# Patient Record
Sex: FEMALE | Race: WHITE | Employment: OTHER | URBAN - NONMETROPOLITAN AREA
[De-identification: names, ages, dates, MRNs, and addresses within clinical notes are randomized per-mention and may not be internally consistent; named-entity substitution may affect disease eponyms.]

---

## 2017-01-04 ENCOUNTER — TELEPHONE (OUTPATIENT)
Dept: PRIMARY CARE CLINIC | Age: 67
End: 2017-01-04

## 2017-01-19 DIAGNOSIS — J01.00 ACUTE NON-RECURRENT MAXILLARY SINUSITIS: ICD-10-CM

## 2017-01-19 RX ORDER — DEXTROMETHORPHAN HYDROBROMIDE AND PROMETHAZINE HYDROCHLORIDE 15; 6.25 MG/5ML; MG/5ML
5 SYRUP ORAL 4 TIMES DAILY PRN
Qty: 240 ML | Refills: 0 | Status: SHIPPED | OUTPATIENT
Start: 2017-01-19 | End: 2017-01-26

## 2017-01-24 RX ORDER — OXYCODONE AND ACETAMINOPHEN 10; 325 MG/1; MG/1
TABLET ORAL
Qty: 150 TABLET | Refills: 0 | Status: SHIPPED | OUTPATIENT
Start: 2017-01-29 | End: 2017-02-23 | Stop reason: SDUPTHER

## 2017-01-24 RX ORDER — OXYCODONE HYDROCHLORIDE 30 MG/1
30 TABLET, FILM COATED, EXTENDED RELEASE ORAL EVERY 12 HOURS
Qty: 60 EACH | Refills: 0 | Status: SHIPPED | OUTPATIENT
Start: 2017-01-29 | End: 2017-02-23 | Stop reason: SDUPTHER

## 2017-02-08 ENCOUNTER — TELEPHONE (OUTPATIENT)
Dept: PRIMARY CARE CLINIC | Age: 67
End: 2017-02-08

## 2017-02-08 RX ORDER — GUAIFENESIN 600 MG/1
600 TABLET, EXTENDED RELEASE ORAL 2 TIMES DAILY
Qty: 20 TABLET | Refills: 0 | Status: SHIPPED | OUTPATIENT
Start: 2017-02-08 | End: 2017-02-18

## 2017-02-08 RX ORDER — AZITHROMYCIN 250 MG/1
TABLET, FILM COATED ORAL
Qty: 1 PACKET | Refills: 0 | Status: SHIPPED | OUTPATIENT
Start: 2017-02-08 | End: 2017-02-18

## 2017-02-23 RX ORDER — OXYCODONE AND ACETAMINOPHEN 10; 325 MG/1; MG/1
TABLET ORAL
Qty: 120 TABLET | Refills: 0 | Status: SHIPPED | OUTPATIENT
Start: 2017-02-28 | End: 2017-03-30 | Stop reason: SDUPTHER

## 2017-02-23 RX ORDER — ONDANSETRON 4 MG/1
4 TABLET, FILM COATED ORAL EVERY 8 HOURS PRN
Qty: 30 TABLET | Refills: 0 | Status: ON HOLD | OUTPATIENT
Start: 2017-02-23 | End: 2022-06-25 | Stop reason: HOSPADM

## 2017-02-23 RX ORDER — OXYCODONE HYDROCHLORIDE 30 MG/1
30 TABLET, FILM COATED, EXTENDED RELEASE ORAL EVERY 12 HOURS
Qty: 48 EACH | Refills: 0 | Status: SHIPPED | OUTPATIENT
Start: 2017-02-28 | End: 2017-03-30 | Stop reason: SDUPTHER

## 2017-02-24 ENCOUNTER — TELEPHONE (OUTPATIENT)
Dept: PRIMARY CARE CLINIC | Age: 67
End: 2017-02-24

## 2017-02-24 RX ORDER — ONDANSETRON 4 MG/1
4 TABLET, FILM COATED ORAL DAILY PRN
Qty: 30 TABLET | Refills: 0 | Status: SHIPPED | OUTPATIENT
Start: 2017-02-24 | End: 2017-03-30

## 2017-03-04 ENCOUNTER — APPOINTMENT (OUTPATIENT)
Dept: GENERAL RADIOLOGY | Facility: HOSPITAL | Age: 67
End: 2017-03-04

## 2017-03-04 ENCOUNTER — HOSPITAL ENCOUNTER (INPATIENT)
Facility: HOSPITAL | Age: 67
LOS: 12 days | Discharge: HOME HEALTH | End: 2017-03-16
Attending: EMERGENCY MEDICINE | Admitting: INTERNAL MEDICINE

## 2017-03-04 ENCOUNTER — APPOINTMENT (OUTPATIENT)
Dept: CT IMAGING | Facility: HOSPITAL | Age: 67
End: 2017-03-04

## 2017-03-04 DIAGNOSIS — A41.9 SEPSIS, DUE TO UNSPECIFIED ORGANISM: ICD-10-CM

## 2017-03-04 DIAGNOSIS — Z74.09 IMPAIRED MOBILITY: ICD-10-CM

## 2017-03-04 DIAGNOSIS — J96.01 ACUTE RESPIRATORY FAILURE WITH HYPOXIA (HCC): ICD-10-CM

## 2017-03-04 DIAGNOSIS — J18.9 PNEUMONIA OF RIGHT LOWER LOBE DUE TO INFECTIOUS ORGANISM: Primary | ICD-10-CM

## 2017-03-04 LAB
ALBUMIN SERPL-MCNC: 3.5 G/DL (ref 3.5–5)
ALBUMIN/GLOB SERPL: 1.2 G/DL (ref 1.1–2.5)
ALP SERPL-CCNC: 77 U/L (ref 24–120)
ALT SERPL W P-5'-P-CCNC: 29 U/L (ref 0–54)
AMPHET+METHAMPHET UR QL: NEGATIVE
ANION GAP SERPL CALCULATED.3IONS-SCNC: 5 MMOL/L (ref 4–13)
APTT PPP: 36.6 SECONDS (ref 24.1–34.8)
ARTERIAL PATENCY WRIST A: ABNORMAL
AST SERPL-CCNC: 38 U/L (ref 7–45)
ATMOSPHERIC PRESS: ABNORMAL MMHG
BARBITURATES UR QL SCN: NEGATIVE
BASE EXCESS BLDA CALC-SCNC: -0.2 MMOL/L (ref -2–2)
BASE EXCESS BLDA CALC-SCNC: -0.2 MMOL/L (ref -2–2)
BASE EXCESS BLDA CALC-SCNC: -2.5 MMOL/L (ref -2–2)
BASE EXCESS BLDA CALC-SCNC: 1.1 MMOL/L (ref -2–2)
BASOPHILS # BLD AUTO: 0.02 10*3/MM3 (ref 0–0.2)
BASOPHILS NFR BLD AUTO: 0.4 % (ref 0–2)
BDY SITE: ABNORMAL
BENZODIAZ UR QL SCN: POSITIVE
BILIRUB SERPL-MCNC: 0.2 MG/DL (ref 0.1–1)
BILIRUB UR QL STRIP: NEGATIVE
BUN BLD-MCNC: 6 MG/DL (ref 5–21)
BUN/CREAT SERPL: 8 (ref 7–25)
CA-I BLDA-SCNC: 1.09 MMOL/L (ref 1.1–1.35)
CALCIUM SPEC-SCNC: 8.2 MG/DL (ref 8.4–10.4)
CANNABINOIDS SERPL QL: NEGATIVE
CHLORIDE SERPL-SCNC: 102 MMOL/L (ref 98–110)
CK MB SERPL-CCNC: 1.05 NG/ML (ref 0–5)
CK SERPL-CCNC: 42 U/L (ref 0–203)
CLARITY UR: CLEAR
CO2 SERPL-SCNC: 32 MMOL/L (ref 24–31)
COCAINE UR QL: NEGATIVE
COLOR UR: YELLOW
CREAT BLD-MCNC: 0.75 MG/DL (ref 0.5–1.4)
CRP SERPL-MCNC: 4.4 MG/DL (ref 0–0.99)
D-LACTATE SERPL-SCNC: 0.7 MMOL/L (ref 0.5–2)
D-LACTATE SERPL-SCNC: 0.9 MMOL/L (ref 0.5–2)
DEPRECATED RDW RBC AUTO: 53.2 FL (ref 40–54)
EOSINOPHIL # BLD AUTO: 0.14 10*3/MM3 (ref 0–0.7)
EOSINOPHIL NFR BLD AUTO: 2.7 % (ref 0–4)
EPAP: 5
EPAP: 5
ERYTHROCYTE [DISTWIDTH] IN BLOOD BY AUTOMATED COUNT: 15 % (ref 12–15)
FLUAV AG NPH QL: NEGATIVE
FLUBV AG NPH QL IA: NEGATIVE
GFR SERPL CREATININE-BSD FRML MDRD: 77 ML/MIN/1.73
GLOBULIN UR ELPH-MCNC: 3 GM/DL
GLUCOSE BLD-MCNC: 119 MG/DL (ref 70–100)
GLUCOSE UR STRIP-MCNC: NEGATIVE MG/DL
HCO3 BLDA-SCNC: 24.7 MMOL/L (ref 22–26)
HCO3 BLDA-SCNC: 26.3 MMOL/L (ref 22–26)
HCO3 BLDA-SCNC: 27.7 MMOL/L (ref 22–26)
HCO3 BLDA-SCNC: 28.6 MMOL/L (ref 22–26)
HCT VFR BLD AUTO: 36.5 % (ref 37–47)
HGB BLD-MCNC: 11.6 G/DL (ref 12–16)
HGB UR QL STRIP.AUTO: NEGATIVE
HOLD SPECIMEN: NORMAL
HOLD SPECIMEN: NORMAL
HOROWITZ INDEX BLD+IHG-RTO: 100 %
HOROWITZ INDEX BLD+IHG-RTO: 55 %
HOROWITZ INDEX BLD+IHG-RTO: 55 %
HOROWITZ INDEX BLD+IHG-RTO: 75 %
IMM GRANULOCYTES # BLD: 0 10*3/MM3 (ref 0–0.03)
IMM GRANULOCYTES NFR BLD: 0 % (ref 0–5)
INR PPP: 0.92 (ref 0.91–1.09)
IPAP: 16
IPAP: 18
KETONES UR QL STRIP: NEGATIVE
LEUKOCYTE ESTERASE UR QL STRIP.AUTO: NEGATIVE
LYMPHOCYTES # BLD AUTO: 0.89 10*3/MM3 (ref 0.72–4.86)
LYMPHOCYTES NFR BLD AUTO: 17.3 % (ref 15–45)
MAGNESIUM SERPL-MCNC: 1.4 MG/DL (ref 1.4–2.2)
MCH RBC QN AUTO: 30.8 PG (ref 28–32)
MCHC RBC AUTO-ENTMCNC: 31.8 G/DL (ref 33–36)
MCV RBC AUTO: 96.8 FL (ref 82–98)
METHADONE UR QL SCN: NEGATIVE
MODALITY: ABNORMAL
MONOCYTES # BLD AUTO: 0.52 10*3/MM3 (ref 0.19–1.3)
MONOCYTES NFR BLD AUTO: 10.1 % (ref 4–12)
NEUTROPHILS # BLD AUTO: 3.58 10*3/MM3 (ref 1.87–8.4)
NEUTROPHILS NFR BLD AUTO: 69.5 % (ref 39–78)
NITRITE UR QL STRIP: NEGATIVE
OPIATES UR QL: POSITIVE
PCO2 BLDA: 50.3 MM HG (ref 35–45)
PCO2 BLDA: 51.8 MM HG (ref 35–45)
PCO2 BLDA: 56.9 MM HG (ref 35–45)
PCO2 BLDA: 58.6 MM HG (ref 35–45)
PCP UR QL SCN: NEGATIVE
PEEP RESPIRATORY: 5 CM[H2O]
PH BLDA: 7.29 PH UNITS (ref 7.35–7.45)
PH BLDA: 7.3 PH UNITS (ref 7.35–7.45)
PH BLDA: 7.32 PH UNITS (ref 7.35–7.45)
PH BLDA: 7.34 PH UNITS (ref 7.35–7.45)
PH UR STRIP.AUTO: 5.5 [PH] (ref 5–8)
PHOSPHATE SERPL-MCNC: 2.6 MG/DL (ref 2.5–4.5)
PLATELET # BLD AUTO: 161 10*3/MM3 (ref 130–400)
PMV BLD AUTO: 11.5 FL (ref 6–12)
PO2 BLDA: 106.9 MM HG (ref 80–100)
PO2 BLDA: 120.9 MM HG (ref 80–100)
PO2 BLDA: 85.8 MM HG (ref 80–100)
PO2 BLDA: 93.7 MM HG (ref 80–100)
POTASSIUM BLD-SCNC: 3.7 MMOL/L (ref 3.5–5.3)
PROCALCITONIN SERPL-MCNC: <0.25 NG/ML
PROT SERPL-MCNC: 6.5 G/DL (ref 6.3–8.7)
PROT UR QL STRIP: NEGATIVE
PROTHROMBIN TIME: 12.6 SECONDS (ref 11.9–14.6)
RBC # BLD AUTO: 3.77 10*6/MM3 (ref 4.2–5.4)
SAO2 % BLDCOA: 95.8 % (ref 94–100)
SAO2 % BLDCOA: 95.8 % (ref 94–100)
SAO2 % BLDCOA: 96.2 % (ref 94–100)
SAO2 % BLDCOA: 96.2 % (ref 94–100)
SAO2 % BLDCOA: 97.3 % (ref 94–100)
SAO2 % BLDCOA: 97.3 % (ref 94–100)
SAO2 % BLDCOA: 98 % (ref 94–100)
SAO2 % BLDCOA: 98 % (ref 94–100)
SODIUM BLD-SCNC: 139 MMOL/L (ref 135–145)
SP GR UR STRIP: 1.02 (ref 1–1.03)
TOTAL RATE: 10 BREATHS/MINUTE
TOTAL RATE: 12 BREATHS/MINUTE
TROPONIN I SERPL-MCNC: 0 NG/ML (ref 0–0.07)
TROPONIN I SERPL-MCNC: 0.02 NG/ML (ref 0–0.03)
UROBILINOGEN UR QL STRIP: NORMAL
VENT CPAP/PEEP: 5
VENTILATOR MODE: AC
WBC NRBC COR # BLD: 5.15 10*3/MM3 (ref 4.8–10.8)
WHOLE BLOOD HOLD SPECIMEN: NORMAL
WHOLE BLOOD HOLD SPECIMEN: NORMAL

## 2017-03-04 PROCEDURE — 85610 PROTHROMBIN TIME: CPT | Performed by: EMERGENCY MEDICINE

## 2017-03-04 PROCEDURE — 87040 BLOOD CULTURE FOR BACTERIA: CPT | Performed by: EMERGENCY MEDICINE

## 2017-03-04 PROCEDURE — 25010000002 ENOXAPARIN PER 10 MG: Performed by: INTERNAL MEDICINE

## 2017-03-04 PROCEDURE — 71010 HC CHEST PA OR AP: CPT

## 2017-03-04 PROCEDURE — 80307 DRUG TEST PRSMV CHEM ANLYZR: CPT | Performed by: INTERNAL MEDICINE

## 2017-03-04 PROCEDURE — 93005 ELECTROCARDIOGRAM TRACING: CPT | Performed by: EMERGENCY MEDICINE

## 2017-03-04 PROCEDURE — 25010000002 AZITHROMYCIN: Performed by: INTERNAL MEDICINE

## 2017-03-04 PROCEDURE — 94660 CPAP INITIATION&MGMT: CPT

## 2017-03-04 PROCEDURE — 80053 COMPREHEN METABOLIC PANEL: CPT | Performed by: EMERGENCY MEDICINE

## 2017-03-04 PROCEDURE — 85730 THROMBOPLASTIN TIME PARTIAL: CPT | Performed by: EMERGENCY MEDICINE

## 2017-03-04 PROCEDURE — 82330 ASSAY OF CALCIUM: CPT

## 2017-03-04 PROCEDURE — 36600 WITHDRAWAL OF ARTERIAL BLOOD: CPT

## 2017-03-04 PROCEDURE — 83605 ASSAY OF LACTIC ACID: CPT | Performed by: INTERNAL MEDICINE

## 2017-03-04 PROCEDURE — 25010000002 PROPOFOL 1000 MG/ML EMULSION: Performed by: INTERNAL MEDICINE

## 2017-03-04 PROCEDURE — 02HV33Z INSERTION OF INFUSION DEVICE INTO SUPERIOR VENA CAVA, PERCUTANEOUS APPROACH: ICD-10-PCS | Performed by: INTERNAL MEDICINE

## 2017-03-04 PROCEDURE — 81003 URINALYSIS AUTO W/O SCOPE: CPT | Performed by: EMERGENCY MEDICINE

## 2017-03-04 PROCEDURE — 70450 CT HEAD/BRAIN W/O DYE: CPT

## 2017-03-04 PROCEDURE — 94799 UNLISTED PULMONARY SVC/PX: CPT

## 2017-03-04 PROCEDURE — 86140 C-REACTIVE PROTEIN: CPT | Performed by: EMERGENCY MEDICINE

## 2017-03-04 PROCEDURE — 31500 INSERT EMERGENCY AIRWAY: CPT | Performed by: INTERNAL MEDICINE

## 2017-03-04 PROCEDURE — 5A1935Z RESPIRATORY VENTILATION, LESS THAN 24 CONSECUTIVE HOURS: ICD-10-PCS | Performed by: INTERNAL MEDICINE

## 2017-03-04 PROCEDURE — 93010 ELECTROCARDIOGRAM REPORT: CPT | Performed by: INTERNAL MEDICINE

## 2017-03-04 PROCEDURE — 84484 ASSAY OF TROPONIN QUANT: CPT

## 2017-03-04 PROCEDURE — 83735 ASSAY OF MAGNESIUM: CPT | Performed by: EMERGENCY MEDICINE

## 2017-03-04 PROCEDURE — 83605 ASSAY OF LACTIC ACID: CPT | Performed by: EMERGENCY MEDICINE

## 2017-03-04 PROCEDURE — 82803 BLOOD GASES ANY COMBINATION: CPT

## 2017-03-04 PROCEDURE — 5A09357 ASSISTANCE WITH RESPIRATORY VENTILATION, LESS THAN 24 CONSECUTIVE HOURS, CONTINUOUS POSITIVE AIRWAY PRESSURE: ICD-10-PCS | Performed by: INTERNAL MEDICINE

## 2017-03-04 PROCEDURE — 94760 N-INVAS EAR/PLS OXIMETRY 1: CPT

## 2017-03-04 PROCEDURE — 94640 AIRWAY INHALATION TREATMENT: CPT

## 2017-03-04 PROCEDURE — 25010000002 PIPERACILLIN-TAZOBACTAM: Performed by: EMERGENCY MEDICINE

## 2017-03-04 PROCEDURE — 25010000002 METHYLPREDNISOLONE PER 125 MG: Performed by: EMERGENCY MEDICINE

## 2017-03-04 PROCEDURE — 82550 ASSAY OF CK (CPK): CPT | Performed by: INTERNAL MEDICINE

## 2017-03-04 PROCEDURE — 82553 CREATINE MB FRACTION: CPT | Performed by: INTERNAL MEDICINE

## 2017-03-04 PROCEDURE — 99285 EMERGENCY DEPT VISIT HI MDM: CPT

## 2017-03-04 PROCEDURE — 87804 INFLUENZA ASSAY W/OPTIC: CPT | Performed by: EMERGENCY MEDICINE

## 2017-03-04 PROCEDURE — 74176 CT ABD & PELVIS W/O CONTRAST: CPT

## 2017-03-04 PROCEDURE — 84145 PROCALCITONIN (PCT): CPT | Performed by: EMERGENCY MEDICINE

## 2017-03-04 PROCEDURE — 84484 ASSAY OF TROPONIN QUANT: CPT | Performed by: INTERNAL MEDICINE

## 2017-03-04 PROCEDURE — 0BH17EZ INSERTION OF ENDOTRACHEAL AIRWAY INTO TRACHEA, VIA NATURAL OR ARTIFICIAL OPENING: ICD-10-PCS | Performed by: INTERNAL MEDICINE

## 2017-03-04 PROCEDURE — 71250 CT THORAX DX C-: CPT

## 2017-03-04 PROCEDURE — 85025 COMPLETE CBC W/AUTO DIFF WBC: CPT | Performed by: EMERGENCY MEDICINE

## 2017-03-04 PROCEDURE — 84100 ASSAY OF PHOSPHORUS: CPT | Performed by: EMERGENCY MEDICINE

## 2017-03-04 PROCEDURE — 94002 VENT MGMT INPAT INIT DAY: CPT

## 2017-03-04 RX ORDER — PROMETHAZINE HYDROCHLORIDE 25 MG/1
25 TABLET ORAL EVERY 6 HOURS PRN
COMMUNITY
End: 2018-12-03

## 2017-03-04 RX ORDER — MIRTAZAPINE 15 MG/1
15 TABLET, FILM COATED ORAL NIGHTLY
COMMUNITY
End: 2017-03-16 | Stop reason: HOSPADM

## 2017-03-04 RX ORDER — HYDRALAZINE HYDROCHLORIDE 20 MG/ML
10 INJECTION INTRAMUSCULAR; INTRAVENOUS EVERY 6 HOURS PRN
Status: DISCONTINUED | OUTPATIENT
Start: 2017-03-04 | End: 2017-03-16 | Stop reason: HOSPADM

## 2017-03-04 RX ORDER — ZOLPIDEM TARTRATE 5 MG/1
10 TABLET ORAL NIGHTLY PRN
COMMUNITY
End: 2017-03-16 | Stop reason: HOSPADM

## 2017-03-04 RX ORDER — DEXTROAMPHETAMINE SACCHARATE, AMPHETAMINE ASPARTATE, DEXTROAMPHETAMINE SULFATE AND AMPHETAMINE SULFATE 5; 5; 5; 5 MG/1; MG/1; MG/1; MG/1
20 TABLET ORAL 2 TIMES DAILY PRN
COMMUNITY

## 2017-03-04 RX ORDER — TIZANIDINE 4 MG/1
4 TABLET ORAL EVERY 8 HOURS PRN
COMMUNITY
End: 2017-03-16 | Stop reason: HOSPADM

## 2017-03-04 RX ORDER — BUDESONIDE AND FORMOTEROL FUMARATE DIHYDRATE 160; 4.5 UG/1; UG/1
2 AEROSOL RESPIRATORY (INHALATION)
COMMUNITY
End: 2018-12-03 | Stop reason: SDUPTHER

## 2017-03-04 RX ORDER — NICOTINE 21 MG/24HR
1 PATCH, TRANSDERMAL 24 HOURS TRANSDERMAL EVERY 24 HOURS
Status: DISCONTINUED | OUTPATIENT
Start: 2017-03-04 | End: 2017-03-16 | Stop reason: HOSPADM

## 2017-03-04 RX ORDER — SODIUM CHLORIDE 0.9 % (FLUSH) 0.9 %
10 SYRINGE (ML) INJECTION AS NEEDED
Status: DISCONTINUED | OUTPATIENT
Start: 2017-03-04 | End: 2017-03-16 | Stop reason: HOSPADM

## 2017-03-04 RX ORDER — CHLORHEXIDINE GLUCONATE 0.12 MG/ML
15 RINSE ORAL EVERY 12 HOURS SCHEDULED
Status: DISCONTINUED | OUTPATIENT
Start: 2017-03-04 | End: 2017-03-05

## 2017-03-04 RX ORDER — IPRATROPIUM BROMIDE AND ALBUTEROL SULFATE 2.5; .5 MG/3ML; MG/3ML
3 SOLUTION RESPIRATORY (INHALATION) ONCE
Status: COMPLETED | OUTPATIENT
Start: 2017-03-04 | End: 2017-03-04

## 2017-03-04 RX ORDER — LISINOPRIL 5 MG/1
5 TABLET ORAL DAILY
COMMUNITY
End: 2019-08-19 | Stop reason: ALTCHOICE

## 2017-03-04 RX ORDER — FUROSEMIDE 40 MG/1
40 TABLET ORAL DAILY
COMMUNITY
End: 2018-12-03

## 2017-03-04 RX ORDER — IPRATROPIUM BROMIDE AND ALBUTEROL SULFATE 2.5; .5 MG/3ML; MG/3ML
3 SOLUTION RESPIRATORY (INHALATION)
Status: DISCONTINUED | OUTPATIENT
Start: 2017-03-04 | End: 2017-03-05

## 2017-03-04 RX ORDER — ENALAPRILAT 2.5 MG/2ML
0.62 INJECTION INTRAVENOUS EVERY 6 HOURS PRN
Status: DISCONTINUED | OUTPATIENT
Start: 2017-03-04 | End: 2017-03-16 | Stop reason: HOSPADM

## 2017-03-04 RX ORDER — ONDANSETRON 4 MG/1
4 TABLET, FILM COATED ORAL EVERY 6 HOURS PRN
COMMUNITY
End: 2020-11-23 | Stop reason: SDUPTHER

## 2017-03-04 RX ORDER — ALBUTEROL SULFATE 90 UG/1
2 AEROSOL, METERED RESPIRATORY (INHALATION) EVERY 4 HOURS PRN
Status: ON HOLD | COMMUNITY
End: 2019-09-05 | Stop reason: SDUPTHER

## 2017-03-04 RX ORDER — SODIUM CHLORIDE 0.9 % (FLUSH) 0.9 %
1-10 SYRINGE (ML) INJECTION AS NEEDED
Status: DISCONTINUED | OUTPATIENT
Start: 2017-03-04 | End: 2017-03-16 | Stop reason: HOSPADM

## 2017-03-04 RX ORDER — OXYCODONE HYDROCHLORIDE 30 MG/1
30 TABLET, FILM COATED, EXTENDED RELEASE ORAL EVERY 12 HOURS SCHEDULED
COMMUNITY
End: 2020-11-23 | Stop reason: DRUGHIGH

## 2017-03-04 RX ORDER — MORPHINE SULFATE 2 MG/ML
2 INJECTION, SOLUTION INTRAMUSCULAR; INTRAVENOUS
Status: DISCONTINUED | OUTPATIENT
Start: 2017-03-04 | End: 2017-03-05

## 2017-03-04 RX ORDER — ACETAMINOPHEN 325 MG/1
650 TABLET ORAL EVERY 4 HOURS PRN
Status: DISCONTINUED | OUTPATIENT
Start: 2017-03-04 | End: 2017-03-16 | Stop reason: HOSPADM

## 2017-03-04 RX ORDER — VANCOMYCIN HYDROCHLORIDE 1 G/200ML
1000 INJECTION, SOLUTION INTRAVENOUS EVERY 24 HOURS
Status: DISCONTINUED | OUTPATIENT
Start: 2017-03-05 | End: 2017-03-04 | Stop reason: DRUGHIGH

## 2017-03-04 RX ORDER — ACETAMINOPHEN 650 MG/1
325 SUPPOSITORY RECTAL EVERY 4 HOURS PRN
Status: DISCONTINUED | OUTPATIENT
Start: 2017-03-04 | End: 2017-03-16 | Stop reason: HOSPADM

## 2017-03-04 RX ORDER — GABAPENTIN 600 MG/1
600 TABLET ORAL EVERY 8 HOURS SCHEDULED
COMMUNITY

## 2017-03-04 RX ORDER — CITALOPRAM 40 MG/1
40 TABLET ORAL DAILY
COMMUNITY
End: 2020-11-23 | Stop reason: ALTCHOICE

## 2017-03-04 RX ORDER — MIDAZOLAM HYDROCHLORIDE 1 MG/ML
INJECTION INTRAMUSCULAR; INTRAVENOUS
Status: DISPENSED
Start: 2017-03-04 | End: 2017-03-05

## 2017-03-04 RX ORDER — SODIUM CHLORIDE 9 MG/ML
75 INJECTION, SOLUTION INTRAVENOUS CONTINUOUS
Status: DISCONTINUED | OUTPATIENT
Start: 2017-03-04 | End: 2017-03-05

## 2017-03-04 RX ORDER — OXYCODONE AND ACETAMINOPHEN 10; 325 MG/1; MG/1
1 TABLET ORAL EVERY 6 HOURS PRN
COMMUNITY

## 2017-03-04 RX ORDER — ONDANSETRON 2 MG/ML
4 INJECTION INTRAMUSCULAR; INTRAVENOUS EVERY 6 HOURS PRN
Status: DISCONTINUED | OUTPATIENT
Start: 2017-03-04 | End: 2017-03-16 | Stop reason: HOSPADM

## 2017-03-04 RX ORDER — VANCOMYCIN HYDROCHLORIDE 1 G/200ML
1000 INJECTION, SOLUTION INTRAVENOUS EVERY 12 HOURS
Status: DISCONTINUED | OUTPATIENT
Start: 2017-03-05 | End: 2017-03-05

## 2017-03-04 RX ORDER — MORPHINE SULFATE 2 MG/ML
2 INJECTION, SOLUTION INTRAMUSCULAR; INTRAVENOUS EVERY 4 HOURS PRN
Status: DISCONTINUED | OUTPATIENT
Start: 2017-03-04 | End: 2017-03-05

## 2017-03-04 RX ORDER — METHYLPREDNISOLONE SODIUM SUCCINATE 125 MG/2ML
125 INJECTION, POWDER, LYOPHILIZED, FOR SOLUTION INTRAMUSCULAR; INTRAVENOUS ONCE
Status: COMPLETED | OUTPATIENT
Start: 2017-03-04 | End: 2017-03-04

## 2017-03-04 RX ORDER — LORAZEPAM 2 MG/ML
2 INJECTION INTRAMUSCULAR
Status: DISCONTINUED | OUTPATIENT
Start: 2017-03-04 | End: 2017-03-05

## 2017-03-04 RX ORDER — CETIRIZINE HYDROCHLORIDE 10 MG/1
10 TABLET ORAL DAILY
COMMUNITY
End: 2018-12-03

## 2017-03-04 RX ORDER — ALPRAZOLAM 1 MG/1
1 TABLET ORAL 4 TIMES DAILY PRN
COMMUNITY
End: 2019-09-18 | Stop reason: HOSPADM

## 2017-03-04 RX ADMIN — NICOTINE 1 PATCH: 21 PATCH, EXTENDED RELEASE TRANSDERMAL at 20:17

## 2017-03-04 RX ADMIN — METHYLPREDNISOLONE SODIUM SUCCINATE 125 MG: 125 INJECTION, POWDER, FOR SOLUTION INTRAMUSCULAR; INTRAVENOUS at 14:30

## 2017-03-04 RX ADMIN — SODIUM CHLORIDE 75 ML/HR: 9 INJECTION, SOLUTION INTRAVENOUS at 20:08

## 2017-03-04 RX ADMIN — PROPOFOL 35 MCG/KG/MIN: 10 INJECTION, EMULSION INTRAVENOUS at 18:54

## 2017-03-04 RX ADMIN — PROPOFOL 40 MCG/KG/MIN: 10 INJECTION, EMULSION INTRAVENOUS at 23:22

## 2017-03-04 RX ADMIN — ENOXAPARIN SODIUM 40 MG: 40 INJECTION SUBCUTANEOUS at 21:07

## 2017-03-04 RX ADMIN — VANCOMYCIN HYDROCHLORIDE 1000 MG: 1 INJECTION, POWDER, LYOPHILIZED, FOR SOLUTION INTRAVENOUS at 14:37

## 2017-03-04 RX ADMIN — SODIUM CHLORIDE 1974 ML: 9 INJECTION, SOLUTION INTRAVENOUS at 14:20

## 2017-03-04 RX ADMIN — CHLORHEXIDINE GLUCONATE 15 ML: 1.2 RINSE ORAL at 21:12

## 2017-03-04 RX ADMIN — TAZOBACTAM SODIUM AND PIPERACILLIN SODIUM 4.5 G: .5; 4 INJECTION, POWDER, LYOPHILIZED, FOR SOLUTION INTRAVENOUS at 15:06

## 2017-03-04 RX ADMIN — NOREPINEPHRINE BITARTRATE 0.06 MCG/KG/MIN: 1 INJECTION INTRAVENOUS at 20:08

## 2017-03-04 RX ADMIN — IPRATROPIUM BROMIDE AND ALBUTEROL SULFATE 3 ML: .5; 3 SOLUTION RESPIRATORY (INHALATION) at 16:37

## 2017-03-04 RX ADMIN — IPRATROPIUM BROMIDE AND ALBUTEROL SULFATE 3 ML: .5; 3 SOLUTION RESPIRATORY (INHALATION) at 20:03

## 2017-03-04 RX ADMIN — AZITHROMYCIN 500 MG: 500 INJECTION, POWDER, LYOPHILIZED, FOR SOLUTION INTRAVENOUS at 23:19

## 2017-03-04 NOTE — H&P
"    [facility]  HISTORY AND PHYSICAL    Date of Admission: 3/4/2017  Primary Care Physician: No Known Provider    Subjective     Chief Complaint:   hypoxia    History of Present Illness  64 YO CF admitted on 3/4/17. Patient too somnolent to give HX. Daughter at bedside and gives HX. States this started about one month ago. Saw PCP Dr. Reyes and got 3 rounds of antibiotics. 2 rounds of doxycycline and one round of azithromycin, subsequently she got slightly better, but never fully recovered. Patient continues to smoke 1 PPD. She has been in bed a lot lately as her  just passed away in July. She has HX of depression and sees on OP psychiatrist. Daughter stated this afternoon family was unable to wake patient and her 02 was 50%. She does not wear 02 at home.       Review of Systems   Dyspnea  Lethargy  Recent episode of abdominal pain    Otherwise complete ROS reviewed and negative except as mentioned in the HPI.      Past Medical History:   Past Medical History   Diagnosis Date   • Anxiety    • COPD (chronic obstructive pulmonary disease)    • Depression    • Hypertension        Past Surgical History:History reviewed. No pertinent past surgical history.    Social History:  reports that she has been smoking Cigarettes.  She has been smoking about 1.00 pack per day. She does not have any smokeless tobacco history on file. She reports that she does not drink alcohol or use illicit drugs. Daughter lives with patient.    Family History: family history is not on file.   RA, depression/anxiety, unknown type of cancer    Allergies:  Allergies not on file    Medications:  Prior to Admission medications    Not on File       Objective     Vital Signs:   Visit Vitals   • /51   • Pulse 79   • Temp (!) 100.9 °F (38.3 °C) (Oral)   • Resp 20   • Ht 67\" (170.2 cm)   • Wt 145 lb (65.8 kg)   • LMP  (LMP Unknown)  Comment: hysterectomy   • SpO2 99%   • Breastfeeding No   • " BMI 22.71 kg/m2     Physical Exam   HENT:   Head: Normocephalic and atraumatic.   Nose: Nose normal.   Mouth/Throat: Oropharynx is clear and moist.   Eyes: Conjunctivae and EOM are normal.   Neck: Normal range of motion. Neck supple.   Cardiovascular: Normal rate, regular rhythm and normal heart sounds.    Pulmonary/Chest: Breath sounds normal. She is in respiratory distress.   Abdominal: Soft.   Hypomobile bowel sounds.    Musculoskeletal: She exhibits no edema or tenderness.   Neurological: No cranial nerve deficit.   Limited responsiveness.    Skin: Skin is warm and dry.   Psychiatric: She has a normal mood and affect.   Vitals reviewed.           Results Reviewed:  Lab Results (last 24 hours)     Procedure Component Value Units Date/Time    [facility] Draw [[id number]] Collected:  03/04/17 1415    Specimen:  Blood Updated:  03/04/17 1436    Narrative:       The following orders were created for panel order [facility] Draw.  Procedure                               Abnormality         Status                     ---------                               -----------         ------                     Light Blue Top[[id number]]                                    In process                 Green Top (Gel)[[id number]]                                   In process                 Lavender Top[[id number]]                                      In process                 Red Top[[id number]]                                           In process                 Green Top (No Gel)[[id number]]                                                             Please view results for these tests on the individual orders.    Red Top [[id number]] Collected:  03/04/17 1415    Specimen:  Blood from Arm, Left Updated:  03/04/17 1436    Light Blue Top [[id number]] Collected:  03/04/17 1415    Specimen:  Blood from Arm, Left Updated:  03/04/17 1436    Lavender Top [[id number]] Collected:  03/04/17 1415    Specimen:  Blood from Arm, Left Updated:  03/04/17 1436    Green  Top (Gel) [[id number]] Collected:  03/04/17 1415    Specimen:  Blood from Arm, Left Updated:  03/04/17 1436    CBC & Differential [[id number]] Collected:  03/04/17 1415    Specimen:  Blood Updated:  03/04/17 1441    Narrative:       The following orders were created for panel order CBC & Differential.  Procedure                               Abnormality         Status                     ---------                               -----------         ------                     CBC Auto Differential[[id number]]         Abnormal            Final result                 Please view results for these tests on the individual orders.    CBC Auto Differential [[id number]]  (Abnormal) Collected:  03/04/17 1415    Specimen:  Blood from Arm, Left Updated:  03/04/17 1441     WBC 5.15 10*3/mm3      RBC 3.77 (L) 10*6/mm3      Hemoglobin 11.6 (L) g/dL      Hematocrit 36.5 (L) %      MCV 96.8 fL      MCH 30.8 pg      MCHC 31.8 (L) g/dL      RDW 15.0 %      RDW-SD 53.2 fl      MPV 11.5 fL      Platelets 161 10*3/mm3      Neutrophil % 69.5 %      Lymphocyte % 17.3 %      Monocyte % 10.1 %      Eosinophil % 2.7 %      Basophil % 0.4 %      Immature Grans % 0.0 %      Neutrophils, Absolute 3.58 10*3/mm3      Lymphocytes, Absolute 0.89 10*3/mm3      Monocytes, Absolute 0.52 10*3/mm3      Eosinophils, Absolute 0.14 10*3/mm3      Basophils, Absolute 0.02 10*3/mm3      Immature Grans, Absolute 0.00 10*3/mm3     Blood Gas, Arterial [[id number]]  (Abnormal) Collected:  03/04/17 1440    Specimen:  Arterial Blood Updated:  03/04/17 1442     Site Arterial: right brachial      Michael's Test --       Documented in Rapid Comm        pH, Arterial 7.319 (L) pH units      pCO2, Arterial 56.9 (H) mm Hg      pO2, Arterial 120.9 (H) mm Hg      HCO3, Arterial 28.6 (H) mmol/L      Base Excess, Arterial 1.1 mmol/L      O2 Saturation, Arterial 98.0 %      O2 Saturation Calculated 98.0 %      Barometric Pressure for Blood Gas -- mmHg       Component not reported at this  site.        Modality Non Rebreather      FIO2 100 %     Narrative:       Serial Number: [id number]    : [id number]    Blood Culture [[id number]] Collected:  03/04/17 1427    Specimen:  Blood from Arm, Right Updated:  03/04/17 1443    Blood Culture [[id number]] Collected:  03/04/17 1415    Specimen:  Blood from Arm, Left Updated:  03/04/17 1443    Calcium, Ionized [[id number]]  (Abnormal) Collected:  03/04/17 1446    Specimen:  Blood Updated:  03/04/17 1448     Ionized Calcium, Arterial 1.09 (L) mmol/L     Narrative:       Serial Number: [id number]    : [id number]    Protime-INR [[id number]]  (Normal) Collected:  03/04/17 1415    Specimen:  Blood from Arm, Left Updated:  03/04/17 1448     Protime 12.6 Seconds      INR 0.92     aPTT [[id number]]  (Abnormal) Collected:  03/04/17 1415    Specimen:  Blood from Arm, Left Updated:  03/04/17 1448     PTT 36.6 (H) seconds     Lactic Acid, Plasma [[id number]]  (Normal) Collected:  03/04/17 1415    Specimen:  Blood from Arm, Left Updated:  03/04/17 1449     Lactate 0.7 mmol/L     POC Troponin, Rapid [[id number]]  (Normal) Collected:  03/04/17 1436    Specimen:  Blood Updated:  03/04/17 1451     Troponin I 0.00 ng/mL       Serial Number: [id number]    : [id number]       Comprehensive Metabolic Panel [[id number]]  (Abnormal) Collected:  03/04/17 1415    Specimen:  Blood from Arm, Left Updated:  03/04/17 1454     Glucose 119 (H) mg/dL      BUN 6 mg/dL      Creatinine 0.75 mg/dL      Sodium 139 mmol/L      Potassium 3.7 mmol/L      Chloride 102 mmol/L      CO2 32.0 (H) mmol/L      Calcium 8.2 (L) mg/dL      Total Protein 6.5 g/dL      Albumin 3.50 g/dL      ALT (SGPT) 29 U/L      AST (SGOT) 38 U/L      Alkaline Phosphatase 77 U/L      Total Bilirubin 0.2 mg/dL      eGFR Non African Amer 77 mL/min/1.73      Globulin 3.0 gm/dL      A/G Ratio 1.2 g/dL      BUN/Creatinine Ratio 8.0      Anion Gap 5.0 mmol/L     Magnesium [[id number]]  (Normal) Collected:  03/04/17 141    Specimen:  Blood from Arm,  Left Updated:  03/04/17 1454     Magnesium 1.4 mg/dL     Phosphorus [[id number]]  (Normal) Collected:  03/04/17 1415    Specimen:  Blood from Arm, Left Updated:  03/04/17 1454     Phosphorus 2.6 mg/dL     C-reactive Protein [[id number]]  (Abnormal) Collected:  03/04/17 1415    Specimen:  Blood from Arm, Left Updated:  03/04/17 1454     C-Reactive Protein 4.40 (H) mg/dL     Procalcitonin [[id number]]  (Normal) Collected:  03/04/17 1415    Specimen:  Blood from Arm, Left Updated:  03/04/17 1510     Procalcitonin <0.25 ng/mL     Narrative:       SIRS, sepsis, severe sepsis, and septic shock are categorized according to the criteria of the consensus conference of the American College of Chest Physicians/Society of Critical Care Medicine.    PCT < 0.5 ng/mL     Systemic infection (sepsis) is not likely.    PCT >0.5 and < 2.0 ng/mL Systemic infection (sepsis) is possible, but other conditions are known to elevate PCT as well.    PCT > 2.0 ng/mL     Systemic infection (sepsis) is likely, unless other causes are known.      PCT > 10.0 ng/mL    Important systemic inflammatory response, almost exclusively due to severe bacterial sepsis or septic shock.    PCT values of < 0.5 ng/mL do not exclude an infection, because localized infections (without systemic signs) may be associated with such low concentrations, or a systemic infection in its initial stages (<6 hours).  Increased PCT can occur without infection.  PCT concentrations between 0.5 and 2.0 ng/mL should be interpreted taking into account the patients history.  It is recommended to retest PCT within 6-24 hours if any concentrations < 2.0 ng/mL are obtained.    Blood Gas, Arterial [[id number]]  (Abnormal) Collected:  03/04/17 1610    Specimen:  Arterial Blood Updated:  03/04/17 1613     Site Arterial: right brachial      Michael's Test --       Documented in Rapid Comm        pH, Arterial 7.292 (L) pH units      pCO2, Arterial 58.6 (H) mm Hg      pO2, Arterial 93.7 mm Hg       HCO3, Arterial 27.7 (H) mmol/L      Base Excess, Arterial -0.2 mmol/L      O2 Saturation, Arterial 96.2 %      O2 Saturation Calculated 96.2 %      Barometric Pressure for Blood Gas -- mmHg       Component not reported at this site.        Modality BiPAP      FIO2 55 %      Rate 12.0 Breaths/minute      IPAP 16      EPAP 5     Narrative:       Serial Number: [id number]    : [id number]        Imaging Results (last 24 hours)     Procedure Component Value Units Date/Time    XR Chest 1 View [[id number]] Collected:  03/04/17 1527     Updated:  03/04/17 1542    Narrative:       CHEST, ONE VIEW:     HISTORY: Sepsis     COMPARISON: 10/31/2009 and 01/21/2009     A single frontal chest radiograph was obtained.     FINDINGS:     COPD and chronic lung changes appreciated.     There are patchy airspace opacities identified in the right lower lobe  laterally not observed previously and a small area of acute infiltrate  from an infectious or inflammatory process, pneumonia considered.  Correlate with patient presentation.     The heart is normal in size without evidence of overt heart failure.     The bony structures are intact.                                  Impression:       1. COPD and chronic lung changes.  2. Mild patchy right lower lobe airspace opacities, small area of acute  infiltrate/pneumonia considered.     This report was finalized on 03/04/2017 15:40 by Dr. Eduardo Black MD.          I have personally reviewed and interpreted the radiology studies and ECG obtained at time of admission.     Assessment / Plan     Assessment & Plan  Hospital Problem List     Pneumonia of right lower lobe due to infectious organism        Impression/Plan:  1. Respiratory distress with limited responsiveness- will place in unit and get neuro checks,  2. Pneumonia failed OP therapy- Will start vancomycin and zosyn. Add nebs, and continue BiPap  3. Fever- flu swab pending  4. Tobacco abuse- patch and counseling  5. HTN- resume home  medications and follow  6. Depression/anxiety- resume home medications  7. Hx abdominal pain with hypomobile BS- will get KUB and follow      Code Status: Full         Colt Sargent,    03/04/17   4:27 PM

## 2017-03-04 NOTE — ED PROVIDER NOTES
Subjective   Patient is a 65 y.o. female presenting with shortness of breath.   Shortness of Breath   Severity:  Severe  Onset quality:  Gradual  Timing:  Constant  Progression:  Worsening  Chronicity:  New  Context: not activity, not animal exposure, not emotional upset, not fumes, not pollens, not smoke exposure, not URI and not weather changes    Relieved by:  Nothing  Worsened by:  Exertion  Ineffective treatments:  None tried  Associated symptoms: cough, diaphoresis, sputum production and wheezing    Associated symptoms: no abdominal pain, no chest pain, no ear pain, no fever, no headaches, no PND, no rash, no sore throat and no vomiting    Risk factors: no recent alcohol use, no obesity, no oral contraceptive use, no prolonged immobilization and no recent surgery        Review of Systems   Constitutional: Positive for diaphoresis. Negative for activity change, appetite change, chills, fatigue and fever.   HENT: Negative for congestion, drooling, ear pain, facial swelling, hearing loss, sinus pressure and sore throat.    Eyes: Negative.  Negative for discharge.   Respiratory: Positive for cough, sputum production, shortness of breath and wheezing.    Cardiovascular: Negative for chest pain and PND.   Gastrointestinal: Negative for abdominal distention, abdominal pain, blood in stool, diarrhea, nausea and vomiting.   Endocrine: Negative.  Negative for cold intolerance, heat intolerance, polydipsia, polyphagia and polyuria.   Genitourinary: Negative.  Negative for dysuria, flank pain and urgency.   Musculoskeletal: Negative.  Negative for arthralgias, back pain, myalgias and neck stiffness.   Skin: Negative.  Negative for color change, pallor and rash.   Allergic/Immunologic: Negative.    Neurological: Negative.  Negative for dizziness, seizures, speech difficulty, weakness, numbness and headaches.   Hematological: Negative.  Negative for adenopathy.   All other systems reviewed and are negative.      Past  Medical History   Diagnosis Date   • Anxiety    • COPD (chronic obstructive pulmonary disease)    • Depression    • Hypertension        Allergies not on file    History reviewed. No pertinent past surgical history.    History reviewed. No pertinent family history.    Social History     Social History   • Marital status: Unknown     Spouse name: N/A   • Number of children: N/A   • Years of education: N/A     Social History Main Topics   • Smoking status: Current Every Day Smoker     Packs/day: 1.00     Types: Cigarettes   • Smokeless tobacco: None   • Alcohol use No   • Drug use: No   • Sexual activity: Defer     Other Topics Concern   • None     Social History Narrative   • None           Objective   Physical Exam   Constitutional: She is oriented to person, place, and time. She appears well-developed and well-nourished.   HENT:   Head: Normocephalic.   Right Ear: External ear normal.   Eyes: Conjunctivae are normal. Pupils are equal, round, and reactive to light.   Neck: Normal range of motion. Neck supple.   Cardiovascular: Regular rhythm, normal heart sounds and intact distal pulses.  Tachycardia present.  PMI is not displaced.  Exam reveals no decreased pulses.    No murmur heard.  Pulmonary/Chest: Accessory muscle usage present. Tachypnea noted. No apnea. She is in respiratory distress. She has decreased breath sounds in the right middle field, the right lower field, the left middle field and the left lower field. She has wheezes in the right middle field, the right lower field, the left middle field and the left lower field. She has rhonchi in the right lower field and the left lower field. She has rales in the right lower field and the left lower field. She exhibits no tenderness.   Abdominal: Soft. Bowel sounds are normal. There is no tenderness.   Musculoskeletal: Normal range of motion. She exhibits no edema or tenderness.   Lower extremity exam bilaterally is unremarkable.  There is no right or left calf  tenderness .  There is no palpable venous cord.  No obvious difference in the size of the legs.  No pitting edema.  The dorsalis pedis and posterior tibial femoral and popliteal pulses are palpable and +2 bilaterally.  Homans sign is negative       Vascular Status -  Her exam exhibits right foot vasculature normal. Her exam exhibits no right foot edema. Her exam exhibits left foot vasculature normal. Her exam exhibits no left foot edema.  Neurological: She is alert and oriented to person, place, and time. She has normal reflexes. No cranial nerve deficit. Coordination normal.   Skin: Skin is warm. No rash noted. No erythema.   Nursing note and vitals reviewed.      Procedures         ED Course  ED Course   Comment By Time   nsr Shamar Angulo MD 03/04 1441   Patient with the shortness of breath appears nausea and IV fluid bolus was given the patient and she is sepsis screen positive for pneumonia doing better now explained with the to the hospitalist to admit the patient Shamar Angulo MD 03/04 1525                  [facility]      Final diagnoses:   Pneumonia of right lower lobe due to infectious organism   Sepsis, due to unspecified organism            Shamar Angulo MD  03/04/17 [id number]

## 2017-03-05 ENCOUNTER — APPOINTMENT (OUTPATIENT)
Dept: CARDIOLOGY | Facility: HOSPITAL | Age: 67
End: 2017-03-05
Attending: INTERNAL MEDICINE

## 2017-03-05 ENCOUNTER — APPOINTMENT (OUTPATIENT)
Dept: GENERAL RADIOLOGY | Facility: HOSPITAL | Age: 67
End: 2017-03-05

## 2017-03-05 ENCOUNTER — APPOINTMENT (OUTPATIENT)
Dept: CT IMAGING | Facility: HOSPITAL | Age: 67
End: 2017-03-05

## 2017-03-05 LAB
AMMONIA BLD-SCNC: <9 UMOL/L (ref 9–33)
ARTERIAL PATENCY WRIST A: ABNORMAL
ATMOSPHERIC PRESS: ABNORMAL MMHG
BASE EXCESS BLDA CALC-SCNC: -0.5 MMOL/L (ref -2–2)
BASE EXCESS BLDA CALC-SCNC: 0.7 MMOL/L (ref -2–2)
BASE EXCESS BLDA CALC-SCNC: 2.1 MMOL/L (ref -2–2)
BDY SITE: ABNORMAL
BH CV ECHO MEAS - AO MAX PG (FULL): 2.9 MMHG
BH CV ECHO MEAS - AO MAX PG: 6.9 MMHG
BH CV ECHO MEAS - AO MEAN PG (FULL): 1 MMHG
BH CV ECHO MEAS - AO MEAN PG: 3 MMHG
BH CV ECHO MEAS - AO V2 MAX: 131 CM/SEC
BH CV ECHO MEAS - AO V2 MEAN: 86.4 CM/SEC
BH CV ECHO MEAS - AO V2 VTI: 36.1 CM
BH CV ECHO MEAS - AVA(I,A): 2.1 CM^2
BH CV ECHO MEAS - AVA(I,D): 2.1 CM^2
BH CV ECHO MEAS - AVA(V,A): 2.4 CM^2
BH CV ECHO MEAS - AVA(V,D): 2.4 CM^2
BH CV ECHO MEAS - CONTRAST EF 4CH: 66.5 ML/M^2
BH CV ECHO MEAS - EDV(CUBED): 60.7 ML
BH CV ECHO MEAS - EDV(MOD-SP4): 113 ML
BH CV ECHO MEAS - EDV(TEICH): 67.1 ML
BH CV ECHO MEAS - EF(CUBED): 81.2 %
BH CV ECHO MEAS - EF(MOD-SP4): 66.5 %
BH CV ECHO MEAS - EF(TEICH): 74.5 %
BH CV ECHO MEAS - ESV(CUBED): 11.4 ML
BH CV ECHO MEAS - ESV(MOD-SP4): 37.8 ML
BH CV ECHO MEAS - ESV(TEICH): 17.1 ML
BH CV ECHO MEAS - FS: 42.7 %
BH CV ECHO MEAS - IVS/LVPW: 1
BH CV ECHO MEAS - IVSD: 1.1 CM
BH CV ECHO MEAS - LA DIMENSION: 2.6 CM
BH CV ECHO MEAS - LAT PEAK E' VEL: 10 CM/SEC
BH CV ECHO MEAS - LV MASS(C)D: 140.8 GRAMS
BH CV ECHO MEAS - LV MAX PG: 4 MMHG
BH CV ECHO MEAS - LV MEAN PG: 2 MMHG
BH CV ECHO MEAS - LV V1 MAX: 100 CM/SEC
BH CV ECHO MEAS - LV V1 MEAN: 64.4 CM/SEC
BH CV ECHO MEAS - LV V1 VTI: 24.2 CM
BH CV ECHO MEAS - LVIDD: 3.9 CM
BH CV ECHO MEAS - LVIDS: 2.3 CM
BH CV ECHO MEAS - LVLD AP4: 7.4 CM
BH CV ECHO MEAS - LVLS AP4: 6.2 CM
BH CV ECHO MEAS - LVOT AREA (M): 3.1 CM^2
BH CV ECHO MEAS - LVOT AREA: 3.1 CM^2
BH CV ECHO MEAS - LVOT DIAM: 2 CM
BH CV ECHO MEAS - LVPWD: 1.1 CM
BH CV ECHO MEAS - MED PEAK E' VEL: 7.62 CM/SEC
BH CV ECHO MEAS - MV A MAX VEL: 65.5 CM/SEC
BH CV ECHO MEAS - MV DEC TIME: 0.25 SEC
BH CV ECHO MEAS - MV E MAX VEL: 76.6 CM/SEC
BH CV ECHO MEAS - MV E/A: 1.2
BH CV ECHO MEAS - RAP SYSTOLE: 10 MMHG
BH CV ECHO MEAS - RVSP: 30.4 MMHG
BH CV ECHO MEAS - SV(CUBED): 49.3 ML
BH CV ECHO MEAS - SV(LVOT): 76 ML
BH CV ECHO MEAS - SV(MOD-SP4): 75.2 ML
BH CV ECHO MEAS - SV(TEICH): 50 ML
BH CV ECHO MEAS - TR MAX VEL: 226 CM/SEC
COHGB MFR BLD: 0.4 % (ref 0–5.1)
FOLATE SERPL-MCNC: 4.24 NG/ML
GAS FLOW AIRWAY: 6 LPM
HCO3 BLDA-SCNC: 25.1 MMOL/L (ref 22–26)
HCO3 BLDA-SCNC: 25.5 MMOL/L (ref 22–26)
HCO3 BLDA-SCNC: 27.5 MMOL/L (ref 22–26)
HCT VFR BLD CALC: 36 % (ref 38–51)
HGB BLDA-MCNC: 12.1 G/DL (ref 12–16)
HOROWITZ INDEX BLD+IHG-RTO: 60 %
HOROWITZ INDEX BLD+IHG-RTO: 75 %
LEFT ATRIUM VOLUME: 33.7 CM3
LV EF 2D ECHO EST: 60 %
METHGB BLD QL: 0.4 % (ref 0.4–1.5)
MODALITY: ABNORMAL
OXYHGB MFR BLDV: 88.7 % (ref 94–97)
PCO2 BLDA: 39.7 MM HG (ref 35–45)
PCO2 BLDA: 45.7 MM HG (ref 35–45)
PCO2 BLDA: 46.9 MM HG (ref 35–45)
PEEP RESPIRATORY: 5 CM[H2O]
PEEP RESPIRATORY: 5 CM[H2O]
PH BLDA: 7.35 PH UNITS (ref 7.35–7.45)
PH BLDA: 7.4 PH UNITS (ref 7.35–7.45)
PH BLDA: 7.42 PH UNITS (ref 7.35–7.45)
PO2 BLDA: 113.1 MM HG (ref 80–100)
PO2 BLDA: 52 MM HG (ref 80–100)
PO2 BLDA: 52.7 MM HG (ref 80–100)
POTASSIUM BLDA-SCNC: 3.67 MMOL/L (ref 3.5–5)
SAO2 % BLDCOA: 86.6 % (ref 94–100)
SAO2 % BLDCOA: 86.6 % (ref 94–100)
SAO2 % BLDCOA: 97.9 % (ref 94–100)
SAO2 % BLDCOA: 97.9 % (ref 94–100)
SODIUM BLDA-SCNC: 142.4 MMOL/L (ref 135–145)
TOTAL RATE: 10 BREATHS/MINUTE
TOTAL RATE: 14 BREATHS/MINUTE
TROPONIN I SERPL-MCNC: <0.012 NG/ML (ref 0–0.03)
TSH SERPL DL<=0.05 MIU/L-ACNC: 0.21 MIU/ML (ref 0.47–4.68)
VENT CPAP/PEEP: 5
VENT CPAP/PEEP: 5
VENTILATOR MODE: AC
VENTILATOR MODE: AC

## 2017-03-05 PROCEDURE — 94799 UNLISTED PULMONARY SVC/PX: CPT

## 2017-03-05 PROCEDURE — 94640 AIRWAY INHALATION TREATMENT: CPT

## 2017-03-05 PROCEDURE — 84484 ASSAY OF TROPONIN QUANT: CPT | Performed by: INTERNAL MEDICINE

## 2017-03-05 PROCEDURE — 93306 TTE W/DOPPLER COMPLETE: CPT | Performed by: INTERNAL MEDICINE

## 2017-03-05 PROCEDURE — 82746 ASSAY OF FOLIC ACID SERUM: CPT | Performed by: PSYCHIATRY & NEUROLOGY

## 2017-03-05 PROCEDURE — 82805 BLOOD GASES W/O2 SATURATION: CPT

## 2017-03-05 PROCEDURE — C8929 TTE W OR WO FOL WCON,DOPPLER: HCPCS

## 2017-03-05 PROCEDURE — 36600 WITHDRAWAL OF ARTERIAL BLOOD: CPT

## 2017-03-05 PROCEDURE — 83050 HGB METHEMOGLOBIN QUAN: CPT

## 2017-03-05 PROCEDURE — 74000 HC ABDOMEN KUB: CPT

## 2017-03-05 PROCEDURE — 25010000002 PIPERACILLIN SOD-TAZOBACTAM PER 1 G: Performed by: INTERNAL MEDICINE

## 2017-03-05 PROCEDURE — 82140 ASSAY OF AMMONIA: CPT | Performed by: PSYCHIATRY & NEUROLOGY

## 2017-03-05 PROCEDURE — 99222 1ST HOSP IP/OBS MODERATE 55: CPT | Performed by: PSYCHIATRY & NEUROLOGY

## 2017-03-05 PROCEDURE — 82375 ASSAY CARBOXYHB QUANT: CPT

## 2017-03-05 PROCEDURE — 25010000002 LEVOFLOXACIN PER 250 MG: Performed by: INTERNAL MEDICINE

## 2017-03-05 PROCEDURE — 25010000002 LORAZEPAM PER 2 MG: Performed by: INTERNAL MEDICINE

## 2017-03-05 PROCEDURE — 82803 BLOOD GASES ANY COMBINATION: CPT

## 2017-03-05 PROCEDURE — 84443 ASSAY THYROID STIM HORMONE: CPT | Performed by: PSYCHIATRY & NEUROLOGY

## 2017-03-05 PROCEDURE — 94760 N-INVAS EAR/PLS OXIMETRY 1: CPT

## 2017-03-05 PROCEDURE — 0 IOPAMIDOL PER 1 ML: Performed by: INTERNAL MEDICINE

## 2017-03-05 PROCEDURE — 71275 CT ANGIOGRAPHY CHEST: CPT

## 2017-03-05 PROCEDURE — 71010 HC CHEST PA OR AP: CPT

## 2017-03-05 PROCEDURE — 25010000002 VANCOMYCIN PER 500 MG: Performed by: INTERNAL MEDICINE

## 2017-03-05 PROCEDURE — 25010000002 ENOXAPARIN PER 10 MG: Performed by: INTERNAL MEDICINE

## 2017-03-05 PROCEDURE — 25010000002 PERFLUTREN (DEFINITY) 8.476 MG IN SODIUM CHLORIDE 10 ML INJECTION: Performed by: INTERNAL MEDICINE

## 2017-03-05 PROCEDURE — 25010000002 PROPOFOL 1000 MG/ML EMULSION: Performed by: INTERNAL MEDICINE

## 2017-03-05 RX ORDER — GABAPENTIN 300 MG/1
300 CAPSULE ORAL EVERY 8 HOURS SCHEDULED
Status: DISCONTINUED | OUTPATIENT
Start: 2017-03-05 | End: 2017-03-16 | Stop reason: HOSPADM

## 2017-03-05 RX ORDER — BUDESONIDE AND FORMOTEROL FUMARATE DIHYDRATE 160; 4.5 UG/1; UG/1
2 AEROSOL RESPIRATORY (INHALATION)
Status: DISCONTINUED | OUTPATIENT
Start: 2017-03-05 | End: 2017-03-16 | Stop reason: HOSPADM

## 2017-03-05 RX ORDER — LEVOFLOXACIN 5 MG/ML
750 INJECTION, SOLUTION INTRAVENOUS EVERY 24 HOURS
Status: DISCONTINUED | OUTPATIENT
Start: 2017-03-05 | End: 2017-03-10

## 2017-03-05 RX ORDER — OXYCODONE AND ACETAMINOPHEN 10; 325 MG/1; MG/1
1 TABLET ORAL EVERY 6 HOURS PRN
Status: DISCONTINUED | OUTPATIENT
Start: 2017-03-05 | End: 2017-03-16 | Stop reason: HOSPADM

## 2017-03-05 RX ORDER — CITALOPRAM 20 MG/1
40 TABLET ORAL DAILY
Status: DISCONTINUED | OUTPATIENT
Start: 2017-03-05 | End: 2017-03-16 | Stop reason: HOSPADM

## 2017-03-05 RX ORDER — CETIRIZINE HYDROCHLORIDE 10 MG/1
10 TABLET ORAL DAILY
Status: DISCONTINUED | OUTPATIENT
Start: 2017-03-05 | End: 2017-03-06

## 2017-03-05 RX ORDER — OXYCODONE HYDROCHLORIDE 15 MG/1
30 TABLET, FILM COATED, EXTENDED RELEASE ORAL EVERY 12 HOURS SCHEDULED
Status: COMPLETED | OUTPATIENT
Start: 2017-03-06 | End: 2017-03-15

## 2017-03-05 RX ORDER — PANTOPRAZOLE SODIUM 40 MG/10ML
40 INJECTION, POWDER, LYOPHILIZED, FOR SOLUTION INTRAVENOUS
Status: DISCONTINUED | OUTPATIENT
Start: 2017-03-05 | End: 2017-03-07 | Stop reason: CLARIF

## 2017-03-05 RX ORDER — FUROSEMIDE 40 MG/1
40 TABLET ORAL DAILY
Status: DISCONTINUED | OUTPATIENT
Start: 2017-03-05 | End: 2017-03-08

## 2017-03-05 RX ORDER — ALPRAZOLAM 0.5 MG/1
0.5 TABLET ORAL 4 TIMES DAILY PRN
Status: DISCONTINUED | OUTPATIENT
Start: 2017-03-05 | End: 2017-03-09

## 2017-03-05 RX ORDER — IPRATROPIUM BROMIDE AND ALBUTEROL SULFATE 2.5; .5 MG/3ML; MG/3ML
3 SOLUTION RESPIRATORY (INHALATION)
Status: DISCONTINUED | OUTPATIENT
Start: 2017-03-05 | End: 2017-03-16 | Stop reason: HOSPADM

## 2017-03-05 RX ADMIN — IPRATROPIUM BROMIDE AND ALBUTEROL SULFATE 3 ML: .5; 3 SOLUTION RESPIRATORY (INHALATION) at 07:33

## 2017-03-05 RX ADMIN — SODIUM CHLORIDE 75 ML/HR: 9 INJECTION, SOLUTION INTRAVENOUS at 10:48

## 2017-03-05 RX ADMIN — Medication 10 ML: at 08:03

## 2017-03-05 RX ADMIN — CITALOPRAM 40 MG: 20 TABLET, FILM COATED ORAL at 22:01

## 2017-03-05 RX ADMIN — OXYCODONE HYDROCHLORIDE AND ACETAMINOPHEN 1 TABLET: 10; 325 TABLET ORAL at 20:03

## 2017-03-05 RX ADMIN — IPRATROPIUM BROMIDE AND ALBUTEROL SULFATE 3 ML: 2.5; .5 SOLUTION RESPIRATORY (INHALATION) at 15:32

## 2017-03-05 RX ADMIN — CETIRIZINE HYDROCHLORIDE 10 MG: 10 TABLET, FILM COATED ORAL at 22:01

## 2017-03-05 RX ADMIN — TAZOBACTAM SODIUM AND PIPERACILLIN SODIUM 3.38 G: 375; 3 INJECTION, SOLUTION INTRAVENOUS at 06:33

## 2017-03-05 RX ADMIN — NICOTINE 1 PATCH: 21 PATCH, EXTENDED RELEASE TRANSDERMAL at 18:48

## 2017-03-05 RX ADMIN — PROPOFOL 50 MCG/KG/MIN: 10 INJECTION, EMULSION INTRAVENOUS at 06:30

## 2017-03-05 RX ADMIN — LEVOFLOXACIN 750 MG: 750 INJECTION, SOLUTION INTRAVENOUS at 14:34

## 2017-03-05 RX ADMIN — LORAZEPAM 2 MG: 2 INJECTION INTRAMUSCULAR at 08:03

## 2017-03-05 RX ADMIN — ENOXAPARIN SODIUM 40 MG: 40 INJECTION SUBCUTANEOUS at 10:47

## 2017-03-05 RX ADMIN — PANTOPRAZOLE SODIUM 40 MG: 40 INJECTION, POWDER, FOR SOLUTION INTRAVENOUS at 10:47

## 2017-03-05 RX ADMIN — CHLORHEXIDINE GLUCONATE 15 ML: 1.2 RINSE ORAL at 08:04

## 2017-03-05 RX ADMIN — BUDESONIDE AND FORMOTEROL FUMARATE DIHYDRATE 2 PUFF: 160; 4.5 AEROSOL RESPIRATORY (INHALATION) at 19:26

## 2017-03-05 RX ADMIN — IOPAMIDOL 100 ML: 755 INJECTION, SOLUTION INTRAVENOUS at 17:00

## 2017-03-05 RX ADMIN — OXYCODONE HYDROCHLORIDE 30 MG: 20 TABLET, FILM COATED, EXTENDED RELEASE ORAL at 23:58

## 2017-03-05 RX ADMIN — SODIUM CHLORIDE 7 ML: 9 INJECTION INTRAMUSCULAR; INTRAVENOUS; SUBCUTANEOUS at 09:49

## 2017-03-05 RX ADMIN — ALPRAZOLAM 0.5 MG: 0.5 TABLET ORAL at 19:25

## 2017-03-05 RX ADMIN — LORAZEPAM 2 MG: 2 INJECTION INTRAMUSCULAR at 03:17

## 2017-03-05 RX ADMIN — VANCOMYCIN HYDROCHLORIDE 1000 MG: 1 INJECTION, SOLUTION INTRAVENOUS at 03:43

## 2017-03-05 RX ADMIN — TAZOBACTAM SODIUM AND PIPERACILLIN SODIUM 3.38 G: 375; 3 INJECTION, SOLUTION INTRAVENOUS at 00:37

## 2017-03-05 RX ADMIN — IPRATROPIUM BROMIDE AND ALBUTEROL SULFATE 3 ML: 2.5; .5 SOLUTION RESPIRATORY (INHALATION) at 19:26

## 2017-03-05 RX ADMIN — GABAPENTIN 300 MG: 300 CAPSULE ORAL at 22:01

## 2017-03-05 RX ADMIN — IPRATROPIUM BROMIDE AND ALBUTEROL SULFATE 3 ML: 2.5; .5 SOLUTION RESPIRATORY (INHALATION) at 23:44

## 2017-03-05 NOTE — PLAN OF CARE
Problem: Patient Care Overview (Adult)  Goal: Plan of Care Review  Outcome: Ongoing (interventions implemented as appropriate)    03/05/17 0636   Coping/Psychosocial Response Interventions   Plan Of Care Reviewed With daughter   Patient Care Overview   Progress no change   Outcome Evaluation   Outcome Summary/Follow up Plan pt on ventilator and sedated, diprivan @ 50, levophed @ 0.06, abx given, condom cath applied,        Goal: Adult Individualization and Mutuality  Outcome: Ongoing (interventions implemented as appropriate)  Goal: Discharge Needs Assessment  Outcome: Ongoing (interventions implemented as appropriate)    Problem: Respiratory Insufficiency (Adult)  Goal: Identify Related Risk Factors and Signs and Symptoms  Outcome: Ongoing (interventions implemented as appropriate)  Goal: Acid/Base Balance  Outcome: Ongoing (interventions implemented as appropriate)  Goal: Effective Ventilation  Outcome: Ongoing (interventions implemented as appropriate)    Problem: Mechanical Ventilation, Invasive (Adult)  Goal: Signs and Symptoms of Listed Potential Problems Will be Absent or Manageable (Mechanical Ventilation, Invasive)  Outcome: Ongoing (interventions implemented as appropriate)    Problem: Pressure Ulcer Risk (Kermit Scale) (Adult,Obstetrics,Pediatric)  Goal: Identify Related Risk Factors and Signs and Symptoms  Outcome: Ongoing (interventions implemented as appropriate)  Goal: Skin Integrity  Outcome: Ongoing (interventions implemented as appropriate)    Problem: Fall Risk (Adult)  Goal: Identify Related Risk Factors and Signs and Symptoms  Outcome: Ongoing (interventions implemented as appropriate)  Goal: Absence of Falls  Outcome: Ongoing (interventions implemented as appropriate)

## 2017-03-05 NOTE — CONSULTS
[facility]  Phone: [phone]  Ruiz Odonnell MD      Subjective   LOS: 1 day     Thank you for this consultation.  65-year-old female who was found obtunded at home.  Her saturation was in the 60s.  Sequence of events is unclear.  Family members are not here for conversation.  It is unclear if the patient may have taken any drugs or had a neurological event.  She was on propofol sedation when I first walked in.  After propofol was taken off she was completely awake and alert.  She is moving all 4 extremities and is attempting to take her endotracheal tube out.  Again history is unobtainable as the patient is still on the ventilator.    Underlying history as of severe depression and failure to thrive due to 's death last summer.  Patient has been resisting going outside the house.  She is a regular heavy smoker.  She continues to smoke a pack of cigarettes a day.  She has underlying history of hypertension.  There is no history of ordinary artery disease prior cancer or diabetes or prior stroke.  Of note patient was recently treated by her primary care physician for bronchitis or pneumonia with several rounds of antibiotics     I have reviewed and edited the Past Medical History, Past Surgical History, Home Medications, Social History and Family History as of 11:32 AM on 03/05/17.    Prescriptions Prior to Admission   Medication Sig Dispense Refill Last Dose   • albuterol (PROVENTIL HFA;VENTOLIN HFA) 108 (90 BASE) MCG/ACT inhaler Inhale 2 puffs Every 4 (Four) Hours As Needed for wheezing.   Past Week at Unknown time   • ALPRAZolam (XANAX) 1 MG tablet Take 1 mg by mouth 4 (Four) Times a Day As Needed for anxiety.   Past Week at Unknown time   • amphetamine-dextroamphetamine (ADDERALL) 20 MG tablet Take 20 mg by mouth 2 (Two) Times a Day.   Past Week at Unknown time   • budesonide-formoterol (SYMBICORT) 160-4.5 MCG/ACT inhaler Inhale 2 puffs 2 (Two) Times a Day.   Past Week at Unknown time   •  cetirizine (zyrTEC) 10 MG tablet Take 10 mg by mouth Daily.   Past Week at Unknown time   • citalopram (CeleXA) 40 MG tablet Take 40 mg by mouth Daily.   Past Week at Unknown time   • furosemide (LASIX) 40 MG tablet Take 40 mg by mouth Daily.   Past Week at Unknown time   • gabapentin (NEURONTIN) 600 MG tablet Take 600 mg by mouth Every 8 (Eight) Hours.   Past Week at Unknown time   • ipratropium-albuterol (COMBIVENT RESPIMAT)  MCG/ACT inhaler Inhale 1 puff 4 (Four) Times a Day As Needed for wheezing.   Past Week at Unknown time   • lisinopril (PRINIVIL,ZESTRIL) 5 MG tablet Take 5 mg by mouth Daily.   Past Week at Unknown time   • mirtazapine (REMERON) 15 MG tablet Take 15 mg by mouth Every Night.   Past Week at Unknown time   • ondansetron (ZOFRAN) 4 MG tablet Take 4 mg by mouth Every 8 (Eight) Hours As Needed for nausea or vomiting.   Past Week at Unknown time   • OxyCODONE HCl ER (oxyCONTIN) 30 MG tablet extended-release 12 hour Take 30 mg by mouth Every 12 (Twelve) Hours.   Past Week at Unknown time   • oxyCODONE-acetaminophen (PERCOCET)  MG per tablet Take 1 tablet by mouth Every 6 (Six) Hours As Needed for moderate pain (4-6) (every 4-6 hours).   Past Week at Unknown time   • promethazine (PHENERGAN) 25 MG tablet Take 25 mg by mouth Every 6 (Six) Hours As Needed for nausea or vomiting.   Past Week at Unknown time   • tiZANidine (ZANAFLEX) 4 MG tablet Take 4 mg by mouth Every 8 (Eight) Hours As Needed for muscle spasms.   Past Week at Unknown time   • zolpidem (AMBIEN) 5 MG tablet Take 10 mg by mouth At Night As Needed for sleep.   Past Week at Unknown time       Review of Systems   Unable to perform ROS: Intubated       Vital Signs past 24hrs  BP range: BP: ()/(42-80) 113/52  Pulse range: Heart Rate:  [] 57  Resp rate range: Resp:  [14-24] 20  Temp range: Temp (24hrs), Av.3 °F (37.4 °C), Min:97.3 °F (36.3 °C), Max:103.1 °F (39.5 °C)    Oxygen range: SpO2:  [86 %-100 %] 98 %; Flow  (L/min):  [3-55] 55;   O2 Device: mechanical ventilator  159 lb 1.6 oz (72.2 kg); Body mass index is 26.89 kg/(m^2).  I/O this shift:  In: 359.6 [I.V.:309.6; IV Piggyback:50]  Out: 700 [Urine:700]  Adult female on the ventilator.  Oral ET tube is noted.  After discontinuation of sedation patient is awake alert moving all 4 extremities and attempting to communicate.  Her pupils are equal and reactive to light.  JVP does not appear to be elevated though difficult to assess.  Trachea midline thyroid not enlarged.  Lungs reveal bilateral air entry.  I do not hear any adventitious sounds.  I.e. I do not hear any rales rhonchi or wheeze.  Overall air entry is diminished but equal.  Percussion note is resonant.  Chest expansion equal no chest wall deformities or tenderness.  Heart examination S1-S2 present rhythm regular with no murmurs noted.  No edema on lower extremities.  Abdomen is soft nontender with bowel sounds present.  No liver spleen enlargement.  No peripheral cyanosis or clubbing.  As stated moving all 4 extremities and appears appropriate.  No obvious adenopathy in the cervical, axillary, inguinal areas.    Results Review:    I have reviewed the laboratory and imaging data since the last note by LPC physician. My annotations are as noted in assessment and plan.    Medication Review:  I have reviewed the current MAR.  My annotations are as noted in assessment and plan.    Plan   PCCM Problems  Acute respiratory failure with hypoxia and possibly hypercapnia, on mechanical ventilation  Underlying COPD likely severe but without exacerbation at this time   Recent bronchitis versus pneumonia with mild bibasilar infiltrates on CT chest  Right lower lobe nodule 8 mm,  Images 53  Current every day smoker  Chronic sedating pain medications as well as Ativan  Relevant Medical Diagnoses  Hypertension    Plan of Treatment  Patient's propofol was discontinued.  She was on Levophed which was also discontinued.  I will check  weaning parameters and attempt extubation this morning.    Underlying severe COPD.  Possible somnolence due to hypercapnia.  We'll need a blood gas off the ventilator to see what her baseline status is.  Awaiting neurological workup.  Currently no evidence of stroke or ingestion of medications other than those prescribed. Her home medications for pain may have contributed to her somnolence.    Due to COPD we'll continue bronchodilator therapy.  At the present time I do not see a need for IV steroids.  She did receive 1 dose of Solu-Medrol last night.    A CT chest shows by basilar infiltrates versus atelectasis which is quite minimal.  She has treated with outpatient antibiotics recently.  Her pro-calcitonin is within normal limits.  I will switch  Out her vancomycin and Zosyn to Levaquin.  Await results of respiratory cultures.  I don't believe she has severe pneumonia at this time.    She is a current smoker.  She will be advised to completely quit smoking.    Right lower lobe nodule.  This will require a follow-up CT chest within 3 months.    Limit sedating medications especially narcotics and benzodiazepines.    ICU bundle was ordered.    Ruiz Odonnell MD  03/05/17  11:32 AM     I spent +35 mins critical care time in care of this patient outside of any procedures.      EMR Dragon/Transcription disclaimer:   Much of this encounter note is an electronic transcription/translation of spoken language to printed text. The electronic translation of spoken language may permit erroneous, or at times, nonsensical words or phrases to be inadvertently transcribed; Although I have reviewed the note for such errors, some may still exist.

## 2017-03-05 NOTE — PROGRESS NOTES
[facility] Medicine Services  INPATIENT PROGRESS NOTE    Length of Stay: 1  Date of Admission: 3/4/2017  Primary Care Physician: No Known Provider    Subjective   Chief Complaint:   AMS/Hypoxia  HPI   66 YO CF admitted on 3/4/17. Patient is intubated and on pressors. Appears more comfortable than yesterday. Will need Dobhoff. O2 on vent at 60%. Aroused per nursing with sedation holiday and was moving all extremities.         Review of Systems   AMS  Hypoxia    All pertinent negatives and positives are as above. All other systems have been reviewed and are negative unless otherwise stated.     Objective    Temp:  [98 °F (36.7 °C)-103.1 °F (39.5 °C)] 98 °F (36.7 °C)  Heart Rate:  [] 57  Resp:  [14-24] 20  BP: ()/(42-80) 113/52  FiO2 (%):  [60 %-100 %] 60 %  Physical Exam   HENT:   Head: Normocephalic and atraumatic.   Nose: Nose normal.   ET tube in place.    Eyes: Conjunctivae and EOM are normal.   Pin point pupils but reactive.   Neck: Normal range of motion. Neck supple.   Cardiovascular: Normal rate, regular rhythm and normal heart sounds.    Pulmonary/Chest: Effort normal and breath sounds normal.   Abdominal: Soft. Bowel sounds are normal.   Musculoskeletal: She exhibits no edema or tenderness.   Neurological: No cranial nerve deficit.   Sedated   Skin: Skin is warm and dry.   Vitals reviewed.          Results Review:  I have reviewed the labs, radiology results, and diagnostic studies.    Laboratory Data:     Results from last 7 days  Lab Units 03/04/17  1415   WBC 10*3/mm3 5.15   HEMOGLOBIN g/dL 11.6*   HEMATOCRIT % 36.5*   PLATELETS 10*3/mm3 161          Results from last 7 days  Lab Units 03/04/17  1415   SODIUM mmol/L 139   POTASSIUM mmol/L 3.7   CHLORIDE mmol/L 102   TOTAL CO2 mmol/L 32.0*   BUN mg/dL 6   CREATININE mg/dL 0.75   CALCIUM mg/dL 8.2*   BILIRUBIN mg/dL 0.2   ALK PHOS U/L 77   ALT (SGPT) U/L 29   AST (SGOT) U/L 38   GLUCOSE mg/dL 119*        Culture Data:   BLOOD CULTURE   Date Value Ref Range Status   03/04/2017 No growth at less than 24 hours  Preliminary   03/04/2017 No growth at less than 24 hours  Preliminary       Radiology Data:   Imaging Results (last 24 hours)     Procedure Component Value Units Date/Time    XR Chest 1 View [[id number]] Collected:  03/04/17 1527     Updated:  03/04/17 1542    Narrative:       CHEST, ONE VIEW:     HISTORY: Sepsis     COMPARISON: 10/31/2009 and 01/21/2009     A single frontal chest radiograph was obtained.     FINDINGS:     COPD and chronic lung changes appreciated.     There are patchy airspace opacities identified in the right lower lobe  laterally not observed previously and a small area of acute infiltrate  from an infectious or inflammatory process, pneumonia considered.  Correlate with patient presentation.     The heart is normal in size without evidence of overt heart failure.     The bony structures are intact.                                  Impression:       1. COPD and chronic lung changes.  2. Mild patchy right lower lobe airspace opacities, small area of acute  infiltrate/pneumonia considered.     This report was finalized on 03/04/2017 15:40 by Dr. Eduardo Black MD.    XR Chest 1 View [[id number]] Collected:  03/04/17 1933     Updated:  03/04/17 1939    Narrative:       EXAMINATION: XR CHEST 1 VW- 3/4/2017 7:33 PM CST     HISTORY: Endotracheal tube placement     COMPARISON: 03/04/2017     FINDINGS:  Since the previous examination, an endotracheal tube has been placed  with tip approximately 2.1 cm above the juan carlos. The remainder of the  exam is otherwise unchanged, with bilateral scattered interstitial and  alveolar opacities and peribronchial wall thickening, concerning for  multifocal pneumonia. The heart appears normal in size. Aorta is  tortuous. Surgical clips are seen at the GE junction.       Impression:       Interval endotracheal tube placement with tip 2.1 cm above  the juan carlos. Exam  otherwise stable.  This report was finalized on 03/04/2017 19:37 by Dr. Ruddy Alvarez MD.    XR Chest 1 View [[id number]] Collected:  03/05/17 0730     Updated:  03/05/17 0730    Narrative:       CHEST, ONE VIEW:     HISTORY: Respiratory distress     COMPARISON: 03/04/2017     A single frontal chest radiograph was obtained.     FINDINGS:     Endotracheal tube is identified in good position above the juan carlos.     COPD and chronic lung changes again identified.     Mild patchy airspace opacities in the right lung base again observed  likely unchanged.     Radiographically, the chest is likely stable.                                  Impression:       1. Stable one-day appearance of the chest.     This report was finalized on  by Dr. Eduardo Black MD.    CT Head Without Contrast [[id number]] Collected:  03/05/17 0746     Updated:  03/05/17 0746    Narrative:       CT SCAN OF THE HEAD, WITHOUT CONTRAST:      HISTORY: Altered mental status     COMPARISONS: 01/06/2009      TECHNIQUE:     Radiation dose equals  mGy-cm.  Automated exposure control dose  reduction technique was implemented.     CT evaluation of the head without intravenous contrast. 5 mm transaxial  images were obtained.   2-D sagittal and coronal reconstruction images  were generated.     FINDINGS: Image quality degradation related to patient motion observed.     There is no intra or extra-axial hemorrhage.     There is no mass effect or midline shift.     There is no hydrocephalus.     Dilated perivascular space versus an old lacunar infarct, less likely,  appreciated again, in the left basal ganglia.        Mucosal thickening and mucus retention cyst noted in the maxillary sinus  with partial opacification with ethmoid sinus mucosal thickening. Mild  mucosal thickening observed in the sphenoid sinuses.     Bone window imaging reveals no calvarial abnormality.          Impression:       1. No CT evidence of an acute intracranial process.  2. Mild  paranasal sinus disease.                     This report was finalized on  by Dr. Eduardo Black MD.    CT Chest Without Contrast [[id number]] Collected:  03/05/17 0753     Updated:  03/05/17 0753    Narrative:       HISTORY: Hypoxia     CT CHEST WO CONTRAST-  CT evaluation of the chest was performed without intravenous contrast. 5  mm sequential transaxial images were obtained. 2-D sagittal and coronal  reconstruction images generated. Radiation dose equals  mGy-cm.   Automated exposure control dose reduction technique was implemented.     Endotracheal tube identified above the juan carlos.     Small mediastinal lymph nodes appreciated without definite mediastinal  or hilar lymphadenopathy. Atherosclerotic aortic and coronary artery  calcifications appreciated.     There is pulmonary emphysema with an upper lobe predominance.  Fibronodular changes/apical pleural thickening observed compatible with  chronic changes.     There are small bilateral pleural effusions with patchy lower lobe  airspace disease. There is diffuse mild reticulonodular interstitial  prominence. There are no significant areas of lung consolidation  identified.     There is a pulmonary nodule identified in the right lower lobe, near the  pleural surface measuring nearly 8 mm, image 53. Imaging follow-up  recommended. There are no pneumothoraces.     Spondylosis changes noted diffusely in the thoracic spine.       Impression:       1. Pulmonary emphysema and chronic lung changes.  2. Small bilateral pleural effusions with lower lobe airspace disease,  question atelectasis and/or infiltrate. Mild diffuse interstitial  prominence. Mild acute interstitial infiltration not excluded.  3. Right lower lobe nodule. Follow-up recommended.  This report was finalized on  by Dr. Eduardo Black MD.    CT Abdomen Pelvis Without Contrast [[id number]] Collected:  03/05/17 0800     Updated:  03/05/17 0800    Narrative:       HISTORY: Abdominal pain     CT ABDOMEN  PELVIS WO CONTRAST-  CT evaluation of the abdomen and pelvis was performed without  intravenous or oral contrast. 5 mm sequential transaxial images were  obtained. 2-D sagittal and coronal reconstruction images generated.  Radiation dose equals  mGy-cm.  Automated exposure control dose  reduction technique was implemented.     Small bilateral pleural effusions with associated lower lobe airspace  disease again noted. The 7 mm pulmonary nodule right lower lobe  laterally again recognized. Findings are described in the chest CT  report.     Surgical clips result in significant streak artifact in the upper  abdomen. No definite hepatic lesion observed. The gallbladder is  surgically absent. No definite biliary ductal dilatation appreciated.     Spleen is not enlarged.     There are no adrenal masses or pancreatic lesions.     There are no urinary tract calculi or evidence of obstruction. The  urinary bladder is mildly distended without focal bladder wall  abnormality.     Changes from hysterectomy observed. There are no adnexal masses.     There is a large amount of stool identified throughout the colon. There  are scattered diverticuli particularly in the sigmoid colon without CT  evidence of acute diverticulitis. There is no CT evidence of acute  appendicitis, cecum extending into the pelvis. The small bowel is not  dilated. The duodenal sweep is imaged appropriately. The stomach is not  distended.     Small para-aortic and mesenteric lymph nodes observed without  pathologically enlarged nodes.     There is no ascites or pneumoperitoneum.     There are atherosclerotic aortoiliofemoral calcifications.     A few spondylosis changes noted in the thoracolumbar spine.     A right femoral vein catheter is identified.       Impression:       1. No CT evidence of an acute intra-abdominal/pelvic pathological  process.  2. Large amount of stool in the colon suggesting constipation.  Diverticulosis without CT evidence of  acute diverticulitis.  This report was finalized on  by Dr. Eduardo Black MD.          I have reviewed the patient current medications.     Assessment/Plan     Hospital Problem List     Pneumonia of right lower lobe due to infectious organism        Impression/Plan:  1. Hypoxia with respiratory distress- Intubated on 60% 02. Pulmonology has been consulted. Simin. Currently day 2 Vancomycin/Zosyn.  2. AMS- question hypoxia/fever vs possible OD? Will ask family. Apparently there has been a HX of depression since the passing of her  in July. Neurology consulted.   3. RLL PNA, failed OP therapy- abx as above  4. Depression/Anxiety- PRN ativan  5. Abdominal pain- constipation on CT  6. Hypotension with HX of hypertension- currently on levophed            Colt Sargent,    03/05/17   8:48 AM

## 2017-03-05 NOTE — PROGRESS NOTES
"Pharmacokinetic Initial Note - Vancomycin    Justyna Lei is a 65 y.o. female   [Ht: 67\" (170.2 cm); Wt: 158 lb 8 oz (71.9 kg)]    Estimated Creatinine Clearance: 66.4 mL/min (by C-G formula based on Cr of 0.75).   Lab Results   Component Value Date    CREATININE 0.75 03/04/2017      Lab Results   Component Value Date    WBC 5.15 03/04/2017      Baseline culture results:  Microbiology Results (last 10 days)       ** No results found for the last 240 hours. **            Indication for use: pneumonia     Assessment/Plan  Initiated Vancomycin 1000 mg IVPB Q12H. Will order Vancomycin trough level 3rd dose 3/5 @13.  Pharmacy will monitor renal function and adjust dose accordingly.    [facility] located [facility]  03/04/17 6:54 PM     "

## 2017-03-05 NOTE — CONSULTS
Neurology Consult Note    Referring Provider: Dr. Colt Sargent  Reason for Consultation: altered mental status      History of present illness:      65-year-old female with a history of severe depression and possibly failure to thrive since her 's death last summer.  Currently there is no family at the bedside and the patient is intubated.  By report the patient was undergoing some treatment for her chronic pulmonary disease over the past 2 months.  She received multiple rounds of antibiotics.  Yesterday afternoon her family had difficulties waking her up.  Her oxygen saturation was found to be 50%.  She was brought in for respiratory failure.  In addition to this she has a history of depression and sees an outpatient psychiatrist.  Even after her respiratory failure was compensated for in the initial stages of her admission she continued to be extremely somnolent.  A question has been posed whether there may be drug intoxication possibly secondary to depression.  Other neurologic etiologies are also in question hence the neurologic consultation currently.  No seizure activity has been seen overnight by the nursing staff.      Past Medical History   Diagnosis Date   • Anxiety    • COPD (chronic obstructive pulmonary disease)    • Depression    • Hypertension        Allergies   Allergen Reactions   • Morphine And Related    • Tetracyclines & Related      No current facility-administered medications on file prior to encounter.      No current outpatient prescriptions on file prior to encounter.       Social History     Social History   • Marital status: Unknown     Spouse name: N/A   • Number of children: N/A   • Years of education: N/A     Occupational History   • Not on file.     Social History Main Topics   • Smoking status: Current Every Day Smoker     Packs/day: 1.00     Types: Cigarettes   • Smokeless tobacco: Not on file   • Alcohol use No   • Drug use: No   • Sexual activity: Defer     Other Topics  Concern   • Not on file     Social History Narrative   • No narrative on file     History reviewed. No pertinent family history.    Review of Systems  A 14 point review of systems was attempted but mental status does not allow for this    Vital Signs   Temp:  [97.3 °F (36.3 °C)-103.1 °F (39.5 °C)] 97.3 °F (36.3 °C)  Heart Rate:  [] 57  Resp:  [14-24] 20  BP: ()/(42-80) 113/52  FiO2 (%):  [60 %-100 %] 60 %    General Exam:  Head:  Normal cephalic, atraumatic  HEENT:  Neck supple.  Patient intubated  Fundoscopic Exam:  No signs of disc edema  CVS:  Regular rate and rhythm.  No murmurs  Carotid Examination:  No bruits  Lungs:  Clear to auscultation  Abdomen:  Non-tender, Non-distended  Extremities:  No signs of peripheral edema  Skin:  No rashes    Neurologic Exam:    Mental Status:    -Heavily sedated and intubated.  When sedation is paused, the patient opens eyes, moves all extremities and sits up in bed.    CN II:  Visual fields full.  Pupils equally reactive to light  CN III, IV, VI:  Extraocular Muscles full with no signs of nystagmus  CN V:  Facial sensory is symmetric with no asymetries.  CN VII:  Facial motor symmetric  CN VIII:    CN IX:  Gag blunted on sedation  CN X:    CN XI:    CN XII:     Motor:     Moves all extremities equal when sedation paused.    DTR:  -Right   Bicep: 2+ Tricep: 2+ Brachoradialis: 2+   Patella: 2+ Ankle: 2+ Neg Babinski  -Left   Bicep: 2+ Tricep: 2+ Brachoradialis: 2+   Patella: 2+ Ankle: 2+ Neg Babinski    Sensory:  -Responds to noxious stim in all four extremities.    Coordination:  -No signs of active tremor  Gait  -intubated and sedated.      Results Review:  Lab Results (last 24 hours)     Procedure Component Value Units Date/Time    CBC & Differential [[id number]] Collected:  03/04/17 1415    Specimen:  Blood Updated:  03/04/17 1441    Narrative:       The following orders were created for panel order CBC & Differential.  Procedure                                Abnormality         Status                     ---------                               -----------         ------                     CBC Auto Differential[[id number]]         Abnormal            Final result                 Please view results for these tests on the individual orders.    CBC Auto Differential [[id number]]  (Abnormal) Collected:  03/04/17 1415    Specimen:  Blood from Arm, Left Updated:  03/04/17 1441     WBC 5.15 10*3/mm3      RBC 3.77 (L) 10*6/mm3      Hemoglobin 11.6 (L) g/dL      Hematocrit 36.5 (L) %      MCV 96.8 fL      MCH 30.8 pg      MCHC 31.8 (L) g/dL      RDW 15.0 %      RDW-SD 53.2 fl      MPV 11.5 fL      Platelets 161 10*3/mm3      Neutrophil % 69.5 %      Lymphocyte % 17.3 %      Monocyte % 10.1 %      Eosinophil % 2.7 %      Basophil % 0.4 %      Immature Grans % 0.0 %      Neutrophils, Absolute 3.58 10*3/mm3      Lymphocytes, Absolute 0.89 10*3/mm3      Monocytes, Absolute 0.52 10*3/mm3      Eosinophils, Absolute 0.14 10*3/mm3      Basophils, Absolute 0.02 10*3/mm3      Immature Grans, Absolute 0.00 10*3/mm3     Blood Gas, Arterial [[id number]]  (Abnormal) Collected:  03/04/17 1440    Specimen:  Arterial Blood Updated:  03/04/17 1442     Site Arterial: right brachial      Michael's Test --       Documented in Rapid Comm        pH, Arterial 7.319 (L) pH units      pCO2, Arterial 56.9 (H) mm Hg      pO2, Arterial 120.9 (H) mm Hg      HCO3, Arterial 28.6 (H) mmol/L      Base Excess, Arterial 1.1 mmol/L      O2 Saturation, Arterial 98.0 %      O2 Saturation Calculated 98.0 %      Barometric Pressure for Blood Gas -- mmHg       Component not reported at this site.        Modality Non Rebreather      FIO2 100 %     Narrative:       Serial Number: [id number]    : [id number]    Calcium, Ionized [[id number]]  (Abnormal) Collected:  03/04/17 1446    Specimen:  Blood Updated:  03/04/17 1448     Ionized Calcium, Arterial 1.09 (L) mmol/L     Narrative:       Serial Number: [id number]    : [id number]     Protime-INR [[id number]]  (Normal) Collected:  03/04/17 1415    Specimen:  Blood from Arm, Left Updated:  03/04/17 1448     Protime 12.6 Seconds      INR 0.92     aPTT [[id number]]  (Abnormal) Collected:  03/04/17 1415    Specimen:  Blood from Arm, Left Updated:  03/04/17 1448     PTT 36.6 (H) seconds     Lactic Acid, Plasma [[id number]]  (Normal) Collected:  03/04/17 1415    Specimen:  Blood from Arm, Left Updated:  03/04/17 1449     Lactate 0.7 mmol/L     POC Troponin, Rapid [[id number]]  (Normal) Collected:  03/04/17 1436    Specimen:  Blood Updated:  03/04/17 1451     Troponin I 0.00 ng/mL       Serial Number: [id number]    : [id number]       Comprehensive Metabolic Panel [[id number]]  (Abnormal) Collected:  03/04/17 1415    Specimen:  Blood from Arm, Left Updated:  03/04/17 1454     Glucose 119 (H) mg/dL      BUN 6 mg/dL      Creatinine 0.75 mg/dL      Sodium 139 mmol/L      Potassium 3.7 mmol/L      Chloride 102 mmol/L      CO2 32.0 (H) mmol/L      Calcium 8.2 (L) mg/dL      Total Protein 6.5 g/dL      Albumin 3.50 g/dL      ALT (SGPT) 29 U/L      AST (SGOT) 38 U/L      Alkaline Phosphatase 77 U/L      Total Bilirubin 0.2 mg/dL      eGFR Non African Amer 77 mL/min/1.73      Globulin 3.0 gm/dL      A/G Ratio 1.2 g/dL      BUN/Creatinine Ratio 8.0      Anion Gap 5.0 mmol/L     Magnesium [[id number]]  (Normal) Collected:  03/04/17 1415    Specimen:  Blood from Arm, Left Updated:  03/04/17 1454     Magnesium 1.4 mg/dL     Phosphorus [[id number]]  (Normal) Collected:  03/04/17 1415    Specimen:  Blood from Arm, Left Updated:  03/04/17 1454     Phosphorus 2.6 mg/dL     C-reactive Protein [[id number]]  (Abnormal) Collected:  03/04/17 1415    Specimen:  Blood from Arm, Left Updated:  03/04/17 1454     C-Reactive Protein 4.40 (H) mg/dL     Procalcitonin [[id number]]  (Normal) Collected:  03/04/17 1415    Specimen:  Blood from Arm, Left Updated:  03/04/17 1510     Procalcitonin <0.25 ng/mL     Narrative:       SIRS, sepsis, severe  sepsis, and septic shock are categorized according to the criteria of the consensus conference of the American College of Chest Physicians/Society of Critical Care Medicine.    PCT < 0.5 ng/mL     Systemic infection (sepsis) is not likely.    PCT >0.5 and < 2.0 ng/mL Systemic infection (sepsis) is possible, but other conditions are known to elevate PCT as well.    PCT > 2.0 ng/mL     Systemic infection (sepsis) is likely, unless other causes are known.      PCT > 10.0 ng/mL    Important systemic inflammatory response, almost exclusively due to severe bacterial sepsis or septic shock.    PCT values of < 0.5 ng/mL do not exclude an infection, because localized infections (without systemic signs) may be associated with such low concentrations, or a systemic infection in its initial stages (<6 hours).  Increased PCT can occur without infection.  PCT concentrations between 0.5 and 2.0 ng/mL should be interpreted taking into account the patients history.  It is recommended to retest PCT within 6-24 hours if any concentrations < 2.0 ng/mL are obtained.    Blood Gas, Arterial [[id number]]  (Abnormal) Collected:  03/04/17 1610    Specimen:  Arterial Blood Updated:  03/04/17 1613     Site Arterial: right brachial      Michael's Test --       Documented in Rapid Comm        pH, Arterial 7.292 (L) pH units      pCO2, Arterial 58.6 (H) mm Hg      pO2, Arterial 93.7 mm Hg      HCO3, Arterial 27.7 (H) mmol/L      Base Excess, Arterial -0.2 mmol/L      O2 Saturation, Arterial 96.2 %      O2 Saturation Calculated 96.2 %      Barometric Pressure for Blood Gas -- mmHg       Component not reported at this site.        Modality BiPAP      FIO2 55 %      Rate 12.0 Breaths/minute      IPAP 16      EPAP 5     Narrative:       Serial Number: [id number]    : [id number]    Urinalysis With / Culture If Indicated [[id number]]  (Normal) Collected:  03/04/17 1659    Specimen:  Urine from Urine, Catheter Updated:  03/04/17 1710     Color, UA Yellow       Appearance, UA Clear      pH, UA 5.5      Specific Gravity, UA 1.021      Glucose, UA Negative      Ketones, UA Negative      Bilirubin, UA Negative      Blood, UA Negative      Protein, UA Negative      Leuk Esterase, UA Negative      Nitrite, UA Negative      Urobilinogen, UA 0.2 E.U./dL     Narrative:       Urine microscopic not indicated.    Blood Gas, Arterial [[id number]]  (Abnormal) Collected:  03/04/17 1741    Specimen:  Arterial Blood Updated:  03/04/17 1742     Site Arterial: right brachial      Michael's Test --       Documented in Rapid Comm        pH, Arterial 7.337 (L) pH units      pCO2, Arterial 50.3 (H) mm Hg      pO2, Arterial 85.8 mm Hg      HCO3, Arterial 26.3 (H) mmol/L      Base Excess, Arterial -0.2 mmol/L      O2 Saturation, Arterial 95.8 %      O2 Saturation Calculated 95.8 %      Barometric Pressure for Blood Gas -- mmHg       Component not reported at this site.        Modality BiPAP      FIO2 55 %      IPAP 18      EPAP 5     Narrative:       Serial Number: [id number]    : [id number]    Urine Drug Screen [[id number]]  (Abnormal) Collected:  03/04/17 1759    Specimen:  Urine from Urine, Clean Catch Updated:  03/04/17 1843     Amphetamine Screen, Urine Negative      Barbiturates Screen, Urine Negative      Benzodiazepine Screen, Urine Positive (A)      Cocaine Screen, Urine Negative      Methadone Screen, Urine Negative      Opiate Screen Positive (A)      Phencyclidine (PCP), Urine Negative      THC, Screen, Urine Negative     Narrative:       Negative Thresholds For Drugs Screened in Urine:    Amphetamines          500 ng/ml  Barbiturates          200 ng/ml  Benzodiazepines       200 ng/ml  Cocaine               150 ng/ml  Methadone             150 ng/ml  Opiates               300 ng/ml  Phencyclidine         25 ng/ml  THC                      50 ng/ml    The normal value for all drugs tested is negative. This report includes final unconfirmed screening results.  A positive result by this assay  can be, at your request, sent to the Reference Lab for confirmation by gas chromatography. Unconfirmed results must not be used for non-medical purposes, such as employment or legal testing. Clinical consideration should be applied to any drug of abuse test result, particularly when unconfirmed results are used.    Light Blue Top [[id number]] Collected:  03/04/17 1415    Specimen:  Blood from Arm, Left Updated:  03/04/17 1901     Extra Tube hold for add-on       Auto resulted       Green Top (Gel) [[id number]] Collected:  03/04/17 1415    Specimen:  Blood from Arm, Left Updated:  03/04/17 1901     Extra Tube Hold for add-ons.       Auto resulted.       Lavender Top [[id number]] Collected:  03/04/17 1415    Specimen:  Blood from Arm, Left Updated:  03/04/17 1901     Extra Tube hold for add-on       Auto resulted       Red Top [[id number]] Collected:  03/04/17 1415    Specimen:  Blood from Arm, Left Updated:  03/04/17 1901     Extra Tube Hold for add-ons.       Auto resulted.       Blood Gas, Arterial [[id number]]  (Abnormal) Collected:  03/04/17 1920    Specimen:  Arterial Blood Updated:  03/04/17 1921     Site Arterial: right radial      Michael's Test --       Documented in Rapid Comm        pH, Arterial 7.296 (L) pH units      pCO2, Arterial 51.8 (H) mm Hg      pO2, Arterial 106.9 (H) mm Hg      HCO3, Arterial 24.7 mmol/L      Base Excess, Arterial -2.5 (L) mmol/L      O2 Saturation, Arterial 97.3 %      O2 Saturation Calculated 97.3 %      Barometric Pressure for Blood Gas -- mmHg       Component not reported at this site.        Modality Ventilator      FIO2 75 %      Ventilator Mode AC      Rate 10.0 Breaths/minute      PEEP 5.0      Vent CPAP/PEEP 5.0     Narrative:       Serial Number: [id number]    : [id number]    Influenza Antigen [[id number]]  (Normal) Collected:  03/04/17 1915    Specimen:  Swab from Nasopharynx Updated:  03/04/17 2006     Influenza A Ag, EIA Negative      Influenza B Ag, EIA Negative     Narrative:          Recommend confirmation of negative results by viral culture or molecular assay.    Lactic Acid, Plasma [[id number]]  (Normal) Collected:  03/04/17 2148    Specimen:  Blood Updated:  03/04/17 2206     Lactate 0.9 mmol/L     Troponin [[id number]]  (Normal) Collected:  03/04/17 2148    Specimen:  Blood Updated:  03/04/17 2217     Troponin I 0.024 ng/mL     CK [[id number]]  (Normal) Collected:  03/04/17 2148    Specimen:  Blood Updated:  03/04/17 2217     Creatine Kinase 42 U/L     CK-MB [[id number]]  (Normal) Collected:  03/04/17 2148    Specimen:  Blood Updated:  03/04/17 2217     CKMB 1.05 ng/mL     Narrative:       CKMB Index not indicated    [place] Draw [[id number]] Collected:  03/04/17 1415    Specimen:  Blood Updated:  03/04/17 2259    Narrative:       The following orders were created for panel order [place] Draw.  Procedure                               Abnormality         Status                     ---------                               -----------         ------                     Light Blue Top[[id number]]                                    Final result               Green Top (Gel)[[id number]]                                   Final result               Lavender Top[[id number]]                                      Final result               Red Top[[id number]]                                           Final result               Green Top (No Gel)[[id number]]                                                             Please view results for these tests on the individual orders.    Blood Gas, Arterial [[id number]]  (Abnormal) Collected:  03/05/17 0250    Specimen:  Arterial Blood Updated:  03/05/17 0251     Site Arterial: right radial      Michael's Test --       Documented in Rapid Comm        pH, Arterial 7.354 pH units      pCO2, Arterial 46.9 (H) mm Hg      pO2, Arterial 113.1 (H) mm Hg      HCO3, Arterial 25.5 mmol/L      Base Excess, Arterial -0.5 mmol/L      O2 Saturation, Arterial 97.9 %      O2 Saturation  Calculated 97.9 %      Barometric Pressure for Blood Gas -- mmHg       Component not reported at this site.        Modality Ventilator      FIO2 75 %      Ventilator Mode AC      Rate 10.0 Breaths/minute      PEEP 5.0      Vent CPAP/PEEP 5.0     Narrative:       Serial Number: [id number]    : [id number]    Blood Culture [[id number]]  (Normal) Collected:  03/04/17 1427    Specimen:  Blood from Arm, Right Updated:  03/05/17 0301     Blood Culture No growth at less than 24 hours     Blood Culture [[id number]]  (Normal) Collected:  03/04/17 1415    Specimen:  Blood from Arm, Left Updated:  03/05/17 0301     Blood Culture No growth at less than 24 hours     Troponin [[id number]]  (Normal) Collected:  03/05/17 0248    Specimen:  Blood Updated:  03/05/17 0321     Troponin I <0.012 ng/mL           .  Imaging Results (last 24 hours)     Procedure Component Value Units Date/Time    XR Chest 1 View [[id number]] Collected:  03/04/17 1527     Updated:  03/04/17 1542    Narrative:       CHEST, ONE VIEW:     HISTORY: Sepsis     COMPARISON: 10/31/2009 and 01/21/2009     A single frontal chest radiograph was obtained.     FINDINGS:     COPD and chronic lung changes appreciated.     There are patchy airspace opacities identified in the right lower lobe  laterally not observed previously and a small area of acute infiltrate  from an infectious or inflammatory process, pneumonia considered.  Correlate with patient presentation.     The heart is normal in size without evidence of overt heart failure.     The bony structures are intact.                                  Impression:       1. COPD and chronic lung changes.  2. Mild patchy right lower lobe airspace opacities, small area of acute  infiltrate/pneumonia considered.     This report was finalized on 03/04/2017 15:40 by Dr. Eduardo Black MD.    XR Chest 1 View [[id number]] Collected:  03/04/17 1933     Updated:  03/04/17 1939    Narrative:       EXAMINATION: XR CHEST 1 VW- 3/4/2017 7:33 PM  CST     HISTORY: Endotracheal tube placement     COMPARISON: 03/04/2017     FINDINGS:  Since the previous examination, an endotracheal tube has been placed  with tip approximately 2.1 cm above the juan carlos. The remainder of the  exam is otherwise unchanged, with bilateral scattered interstitial and  alveolar opacities and peribronchial wall thickening, concerning for  multifocal pneumonia. The heart appears normal in size. Aorta is  tortuous. Surgical clips are seen at the GE junction.       Impression:       Interval endotracheal tube placement with tip 2.1 cm above  the juan carlos. Exam otherwise stable.  This report was finalized on 03/04/2017 19:37 by Dr. Ruddy Alvarez MD.    XR Chest 1 View [[id number]] Collected:  03/05/17 0730     Updated:  03/05/17 0730    Narrative:       CHEST, ONE VIEW:     HISTORY: Respiratory distress     COMPARISON: 03/04/2017     A single frontal chest radiograph was obtained.     FINDINGS:     Endotracheal tube is identified in good position above the juan carlos.     COPD and chronic lung changes again identified.     Mild patchy airspace opacities in the right lung base again observed  likely unchanged.     Radiographically, the chest is likely stable.                                  Impression:       1. Stable one-day appearance of the chest.     This report was finalized on  by Dr. Eduardo Black MD.    CT Head Without Contrast [[id number]] Collected:  03/05/17 0746     Updated:  03/05/17 0746    Narrative:       CT SCAN OF THE HEAD, WITHOUT CONTRAST:      HISTORY: Altered mental status     COMPARISONS: 01/06/2009      TECHNIQUE:     Radiation dose equals  mGy-cm.  Automated exposure control dose  reduction technique was implemented.     CT evaluation of the head without intravenous contrast. 5 mm transaxial  images were obtained.   2-D sagittal and coronal reconstruction images  were generated.     FINDINGS: Image quality degradation related to patient motion observed.     There is no  intra or extra-axial hemorrhage.     There is no mass effect or midline shift.     There is no hydrocephalus.     Dilated perivascular space versus an old lacunar infarct, less likely,  appreciated again, in the left basal ganglia.        Mucosal thickening and mucus retention cyst noted in the maxillary sinus  with partial opacification with ethmoid sinus mucosal thickening. Mild  mucosal thickening observed in the sphenoid sinuses.     Bone window imaging reveals no calvarial abnormality.          Impression:       1. No CT evidence of an acute intracranial process.  2. Mild paranasal sinus disease.                     This report was finalized on  by Dr. Eduardo Black MD.    CT Chest Without Contrast [[id number]] Collected:  03/05/17 0753     Updated:  03/05/17 0753    Narrative:       HISTORY: Hypoxia     CT CHEST WO CONTRAST-  CT evaluation of the chest was performed without intravenous contrast. 5  mm sequential transaxial images were obtained. 2-D sagittal and coronal  reconstruction images generated. Radiation dose equals  mGy-cm.   Automated exposure control dose reduction technique was implemented.     Endotracheal tube identified above the juan carlos.     Small mediastinal lymph nodes appreciated without definite mediastinal  or hilar lymphadenopathy. Atherosclerotic aortic and coronary artery  calcifications appreciated.     There is pulmonary emphysema with an upper lobe predominance.  Fibronodular changes/apical pleural thickening observed compatible with  chronic changes.     There are small bilateral pleural effusions with patchy lower lobe  airspace disease. There is diffuse mild reticulonodular interstitial  prominence. There are no significant areas of lung consolidation  identified.     There is a pulmonary nodule identified in the right lower lobe, near the  pleural surface measuring nearly 8 mm, image 53. Imaging follow-up  recommended. There are no pneumothoraces.     Spondylosis changes  noted diffusely in the thoracic spine.       Impression:       1. Pulmonary emphysema and chronic lung changes.  2. Small bilateral pleural effusions with lower lobe airspace disease,  question atelectasis and/or infiltrate. Mild diffuse interstitial  prominence. Mild acute interstitial infiltration not excluded.  3. Right lower lobe nodule. Follow-up recommended.  This report was finalized on  by Dr. Eduardo Black MD.    CT Abdomen Pelvis Without Contrast [[id number]] Collected:  03/05/17 0800     Updated:  03/05/17 0800    Narrative:       HISTORY: Abdominal pain     CT ABDOMEN PELVIS WO CONTRAST-  CT evaluation of the abdomen and pelvis was performed without  intravenous or oral contrast. 5 mm sequential transaxial images were  obtained. 2-D sagittal and coronal reconstruction images generated.  Radiation dose equals  mGy-cm.  Automated exposure control dose  reduction technique was implemented.     Small bilateral pleural effusions with associated lower lobe airspace  disease again noted. The 7 mm pulmonary nodule right lower lobe  laterally again recognized. Findings are described in the chest CT  report.     Surgical clips result in significant streak artifact in the upper  abdomen. No definite hepatic lesion observed. The gallbladder is  surgically absent. No definite biliary ductal dilatation appreciated.     Spleen is not enlarged.     There are no adrenal masses or pancreatic lesions.     There are no urinary tract calculi or evidence of obstruction. The  urinary bladder is mildly distended without focal bladder wall  abnormality.     Changes from hysterectomy observed. There are no adnexal masses.     There is a large amount of stool identified throughout the colon. There  are scattered diverticuli particularly in the sigmoid colon without CT  evidence of acute diverticulitis. There is no CT evidence of acute  appendicitis, cecum extending into the pelvis. The small bowel is not  dilated. The  duodenal sweep is imaged appropriately. The stomach is not  distended.     Small para-aortic and mesenteric lymph nodes observed without  pathologically enlarged nodes.     There is no ascites or pneumoperitoneum.     There are atherosclerotic aortoiliofemoral calcifications.     A few spondylosis changes noted in the thoracolumbar spine.     A right femoral vein catheter is identified.       Impression:       1. No CT evidence of an acute intra-abdominal/pelvic pathological  process.  2. Large amount of stool in the colon suggesting constipation.  Diverticulosis without CT evidence of acute diverticulitis.  This report was finalized on  by Dr. Eduardo Black MD.          CT of head without contrast reviewed by me.  No signs of acute parenchymal injury.    Impression    1.  Metabolic encephalopathy  --Currently her encephalopathy is likely secondary to medications as withdrawal off of sedation causes her to abruptly began moving all extremities and her level of consciousness improves.  --This could have been secondary to hypercarbia initially.  --Differential diagnosis will also include medication intoxication  --There is no focality to the neurologic exam to suggest CNS structural lesions.  There is no history of seizures nor seizure activity witnessed thus far to suggest an epileptic etiology.    Plan    1.  Ammonia  2.  B12  3.  Folate  4.  TSH  5.  We will continue neurologic exams.    I discussed the patients findings and my recommendations with nursing staff    Dre Deal MD  03/05/17  10:07 AM

## 2017-03-05 NOTE — PLAN OF CARE
Problem: Patient Care Overview (Adult)  Goal: Plan of Care Review  Outcome: Ongoing (interventions implemented as appropriate)    Problem: Respiratory Insufficiency (Adult)  Goal: Identify Related Risk Factors and Signs and Symptoms  Outcome: Ongoing (interventions implemented as appropriate)  Goal: Acid/Base Balance  Outcome: Ongoing (interventions implemented as appropriate)  Goal: Effective Ventilation  Outcome: Ongoing (interventions implemented as appropriate)    Problem: Mechanical Ventilation, Invasive (Adult)  Goal: Signs and Symptoms of Listed Potential Problems Will be Absent or Manageable (Mechanical Ventilation, Invasive)  Outcome: Ongoing (interventions implemented as appropriate)    Problem: Pressure Ulcer Risk (Kermit Scale) (Adult,Obstetrics,Pediatric)  Goal: Identify Related Risk Factors and Signs and Symptoms  Outcome: Ongoing (interventions implemented as appropriate)  Goal: Skin Integrity  Outcome: Ongoing (interventions implemented as appropriate)    Problem: Fall Risk (Adult)  Goal: Identify Related Risk Factors and Signs and Symptoms  Outcome: Ongoing (interventions implemented as appropriate)  Goal: Absence of Falls  Outcome: Ongoing (interventions implemented as appropriate)

## 2017-03-06 ENCOUNTER — APPOINTMENT (OUTPATIENT)
Dept: CARDIOLOGY | Facility: HOSPITAL | Age: 67
End: 2017-03-06

## 2017-03-06 ENCOUNTER — APPOINTMENT (OUTPATIENT)
Dept: GENERAL RADIOLOGY | Facility: HOSPITAL | Age: 67
End: 2017-03-06

## 2017-03-06 PROBLEM — D64.9 ANEMIA: Status: ACTIVE | Noted: 2017-03-06

## 2017-03-06 PROBLEM — J44.9 COPD (CHRONIC OBSTRUCTIVE PULMONARY DISEASE): Status: ACTIVE | Noted: 2017-03-06

## 2017-03-06 PROBLEM — J96.90 RESPIRATORY FAILURE (HCC): Status: ACTIVE | Noted: 2017-03-06

## 2017-03-06 PROBLEM — E87.6 HYPOKALEMIA: Status: ACTIVE | Noted: 2017-03-06

## 2017-03-06 LAB
ANION GAP SERPL CALCULATED.3IONS-SCNC: 3 MMOL/L (ref 4–13)
BH CV ECHO MEAS - AO MAX PG (FULL): 3.1 MMHG
BH CV ECHO MEAS - AO MAX PG: 9.6 MMHG
BH CV ECHO MEAS - AO MEAN PG (FULL): 1 MMHG
BH CV ECHO MEAS - AO MEAN PG: 5 MMHG
BH CV ECHO MEAS - AO ROOT AREA (BSA CORRECTED): 1.7
BH CV ECHO MEAS - AO ROOT AREA: 7.1 CM^2
BH CV ECHO MEAS - AO ROOT DIAM: 3 CM
BH CV ECHO MEAS - AO V2 MAX: 155 CM/SEC
BH CV ECHO MEAS - AO V2 MEAN: 106 CM/SEC
BH CV ECHO MEAS - AO V2 VTI: 28.3 CM
BH CV ECHO MEAS - AVA(I,A): 2.4 CM^2
BH CV ECHO MEAS - AVA(I,D): 2.4 CM^2
BH CV ECHO MEAS - AVA(V,A): 2.3 CM^2
BH CV ECHO MEAS - AVA(V,D): 2.3 CM^2
BH CV ECHO MEAS - BSA(HAYCOCK): 1.8 M^2
BH CV ECHO MEAS - BSA: 1.8 M^2
BH CV ECHO MEAS - BZI_BMI: 26.3 KILOGRAMS/M^2
BH CV ECHO MEAS - BZI_METRIC_HEIGHT: 165.1 CM
BH CV ECHO MEAS - BZI_METRIC_WEIGHT: 71.7 KG
BH CV ECHO MEAS - CONTRAST EF 4CH: 59.7 ML/M^2
BH CV ECHO MEAS - EDV(CUBED): 64.5 ML
BH CV ECHO MEAS - EDV(MOD-SP4): 42.2 ML
BH CV ECHO MEAS - EDV(TEICH): 70.4 ML
BH CV ECHO MEAS - EF(CUBED): 63 %
BH CV ECHO MEAS - EF(MOD-SP4): 59.7 %
BH CV ECHO MEAS - EF(TEICH): 55 %
BH CV ECHO MEAS - ESV(CUBED): 23.9 ML
BH CV ECHO MEAS - ESV(MOD-SP4): 17 ML
BH CV ECHO MEAS - ESV(TEICH): 31.7 ML
BH CV ECHO MEAS - FS: 28.2 %
BH CV ECHO MEAS - IVS/LVPW: 0.98
BH CV ECHO MEAS - IVSD: 1 CM
BH CV ECHO MEAS - LA DIMENSION: 1.3 CM
BH CV ECHO MEAS - LA/AO: 0.43
BH CV ECHO MEAS - LAT PEAK E' VEL: 6.9 CM/SEC
BH CV ECHO MEAS - LV DIASTOLIC VOL/BSA (35-75): 23.6 ML/M^2
BH CV ECHO MEAS - LV MASS(C)D: 129.4 GRAMS
BH CV ECHO MEAS - LV MASS(C)DI: 72.3 GRAMS/M^2
BH CV ECHO MEAS - LV MAX PG: 6.6 MMHG
BH CV ECHO MEAS - LV MEAN PG: 4 MMHG
BH CV ECHO MEAS - LV SYSTOLIC VOL/BSA (12-30): 9.5 ML/M^2
BH CV ECHO MEAS - LV V1 MAX: 128 CM/SEC
BH CV ECHO MEAS - LV V1 MEAN: 91.8 CM/SEC
BH CV ECHO MEAS - LV V1 VTI: 23.5 CM
BH CV ECHO MEAS - LVIDD: 4 CM
BH CV ECHO MEAS - LVIDS: 2.9 CM
BH CV ECHO MEAS - LVLD AP4: 5.8 CM
BH CV ECHO MEAS - LVLS AP4: 5.1 CM
BH CV ECHO MEAS - LVOT AREA (M): 2.8 CM^2
BH CV ECHO MEAS - LVOT AREA: 2.8 CM^2
BH CV ECHO MEAS - LVOT DIAM: 1.9 CM
BH CV ECHO MEAS - LVPWD: 1 CM
BH CV ECHO MEAS - MV A MAX VEL: 90.7 CM/SEC
BH CV ECHO MEAS - MV DEC TIME: 0.11 SEC
BH CV ECHO MEAS - MV E MAX VEL: 94.1 CM/SEC
BH CV ECHO MEAS - MV E/A: 1
BH CV ECHO MEAS - RAP SYSTOLE: 10 MMHG
BH CV ECHO MEAS - RVSP: 38.5 MMHG
BH CV ECHO MEAS - SI(AO): 111.8 ML/M^2
BH CV ECHO MEAS - SI(CUBED): 22.7 ML/M^2
BH CV ECHO MEAS - SI(LVOT): 37.2 ML/M^2
BH CV ECHO MEAS - SI(MOD-SP4): 14.1 ML/M^2
BH CV ECHO MEAS - SI(TEICH): 21.7 ML/M^2
BH CV ECHO MEAS - SV(AO): 200 ML
BH CV ECHO MEAS - SV(CUBED): 40.6 ML
BH CV ECHO MEAS - SV(LVOT): 66.6 ML
BH CV ECHO MEAS - SV(MOD-SP4): 25.2 ML
BH CV ECHO MEAS - SV(TEICH): 38.7 ML
BH CV ECHO MEAS - TR MAX VEL: 267 CM/SEC
BUN BLD-MCNC: 6 MG/DL (ref 5–21)
BUN/CREAT SERPL: 9.4 (ref 7–25)
CALCIUM SPEC-SCNC: 7.7 MG/DL (ref 8.4–10.4)
CHLORIDE SERPL-SCNC: 105 MMOL/L (ref 98–110)
CO2 SERPL-SCNC: 32 MMOL/L (ref 24–31)
CREAT BLD-MCNC: 0.64 MG/DL (ref 0.5–1.4)
DEPRECATED RDW RBC AUTO: 53.5 FL (ref 40–54)
ERYTHROCYTE [DISTWIDTH] IN BLOOD BY AUTOMATED COUNT: 15.5 % (ref 12–15)
GFR SERPL CREATININE-BSD FRML MDRD: 93 ML/MIN/1.73
GLUCOSE BLD-MCNC: 103 MG/DL (ref 70–100)
HCT VFR BLD AUTO: 30.1 % (ref 37–47)
HGB BLD-MCNC: 9.8 G/DL (ref 12–16)
LEFT ATRIUM VOLUME INDEX: 19.1 ML/M2
MCH RBC QN AUTO: 31.1 PG (ref 28–32)
MCHC RBC AUTO-ENTMCNC: 32.6 G/DL (ref 33–36)
MCV RBC AUTO: 95.6 FL (ref 82–98)
NT-PROBNP SERPL-MCNC: 2510 PG/ML (ref 0–900)
PLATELET # BLD AUTO: 124 10*3/MM3 (ref 130–400)
PMV BLD AUTO: 11.6 FL (ref 6–12)
POTASSIUM BLD-SCNC: 3 MMOL/L (ref 3.5–5.3)
RBC # BLD AUTO: 3.15 10*6/MM3 (ref 4.2–5.4)
SODIUM BLD-SCNC: 140 MMOL/L (ref 135–145)
T3FREE SERPL-MCNC: 2.81 PG/ML (ref 2.77–5.27)
T4 FREE SERPL-MCNC: 0.91 NG/DL (ref 0.78–2.19)
TROPONIN I SERPL-MCNC: 0.01 NG/ML (ref 0–0.03)
VIT B12 BLD-MCNC: 225 PG/ML (ref 239–931)
WBC NRBC COR # BLD: 5.19 10*3/MM3 (ref 4.8–10.8)

## 2017-03-06 PROCEDURE — 84481 FREE ASSAY (FT-3): CPT | Performed by: PSYCHIATRY & NEUROLOGY

## 2017-03-06 PROCEDURE — 25010000002 METHYLPREDNISOLONE PER 40 MG: Performed by: NURSE PRACTITIONER

## 2017-03-06 PROCEDURE — 25010000002 ENOXAPARIN PER 10 MG: Performed by: INTERNAL MEDICINE

## 2017-03-06 PROCEDURE — 94799 UNLISTED PULMONARY SVC/PX: CPT

## 2017-03-06 PROCEDURE — 83880 ASSAY OF NATRIURETIC PEPTIDE: CPT | Performed by: INTERNAL MEDICINE

## 2017-03-06 PROCEDURE — C8929 TTE W OR WO FOL WCON,DOPPLER: HCPCS

## 2017-03-06 PROCEDURE — 99232 SBSQ HOSP IP/OBS MODERATE 35: CPT | Performed by: PSYCHIATRY & NEUROLOGY

## 2017-03-06 PROCEDURE — 84439 ASSAY OF FREE THYROXINE: CPT | Performed by: PSYCHIATRY & NEUROLOGY

## 2017-03-06 PROCEDURE — 94760 N-INVAS EAR/PLS OXIMETRY 1: CPT

## 2017-03-06 PROCEDURE — 25010000002 LEVOFLOXACIN PER 250 MG: Performed by: INTERNAL MEDICINE

## 2017-03-06 PROCEDURE — 85027 COMPLETE CBC AUTOMATED: CPT | Performed by: INTERNAL MEDICINE

## 2017-03-06 PROCEDURE — 93010 ELECTROCARDIOGRAM REPORT: CPT | Performed by: INTERNAL MEDICINE

## 2017-03-06 PROCEDURE — 93306 TTE W/DOPPLER COMPLETE: CPT | Performed by: INTERNAL MEDICINE

## 2017-03-06 PROCEDURE — 82607 VITAMIN B-12: CPT | Performed by: PSYCHIATRY & NEUROLOGY

## 2017-03-06 PROCEDURE — 80048 BASIC METABOLIC PNL TOTAL CA: CPT | Performed by: INTERNAL MEDICINE

## 2017-03-06 PROCEDURE — 84484 ASSAY OF TROPONIN QUANT: CPT | Performed by: INTERNAL MEDICINE

## 2017-03-06 PROCEDURE — 25010000002 PERFLUTREN 6.52 MG/ML SUSPENSION

## 2017-03-06 RX ORDER — DOCUSATE SODIUM 100 MG/1
100 CAPSULE, LIQUID FILLED ORAL 2 TIMES DAILY
Status: DISCONTINUED | OUTPATIENT
Start: 2017-03-06 | End: 2017-03-16 | Stop reason: HOSPADM

## 2017-03-06 RX ORDER — CYANOCOBALAMIN 1000 UG/ML
1000 INJECTION, SOLUTION INTRAMUSCULAR; SUBCUTANEOUS DAILY
Status: DISPENSED | OUTPATIENT
Start: 2017-03-06 | End: 2017-03-11

## 2017-03-06 RX ORDER — METHYLPREDNISOLONE SODIUM SUCCINATE 40 MG/ML
40 INJECTION, POWDER, LYOPHILIZED, FOR SOLUTION INTRAMUSCULAR; INTRAVENOUS EVERY 6 HOURS
Status: COMPLETED | OUTPATIENT
Start: 2017-03-06 | End: 2017-03-06

## 2017-03-06 RX ORDER — DEXTROMETHORPHAN POLISTIREX 30 MG/5ML
30 SUSPENSION ORAL EVERY 12 HOURS SCHEDULED
Status: DISCONTINUED | OUTPATIENT
Start: 2017-03-06 | End: 2017-03-16 | Stop reason: HOSPADM

## 2017-03-06 RX ORDER — POTASSIUM CHLORIDE 750 MG/1
40 CAPSULE, EXTENDED RELEASE ORAL DAILY
Status: COMPLETED | OUTPATIENT
Start: 2017-03-06 | End: 2017-03-08

## 2017-03-06 RX ADMIN — ALPRAZOLAM 0.5 MG: 0.5 TABLET ORAL at 06:26

## 2017-03-06 RX ADMIN — OXYCODONE HYDROCHLORIDE AND ACETAMINOPHEN 1 TABLET: 10; 325 TABLET ORAL at 02:22

## 2017-03-06 RX ADMIN — NICOTINE 1 PATCH: 21 PATCH, EXTENDED RELEASE TRANSDERMAL at 18:01

## 2017-03-06 RX ADMIN — GABAPENTIN 300 MG: 300 CAPSULE ORAL at 22:16

## 2017-03-06 RX ADMIN — METHYLPREDNISOLONE SODIUM SUCCINATE 40 MG: 40 INJECTION, POWDER, FOR SOLUTION INTRAMUSCULAR; INTRAVENOUS at 09:27

## 2017-03-06 RX ADMIN — IPRATROPIUM BROMIDE AND ALBUTEROL SULFATE 3 ML: 2.5; .5 SOLUTION RESPIRATORY (INHALATION) at 20:31

## 2017-03-06 RX ADMIN — METHYLPREDNISOLONE SODIUM SUCCINATE 40 MG: 40 INJECTION, POWDER, FOR SOLUTION INTRAMUSCULAR; INTRAVENOUS at 16:11

## 2017-03-06 RX ADMIN — IPRATROPIUM BROMIDE AND ALBUTEROL SULFATE 3 ML: 2.5; .5 SOLUTION RESPIRATORY (INHALATION) at 03:22

## 2017-03-06 RX ADMIN — IPRATROPIUM BROMIDE AND ALBUTEROL SULFATE 3 ML: 2.5; .5 SOLUTION RESPIRATORY (INHALATION) at 10:38

## 2017-03-06 RX ADMIN — OXYCODONE HYDROCHLORIDE 30 MG: 20 TABLET, FILM COATED, EXTENDED RELEASE ORAL at 20:26

## 2017-03-06 RX ADMIN — DEXTROMETHORPHAN 30 MG: 30 SUSPENSION, EXTENDED RELEASE ORAL at 09:27

## 2017-03-06 RX ADMIN — POTASSIUM CHLORIDE 40 MEQ: 750 CAPSULE, EXTENDED RELEASE ORAL at 11:07

## 2017-03-06 RX ADMIN — IPRATROPIUM BROMIDE AND ALBUTEROL SULFATE 3 ML: 2.5; .5 SOLUTION RESPIRATORY (INHALATION) at 07:30

## 2017-03-06 RX ADMIN — OXYCODONE HYDROCHLORIDE AND ACETAMINOPHEN 1 TABLET: 10; 325 TABLET ORAL at 16:37

## 2017-03-06 RX ADMIN — GABAPENTIN 300 MG: 300 CAPSULE ORAL at 16:11

## 2017-03-06 RX ADMIN — ENOXAPARIN SODIUM 40 MG: 40 INJECTION SUBCUTANEOUS at 09:03

## 2017-03-06 RX ADMIN — ALPRAZOLAM 0.5 MG: 0.5 TABLET ORAL at 00:06

## 2017-03-06 RX ADMIN — ALPRAZOLAM 0.5 MG: 0.5 TABLET ORAL at 20:27

## 2017-03-06 RX ADMIN — LEVOFLOXACIN 750 MG: 750 INJECTION, SOLUTION INTRAVENOUS at 16:10

## 2017-03-06 RX ADMIN — Medication 10 ML: at 09:06

## 2017-03-06 RX ADMIN — PANTOPRAZOLE SODIUM 40 MG: 40 INJECTION, POWDER, FOR SOLUTION INTRAVENOUS at 05:18

## 2017-03-06 RX ADMIN — METHYLPREDNISOLONE SODIUM SUCCINATE 40 MG: 40 INJECTION, POWDER, FOR SOLUTION INTRAMUSCULAR; INTRAVENOUS at 20:26

## 2017-03-06 RX ADMIN — FUROSEMIDE 40 MG: 40 TABLET ORAL at 09:03

## 2017-03-06 RX ADMIN — ACETAMINOPHEN 650 MG: 325 TABLET ORAL at 11:07

## 2017-03-06 RX ADMIN — CITALOPRAM 40 MG: 20 TABLET, FILM COATED ORAL at 09:04

## 2017-03-06 RX ADMIN — PERFLUTREN 13.04 MG: 6.52 INJECTION, SUSPENSION INTRAVENOUS at 10:14

## 2017-03-06 RX ADMIN — DEXTROMETHORPHAN 30 MG: 30 SUSPENSION, EXTENDED RELEASE ORAL at 20:27

## 2017-03-06 RX ADMIN — GABAPENTIN 300 MG: 300 CAPSULE ORAL at 05:18

## 2017-03-06 RX ADMIN — Medication 10 ML: at 16:11

## 2017-03-06 RX ADMIN — BUDESONIDE AND FORMOTEROL FUMARATE DIHYDRATE 2 PUFF: 160; 4.5 AEROSOL RESPIRATORY (INHALATION) at 07:29

## 2017-03-06 RX ADMIN — CETIRIZINE HYDROCHLORIDE 10 MG: 10 TABLET, FILM COATED ORAL at 09:04

## 2017-03-06 RX ADMIN — OXYCODONE HYDROCHLORIDE 30 MG: 20 TABLET, FILM COATED, EXTENDED RELEASE ORAL at 09:03

## 2017-03-06 RX ADMIN — DOCUSATE SODIUM 100 MG: 100 CAPSULE ORAL at 11:07

## 2017-03-06 RX ADMIN — IPRATROPIUM BROMIDE AND ALBUTEROL SULFATE 3 ML: 2.5; .5 SOLUTION RESPIRATORY (INHALATION) at 14:32

## 2017-03-06 RX ADMIN — BUDESONIDE AND FORMOTEROL FUMARATE DIHYDRATE 2 PUFF: 160; 4.5 AEROSOL RESPIRATORY (INHALATION) at 20:31

## 2017-03-06 RX ADMIN — OXYCODONE HYDROCHLORIDE AND ACETAMINOPHEN 1 TABLET: 10; 325 TABLET ORAL at 23:25

## 2017-03-06 NOTE — PLAN OF CARE
Problem: Patient Care Overview (Adult)  Goal: Plan of Care Review  Outcome: Ongoing (interventions implemented as appropriate)  Goal: Adult Individualization and Mutuality  Outcome: Ongoing (interventions implemented as appropriate)  Goal: Discharge Needs Assessment  Outcome: Ongoing (interventions implemented as appropriate)    Problem: Respiratory Insufficiency (Adult)  Goal: Identify Related Risk Factors and Signs and Symptoms  Outcome: Outcome(s) achieved Date Met:  03/06/17  Goal: Acid/Base Balance  Outcome: Ongoing (interventions implemented as appropriate)  Goal: Effective Ventilation  Outcome: Ongoing (interventions implemented as appropriate)    Problem: Pressure Ulcer Risk (Kermit Scale) (Adult,Obstetrics,Pediatric)  Goal: Identify Related Risk Factors and Signs and Symptoms  Outcome: Outcome(s) achieved Date Met:  03/06/17  Goal: Skin Integrity  Outcome: Ongoing (interventions implemented as appropriate)    Problem: Fall Risk (Adult)  Goal: Identify Related Risk Factors and Signs and Symptoms  Outcome: Outcome(s) achieved Date Met:  03/06/17  Goal: Absence of Falls  Outcome: Ongoing (interventions implemented as appropriate)

## 2017-03-06 NOTE — PROGRESS NOTES
Discharge Planning Assessment  [facility]     Patient Name: Justyna Lei  MRN: [id number]  Today's Date: 3/6/2017    Admit Date: 3/4/2017          Discharge Needs Assessment       03/06/17 1352    Living Environment    Lives With child(nani), adult    Living Arrangements mobile home    Provides Primary Care For no one    Quality Of Family Relationships supportive    Able to Return to Prior Living Arrangements yes    Discharge Needs Assessment    Concerns To Be Addressed denies needs/concerns at this time;no discharge needs identified    Readmission Within The Last 30 Days no previous admission in last 30 days    Anticipated Changes Related to Illness none    Equipment Currently Used at Home walker, rolling    Equipment Needed After Discharge none    Current Discharge Risk psychiatric illness    Discharge Planning Comments Spoke with patient regarding discharge plan/needs.  Patient states she resides at home with her two adult children (daughters).  Patient states her PCP is Dr. Reyes.  Patient states she does have prescription drug coverage.  Patient states she owns a RW but doesn't have to use it.  Patient states she broke her leg in the past and that is why she has the walker.  Patient denies discharge needs, no discharge needs identified.              Discharge Plan     None        Discharge Placement     No information found                Demographic Summary     None            Functional Status     None            Psychosocial     None            Abuse/Neglect     None            Legal     None            Substance Abuse     None            Patient Forms     None          YOLA Carlson

## 2017-03-06 NOTE — PROGRESS NOTES
[facility] Medicine Services  INPATIENT PROGRESS NOTE    Length of Stay: 2  Date of Admission: 3/4/2017  Primary Care Physician: No Known Provider    Subjective   Chief Complaint: Dyspnea    HPI   66 YO CF admitted on 3/4/17. Patient is intubated and on pressors.  Extubated on 3/4.  Patient still have shortness of breath on 6 L of oxygen.  She is more alert today.  Moving all extremity.      Review of Systems   Constitutional: Positive for activity change, appetite change, diaphoresis and fatigue. Negative for chills and fever.   HENT: Negative for hearing loss, nosebleeds, tinnitus and trouble swallowing.    Eyes: Negative for visual disturbance.   Respiratory: Positive for apnea, cough and wheezing. Negative for chest tightness and shortness of breath.    Cardiovascular: Negative for chest pain, palpitations and leg swelling.   Gastrointestinal: Negative for abdominal distention, abdominal pain, blood in stool, constipation, diarrhea, nausea and vomiting.   Endocrine: Negative for cold intolerance, heat intolerance, polydipsia, polyphagia and polyuria.   Genitourinary: Negative for decreased urine volume, difficulty urinating, dysuria, flank pain, frequency and hematuria.   Musculoskeletal: Positive for arthralgias, back pain, gait problem and myalgias. Negative for joint swelling.   Skin: Negative for rash.   Allergic/Immunologic: Negative for immunocompromised state.   Neurological: Positive for weakness. Negative for dizziness, syncope, light-headedness and headaches.   Hematological: Negative for adenopathy. Does not bruise/bleed easily.   Psychiatric/Behavioral: Positive for decreased concentration. Negative for confusion, hallucinations, sleep disturbance and suicidal ideas. The patient is not nervous/anxious.           All pertinent negatives and positives are as above. All other systems have been reviewed and are negative unless otherwise stated.     Objective    Temp:   [99.1 °F (37.3 °C)-100 °F (37.8 °C)] 99.8 °F (37.7 °C)  Heart Rate:  [] 105  Resp:  [14-26] 26  BP: (103-137)/(45-71) 132/54  FiO2 (%):  [60 %] 60 %    Intake/Output Summary (Last 24 hours) at 03/06/17 0928  Last data filed at 03/06/17 0800   Gross per 24 hour   Intake   2484 ml   Output   2450 ml   Net     34 ml     Physical Exam   Constitutional: She is oriented to person, place, and time. She appears well-developed and well-nourished.   HENT:   Head: Normocephalic and atraumatic.   Eyes: Conjunctivae and EOM are normal. Pupils are equal, round, and reactive to light.   Neck: Neck supple. No JVD present. No thyromegaly present.   Cardiovascular: Normal rate, regular rhythm, normal heart sounds and intact distal pulses.  Exam reveals no gallop and no friction rub.    No murmur heard.  Pulmonary/Chest: Effort normal. No respiratory distress. She has wheezes. She has no rales. She exhibits no tenderness.   Abdominal: Soft. Bowel sounds are normal. She exhibits no distension. There is no tenderness. There is no rebound and no guarding.   Musculoskeletal: Normal range of motion. She exhibits no edema, tenderness or deformity.   Lymphadenopathy:     She has no cervical adenopathy.   Neurological: She is alert and oriented to person, place, and time. She displays normal reflexes. No cranial nerve deficit. She exhibits abnormal muscle tone. Coordination abnormal.   Skin: Skin is warm and dry. No rash noted.   Psychiatric: She has a normal mood and affect. Her behavior is normal.   Nursing note and vitals reviewed.      Results Review:  Lab Results (last 24 hours)     Procedure Component Value Units Date/Time    Blood Gas, Arterial [[id number]]  (Abnormal) Collected:  03/05/17 1149    Specimen:  Arterial Blood Updated:  03/05/17 1151     Site Arterial: right radial      Michael's Test --       Documented in Rapid Comm        pH, Arterial 7.398 pH units      pCO2, Arterial 45.7 (H) mm Hg      pO2, Arterial 52.0 (L) mm Hg       HCO3, Arterial 27.5 (H) mmol/L      Base Excess, Arterial 2.1 (H) mmol/L      O2 Saturation, Arterial 86.6 (L) %      O2 Saturation Calculated 86.6 (L) %      Barometric Pressure for Blood Gas -- mmHg       Component not reported at this site.        Modality Ventilator      FIO2 60 %      Ventilator Mode AC      Rate 14.0 Breaths/minute      PEEP 5.0      Vent CPAP/PEEP 5.0     Narrative:       Serial Number: [id number]    : [id number]    Blood Gas, Arterial With Co-Ox [[id number]]  (Abnormal) Collected:  03/05/17 1257    Specimen:  Arterial Blood Updated:  03/05/17 1258     Site Arterial: right brachial      Michael's Test --       Documented in Rapid Comm        pH, Arterial 7.419 pH units      pCO2, Arterial 39.7 mm Hg      pO2, Arterial 52.7 (L) mm Hg      HCO3, Arterial 25.1 mmol/L      Base Excess, Arterial 0.7 mmol/L      Hemoglobin, Blood Gas 12.1 g/dL      Hematocrit, Blood Gas 36.0 (L) %      Oxyhemoglobin 88.7 (L) %      Methemoglobin 0.4 %      Carboxyhemoglobin 0.4 %      Sodium, Arterial 142.4 mmol/L      Potassium, Arterial 3.67 mmol/L      Barometric Pressure for Blood Gas -- mmHg       Component not reported at this site.        Modality Cannula      Flow Rate 6.00 lpm     Narrative:       Serial Number: [id number]    : [id number]    Ammonia [[id number]]  (Abnormal) Collected:  03/05/17 1333    Specimen:  Blood Updated:  03/05/17 1354     Ammonia <9 (L) umol/L     TSH [[id number]]  (Abnormal) Collected:  03/05/17 1333    Specimen:  Blood Updated:  03/05/17 1426     TSH 0.208 (L) mIU/mL     Blood Culture [[id number]]  (Normal) Collected:  03/04/17 1427    Specimen:  Blood from Arm, Right Updated:  03/05/17 1501     Blood Culture No growth at 24 hours     Blood Culture [[id number]]  (Normal) Collected:  03/04/17 1415    Specimen:  Blood from Arm, Left Updated:  03/05/17 1501     Blood Culture No growth at 24 hours     Folate [[id number]] Collected:  03/05/17 1333    Specimen:  Blood Updated:  03/05/17 1501      Folate 4.24 ng/mL     CBC (No Diff) [[id number]]  (Abnormal) Collected:  03/06/17 0521    Specimen:  Blood Updated:  03/06/17 0541     WBC 5.19 10*3/mm3      RBC 3.15 (L) 10*6/mm3      Hemoglobin 9.8 (L) g/dL      Hematocrit 30.1 (L) %      MCV 95.6 fL      MCH 31.1 pg      MCHC 32.6 (L) g/dL      RDW 15.5 (H) %      RDW-SD 53.5 fl      MPV 11.6 fL      Platelets 124 (L) 10*3/mm3     Basic Metabolic Panel [[id number]]  (Abnormal) Collected:  03/06/17 0521    Specimen:  Blood Updated:  03/06/17 0553     Glucose 103 (H) mg/dL      BUN 6 mg/dL      Creatinine 0.64 mg/dL      Sodium 140 mmol/L      Potassium 3.0 (L) mmol/L      Chloride 105 mmol/L      CO2 32.0 (H) mmol/L      Calcium 7.7 (L) mg/dL      eGFR Non African Amer 93 mL/min/1.73      BUN/Creatinine Ratio 9.4      Anion Gap 3.0 (L) mmol/L     Narrative:       GFR Normal >60  Chronic Kidney Disease <60  Kidney Failure <15    Troponin [[id number]]  (Normal) Collected:  03/06/17 0521    Specimen:  Blood Updated:  03/06/17 0603     Troponin I 0.014 ng/mL     BNP [[id number]]  (Abnormal) Collected:  03/06/17 0521    Specimen:  Blood Updated:  03/06/17 0603     proBNP 2510.0 (H) pg/mL     Vitamin B12 [[id number]]  (Abnormal) Collected:  03/06/17 0521    Specimen:  Blood Updated:  03/06/17 0643     Vitamin B-12 225 (L) pg/mL            Cultures:  BLOOD CULTURE   Date Value Ref Range Status   03/04/2017 No growth at 24 hours  Preliminary   03/04/2017 No growth at 24 hours  Preliminary       Radiology Data:    Imaging Results (last 24 hours)     Procedure Component Value Units Date/Time    CT Abdomen Pelvis Without Contrast [[id number]] Collected:  03/05/17 0800     Updated:  03/05/17 1259    Narrative:       HISTORY: Abdominal pain     CT ABDOMEN PELVIS WO CONTRAST-  CT evaluation of the abdomen and pelvis was performed without  intravenous or oral contrast. 5 mm sequential transaxial images were  obtained. 2-D sagittal and coronal reconstruction images generated.  Radiation dose  equals  mGy-cm.  Automated exposure control dose  reduction technique was implemented.     Small bilateral pleural effusions with associated lower lobe airspace  disease again noted. The 7 mm pulmonary nodule right lower lobe  laterally again recognized. Findings are described in the chest CT  report.     Surgical clips result in significant streak artifact in the upper  abdomen. No definite hepatic lesion observed. The gallbladder is  surgically absent. No definite biliary ductal dilatation appreciated.     Spleen is not enlarged.     There are no adrenal masses or pancreatic lesions.     There are no urinary tract calculi or evidence of obstruction. The  urinary bladder is mildly distended without focal bladder wall  abnormality.     Changes from hysterectomy observed. There are no adnexal masses.     There is a large amount of stool identified throughout the colon. There  are scattered diverticuli particularly in the sigmoid colon without CT  evidence of acute diverticulitis. There is no CT evidence of acute  appendicitis, cecum extending into the pelvis. The small bowel is not  dilated. The duodenal sweep is imaged appropriately. The stomach is not  distended.     Small para-aortic and mesenteric lymph nodes observed without  pathologically enlarged nodes.     There is no ascites or pneumoperitoneum.     There are atherosclerotic aortoiliofemoral calcifications.     A few spondylosis changes noted in the thoracolumbar spine.     A right femoral vein catheter is identified.       Impression:       1. No CT evidence of an acute intra-abdominal/pelvic pathological  process.  2. Large amount of stool in the colon suggesting constipation.  Diverticulosis without CT evidence of acute diverticulitis.  This report was finalized on 03/05/2017 12:56 by Dr. Eduardo Black MD.    CT Chest Without Contrast [[id number]] Collected:  03/05/17 0753     Updated:  03/05/17 1300    Narrative:       HISTORY: Hypoxia     CT CHEST  WO CONTRAST-  CT evaluation of the chest was performed without intravenous contrast. 5  mm sequential transaxial images were obtained. 2-D sagittal and coronal  reconstruction images generated. Radiation dose equals  mGy-cm.   Automated exposure control dose reduction technique was implemented.     Endotracheal tube identified above the juan carlos.     Small mediastinal lymph nodes appreciated without definite mediastinal  or hilar lymphadenopathy. Atherosclerotic aortic and coronary artery  calcifications appreciated.     There is pulmonary emphysema with an upper lobe predominance.  Fibronodular changes/apical pleural thickening observed compatible with  chronic changes.     There are small bilateral pleural effusions with patchy lower lobe  airspace disease. There is diffuse mild reticulonodular interstitial  prominence. There are no significant areas of lung consolidation  identified.     There is a pulmonary nodule identified in the right lower lobe, near the  pleural surface measuring nearly 8 mm, image 53. Imaging follow-up  recommended. There are no pneumothoraces.     Spondylosis changes noted diffusely in the thoracic spine.       Impression:       1. Pulmonary emphysema and chronic lung changes.  2. Small bilateral pleural effusions with lower lobe airspace disease,  question atelectasis and/or infiltrate. Mild diffuse interstitial  prominence. Mild acute interstitial infiltration not excluded.  3. Right lower lobe nodule. Follow-up recommended.  This report was finalized on 03/05/2017 12:58 by Dr. Eduardo Black MD.    CT Head Without Contrast [[id number]] Collected:  03/05/17 0746     Updated:  03/05/17 1302    Narrative:       CT SCAN OF THE HEAD, WITHOUT CONTRAST:      HISTORY: Altered mental status     COMPARISONS: 01/06/2009      TECHNIQUE:     Radiation dose equals  mGy-cm.  Automated exposure control dose  reduction technique was implemented.     CT evaluation of the head without  intravenous contrast. 5 mm transaxial  images were obtained.   2-D sagittal and coronal reconstruction images  were generated.     FINDINGS: Image quality degradation related to patient motion observed.     There is no intra or extra-axial hemorrhage.     There is no mass effect or midline shift.     There is no hydrocephalus.     Dilated perivascular space versus an old lacunar infarct, less likely,  appreciated again, in the left basal ganglia.        Mucosal thickening and mucus retention cyst noted in the maxillary sinus  with partial opacification with ethmoid sinus mucosal thickening. Mild  mucosal thickening observed in the sphenoid sinuses.     Bone window imaging reveals no calvarial abnormality.          Impression:       1. No CT evidence of an acute intracranial process.  2. Mild paranasal sinus disease.                     This report was finalized on 03/05/2017 13:00 by Dr. Eduardo Black MD.    XR Abdomen KUB [[id number]] Collected:  03/05/17 1213     Updated:  03/05/17 1304    Narrative:       HISTORY: Feeding tube placement.     XR ABDOMEN KUB-  Radiograph of the lower chest and upper abdomen was performed to  evaluate the position of a Dobbhoff feeding tube.     The feeding tube is identified in the stomach with the tip in the distal  body or antrum region.  This report was finalized on 03/05/2017 13:02 by Dr. Eduardo Black MD.    XR Chest 1 View [[id number]] Collected:  03/05/17 0730     Updated:  03/05/17 1306    Narrative:       CHEST, ONE VIEW:     HISTORY: Respiratory distress     COMPARISON: 03/04/2017     A single frontal chest radiograph was obtained.     FINDINGS:     Endotracheal tube is identified in good position above the juan carlos.     COPD and chronic lung changes again identified.     Mild patchy airspace opacities in the right lung base again observed  likely unchanged.     Radiographically, the chest is likely stable.                                  Impression:       1. Stable one-day  appearance of the chest.     This report was finalized on 03/05/2017 13:04 by Dr. Eduardo Black MD.    CT Angiogram Chest With & Without Contrast [[id number]] Collected:  03/05/17 1639     Updated:  03/05/17 1643    Narrative:       HISTORY: Hypoxemia     COMPARISON: Chest CT dated 03/04/2017     TECHNIQUE:     CT evaluation of the chest was performed with intravenous contrast. 2 mm  transaxial images were obtained. 3-D reconstruction angiographic images  were generated using a volume rendering technique. Coronal  reconstruction maximum intensity projection images of the pulmonary  arteries and aorta were also generated.     Radiation dose equals  mGy-cm.  Automated exposure control dose  reduction technique was implemented.        FINDINGS:     There is image quality degradation related to patient motion artifact in  the lower lobes. In the right lower lobe, there is low-attenuation  change within the medial basal pulmonary segment felt most likely  related to motion.     No definite CT angiographic evidence of pulmonary embolus appreciated.     The aorta is normal in caliber, without aneurysm or dissection.     Lower lobe airspace disease again appreciated likely atelectasis with  small pleural effusions.     The right lower lobe pulmonary nodule less conspicuous compared to the  previous examination likely due to the atelectasis.     Pulmonary emphysema again identified with an upper lobe predominance.       Impression:       1. No CT angiographic evidence of pulmonary embolus or acute aortic  pathology.  2. Lower lobe airspace disease, likely atelectasis with small pleural  effusions.  3. Pulmonary emphysema.  This report was finalized on 03/05/2017 16:41 by Dr. Eduardo Black MD.          Allergies   Allergen Reactions   • Aspirin Anaphylaxis   • Demerol [Meperidine] Anaphylaxis   • Ibuprofen Anaphylaxis   • Neosporin [Neomycin-Bacitracin Zn-Polymyx] Anaphylaxis   • Penicillins Anaphylaxis   • Sulfa  Antibiotics Anaphylaxis   • Codeine Hives     Pt can take codeine as long is it is not alot   • Talwin [Pentazocine] Hives   • Tetracyclines & Related    • Morphine And Related Hives       Scheduled meds:     budesonide-formoterol 2 puff Inhalation BID - RT   cetirizine 10 mg Oral Daily   citalopram 40 mg Oral Daily   dextromethorphan polistirex ER 30 mg Oral Q12H   enoxaparin 40 mg Subcutaneous Daily   furosemide 40 mg Oral Daily   gabapentin 300 mg Oral Q8H   ipratropium-albuterol 3 mL Nebulization Q4H - RT   levoFLOXacin 750 mg Intravenous Q24H   methylPREDNISolone sodium succinate 40 mg Intravenous Q6H   nicotine 1 patch Transdermal Q24H   oxyCODONE ER 30 mg Oral Q12H   pantoprazole 40 mg Intravenous Q AM       PRN meds:  •  acetaminophen  •  acetaminophen  •  ALPRAZolam  •  enalaprilat  •  hydrALAZINE  •  ondansetron  •  oxyCODONE-acetaminophen  •  sodium chloride  •  sodium chloride    Assessment/Plan     Active Problems:    Pneumonia of right lower lobe due to infectious organism    COPD (chronic obstructive pulmonary disease)    Hypokalemia    Anemia    Respiratory failure      Plan:  Pneumonia/COPD/respiratory failureHypoxia with respiratory distress-Extubated 3/6.  Duonebs. Currently  Levaquin.  By mouth steroids.    AMS- question hypoxia/fever vs possible OD. Apparently there has been a HX of depression since the passing of her  in July. Neurology consulted.     Hypokalemia. kdur x 3 days.    Depression/Anxiety- xanax.     Abdominal pain- constipation on CT    Hypotension with HX of hypertension- off levaphed    Abnormal TSH- Free t4 and free t3.    Chronic pain-  on OxyContin, and  Percocet when necessary     Anemia- stable. [place] panel.    Discharge Planning:  pending    Siddharth Leavitt MD   03/06/17   9:28 AM

## 2017-03-06 NOTE — PLAN OF CARE
Problem: Mechanical Ventilation, Invasive (Adult)  Goal: Signs and Symptoms of Listed Potential Problems Will be Absent or Manageable (Mechanical Ventilation, Invasive)  Outcome: Outcome(s) achieved Date Met:  03/05/17

## 2017-03-06 NOTE — PROGRESS NOTES
"[facility] Pulmonary Progress Note    Patient: Justyna Lei  3/25/1951   MR# [id number]   [id number]  03/06/17   9:05 AM  Referring Provider: Siddharth Leavitt MD      Problem list:   Patient Active Problem List   Diagnosis   • Pneumonia of right lower lobe due to infectious organism        Chief Complaint: Hypoxia    Interval history: She was extubated yesterday with a neg CTA for PE. She's on 6 liters NC with sats in the upper 80's. She was smoking \"around 1/2 ppd\" prior to hospitalization. She denies any acute distress and carries on conversation without difficulty. She said she had an echo about 2 years ago per Dr. Lynne. No other aggravating, alleviating factors or associated symptoms.    HPI    Allergy:   Allergies   Allergen Reactions   • Aspirin Anaphylaxis   • Demerol [Meperidine] Anaphylaxis   • Ibuprofen Anaphylaxis   • Neosporin [Neomycin-Bacitracin Zn-Polymyx] Anaphylaxis   • Penicillins Anaphylaxis   • Sulfa Antibiotics Anaphylaxis   • Codeine Hives     Pt can take codeine as long is it is not alot   • Talwin [Pentazocine] Hives   • Tetracyclines & Related    • Morphine And Related Hives       Meds:      budesonide-formoterol 2 puff Inhalation BID - RT   cetirizine 10 mg Oral Daily   citalopram 40 mg Oral Daily   dextromethorphan polistirex ER 30 mg Oral Q12H   enoxaparin 40 mg Subcutaneous Daily   furosemide 40 mg Oral Daily   gabapentin 300 mg Oral Q8H   ipratropium-albuterol 3 mL Nebulization Q4H - RT   levoFLOXacin 750 mg Intravenous Q24H   methylPREDNISolone sodium succinate 40 mg Intravenous Q6H   nicotine 1 patch Transdermal Q24H   oxyCODONE ER 30 mg Oral Q12H   pantoprazole 40 mg Intravenous Q AM        Review of Systems:   Review of Systems   Constitutional: Negative.    HENT: Negative.    Respiratory: Positive for shortness of breath.    Cardiovascular: Negative.    Gastrointestinal: Negative.    Skin: Negative.    Neurological: Negative.    Psychiatric/Behavioral: Negative.    All other " systems reviewed and are negative.      Physical Exam:  Vitals:    03/06/17 0800   BP: 132/54   Pulse: 105   Resp: 26   Temp:    SpO2: (!) 88%       Intake/Output Summary (Last 24 hours) at 03/06/17 0905  Last data filed at 03/06/17 0800   Gross per 24 hour   Intake   2484 ml   Output   2450 ml   Net     34 ml     Physical Exam   Constitutional: She is oriented to person, place, and time. She appears well-developed and well-nourished. She appears ill. No distress. She is not intubated. Nasal cannula in place.   HENT:   Head: Normocephalic and atraumatic.   Eyes: Pupils are equal, round, and reactive to light.   Neck: Normal range of motion. Neck supple.   Cardiovascular: Regular rhythm, S1 normal and S2 normal.  Tachycardia present.    Pulmonary/Chest: Tachypnea noted. She is not intubated. No respiratory distress. She has decreased breath sounds in the right lower field and the left lower field.   Abdominal: Soft. Bowel sounds are normal. She exhibits no distension.   Musculoskeletal: Normal range of motion. She exhibits no edema.   Neurological: She is alert and oriented to person, place, and time.   Skin: Skin is warm and dry.   Psychiatric: She has a normal mood and affect.   Nursing note and vitals reviewed.      Data:     Results from last 7 days  Lab Units 03/06/17  0521 03/04/17  1415   WBC 10*3/mm3 5.19 5.15   HEMOGLOBIN g/dL 9.8* 11.6*   PLATELETS 10*3/mm3 124* 161         Results from last 7 days  Lab Units 03/06/17  0521 03/05/17  1257 03/04/17  1415   SODIUM mmol/L 140  --  139   SODIUM, ARTERIAL mmol/L  --  142.4  --    POTASSIUM mmol/L 3.0*  --  3.7   BUN mg/dL 6  --  6   CREATININE mg/dL 0.64  --  0.75         Results from last 7 days  Lab Units 03/05/17  1257 03/05/17  1149 03/05/17  0250 03/04/17  1920   PH, ARTERIAL pH units 7.419 7.398 7.354 7.296*   PCO2, ARTERIAL mm Hg 39.7 45.7* 46.9* 51.8*   PO2 ART mm Hg 52.7* 52.0* 113.1* 106.9*   FIO2 %  --  60 75 75         Radiology:  Imaging Results  (last 24 hours)     Procedure Component Value Units Date/Time    CT Abdomen Pelvis Without Contrast [[id number]] Collected:  03/05/17 0800     Updated:  03/05/17 1259    Narrative:       HISTORY: Abdominal pain     CT ABDOMEN PELVIS WO CONTRAST-  CT evaluation of the abdomen and pelvis was performed without  intravenous or oral contrast. 5 mm sequential transaxial images were  obtained. 2-D sagittal and coronal reconstruction images generated.  Radiation dose equals  mGy-cm.  Automated exposure control dose  reduction technique was implemented.     Small bilateral pleural effusions with associated lower lobe airspace  disease again noted. The 7 mm pulmonary nodule right lower lobe  laterally again recognized. Findings are described in the chest CT  report.     Surgical clips result in significant streak artifact in the upper  abdomen. No definite hepatic lesion observed. The gallbladder is  surgically absent. No definite biliary ductal dilatation appreciated.     Spleen is not enlarged.     There are no adrenal masses or pancreatic lesions.     There are no urinary tract calculi or evidence of obstruction. The  urinary bladder is mildly distended without focal bladder wall  abnormality.     Changes from hysterectomy observed. There are no adnexal masses.     There is a large amount of stool identified throughout the colon. There  are scattered diverticuli particularly in the sigmoid colon without CT  evidence of acute diverticulitis. There is no CT evidence of acute  appendicitis, cecum extending into the pelvis. The small bowel is not  dilated. The duodenal sweep is imaged appropriately. The stomach is not  distended.     Small para-aortic and mesenteric lymph nodes observed without  pathologically enlarged nodes.     There is no ascites or pneumoperitoneum.     There are atherosclerotic aortoiliofemoral calcifications.     A few spondylosis changes noted in the thoracolumbar spine.     A right femoral vein  catheter is identified.       Impression:       1. No CT evidence of an acute intra-abdominal/pelvic pathological  process.  2. Large amount of stool in the colon suggesting constipation.  Diverticulosis without CT evidence of acute diverticulitis.  This report was finalized on 03/05/2017 12:56 by Dr. Eduardo Black MD.    CT Chest Without Contrast [[id number]] Collected:  03/05/17 0753     Updated:  03/05/17 1300    Narrative:       HISTORY: Hypoxia     CT CHEST WO CONTRAST-  CT evaluation of the chest was performed without intravenous contrast. 5  mm sequential transaxial images were obtained. 2-D sagittal and coronal  reconstruction images generated. Radiation dose equals  mGy-cm.   Automated exposure control dose reduction technique was implemented.     Endotracheal tube identified above the juan carlos.     Small mediastinal lymph nodes appreciated without definite mediastinal  or hilar lymphadenopathy. Atherosclerotic aortic and coronary artery  calcifications appreciated.     There is pulmonary emphysema with an upper lobe predominance.  Fibronodular changes/apical pleural thickening observed compatible with  chronic changes.     There are small bilateral pleural effusions with patchy lower lobe  airspace disease. There is diffuse mild reticulonodular interstitial  prominence. There are no significant areas of lung consolidation  identified.     There is a pulmonary nodule identified in the right lower lobe, near the  pleural surface measuring nearly 8 mm, image 53. Imaging follow-up  recommended. There are no pneumothoraces.     Spondylosis changes noted diffusely in the thoracic spine.       Impression:       1. Pulmonary emphysema and chronic lung changes.  2. Small bilateral pleural effusions with lower lobe airspace disease,  question atelectasis and/or infiltrate. Mild diffuse interstitial  prominence. Mild acute interstitial infiltration not excluded.  3. Right lower lobe nodule. Follow-up  recommended.  This report was finalized on 03/05/2017 12:58 by Dr. Eduardo Black MD.    CT Head Without Contrast [[id number]] Collected:  03/05/17 0746     Updated:  03/05/17 1302    Narrative:       CT SCAN OF THE HEAD, WITHOUT CONTRAST:      HISTORY: Altered mental status     COMPARISONS: 01/06/2009      TECHNIQUE:     Radiation dose equals  mGy-cm.  Automated exposure control dose  reduction technique was implemented.     CT evaluation of the head without intravenous contrast. 5 mm transaxial  images were obtained.   2-D sagittal and coronal reconstruction images  were generated.     FINDINGS: Image quality degradation related to patient motion observed.     There is no intra or extra-axial hemorrhage.     There is no mass effect or midline shift.     There is no hydrocephalus.     Dilated perivascular space versus an old lacunar infarct, less likely,  appreciated again, in the left basal ganglia.        Mucosal thickening and mucus retention cyst noted in the maxillary sinus  with partial opacification with ethmoid sinus mucosal thickening. Mild  mucosal thickening observed in the sphenoid sinuses.     Bone window imaging reveals no calvarial abnormality.          Impression:       1. No CT evidence of an acute intracranial process.  2. Mild paranasal sinus disease.                     This report was finalized on 03/05/2017 13:00 by Dr. Eduardo Black MD.    XR Abdomen KUB [[id number]] Collected:  03/05/17 1213     Updated:  03/05/17 1304    Narrative:       HISTORY: Feeding tube placement.     XR ABDOMEN KUB-  Radiograph of the lower chest and upper abdomen was performed to  evaluate the position of a Dobbhoff feeding tube.     The feeding tube is identified in the stomach with the tip in the distal  body or antrum region.  This report was finalized on 03/05/2017 13:02 by Dr. Eduardo Black MD.    XR Chest 1 View [[id number]] Collected:  03/05/17 0730     Updated:  03/05/17 1306    Narrative:       CHEST, ONE  VIEW:     HISTORY: Respiratory distress     COMPARISON: 03/04/2017     A single frontal chest radiograph was obtained.     FINDINGS:     Endotracheal tube is identified in good position above the juan carlos.     COPD and chronic lung changes again identified.     Mild patchy airspace opacities in the right lung base again observed  likely unchanged.     Radiographically, the chest is likely stable.                                  Impression:       1. Stable one-day appearance of the chest.     This report was finalized on 03/05/2017 13:04 by Dr. Eduardo Black MD.    CT Angiogram Chest With & Without Contrast [[id number]] Collected:  03/05/17 1639     Updated:  03/05/17 1643    Narrative:       HISTORY: Hypoxemia     COMPARISON: Chest CT dated 03/04/2017     TECHNIQUE:     CT evaluation of the chest was performed with intravenous contrast. 2 mm  transaxial images were obtained. 3-D reconstruction angiographic images  were generated using a volume rendering technique. Coronal  reconstruction maximum intensity projection images of the pulmonary  arteries and aorta were also generated.     Radiation dose equals  mGy-cm.  Automated exposure control dose  reduction technique was implemented.        FINDINGS:     There is image quality degradation related to patient motion artifact in  the lower lobes. In the right lower lobe, there is low-attenuation  change within the medial basal pulmonary segment felt most likely  related to motion.     No definite CT angiographic evidence of pulmonary embolus appreciated.     The aorta is normal in caliber, without aneurysm or dissection.     Lower lobe airspace disease again appreciated likely atelectasis with  small pleural effusions.     The right lower lobe pulmonary nodule less conspicuous compared to the  previous examination likely due to the atelectasis.     Pulmonary emphysema again identified with an upper lobe predominance.       Impression:       1. No CT angiographic  evidence of pulmonary embolus or acute aortic  pathology.  2. Lower lobe airspace disease, likely atelectasis with small pleural  effusions.  3. Pulmonary emphysema.  This report was finalized on 03/05/2017 16:41 by Dr. Eduardo Black MD.          Pulmonary Assessment:  1. Acute hypoxemic respiratory failure  2. COPD and emphysema  3. RLL nodule, 8mm  4. Tobacco abuse      Plan:   · Echo with bubble study today to rule out a potential blood flow/shunting issue  · Solu-medrol IV 40 mg today x 3 doses  · Continue nebs and Symbicort   · Smoking cessation  · Will need 3 month f/u CT for RLL nodule  · Limit sedation meds      Electronically signed by THIERNO Souza, 03/06/17, 9:05 AM     Respiratory exam shows wheezing. Remains off vent.  Weak.  Continue ICU care, bipap if needed. I have seen and examined patient personally, performing a face-to-face diagnostic evaluation with plan of care reviewed and developed with APRN and nursing staff. I have addended and/or modified the above history of present illness, physical examination, and assessment and plan to reflect my findings and impressions. Essential elements of the care plan were discussed with APRN above.  Agree with findings and assessment/plan as documented above.    Electronically signed by Wm Valdes MD, on 3/6/2017, 11:01 PM

## 2017-03-06 NOTE — PROGRESS NOTES
Neurology Progress Note      Chief Complaint:  Altered mental status    Subjective     Subjective:    The patient has been extubated and is talkative this morning.  She is oriented ×3.  She denies taking excessive medications.  She does admit to not sleeping for the past 4 days.  She is having difficulties after losing her  and having difficulties in her finances as well which is causing her a great deal of stress and anxiety which is ultimately leading to insomnia.  She is in better spirits this morning.  Medications:  Current Facility-Administered Medications   Medication Dose Route Frequency Provider Last Rate Last Dose   • acetaminophen (TYLENOL) suppository 325 mg  325 mg Rectal Q4H PRN Colt Sargent DO       • acetaminophen (TYLENOL) tablet 650 mg  650 mg Oral Q4H PRN Colt Sargent DO       • ALPRAZolam (XANAX) tablet 0.5 mg  0.5 mg Oral 4x Daily PRN Colt Sargent DO   0.5 mg at 03/06/17 0626   • budesonide-formoterol (SYMBICORT) 160-4.5 MCG/ACT inhaler 2 puff  2 puff Inhalation BID - RT Colt Sargent DO   2 puff at 03/06/17 0729   • citalopram (CeleXA) tablet 40 mg  40 mg Oral Daily Colt Sargent DO   40 mg at 03/06/17 0904   • dextromethorphan polistirex ER (DELSYM) 30 MG/5ML oral suspension 30 mg  30 mg Oral Q12H Sydneeray Armendariz APRN   30 mg at 03/06/17 0927   • docusate sodium (COLACE) capsule 100 mg  100 mg Oral BID Siddharth Leavitt MD       • enalaprilat (VASOTEC) injection 0.625 mg  0.625 mg Intravenous Q6H PRN Colt Sargent DO       • enoxaparin (LOVENOX) syringe 40 mg  40 mg Subcutaneous Daily Colt Sargent DO   40 mg at 03/06/17 0903   • furosemide (LASIX) tablet 40 mg  40 mg Oral Daily Colt Sargent DO   40 mg at 03/06/17 0903   • gabapentin (NEURONTIN) capsule 300 mg  300 mg Oral Q8H Colt Sargent DO   300 mg at 03/06/17 0518   • hydrALAZINE (APRESOLINE) injection 10 mg  10 mg Intravenous Q6H PRN Colt Sargent DO       •  ipratropium-albuterol (DUO-NEB) nebulizer solution 3 mL  3 mL Nebulization Q4H - RT Ruiz Odonnell MD   3 mL at 03/06/17 0730   • levoFLOXacin (LEVAQUIN) 750 mg/150 mL D5W (premix) (LEVAQUIN) 750 mg  750 mg Intravenous Q24H Ruiz Odonnell MD   750 mg at 03/05/17 1434   • methylPREDNISolone sodium succinate (SOLU-Medrol) injection 40 mg  40 mg Intravenous Q6H THIERNO Laws   40 mg at 03/06/17 0927   • nicotine (NICODERM CQ) 21 MG/24HR patch 1 patch  1 patch Transdermal Q24H Colt Sargent DO   1 patch at 03/05/17 1848   • ondansetron (ZOFRAN) injection 4 mg  4 mg Intravenous Q6H PRN Colt Sargent DO       • oxyCODONE (oxyCONTIN) 12 hr tablet 30 mg  30 mg Oral Q12H Nicki Green MD   30 mg at 03/06/17 0903   • oxyCODONE-acetaminophen (PERCOCET)  MG per tablet 1 tablet  1 tablet Oral Q6H PRN Colt Sargent DO   1 tablet at 03/06/17 0222   • pantoprazole (PROTONIX) injection 40 mg  40 mg Intravenous Q AM Colt Sargent DO   40 mg at 03/06/17 0518   • potassium chloride (MICRO-K) CR capsule 40 mEq  40 mEq Oral Daily Siddharth Leavitt MD       • sodium chloride 0.9 % flush 1-10 mL  1-10 mL Intravenous PRN Colt Sargent DO       • sodium chloride 0.9 % flush 10 mL  10 mL Intravenous PRN Shamar Angulo MD   10 mL at 03/06/17 0906       Review of Systems:   -A 14 point review of systems is completed and is negative except for fatigue    General Exam:  Head:  Normal cephalic, atraumatic  HEENT:  Neck supple  Fundoscopic Exam:  No signs of disc edema  CVS:  Regular rate and rhythm.  No murmurs  Carotid Examination:  No bruits  Lungs:  Clear to auscultation  Abdomen:  Non-tender, Non-distended  Extremities:  No signs of peripheral edema  Skin:  No rashes    Neurologic Exam:    Mental Status:    -Awake, Alert, Oriented X 3  -No word finding difficulties  -No aphasia  -No dysarthria  -Follows simple and complex commands    CN II:  Visual fields full.  Pupils equally reactive to light  CN III, IV, VI:   Extraocular Muscles full with no signs of nystagmus  CN V:  Facial sensory is symmetric with no asymetries.  CN VII:  Facial motor symmetric  CN VIII:  Gross hearing intact bilaterally  CN IX:  Palate elevates symmetrically  CN X:  Palate elevates symmetrically  CN XI:  Shoulder shrug symmetric  CN XII:  Tongue is midline on protrusion    Motor: (strength out of 5:  1= minimal movement, 2 = movement in plane of gravity, 3 = movement against gravity, 4 = movement against some resistance, 5 = full strength)    -Right Upper Ext: Proximal: 5 Distal: 5  -Left Upper Ext: Proximal: 5 Distal: 5    -Right Lower Ext: Proximal: 5 Distal: 5  -Left Lower Ext: Proximal: 5 Distal: 5    DTR:  -Right   Bicep: 2+ Tricep: 2+ Brachoradialis: 2+   Patella: 2+ Ankle: 2+ Neg Babinski  -Left   Bicep: 2+ Tricep: 2+ Brachoradialis: 2+   Patella: 2+ Ankle: 2+ Neg Babinski    Sensory:  -Intact to light touch, pinprick, temperature, pain, and proprioception    Coordination:  -Finger to nose intact  -Heel to shin intact  -No ataxia    Gait  -No signs of ataxia  -ambulates unassisted    Objective      Vital Signs  Temp:  [99.1 °F (37.3 °C)-100 °F (37.8 °C)] 99.8 °F (37.7 °C)  Heart Rate:  [] 103  Resp:  [14-26] 26  BP: (103-146)/(45-71) 146/68  FiO2 (%):  [60 %] 60 %    Physical Exam:         Results Review:    I reviewed the patient's new clinical results.  Vitamin B-12 239 - 931 pg/mL 225 (L)     TSH 0.470 - 4.680 mIU/mL 0.208 (L)     Folate >2.75 ng/mL 4.24       Ammonia 9 - 33 umol/L <9 (L)           Assessment/Plan     Hospital Problem List    Active Problems:    Pneumonia of right lower lobe due to infectious organism    COPD (chronic obstructive pulmonary disease)    Hypokalemia    Anemia    Respiratory failure    Impression     1. Metabolic encephalopathy  --Resolved  --Likely secondary to hypoxia  --Insomnia could have played a role as well.  2.  B12 deficiency  3.  Abnormal TSH  Plan     1.  We will order 1000 µg of B12 IM  injections for 5 days in a row after this she will require tablets  2.  T4 and T3 level to be followed by primary team  3.  Will sign off for now.  I spoken with the patient and her family.  As an outpatient if she notices continued headaches or memory difficulties secondary to this episode of hypoxia I asked that she contact my office so we can see her on an outpatient basis.      We will schedule outpatient follow-up in 6 weeks        Dre Deal MD  03/06/17  10:37 AM

## 2017-03-06 NOTE — PAYOR COMM NOTE
"FROM: JARRETT HIGH  PHONE: [phone]  FAX: [phone]    PENDING: [id number]    Jhon Lei (65 y.o. Female)     Date of Birth Social Security Number Address Home Phone MRN    03/25/1951  [id number] [facility] [phone] [id number]    Sikh Marital Status          None Unknown       Admission Date Admission Type Admitting Provider Attending Provider Department, Room/Bed    3/4/17 Emergency Siddharth Leavitt MD Truong, Khai C, MD [facility] CARDIAC CARE, C006/1    Discharge Date Discharge Disposition Discharge Destination                      Attending Provider: Siddharth Leavitt MD     Allergies:  Aspirin, Demerol [Meperidine], Ibuprofen, Neosporin [Neomycin-bacitracin Zn-polymyx], Penicillins, Sulfa Antibiotics, Codeine, Talwin [Pentazocine], Tetracyclines & Related, Morphine And Related    Isolation:  None   Infection:  None   Code Status:  FULL    Ht:  64.5\" (163.8 cm)   Wt:  158 lb 8 oz (71.9 kg)    Admission Cmt:  None   Principal Problem:  None                Active Insurance as of 3/4/2017     Primary Coverage     Payor Plan Insurance Group Employer/Plan Group    [employer]HE[employer] MEDICAID ANTH MEDICAID [id number]     Payor Plan Address Payor Plan Phone Number Effective From Effective To    [address] [address] [phone] 1/1/2016     [place] [address]       Subscriber Name Subscriber Birth Date Member ID       JHON LEI 3/25/1951 [id number]                 Emergency Contacts      (Rel.) Home Phone Work Phone Mobile Phone    Ella Guerrero (Daughter) [phone] -- --               History & Physical      Colt Sargent DO at 3/4/2017  4:27 PM              [facility] Medicine Services  HISTORY AND PHYSICAL    Date of Admission: 3/4/2017  Primary Care Physician: No Known Provider    Subjective     Chief Complaint:   hypoxia    History of Present Illness  64 YO CF admitted on 3/4/17. Patient too somnolent to give HX. " "Daughter at bedside and gives HX. States this started about one month ago. Saw PCP Dr. Reyes and got 3 rounds of antibiotics. 2 rounds of doxycycline and one round of azithromycin, subsequently she got slightly better, but never fully recovered. Patient continues to smoke 1 PPD. She has been in bed a lot lately as her  just passed away in July. She has HX of depression and sees on OP psychiatrist. Daughter stated this afternoon family was unable to wake patient and her 02 was 50%. She does not wear 02 at home.       Review of Systems   Dyspnea  Lethargy  Recent episode of abdominal pain    Otherwise complete ROS reviewed and negative except as mentioned in the HPI.      Past Medical History:   Past Medical History   Diagnosis Date   • Anxiety    • COPD (chronic obstructive pulmonary disease)    • Depression    • Hypertension        Past Surgical History:History reviewed. No pertinent past surgical history.    Social History:  reports that she has been smoking Cigarettes.  She has been smoking about 1.00 pack per day. She does not have any smokeless tobacco history on file. She reports that she does not drink alcohol or use illicit drugs. Daughter lives with patient.    Family History: family history is not on file.   RA, depression/anxiety, unknown type of cancer    Allergies:  Allergies not on file    Medications:  Prior to Admission medications    Not on File       Objective     Vital Signs:   Visit Vitals   • /51   • Pulse 79   • Temp (!) 100.9 °F (38.3 °C) (Oral)   • Resp 20   • Ht 67\" (170.2 cm)   • Wt 145 lb (65.8 kg)   • LMP  (LMP Unknown)  Comment: hysterectomy   • SpO2 99%   • Breastfeeding No   • BMI 22.71 kg/m2     Physical Exam   HENT:   Head: Normocephalic and atraumatic.   Nose: Nose normal.   Mouth/Throat: Oropharynx is clear and moist.   Eyes: Conjunctivae and EOM are normal.   Neck: Normal range of motion. Neck supple.   Cardiovascular: Normal rate, regular rhythm and normal heart " sounds.    Pulmonary/Chest: Breath sounds normal. She is in respiratory distress.   Abdominal: Soft.   Hypomobile bowel sounds.    Musculoskeletal: She exhibits no edema or tenderness.   Neurological: No cranial nerve deficit.   Limited responsiveness.    Skin: Skin is warm and dry.   Psychiatric: She has a normal mood and affect.   Vitals reviewed.           Results Reviewed:  Lab Results (last 24 hours)     Procedure Component Value Units Date/Time    Darden Draw [[id number]] Collected:  03/04/17 1415    Specimen:  Blood Updated:  03/04/17 1436    Narrative:       The following orders were created for panel order Darden Draw.  Procedure                               Abnormality         Status                     ---------                               -----------         ------                     Light Blue Top[[id number]]                                    In process                 Green Top (Gel)[[id number]]                                   In process                 Lavender Top[[id number]]                                      In process                 Red Top[[id number]]                                           In process                 Green Top (No Gel)[[id number]]                                                             Please view results for these tests on the individual orders.    Red Top [[id number]] Collected:  03/04/17 1415    Specimen:  Blood from Arm, Left Updated:  03/04/17 1436    Light Blue Top [[id number]] Collected:  03/04/17 1415    Specimen:  Blood from Arm, Left Updated:  03/04/17 1436    Lavender Top [[id number]] Collected:  03/04/17 1415    Specimen:  Blood from Arm, Left Updated:  03/04/17 1436    Green Top (Gel) [[id number]] Collected:  03/04/17 1415    Specimen:  Blood from Arm, Left Updated:  03/04/17 1436    CBC & Differential [[id number]] Collected:  03/04/17 1415    Specimen:  Blood Updated:  03/04/17 1441    Narrative:       The following orders were created for panel order CBC &  Differential.  Procedure                               Abnormality         Status                     ---------                               -----------         ------                     CBC Auto Differential[[id number]]         Abnormal            Final result                 Please view results for these tests on the individual orders.    CBC Auto Differential [[id number]]  (Abnormal) Collected:  03/04/17 1415    Specimen:  Blood from Arm, Left Updated:  03/04/17 1441     WBC 5.15 10*3/mm3      RBC 3.77 (L) 10*6/mm3      Hemoglobin 11.6 (L) g/dL      Hematocrit 36.5 (L) %      MCV 96.8 fL      MCH 30.8 pg      MCHC 31.8 (L) g/dL      RDW 15.0 %      RDW-SD 53.2 fl      MPV 11.5 fL      Platelets 161 10*3/mm3      Neutrophil % 69.5 %      Lymphocyte % 17.3 %      Monocyte % 10.1 %      Eosinophil % 2.7 %      Basophil % 0.4 %      Immature Grans % 0.0 %      Neutrophils, Absolute 3.58 10*3/mm3      Lymphocytes, Absolute 0.89 10*3/mm3      Monocytes, Absolute 0.52 10*3/mm3      Eosinophils, Absolute 0.14 10*3/mm3      Basophils, Absolute 0.02 10*3/mm3      Immature Grans, Absolute 0.00 10*3/mm3     Blood Gas, Arterial [[id number]]  (Abnormal) Collected:  03/04/17 1440    Specimen:  Arterial Blood Updated:  03/04/17 1442     Site Arterial: right brachial      Michael's Test --       Documented in Rapid Comm        pH, Arterial 7.319 (L) pH units      pCO2, Arterial 56.9 (H) mm Hg      pO2, Arterial 120.9 (H) mm Hg      HCO3, Arterial 28.6 (H) mmol/L      Base Excess, Arterial 1.1 mmol/L      O2 Saturation, Arterial 98.0 %      O2 Saturation Calculated 98.0 %      Barometric Pressure for Blood Gas -- mmHg       Component not reported at this site.        Modality Non Rebreather      FIO2 100 %     Narrative:       Serial Number: [id number]    : [id number]    Blood Culture [[id number]] Collected:  03/04/17 1427    Specimen:  Blood from Arm, Right Updated:  03/04/17 1443    Blood Culture [[id number]] Collected:  03/04/17 1415     Specimen:  Blood from Arm, Left Updated:  03/04/17 1443    Calcium, Ionized [[id number]]  (Abnormal) Collected:  03/04/17 1446    Specimen:  Blood Updated:  03/04/17 1448     Ionized Calcium, Arterial 1.09 (L) mmol/L     Narrative:       Serial Number: [id number]    : [id number]    Protime-INR [[id number]]  (Normal) Collected:  03/04/17 1415    Specimen:  Blood from Arm, Left Updated:  03/04/17 1448     Protime 12.6 Seconds      INR 0.92     aPTT [[id number]]  (Abnormal) Collected:  03/04/17 1415    Specimen:  Blood from Arm, Left Updated:  03/04/17 1448     PTT 36.6 (H) seconds     Lactic Acid, Plasma [[id number]]  (Normal) Collected:  03/04/17 1415    Specimen:  Blood from Arm, Left Updated:  03/04/17 1449     Lactate 0.7 mmol/L     POC Troponin, Rapid [[id number]]  (Normal) Collected:  03/04/17 1436    Specimen:  Blood Updated:  03/04/17 1451     Troponin I 0.00 ng/mL       Serial Number: [id number]    : [id number]       Comprehensive Metabolic Panel [[id number]]  (Abnormal) Collected:  03/04/17 1415    Specimen:  Blood from Arm, Left Updated:  03/04/17 1454     Glucose 119 (H) mg/dL      BUN 6 mg/dL      Creatinine 0.75 mg/dL      Sodium 139 mmol/L      Potassium 3.7 mmol/L      Chloride 102 mmol/L      CO2 32.0 (H) mmol/L      Calcium 8.2 (L) mg/dL      Total Protein 6.5 g/dL      Albumin 3.50 g/dL      ALT (SGPT) 29 U/L      AST (SGOT) 38 U/L      Alkaline Phosphatase 77 U/L      Total Bilirubin 0.2 mg/dL      eGFR Non African Amer 77 mL/min/1.73      Globulin 3.0 gm/dL      A/G Ratio 1.2 g/dL      BUN/Creatinine Ratio 8.0      Anion Gap 5.0 mmol/L     Magnesium [[id number]]  (Normal) Collected:  03/04/17 1415    Specimen:  Blood from Arm, Left Updated:  03/04/17 1454     Magnesium 1.4 mg/dL     Phosphorus [[id number]]  (Normal) Collected:  03/04/17 1415    Specimen:  Blood from Arm, Left Updated:  03/04/17 1454     Phosphorus 2.6 mg/dL     C-reactive Protein [[id number]]  (Abnormal) Collected:  03/04/17 1415    Specimen:   Blood from Arm, Left Updated:  03/04/17 1454     C-Reactive Protein 4.40 (H) mg/dL     Procalcitonin [[id number]]  (Normal) Collected:  03/04/17 1415    Specimen:  Blood from Arm, Left Updated:  03/04/17 1510     Procalcitonin <0.25 ng/mL     Narrative:       SIRS, sepsis, severe sepsis, and septic shock are categorized according to the criteria of the consensus conference of the American College of Chest Physicians/Society of Critical Care Medicine.    PCT < 0.5 ng/mL     Systemic infection (sepsis) is not likely.    PCT >0.5 and < 2.0 ng/mL Systemic infection (sepsis) is possible, but other conditions are known to elevate PCT as well.    PCT > 2.0 ng/mL     Systemic infection (sepsis) is likely, unless other causes are known.      PCT > 10.0 ng/mL    Important systemic inflammatory response, almost exclusively due to severe bacterial sepsis or septic shock.    PCT values of < 0.5 ng/mL do not exclude an infection, because localized infections (without systemic signs) may be associated with such low concentrations, or a systemic infection in its initial stages (<6 hours).  Increased PCT can occur without infection.  PCT concentrations between 0.5 and 2.0 ng/mL should be interpreted taking into account the patients history.  It is recommended to retest PCT within 6-24 hours if any concentrations < 2.0 ng/mL are obtained.    Blood Gas, Arterial [[id number]]  (Abnormal) Collected:  03/04/17 1610    Specimen:  Arterial Blood Updated:  03/04/17 1613     Site Arterial: right brachial      Michael's Test --       Documented in Rapid Comm        pH, Arterial 7.292 (L) pH units      pCO2, Arterial 58.6 (H) mm Hg      pO2, Arterial 93.7 mm Hg      HCO3, Arterial 27.7 (H) mmol/L      Base Excess, Arterial -0.2 mmol/L      O2 Saturation, Arterial 96.2 %      O2 Saturation Calculated 96.2 %      Barometric Pressure for Blood Gas -- mmHg       Component not reported at this site.        Modality BiPAP      FIO2 55 %      Rate 12.0  Breaths/minute      IPAP 16      EPAP 5     Narrative:       Serial Number: [id number]    : [id number]        Imaging Results (last 24 hours)     Procedure Component Value Units Date/Time    XR Chest 1 View [[id number]] Collected:  03/04/17 1527     Updated:  03/04/17 1542    Narrative:       CHEST, ONE VIEW:     HISTORY: Sepsis     COMPARISON: 10/31/2009 and 01/21/2009     A single frontal chest radiograph was obtained.     FINDINGS:     COPD and chronic lung changes appreciated.     There are patchy airspace opacities identified in the right lower lobe  laterally not observed previously and a small area of acute infiltrate  from an infectious or inflammatory process, pneumonia considered.  Correlate with patient presentation.     The heart is normal in size without evidence of overt heart failure.     The bony structures are intact.                                  Impression:       1. COPD and chronic lung changes.  2. Mild patchy right lower lobe airspace opacities, small area of acute  infiltrate/pneumonia considered.     This report was finalized on 03/04/2017 15:40 by Dr. Eduardo Black MD.          I have personally reviewed and interpreted the radiology studies and ECG obtained at time of admission.     Assessment / Plan     Assessment & Plan  Hospital Problem List     Pneumonia of right lower lobe due to infectious organism        Impression/Plan:  1. Respiratory distress with limited responsiveness- will place in unit and get neuro checks,  2. Pneumonia failed OP therapy- Will start vancomycin and zosyn. Add nebs, and continue BiPap  3. Fever- flu swab pending  4. Tobacco abuse- patch and counseling  5. HTN- resume home medications and follow  6. Depression/anxiety- resume home medications  7. Hx abdominal pain with hypomobile BS- will get KUB and follow      Code Status: Full         Colt Sargent DO   03/04/17   4:27 PM               Electronically signed by Colt Sargent DO at 3/4/2017  4:37 PM            Emergency Department Notes      Shamar Angulo MD at 3/4/2017  2:11 PM          Subjective   Patient is a 65 y.o. female presenting with shortness of breath.   Shortness of Breath   Severity:  Severe  Onset quality:  Gradual  Timing:  Constant  Progression:  Worsening  Chronicity:  New  Context: not activity, not animal exposure, not emotional upset, not fumes, not pollens, not smoke exposure, not URI and not weather changes    Relieved by:  Nothing  Worsened by:  Exertion  Ineffective treatments:  None tried  Associated symptoms: cough, diaphoresis, sputum production and wheezing    Associated symptoms: no abdominal pain, no chest pain, no ear pain, no fever, no headaches, no PND, no rash, no sore throat and no vomiting    Risk factors: no recent alcohol use, no obesity, no oral contraceptive use, no prolonged immobilization and no recent surgery        Review of Systems   Constitutional: Positive for diaphoresis. Negative for activity change, appetite change, chills, fatigue and fever.   HENT: Negative for congestion, drooling, ear pain, facial swelling, hearing loss, sinus pressure and sore throat.    Eyes: Negative.  Negative for discharge.   Respiratory: Positive for cough, sputum production, shortness of breath and wheezing.    Cardiovascular: Negative for chest pain and PND.   Gastrointestinal: Negative for abdominal distention, abdominal pain, blood in stool, diarrhea, nausea and vomiting.   Endocrine: Negative.  Negative for cold intolerance, heat intolerance, polydipsia, polyphagia and polyuria.   Genitourinary: Negative.  Negative for dysuria, flank pain and urgency.   Musculoskeletal: Negative.  Negative for arthralgias, back pain, myalgias and neck stiffness.   Skin: Negative.  Negative for color change, pallor and rash.   Allergic/Immunologic: Negative.    Neurological: Negative.  Negative for dizziness, seizures, speech difficulty, weakness, numbness and headaches.   Hematological: Negative.   Negative for adenopathy. other systems reviewed and are negative.        Objective   Physical Exam   Constitutional: She is oriented to person, place, and time. She appears well-developed and well-nourished.   HENT:   Head: Normocephalic.   Right Ear: External ear normal.   Eyes: Conjunctivae are normal. Pupils are equal, round, and reactive to light.   Neck: Normal range of motion. Neck supple.   Cardiovascular: Regular rhythm, normal heart sounds and intact distal pulses.  Tachycardia present.  PMI is not displaced.  Exam reveals no decreased pulses.  murmur heard.  Pulmonary/Chest: Accessory muscle usage present. Tachypnea noted. No apnea. She is in respiratory distress. She has decreased breath sounds in the right middle field, the right lower field, the left middle field and the left lower field. She has wheezes in the right middle field, the right lower field, the left middle field and the left lower field. She has rhonchi in the right lower field and the left lower field. She has rales in the right lower field and the left lower field. She exhibits no tenderness.   Abdominal: Soft. Bowel sounds are normal. There is no tenderness.   Musculoskeletal: Normal range of motion. She exhibits no edema or tenderness.   Lower extremity exam bilaterally is unremarkable.  There is no right or left calf tenderness .  There is no palpable venous cord.  No obvious difference in the size of the legs.  No pitting edema.  The dorsalis pedis and posterior tibial femoral and popliteal pulses are palpable and +2 bilaterally.  Homans sign is negative       Vascular Status -  Her exam exhibits right foot vasculature normal. Her exam exhibits no right foot edema. Her exam exhibits left foot vasculature normal. Her exam exhibits no left foot edema.  Neurological: She is alert and oriented to person, place, and time. She has normal reflexes. No cranial nerve deficit. Coordination normal.   Skin: Skin is warm. No rash noted. No  erythema. note and vitals reviewed.      Procedures        ED Course  ED Course   Comment By Time   nsr Shamar Angulo MD 03/04 1441   Patient with the shortness of breath appears nausea and IV fluid bolus was given the patient and she is sepsis screen positive for pneumonia doing better now explained with the to the hospitalist to admit the patient Shamar Angulo MD 03/04 1525                  MDM      Final diagnoses:   Pneumonia of right lower lobe due to infectious organism   Sepsis, due to unspecified organism           Shamar Angulo MD  03/04/17 1527       Electronically signed by Shamar Angulo MD at 3/4/2017  3:27 PM      Morenita Carnes, RN at 3/4/2017  3:36 PM          Notified RT of order for ABG per Dr. Angulo at this time     Morenita Carnes RN  03/04/17 1537       Electronically signed by Morenita Carnes RN at 3/4/2017  3:37 PM       Specimen:  Arterial Blood Updated:  03/05/17 0251     Site Arterial: right radial      Michael's Test --       Documented in Rapid Comm        pH, Arterial 7.354 pH units      pCO2, Arterial 46.9 (H) mm Hg      pO2, Arterial 113.1 (H) mm Hg      HCO3, Arterial 25.5 mmol/L      Base Excess, Arterial -0.5 mmol/L      O2 Saturation, Arterial 97.9 %      O2 Saturation Calculated 97.9 %      Barometric Pressure for Blood Gas -- mmHg       Component not reported at this site.        Modality Ventilator      FIO2 75 %      Ventilator Mode AC      Rate 10.0 Breaths/minute      PEEP 5.0      Vent CPAP/PEEP 5.0     Narrative:       Serial Number: [id number]    : [id number]    Troponin [[id number]]  (Normal) Collected:  03/05/17 0248    Specimen:  Blood Updated:  03/05/17 0321     Troponin I <0.012 ng/mL     Blood Gas, Arterial [[id number]]  (Abnormal) Collected:  03/05/17 1149    Specimen:  Arterial Blood Updated:  03/05/17 1151     Site Arterial: right radial      Michael's Test --       Documented in Rapid Comm        pH, Arterial 7.398 pH units      pCO2, Arterial 45.7 (H) mm Hg      pO2,  Arterial 52.0 (L) mm Hg      HCO3, Arterial 27.5 (H) mmol/L      Base Excess, Arterial 2.1 (H) mmol/L      O2 Saturation, Arterial 86.6 (L) %      O2 Saturation Calculated 86.6 (L) %      Barometric Pressure for Blood Gas -- mmHg       Component not reported at this site.        Modality Ventilator      FIO2 60 %      Ventilator Mode AC      Rate 14.0 Breaths/minute      PEEP 5.0      Vent CPAP/PEEP 5.0     Narrative:       Serial Number: [id number]    : [id number]    Blood Gas, Arterial With Co-Ox [[id number]]  (Abnormal) Collected:  03/05/17 1257    Specimen:  Arterial Blood Updated:  03/05/17 1258     Site Arterial: right brachial      Michael's Test --       Documented in Rapid Comm        pH, Arterial 7.419 pH units      pCO2, Arterial 39.7 mm Hg      pO2, Arterial 52.7 (L) mm Hg      HCO3, Arterial 25.1 mmol/L      Base Excess, Arterial 0.7 mmol/L      Hemoglobin, Blood Gas 12.1 g/dL      Hematocrit, Blood Gas 36.0 (L) %      Oxyhemoglobin 88.7 (L) %      Methemoglobin 0.4 %      Carboxyhemoglobin 0.4 %      Sodium, Arterial 142.4 mmol/L      Potassium, Arterial 3.67 mmol/L      Barometric Pressure for Blood Gas -- mmHg       Component not reported at this site.        Modality Cannula      Flow Rate 6.00 lpm     Narrative:       Serial Number: [id number]    : [id number]    Ammonia [[id number]]  (Abnormal) Collected:  03/05/17 1333    Specimen:  Blood Updated:  03/05/17 1354     Ammonia <9 (L) umol/L     TSH [[id number]]  (Abnormal) Collected:  03/05/17 1333    Specimen:  Blood Updated:  03/05/17 1426     TSH 0.208 (L) mIU/mL     Blood Culture [[id number]]  (Normal) Collected:  03/04/17 1427    Specimen:  Blood from Arm, Right Updated:  03/05/17 1501     Blood Culture No growth at 24 hours     Blood Culture [[id number]]  (Normal) Collected:  03/04/17 1415    Specimen:  Blood from Arm, Left Updated:  03/05/17 1501     Blood Culture No growth at 24 hours     Folate [[id number]] Collected:  03/05/17 1333    Specimen:  Blood  Updated:  03/05/17 1501     Folate 4.24 ng/mL     CBC (No Diff) [[id number]]  (Abnormal) Collected:  03/06/17 0521    Specimen:  Blood Updated:  03/06/17 0541     WBC 5.19 10*3/mm3      RBC 3.15 (L) 10*6/mm3      Hemoglobin 9.8 (L) g/dL      Hematocrit 30.1 (L) %      MCV 95.6 fL      MCH 31.1 pg      MCHC 32.6 (L) g/dL      RDW 15.5 (H) %      RDW-SD 53.5 fl      MPV 11.6 fL      Platelets 124 (L) 10*3/mm3     Basic Metabolic Panel [[id number]]  (Abnormal) Collected:  03/06/17 0521    Specimen:  Blood Updated:  03/06/17 0553     Glucose 103 (H) mg/dL      BUN 6 mg/dL      Creatinine 0.64 mg/dL      Sodium 140 mmol/L      Potassium 3.0 (L) mmol/L      Chloride 105 mmol/L      CO2 32.0 (H) mmol/L      Calcium 7.7 (L) mg/dL      eGFR Non African Amer 93 mL/min/1.73      BUN/Creatinine Ratio 9.4      Anion Gap 3.0 (L) mmol/L     Narrative:       GFR Normal >60  Chronic Kidney Disease <60  Kidney Failure <15    Troponin [[id number]]  (Normal) Collected:  03/06/17 0521    Specimen:  Blood Updated:  03/06/17 0603     Troponin I 0.014 ng/mL     BNP [[id number]]  (Abnormal) Collected:  03/06/17 0521    Specimen:  Blood Updated:  03/06/17 0603     proBNP 2510.0 (H) pg/mL     Vitamin B12 [[id number]]  (Abnormal) Collected:  03/06/17 0521    Specimen:  Blood Updated:  03/06/17 0643     Vitamin B-12 225 (L) pg/mL           Imaging Results (last 72 hours)     Procedure Component Value Units Date/Time    XR Chest 1 View [[id number]] Collected:  03/04/17 1527     Updated:  03/04/17 1542    Narrative:       CHEST, ONE VIEW:     HISTORY: Sepsis     COMPARISON: 10/31/2009 and 01/21/2009     A single frontal chest radiograph was obtained.     FINDINGS:     COPD and chronic lung changes appreciated.     There are patchy airspace opacities identified in the right lower lobe  laterally not observed previously and a small area of acute infiltrate  from an infectious or inflammatory process, pneumonia considered.  Correlate with patient  presentation.     The heart is normal in size without evidence of overt heart failure.     The bony structures are intact.                                  Impression:       1. COPD and chronic lung changes.  2. Mild patchy right lower lobe airspace opacities, small area of acute  infiltrate/pneumonia considered.     This report was finalized on 03/04/2017 15:40 by Dr. Eduardo Black MD.    XR Chest 1 View [[id number]] Collected:  03/04/17 1933     Updated:  03/04/17 1939    Narrative:       EXAMINATION: XR CHEST 1 VW- 3/4/2017 7:33 PM CST     HISTORY: Endotracheal tube placement     COMPARISON: 03/04/2017     FINDINGS:  Since the previous examination, an endotracheal tube has been placed  with tip approximately 2.1 cm above the juan carlos. The remainder of the  exam is otherwise unchanged, with bilateral scattered interstitial and  alveolar opacities and peribronchial wall thickening, concerning for  multifocal pneumonia. The heart appears normal in size. Aorta is  tortuous. Surgical clips are seen at the GE junction.       Impression:       Interval endotracheal tube placement with tip 2.1 cm above  the juan carlos. Exam otherwise stable.  This report was finalized on 03/04/2017 19:37 by Dr. Ruddy Alvarez MD.    CT Abdomen Pelvis Without Contrast [[id number]] Collected:  03/05/17 0800     Updated:  03/05/17 1259    Narrative:       HISTORY: Abdominal pain     CT ABDOMEN PELVIS WO CONTRAST-  CT evaluation of the abdomen and pelvis was performed without  intravenous or oral contrast. 5 mm sequential transaxial images were  obtained. 2-D sagittal and coronal reconstruction images generated.  Radiation dose equals  mGy-cm.  Automated exposure control dose  reduction technique was implemented.     Small bilateral pleural effusions with associated lower lobe airspace  disease again noted. The 7 mm pulmonary nodule right lower lobe  laterally again recognized. Findings are described in the chest CT  report.     Surgical clips  result in significant streak artifact in the upper  abdomen. No definite hepatic lesion observed. The gallbladder is  surgically absent. No definite biliary ductal dilatation appreciated.     Spleen is not enlarged.     There are no adrenal masses or pancreatic lesions.     There are no urinary tract calculi or evidence of obstruction. The  urinary bladder is mildly distended without focal bladder wall  abnormality.     Changes from hysterectomy observed. There are no adnexal masses.     There is a large amount of stool identified throughout the colon. There  are scattered diverticuli particularly in the sigmoid colon without CT  evidence of acute diverticulitis. There is no CT evidence of acute  appendicitis, cecum extending into the pelvis. The small bowel is not  dilated. The duodenal sweep is imaged appropriately. The stomach is not  distended.     Small para-aortic and mesenteric lymph nodes observed without  pathologically enlarged nodes.     There is no ascites or pneumoperitoneum.     There are atherosclerotic aortoiliofemoral calcifications.     A few spondylosis changes noted in the thoracolumbar spine.     A right femoral vein catheter is identified.       Impression:       1. No CT evidence of an acute intra-abdominal/pelvic pathological  process.  2. Large amount of stool in the colon suggesting constipation.  Diverticulosis without CT evidence of acute diverticulitis.  This report was finalized on 03/05/2017 12:56 by Dr. Eduardo Black MD.    CT Chest Without Contrast [[id number]] Collected:  03/05/17 0753     Updated:  03/05/17 1300    Narrative:       HISTORY: Hypoxia     CT CHEST WO CONTRAST-  CT evaluation of the chest was performed without intravenous contrast. 5  mm sequential transaxial images were obtained. 2-D sagittal and coronal  reconstruction images generated. Radiation dose equals  mGy-cm.   Automated exposure control dose reduction technique was implemented.     Endotracheal tube  identified above the juan carlos.     Small mediastinal lymph nodes appreciated without definite mediastinal  or hilar lymphadenopathy. Atherosclerotic aortic and coronary artery  calcifications appreciated.     There is pulmonary emphysema with an upper lobe predominance.  Fibronodular changes/apical pleural thickening observed compatible with  chronic changes.     There are small bilateral pleural effusions with patchy lower lobe  airspace disease. There is diffuse mild reticulonodular interstitial  prominence. There are no significant areas of lung consolidation  identified.     There is a pulmonary nodule identified in the right lower lobe, near the  pleural surface measuring nearly 8 mm, image 53. Imaging follow-up  recommended. There are no pneumothoraces.     Spondylosis changes noted diffusely in the thoracic spine.       Impression:       1. Pulmonary emphysema and chronic lung changes.  2. Small bilateral pleural effusions with lower lobe airspace disease,  question atelectasis and/or infiltrate. Mild diffuse interstitial  prominence. Mild acute interstitial infiltration not excluded.  3. Right lower lobe nodule. Follow-up recommended.  This report was finalized on 03/05/2017 12:58 by Dr. Eduardo Black MD.    CT Head Without Contrast [[id number]] Collected:  03/05/17 0746     Updated:  03/05/17 1302    Narrative:       CT SCAN OF THE HEAD, WITHOUT CONTRAST:      HISTORY: Altered mental status     COMPARISONS: 01/06/2009      TECHNIQUE:     Radiation dose equals  mGy-cm.  Automated exposure control dose  reduction technique was implemented.     CT evaluation of the head without intravenous contrast. 5 mm transaxial  images were obtained.   2-D sagittal and coronal reconstruction images  were generated.     FINDINGS: Image quality degradation related to patient motion observed.     There is no intra or extra-axial hemorrhage.     There is no mass effect or midline shift.     There is no hydrocephalus.      Dilated perivascular space versus an old lacunar infarct, less likely,  appreciated again, in the left basal ganglia.        Mucosal thickening and mucus retention cyst noted in the maxillary sinus  with partial opacification with ethmoid sinus mucosal thickening. Mild  mucosal thickening observed in the sphenoid sinuses.     Bone window imaging reveals no calvarial abnormality.          Impression:       1. No CT evidence of an acute intracranial process.  2. Mild paranasal sinus disease.                     This report was finalized on 03/05/2017 13:00 by Dr. Eduardo Black MD.    XR Abdomen KUB [[id number]] Collected:  03/05/17 1213     Updated:  03/05/17 1304    Narrative:       HISTORY: Feeding tube placement.     XR ABDOMEN KUB-  Radiograph of the lower chest and upper abdomen was performed to  evaluate the position of a Dobbhoff feeding tube.     The feeding tube is identified in the stomach with the tip in the distal  body or antrum region.  This report was finalized on 03/05/2017 13:02 by Dr. Eduardo Black MD.    XR Chest 1 View [[id number]] Collected:  03/05/17 0730     Updated:  03/05/17 1306    Narrative:       CHEST, ONE VIEW:     HISTORY: Respiratory distress     COMPARISON: 03/04/2017     A single frontal chest radiograph was obtained.     FINDINGS:     Endotracheal tube is identified in good position above the juan carlos.     COPD and chronic lung changes again identified.     Mild patchy airspace opacities in the right lung base again observed  likely unchanged.     Radiographically, the chest is likely stable.                                  Impression:       1. Stable one-day appearance of the chest.     This report was finalized on 03/05/2017 13:04 by Dr. Eduardo Black MD.    CT Angiogram Chest With & Without Contrast [[id number]] Collected:  03/05/17 1639     Updated:  03/05/17 1643    Narrative:       HISTORY: Hypoxemia     COMPARISON: Chest CT dated 03/04/2017     TECHNIQUE:     CT evaluation of the  chest was performed with intravenous contrast. 2 mm  transaxial images were obtained. 3-D reconstruction angiographic images  were generated using a volume rendering technique. Coronal  reconstruction maximum intensity projection images of the pulmonary  arteries and aorta were also generated.     Radiation dose equals  mGy-cm.  Automated exposure control dose  reduction technique was implemented.        FINDINGS:     There is image quality degradation related to patient motion artifact in  the lower lobes. In the right lower lobe, there is low-attenuation  change within the medial basal pulmonary segment felt most likely  related to motion.     No definite CT angiographic evidence of pulmonary embolus appreciated.     The aorta is normal in caliber, without aneurysm or dissection.     Lower lobe airspace disease again appreciated likely atelectasis with  small pleural effusions.     The right lower lobe pulmonary nodule less conspicuous compared to the  previous examination likely due to the atelectasis.     Pulmonary emphysema again identified with an upper lobe predominance.       Impression:       1. No CT angiographic evidence of pulmonary embolus or acute aortic  pathology.  2. Lower lobe airspace disease, likely atelectasis with small pleural  effusions.  3. Pulmonary emphysema.  This report was finalized on 03/05/2017 16:41 by Dr. Eduardo Black MD.          ECG/EMG Results (last 72 hours)     Procedure Component Value Units Date/Time    Adult Transthoracic Echo Complete With Contrast [[id number]] Collected:  03/05/17 0832     IVSd 1.1 cm Updated:  03/05/17 1154     LVIDd 3.9 cm      LVIDs 2.3 cm      LVPWd 1.1 cm      IVS/LVPW 1.0      FS 42.7 %      EDV(Teich) 67.1 ml      ESV(Teich) 17.1 ml      EF(Teich) 74.5 %      EDV(cubed) 60.7 ml      ESV(cubed) 11.4 ml      EF(cubed) 81.2 %      LV mass(C)d 140.8 grams      SV(Teich) 50.0 ml      SV(cubed) 49.3 ml      LA dimension 2.6 cm      LVOT diam 2.0 cm       LVOT area 3.1 cm^2      LVOT area(traced) 3.1 cm^2      MV E max italo 76.6 cm/sec      MV A max italo 65.5 cm/sec      MV E/A 1.2      MV dec time 0.25 sec      Ao pk italo 131.0 cm/sec      Ao max PG 6.9 mmHg      Ao max PG (full) 2.9 mmHg      Ao V2 mean 86.4 cm/sec      Ao mean PG 3.0 mmHg      Ao mean PG (full) 1.0 mmHg      Ao V2 VTI 36.1 cm      MARIE(I,A) 2.1 cm^2      MARIE(I,D) 2.1 cm^2      MARIE(V,A) 2.4 cm^2      MARIE(V,D) 2.4 cm^2      LV V1 max PG 4.0 mmHg      LV V1 mean PG 2.0 mmHg      LV V1 max 100.0 cm/sec      LV V1 mean 64.4 cm/sec      LV V1 VTI 24.2 cm      SV(LVOT) 76.0 ml      TR max italo 226.0 cm/sec      RVSP(TR) 30.4 mmHg      RAP systole 10.0 mmHg      LA volume 33.7 cm3      Lat Peak E' Italo 10.0 cm/sec      Med Peak E' Italo 7.62 cm/sec      LVLd ap4 7.4 cm      EDV(MOD-sp4) 113.0 ml      LVLs ap4 6.2 cm      ESV(MOD-sp4) 37.8 ml      EF(MOD-sp4) 66.5 %      SV(MOD-sp4) 75.2 ml      CONTRAST EF 4CH 66.5 ml/m^2      Echo EF Estimated 60 %     Narrative:       · Left ventricular function is normal. Estimated EF = 60%.  · All left ventricular wall segments contract normally.  · Estimated right ventricular systolic pressure from tricuspid   regurgitation is normal (<35 mmHg).       SCANNED EKG [[id number]] Resulted:  03/04/17      Updated:  03/05/17 1356    ECG 12 Lead [[id number]] Collected:  03/04/17 1415     Updated:  03/05/17 1632    Narrative:       Test Reason : shortness of breath  Blood Pressure : **/** mmHG  Vent. Rate : 098 BPM     Atrial Rate : 098 BPM     P-R Int : 118 ms          QRS Dur : 072 ms      QT Int : 338 ms       P-R-T Axes : 047 -09 046 degrees     QTc Int : 431 ms    Normal sinus rhythm  Normal ECG  When compared with ECG of 31-OCT-2009 10:50,  Vent. rate has increased BY  36 BPM  Questionable change in The axis    Referred By:  SORIN           Confirmed By:Calvin Walters MD    ECG 12 Lead [[id number]] Collected:  03/06/17 0823     Updated:  03/06/17 0823             Physician  Progress Notes (last 72 hours) (Notes from 3/3/2017 10:23 AM through 3/6/2017 10:23 AM)      Colt Marianneshanice Sargent, DO at 3/5/2017  8:48 AM  Version 1 of 1             [facility] Medicine Services  INPATIENT PROGRESS NOTE    Length of Stay: 1  Date of Admission: 3/4/2017  Primary Care Physician: No Known Provider    Subjective   Chief Complaint:   AMS/Hypoxia  HPI   66 YO CF admitted on 3/4/17. Patient is intubated and on pressors. Appears more comfortable than yesterday. Will need Dobhoff. O2 on vent at 60%. Aroused per nursing with sedation holiday and was moving all extremities.         Review of Systems   AMS  Hypoxia    All pertinent negatives and positives are as above. All other systems have been reviewed and are negative unless otherwise stated.     Objective    Temp:  [98 °F (36.7 °C)-103.1 °F (39.5 °C)] 98 °F (36.7 °C)  Heart Rate:  [] 57  Resp:  [14-24] 20  BP: ()/(42-80) 113/52  FiO2 (%):  [60 %-100 %] 60 %  Physical Exam   HENT:   Head: Normocephalic and atraumatic.   Nose: Nose normal.   ET tube in place.    Eyes: Conjunctivae and EOM are normal.   Pin point pupils but reactive.   Neck: Normal range of motion. Neck supple.   Cardiovascular: Normal rate, regular rhythm and normal heart sounds.    Pulmonary/Chest: Effort normal and breath sounds normal.   Abdominal: Soft. Bowel sounds are normal.   Musculoskeletal: She exhibits no edema or tenderness.   Neurological: No cranial nerve deficit.   Sedated   Skin: Skin is warm and dry.   Vitals reviewed.          Results Review:  I have reviewed the labs, radiology results, and diagnostic studies.    Laboratory Data:         Culture Data:   BLOOD CULTURE   Date Value Ref Range Status   03/04/2017 No growth at less than 24 hours  Preliminary   03/04/2017 No growth at less than 24 hours  Preliminary       Radiology Data:   Imaging Results (last 24 hours)     Procedure Component Value Units Date/Time    XR Chest 1 View  [[id number]] Collected:  03/04/17 1527     Updated:  03/04/17 1542    Narrative:       CHEST, ONE VIEW:     HISTORY: Sepsis     COMPARISON: 10/31/2009 and 01/21/2009     A single frontal chest radiograph was obtained.     FINDINGS:     COPD and chronic lung changes appreciated.     There are patchy airspace opacities identified in the right lower lobe  laterally not observed previously and a small area of acute infiltrate  from an infectious or inflammatory process, pneumonia considered.  Correlate with patient presentation.     The heart is normal in size without evidence of overt heart failure.     The bony structures are intact.                                  Impression:       1. COPD and chronic lung changes.  2. Mild patchy right lower lobe airspace opacities, small area of acute  infiltrate/pneumonia considered.     This report was finalized on 03/04/2017 15:40 by Dr. Eduardo Black MD.    XR Chest 1 View [[id number]] Collected:  03/04/17 1933     Updated:  03/04/17 1939    Narrative:       EXAMINATION: XR CHEST 1 VW- 3/4/2017 7:33 PM CST     HISTORY: Endotracheal tube placement     COMPARISON: 03/04/2017     FINDINGS:  Since the previous examination, an endotracheal tube has been placed  with tip approximately 2.1 cm above the juan carlos. The remainder of the  exam is otherwise unchanged, with bilateral scattered interstitial and  alveolar opacities and peribronchial wall thickening, concerning for  multifocal pneumonia. The heart appears normal in size. Aorta is  tortuous. Surgical clips are seen at the GE junction.       Impression:       Interval endotracheal tube placement with tip 2.1 cm above  the juan carlos. Exam otherwise stable.  This report was finalized on 03/04/2017 19:37 by Dr. Ruddy Alvarez MD.    XR Chest 1 View [[id number]] Collected:  03/05/17 0730     Updated:  03/05/17 0730    Narrative:       CHEST, ONE VIEW:     HISTORY: Respiratory distress     COMPARISON: 03/04/2017     A single frontal chest  radiograph was obtained.     FINDINGS:     Endotracheal tube is identified in good position above the juan carlos.     COPD and chronic lung changes again identified.     Mild patchy airspace opacities in the right lung base again observed  likely unchanged.     Radiographically, the chest is likely stable.                                  Impression:       1. Stable one-day appearance of the chest.     This report was finalized on  by Dr. Eduardo Black MD.    CT Head Without Contrast [[id number]] Collected:  03/05/17 0746     Updated:  03/05/17 0746    Narrative:       CT SCAN OF THE HEAD, WITHOUT CONTRAST:      HISTORY: Altered mental status     COMPARISONS: 01/06/2009      TECHNIQUE:     Radiation dose equals  mGy-cm.  Automated exposure control dose  reduction technique was implemented.     CT evaluation of the head without intravenous contrast. 5 mm transaxial  images were obtained.   2-D sagittal and coronal reconstruction images  were generated.     FINDINGS: Image quality degradation related to patient motion observed.     There is no intra or extra-axial hemorrhage.     There is no mass effect or midline shift.     There is no hydrocephalus.     Dilated perivascular space versus an old lacunar infarct, less likely,  appreciated again, in the left basal ganglia.        Mucosal thickening and mucus retention cyst noted in the maxillary sinus  with partial opacification with ethmoid sinus mucosal thickening. Mild  mucosal thickening observed in the sphenoid sinuses.     Bone window imaging reveals no calvarial abnormality.          Impression:       1. No CT evidence of an acute intracranial process.  2. Mild paranasal sinus disease.                     This report was finalized on  by Dr. Eduardo Black MD.    CT Chest Without Contrast [[id number]] Collected:  03/05/17 0753     Updated:  03/05/17 0753    Narrative:       HISTORY: Hypoxia     CT CHEST WO CONTRAST-  CT evaluation of the chest was performed without  intravenous contrast. 5  mm sequential transaxial images were obtained. 2-D sagittal and coronal  reconstruction images generated. Radiation dose equals  mGy-cm.   Automated exposure control dose reduction technique was implemented.     Endotracheal tube identified above the juan carlos.     Small mediastinal lymph nodes appreciated without definite mediastinal  or hilar lymphadenopathy. Atherosclerotic aortic and coronary artery  calcifications appreciated.     There is pulmonary emphysema with an upper lobe predominance.  Fibronodular changes/apical pleural thickening observed compatible with  chronic changes.     There are small bilateral pleural effusions with patchy lower lobe  airspace disease. There is diffuse mild reticulonodular interstitial  prominence. There are no significant areas of lung consolidation  identified.     There is a pulmonary nodule identified in the right lower lobe, near the  pleural surface measuring nearly 8 mm, image 53. Imaging follow-up  recommended. There are no pneumothoraces.     Spondylosis changes noted diffusely in the thoracic spine.       Impression:       1. Pulmonary emphysema and chronic lung changes.  2. Small bilateral pleural effusions with lower lobe airspace disease,  question atelectasis and/or infiltrate. Mild diffuse interstitial  prominence. Mild acute interstitial infiltration not excluded.  3. Right lower lobe nodule. Follow-up recommended.  This report was finalized on  by Dr. Eduardo Black MD.    CT Abdomen Pelvis Without Contrast [[id number]] Collected:  03/05/17 0800     Updated:  03/05/17 0800    Narrative:       HISTORY: Abdominal pain     CT ABDOMEN PELVIS WO CONTRAST-  CT evaluation of the abdomen and pelvis was performed without  intravenous or oral contrast. 5 mm sequential transaxial images were  obtained. 2-D sagittal and coronal reconstruction images generated.  Radiation dose equals  mGy-cm.  Automated exposure control dose  reduction  technique was implemented.     Small bilateral pleural effusions with associated lower lobe airspace  disease again noted. The 7 mm pulmonary nodule right lower lobe  laterally again recognized. Findings are described in the chest CT  report.     Surgical clips result in significant streak artifact in the upper  abdomen. No definite hepatic lesion observed. The gallbladder is  surgically absent. No definite biliary ductal dilatation appreciated.     Spleen is not enlarged.     There are no adrenal masses or pancreatic lesions.     There are no urinary tract calculi or evidence of obstruction. The  urinary bladder is mildly distended without focal bladder wall  abnormality.     Changes from hysterectomy observed. There are no adnexal masses.     There is a large amount of stool identified throughout the colon. There  are scattered diverticuli particularly in the sigmoid colon without CT  evidence of acute diverticulitis. There is no CT evidence of acute  appendicitis, cecum extending into the pelvis. The small bowel is not  dilated. The duodenal sweep is imaged appropriately. The stomach is not  distended.     Small para-aortic and mesenteric lymph nodes observed without  pathologically enlarged nodes.     There is no ascites or pneumoperitoneum.     There are atherosclerotic aortoiliofemoral calcifications.     A few spondylosis changes noted in the thoracolumbar spine.     A right femoral vein catheter is identified.       Impression:       1. No CT evidence of an acute intra-abdominal/pelvic pathological  process.  2. Large amount of stool in the colon suggesting constipation.  Diverticulosis without CT evidence of acute diverticulitis.  This report was finalized on  by Dr. Eduardo Black MD.          I have reviewed the patient current medications.     Assessment/Plan     Hospital Problem List     Pneumonia of right lower lobe due to infectious organism        Impression/Plan:  1. Hypoxia with respiratory  "distress- Intubated on 60% 02. Pulmonology has been consulted. Simin. Currently day 2 Vancomycin/Zosyn.  2. AMS- question hypoxia/fever vs possible OD? Will ask family. Apparently there has been a HX of depression since the passing of her  in July. Neurology consulted.   3. RLL PNA, failed OP therapy- abx as above  4. Depression/Anxiety- PRN ativan  5. Abdominal pain- constipation on CT  6. Hypotension with HX of hypertension- currently on levophed            Colt Sargent DO   03/05/17   8:48 AM       Electronically signed by Colt Sargent DO at 3/5/2017  9:02 AM      Ranjeet Wade, APRN at 3/6/2017  9:05 AM  Version 1 of [facility] Pulmonary Progress Note    Patient: Justyna Lei  3/25/1951   MR# [id number]   Acct# [id number]  03/06/17   9:05 AM  Referring Provider: Siddharth Leavitt MD      Problem list:   Patient Active Problem List   Diagnosis   • Pneumonia of right lower lobe due to infectious organism        Chief Complaint: Hypoxia    Interval history: She was extubated yesterday with a neg CTA for PE. She's on 6 liters NC with sats in the upper 80's. She was smoking \"around 1/2 ppd\" prior to hospitalization. She denies any acute distress and carries on conversation without difficulty. She said she had an echo about 2 years ago per Dr. Lynne. No other aggravating, alleviating factors or associated symptoms.    HPI  Meds:      budesonide-formoterol 2 puff Inhalation BID - RT   cetirizine 10 mg Oral Daily   citalopram 40 mg Oral Daily   dextromethorphan polistirex ER 30 mg Oral Q12H   enoxaparin 40 mg Subcutaneous Daily   furosemide 40 mg Oral Daily   gabapentin 300 mg Oral Q8H   ipratropium-albuterol 3 mL Nebulization Q4H - RT   levoFLOXacin 750 mg Intravenous Q24H   methylPREDNISolone sodium succinate 40 mg Intravenous Q6H   nicotine 1 patch Transdermal Q24H   oxyCODONE ER 30 mg Oral Q12H   pantoprazole 40 mg Intravenous Q AM        Review of Systems:   Review of Systems "   Constitutional: Negative.    HENT: Negative.    Respiratory: Positive for shortness of breath.    Cardiovascular: Negative.    Gastrointestinal: Negative.    Skin: Negative.    Neurological: Negative.    Psychiatric/Behavioral: Negative.    All other systems reviewed and are negative.      Physical Exam:  Vitals:    03/06/17 0800   BP: 132/54   Pulse: 105   Resp: 26   Temp:    SpO2: (!) 88%       Intake/Output Summary (Last 24 hours) at 03/06/17 0905  Last data filed at 03/06/17 0800   Gross per 24 hour   Intake   2484 ml   Output   2450 ml   Net     34 ml     Physical Exam   Constitutional: She is oriented to person, place, and time. She appears well-developed and well-nourished. She appears ill. No distress. She is not intubated. Nasal cannula in place.   HENT:   Head: Normocephalic and atraumatic.   Eyes: Pupils are equal, round, and reactive to light.   Neck: Normal range of motion. Neck supple.   Cardiovascular: Regular rhythm, S1 normal and S2 normal.  Tachycardia present.    Pulmonary/Chest: Tachypnea noted. She is not intubated. No respiratory distress. She has decreased breath sounds in the right lower field and the left lower field.   Abdominal: Soft. Bowel sounds are normal. She exhibits no distension.   Musculoskeletal: Normal range of motion. She exhibits no edema.   Neurological: She is alert and oriented to person, place, and time.   Skin: Skin is warm and dry.   Psychiatric: She has a normal mood and affect.   Nursing note and vitals reviewed.      Data:     Results from last 7 days  Lab Units 03/06/17  0521 03/04/17  1415   WBC 10*3/mm3 5.19 5.15   HEMOGLOBIN g/dL 9.8* 11.6*   PLATELETS 10*3/mm3 124* 161         Results from last 7 days  Lab Units 03/06/17  0521 03/05/17  1257 03/04/17  1415   SODIUM mmol/L 140  --  139   SODIUM, ARTERIAL mmol/L  --  142.4  --    POTASSIUM mmol/L 3.0*  --  3.7   BUN mg/dL 6  --  6   CREATININE mg/dL 0.64  --  0.75         Results from last 7 days  Lab Units  03/05/17  1257 03/05/17  1149 03/05/17  0250 03/04/17  1920   PH, ARTERIAL pH units 7.419 7.398 7.354 7.296*   PCO2, ARTERIAL mm Hg 39.7 45.7* 46.9* 51.8*   PO2 ART mm Hg 52.7* 52.0* 113.1* 106.9*   FIO2 %  --  60 75 75         Radiology:  Imaging Results (last 24 hours)     Procedure Component Value Units Date/Time    CT Abdomen Pelvis Without Contrast [[id number]] Collected:  03/05/17 0800     Updated:  03/05/17 1259    Narrative:       HISTORY: Abdominal pain     CT ABDOMEN PELVIS WO CONTRAST-  CT evaluation of the abdomen and pelvis was performed without  intravenous or oral contrast. 5 mm sequential transaxial images were  obtained. 2-D sagittal and coronal reconstruction images generated.  Radiation dose equals  mGy-cm.  Automated exposure control dose  reduction technique was implemented.     Small bilateral pleural effusions with associated lower lobe airspace  disease again noted. The 7 mm pulmonary nodule right lower lobe  laterally again recognized. Findings are described in the chest CT  report.     Surgical clips result in significant streak artifact in the upper  abdomen. No definite hepatic lesion observed. The gallbladder is  surgically absent. No definite biliary ductal dilatation appreciated.     Spleen is not enlarged.     There are no adrenal masses or pancreatic lesions.     There are no urinary tract calculi or evidence of obstruction. The  urinary bladder is mildly distended without focal bladder wall  abnormality.     Changes from hysterectomy observed. There are no adnexal masses.     There is a large amount of stool identified throughout the colon. There  are scattered diverticuli particularly in the sigmoid colon without CT  evidence of acute diverticulitis. There is no CT evidence of acute  appendicitis, cecum extending into the pelvis. The small bowel is not  dilated. The duodenal sweep is imaged appropriately. The stomach is not  distended.     Small para-aortic and mesenteric  lymph nodes observed without  pathologically enlarged nodes.     There is no ascites or pneumoperitoneum.     There are atherosclerotic aortoiliofemoral calcifications.     A few spondylosis changes noted in the thoracolumbar spine.     A right femoral vein catheter is identified.       Impression:       1. No CT evidence of an acute intra-abdominal/pelvic pathological  process.  2. Large amount of stool in the colon suggesting constipation.  Diverticulosis without CT evidence of acute diverticulitis.  This report was finalized on 03/05/2017 12:56 by Dr. Eduardo Black MD.    CT Chest Without Contrast [[id number]] Collected:  03/05/17 0753     Updated:  03/05/17 1300    Narrative:       HISTORY: Hypoxia     CT CHEST WO CONTRAST-  CT evaluation of the chest was performed without intravenous contrast. 5  mm sequential transaxial images were obtained. 2-D sagittal and coronal  reconstruction images generated. Radiation dose equals  mGy-cm.   Automated exposure control dose reduction technique was implemented.     Endotracheal tube identified above the juan carlos.     Small mediastinal lymph nodes appreciated without definite mediastinal  or hilar lymphadenopathy. Atherosclerotic aortic and coronary artery  calcifications appreciated.     There is pulmonary emphysema with an upper lobe predominance.  Fibronodular changes/apical pleural thickening observed compatible with  chronic changes.     There are small bilateral pleural effusions with patchy lower lobe  airspace disease. There is diffuse mild reticulonodular interstitial  prominence. There are no significant areas of lung consolidation  identified.     There is a pulmonary nodule identified in the right lower lobe, near the  pleural surface measuring nearly 8 mm, image 53. Imaging follow-up  recommended. There are no pneumothoraces.     Spondylosis changes noted diffusely in the thoracic spine.       Impression:       1. Pulmonary emphysema and chronic lung  changes.  2. Small bilateral pleural effusions with lower lobe airspace disease,  question atelectasis and/or infiltrate. Mild diffuse interstitial  prominence. Mild acute interstitial infiltration not excluded.  3. Right lower lobe nodule. Follow-up recommended.  This report was finalized on 03/05/2017 12:58 by Dr. Eduardo Black MD.    CT Head Without Contrast [[id number]] Collected:  03/05/17 0746     Updated:  03/05/17 1302    Narrative:       CT SCAN OF THE HEAD, WITHOUT CONTRAST:      HISTORY: Altered mental status     COMPARISONS: 01/06/2009      TECHNIQUE:     Radiation dose equals  mGy-cm.  Automated exposure control dose  reduction technique was implemented.     CT evaluation of the head without intravenous contrast. 5 mm transaxial  images were obtained.   2-D sagittal and coronal reconstruction images  were generated.     FINDINGS: Image quality degradation related to patient motion observed.     There is no intra or extra-axial hemorrhage.     There is no mass effect or midline shift.     There is no hydrocephalus.     Dilated perivascular space versus an old lacunar infarct, less likely,  appreciated again, in the left basal ganglia.        Mucosal thickening and mucus retention cyst noted in the maxillary sinus  with partial opacification with ethmoid sinus mucosal thickening. Mild  mucosal thickening observed in the sphenoid sinuses.     Bone window imaging reveals no calvarial abnormality.          Impression:       1. No CT evidence of an acute intracranial process.  2. Mild paranasal sinus disease.                     This report was finalized on 03/05/2017 13:00 by Dr. Eduardo Black MD.    XR Abdomen KUB [[id number]] Collected:  03/05/17 1213     Updated:  03/05/17 1304    Narrative:       HISTORY: Feeding tube placement.     XR ABDOMEN KUB-  Radiograph of the lower chest and upper abdomen was performed to  evaluate the position of a Dobbhoff feeding tube.     The feeding tube is identified in  the stomach with the tip in the distal  body or antrum region.  This report was finalized on 03/05/2017 13:02 by Dr. Eduardo Black MD.    XR Chest 1 View [[id number]] Collected:  03/05/17 0730     Updated:  03/05/17 1306    Narrative:       CHEST, ONE VIEW:     HISTORY: Respiratory distress     COMPARISON: 03/04/2017     A single frontal chest radiograph was obtained.     FINDINGS:     Endotracheal tube is identified in good position above the juan carlos.     COPD and chronic lung changes again identified.     Mild patchy airspace opacities in the right lung base again observed  likely unchanged.     Radiographically, the chest is likely stable.                                  Impression:       1. Stable one-day appearance of the chest.     This report was finalized on 03/05/2017 13:04 by Dr. Eduardo Black MD.    CT Angiogram Chest With & Without Contrast [[id number]] Collected:  03/05/17 1639     Updated:  03/05/17 1643    Narrative:       HISTORY: Hypoxemia     COMPARISON: Chest CT dated 03/04/2017     TECHNIQUE:     CT evaluation of the chest was performed with intravenous contrast. 2 mm  transaxial images were obtained. 3-D reconstruction angiographic images  were generated using a volume rendering technique. Coronal  reconstruction maximum intensity projection images of the pulmonary  arteries and aorta were also generated.     Radiation dose equals  mGy-cm.  Automated exposure control dose  reduction technique was implemented.        FINDINGS:     There is image quality degradation related to patient motion artifact in  the lower lobes. In the right lower lobe, there is low-attenuation  change within the medial basal pulmonary segment felt most likely  related to motion.     No definite CT angiographic evidence of pulmonary embolus appreciated.     The aorta is normal in caliber, without aneurysm or dissection.     Lower lobe airspace disease again appreciated likely atelectasis with  small pleural  effusions.     The right lower lobe pulmonary nodule less conspicuous compared to the  previous examination likely due to the atelectasis.     Pulmonary emphysema again identified with an upper lobe predominance.       Impression:       1. No CT angiographic evidence of pulmonary embolus or acute aortic  pathology.  2. Lower lobe airspace disease, likely atelectasis with small pleural  effusions.  3. Pulmonary emphysema.  This report was finalized on 03/05/2017 16:41 by Dr. Eduardo Black MD.          Pulmonary Assessment:  1. Acute hypoxemic respiratory failure  2. COPD and emphysema  3. RLL nodule, 8mm  4. Tobacco abuse      Plan:   · Echo with bubble study today to rule out a potential blood flow/shunting issue  · Solu-medrol IV 40 mg today x 3 doses  · Continue nebs and Symbicort   · Smoking cessation  · Will need 3 month f/u CT for RLL nodule  · Limit sedation meds      Electronically signed by THIERNO Souza, 03/06/17, 9:05 AM          Electronically signed by THIERNO Souza at 3/6/2017  9:31 AM      Siddharth Leavitt MD at 3/6/2017  9:26 AM  Version 2 of 2             [facility] Medicine Services  INPATIENT PROGRESS NOTE    Length of Stay: 2  Date of Admission: 3/4/2017  Primary Care Physician: No Known Provider    Subjective   Chief Complaint: Dyspnea    HPI   66 YO CF admitted on 3/4/17. Patient is intubated and on pressors.  Extubated on 3/4.  Patient still have shortness of breath on 6 L of oxygen.  She is more alert today.  Moving all extremity.      Review of Systems   Constitutional: Positive for activity change, appetite change, diaphoresis and fatigue. Negative for chills and fever.   HENT: Negative for hearing loss, nosebleeds, tinnitus and trouble swallowing.    Eyes: Negative for visual disturbance.   Respiratory: Positive for apnea, cough and wheezing. Negative for chest tightness and shortness of breath.    Cardiovascular: Negative for  chest pain, palpitations and leg swelling.   Gastrointestinal: Negative for abdominal distention, abdominal pain, blood in stool, constipation, diarrhea, nausea and vomiting.   Endocrine: Negative for cold intolerance, heat intolerance, polydipsia, polyphagia and polyuria.   Genitourinary: Negative for decreased urine volume, difficulty urinating, dysuria, flank pain, frequency and hematuria.   Musculoskeletal: Positive for arthralgias, back pain, gait problem and myalgias. Negative for joint swelling.   Skin: Negative for rash.   Allergic/Immunologic: Negative for immunocompromised state.   Neurological: Positive for weakness. Negative for dizziness, syncope, light-headedness and headaches.   Hematological: Negative for adenopathy. Does not bruise/bleed easily.   Psychiatric/Behavioral: Positive for decreased concentration. Negative for confusion, hallucinations, sleep disturbance and suicidal ideas. The patient is not nervous/anxious.           All pertinent negatives and positives are as above. All other systems have been reviewed and are negative unless otherwise stated.     Objective    Temp:  [99.1 °F (37.3 °C)-100 °F (37.8 °C)] 99.8 °F (37.7 °C)  Heart Rate:  [] 105  Resp:  [14-26] 26  BP: (103-137)/(45-71) 132/54  FiO2 (%):  [60 %] 60 %    Intake/Output Summary (Last 24 hours) at 03/06/17 0928  Last data filed at 03/06/17 0800   Gross per 24 hour   Intake   2484 ml   Output   2450 ml   Net     34 ml     Physical Exam   Constitutional: She is oriented to person, place, and time. She appears well-developed and well-nourished.   HENT:   Head: Normocephalic and atraumatic.   Eyes: Conjunctivae and EOM are normal. Pupils are equal, round, and reactive to light.   Neck: Neck supple. No JVD present. No thyromegaly present.   Cardiovascular: Normal rate, regular rhythm, normal heart sounds and intact distal pulses.  Exam reveals no gallop and no friction rub.    No murmur heard.  Pulmonary/Chest: Effort  normal. No respiratory distress. She has wheezes. She has no rales. She exhibits no tenderness.   Abdominal: Soft. Bowel sounds are normal. She exhibits no distension. There is no tenderness. There is no rebound and no guarding.   Musculoskeletal: Normal range of motion. She exhibits no edema, tenderness or deformity.   Lymphadenopathy:     She has no cervical adenopathy.   Neurological: She is alert and oriented to person, place, and time. She displays normal reflexes. No cranial nerve deficit. She exhibits abnormal muscle tone. Coordination abnormal.   Skin: Skin is warm and dry. No rash noted.   Psychiatric: She has a normal mood and affect. Her behavior is normal.   Nursing note and vitals reviewed.      Results Review:  Lab Results (last 24 hours)     Procedure Component Value Units Date/Time    Blood Gas, Arterial [[id number]]  (Abnormal) Collected:  03/05/17 1149    Specimen:  Arterial Blood Updated:  03/05/17 1151     Site Arterial: right radial      Michael's Test --       Documented in Rapid Comm        pH, Arterial 7.398 pH units      pCO2, Arterial 45.7 (H) mm Hg      pO2, Arterial 52.0 (L) mm Hg      HCO3, Arterial 27.5 (H) mmol/L      Base Excess, Arterial 2.1 (H) mmol/L      O2 Saturation, Arterial 86.6 (L) %      O2 Saturation Calculated 86.6 (L) %      Barometric Pressure for Blood Gas -- mmHg       Component not reported at this site.        Modality Ventilator      FIO2 60 %      Ventilator Mode AC      Rate 14.0 Breaths/minute      PEEP 5.0      Vent CPAP/PEEP 5.0     Narrative:       Serial Number: [id number]    : [id number]    Blood Gas, Arterial With Co-Ox [[id number]]  (Abnormal) Collected:  03/05/17 1257    Specimen:  Arterial Blood Updated:  03/05/17 1258     Site Arterial: right brachial      Michael's Test --       Documented in Rapid [facility]        pH, Arterial 7.419 pH units      pCO2, Arterial 39.7 mm Hg      pO2, Arterial 52.7 (L) mm Hg      HCO3, Arterial 25.1 mmol/L      Base Excess, Arterial  0.7 mmol/L      Hemoglobin, Blood Gas 12.1 g/dL      Hematocrit, Blood Gas 36.0 (L) %      Oxyhemoglobin 88.7 (L) %      Methemoglobin 0.4 %      Carboxyhemoglobin 0.4 %      Sodium, Arterial 142.4 mmol/L      Potassium, Arterial 3.67 mmol/L      Barometric Pressure for Blood Gas -- mmHg       Component not reported at this site.        Modality Cannula      Flow Rate 6.00 lpm     Narrative:       Serial Number: [id number]    : [id number]    Ammonia [[id number]]  (Abnormal) Collected:  03/05/17 1333    Specimen:  Blood Updated:  03/05/17 1354     Ammonia <9 (L) umol/L     TSH [[id number]]  (Abnormal) Collected:  03/05/17 1333    Specimen:  Blood Updated:  03/05/17 1426     TSH 0.208 (L) mIU/mL     Blood Culture [[id number]]  (Normal) Collected:  03/04/17 1427    Specimen:  Blood from Arm, Right Updated:  03/05/17 1501     Blood Culture No growth at 24 hours     Blood Culture [[id number]]  (Normal) Collected:  03/04/17 1415    Specimen:  Blood from Arm, Left Updated:  03/05/17 1501     Blood Culture No growth at 24 hours     Folate [[id number]] Collected:  03/05/17 1333    Specimen:  Blood Updated:  03/05/17 1501     Folate 4.24 ng/mL     CBC (No Diff) [[id number]]  (Abnormal) Collected:  03/06/17 0521    Specimen:  Blood Updated:  03/06/17 0541     WBC 5.19 10*3/mm3      RBC 3.15 (L) 10*6/mm3      Hemoglobin 9.8 (L) g/dL      Hematocrit 30.1 (L) %      MCV 95.6 fL      MCH 31.1 pg      MCHC 32.6 (L) g/dL      RDW 15.5 (H) %      RDW-SD 53.5 fl      MPV 11.6 fL      Platelets 124 (L) 10*3/mm3     Basic Metabolic Panel [[id number]]  (Abnormal) Collected:  03/06/17 0521    Specimen:  Blood Updated:  03/06/17 0553     Glucose 103 (H) mg/dL      BUN 6 mg/dL      Creatinine 0.64 mg/dL      Sodium 140 mmol/L      Potassium 3.0 (L) mmol/L      Chloride 105 mmol/L      CO2 32.0 (H) mmol/L      Calcium 7.7 (L) mg/dL      eGFR Non African Amer 93 mL/min/1.73      BUN/Creatinine Ratio 9.4      Anion Gap 3.0 (L) mmol/L     Narrative:       GFR  Normal >60  Chronic Kidney Disease <60  Kidney Failure <15    Troponin [[id number]]  (Normal) Collected:  03/06/17 0521    Specimen:  Blood Updated:  03/06/17 0603     Troponin I 0.014 ng/mL     BNP [[id number]]  (Abnormal) Collected:  03/06/17 0521    Specimen:  Blood Updated:  03/06/17 0603     proBNP 2510.0 (H) pg/mL     Vitamin B12 [[id number]]  (Abnormal) Collected:  03/06/17 0521    Specimen:  Blood Updated:  03/06/17 0643     Vitamin B-12 225 (L) pg/mL            Cultures:  BLOOD CULTURE   Date Value Ref Range Status   03/04/2017 No growth at 24 hours  Preliminary   03/04/2017 No growth at 24 hours  Preliminary         Assessment/Plan     Active Problems:    Pneumonia of right lower lobe due to infectious organism    COPD (chronic obstructive pulmonary disease)    Hypokalemia    Anemia    Respiratory failure      Plan:  Pneumonia/COPD/respiratory failureHypoxia with respiratory distress-Extubated 3/6.  Duonebs. Currently  Levaquin.  By mouth steroids.    AMS- question hypoxia/fever vs possible OD. Apparently there has been a HX of depression since the passing of her  in July. Neurology consulted.     Hypokalemia. kdur x 3 days.    Depression/Anxiety- xanax.     Abdominal pain- constipation on CT    Hypotension with HX of hypertension- off levaphed    Abnormal TSH- Free t4 and free t3.    Chronic pain-  on OxyContin, and  Percocet when necessary     Anemia- stable. [place] panel.    Discharge Planning:  pending    Siddharth Leavitt MD   03/06/17   9:28 AM                     Electronically signed by Siddharth Leavitt MD at 3/6/2017 10:00 AM      Siddharth Leavitt MD at 3/6/2017  9:26 AM  Version 1 of 2             [facility] Medicine Services  INPATIENT PROGRESS NOTE    Length of Stay: 2  Date of Admission: 3/4/2017  Primary Care Physician: No Known Provider    Subjective   Chief Complaint: Dyspnea    HPI   64 YO CF admitted on 3/4/17. Patient is intubated and on pressors.  Extubated  on 3/4.  Patient still have shortness of breath on 6 L of oxygen.  She is more alert today.  Moving all extremity.      Review of Systems   Constitutional: Positive for activity change, appetite change, diaphoresis and fatigue. Negative for chills and fever.   HENT: Negative for hearing loss, nosebleeds, tinnitus and trouble swallowing.    Eyes: Negative for visual disturbance.   Respiratory: Positive for apnea, cough and wheezing. Negative for chest tightness and shortness of breath.    Cardiovascular: Negative for chest pain, palpitations and leg swelling.   Gastrointestinal: Negative for abdominal distention, abdominal pain, blood in stool, constipation, diarrhea, nausea and vomiting.   Endocrine: Negative for cold intolerance, heat intolerance, polydipsia, polyphagia and polyuria.   Genitourinary: Negative for decreased urine volume, difficulty urinating, dysuria, flank pain, frequency and hematuria.   Musculoskeletal: Positive for arthralgias, back pain, gait problem and myalgias. Negative for joint swelling.   Skin: Negative for rash.   Allergic/Immunologic: Negative for immunocompromised state.   Neurological: Positive for weakness. Negative for dizziness, syncope, light-headedness and headaches.   Hematological: Negative for adenopathy. Does not bruise/bleed easily.   Psychiatric/Behavioral: Positive for decreased concentration. Negative for confusion, hallucinations, sleep disturbance and suicidal ideas. The patient is not nervous/anxious.           All pertinent negatives and positives are as above. All other systems have been reviewed and are negative unless otherwise stated.     Objective    Temp:  [99.1 °F (37.3 °C)-100 °F (37.8 °C)] 99.8 °F (37.7 °C)  Heart Rate:  [] 105  Resp:  [14-26] 26  BP: (103-137)/(45-71) 132/54  FiO2 (%):  [60 %] 60 %    Intake/Output Summary (Last 24 hours) at 03/06/17 0928  Last data filed at 03/06/17 0800   Gross per 24 hour   Intake   2484 ml   Output   2450 ml   Net      34 ml     Physical Exam   Constitutional: She is oriented to person, place, and time. She appears well-developed and well-nourished.   HENT:   Head: Normocephalic and atraumatic.   Eyes: Conjunctivae and EOM are normal. Pupils are equal, round, and reactive to light.   Neck: Neck supple. No JVD present. No thyromegaly present.   Cardiovascular: Normal rate, regular rhythm, normal heart sounds and intact distal pulses.  Exam reveals no gallop and no friction rub.    No murmur heard.  Pulmonary/Chest: Effort normal. No respiratory distress. She has wheezes. She has no rales. She exhibits no tenderness.   Abdominal: Soft. Bowel sounds are normal. She exhibits no distension. There is no tenderness. There is no rebound and no guarding.   Musculoskeletal: Normal range of motion. She exhibits no edema, tenderness or deformity.   Lymphadenopathy:     She has no cervical adenopathy.   Neurological: She is alert and oriented to person, place, and time. She displays normal reflexes. No cranial nerve deficit. She exhibits abnormal muscle tone. Coordination abnormal.   Skin: Skin is warm and dry. No rash noted.   Psychiatric: She has a normal mood and affect. Her behavior is normal.   Nursing note and vitals reviewed.     Specimen:  Blood Updated:  03/06/17 0541     WBC 5.19 10*3/mm3      RBC 3.15 (L) 10*6/mm3      Hemoglobin 9.8 (L) g/dL      Hematocrit 30.1 (L) %      MCV 95.6 fL      MCH 31.1 pg      MCHC 32.6 (L) g/dL      RDW 15.5 (H) %      RDW-SD 53.5 fl      MPV 11.6 fL      Platelets 124 (L) 10*3/mm3     Basic Metabolic Panel [[id number]]  (Abnormal) Collected:  03/06/17 0521    Specimen:  Blood Updated:  03/06/17 0553     Glucose 103 (H) mg/dL      BUN 6 mg/dL      Creatinine 0.64 mg/dL      Sodium 140 mmol/L      Potassium 3.0 (L) mmol/L      Chloride 105 mmol/L      CO2 32.0 (H) mmol/L      Calcium 7.7 (L) mg/dL      eGFR Non African Amer 93 mL/min/1.73      BUN/Creatinine Ratio 9.4      Anion Gap 3.0 (L) mmol/L      Narrative:       GFR Normal >60  Chronic Kidney Disease <60  Kidney Failure <15    Troponin [[id number]]  (Normal) Collected:  03/06/17 0521    Specimen:  Blood Updated:  03/06/17 0603     Troponin I 0.014 ng/mL     BNP [[id number]]  (Abnormal) Collected:  03/06/17 0521    Specimen:  Blood Updated:  03/06/17 0603     proBNP 2510.0 (H) pg/mL     Vitamin B12 [[id number]]  (Abnormal) Collected:  03/06/17 0521    Specimen:  Blood Updated:  03/06/17 0643     Vitamin B-12 225 (L) pg/mL            Cultures:  BLOOD CULTURE   Date Value Ref Range Status   03/04/2017 No growth at 24 hours  Preliminary   03/04/2017 No growth at 24 hours  Preliminary           Assessment/Plan     Active Problems:    Pneumonia of right lower lobe due to infectious organism    COPD (chronic obstructive pulmonary disease)    Hypokalemia    Anemia    Respiratory failure      Plan:  Pneumonia/COPD/respiratory failureHypoxia with respiratory distress-Extubated 3/6.  Duonebs. Currently  Levaquin.     AMS- question hypoxia/fever vs possible OD. Apparently there has been a HX of depression since the passing of her  in July. Neurology consulted.     Hypokalemia. kdur x 3 days.    Depression/Anxiety- xanax.     Abdominal pain- constipation on CT    Hypotension with HX of hypertension- off levaphed    Abnormal TSH- Free t4 and free t3.    Chronic pain-  on OxyContin, and  Percocet when necessary     Anemia- stable. [place] panel.    Discharge Planning:  pending    Siddharth Leavitt MD   03/06/17   9:28 AM                     Electronically signed by Siddharth Leavitt MD at 3/6/2017  9:58 AM           Consult Notes (last 72 hours) (Notes from 3/3/2017 10:23 AM through 3/6/2017 10:23 AM)      Dre Deal MD at 3/5/2017 10:07 AM  Version 1 of 1     Consult Orders:    1. Inpatient Consult to Neurology [[id number]] ordered by Colt Sargent DO at 03/04/17 1802                Neurology Consult Note    Referring Provider: Dr. Colt Sargent  Reason for  Consultation: altered mental status      History of present illness:      65-year-old female with a history of severe depression and possibly failure to thrive since her 's death last summer.  Currently there is no family at the bedside and the patient is intubated.  By report the patient was undergoing some treatment for her chronic pulmonary disease over the past 2 months.  She received multiple rounds of antibiotics.  Yesterday afternoon her family had difficulties waking her up.  Her oxygen saturation was found to be 50%.  She was brought in for respiratory failure.  In addition to this she has a history of depression and sees an outpatient psychiatrist.  Even after her respiratory failure was compensated for in the initial stages of her admission she continued to be extremely somnolent.  A question has been posed whether there may be drug intoxication possibly secondary to depression.  Other neurologic etiologies are also in question hence the neurologic consultation currently.  No seizure activity has been seen overnight by the nursing staff.    Review of Systems  A 14 point review of systems was attempted but mental status does not allow for this    Vital Signs   Temp:  [97.3 °F (36.3 °C)-103.1 °F (39.5 °C)] 97.3 °F (36.3 °C)  Heart Rate:  [] 57  Resp:  [14-24] 20  BP: ()/(42-80) 113/52  FiO2 (%):  [60 %-100 %] 60 %    General Exam:  Head:  Normal cephalic, atraumatic  HEENT:  Neck supple.  Patient intubated  Fundoscopic Exam:  No signs of disc edema  CVS:  Regular rate and rhythm.  No murmurs  Carotid Examination:  No bruits  Lungs:  Clear to auscultation  Abdomen:  Non-tender, Non-distended  Extremities:  No signs of peripheral edema  Skin:  No rashes    Neurologic Exam:    Mental Status:    -Heavily sedated and intubated.  When sedation is paused, the patient opens eyes, moves all extremities and sits up in bed.    CN II:  Visual fields full.  Pupils equally reactive to light  CN III,  IV, VI:  Extraocular Muscles full with no signs of nystagmus  CN V:  Facial sensory is symmetric with no asymetries.  CN VII:  Facial motor symmetric  CN VIII:    CN IX:  Gag blunted on sedation  CN X:    CN XI:    CN XII:     Motor:     Moves all extremities equal when sedation paused.    DTR:  -Right   Bicep: 2+ Tricep: 2+ Brachoradialis: 2+   Patella: 2+ Ankle: 2+ Neg Babinski  -Left   Bicep: 2+ Tricep: 2+ Brachoradialis: 2+   Patella: 2+ Ankle: 2+ Neg Babinski    Sensory:  -Responds to noxious stim in all four extremities.    Coordination:  -No signs of active tremor  Gait  -intubated and sedated.      Impression    1.  Metabolic encephalopathy  --Currently her encephalopathy is likely secondary to medications as withdrawal off of sedation causes her to abruptly began moving all extremities and her level of consciousness improves.  --This could have been secondary to hypercarbia initially.  --Differential diagnosis will also include medication intoxication  --There is no focality to the neurologic exam to suggest CNS structural lesions.  There is no history of seizures nor seizure activity witnessed thus far to suggest an epileptic etiology.    Plan    1.  Ammonia  2.  B12  3.  Folate  4.  TSH  5.  We will continue neurologic exams.    I discussed the patients findings and my recommendations with nursing staff    Dre Deal MD  03/05/17  10:07 AM         Electronically signed by Dre Deal MD at 3/5/2017 10:23 AM      Ruiz Odonnell MD at 3/5/2017 11:32 AM  Version 1 of 1     Consult Orders:    1. Inpatient Consult to Pulmonology [[id number]] ordered by Colt Sargent DO at 03/04/17 1802                [facility]  Phone: [phone]  Ruiz Odonnell MD      Subjective   LOS: 1 day   you for this consultation.  65-year-old female who was found obtunded at home.  Her saturation was in the 60s.  Sequence of events is unclear.  Family members are not here for conversation.  It is unclear if  the patient may have taken any drugs or had a neurological event.  She was on propofol sedation when I first walked in.  After propofol was taken off she was completely awake and alert.  She is moving all 4 extremities and is attempting to take her endotracheal tube out.  Again history is unobtainable as the patient is still on the ventilator.    Underlying history as of severe depression and failure to thrive due to 's death last summer.  Patient has been resisting going outside the house.  She is a regular heavy smoker.  She continues to smoke a pack of cigarettes a day.  She has underlying history of hypertension.  There is no history of ordinary artery disease prior cancer or diabetes or prior stroke.  Of note patient was recently treated by her primary care physician for bronchitis or pneumonia with several rounds of antibiotics  Objective  I have reviewed and edited the Past Medical History, Past Surgical History, Home Medications, Social History and Family History as of 11:32 AM on 03/05/17.    Prescriptions Prior to Admission   Medication Sig Dispense Refill Last Dose   • albuterol (PROVENTIL HFA;VENTOLIN HFA) 108 (90 BASE) MCG/ACT inhaler Inhale 2 puffs Every 4 (Four) Hours As Needed for wheezing.   Past Week at Unknown time   • ALPRAZolam (XANAX) 1 MG tablet Take 1 mg by mouth 4 (Four) Times a Day As Needed for anxiety.   Past Week at Unknown time   • amphetamine-dextroamphetamine (ADDERALL) 20 MG tablet Take 20 mg by mouth 2 (Two) Times a Day.   Past Week at Unknown time   • budesonide-formoterol (SYMBICORT) 160-4.5 MCG/ACT inhaler Inhale 2 puffs 2 (Two) Times a Day.   Past Week at Unknown time   • cetirizine (zyrTEC) 10 MG tablet Take 10 mg by mouth Daily.   Past Week at Unknown time   • citalopram (CeleXA) 40 MG tablet Take 40 mg by mouth Daily.   Past Week at Unknown time   • furosemide (LASIX) 40 MG tablet Take 40 mg by mouth Daily.   Past Week at Unknown time   • gabapentin (NEURONTIN) 600 MG  tablet Take 600 mg by mouth Every 8 (Eight) Hours.   Past Week at Unknown time   • ipratropium-albuterol (COMBIVENT RESPIMAT)  MCG/ACT inhaler Inhale 1 puff 4 (Four) Times a Day As Needed for wheezing.   Past Week at Unknown time   • lisinopril (PRINIVIL,ZESTRIL) 5 MG tablet Take 5 mg by mouth Daily.   Past Week at Unknown time   • mirtazapine (REMERON) 15 MG tablet Take 15 mg by mouth Every Night.   Past Week at Unknown time   • ondansetron (ZOFRAN) 4 MG tablet Take 4 mg by mouth Every 8 (Eight) Hours As Needed for nausea or vomiting.   Past Week at Unknown time   • OxyCODONE HCl ER (oxyCONTIN) 30 MG tablet extended-release 12 hour Take 30 mg by mouth Every 12 (Twelve) Hours.   Past Week at Unknown time   • oxyCODONE-acetaminophen (PERCOCET)  MG per tablet Take 1 tablet by mouth Every 6 (Six) Hours As Needed for moderate pain (4-6) (every 4-6 hours).   Past Week at Unknown time   • promethazine (PHENERGAN) 25 MG tablet Take 25 mg by mouth Every 6 (Six) Hours As Needed for nausea or vomiting.   Past Week at Unknown time   • tiZANidine (ZANAFLEX) 4 MG tablet Take 4 mg by mouth Every 8 (Eight) Hours As Needed for muscle spasms.   Past Week at Unknown time   • zolpidem (AMBIEN) 5 MG tablet Take 10 mg by mouth At Night As Needed for sleep.   Past Week at Unknown time       Review of Systems   Unable to perform ROS: Intubated       Vital Signs past 24hrs  BP range: BP: ()/(42-80) 113/52  Pulse range: Heart Rate:  [] 57  Resp rate range: Resp:  [14-24] 20  Temp range: Temp (24hrs), Av.3 °F (37.4 °C), Min:97.3 °F (36.3 °C), Max:103.1 °F (39.5 °C)    Oxygen range: SpO2:  [86 %-100 %] 98 %; Flow (L/min):  [3-55] 55;   O2 Device: mechanical ventilator  159 lb 1.6 oz (72.2 kg); Body mass index is 26.89 kg/(m^2).  I/O this shift:  In: 359.6 [I.V.:309.6; IV Piggyback:50]  Out: 700 [Urine:700]female on the ventilator.  Oral ET tube is noted.  After discontinuation of sedation patient is awake alert  moving all 4 extremities and attempting to communicate.  Her pupils are equal and reactive to light.  JVP does not appear to be elevated though difficult to assess.  Trachea midline thyroid not enlarged.  Lungs reveal bilateral air entry.  I do not hear any adventitious sounds.  I.e. I do not hear any rales rhonchi or wheeze.  Overall air entry is diminished but equal.  Percussion note is resonant.  Chest expansion equal no chest wall deformities or tenderness.  Heart examination S1-S2 present rhythm regular with no murmurs noted.  No edema on lower extremities.  Abdomen is soft nontender with bowel sounds present.  No liver spleen enlargement.  No peripheral cyanosis or clubbing.  As stated moving all 4 extremities and appears appropriate.  No obvious adenopathy in the cervical, axillary, inguinal areas.    Results Review:    I have reviewed the laboratory and imaging data since the last note by [facility] physician. My annotations are as noted in assessment and plan.    Medication Review:  I have reviewed the current MAR.  My annotations are as noted in assessment and plan.    Plan   PCCM Problemsrespiratory failure with hypoxia and possibly hypercapnia, on mechanical ventilation  Underlying COPD likely severe but without exacerbation at this time bronchitis versus pneumonia with mild bibasilar infiltrates on CT chestlower lobe nodule 8 mm,  Images 53  Current every day smokersedating pain medications as well as Ativan  Relevant Medical Diagnoses  of Treatment's propofol was discontinued.  She was on Levophed which was also discontinued.  I will check weaning parameters and attempt extubation this morning.    Underlying severe COPD.  Possible somnolence due to hypercapnia.  We'll need a blood gas off the ventilator to see what her baseline status is.  Awaiting neurological workup.  Currently no evidence of stroke or ingestion of medications other than those prescribed. Her home medications for pain may have contributed  to her somnolence.    Due to COPD we'll continue bronchodilator therapy.  At the present time I do not see a need for IV steroids.  She did receive 1 dose of Solu-Medrol last night.    A CT chest shows by basilar infiltrates versus atelectasis which is quite minimal.  She has treated with outpatient antibiotics recently.  Her pro-calcitonin is within normal limits.  I will switch  Out her vancomycin and Zosyn to Levaquin.  Await results of respiratory cultures.  I don't believe she has severe pneumonia at this time.    She is a current smoker.  She will be advised to completely quit smoking.    Right lower lobe nodule.  This will require a follow-up CT chest within 3 months.    Limit sedating medications especially narcotics and benzodiazepines.    ICU bundle was ordered.    Ruiz Odonnell MD  03/05/17  11:32 AM     I spent +35 mins critical care time in care of this patient outside of any procedures.      EMR Dragon/Transcription disclaimer:   Much of this encounter note is an electronic transcription/translation of spoken language to printed text. The electronic translation of spoken language may permit erroneous, or at times, nonsensical words or phrases to be inadvertently transcribed; Although I have reviewed the note for such errors, some may still exist.        Electronically signed by Ruiz Odonnell MD at 3/5/2017 11:45 AM

## 2017-03-06 NOTE — PLAN OF CARE
Problem: Patient Care Overview (Adult)  Goal: Plan of Care Review  Outcome: Ongoing (interventions implemented as appropriate)    03/06/17 0322   Coping/Psychosocial Response Interventions   Plan Of Care Reviewed With patient   Patient Care Overview   Progress improving   Outcome Evaluation   Outcome Summary/Follow up Plan pt on 6LNC O2 sat 93%, breathing tx continue, home pain meds restarted with effect, bp stable, nsr 80's, a       Goal: Adult Individualization and Mutuality  Outcome: Ongoing (interventions implemented as appropriate)  Goal: Discharge Needs Assessment  Outcome: Ongoing (interventions implemented as appropriate)    Problem: Respiratory Insufficiency (Adult)  Goal: Identify Related Risk Factors and Signs and Symptoms  Outcome: Ongoing (interventions implemented as appropriate)  Goal: Acid/Base Balance  Outcome: Ongoing (interventions implemented as appropriate)  Goal: Effective Ventilation  Outcome: Ongoing (interventions implemented as appropriate)    Problem: Pressure Ulcer Risk (Kermit Scale) (Adult,Obstetrics,Pediatric)  Goal: Identify Related Risk Factors and Signs and Symptoms  Outcome: Ongoing (interventions implemented as appropriate)  Goal: Skin Integrity  Outcome: Ongoing (interventions implemented as appropriate)    Problem: Fall Risk (Adult)  Goal: Identify Related Risk Factors and Signs and Symptoms  Outcome: Ongoing (interventions implemented as appropriate)  Goal: Absence of Falls  Outcome: Ongoing (interventions implemented as appropriate)

## 2017-03-07 ENCOUNTER — APPOINTMENT (OUTPATIENT)
Dept: GENERAL RADIOLOGY | Facility: HOSPITAL | Age: 67
End: 2017-03-07

## 2017-03-07 LAB
ALBUMIN SERPL-MCNC: 3.3 G/DL (ref 3.5–5)
ALBUMIN/GLOB SERPL: 1.2 G/DL (ref 1.1–2.5)
ALP SERPL-CCNC: 47 U/L (ref 24–120)
ALT SERPL W P-5'-P-CCNC: 17 U/L (ref 0–54)
AMYLASE SERPL-CCNC: 54 U/L (ref 30–110)
ANION GAP SERPL CALCULATED.3IONS-SCNC: 9 MMOL/L (ref 4–13)
AST SERPL-CCNC: 18 U/L (ref 7–45)
BASOPHILS # BLD AUTO: 0 10*3/MM3 (ref 0–0.2)
BASOPHILS NFR BLD AUTO: 0 % (ref 0–2)
BILIRUB SERPL-MCNC: 0.2 MG/DL (ref 0.1–1)
BUN BLD-MCNC: 13 MG/DL (ref 5–21)
BUN/CREAT SERPL: 20 (ref 7–25)
CALCIUM SPEC-SCNC: 7.9 MG/DL (ref 8.4–10.4)
CHLORIDE SERPL-SCNC: 99 MMOL/L (ref 98–110)
CO2 SERPL-SCNC: 32 MMOL/L (ref 24–31)
CREAT BLD-MCNC: 0.65 MG/DL (ref 0.5–1.4)
DEPRECATED RDW RBC AUTO: 53 FL (ref 40–54)
EOSINOPHIL # BLD AUTO: 0 10*3/MM3 (ref 0–0.7)
EOSINOPHIL NFR BLD AUTO: 0 % (ref 0–4)
ERYTHROCYTE [DISTWIDTH] IN BLOOD BY AUTOMATED COUNT: 15.3 % (ref 12–15)
FERRITIN SERPL-MCNC: 55.8 NG/ML (ref 11.1–264)
GFR SERPL CREATININE-BSD FRML MDRD: 91 ML/MIN/1.73
GLOBULIN UR ELPH-MCNC: 2.8 GM/DL
GLUCOSE BLD-MCNC: 199 MG/DL (ref 70–100)
HCT VFR BLD AUTO: 32.3 % (ref 37–47)
HGB BLD-MCNC: 10.6 G/DL (ref 12–16)
IMM GRANULOCYTES # BLD: 0 10*3/MM3 (ref 0–0.03)
IMM GRANULOCYTES NFR BLD: 0 % (ref 0–5)
IRON 24H UR-MRATE: 14 MCG/DL (ref 42–180)
IRON SATN MFR SERPL: 6 % (ref 20–45)
LIPASE SERPL-CCNC: 49 U/L (ref 23–203)
LYMPHOCYTES # BLD AUTO: 0.22 10*3/MM3 (ref 0.72–4.86)
LYMPHOCYTES NFR BLD AUTO: 6.8 % (ref 15–45)
MCH RBC QN AUTO: 30.7 PG (ref 28–32)
MCHC RBC AUTO-ENTMCNC: 32.8 G/DL (ref 33–36)
MCV RBC AUTO: 93.6 FL (ref 82–98)
MONOCYTES # BLD AUTO: 0.12 10*3/MM3 (ref 0.19–1.3)
MONOCYTES NFR BLD AUTO: 3.7 % (ref 4–12)
NEUTROPHILS # BLD AUTO: 2.89 10*3/MM3 (ref 1.87–8.4)
NEUTROPHILS NFR BLD AUTO: 89.5 % (ref 39–78)
PLATELET # BLD AUTO: 130 10*3/MM3 (ref 130–400)
PMV BLD AUTO: 11.6 FL (ref 6–12)
POTASSIUM BLD-SCNC: 3.2 MMOL/L (ref 3.5–5.3)
PROT SERPL-MCNC: 6.1 G/DL (ref 6.3–8.7)
RBC # BLD AUTO: 3.45 10*6/MM3 (ref 4.2–5.4)
RETICS #: 0.03 10*6/MM3 (ref 0.02–0.13)
RETICS/RBC NFR AUTO: 0.77 % (ref 0.6–1.8)
SODIUM BLD-SCNC: 140 MMOL/L (ref 135–145)
TIBC SERPL-MCNC: 254 MCG/DL (ref 225–420)
WBC NRBC COR # BLD: 3.23 10*3/MM3 (ref 4.8–10.8)

## 2017-03-07 PROCEDURE — 25010000002 LEVOFLOXACIN PER 250 MG: Performed by: INTERNAL MEDICINE

## 2017-03-07 PROCEDURE — 94799 UNLISTED PULMONARY SVC/PX: CPT

## 2017-03-07 PROCEDURE — 83550 IRON BINDING TEST: CPT | Performed by: FAMILY MEDICINE

## 2017-03-07 PROCEDURE — 85025 COMPLETE CBC W/AUTO DIFF WBC: CPT | Performed by: FAMILY MEDICINE

## 2017-03-07 PROCEDURE — 25010000002 CYANOCOBALAMIN PER 1000 MCG: Performed by: PSYCHIATRY & NEUROLOGY

## 2017-03-07 PROCEDURE — 82728 ASSAY OF FERRITIN: CPT | Performed by: FAMILY MEDICINE

## 2017-03-07 PROCEDURE — 71010 HC CHEST PA OR AP: CPT

## 2017-03-07 PROCEDURE — G8978 MOBILITY CURRENT STATUS: HCPCS

## 2017-03-07 PROCEDURE — 25010000002 IRON SUCROSE PER 1 MG: Performed by: FAMILY MEDICINE

## 2017-03-07 PROCEDURE — 97162 PT EVAL MOD COMPLEX 30 MIN: CPT

## 2017-03-07 PROCEDURE — 80053 COMPREHEN METABOLIC PANEL: CPT | Performed by: FAMILY MEDICINE

## 2017-03-07 PROCEDURE — 97116 GAIT TRAINING THERAPY: CPT

## 2017-03-07 PROCEDURE — 94760 N-INVAS EAR/PLS OXIMETRY 1: CPT

## 2017-03-07 PROCEDURE — 83540 ASSAY OF IRON: CPT | Performed by: FAMILY MEDICINE

## 2017-03-07 PROCEDURE — 82150 ASSAY OF AMYLASE: CPT | Performed by: FAMILY MEDICINE

## 2017-03-07 PROCEDURE — G8979 MOBILITY GOAL STATUS: HCPCS

## 2017-03-07 PROCEDURE — 85045 AUTOMATED RETICULOCYTE COUNT: CPT | Performed by: FAMILY MEDICINE

## 2017-03-07 PROCEDURE — 25010000002 ENOXAPARIN PER 10 MG: Performed by: INTERNAL MEDICINE

## 2017-03-07 PROCEDURE — 83690 ASSAY OF LIPASE: CPT | Performed by: FAMILY MEDICINE

## 2017-03-07 RX ORDER — PANTOPRAZOLE SODIUM 40 MG/1
40 TABLET, DELAYED RELEASE ORAL
Status: DISCONTINUED | OUTPATIENT
Start: 2017-03-08 | End: 2017-03-16 | Stop reason: HOSPADM

## 2017-03-07 RX ORDER — MIRTAZAPINE 15 MG/1
15 TABLET, FILM COATED ORAL NIGHTLY
Status: DISCONTINUED | OUTPATIENT
Start: 2017-03-07 | End: 2017-03-08

## 2017-03-07 RX ADMIN — IRON SUCROSE 300 MG: 20 INJECTION, SOLUTION INTRAVENOUS at 11:51

## 2017-03-07 RX ADMIN — ACETAMINOPHEN 650 MG: 325 TABLET ORAL at 03:06

## 2017-03-07 RX ADMIN — IPRATROPIUM BROMIDE AND ALBUTEROL SULFATE 3 ML: 2.5; .5 SOLUTION RESPIRATORY (INHALATION) at 19:33

## 2017-03-07 RX ADMIN — ALPRAZOLAM 0.5 MG: 0.5 TABLET ORAL at 19:41

## 2017-03-07 RX ADMIN — POTASSIUM CHLORIDE 40 MEQ: 750 CAPSULE, EXTENDED RELEASE ORAL at 10:11

## 2017-03-07 RX ADMIN — DOCUSATE SODIUM 100 MG: 100 CAPSULE ORAL at 10:12

## 2017-03-07 RX ADMIN — GABAPENTIN 300 MG: 300 CAPSULE ORAL at 15:12

## 2017-03-07 RX ADMIN — OXYCODONE HYDROCHLORIDE AND ACETAMINOPHEN 1 TABLET: 10; 325 TABLET ORAL at 15:13

## 2017-03-07 RX ADMIN — IPRATROPIUM BROMIDE AND ALBUTEROL SULFATE 3 ML: 2.5; .5 SOLUTION RESPIRATORY (INHALATION) at 23:39

## 2017-03-07 RX ADMIN — IPRATROPIUM BROMIDE AND ALBUTEROL SULFATE 3 ML: 2.5; .5 SOLUTION RESPIRATORY (INHALATION) at 07:07

## 2017-03-07 RX ADMIN — NICOTINE 1 PATCH: 21 PATCH, EXTENDED RELEASE TRANSDERMAL at 18:26

## 2017-03-07 RX ADMIN — GABAPENTIN 300 MG: 300 CAPSULE ORAL at 06:40

## 2017-03-07 RX ADMIN — DEXTROMETHORPHAN 30 MG: 30 SUSPENSION, EXTENDED RELEASE ORAL at 10:34

## 2017-03-07 RX ADMIN — DOCUSATE SODIUM 100 MG: 100 CAPSULE ORAL at 18:26

## 2017-03-07 RX ADMIN — OXYCODONE HYDROCHLORIDE 30 MG: 20 TABLET, FILM COATED, EXTENDED RELEASE ORAL at 22:27

## 2017-03-07 RX ADMIN — OXYCODONE HYDROCHLORIDE AND ACETAMINOPHEN 1 TABLET: 10; 325 TABLET ORAL at 06:40

## 2017-03-07 RX ADMIN — CYANOCOBALAMIN 1000 MCG: 1000 INJECTION, SOLUTION INTRAMUSCULAR at 10:12

## 2017-03-07 RX ADMIN — IPRATROPIUM BROMIDE AND ALBUTEROL SULFATE 3 ML: 2.5; .5 SOLUTION RESPIRATORY (INHALATION) at 10:43

## 2017-03-07 RX ADMIN — BUDESONIDE AND FORMOTEROL FUMARATE DIHYDRATE 2 PUFF: 160; 4.5 AEROSOL RESPIRATORY (INHALATION) at 07:07

## 2017-03-07 RX ADMIN — GABAPENTIN 300 MG: 300 CAPSULE ORAL at 22:27

## 2017-03-07 RX ADMIN — DEXTROMETHORPHAN 30 MG: 30 SUSPENSION, EXTENDED RELEASE ORAL at 22:28

## 2017-03-07 RX ADMIN — ALPRAZOLAM 0.5 MG: 0.5 TABLET ORAL at 10:34

## 2017-03-07 RX ADMIN — PANTOPRAZOLE SODIUM 40 MG: 40 INJECTION, POWDER, FOR SOLUTION INTRAVENOUS at 06:40

## 2017-03-07 RX ADMIN — BUDESONIDE AND FORMOTEROL FUMARATE DIHYDRATE 2 PUFF: 160; 4.5 AEROSOL RESPIRATORY (INHALATION) at 19:34

## 2017-03-07 RX ADMIN — FUROSEMIDE 40 MG: 40 TABLET ORAL at 10:11

## 2017-03-07 RX ADMIN — CITALOPRAM 40 MG: 20 TABLET, FILM COATED ORAL at 10:12

## 2017-03-07 RX ADMIN — IPRATROPIUM BROMIDE AND ALBUTEROL SULFATE 3 ML: 2.5; .5 SOLUTION RESPIRATORY (INHALATION) at 14:38

## 2017-03-07 RX ADMIN — OXYCODONE HYDROCHLORIDE 30 MG: 20 TABLET, FILM COATED, EXTENDED RELEASE ORAL at 10:10

## 2017-03-07 RX ADMIN — LEVOFLOXACIN 750 MG: 750 INJECTION, SOLUTION INTRAVENOUS at 13:40

## 2017-03-07 RX ADMIN — ENOXAPARIN SODIUM 40 MG: 40 INJECTION SUBCUTANEOUS at 10:08

## 2017-03-07 RX ADMIN — IPRATROPIUM BROMIDE AND ALBUTEROL SULFATE 3 ML: 2.5; .5 SOLUTION RESPIRATORY (INHALATION) at 00:11

## 2017-03-07 RX ADMIN — ALPRAZOLAM 0.5 MG: 0.5 TABLET ORAL at 03:10

## 2017-03-07 RX ADMIN — IPRATROPIUM BROMIDE AND ALBUTEROL SULFATE 3 ML: 2.5; .5 SOLUTION RESPIRATORY (INHALATION) at 04:01

## 2017-03-07 NOTE — PLAN OF CARE
Problem: Patient Care Overview (Adult)  Goal: Plan of Care Review  Outcome: Ongoing (interventions implemented as appropriate)    03/07/17 0930   Coping/Psychosocial Response Interventions   Plan Of Care Reviewed With patient   Outcome Evaluation   Outcome Summary/Follow up Plan PT raulal completed. Pt. SBA with bed mobility and CGA with transfer and gait ~ 90 ft. Pt. with 1 LOB requiring CGA to maintain dynamic balance with gait. Pt. also desat to 84% on 4L O2/NC with gait. Pt. also with generalized weakness with mobility. PT will work to increase pt's functional mobility, strength, balance, and endurance. Anticipate pt. will d/c home with daughters, consider HH or outpatient PT if needed.          Problem: Inpatient Physical Therapy  Goal: Transfer Training Goal 1 LTG- PT  Outcome: Ongoing (interventions implemented as appropriate)    03/07/17 0930   Transfer Training PT LTG   Transfer Training PT LTG, Date Established 03/07/17   Transfer Training PT LTG, Time to Achieve by discharge   Transfer Training PT LTG, Activity Type bed to chair /chair to bed;sit to stand/stand to sit   Transfer Training PT LTG, [place] Level independent       Goal: Gait Training Goal LTG- PT  Outcome: Ongoing (interventions implemented as appropriate)    03/07/17 0930   Gait Training PT LTG   Gait Training Goal PT LTG, Date Established 03/07/17   Gait Training Goal PT LTG, Time to Achieve by discharge   Gait Training Goal PT LTG, [place] Level independent   Gait Training Goal PT LTG, Distance to Achieve 250       Goal: Stair Training Goal LTG- PT  Outcome: Ongoing (interventions implemented as appropriate)    03/07/17 0930   Stair Training PT LTG   Stair Training Goal PT LTG, Date Established 03/07/17   Stair Training Goal PT LTG, Time to Achieve by discharge   Stair Training Goal PT LTG, Number of Steps 2   Stair Training Goal PT LTG, [place] Level supervision required   Stair Training Goal PT LTG, Assist Device 2 handrails        Goal: Dynamic Standing Balance Goal LTG- PT  Outcome: Ongoing (interventions implemented as appropriate)    03/07/17 0930   Dynamic Standing Balance PT LTG   Dynamic Standing Balance PT LTG, Date Established 03/07/17   Dynamic Standing Balance PT LTG, Time to Achieve by discharge   Dynamic Standing Balance PT LTG, [place] Level independent   Dynamic Standing Balance PT LTG, Additional Goal With all functional moblity

## 2017-03-07 NOTE — PROGRESS NOTES
Automatic IV to PO Conversion Pharmacy Note - General    [facility] is a 65 y.o. female who meets the following criteria for IV to PO therapy conversion :     · Tolerating oral fluids or 40ml/hour of enteral nutrition and oral route not otherwise compromised  · Receiving other oral medications on a scheduled basis    Assessment/Plan  Based on this criteria, Protonix 40 mg IV QAM has been changed to Protonix 40 mg PO QAM per the directives and guidelines established by [facility] Pharmacy and Therapeutics Committee and [facility] Medical Executive Committee .       Abdi Ohara [facility]  03/07/174:32 PM

## 2017-03-07 NOTE — PROGRESS NOTES
Acute Care - Physical Therapy Initial Evaluation  [facility]     Patient Name: Justyna Lei  : 3/25/1951  MRN: [id number]  Today's Date: 3/7/2017   Onset of Illness/Injury or Date of Surgery Date: 17  Date of Referral to PT: 17  Referring Physician: Dr Sargent      Admit Date: 3/4/2017     Visit Dx:    ICD-10-CM ICD-9-CM   1. Pneumonia of right lower lobe due to infectious organism J18.9 483.8   2. Sepsis, due to unspecified organism A41.9 038.9     995.91   3. Acute respiratory failure with hypoxia J96.01 518.81   4. Impaired mobility Z74.09 799.89     Patient Active Problem List   Diagnosis   • Pneumonia of right lower lobe due to infectious organism   • COPD (chronic obstructive pulmonary disease)   • Hypokalemia   • Anemia   • Respiratory failure     Past Medical History   Diagnosis Date   • Anxiety    • COPD (chronic obstructive pulmonary disease)    • Depression    • Hypertension      History reviewed. No pertinent past surgical history.       PT ASSESSMENT (last 72 hours)      PT Evaluation       17 0930 17 1526    Rehab Evaluation    Document Type evaluation   See MAR  -MELISSA     Subjective Information agree to therapy;complains of;weakness;fatigue;pain  -MELISSA     General Information    Patient Profile Review yes  -MELISSA     Onset of Illness/Injury or Date of Surgery Date 17  -MELISSA     Referring Physician Dr Sargent  -MELISSA     General Observations Fowlers in bed, alert, oriented. Daughter present initially.   -MELISSA     Pertinent History Of Current Problem Daughter unable to wake pt. & O2 sat was 50%. Dx: Hypoxia with resp. distress with limited responsiveness, R LL PNA with failed OP therapy, was intubated at [facility] 3. Metabolic encephalopathy. 3/7 H&H: 10.6/32.3. 3/7 WBC: 3.23. 3/5 Chest CT: Lower lobe airspace disease, likely atelctasis with small pleural effusion. 3/5 KUB, 3/4 chest CT, 3/4 CT Abd/pelvis, 3/ head CT. 3/7 cxr.   -MELISSA     Precautions/Limitations fall precautions;oxygen  therapy device and L/min  -MELISSA     Prior Level of Function independent:;all household mobility;community mobility;ADL's;dressing;bathing;cooking;cleaning;home management;driving  -MELISSA     Equipment Currently Used at Home walker, rolling;shower chair  -MELISSA walker, rolling  -BM    Plans/Goals Discussed With patient;agreed upon  -MELISSA     Risks Reviewed patient:;LOB;dizziness;increased discomfort;change in vital signs  -MELISSA     Benefits Reviewed patient:;improve function;increase independence;increase strength;increase balance;decrease pain;increase knowledge  -MELISSA     Barriers to Rehab previous functional deficit;medically complex  -MELISSA     Living Environment    Lives With child(nani), adult   Daughters lives with pt.  -MELISSA     Living Arrangements mobile home  -MELISSA     Home Accessibility bed and bath on same level;stairs to enter home   walk in shower  -MELISSA     Number of Stairs to Enter Home 2  -MELISSA     Stair Railings at Home outside, present at both sides  -MELISSA     Transportation Available  car;family or friend will provide  -BM    Clinical Impression    Date of Referral to PT 03/05/17  -MELISSA     PT Diagnosis Impaired mobility/balance.  -MELISSA     Functional Level At Time Of Evaluation SBA bed mobility, CGA t/f and gait ~ 90 ft.   -MELISSA     Patient/Family Goals Statement Go home  -MELISSA     Criteria for Skilled Therapeutic Interventions Met yes;treatment indicated  -MELISSA     Impairments Found (describe specific impairments) aerobic capacity/endurance;gait, locomotion, and balance  -MELISSA     Functional Limitations in Following Categories (Describe Specific Limitations) self-care;home management  -MELISSA     Rehab Potential good, to achieve stated therapy goals  -MELISSA     Predicted Duration of Therapy Intervention (days/wks) until d/c  -MELISSA     Vital Signs    Pre Systolic BP Rehab 106  -MELISSA     Pre Treatment Diastolic BP 78  -MELISSA     Post Systolic BP Rehab 121  -MELISSA     Post Treatment Diastolic BP 53  -MELISSA     Pretreatment Heart Rate (beats/min) 82  -MELISSA      Intratreatment Heart Rate (beats/min) 101  -MELISSA     Posttreatment Heart Rate (beats/min) 88  -MELISSA     Pre SpO2 (%) 89  -MELISSA     O2 Delivery Pre Treatment supplemental O2   4L/NC  -MELISSA     Intra SpO2 (%) 84  -MELISSA     O2 Delivery Intra Treatment supplemental O2   4L/NC  -MELISSA     Post SpO2 (%) 90  -MELISSA     O2 Delivery Post Treatment supplemental O2   4L/NC  -MELISSA     Pre Patient Position Supine  -MELISSA     Intra Patient Position --   ambulating  -MELISSA     Post Patient Position Supine  -MELISSA     Pain Assessment    Pain Assessment 0-10  -MELISSA     Pain Score 5  -MELISSA     Pain Type Chronic pain  -MELISSA     Pain Location Generalized  -MELISSA     Pain Descriptors Aching  -MELISSA     Pain Intervention(s) Repositioned;Medication (See MAR);Ambulation/increased activity  -MELISSA     Response to Interventions tolerated  -MELISSA     Vision Assessment/Intervention    Visual Impairment WFL   per pt  -MELISSA     Cognitive Assessment/Intervention    Current Cognitive/Communication Assessment functional  -MELISSA     Orientation Status oriented x 4  -MELISSA     Follows Commands/Answers Questions able to follow multi-step instructions;100% of the time  -MELISSA     Personal Safety decreased awareness, need for assist  -MELISSA     Personal Safety Interventions fall prevention program maintained;gait belt;muscle strengthening facilitated;nonskid shoes/slippers when out of bed;supervised activity  -MELISSA     ROM (Range of Motion)    General ROM Detail B UE/LE AROM WFL  -MELISSA     MMT (Manual Muscle Testing)    General MMT Assessment Detail B UE 4/5, B LE functionally 4-/5.  -MELISSA     Bed Mobility, Assessment/Treatment    Bed Mobility, Assistive Device head of bed elevated  -MELISSA     Bed Mobility, Scoot/Bridge, [place] verbal cues required;supervision required  -MELISSA     Bed Mob, Supine to Sit, [place] verbal cues required;supervision required  -MELISSA     Bed Mob, Sit to Supine, [place] verbal cues required;supervision required  -MELISSA     Transfer Assessment/Treatment    Transfers, Sit-Stand  [place] verbal cues required;contact guard assist  -MELISSA     Transfers, Stand-Sit [place] verbal cues required;contact guard assist  -MELISSA     Gait Assessment/Treatment    Gait, [place] Level verbal cues required;contact guard assist;upper extremity support  -MELISSA     Gait, Distance (Feet) 90  -MELISSA     Gait, Gait Pattern Analysis swing-through gait  -MELISSA     Gait, Gait Deviations bilateral:;ailyn decreased;step length decreased  -MELISSA     Gait, Safety Issues balance decreased during turns;step length decreased;supplemental O2  -MELISSA     Gait, Impairments strength decreased;impaired balance  -MELISSA     Gait, Comment Unsteady gait at times, CGA needed for 1 LOB with turning but pt.. able to maintain with assist. SOA with gait, decreased endurance.   -MELISSA     Motor Skills/Interventions    Additional Documentation Balance Skills Training (Group)  -MELISSA     Balance Skills Training    Sitting-Level of Assistance Close supervision  -MELISSA     Sitting-Balance Support Right upper extremity supported;Left upper extremity supported;Feet unsupported  -MELISSA     Standing-Level of Assistance Contact guard  -MELISSA     Static Standing Balance Support Right upper extremity supported  -MELISSA     Gait Balance-Level of Assistance Contact guard  -MELISSA     Gait Balance Support Right upper extremity supported  -MELISSA     Sensory Assessment/Intervention    Sensory Impairment --   WNL per pt  -MELISSA     Positioning and Restraints    Pre-Treatment Position in bed  -MELISSA     Post Treatment Position bed  -MELISSA     In Bed notified nsg;fowlers;call light within reach;encouraged to call for assist;patient within staff view;legs elevated  -MELISSA       03/06/17 1352 03/06/17 0322    General Information    Equipment Currently Used at Home walker, rolling  -CE     Living Environment    Lives With child(nani), adult  -CE     Living Arrangements mobile home  -CE     Transportation Available  car;family or friend will provide  -KB      03/05/17 1900 03/05/17 1850    General  Information    Equipment Currently Used at Home  none  -AR    Living Environment    Lives With child(nani), adult  -KB     Living Arrangements mobile home  -KB     Home Accessibility no concerns  -KB     Stair Railings at Home none  -KB     Type of Financial/Environmental Concern none  -KB     Transportation Available car;family or friend will provide  -KB       User Key  (r) = Recorded By, (t) = Taken By, (c) = Cosigned By    Initials Name Provider Type    MELISSA Veliz, PT DPT Physical Therapist    ELIZA Gonzalez, RN Registered Nurse    ALLAN Brunner, RN Registered Nurse    DOMI ColvinW     BM Susan He, RN, BSN Registered Nurse          Physical Therapy Education     Title: PT OT SLP Therapies (Active)     Topic: Physical Therapy (Active)     Point: Mobility training (Done)    Learning Progress Summary    Learner Readiness Method Response Comment Documented by Status   Patient Acceptance E VU,NR Educated pt. on progression of PT POC and benefits of activity MELISSA 03/07/17 0930 Done                      User Key     Initials Effective Dates Name Provider Type Discipline    MELISSA 08/02/16 -  Larry Veliz, PT DPT Physical Therapist PT                PT Recommendation and Plan  Anticipated Discharge Disposition: home with assist, home with home health, home with outpatient services, home with 24/7 care (pending progress)  Demonstrates Need for Referral to Another Service: occupational therapy  Planned Therapy Interventions: bed mobility training, transfer training, gait training, balance training, home exercise program, patient/family education, stair training, strengthening  PT Frequency: daily, 2 times/day  Plan of Care Review  Plan Of Care Reviewed With: patient  Outcome Summary/Follow up Plan: PT eval completed. Pt. SBA with bed mobility and CGA with transfer and gait ~ 90 ft. Pt. with 1 LOB requiring CGA to maintain dynamic balance with gait. Pt. also desat to 84%  on 4L O2/NC with gait. Pt. also with generalized weakness with mobility. PT will work to increase pt's functional mobility, strength, balance, and endurance. Anticipate pt. will d/c home with daughters, consider HH or outpatient PT if needed.           IP PT Goals       03/07/17 0930          Transfer Training PT LTG    Transfer Training PT LTG, Date Established 03/07/17  -MELISSA      Transfer Training PT LTG, Time to Achieve by discharge  -MELISSA      Transfer Training PT LTG, Activity Type bed to chair /chair to bed;sit to stand/stand to sit  -MELISSA      Transfer Training PT LTG, [place] Level independent  -MELISSA      Gait Training PT LTG    Gait Training Goal PT LTG, Date Established 03/07/17  -MELISSA      Gait Training Goal PT LTG, Time to Achieve by discharge  -MELISSA      Gait Training Goal PT LTG, [place] Level independent  -MELISSA      Gait Training Goal PT LTG, Distance to Achieve 250  -MELISSA      Stair Training PT LTG    Stair Training Goal PT LTG, Date Established 03/07/17  -MELISSA      Stair Training Goal PT LTG, Time to Achieve by discharge  -MELISSA      Stair Training Goal PT LTG, Number of Steps 2  -MELISSA      Stair Training Goal PT LTG, [place] Level supervision required  -MELISSA      Stair Training Goal PT LTG, Assist Device 2 handrails  -MELISSA      Dynamic Standing Balance PT LTG    Dynamic Standing Balance PT LTG, Date Established 03/07/17  -MELISSA      Dynamic Standing Balance PT LTG, Time to Achieve by discharge  -MELISSA      Dynamic Standing Balance PT LTG, [place] Level independent  -MELISSA      Dynamic Standing Balance PT LTG, Additional Goal With all functional moblity  -MELISSA        User Key  (r) = Recorded By, (t) = Taken By, (c) = Cosigned By    Initials Name Provider Type    EMLISSA Veliz, PT DPT Physical Therapist                Outcome Measures       03/07/17 0930          How much help from another person do you currently need...    Turning from your back to your side while in flat bed without using bedrails? 3  -MELISSA       Moving from lying on back to sitting on the side of a flat bed without bedrails? 3  -MELISSA      Moving to and from a bed to a chair (including a wheelchair)? 3  -MELISSA      Standing up from a chair using your arms (e.g., wheelchair, bedside chair)? 3  -MELISSA      Climbing 3-5 steps with a railing? 2  -MELISSA      To walk in hospital room? 3  -MELISSA      AM-PAC 6 Clicks Score 17  -MELISSA      Functional Assessment    Outcome Measure Options AM-PAC 6 Clicks Basic Mobility (PT)  -MELISSA        User Key  (r) = Recorded By, (t) = Taken By, (c) = Cosigned By    Initials Name Provider Type    MELISSA Veliz, PT DPT Physical Therapist           Time Calculation:         PT Charges       03/07/17 1323          Time Calculation    Start Time 0930  -MELISSA      Stop Time 1130   PT with 80 total minutes of eval.   -MELISSA      Time Calculation (min) 120 min  -MELISSA      PT Non-Billable Time (min) 40 min   extra time spent in chart review with PT student and time spent waiting for RN.  -MELISSA      PT Received On 03/07/17  -MELISSA      PT Goal Re-Cert Due Date 03/17/17  -MELISSA      Time Calculation- PT    Total Timed Code Minutes- PT 20 minute(s)  -MELISSA        User Key  (r) = Recorded By, (t) = Taken By, (c) = Cosigned By    Initials Name Provider Type    MELISSA Veliz, PT DPT Physical Therapist          Therapy Charges for Today     Code Description Service Date Service Provider Modifiers Qty    66202274209 HC PT MOBILITY CURRENT 3/7/2017 Larry Veliz, PT DPT GP, CK 1    13704295677 HC PT MOBILITY PROJECTED 3/7/2017 Larry Veliz, PT DPT GP, CI 1    78485761237 HC PT EVAL MOD COMPLEXITY 4 3/7/2017 Larry Veliz, PT DPT GP 1    28439922076 HC GAIT TRAINING EA 15 MIN 3/7/2017 Larry Veliz, PT DPT GP 1          PT G-Codes  Outcome Measure Options: AM-PAC 6 Clicks Basic Mobility (PT)  Score: 17  Functional Limitation: Mobility: Walking and moving around  Mobility: Walking and Moving Around Current Status (): At least 40 percent but less than 60  percent impaired, limited or restricted  Mobility: Walking and Moving Around Goal Status (): At least 1 percent but less than 20 percent impaired, limited or restricted      Larry Veliz, PT DPT  3/7/2017

## 2017-03-07 NOTE — PLAN OF CARE
Problem: Patient Care Overview (Adult)  Goal: Plan of Care Review  Outcome: Ongoing (interventions implemented as appropriate)    03/07/17 0535   Coping/Psychosocial Response Interventions   Plan Of Care Reviewed With patient   Patient Care Overview   Progress improving         03/07/17 0535   Coping/Psychosocial Response Interventions   Plan Of Care Reviewed With patient   Patient Care Overview   Progress improving   Outcome Evaluation   Outcome Summary/Follow up Plan Tolerates 6L NC, ambulated to commode, O2 sat dropped to 87%.         Problem: Respiratory Insufficiency (Adult)  Goal: Acid/Base Balance  Outcome: Ongoing (interventions implemented as appropriate)    Problem: Pressure Ulcer Risk (Kerimt Scale) (Adult,Obstetrics,Pediatric)  Goal: Skin Integrity  Outcome: Outcome(s) achieved Date Met:  03/07/17    Problem: Fall Risk (Adult)  Goal: Absence of Falls  Outcome: Outcome(s) achieved Date Met:  03/07/17

## 2017-03-07 NOTE — PROGRESS NOTES
[facility]  INPATIENT PROGRESS NOTE    Length of Stay: 3  Date of Admission: 3/4/2017  Primary Care Physician: No Known Provider    Subjective   Chief Complaint: Dyspnea    HPI   66 YO CF admitted on 3/4/17. Patient is intubated and on pressors. Extubated on 3/4.  Patient is breathing a lot better, on 4l nc.  Patient still remained hypotensive, but asymptomatic.  Recommended from pulmonary to keep her here to watch respiratory status.    Review of Systems   Constitutional: Positive for activity change, appetite change and fatigue. Negative for chills and fever.   HENT: Negative for hearing loss, nosebleeds, tinnitus and trouble swallowing.    Eyes: Negative for visual disturbance.   Respiratory: Positive for cough, shortness of breath and wheezing. Negative for chest tightness.    Cardiovascular: Negative for chest pain, palpitations and leg swelling.   Gastrointestinal: Negative for abdominal distention, abdominal pain, blood in stool, constipation, diarrhea, nausea and vomiting.   Endocrine: Negative for cold intolerance, heat intolerance, polydipsia, polyphagia and polyuria.   Genitourinary: Negative for decreased urine volume, difficulty urinating, dysuria, flank pain, frequency and hematuria.   Musculoskeletal: Positive for arthralgias, back pain and myalgias. Negative for joint swelling.   Skin: Negative for rash.   Allergic/Immunologic: Negative for immunocompromised state.   Neurological: Positive for tremors. Negative for dizziness, syncope, weakness, light-headedness and headaches.   Hematological: Negative for adenopathy. Does not bruise/bleed easily.   Psychiatric/Behavioral: Negative for confusion, sleep disturbance and suicidal ideas. The patient is nervous/anxious.           All pertinent negatives and positives are as above. All other systems have been reviewed and are negative unless otherwise stated.     Objective    Temp:  [98.4 °F (36.9  °C)-101.4 °F (38.6 °C)] 98.4 °F (36.9 °C)  Heart Rate:  [] 83  Resp:  [11-26] 11  BP: ()/(44-68) 109/55    Intake/Output Summary (Last 24 hours) at 03/07/17 0910  Last data filed at 03/06/17 1800   Gross per 24 hour   Intake    150 ml   Output   1700 ml   Net  -1550 ml     Physical Exam   Constitutional: She is oriented to person, place, and time. She appears well-developed.   HENT:   Head: Normocephalic and atraumatic.   Eyes: Conjunctivae and EOM are normal. Pupils are equal, round, and reactive to light.   Neck: Neck supple. No JVD present. No thyromegaly present.   Cardiovascular: Normal rate, regular rhythm, normal heart sounds and intact distal pulses.  Exam reveals no gallop and no friction rub.    No murmur heard.  Pulmonary/Chest: Effort normal. No respiratory distress. She has wheezes. She has rales. She exhibits no tenderness.   Abdominal: Soft. Bowel sounds are normal. She exhibits no distension. There is no tenderness. There is no rebound and no guarding.   Musculoskeletal: Normal range of motion. She exhibits no edema, tenderness or deformity.   Lymphadenopathy:     She has no cervical adenopathy.   Neurological: She is alert and oriented to person, place, and time. She displays normal reflexes. No cranial nerve deficit. She exhibits abnormal muscle tone. Coordination abnormal.   Skin: Skin is warm and dry. No rash noted.   Psychiatric: She has a normal mood and affect. Judgment and thought content normal.   Patient seemed very nervous.   Nursing note and vitals reviewed.      Results Review:  Lab Results (last 24 hours)     Procedure Component Value Units Date/Time    T4, Free [[id number]]  (Normal) Collected:  03/06/17 1303    Specimen:  Blood Updated:  03/06/17 1342     Free T4 0.91 ng/dL     T3, Free [[id number]]  (Normal) Collected:  03/06/17 1303    Specimen:  Blood Updated:  03/06/17 1342     T3, Free 2.81 pg/mL     Blood Culture [[id number]]  (Normal) Collected:  03/04/17 5577     Specimen:  Blood from Arm, Right Updated:  03/06/17 1501     Blood Culture No growth at 2 days     Blood Culture [[id number]]  (Normal) Collected:  03/04/17 1415    Specimen:  Blood from Arm, Left Updated:  03/06/17 1501     Blood Culture No growth at 2 days     CBC & Differential [[id number]] Collected:  03/07/17 0248    Specimen:  Blood Updated:  03/07/17 0256    Narrative:       The following orders were created for panel order CBC & Differential.  Procedure                               Abnormality         Status                     ---------                               -----------         ------                     CBC Auto Differential[[id number]]         Abnormal            Final result                 Please view results for these tests on the individual orders.    Reticulocytes [[id number]]  (Normal) Collected:  03/07/17 0248    Specimen:  Blood Updated:  03/07/17 0256     Reticulocyte % 0.77 %      Reticulocyte Absolute 0.0266 10*6/mm3     CBC Auto Differential [[id number]]  (Abnormal) Collected:  03/07/17 0248    Specimen:  Blood Updated:  03/07/17 0256     WBC 3.23 (L) 10*3/mm3      RBC 3.45 (L) 10*6/mm3      Hemoglobin 10.6 (L) g/dL      Hematocrit 32.3 (L) %      MCV 93.6 fL      MCH 30.7 pg      MCHC 32.8 (L) g/dL      RDW 15.3 (H) %      RDW-SD 53.0 fl      MPV 11.6 fL      Platelets 130 10*3/mm3      Neutrophil % 89.5 (H) %      Lymphocyte % 6.8 (L) %      Monocyte % 3.7 (L) %      Eosinophil % 0.0 %      Basophil % 0.0 %      Immature Grans % 0.0 %      Neutrophils, Absolute 2.89 10*3/mm3      Lymphocytes, Absolute 0.22 (L) 10*3/mm3      Monocytes, Absolute 0.12 (L) 10*3/mm3      Eosinophils, Absolute 0.00 10*3/mm3      Basophils, Absolute 0.00 10*3/mm3      Immature Grans, Absolute 0.00 10*3/mm3     Lipase [[id number]]  (Normal) Collected:  03/07/17 0248    Specimen:  Blood Updated:  03/07/17 0306     Lipase 49 U/L     Amylase [[id number]]  (Normal) Collected:  03/07/17 0248    Specimen:  Blood Updated:   03/07/17 0306     Amylase 54 U/L     Comprehensive Metabolic Panel [[id number]]  (Abnormal) Collected:  03/07/17 0248    Specimen:  Blood Updated:  03/07/17 0311     Glucose 199 (H) mg/dL      BUN 13 mg/dL      Creatinine 0.65 mg/dL      Sodium 140 mmol/L      Potassium 3.2 (L) mmol/L      Chloride 99 mmol/L      CO2 32.0 (H) mmol/L      Calcium 7.9 (L) mg/dL      Total Protein 6.1 (L) g/dL      Albumin 3.30 (L) g/dL      ALT (SGPT) 17 U/L      AST (SGOT) 18 U/L      Alkaline Phosphatase 47 U/L      Total Bilirubin 0.2 mg/dL      eGFR Non African Amer 91 mL/min/1.73      Globulin 2.8 gm/dL      A/G Ratio 1.2 g/dL      BUN/Creatinine Ratio 20.0      Anion Gap 9.0 mmol/L     Iron Profile [[id number]]  (Abnormal) Collected:  03/07/17 0248    Specimen:  Blood Updated:  03/07/17 0313     Iron 14 (L) mcg/dL      TIBC 254 mcg/dL      Iron Saturation 6 (L) %     Ferritin [[id number]]  (Normal) Collected:  03/07/17 0248    Specimen:  Blood Updated:  03/07/17 0341     Ferritin 55.80 ng/mL            Cultures:  BLOOD CULTURE   Date Value Ref Range Status   03/04/2017 No growth at 2 days  Preliminary   03/04/2017 No growth at 2 days  Preliminary       Radiology Data:    Interpretation Summary      · Left ventricular function is normal. Estimated ejection fraction is 56-60%.  · Right ventricular size and systolic function is normal.  · There is no significant (greater than mild) valvular dysfunction.  · Left ventricular diastolic function is normal.  · Compared to study dated 3/5/17, there are no significant changes.             Allergies   Allergen Reactions   • Aspirin Anaphylaxis   • Demerol [Meperidine] Anaphylaxis   • Ibuprofen Anaphylaxis   • Neosporin [Neomycin-Bacitracin Zn-Polymyx] Anaphylaxis   • Penicillins Anaphylaxis   • Sulfa Antibiotics Anaphylaxis   • Codeine Hives     Pt can take codeine as long is it is not alot   • Talwin [Pentazocine] Hives   • Tetracyclines & Related    • Morphine And Related Hives       Scheduled  meds:     budesonide-formoterol 2 puff Inhalation BID - RT   citalopram 40 mg Oral Daily   cyanocobalamin 1,000 mcg Intramuscular Daily   dextromethorphan polistirex ER 30 mg Oral Q12H   docusate sodium 100 mg Oral BID   enoxaparin 40 mg Subcutaneous Daily   furosemide 40 mg Oral Daily   gabapentin 300 mg Oral Q8H   ipratropium-albuterol 3 mL Nebulization Q4H - RT   levoFLOXacin 750 mg Intravenous Q24H   nicotine 1 patch Transdermal Q24H   oxyCODONE ER 30 mg Oral Q12H   pantoprazole 40 mg Intravenous Q AM   potassium chloride 40 mEq Oral Daily       PRN meds:  •  acetaminophen  •  acetaminophen  •  ALPRAZolam  •  enalaprilat  •  hydrALAZINE  •  ondansetron  •  oxyCODONE-acetaminophen  •  sodium chloride  •  sodium chloride    Assessment/Plan     Active Problems:    Pneumonia of right lower lobe due to infectious organism    COPD (chronic obstructive pulmonary disease)    Hypokalemia    Anemia    Respiratory failure      Plan:  Pneumonia/COPD/respiratory failureHypoxia with respiratory distress-Extubated 3/6.  Duonebs. Currently Levaquin.  By mouth steroids.      Dyspnea/echocardiogram ejection fraction 60%    Right lower lobe lung nodule-repeat CT scan in 3 months.     AMS- question hypoxia/fever vs possible OD. Apparently there has been a HX of depression since the passing of her  in July. Neurology consulted.      Hypokalemia. kdur x 3 days.  Improving.     Depression/Anxiety- Celexa/xanax- Xanax to stay the same dose, due to hypotension.  We will put patient back on home medication of Remeron to help her sleep.     Abdominal pain- constipation on CT     Hypotension with HX of hypertension- off levaphed     Subacute Thyroid-Abnormal TSH- Free t4 and free t3. Normal.      Chronic pain-  on OxyContin, and  Percocet when necessary      Anemia- iron deficiency.  An venofer ×3 days     Discharge Planning: per pulm.    Siddharth Leavitt MD   03/07/17   9:10 AM

## 2017-03-07 NOTE — PROGRESS NOTES
[facility] Pulmonary Progress Note    Patient: Justyna Waimea  3/25/1951   MR# [id number]   [id number]  03/07/17   8:43 AM  Referring Provider: Siddharth Leavitt MD      Problem list:   Patient Active Problem List   Diagnosis   • Pneumonia of right lower lobe due to infectious organism   • COPD (chronic obstructive pulmonary disease)   • Hypokalemia   • Anemia   • Respiratory failure        Chief Complaint: Anxiety    Interval history: Echo was repeated but without the bubble study as ordered. She's reporting increased anxiety and wants more anxiety meds, we will defer this to the attending.  No other aggravating, alleviating factors or associated symptoms.    Shortness of Breath   Associated symptoms include a fever.   Cough   Associated symptoms include a fever and shortness of breath.   Fever    Associated symptoms include coughing.       Allergy:   Allergies   Allergen Reactions   • Aspirin Anaphylaxis   • Demerol [Meperidine] Anaphylaxis   • Ibuprofen Anaphylaxis   • Neosporin [Neomycin-Bacitracin Zn-Polymyx] Anaphylaxis   • Penicillins Anaphylaxis   • Sulfa Antibiotics Anaphylaxis   • Codeine Hives     Pt can take codeine as long is it is not alot   • Talwin [Pentazocine] Hives   • Tetracyclines & Related    • Morphine And Related Hives       Meds:      budesonide-formoterol 2 puff Inhalation BID - RT   citalopram 40 mg Oral Daily   cyanocobalamin 1,000 mcg Intramuscular Daily   dextromethorphan polistirex ER 30 mg Oral Q12H   docusate sodium 100 mg Oral BID   enoxaparin 40 mg Subcutaneous Daily   furosemide 40 mg Oral Daily   gabapentin 300 mg Oral Q8H   ipratropium-albuterol 3 mL Nebulization Q4H - RT   levoFLOXacin 750 mg Intravenous Q24H   nicotine 1 patch Transdermal Q24H   oxyCODONE ER 30 mg Oral Q12H   pantoprazole 40 mg Intravenous Q AM   potassium chloride 40 mEq Oral Daily        Review of Systems:   Review of Systems   Constitutional: Positive for fever.   HENT: Negative.    Respiratory: Positive for  cough and shortness of breath.    Cardiovascular: Negative.    Gastrointestinal: Negative.    Skin: Negative.    Neurological: Negative.    Psychiatric/Behavioral: The patient is nervous/anxious.    All other systems reviewed and are negative.      Physical Exam:  Vitals:    03/07/17 0804   BP:    Pulse:    Resp:    Temp: 98.4 °F (36.9 °C)   SpO2:        Intake/Output Summary (Last 24 hours) at 03/07/17 0843  Last data filed at 03/06/17 1800   Gross per 24 hour   Intake    150 ml   Output   1700 ml   Net  -1550 ml     Physical Exam   Constitutional: She is oriented to person, place, and time. She appears well-developed and well-nourished. She appears ill. No distress. She is not intubated. Nasal cannula in place.   HENT:   Head: Normocephalic and atraumatic.   Eyes: Pupils are equal, round, and reactive to light.   Neck: Normal range of motion. Neck supple.   Cardiovascular: Regular rhythm, S1 normal and S2 normal.  Tachycardia present.    Pulmonary/Chest: Tachypnea noted. She is not intubated. No respiratory distress. She has decreased breath sounds in the right lower field and the left lower field. She has wheezes.   Abdominal: Soft. Bowel sounds are normal. She exhibits no distension.   Musculoskeletal: Normal range of motion. She exhibits no edema.   Neurological: She is alert and oriented to person, place, and time.   Skin: Skin is warm and dry.   Psychiatric: Her speech is normal. Her mood appears anxious.   Nursing note and vitals reviewed.      Data:     Results from last 7 days  Lab Units 03/07/17  0248 03/06/17  0521 03/04/17  1415   WBC 10*3/mm3 3.23* 5.19 5.15   HEMOGLOBIN g/dL 10.6* 9.8* 11.6*   PLATELETS 10*3/mm3 130 124* 161         Results from last 7 days  Lab Units 03/07/17  0248 03/06/17  0521 03/05/17  1257 03/04/17  1415   SODIUM mmol/L 140 140  --  139   SODIUM, ARTERIAL mmol/L  --   --  142.4  --    POTASSIUM mmol/L 3.2* 3.0*  --  3.7   BUN mg/dL 13 6  --  6   CREATININE mg/dL 0.65 0.64  --   0.75         Results from last 7 days  Lab Units 03/05/17  1257 03/05/17  1149 03/05/17  0250 03/04/17  1920   PH, ARTERIAL pH units 7.419 7.398 7.354 7.296*   PCO2, ARTERIAL mm Hg 39.7 45.7* 46.9* 51.8*   PO2 ART mm Hg 52.7* 52.0* 113.1* 106.9*   FIO2 %  --  60 75 75         Radiology:  No new today    Pulmonary Assessment:  1. Acute hypoxemic respiratory failure  2. COPD and emphysema  3. RLL nodule, 8mm  4. Tobacco abuse  5.  Anxiety, secondary to grief from recent loss of spouse      Plan:   · Echo results noted, unfortunately it was done without the bubble study as ordered to evaluate a potential blood flow/shunting issue  · Okay to increase anxiety med per attending but recommend she remain in CCU for close monitoring of her respiratory status  · CXR tomorrow  · Continue nebs and Symbicort   · Smoking cessation  · Will need 3 month f/u CT for RLL nodule      Electronically signed by THIERNO Souza, 03/07/17, 8:43 AM     Respiratory exam shows wheezing.  She is anxious and is a smoker.  Ok to resume her home anxiety meds in low doses.  Other plans as above.  Ok to floor.   I have seen and examined patient personally, performing a face-to-face diagnostic evaluation with plan of care reviewed and developed with APRN and nursing staff. I have addended and/or modified the above history of present illness, physical examination, and assessment and plan to reflect my findings and impressions. Essential elements of the care plan were discussed with APRN above.  Agree with findings and assessment/plan as documented above.    Electronically signed by Wm Valdes MD, on 3/7/2017, 10:51 PM

## 2017-03-07 NOTE — PLAN OF CARE
Problem: Patient Care Overview (Adult)  Goal: Plan of Care Review  Outcome: Ongoing (interventions implemented as appropriate)    03/07/17 1174   Coping/Psychosocial Response Interventions   Plan Of Care Reviewed With patient   Patient Care Overview   Progress improving   Outcome Evaluation   Outcome Summary/Follow up Plan Pt on regular diet, oral intake 50% of past 3 meals. Adding Ensure BID. Cont to follow

## 2017-03-08 LAB
ALBUMIN SERPL-MCNC: 2.9 G/DL (ref 3.5–5)
ALBUMIN/GLOB SERPL: 1.2 G/DL (ref 1.1–2.5)
ALP SERPL-CCNC: 47 U/L (ref 24–120)
ALT SERPL W P-5'-P-CCNC: 16 U/L (ref 0–54)
ANION GAP SERPL CALCULATED.3IONS-SCNC: 7 MMOL/L (ref 4–13)
AST SERPL-CCNC: 15 U/L (ref 7–45)
BASOPHILS # BLD AUTO: 0 10*3/MM3 (ref 0–0.2)
BASOPHILS NFR BLD AUTO: 0 % (ref 0–2)
BILIRUB SERPL-MCNC: 0.1 MG/DL (ref 0.1–1)
BUN BLD-MCNC: 9 MG/DL (ref 5–21)
BUN/CREAT SERPL: 12.9 (ref 7–25)
CALCIUM SPEC-SCNC: 7.8 MG/DL (ref 8.4–10.4)
CHLORIDE SERPL-SCNC: 98 MMOL/L (ref 98–110)
CO2 SERPL-SCNC: 32 MMOL/L (ref 24–31)
CREAT BLD-MCNC: 0.7 MG/DL (ref 0.5–1.4)
DEPRECATED RDW RBC AUTO: 55 FL (ref 40–54)
EOSINOPHIL # BLD AUTO: 0.01 10*3/MM3 (ref 0–0.7)
EOSINOPHIL NFR BLD AUTO: 0.3 % (ref 0–4)
ERYTHROCYTE [DISTWIDTH] IN BLOOD BY AUTOMATED COUNT: 15.7 % (ref 12–15)
GFR SERPL CREATININE-BSD FRML MDRD: 83 ML/MIN/1.73
GLOBULIN UR ELPH-MCNC: 2.4 GM/DL
GLUCOSE BLD-MCNC: 83 MG/DL (ref 70–100)
HCT VFR BLD AUTO: 32.5 % (ref 37–47)
HGB BLD-MCNC: 10.4 G/DL (ref 12–16)
IMM GRANULOCYTES # BLD: 0.01 10*3/MM3 (ref 0–0.03)
IMM GRANULOCYTES NFR BLD: 0.3 % (ref 0–5)
LYMPHOCYTES # BLD AUTO: 0.71 10*3/MM3 (ref 0.72–4.86)
LYMPHOCYTES NFR BLD AUTO: 20.2 % (ref 15–45)
MCH RBC QN AUTO: 30.3 PG (ref 28–32)
MCHC RBC AUTO-ENTMCNC: 32 G/DL (ref 33–36)
MCV RBC AUTO: 94.8 FL (ref 82–98)
MONOCYTES # BLD AUTO: 0.34 10*3/MM3 (ref 0.19–1.3)
MONOCYTES NFR BLD AUTO: 9.7 % (ref 4–12)
NEUTROPHILS # BLD AUTO: 2.44 10*3/MM3 (ref 1.87–8.4)
NEUTROPHILS NFR BLD AUTO: 69.5 % (ref 39–78)
PLATELET # BLD AUTO: 129 10*3/MM3 (ref 130–400)
PMV BLD AUTO: 11.8 FL (ref 6–12)
POTASSIUM BLD-SCNC: 3.4 MMOL/L (ref 3.5–5.3)
PROT SERPL-MCNC: 5.3 G/DL (ref 6.3–8.7)
RBC # BLD AUTO: 3.43 10*6/MM3 (ref 4.2–5.4)
SODIUM BLD-SCNC: 137 MMOL/L (ref 135–145)
WBC NRBC COR # BLD: 3.51 10*3/MM3 (ref 4.8–10.8)

## 2017-03-08 PROCEDURE — 94799 UNLISTED PULMONARY SVC/PX: CPT

## 2017-03-08 PROCEDURE — 85025 COMPLETE CBC W/AUTO DIFF WBC: CPT | Performed by: FAMILY MEDICINE

## 2017-03-08 PROCEDURE — 25010000002 ENOXAPARIN PER 10 MG: Performed by: INTERNAL MEDICINE

## 2017-03-08 PROCEDURE — 25010000002 IRON SUCROSE PER 1 MG: Performed by: FAMILY MEDICINE

## 2017-03-08 PROCEDURE — 80053 COMPREHEN METABOLIC PANEL: CPT | Performed by: FAMILY MEDICINE

## 2017-03-08 PROCEDURE — 94760 N-INVAS EAR/PLS OXIMETRY 1: CPT

## 2017-03-08 PROCEDURE — 63710000001 PROMETHAZINE PER 25 MG: Performed by: FAMILY MEDICINE

## 2017-03-08 PROCEDURE — 97110 THERAPEUTIC EXERCISES: CPT

## 2017-03-08 PROCEDURE — 25010000002 LEVOFLOXACIN PER 250 MG: Performed by: INTERNAL MEDICINE

## 2017-03-08 PROCEDURE — 97116 GAIT TRAINING THERAPY: CPT

## 2017-03-08 PROCEDURE — 25010000002 CYANOCOBALAMIN PER 1000 MCG: Performed by: PSYCHIATRY & NEUROLOGY

## 2017-03-08 RX ORDER — PROMETHAZINE HYDROCHLORIDE 6.25 MG/5ML
12.5 SYRUP ORAL EVERY 6 HOURS PRN
Status: DISCONTINUED | OUTPATIENT
Start: 2017-03-08 | End: 2017-03-16 | Stop reason: HOSPADM

## 2017-03-08 RX ORDER — MIRTAZAPINE 30 MG/1
30 TABLET, FILM COATED ORAL NIGHTLY
Status: DISCONTINUED | OUTPATIENT
Start: 2017-03-08 | End: 2017-03-09

## 2017-03-08 RX ORDER — SENNOSIDES 8.6 MG
1 TABLET ORAL NIGHTLY
Status: DISCONTINUED | OUTPATIENT
Start: 2017-03-08 | End: 2017-03-16 | Stop reason: HOSPADM

## 2017-03-08 RX ORDER — FUROSEMIDE 20 MG/1
20 TABLET ORAL DAILY
Status: DISCONTINUED | OUTPATIENT
Start: 2017-03-09 | End: 2017-03-11

## 2017-03-08 RX ADMIN — PANTOPRAZOLE SODIUM 40 MG: 40 TABLET, DELAYED RELEASE ORAL at 06:08

## 2017-03-08 RX ADMIN — GABAPENTIN 300 MG: 300 CAPSULE ORAL at 21:57

## 2017-03-08 RX ADMIN — OXYCODONE HYDROCHLORIDE AND ACETAMINOPHEN 1 TABLET: 10; 325 TABLET ORAL at 13:48

## 2017-03-08 RX ADMIN — FUROSEMIDE 40 MG: 40 TABLET ORAL at 10:05

## 2017-03-08 RX ADMIN — OXYCODONE HYDROCHLORIDE 30 MG: 20 TABLET, FILM COATED, EXTENDED RELEASE ORAL at 10:04

## 2017-03-08 RX ADMIN — POTASSIUM CHLORIDE 40 MEQ: 750 CAPSULE, EXTENDED RELEASE ORAL at 10:05

## 2017-03-08 RX ADMIN — SENNA 8.6 MG: 8.6 TABLET, COATED ORAL at 21:55

## 2017-03-08 RX ADMIN — GABAPENTIN 300 MG: 300 CAPSULE ORAL at 06:08

## 2017-03-08 RX ADMIN — OXYCODONE HYDROCHLORIDE AND ACETAMINOPHEN 1 TABLET: 10; 325 TABLET ORAL at 02:56

## 2017-03-08 RX ADMIN — IPRATROPIUM BROMIDE AND ALBUTEROL SULFATE 3 ML: 2.5; .5 SOLUTION RESPIRATORY (INHALATION) at 19:49

## 2017-03-08 RX ADMIN — CYANOCOBALAMIN 1000 MCG: 1000 INJECTION, SOLUTION INTRAMUSCULAR at 10:04

## 2017-03-08 RX ADMIN — OXYCODONE HYDROCHLORIDE 30 MG: 20 TABLET, FILM COATED, EXTENDED RELEASE ORAL at 22:02

## 2017-03-08 RX ADMIN — LEVOFLOXACIN 750 MG: 750 INJECTION, SOLUTION INTRAVENOUS at 14:05

## 2017-03-08 RX ADMIN — IPRATROPIUM BROMIDE AND ALBUTEROL SULFATE 3 ML: 2.5; .5 SOLUTION RESPIRATORY (INHALATION) at 14:37

## 2017-03-08 RX ADMIN — BUDESONIDE AND FORMOTEROL FUMARATE DIHYDRATE 2 PUFF: 160; 4.5 AEROSOL RESPIRATORY (INHALATION) at 06:46

## 2017-03-08 RX ADMIN — BUDESONIDE AND FORMOTEROL FUMARATE DIHYDRATE 2 PUFF: 160; 4.5 AEROSOL RESPIRATORY (INHALATION) at 19:49

## 2017-03-08 RX ADMIN — ALPRAZOLAM 0.5 MG: 0.5 TABLET ORAL at 06:13

## 2017-03-08 RX ADMIN — DOCUSATE SODIUM 100 MG: 100 CAPSULE ORAL at 10:05

## 2017-03-08 RX ADMIN — ENOXAPARIN SODIUM 40 MG: 40 INJECTION SUBCUTANEOUS at 10:05

## 2017-03-08 RX ADMIN — IPRATROPIUM BROMIDE AND ALBUTEROL SULFATE 3 ML: 2.5; .5 SOLUTION RESPIRATORY (INHALATION) at 03:20

## 2017-03-08 RX ADMIN — PROMETHAZINE HYDROCHLORIDE 12.5 MG: 6.25 SOLUTION ORAL at 22:04

## 2017-03-08 RX ADMIN — ALPRAZOLAM 0.5 MG: 0.5 TABLET ORAL at 01:16

## 2017-03-08 RX ADMIN — IPRATROPIUM BROMIDE AND ALBUTEROL SULFATE 3 ML: 2.5; .5 SOLUTION RESPIRATORY (INHALATION) at 06:46

## 2017-03-08 RX ADMIN — GABAPENTIN 300 MG: 300 CAPSULE ORAL at 14:20

## 2017-03-08 RX ADMIN — ALPRAZOLAM 0.5 MG: 0.5 TABLET ORAL at 10:15

## 2017-03-08 RX ADMIN — ALPRAZOLAM 0.5 MG: 0.5 TABLET ORAL at 17:57

## 2017-03-08 RX ADMIN — CITALOPRAM 40 MG: 20 TABLET, FILM COATED ORAL at 10:05

## 2017-03-08 RX ADMIN — IPRATROPIUM BROMIDE AND ALBUTEROL SULFATE 3 ML: 2.5; .5 SOLUTION RESPIRATORY (INHALATION) at 10:48

## 2017-03-08 RX ADMIN — DEXTROMETHORPHAN 30 MG: 30 SUSPENSION, EXTENDED RELEASE ORAL at 10:47

## 2017-03-08 RX ADMIN — IPRATROPIUM BROMIDE AND ALBUTEROL SULFATE 3 ML: 2.5; .5 SOLUTION RESPIRATORY (INHALATION) at 23:40

## 2017-03-08 RX ADMIN — IRON SUCROSE 300 MG: 20 INJECTION, SOLUTION INTRAVENOUS at 11:51

## 2017-03-08 RX ADMIN — NICOTINE 1 PATCH: 21 PATCH, EXTENDED RELEASE TRANSDERMAL at 17:57

## 2017-03-08 NOTE — PLAN OF CARE
Problem: Patient Care Overview (Adult)  Goal: Plan of Care Review  Outcome: Ongoing (interventions implemented as appropriate)    Problem: Respiratory Insufficiency (Adult)  Goal: Effective Ventilation  Outcome: Ongoing (interventions implemented as appropriate)

## 2017-03-08 NOTE — PLAN OF CARE
Problem: Patient Care Overview (Adult)  Goal: Plan of Care Review  Outcome: Ongoing (interventions implemented as appropriate)    03/07/17 1841   Coping/Psychosocial Response Interventions   Plan Of Care Reviewed With patient   Patient Care Overview   Progress no change   Outcome Evaluation   Outcome Summary/Follow up Plan Patient transferred from CCU to [facility], [facility], Will continue to monitor.

## 2017-03-08 NOTE — PAYOR COMM NOTE
"3/8 ADDITIONAL CLINICAL  PLEASE SEND APPROVAL  [id number]      Jhon Lei (65 y.o. Female)     Date of Birth Social Security Number Address Home Phone MRN    03/25/1951  [id number] [facility] [address] [phone] [id number]    Anab[employer] Marital Status          None Unknown       Admission Date Admission Type Admitting Provider Attending Provider Department, Room/Bed    3/4/17 Emergency Siddharth Leavitt MD Truong, Khai C, MD [facility] 4C, 491/1    Discharge Date Discharge Disposition Discharge Destination                      Attending Provider: Siddharth Leavitt MD     Allergies:  Aspirin, Demerol [Meperidine], Ibuprofen, Neosporin [Neomycin-bacitracin Zn-polymyx], Penicillins, Sulfa Antibiotics, Codeine, Talwin [Pentazocine], Tetracyclines & Related, Morphine And Related    Isolation:  None   Infection:  None   Code Status:  FULL    Ht:  64.5\" (163.8 cm)   Wt:  153 lb 11.2 oz (69.7 kg)    Admission Cmt:  None   Principal Problem:  None                Active Insurance as of 3/4/2017     Primary Coverage     Payor Plan Insurance Group Employer/Plan Group    [employer]HE[employer] MEDICAID ANTHEM MEDICAID [id number]     Payor Plan Address Payor Plan Phone Number Effective From Effective To    [address] [address] [phone] 1/1/2016     [place] [id number]       Subscriber Name Subscriber Birth Date Member ID       JHON LEI 3/25/1951 [id number]                 Emergency Contacts      (Rel.) Home Phone Work Phone Mobile Phone    Ella Guerrero (Daughter) [phone] -- --               Physician Progress Notes (last 72 hours) (Notes from 3/5/2017  7:48 AM through 3/8/2017  7:48 AM)      Colt Sargent DO at 3/5/2017  8:48 AM  Version 1 of 1             [facility] Medicine Services  INPATIENT PROGRESS NOTE    Length of Stay: 1  Date of Admission: 3/4/2017  Primary Care Physician: No Known Provider    Subjective   Chief Complaint:   AMS/Hypoxia  HPI   65 " YO CF admitted on 3/4/17. Patient is intubated and on pressors. Appears more comfortable than yesterday. Will need Dobhoff. O2 on vent at 60%. Aroused per nursing with sedation holiday and was moving all extremities.         Review of Systems   AMS  Hypoxia    All pertinent negatives and positives are as above. All other systems have been reviewed and are negative unless otherwise stated.     Objective    Temp:  [98 °F (36.7 °C)-103.1 °F (39.5 °C)] 98 °F (36.7 °C)  Heart Rate:  [] 57  Resp:  [14-24] 20  BP: ()/(42-80) 113/52  FiO2 (%):  [60 %-100 %] 60 %  Physical Exam   HENT:   Head: Normocephalic and atraumatic.   Nose: Nose normal.   ET tube in place.    Eyes: Conjunctivae and EOM are normal.   Pin point pupils but reactive.   Neck: Normal range of motion. Neck supple.   Cardiovascular: Normal rate, regular rhythm and normal heart sounds.    Pulmonary/Chest: Effort normal and breath sounds normal.   Abdominal: Soft. Bowel sounds are normal.   Musculoskeletal: She exhibits no edema or tenderness.   Neurological: No cranial nerve deficit.   Sedated   Skin: Skin is warm and dry.   Vitals reviewed.          Results Review:  I have reviewed the labs, radiology results, and diagnostic studies.    Laboratory Data:     Results from last 7 days  Lab Units 03/04/17  1415   WBC 10*3/mm3 5.15   HEMOGLOBIN g/dL 11.6*   HEMATOCRIT % 36.5*   PLATELETS 10*3/mm3 161          Results from last 7 days  Lab Units 03/04/17  1415   SODIUM mmol/L 139   POTASSIUM mmol/L 3.7   CHLORIDE mmol/L 102   TOTAL CO2 mmol/L 32.0*   BUN mg/dL 6   CREATININE mg/dL 0.75   CALCIUM mg/dL 8.2*   BILIRUBIN mg/dL 0.2   ALK PHOS U/L 77   ALT (SGPT) U/L 29   AST (SGOT) U/L 38   GLUCOSE mg/dL 119*       Culture Data:   BLOOD CULTURE   Date Value Ref Range Status   03/04/2017 No growth at less than 24 hours  Preliminary   03/04/2017 No growth at less than 24 hours  Preliminary       Radiology Data:   Imaging Results (last 24 hours)     Procedure  Component Value Units Date/Time    XR Chest 1 View [[id number]] Collected:  03/04/17 1527     Updated:  03/04/17 1542    Narrative:       CHEST, ONE VIEW:     HISTORY: Sepsis     COMPARISON: 10/31/2009 and 01/21/2009     A single frontal chest radiograph was obtained.     FINDINGS:     COPD and chronic lung changes appreciated.     There are patchy airspace opacities identified in the right lower lobe  laterally not observed previously and a small area of acute infiltrate  from an infectious or inflammatory process, pneumonia considered.  Correlate with patient presentation.     The heart is normal in size without evidence of overt heart failure.     The bony structures are intact.                                  Impression:       1. COPD and chronic lung changes.  2. Mild patchy right lower lobe airspace opacities, small area of acute  infiltrate/pneumonia considered.     This report was finalized on 03/04/2017 15:40 by Dr. Eduardo Black MD.    XR Chest 1 View [[id number]] Collected:  03/04/17 1933     Updated:  03/04/17 1939    Narrative:       EXAMINATION: XR CHEST 1 VW- 3/4/2017 7:33 PM CST     HISTORY: Endotracheal tube placement     COMPARISON: 03/04/2017     FINDINGS:  Since the previous examination, an endotracheal tube has been placed  with tip approximately 2.1 cm above the juan carlos. The remainder of the  exam is otherwise unchanged, with bilateral scattered interstitial and  alveolar opacities and peribronchial wall thickening, concerning for  multifocal pneumonia. The heart appears normal in size. Aorta is  tortuous. Surgical clips are seen at the GE junction.       Impression:       Interval endotracheal tube placement with tip 2.1 cm above  the juan carlos. Exam otherwise stable.  This report was finalized on 03/04/2017 19:37 by Dr. Ruddy Alvarez MD.    XR Chest 1 View [[id number]] Collected:  03/05/17 0730     Updated:  03/05/17 0730    Narrative:       CHEST, ONE VIEW:     HISTORY: Respiratory distress      COMPARISON: 03/04/2017     A single frontal chest radiograph was obtained.     FINDINGS:     Endotracheal tube is identified in good position above the juan carlos.     COPD and chronic lung changes again identified.     Mild patchy airspace opacities in the right lung base again observed  likely unchanged.     Radiographically, the chest is likely stable.                                  Impression:       1. Stable one-day appearance of the chest.     This report was finalized on  by Dr. Eduardo Black MD.    CT Head Without Contrast [[id number]] Collected:  03/05/17 0746     Updated:  03/05/17 0746    Narrative:       CT SCAN OF THE HEAD, WITHOUT CONTRAST:      HISTORY: Altered mental status     COMPARISONS: 01/06/2009      TECHNIQUE:     Radiation dose equals  mGy-cm.  Automated exposure control dose  reduction technique was implemented.     CT evaluation of the head without intravenous contrast. 5 mm transaxial  images were obtained.   2-D sagittal and coronal reconstruction images  were generated.     FINDINGS: Image quality degradation related to patient motion observed.     There is no intra or extra-axial hemorrhage.     There is no mass effect or midline shift.     There is no hydrocephalus.     Dilated perivascular space versus an old lacunar infarct, less likely,  appreciated again, in the left basal ganglia.        Mucosal thickening and mucus retention cyst noted in the maxillary sinus  with partial opacification with ethmoid sinus mucosal thickening. Mild  mucosal thickening observed in the sphenoid sinuses.     Bone window imaging reveals no calvarial abnormality.          Impression:       1. No CT evidence of an acute intracranial process.  2. Mild paranasal sinus disease.                     This report was finalized on  by Dr. Eduardo Black MD.    CT Chest Without Contrast [[id number]] Collected:  03/05/17 0753     Updated:  03/05/17 0753    Narrative:       HISTORY: Hypoxia     CT CHEST WO  CONTRAST-  CT evaluation of the chest was performed without intravenous contrast. 5  mm sequential transaxial images were obtained. 2-D sagittal and coronal  reconstruction images generated. Radiation dose equals  mGy-cm.   Automated exposure control dose reduction technique was implemented.     Endotracheal tube identified above the juan carlos.     Small mediastinal lymph nodes appreciated without definite mediastinal  or hilar lymphadenopathy. Atherosclerotic aortic and coronary artery  calcifications appreciated.     There is pulmonary emphysema with an upper lobe predominance.  Fibronodular changes/apical pleural thickening observed compatible with  chronic changes.     There are small bilateral pleural effusions with patchy lower lobe  airspace disease. There is diffuse mild reticulonodular interstitial  prominence. There are no significant areas of lung consolidation  identified.     There is a pulmonary nodule identified in the right lower lobe, near the  pleural surface measuring nearly 8 mm, image 53. Imaging follow-up  recommended. There are no pneumothoraces.     Spondylosis changes noted diffusely in the thoracic spine.       Impression:       1. Pulmonary emphysema and chronic lung changes.  2. Small bilateral pleural effusions with lower lobe airspace disease,  question atelectasis and/or infiltrate. Mild diffuse interstitial  prominence. Mild acute interstitial infiltration not excluded.  3. Right lower lobe nodule. Follow-up recommended.  This report was finalized on  by Dr. Eduardo Black MD.    CT Abdomen Pelvis Without Contrast [[id number]] Collected:  03/05/17 0800     Updated:  03/05/17 0800    Narrative:       HISTORY: Abdominal pain     CT ABDOMEN PELVIS WO CONTRAST-  CT evaluation of the abdomen and pelvis was performed without  intravenous or oral contrast. 5 mm sequential transaxial images were  obtained. 2-D sagittal and coronal reconstruction images generated.  Radiation dose equals DLP  487 mGy-cm.  Automated exposure control dose  reduction technique was implemented.     Small bilateral pleural effusions with associated lower lobe airspace  disease again noted. The 7 mm pulmonary nodule right lower lobe  laterally again recognized. Findings are described in the chest CT  report.     Surgical clips result in significant streak artifact in the upper  abdomen. No definite hepatic lesion observed. The gallbladder is  surgically absent. No definite biliary ductal dilatation appreciated.     Spleen is not enlarged.     There are no adrenal masses or pancreatic lesions.     There are no urinary tract calculi or evidence of obstruction. The  urinary bladder is mildly distended without focal bladder wall  abnormality.     Changes from hysterectomy observed. There are no adnexal masses.     There is a large amount of stool identified throughout the colon. There  are scattered diverticuli particularly in the sigmoid colon without CT  evidence of acute diverticulitis. There is no CT evidence of acute  appendicitis, cecum extending into the pelvis. The small bowel is not  dilated. The duodenal sweep is imaged appropriately. The stomach is not  distended.     Small para-aortic and mesenteric lymph nodes observed without  pathologically enlarged nodes.     There is no ascites or pneumoperitoneum.     There are atherosclerotic aortoiliofemoral calcifications.     A few spondylosis changes noted in the thoracolumbar spine.     A right femoral vein catheter is identified.       Impression:       1. No CT evidence of an acute intra-abdominal/pelvic pathological  process.  2. Large amount of stool in the colon suggesting constipation.  Diverticulosis without CT evidence of acute diverticulitis.  This report was finalized on  by Dr. Eduardo Black MD.          I have reviewed the patient current medications.     Assessment/Plan     Hospital Problem List     Pneumonia of right lower lobe due to infectious organism     "    Impression/Plan:  1. Hypoxia with respiratory distress- Intubated on 60% 02. Pulmonology has been consulted. Jhononeursula. Currently day 2 Vancomycin/Zosyn.  2. AMS- question hypoxia/fever vs possible OD? Will ask family. Apparently there has been a HX of depression since the passing of her  in July. Neurology consulted.   3. RLL PNA, failed OP therapy- abx as above  4. Depression/Anxiety- PRN ativan  5. Abdominal pain- constipation on CT  6. Hypotension with HX of hypertension- currently on levophed            Colt Sargent DO   03/05/17   8:48 AM       Electronically signed by Colt Sargent DO at 3/5/2017  9:02 AM      Wm Valdes MD at 3/6/2017  9:05 AM  Version 2 of [facility] Pulmonary Progress Note    Patient: Justyna Lei  3/25/1951   MR# [id number]   Acct# [id number]  03/06/17   9:05 AM  Referring Provider: Siddharth Leavitt MD      Problem list:   Patient Active Problem List   Diagnosis   • Pneumonia of right lower lobe due to infectious organism        Chief Complaint: Hypoxia    Interval history: She was extubated yesterday with a neg CTA for PE. She's on 6 liters NC with sats in the upper 80's. She was smoking \"around 1/2 ppd\" prior to hospitalization. She denies any acute distress and carries on conversation without difficulty. She said she had an echo about 2 years ago per Dr. Lynne. No other aggravating, alleviating factors or associated symptoms.    HPI    Allergy:   Allergies   Allergen Reactions   • Aspirin Anaphylaxis   • Demerol [Meperidine] Anaphylaxis   • Ibuprofen Anaphylaxis   • Neosporin [Neomycin-Bacitracin Zn-Polymyx] Anaphylaxis   • Penicillins Anaphylaxis   • Sulfa Antibiotics Anaphylaxis   • Codeine Hives     Pt can take codeine as long is it is not alot   • Talwin [Pentazocine] Hives   • Tetracyclines & Related    • Morphine And Related Hives       Meds:      budesonide-formoterol 2 puff Inhalation BID - RT   cetirizine 10 mg Oral Daily   citalopram 40 mg " Oral Daily   dextromethorphan polistirex ER 30 mg Oral Q12H   enoxaparin 40 mg Subcutaneous Daily   furosemide 40 mg Oral Daily   gabapentin 300 mg Oral Q8H   ipratropium-albuterol 3 mL Nebulization Q4H - RT   levoFLOXacin 750 mg Intravenous Q24H   methylPREDNISolone sodium succinate 40 mg Intravenous Q6H   nicotine 1 patch Transdermal Q24H   oxyCODONE ER 30 mg Oral Q12H   pantoprazole 40 mg Intravenous Q AM        Review of Systems:   Review of Systems   Constitutional: Negative.    HENT: Negative.    Respiratory: Positive for shortness of breath.    Cardiovascular: Negative.    Gastrointestinal: Negative.    Skin: Negative.    Neurological: Negative.    Psychiatric/Behavioral: Negative.    All other systems reviewed and are negative.      Physical Exam:  Vitals:    03/06/17 0800   BP: 132/54   Pulse: 105   Resp: 26   Temp:    SpO2: (!) 88%       Intake/Output Summary (Last 24 hours) at 03/06/17 0905  Last data filed at 03/06/17 0800   Gross per 24 hour   Intake   2484 ml   Output   2450 ml   Net     34 ml     Physical Exam   Constitutional: She is oriented to person, place, and time. She appears well-developed and well-nourished. She appears ill. No distress. She is not intubated. Nasal cannula in place.   HENT:   Head: Normocephalic and atraumatic.   Eyes: Pupils are equal, round, and reactive to light.   Neck: Normal range of motion. Neck supple.   Cardiovascular: Regular rhythm, S1 normal and S2 normal.  Tachycardia present.    Pulmonary/Chest: Tachypnea noted. She is not intubated. No respiratory distress. She has decreased breath sounds in the right lower field and the left lower field.   Abdominal: Soft. Bowel sounds are normal. She exhibits no distension.   Musculoskeletal: Normal range of motion. She exhibits no edema.   Neurological: She is alert and oriented to person, place, and time.   Skin: Skin is warm and dry.   Psychiatric: She has a normal mood and affect.   Nursing note and vitals  reviewed.      Data:     Results from last 7 days  Lab Units 03/06/17  0521 03/04/17  1415   WBC 10*3/mm3 5.19 5.15   HEMOGLOBIN g/dL 9.8* 11.6*   PLATELETS 10*3/mm3 124* 161         Results from last 7 days  Lab Units 03/06/17  0521 03/05/17  1257 03/04/17  1415   SODIUM mmol/L 140  --  139   SODIUM, ARTERIAL mmol/L  --  142.4  --    POTASSIUM mmol/L 3.0*  --  3.7   BUN mg/dL 6  --  6   CREATININE mg/dL 0.64  --  0.75         Results from last 7 days  Lab Units 03/05/17  1257 03/05/17  1149 03/05/17  0250 03/04/17  1920   PH, ARTERIAL pH units 7.419 7.398 7.354 7.296*   PCO2, ARTERIAL mm Hg 39.7 45.7* 46.9* 51.8*   PO2 ART mm Hg 52.7* 52.0* 113.1* 106.9*   FIO2 %  --  60 75 75         Radiology:  Imaging Results (last 24 hours)     Procedure Component Value Units Date/Time    CT Abdomen Pelvis Without Contrast [[id number]] Collected:  03/05/17 0800     Updated:  03/05/17 1259    Narrative:       HISTORY: Abdominal pain     CT ABDOMEN PELVIS WO CONTRAST-  CT evaluation of the abdomen and pelvis was performed without  intravenous or oral contrast. 5 mm sequential transaxial images were  obtained. 2-D sagittal and coronal reconstruction images generated.  Radiation dose equals  mGy-cm.  Automated exposure control dose  reduction technique was implemented.     Small bilateral pleural effusions with associated lower lobe airspace  disease again noted. The 7 mm pulmonary nodule right lower lobe  laterally again recognized. Findings are described in the chest CT  report.     Surgical clips result in significant streak artifact in the upper  abdomen. No definite hepatic lesion observed. The gallbladder is  surgically absent. No definite biliary ductal dilatation appreciated.     Spleen is not enlarged.     There are no adrenal masses or pancreatic lesions.     There are no urinary tract calculi or evidence of obstruction. The  urinary bladder is mildly distended without focal bladder wall  abnormality.     Changes  from hysterectomy observed. There are no adnexal masses.     There is a large amount of stool identified throughout the colon. There  are scattered diverticuli particularly in the sigmoid colon without CT  evidence of acute diverticulitis. There is no CT evidence of acute  appendicitis, cecum extending into the pelvis. The small bowel is not  dilated. The duodenal sweep is imaged appropriately. The stomach is not  distended.     Small para-aortic and mesenteric lymph nodes observed without  pathologically enlarged nodes.     There is no ascites or pneumoperitoneum.     There are atherosclerotic aortoiliofemoral calcifications.     A few spondylosis changes noted in the thoracolumbar spine.     A right femoral vein catheter is identified.       Impression:       1. No CT evidence of an acute intra-abdominal/pelvic pathological  process.  2. Large amount of stool in the colon suggesting constipation.  Diverticulosis without CT evidence of acute diverticulitis.  This report was finalized on 03/05/2017 12:56 by Dr. Eduardo Black MD.    CT Chest Without Contrast [[id number]] Collected:  03/05/17 0753     Updated:  03/05/17 1300    Narrative:       HISTORY: Hypoxia     CT CHEST WO CONTRAST-  CT evaluation of the chest was performed without intravenous contrast. 5  mm sequential transaxial images were obtained. 2-D sagittal and coronal  reconstruction images generated. Radiation dose equals  mGy-cm.   Automated exposure control dose reduction technique was implemented.     Endotracheal tube identified above the juan carlos.     Small mediastinal lymph nodes appreciated without definite mediastinal  or hilar lymphadenopathy. Atherosclerotic aortic and coronary artery  calcifications appreciated.     There is pulmonary emphysema with an upper lobe predominance.  Fibronodular changes/apical pleural thickening observed compatible with  chronic changes.     There are small bilateral pleural effusions with patchy lower  lobe  airspace disease. There is diffuse mild reticulonodular interstitial  prominence. There are no significant areas of lung consolidation  identified.     There is a pulmonary nodule identified in the right lower lobe, near the  pleural surface measuring nearly 8 mm, image 53. Imaging follow-up  recommended. There are no pneumothoraces.     Spondylosis changes noted diffusely in the thoracic spine.       Impression:       1. Pulmonary emphysema and chronic lung changes.  2. Small bilateral pleural effusions with lower lobe airspace disease,  question atelectasis and/or infiltrate. Mild diffuse interstitial  prominence. Mild acute interstitial infiltration not excluded.  3. Right lower lobe nodule. Follow-up recommended.  This report was finalized on 03/05/2017 12:58 by Dr. Eduardo Black MD.    CT Head Without Contrast [[id number]] Collected:  03/05/17 0746     Updated:  03/05/17 1302    Narrative:       CT SCAN OF THE HEAD, WITHOUT CONTRAST:      HISTORY: Altered mental status     COMPARISONS: 01/06/2009      TECHNIQUE:     Radiation dose equals  mGy-cm.  Automated exposure control dose  reduction technique was implemented.     CT evaluation of the head without intravenous contrast. 5 mm transaxial  images were obtained.   2-D sagittal and coronal reconstruction images  were generated.     FINDINGS: Image quality degradation related to patient motion observed.     There is no intra or extra-axial hemorrhage.     There is no mass effect or midline shift.     There is no hydrocephalus.     Dilated perivascular space versus an old lacunar infarct, less likely,  appreciated again, in the left basal ganglia.        Mucosal thickening and mucus retention cyst noted in the maxillary sinus  with partial opacification with ethmoid sinus mucosal thickening. Mild  mucosal thickening observed in the sphenoid sinuses.     Bone window imaging reveals no calvarial abnormality.          Impression:       1. No CT evidence  of an acute intracranial process.  2. Mild paranasal sinus disease.                     This report was finalized on 03/05/2017 13:00 by Dr. Eduardo Black MD.    XR Abdomen KUB [[id number]] Collected:  03/05/17 1213     Updated:  03/05/17 1304    Narrative:       HISTORY: Feeding tube placement.     XR ABDOMEN KUB-  Radiograph of the lower chest and upper abdomen was performed to  evaluate the position of a Dobbhoff feeding tube.     The feeding tube is identified in the stomach with the tip in the distal  body or antrum region.  This report was finalized on 03/05/2017 13:02 by Dr. Eduardo Black MD.    XR Chest 1 View [[id number]] Collected:  03/05/17 0730     Updated:  03/05/17 1306    Narrative:       CHEST, ONE VIEW:     HISTORY: Respiratory distress     COMPARISON: 03/04/2017     A single frontal chest radiograph was obtained.     FINDINGS:     Endotracheal tube is identified in good position above the juan carlos.     COPD and chronic lung changes again identified.     Mild patchy airspace opacities in the right lung base again observed  likely unchanged.     Radiographically, the chest is likely stable.                                  Impression:       1. Stable one-day appearance of the chest.     This report was finalized on 03/05/2017 13:04 by Dr. Eduardo Black MD.    CT Angiogram Chest With & Without Contrast [[id number]] Collected:  03/05/17 1639     Updated:  03/05/17 1643    Narrative:       HISTORY: Hypoxemia     COMPARISON: Chest CT dated 03/04/2017     TECHNIQUE:     CT evaluation of the chest was performed with intravenous contrast. 2 mm  transaxial images were obtained. 3-D reconstruction angiographic images  were generated using a volume rendering technique. Coronal  reconstruction maximum intensity projection images of the pulmonary  arteries and aorta were also generated.     Radiation dose equals  mGy-cm.  Automated exposure control dose  reduction technique was implemented.        FINDINGS:      There is image quality degradation related to patient motion artifact in  the lower lobes. In the right lower lobe, there is low-attenuation  change within the medial basal pulmonary segment felt most likely  related to motion.     No definite CT angiographic evidence of pulmonary embolus appreciated.     The aorta is normal in caliber, without aneurysm or dissection.     Lower lobe airspace disease again appreciated likely atelectasis with  small pleural effusions.     The right lower lobe pulmonary nodule less conspicuous compared to the  previous examination likely due to the atelectasis.     Pulmonary emphysema again identified with an upper lobe predominance.       Impression:       1. No CT angiographic evidence of pulmonary embolus or acute aortic  pathology.  2. Lower lobe airspace disease, likely atelectasis with small pleural  effusions.  3. Pulmonary emphysema.  This report was finalized on 03/05/2017 16:41 by Dr. Eduardo Black MD.          Pulmonary Assessment:  1. Acute hypoxemic respiratory failure  2. COPD and emphysema  3. RLL nodule, 8mm  4. Tobacco abuse      Plan:   · Echo with bubble study today to rule out a potential blood flow/shunting issue  · Solu-medrol IV 40 mg today x 3 doses  · Continue nebs and Symbicort   · Smoking cessation  · Will need 3 month f/u CT for RLL nodule  · Limit sedation meds      Electronically signed by THIERNO Souza, 03/06/17, 9:05 AM     Respiratory exam shows wheezing. Remains off vent.  Weak.  Continue ICU care, bipap if needed. I have seen and examined patient personally, performing a face-to-face diagnostic evaluation with plan of care reviewed and developed with APRN and nursing staff. I have addended and/or modified the above history of present illness, physical examination, and assessment and plan to reflect my findings and impressions. Essential elements of the care plan were discussed with APRN above.  Agree with findings and assessment/plan  "as documented above.    Electronically signed by Wm Valdes MD, on 3/6/2017, 11:01 PM             Electronically signed by Wm Valdes MD at 3/6/2017 11:04 PM      THIERNO Souza at 3/6/2017  9:05 AM  Version 1 of 2         [facility] Pulmonary Progress Note    Patient: Justyna Lei  3/25/1951   MR# [id number]   Acct# [id number]  03/06/17   9:05 AM  Referring Provider: Siddharth Leavitt MD      Problem list:   Patient Active Problem List   Diagnosis   • Pneumonia of right lower lobe due to infectious organism        Chief Complaint: Hypoxia    Interval history: She was extubated yesterday with a neg CTA for PE. She's on 6 liters NC with sats in the upper 80's. She was smoking \"around 1/2 ppd\" prior to hospitalization. She denies any acute distress and carries on conversation without difficulty. She said she had an echo about 2 years ago per Dr. Lynne. No other aggravating, alleviating factors or associated symptoms.    HPI    Allergy:   Allergies   Allergen Reactions   • Aspirin Anaphylaxis   • Demerol [Meperidine] Anaphylaxis   • Ibuprofen Anaphylaxis   • Neosporin [Neomycin-Bacitracin Zn-Polymyx] Anaphylaxis   • Penicillins Anaphylaxis   • Sulfa Antibiotics Anaphylaxis   • Codeine Hives     Pt can take codeine as long is it is not alot   • Talwin [Pentazocine] Hives   • Tetracyclines & Related    • Morphine And Related Hives       Meds:      budesonide-formoterol 2 puff Inhalation BID - RT   cetirizine 10 mg Oral Daily   citalopram 40 mg Oral Daily   dextromethorphan polistirex ER 30 mg Oral Q12H   enoxaparin 40 mg Subcutaneous Daily   furosemide 40 mg Oral Daily   gabapentin 300 mg Oral Q8H   ipratropium-albuterol 3 mL Nebulization Q4H - RT   levoFLOXacin 750 mg Intravenous Q24H   methylPREDNISolone sodium succinate 40 mg Intravenous Q6H   nicotine 1 patch Transdermal Q24H   oxyCODONE ER 30 mg Oral Q12H   pantoprazole 40 mg Intravenous Q AM        Review of Systems:   Review of Systems "   Constitutional: Negative.    HENT: Negative.    Respiratory: Positive for shortness of breath.    Cardiovascular: Negative.    Gastrointestinal: Negative.    Skin: Negative.    Neurological: Negative.    Psychiatric/Behavioral: Negative.    All other systems reviewed and are negative.      Physical Exam:  Vitals:    03/06/17 0800   BP: 132/54   Pulse: 105   Resp: 26   Temp:    SpO2: (!) 88%       Intake/Output Summary (Last 24 hours) at 03/06/17 0905  Last data filed at 03/06/17 0800   Gross per 24 hour   Intake   2484 ml   Output   2450 ml   Net     34 ml     Physical Exam   Constitutional: She is oriented to person, place, and time. She appears well-developed and well-nourished. She appears ill. No distress. She is not intubated. Nasal cannula in place.   HENT:   Head: Normocephalic and atraumatic.   Eyes: Pupils are equal, round, and reactive to light.   Neck: Normal range of motion. Neck supple.   Cardiovascular: Regular rhythm, S1 normal and S2 normal.  Tachycardia present.    Pulmonary/Chest: Tachypnea noted. She is not intubated. No respiratory distress. She has decreased breath sounds in the right lower field and the left lower field.   Abdominal: Soft. Bowel sounds are normal. She exhibits no distension.   Musculoskeletal: Normal range of motion. She exhibits no edema.   Neurological: She is alert and oriented to person, place, and time.   Skin: Skin is warm and dry.   Psychiatric: She has a normal mood and affect.   Nursing note and vitals reviewed.      Data:     Results from last 7 days  Lab Units 03/06/17  0521 03/04/17  1415   WBC 10*3/mm3 5.19 5.15   HEMOGLOBIN g/dL 9.8* 11.6*   PLATELETS 10*3/mm3 124* 161         Results from last 7 days  Lab Units 03/06/17  0521 03/05/17  1257 03/04/17  1415   SODIUM mmol/L 140  --  139   SODIUM, ARTERIAL mmol/L  --  142.4  --    POTASSIUM mmol/L 3.0*  --  3.7   BUN mg/dL 6  --  6   CREATININE mg/dL 0.64  --  0.75         Results from last 7 days  Lab Units  03/05/17  1257 03/05/17  1149 03/05/17  0250 03/04/17  1920   PH, ARTERIAL pH units 7.419 7.398 7.354 7.296*   PCO2, ARTERIAL mm Hg 39.7 45.7* 46.9* 51.8*   PO2 ART mm Hg 52.7* 52.0* 113.1* 106.9*   FIO2 %  --  60 75 75         Radiology:  Imaging Results (last 24 hours)     Procedure Component Value Units Date/Time    CT Abdomen Pelvis Without Contrast [[id number]] Collected:  03/05/17 0800     Updated:  03/05/17 1259    Narrative:       HISTORY: Abdominal pain     CT ABDOMEN PELVIS WO CONTRAST-  CT evaluation of the abdomen and pelvis was performed without  intravenous or oral contrast. 5 mm sequential transaxial images were  obtained. 2-D sagittal and coronal reconstruction images generated.  Radiation dose equals  mGy-cm.  Automated exposure control dose  reduction technique was implemented.     Small bilateral pleural effusions with associated lower lobe airspace  disease again noted. The 7 mm pulmonary nodule right lower lobe  laterally again recognized. Findings are described in the chest CT  report.     Surgical clips result in significant streak artifact in the upper  abdomen. No definite hepatic lesion observed. The gallbladder is  surgically absent. No definite biliary ductal dilatation appreciated.     Spleen is not enlarged.     There are no adrenal masses or pancreatic lesions.     There are no urinary tract calculi or evidence of obstruction. The  urinary bladder is mildly distended without focal bladder wall  abnormality.     Changes from hysterectomy observed. There are no adnexal masses.     There is a large amount of stool identified throughout the colon. There  are scattered diverticuli particularly in the sigmoid colon without CT  evidence of acute diverticulitis. There is no CT evidence of acute  appendicitis, cecum extending into the pelvis. The small bowel is not  dilated. The duodenal sweep is imaged appropriately. The stomach is not  distended.     Small para-aortic and mesenteric  lymph nodes observed without  pathologically enlarged nodes.     There is no ascites or pneumoperitoneum.     There are atherosclerotic aortoiliofemoral calcifications.     A few spondylosis changes noted in the thoracolumbar spine.     A right femoral vein catheter is identified.       Impression:       1. No CT evidence of an acute intra-abdominal/pelvic pathological  process.  2. Large amount of stool in the colon suggesting constipation.  Diverticulosis without CT evidence of acute diverticulitis.  This report was finalized on 03/05/2017 12:56 by Dr. Eduardo Black MD.    CT Chest Without Contrast [[id number]] Collected:  03/05/17 0753     Updated:  03/05/17 1300    Narrative:       HISTORY: Hypoxia     CT CHEST WO CONTRAST-  CT evaluation of the chest was performed without intravenous contrast. 5  mm sequential transaxial images were obtained. 2-D sagittal and coronal  reconstruction images generated. Radiation dose equals  mGy-cm.   Automated exposure control dose reduction technique was implemented.     Endotracheal tube identified above the juan carlos.     Small mediastinal lymph nodes appreciated without definite mediastinal  or hilar lymphadenopathy. Atherosclerotic aortic and coronary artery  calcifications appreciated.     There is pulmonary emphysema with an upper lobe predominance.  Fibronodular changes/apical pleural thickening observed compatible with  chronic changes.     There are small bilateral pleural effusions with patchy lower lobe  airspace disease. There is diffuse mild reticulonodular interstitial  prominence. There are no significant areas of lung consolidation  identified.     There is a pulmonary nodule identified in the right lower lobe, near the  pleural surface measuring nearly 8 mm, image 53. Imaging follow-up  recommended. There are no pneumothoraces.     Spondylosis changes noted diffusely in the thoracic spine.       Impression:       1. Pulmonary emphysema and chronic lung  changes.  2. Small bilateral pleural effusions with lower lobe airspace disease,  question atelectasis and/or infiltrate. Mild diffuse interstitial  prominence. Mild acute interstitial infiltration not excluded.  3. Right lower lobe nodule. Follow-up recommended.  This report was finalized on 03/05/2017 12:58 by Dr. Eduardo Black MD.    CT Head Without Contrast [[id number]] Collected:  03/05/17 0746     Updated:  03/05/17 1302    Narrative:       CT SCAN OF THE HEAD, WITHOUT CONTRAST:      HISTORY: Altered mental status     COMPARISONS: 01/06/2009      TECHNIQUE:     Radiation dose equals  mGy-cm.  Automated exposure control dose  reduction technique was implemented.     CT evaluation of the head without intravenous contrast. 5 mm transaxial  images were obtained.   2-D sagittal and coronal reconstruction images  were generated.     FINDINGS: Image quality degradation related to patient motion observed.     There is no intra or extra-axial hemorrhage.     There is no mass effect or midline shift.     There is no hydrocephalus.     Dilated perivascular space versus an old lacunar infarct, less likely,  appreciated again, in the left basal ganglia.        Mucosal thickening and mucus retention cyst noted in the maxillary sinus  with partial opacification with ethmoid sinus mucosal thickening. Mild  mucosal thickening observed in the sphenoid sinuses.     Bone window imaging reveals no calvarial abnormality.          Impression:       1. No CT evidence of an acute intracranial process.  2. Mild paranasal sinus disease.                     This report was finalized on 03/05/2017 13:00 by Dr. Eduardo Black MD.    XR Abdomen KUB [[id number]] Collected:  03/05/17 1213     Updated:  03/05/17 1304    Narrative:       HISTORY: Feeding tube placement.     XR ABDOMEN KUB-  Radiograph of the lower chest and upper abdomen was performed to  evaluate the position of a Dobbhoff feeding tube.     The feeding tube is identified in  the stomach with the tip in the distal  body or antrum region.  This report was finalized on 03/05/2017 13:02 by Dr. Eduardo Black MD.    XR Chest 1 View [[id number]] Collected:  03/05/17 0730     Updated:  03/05/17 1306    Narrative:       CHEST, ONE VIEW:     HISTORY: Respiratory distress     COMPARISON: 03/04/2017     A single frontal chest radiograph was obtained.     FINDINGS:     Endotracheal tube is identified in good position above the juan carlos.     COPD and chronic lung changes again identified.     Mild patchy airspace opacities in the right lung base again observed  likely unchanged.     Radiographically, the chest is likely stable.                                  Impression:       1. Stable one-day appearance of the chest.     This report was finalized on 03/05/2017 13:04 by Dr. Eduardo Black MD.    CT Angiogram Chest With & Without Contrast [[id number]] Collected:  03/05/17 1639     Updated:  03/05/17 1643    Narrative:       HISTORY: Hypoxemia     COMPARISON: Chest CT dated 03/04/2017     TECHNIQUE:     CT evaluation of the chest was performed with intravenous contrast. 2 mm  transaxial images were obtained. 3-D reconstruction angiographic images  were generated using a volume rendering technique. Coronal  reconstruction maximum intensity projection images of the pulmonary  arteries and aorta were also generated.     Radiation dose equals  mGy-cm.  Automated exposure control dose  reduction technique was implemented.        FINDINGS:     There is image quality degradation related to patient motion artifact in  the lower lobes. In the right lower lobe, there is low-attenuation  change within the medial basal pulmonary segment felt most likely  related to motion.     No definite CT angiographic evidence of pulmonary embolus appreciated.     The aorta is normal in caliber, without aneurysm or dissection.     Lower lobe airspace disease again appreciated likely atelectasis with  small pleural  effusions.     The right lower lobe pulmonary nodule less conspicuous compared to the  previous examination likely due to the atelectasis.     Pulmonary emphysema again identified with an upper lobe predominance.       Impression:       1. No CT angiographic evidence of pulmonary embolus or acute aortic  pathology.  2. Lower lobe airspace disease, likely atelectasis with small pleural  effusions.  3. Pulmonary emphysema.  This report was finalized on 03/05/2017 16:41 by Dr. Eduardo Black MD.          Pulmonary Assessment:  1. Acute hypoxemic respiratory failure  2. COPD and emphysema  3. RLL nodule, 8mm  4. Tobacco abuse      Plan:   · Echo with bubble study today to rule out a potential blood flow/shunting issue  · Solu-medrol IV 40 mg today x 3 doses  · Continue nebs and Symbicort   · Smoking cessation  · Will need 3 month f/u CT for RLL nodule  · Limit sedation meds      Electronically signed by THIERNO Souza, 03/06/17, 9:05 AM          Electronically signed by THIERNO Souza at 3/6/2017  9:31 AM      Siddharth Leavitt MD at 3/6/2017  9:26 AM  Version 2 of 2             [facility] Medicine Services  INPATIENT PROGRESS NOTE    Length of Stay: 2  Date of Admission: 3/4/2017  Primary Care Physician: No Known Provider    Subjective   Chief Complaint: Dyspnea    HPI   66 YO CF admitted on 3/4/17. Patient is intubated and on pressors.  Extubated on 3/4.  Patient still have shortness of breath on 6 L of oxygen.  She is more alert today.  Moving all extremity.      Review of Systems   Constitutional: Positive for activity change, appetite change, diaphoresis and fatigue. Negative for chills and fever.   HENT: Negative for hearing loss, nosebleeds, tinnitus and trouble swallowing.    Eyes: Negative for visual disturbance.   Respiratory: Positive for apnea, cough and wheezing. Negative for chest tightness and shortness of breath.    Cardiovascular: Negative for  chest pain, palpitations and leg swelling.   Gastrointestinal: Negative for abdominal distention, abdominal pain, blood in stool, constipation, diarrhea, nausea and vomiting.   Endocrine: Negative for cold intolerance, heat intolerance, polydipsia, polyphagia and polyuria.   Genitourinary: Negative for decreased urine volume, difficulty urinating, dysuria, flank pain, frequency and hematuria.   Musculoskeletal: Positive for arthralgias, back pain, gait problem and myalgias. Negative for joint swelling.   Skin: Negative for rash.   Allergic/Immunologic: Negative for immunocompromised state.   Neurological: Positive for weakness. Negative for dizziness, syncope, light-headedness and headaches.   Hematological: Negative for adenopathy. Does not bruise/bleed easily.   Psychiatric/Behavioral: Positive for decreased concentration. Negative for confusion, hallucinations, sleep disturbance and suicidal ideas. The patient is not nervous/anxious.           All pertinent negatives and positives are as above. All other systems have been reviewed and are negative unless otherwise stated.     Objective    Temp:  [99.1 °F (37.3 °C)-100 °F (37.8 °C)] 99.8 °F (37.7 °C)  Heart Rate:  [] 105  Resp:  [14-26] 26  BP: (103-137)/(45-71) 132/54  FiO2 (%):  [60 %] 60 %    Intake/Output Summary (Last 24 hours) at 03/06/17 0928  Last data filed at 03/06/17 0800   Gross per 24 hour   Intake   2484 ml   Output   2450 ml   Net     34 ml     Physical Exam   Constitutional: She is oriented to person, place, and time. She appears well-developed and well-nourished.   HENT:   Head: Normocephalic and atraumatic.   Eyes: Conjunctivae and EOM are normal. Pupils are equal, round, and reactive to light.   Neck: Neck supple. No JVD present. No thyromegaly present.   Cardiovascular: Normal rate, regular rhythm, normal heart sounds and intact distal pulses.  Exam reveals no gallop and no friction rub.    No murmur heard.  Pulmonary/Chest: Effort  normal. No respiratory distress. She has wheezes. She has no rales. She exhibits no tenderness.   Abdominal: Soft. Bowel sounds are normal. She exhibits no distension. There is no tenderness. There is no rebound and no guarding.   Musculoskeletal: Normal range of motion. She exhibits no edema, tenderness or deformity.   Lymphadenopathy:     She has no cervical adenopathy.   Neurological: She is alert and oriented to person, place, and time. She displays normal reflexes. No cranial nerve deficit. She exhibits abnormal muscle tone. Coordination abnormal.   Skin: Skin is warm and dry. No rash noted.   Psychiatric: She has a normal mood and affect. Her behavior is normal.   Nursing note and vitals reviewed.      Results Review:  Lab Results (last 24 hours)     Procedure Component Value Units Date/Time    Blood Gas, Arterial [[id number]]  (Abnormal) Collected:  03/05/17 1149    Specimen:  Arterial Blood Updated:  03/05/17 1151     Site Arterial: right radial      Michael's Test --       Documented in Rapid Comm        pH, Arterial 7.398 pH units      pCO2, Arterial 45.7 (H) mm Hg      pO2, Arterial 52.0 (L) mm Hg      HCO3, Arterial 27.5 (H) mmol/L      Base Excess, Arterial 2.1 (H) mmol/L      O2 Saturation, Arterial 86.6 (L) %      O2 Saturation Calculated 86.6 (L) %      Barometric Pressure for Blood Gas -- mmHg       Component not reported at this site.        Modality Ventilator      FIO2 60 %      Ventilator Mode AC      Rate 14.0 Breaths/minute      PEEP 5.0      Vent CPAP/PEEP 5.0     Narrative:       Serial Number: [id number]    : [id number]    Blood Gas, Arterial With Co-Ox [[id number]]  (Abnormal) Collected:  03/05/17 1257    Specimen:  Arterial Blood Updated:  03/05/17 1258     Site Arterial: right brachial      Michael's Test --       Documented in Rapid Comm        pH, Arterial 7.419 pH units      pCO2, Arterial 39.7 mm Hg      pO2, Arterial 52.7 (L) mm Hg      HCO3, Arterial 25.1 mmol/L      Base Excess, Arterial  0.7 mmol/L      Hemoglobin, Blood Gas 12.1 g/dL      Hematocrit, Blood Gas 36.0 (L) %      Oxyhemoglobin 88.7 (L) %      Methemoglobin 0.4 %      Carboxyhemoglobin 0.4 %      Sodium, Arterial 142.4 mmol/L      Potassium, Arterial 3.67 mmol/L      Barometric Pressure for Blood Gas -- mmHg       Component not reported at this site.        Modality Cannula      Flow Rate 6.00 lpm     Narrative:       Serial Number: [id number]    : [id number]    Ammonia [[id number]]  (Abnormal) Collected:  03/05/17 1333    Specimen:  Blood Updated:  03/05/17 1354     Ammonia <9 (L) umol/L     TSH [[id number]]  (Abnormal) Collected:  03/05/17 1333    Specimen:  Blood Updated:  03/05/17 1426     TSH 0.208 (L) mIU/mL     Blood Culture [[id number]]  (Normal) Collected:  03/04/17 1427    Specimen:  Blood from Arm, Right Updated:  03/05/17 1501     Blood Culture No growth at 24 hours     Blood Culture [[id number]]  (Normal) Collected:  03/04/17 1415    Specimen:  Blood from Arm, Left Updated:  03/05/17 1501     Blood Culture No growth at 24 hours     Folate [[id number]] Collected:  03/05/17 1333    Specimen:  Blood Updated:  03/05/17 1501     Folate 4.24 ng/mL     CBC (No Diff) [[id number]]  (Abnormal) Collected:  03/06/17 0521    Specimen:  Blood Updated:  03/06/17 0541     WBC 5.19 10*3/mm3      RBC 3.15 (L) 10*6/mm3      Hemoglobin 9.8 (L) g/dL      Hematocrit 30.1 (L) %      MCV 95.6 fL      MCH 31.1 pg      MCHC 32.6 (L) g/dL      RDW 15.5 (H) %      RDW-SD 53.5 fl      MPV 11.6 fL      Platelets 124 (L) 10*3/mm3     Basic Metabolic Panel [[id number]]  (Abnormal) Collected:  03/06/17 0521    Specimen:  Blood Updated:  03/06/17 0553     Glucose 103 (H) mg/dL      BUN 6 mg/dL      Creatinine 0.64 mg/dL      Sodium 140 mmol/L      Potassium 3.0 (L) mmol/L      Chloride 105 mmol/L      CO2 32.0 (H) mmol/L      Calcium 7.7 (L) mg/dL      eGFR Non African Amer 93 mL/min/1.73      BUN/Creatinine Ratio 9.4      Anion Gap 3.0 (L) mmol/L     Narrative:       GFR  Normal >60  Chronic Kidney Disease <60  Kidney Failure <15    Troponin [[id number]]  (Normal) Collected:  03/06/17 0521    Specimen:  Blood Updated:  03/06/17 0603     Troponin I 0.014 ng/mL     BNP [[id number]]  (Abnormal) Collected:  03/06/17 0521    Specimen:  Blood Updated:  03/06/17 0603     proBNP 2510.0 (H) pg/mL     Vitamin B12 [[id number]]  (Abnormal) Collected:  03/06/17 0521    Specimen:  Blood Updated:  03/06/17 0643     Vitamin B-12 225 (L) pg/mL            Cultures:  BLOOD CULTURE   Date Value Ref Range Status   03/04/2017 No growth at 24 hours  Preliminary   03/04/2017 No growth at 24 hours  Preliminary       Radiology Data:    Imaging Results (last 24 hours)     Procedure Component Value Units Date/Time    CT Abdomen Pelvis Without Contrast [[id number]] Collected:  03/05/17 0800     Updated:  03/05/17 1259    Narrative:       HISTORY: Abdominal pain     CT ABDOMEN PELVIS WO CONTRAST-  CT evaluation of the abdomen and pelvis was performed without  intravenous or oral contrast. 5 mm sequential transaxial images were  obtained. 2-D sagittal and coronal reconstruction images generated.  Radiation dose equals  mGy-cm.  Automated exposure control dose  reduction technique was implemented.     Small bilateral pleural effusions with associated lower lobe airspace  disease again noted. The 7 mm pulmonary nodule right lower lobe  laterally again recognized. Findings are described in the chest CT  report.     Surgical clips result in significant streak artifact in the upper  abdomen. No definite hepatic lesion observed. The gallbladder is  surgically absent. No definite biliary ductal dilatation appreciated.     Spleen is not enlarged.     There are no adrenal masses or pancreatic lesions.     There are no urinary tract calculi or evidence of obstruction. The  urinary bladder is mildly distended without focal bladder wall  abnormality.     Changes from hysterectomy observed. There are no adnexal masses.      There is a large amount of stool identified throughout the colon. There  are scattered diverticuli particularly in the sigmoid colon without CT  evidence of acute diverticulitis. There is no CT evidence of acute  appendicitis, cecum extending into the pelvis. The small bowel is not  dilated. The duodenal sweep is imaged appropriately. The stomach is not  distended.     Small para-aortic and mesenteric lymph nodes observed without  pathologically enlarged nodes.     There is no ascites or pneumoperitoneum.     There are atherosclerotic aortoiliofemoral calcifications.     A few spondylosis changes noted in the thoracolumbar spine.     A right femoral vein catheter is identified.       Impression:       1. No CT evidence of an acute intra-abdominal/pelvic pathological  process.  2. Large amount of stool in the colon suggesting constipation.  Diverticulosis without CT evidence of acute diverticulitis.  This report was finalized on 03/05/2017 12:56 by Dr. Eduardo Black MD.    CT Chest Without Contrast [[id number]] Collected:  03/05/17 0753     Updated:  03/05/17 1300    Narrative:       HISTORY: Hypoxia     CT CHEST WO CONTRAST-  CT evaluation of the chest was performed without intravenous contrast. 5  mm sequential transaxial images were obtained. 2-D sagittal and coronal  reconstruction images generated. Radiation dose equals  mGy-cm.   Automated exposure control dose reduction technique was implemented.     Endotracheal tube identified above the juan carlos.     Small mediastinal lymph nodes appreciated without definite mediastinal  or hilar lymphadenopathy. Atherosclerotic aortic and coronary artery  calcifications appreciated.     There is pulmonary emphysema with an upper lobe predominance.  Fibronodular changes/apical pleural thickening observed compatible with  chronic changes.     There are small bilateral pleural effusions with patchy lower lobe  airspace disease. There is diffuse mild reticulonodular  interstitial  prominence. There are no significant areas of lung consolidation  identified.     There is a pulmonary nodule identified in the right lower lobe, near the  pleural surface measuring nearly 8 mm, image 53. Imaging follow-up  recommended. There are no pneumothoraces.     Spondylosis changes noted diffusely in the thoracic spine.       Impression:       1. Pulmonary emphysema and chronic lung changes.  2. Small bilateral pleural effusions with lower lobe airspace disease,  question atelectasis and/or infiltrate. Mild diffuse interstitial  prominence. Mild acute interstitial infiltration not excluded.  3. Right lower lobe nodule. Follow-up recommended.  This report was finalized on 03/05/2017 12:58 by Dr. Eduardo Black MD.    CT Head Without Contrast [[id number]] Collected:  03/05/17 0746     Updated:  03/05/17 1302    Narrative:       CT SCAN OF THE HEAD, WITHOUT CONTRAST:      HISTORY: Altered mental status     COMPARISONS: 01/06/2009      TECHNIQUE:     Radiation dose equals  mGy-cm.  Automated exposure control dose  reduction technique was implemented.     CT evaluation of the head without intravenous contrast. 5 mm transaxial  images were obtained.   2-D sagittal and coronal reconstruction images  were generated.     FINDINGS: Image quality degradation related to patient motion observed.     There is no intra or extra-axial hemorrhage.     There is no mass effect or midline shift.     There is no hydrocephalus.     Dilated perivascular space versus an old lacunar infarct, less likely,  appreciated again, in the left basal ganglia.        Mucosal thickening and mucus retention cyst noted in the maxillary sinus  with partial opacification with ethmoid sinus mucosal thickening. Mild  mucosal thickening observed in the sphenoid sinuses.     Bone window imaging reveals no calvarial abnormality.          Impression:       1. No CT evidence of an acute intracranial process.  2. Mild paranasal sinus  disease.                     This report was finalized on 03/05/2017 13:00 by Dr. Eduardo Black MD.    XR Abdomen KUB [[id number]] Collected:  03/05/17 1213     Updated:  03/05/17 1304    Narrative:       HISTORY: Feeding tube placement.     XR ABDOMEN KUB-  Radiograph of the lower chest and upper abdomen was performed to  evaluate the position of a Dobbhoff feeding tube.     The feeding tube is identified in the stomach with the tip in the distal  body or antrum region.  This report was finalized on 03/05/2017 13:02 by Dr. Eduardo Black MD.    XR Chest 1 View [[id number]] Collected:  03/05/17 0730     Updated:  03/05/17 1306    Narrative:       CHEST, ONE VIEW:     HISTORY: Respiratory distress     COMPARISON: 03/04/2017     A single frontal chest radiograph was obtained.     FINDINGS:     Endotracheal tube is identified in good position above the juan carlos.     COPD and chronic lung changes again identified.     Mild patchy airspace opacities in the right lung base again observed  likely unchanged.     Radiographically, the chest is likely stable.                                  Impression:       1. Stable one-day appearance of the chest.     This report was finalized on 03/05/2017 13:04 by Dr. Eduardo Black MD.    CT Angiogram Chest With & Without Contrast [[id number]] Collected:  03/05/17 1639     Updated:  03/05/17 1643    Narrative:       HISTORY: Hypoxemia     COMPARISON: Chest CT dated 03/04/2017     TECHNIQUE:     CT evaluation of the chest was performed with intravenous contrast. 2 mm  transaxial images were obtained. 3-D reconstruction angiographic images  were generated using a volume rendering technique. Coronal  reconstruction maximum intensity projection images of the pulmonary  arteries and aorta were also generated.     Radiation dose equals  mGy-cm.  Automated exposure control dose  reduction technique was implemented.        FINDINGS:     There is image quality degradation related to patient motion  artifact in  the lower lobes. In the right lower lobe, there is low-attenuation  change within the medial basal pulmonary segment felt most likely  related to motion.     No definite CT angiographic evidence of pulmonary embolus appreciated.     The aorta is normal in caliber, without aneurysm or dissection.     Lower lobe airspace disease again appreciated likely atelectasis with  small pleural effusions.     The right lower lobe pulmonary nodule less conspicuous compared to the  previous examination likely due to the atelectasis.     Pulmonary emphysema again identified with an upper lobe predominance.       Impression:       1. No CT angiographic evidence of pulmonary embolus or acute aortic  pathology.  2. Lower lobe airspace disease, likely atelectasis with small pleural  effusions.  3. Pulmonary emphysema.  This report was finalized on 03/05/2017 16:41 by Dr. Eduardo Black MD.          Allergies   Allergen Reactions   • Aspirin Anaphylaxis   • Demerol [Meperidine] Anaphylaxis   • Ibuprofen Anaphylaxis   • Neosporin [Neomycin-Bacitracin Zn-Polymyx] Anaphylaxis   • Penicillins Anaphylaxis   • Sulfa Antibiotics Anaphylaxis   • Codeine Hives     Pt can take codeine as long is it is not alot   • Talwin [Pentazocine] Hives   • Tetracyclines & Related    • Morphine And Related Hives       Scheduled meds:     budesonide-formoterol 2 puff Inhalation BID - RT   cetirizine 10 mg Oral Daily   citalopram 40 mg Oral Daily   dextromethorphan polistirex ER 30 mg Oral Q12H   enoxaparin 40 mg Subcutaneous Daily   furosemide 40 mg Oral Daily   gabapentin 300 mg Oral Q8H   ipratropium-albuterol 3 mL Nebulization Q4H - RT   levoFLOXacin 750 mg Intravenous Q24H   methylPREDNISolone sodium succinate 40 mg Intravenous Q6H   nicotine 1 patch Transdermal Q24H   oxyCODONE ER 30 mg Oral Q12H   pantoprazole 40 mg Intravenous Q AM       PRN meds:  •  acetaminophen  •  acetaminophen  •  ALPRAZolam  •  enalaprilat  •  hydrALAZINE  •   ondansetron  •  oxyCODONE-acetaminophen  •  sodium chloride  •  sodium chloride    Assessment/Plan     Active Problems:    Pneumonia of right lower lobe due to infectious organism    COPD (chronic obstructive pulmonary disease)    Hypokalemia    Anemia    Respiratory failure      Plan:  Pneumonia/COPD/respiratory failureHypoxia with respiratory distress-Extubated 3/6.  Duonebs. Currently  Levaquin.  By mouth steroids.    AMS- question hypoxia/fever vs possible OD. Apparently there has been a HX of depression since the passing of her  in July. Neurology consulted.     Hypokalemia. kdur x 3 days.    Depression/Anxiety- xanax.     Abdominal pain- constipation on CT    Hypotension with HX of hypertension- off levaphed    Abnormal TSH- Free t4 and free t3.    Chronic pain-  on OxyContin, and  Percocet when necessary     Anemia- stable. [place] panel.    Discharge Planning:  pending    Siddharth Leavitt MD   03/06/17   9:28 AM                     Electronically signed by Siddharth Leavitt MD at 3/6/2017 10:00 AM      Siddharth Leavitt MD at 3/6/2017  9:26 AM  Version 1 of 2             [facility] Medicine Services  INPATIENT PROGRESS NOTE    Length of Stay: 2  Date of Admission: 3/4/2017  Primary Care Physician: No Known Provider    Subjective   Chief Complaint: Dyspnea    HPI   64 YO CF admitted on 3/4/17. Patient is intubated and on pressors.  Extubated on 3/4.  Patient still have shortness of breath on 6 L of oxygen.  She is more alert today.  Moving all extremity.      Review of Systems   Constitutional: Positive for activity change, appetite change, diaphoresis and fatigue. Negative for chills and fever.   HENT: Negative for hearing loss, nosebleeds, tinnitus and trouble swallowing.    Eyes: Negative for visual disturbance.   Respiratory: Positive for apnea, cough and wheezing. Negative for chest tightness and shortness of breath.    Cardiovascular: Negative for chest pain, palpitations  and leg swelling.   Gastrointestinal: Negative for abdominal distention, abdominal pain, blood in stool, constipation, diarrhea, nausea and vomiting.   Endocrine: Negative for cold intolerance, heat intolerance, polydipsia, polyphagia and polyuria.   Genitourinary: Negative for decreased urine volume, difficulty urinating, dysuria, flank pain, frequency and hematuria.   Musculoskeletal: Positive for arthralgias, back pain, gait problem and myalgias. Negative for joint swelling.   Skin: Negative for rash.   Allergic/Immunologic: Negative for immunocompromised state.   Neurological: Positive for weakness. Negative for dizziness, syncope, light-headedness and headaches.   Hematological: Negative for adenopathy. Does not bruise/bleed easily.   Psychiatric/Behavioral: Positive for decreased concentration. Negative for confusion, hallucinations, sleep disturbance and suicidal ideas. The patient is not nervous/anxious.           All pertinent negatives and positives are as above. All other systems have been reviewed and are negative unless otherwise stated.     Objective    Temp:  [99.1 °F (37.3 °C)-100 °F (37.8 °C)] 99.8 °F (37.7 °C)  Heart Rate:  [] 105  Resp:  [14-26] 26  BP: (103-137)/(45-71) 132/54  FiO2 (%):  [60 %] 60 %    Intake/Output Summary (Last 24 hours) at 03/06/17 0928  Last data filed at 03/06/17 0800   Gross per 24 hour   Intake   2484 ml   Output   2450 ml   Net     34 ml     Physical Exam   Constitutional: She is oriented to person, place, and time. She appears well-developed and well-nourished.   HENT:   Head: Normocephalic and atraumatic.   Eyes: Conjunctivae and EOM are normal. Pupils are equal, round, and reactive to light.   Neck: Neck supple. No JVD present. No thyromegaly present.   Cardiovascular: Normal rate, regular rhythm, normal heart sounds and intact distal pulses.  Exam reveals no gallop and no friction rub.    No murmur heard.  Pulmonary/Chest: Effort normal. No respiratory  distress. She has wheezes. She has no rales. She exhibits no tenderness.   Abdominal: Soft. Bowel sounds are normal. She exhibits no distension. There is no tenderness. There is no rebound and no guarding.   Musculoskeletal: Normal range of motion. She exhibits no edema, tenderness or deformity.   Lymphadenopathy:     She has no cervical adenopathy.   Neurological: She is alert and oriented to person, place, and time. She displays normal reflexes. No cranial nerve deficit. She exhibits abnormal muscle tone. Coordination abnormal.   Skin: Skin is warm and dry. No rash noted.   Psychiatric: She has a normal mood and affect. Her behavior is normal.   Nursing note and vitals reviewed.      Results Review:  Lab Results (last 24 hours)     Procedure Component Value Units Date/Time    Blood Gas, Arterial [[id number]]  (Abnormal) Collected:  03/05/17 1149    Specimen:  Arterial Blood Updated:  03/05/17 1151     Site Arterial: right radial      Michael's Test --       Documented in Rapid Comm        pH, Arterial 7.398 pH units      pCO2, Arterial 45.7 (H) mm Hg      pO2, Arterial 52.0 (L) mm Hg      HCO3, Arterial 27.5 (H) mmol/L      Base Excess, Arterial 2.1 (H) mmol/L      O2 Saturation, Arterial 86.6 (L) %      O2 Saturation Calculated 86.6 (L) %      Barometric Pressure for Blood Gas -- mmHg       Component not reported at this site.        Modality Ventilator      FIO2 60 %      Ventilator Mode AC      Rate 14.0 Breaths/minute      PEEP 5.0      Vent CPAP/PEEP 5.0     Narrative:       Serial Number: [id number]    : [id number]    Blood Gas, Arterial With Co-Ox [[id number]]  (Abnormal) Collected:  03/05/17 1257    Specimen:  Arterial Blood Updated:  03/05/17 1258     Site Arterial: right brachial      Michael's Test --       Documented in Rapid Comm        pH, Arterial 7.419 pH units      pCO2, Arterial 39.7 mm Hg      pO2, Arterial 52.7 (L) mm Hg      HCO3, Arterial 25.1 mmol/L      Base Excess, Arterial 0.7 mmol/L       Hemoglobin, Blood Gas 12.1 g/dL      Hematocrit, Blood Gas 36.0 (L) %      Oxyhemoglobin 88.7 (L) %      Methemoglobin 0.4 %      Carboxyhemoglobin 0.4 %      Sodium, Arterial 142.4 mmol/L      Potassium, Arterial 3.67 mmol/L      Barometric Pressure for Blood Gas -- mmHg       Component not reported at this site.        Modality Cannula      Flow Rate 6.00 lpm     Narrative:       Serial Number: [id number]    : [id number]    Ammonia [[id number]]  (Abnormal) Collected:  03/05/17 1333    Specimen:  Blood Updated:  03/05/17 1354     Ammonia <9 (L) umol/L     TSH [[id number]]  (Abnormal) Collected:  03/05/17 1333    Specimen:  Blood Updated:  03/05/17 1426     TSH 0.208 (L) mIU/mL     Blood Culture [[id number]]  (Normal) Collected:  03/04/17 1427    Specimen:  Blood from Arm, Right Updated:  03/05/17 1501     Blood Culture No growth at 24 hours     Blood Culture [[id number]]  (Normal) Collected:  03/04/17 1415    Specimen:  Blood from Arm, Left Updated:  03/05/17 1501     Blood Culture No growth at 24 hours     Folate [[id number]] Collected:  03/05/17 1333    Specimen:  Blood Updated:  03/05/17 1501     Folate 4.24 ng/mL     CBC (No Diff) [[id number]]  (Abnormal) Collected:  03/06/17 0521    Specimen:  Blood Updated:  03/06/17 0541     WBC 5.19 10*3/mm3      RBC 3.15 (L) 10*6/mm3      Hemoglobin 9.8 (L) g/dL      Hematocrit 30.1 (L) %      MCV 95.6 fL      MCH 31.1 pg      MCHC 32.6 (L) g/dL      RDW 15.5 (H) %      RDW-SD 53.5 fl      MPV 11.6 fL      Platelets 124 (L) 10*3/mm3     Basic Metabolic Panel [[id number]]  (Abnormal) Collected:  03/06/17 0521    Specimen:  Blood Updated:  03/06/17 0553     Glucose 103 (H) mg/dL      BUN 6 mg/dL      Creatinine 0.64 mg/dL      Sodium 140 mmol/L      Potassium 3.0 (L) mmol/L      Chloride 105 mmol/L      CO2 32.0 (H) mmol/L      Calcium 7.7 (L) mg/dL      eGFR Non African Amer 93 mL/min/1.73      BUN/Creatinine Ratio 9.4      Anion Gap 3.0 (L) mmol/L     Narrative:       GFR Normal  >60  Chronic Kidney Disease <60  Kidney Failure <15    Troponin [[id number]]  (Normal) Collected:  03/06/17 0521    Specimen:  Blood Updated:  03/06/17 0603     Troponin I 0.014 ng/mL     BNP [[id number]]  (Abnormal) Collected:  03/06/17 0521    Specimen:  Blood Updated:  03/06/17 0603     proBNP 2510.0 (H) pg/mL     Vitamin B12 [[id number]]  (Abnormal) Collected:  03/06/17 0521    Specimen:  Blood Updated:  03/06/17 0643     Vitamin B-12 225 (L) pg/mL            Cultures:  BLOOD CULTURE   Date Value Ref Range Status   03/04/2017 No growth at 24 hours  Preliminary   03/04/2017 No growth at 24 hours  Preliminary       Radiology Data:    Imaging Results (last 24 hours)     Procedure Component Value Units Date/Time    CT Abdomen Pelvis Without Contrast [[id number]] Collected:  03/05/17 0800     Updated:  03/05/17 1259    Narrative:       HISTORY: Abdominal pain     CT ABDOMEN PELVIS WO CONTRAST-  CT evaluation of the abdomen and pelvis was performed without  intravenous or oral contrast. 5 mm sequential transaxial images were  obtained. 2-D sagittal and coronal reconstruction images generated.  Radiation dose equals  mGy-cm.  Automated exposure control dose  reduction technique was implemented.     Small bilateral pleural effusions with associated lower lobe airspace  disease again noted. The 7 mm pulmonary nodule right lower lobe  laterally again recognized. Findings are described in the chest CT  report.     Surgical clips result in significant streak artifact in the upper  abdomen. No definite hepatic lesion observed. The gallbladder is  surgically absent. No definite biliary ductal dilatation appreciated.     Spleen is not enlarged.     There are no adrenal masses or pancreatic lesions.     There are no urinary tract calculi or evidence of obstruction. The  urinary bladder is mildly distended without focal bladder wall  abnormality.     Changes from hysterectomy observed. There are no adnexal masses.     There is a  large amount of stool identified throughout the colon. There  are scattered diverticuli particularly in the sigmoid colon without CT  evidence of acute diverticulitis. There is no CT evidence of acute  appendicitis, cecum extending into the pelvis. The small bowel is not  dilated. The duodenal sweep is imaged appropriately. The stomach is not  distended.     Small para-aortic and mesenteric lymph nodes observed without  pathologically enlarged nodes.     There is no ascites or pneumoperitoneum.     There are atherosclerotic aortoiliofemoral calcifications.     A few spondylosis changes noted in the thoracolumbar spine.     A right femoral vein catheter is identified.       Impression:       1. No CT evidence of an acute intra-abdominal/pelvic pathological  process.  2. Large amount of stool in the colon suggesting constipation.  Diverticulosis without CT evidence of acute diverticulitis.  This report was finalized on 03/05/2017 12:56 by Dr. Eduardo Black MD.    CT Chest Without Contrast [[id number]] Collected:  03/05/17 0753     Updated:  03/05/17 1300    Narrative:       HISTORY: Hypoxia     CT CHEST WO CONTRAST-  CT evaluation of the chest was performed without intravenous contrast. 5  mm sequential transaxial images were obtained. 2-D sagittal and coronal  reconstruction images generated. Radiation dose equals  mGy-cm.   Automated exposure control dose reduction technique was implemented.     Endotracheal tube identified above the juan carlos.     Small mediastinal lymph nodes appreciated without definite mediastinal  or hilar lymphadenopathy. Atherosclerotic aortic and coronary artery  calcifications appreciated.     There is pulmonary emphysema with an upper lobe predominance.  Fibronodular changes/apical pleural thickening observed compatible with  chronic changes.     There are small bilateral pleural effusions with patchy lower lobe  airspace disease. There is diffuse mild reticulonodular  interstitial  prominence. There are no significant areas of lung consolidation  identified.     There is a pulmonary nodule identified in the right lower lobe, near the  pleural surface measuring nearly 8 mm, image 53. Imaging follow-up  recommended. There are no pneumothoraces.     Spondylosis changes noted diffusely in the thoracic spine.       Impression:       1. Pulmonary emphysema and chronic lung changes.  2. Small bilateral pleural effusions with lower lobe airspace disease,  question atelectasis and/or infiltrate. Mild diffuse interstitial  prominence. Mild acute interstitial infiltration not excluded.  3. Right lower lobe nodule. Follow-up recommended.  This report was finalized on 03/05/2017 12:58 by Dr. Eduardo Black MD.    CT Head Without Contrast [[id number]] Collected:  03/05/17 0746     Updated:  03/05/17 1302    Narrative:       CT SCAN OF THE HEAD, WITHOUT CONTRAST:      HISTORY: Altered mental status     COMPARISONS: 01/06/2009      TECHNIQUE:     Radiation dose equals  mGy-cm.  Automated exposure control dose  reduction technique was implemented.     CT evaluation of the head without intravenous contrast. 5 mm transaxial  images were obtained.   2-D sagittal and coronal reconstruction images  were generated.     FINDINGS: Image quality degradation related to patient motion observed.     There is no intra or extra-axial hemorrhage.     There is no mass effect or midline shift.     There is no hydrocephalus.     Dilated perivascular space versus an old lacunar infarct, less likely,  appreciated again, in the left basal ganglia.        Mucosal thickening and mucus retention cyst noted in the maxillary sinus  with partial opacification with ethmoid sinus mucosal thickening. Mild  mucosal thickening observed in the sphenoid sinuses.     Bone window imaging reveals no calvarial abnormality.          Impression:       1. No CT evidence of an acute intracranial process.  2. Mild paranasal sinus  disease.                     This report was finalized on 03/05/2017 13:00 by Dr. Eduardo Black MD.    XR Abdomen KUB [[id number]] Collected:  03/05/17 1213     Updated:  03/05/17 1304    Narrative:       HISTORY: Feeding tube placement.     XR ABDOMEN KUB-  Radiograph of the lower chest and upper abdomen was performed to  evaluate the position of a Dobbhoff feeding tube.     The feeding tube is identified in the stomach with the tip in the distal  body or antrum region.  This report was finalized on 03/05/2017 13:02 by Dr. Eduardo Black MD.    XR Chest 1 View [[id number]] Collected:  03/05/17 0730     Updated:  03/05/17 1306    Narrative:       CHEST, ONE VIEW:     HISTORY: Respiratory distress     COMPARISON: 03/04/2017     A single frontal chest radiograph was obtained.     FINDINGS:     Endotracheal tube is identified in good position above the juan carlos.     COPD and chronic lung changes again identified.     Mild patchy airspace opacities in the right lung base again observed  likely unchanged.     Radiographically, the chest is likely stable.                                  Impression:       1. Stable one-day appearance of the chest.     This report was finalized on 03/05/2017 13:04 by Dr. Eduardo Black MD.    CT Angiogram Chest With & Without Contrast [[id number]] Collected:  03/05/17 1639     Updated:  03/05/17 1643    Narrative:       HISTORY: Hypoxemia     COMPARISON: Chest CT dated 03/04/2017     TECHNIQUE:     CT evaluation of the chest was performed with intravenous contrast. 2 mm  transaxial images were obtained. 3-D reconstruction angiographic images  were generated using a volume rendering technique. Coronal  reconstruction maximum intensity projection images of the pulmonary  arteries and aorta were also generated.     Radiation dose equals  mGy-cm.  Automated exposure control dose  reduction technique was implemented.        FINDINGS:     There is image quality degradation related to patient motion  ondansetron  •  oxyCODONE-acetaminophen  •  sodium chloride  •  sodium chloride    Assessment/Plan     Active Problems:    Pneumonia of right lower lobe due to infectious organism    COPD (chronic obstructive pulmonary disease)    Hypokalemia    Anemia    Respiratory failure      Plan:  Pneumonia/COPD/respiratory failureHypoxia with respiratory distress-Extubated 3/6.  Duonebs. Currently  Levaquin.     AMS- question hypoxia/fever vs possible OD. Apparently there has been a HX of depression since the passing of her  in July. Neurology consulted.     Hypokalemia. kdur x 3 days.    Depression/Anxiety- xanax.     Abdominal pain- constipation on CT    Hypotension with HX of hypertension- off levaphed    Abnormal TSH- Free t4 and free t3.    Chronic pain-  on OxyContin, and  Percocet when necessary     Anemia- stable. [place] panel.    Discharge Planning:  pending    Siddharth Leavitt MD   03/06/17   9:28 AM                     Electronically signed by Siddharth Leavitt MD at 3/6/2017  9:58 AM      Dre Deal MD at 3/6/2017 10:35 AM  Version 1 of 1           Neurology Progress Note      Chief Complaint:  Altered mental status    Subjective     Subjective:    The patient has been extubated and is talkative this morning.  She is oriented ×3.  She denies taking excessive medications.  She does admit to not sleeping for the past 4 days.  She is having difficulties after losing her  and having difficulties in her finances as well which is causing her a great deal of stress and anxiety which is ultimately leading to insomnia.  She is in better spirits this morning.  Medications:  Current Facility-Administered Medications   Medication Dose Route Frequency Provider Last Rate Last Dose   • acetaminophen (TYLENOL) suppository 325 mg  325 mg Rectal Q4H PRN Colt Sargent DO       • acetaminophen (TYLENOL) tablet 650 mg  650 mg Oral Q4H PRN Colt Sargent, DO       • ALPRAZolam (XANAX) tablet 0.5 mg  0.5 mg Oral  4x Daily PRN Colt Sargent DO   0.5 mg at 03/06/17 0626   • budesonide-formoterol (SYMBICORT) 160-4.5 MCG/ACT inhaler 2 puff  2 puff Inhalation BID - RT Colt Sargent DO   2 puff at 03/06/17 0729   • citalopram (CeleXA) tablet 40 mg  40 mg Oral Daily Colt Sargent DO   40 mg at 03/06/17 0904   • dextromethorphan polistirex ER (DELSYM) 30 MG/5ML oral suspension 30 mg  30 mg Oral Q12H Sydnee Armendariz, APRN   30 mg at 03/06/17 0927   • docusate sodium (COLACE) capsule 100 mg  100 mg Oral BID Siddharth Leavitt MD       • enalaprilat (VASOTEC) injection 0.625 mg  0.625 mg Intravenous Q6H PRN Colt Sargent DO       • enoxaparin (LOVENOX) syringe 40 mg  40 mg Subcutaneous Daily Colt Sargent DO   40 mg at 03/06/17 0903   • furosemide (LASIX) tablet 40 mg  40 mg Oral Daily Colt Sargent DO   40 mg at 03/06/17 0903   • gabapentin (NEURONTIN) capsule 300 mg  300 mg Oral Q8H Colt Sargent DO   300 mg at 03/06/17 0518   • hydrALAZINE (APRESOLINE) injection 10 mg  10 mg Intravenous Q6H PRN Colt Sargent DO       • ipratropium-albuterol (DUO-NEB) nebulizer solution 3 mL  3 mL Nebulization Q4H - RT Ruiz Odonnell MD   3 mL at 03/06/17 0730   • levoFLOXacin (LEVAQUIN) 750 mg/150 mL D5W (premix) (LEVAQUIN) 750 mg  750 mg Intravenous Q24H Ruiz Odonnell MD   750 mg at 03/05/17 1434   • methylPREDNISolone sodium succinate (SOLU-Medrol) injection 40 mg  40 mg Intravenous Q6H Sydnee Armendariz APRN   40 mg at 03/06/17 0927   • nicotine (NICODERM CQ) 21 MG/24HR patch 1 patch  1 patch Transdermal Q24H Colt Sargent DO   1 patch at 03/05/17 1848   • ondansetron (ZOFRAN) injection 4 mg  4 mg Intravenous Q6H PRN Colt Sargent DO       • oxyCODONE (oxyCONTIN) 12 hr tablet 30 mg  30 mg Oral Q12H Nicki Green MD   30 mg at 03/06/17 0903   • oxyCODONE-acetaminophen (PERCOCET)  MG per tablet 1 tablet  1 tablet Oral Q6H PRN Colt Sargent DO   1 tablet at 03/06/17 0222   • pantoprazole (PROTONIX)  injection 40 mg  40 mg Intravenous Q AM Colt Sargent DO   40 mg at 03/06/17 0518   • potassium chloride (MICRO-K) CR capsule 40 mEq  40 mEq Oral Daily Siddharth Leavitt MD       • sodium chloride 0.9 % flush 1-10 mL  1-10 mL Intravenous PRN Colt Sargent DO       • sodium chloride 0.9 % flush 10 mL  10 mL Intravenous PRN Shamar Angulo MD   10 mL at 03/06/17 0906       Review of Systems:   -A 14 point review of systems is completed and is negative except for fatigue    General Exam:  Head:  Normal cephalic, atraumatic  HEENT:  Neck supple  Fundoscopic Exam:  No signs of disc edema  CVS:  Regular rate and rhythm.  No murmurs  Carotid Examination:  No bruits  Lungs:  Clear to auscultation  Abdomen:  Non-tender, Non-distended  Extremities:  No signs of peripheral edema  Skin:  No rashes    Neurologic Exam:    Mental Status:    -Awake, Alert, Oriented X 3  -No word finding difficulties  -No aphasia  -No dysarthria  -Follows simple and complex commands    CN II:  Visual fields full.  Pupils equally reactive to light  CN III, IV, VI:  Extraocular Muscles full with no signs of nystagmus  CN V:  Facial sensory is symmetric with no asymetries.  CN VII:  Facial motor symmetric  CN VIII:  Gross hearing intact bilaterally  CN IX:  Palate elevates symmetrically  CN X:  Palate elevates symmetrically  CN XI:  Shoulder shrug symmetric  CN XII:  Tongue is midline on protrusion    Motor: (strength out of 5:  1= minimal movement, 2 = movement in plane of gravity, 3 = movement against gravity, 4 = movement against some resistance, 5 = full strength)    -Right Upper Ext: Proximal: 5 Distal: 5  -Left Upper Ext: Proximal: 5 Distal: 5    -Right Lower Ext: Proximal: 5 Distal: 5  -Left Lower Ext: Proximal: 5 Distal: 5    DTR:  -Right   Bicep: 2+ Tricep: 2+ Brachoradialis: 2+   Patella: 2+ Ankle: 2+ Neg Babinski  -Left   Bicep: 2+ Tricep: 2+ Brachoradialis: 2+   Patella: 2+ Ankle: 2+ Neg Babinski    Sensory:  -Intact to light touch,  pinprick, temperature, pain, and proprioception    Coordination:  -Finger to nose intact  -Heel to shin intact  -No ataxia    Gait  -No signs of ataxia  -ambulates unassisted    Objective      Vital Signs  Temp:  [99.1 °F (37.3 °C)-100 °F (37.8 °C)] 99.8 °F (37.7 °C)  Heart Rate:  [] 103  Resp:  [14-26] 26  BP: (103-146)/(45-71) 146/68  FiO2 (%):  [60 %] 60 %    Physical Exam:         Results Review:    I reviewed the patient's new clinical results.  Vitamin B-12 239 - 931 pg/mL 225 (L)     TSH 0.470 - 4.680 mIU/mL 0.208 (L)     Folate >2.75 ng/mL 4.24       Ammonia 9 - 33 umol/L <9 (L)           Assessment/Plan     Hospital Problem List    Active Problems:    Pneumonia of right lower lobe due to infectious organism    COPD (chronic obstructive pulmonary disease)    Hypokalemia    Anemia    Respiratory failure    Impression     1. Metabolic encephalopathy  --Resolved  --Likely secondary to hypoxia  --Insomnia could have played a role as well.  2.  B12 deficiency  3.  Abnormal TSH  Plan     1.  We will order 1000 µg of B12 IM injections for 5 days in a row after this she will require tablets  2.  T4 and T3 level to be followed by primary team  3.  Will sign off for now.  I spoken with the patient and her family.  As an outpatient if she notices continued headaches or memory difficulties secondary to this episode of hypoxia I asked that she contact my office so we can see her on an outpatient basis.      We will schedule outpatient follow-up in 6 weeks        Dre Deal MD  03/06/17  10:37 AM       Electronically signed by Dre Deal MD at 3/6/2017 10:47 AM      Wm Valdes MD at 3/7/2017  8:43 AM  Version 3 of 3         [facility] Pulmonary Progress Note    Patient: Justyna Lei  3/25/1951   MR# [id number]   Acct# [id number]  03/07/17   8:43 AM  Referring Provider: Siddharth Leavitt MD      Problem list:   Patient Active Problem List   Diagnosis   • Pneumonia of right lower lobe due to  infectious organism   • COPD (chronic obstructive pulmonary disease)   • Hypokalemia   • Anemia   • Respiratory failure        Chief Complaint: Anxiety    Interval history: Echo was repeated but without the bubble study as ordered. She's reporting increased anxiety and wants more anxiety meds, we will defer this to the attending.  No other aggravating, alleviating factors or associated symptoms.    Shortness of Breath   Associated symptoms include a fever.   Cough   Associated symptoms include a fever and shortness of breath.   Fever    Associated symptoms include coughing.       Allergy:   Allergies   Allergen Reactions   • Aspirin Anaphylaxis   • Demerol [Meperidine] Anaphylaxis   • Ibuprofen Anaphylaxis   • Neosporin [Neomycin-Bacitracin Zn-Polymyx] Anaphylaxis   • Penicillins Anaphylaxis   • Sulfa Antibiotics Anaphylaxis   • Codeine Hives     Pt can take codeine as long is it is not alot   • Talwin [Pentazocine] Hives   • Tetracyclines & Related    • Morphine And Related Hives       Meds:      budesonide-formoterol 2 puff Inhalation BID - RT   citalopram 40 mg Oral Daily   cyanocobalamin 1,000 mcg Intramuscular Daily   dextromethorphan polistirex ER 30 mg Oral Q12H   docusate sodium 100 mg Oral BID   enoxaparin 40 mg Subcutaneous Daily   furosemide 40 mg Oral Daily   gabapentin 300 mg Oral Q8H   ipratropium-albuterol 3 mL Nebulization Q4H - RT   levoFLOXacin 750 mg Intravenous Q24H   nicotine 1 patch Transdermal Q24H   oxyCODONE ER 30 mg Oral Q12H   pantoprazole 40 mg Intravenous Q AM   potassium chloride 40 mEq Oral Daily        Review of Systems:   Review of Systems   Constitutional: Positive for fever.   HENT: Negative.    Respiratory: Positive for cough and shortness of breath.    Cardiovascular: Negative.    Gastrointestinal: Negative.    Skin: Negative.    Neurological: Negative.    Psychiatric/Behavioral: The patient is nervous/anxious.    All other systems reviewed and are negative.      Physical  Exam:  Vitals:    03/07/17 0804   BP:    Pulse:    Resp:    Temp: 98.4 °F (36.9 °C)   SpO2:        Intake/Output Summary (Last 24 hours) at 03/07/17 0843  Last data filed at 03/06/17 1800   Gross per 24 hour   Intake    150 ml   Output   1700 ml   Net  -1550 ml     Physical Exam   Constitutional: She is oriented to person, place, and time. She appears well-developed and well-nourished. She appears ill. No distress. She is not intubated. Nasal cannula in place.   HENT:   Head: Normocephalic and atraumatic.   Eyes: Pupils are equal, round, and reactive to light.   Neck: Normal range of motion. Neck supple.   Cardiovascular: Regular rhythm, S1 normal and S2 normal.  Tachycardia present.    Pulmonary/Chest: Tachypnea noted. She is not intubated. No respiratory distress. She has decreased breath sounds in the right lower field and the left lower field. She has wheezes.   Abdominal: Soft. Bowel sounds are normal. She exhibits no distension.   Musculoskeletal: Normal range of motion. She exhibits no edema.   Neurological: She is alert and oriented to person, place, and time.   Skin: Skin is warm and dry.   Psychiatric: Her speech is normal. Her mood appears anxious.   Nursing note and vitals reviewed.      Data:     Results from last 7 days  Lab Units 03/07/17  0248 03/06/17  0521 03/04/17  1415   WBC 10*3/mm3 3.23* 5.19 5.15   HEMOGLOBIN g/dL 10.6* 9.8* 11.6*   PLATELETS 10*3/mm3 130 124* 161         Results from last 7 days  Lab Units 03/07/17  0248 03/06/17  0521 03/05/17  1257 03/04/17  1415   SODIUM mmol/L 140 140  --  139   SODIUM, ARTERIAL mmol/L  --   --  142.4  --    POTASSIUM mmol/L 3.2* 3.0*  --  3.7   BUN mg/dL 13 6  --  6   CREATININE mg/dL 0.65 0.64  --  0.75         Results from last 7 days  Lab Units 03/05/17  1257 03/05/17  1149 03/05/17  0250 03/04/17  1920   PH, ARTERIAL pH units 7.419 7.398 7.354 7.296*   PCO2, ARTERIAL mm Hg 39.7 45.7* 46.9* 51.8*   PO2 ART mm Hg 52.7* 52.0* 113.1* 106.9*   FIO2 %  --   60 75 75         Radiology:  No new today    Pulmonary Assessment:  1. Acute hypoxemic respiratory failure  2. COPD and emphysema  3. RLL nodule, 8mm  4. Tobacco abuse  5.  Anxiety, secondary to grief from recent loss of spouse      Plan:   · Echo results noted, unfortunately it was done without the bubble study as ordered to evaluate a potential blood flow/shunting issue  · Okay to increase anxiety med per attending but recommend she remain in CCU for close monitoring of her respiratory status  · CXR tomorrow  · Continue nebs and Symbicort   · Smoking cessation  · Will need 3 month f/u CT for RLL nodule      Electronically signed by THIERNO Souza, 03/07/17, 8:43 AM     Respiratory exam shows wheezing.  She is anxious and is a smoker.  Ok to resume her home anxiety meds in low doses.  Other plans as above.  Ok to floor.   I have seen and examined patient personally, performing a face-to-face diagnostic evaluation with plan of care reviewed and developed with APRN and nursing staff. I have addended and/or modified the above history of present illness, physical examination, and assessment and plan to reflect my findings and impressions. Essential elements of the care plan were discussed with APRN above.  Agree with findings and assessment/plan as documented above.    Electronically signed by Wm Valdes MD, on 3/7/2017, 10:51 PM               Electronically signed by Wm Valdes MD at 3/7/2017 10:52 PM      THIERNO Souza at 3/7/2017  8:43 AM  Version 2 of [facility] Pulmonary Progress Note    Patient: [facility]  3/25/1951   MR# [id number]   Acct# [id number]  03/07/17   8:43 AM  Referring Provider: Siddharth Leavitt MD      Problem list:   Patient Active Problem List   Diagnosis   • Pneumonia of right lower lobe due to infectious organism   • COPD (chronic obstructive pulmonary disease)   • Hypokalemia   • Anemia   • Respiratory failure        Chief Complaint:  Anxiety    Interval history: Echo was repeated but without the bubble study as ordered. She's reporting increased anxiety and wants more anxiety meds, we will defer this to the attending.  No other aggravating, alleviating factors or associated symptoms.    Shortness of Breath   Associated symptoms include a fever.   Cough   Associated symptoms include a fever and shortness of breath.   Fever    Associated symptoms include coughing.       Allergy:   Allergies   Allergen Reactions   • Aspirin Anaphylaxis   • Demerol [Meperidine] Anaphylaxis   • Ibuprofen Anaphylaxis   • Neosporin [Neomycin-Bacitracin Zn-Polymyx] Anaphylaxis   • Penicillins Anaphylaxis   • Sulfa Antibiotics Anaphylaxis   • Codeine Hives     Pt can take codeine as long is it is not alot   • Talwin [Pentazocine] Hives   • Tetracyclines & Related    • Morphine And Related Hives       Meds:      budesonide-formoterol 2 puff Inhalation BID - RT   citalopram 40 mg Oral Daily   cyanocobalamin 1,000 mcg Intramuscular Daily   dextromethorphan polistirex ER 30 mg Oral Q12H   docusate sodium 100 mg Oral BID   enoxaparin 40 mg Subcutaneous Daily   furosemide 40 mg Oral Daily   gabapentin 300 mg Oral Q8H   ipratropium-albuterol 3 mL Nebulization Q4H - RT   levoFLOXacin 750 mg Intravenous Q24H   nicotine 1 patch Transdermal Q24H   oxyCODONE ER 30 mg Oral Q12H   pantoprazole 40 mg Intravenous Q AM   potassium chloride 40 mEq Oral Daily        Review of Systems:   Review of Systems   Constitutional: Positive for fever.   HENT: Negative.    Respiratory: Positive for cough and shortness of breath.    Cardiovascular: Negative.    Gastrointestinal: Negative.    Skin: Negative.    Neurological: Negative.    Psychiatric/Behavioral: The patient is nervous/anxious.    All other systems reviewed and are negative.      Physical Exam:  Vitals:    03/07/17 0804   BP:    Pulse:    Resp:    Temp: 98.4 °F (36.9 °C)   SpO2:        Intake/Output Summary (Last 24 hours) at 03/07/17  0843  Last data filed at 03/06/17 1800   Gross per 24 hour   Intake    150 ml   Output   1700 ml   Net  -1550 ml     Physical Exam   Constitutional: She is oriented to person, place, and time. She appears well-developed and well-nourished. She appears ill. No distress. She is not intubated. Nasal cannula in place.   HENT:   Head: Normocephalic and atraumatic.   Eyes: Pupils are equal, round, and reactive to light.   Neck: Normal range of motion. Neck supple.   Cardiovascular: Regular rhythm, S1 normal and S2 normal.  Tachycardia present.    Pulmonary/Chest: Tachypnea noted. She is not intubated. No respiratory distress. She has decreased breath sounds in the right lower field and the left lower field. She has wheezes.   Abdominal: Soft. Bowel sounds are normal. She exhibits no distension.   Musculoskeletal: Normal range of motion. She exhibits no edema.   Neurological: She is alert and oriented to person, place, and time.   Skin: Skin is warm and dry.   Psychiatric: Her speech is normal. Her mood appears anxious.   Nursing note and vitals reviewed.      Data:     Results from last 7 days  Lab Units 03/07/17  0248 03/06/17  0521 03/04/17  1415   WBC 10*3/mm3 3.23* 5.19 5.15   HEMOGLOBIN g/dL 10.6* 9.8* 11.6*   PLATELETS 10*3/mm3 130 124* 161         Results from last 7 days  Lab Units 03/07/17  0248 03/06/17  0521 03/05/17  1257 03/04/17  1415   SODIUM mmol/L 140 140  --  139   SODIUM, ARTERIAL mmol/L  --   --  142.4  --    POTASSIUM mmol/L 3.2* 3.0*  --  3.7   BUN mg/dL 13 6  --  6   CREATININE mg/dL 0.65 0.64  --  0.75         Results from last 7 days  Lab Units 03/05/17  1257 03/05/17  1149 03/05/17  0250 03/04/17  1920   PH, ARTERIAL pH units 7.419 7.398 7.354 7.296*   PCO2, ARTERIAL mm Hg 39.7 45.7* 46.9* 51.8*   PO2 ART mm Hg 52.7* 52.0* 113.1* 106.9*   FIO2 %  --  60 75 75         Radiology:  No new today    Pulmonary Assessment:  1. Acute hypoxemic respiratory failure  2. COPD and emphysema  3. RLL nodule,  8mm  4. Tobacco abuse  5.  Anxiety, secondary to grief from recent loss of spouse      Plan:   · Echo results noted, unfortunately it was done without the bubble study as ordered to evaluate a potential blood flow/shunting issue  · Okay to increase anxiety med per attending but recommend she remain in CCU for close monitoring of her respiratory status  · CXR tomorrow  · Continue nebs and Symbicort   · Smoking cessation  · Will need 3 month f/u CT for RLL nodule      Electronically signed by THIERNO Souza, 03/07/17, 8:43 AM              Electronically signed by THIERNO Souza at 3/7/2017  8:52 AM      THIERNO Souza at 3/7/2017  8:43 AM  Version 1 of 3         [facility] Pulmonary Progress Note    Patient: Justyna Lei  3/25/1951   MR# [id number]   Acct# [id number]  03/07/17   8:43 AM  Referring Provider: Siddharth Leavitt MD      Problem list:   Patient Active Problem List   Diagnosis   • Pneumonia of right lower lobe due to infectious organism   • COPD (chronic obstructive pulmonary disease)   • Hypokalemia   • Anemia   • Respiratory failure        Chief Complaint: Anxiety    Interval history: Echo was repeated but without the bubble study as ordered. She's reporting increased anxiety and wants more anxiety meds, we will defer this to the attending.  No other aggravating, alleviating factors or associated symptoms.    Shortness of Breath   Associated symptoms include a fever.   Cough   Associated symptoms include a fever and shortness of breath.   Fever    Associated symptoms include coughing.       Allergy:   Allergies   Allergen Reactions   • Aspirin Anaphylaxis   • Demerol [Meperidine] Anaphylaxis   • Ibuprofen Anaphylaxis   • Neosporin [Neomycin-Bacitracin Zn-Polymyx] Anaphylaxis   • Penicillins Anaphylaxis   • Sulfa Antibiotics Anaphylaxis   • Codeine Hives     Pt can take codeine as long is it is not alot   • Talwin [Pentazocine] Hives   • Tetracyclines & Related    •  Morphine And Related Hives       Meds:      budesonide-formoterol 2 puff Inhalation BID - RT   citalopram 40 mg Oral Daily   cyanocobalamin 1,000 mcg Intramuscular Daily   dextromethorphan polistirex ER 30 mg Oral Q12H   docusate sodium 100 mg Oral BID   enoxaparin 40 mg Subcutaneous Daily   furosemide 40 mg Oral Daily   gabapentin 300 mg Oral Q8H   ipratropium-albuterol 3 mL Nebulization Q4H - RT   levoFLOXacin 750 mg Intravenous Q24H   nicotine 1 patch Transdermal Q24H   oxyCODONE ER 30 mg Oral Q12H   pantoprazole 40 mg Intravenous Q AM   potassium chloride 40 mEq Oral Daily        Review of Systems:   Review of Systems   Constitutional: Positive for fever.   HENT: Negative.    Respiratory: Positive for cough and shortness of breath.    Cardiovascular: Negative.    Gastrointestinal: Negative.    Skin: Negative.    Neurological: Negative.    Psychiatric/Behavioral: The patient is nervous/anxious.    All other systems reviewed and are negative.      Physical Exam:  Vitals:    03/07/17 0804   BP:    Pulse:    Resp:    Temp: 98.4 °F (36.9 °C)   SpO2:        Intake/Output Summary (Last 24 hours) at 03/07/17 0843  Last data filed at 03/06/17 1800   Gross per 24 hour   Intake    150 ml   Output   1700 ml   Net  -1550 ml     Physical Exam   Constitutional: She is oriented to person, place, and time. She appears well-developed and well-nourished. She appears ill. No distress. She is not intubated. Nasal cannula in place.   HENT:   Head: Normocephalic and atraumatic.   Eyes: Pupils are equal, round, and reactive to light.   Neck: Normal range of motion. Neck supple.   Cardiovascular: Regular rhythm, S1 normal and S2 normal.  Tachycardia present.    Pulmonary/Chest: Tachypnea noted. She is not intubated. No respiratory distress. She has decreased breath sounds in the right lower field and the left lower field. She has wheezes.   Abdominal: Soft. Bowel sounds are normal. She exhibits no distension.   Musculoskeletal: Normal  range of motion. She exhibits no edema.   Neurological: She is alert and oriented to person, place, and time.   Skin: Skin is warm and dry.   Psychiatric: Her speech is normal. Her mood appears anxious.   Nursing note and vitals reviewed.      Data:     Results from last 7 days  Lab Units 03/07/17  0248 03/06/17  0521 03/04/17  1415   WBC 10*3/mm3 3.23* 5.19 5.15   HEMOGLOBIN g/dL 10.6* 9.8* 11.6*   PLATELETS 10*3/mm3 130 124* 161         Results from last 7 days  Lab Units 03/07/17  0248 03/06/17  0521 03/05/17  1257 03/04/17  1415   SODIUM mmol/L 140 140  --  139   SODIUM, ARTERIAL mmol/L  --   --  142.4  --    POTASSIUM mmol/L 3.2* 3.0*  --  3.7   BUN mg/dL 13 6  --  6   CREATININE mg/dL 0.65 0.64  --  0.75         Results from last 7 days  Lab Units 03/05/17  1257 03/05/17  1149 03/05/17  0250 03/04/17  1920   PH, ARTERIAL pH units 7.419 7.398 7.354 7.296*   PCO2, ARTERIAL mm Hg 39.7 45.7* 46.9* 51.8*   PO2 ART mm Hg 52.7* 52.0* 113.1* 106.9*   FIO2 %  --  60 75 75         Radiology:  No new today    Pulmonary Assessment:  1. Acute hypoxemic respiratory failure  2. COPD and emphysema  3. RLL nodule, 8mm  4. Tobacco abuse  5.  Anxiety, secondary to grief from recent loss of spouse      Plan:   · Echo results noted, unfortunately it was done without the bubble study as ordered to evaluate a potential blood flow/shunting issue  · Okay to increase anxiety med per attending but recommend she remain in CCU for close monitoring of her respiratory status  · Continue nebs and Symbicort   · Smoking cessation  · Will need 3 month f/u CT for RLL nodule      Electronically signed by THIERNO Souza, 03/07/17, 8:43 AM              Electronically signed by THIERNO Souza at 3/7/2017  8:51 AM      Siddharth Leavitt MD at 3/7/2017  9:10 AM  Version 1 of 1             [facility] Medicine Services  INPATIENT PROGRESS NOTE    Length of Stay: 3  Date of Admission:  3/4/2017  Primary Care Physician: No Known Provider    Subjective   Chief Complaint: Dyspnea    HPI   64 YO CF admitted on 3/4/17. Patient is intubated and on pressors. Extubated on 3/4.  Patient is breathing a lot better, on 4l nc.  Patient still remained hypotensive, but asymptomatic.  Recommended from pulmonary to keep her here to watch respiratory status.    Review of Systems   Constitutional: Positive for activity change, appetite change and fatigue. Negative for chills and fever.   HENT: Negative for hearing loss, nosebleeds, tinnitus and trouble swallowing.    Eyes: Negative for visual disturbance.   Respiratory: Positive for cough, shortness of breath and wheezing. Negative for chest tightness.    Cardiovascular: Negative for chest pain, palpitations and leg swelling.   Gastrointestinal: Negative for abdominal distention, abdominal pain, blood in stool, constipation, diarrhea, nausea and vomiting.   Endocrine: Negative for cold intolerance, heat intolerance, polydipsia, polyphagia and polyuria.   Genitourinary: Negative for decreased urine volume, difficulty urinating, dysuria, flank pain, frequency and hematuria.   Musculoskeletal: Positive for arthralgias, back pain and myalgias. Negative for joint swelling.   Skin: Negative for rash.   Allergic/Immunologic: Negative for immunocompromised state.   Neurological: Positive for tremors. Negative for dizziness, syncope, weakness, light-headedness and headaches.   Hematological: Negative for adenopathy. Does not bruise/bleed easily.   Psychiatric/Behavioral: Negative for confusion, sleep disturbance and suicidal ideas. The patient is nervous/anxious.           All pertinent negatives and positives are as above. All other systems have been reviewed and are negative unless otherwise stated.     Objective    Temp:  [98.4 °F (36.9 °C)-101.4 °F (38.6 °C)] 98.4 °F (36.9 °C)  Heart Rate:  [] 83  Resp:  [11-26] 11  BP: ()/(44-68) 109/55    Intake/Output  Summary (Last 24 hours) at 03/07/17 0910  Last data filed at 03/06/17 1800   Gross per 24 hour   Intake    150 ml   Output   1700 ml   Net  -1550 ml     Physical Exam   Constitutional: She is oriented to person, place, and time. She appears well-developed.   HENT:   Head: Normocephalic and atraumatic.   Eyes: Conjunctivae and EOM are normal. Pupils are equal, round, and reactive to light.   Neck: Neck supple. No JVD present. No thyromegaly present.   Cardiovascular: Normal rate, regular rhythm, normal heart sounds and intact distal pulses.  Exam reveals no gallop and no friction rub.    No murmur heard.  Pulmonary/Chest: Effort normal. No respiratory distress. She has wheezes. She has rales. She exhibits no tenderness.   Abdominal: Soft. Bowel sounds are normal. She exhibits no distension. There is no tenderness. There is no rebound and no guarding.   Musculoskeletal: Normal range of motion. She exhibits no edema, tenderness or deformity.   Lymphadenopathy:     She has no cervical adenopathy.   Neurological: She is alert and oriented to person, place, and time. She displays normal reflexes. No cranial nerve deficit. She exhibits abnormal muscle tone. Coordination abnormal.   Skin: Skin is warm and dry. No rash noted.   Psychiatric: She has a normal mood and affect. Judgment and thought content normal.   Patient seemed very nervous.   Nursing note and vitals reviewed.      Results Review:  Lab Results (last 24 hours)     Procedure Component Value Units Date/Time    T4, Free [[id number]]  (Normal) Collected:  03/06/17 1303    Specimen:  Blood Updated:  03/06/17 1342     Free T4 0.91 ng/dL     T3, Free [[id number]]  (Normal) Collected:  03/06/17 1303    Specimen:  Blood Updated:  03/06/17 1342     T3, Free 2.81 pg/mL     Blood Culture [[id number]]  (Normal) Collected:  03/04/17 1427    Specimen:  Blood from Arm, Right Updated:  03/06/17 1501     Blood Culture No growth at 2 days     Blood Culture [[id number]]  (Normal)  Collected:  03/04/17 1415    Specimen:  Blood from Arm, Left Updated:  03/06/17 1501     Blood Culture No growth at 2 days     CBC & Differential [[id number]] Collected:  03/07/17 0248    Specimen:  Blood Updated:  03/07/17 0256    Narrative:       The following orders were created for panel order CBC & Differential.  Procedure                               Abnormality         Status                     ---------                               -----------         ------                     CBC Auto Differential[[id number]]         Abnormal            Final result                 Please view results for these tests on the individual orders.    Reticulocytes [[id number]]  (Normal) Collected:  03/07/17 0248    Specimen:  Blood Updated:  03/07/17 0256     Reticulocyte % 0.77 %      Reticulocyte Absolute 0.0266 10*6/mm3     CBC Auto Differential [[id number]]  (Abnormal) Collected:  03/07/17 0248    Specimen:  Blood Updated:  03/07/17 0256     WBC 3.23 (L) 10*3/mm3      RBC 3.45 (L) 10*6/mm3      Hemoglobin 10.6 (L) g/dL      Hematocrit 32.3 (L) %      MCV 93.6 fL      MCH 30.7 pg      MCHC 32.8 (L) g/dL      RDW 15.3 (H) %      RDW-SD 53.0 fl      MPV 11.6 fL      Platelets 130 10*3/mm3      Neutrophil % 89.5 (H) %      Lymphocyte % 6.8 (L) %      Monocyte % 3.7 (L) %      Eosinophil % 0.0 %      Basophil % 0.0 %      Immature Grans % 0.0 %      Neutrophils, Absolute 2.89 10*3/mm3      Lymphocytes, Absolute 0.22 (L) 10*3/mm3      Monocytes, Absolute 0.12 (L) 10*3/mm3      Eosinophils, Absolute 0.00 10*3/mm3      Basophils, Absolute 0.00 10*3/mm3      Immature Grans, Absolute 0.00 10*3/mm3     Lipase [[id number]]  (Normal) Collected:  03/07/17 0248    Specimen:  Blood Updated:  03/07/17 0306     Lipase 49 U/L     Amylase [[id number]]  (Normal) Collected:  03/07/17 0248    Specimen:  Blood Updated:  03/07/17 0306     Amylase 54 U/L     Comprehensive Metabolic Panel [[id number]]  (Abnormal) Collected:  03/07/17 0248    Specimen:  Blood  Updated:  03/07/17 0311     Glucose 199 (H) mg/dL      BUN 13 mg/dL      Creatinine 0.65 mg/dL      Sodium 140 mmol/L      Potassium 3.2 (L) mmol/L      Chloride 99 mmol/L      CO2 32.0 (H) mmol/L      Calcium 7.9 (L) mg/dL      Total Protein 6.1 (L) g/dL      Albumin 3.30 (L) g/dL      ALT (SGPT) 17 U/L      AST (SGOT) 18 U/L      Alkaline Phosphatase 47 U/L      Total Bilirubin 0.2 mg/dL      eGFR Non African Amer 91 mL/min/1.73      Globulin 2.8 gm/dL      A/G Ratio 1.2 g/dL      BUN/Creatinine Ratio 20.0      Anion Gap 9.0 mmol/L     Iron Profile [[id number]]  (Abnormal) Collected:  03/07/17 0248    Specimen:  Blood Updated:  03/07/17 0313     Iron 14 (L) mcg/dL      TIBC 254 mcg/dL      Iron Saturation 6 (L) %     Ferritin [[id number]]  (Normal) Collected:  03/07/17 0248    Specimen:  Blood Updated:  03/07/17 0341     Ferritin 55.80 ng/mL            Cultures:  BLOOD CULTURE   Date Value Ref Range Status   03/04/2017 No growth at 2 days  Preliminary   03/04/2017 No growth at 2 days  Preliminary       Radiology Data:    Interpretation Summary      · Left ventricular function is normal. Estimated ejection fraction is 56-60%.  · Right ventricular size and systolic function is normal.  · There is no significant (greater than mild) valvular dysfunction.  · Left ventricular diastolic function is normal.  · Compared to study dated 3/5/17, there are no significant changes.             Allergies   Allergen Reactions   • Aspirin Anaphylaxis   • Demerol [Meperidine] Anaphylaxis   • Ibuprofen Anaphylaxis   • Neosporin [Neomycin-Bacitracin Zn-Polymyx] Anaphylaxis   • Penicillins Anaphylaxis   • Sulfa Antibiotics Anaphylaxis   • Codeine Hives     Pt can take codeine as long is it is not alot   • Talwin [Pentazocine] Hives   • Tetracyclines & Related    • Morphine And Related Hives       Scheduled meds:     budesonide-formoterol 2 puff Inhalation BID - RT   citalopram 40 mg Oral Daily   cyanocobalamin 1,000 mcg Intramuscular Daily    dextromethorphan polistirex ER 30 mg Oral Q12H   docusate sodium 100 mg Oral BID   enoxaparin 40 mg Subcutaneous Daily   furosemide 40 mg Oral Daily   gabapentin 300 mg Oral Q8H   ipratropium-albuterol 3 mL Nebulization Q4H - RT   levoFLOXacin 750 mg Intravenous Q24H   nicotine 1 patch Transdermal Q24H   oxyCODONE ER 30 mg Oral Q12H   pantoprazole 40 mg Intravenous Q AM   potassium chloride 40 mEq Oral Daily       PRN meds:  •  acetaminophen  •  acetaminophen  •  ALPRAZolam  •  enalaprilat  •  hydrALAZINE  •  ondansetron  •  oxyCODONE-acetaminophen  •  sodium chloride  •  sodium chloride    Assessment/Plan     Active Problems:    Pneumonia of right lower lobe due to infectious organism    COPD (chronic obstructive pulmonary disease)    Hypokalemia    Anemia    Respiratory failure      Plan:  Pneumonia/COPD/respiratory failureHypoxia with respiratory distress-Extubated 3/6.  Duonebs. Currently Levaquin.  By mouth steroids.      Dyspnea/echocardiogram ejection fraction 60%    Right lower lobe lung nodule-repeat CT scan in 3 months.     AMS- question hypoxia/fever vs possible OD. Apparently there has been a HX of depression since the passing of her  in July. Neurology consulted.      Hypokalemia. kdur x 3 days.  Improving.     Depression/Anxiety- Celexa/xanax- Xanax to stay the same dose, due to hypotension.  We will put patient back on home medication of Remeron to help her sleep.     Abdominal pain- constipation on CT     Hypotension with HX of hypertension- off levaphed     Subacute Thyroid-Abnormal TSH- Free t4 and free t3. Normal.      Chronic pain-  on OxyContin, and  Percocet when necessary      Anemia- iron deficiency.  An venofer ×3 days     Discharge Planning: per pulm.    Siddharth Leavitt MD   03/07/17   9:10 AM                    Neurology Consult Note    Referring Provider: Dr. Colt Sargent  Reason for Consultation: altered mental status      History of present illness:      65-year-old female with a  history of severe depression and possibly failure to thrive since her 's death last summer.  Currently there is no family at the bedside and the patient is intubated.  By report the patient was undergoing some treatment for her chronic pulmonary disease over the past 2 months.  She received multiple rounds of antibiotics.  Yesterday afternoon her family had difficulties waking her up.  Her oxygen saturation was found to be 50%.  She was brought in for respiratory failure.  In addition to this she has a history of depression and sees an outpatient psychiatrist.  Even after her respiratory failure was compensated for in the initial stages of her admission she continued to be extremely somnolent.  A question has been posed whether there may be drug intoxication possibly secondary to depression.  Other neurologic etiologies are also in question hence the neurologic consultation currently.  No seizure activity has been seen overnight by the nursing staff.      Review of Systems  A 14 point review of systems was attempted but mental status does not allow for this    Vital Signs   Temp:  [97.3 °F (36.3 °C)-103.1 °F (39.5 °C)] 97.3 °F (36.3 °C)  Heart Rate:  [] 57  Resp:  [14-24] 20  BP: ()/(42-80) 113/52  FiO2 (%):  [60 %-100 %] 60 %    General Exam:  Head:  Normal cephalic, atraumatic  HEENT:  Neck supple.  Patient intubated  Fundoscopic Exam:  No signs of disc edema  CVS:  Regular rate and rhythm.  No murmurs  Carotid Examination:  No bruits  Lungs:  Clear to auscultation  Abdomen:  Non-tender, Non-distended  Extremities:  No signs of peripheral edema  Skin:  No rashes    Neurologic Exam:    Mental Status:    -Heavily sedated and intubated.  When sedation is paused, the patient opens eyes, moves all extremities and sits up in bed.    CN II:  Visual fields full.  Pupils equally reactive to light  CN III, IV, VI:  Extraocular Muscles full with no signs of nystagmus  CN V:  Facial sensory is symmetric  with no asymetries.  CN VII:  Facial motor symmetric  CN VIII:    CN IX:  Gag blunted on sedation  CN X:    CN XI:    CN XII:     Motor:     Moves all extremities equal when sedation paused.    DTR:  -Right   Bicep: 2+ Tricep: 2+ Brachoradialis: 2+   Patella: 2+ Ankle: 2+ Neg Babinski  -Left   Bicep: 2+ Tricep: 2+ Brachoradialis: 2+   Patella: 2+ Ankle: 2+ Neg Babinski    Sensory:  -Responds to noxious stim in all four extremities.    Coordination:  -No signs of active tremor  Gait  -intubated and sedated.      Impression    1.  Metabolic encephalopathy  --Currently her encephalopathy is likely secondary to medications as withdrawal off of sedation causes her to abruptly began moving all extremities and her level of consciousness improves.  --This could have been secondary to hypercarbia initially.  --Differential diagnosis will also include medication intoxication  --There is no focality to the neurologic exam to suggest CNS structural lesions.  There is no history of seizures nor seizure activity witnessed thus far to suggest an epileptic etiology.    Plan    1.  Ammonia  2.  B12  3.  Folate  4.  TSH  5.  We will continue neurologic exams.    I discussed the patients findings and my recommendations with nursing staff    Dre Deal MD  03/05/17  10:07 AM         Electronically signed by Dre Deal MD at 3/5/2017 10:23 AM      Ruiz Odonnell MD at 3/5/2017 11:32 AM  Version 1 of 1     Consult Orders:    1. Inpatient Consult to Pulmonology [[id number]] ordered by Colt Sargent DO at 03/04/17 1802                [facility]  Phone: [phone]  Ruiz Odonnell MD      Subjective   LOS: 1 day   you for this consultation.  65-year-old female who was found obtunded at home.  Her saturation was in the 60s.  Sequence of events is unclear.  Family members are not here for conversation.  It is unclear if the patient may have taken any drugs or had a neurological event.  She was on propofol sedation  when I first walked in.  After propofol was taken off she was completely awake and alert.  She is moving all 4 extremities and is attempting to take her endotracheal tube out.  Again history is unobtainable as the patient is still on the ventilator.    Underlying history as of severe depression and failure to thrive due to 's death last summer.  Patient has been resisting going outside the house.  She is a regular heavy smoker.  She continues to smoke a pack of cigarettes a day.  She has underlying history of hypertension.  There is no history of ordinary artery disease prior cancer or diabetes or prior stroke.  Of note patient was recently treated by her primary care physician for bronchitis or pneumonia with several rounds of antibiotics  Objective  I have reviewed and edited the Past Medical History, Past Surgical History, Home Medications, Social History and Family History as of 11:32 AM on 17.    Review of Systems   Unable to perform ROS: Intubated       Vital Signs past 24hrs  BP range: BP: ()/(42-80) 113/52  Pulse range: Heart Rate:  [] 57  Resp rate range: Resp:  [14-24] 20  Temp range: Temp (24hrs), Av.3 °F (37.4 °C), Min:97.3 °F (36.3 °C), Max:103.1 °F (39.5 °C)    Oxygen range: SpO2:  [86 %-100 %] 98 %; Flow (L/min):  [3-55] 55;   O2 Device: mechanical ventilator  159 lb 1.6 oz (72.2 kg); Body mass index is 26.89 kg/(m^2).  I/O this shift:  In: 359.6 [I.V.:309.6; IV Piggyback:50]  Out: 700 [Urine:700]female on the ventilator.  Oral ET tube is noted.  After discontinuation of sedation patient is awake alert moving all 4 extremities and attempting to communicate.  Her pupils are equal and reactive to light.  JVP does not appear to be elevated though difficult to assess.  Trachea midline thyroid not enlarged.  Lungs reveal bilateral air entry.  I do not hear any adventitious sounds.  I.e. I do not hear any rales rhonchi or wheeze.  Overall air entry is diminished but equal.   Percussion note is resonant.  Chest expansion equal no chest wall deformities or tenderness.  Heart examination S1-S2 present rhythm regular with no murmurs noted.  No edema on lower extremities.  Abdomen is soft nontender with bowel sounds present.  No liver spleen enlargement.  No peripheral cyanosis or clubbing.  As stated moving all 4 extremities and appears appropriate.  No obvious adenopathy in the cervical, axillary, inguinal areas.    Results Review:    I have reviewed the laboratory and imaging data since the last note by [facility] physician. My annotations are as noted in assessment and plan.    Medication Review:  I have reviewed the current MAR.  My annotations are as noted in assessment and plan.    Plan   PCC Problemsrespiratory failure with hypoxia and possibly hypercapnia, on mechanical ventilation  Underlying COPD likely severe but without exacerbation at this time bronchitis versus pneumonia with mild bibasilar infiltrates on CT chestlower lobe nodule 8 mm,  Images 53  Current every day smokersedating pain medications as well as Ativan  Relevant Medical Diagnoses  of Treatment's propofol was discontinued.  She was on Levophed which was also discontinued.  I will check weaning parameters and attempt extubation this morning.    Underlying severe COPD.  Possible somnolence due to hypercapnia.  We'll need a blood gas off the ventilator to see what her baseline status is.  Awaiting neurological workup.  Currently no evidence of stroke or ingestion of medications other than those prescribed. Her home medications for pain may have contributed to her somnolence.    Due to COPD we'll continue bronchodilator therapy.  At the present time I do not see a need for IV steroids.  She did receive 1 dose of Solu-Medrol last night.    A CT chest shows by basilar infiltrates versus atelectasis which is quite minimal.  She has treated with outpatient antibiotics recently.  Her pro-calcitonin is within normal limits.  I  will switch  Out her vancomycin and Zosyn to Levaquin.  Await results of respiratory cultures.  I don't believe she has severe pneumonia at this time.    She is a current smoker.  She will be advised to completely quit smoking.    Right lower lobe nodule.  This will require a follow-up CT chest within 3 months.    Limit sedating medications especially narcotics and benzodiazepines.    ICU bundle was ordered.    Ruiz Odonnell MD  03/05/17  11:32 AM     I spent +35 mins critical care time in care of this patient outside of any procedures.      EMR Dragon/Transcription disclaimer:   Much of this encounter note is an electronic transcription/translation of spoken language to printed text. The electronic translation of spoken language may permit erroneous, or at times, nonsensical words or phrases to be inadvertently transcribed; Although I have reviewed the note for such errors, some may still exist.        Electronically signed by Ruiz Odonnell MD at 3/5/2017 11:45 AM

## 2017-03-08 NOTE — PLAN OF CARE
Problem: Patient Care Overview (Adult)  Goal: Plan of Care Review  Outcome: Ongoing (interventions implemented as appropriate)    03/08/17 6279   Coping/Psychosocial Response Interventions   Plan Of Care Reviewed With patient   Patient Care Overview   Progress progress towards functional goals is fair   Outcome Evaluation   Outcome Summary/Follow up Plan Pt. was modified independent for bed mobility. Pt. was cga to stand and min x 1 to walk 100'. Pt's O2 sats dropped to 85% on 4 liters with activity. With rest and verbal cues, O2 sat increased to 90% within 5 minutes. WIll continue to work on pt's strength and endurance.

## 2017-03-08 NOTE — PROGRESS NOTES
[facility] Pulmonary Progress Note    Patient: Justyna [place]  3/25/1951   MR# [id number]   Acct# [id number]  03/08/17   12:06 PM  Referring Provider: Siddharth Leavitt MD      Problem list:   Patient Active Problem List   Diagnosis   • Pneumonia of right lower lobe due to infectious organism   • COPD (chronic obstructive pulmonary disease)   • Hypokalemia   • Anemia   • Respiratory failure        Chief Complaint: Anxiety    Interval history: She is less anxious and resting comfortably on her O2. Wheezing is much better and she is alert today. She has not been up ambulating yet. No other aggravating, alleviating factors or associated symptoms.    Shortness of Breath   Associated symptoms include a fever.   Cough   Associated symptoms include a fever and shortness of breath.   Fever    Associated symptoms include coughing.       Allergy:   Allergies   Allergen Reactions   • Aspirin Anaphylaxis   • Demerol [Meperidine] Anaphylaxis   • Ibuprofen Anaphylaxis   • Neosporin [Neomycin-Bacitracin Zn-Polymyx] Anaphylaxis   • Penicillins Anaphylaxis   • Sulfa Antibiotics Anaphylaxis   • Codeine Hives     Pt can take codeine as long is it is not alot   • Talwin [Pentazocine] Hives   • Tetracyclines & Related    • Morphine And Related Hives       Meds:      budesonide-formoterol 2 puff Inhalation BID - RT   citalopram 40 mg Oral Daily   cyanocobalamin 1,000 mcg Intramuscular Daily   dextromethorphan polistirex ER 30 mg Oral Q12H   docusate sodium 100 mg Oral BID   enoxaparin 40 mg Subcutaneous Daily   furosemide 40 mg Oral Daily   gabapentin 300 mg Oral Q8H   ipratropium-albuterol 3 mL Nebulization Q4H - RT   iron sucrose (VENOFER) IVPB 300 mg Intravenous Q24H   levoFLOXacin 750 mg Intravenous Q24H   mirtazapine 15 mg Oral Nightly   nicotine 1 patch Transdermal Q24H   oxyCODONE ER 30 mg Oral Q12H   pantoprazole 40 mg Oral Q AM        Review of Systems:   Review of Systems   Constitutional: Positive for fever.   HENT: Negative.     Respiratory: Positive for cough and shortness of breath.    Cardiovascular: Negative.    Gastrointestinal: Negative.    Skin: Negative.    Neurological: Negative.    Psychiatric/Behavioral: The patient is nervous/anxious.    All other systems reviewed and are negative.      Physical Exam:  Vitals:    03/08/17 1048   BP:    Pulse: 110   Resp:    Temp:    SpO2:        Intake/Output Summary (Last 24 hours) at 03/08/17 1206  Last data filed at 03/08/17 1120   Gross per 24 hour   Intake    730 ml   Output    400 ml   Net    330 ml     Physical Exam   Constitutional: She is oriented to person, place, and time. She appears well-developed and well-nourished. No distress. She is not intubated. Nasal cannula in place.   HENT:   Head: Normocephalic and atraumatic.   Eyes: Pupils are equal, round, and reactive to light.   Neck: Normal range of motion. Neck supple.   Cardiovascular: Regular rhythm, S1 normal and S2 normal.  Tachycardia present.    Pulmonary/Chest: She is not intubated. No respiratory distress. She has decreased breath sounds in the right lower field and the left lower field. She has wheezes.   Decreased air movement likely from COPD and wheezing is much better   Abdominal: Soft. Bowel sounds are normal. She exhibits no distension.   Musculoskeletal: Normal range of motion. She exhibits no edema.   Neurological: She is alert and oriented to person, place, and time.   Skin: Skin is warm and dry.   Psychiatric: Her speech is normal. Her mood appears anxious.   Nursing note and vitals reviewed.      Data:     Results from last 7 days  Lab Units 03/08/17  0602 03/07/17  0248 03/06/17  0521   WBC 10*3/mm3 3.51* 3.23* 5.19   HEMOGLOBIN g/dL 10.4* 10.6* 9.8*   PLATELETS 10*3/mm3 129* 130 124*         Results from last 7 days  Lab Units 03/08/17  0602 03/07/17  0248 03/06/17  0521   SODIUM mmol/L 137 140 140   POTASSIUM mmol/L 3.4* 3.2* 3.0*   BUN mg/dL 9 13 6   CREATININE mg/dL 0.70 0.65 0.64         Results from last  7 days  Lab Units 03/05/17  1257 03/05/17  1149 03/05/17  0250 03/04/17  1920   PH, ARTERIAL pH units 7.419 7.398 7.354 7.296*   PCO2, ARTERIAL mm Hg 39.7 45.7* 46.9* 51.8*   PO2 ART mm Hg 52.7* 52.0* 113.1* 106.9*   FIO2 %  --  60 75 75         Radiology:    History: Follow-up respiratory failure      Chest frontal, 0918 hours:      Comparison study dated 03/05/2017.      Endotracheal tube is been removed. There is evidence of right basilar  atelectasis versus fibrosis, stable. Lungs are otherwise clear with some  overexpansion.      IMPRESSIONS:  1. Endotracheal tube now removed.  2. Stable right basilar atelectasis versus fibrosis.  3. Overexpansion compatible with some COPD.  This report was finalized on 03/07/2017 09:52 by Dr. Shabbir Pederson MD.    Pulmonary Assessment:  1. Acute hypoxemic respiratory failure-better  2. COPD and emphysema  3. RLL nodule, 8mm  4. Tobacco abuse  5.  Anxiety, secondary to grief from recent loss of spouse      Plan:     · Start O2 walks today to see how she tolerate mobilization  · Will need Home O2 assessment once she is stable enough to go home.   · If her O2 levels get worse or she becomes more symptomatic would recommend they repeat that echo again with the BUBBLE STUDY as it was ordered to rule out shunting  · Otherwise if she continues to improve should be able to go home in the next 1-2 days  · Continue nebs and Symbicort   · Smoking cessation  · Will need 3 month f/u CT chest wo for RLL nodule and f/u in our office then for the results as well as recheck on her O2 needs.      Electronically signed by THIERNO Pearson, 03/08/17, 12:06 PM     Respiratory exam shows diminished breath sounds.  She needs to ambulate and once ambulatory she can go home with steroid taper ove 10 days or so.  Signing off, available.  I have seen and examined patient personally, performing a face-to-face diagnostic evaluation with plan of care reviewed and developed with APRN and nursing  staff. I have addended and/or modified the above history of present illness, physical examination, and assessment and plan to reflect my findings and impressions. Essential elements of the care plan were discussed with APRN above.  Agree with findings and assessment/plan as documented above.    Electronically signed by Wm Valdes MD, on 3/8/2017, 9:24 PM

## 2017-03-09 LAB
ALBUMIN SERPL-MCNC: 3.1 G/DL (ref 3.5–5)
ALBUMIN/GLOB SERPL: 1.1 G/DL (ref 1.1–2.5)
ALP SERPL-CCNC: 47 U/L (ref 24–120)
ALT SERPL W P-5'-P-CCNC: 17 U/L (ref 0–54)
ANION GAP SERPL CALCULATED.3IONS-SCNC: 7 MMOL/L (ref 4–13)
ARTERIAL PATENCY WRIST A: ABNORMAL
AST SERPL-CCNC: 21 U/L (ref 7–45)
ATMOSPHERIC PRESS: ABNORMAL MMHG
BACTERIA SPEC AEROBE CULT: NORMAL
BACTERIA SPEC AEROBE CULT: NORMAL
BASE EXCESS BLDA CALC-SCNC: 8.3 MMOL/L (ref -2–2)
BASOPHILS # BLD AUTO: 0.01 10*3/MM3 (ref 0–0.2)
BASOPHILS NFR BLD AUTO: 0.3 % (ref 0–2)
BDY SITE: ABNORMAL
BILIRUB SERPL-MCNC: 0.2 MG/DL (ref 0.1–1)
BUN BLD-MCNC: 9 MG/DL (ref 5–21)
BUN/CREAT SERPL: 11.8 (ref 7–25)
CALCIUM SPEC-SCNC: 7.9 MG/DL (ref 8.4–10.4)
CHLORIDE SERPL-SCNC: 96 MMOL/L (ref 98–110)
CO2 SERPL-SCNC: 36 MMOL/L (ref 24–31)
CREAT BLD-MCNC: 0.76 MG/DL (ref 0.5–1.4)
DEPRECATED RDW RBC AUTO: 55.6 FL (ref 40–54)
EOSINOPHIL # BLD AUTO: 0.03 10*3/MM3 (ref 0–0.7)
EOSINOPHIL NFR BLD AUTO: 1 % (ref 0–4)
ERYTHROCYTE [DISTWIDTH] IN BLOOD BY AUTOMATED COUNT: 15.9 % (ref 12–15)
GAS FLOW AIRWAY: 15 LPM
GFR SERPL CREATININE-BSD FRML MDRD: 76 ML/MIN/1.73
GLOBULIN UR ELPH-MCNC: 2.8 GM/DL
GLUCOSE BLD-MCNC: 90 MG/DL (ref 70–100)
HCO3 BLDA-SCNC: 33.4 MMOL/L (ref 22–26)
HCT VFR BLD AUTO: 34.6 % (ref 37–47)
HGB BLD-MCNC: 10.9 G/DL (ref 12–16)
HOROWITZ INDEX BLD+IHG-RTO: 55 %
IMM GRANULOCYTES # BLD: 0.01 10*3/MM3 (ref 0–0.03)
IMM GRANULOCYTES NFR BLD: 0.3 % (ref 0–5)
LYMPHOCYTES # BLD AUTO: 0.86 10*3/MM3 (ref 0.72–4.86)
LYMPHOCYTES NFR BLD AUTO: 29.1 % (ref 15–45)
MCH RBC QN AUTO: 30.2 PG (ref 28–32)
MCHC RBC AUTO-ENTMCNC: 31.5 G/DL (ref 33–36)
MCV RBC AUTO: 95.8 FL (ref 82–98)
MODALITY: ABNORMAL
MONOCYTES # BLD AUTO: 0.41 10*3/MM3 (ref 0.19–1.3)
MONOCYTES NFR BLD AUTO: 13.9 % (ref 4–12)
NEUTROPHILS # BLD AUTO: 1.64 10*3/MM3 (ref 1.87–8.4)
NEUTROPHILS NFR BLD AUTO: 55.4 % (ref 39–78)
PCO2 BLDA: 47.6 MM HG (ref 35–45)
PH BLDA: 7.46 PH UNITS (ref 7.35–7.45)
PLATELET # BLD AUTO: 125 10*3/MM3 (ref 130–400)
PMV BLD AUTO: 11.8 FL (ref 6–12)
PO2 BLDA: 55.6 MM HG (ref 80–100)
POTASSIUM BLD-SCNC: 3.3 MMOL/L (ref 3.5–5.3)
PROT SERPL-MCNC: 5.9 G/DL (ref 6.3–8.7)
RBC # BLD AUTO: 3.61 10*6/MM3 (ref 4.2–5.4)
SAO2 % BLDCOA: 90.3 % (ref 94–100)
SAO2 % BLDCOA: 90.3 % (ref 94–100)
SODIUM BLD-SCNC: 139 MMOL/L (ref 135–145)
WBC NRBC COR # BLD: 2.96 10*3/MM3 (ref 4.8–10.8)

## 2017-03-09 PROCEDURE — 25010000002 CYANOCOBALAMIN PER 1000 MCG: Performed by: PSYCHIATRY & NEUROLOGY

## 2017-03-09 PROCEDURE — 94760 N-INVAS EAR/PLS OXIMETRY 1: CPT

## 2017-03-09 PROCEDURE — 85025 COMPLETE CBC W/AUTO DIFF WBC: CPT | Performed by: FAMILY MEDICINE

## 2017-03-09 PROCEDURE — 94799 UNLISTED PULMONARY SVC/PX: CPT

## 2017-03-09 PROCEDURE — 36600 WITHDRAWAL OF ARTERIAL BLOOD: CPT

## 2017-03-09 PROCEDURE — 25010000002 ENOXAPARIN PER 10 MG: Performed by: INTERNAL MEDICINE

## 2017-03-09 PROCEDURE — 25010000002 LEVOFLOXACIN PER 250 MG: Performed by: INTERNAL MEDICINE

## 2017-03-09 PROCEDURE — 97110 THERAPEUTIC EXERCISES: CPT

## 2017-03-09 PROCEDURE — 97116 GAIT TRAINING THERAPY: CPT

## 2017-03-09 PROCEDURE — 25010000002 IRON SUCROSE PER 1 MG: Performed by: FAMILY MEDICINE

## 2017-03-09 PROCEDURE — 80053 COMPREHEN METABOLIC PANEL: CPT | Performed by: FAMILY MEDICINE

## 2017-03-09 PROCEDURE — 82803 BLOOD GASES ANY COMBINATION: CPT

## 2017-03-09 RX ORDER — ALPRAZOLAM 0.5 MG/1
1 TABLET ORAL 4 TIMES DAILY PRN
Status: DISCONTINUED | OUTPATIENT
Start: 2017-03-09 | End: 2017-03-16 | Stop reason: HOSPADM

## 2017-03-09 RX ORDER — SIMETHICONE 80 MG
160 TABLET,CHEWABLE ORAL 4 TIMES DAILY PRN
Status: DISCONTINUED | OUTPATIENT
Start: 2017-03-09 | End: 2017-03-16 | Stop reason: HOSPADM

## 2017-03-09 RX ORDER — POTASSIUM CHLORIDE 750 MG/1
20 CAPSULE, EXTENDED RELEASE ORAL
Status: COMPLETED | OUTPATIENT
Start: 2017-03-09 | End: 2017-03-11

## 2017-03-09 RX ADMIN — GABAPENTIN 300 MG: 300 CAPSULE ORAL at 05:34

## 2017-03-09 RX ADMIN — CYANOCOBALAMIN 1000 MCG: 1000 INJECTION, SOLUTION INTRAMUSCULAR at 08:42

## 2017-03-09 RX ADMIN — IPRATROPIUM BROMIDE AND ALBUTEROL SULFATE 3 ML: 2.5; .5 SOLUTION RESPIRATORY (INHALATION) at 23:43

## 2017-03-09 RX ADMIN — DOCUSATE SODIUM 100 MG: 100 CAPSULE ORAL at 17:23

## 2017-03-09 RX ADMIN — BUDESONIDE AND FORMOTEROL FUMARATE DIHYDRATE 2 PUFF: 160; 4.5 AEROSOL RESPIRATORY (INHALATION) at 19:05

## 2017-03-09 RX ADMIN — OXYCODONE HYDROCHLORIDE AND ACETAMINOPHEN 1 TABLET: 10; 325 TABLET ORAL at 14:05

## 2017-03-09 RX ADMIN — ALPRAZOLAM 1 MG: 0.5 TABLET ORAL at 16:47

## 2017-03-09 RX ADMIN — POTASSIUM CHLORIDE 20 MEQ: 750 CAPSULE, EXTENDED RELEASE ORAL at 17:22

## 2017-03-09 RX ADMIN — IPRATROPIUM BROMIDE AND ALBUTEROL SULFATE 3 ML: 2.5; .5 SOLUTION RESPIRATORY (INHALATION) at 06:56

## 2017-03-09 RX ADMIN — FUROSEMIDE 20 MG: 20 TABLET ORAL at 08:42

## 2017-03-09 RX ADMIN — SIMETHICONE 160 MG: 80 TABLET, CHEWABLE ORAL at 22:38

## 2017-03-09 RX ADMIN — IPRATROPIUM BROMIDE AND ALBUTEROL SULFATE 3 ML: 2.5; .5 SOLUTION RESPIRATORY (INHALATION) at 19:05

## 2017-03-09 RX ADMIN — ALPRAZOLAM 0.5 MG: 0.5 TABLET ORAL at 08:55

## 2017-03-09 RX ADMIN — CITALOPRAM 40 MG: 20 TABLET, FILM COATED ORAL at 08:42

## 2017-03-09 RX ADMIN — DEXTROMETHORPHAN 30 MG: 30 SUSPENSION, EXTENDED RELEASE ORAL at 09:52

## 2017-03-09 RX ADMIN — SENNA 8.6 MG: 8.6 TABLET, COATED ORAL at 20:18

## 2017-03-09 RX ADMIN — IPRATROPIUM BROMIDE AND ALBUTEROL SULFATE 3 ML: 2.5; .5 SOLUTION RESPIRATORY (INHALATION) at 11:08

## 2017-03-09 RX ADMIN — OXYCODONE HYDROCHLORIDE AND ACETAMINOPHEN 1 TABLET: 10; 325 TABLET ORAL at 02:56

## 2017-03-09 RX ADMIN — IPRATROPIUM BROMIDE AND ALBUTEROL SULFATE 3 ML: 2.5; .5 SOLUTION RESPIRATORY (INHALATION) at 14:50

## 2017-03-09 RX ADMIN — ENOXAPARIN SODIUM 40 MG: 40 INJECTION SUBCUTANEOUS at 08:44

## 2017-03-09 RX ADMIN — GABAPENTIN 300 MG: 300 CAPSULE ORAL at 13:33

## 2017-03-09 RX ADMIN — IPRATROPIUM BROMIDE AND ALBUTEROL SULFATE 3 ML: 2.5; .5 SOLUTION RESPIRATORY (INHALATION) at 03:17

## 2017-03-09 RX ADMIN — ALPRAZOLAM 0.5 MG: 0.5 TABLET ORAL at 00:35

## 2017-03-09 RX ADMIN — OXYCODONE HYDROCHLORIDE 30 MG: 20 TABLET, FILM COATED, EXTENDED RELEASE ORAL at 08:42

## 2017-03-09 RX ADMIN — LEVOFLOXACIN 750 MG: 750 INJECTION, SOLUTION INTRAVENOUS at 12:23

## 2017-03-09 RX ADMIN — NICOTINE 1 PATCH: 21 PATCH, EXTENDED RELEASE TRANSDERMAL at 17:23

## 2017-03-09 RX ADMIN — PANTOPRAZOLE SODIUM 40 MG: 40 TABLET, DELAYED RELEASE ORAL at 05:34

## 2017-03-09 RX ADMIN — DOCUSATE SODIUM 100 MG: 100 CAPSULE ORAL at 08:42

## 2017-03-09 RX ADMIN — DEXTROMETHORPHAN 30 MG: 30 SUSPENSION, EXTENDED RELEASE ORAL at 20:18

## 2017-03-09 RX ADMIN — IRON SUCROSE 300 MG: 20 INJECTION, SOLUTION INTRAVENOUS at 09:52

## 2017-03-09 RX ADMIN — BUDESONIDE AND FORMOTEROL FUMARATE DIHYDRATE 2 PUFF: 160; 4.5 AEROSOL RESPIRATORY (INHALATION) at 06:56

## 2017-03-09 RX ADMIN — GABAPENTIN 300 MG: 300 CAPSULE ORAL at 21:45

## 2017-03-09 RX ADMIN — OXYCODONE HYDROCHLORIDE 30 MG: 20 TABLET, FILM COATED, EXTENDED RELEASE ORAL at 21:45

## 2017-03-09 NOTE — PROGRESS NOTES
Continued Stay Note   Bernard     Patient Name: Justyna Lei  MRN: [id number]  Today's Date: 3/9/2017    Admit Date: 3/4/2017          Discharge Plan       03/09/17 1510    Case Management/Social Work Plan    Plan Home    Additional Comments Have what is needed from respiratory to set up O2/Neb Machine.  Called Camelia at [facility] and provided referal and faxed needed information. Informed that pt will be discharged tomorrow.       03/09/17 1453    Case Management/Social Work Plan    Plan Home    Additional Comments Pt has order for Home O2, and Dr. Leavitt asked for JONEL Boykin to put in order for nebulizer machine.  Spoke with pt re: providers in her area and she prefers [facility]. Once respiratory has documentation showing she qualifies for O2, referral will be sent.  MD says pt will go home tomorrow.                Discharge Codes     None            IMELDA Jean-Baptiste

## 2017-03-09 NOTE — PLAN OF CARE
Problem: Patient Care Overview (Adult)  Goal: Plan of Care Review  Outcome: Ongoing (interventions implemented as appropriate)    03/09/17 0343   Coping/Psychosocial Response Interventions   Plan Of Care Reviewed With patient   Patient Care Overview   Progress no change   Outcome Evaluation   Outcome Summary/Follow up Plan pt has c/o of pain this shift. VSS          Problem: Respiratory Insufficiency (Adult)  Goal: Acid/Base Balance  Outcome: Ongoing (interventions implemented as appropriate)    03/09/17 0343   Respiratory Insufficiency (Adult)   Acid/Base Balance making progress toward outcome       Goal: Effective Ventilation  Outcome: Ongoing (interventions implemented as appropriate)    03/09/17 0343   Respiratory Insufficiency (Adult)   Effective Ventilation making progress toward outcome

## 2017-03-09 NOTE — PROGRESS NOTES
Continued Stay Note   Bernard     Patient Name: Justyna Lei  MRN: [id number]  Today's Date: 3/9/2017    Admit Date: 3/4/2017          Discharge Plan       03/09/17 1452    Case Management/Social Work Plan    Plan Home    Additional Comments Pt has order for Home O2, and Dr. Leavitt asked for JON[facility] Boykin to put in order for nebulizer machine.  Spoke with pt re: providers in her area and she prefers [facility]. Once respiratory has documentation showing she qualifies for O2, referral will be sent.  MD says pt will go home tomorrow.                Discharge Codes     None            IMELDA Jean-Baptiste

## 2017-03-09 NOTE — PROGRESS NOTES
[facility] Medicine Services  INPATIENT PROGRESS NOTE    Length of Stay: 5  Date of Admission: 3/4/2017  Primary Care Physician: No Known Provider    Subjective   Chief Complaint: Pneumonia, dyspnea    HPI   64 YO CF admitted on 3/4/17. Extubated on 3/4. Patient is breathing a lot better, on 2l nc. Patient still remained hypotensive, but asymptomatic. Plan home oxygen and neb. Put back home dose of xanax.     Review of Systems   Constitutional: Positive for activity change, appetite change and fatigue. Negative for chills and fever.   HENT: Negative for hearing loss, nosebleeds, tinnitus and trouble swallowing.   Eyes: Negative for visual disturbance.   Respiratory: Positive for cough, shortness of breath and wheezing. Negative for chest tightness.   Cardiovascular: Negative for chest pain, palpitations and leg swelling.   Gastrointestinal: Negative for abdominal distention, abdominal pain, blood in stool, constipation, diarrhea, nausea and vomiting.   Endocrine: Negative for cold intolerance, heat intolerance, polydipsia, polyphagia and polyuria.   Genitourinary: Negative for decreased urine volume, difficulty urinating, dysuria, flank pain, frequency and hematuria.   Musculoskeletal: Positive for arthralgias, back pain and myalgias. Negative for joint swelling.   Skin: Negative for rash.   Allergic/Immunologic: Negative for immunocompromised state.   Neurological: Positive for tremors. Negative for dizziness, syncope, weakness, light-headedness and headaches.   Hematological: Negative for adenopathy. Does not bruise/bleed easily.   Psychiatric/Behavioral: Negative for confusion, sleep disturbance and suicidal ideas. The patient is nervous/anxio    All pertinent negatives and positives are as above. All other systems have been reviewed and are negative unless otherwise stated.     Objective    Temp:  [98.4 °F (36.9 °C)-98.9 °F (37.2 °C)] 98.7 °F (37.1 °C)  Heart Rate:  [79-88]  84  Resp:  [16-20] 20  BP: ()/(43-56) 108/56    Intake/Output Summary (Last 24 hours) at 03/09/17 1449  Last data filed at 03/09/17 1330   Gross per 24 hour   Intake    360 ml   Output   3650 ml   Net  -3290 ml     Physical Exam  Constitutional: She is oriented to person, place, and time. She appears well-developed.   HENT:   Head: Normocephalic and atraumatic.   Eyes: Conjunctivae and EOM are normal. Pupils are equal, round, and reactive to light.   Neck: Neck supple. No JVD present. No thyromegaly present.   Cardiovascular: Normal rate, regular rhythm, normal heart sounds and intact distal pulses. Exam reveals no gallop and no friction rub.   No murmur heard.  Pulmonary/Chest: Effort normal. No respiratory distress. She has wheezes. She has rales. She exhibits no tenderness.   Abdominal: Soft. Bowel sounds are normal. She exhibits no distension. There is no tenderness. There is no rebound and no guarding.   Musculoskeletal: Normal range of motion. She exhibits no edema, tenderness or deformity.   Lymphadenopathy:   She has no cervical adenopathy.   Neurological: She is alert and oriented to person, place, and time. She displays normal reflexes. No cranial nerve deficit. She exhibits abnormal muscle tone. Coordination abnormal.   Skin: Skin is warm and dry. No rash noted.   Psychiatric: She has a normal mood and affect. Judgment and thought content normal.   Patient seemed very nervous.   Nursing note and vitals reviewed.  Results Review:  Results Review:  Lab Results (last 24 hours)     Procedure Component Value Units Date/Time    Blood Culture [[id number]]  (Normal) Collected:  03/04/17 1427    Specimen:  Blood from Arm, Right Updated:  03/08/17 1501     Blood Culture No growth at 4 days     Blood Culture [[id number]]  (Normal) Collected:  03/04/17 1415    Specimen:  Blood from Arm, Left Updated:  03/08/17 1501     Blood Culture No growth at 4 days     CBC & Differential [[id number]] Collected:  03/09/17 0542     Specimen:  Blood Updated:  03/09/17 0622    Narrative:       The following orders were created for panel order CBC & Differential.  Procedure                               Abnormality         Status                     ---------                               -----------         ------                     CBC Auto Differential[[id number]]         Abnormal            Final result                 Please view results for these tests on the individual orders.    CBC Auto Differential [[id number]]  (Abnormal) Collected:  03/09/17 0521    Specimen:  Blood Updated:  03/09/17 0622     WBC 2.96 (L) 10*3/mm3      RBC 3.61 (L) 10*6/mm3      Hemoglobin 10.9 (L) g/dL      Hematocrit 34.6 (L) %      MCV 95.8 fL      MCH 30.2 pg      MCHC 31.5 (L) g/dL      RDW 15.9 (H) %      RDW-SD 55.6 (H) fl      MPV 11.8 fL      Platelets 125 (L) 10*3/mm3      Neutrophil % 55.4 %      Lymphocyte % 29.1 %      Monocyte % 13.9 (H) %      Eosinophil % 1.0 %      Basophil % 0.3 %      Immature Grans % 0.3 %      Neutrophils, Absolute 1.64 (L) 10*3/mm3      Lymphocytes, Absolute 0.86 10*3/mm3      Monocytes, Absolute 0.41 10*3/mm3      Eosinophils, Absolute 0.03 10*3/mm3      Basophils, Absolute 0.01 10*3/mm3      Immature Grans, Absolute 0.01 10*3/mm3     Comprehensive Metabolic Panel [[id number]]  (Abnormal) Collected:  03/09/17 0521    Specimen:  Blood Updated:  03/09/17 0633     Glucose 90 mg/dL      BUN 9 mg/dL      Creatinine 0.76 mg/dL      Sodium 139 mmol/L      Potassium 3.3 (L) mmol/L      Chloride 96 (L) mmol/L      CO2 36.0 (H) mmol/L      Calcium 7.9 (L) mg/dL      Total Protein 5.9 (L) g/dL      Albumin 3.10 (L) g/dL      ALT (SGPT) 17 U/L      AST (SGOT) 21 U/L      Alkaline Phosphatase 47 U/L      Total Bilirubin 0.2 mg/dL      eGFR Non African Amer 76 mL/min/1.73      Globulin 2.8 gm/dL      A/G Ratio 1.1 g/dL      BUN/Creatinine Ratio 11.8      Anion Gap 7.0 mmol/L            Cultures:  BLOOD CULTURE   Date Value Ref Range Status    03/04/2017 No growth at 4 days  Preliminary   03/04/2017 No growth at 4 days  Preliminary       Radiology Data:    Imaging Results (last 24 hours)     ** No results found for the last 24 hours. **          Allergies   Allergen Reactions   • Aspirin Anaphylaxis   • Demerol [Meperidine] Anaphylaxis   • Ibuprofen Anaphylaxis   • Neosporin [Neomycin-Bacitracin Zn-Polymyx] Anaphylaxis   • Penicillins Anaphylaxis   • Sulfa Antibiotics Anaphylaxis   • Codeine Hives     Pt can take codeine as long is it is not alot   • Talwin [Pentazocine] Hives   • Tetracyclines & Related    • Morphine And Related Hives       Scheduled meds:     budesonide-formoterol 2 puff Inhalation BID - RT   citalopram 40 mg Oral Daily   cyanocobalamin 1,000 mcg Intramuscular Daily   dextromethorphan polistirex ER 30 mg Oral Q12H   docusate sodium 100 mg Oral BID   enoxaparin 40 mg Subcutaneous Daily   furosemide 20 mg Oral Daily   gabapentin 300 mg Oral Q8H   ipratropium-albuterol 3 mL Nebulization Q4H - RT   levoFLOXacin 750 mg Intravenous Q24H   mirtazapine 30 mg Oral Nightly   nicotine 1 patch Transdermal Q24H   oxyCODONE ER 30 mg Oral Q12H   pantoprazole 40 mg Oral Q AM   senna 1 tablet Oral Nightly       PRN meds:  •  acetaminophen  •  acetaminophen  •  ALPRAZolam  •  enalaprilat  •  hydrALAZINE  •  ondansetron  •  oxyCODONE-acetaminophen  •  promethazine  •  sodium chloride  •  sodium chloride    Assessment/Plan     Active Problems:    Pneumonia of right lower lobe due to infectious organism    COPD (chronic obstructive pulmonary disease)    Hypokalemia    Anemia    Respiratory failure      Plan:  Pneumonia/COPD/respiratory failureHypoxia with respiratory distress-Extubated 3/6. Duonebs. Currently Levaquin. By mouth steroids. Ambulatory o2 sat.  Consult placement for home oxygen and neb at home.      Dyspnea/echocardiogram ejection fraction 60%      Right lower lobe lung nodule-repeat CT scan in 3 months.      AMS- question hypoxia/fever vs  possible OD. Apparently there has been a HX of depression since the passing of her  in July. Neurology consulted.       Hypokalemia. kdur x 2 days. Improving.    Reflux and gas- on protonix, start on mylicon prn      Depression/Anxiety- Celexa/xanax- Increase xanax to home does.  Pt wants Remeron to be DC'd..      Abdominal pain- constipation on CT . Colace and senna      Hypotension with HX of hypertension- off levaphed      Subacute Thyroid-Abnormal TSH- Free t4 and free t3. Normal.       Chronic pain- on OxyContin, and Percocet when necessary       Anemia- iron deficiency. An venofer ×3 days      Discharge Plannin-2 days    Siddharth Leavitt MD   17   2:49 PM

## 2017-03-09 NOTE — PROGRESS NOTES
[facility] Medicine Services  INPATIENT PROGRESS NOTE    Length of Stay: 4  Date of Admission: 3/4/2017  Primary Care Physician: No Known Provider    Subjective   Chief Complaint: Pneumonia, dyspnea    HPI   66 YO CF admitted on 3/4/17.  Extubated on 3/4. Patient is breathing a lot better, on 2l nc.  Patient still remained hypotensive, but asymptomatic.  Plan for prematurity, O2 sat.  An home oxygen.    Review of Systems   Constitutional: Positive for activity change, appetite change and fatigue. Negative for chills and fever.   HENT: Negative for hearing loss, nosebleeds, tinnitus and trouble swallowing.   Eyes: Negative for visual disturbance.   Respiratory: Positive for cough, shortness of breath and wheezing. Negative for chest tightness.   Cardiovascular: Negative for chest pain, palpitations and leg swelling.   Gastrointestinal: Negative for abdominal distention, abdominal pain, blood in stool, constipation, diarrhea, nausea and vomiting.   Endocrine: Negative for cold intolerance, heat intolerance, polydipsia, polyphagia and polyuria.   Genitourinary: Negative for decreased urine volume, difficulty urinating, dysuria, flank pain, frequency and hematuria.   Musculoskeletal: Positive for arthralgias, back pain and myalgias. Negative for joint swelling.   Skin: Negative for rash.   Allergic/Immunologic: Negative for immunocompromised state.   Neurological: Positive for tremors. Negative for dizziness, syncope, weakness, light-headedness and headaches.   Hematological: Negative for adenopathy. Does not bruise/bleed easily.   Psychiatric/Behavioral: Negative for confusion, sleep disturbance and suicidal ideas. The patient is nervous/anxio    All pertinent negatives and positives are as above. All other systems have been reviewed and are negative unless otherwise stated.     Objective    Temp:  [98.4 °F (36.9 °C)-99.6 °F (37.6 °C)] 98.4 °F (36.9 °C)  Heart Rate:  []  81  Resp:  [18-20] 18  BP: ()/(43-51) 99/45    Intake/Output Summary (Last 24 hours) at 03/08/17 2001  Last data filed at 03/08/17 1847   Gross per 24 hour   Intake    240 ml   Output   1300 ml   Net  -1060 ml     Physical Exam  Constitutional: She is oriented to person, place, and time. She appears well-developed.   HENT:   Head: Normocephalic and atraumatic.   Eyes: Conjunctivae and EOM are normal. Pupils are equal, round, and reactive to light.   Neck: Neck supple. No JVD present. No thyromegaly present.   Cardiovascular: Normal rate, regular rhythm, normal heart sounds and intact distal pulses. Exam reveals no gallop and no friction rub.   No murmur heard.  Pulmonary/Chest: Effort normal. No respiratory distress. She has wheezes. She has rales. She exhibits no tenderness.   Abdominal: Soft. Bowel sounds are normal. She exhibits no distension. There is no tenderness. There is no rebound and no guarding.   Musculoskeletal: Normal range of motion. She exhibits no edema, tenderness or deformity.   Lymphadenopathy:   She has no cervical adenopathy.   Neurological: She is alert and oriented to person, place, and time. She displays normal reflexes. No cranial nerve deficit. She exhibits abnormal muscle tone. Coordination abnormal.   Skin: Skin is warm and dry. No rash noted.   Psychiatric: She has a normal mood and affect. Judgment and thought content normal.   Patient seemed very nervous.   Nursing note and vitals reviewed.  Results Review:  Lab Results (last 24 hours)     Procedure Component Value Units Date/Time    CBC & Differential [[id number]] Collected:  03/08/17 0602    Specimen:  Blood Updated:  03/08/17 0703    Narrative:       The following orders were created for panel order CBC & Differential.  Procedure                               Abnormality         Status                     ---------                               -----------         ------                     CBC Auto Differential[[id number]]          Abnormal            Final result                 Please view results for these tests on the individual orders.    CBC Auto Differential [[id number]]  (Abnormal) Collected:  03/08/17 0602    Specimen:  Blood Updated:  03/08/17 0703     WBC 3.51 (L) 10*3/mm3      RBC 3.43 (L) 10*6/mm3      Hemoglobin 10.4 (L) g/dL      Hematocrit 32.5 (L) %      MCV 94.8 fL      MCH 30.3 pg      MCHC 32.0 (L) g/dL      RDW 15.7 (H) %      RDW-SD 55.0 (H) fl      MPV 11.8 fL      Platelets 129 (L) 10*3/mm3      Neutrophil % 69.5 %      Lymphocyte % 20.2 %      Monocyte % 9.7 %      Eosinophil % 0.3 %      Basophil % 0.0 %      Immature Grans % 0.3 %      Neutrophils, Absolute 2.44 10*3/mm3      Lymphocytes, Absolute 0.71 (L) 10*3/mm3      Monocytes, Absolute 0.34 10*3/mm3      Eosinophils, Absolute 0.01 10*3/mm3      Basophils, Absolute 0.00 10*3/mm3      Immature Grans, Absolute 0.01 10*3/mm3     Comprehensive Metabolic Panel [[id number]]  (Abnormal) Collected:  03/08/17 0602    Specimen:  Blood Updated:  03/08/17 0746     Glucose 83 mg/dL      BUN 9 mg/dL      Creatinine 0.70 mg/dL      Sodium 137 mmol/L      Potassium 3.4 (L) mmol/L      Chloride 98 mmol/L      CO2 32.0 (H) mmol/L      Calcium 7.8 (L) mg/dL      Total Protein 5.3 (L) g/dL      Albumin 2.90 (L) g/dL      ALT (SGPT) 16 U/L      AST (SGOT) 15 U/L      Alkaline Phosphatase 47 U/L      Total Bilirubin 0.1 mg/dL      eGFR Non African Amer 83 mL/min/1.73      Globulin 2.4 gm/dL      A/G Ratio 1.2 g/dL      BUN/Creatinine Ratio 12.9      Anion Gap 7.0 mmol/L     Blood Culture [[id number]]  (Normal) Collected:  03/04/17 1427    Specimen:  Blood from Arm, Right Updated:  03/08/17 1501     Blood Culture No growth at 4 days     Blood Culture [[id number]]  (Normal) Collected:  03/04/17 1415    Specimen:  Blood from Arm, Left Updated:  03/08/17 1501     Blood Culture No growth at 4 days            Cultures:  BLOOD CULTURE   Date Value Ref Range Status   03/04/2017 No growth at 4 days   Preliminary   03/04/2017 No growth at 4 days  Preliminary       Radiology Data:    Imaging Results (last 24 hours)     ** No results found for the last 24 hours. **          Allergies   Allergen Reactions   • Aspirin Anaphylaxis   • Demerol [Meperidine] Anaphylaxis   • Ibuprofen Anaphylaxis   • Neosporin [Neomycin-Bacitracin Zn-Polymyx] Anaphylaxis   • Penicillins Anaphylaxis   • Sulfa Antibiotics Anaphylaxis   • Codeine Hives     Pt can take codeine as long is it is not alot   • Talwin [Pentazocine] Hives   • Tetracyclines & Related    • Morphine And Related Hives       Scheduled meds:     budesonide-formoterol 2 puff Inhalation BID - RT   citalopram 40 mg Oral Daily   cyanocobalamin 1,000 mcg Intramuscular Daily   dextromethorphan polistirex ER 30 mg Oral Q12H   docusate sodium 100 mg Oral BID   enoxaparin 40 mg Subcutaneous Daily   furosemide 40 mg Oral Daily   gabapentin 300 mg Oral Q8H   ipratropium-albuterol 3 mL Nebulization Q4H - RT   iron sucrose (VENOFER) IVPB 300 mg Intravenous Q24H   levoFLOXacin 750 mg Intravenous Q24H   mirtazapine 15 mg Oral Nightly   nicotine 1 patch Transdermal Q24H   oxyCODONE ER 30 mg Oral Q12H   pantoprazole 40 mg Oral Q AM       PRN meds:  •  acetaminophen  •  acetaminophen  •  ALPRAZolam  •  enalaprilat  •  hydrALAZINE  •  ondansetron  •  oxyCODONE-acetaminophen  •  sodium chloride  •  sodium chloride    Assessment/Plan     Active Problems:    Pneumonia of right lower lobe due to infectious organism    COPD (chronic obstructive pulmonary disease)    Hypokalemia    Anemia    Respiratory failure      Plan:  Pneumonia/COPD/respiratory failureHypoxia with respiratory distress-Extubated 3/6. Duonebs. Currently Levaquin. By mouth steroids. Ambulatory o2 sat.  Consult placement for home oxygen.     Dyspnea/echocardiogram ejection fraction 60%     Right lower lobe lung nodule-repeat CT scan in 3 months.      AMS- question hypoxia/fever vs possible OD. Apparently there has been a HX of  depression since the passing of her  in July. Neurology consulted.       Hypokalemia. kdur x 3 days. Improving.      Depression/Anxiety- Celexa/xanax- Xanax to stay the same dose, due to hypotension.  Increase Remeron to help her sleep.      Abdominal pain- constipation on CT .  Colace and senna      Hypotension with HX of hypertension- off levaphed      Subacute Thyroid-Abnormal TSH- Free t4 and free t3. Normal.       Chronic pain- on OxyContin, and Percocet when necessary       Anemia- iron deficiency. An venofer ×3 days      Discharge Plannin-2 days    Siddharth Leavitt MD   17   8:01 PM

## 2017-03-09 NOTE — PLAN OF CARE
Problem: Patient Care Overview (Adult)  Goal: Plan of Care Review  Outcome: Ongoing (interventions implemented as appropriate)    03/09/17 6891   Coping/Psychosocial Response Interventions   Plan Of Care Reviewed With patient   Patient Care Overview   Progress progress toward functional goals as expected         Problem: Respiratory Insufficiency (Adult)  Goal: Acid/Base Balance  Outcome: Ongoing (interventions implemented as appropriate)  Goal: Effective Ventilation  Outcome: Ongoing (interventions implemented as appropriate)

## 2017-03-09 NOTE — DISCHARGE PLACEMENT REQUEST
"Maye Gao [phone]    Jhon Lei (65 y.o. Female)     Date of Birth Social Security Number Address Home Phone MRN    03/25/1951  [id number] [place] [phone] [id number]    Protestant Marital Status          None Unknown       Admission Date Admission Type Admitting Provider Attending Provider Department, Room/Bed    3/4/17 Emergency Siddharth Leavitt MD Truong, Khai C, MD [address], 491/1    Discharge Date Discharge Disposition Discharge Destination                      Attending Provider: Siddharth Leavitt MD     Allergies:  Aspirin, Demerol [Meperidine], Ibuprofen, Neosporin [Neomycin-bacitracin Zn-polymyx], Penicillins, Sulfa Antibiotics, Codeine, Talwin [Pentazocine], Tetracyclines & Related, Morphine And Related    Isolation:  None   Infection:  None   Code Status:  FULL    Ht:  64.5\" (163.8 cm)   Wt:  153 lb 1.6 oz (69.4 kg)    Admission Cmt:  None   Principal Problem:  None                Active Insurance as of 3/4/2017     Primary Coverage     Payor Plan Insurance Group Employer/Plan Group    ANTHEM MEDICAID AN[place]M MEDICAID [id number]     Payor Plan Address Payor Plan Phone Number Effective From Effective To    [address] [address] [phone] 1/1/2016     [place] [address]       Subscriber Name Subscriber Birth Date Member ID       JHON LEI 3/25/1951 [id number]                 Emergency Contacts      (Rel.) Home Phone Work Phone Mobile Phone    Ella Guerrero (Daughter) [phone] -- --      Home Nebulizer [] (Order [id number])   General Supply   Date: 3/9/2017 Department: [address] Ordering/Authorizing: Siddharth Leavitt MD   Order History   Outpatient   Date/Time Action Taken User Additional Information   03/09/17 1455 Sign Lucretia Alegria RN Ordering Mode: Verbal with readback   Order Details   Frequency Duration Priority Order Class   None None Routine External   Start Date/Time   Start Date   03/09/17   Order " Questions   Question Answer Comment   Nebulizer Equipment:  Nebulizer w/ Compressor    Nebulizer Accessories  Nebulizer Kit - Administration Set, Non-Disposable    Length of Need (99 Months = Lifetime) 99 Months = Lifetime    Note:  Custom values to enter length of need for DME   Verbal Order Info   Action Created on Order Mode Entered by Responsible Provider Signed by Signed on   Ordering 03/09/17 1455 Verbal with readback HAILEY Eugene MD     Source Order Set / Preference List   Preference List   AMB SUPPLIES [1416]   Clinical Indications      ICD-10-CM ICD-9-CM   Pneumonia of right lower lobe due to infectious organism  - Primary     J18.9 483.8   Reprint Order Requisition   HOME NEBULIZER (Order #[id number]) on 3/9/17   Encounter-Level Cardiology Documents:   There are no encounter-level cardiology documents.   Encounter   View Encounter   Order Provider Info       Office phone Pager E-mail   Ordering User Lucretia Alegria RN -- -- --   Authorizing Provider Siddharth Leavitt [phone] -- --   Attending Provider Siddharth Leavitt [phone] -- --   Entered By Lucretia Alegria RN -- -- --   Ordering Provider Siddharth Leavitt [phone] -- --   Linked Charges   No charges linked to this procedure   Order Transmittal Tracking   HOME NEBULIZER (Order #[id number]) on 3/9/17   Authorized by: Siddharth Leavitt MD (NPI: [id number])         Oxygen Therapy [EQ60] (Order [id number])   General Supply   Date: 3/9/2017 Department: [address] Ordering/Authorizing: Siddharth Leavitt MD   Order History   Outpatient   Date/Time Action Taken User Additional Information   03/09/17 1505 Sign Lucretia Alegria RN Ordering Mode: Verbal with readback   Order Details   Frequency Duration Priority Order Class   None None Routine External   Start Date/Time   Start Date   03/09/17   Order Questions   Question Answer Comment   Delivery Modality Nasal Cannula    Liters Per Minute: 4    Duration:  Continuous    Equipment  Oxygen Concentrator &  &  Portable Gaseous Oxygen System & Portable Oxygen Contents Gaseous    Length of Need (99 Months = Lifetime) 99 Months = Lifetime    Note:  Custom values to enter length of need for DME   Verbal Order Info   Action Created on Order Mode Entered by Responsible Provider Signed by Signed on   Ordering 03/09/17 1505 Verbal with readback HAILEY Eugene MD     Source Order Set / Preference List   Preference List   AMB SUPPLIES [1416]   Clinical Indications      ICD-10-CM ICD-9-CM   Pneumonia of right lower lobe due to infectious organism  - Primary     J18.9 483.8   Reprint Order Requisition   OXYGEN THERAPY (Order #[id number]) on 3/9/17   Encounter-Level Cardiology Documents:   There are no encounter-level cardiology documents.   Encounter   View Encounter   Order Provider Info       Office phone Pager E-mail   Ordering User Lucretia Alegria RN -- -- --   Authorizing Provider Siddharth Leavitt [phone] -- --   Attending Provider Siddharth Leavitt [phone] -- --   Entered By Lucretia Alegria RN -- -- --   Ordering Provider Siddharth Leavitt [phone] -- --   Linked Charges   No charges linked to this procedure   Order Transmittal Tracking   OXYGEN THERAPY (Order #[id number]) on 3/9/17   Authorized by: Siddharth Leavitt MD (NPI: [id number])           Oxygen Therapy [EQ60] (Order [id number])   General Supply   Date: 3/9/2017 Department: [address] Ordering/Authorizing: Siddharth Leavitt MD   Order History   Outpatient   Date/Time Action Taken User Additional Information   03/09/17 1505 Sign Lucretia Alegria RN Ordering Mode: Verbal with readback   Order Details   Frequency Duration Priority Order Class   None None Routine External   Start Date/Time   Start Date   03/09/17   Order Questions   Question Answer Comment   Delivery Modality Nasal Cannula    Liters Per Minute: 4    Duration: Continuous    Equipment  Oxygen  Concentrator &  &  Portable Gaseous Oxygen System & Portable Oxygen Contents Gaseous    Length of Need (99 Months = Lifetime) 99 Months = Lifetime    Note:  Custom values to enter length of need for DME   Verbal Order Info   Action Created on Order Mode Entered by Responsible Provider Signed by Signed on   Ordering 03/09/17 1505 Verbal with readback HAILEY Eugene MD     Source Order Set / Preference List   Preference List   AMB SUPPLIES [1416]   Clinical Indications      ICD-10-CM ICD-9-CM   Pneumonia of right lower lobe due to infectious organism  - Primary     J18.9 483.8   Reprint Order Requisition   OXYGEN THERAPY (Order #[id number]) on 3/9/17   Encounter-Level Cardiology Documents:   There are no encounter-level cardiology documents.   Encounter   View Encounter   Order Provider Info       Office phone Pager E-mail   Ordering User Lucretia Alegria RN -- -- --   Authorizing Provider Siddharth Leavitt [phone] -- --   Attending Provider Siddharth Leavitt [phone] -- --   Entered By Lucretia Alegria RN -- -- --   Ordering Provider Siddharth Leavitt [phone] -- --   Linked Charges   No charges linked to this procedure   Order Transmittal Tracking   OXYGEN THERAPY (Order #[id number]) on 3/9/17   Authorized by: Siddharth Leavitt MD (NPI: [id number])         Kumar Mae, RRT Respiratory Therapist Signed  Progress Notes Date of Service: 3/9/2017  2:52 PM         []Hide copied text  []Hover for attribution information  3/9/17 @ 0648 - Entered patient's room to give breathing treatment. Upon placing pulse ox on patient's finger, SpO2 was 68% on RA with HR 85 while patient lying in bed. 4L NC was placed back on patient and SpO2 came up to 90% within approximately 7 minutes with patient doing deep breathing               History & Physical      Colt Sargent DO at 3/4/2017  4:27 PM              [facility]  HISTORY AND  "PHYSICAL    Date of Admission: 3/4/2017  Primary Care Physician: No Known Provider    Subjective     Chief Complaint:   hypoxia    History of Present Illness  64 YO CF admitted on 3/4/17. Patient too somnolent to give HX. Daughter at bedside and gives HX. States this started about one month ago. Saw PCP Dr. Reyes and got 3 rounds of antibiotics. 2 rounds of doxycycline and one round of azithromycin, subsequently she got slightly better, but never fully recovered. Patient continues to smoke 1 PPD. She has been in bed a lot lately as her  just passed away in July. She has HX of depression and sees on OP psychiatrist. Daughter stated this afternoon family was unable to wake patient and her 02 was 50%. She does not wear 02 at home.       Review of Systems   Dyspnea  Lethargy  Recent episode of abdominal pain    Otherwise complete ROS reviewed and negative except as mentioned in the HPI.      Past Medical History:   Past Medical History   Diagnosis Date   • Anxiety    • COPD (chronic obstructive pulmonary disease)    • Depression    • Hypertension        Past Surgical History:History reviewed. No pertinent past surgical history.    Social History:  reports that she has been smoking Cigarettes.  She has been smoking about 1.00 pack per day. She does not have any smokeless tobacco history on file. She reports that she does not drink alcohol or use illicit drugs. Daughter lives with patient.    Family History: family history is not on file.   RA, depression/anxiety, unknown type of cancer    Allergies:  Allergies not on file    Medications:  Prior to Admission medications    Not on File       Objective     Vital Signs:   Visit Vitals   • /51   • Pulse 79   • Temp (!) 100.9 °F (38.3 °C) (Oral)   • Resp 20   • Ht 67\" (170.2 cm)   • Wt 145 lb (65.8 kg)   • LMP  (LMP Unknown)  Comment: hysterectomy   • SpO2 99%   • Breastfeeding No   • BMI 22.71 kg/m2     Physical Exam   HENT:   Head: Normocephalic and atraumatic. "   Nose: Nose normal.   Mouth/Throat: Oropharynx is clear and moist.   Eyes: Conjunctivae and EOM are normal.   Neck: Normal range of motion. Neck supple.   Cardiovascular: Normal rate, regular rhythm and normal heart sounds.    Pulmonary/Chest: Breath sounds normal. She is in respiratory distress.   Abdominal: Soft.   Hypomobile bowel sounds.    Musculoskeletal: She exhibits no edema or tenderness.   Neurological: No cranial nerve deficit.   Limited responsiveness.    Skin: Skin is warm and dry.   Psychiatric: She has a normal mood and affect.   Vitals reviewed.           Results Reviewed:  Lab Results (last 24 hours)     Procedure Component Value Units Date/Time    [place] Draw [[id number]] Collected:  03/04/17 1415    Specimen:  Blood Updated:  03/04/17 1436    Narrative:       The following orders were created for panel order [place] Draw.  Procedure                               Abnormality         Status                     ---------                               -----------         ------                     Light Blue Top[[id number]]                                    In process                 Green Top (Gel)[[id number]]                                   In process                 Lavender Top[[id number]]                                      In process                 Red Top[[id number]]                                           In process                 Green Top (No Gel)[[id number]]                                                             Please view results for these tests on the individual orders.    Red Top [[id number]] Collected:  03/04/17 1415    Specimen:  Blood from Arm, Left Updated:  03/04/17 1436    Light Blue Top [[id number]] Collected:  03/04/17 1415    Specimen:  Blood from Arm, Left Updated:  03/04/17 1436    Lavender Top [[id number]] Collected:  03/04/17 1415    Specimen:  Blood from Arm, Left Updated:  03/04/17 1436    Green Top (Gel) [[id number]] Collected:  03/04/17 1415    Specimen:  Blood from Arm,  Left Updated:  03/04/17 1436    CBC & Differential [[id number]] Collected:  03/04/17 1415    Specimen:  Blood Updated:  03/04/17 1441    Narrative:       The following orders were created for panel order CBC & Differential.  Procedure                               Abnormality         Status                     ---------                               -----------         ------                     CBC Auto Differential[[id number]]         Abnormal            Final result                 Please view results for these tests on the individual orders.    CBC Auto Differential [[id number]]  (Abnormal) Collected:  03/04/17 1415    Specimen:  Blood from Arm, Left Updated:  03/04/17 1441     WBC 5.15 10*3/mm3      RBC 3.77 (L) 10*6/mm3      Hemoglobin 11.6 (L) g/dL      Hematocrit 36.5 (L) %      MCV 96.8 fL      MCH 30.8 pg      MCHC 31.8 (L) g/dL      RDW 15.0 %      RDW-SD 53.2 fl      MPV 11.5 fL      Platelets 161 10*3/mm3      Neutrophil % 69.5 %      Lymphocyte % 17.3 %      Monocyte % 10.1 %      Eosinophil % 2.7 %      Basophil % 0.4 %      Immature Grans % 0.0 %      Neutrophils, Absolute 3.58 10*3/mm3      Lymphocytes, Absolute 0.89 10*3/mm3      Monocytes, Absolute 0.52 10*3/mm3      Eosinophils, Absolute 0.14 10*3/mm3      Basophils, Absolute 0.02 10*3/mm3      Immature Grans, Absolute 0.00 10*3/mm3     Blood Gas, Arterial [[id number]]  (Abnormal) Collected:  03/04/17 1440    Specimen:  Arterial Blood Updated:  03/04/17 1442     Site Arterial: right brachial      Michael's Test --       Documented in Rapid Comm        pH, Arterial 7.319 (L) pH units      pCO2, Arterial 56.9 (H) mm Hg      pO2, Arterial 120.9 (H) mm Hg      HCO3, Arterial 28.6 (H) mmol/L      Base Excess, Arterial 1.1 mmol/L      O2 Saturation, Arterial 98.0 %      O2 Saturation Calculated 98.0 %      Barometric Pressure for Blood Gas -- mmHg       Component not reported at this site.        Modality Non Rebreather      FIO2 100 %     Narrative:        Serial Number: [id number]    : [id number]    Blood Culture [[id number]] Collected:  03/04/17 1427    Specimen:  Blood from Arm, Right Updated:  03/04/17 1443    Blood Culture [[id number]] Collected:  03/04/17 1415    Specimen:  Blood from Arm, Left Updated:  03/04/17 1443    Calcium, Ionized [[id number]]  (Abnormal) Collected:  03/04/17 1446    Specimen:  Blood Updated:  03/04/17 1448     Ionized Calcium, Arterial 1.09 (L) mmol/L     Narrative:       Serial Number: [id number]    : [id number]    Protime-INR [[id number]]  (Normal) Collected:  03/04/17 1415    Specimen:  Blood from Arm, Left Updated:  03/04/17 1448     Protime 12.6 Seconds      INR 0.92     aPTT [[id number]]  (Abnormal) Collected:  03/04/17 1415    Specimen:  Blood from Arm, Left Updated:  03/04/17 1448     PTT 36.6 (H) seconds     Lactic Acid, Plasma [[id number]]  (Normal) Collected:  03/04/17 1415    Specimen:  Blood from Arm, Left Updated:  03/04/17 1449     Lactate 0.7 mmol/L     POC Troponin, Rapid [[id number]]  (Normal) Collected:  03/04/17 1436    Specimen:  Blood Updated:  03/04/17 1451     Troponin I 0.00 ng/mL       Serial Number: [id number]    : [id number]       Comprehensive Metabolic Panel [[id number]]  (Abnormal) Collected:  03/04/17 1415    Specimen:  Blood from Arm, Left Updated:  03/04/17 1454     Glucose 119 (H) mg/dL      BUN 6 mg/dL      Creatinine 0.75 mg/dL      Sodium 139 mmol/L      Potassium 3.7 mmol/L      Chloride 102 mmol/L      CO2 32.0 (H) mmol/L      Calcium 8.2 (L) mg/dL      Total Protein 6.5 g/dL      Albumin 3.50 g/dL      ALT (SGPT) 29 U/L      AST (SGOT) 38 U/L      Alkaline Phosphatase 77 U/L      Total Bilirubin 0.2 mg/dL      eGFR Non African Amer 77 mL/min/1.73      Globulin 3.0 gm/dL      A/G Ratio 1.2 g/dL      BUN/Creatinine Ratio 8.0      Anion Gap 5.0 mmol/L     Magnesium [[id number]]  (Normal) Collected:  03/04/17 1415    Specimen:  Blood from Arm, Left Updated:  03/04/17 1454     Magnesium 1.4 mg/dL     Phosphorus  [[id number]]  (Normal) Collected:  03/04/17 1415    Specimen:  Blood from Arm, Left Updated:  03/04/17 1454     Phosphorus 2.6 mg/dL     C-reactive Protein [[id number]]  (Abnormal) Collected:  03/04/17 1415    Specimen:  Blood from Arm, Left Updated:  03/04/17 1454     C-Reactive Protein 4.40 (H) mg/dL     Procalcitonin [[id number]]  (Normal) Collected:  03/04/17 1415    Specimen:  Blood from Arm, Left Updated:  03/04/17 1510     Procalcitonin <0.25 ng/mL     Narrative:       SIRS, sepsis, severe sepsis, and septic shock are categorized according to the criteria of the consensus conference of the American College of Chest Physicians/Society of Critical Care Medicine.    PCT < 0.5 ng/mL     Systemic infection (sepsis) is not likely.    PCT >0.5 and < 2.0 ng/mL Systemic infection (sepsis) is possible, but other conditions are known to elevate PCT as well.    PCT > 2.0 ng/mL     Systemic infection (sepsis) is likely, unless other causes are known.      PCT > 10.0 ng/mL    Important systemic inflammatory response, almost exclusively due to severe bacterial sepsis or septic shock.    PCT values of < 0.5 ng/mL do not exclude an infection, because localized infections (without systemic signs) may be associated with such low concentrations, or a systemic infection in its initial stages (<6 hours).  Increased PCT can occur without infection.  PCT concentrations between 0.5 and 2.0 ng/mL should be interpreted taking into account the patients history.  It is recommended to retest PCT within 6-24 hours if any concentrations < 2.0 ng/mL are obtained.    Blood Gas, Arterial [[id number]]  (Abnormal) Collected:  03/04/17 1610    Specimen:  Arterial Blood Updated:  03/04/17 1613     Site Arterial: right brachial      Michael's Test --       Documented in Rapid Comm        pH, Arterial 7.292 (L) pH units      pCO2, Arterial 58.6 (H) mm Hg      pO2, Arterial 93.7 mm Hg      HCO3, Arterial 27.7 (H) mmol/L      Base Excess, Arterial -0.2 mmol/L       O2 Saturation, Arterial 96.2 %      O2 Saturation Calculated 96.2 %      Barometric Pressure for Blood Gas -- mmHg       Component not reported at this site.        Modality BiPAP      FIO2 55 %      Rate 12.0 Breaths/minute      IPAP 16      EPAP 5     Narrative:       Serial Number: [id number]    : [id number]        Imaging Results (last 24 hours)     Procedure Component Value Units Date/Time    XR Chest 1 View [[id number]] Collected:  03/04/17 1527     Updated:  03/04/17 1542    Narrative:       CHEST, ONE VIEW:     HISTORY: Sepsis     COMPARISON: 10/31/2009 and 01/21/2009     A single frontal chest radiograph was obtained.     FINDINGS:     COPD and chronic lung changes appreciated.     There are patchy airspace opacities identified in the right lower lobe  laterally not observed previously and a small area of acute infiltrate  from an infectious or inflammatory process, pneumonia considered.  Correlate with patient presentation.     The heart is normal in size without evidence of overt heart failure.     The bony structures are intact.                                  Impression:       1. COPD and chronic lung changes.  2. Mild patchy right lower lobe airspace opacities, small area of acute  infiltrate/pneumonia considered.     This report was finalized on 03/04/2017 15:40 by Dr. Eduardo Black MD.          I have personally reviewed and interpreted the radiology studies and ECG obtained at time of admission.     Assessment / Plan     Assessment & Plan  Hospital Problem List     Pneumonia of right lower lobe due to infectious organism        Impression/Plan:  1. Respiratory distress with limited responsiveness- will place in unit and get neuro checks,  2. Pneumonia failed OP therapy- Will start vancomycin and zosyn. Add nebs, and continue BiPap  3. Fever- flu swab pending  4. Tobacco abuse- patch and counseling  5. HTN- resume home medications and follow  6. Depression/anxiety- resume home medications  7. Hx  abdominal pain with hypomobile BS- will get KUB and follow      Code Status: Full         Colt Sargent DO   03/04/17   4:27 PM               Electronically signed by Colt Sargent DO at 3/4/2017  4:37 PM          Lab Results (last 24 hours)     Procedure Component Value Units Date/Time    CBC & Differential [[id number]] Collected:  03/09/17 0521    Specimen:  Blood Updated:  03/09/17 0622    Narrative:       The following orders were created for panel order CBC & Differential.  Procedure                               Abnormality         Status                     ---------                               -----------         ------                     CBC Auto Differential[[id number]]         Abnormal            Final result                 Please view results for these tests on the individual orders.    CBC Auto Differential [[id number]]  (Abnormal) Collected:  03/09/17 0521    Specimen:  Blood Updated:  03/09/17 0622     WBC 2.96 (L) 10*3/mm3      RBC 3.61 (L) 10*6/mm3      Hemoglobin 10.9 (L) g/dL      Hematocrit 34.6 (L) %      MCV 95.8 fL      MCH 30.2 pg      MCHC 31.5 (L) g/dL      RDW 15.9 (H) %      RDW-SD 55.6 (H) fl      MPV 11.8 fL      Platelets 125 (L) 10*3/mm3      Neutrophil % 55.4 %      Lymphocyte % 29.1 %      Monocyte % 13.9 (H) %      Eosinophil % 1.0 %      Basophil % 0.3 %      Immature Grans % 0.3 %      Neutrophils, Absolute 1.64 (L) 10*3/mm3      Lymphocytes, Absolute 0.86 10*3/mm3      Monocytes, Absolute 0.41 10*3/mm3      Eosinophils, Absolute 0.03 10*3/mm3      Basophils, Absolute 0.01 10*3/mm3      Immature Grans, Absolute 0.01 10*3/mm3     Comprehensive Metabolic Panel [[id number]]  (Abnormal) Collected:  03/09/17 0521    Specimen:  Blood Updated:  03/09/17 0633     Glucose 90 mg/dL      BUN 9 mg/dL      Creatinine 0.76 mg/dL      Sodium 139 mmol/L      Potassium 3.3 (L) mmol/L      Chloride 96 (L) mmol/L      CO2 36.0 (H) mmol/L      Calcium 7.9 (L) mg/dL      Total Protein 5.9 (L)  g/dL      Albumin 3.10 (L) g/dL      ALT (SGPT) 17 U/L      AST (SGOT) 21 U/L      Alkaline Phosphatase 47 U/L      Total Bilirubin 0.2 mg/dL      eGFR Non African Amer 76 mL/min/1.73      Globulin 2.8 gm/dL      A/G Ratio 1.1 g/dL      BUN/Creatinine Ratio 11.8      Anion Gap 7.0 mmol/L     Blood Culture [[id number]]  (Normal) Collected:  03/04/17 1427    Specimen:  Blood from Arm, Right Updated:  03/09/17 1501     Blood Culture No growth at 5 days     Blood Culture [[id number]]  (Normal) Collected:  03/04/17 1415    Specimen:  Blood from Arm, Left Updated:  03/09/17 1501     Blood Culture No growth at 5 days         Orders (last 24 hrs)     Start     Ordered    03/10/17 0600  CBC & Differential  Morning Draw      03/09/17 1507    03/10/17 0600  Comprehensive Metabolic Panel  Morning Draw      03/09/17 1507    03/09/17 1800  potassium chloride (MICRO-K) CR capsule 20 mEq  3 Times Daily With Meals      03/09/17 1502    03/09/17 1506  simethicone (MYLICON) chewable tablet 160 mg  4 Times Daily PRN      03/09/17 1506    03/09/17 1458  ALPRAZolam (XANAX) tablet 1 mg  4 Times Daily PRN      03/09/17 1458    03/09/17 0900  furosemide (LASIX) tablet 20 mg  Daily      03/08/17 2014 03/09/17 0600  CBC & Differential  Morning Draw      03/08/17 2013 03/09/17 0600  Comprehensive Metabolic Panel  Morning Draw      03/08/17 2013 03/09/17 0600  CBC Auto Differential  PROCEDURE ONCE      03/09/17 0001    03/09/17 0000  Home Nebulizer      03/09/17 1455    03/09/17 0000  Oxygen Therapy      03/09/17 1505    03/08/17 2100  mirtazapine (REMERON) tablet 30 mg  Nightly,   Status:  Discontinued      03/08/17 2003 03/08/17 2100  senna (SENOKOT) tablet 8.6 mg  Nightly      03/08/17 2010 03/08/17 2009  Inpatient Consult to Case Management   Once     Provider:  (Not yet assigned)    03/08/17 2008 03/08/17 2009  Pulse Oximetry While Ambulating  Once      03/08/17 2008 03/08/17 2003  promethazine (PHENERGAN)  6.25 MG/5ML syrup 12.5 mg  Every 6 Hours PRN      03/08/17 2003 03/08/17 0600  pantoprazole (PROTONIX) EC tablet 40 mg  Every Early Morning      03/07/17 1631    03/07/17 2100  mirtazapine (REMERON) tablet 15 mg  Nightly,   Status:  Discontinued      03/07/17 0947    03/07/17 1800  Dietary Nutrition Supplements Ensure Enlive  Daily With Breakfast & Dinner     Comments:  Chocolate flavor.    03/07/17 1626    03/07/17 1030  iron sucrose (VENOFER) 300 mg in sodium chloride 0.9 % 100 mL IVPB  Every 24 Hours      03/07/17 0946    03/06/17 1115  cyanocobalamin injection 1,000 mcg  Daily      03/06/17 1043    03/06/17 1030  docusate sodium (COLACE) capsule 100 mg  2 Times Daily      03/06/17 0951    03/06/17 0900  dextromethorphan polistirex ER (DELSYM) 30 MG/5ML oral suspension 30 mg  Every 12 Hours Scheduled      03/06/17 0841    03/06/17 0015  oxyCODONE (oxyCONTIN) 12 hr tablet 30 mg  Every 12 Hours Scheduled      03/05/17 2343    03/06/17 0000  Discharge Follow-up with Specialty      03/06/17 1047    03/05/17 2200  gabapentin (NEURONTIN) capsule 300 mg  Every 8 Hours Scheduled      03/05/17 1710    03/05/17 1930  budesonide-formoterol (SYMBICORT) 160-4.5 MCG/ACT inhaler 2 puff  2 Times Daily - RT      03/05/17 1710 03/05/17 1745  citalopram (CeleXA) tablet 40 mg  Daily      03/05/17 1710    03/05/17 1745  furosemide (LASIX) tablet 40 mg  Daily,   Status:  Discontinued      03/05/17 1710 03/05/17 1710  oxyCODONE-acetaminophen (PERCOCET)  MG per tablet 1 tablet  Every 6 Hours PRN      03/05/17 1710    03/05/17 1709  ALPRAZolam (XANAX) tablet 0.5 mg  4 Times Daily PRN,   Status:  Discontinued      03/05/17 1710    03/05/17 1530  ipratropium-albuterol (DUO-NEB) nebulizer solution 3 mL  Every 4 Hours - RT      03/05/17 1132    03/05/17 1215  levoFLOXacin (LEVAQUIN) 750 mg/150 mL D5W (premix) (LEVAQUIN) 750 mg  Every 24 Hours      03/05/17 1131    03/04/17 1831  hydrALAZINE (APRESOLINE) injection 10 mg  Every  6 Hours PRN      03/04/17 1831    03/04/17 1831  enalaprilat (VASOTEC) injection 0.625 mg  Every 6 Hours PRN      03/04/17 1831    03/04/17 1818  acetaminophen (TYLENOL) suppository 325 mg  Every 4 Hours PRN      03/04/17 1818    03/04/17 1815  enoxaparin (LOVENOX) syringe 40 mg  Daily      03/04/17 1737    03/04/17 1815  nicotine (NICODERM CQ) 21 MG/24HR patch 1 patch  Every 24 Hours      03/04/17 1737    03/04/17 1737  sodium chloride 0.9 % flush 1-10 mL  As Needed      03/04/17 1737    03/04/17 1737  acetaminophen (TYLENOL) tablet 650 mg  Every 4 Hours PRN      03/04/17 1737    03/04/17 1737  ondansetron (ZOFRAN) injection 4 mg  Every 6 Hours PRN      03/04/17 1737    03/04/17 1416  sodium chloride 0.9 % flush 10 mL  As Needed      03/04/17 1419    Unscheduled  Oxygen Therapy- Nasal Cannula; 2 LPM; Titrate for spO2: equal to or greater than, 92%  As Needed      03/04/17 1419    Unscheduled  Suction Deep Oral  As Needed     Comments:  Subglottic suctioning must be provided q 6 hours to meet minimum requirements.    03/04/17 1823    Unscheduled  Suction Deep Oral  As Needed     Comments:  Subglottic suctioning must be provided q 6 hours to meet minimum requirements.    03/04/17 2156    --  gabapentin (NEURONTIN) 600 MG tablet  Every 8 Hours Scheduled      03/04/17 1639    --  furosemide (LASIX) 40 MG tablet  Daily      03/04/17 1639    --  tiZANidine (ZANAFLEX) 4 MG tablet  Every 8 Hours PRN      03/04/17 1639    --  cetirizine (zyrTEC) 10 MG tablet  Daily      03/04/17 1639    --  ALPRAZolam (XANAX) 1 MG tablet  4 Times Daily PRN      03/04/17 1639    --  OxyCODONE HCl ER (oxyCONTIN) 30 MG tablet extended-release 12 hour  Every 12 Hours Scheduled      03/04/17 1639    --  oxyCODONE-acetaminophen (PERCOCET)  MG per tablet  Every 6 Hours PRN      03/04/17 1639    --  mirtazapine (REMERON) 15 MG tablet  Nightly      03/04/17 1639    --  citalopram (CeleXA) 40 MG tablet  Daily      03/04/17 1639    --   ondansetron (ZOFRAN) 4 MG tablet  Every 8 Hours PRN      03/04/17 1639    --  lisinopril (PRINIVIL,ZESTRIL) 5 MG tablet  Daily      03/04/17 1639    --  amphetamine-dextroamphetamine (ADDERALL) 20 MG tablet  2 Times Daily      03/04/17 1639    --  zolpidem (AMBIEN) 5 MG tablet  Nightly PRN      03/04/17 1639    --  ipratropium-albuterol (COMBIVENT RESPIMAT)  MCG/ACT inhaler  4 Times Daily PRN      03/04/17 1639    --  budesonide-formoterol (SYMBICORT) 160-4.5 MCG/ACT inhaler  2 Times Daily - RT      03/04/17 1639    --  albuterol (PROVENTIL HFA;VENTOLIN HFA) 108 (90 BASE) MCG/ACT inhaler  Every 4 Hours PRN      03/04/17 1639    --  promethazine (PHENERGAN) 25 MG tablet  Every 6 Hours PRN      03/04/17 1639    --  SCANNED - TELEMETRY        03/04/17 0000    --  SCANNED EKG      03/04/17 0000    --  SCANNED - TELEMETRY        03/04/17 0000    --  SCANNED EKG      03/04/17 0000    --  SCANNED - TELEMETRY        03/04/17 0000    --  SCANNED - TELEMETRY        03/04/17 0000

## 2017-03-09 NOTE — PROGRESS NOTES
3/9/17 @ 0648 - Entered patient's room to give breathing treatment. Upon placing pulse ox on patient's finger, SpO2 was 68% on RA with HR 85 while patient lying in bed. 4L NC was placed back on patient and SpO2 came up to 90% within approximately 7 minutes with patient doing deep breathing.

## 2017-03-10 ENCOUNTER — APPOINTMENT (OUTPATIENT)
Dept: GENERAL RADIOLOGY | Facility: HOSPITAL | Age: 67
End: 2017-03-10

## 2017-03-10 LAB
ALBUMIN SERPL-MCNC: 3.2 G/DL (ref 3.5–5)
ALBUMIN/GLOB SERPL: 1.1 G/DL (ref 1.1–2.5)
ALP SERPL-CCNC: 46 U/L (ref 24–120)
ALT SERPL W P-5'-P-CCNC: 15 U/L (ref 0–54)
ANION GAP SERPL CALCULATED.3IONS-SCNC: 5 MMOL/L (ref 4–13)
AST SERPL-CCNC: 22 U/L (ref 7–45)
BASOPHILS # BLD AUTO: 0.01 10*3/MM3 (ref 0–0.2)
BASOPHILS NFR BLD AUTO: 0.3 % (ref 0–2)
BILIRUB SERPL-MCNC: 0.2 MG/DL (ref 0.1–1)
BUN BLD-MCNC: 7 MG/DL (ref 5–21)
BUN/CREAT SERPL: 9.3 (ref 7–25)
CALCIUM SPEC-SCNC: 8.1 MG/DL (ref 8.4–10.4)
CHLORIDE SERPL-SCNC: 96 MMOL/L (ref 98–110)
CO2 SERPL-SCNC: 36 MMOL/L (ref 24–31)
CREAT BLD-MCNC: 0.75 MG/DL (ref 0.5–1.4)
DEPRECATED RDW RBC AUTO: 56 FL (ref 40–54)
EOSINOPHIL # BLD AUTO: 0.08 10*3/MM3 (ref 0–0.7)
EOSINOPHIL NFR BLD AUTO: 2.1 % (ref 0–4)
ERYTHROCYTE [DISTWIDTH] IN BLOOD BY AUTOMATED COUNT: 15.9 % (ref 12–15)
GFR SERPL CREATININE-BSD FRML MDRD: 77 ML/MIN/1.73
GLOBULIN UR ELPH-MCNC: 2.9 GM/DL
GLUCOSE BLD-MCNC: 99 MG/DL (ref 70–100)
HCT VFR BLD AUTO: 33.7 % (ref 37–47)
HGB BLD-MCNC: 10.6 G/DL (ref 12–16)
IMM GRANULOCYTES # BLD: 0.03 10*3/MM3 (ref 0–0.03)
IMM GRANULOCYTES NFR BLD: 0.8 % (ref 0–5)
LYMPHOCYTES # BLD AUTO: 1.12 10*3/MM3 (ref 0.72–4.86)
LYMPHOCYTES NFR BLD AUTO: 28.8 % (ref 15–45)
MCH RBC QN AUTO: 30.1 PG (ref 28–32)
MCHC RBC AUTO-ENTMCNC: 31.5 G/DL (ref 33–36)
MCV RBC AUTO: 95.7 FL (ref 82–98)
MONOCYTES # BLD AUTO: 0.35 10*3/MM3 (ref 0.19–1.3)
MONOCYTES NFR BLD AUTO: 9 % (ref 4–12)
NEUTROPHILS # BLD AUTO: 2.3 10*3/MM3 (ref 1.87–8.4)
NEUTROPHILS NFR BLD AUTO: 59 % (ref 39–78)
PLATELET # BLD AUTO: 137 10*3/MM3 (ref 130–400)
PMV BLD AUTO: 11.3 FL (ref 6–12)
POTASSIUM BLD-SCNC: 3.7 MMOL/L (ref 3.5–5.3)
PROT SERPL-MCNC: 6.1 G/DL (ref 6.3–8.7)
RBC # BLD AUTO: 3.52 10*6/MM3 (ref 4.2–5.4)
SODIUM BLD-SCNC: 137 MMOL/L (ref 135–145)
WBC NRBC COR # BLD: 3.89 10*3/MM3 (ref 4.8–10.8)

## 2017-03-10 PROCEDURE — 94799 UNLISTED PULMONARY SVC/PX: CPT

## 2017-03-10 PROCEDURE — 71020 HC CHEST PA AND LATERAL: CPT

## 2017-03-10 PROCEDURE — 97110 THERAPEUTIC EXERCISES: CPT

## 2017-03-10 PROCEDURE — 97530 THERAPEUTIC ACTIVITIES: CPT

## 2017-03-10 PROCEDURE — 63710000001 PROMETHAZINE PER 25 MG: Performed by: FAMILY MEDICINE

## 2017-03-10 PROCEDURE — 25010000002 CYANOCOBALAMIN PER 1000 MCG: Performed by: PSYCHIATRY & NEUROLOGY

## 2017-03-10 PROCEDURE — 85025 COMPLETE CBC W/AUTO DIFF WBC: CPT | Performed by: FAMILY MEDICINE

## 2017-03-10 PROCEDURE — 80053 COMPREHEN METABOLIC PANEL: CPT | Performed by: FAMILY MEDICINE

## 2017-03-10 PROCEDURE — 25010000002 ENOXAPARIN PER 10 MG: Performed by: INTERNAL MEDICINE

## 2017-03-10 PROCEDURE — 25010000002 LEVOFLOXACIN PER 250 MG: Performed by: INTERNAL MEDICINE

## 2017-03-10 PROCEDURE — 25010000002 FUROSEMIDE PER 20 MG: Performed by: FAMILY MEDICINE

## 2017-03-10 RX ORDER — FUROSEMIDE 10 MG/ML
40 INJECTION INTRAMUSCULAR; INTRAVENOUS ONCE
Status: COMPLETED | OUTPATIENT
Start: 2017-03-10 | End: 2017-03-10

## 2017-03-10 RX ORDER — LEVOFLOXACIN 5 MG/ML
500 INJECTION, SOLUTION INTRAVENOUS EVERY 24 HOURS
Status: DISCONTINUED | OUTPATIENT
Start: 2017-03-11 | End: 2017-03-16 | Stop reason: HOSPADM

## 2017-03-10 RX ADMIN — ALPRAZOLAM 1 MG: 0.5 TABLET ORAL at 00:05

## 2017-03-10 RX ADMIN — NICOTINE 1 PATCH: 21 PATCH, EXTENDED RELEASE TRANSDERMAL at 17:56

## 2017-03-10 RX ADMIN — ALPRAZOLAM 1 MG: 0.5 TABLET ORAL at 06:30

## 2017-03-10 RX ADMIN — OXYCODONE HYDROCHLORIDE 30 MG: 20 TABLET, FILM COATED, EXTENDED RELEASE ORAL at 09:17

## 2017-03-10 RX ADMIN — GABAPENTIN 300 MG: 300 CAPSULE ORAL at 21:00

## 2017-03-10 RX ADMIN — OXYCODONE HYDROCHLORIDE 30 MG: 20 TABLET, FILM COATED, EXTENDED RELEASE ORAL at 21:00

## 2017-03-10 RX ADMIN — DOCUSATE SODIUM 100 MG: 100 CAPSULE ORAL at 09:14

## 2017-03-10 RX ADMIN — GABAPENTIN 300 MG: 300 CAPSULE ORAL at 06:30

## 2017-03-10 RX ADMIN — IPRATROPIUM BROMIDE AND ALBUTEROL SULFATE 3 ML: 2.5; .5 SOLUTION RESPIRATORY (INHALATION) at 07:20

## 2017-03-10 RX ADMIN — ALPRAZOLAM 1 MG: 0.5 TABLET ORAL at 21:00

## 2017-03-10 RX ADMIN — LEVOFLOXACIN 750 MG: 750 INJECTION, SOLUTION INTRAVENOUS at 12:24

## 2017-03-10 RX ADMIN — OXYCODONE HYDROCHLORIDE AND ACETAMINOPHEN 1 TABLET: 10; 325 TABLET ORAL at 18:11

## 2017-03-10 RX ADMIN — POTASSIUM CHLORIDE 20 MEQ: 750 CAPSULE, EXTENDED RELEASE ORAL at 11:09

## 2017-03-10 RX ADMIN — ENOXAPARIN SODIUM 40 MG: 40 INJECTION SUBCUTANEOUS at 09:14

## 2017-03-10 RX ADMIN — IPRATROPIUM BROMIDE AND ALBUTEROL SULFATE 3 ML: 2.5; .5 SOLUTION RESPIRATORY (INHALATION) at 15:34

## 2017-03-10 RX ADMIN — SIMETHICONE 160 MG: 80 TABLET, CHEWABLE ORAL at 21:00

## 2017-03-10 RX ADMIN — PROMETHAZINE HYDROCHLORIDE 12.5 MG: 6.25 SOLUTION ORAL at 09:16

## 2017-03-10 RX ADMIN — IPRATROPIUM BROMIDE AND ALBUTEROL SULFATE 3 ML: 2.5; .5 SOLUTION RESPIRATORY (INHALATION) at 23:18

## 2017-03-10 RX ADMIN — IPRATROPIUM BROMIDE AND ALBUTEROL SULFATE 3 ML: 2.5; .5 SOLUTION RESPIRATORY (INHALATION) at 03:34

## 2017-03-10 RX ADMIN — FUROSEMIDE 20 MG: 20 TABLET ORAL at 09:14

## 2017-03-10 RX ADMIN — SENNA 8.6 MG: 8.6 TABLET, COATED ORAL at 21:00

## 2017-03-10 RX ADMIN — DOCUSATE SODIUM 100 MG: 100 CAPSULE ORAL at 17:56

## 2017-03-10 RX ADMIN — POTASSIUM CHLORIDE 20 MEQ: 750 CAPSULE, EXTENDED RELEASE ORAL at 09:12

## 2017-03-10 RX ADMIN — GABAPENTIN 300 MG: 300 CAPSULE ORAL at 14:25

## 2017-03-10 RX ADMIN — IPRATROPIUM BROMIDE AND ALBUTEROL SULFATE 3 ML: 2.5; .5 SOLUTION RESPIRATORY (INHALATION) at 19:24

## 2017-03-10 RX ADMIN — BUDESONIDE AND FORMOTEROL FUMARATE DIHYDRATE 2 PUFF: 160; 4.5 AEROSOL RESPIRATORY (INHALATION) at 19:24

## 2017-03-10 RX ADMIN — Medication 10 ML: at 12:25

## 2017-03-10 RX ADMIN — OXYCODONE HYDROCHLORIDE AND ACETAMINOPHEN 1 TABLET: 10; 325 TABLET ORAL at 03:26

## 2017-03-10 RX ADMIN — PANTOPRAZOLE SODIUM 40 MG: 40 TABLET, DELAYED RELEASE ORAL at 06:30

## 2017-03-10 RX ADMIN — POTASSIUM CHLORIDE 20 MEQ: 750 CAPSULE, EXTENDED RELEASE ORAL at 17:56

## 2017-03-10 RX ADMIN — BUDESONIDE AND FORMOTEROL FUMARATE DIHYDRATE 2 PUFF: 160; 4.5 AEROSOL RESPIRATORY (INHALATION) at 07:20

## 2017-03-10 RX ADMIN — FUROSEMIDE 40 MG: 10 INJECTION, SOLUTION INTRAMUSCULAR; INTRAVENOUS at 17:54

## 2017-03-10 RX ADMIN — DEXTROMETHORPHAN 30 MG: 30 SUSPENSION, EXTENDED RELEASE ORAL at 21:01

## 2017-03-10 RX ADMIN — CITALOPRAM 40 MG: 20 TABLET, FILM COATED ORAL at 09:13

## 2017-03-10 NOTE — PLAN OF CARE
Problem: Patient Care Overview (Adult)  Goal: Plan of Care Review  Outcome: Ongoing (interventions implemented as appropriate)    03/10/17 1530   Coping/Psychosocial Response Interventions   Plan Of Care Reviewed With patient;family   Patient Care Overview   Progress no change   Outcome Evaluation   Outcome Summary/Follow up Plan Pt. on Venturi mask at 55% during the day with O2 sats staying between 90-93%, dropping when changed to NC at 5L to 85% and staying at 85-88% Dr. centeno.          Problem: Respiratory Insufficiency (Adult)  Goal: Acid/Base Balance  Outcome: Ongoing (interventions implemented as appropriate)  Goal: Effective Ventilation  Outcome: Ongoing (interventions implemented as appropriate)

## 2017-03-10 NOTE — PLAN OF CARE
Problem: Patient Care Overview (Adult)  Goal: Plan of Care Review  Outcome: Ongoing (interventions implemented as appropriate)    03/10/17 0455   Coping/Psychosocial Response Interventions   Plan Of Care Reviewed With patient   Patient Care Overview   Progress unable to show any progress toward functional goals         Problem: Respiratory Insufficiency (Adult)  Goal: Acid/Base Balance  Outcome: Ongoing (interventions implemented as appropriate)    03/10/17 0455   Respiratory Insufficiency (Adult)   Acid/Base Balance unable to achieve outcome       Goal: Effective Ventilation  Outcome: Ongoing (interventions implemented as appropriate)    03/10/17 0455   Respiratory Insufficiency (Adult)   Effective Ventilation unable to achieve outcome

## 2017-03-10 NOTE — PROGRESS NOTES
[facility] Medicine Services  INPATIENT PROGRESS NOTE    Length of Stay: 6  Date of Admission: 3/4/2017  Primary Care Physician: No Known Provider    Subjective   Chief Complaint:: Dysnea    HPI   64 YO CF admitted on 3/4/17. Extubated on 3/4. Patient is breathing a lot better, on 2l nc. Patient still remained hypotensive, but asymptomatic.  Patient do not to decrease the Xanax.  Patient c/o of increasing respiratory distress today.  O2 sat is been low.  Give Lasix 40×1 now.     Review of Systems   Constitutional: Positive for activity change, appetite change and fatigue. Negative for chills and fever.   HENT: Negative for hearing loss, nosebleeds, tinnitus and trouble swallowing.   Eyes: Negative for visual disturbance.   Respiratory: Positive for cough, shortness of breath and wheezing. Negative for chest tightness.   Cardiovascular: Negative for chest pain, palpitations and leg swelling.   Gastrointestinal: Negative for abdominal distention, abdominal pain, blood in stool, constipation, diarrhea, nausea and vomiting.   Endocrine: Negative for cold intolerance, heat intolerance, polydipsia, polyphagia and polyuria.   Genitourinary: Negative for decreased urine volume, difficulty urinating, dysuria, flank pain, frequency and hematuria.   Musculoskeletal: Positive for arthralgias, back pain and myalgias. Negative for joint swelling.   Skin: Negative for rash.   Allergic/Immunologic: Negative for immunocompromised state.   Neurological: Positive for tremors. Negative for dizziness, syncope, weakness, light-headedness and headaches.   Hematological: Negative for adenopathy. Does not bruise/bleed easily.   Psychiatric/Behavioral: Negative for confusion, sleep disturbance and suicidal ideas. The patient is nervous/anxio       All pertinent negatives and positives are as above. All other systems have been reviewed and are negative unless otherwise stated.     Objective    Temp:  [98.3  °F (36.8 °C)-98.6 °F (37 °C)] 98.6 °F (37 °C)  Heart Rate:  [75-85] 79  Resp:  [18-20] 20  BP: (104-108)/(49-52) 104/52    Intake/Output Summary (Last 24 hours) at 03/10/17 1619  Last data filed at 03/10/17 1300   Gross per 24 hour   Intake    630 ml   Output   1725 ml   Net  -1095 ml     Physical Exam   Constitutional: She is oriented to person, place, and time. She appears well-developed.   HENT:   Head: Normocephalic and atraumatic.   Eyes: Conjunctivae and EOM are normal. Pupils are equal, round, and reactive to light.   Neck: Neck supple. No JVD present. No thyromegaly present.   Cardiovascular: Normal rate, regular rhythm, normal heart sounds and intact distal pulses.  Exam reveals no gallop and no friction rub.    No murmur heard.  Pulmonary/Chest: Effort normal. No respiratory distress. She has wheezes. She has rales. She exhibits no tenderness.   Abdominal: Soft. Bowel sounds are normal. She exhibits no distension. There is no tenderness. There is no rebound and no guarding.   Musculoskeletal: Normal range of motion. She exhibits no edema, tenderness or deformity.   Lymphadenopathy:     She has no cervical adenopathy.   Neurological: She is alert and oriented to person, place, and time. She displays normal reflexes. No cranial nerve deficit. She exhibits abnormal muscle tone. Coordination abnormal.   Skin: Skin is warm and dry. No rash noted.   Psychiatric: She has a normal mood and affect. Her behavior is normal. Judgment and thought content normal.   Nursing note and vitals reviewed.      Results Review:  Lab Results (last 24 hours)     Procedure Component Value Units Date/Time    Blood Gas, Arterial [[id number]]  (Abnormal) Collected:  03/09/17 2041    Specimen:  Arterial Blood Updated:  03/09/17 2043     Site Arterial: right radial      Michael's Test --       Documented in Rapid Comm        pH, Arterial 7.464 (H) pH units      pCO2, Arterial 47.6 (H) mm Hg      pO2, Arterial 55.6 (L) mm Hg      HCO3,  Arterial 33.4 (H) mmol/L      Base Excess, Arterial 8.3 (H) mmol/L      O2 Saturation, Arterial 90.3 (L) %      O2 Saturation Calculated 90.3 (L) %      Barometric Pressure for Blood Gas -- mmHg       Component not reported at this site.        Modality Venti Mask      FIO2 55 %      Flow Rate 15.00 lpm     Narrative:       Serial Number: [id number]    : [id number]    CBC & Differential [[id number]] Collected:  03/10/17 0511    Specimen:  Blood Updated:  03/10/17 0541    Narrative:       The following orders were created for panel order CBC & Differential.  Procedure                               Abnormality         Status                     ---------                               -----------         ------                     CBC Auto Differential[[id number]]         Abnormal            Final result                 Please view results for these tests on the individual orders.    CBC Auto Differential [[id number]]  (Abnormal) Collected:  03/10/17 0511    Specimen:  Blood Updated:  03/10/17 0541     WBC 3.89 (L) 10*3/mm3      RBC 3.52 (L) 10*6/mm3      Hemoglobin 10.6 (L) g/dL      Hematocrit 33.7 (L) %      MCV 95.7 fL      MCH 30.1 pg      MCHC 31.5 (L) g/dL      RDW 15.9 (H) %      RDW-SD 56.0 (H) fl      MPV 11.3 fL      Platelets 137 10*3/mm3      Neutrophil % 59.0 %      Lymphocyte % 28.8 %      Monocyte % 9.0 %      Eosinophil % 2.1 %      Basophil % 0.3 %      Immature Grans % 0.8 %      Neutrophils, Absolute 2.30 10*3/mm3      Lymphocytes, Absolute 1.12 10*3/mm3      Monocytes, Absolute 0.35 10*3/mm3      Eosinophils, Absolute 0.08 10*3/mm3      Basophils, Absolute 0.01 10*3/mm3      Immature Grans, Absolute 0.03 10*3/mm3     Comprehensive Metabolic Panel [[id number]]  (Abnormal) Collected:  03/10/17 0511    Specimen:  Blood Updated:  03/10/17 0610     Glucose 99 mg/dL      BUN 7 mg/dL      Creatinine 0.75 mg/dL      Sodium 137 mmol/L      Potassium 3.7 mmol/L      Chloride 96 (L) mmol/L      CO2 36.0 (H) mmol/L       Calcium 8.1 (L) mg/dL      Total Protein 6.1 (L) g/dL      Albumin 3.20 (L) g/dL      ALT (SGPT) 15 U/L      AST (SGOT) 22 U/L      Alkaline Phosphatase 46 U/L      Total Bilirubin 0.2 mg/dL      eGFR Non African Amer 77 mL/min/1.73      Globulin 2.9 gm/dL      A/G Ratio 1.1 g/dL      BUN/Creatinine Ratio 9.3      Anion Gap 5.0 mmol/L            Cultures:  BLOOD CULTURE   Date Value Ref Range Status   03/04/2017 No growth at 5 days  Final   03/04/2017 No growth at 5 days  Final       Radiology Data:    Imaging Results (last 24 hours)     ** No results found for the last 24 hours. **          Allergies   Allergen Reactions   • Aspirin Anaphylaxis   • Demerol [Meperidine] Anaphylaxis   • Ibuprofen Anaphylaxis   • Neosporin [Neomycin-Bacitracin Zn-Polymyx] Anaphylaxis   • Penicillins Anaphylaxis   • Sulfa Antibiotics Anaphylaxis   • Codeine Hives     Pt can take codeine as long is it is not alot   • Talwin [Pentazocine] Hives   • Tetracyclines & Related    • Morphine And Related Hives       Scheduled meds:     budesonide-formoterol 2 puff Inhalation BID - RT   citalopram 40 mg Oral Daily   cyanocobalamin 1,000 mcg Intramuscular Daily   dextromethorphan polistirex ER 30 mg Oral Q12H   docusate sodium 100 mg Oral BID   enoxaparin 40 mg Subcutaneous Daily   furosemide 20 mg Oral Daily   gabapentin 300 mg Oral Q8H   ipratropium-albuterol 3 mL Nebulization Q4H - RT   levoFLOXacin 750 mg Intravenous Q24H   nicotine 1 patch Transdermal Q24H   oxyCODONE ER 30 mg Oral Q12H   pantoprazole 40 mg Oral Q AM   potassium chloride 20 mEq Oral TID With Meals   senna 1 tablet Oral Nightly       PRN meds:  •  acetaminophen  •  acetaminophen  •  ALPRAZolam  •  enalaprilat  •  hydrALAZINE  •  ondansetron  •  oxyCODONE-acetaminophen  •  promethazine  •  simethicone  •  sodium chloride  •  sodium chloride    Assessment/Plan     Active Problems:    Pneumonia of right lower lobe due to infectious organism    COPD (chronic obstructive  pulmonary disease)    Hypokalemia    Anemia    Respiratory failure      Plan:  Pneumonia/COPD/respiratory failureHypoxia with respiratory distress-Extubated 3/6. Duonebs. Currently Levaquin. By mouth steroids. Ambulatory o2 sat. Consult placement for home oxygen and neb at home.      Dyspnea/echocardiogram ejection fraction 60%. 40 lasix now due to ncreasing shortness of breath today, chest x-ray.      Right lower lobe lung nodule-repeat CT scan in 3 months.      AMS- question hypoxia/fever vs possible OD. Apparently there has been a HX of depression since the passing of her  in July. Neurology consulted.       Hypokalemia. kdur x 2 days. Improving.     Reflux and gas- on protonix, start on mylicon prn      Depression/Anxiety- Celexa/xanax- Increase xanax to home does. Pt wants Remeron to be DC'd..      Abdominal pain- constipation on CT . Colace and senna      Hypotension with HX of hypertension- off levaphed      Subacute Thyroid-Abnormal TSH- Free t4 and free t3, normal.       Chronic pain- on OxyContin, and Percocet when necessary       Anemia- iron deficiency. S/p venofer 3/8.      Discharge Plannin-2 days    Siddharth Leavitt MD   03/10/17   4:19 PM

## 2017-03-10 NOTE — PLAN OF CARE
Problem: Patient Care Overview (Adult)  Goal: Plan of Care Review  Outcome: Ongoing (interventions implemented as appropriate)    03/10/17 2696   Coping/Psychosocial Response Interventions   Plan Of Care Reviewed With patient   Patient Care Overview   Progress unable to show any progress toward functional goals   Outcome Evaluation   Outcome Summary/Follow up Plan Pt. uanble to ambulate this afternoon due to being on high flow O2 and declining to wear a mask per respiratory. Pt.'s O2 sats ranged from 84-90% during tx. Pt. is independent with transfers and bed mobility. Will continue to work with pt. as her O2 sat allows.

## 2017-03-11 LAB
ALBUMIN SERPL-MCNC: 3.5 G/DL (ref 3.5–5)
ALBUMIN/GLOB SERPL: 1.1 G/DL (ref 1.1–2.5)
ALP SERPL-CCNC: 53 U/L (ref 24–120)
ALT SERPL W P-5'-P-CCNC: 17 U/L (ref 0–54)
ANION GAP SERPL CALCULATED.3IONS-SCNC: 7 MMOL/L (ref 4–13)
AST SERPL-CCNC: 22 U/L (ref 7–45)
BASOPHILS # BLD AUTO: 0.02 10*3/MM3 (ref 0–0.2)
BASOPHILS NFR BLD AUTO: 0.3 % (ref 0–2)
BILIRUB SERPL-MCNC: 0.2 MG/DL (ref 0.1–1)
BUN BLD-MCNC: 11 MG/DL (ref 5–21)
BUN/CREAT SERPL: 12 (ref 7–25)
CALCIUM SPEC-SCNC: 8.3 MG/DL (ref 8.4–10.4)
CHLORIDE SERPL-SCNC: 93 MMOL/L (ref 98–110)
CO2 SERPL-SCNC: 37 MMOL/L (ref 24–31)
CREAT BLD-MCNC: 0.92 MG/DL (ref 0.5–1.4)
DEPRECATED RDW RBC AUTO: 55.3 FL (ref 40–54)
EOSINOPHIL # BLD AUTO: 0.08 10*3/MM3 (ref 0–0.7)
EOSINOPHIL NFR BLD AUTO: 1.4 % (ref 0–4)
ERYTHROCYTE [DISTWIDTH] IN BLOOD BY AUTOMATED COUNT: 15.8 % (ref 12–15)
GFR SERPL CREATININE-BSD FRML MDRD: 61 ML/MIN/1.73
GLOBULIN UR ELPH-MCNC: 3.1 GM/DL
GLUCOSE BLD-MCNC: 101 MG/DL (ref 70–100)
HCT VFR BLD AUTO: 36 % (ref 37–47)
HGB BLD-MCNC: 11.5 G/DL (ref 12–16)
IMM GRANULOCYTES # BLD: 0.03 10*3/MM3 (ref 0–0.03)
IMM GRANULOCYTES NFR BLD: 0.5 % (ref 0–5)
LYMPHOCYTES # BLD AUTO: 1.49 10*3/MM3 (ref 0.72–4.86)
LYMPHOCYTES NFR BLD AUTO: 25.6 % (ref 15–45)
MCH RBC QN AUTO: 30.7 PG (ref 28–32)
MCHC RBC AUTO-ENTMCNC: 31.9 G/DL (ref 33–36)
MCV RBC AUTO: 96.3 FL (ref 82–98)
MONOCYTES # BLD AUTO: 0.58 10*3/MM3 (ref 0.19–1.3)
MONOCYTES NFR BLD AUTO: 10 % (ref 4–12)
NEUTROPHILS # BLD AUTO: 3.61 10*3/MM3 (ref 1.87–8.4)
NEUTROPHILS NFR BLD AUTO: 62.2 % (ref 39–78)
NT-PROBNP SERPL-MCNC: 180 PG/ML (ref 0–900)
PLATELET # BLD AUTO: 184 10*3/MM3 (ref 130–400)
PMV BLD AUTO: 11.5 FL (ref 6–12)
POTASSIUM BLD-SCNC: 4.4 MMOL/L (ref 3.5–5.3)
PROT SERPL-MCNC: 6.6 G/DL (ref 6.3–8.7)
RBC # BLD AUTO: 3.74 10*6/MM3 (ref 4.2–5.4)
SODIUM BLD-SCNC: 137 MMOL/L (ref 135–145)
WBC NRBC COR # BLD: 5.81 10*3/MM3 (ref 4.8–10.8)

## 2017-03-11 PROCEDURE — 80053 COMPREHEN METABOLIC PANEL: CPT | Performed by: FAMILY MEDICINE

## 2017-03-11 PROCEDURE — 94799 UNLISTED PULMONARY SVC/PX: CPT

## 2017-03-11 PROCEDURE — 25010000002 FUROSEMIDE PER 20 MG: Performed by: FAMILY MEDICINE

## 2017-03-11 PROCEDURE — 83880 ASSAY OF NATRIURETIC PEPTIDE: CPT | Performed by: FAMILY MEDICINE

## 2017-03-11 PROCEDURE — 85025 COMPLETE CBC W/AUTO DIFF WBC: CPT | Performed by: FAMILY MEDICINE

## 2017-03-11 PROCEDURE — 25010000002 LEVOFLOXACIN PER 250 MG: Performed by: FAMILY MEDICINE

## 2017-03-11 PROCEDURE — 25010000002 ENOXAPARIN PER 10 MG: Performed by: INTERNAL MEDICINE

## 2017-03-11 PROCEDURE — 63710000001 PROMETHAZINE PER 25 MG: Performed by: FAMILY MEDICINE

## 2017-03-11 RX ORDER — FUROSEMIDE 10 MG/ML
40 INJECTION INTRAMUSCULAR; INTRAVENOUS ONCE
Status: COMPLETED | OUTPATIENT
Start: 2017-03-11 | End: 2017-03-11

## 2017-03-11 RX ORDER — FUROSEMIDE 20 MG/1
20 TABLET ORAL 2 TIMES DAILY
Status: DISCONTINUED | OUTPATIENT
Start: 2017-03-11 | End: 2017-03-11

## 2017-03-11 RX ORDER — FUROSEMIDE 20 MG/1
20 TABLET ORAL DAILY
Status: DISCONTINUED | OUTPATIENT
Start: 2017-03-12 | End: 2017-03-16 | Stop reason: HOSPADM

## 2017-03-11 RX ADMIN — ALPRAZOLAM 1 MG: 0.5 TABLET ORAL at 14:45

## 2017-03-11 RX ADMIN — PROMETHAZINE HYDROCHLORIDE 12.5 MG: 6.25 SOLUTION ORAL at 00:56

## 2017-03-11 RX ADMIN — ENOXAPARIN SODIUM 40 MG: 40 INJECTION SUBCUTANEOUS at 09:09

## 2017-03-11 RX ADMIN — DEXTROMETHORPHAN 30 MG: 30 SUSPENSION, EXTENDED RELEASE ORAL at 21:08

## 2017-03-11 RX ADMIN — IPRATROPIUM BROMIDE AND ALBUTEROL SULFATE 3 ML: 2.5; .5 SOLUTION RESPIRATORY (INHALATION) at 07:46

## 2017-03-11 RX ADMIN — POTASSIUM CHLORIDE 20 MEQ: 750 CAPSULE, EXTENDED RELEASE ORAL at 12:12

## 2017-03-11 RX ADMIN — DEXTROMETHORPHAN 30 MG: 30 SUSPENSION, EXTENDED RELEASE ORAL at 09:08

## 2017-03-11 RX ADMIN — OXYCODONE HYDROCHLORIDE AND ACETAMINOPHEN 1 TABLET: 10; 325 TABLET ORAL at 06:27

## 2017-03-11 RX ADMIN — LEVOFLOXACIN 500 MG: 500 INJECTION, SOLUTION INTRAVENOUS at 12:12

## 2017-03-11 RX ADMIN — BUDESONIDE AND FORMOTEROL FUMARATE DIHYDRATE 2 PUFF: 160; 4.5 AEROSOL RESPIRATORY (INHALATION) at 19:13

## 2017-03-11 RX ADMIN — DOCUSATE SODIUM 100 MG: 100 CAPSULE ORAL at 18:10

## 2017-03-11 RX ADMIN — NYSTATIN 500000 UNITS: 100000 SUSPENSION ORAL at 18:10

## 2017-03-11 RX ADMIN — GABAPENTIN 300 MG: 300 CAPSULE ORAL at 21:07

## 2017-03-11 RX ADMIN — IPRATROPIUM BROMIDE AND ALBUTEROL SULFATE 3 ML: 2.5; .5 SOLUTION RESPIRATORY (INHALATION) at 22:44

## 2017-03-11 RX ADMIN — NICOTINE 1 PATCH: 21 PATCH, EXTENDED RELEASE TRANSDERMAL at 18:10

## 2017-03-11 RX ADMIN — IPRATROPIUM BROMIDE AND ALBUTEROL SULFATE 3 ML: 2.5; .5 SOLUTION RESPIRATORY (INHALATION) at 11:06

## 2017-03-11 RX ADMIN — PROMETHAZINE HYDROCHLORIDE 12.5 MG: 6.25 SOLUTION ORAL at 15:39

## 2017-03-11 RX ADMIN — IPRATROPIUM BROMIDE AND ALBUTEROL SULFATE 3 ML: 2.5; .5 SOLUTION RESPIRATORY (INHALATION) at 03:13

## 2017-03-11 RX ADMIN — POTASSIUM CHLORIDE 20 MEQ: 750 CAPSULE, EXTENDED RELEASE ORAL at 09:09

## 2017-03-11 RX ADMIN — CITALOPRAM 40 MG: 20 TABLET, FILM COATED ORAL at 09:08

## 2017-03-11 RX ADMIN — FUROSEMIDE 20 MG: 20 TABLET ORAL at 09:09

## 2017-03-11 RX ADMIN — SIMETHICONE 160 MG: 80 TABLET, CHEWABLE ORAL at 06:27

## 2017-03-11 RX ADMIN — ALPRAZOLAM 1 MG: 0.5 TABLET ORAL at 22:19

## 2017-03-11 RX ADMIN — NYSTATIN 500000 UNITS: 100000 SUSPENSION ORAL at 21:08

## 2017-03-11 RX ADMIN — DOCUSATE SODIUM 100 MG: 100 CAPSULE ORAL at 09:09

## 2017-03-11 RX ADMIN — OXYCODONE HYDROCHLORIDE AND ACETAMINOPHEN 1 TABLET: 10; 325 TABLET ORAL at 19:19

## 2017-03-11 RX ADMIN — BUDESONIDE AND FORMOTEROL FUMARATE DIHYDRATE 2 PUFF: 160; 4.5 AEROSOL RESPIRATORY (INHALATION) at 07:46

## 2017-03-11 RX ADMIN — PANTOPRAZOLE SODIUM 40 MG: 40 TABLET, DELAYED RELEASE ORAL at 06:27

## 2017-03-11 RX ADMIN — IPRATROPIUM BROMIDE AND ALBUTEROL SULFATE 3 ML: 2.5; .5 SOLUTION RESPIRATORY (INHALATION) at 14:41

## 2017-03-11 RX ADMIN — FUROSEMIDE 40 MG: 10 INJECTION, SOLUTION INTRAMUSCULAR; INTRAVENOUS at 14:46

## 2017-03-11 RX ADMIN — OXYCODONE HYDROCHLORIDE 30 MG: 20 TABLET, FILM COATED, EXTENDED RELEASE ORAL at 21:07

## 2017-03-11 RX ADMIN — OXYCODONE HYDROCHLORIDE AND ACETAMINOPHEN 1 TABLET: 10; 325 TABLET ORAL at 00:54

## 2017-03-11 RX ADMIN — SENNA 8.6 MG: 8.6 TABLET, COATED ORAL at 21:06

## 2017-03-11 RX ADMIN — OXYCODONE HYDROCHLORIDE 30 MG: 20 TABLET, FILM COATED, EXTENDED RELEASE ORAL at 09:09

## 2017-03-11 RX ADMIN — OXYCODONE HYDROCHLORIDE AND ACETAMINOPHEN 1 TABLET: 10; 325 TABLET ORAL at 12:55

## 2017-03-11 RX ADMIN — IPRATROPIUM BROMIDE AND ALBUTEROL SULFATE 3 ML: 2.5; .5 SOLUTION RESPIRATORY (INHALATION) at 19:13

## 2017-03-11 RX ADMIN — GABAPENTIN 300 MG: 300 CAPSULE ORAL at 14:46

## 2017-03-11 RX ADMIN — GABAPENTIN 300 MG: 300 CAPSULE ORAL at 06:28

## 2017-03-11 RX ADMIN — ALPRAZOLAM 1 MG: 0.5 TABLET ORAL at 03:34

## 2017-03-11 NOTE — PROGRESS NOTES
Continued Stay Note  CODY Wagner     Patient Name: Justyna Lei  MRN: [id number]  Today's Date: 3/11/2017    Admit Date: 3/4/2017          Discharge Plan       03/11/17 1451    Case Management/Social Work Plan    Plan (P)   Pt already has home oxygen and neb set up. Will follow.     Patient/Family In Agreement With Plan (P)  yes              Discharge Codes     None            Esther Ramsey

## 2017-03-11 NOTE — PLAN OF CARE
Problem: Patient Care Overview (Adult)  Goal: Plan of Care Review  Outcome: Ongoing (interventions implemented as appropriate)    03/11/17 1533   Coping/Psychosocial Response Interventions   Plan Of Care Reviewed With patient   Patient Care Overview   Progress no change   Outcome Evaluation   Outcome Summary/Follow up Plan Patient continues on 9L HI [place] NC with saturations to low to mid 90's through out the shift, will continue to monitor.          Problem: Respiratory Insufficiency (Adult)  Goal: Acid/Base Balance  Outcome: Ongoing (interventions implemented as appropriate)  Goal: Effective Ventilation  Outcome: Ongoing (interventions implemented as appropriate)

## 2017-03-11 NOTE — PROGRESS NOTES
[facility] Medicine Services  INPATIENT PROGRESS NOTE    Length of Stay: 7  Date of Admission: 3/4/2017  Primary Care Physician: No Known Provider    Subjective   Chief Complaint: Dysnea    HPI   66 YO CF admitted on 3/4/17. Extubated on 3/4. Patient is breathing a lot better, on 2l nc. Patient still remained hypotensive, but asymptomatic. Patient do not to decrease the Xanax. Patient states sob was better after the lasix. Pt is has sob. O2 sat is been low. Give Lasix 40×1 now.  Patient complaining of thrush.    Review of Systems   Constitutional: Positive for activity change, appetite change and fatigue. Negative for chills and fever.   HENT: Negative for hearing loss, nosebleeds, tinnitus and trouble swallowing.   Eyes: Negative for visual disturbance.   Respiratory: Positive for cough, shortness of breath and wheezing. Negative for chest tightness.   Cardiovascular: Negative for chest pain, palpitations and leg swelling.   Gastrointestinal: Negative for abdominal distention, abdominal pain, blood in stool, constipation, diarrhea, nausea and vomiting.   Endocrine: Negative for cold intolerance, heat intolerance, polydipsia, polyphagia and polyuria.   Genitourinary: Negative for decreased urine volume, difficulty urinating, dysuria, flank pain, frequency and hematuria.   Musculoskeletal: Positive for arthralgias, back pain and myalgias. Negative for joint swelling.   Skin: Negative for rash.   Allergic/Immunologic: Negative for immunocompromised state.   Neurological: Positive for tremors. Negative for dizziness, syncope, weakness, light-headedness and headaches.   Hematological: Negative for adenopathy. Does not bruise/bleed easily.   Psychiatric/Behavioral: Negative for confusion, sleep disturbance and suicidal ideas. The patient is nervous/anxio          All pertinent negatives and positives are as above. All other systems have been reviewed and are negative unless otherwise  stated.     Objective    Temp:  [98 °F (36.7 °C)-98.9 °F (37.2 °C)] 98 °F (36.7 °C)  Heart Rate:  [76-87] 87  Resp:  [18-20] 20  BP: ()/(43-61) 103/52    Intake/Output Summary (Last 24 hours) at 03/11/17 1337  Last data filed at 03/11/17 1212   Gross per 24 hour   Intake    700 ml   Output   2350 ml   Net  -1650 ml     Physical Exam  Constitutional: She is oriented to person, place, and time. She appears well-developed.   HENT:   Head: Normocephalic and atraumatic.   Eyes: Conjunctivae and EOM are normal. Pupils are equal, round, and reactive to light.   Neck: Neck supple. No JVD present. No thyromegaly present.   Cardiovascular: Normal rate, regular rhythm, normal heart sounds and intact distal pulses. Exam reveals no gallop and no friction rub.   No murmur heard.  Pulmonary/Chest: Effort normal. No respiratory distress. She has wheezes. She has rales. She exhibits no tenderness.   Abdominal: Soft. Bowel sounds are normal. She exhibits no distension. There is no tenderness. There is no rebound and no guarding.   Musculoskeletal: Normal range of motion. She exhibits no edema, tenderness or deformity.   Lymphadenopathy:   She has no cervical adenopathy.   Neurological: She is alert and oriented to person, place, and time. She displays normal reflexes. No cranial nerve deficit. She exhibits abnormal muscle tone. Coordination abnormal.   Skin: Skin is warm and dry. No rash noted.   Psychiatric: She has a normal mood and affect. Her behavior is normal. Judgment and thought content normal.   Nursing note and vitals reviewed.  Results Review:  Lab Results (last 24 hours)     Procedure Component Value Units Date/Time    CBC & Differential [[id number]] Collected:  03/11/17 0452    Specimen:  Blood Updated:  03/11/17 0554    Narrative:       The following orders were created for panel order CBC & Differential.  Procedure                               Abnormality         Status                     ---------                                -----------         ------                     CBC Auto Differential[[id number]]         Abnormal            Final result                 Please view results for these tests on the individual orders.    CBC Auto Differential [[id number]]  (Abnormal) Collected:  03/11/17 0452    Specimen:  Blood Updated:  03/11/17 0554     WBC 5.81 10*3/mm3      RBC 3.74 (L) 10*6/mm3      Hemoglobin 11.5 (L) g/dL      Hematocrit 36.0 (L) %      MCV 96.3 fL      MCH 30.7 pg      MCHC 31.9 (L) g/dL      RDW 15.8 (H) %      RDW-SD 55.3 (H) fl      MPV 11.5 fL      Platelets 184 10*3/mm3      Neutrophil % 62.2 %      Lymphocyte % 25.6 %      Monocyte % 10.0 %      Eosinophil % 1.4 %      Basophil % 0.3 %      Immature Grans % 0.5 %      Neutrophils, Absolute 3.61 10*3/mm3      Lymphocytes, Absolute 1.49 10*3/mm3      Monocytes, Absolute 0.58 10*3/mm3      Eosinophils, Absolute 0.08 10*3/mm3      Basophils, Absolute 0.02 10*3/mm3      Immature Grans, Absolute 0.03 10*3/mm3     BNP [[id number]]  (Normal) Collected:  03/11/17 0452    Specimen:  Blood Updated:  03/11/17 0609     proBNP 180.0 pg/mL     Comprehensive Metabolic Panel [[id number]]  (Abnormal) Collected:  03/11/17 0452    Specimen:  Blood Updated:  03/11/17 0641     Glucose 101 (H) mg/dL      BUN 11 mg/dL      Creatinine 0.92 mg/dL      Sodium 137 mmol/L      Potassium 4.4 mmol/L      Chloride 93 (L) mmol/L      CO2 37.0 (H) mmol/L      Calcium 8.3 (L) mg/dL      Total Protein 6.6 g/dL      Albumin 3.50 g/dL      ALT (SGPT) 17 U/L      AST (SGOT) 22 U/L      Alkaline Phosphatase 53 U/L      Total Bilirubin 0.2 mg/dL      eGFR Non African Amer 61 mL/min/1.73      Globulin 3.1 gm/dL      A/G Ratio 1.1 g/dL      BUN/Creatinine Ratio 12.0      Anion Gap 7.0 mmol/L            Cultures:  BLOOD CULTURE   Date Value Ref Range Status   03/04/2017 No growth at 5 days  Final   03/04/2017 No growth at 5 days  Final       Radiology Data:    Imaging Results (last 24 hours)     Procedure  Component Value Units Date/Time    XR Chest PA & Lateral [[id number]] Collected:  03/10/17 1736     Updated:  03/10/17 1739    Narrative:       XR CHEST PA AND LATERAL- 3/10/2017 5:28 PM CST     HISTORY: SOB; J18.9-Pneumonia, unspecified organism; A41.9-Sepsis,  unspecified organism; J96.01-Acute respiratory failure with hypoxia;  Z74.09-Other reduced mobility      COMPARISON: 03/07/2017     FINDINGS:   Emphysematous changes are noted. Scarring is seen in the apices. Mild  linear atelectasis or scarring is noted in the lung bases. The  cardiomediastinal silhouette and pulmonary vascularity are unchanged.  The osseous structures and surrounding soft tissues demonstrate no acute  abnormality.       Impression:       1. Emphysematous changes without evidence of focal pneumonia.        This report was finalized on 03/10/2017 17:37 by Dr. Naun Calhoun MD.          Allergies   Allergen Reactions   • Aspirin Anaphylaxis   • Demerol [Meperidine] Anaphylaxis   • Ibuprofen Anaphylaxis   • Neosporin [Neomycin-Bacitracin Zn-Polymyx] Anaphylaxis   • Penicillins Anaphylaxis   • Sulfa Antibiotics Anaphylaxis   • Codeine Hives     Pt can take codeine as long is it is not alot   • Talwin [Pentazocine] Hives   • Tetracyclines & Related    • Morphine And Related Hives       Scheduled meds:     budesonide-formoterol 2 puff Inhalation BID - RT   citalopram 40 mg Oral Daily   dextromethorphan polistirex ER 30 mg Oral Q12H   docusate sodium 100 mg Oral BID   enoxaparin 40 mg Subcutaneous Daily   furosemide 40 mg Intravenous Once   furosemide 20 mg Oral BID   gabapentin 300 mg Oral Q8H   ipratropium-albuterol 3 mL Nebulization Q4H - RT   levoFLOXacin 500 mg Intravenous Q24H   nicotine 1 patch Transdermal Q24H   nystatin 5 mL Oral 4x Daily   oxyCODONE ER 30 mg Oral Q12H   pantoprazole 40 mg Oral Q AM   senna 1 tablet Oral Nightly       PRN meds:  •  acetaminophen  •  acetaminophen  •  ALPRAZolam  •  enalaprilat  •  hydrALAZINE  •   ondansetron  •  oxyCODONE-acetaminophen  •  promethazine  •  simethicone  •  sodium chloride  •  sodium chloride    Assessment/Plan     Active Problems:    Pneumonia of right lower lobe due to infectious organism    COPD (chronic obstructive pulmonary disease)    Hypokalemia    Anemia    Respiratory failure      Plan:  Pneumonia/COPD/respiratory failureHypoxia with respiratory distress-Extubated 3/6. Duonebs. Currently Levaquin. By mouth steroids. Ambulatory o2 sat. Consult placement for home oxygen and neb at home.      Dyspnea/echocardiogram ejection fraction 60%, diastolic dysfunction. 40 lasix now due to ncreasing shortness of breath today, increase lasix bid.       Right lower lobe lung nodule-repeat CT scan in 3 months.      AMS- question hypoxia/fever vs possible OD. Apparently there has been a HX of depression since the passing of her  in July. Neurology consulted.       Hypokalemia. kdur x 2 days. Improving.      Reflux and gas- on protonix, start on mylicon prn      Depression/Anxiety- Celexa/xanax- Increase xanax to home does. Pt wants Remeron to be DC'd..      Abdominal pain- constipation on CT . Colace and senna      Hypotension with HX of hypertension- off levaphed      Subacute Thyroid-Low TSH- Free t4 and free t3, normal.       Chronic pain- on OxyContin, and Percocet when necessary       Anemia- iron deficiency. S/p venofer 3/8.    Thrust - nystatin qid.      Discharge Plannin-2 days     Siddharth Leavitt MD   17   1:37 PM

## 2017-03-11 NOTE — PLAN OF CARE
Problem: Patient Care Overview (Adult)  Goal: Plan of Care Review  Outcome: Ongoing (interventions implemented as appropriate)    03/11/17 0536   Coping/Psychosocial Response Interventions   Plan Of Care Reviewed With patient   Outcome Evaluation   Outcome Summary/Follow up Plan Pt. currently on O2 @ 9L per high flow nasal canula. Pt. continues to desat with speech and positioning.         Problem: Respiratory Insufficiency (Adult)  Goal: Acid/Base Balance  Outcome: Ongoing (interventions implemented as appropriate)    03/11/17 0536   Respiratory Insufficiency (Adult)   Acid/Base Balance making progress toward outcome       Goal: Effective Ventilation  Outcome: Ongoing (interventions implemented as appropriate)    03/11/17 0536   Respiratory Insufficiency (Adult)   Effective Ventilation making progress toward outcome

## 2017-03-12 LAB
ALBUMIN SERPL-MCNC: 3.6 G/DL (ref 3.5–5)
ALBUMIN/GLOB SERPL: 1.1 G/DL (ref 1.1–2.5)
ALP SERPL-CCNC: 47 U/L (ref 24–120)
ALT SERPL W P-5'-P-CCNC: 21 U/L (ref 0–54)
ANION GAP SERPL CALCULATED.3IONS-SCNC: 5 MMOL/L (ref 4–13)
AST SERPL-CCNC: 19 U/L (ref 7–45)
BASOPHILS # BLD AUTO: 0.01 10*3/MM3 (ref 0–0.2)
BASOPHILS NFR BLD AUTO: 0.2 % (ref 0–2)
BILIRUB SERPL-MCNC: 0.4 MG/DL (ref 0.1–1)
BUN BLD-MCNC: 12 MG/DL (ref 5–21)
BUN/CREAT SERPL: 14 (ref 7–25)
CALCIUM SPEC-SCNC: 8.4 MG/DL (ref 8.4–10.4)
CHLORIDE SERPL-SCNC: 92 MMOL/L (ref 98–110)
CO2 SERPL-SCNC: 38 MMOL/L (ref 24–31)
CREAT BLD-MCNC: 0.86 MG/DL (ref 0.5–1.4)
DEPRECATED RDW RBC AUTO: 55.3 FL (ref 40–54)
EOSINOPHIL # BLD AUTO: 0.11 10*3/MM3 (ref 0–0.7)
EOSINOPHIL NFR BLD AUTO: 1.8 % (ref 0–4)
ERYTHROCYTE [DISTWIDTH] IN BLOOD BY AUTOMATED COUNT: 15.7 % (ref 12–15)
GFR SERPL CREATININE-BSD FRML MDRD: 66 ML/MIN/1.73
GLOBULIN UR ELPH-MCNC: 3.2 GM/DL
GLUCOSE BLD-MCNC: 106 MG/DL (ref 70–100)
HCT VFR BLD AUTO: 35.8 % (ref 37–47)
HGB BLD-MCNC: 11.4 G/DL (ref 12–16)
IMM GRANULOCYTES # BLD: 0.06 10*3/MM3 (ref 0–0.03)
IMM GRANULOCYTES NFR BLD: 1 % (ref 0–5)
LYMPHOCYTES # BLD AUTO: 1.38 10*3/MM3 (ref 0.72–4.86)
LYMPHOCYTES NFR BLD AUTO: 22.3 % (ref 15–45)
MCH RBC QN AUTO: 30.6 PG (ref 28–32)
MCHC RBC AUTO-ENTMCNC: 31.8 G/DL (ref 33–36)
MCV RBC AUTO: 96.2 FL (ref 82–98)
MONOCYTES # BLD AUTO: 0.77 10*3/MM3 (ref 0.19–1.3)
MONOCYTES NFR BLD AUTO: 12.4 % (ref 4–12)
NEUTROPHILS # BLD AUTO: 3.87 10*3/MM3 (ref 1.87–8.4)
NEUTROPHILS NFR BLD AUTO: 62.3 % (ref 39–78)
NT-PROBNP SERPL-MCNC: 70.5 PG/ML (ref 0–900)
PLATELET # BLD AUTO: 200 10*3/MM3 (ref 130–400)
PMV BLD AUTO: 11 FL (ref 6–12)
POTASSIUM BLD-SCNC: 4.2 MMOL/L (ref 3.5–5.3)
PROT SERPL-MCNC: 6.8 G/DL (ref 6.3–8.7)
RBC # BLD AUTO: 3.72 10*6/MM3 (ref 4.2–5.4)
SODIUM BLD-SCNC: 135 MMOL/L (ref 135–145)
WBC NRBC COR # BLD: 6.2 10*3/MM3 (ref 4.8–10.8)

## 2017-03-12 PROCEDURE — 85025 COMPLETE CBC W/AUTO DIFF WBC: CPT | Performed by: FAMILY MEDICINE

## 2017-03-12 PROCEDURE — 97116 GAIT TRAINING THERAPY: CPT

## 2017-03-12 PROCEDURE — 97110 THERAPEUTIC EXERCISES: CPT

## 2017-03-12 PROCEDURE — 94799 UNLISTED PULMONARY SVC/PX: CPT

## 2017-03-12 PROCEDURE — 25010000002 ENOXAPARIN PER 10 MG: Performed by: INTERNAL MEDICINE

## 2017-03-12 PROCEDURE — 83880 ASSAY OF NATRIURETIC PEPTIDE: CPT | Performed by: FAMILY MEDICINE

## 2017-03-12 PROCEDURE — 25010000002 LEVOFLOXACIN PER 250 MG: Performed by: FAMILY MEDICINE

## 2017-03-12 PROCEDURE — 80053 COMPREHEN METABOLIC PANEL: CPT | Performed by: FAMILY MEDICINE

## 2017-03-12 RX ORDER — ACETYLCYSTEINE 200 MG/ML
300 SOLUTION ORAL; RESPIRATORY (INHALATION)
Status: DISCONTINUED | OUTPATIENT
Start: 2017-03-12 | End: 2017-03-16 | Stop reason: HOSPADM

## 2017-03-12 RX ADMIN — ALPRAZOLAM 1 MG: 0.5 TABLET ORAL at 22:26

## 2017-03-12 RX ADMIN — NYSTATIN 500000 UNITS: 100000 SUSPENSION ORAL at 20:42

## 2017-03-12 RX ADMIN — ACETYLCYSTEINE 300 MG: 200 SOLUTION ORAL; RESPIRATORY (INHALATION) at 21:05

## 2017-03-12 RX ADMIN — OXYCODONE HYDROCHLORIDE AND ACETAMINOPHEN 1 TABLET: 10; 325 TABLET ORAL at 01:20

## 2017-03-12 RX ADMIN — DOCUSATE SODIUM 100 MG: 100 CAPSULE ORAL at 10:07

## 2017-03-12 RX ADMIN — SIMETHICONE 160 MG: 80 TABLET, CHEWABLE ORAL at 00:47

## 2017-03-12 RX ADMIN — GABAPENTIN 300 MG: 300 CAPSULE ORAL at 05:14

## 2017-03-12 RX ADMIN — DOCUSATE SODIUM 100 MG: 100 CAPSULE ORAL at 17:35

## 2017-03-12 RX ADMIN — NYSTATIN 500000 UNITS: 100000 SUSPENSION ORAL at 10:07

## 2017-03-12 RX ADMIN — ALPRAZOLAM 1 MG: 0.5 TABLET ORAL at 12:30

## 2017-03-12 RX ADMIN — SENNA 8.6 MG: 8.6 TABLET, COATED ORAL at 20:42

## 2017-03-12 RX ADMIN — BUDESONIDE AND FORMOTEROL FUMARATE DIHYDRATE 2 PUFF: 160; 4.5 AEROSOL RESPIRATORY (INHALATION) at 06:50

## 2017-03-12 RX ADMIN — IPRATROPIUM BROMIDE AND ALBUTEROL SULFATE 3 ML: 2.5; .5 SOLUTION RESPIRATORY (INHALATION) at 06:49

## 2017-03-12 RX ADMIN — OXYCODONE HYDROCHLORIDE 30 MG: 20 TABLET, FILM COATED, EXTENDED RELEASE ORAL at 10:07

## 2017-03-12 RX ADMIN — ALPRAZOLAM 1 MG: 0.5 TABLET ORAL at 05:24

## 2017-03-12 RX ADMIN — DEXTROMETHORPHAN 30 MG: 30 SUSPENSION, EXTENDED RELEASE ORAL at 20:43

## 2017-03-12 RX ADMIN — NICOTINE 1 PATCH: 21 PATCH, EXTENDED RELEASE TRANSDERMAL at 17:36

## 2017-03-12 RX ADMIN — GABAPENTIN 300 MG: 300 CAPSULE ORAL at 17:35

## 2017-03-12 RX ADMIN — NYSTATIN 500000 UNITS: 100000 SUSPENSION ORAL at 12:01

## 2017-03-12 RX ADMIN — ACETYLCYSTEINE 300 MG: 200 SOLUTION ORAL; RESPIRATORY (INHALATION) at 11:03

## 2017-03-12 RX ADMIN — BUDESONIDE AND FORMOTEROL FUMARATE DIHYDRATE 2 PUFF: 160; 4.5 AEROSOL RESPIRATORY (INHALATION) at 19:55

## 2017-03-12 RX ADMIN — IPRATROPIUM BROMIDE AND ALBUTEROL SULFATE 3 ML: 2.5; .5 SOLUTION RESPIRATORY (INHALATION) at 14:50

## 2017-03-12 RX ADMIN — ENOXAPARIN SODIUM 40 MG: 40 INJECTION SUBCUTANEOUS at 10:06

## 2017-03-12 RX ADMIN — GABAPENTIN 300 MG: 300 CAPSULE ORAL at 20:42

## 2017-03-12 RX ADMIN — OXYCODONE HYDROCHLORIDE 30 MG: 20 TABLET, FILM COATED, EXTENDED RELEASE ORAL at 20:42

## 2017-03-12 RX ADMIN — IPRATROPIUM BROMIDE AND ALBUTEROL SULFATE 3 ML: 2.5; .5 SOLUTION RESPIRATORY (INHALATION) at 23:19

## 2017-03-12 RX ADMIN — OXYCODONE HYDROCHLORIDE AND ACETAMINOPHEN 1 TABLET: 10; 325 TABLET ORAL at 23:54

## 2017-03-12 RX ADMIN — OXYCODONE HYDROCHLORIDE AND ACETAMINOPHEN 1 TABLET: 10; 325 TABLET ORAL at 17:35

## 2017-03-12 RX ADMIN — FUROSEMIDE 20 MG: 20 TABLET ORAL at 10:07

## 2017-03-12 RX ADMIN — IPRATROPIUM BROMIDE AND ALBUTEROL SULFATE 3 ML: 2.5; .5 SOLUTION RESPIRATORY (INHALATION) at 11:03

## 2017-03-12 RX ADMIN — LEVOFLOXACIN 500 MG: 500 INJECTION, SOLUTION INTRAVENOUS at 12:00

## 2017-03-12 RX ADMIN — NYSTATIN 500000 UNITS: 100000 SUSPENSION ORAL at 17:35

## 2017-03-12 RX ADMIN — DEXTROMETHORPHAN 30 MG: 30 SUSPENSION, EXTENDED RELEASE ORAL at 10:07

## 2017-03-12 RX ADMIN — PANTOPRAZOLE SODIUM 40 MG: 40 TABLET, DELAYED RELEASE ORAL at 05:14

## 2017-03-12 RX ADMIN — IPRATROPIUM BROMIDE AND ALBUTEROL SULFATE 3 ML: 2.5; .5 SOLUTION RESPIRATORY (INHALATION) at 03:12

## 2017-03-12 RX ADMIN — CITALOPRAM 40 MG: 20 TABLET, FILM COATED ORAL at 10:07

## 2017-03-12 RX ADMIN — IPRATROPIUM BROMIDE AND ALBUTEROL SULFATE 3 ML: 2.5; .5 SOLUTION RESPIRATORY (INHALATION) at 19:55

## 2017-03-12 NOTE — PLAN OF CARE
Problem: Patient Care Overview (Adult)  Goal: Plan of Care Review  Outcome: Ongoing (interventions implemented as appropriate)    03/12/17 1031   Coping/Psychosocial Response Interventions   Plan Of Care Reviewed With patient   Patient Care Overview   Progress progress toward functional goals is gradual   Outcome Evaluation   Outcome Summary/Follow up Plan Patient doing well with therapy but continues to be limited by desaturation of O2 and decreased endurance and impaired balance. She transfers with supervision and ambulated 80' x 2 CGA. First on 6 L O2/NC but desated to 85% then did increase to 8L O2/NC with activity and desated to 86%. We will continue to work with therapy to increase mobility and endurance while monitoring O2 sats.

## 2017-03-12 NOTE — PLAN OF CARE
Problem: Patient Care Overview (Adult)  Goal: Plan of Care Review  Outcome: Ongoing (interventions implemented as appropriate)    03/12/17 0349   Coping/Psychosocial Response Interventions   Plan Of Care Reviewed With patient;family   Patient Care Overview   Progress no change   Outcome Evaluation   Outcome Summary/Follow up Plan pt remain on 9 liters high-flow o2 sat 95% tele nsr. pure wick remains in place. medicated once with percocet         Problem: Respiratory Insufficiency (Adult)  Goal: Acid/Base Balance  Outcome: Ongoing (interventions implemented as appropriate)  Goal: Effective Ventilation  Outcome: Ongoing (interventions implemented as appropriate)

## 2017-03-12 NOTE — PROGRESS NOTES
[facility] Medicine Services  INPATIENT PROGRESS NOTE    Length of Stay: 8  Date of Admission: 3/4/2017  Primary Care Physician: No Known Provider    Subjective   Chief Complaint: Dysnea    HPI   Patient underwent an episode respiratory distress last night.  Breathing much better this morning.  Chronic low blood pressure.  On home medication of pain and Xanax? Causing hypotension? Pt refused to cut back those medication.     Review of Systems   Constitutional: Positive for activity change, appetite change and fatigue. Negative for chills and fever.   HENT: Negative for hearing loss, nosebleeds, tinnitus and trouble swallowing.   Eyes: Negative for visual disturbance.   Respiratory: Positive for cough, shortness of breath and wheezing. Negative for chest tightness.   Cardiovascular: Negative for chest pain, palpitations and leg swelling.   Gastrointestinal: Negative for abdominal distention, abdominal pain, blood in stool, constipation, diarrhea, nausea and vomiting.   Endocrine: Negative for cold intolerance, heat intolerance, polydipsia, polyphagia and polyuria.   Genitourinary: Negative for decreased urine volume, difficulty urinating, dysuria, flank pain, frequency and hematuria.   Musculoskeletal: Positive for arthralgias, back pain and myalgias. Negative for joint swelling.   Skin: Negative for rash.   Allergic/Immunologic: Negative for immunocompromised state.   Neurological: Positive for tremors. Negative for dizziness, syncope, weakness, light-headedness and headaches.   Hematological: Negative for adenopathy. Does not bruise/bleed easily.   Psychiatric/Behavioral: Negative for confusion, sleep disturbance and suicidal ideas. The patient is nervous/anxio        All pertinent negatives and positives are as above. All other systems have been reviewed and are negative unless otherwise stated.     Objective    Temp:  [97.9 °F (36.6 °C)-98.7 °F (37.1 °C)] 98.7 °F (37.1  °C)  Heart Rate:  [76-90] 90  Resp:  [18-20] 20  BP: ()/(41-52) 91/52    Intake/Output Summary (Last 24 hours) at 03/12/17 0834  Last data filed at 03/11/17 1950   Gross per 24 hour   Intake    940 ml   Output   1700 ml   Net   -760 ml     Physical Exam  Constitutional: Positive for activity change, appetite change and fatigue. Negative for chills and fever.   HENT: Negative for hearing loss, nosebleeds, tinnitus and trouble swallowing.   Eyes: Negative for visual disturbance.   Respiratory: Positive for cough, shortness of breath and wheezing. Negative for chest tightness.   Cardiovascular: Negative for chest pain, palpitations and leg swelling.   Gastrointestinal: Negative for abdominal distention, abdominal pain, blood in stool, constipation, diarrhea, nausea and vomiting.   Endocrine: Negative for cold intolerance, heat intolerance, polydipsia, polyphagia and polyuria.   Genitourinary: Negative for decreased urine volume, difficulty urinating, dysuria, flank pain, frequency and hematuria.   Musculoskeletal: Positive for arthralgias, back pain and myalgias. Negative for joint swelling.   Skin: Negative for rash.   Allergic/Immunologic: Negative for immunocompromised state.   Neurological: Positive for tremors. Negative for dizziness, syncope, weakness, light-headedness and headaches.   Hematological: Negative for adenopathy. Does not bruise/bleed easily.   Psychiatric/Behavioral: Negative for confusion, sleep disturbance and suicidal ideas. The patient is nervous/anxio  Results Review:  Lab Results (last 24 hours)     Procedure Component Value Units Date/Time    CBC & Differential [[id number]] Collected:  03/12/17 0614    Specimen:  Blood Updated:  03/12/17 0644    Narrative:       The following orders were created for panel order CBC & Differential.  Procedure                               Abnormality         Status                     ---------                               -----------         ------                      CBC Auto Differential[[id number]]         Abnormal            Final result                 Please view results for these tests on the individual orders.    CBC Auto Differential [[id number]]  (Abnormal) Collected:  03/12/17 0614    Specimen:  Blood Updated:  03/12/17 0644     WBC 6.20 10*3/mm3      RBC 3.72 (L) 10*6/mm3      Hemoglobin 11.4 (L) g/dL      Hematocrit 35.8 (L) %      MCV 96.2 fL      MCH 30.6 pg      MCHC 31.8 (L) g/dL      RDW 15.7 (H) %      RDW-SD 55.3 (H) fl      MPV 11.0 fL      Platelets 200 10*3/mm3      Neutrophil % 62.3 %      Lymphocyte % 22.3 %      Monocyte % 12.4 (H) %      Eosinophil % 1.8 %      Basophil % 0.2 %      Immature Grans % 1.0 %      Neutrophils, Absolute 3.87 10*3/mm3      Lymphocytes, Absolute 1.38 10*3/mm3      Monocytes, Absolute 0.77 10*3/mm3      Eosinophils, Absolute 0.11 10*3/mm3      Basophils, Absolute 0.01 10*3/mm3      Immature Grans, Absolute 0.06 (H) 10*3/mm3     Comprehensive Metabolic Panel [[id number]]  (Abnormal) Collected:  03/12/17 0614    Specimen:  Blood Updated:  03/12/17 0701     Glucose 106 (H) mg/dL      BUN 12 mg/dL      Creatinine 0.86 mg/dL      Sodium 135 mmol/L      Potassium 4.2 mmol/L      Chloride 92 (L) mmol/L      CO2 38.0 (H) mmol/L      Calcium 8.4 mg/dL      Total Protein 6.8 g/dL      Albumin 3.60 g/dL      ALT (SGPT) 21 U/L      AST (SGOT) 19 U/L      Alkaline Phosphatase 47 U/L      Total Bilirubin 0.4 mg/dL      eGFR Non African Amer 66 mL/min/1.73      Globulin 3.2 gm/dL      A/G Ratio 1.1 g/dL      BUN/Creatinine Ratio 14.0      Anion Gap 5.0 mmol/L     BNP [[id number]]  (Normal) Collected:  03/12/17 0614    Specimen:  Blood Updated:  03/12/17 0706     proBNP 70.5 pg/mL            Cultures:       Radiology Data:    Imaging Results (last 24 hours)     ** No results found for the last 24 hours. **          Allergies   Allergen Reactions   • Aspirin Anaphylaxis   • Demerol [Meperidine] Anaphylaxis   • Ibuprofen Anaphylaxis   •  Neosporin [Neomycin-Bacitracin Zn-Polymyx] Anaphylaxis   • Penicillins Anaphylaxis   • Sulfa Antibiotics Anaphylaxis   • Codeine Hives     Pt can take codeine as long is it is not alot   • Talwin [Pentazocine] Hives   • Tetracyclines & Related    • Morphine And Related Hives       Scheduled meds:     budesonide-formoterol 2 puff Inhalation BID - RT   citalopram 40 mg Oral Daily   dextromethorphan polistirex ER 30 mg Oral Q12H   docusate sodium 100 mg Oral BID   enoxaparin 40 mg Subcutaneous Daily   furosemide 20 mg Oral Daily   gabapentin 300 mg Oral Q8H   ipratropium-albuterol 3 mL Nebulization Q4H - RT   levoFLOXacin 500 mg Intravenous Q24H   nicotine 1 patch Transdermal Q24H   nystatin 5 mL Oral 4x Daily   oxyCODONE ER 30 mg Oral Q12H   pantoprazole 40 mg Oral Q AM   senna 1 tablet Oral Nightly       PRN meds:  •  acetaminophen  •  acetaminophen  •  ALPRAZolam  •  enalaprilat  •  hydrALAZINE  •  ondansetron  •  oxyCODONE-acetaminophen  •  promethazine  •  simethicone  •  sodium chloride  •  sodium chloride    Assessment/Plan     Active Problems:    Pneumonia of right lower lobe due to infectious organism    COPD (chronic obstructive pulmonary disease)    Hypokalemia    Anemia    Respiratory failure      Plan:  Pneumonia/COPD/respiratory failureHypoxia with respiratory distress-Extubated 3/6. Duonebs. Currently Levaquin. By mouth steroids. Ambulatory o2 sat. Consult placement for home oxygen and neb at home.  Coughing- cont delsym and add mucomyst.      Dyspnea/echocardiogram ejection fraction 60%, diastolic dysfunction. 40 lasix now due to ncreasing shortness of breath today, increase lasix bid.       Right lower lobe lung nodule-per ct scan. Repeat CT scan in 3 months.      AMS- question hypoxia/fever vs possible OD. Apparently there has been a HX of depression since the passing of her  in July. Neurology consulted.       Hypokalemia. kdur x 2 days. Improving.      Reflux and gas- on protonix, start on  mylicon prn       Abdominal pain- constipation on CT . Colace and senna      Hypotension with HX of hypertension- off levaphed      Subacute Thyroid-Low TSH- Free t4 and free t3, normal.       Chronic pain- on OxyContin, and Percocet when necessary, home med.     Depression/Anxiety- Celexa/xanax, home medications.       Anemia- iron deficiency. S/p venofer 3/8.    Smoking- pt promise to stop smoking. Before admission, pat smoke 1-2 ppd. On nicotine patch.      Thrush - nystatin qid.      Discharge Plannin-2 days. Plan go home with home O2 and neb.     Siddharth Leavitt MD   17   8:34 AM

## 2017-03-12 NOTE — PLAN OF CARE
Problem: Patient Care Overview (Adult)  Goal: Plan of Care Review  Outcome: Ongoing (interventions implemented as appropriate)    03/12/17 1621   Coping/Psychosocial Response Interventions   Plan Of Care Reviewed With patient   Patient Care Overview   Progress no change   Outcome Evaluation   Outcome Summary/Follow up Plan Patient saturations low to mid 90's on 6L HI [place] NC while in bed. will continue to monitor.          Problem: Respiratory Insufficiency (Adult)  Goal: Acid/Base Balance  Outcome: Ongoing (interventions implemented as appropriate)  Goal: Effective Ventilation  Outcome: Ongoing (interventions implemented as appropriate)

## 2017-03-13 LAB
ALBUMIN SERPL-MCNC: 3.3 G/DL (ref 3.5–5)
ALBUMIN/GLOB SERPL: 1.1 G/DL (ref 1.1–2.5)
ALP SERPL-CCNC: 50 U/L (ref 24–120)
ALT SERPL W P-5'-P-CCNC: 15 U/L (ref 0–54)
ANION GAP SERPL CALCULATED.3IONS-SCNC: 6 MMOL/L (ref 4–13)
AST SERPL-CCNC: 16 U/L (ref 7–45)
BASOPHILS # BLD AUTO: 0.02 10*3/MM3 (ref 0–0.2)
BASOPHILS NFR BLD AUTO: 0.3 % (ref 0–2)
BILIRUB SERPL-MCNC: 0.3 MG/DL (ref 0.1–1)
BUN BLD-MCNC: 11 MG/DL (ref 5–21)
BUN/CREAT SERPL: 12.9 (ref 7–25)
CALCIUM SPEC-SCNC: 8.6 MG/DL (ref 8.4–10.4)
CHLORIDE SERPL-SCNC: 94 MMOL/L (ref 98–110)
CO2 SERPL-SCNC: 35 MMOL/L (ref 24–31)
CREAT BLD-MCNC: 0.85 MG/DL (ref 0.5–1.4)
DEPRECATED RDW RBC AUTO: 55.4 FL (ref 40–54)
EOSINOPHIL # BLD AUTO: 0.11 10*3/MM3 (ref 0–0.7)
EOSINOPHIL NFR BLD AUTO: 1.7 % (ref 0–4)
ERYTHROCYTE [DISTWIDTH] IN BLOOD BY AUTOMATED COUNT: 15.7 % (ref 12–15)
GFR SERPL CREATININE-BSD FRML MDRD: 67 ML/MIN/1.73
GLOBULIN UR ELPH-MCNC: 3 GM/DL
GLUCOSE BLD-MCNC: 102 MG/DL (ref 70–100)
HCT VFR BLD AUTO: 35 % (ref 37–47)
HGB BLD-MCNC: 11 G/DL (ref 12–16)
IMM GRANULOCYTES # BLD: 0.08 10*3/MM3 (ref 0–0.03)
IMM GRANULOCYTES NFR BLD: 1.2 % (ref 0–5)
LYMPHOCYTES # BLD AUTO: 1.17 10*3/MM3 (ref 0.72–4.86)
LYMPHOCYTES NFR BLD AUTO: 18.2 % (ref 15–45)
MCH RBC QN AUTO: 30.5 PG (ref 28–32)
MCHC RBC AUTO-ENTMCNC: 31.4 G/DL (ref 33–36)
MCV RBC AUTO: 97 FL (ref 82–98)
MONOCYTES # BLD AUTO: 0.98 10*3/MM3 (ref 0.19–1.3)
MONOCYTES NFR BLD AUTO: 15.3 % (ref 4–12)
NEUTROPHILS # BLD AUTO: 4.06 10*3/MM3 (ref 1.87–8.4)
NEUTROPHILS NFR BLD AUTO: 63.3 % (ref 39–78)
PLATELET # BLD AUTO: 214 10*3/MM3 (ref 130–400)
PMV BLD AUTO: 11.1 FL (ref 6–12)
POTASSIUM BLD-SCNC: 4.3 MMOL/L (ref 3.5–5.3)
PROT SERPL-MCNC: 6.3 G/DL (ref 6.3–8.7)
RBC # BLD AUTO: 3.61 10*6/MM3 (ref 4.2–5.4)
SODIUM BLD-SCNC: 135 MMOL/L (ref 135–145)
WBC NRBC COR # BLD: 6.42 10*3/MM3 (ref 4.8–10.8)

## 2017-03-13 PROCEDURE — 85025 COMPLETE CBC W/AUTO DIFF WBC: CPT | Performed by: FAMILY MEDICINE

## 2017-03-13 PROCEDURE — 25010000002 LEVOFLOXACIN PER 250 MG: Performed by: FAMILY MEDICINE

## 2017-03-13 PROCEDURE — 97116 GAIT TRAINING THERAPY: CPT

## 2017-03-13 PROCEDURE — 97110 THERAPEUTIC EXERCISES: CPT

## 2017-03-13 PROCEDURE — 94799 UNLISTED PULMONARY SVC/PX: CPT

## 2017-03-13 PROCEDURE — 80053 COMPREHEN METABOLIC PANEL: CPT | Performed by: FAMILY MEDICINE

## 2017-03-13 PROCEDURE — 94760 N-INVAS EAR/PLS OXIMETRY 1: CPT

## 2017-03-13 PROCEDURE — 25010000002 ENOXAPARIN PER 10 MG: Performed by: INTERNAL MEDICINE

## 2017-03-13 RX ADMIN — LEVOFLOXACIN 500 MG: 500 INJECTION, SOLUTION INTRAVENOUS at 11:31

## 2017-03-13 RX ADMIN — IPRATROPIUM BROMIDE AND ALBUTEROL SULFATE 3 ML: 2.5; .5 SOLUTION RESPIRATORY (INHALATION) at 23:47

## 2017-03-13 RX ADMIN — GABAPENTIN 300 MG: 300 CAPSULE ORAL at 13:03

## 2017-03-13 RX ADMIN — OXYCODONE HYDROCHLORIDE AND ACETAMINOPHEN 1 TABLET: 10; 325 TABLET ORAL at 16:31

## 2017-03-13 RX ADMIN — NYSTATIN 500000 UNITS: 100000 SUSPENSION ORAL at 09:27

## 2017-03-13 RX ADMIN — GABAPENTIN 300 MG: 300 CAPSULE ORAL at 06:03

## 2017-03-13 RX ADMIN — CITALOPRAM 40 MG: 20 TABLET, FILM COATED ORAL at 09:27

## 2017-03-13 RX ADMIN — IPRATROPIUM BROMIDE AND ALBUTEROL SULFATE 3 ML: 2.5; .5 SOLUTION RESPIRATORY (INHALATION) at 14:50

## 2017-03-13 RX ADMIN — ENOXAPARIN SODIUM 40 MG: 40 INJECTION SUBCUTANEOUS at 09:28

## 2017-03-13 RX ADMIN — NYSTATIN 500000 UNITS: 100000 SUSPENSION ORAL at 17:42

## 2017-03-13 RX ADMIN — GABAPENTIN 300 MG: 300 CAPSULE ORAL at 22:09

## 2017-03-13 RX ADMIN — OXYCODONE HYDROCHLORIDE 30 MG: 20 TABLET, FILM COATED, EXTENDED RELEASE ORAL at 09:27

## 2017-03-13 RX ADMIN — ACETYLCYSTEINE 300 MG: 200 SOLUTION ORAL; RESPIRATORY (INHALATION) at 19:33

## 2017-03-13 RX ADMIN — BUDESONIDE AND FORMOTEROL FUMARATE DIHYDRATE 2 PUFF: 160; 4.5 AEROSOL RESPIRATORY (INHALATION) at 06:42

## 2017-03-13 RX ADMIN — NYSTATIN 500000 UNITS: 100000 SUSPENSION ORAL at 11:32

## 2017-03-13 RX ADMIN — IPRATROPIUM BROMIDE AND ALBUTEROL SULFATE 3 ML: 2.5; .5 SOLUTION RESPIRATORY (INHALATION) at 10:43

## 2017-03-13 RX ADMIN — FUROSEMIDE 20 MG: 20 TABLET ORAL at 09:27

## 2017-03-13 RX ADMIN — OXYCODONE HYDROCHLORIDE 30 MG: 20 TABLET, FILM COATED, EXTENDED RELEASE ORAL at 20:21

## 2017-03-13 RX ADMIN — SENNA 8.6 MG: 8.6 TABLET, COATED ORAL at 20:20

## 2017-03-13 RX ADMIN — OXYCODONE HYDROCHLORIDE AND ACETAMINOPHEN 1 TABLET: 10; 325 TABLET ORAL at 06:03

## 2017-03-13 RX ADMIN — NYSTATIN 500000 UNITS: 100000 SUSPENSION ORAL at 20:21

## 2017-03-13 RX ADMIN — NICOTINE 1 PATCH: 21 PATCH, EXTENDED RELEASE TRANSDERMAL at 17:43

## 2017-03-13 RX ADMIN — IPRATROPIUM BROMIDE AND ALBUTEROL SULFATE 3 ML: 2.5; .5 SOLUTION RESPIRATORY (INHALATION) at 19:32

## 2017-03-13 RX ADMIN — ALPRAZOLAM 1 MG: 0.5 TABLET ORAL at 18:33

## 2017-03-13 RX ADMIN — BUDESONIDE AND FORMOTEROL FUMARATE DIHYDRATE 2 PUFF: 160; 4.5 AEROSOL RESPIRATORY (INHALATION) at 19:33

## 2017-03-13 RX ADMIN — ACETYLCYSTEINE 300 MG: 200 SOLUTION ORAL; RESPIRATORY (INHALATION) at 06:42

## 2017-03-13 RX ADMIN — ALPRAZOLAM 1 MG: 0.5 TABLET ORAL at 04:36

## 2017-03-13 RX ADMIN — DEXTROMETHORPHAN 30 MG: 30 SUSPENSION, EXTENDED RELEASE ORAL at 20:21

## 2017-03-13 RX ADMIN — DOCUSATE SODIUM 100 MG: 100 CAPSULE ORAL at 09:27

## 2017-03-13 RX ADMIN — PANTOPRAZOLE SODIUM 40 MG: 40 TABLET, DELAYED RELEASE ORAL at 06:03

## 2017-03-13 RX ADMIN — IPRATROPIUM BROMIDE AND ALBUTEROL SULFATE 3 ML: 2.5; .5 SOLUTION RESPIRATORY (INHALATION) at 06:42

## 2017-03-13 RX ADMIN — DOCUSATE SODIUM 100 MG: 100 CAPSULE ORAL at 17:42

## 2017-03-13 RX ADMIN — DEXTROMETHORPHAN 30 MG: 30 SUSPENSION, EXTENDED RELEASE ORAL at 09:28

## 2017-03-13 NOTE — PROGRESS NOTES
[facility] Medicine Services  INPATIENT PROGRESS NOTE    Length of Stay: 9  Date of Admission: 3/4/2017  Primary Care Physician: No Known Provider    Subjective   Chief Complaint: Tired.   HPI   Feels better.   Still tired.  Still coughing  Has been up with PT in the [address].  No nausea or vomiting.  Is using PureWick currently.  Notes she has had depression at home.  Recent loss of .     Review of Systems     All pertinent negatives and positives are as above. All other systems have been reviewed and are negative unless otherwise stated.     Objective    Temp:  [98 °F (36.7 °C)-98.4 °F (36.9 °C)] 98.4 °F (36.9 °C)  Heart Rate:  [72-95] 84  Resp:  [18] 18  BP: ()/(45-76) 106/47  Physical Exam   Constitutional: She is oriented to person, place, and time. She appears well-developed and well-nourished.   HENT:   Head: Normocephalic and atraumatic.   Eyes: Conjunctivae and EOM are normal. Pupils are equal, round, and reactive to light.   Neck: Neck supple.   Cardiovascular: Normal rate and regular rhythm.  Exam reveals no gallop and no friction rub.    No murmur heard.  Pulmonary/Chest: Effort normal. She has wheezes (inspiratory).   Abdominal: Soft. Bowel sounds are normal. There is no hepatosplenomegaly. There is no tenderness.   Genitourinary:   Genitourinary Comments: Pure Wick system in place   Musculoskeletal: Normal range of motion. She exhibits no edema.   Neurological: She is alert and oriented to person, place, and time. No cranial nerve deficit.   Skin: Skin is warm and dry.   Psychiatric: She has a normal mood and affect. Her behavior is normal.   Nursing note and vitals reviewed.          Results Review:  I have reviewed the labs, radiology results, and diagnostic studies.    Laboratory Data:     Results from last 7 days  Lab Units 03/13/17  0553 03/12/17  0614 03/11/17  0452   WBC 10*3/mm3 6.42 6.20 5.81   HEMOGLOBIN g/dL 11.0* 11.4* 11.5*   HEMATOCRIT %  35.0* 35.8* 36.0*   PLATELETS 10*3/mm3 214 200 184          Results from last 7 days  Lab Units 03/13/17  0553 03/12/17  0614 03/11/17  0452   SODIUM mmol/L 135 135 137   POTASSIUM mmol/L 4.3 4.2 4.4   CHLORIDE mmol/L 94* 92* 93*   TOTAL CO2 mmol/L 35.0* 38.0* 37.0*   BUN mg/dL 11 12 11   CREATININE mg/dL 0.85 0.86 0.92   CALCIUM mg/dL 8.6 8.4 8.3*   BILIRUBIN mg/dL 0.3 0.4 0.2   ALK PHOS U/L 50 47 53   ALT (SGPT) U/L 15 21 17   AST (SGOT) U/L 16 19 22   GLUCOSE mg/dL 102* 106* 101*       Culture Data:        Radiology Data:   Imaging Results (last 24 hours)     ** No results found for the last 24 hours. **          I have reviewed the patient current medications.     Assessment/Plan     Hospital Problem List     Pneumonia of right lower lobe due to infectious organism    COPD (chronic obstructive pulmonary disease)    Hypokalemia    Anemia    Respiratory failure        Assessment    Acute respiratory failure resolved.  COPD, exacerbation  Anemia, chronic disease  Right lower lobe pulmonary nodule    Plan    DC santino  [employer]ulate  Chair  Social service consult for home health,  Anticipate home in 24-48 hours.         Mirna Galvez DO   03/13/17   9:18 AM

## 2017-03-13 NOTE — PROGRESS NOTES
Continued Stay Note  CODY Wagner     Patient Name: Justyna Lei  MRN: [id number]  Today's Date: 3/13/2017    Admit Date: 3/4/2017          Discharge Plan       03/13/17 1414    Case Management/Social Work Plan    Plan Home with home health    Additional Comments Noted that pt will be ready for d/c tomorrow or Wednesday. Will arrange home health when orders rec'd. Oxygen was arranged last week but she will have to be tested again because testing is only good for 48 hours.               Discharge Codes     None            IMELDA Juarez

## 2017-03-13 NOTE — PLAN OF CARE
Problem: Patient Care Overview (Adult)  Goal: Plan of Care Review  Outcome: Ongoing (interventions implemented as appropriate)    03/13/17 1542   Coping/Psychosocial Response Interventions   Plan Of Care Reviewed With patient   Patient Care Overview   Progress progress toward functional goals as expected         Problem: Respiratory Insufficiency (Adult)  Goal: Acid/Base Balance  Outcome: Ongoing (interventions implemented as appropriate)  Goal: Effective Ventilation  Outcome: Ongoing (interventions implemented as appropriate)

## 2017-03-13 NOTE — PLAN OF CARE
Problem: Patient Care Overview (Adult)  Goal: Plan of Care Review  Outcome: Ongoing (interventions implemented as appropriate)    03/13/17 1008   Coping/Psychosocial Response Interventions   Plan Of Care Reviewed With patient   Patient Care Overview   Progress progress towards functional goals is fair   Outcome Evaluation   Outcome Summary/Follow up Plan Pt performed bed mobility with supervision. Transfered sit to stand with CGA. Amb 80' X 2 with CGA on 6.0 L high flow. O2 was at 87% during amb, increased to 92% once pt was back in the bed. Will continue to work with pt to increase strengh, endurance and safety.

## 2017-03-13 NOTE — PLAN OF CARE
Problem: Patient Care Overview (Adult)  Goal: Plan of Care Review  Outcome: Ongoing (interventions implemented as appropriate)    03/13/17 0344   Coping/Psychosocial Response Interventions   Plan Of Care Reviewed With patient   Patient Care Overview   Progress progress toward functional goals is gradual   Outcome Evaluation   Outcome Summary/Follow up Plan cont to moniter o2 sats , o2 on at 6l/m pt c/o sore throat        Goal: Discharge Needs Assessment  Outcome: Ongoing (interventions implemented as appropriate)    Problem: Respiratory Insufficiency (Adult)  Goal: Acid/Base Balance  Outcome: Ongoing (interventions implemented as appropriate)  Goal: Effective Ventilation  Outcome: Ongoing (interventions implemented as appropriate)

## 2017-03-14 PROCEDURE — 25010000002 LEVOFLOXACIN PER 250 MG: Performed by: FAMILY MEDICINE

## 2017-03-14 PROCEDURE — 94799 UNLISTED PULMONARY SVC/PX: CPT

## 2017-03-14 PROCEDURE — 97116 GAIT TRAINING THERAPY: CPT

## 2017-03-14 PROCEDURE — 25010000002 ENOXAPARIN PER 10 MG: Performed by: INTERNAL MEDICINE

## 2017-03-14 PROCEDURE — 97110 THERAPEUTIC EXERCISES: CPT

## 2017-03-14 RX ORDER — BENZONATATE 100 MG/1
200 CAPSULE ORAL 3 TIMES DAILY PRN
Status: DISCONTINUED | OUTPATIENT
Start: 2017-03-14 | End: 2017-03-16 | Stop reason: HOSPADM

## 2017-03-14 RX ORDER — IPRATROPIUM BROMIDE 21 UG/1
2 SPRAY, METERED NASAL EVERY 12 HOURS
Status: DISCONTINUED | OUTPATIENT
Start: 2017-03-14 | End: 2017-03-16 | Stop reason: HOSPADM

## 2017-03-14 RX ADMIN — BUDESONIDE AND FORMOTEROL FUMARATE DIHYDRATE 2 PUFF: 160; 4.5 AEROSOL RESPIRATORY (INHALATION) at 20:02

## 2017-03-14 RX ADMIN — ALPRAZOLAM 1 MG: 0.5 TABLET ORAL at 11:27

## 2017-03-14 RX ADMIN — DEXTROMETHORPHAN 30 MG: 30 SUSPENSION, EXTENDED RELEASE ORAL at 09:32

## 2017-03-14 RX ADMIN — NICOTINE 1 PATCH: 21 PATCH, EXTENDED RELEASE TRANSDERMAL at 21:26

## 2017-03-14 RX ADMIN — IPRATROPIUM BROMIDE AND ALBUTEROL SULFATE 3 ML: 2.5; .5 SOLUTION RESPIRATORY (INHALATION) at 06:37

## 2017-03-14 RX ADMIN — ENOXAPARIN SODIUM 40 MG: 40 INJECTION SUBCUTANEOUS at 09:31

## 2017-03-14 RX ADMIN — OXYCODONE HYDROCHLORIDE 30 MG: 20 TABLET, FILM COATED, EXTENDED RELEASE ORAL at 21:14

## 2017-03-14 RX ADMIN — PANTOPRAZOLE SODIUM 40 MG: 40 TABLET, DELAYED RELEASE ORAL at 06:08

## 2017-03-14 RX ADMIN — BUDESONIDE AND FORMOTEROL FUMARATE DIHYDRATE 2 PUFF: 160; 4.5 AEROSOL RESPIRATORY (INHALATION) at 06:38

## 2017-03-14 RX ADMIN — GABAPENTIN 300 MG: 300 CAPSULE ORAL at 17:19

## 2017-03-14 RX ADMIN — OXYCODONE HYDROCHLORIDE AND ACETAMINOPHEN 1 TABLET: 10; 325 TABLET ORAL at 17:49

## 2017-03-14 RX ADMIN — IPRATROPIUM BROMIDE AND ALBUTEROL SULFATE 3 ML: 2.5; .5 SOLUTION RESPIRATORY (INHALATION) at 20:01

## 2017-03-14 RX ADMIN — NYSTATIN 500000 UNITS: 100000 SUSPENSION ORAL at 17:19

## 2017-03-14 RX ADMIN — NYSTATIN 500000 UNITS: 100000 SUSPENSION ORAL at 09:31

## 2017-03-14 RX ADMIN — IPRATROPIUM BROMIDE AND ALBUTEROL SULFATE 3 ML: 2.5; .5 SOLUTION RESPIRATORY (INHALATION) at 03:15

## 2017-03-14 RX ADMIN — FUROSEMIDE 20 MG: 20 TABLET ORAL at 09:31

## 2017-03-14 RX ADMIN — OXYCODONE HYDROCHLORIDE AND ACETAMINOPHEN 1 TABLET: 10; 325 TABLET ORAL at 01:02

## 2017-03-14 RX ADMIN — IPRATROPIUM BROMIDE AND ALBUTEROL SULFATE 3 ML: 2.5; .5 SOLUTION RESPIRATORY (INHALATION) at 10:45

## 2017-03-14 RX ADMIN — LEVOFLOXACIN 500 MG: 500 INJECTION, SOLUTION INTRAVENOUS at 13:26

## 2017-03-14 RX ADMIN — IPRATROPIUM BROMIDE AND ALBUTEROL SULFATE 3 ML: 2.5; .5 SOLUTION RESPIRATORY (INHALATION) at 14:39

## 2017-03-14 RX ADMIN — ACETYLCYSTEINE 300 MG: 200 SOLUTION ORAL; RESPIRATORY (INHALATION) at 20:01

## 2017-03-14 RX ADMIN — OXYCODONE HYDROCHLORIDE 30 MG: 20 TABLET, FILM COATED, EXTENDED RELEASE ORAL at 11:27

## 2017-03-14 RX ADMIN — ACETYLCYSTEINE 300 MG: 200 SOLUTION ORAL; RESPIRATORY (INHALATION) at 06:38

## 2017-03-14 RX ADMIN — DOCUSATE SODIUM 100 MG: 100 CAPSULE ORAL at 09:31

## 2017-03-14 RX ADMIN — SIMETHICONE 160 MG: 80 TABLET, CHEWABLE ORAL at 20:20

## 2017-03-14 RX ADMIN — CITALOPRAM 40 MG: 20 TABLET, FILM COATED ORAL at 09:30

## 2017-03-14 RX ADMIN — GABAPENTIN 300 MG: 300 CAPSULE ORAL at 21:14

## 2017-03-14 RX ADMIN — IPRATROPIUM BROMIDE AND ALBUTEROL SULFATE 3 ML: 2.5; .5 SOLUTION RESPIRATORY (INHALATION) at 23:42

## 2017-03-14 RX ADMIN — NYSTATIN 500000 UNITS: 100000 SUSPENSION ORAL at 21:14

## 2017-03-14 RX ADMIN — ALPRAZOLAM 1 MG: 0.5 TABLET ORAL at 20:20

## 2017-03-14 RX ADMIN — DEXTROMETHORPHAN 30 MG: 30 SUSPENSION, EXTENDED RELEASE ORAL at 21:14

## 2017-03-14 RX ADMIN — SENNA 8.6 MG: 8.6 TABLET, COATED ORAL at 21:14

## 2017-03-14 RX ADMIN — GABAPENTIN 300 MG: 300 CAPSULE ORAL at 06:08

## 2017-03-14 NOTE — PROGRESS NOTES
"    [facility] Medicine Services  INPATIENT PROGRESS NOTE    Length of Stay: 10  Date of Admission: 3/4/2017  Primary Care Physician: No Known Provider    Subjective   Chief Complaint: Cough, nasal drainage from sinuses  HPI   Significant cough with the nasal drainage.  Also did not sleep last night.  Son at bedside notes this is not unusual as she is a \"night owl\"  Tired.           Review of Systems     All pertinent negatives and positives are as above. All other systems have been reviewed and are negative unless otherwise stated.     Objective    Temp:  [97.8 °F (36.6 °C)-98.7 °F (37.1 °C)] 97.8 °F (36.6 °C)  Heart Rate:  [77-96] 77  Resp:  [16-20] 18  BP: (107-110)/(45-51) 110/45  Physical Exam   Constitutional: She is oriented to person, place, and time. She appears well-developed and well-nourished.   HENT:   Head: Normocephalic and atraumatic.   Eyes: Conjunctivae and EOM are normal. Pupils are equal, round, and reactive to light.   Neck: Neck supple. No JVD present. No thyromegaly present.   Cardiovascular: Normal rate and regular rhythm.  Exam reveals no gallop and no friction rub.    No murmur heard.  Pulmonary/Chest: Effort normal and breath sounds normal.   cough   Abdominal: Soft. Bowel sounds are normal. There is no hepatosplenomegaly. There is no tenderness.   Musculoskeletal: Normal range of motion. She exhibits no edema.   Neurological: She is alert and oriented to person, place, and time. No cranial nerve deficit.   Skin: Skin is warm and dry.   Psychiatric: She has a normal mood and affect. Her behavior is normal.   Nursing note and vitals reviewed.          Results Review:  I have reviewed the labs, radiology results, and diagnostic studies.    Laboratory Data:     Results from last 7 days  Lab Units 03/13/17  0553 03/12/17  0614 03/11/17  0452   WBC 10*3/mm3 6.42 6.20 5.81   HEMOGLOBIN g/dL 11.0* 11.4* 11.5*   HEMATOCRIT % 35.0* 35.8* 36.0*   PLATELETS 10*3/mm3 " 214 200 184          Results from last 7 days  Lab Units 03/13/17  0553 03/12/17  0614 03/11/17  0452   SODIUM mmol/L 135 135 137   POTASSIUM mmol/L 4.3 4.2 4.4   CHLORIDE mmol/L 94* 92* 93*   TOTAL CO2 mmol/L 35.0* 38.0* 37.0*   BUN mg/dL 11 12 11   CREATININE mg/dL 0.85 0.86 0.92   CALCIUM mg/dL 8.6 8.4 8.3*   BILIRUBIN mg/dL 0.3 0.4 0.2   ALK PHOS U/L 50 47 53   ALT (SGPT) U/L 15 21 17   AST (SGOT) U/L 16 19 22   GLUCOSE mg/dL 102* 106* 101*       Culture Data:        Radiology Data:   Imaging Results (last 24 hours)     ** No results found for the last 24 hours. **          I have reviewed the patient current medications.     Assessment/Plan       Assessment     Acute respiratory failure resolved.  COPD, exacerbation  Anemia, chronic disease  Right lower lobe pulmonary nodule    Plan    atrovent nasal spray  tussionex cough syrup  Tessalon pearle q8h prn cough  Wean oxygen to SaO2 89-92%  Continue PT amublation        Discharge Planning: I expect patient to be discharged to home in 1-2 days.    Mirna Galvez DO   03/14/17   10:54 AM

## 2017-03-14 NOTE — PLAN OF CARE
Problem: Patient Care Overview (Adult)  Goal: Plan of Care Review  Outcome: Ongoing (interventions implemented as appropriate)    03/14/17 1035   Coping/Psychosocial Response Interventions   Plan Of Care Reviewed With patient   Patient Care Overview   Progress progress toward functional goals as expected   Outcome Evaluation   Outcome Summary/Follow up Plan Pt is independent with bed mobility and supervision/SBA for transfers. Pt. was able to walk 200' with no LOB, but did have decreased step length/narrow VERNA. Pt's O2 sat dropped to 86% on 6 liters of O2, but increased to 90% with rest and verbal cues.

## 2017-03-14 NOTE — PROGRESS NOTES
Continued Stay Note  CODY Wagner     Patient Name: Justyna Lei  MRN: [id number]  Today's Date: 3/14/2017    Admit Date: 3/4/2017          Discharge Plan       03/14/17 1600    Case Management/Social Work Plan    Plan Home with home health    Patient/Family In Agreement With Plan yes    Additional Comments Informed Dr. Galvez that pt will need to be tested again for oxygen and asked if she can order it the day of d/c. Updated [facility] at [facility] and will send new information on day of d/c.               Discharge Codes     None            IMELDA Juarez

## 2017-03-14 NOTE — PLAN OF CARE
Problem: Patient Care Overview (Adult)  Goal: Plan of Care Review  Outcome: Ongoing (interventions implemented as appropriate)    03/14/17 0316   Coping/Psychosocial Response Interventions   Plan Of Care Reviewed With patient   Patient Care Overview   Progress improving   Outcome Evaluation   Outcome Summary/Follow up Plan pt O2 Sat has been 92% on 3L NC tonight at rest         Problem: Respiratory Insufficiency (Adult)  Goal: Acid/Base Balance  Outcome: Ongoing (interventions implemented as appropriate)    03/14/17 0316   Respiratory Insufficiency (Adult)   Acid/Base Balance making progress toward outcome       Goal: Effective Ventilation  Outcome: Ongoing (interventions implemented as appropriate)    03/14/17 0316   Respiratory Insufficiency (Adult)   Effective Ventilation making progress toward outcome

## 2017-03-14 NOTE — PAYOR COMM NOTE
"[facility]  JOS RANGEL RN [phone]  FAX [phone]    Jhon Lei (65 y.o. Female) [id number]    Date of Birth Social Security Number Address Home Phone MRN    03/25/1951  [id number] [facility] [phone] [id number]    [employer] Marital Status          None Unknown       Admission Date Admission Type Admitting Provider Attending Provider Department, Room/Bed    3/4/17 Emergency Mirna Galvez DO Horn, Frances Marie, DO [facility] 4C, 491/1    Discharge Date Discharge Disposition Discharge Destination                      Attending Provider: Mirna Galvez DO     Allergies:  Aspirin, Demerol [Meperidine], Ibuprofen, Neosporin [Neomycin-bacitracin Zn-polymyx], Penicillins, Sulfa Antibiotics, Codeine, Talwin [Pentazocine], Tetracyclines & Related, Morphine And Related    Isolation:  None   Infection:  None   Code Status:  FULL    Ht:  64.5\" (163.8 cm)   Wt:  154 lb (69.9 kg)    Admission Cmt:  None   Principal Problem:  None                Active Insurance as of 3/4/2017     Primary Coverage     Payor Plan Insurance Group Employer/Plan Group    ANTHEM MEDICAID ANTHEM MEDICAID [id number]     Payor Plan Address Payor Plan Phone Number Effective From Effective To    [address] [address] [phone] 1/1/2016     [place] [address]       Subscriber Name Subscriber Birth Date Member ID       JHON LEI 3/25/1951 [id number]                 Emergency Contacts      (Rel.) Home Phone Work Phone Mobile Phone    Ella Guerrero (Daughter) [phone] -- --            Vital Signs (last 24 hours)       03/13 0700  -  03/14 0659 03/14 0700  -  03/14 1105   Most Recent    Temp (°F) 98.4 -  98.7      97.8     97.8 (36.6)    Heart Rate 78 -  96      77     77    Resp 16 -  20      18     18    /47 -  107/51      110/45     110/45    SpO2 (%) 91 -  94    90 -  96     96          Intake & Output (last day)       03/13 0701 - 03/14 0700 03/14 " 0701 - 03/15 0700    P.O. 360 360    IV Piggyback      Total Intake(mL/kg) 360 (5.2) 360 (5.2)    Urine (mL/kg/hr) 800 (0.5)     Stool      Total Output 800      Net -440 +360              Lab Results (last 24 hours)     ** No results found for the last 24 hours. **        Imaging Results (last 24 hours)     ** No results found for the last 24 hours. **        Orders (last 24 hrs)     Start     Ordered    03/14/17 1130  ipratropium (ATROVENT) nasal spray 2 spray  Every 12 Hours      03/14/17 1052    03/14/17 1100  Nursing Communication Maintain SaO2 89-92%  Continuous     Comments:  Maintain SaO2 89-92%    03/14/17 1059    03/14/17 1052  HYDROcod Polst-CPM Polst ER (TUSSIONEX PENNKINETIC) 10-8 MG/5ML ER suspension 5 mL  Every 12 Hours PRN      03/14/17 1053    03/14/17 1052  benzonatate (TESSALON) capsule 200 mg  3 Times Daily PRN      03/14/17 1052    03/12/17 0900  furosemide (LASIX) tablet 20 mg  Daily      03/11/17 1341    03/12/17 0900  acetylcysteine (MUCOMYST) 20 % solution 300 mg  Every 12 Hours - RT      03/12/17 0849    03/11/17 1800  nystatin (MYCOSTATIN) 835746 UNIT/ML suspension 500,000 Units  4 Times Daily      03/11/17 1336    03/11/17 1215  levoFLOXacin (LEVAQUIN) 500 mg/100 mL D5W (premix) (LEVAQUIN) 500 mg  Every 24 Hours      03/10/17 1621    03/09/17 1506  simethicone (MYLICON) chewable tablet 160 mg  4 Times Daily PRN      03/09/17 1506    03/09/17 1458  ALPRAZolam (XANAX) tablet 1 mg  4 Times Daily PRN      03/09/17 1458    03/09/17 0000  Home Nebulizer      03/09/17 1455    03/09/17 0000  Oxygen Therapy      03/09/17 1505    03/08/17 2100  senna (SENOKOT) tablet 8.6 mg  Nightly      03/08/17 2010 03/08/17 2003  promethazine (PHENERGAN) 6.25 MG/5ML syrup 12.5 mg  Every 6 Hours PRN      03/08/17 2003 03/08/17 0600  pantoprazole (PROTONIX) EC tablet 40 mg  Every Early Morning      03/07/17 1631    03/07/17 1800  Dietary Nutrition Supplements Ensure Enlive  Daily With Breakfast & Dinner      Comments:  Chocolate flavor.    03/07/17 1626    03/06/17 1030  docusate sodium (COLACE) capsule 100 mg  2 Times Daily      03/06/17 0951    03/06/17 0900  dextromethorphan polistirex ER (DELSYM) 30 MG/5ML oral suspension 30 mg  Every 12 Hours Scheduled      03/06/17 0841    03/06/17 0015  oxyCODONE (oxyCONTIN) 12 hr tablet 30 mg  Every 12 Hours Scheduled      03/05/17 2343    03/06/17 0000  Discharge Follow-up with Specialty      03/06/17 1047    03/05/17 2200  gabapentin (NEURONTIN) capsule 300 mg  Every 8 Hours Scheduled      03/05/17 1710    03/05/17 1930  budesonide-formoterol (SYMBICORT) 160-4.5 MCG/ACT inhaler 2 puff  2 Times Daily - RT      03/05/17 1710    03/05/17 1745  citalopram (CeleXA) tablet 40 mg  Daily      03/05/17 1710    03/05/17 1710  oxyCODONE-acetaminophen (PERCOCET)  MG per tablet 1 tablet  Every 6 Hours PRN      03/05/17 1710    03/05/17 1530  ipratropium-albuterol (DUO-NEB) nebulizer solution 3 mL  Every 4 Hours - RT      03/05/17 1132    03/04/17 1831  hydrALAZINE (APRESOLINE) injection 10 mg  Every 6 Hours PRN      03/04/17 1831    03/04/17 1831  enalaprilat (VASOTEC) injection 0.625 mg  Every 6 Hours PRN      03/04/17 1831    03/04/17 1818  acetaminophen (TYLENOL) suppository 325 mg  Every 4 Hours PRN      03/04/17 1818    03/04/17 1815  enoxaparin (LOVENOX) syringe 40 mg  Daily      03/04/17 1737    03/04/17 1815  nicotine (NICODERM CQ) 21 MG/24HR patch 1 patch  Every 24 Hours      03/04/17 1737    03/04/17 1737  sodium chloride 0.9 % flush 1-10 mL  As Needed      03/04/17 1737    03/04/17 1737  acetaminophen (TYLENOL) tablet 650 mg  Every 4 Hours PRN      03/04/17 1737    03/04/17 1737  ondansetron (ZOFRAN) injection 4 mg  Every 6 Hours PRN      03/04/17 1737    03/04/17 1416  sodium chloride 0.9 % flush 10 mL  As Needed      03/04/17 1419    Unscheduled  Oxygen Therapy- Nasal Cannula; 2 LPM; Titrate for spO2: equal to or greater than, 92%  As Needed      03/04/17 1411     Unscheduled  Suction Deep Oral  As Needed     Comments:  Subglottic suctioning must be provided q 6 hours to meet minimum requirements.    03/04/17 1823    Unscheduled  Suction Deep Oral  As Needed     Comments:  Subglottic suctioning must be provided q 6 hours to meet minimum requirements.    03/04/17 2156    Unscheduled  Oxygen Therapy- Venturi Mask; Titrate for spO2: equal to or greater than, 92%  As Needed      03/09/17 1947    --  gabapentin (NEURONTIN) 600 MG tablet  Every 8 Hours Scheduled      03/04/17 1639    --  furosemide (LASIX) 40 MG tablet  Daily      03/04/17 1639    --  tiZANidine (ZANAFLEX) 4 MG tablet  Every 8 Hours PRN      03/04/17 1639    --  cetirizine (zyrTEC) 10 MG tablet  Daily      03/04/17 1639    --  ALPRAZolam (XANAX) 1 MG tablet  4 Times Daily PRN      03/04/17 1639    --  OxyCODONE HCl ER (oxyCONTIN) 30 MG tablet extended-release 12 hour  Every 12 Hours Scheduled      03/04/17 1639    --  oxyCODONE-acetaminophen (PERCOCET)  MG per tablet  Every 6 Hours PRN      03/04/17 1639    --  mirtazapine (REMERON) 15 MG tablet  Nightly      03/04/17 1639    --  citalopram (CeleXA) 40 MG tablet  Daily      03/04/17 1639    --  ondansetron (ZOFRAN) 4 MG tablet  Every 8 Hours PRN      03/04/17 1639    --  lisinopril (PRINIVIL,ZESTRIL) 5 MG tablet  Daily      03/04/17 1639    --  amphetamine-dextroamphetamine (ADDERALL) 20 MG tablet  2 Times Daily      03/04/17 1639    --  zolpidem (AMBIEN) 5 MG tablet  Nightly PRN      03/04/17 1639    --  ipratropium-albuterol (COMBIVENT RESPIMAT)  MCG/ACT inhaler  4 Times Daily PRN      03/04/17 1639    --  budesonide-formoterol (SYMBICORT) 160-4.5 MCG/ACT inhaler  2 Times Daily - RT      03/04/17 1639    --  albuterol (PROVENTIL HFA;VENTOLIN HFA) 108 (90 BASE) MCG/ACT inhaler  Every 4 Hours PRN      03/04/17 1639    --  promethazine (PHENERGAN) 25 MG tablet  Every 6 Hours PRN      03/04/17 1639    --  SCANNED - TELEMETRY        03/04/17 0000    --   "SCANNED EKG      03/04/17 0000    --  SCANNED - TELEMETRY        03/04/17 0000    --  SCANNED EKG      03/04/17 0000    --  SCANNED - TELEMETRY        03/04/17 0000    --  SCANNED - TELEMETRY        03/04/17 0000    --  SCANNED - TELEMETRY        03/04/17 0000    --  SCANNED - TELEMETRY        03/04/17 0000    --  SCANNED - TELEMETRY        03/04/17 0000    --  SCANNED - TELEMETRY        03/04/17 0000             Physician Progress Notes (last 24 hours) (Notes from 3/13/2017 11:05 AM through 3/14/2017 11:05 AM)      Mirna Galvez DO at 3/14/2017 10:53 AM  Version 1 of 1             [facility] Medicine Services  INPATIENT PROGRESS NOTE    Length of Stay: 10  Date of Admission: 3/4/2017  Primary Care Physician: No Known Provider    Subjective   Chief Complaint: Cough, nasal drainage from sinuses  HPI   Significant cough with the nasal drainage.  Also did not sleep last night.  Son at bedside notes this is not unusual as she is a \"night owl\"  Tired.           Review of Systems     All pertinent negatives and positives are as above. All other systems have been reviewed and are negative unless otherwise stated.     Objective    Temp:  [97.8 °F (36.6 °C)-98.7 °F (37.1 °C)] 97.8 °F (36.6 °C)  Heart Rate:  [77-96] 77  Resp:  [16-20] 18  BP: (107-110)/(45-51) 110/45  Physical Exam   Constitutional: She is oriented to person, place, and time. She appears well-developed and well-nourished.   HENT:   Head: Normocephalic and atraumatic.   Eyes: Conjunctivae and EOM are normal. Pupils are equal, round, and reactive to light.   Neck: Neck supple. No JVD present. No thyromegaly present.   Cardiovascular: Normal rate and regular rhythm.  Exam reveals no gallop and no friction rub.    No murmur heard.  Pulmonary/Chest: Effort normal and breath sounds normal.   cough   Abdominal: Soft. Bowel sounds are normal. There is no hepatosplenomegaly. There is no tenderness.   Musculoskeletal: Normal range " of motion. She exhibits no edema.   Neurological: She is alert and oriented to person, place, and time. No cranial nerve deficit.   Skin: Skin is warm and dry.   Psychiatric: She has a normal mood and affect. Her behavior is normal.   Nursing note and vitals reviewed.          Results Review:  I have reviewed the labs, radiology results, and diagnostic studies.    Laboratory Data:     Results from last 7 days  Lab Units 03/13/17  0553 03/12/17  0614 03/11/17  0452   WBC 10*3/mm3 6.42 6.20 5.81   HEMOGLOBIN g/dL 11.0* 11.4* 11.5*   HEMATOCRIT % 35.0* 35.8* 36.0*   PLATELETS 10*3/mm3 214 200 184          Results from last 7 days  Lab Units 03/13/17  0553 03/12/17  0614 03/11/17  0452   SODIUM mmol/L 135 135 137   POTASSIUM mmol/L 4.3 4.2 4.4   CHLORIDE mmol/L 94* 92* 93*   TOTAL CO2 mmol/L 35.0* 38.0* 37.0*   BUN mg/dL 11 12 11   CREATININE mg/dL 0.85 0.86 0.92   CALCIUM mg/dL 8.6 8.4 8.3*   BILIRUBIN mg/dL 0.3 0.4 0.2   ALK PHOS U/L 50 47 53   ALT (SGPT) U/L 15 21 17   AST (SGOT) U/L 16 19 22   GLUCOSE mg/dL 102* 106* 101*       Culture Data:        Radiology Data:   Imaging Results (last 24 hours)     ** No results found for the last 24 hours. **          I have reviewed the patient current medications.     Assessment/Plan       Assessment     Acute respiratory failure resolved.  COPD, exacerbation  Anemia, chronic disease  Right lower lobe pulmonary nodule    Plan    atrovent nasal spray  tussionex cough syrup  Tessalon pearle q8h prn cough  Wean oxygen to SaO2 89-92%  Continue PT amublation        Discharge Planning: I expect patient to be discharged to home in 1-2 days.    Mirna Galvez DO   03/14/17   10:54 AM       Electronically signed by Mirna Galvez DO at 3/14/2017 10:59 AM             Mirna Galvez DO Physician Signed Medicine Progress Notes Date of Service: 3/13/2017  9:18 AM         []Hide copied text  []Hover for attribution information       [facility]  Medicine Services  INPATIENT PROGRESS NOTE     Length of Stay: 9  Date of Admission: 3/4/2017  Primary Care Physician: No Known Provider     Subjective   Chief Complaint: Tired.   HPI   Feels better.   Still tired.  Still coughing  Has been up with PT in the [address].  No nausea or vomiting.  Is using PureWick currently.  Notes she has had depression at home.  Recent loss of .      Review of Systems      All pertinent negatives and positives are as above. All other systems have been reviewed and are negative unless otherwise stated.      Objective    Temp: [98 °F (36.7 °C)-98.4 °F (36.9 °C)] 98.4 °F (36.9 °C)  Heart Rate: [72-95] 84  Resp: [18] 18  BP: ()/(45-76) 106/47  Physical Exam   Constitutional: She is oriented to person, place, and time. She appears well-developed and well-nourished.   HENT:   Head: Normocephalic and atraumatic.   Eyes: Conjunctivae and EOM are normal. Pupils are equal, round, and reactive to light.   Neck: Neck supple.   Cardiovascular: Normal rate and regular rhythm. Exam reveals no gallop and no friction rub.   No murmur heard.  Pulmonary/Chest: Effort normal. She has wheezes (inspiratory).   Abdominal: Soft. Bowel sounds are normal. There is no hepatosplenomegaly. There is no tenderness.   Genitourinary:   Genitourinary Comments: Pure Wick system in place   Musculoskeletal: Normal range of motion. She exhibits no edema.   Neurological: She is alert and oriented to person, place, and time. No cranial nerve deficit.   Skin: Skin is warm and dry.   Psychiatric: She has a normal mood and affect. Her behavior is normal.   Nursing note and vitals reviewed.              Results Review:  I have reviewed the labs, radiology results, and diagnostic studies.     Laboratory Data:      Results from last 7 days  Lab Units 03/13/17  0553 03/12/17  0614 03/11/17  0452   WBC 10*3/mm3 6.42 6.20 5.81   HEMOGLOBIN g/dL 11.0* 11.4* 11.5*   HEMATOCRIT % 35.0* 35.8* 36.0*   PLATELETS 10*3/mm3 214  200 184             Results from last 7 days  Lab Units 03/13/17  0553 03/12/17  0614 03/11/17  0452   SODIUM mmol/L 135 135 137   POTASSIUM mmol/L 4.3 4.2 4.4   CHLORIDE mmol/L 94* 92* 93*   TOTAL CO2 mmol/L 35.0* 38.0* 37.0*   BUN mg/dL 11 12 11   CREATININE mg/dL 0.85 0.86 0.92   CALCIUM mg/dL 8.6 8.4 8.3*   BILIRUBIN mg/dL 0.3 0.4 0.2   ALK PHOS U/L 50 47 53   ALT (SGPT) U/L 15 21 17   AST (SGOT) U/L 16 19 22   GLUCOSE mg/dL 102* 106* 101*         Culture Data:          Radiology Data:       Imaging Results (last 24 hours)      ** No results found for the last 24 hours. **             I have reviewed the patient current medications.      Assessment/Plan          Hospital Problem List      Pneumonia of right lower lobe due to infectious organism     COPD (chronic obstructive pulmonary disease)     Hypokalemia     Anemia     Respiratory failure          Assessment     Acute respiratory failure resolved.  COPD, exacerbation  Anemia, chronic disease  Right lower lobe pulmonary nodule     Plan     IGLESIA de  [employer]  Chair  Social service consult for home health,  Anticipate home in 24-48 hours.            Mirna Galvez DO   03/13/17   9:18 AM

## 2017-03-15 PROCEDURE — 94799 UNLISTED PULMONARY SVC/PX: CPT

## 2017-03-15 PROCEDURE — 97116 GAIT TRAINING THERAPY: CPT

## 2017-03-15 PROCEDURE — 25010000002 ENOXAPARIN PER 10 MG: Performed by: INTERNAL MEDICINE

## 2017-03-15 PROCEDURE — 25010000002 LEVOFLOXACIN PER 250 MG: Performed by: FAMILY MEDICINE

## 2017-03-15 RX ORDER — OXYCODONE HYDROCHLORIDE 15 MG/1
30 TABLET, FILM COATED, EXTENDED RELEASE ORAL EVERY 12 HOURS SCHEDULED
Status: DISCONTINUED | OUTPATIENT
Start: 2017-03-15 | End: 2017-03-16 | Stop reason: HOSPADM

## 2017-03-15 RX ADMIN — OXYCODONE HYDROCHLORIDE 30 MG: 20 TABLET, FILM COATED, EXTENDED RELEASE ORAL at 09:27

## 2017-03-15 RX ADMIN — DOCUSATE SODIUM 100 MG: 100 CAPSULE ORAL at 09:27

## 2017-03-15 RX ADMIN — IPRATROPIUM BROMIDE AND ALBUTEROL SULFATE 3 ML: 2.5; .5 SOLUTION RESPIRATORY (INHALATION) at 15:20

## 2017-03-15 RX ADMIN — DEXTROMETHORPHAN 30 MG: 30 SUSPENSION, EXTENDED RELEASE ORAL at 20:17

## 2017-03-15 RX ADMIN — IPRATROPIUM BROMIDE AND ALBUTEROL SULFATE 3 ML: 2.5; .5 SOLUTION RESPIRATORY (INHALATION) at 07:42

## 2017-03-15 RX ADMIN — ACETYLCYSTEINE 300 MG: 200 SOLUTION ORAL; RESPIRATORY (INHALATION) at 19:38

## 2017-03-15 RX ADMIN — IPRATROPIUM BROMIDE 2 SPRAY: 21 SPRAY, METERED NASAL at 00:26

## 2017-03-15 RX ADMIN — IPRATROPIUM BROMIDE AND ALBUTEROL SULFATE 3 ML: 2.5; .5 SOLUTION RESPIRATORY (INHALATION) at 11:21

## 2017-03-15 RX ADMIN — NICOTINE 1 PATCH: 21 PATCH, EXTENDED RELEASE TRANSDERMAL at 18:49

## 2017-03-15 RX ADMIN — LEVOFLOXACIN 500 MG: 500 INJECTION, SOLUTION INTRAVENOUS at 12:09

## 2017-03-15 RX ADMIN — BUDESONIDE AND FORMOTEROL FUMARATE DIHYDRATE 2 PUFF: 160; 4.5 AEROSOL RESPIRATORY (INHALATION) at 19:37

## 2017-03-15 RX ADMIN — GABAPENTIN 300 MG: 300 CAPSULE ORAL at 16:18

## 2017-03-15 RX ADMIN — ALPRAZOLAM 1 MG: 0.5 TABLET ORAL at 21:35

## 2017-03-15 RX ADMIN — CITALOPRAM 40 MG: 20 TABLET, FILM COATED ORAL at 09:27

## 2017-03-15 RX ADMIN — OXYCODONE HYDROCHLORIDE 30 MG: 20 TABLET, FILM COATED, EXTENDED RELEASE ORAL at 22:31

## 2017-03-15 RX ADMIN — NYSTATIN 500000 UNITS: 100000 SUSPENSION ORAL at 20:17

## 2017-03-15 RX ADMIN — ACETYLCYSTEINE 300 MG: 200 SOLUTION ORAL; RESPIRATORY (INHALATION) at 07:42

## 2017-03-15 RX ADMIN — FUROSEMIDE 20 MG: 20 TABLET ORAL at 09:27

## 2017-03-15 RX ADMIN — BUDESONIDE AND FORMOTEROL FUMARATE DIHYDRATE 2 PUFF: 160; 4.5 AEROSOL RESPIRATORY (INHALATION) at 07:42

## 2017-03-15 RX ADMIN — OXYCODONE HYDROCHLORIDE AND ACETAMINOPHEN 1 TABLET: 10; 325 TABLET ORAL at 16:51

## 2017-03-15 RX ADMIN — NYSTATIN 500000 UNITS: 100000 SUSPENSION ORAL at 09:28

## 2017-03-15 RX ADMIN — OXYCODONE HYDROCHLORIDE AND ACETAMINOPHEN 1 TABLET: 10; 325 TABLET ORAL at 02:06

## 2017-03-15 RX ADMIN — SIMETHICONE 160 MG: 80 TABLET, CHEWABLE ORAL at 01:00

## 2017-03-15 RX ADMIN — ALPRAZOLAM 1 MG: 0.5 TABLET ORAL at 11:29

## 2017-03-15 RX ADMIN — NYSTATIN 500000 UNITS: 100000 SUSPENSION ORAL at 12:10

## 2017-03-15 RX ADMIN — NYSTATIN 500000 UNITS: 100000 SUSPENSION ORAL at 18:50

## 2017-03-15 RX ADMIN — HYDROCODONE POLISTIREX AND CHLORPHENIRAMINE POLISTIREX 5 ML: 10; 8 SUSPENSION, EXTENDED RELEASE ORAL at 19:40

## 2017-03-15 RX ADMIN — DOCUSATE SODIUM 100 MG: 100 CAPSULE ORAL at 18:50

## 2017-03-15 RX ADMIN — ENOXAPARIN SODIUM 40 MG: 40 INJECTION SUBCUTANEOUS at 09:28

## 2017-03-15 RX ADMIN — IPRATROPIUM BROMIDE AND ALBUTEROL SULFATE 3 ML: 2.5; .5 SOLUTION RESPIRATORY (INHALATION) at 19:37

## 2017-03-15 RX ADMIN — IPRATROPIUM BROMIDE AND ALBUTEROL SULFATE 3 ML: 2.5; .5 SOLUTION RESPIRATORY (INHALATION) at 03:10

## 2017-03-15 RX ADMIN — GABAPENTIN 300 MG: 300 CAPSULE ORAL at 06:16

## 2017-03-15 RX ADMIN — PANTOPRAZOLE SODIUM 40 MG: 40 TABLET, DELAYED RELEASE ORAL at 06:16

## 2017-03-15 RX ADMIN — GABAPENTIN 300 MG: 300 CAPSULE ORAL at 22:31

## 2017-03-15 RX ADMIN — IPRATROPIUM BROMIDE AND ALBUTEROL SULFATE 3 ML: 2.5; .5 SOLUTION RESPIRATORY (INHALATION) at 23:53

## 2017-03-15 RX ADMIN — DEXTROMETHORPHAN 30 MG: 30 SUSPENSION, EXTENDED RELEASE ORAL at 09:28

## 2017-03-15 NOTE — PROGRESS NOTES
Continued Stay Note  CODY Wagner     Patient Name: Justyna Lei  MRN: [id number]  Today's Date: 3/15/2017    Admit Date: 3/4/2017          Discharge Plan       03/15/17 1317    Case Management/Social Work Plan    Plan Home with home health    Patient/Family In Agreement With Plan yes    Additional Comments Pt is not going to d/c today but hopefully tomorrow. Will arrange home health and home O2 at that time when orders rec'd.               Discharge Codes     None            IMELDA Juarez

## 2017-03-15 NOTE — PLAN OF CARE
Problem: Patient Care Overview (Adult)  Goal: Plan of Care Review  Outcome: Ongoing (interventions implemented as appropriate)    03/15/17 0959   Coping/Psychosocial Response Interventions   Plan Of Care Reviewed With patient   Patient Care Overview   Progress progress toward functional goals as expected   Outcome Evaluation   Outcome Summary/Follow up Plan Pt. is independent with bed mobility and transfers. Pt. walked 250' with one standing rest to monitor O2 sats. Pt. started out on 1 L at 89% at rest, while walking dropped to 83%. Increased O2 to 2 liters and sat increased to 88% within a few minutes. Again at rest pt was 89% on 1 L. Pt. hopes to be discharged home tomorrow.

## 2017-03-15 NOTE — PAYOR COMM NOTE
"3/15 CLINICAL UPDATE  [id number]    Jhon Lei (65 y.o. Female)     Date of Birth Social Security Number Address Home Phone MRN    03/25/1951  [address] [facility] [phone] [id number]    Buddhism Marital Status          None Unknown       Admission Date Admission Type Admitting Provider Attending Provider Department, Room/Bed    3/4/17 Emergency Mirna Galvez DO Horn, Frances Marie, DO [facility] 4C, 491/1    Discharge Date Discharge Disposition Discharge Destination                      Attending Provider: Mirna Galvez DO     Allergies:  Aspirin, Demerol [Meperidine], Ibuprofen, Neosporin [Neomycin-bacitracin Zn-polymyx], Penicillins, Sulfa Antibiotics, Codeine, Talwin [Pentazocine], Tetracyclines & Related, Morphine And Related    Isolation:  None   Infection:  None   Code Status:  FULL    Ht:  64.5\" (163.8 cm)   Wt:  155 lb (70.3 kg)    Admission Cmt:  None   Principal Problem:  None                Active Insurance as of 3/4/2017     Primary Coverage     Payor Plan Insurance Group Employer/Plan Group    [employer]HE[employer] MEDICAID AN[place] MEDICAID [id number]     Payor Plan Address Payor Plan Phone Number Effective From Effective To    [address] [address] [phone] 1/1/2016     [place] [address]       Subscriber Name Subscriber Birth Date Member ID       JHON LEI 3/25/1951 [id number]                 Emergency Contacts      (Rel.) Home Phone Work Phone Mobile Phone    Ella Guerrero (Daughter) [phone] -- --            Vital Signs (last 72 hrs)       03/12 0700  -  03/13 0659 03/13 0700  -  03/14 0659 03/14 0700  -  03/15 0659 03/15 0700  -  03/15 0805   Most Recent    Temp (°F) 98 -  98.7    98.4 -  98.7      97.8      97.7     97.7 (36.5)    Heart Rate 72 -  95    78 -  96    68 -  78      85     85    Resp 18 -  20    16 -  20      18      18     18    BP 91/52 -  110/45    106/47 -  107/51    102/73 -  110/45      100/49     100/49    " SpO2 (%) (!)87 -  97    91 -  94    90 -  96      97     97          Intake & Output (last 3 days)       03/12 0701 - 03/13 0700 03/13 0701 - 03/14 0700 03/14 0701 - 03/15 0700 03/15 0701 - 03/16 0700    P.O. 360 360 360     IV Piggyback 100  150     Total Intake(mL/kg) 460 (6.6) 360 (5.2) 510 (7.3)     Urine (mL/kg/hr) 1750 (1) 800 (0.5)      Stool 0 (0)       Total Output 1750 800        Net -1290 -440 +510              Unmeasured Urine Occurrence   1 x     Unmeasured Stool Occurrence 1 x           Lines, Drains & Airways    Active LDAs     Name:   Placement date:   Placement time:   Site:   Days:    Peripheral IV Line - Single Lumen 03/04/17 1405 18 gauge  03/04/17    1405      10                Hospital Medications (active)       Dose Frequency Start End    acetaminophen (TYLENOL) suppository 325 mg 325 mg Every 4 Hours PRN 3/4/2017     Sig - Route: Insert 0.5 suppositories into the rectum Every 4 (Four) Hours As Needed for Fever. - Rectal    acetaminophen (TYLENOL) tablet 650 mg 650 mg Every 4 Hours PRN 3/4/2017     Sig - Route: Take 2 tablets by mouth Every 4 (Four) Hours As Needed for Mild Pain (1-3). - Oral    acetylcysteine (MUCOMYST) 20 % solution 300 mg 300 mg Every 12 Hours - RT 3/12/2017     Sig - Route: Inhale 1.5 mL Every 12 (Twelve) Hours. - Inhalation    ALPRAZolam (XANAX) tablet 1 mg 1 mg 4 Times Daily PRN 3/9/2017     Sig - Route: Take 2 tablets by mouth 4 (Four) Times a Day As Needed for Anxiety. - Oral    benzonatate (TESSALON) capsule 200 mg 200 mg 3 Times Daily PRN 3/14/2017     Sig - Route: Take 2 capsules by mouth 3 (Three) Times a Day As Needed for Cough. - Oral    budesonide-formoterol (SYMBICORT) 160-4.5 MCG/ACT inhaler 2 puff 2 puff 2 Times Daily - RT 3/5/2017     Sig - Route: Inhale 2 puffs 2 (Two) Times a Day. - Inhalation    citalopram (CeleXA) tablet 40 mg 40 mg Daily 3/5/2017     Sig - Route: Take 2 tablets by mouth Daily. - Oral    dextromethorphan polistirex ER (DELSYM) 30 MG/5ML  oral suspension 30 mg 30 mg Every 12 Hours Scheduled 3/6/2017     Sig - Route: Take 30 mg by mouth Every 12 (Twelve) Hours. - Oral    docusate sodium (COLACE) capsule 100 mg 100 mg 2 Times Daily 3/6/2017     Sig - Route: Take 1 capsule by mouth 2 (Two) Times a Day. - Oral    enalaprilat (VASOTEC) injection 0.625 mg 0.625 mg Every 6 Hours PRN 3/4/2017     Sig - Route: Infuse 0.5 mL into a venous catheter Every 6 (Six) Hours As Needed for High Blood Pressure. - Intravenous    enoxaparin (LOVENOX) syringe 40 mg 40 mg Daily 3/4/2017     Sig - Route: Inject 0.4 mL under the skin Daily. - Subcutaneous    furosemide (LASIX) tablet 20 mg 20 mg Daily 3/12/2017     Sig - Route: Take 1 tablet by mouth Daily. - Oral    gabapentin (NEURONTIN) capsule 300 mg 300 mg Every 8 Hours Scheduled 3/5/2017     Sig - Route: Take 1 capsule by mouth Every 8 (Eight) Hours. - Oral    hydrALAZINE (APRESOLINE) injection 10 mg 10 mg Every 6 Hours PRN 3/4/2017     Sig - Route: Infuse 0.5 mL into a venous catheter Every 6 (Six) Hours As Needed for High Blood Pressure. - Intravenous    HYDROcod Polst-CPM Polst ER (TUSSIONEX PENNKINETIC) 10-8 MG/5ML ER suspension 5 mL 5 mL Every 12 Hours PRN 3/14/2017 3/24/2017    Sig - Route: Take 5 mL by mouth Every 12 (Twelve) Hours As Needed for Cough. - Oral    ipratropium (ATROVENT) nasal spray 2 spray 2 spray Every 12 Hours 3/14/2017     Sig - Route: 2 sprays by Each Nare route Every 12 (Twelve) Hours. - Each Nare    ipratropium-albuterol (DUO-NEB) nebulizer solution 3 mL 3 mL Every 4 Hours - RT 3/5/2017     Sig - Route: Take 3 mL by nebulization Every 4 (Four) Hours. - Nebulization    levoFLOXacin (LEVAQUIN) 500 mg/100 mL D5W (premix) (LEVAQUIN) 500 mg 500 mg Every 24 Hours 3/11/2017     Sig - Route: Infuse 100 mL into a venous catheter Daily. - Intravenous    nicotine (NICODERM CQ) 21 MG/24HR patch 1 patch 1 patch Every 24 Hours 3/4/2017     Sig - Route: Place 1 patch on the skin Daily. - Transdermal     nystatin (MYCOSTATIN) 576670 UNIT/ML suspension 500,000 Units 5 mL 4 Times Daily 3/11/2017     Sig - Route: Take 5 mL by mouth 4 (Four) Times a Day. - Oral    ondansetron (ZOFRAN) injection 4 mg 4 mg Every 6 Hours PRN 3/4/2017     Sig - Route: Infuse 2 mL into a venous catheter Every 6 (Six) Hours As Needed for Nausea or Vomiting. - Intravenous    oxyCODONE (oxyCONTIN) 12 hr tablet 30 mg 30 mg Every 12 Hours Scheduled 3/6/2017 3/15/2017    Sig - Route: Take 2 tablets by mouth Every 12 (Twelve) Hours. - Oral    oxyCODONE-acetaminophen (PERCOCET)  MG per tablet 1 tablet 1 tablet Every 6 Hours PRN 3/5/2017     Sig - Route: Take 1 tablet by mouth Every 6 (Six) Hours As Needed for Moderate Pain (4-6) (every 4-6 hours). - Oral    pantoprazole (PROTONIX) EC tablet 40 mg 40 mg Every Early Morning 3/8/2017     Sig - Route: Take 1 tablet by mouth Every Morning. - Oral    promethazine (PHENERGAN) 6.25 MG/5ML syrup 12.5 mg 12.5 mg Every 6 Hours PRN 3/8/2017     Sig - Route: Take 10 mL by mouth Every 6 (Six) Hours As Needed for Nausea or Vomiting. - Oral    senna (SENOKOT) tablet 8.6 mg 1 tablet Nightly 3/8/2017     Sig - Route: Take 1 tablet by mouth Every Night. - Oral    simethicone (MYLICON) chewable tablet 160 mg 160 mg 4 Times Daily PRN 3/9/2017     Sig - Route: Chew 2 tablets 4 (Four) Times a Day As Needed for flatulence. - Oral    sodium chloride 0.9 % flush 1-10 mL 1-10 mL As Needed 3/4/2017     Sig - Route: Infuse 1-10 mL into a venous catheter As Needed for Line Care. - Intravenous    sodium chloride 0.9 % flush 10 mL 10 mL As Needed 3/4/2017     Sig - Route: Infuse 10 mL into a venous catheter As Needed for Line Care. - Intravenous          Lab Results (last 24 hours)     ** No results found for the last 24 hours. **        Imaging Results (last 24 hours)     ** No results found for the last 24 hours. **        Orders (last 24 hrs)     Start     Ordered    03/14/17 1130  ipratropium (ATROVENT) nasal spray 2  spray  Every 12 Hours      03/14/17 1052    03/14/17 1100  Nursing Communication Maintain SaO2 89-92%  Continuous     Comments:  Maintain SaO2 89-92%    03/14/17 1059    03/14/17 1052  HYDROcod Polst-CPM Polst ER (TUSSIONEX PENNKINETIC) 10-8 MG/5ML ER suspension 5 mL  Every 12 Hours PRN      03/14/17 1053    03/14/17 1052  benzonatate (TESSALON) capsule 200 mg  3 Times Daily PRN      03/14/17 1052    03/12/17 0900  furosemide (LASIX) tablet 20 mg  Daily      03/11/17 1341    03/12/17 0900  acetylcysteine (MUCOMYST) 20 % solution 300 mg  Every 12 Hours - RT      03/12/17 0849    03/11/17 1800  nystatin (MYCOSTATIN) 160852 UNIT/ML suspension 500,000 Units  4 Times Daily      03/11/17 1336    03/11/17 1215  levoFLOXacin (LEVAQUIN) 500 mg/100 mL D5W (premix) (LEVAQUIN) 500 mg  Every 24 Hours      03/10/17 1621    03/09/17 1506  simethicone (MYLICON) chewable tablet 160 mg  4 Times Daily PRN      03/09/17 1506    03/09/17 1458  ALPRAZolam (XANAX) tablet 1 mg  4 Times Daily PRN      03/09/17 1458    03/09/17 0000  Home Nebulizer      03/09/17 1455    03/09/17 0000  Oxygen Therapy      03/09/17 1505    03/08/17 2100  senna (SENOKOT) tablet 8.6 mg  Nightly      03/08/17 2010 03/08/17 2003  promethazine (PHENERGAN) 6.25 MG/5ML syrup 12.5 mg  Every 6 Hours PRN      03/08/17 2003 03/08/17 0600  pantoprazole (PROTONIX) EC tablet 40 mg  Every Early Morning      03/07/17 1631    03/07/17 1800  Dietary Nutrition Supplements Ensure Enlive  Daily With Breakfast & Dinner     Comments:  Chocolate flavor.    03/07/17 1626    03/06/17 1030  docusate sodium (COLACE) capsule 100 mg  2 Times Daily      03/06/17 0951    03/06/17 0900  dextromethorphan polistirex ER (DELSYM) 30 MG/5ML oral suspension 30 mg  Every 12 Hours Scheduled      03/06/17 0841    03/06/17 0015  oxyCODONE (oxyCONTIN) 12 hr tablet 30 mg  Every 12 Hours Scheduled      03/05/17 2343    03/06/17 0000  Discharge Follow-up with Specialty      03/06/17 1047    03/05/17  2200  gabapentin (NEURONTIN) capsule 300 mg  Every 8 Hours Scheduled      03/05/17 1710    03/05/17 1930  budesonide-formoterol (SYMBICORT) 160-4.5 MCG/ACT inhaler 2 puff  2 Times Daily - RT      03/05/17 1710    03/05/17 1745  citalopram (CeleXA) tablet 40 mg  Daily      03/05/17 1710    03/05/17 1710  oxyCODONE-acetaminophen (PERCOCET)  MG per tablet 1 tablet  Every 6 Hours PRN      03/05/17 1710    03/05/17 1530  ipratropium-albuterol (DUO-NEB) nebulizer solution 3 mL  Every 4 Hours - RT      03/05/17 1132    03/04/17 1831  hydrALAZINE (APRESOLINE) injection 10 mg  Every 6 Hours PRN      03/04/17 1831    03/04/17 1831  enalaprilat (VASOTEC) injection 0.625 mg  Every 6 Hours PRN      03/04/17 1831    03/04/17 1818  acetaminophen (TYLENOL) suppository 325 mg  Every 4 Hours PRN      03/04/17 1818    03/04/17 1815  enoxaparin (LOVENOX) syringe 40 mg  Daily      03/04/17 1737    03/04/17 1815  nicotine (NICODERM CQ) 21 MG/24HR patch 1 patch  Every 24 Hours      03/04/17 1737    03/04/17 1737  sodium chloride 0.9 % flush 1-10 mL  As Needed      03/04/17 1737    03/04/17 1737  acetaminophen (TYLENOL) tablet 650 mg  Every 4 Hours PRN      03/04/17 1737    03/04/17 1737  ondansetron (ZOFRAN) injection 4 mg  Every 6 Hours PRN      03/04/17 1737    03/04/17 1416  sodium chloride 0.9 % flush 10 mL  As Needed      03/04/17 1419    Unscheduled  Oxygen Therapy- Nasal Cannula; 2 LPM; Titrate for spO2: equal to or greater than, 92%  As Needed      03/04/17 1419    Unscheduled  Suction Deep Oral  As Needed     Comments:  Subglottic suctioning must be provided q 6 hours to meet minimum requirements.    03/04/17 1823    Unscheduled  Suction Deep Oral  As Needed     Comments:  Subglottic suctioning must be provided q 6 hours to meet minimum requirements.    03/04/17 2156    Unscheduled  Oxygen Therapy- Venturi Mask; Titrate for spO2: equal to or greater than, 92%  As Needed      03/09/17 1947    --  gabapentin (NEURONTIN) 600  MG tablet  Every 8 Hours Scheduled      03/04/17 1639    --  furosemide (LASIX) 40 MG tablet  Daily      03/04/17 1639    --  tiZANidine (ZANAFLEX) 4 MG tablet  Every 8 Hours PRN      03/04/17 1639    --  cetirizine (zyrTEC) 10 MG tablet  Daily      03/04/17 1639    --  ALPRAZolam (XANAX) 1 MG tablet  4 Times Daily PRN      03/04/17 1639    --  OxyCODONE HCl ER (oxyCONTIN) 30 MG tablet extended-release 12 hour  Every 12 Hours Scheduled      03/04/17 1639    --  oxyCODONE-acetaminophen (PERCOCET)  MG per tablet  Every 6 Hours PRN      03/04/17 1639    --  mirtazapine (REMERON) 15 MG tablet  Nightly      03/04/17 1639    --  citalopram (CeleXA) 40 MG tablet  Daily      03/04/17 1639    --  ondansetron (ZOFRAN) 4 MG tablet  Every 8 Hours PRN      03/04/17 1639    --  lisinopril (PRINIVIL,ZESTRIL) 5 MG tablet  Daily      03/04/17 1639    --  amphetamine-dextroamphetamine (ADDERALL) 20 MG tablet  2 Times Daily      03/04/17 1639    --  zolpidem (AMBIEN) 5 MG tablet  Nightly PRN      03/04/17 1639    --  ipratropium-albuterol (COMBIVENT RESPIMAT)  MCG/ACT inhaler  4 Times Daily PRN      03/04/17 1639    --  budesonide-formoterol (SYMBICORT) 160-4.5 MCG/ACT inhaler  2 Times Daily - RT      03/04/17 1639    --  albuterol (PROVENTIL HFA;VENTOLIN HFA) 108 (90 BASE) MCG/ACT inhaler  Every 4 Hours PRN      03/04/17 1639    --  promethazine (PHENERGAN) 25 MG tablet  Every 6 Hours PRN      03/04/17 1639    --  SCANNED - TELEMETRY        03/04/17 0000    --  SCANNED EKG      03/04/17 0000    --  SCANNED - TELEMETRY        03/04/17 0000    --  SCANNED EKG      03/04/17 0000    --  SCANNED - TELEMETRY        03/04/17 0000    --  SCANNED - TELEMETRY        03/04/17 0000    --  SCANNED - TELEMETRY        03/04/17 0000    --  SCANNED - TELEMETRY        03/04/17 0000    --  SCANNED - TELEMETRY        03/04/17 0000    --  SCANNED - TELEMETRY        03/04/17 0000             Physician Progress Notes (last 72 hours) (Notes from  3/12/2017  8:05 AM through 3/15/2017  8:05 AM)      Siddharth Leavitt MD at 3/12/2017  8:34 AM  Version 1 of 1             [facility] Medicine Services  INPATIENT PROGRESS NOTE    Length of Stay: 8  Date of Admission: 3/4/2017  Primary Care Physician: No Known Provider    Subjective   Chief Complaint: Dysnea    HPI   Patient underwent an episode respiratory distress last night.  Breathing much better this morning.  Chronic low blood pressure.  On home medication of pain and Xanax? Causing hypotension? Pt refused to cut back those medication.     Review of Systems   Constitutional: Positive for activity change, appetite change and fatigue. Negative for chills and fever.   HENT: Negative for hearing loss, nosebleeds, tinnitus and trouble swallowing.   Eyes: Negative for visual disturbance.   Respiratory: Positive for cough, shortness of breath and wheezing. Negative for chest tightness.   Cardiovascular: Negative for chest pain, palpitations and leg swelling.   Gastrointestinal: Negative for abdominal distention, abdominal pain, blood in stool, constipation, diarrhea, nausea and vomiting.   Endocrine: Negative for cold intolerance, heat intolerance, polydipsia, polyphagia and polyuria.   Genitourinary: Negative for decreased urine volume, difficulty urinating, dysuria, flank pain, frequency and hematuria.   Musculoskeletal: Positive for arthralgias, back pain and myalgias. Negative for joint swelling.   Skin: Negative for rash.   Allergic/Immunologic: Negative for immunocompromised state.   Neurological: Positive for tremors. Negative for dizziness, syncope, weakness, light-headedness and headaches.   Hematological: Negative for adenopathy. Does not bruise/bleed easily.   Psychiatric/Behavioral: Negative for confusion, sleep disturbance and suicidal ideas. The patient is nervous/anxio        All pertinent negatives and positives are as above. All other systems have been reviewed and are  negative unless otherwise stated.     Objective    Temp:  [97.9 °F (36.6 °C)-98.7 °F (37.1 °C)] 98.7 °F (37.1 °C)  Heart Rate:  [76-90] 90  Resp:  [18-20] 20  BP: ()/(41-52) 91/52    Intake/Output Summary (Last 24 hours) at 03/12/17 0834  Last data filed at 03/11/17 1950   Gross per 24 hour   Intake    940 ml   Output   1700 ml   Net   -760 ml     Physical Exam  Constitutional: Positive for activity change, appetite change and fatigue. Negative for chills and fever.   HENT: Negative for hearing loss, nosebleeds, tinnitus and trouble swallowing.   Eyes: Negative for visual disturbance.   Respiratory: Positive for cough, shortness of breath and wheezing. Negative for chest tightness.   Cardiovascular: Negative for chest pain, palpitations and leg swelling.   Gastrointestinal: Negative for abdominal distention, abdominal pain, blood in stool, constipation, diarrhea, nausea and vomiting.   Endocrine: Negative for cold intolerance, heat intolerance, polydipsia, polyphagia and polyuria.   Genitourinary: Negative for decreased urine volume, difficulty urinating, dysuria, flank pain, frequency and hematuria.   Musculoskeletal: Positive for arthralgias, back pain and myalgias. Negative for joint swelling.   Skin: Negative for rash.   Allergic/Immunologic: Negative for immunocompromised state.   Neurological: Positive for tremors. Negative for dizziness, syncope, weakness, light-headedness and headaches.   Hematological: Negative for adenopathy. Does not bruise/bleed easily.   Psychiatric/Behavioral: Negative for confusion, sleep disturbance and suicidal ideas. The patient is nervous/anxio  Results Review:  Lab Results (last 24 hours)     Procedure Component Value Units Date/Time    CBC & Differential [[id number]] Collected:  03/12/17 0614    Specimen:  Blood Updated:  03/12/17 0644    Narrative:       The following orders were created for panel order CBC & Differential.  Procedure                                Abnormality         Status                     ---------                               -----------         ------                     CBC Auto Differential[[id number]]         Abnormal            Final result                 Please view results for these tests on the individual orders.    CBC Auto Differential [[id number]]  (Abnormal) Collected:  03/12/17 0614    Specimen:  Blood Updated:  03/12/17 0644     WBC 6.20 10*3/mm3      RBC 3.72 (L) 10*6/mm3      Hemoglobin 11.4 (L) g/dL      Hematocrit 35.8 (L) %      MCV 96.2 fL      MCH 30.6 pg      MCHC 31.8 (L) g/dL      RDW 15.7 (H) %      RDW-SD 55.3 (H) fl      MPV 11.0 fL      Platelets 200 10*3/mm3      Neutrophil % 62.3 %      Lymphocyte % 22.3 %      Monocyte % 12.4 (H) %      Eosinophil % 1.8 %      Basophil % 0.2 %      Immature Grans % 1.0 %      Neutrophils, Absolute 3.87 10*3/mm3      Lymphocytes, Absolute 1.38 10*3/mm3      Monocytes, Absolute 0.77 10*3/mm3      Eosinophils, Absolute 0.11 10*3/mm3      Basophils, Absolute 0.01 10*3/mm3      Immature Grans, Absolute 0.06 (H) 10*3/mm3     Comprehensive Metabolic Panel [[id number]]  (Abnormal) Collected:  03/12/17 0614    Specimen:  Blood Updated:  03/12/17 0701     Glucose 106 (H) mg/dL      BUN 12 mg/dL      Creatinine 0.86 mg/dL      Sodium 135 mmol/L      Potassium 4.2 mmol/L      Chloride 92 (L) mmol/L      CO2 38.0 (H) mmol/L      Calcium 8.4 mg/dL      Total Protein 6.8 g/dL      Albumin 3.60 g/dL      ALT (SGPT) 21 U/L      AST (SGOT) 19 U/L      Alkaline Phosphatase 47 U/L      Total Bilirubin 0.4 mg/dL      eGFR Non African Amer 66 mL/min/1.73      Globulin 3.2 gm/dL      A/G Ratio 1.1 g/dL      BUN/Creatinine Ratio 14.0      Anion Gap 5.0 mmol/L     BNP [[id number]]  (Normal) Collected:  03/12/17 0614    Specimen:  Blood Updated:  03/12/17 0706     proBNP 70.5 pg/mL            Cultures:       Radiology Data:    Imaging Results (last 24 hours)     ** No results found for the last 24 hours. **           Allergies   Allergen Reactions   • Aspirin Anaphylaxis   • Demerol [Meperidine] Anaphylaxis   • Ibuprofen Anaphylaxis   • Neosporin [Neomycin-Bacitracin Zn-Polymyx] Anaphylaxis   • Penicillins Anaphylaxis   • Sulfa Antibiotics Anaphylaxis   • Codeine Hives     Pt can take codeine as long is it is not alot   • Talwin [Pentazocine] Hives   • Tetracyclines & Related    • Morphine And Related Hives       Scheduled meds:     budesonide-formoterol 2 puff Inhalation BID - RT   citalopram 40 mg Oral Daily   dextromethorphan polistirex ER 30 mg Oral Q12H   docusate sodium 100 mg Oral BID   enoxaparin 40 mg Subcutaneous Daily   furosemide 20 mg Oral Daily   gabapentin 300 mg Oral Q8H   ipratropium-albuterol 3 mL Nebulization Q4H - RT   levoFLOXacin 500 mg Intravenous Q24H   nicotine 1 patch Transdermal Q24H   nystatin 5 mL Oral 4x Daily   oxyCODONE ER 30 mg Oral Q12H   pantoprazole 40 mg Oral Q AM   senna 1 tablet Oral Nightly       PRN meds:  •  acetaminophen  •  acetaminophen  •  ALPRAZolam  •  enalaprilat  •  hydrALAZINE  •  ondansetron  •  oxyCODONE-acetaminophen  •  promethazine  •  simethicone  •  sodium chloride  •  sodium chloride    Assessment/Plan     Active Problems:    Pneumonia of right lower lobe due to infectious organism    COPD (chronic obstructive pulmonary disease)    Hypokalemia    Anemia    Respiratory failure      Plan:  Pneumonia/COPD/respiratory failureHypoxia with respiratory distress-Extubated 3/6. Duonebs. Currently Levaquin. By mouth steroids. Ambulatory o2 sat. Consult placement for home oxygen and neb at home.  Coughing- cont delsym and add mucomyst.      Dyspnea/echocardiogram ejection fraction 60%, diastolic dysfunction. 40 lasix now due to ncreasing shortness of breath today, increase lasix bid.       Right lower lobe lung nodule-per ct scan. Repeat CT scan in 3 months.      AMS- question hypoxia/fever vs possible OD. Apparently there has been a HX of depression since the passing of her   in July. Neurology consulted.       Hypokalemia. kdur x 2 days. Improving.      Reflux and gas- on protonix, start on mylicon prn       Abdominal pain- constipation on CT . Colace and senna      Hypotension with HX of hypertension- off levaphed      Subacute Thyroid-Low TSH- Free t4 and free t3, normal.       Chronic pain- on OxyContin, and Percocet when necessary, home med.     Depression/Anxiety- Celexa/xanax, home medications.       Anemia- iron deficiency. S/p venofer 3/8.    Smoking- pt promise to stop smoking. Before admission, pat smoke 1-2 ppd. On nicotine patch.      Thrush - nystatin qid.      Discharge Plannin-2 days. Plan go home with home O2 and neb.     Siddharth Leavitt MD   17   8:34 AM                     Electronically signed by Siddharth Leavitt MD at 3/12/2017  8:50 AM      Mirna Galvez DO at 3/13/2017  9:18 AM  Version 1 of 1             [facility] Medicine Services  INPATIENT PROGRESS NOTE    Length of Stay: 9  Date of Admission: 3/4/2017  Primary Care Physician: No Known Provider    Subjective   Chief Complaint: Tired.   HPI   Feels better.   Still tired.  Still coughing  Has been up with PT in the [address].  No nausea or vomiting.  Is using PureWick currently.  Notes she has had depression at home.  Recent loss of .     Review of Systems     All pertinent negatives and positives are as above. All other systems have been reviewed and are negative unless otherwise stated.     Objective    Temp:  [98 °F (36.7 °C)-98.4 °F (36.9 °C)] 98.4 °F (36.9 °C)  Heart Rate:  [72-95] 84  Resp:  [18] 18  BP: ()/(45-76) 106/47  Physical Exam   Constitutional: She is oriented to person, place, and time. She appears well-developed and well-nourished.   HENT:   Head: Normocephalic and atraumatic.   Eyes: Conjunctivae and EOM are normal. Pupils are equal, round, and reactive to light.   Neck: Neck supple.   Cardiovascular: Normal rate and regular  rhythm.  Exam reveals no gallop and no friction rub.    No murmur heard.  Pulmonary/Chest: Effort normal. She has wheezes (inspiratory).   Abdominal: Soft. Bowel sounds are normal. There is no hepatosplenomegaly. There is no tenderness.   Genitourinary:   Genitourinary Comments: Pure Wick system in place   Musculoskeletal: Normal range of motion. She exhibits no edema.   Neurological: She is alert and oriented to person, place, and time. No cranial nerve deficit.   Skin: Skin is warm and dry.   Psychiatric: She has a normal mood and affect. Her behavior is normal.   Nursing note and vitals reviewed.          Results Review:  I have reviewed the labs, radiology results, and diagnostic studies.    Laboratory Data:     Results from last 7 days  Lab Units 03/13/17  0553 03/12/17  0614 03/11/17  0452   WBC 10*3/mm3 6.42 6.20 5.81   HEMOGLOBIN g/dL 11.0* 11.4* 11.5*   HEMATOCRIT % 35.0* 35.8* 36.0*   PLATELETS 10*3/mm3 214 200 184          Results from last 7 days  Lab Units 03/13/17  0553 03/12/17  0614 03/11/17  0452   SODIUM mmol/L 135 135 137   POTASSIUM mmol/L 4.3 4.2 4.4   CHLORIDE mmol/L 94* 92* 93*   TOTAL CO2 mmol/L 35.0* 38.0* 37.0*   BUN mg/dL 11 12 11   CREATININE mg/dL 0.85 0.86 0.92   CALCIUM mg/dL 8.6 8.4 8.3*   BILIRUBIN mg/dL 0.3 0.4 0.2   ALK PHOS U/L 50 47 53   ALT (SGPT) U/L 15 21 17   AST (SGOT) U/L 16 19 22   GLUCOSE mg/dL 102* 106* 101*       Culture Data:        Radiology Data:   Imaging Results (last 24 hours)     ** No results found for the last 24 hours. **          I have reviewed the patient current medications.     Assessment/Plan     Hospital Problem List     Pneumonia of right lower lobe due to infectious organism    COPD (chronic obstructive pulmonary disease)    Hypokalemia    Anemia    Respiratory failure        Assessment    Acute respiratory failure resolved.  COPD, exacerbation  Anemia, chronic disease  Right lower lobe pulmonary nodule    Plan    MT purewisal  Ambulate  Chair  Social  "service consult for home health,  Anticipate home in 24-48 hours.         Mirna Galvez DO   03/13/17   9:18 AM       Electronically signed by Mirna Galvez DO at 3/13/2017  9:24 AM      Mirna Galvez DO at 3/14/2017 10:53 AM  Version 1 of 1             [facility] Medicine Services  INPATIENT PROGRESS NOTE    Length of Stay: 10  Date of Admission: 3/4/2017  Primary Care Physician: No Known Provider    Subjective   Chief Complaint: Cough, nasal drainage from sinuses  HPI   Significant cough with the nasal drainage.  Also did not sleep last night.  Son at bedside notes this is not unusual as she is a \"night owl\"  Tired.           Review of Systems     All pertinent negatives and positives are as above. All other systems have been reviewed and are negative unless otherwise stated.     Objective    Temp:  [97.8 °F (36.6 °C)-98.7 °F (37.1 °C)] 97.8 °F (36.6 °C)  Heart Rate:  [77-96] 77  Resp:  [16-20] 18  BP: (107-110)/(45-51) 110/45  Physical Exam   Constitutional: She is oriented to person, place, and time. She appears well-developed and well-nourished.   HENT:   Head: Normocephalic and atraumatic.   Eyes: Conjunctivae and EOM are normal. Pupils are equal, round, and reactive to light.   Neck: Neck supple. No JVD present. No thyromegaly present.   Cardiovascular: Normal rate and regular rhythm.  Exam reveals no gallop and no friction rub.    No murmur heard.  Pulmonary/Chest: Effort normal and breath sounds normal.   cough   Abdominal: Soft. Bowel sounds are normal. There is no hepatosplenomegaly. There is no tenderness.   Musculoskeletal: Normal range of motion. She exhibits no edema.   Neurological: She is alert and oriented to person, place, and time. No cranial nerve deficit.   Skin: Skin is warm and dry.   Psychiatric: She has a normal mood and affect. Her behavior is normal.   Nursing note and vitals reviewed.          Results Review:  I have reviewed the labs, " radiology results, and diagnostic studies.    Laboratory Data:     Results from last 7 days  Lab Units 03/13/17  0553 03/12/17  0614 03/11/17  0452   WBC 10*3/mm3 6.42 6.20 5.81   HEMOGLOBIN g/dL 11.0* 11.4* 11.5*   HEMATOCRIT % 35.0* 35.8* 36.0*   PLATELETS 10*3/mm3 214 200 184          Results from last 7 days  Lab Units 03/13/17  0553 03/12/17  0614 03/11/17  0452   SODIUM mmol/L 135 135 137   POTASSIUM mmol/L 4.3 4.2 4.4   CHLORIDE mmol/L 94* 92* 93*   TOTAL CO2 mmol/L 35.0* 38.0* 37.0*   BUN mg/dL 11 12 11   CREATININE mg/dL 0.85 0.86 0.92   CALCIUM mg/dL 8.6 8.4 8.3*   BILIRUBIN mg/dL 0.3 0.4 0.2   ALK PHOS U/L 50 47 53   ALT (SGPT) U/L 15 21 17   AST (SGOT) U/L 16 19 22   GLUCOSE mg/dL 102* 106* 101*       Culture Data:        Radiology Data:   Imaging Results (last 24 hours)     ** No results found for the last 24 hours. **          I have reviewed the patient current medications.     Assessment/Plan       Assessment     Acute respiratory failure resolved.  COPD, exacerbation  Anemia, chronic disease  Right lower lobe pulmonary nodule    Plan    atrovent nasal spray  tussionex cough syrup  Tessalon pearle q8h prn cough  Wean oxygen to SaO2 89-92%  Continue PT amublation        Discharge Planning: I expect patient to be discharged to home in 1-2 days.    Mirna Galvez DO   03/14/17   10:54 AM       Electronically signed by Mirna Galvez DO at 3/14/2017 10:59 AM        Consult Notes (last 72 hours) (Notes from 3/12/2017  8:05 AM through 3/15/2017  8:05 AM)     No notes of this type exist for this encounter.

## 2017-03-15 NOTE — PROGRESS NOTES
[facility] Medicine Services  INPATIENT PROGRESS NOTE    Length of Stay: 11  Date of Admission: 3/4/2017  Primary Care Physician: No Known Provider    Subjective   Chief Complaint: Feels some bloating of stomach that makes her feel short of breath.    HPI   No chest pain.  Breathing is better.   No nausea.  Just can't get to sleep at night.      Review of Systems     All pertinent negatives and positives are as above. All other systems have been reviewed and are negative unless otherwise stated.     Objective    Temp:  [97.7 °F (36.5 °C)-97.8 °F (36.6 °C)] 97.7 °F (36.5 °C)  Heart Rate:  [68-85] 85  Resp:  [18] 18  BP: (100-102)/(49-73) 100/49  Physical Exam   Constitutional: She is oriented to person, place, and time. She appears well-developed and well-nourished.   Patient was asleep when I entered the room.  Attempted to arouse patient.  Physical exam preformed.  Patient remained asleep during exam.  Nurse at bedside during interaction.  Again attempted to awaken patient.  Had to shake her shoulder and call her name in loud voice.   She did finally awaken.   Oxygen was weaned to 1 liter nasal canula while I was examining patient and her oxygen saturation remained at 93-94%,   Eyes: Conjunctivae and EOM are normal. Pupils are equal, round, and reactive to light.   Neck: Neck supple.   Cardiovascular: Normal rate and regular rhythm.  Exam reveals no gallop and no friction rub.    No murmur heard.  Pulmonary/Chest: Effort normal and breath sounds normal.   Snores loudly while sleeping.   Abdominal: Soft. Bowel sounds are normal. There is no hepatosplenomegaly. There is no tenderness.   Musculoskeletal: Normal range of motion. She exhibits no edema.   Neurological: She is alert and oriented to person, place, and time. No cranial nerve deficit.   Skin: Skin is warm and dry.   Psychiatric: She has a normal mood and affect. Her behavior is normal.   Nursing note and vitals  reviewed.          Results Review:  I have reviewed the labs, radiology results, and diagnostic studies.    Laboratory Data:     Results from last 7 days  Lab Units 03/13/17  0553 03/12/17  0614 03/11/17  0452   WBC 10*3/mm3 6.42 6.20 5.81   HEMOGLOBIN g/dL 11.0* 11.4* 11.5*   HEMATOCRIT % 35.0* 35.8* 36.0*   PLATELETS 10*3/mm3 214 200 184          Results from last 7 days  Lab Units 03/13/17  0553 03/12/17  0614 03/11/17  0452   SODIUM mmol/L 135 135 137   POTASSIUM mmol/L 4.3 4.2 4.4   CHLORIDE mmol/L 94* 92* 93*   TOTAL CO2 mmol/L 35.0* 38.0* 37.0*   BUN mg/dL 11 12 11   CREATININE mg/dL 0.85 0.86 0.92   CALCIUM mg/dL 8.6 8.4 8.3*   BILIRUBIN mg/dL 0.3 0.4 0.2   ALK PHOS U/L 50 47 53   ALT (SGPT) U/L 15 21 17   AST (SGOT) U/L 16 19 22   GLUCOSE mg/dL 102* 106* 101*       Culture Data:        Radiology Data:   Imaging Results (last 24 hours)     ** No results found for the last 24 hours. **          I have reviewed the patient current medications.     Assessment/Plan     Hospital Problem List     Pneumonia of right lower lobe due to infectious organism    COPD (chronic obstructive pulmonary disease)    Hypokalemia    Anemia    Respiratory failure        Assessment      Acute respiratory failure resolved.  COPD, exacerbation  Anemia, chronic disease  Right lower lobe pulmonary nodule    Plan    Increase activity.  Walk in [place]  Check oxygen on room air and overnight oximetry.  Anticipate discharge tomorrow.                   Discharge Planning: I expect patient to be discharged to home in 1 days.    Mirna Galvez DO   03/15/17   9:47 AM

## 2017-03-15 NOTE — PLAN OF CARE
Problem: Patient Care Overview (Adult)  Goal: Plan of Care Review  Outcome: Ongoing (interventions implemented as appropriate)    03/15/17 8415   Coping/Psychosocial Response Interventions   Plan Of Care Reviewed With patient   Patient Care Overview   Progress progress toward functional goals as expected   Outcome Evaluation   Outcome Summary/Follow up Plan Pt. is independent for bed mobility and transfers. Pt. walked 250' independent except for PTA pushing O2 tank. Pt's O2 sat dropped to 87-88% on 2 liters with activity and was 92% on 1 L at rest. Pt. expects to be discharged home tomorrow. No PT needs identified.

## 2017-03-15 NOTE — PLAN OF CARE
Problem: Patient Care Overview (Adult)  Goal: Plan of Care Review  Outcome: Ongoing (interventions implemented as appropriate)    03/15/17 8462   Coping/Psychosocial Response Interventions   Plan Of Care Reviewed With patient   Patient Care Overview   Progress no change   Outcome Evaluation   Outcome Summary/Follow up Plan Patient on 2L NC with saturations between at 90-93% at rest. Pt's o2 sat droppped to 87-88% on 2L NC with ambulation to bathroom. Will continue to monitor.          Problem: Respiratory Insufficiency (Adult)  Goal: Acid/Base Balance  Outcome: Ongoing (interventions implemented as appropriate)  Goal: Effective Ventilation  Outcome: Ongoing (interventions implemented as appropriate)

## 2017-03-16 ENCOUNTER — TRANSCRIBE ORDERS (OUTPATIENT)
Dept: ADMINISTRATIVE | Facility: HOSPITAL | Age: 67
End: 2017-03-16

## 2017-03-16 VITALS
HEART RATE: 89 BPM | DIASTOLIC BLOOD PRESSURE: 54 MMHG | BODY MASS INDEX: 26.56 KG/M2 | WEIGHT: 159.4 LBS | OXYGEN SATURATION: 93 % | RESPIRATION RATE: 18 BRPM | TEMPERATURE: 98.4 F | SYSTOLIC BLOOD PRESSURE: 131 MMHG | HEIGHT: 65 IN

## 2017-03-16 DIAGNOSIS — R91.1 LUNG NODULE: Primary | ICD-10-CM

## 2017-03-16 PROCEDURE — 97530 THERAPEUTIC ACTIVITIES: CPT

## 2017-03-16 PROCEDURE — 25010000002 ENOXAPARIN PER 10 MG: Performed by: INTERNAL MEDICINE

## 2017-03-16 PROCEDURE — 94799 UNLISTED PULMONARY SVC/PX: CPT

## 2017-03-16 RX ORDER — NICOTINE 21 MG/24HR
1 PATCH, TRANSDERMAL 24 HOURS TRANSDERMAL EVERY 24 HOURS
Qty: 30 PATCH | Refills: 0 | Status: SHIPPED | OUTPATIENT
Start: 2017-03-16 | End: 2019-09-20

## 2017-03-16 RX ADMIN — DEXTROMETHORPHAN 30 MG: 30 SUSPENSION, EXTENDED RELEASE ORAL at 09:37

## 2017-03-16 RX ADMIN — PANTOPRAZOLE SODIUM 40 MG: 40 TABLET, DELAYED RELEASE ORAL at 06:43

## 2017-03-16 RX ADMIN — IPRATROPIUM BROMIDE 2 SPRAY: 21 SPRAY, METERED NASAL at 12:09

## 2017-03-16 RX ADMIN — GABAPENTIN 300 MG: 300 CAPSULE ORAL at 09:37

## 2017-03-16 RX ADMIN — ALPRAZOLAM 1 MG: 0.5 TABLET ORAL at 12:08

## 2017-03-16 RX ADMIN — IPRATROPIUM BROMIDE AND ALBUTEROL SULFATE 3 ML: 2.5; .5 SOLUTION RESPIRATORY (INHALATION) at 03:14

## 2017-03-16 RX ADMIN — DOCUSATE SODIUM 100 MG: 100 CAPSULE ORAL at 09:36

## 2017-03-16 RX ADMIN — IPRATROPIUM BROMIDE AND ALBUTEROL SULFATE 3 ML: 2.5; .5 SOLUTION RESPIRATORY (INHALATION) at 07:31

## 2017-03-16 RX ADMIN — ENOXAPARIN SODIUM 40 MG: 40 INJECTION SUBCUTANEOUS at 09:37

## 2017-03-16 RX ADMIN — GABAPENTIN 300 MG: 300 CAPSULE ORAL at 15:28

## 2017-03-16 RX ADMIN — BUDESONIDE AND FORMOTEROL FUMARATE DIHYDRATE 2 PUFF: 160; 4.5 AEROSOL RESPIRATORY (INHALATION) at 07:31

## 2017-03-16 RX ADMIN — ACETYLCYSTEINE 300 MG: 200 SOLUTION ORAL; RESPIRATORY (INHALATION) at 07:31

## 2017-03-16 RX ADMIN — OXYCODONE HYDROCHLORIDE AND ACETAMINOPHEN 1 TABLET: 10; 325 TABLET ORAL at 15:28

## 2017-03-16 RX ADMIN — OXYCODONE HYDROCHLORIDE 30 MG: 20 TABLET, FILM COATED, EXTENDED RELEASE ORAL at 09:36

## 2017-03-16 RX ADMIN — FUROSEMIDE 20 MG: 20 TABLET ORAL at 09:36

## 2017-03-16 RX ADMIN — HYDROCODONE POLISTIREX AND CHLORPHENIRAMINE POLISTIREX 5 ML: 10; 8 SUSPENSION, EXTENDED RELEASE ORAL at 12:08

## 2017-03-16 RX ADMIN — CITALOPRAM 40 MG: 20 TABLET, FILM COATED ORAL at 09:36

## 2017-03-16 RX ADMIN — OXYCODONE HYDROCHLORIDE AND ACETAMINOPHEN 1 TABLET: 10; 325 TABLET ORAL at 01:27

## 2017-03-16 RX ADMIN — NYSTATIN 500000 UNITS: 100000 SUSPENSION ORAL at 09:37

## 2017-03-16 NOTE — PLAN OF CARE
Problem: Patient Care Overview (Adult)  Goal: Plan of Care Review  Outcome: Ongoing (interventions implemented as appropriate)    03/16/17 1142   Coping/Psychosocial Response Interventions   Plan Of Care Reviewed With patient   Patient Care Overview   Progress progress towards functional goals is fair   Outcome Evaluation   Outcome Summary/Follow up Plan Pt. limited in eagerness to participate in therapy today. Pt. states she did not sleep well last night. Pt. indep. supine to sit and sat EOB x 5 mins. O2 removed by RT request. Pt's sats dropped to 88% after 2 mins. O2 put back on pt. at 2LO2 and sats increased to 92%. Pt. declined further mobility at this time. Pt. anticipating d/c home today.

## 2017-03-16 NOTE — PLAN OF CARE
Problem: Patient Care Overview (Adult)  Goal: Plan of Care Review  Outcome: Ongoing (interventions implemented as appropriate)    03/15/17 1709 03/16/17 0518   Coping/Psychosocial Response Interventions   Plan Of Care Reviewed With patient --    Patient Care Overview   Progress no change --    Outcome Evaluation   Outcome Summary/Follow up Plan --  pt on 2l nc o2 sats 87 to 93 % with occassional desats to 84-85 with activity. pt c/o anxoety and pain, prn meds given, pt tolerating well. pt resting comfortably         Problem: Respiratory Insufficiency (Adult)  Goal: Identify Related Risk Factors and Signs and Symptoms  Outcome: Ongoing (interventions implemented as appropriate)  Goal: Acid/Base Balance  Outcome: Ongoing (interventions implemented as appropriate)  Goal: Effective Ventilation  Outcome: Ongoing (interventions implemented as appropriate)    Problem: Pressure Ulcer Risk (Kermit Scale) (Adult,Obstetrics,Pediatric)  Goal: Identify Related Risk Factors and Signs and Symptoms  Outcome: Ongoing (interventions implemented as appropriate)  Goal: Skin Integrity  Outcome: Ongoing (interventions implemented as appropriate)    Problem: Fall Risk (Adult)  Goal: Identify Related Risk Factors and Signs and Symptoms  Outcome: Ongoing (interventions implemented as appropriate)  Goal: Absence of Falls  Outcome: Ongoing (interventions implemented as appropriate)

## 2017-03-16 NOTE — DISCHARGE PLACEMENT REQUEST
Terri Coates, PT Physical Therapist Addendum Physical Therapy Plan of Care Date of Service: 3/16/2017 11:44 AM         Problem: Patient Care Overview (Adult)  Goal: Plan of Care Review  Outcome: Ongoing (interventions implemented as appropriate)     03/16/17 1142   Coping/Psychosocial Response Interventions   Plan Of Care Reviewed With patient   Patient Care Overview   Progress progress towards functional goals is fair   Outcome Evaluation   Outcome Summary/Follow up Plan Pt. limited in eagerness to participate in therapy today. Pt. states she did not sleep well last night. Pt. indep. supine to sit and sat EOB x 5 mins. O2 removed by RT request. Pt's sats dropped to 88% after 2 mins. O2 put back on pt. at 2LO2 and sats increased to 92%. Pt. declined further mobility at this time. Pt. anticipating d/c home today.

## 2017-03-16 NOTE — DISCHARGE PLACEMENT REQUEST
"[facility] [phone]  Room 491    Jhon Lei (65 y.o. Female)     Date of Birth Social Security Number Address Home Phone MRN    1951  [address]  [facility] [place] [facility] [place] [address] [phone] [id number]    Catholic Marital Status          None Unknown       Admission Date Admission Type Admitting Provider Attending Provider Department, Room/Bed    3/4/17 Emergency Mirna Galvez DO Horn, Frances Marie, DO [address], 491/1    Discharge Date Discharge Disposition Discharge Destination         Home-Health Care [facility]             Attending Provider: Mirna Galvez DO     Allergies:  Aspirin, Demerol [Meperidine], Ibuprofen, Neosporin [Neomycin-bacitracin Zn-polymyx], Penicillins, Sulfa Antibiotics, Codeine, Talwin [Pentazocine], Tetracyclines & Related, Morphine And Related    Isolation:  None   Infection:  None   Code Status:  FULL    Ht:  64.5\" (163.8 cm)   Wt:  159 lb 6.4 oz (72.3 kg)    Admission Cmt:  None   Principal Problem:  None                Active Insurance as of 3/4/2017     Primary Coverage     Payor Plan Insurance Group Employer/Plan Group    ANTHEM MEDICAID ANTHEM MEDICAID [id number]     Payor Plan Address Payor Plan Phone Number Effective From Effective To    [address] [address] [phone] [address]     [place] [address]       Subscriber Name Subscriber Birth Date Member ID       JHON LEI 3/25/1951 [id number]                 Emergency Contacts      (Rel.) Home Phone Work Phone Mobile Phone    Ella Guerrero (Daughter) [phone] -- --                        [address] [address]  Phone: [phone]  Fax:  Date Ordered: Mar 16, 2017         Patient: Jhon Lei MRN: [id number]   [address] [place] [address] : 3/25/1951  SSN:    Phone: [phone] Sex: F     Weight: 159 lb 6.4 oz (72.3 kg)         Ht Readings from Last 1 Encounters:   17 64.5\" (163.8 cm)    "      Home Oxygen Therapy (Order ID: [id number])   Diagnosis: Acute respiratory failure with hypoxia (J96.01 [ICD-10-CM] 518.81 [ICD-9-CM])   Quantity: 1     Delivery Modality: Nasal Cannula  Liters Per Minute: 2  Duration: Continuous  Equipment:  Oxygen Concentrator &  &  Portable Gaseous Oxygen System & Portable Oxygen Contents Gaseous  Length of Need (99 Months = Lifetime): 99 Months = Lifetime            Authorizing Provider:Mirna Galvez DO  Order Entered By: Mirna Galvez DO 3/16/2017 11:34 AM     Electronically signed by: Mirna Galvez DO (NPI: [id number]) 3/16/2017 11:34 AM                   Discharge Summary      Mirna Galvez DO at 3/16/2017 11:35 AM              [facility] Medicine Services  DISCHARGE SUMMARY       Date of Admission: 3/4/2017  Date of Discharge:  3/16/2017  Primary Care Physician: No Known Provider    Presenting Problem/History of Present Illness:  Pneumonia of right lower lobe due to infectious organism [J18.9]  Pneumonia of right lower lobe due to infectious organism [J18.9]     Final Discharge Diagnoses:  Hospital Problem List     COPD (chronic obstructive pulmonary disease)    Hypokalemia    Anemia    Respiratory failure      Discharge Diagnosis    Acute hypercapneic hypoxemic respiratory failure.  COPD, exacerbation  Anemia, chronic disease  Right lower lobe pulmonary nodule  Mild patchy airspace opacities right lung base.     Consults: Pulmonology, Neurology    Procedures Performed:   CT angiogram chest  IMPRESSION:  1. No CT angiographic evidence of pulmonary embolus or acute aortic  pathology.  2. Lower lobe airspace disease, likely atelectasis with small pleural  effusions.  3. Pulmonary emphysema.  Pertinent Test Results:    Specimen: Blood Updated: 03/13/17 0700         Glucose 102 (H) mg/dL         BUN 11 mg/dL         Creatinine 0.85 mg/dL         Sodium 135 mmol/L         Potassium 4.3 mmol/L          Chloride 94 (L) mmol/L         CO2 35.0 (H) mmol/L         Calcium 8.6 mg/dL         Total Protein 6.3 g/dL         Albumin 3.30 (L) g/dL         ALT (SGPT) 15 U/L         AST (SGOT) 16 U/L         Alkaline Phosphatase 50 U/L         Total Bilirubin 0.3 mg/dL         eGFR Non African Amer 67 mL/min/1.73         Globulin 3.0 gm/dL         A/G Ratio 1.1 g/dL         BUN/Creatinine Ratio 12.9         Anion Gap 6.0 mmol/L        CBC Auto Differential [[id number]] (Abnormal) Collected: 03/13/17 0553       Lab Status: Final result Specimen: Blood Updated: 03/13/17 0641        WBC 6.42 10*3/mm3         RBC 3.61 (L) 10*6/mm3         Hemoglobin 11.0 (L) g/dL         Hematocrit 35.0 (L) %         MCV 97.0 fL         MCH 30.5 pg         MCHC 31.4 (L) g/dL         RDW 15.7 (H) %         RDW-SD 55.4 (H) fl         MPV 11.1 fL         Platelets 214 10*3/mm3         Neutrophil % 63.3 %         Lymphocyte % 18.2 %         Monocyte % 15.3 (H) %         Eosinophil % 1.7 %         Basophil % 0.3 %         Immature Grans % 1.2 %         Neutrophils, Absolute 4.06 10*3/mm3         Lymphocytes, Absolute 1.17 10*3/mm3         Monocytes, Absolute 0.98 10*3/mm3         Eosinophils, Absolute 0.11 10*3/mm3         Basophils, Absolute 0.02 10*3/mm3         Immature Grans, Absolute 0.08 (H)          Chief Complaint on Day of Discharge: Still has cough that is frequent with thin clear sputum.  Mucomyst breathing treatment caused nausea.          Hospital Course:  The patient is a 65 y.o. female who presented to [facility] with increased somnolence and breathing difficulties. .    It was felt she may have had mild patchy infiltrate on the chest xray   She was hypercapneic/hypoxemic with underlying COPD.  She was placed in the CCU. She was intubated.  She also initially required pressor agent for maintenance of blood pressure.  The ventilator was weaned.  Sedation weaned and discontinued and subsequently the ventilator was discontinued.   " Pressor agent weaned off.  Patient was placed on antibiotics via IV at admission.  She was transferred to the medical floor.  Physical therapy consulted.   Mobilization of patient undertaken.  Patient slowly improved.  Oxygen was weaned.  She does have drops in her oxygen status with activity.  She has significant underlying lung disease.   It is noted that the patient takes significant amounts of narcotic and sedating medications.    She has issues with hypersomnolence that I feel are related to her medications.  During her stay several of her sedating medications were held.  They will not be restarted on discharge.   On day of discharge she is stable.  She continues to require oxygen routinely and will likely do so for the remainder of her life.   A lengthy discussion was held on day prior to discharge and day of discharge regarding smoking cessation.  Patient seems to understand the need for smoking cessation but seems to lack the motivation to attempt.      Condition on Discharge:  stable    Physical Exam on Discharge:  Visit Vitals   • /54 (BP Location: Left arm, Patient Position: Lying)   • Pulse 89   • Temp 98.4 °F (36.9 °C) (Temporal Artery )   • Resp 18   • Ht 64.5\" (163.8 cm)   • Wt 159 lb 6.4 oz (72.3 kg)   • LMP  (LMP Unknown)  Comment: hysterectomy   • SpO2 93%   • Breastfeeding No   • BMI 26.94 kg/m2     Physical Exam   Constitutional: She is oriented to person, place, and time. She appears well-developed and well-nourished.   Eyes: Conjunctivae and EOM are normal. Pupils are equal, round, and reactive to light.   Neck: Neck supple.   Cardiovascular: Normal rate and regular rhythm.  Exam reveals no gallop and no friction rub.    No murmur heard.  Pulmonary/Chest: Effort normal and breath sounds normal.   Cough with clear sputum.   Abdominal: Soft. Bowel sounds are normal. There is no hepatosplenomegaly. There is no tenderness.   Musculoskeletal: Normal range of motion. She exhibits no edema. "   Neurological: She is alert and oriented to person, place, and time. No cranial nerve deficit.   Skin: Skin is warm and dry.   Psychiatric: Her behavior is normal.   Nursing note and vitals reviewed.        Discharge Disposition:  Home-Health Care [facility]    Discharge Medications:   Lei, Justyna   Home Medication Instructions KELLIE:[id number]    Printed on:03/16/17 3765   Medication Information                      albuterol (PROVENTIL HFA;VENTOLIN HFA) 108 (90 BASE) MCG/ACT inhaler  Inhale 2 puffs Every 4 (Four) Hours As Needed for wheezing.             ALPRAZolam (XANAX) 1 MG tablet  Take 1 mg by mouth 4 (Four) Times a Day As Needed for anxiety.             amphetamine-dextroamphetamine (ADDERALL) 20 MG tablet  Take 20 mg by mouth 2 (Two) Times a Day.             budesonide-formoterol (SYMBICORT) 160-4.5 MCG/ACT inhaler  Inhale 2 puffs 2 (Two) Times a Day.             cetirizine (zyrTEC) 10 MG tablet  Take 10 mg by mouth Daily.             citalopram (CeleXA) 40 MG tablet  Take 40 mg by mouth Daily.             furosemide (LASIX) 40 MG tablet  Take 40 mg by mouth Daily.             gabapentin (NEURONTIN) 600 MG tablet  Take 600 mg by mouth Every 8 (Eight) Hours.             ipratropium-albuterol (COMBIVENT RESPIMAT)  MCG/ACT inhaler  Inhale 1 puff 4 (Four) Times a Day As Needed for wheezing.             lisinopril (PRINIVIL,ZESTRIL) 5 MG tablet  Take 5 mg by mouth Daily.             nicotine (NICODERM CQ) 21 MG/24HR patch  Place 1 patch on the skin Daily.             ondansetron (ZOFRAN) 4 MG tablet  Take 4 mg by mouth Every 8 (Eight) Hours As Needed for nausea or vomiting.             OxyCODONE HCl ER (oxyCONTIN) 30 MG tablet extended-release 12 hour  Take 30 mg by mouth Every 12 (Twelve) Hours.             oxyCODONE-acetaminophen (PERCOCET)  MG per tablet  Take 1 tablet by mouth Every 6 (Six) Hours As Needed for moderate pain (4-6) (every 4-6 hours).             promethazine (PHENERGAN) 25 MG  tablet  Take 25 mg by mouth Every 6 (Six) Hours As Needed for nausea or vomiting.                 Discharge Diet:   Diet Instructions     Diet: Regular; Thin Liquids, No Restrictions       Discharge Diet:  Regular   Fluid Consistency:  Thin Liquids, No Restrictions                 Activity at Discharge:   Activity Instructions     Activity as Tolerated                     Discharge Care Plan/Instructions: Home Health to follow for disease process and strengthening.    Follow-up Appointments:   Dr Reyes one week    Test Results Pending at Discharge: none     Mirna Galvez DO  03/16/17  11:35 AM    Time: 40 minutes           Electronically signed by Mirna Galvez DO at 3/16/2017 11:54 AM        Terri Coates, PT Physical Therapist Addendum Physical Therapy Plan of Care Date of Service: 3/16/2017 11:44 AM         Problem: Patient Care Overview (Adult)  Goal: Plan of Care Review  Outcome: Ongoing (interventions implemented as appropriate)     03/16/17 1142   Coping/Psychosocial Response Interventions   Plan Of Care Reviewed With patient   Patient Care Overview   Progress progress towards functional goals is fair   Outcome Evaluation   Outcome Summary/Follow up Plan Pt. limited in eagerness to participate in therapy today. Pt. states she did not sleep well last night. Pt. indep. supine to sit and sat EOB x 5 mins. O2 removed by RT request. Pt's sats dropped to 88% after 2 mins. O2 put back on pt. at 2LO2 and sats increased to 92%. Pt. declined further mobility at this time. Pt. anticipating d/c home today.

## 2017-03-16 NOTE — DISCHARGE SUMMARY
[facility]  DISCHARGE SUMMARY       Date of Admission: 3/4/2017  Date of Discharge:  3/16/2017  Primary Care Physician: No Known Provider    Presenting Problem/History of Present Illness:  Pneumonia of right lower lobe due to infectious organism [J18.9]  Pneumonia of right lower lobe due to infectious organism [J18.9]     Final Discharge Diagnoses:  Hospital Problem List     COPD (chronic obstructive pulmonary disease)    Hypokalemia    Anemia    Respiratory failure      Discharge Diagnosis    Acute hypercapneic hypoxemic respiratory failure.  COPD, exacerbation  Anemia, chronic disease  Right lower lobe pulmonary nodule  Mild patchy airspace opacities right lung base.     Consults: Pulmonology, Neurology    Procedures Performed:   CT angiogram chest  IMPRESSION:  1. No CT angiographic evidence of pulmonary embolus or acute aortic  pathology.  2. Lower lobe airspace disease, likely atelectasis with small pleural  effusions.  3. Pulmonary emphysema.  Pertinent Test Results:    Specimen: Blood Updated: 03/13/17 0700         Glucose 102 (H) mg/dL         BUN 11 mg/dL         Creatinine 0.85 mg/dL         Sodium 135 mmol/L         Potassium 4.3 mmol/L         Chloride 94 (L) mmol/L         CO2 35.0 (H) mmol/L         Calcium 8.6 mg/dL         Total Protein 6.3 g/dL         Albumin 3.30 (L) g/dL         ALT (SGPT) 15 U/L         AST (SGOT) 16 U/L         Alkaline Phosphatase 50 U/L         Total Bilirubin 0.3 mg/dL         eGFR Non African Amer 67 mL/min/1.73         Globulin 3.0 gm/dL         A/G Ratio 1.1 g/dL         BUN/Creatinine Ratio 12.9         Anion Gap 6.0 mmol/L        CBC Auto Differential [[id number]] (Abnormal) Collected: 03/13/17 0553       Lab Status: Final result Specimen: Blood Updated: 03/13/17 0641        WBC 6.42 10*3/mm3         RBC 3.61 (L) 10*6/mm3         Hemoglobin 11.0 (L) g/dL         Hematocrit 35.0 (L) %         MCV 97.0 fL         MCH  30.5 pg         MCHC 31.4 (L) g/dL         RDW 15.7 (H) %         RDW-SD 55.4 (H) fl         MPV 11.1 fL         Platelets 214 10*3/mm3         Neutrophil % 63.3 %         Lymphocyte % 18.2 %         Monocyte % 15.3 (H) %         Eosinophil % 1.7 %         Basophil % 0.3 %         Immature Grans % 1.2 %         Neutrophils, Absolute 4.06 10*3/mm3         Lymphocytes, Absolute 1.17 10*3/mm3         Monocytes, Absolute 0.98 10*3/mm3         Eosinophils, Absolute 0.11 10*3/mm3         Basophils, Absolute 0.02 10*3/mm3         Immature Grans, Absolute 0.08 (H)          Chief Complaint on Day of Discharge: Still has cough that is frequent with thin clear sputum.  Mucomyst breathing treatment caused nausea.          Hospital Course:  The patient is a 65 y.o. female who presented to [facility] with increased somnolence and breathing difficulties. .    It was felt she may have had mild patchy infiltrate on the chest xray   She was hypercapneic/hypoxemic with underlying COPD.  She was placed in the CCU. She was intubated.  She also initially required pressor agent for maintenance of blood pressure.  The ventilator was weaned.  Sedation weaned and discontinued and subsequently the ventilator was discontinued.   Pressor agent weaned off.  Patient was placed on antibiotics via IV at admission.  She was transferred to the medical floor.  Physical therapy consulted.   Mobilization of patient undertaken.  Patient slowly improved.  Oxygen was weaned.  She does have drops in her oxygen status with activity.  She has significant underlying lung disease.   It is noted that the patient takes significant amounts of narcotic and sedating medications.    She has issues with hypersomnolence that I feel are related to her medications.  During her stay several of her sedating medications were held.  They will not be restarted on discharge.   On day of discharge she is stable.  She continues to require oxygen routinely and will  "likely do so for the remainder of her life.   A lengthy discussion was held on day prior to discharge and day of discharge regarding smoking cessation.  Patient seems to understand the need for smoking cessation but seems to lack the motivation to attempt.      Condition on Discharge:  stable    Physical Exam on Discharge:  Visit Vitals   • /54 (BP Location: Left arm, Patient Position: Lying)   • Pulse 89   • Temp 98.4 °F (36.9 °C) (Temporal Artery )   • Resp 18   • Ht 64.5\" (163.8 cm)   • Wt 159 lb 6.4 oz (72.3 kg)   • LMP  (LMP Unknown)  Comment: hysterectomy   • SpO2 93%   • Breastfeeding No   • BMI 26.94 kg/m2     Physical Exam   Constitutional: She is oriented to person, place, and time. She appears well-developed and well-nourished.   Eyes: Conjunctivae and EOM are normal. Pupils are equal, round, and reactive to light.   Neck: Neck supple.   Cardiovascular: Normal rate and regular rhythm.  Exam reveals no gallop and no friction rub.    No murmur heard.  Pulmonary/Chest: Effort normal and breath sounds normal.   Cough with clear sputum.   Abdominal: Soft. Bowel sounds are normal. There is no hepatosplenomegaly. There is no tenderness.   Musculoskeletal: Normal range of motion. She exhibits no edema.   Neurological: She is alert and oriented to person, place, and time. No cranial nerve deficit.   Skin: Skin is warm and dry.   Psychiatric: Her behavior is normal.   Nursing note and vitals reviewed.        Discharge Disposition:  Home-Health Care [facility]    Discharge Medications:   Lei, Justyna   Home Medication Instructions KELLIE:[id number]    Printed on:03/16/17 7450   Medication Information                      albuterol (PROVENTIL HFA;VENTOLIN HFA) 108 (90 BASE) MCG/ACT inhaler  Inhale 2 puffs Every 4 (Four) Hours As Needed for wheezing.             ALPRAZolam (XANAX) 1 MG tablet  Take 1 mg by mouth 4 (Four) Times a Day As Needed for anxiety.             amphetamine-dextroamphetamine (ADDERALL) 20 MG " tablet  Take 20 mg by mouth 2 (Two) Times a Day.             budesonide-formoterol (SYMBICORT) 160-4.5 MCG/ACT inhaler  Inhale 2 puffs 2 (Two) Times a Day.             cetirizine (zyrTEC) 10 MG tablet  Take 10 mg by mouth Daily.             citalopram (CeleXA) 40 MG tablet  Take 40 mg by mouth Daily.             furosemide (LASIX) 40 MG tablet  Take 40 mg by mouth Daily.             gabapentin (NEURONTIN) 600 MG tablet  Take 600 mg by mouth Every 8 (Eight) Hours.             ipratropium-albuterol (COMBIVENT RESPIMAT)  MCG/ACT inhaler  Inhale 1 puff 4 (Four) Times a Day As Needed for wheezing.             lisinopril (PRINIVIL,ZESTRIL) 5 MG tablet  Take 5 mg by mouth Daily.             nicotine (NICODERM CQ) 21 MG/24HR patch  Place 1 patch on the skin Daily.             ondansetron (ZOFRAN) 4 MG tablet  Take 4 mg by mouth Every 8 (Eight) Hours As Needed for nausea or vomiting.             OxyCODONE HCl ER (oxyCONTIN) 30 MG tablet extended-release 12 hour  Take 30 mg by mouth Every 12 (Twelve) Hours.             oxyCODONE-acetaminophen (PERCOCET)  MG per tablet  Take 1 tablet by mouth Every 6 (Six) Hours As Needed for moderate pain (4-6) (every 4-6 hours).             promethazine (PHENERGAN) 25 MG tablet  Take 25 mg by mouth Every 6 (Six) Hours As Needed for nausea or vomiting.                 Discharge Diet:   Diet Instructions     Diet: Regular; Thin Liquids, No Restrictions       Discharge Diet:  Regular   Fluid Consistency:  Thin Liquids, No Restrictions                 Activity at Discharge:   Activity Instructions     Activity as Tolerated                     Discharge Care Plan/Instructions: Home Health to follow for disease process and strengthening.    Follow-up Appointments:   Dr Reyes one week    Test Results Pending at Discharge: none     Mirna Galvez DO  03/16/17  11:35 AM    Time: 40 minutes

## 2017-03-16 NOTE — DISCHARGE PLACEMENT REQUEST
"[facility] [phone]  Room 491    Jhon Lei (65 y.o. Female)     Date of Birth Social Security Number Address Home Phone MRN    03/25/1951  [address] [facility] [id number] [phone] [id number]    [place] Marital Status          None Unknown       Admission Date Admission Type Admitting Provider Attending Provider Department, Room/Bed    3/4/17 Emergency Mirna Galvez DO Horn, Frances Marie, DO [facility] 4C, 491/1    Discharge Date Discharge Disposition Discharge Destination         Home-[facility]             Attending Provider: Mirna Galvez DO     Allergies:  Aspirin, Demerol [Meperidine], Ibuprofen, Neosporin [Neomycin-bacitracin Zn-polymyx], Penicillins, Sulfa Antibiotics, Codeine, Talwin [Pentazocine], Tetracyclines & Related, Morphine And Related    Isolation:  None   Infection:  None   Code Status:  FULL    Ht:  64.5\" (163.8 cm)   Wt:  159 lb 6.4 oz (72.3 kg)    Admission Cmt:  None   Principal Problem:  None                Active Insurance as of 3/4/2017     Primary Coverage     Payor Plan Insurance Group Employer/Plan Group    ANTHEM MEDICAID ANTHEM MEDICAID [id number]     Payor Plan Address Payor Plan Phone Number Effective From Effective To    [address] [address] [phone] 1/1/2016     [place] [address]       Subscriber Name Subscriber Birth Date Member ID       JHON LEI 3/25/1951 [id number]                 Emergency Contacts      (Rel.) Home Phone Work Phone Mobile Phone    Ella Guerrero (Daughter) [phone] -- --        Terri Coates, PT Physical Therapist Signed Physical Therapy Plan of Care Date of Service: 3/16/2017 11:44 AM         Problem: Patient Care Overview (Adult)  Goal: Plan of Care Review  Outcome: Ongoing (interventions implemented as appropriate)     03/16/17 1142   Coping/Psychosocial Response Interventions   Plan Of Care Reviewed With patient   Patient Care Overview   Progress progress towards " "functional goals is fair   Outcome Evaluation   Outcome Summary/Follow up Plan Pt. limited in eagerness to participate in therapy today. Pt. states she did not sleep well last night. Pt. indep. supine to sit and sat EOB x 5 mins. O2 removed by RT request. Pt's sats dropped to 88% after 2 mins. O2 put back on pt. at 2LO2. Pt. declined further mobility at this time. Pt. anticipating d/c home today.                [address] [address]  Phone: [phone]  Fax:  Date Ordered: Mar 16, 2017         Patient: Justyna Lei MRN: [id number]   [address] [facility] [address] : 3/25/1951  SSN:    Phone: [phone] Sex: F     Weight: 159 lb 6.4 oz (72.3 kg)         Ht Readings from Last 1 Encounters:   17 64.5\" (163.8 cm)         Home Oxygen Therapy (Order ID: [id number])   Diagnosis: Acute respiratory failure with hypoxia (J96.01 [ICD-10-CM] 518.81 [ICD-9-CM])   Quantity: 1     Delivery Modality: Nasal Cannula  Liters Per Minute: 2  Duration: Continuous  Equipment:  Oxygen Concentrator &  &  Portable Gaseous Oxygen System & Portable Oxygen Contents Gaseous  Length of Need (99 Months = Lifetime): 99 Months = Lifetime            Authorizing Provider:Mirna Galvez DO  Order Entered By: Mirna Galvez DO 3/16/2017 11:34 AM     Electronically signed by: Mirna Galvez DO (NPI: [id number]) 3/16/2017 11:34 AM                   Discharge Summary      Mirna Galvez DO at 3/16/2017 11:35 AM              [facility] Medicine [facility]  DISCHARGE SUMMARY       Date of Admission: 3/4/2017  Date of Discharge:  3/16/2017  Primary Care Physician: No Known Provider    Presenting Problem/History of Present Illness:  Pneumonia of right lower lobe due to infectious organism [J18.9]  Pneumonia of right lower lobe due to infectious organism [J18.9]     Final Discharge Diagnoses:  Hospital Problem List     COPD " (chronic obstructive pulmonary disease)    Hypokalemia    Anemia    Respiratory failure      Discharge Diagnosis    Acute hypercapneic hypoxemic respiratory failure.  COPD, exacerbation  Anemia, chronic disease  Right lower lobe pulmonary nodule  Mild patchy airspace opacities right lung base.     Consults: Pulmonology, Neurology    Procedures Performed:   CT angiogram chest  IMPRESSION:  1. No CT angiographic evidence of pulmonary embolus or acute aortic  pathology.  2. Lower lobe airspace disease, likely atelectasis with small pleural  effusions.  3. Pulmonary emphysema.  Pertinent Test Results:    Specimen: Blood Updated: 03/13/17 0700         Glucose 102 (H) mg/dL         BUN 11 mg/dL         Creatinine 0.85 mg/dL         Sodium 135 mmol/L         Potassium 4.3 mmol/L         Chloride 94 (L) mmol/L         CO2 35.0 (H) mmol/L         Calcium 8.6 mg/dL         Total Protein 6.3 g/dL         Albumin 3.30 (L) g/dL         ALT (SGPT) 15 U/L         AST (SGOT) 16 U/L         Alkaline Phosphatase 50 U/L         Total Bilirubin 0.3 mg/dL         eGFR Non African Amer 67 mL/min/1.73         Globulin 3.0 gm/dL         A/G Ratio 1.1 g/dL         BUN/Creatinine Ratio 12.9         Anion Gap 6.0 mmol/L        CBC Auto Differential [[id number]] (Abnormal) Collected: 03/13/17 0553       Lab Status: Final result Specimen: Blood Updated: 03/13/17 0641        WBC 6.42 10*3/mm3         RBC 3.61 (L) 10*6/mm3         Hemoglobin 11.0 (L) g/dL         Hematocrit 35.0 (L) %         MCV 97.0 fL         MCH 30.5 pg         MCHC 31.4 (L) g/dL         RDW 15.7 (H) %         RDW-SD 55.4 (H) fl         MPV 11.1 fL         Platelets 214 10*3/mm3         Neutrophil % 63.3 %         Lymphocyte % 18.2 %         Monocyte % 15.3 (H) %         Eosinophil % 1.7 %         Basophil % 0.3 %         Immature Grans % 1.2 %         Neutrophils, Absolute 4.06 10*3/mm3         Lymphocytes, Absolute 1.17 10*3/mm3         Monocytes, Absolute 0.98 10*3/mm3          Eosinophils, Absolute 0.11 10*3/mm3         Basophils, Absolute 0.02 10*3/mm3         Immature Grans, Absolute 0.08 (H)          Chief Complaint on Day of Discharge: Still has cough that is frequent with thin clear sputum.  Mucomyst breathing treatment caused nausea.          Hospital Course:  The patient is a 65 y.o. female who presented to [facility] with increased somnolence and breathing difficulties. .    It was felt she may have had mild patchy infiltrate on the chest xray   She was hypercapneic/hypoxemic with underlying COPD.  She was placed in the CCU. She was intubated.  She also initially required pressor agent for maintenance of blood pressure.  The ventilator was weaned.  Sedation weaned and discontinued and subsequently the ventilator was discontinued.   Pressor agent weaned off.  Patient was placed on antibiotics via IV at admission.  She was transferred to the medical floor.  Physical therapy consulted.   Mobilization of patient undertaken.  Patient slowly improved.  Oxygen was weaned.  She does have drops in her oxygen status with activity.  She has significant underlying lung disease.   It is noted that the patient takes significant amounts of narcotic and sedating medications.    She has issues with hypersomnolence that I feel are related to her medications.  During her stay several of her sedating medications were held.  They will not be restarted on discharge.   On day of discharge she is stable.  She continues to require oxygen routinely and will likely do so for the remainder of her life.   A lengthy discussion was held on day prior to discharge and day of discharge regarding smoking cessation.  Patient seems to understand the need for smoking cessation but seems to lack the motivation to attempt.      Condition on Discharge:  stable    Physical Exam on Discharge:  Visit Vitals   • /54 (BP Location: Left arm, Patient Position: Lying)   • Pulse 89   • Temp 98.4 °F (36.9  "°C) (Temporal Artery )   • Resp 18   • Ht 64.5\" (163.8 cm)   • Wt 159 lb 6.4 oz (72.3 kg)   • LMP  (LMP Unknown)  Comment: hysterectomy   • SpO2 93%   • Breastfeeding No   • BMI 26.94 kg/m2     Physical Exam   Constitutional: She is oriented to person, place, and time. She appears well-developed and well-nourished.   Eyes: Conjunctivae and EOM are normal. Pupils are equal, round, and reactive to light.   Neck: Neck supple.   Cardiovascular: Normal rate and regular rhythm.  Exam reveals no gallop and no friction rub.    No murmur heard.  Pulmonary/Chest: Effort normal and breath sounds normal.   Cough with clear sputum.   Abdominal: Soft. Bowel sounds are normal. There is no hepatosplenomegaly. There is no tenderness.   Musculoskeletal: Normal range of motion. She exhibits no edema.   Neurological: She is alert and oriented to person, place, and time. No cranial nerve deficit.   Skin: Skin is warm and dry.   Psychiatric: Her behavior is normal.   Nursing note and vitals reviewed.        Discharge Disposition:  Home-Health Care [facility]    Discharge Medications:   Lei, Justyna   Home Medication Instructions KELLIE:[id number]    Printed on:03/16/17 4038   Medication Information                      albuterol (PROVENTIL HFA;VENTOLIN HFA) 108 (90 BASE) MCG/ACT inhaler  Inhale 2 puffs Every 4 (Four) Hours As Needed for wheezing.             ALPRAZolam (XANAX) 1 MG tablet  Take 1 mg by mouth 4 (Four) Times a Day As Needed for anxiety.             amphetamine-dextroamphetamine (ADDERALL) 20 MG tablet  Take 20 mg by mouth 2 (Two) Times a Day.             budesonide-formoterol (SYMBICORT) 160-4.5 MCG/ACT inhaler  Inhale 2 puffs 2 (Two) Times a Day.             cetirizine (zyrTEC) 10 MG tablet  Take 10 mg by mouth Daily.             citalopram (CeleXA) 40 MG tablet  Take 40 mg by mouth Daily.             furosemide (LASIX) 40 MG tablet  Take 40 mg by mouth Daily.             gabapentin (NEURONTIN) 600 MG tablet  Take 600 mg by " mouth Every 8 (Eight) Hours.             ipratropium-albuterol (COMBIVENT RESPIMAT)  MCG/ACT inhaler  Inhale 1 puff 4 (Four) Times a Day As Needed for wheezing.             lisinopril (PRINIVIL,ZESTRIL) 5 MG tablet  Take 5 mg by mouth Daily.             nicotine (NICODERM CQ) 21 MG/24HR patch  Place 1 patch on the skin Daily.             ondansetron (ZOFRAN) 4 MG tablet  Take 4 mg by mouth Every 8 (Eight) Hours As Needed for nausea or vomiting.             OxyCODONE HCl ER (oxyCONTIN) 30 MG tablet extended-release 12 hour  Take 30 mg by mouth Every 12 (Twelve) Hours.             oxyCODONE-acetaminophen (PERCOCET)  MG per tablet  Take 1 tablet by mouth Every 6 (Six) Hours As Needed for moderate pain (4-6) (every 4-6 hours).             promethazine (PHENERGAN) 25 MG tablet  Take 25 mg by mouth Every 6 (Six) Hours As Needed for nausea or vomiting.                 Discharge Diet:   Diet Instructions     Diet: Regular; Thin Liquids, No Restrictions       Discharge Diet:  Regular   Fluid Consistency:  Thin Liquids, No Restrictions                 Activity at Discharge:   Activity Instructions     Activity as Tolerated                     Discharge Care Plan/Instructions: Home Health to follow for disease process and strengthening.    Follow-up Appointments:   Dr Reyes one week    Test Results Pending at Discharge: none     Mirna Galvez DO  03/16/17  11:35 AM    Time: 40 minutes           Electronically signed by Mirna Galvez DO at 3/16/2017 11:54 AM

## 2017-03-16 NOTE — PROGRESS NOTES
Continued Stay Note   Bernard     Patient Name: Justyna Lei  MRN: [id number]  Today's Date: 3/16/2017    Admit Date: 3/4/2017          Discharge Plan       03/16/17 1409    Case Management/Social Work Plan    Plan Home    Patient/Family In Agreement With Plan yes    Final Note    Final Note Pt is being d/c'ed home today. She now does not want home health and says her family will be helping her. Faxed the newest oxygen information to Mina at [phone] and informed Camelia [phone]. They have already delivered a tank to her room and will deliver the neb machine to her house.       03/16/17 1354    Case Management/Social Work Plan    Plan Home              Discharge Codes       03/16/17 1410    Discharge Codes    Discharge Codes 01  Discharge to home        Expected Discharge Date and Time     Expected Discharge Date Expected Discharge Time    Mar 16, 2017             IMELDA Juarez

## 2017-03-17 RX ORDER — ALBUTEROL SULFATE 2.5 MG/3ML
2.5 SOLUTION RESPIRATORY (INHALATION) EVERY 6 HOURS PRN
Qty: 120 EACH | Refills: 0 | Status: ON HOLD | OUTPATIENT
Start: 2017-03-17 | End: 2022-08-23 | Stop reason: HOSPADM

## 2017-03-17 NOTE — PLAN OF CARE
Problem: Inpatient Physical Therapy  Goal: Transfer Training Goal 1 LTG- PT  Outcome: Outcome(s) achieved Date Met:  03/17/17 03/07/17 0930 03/17/17 0805   Transfer Training PT LTG   Transfer Training PT LTG, Date Established 03/07/17 --    Transfer Training PT LTG, Time to Achieve by discharge --    Transfer Training PT LTG, Activity Type bed to chair /chair to bed;sit to stand/stand to sit --    Transfer Training PT LTG, Amado Level independent --    Transfer Training PT LTG, Date Goal Reviewed --  03/17/17   Transfer Training PT LTG, Outcome --  goal met       Goal: Gait Training Goal LTG- PT  Outcome: Outcome(s) achieved Date Met:  03/17/17 03/07/17 0930 03/17/17 0805   Gait Training PT LTG   Gait Training Goal PT LTG, Date Established 03/07/17 --    Gait Training Goal PT LTG, Time to Achieve by discharge --    Gait Training Goal PT LTG, Amado Level independent --    Gait Training Goal PT LTG, Distance to Achieve 250 --    Gait Training Goal PT LTG, Date Goal Reviewed --  03/17/17   Gait Training Goal PT LTG, Outcome --  goal met       Goal: Stair Training Goal LTG- PT  Outcome: Unable to achieve outcome(s) by discharge Date Met:  03/17/17 03/07/17 0930 03/17/17 0805   Stair Training PT LTG   Stair Training Goal PT LTG, Date Established 03/07/17 --    Stair Training Goal PT LTG, Time to Achieve by discharge --    Stair Training Goal PT LTG, Number of Steps 2 --    Stair Training Goal PT LTG, Amado Level supervision required --    Stair Training Goal PT LTG, Assist Device 2 handrails --    Stair Training Goal PT LTG, Date Goal Reviewed --  03/17/17   Stair Training Goal PT LTG, Outcome --  goal not met       Goal: Dynamic Standing Balance Goal LTG- PT  Outcome: Outcome(s) achieved Date Met:  03/17/17 03/07/17 0930 03/17/17 0805   Dynamic Standing Balance PT LTG   Dynamic Standing Balance PT LTG, Date Established 03/07/17 --    Dynamic Standing Balance PT LTG, Time to Achieve by  discharge --    Dynamic Standing Balance PT LTG, Miller Level independent --    Dynamic Standing Balance PT LTG, Additional Goal With all functional moblity --    Dynamic Standing Balance PT LTG, Date Goal Reviewed --  03/17/17   Dynamic Standing Balance PT LTG, Outcome --  goal met

## 2017-03-17 NOTE — PAYOR COMM NOTE
"[facility]  JOS RANGEL RN [id number]  FAX [phone]  Jhon Lei (65 y.o. Female) [id number]  DCD HOME 3/16    Date of Birth Social Security Number Address Home Phone MRN    03/25/1951  [id number] [facility] [id number] [phone] [id number]    Quaker Marital Status          None Unknown       Admission Date Admission Type Admitting Provider Attending Provider Department, Room/Bed    3/4/17 Emergency Mirna Galvez DO  [facility] 4C, 491/1    Discharge Date Discharge Disposition Discharge Destination        3/16/2017 Home-Health Care [facility]            Attending Provider: (none)    Allergies:  Aspirin, Demerol [Meperidine], Ibuprofen, Neosporin [Neomycin-bacitracin Zn-polymyx], Penicillins, Sulfa Antibiotics, Codeine, Talwin [Pentazocine], Tetracyclines & Related, Morphine And Related    Isolation:  None   Infection:  None   Code Status:  Prior    Ht:  64.5\" (163.8 cm)   Wt:  159 lb 6.4 oz (72.3 kg)    Admission Cmt:  None   Principal Problem:  None                Active Insurance as of 3/4/2017     Primary Coverage     Payor Plan Insurance Group Employer/Plan Group    ANTHEM MEDICAID ANTH MEDICAID [id number]     Payor Plan Address Payor Plan Phone Number Effective From Effective To    [address] [address] [phone] 1/1/2016     [place] [address]       Subscriber Name Subscriber Birth Date Member ID       JHON LEI 3/25/1951 [id number]                 Emergency Contacts      (Rel.) Home Phone Work Phone Mobile Phone    Ella Guerrero (Daughter) [phone] -- --               Discharge Summary      Mirna Galvez DO at 3/16/2017 11:35 AM              [facility] Medicine Services  DISCHARGE SUMMARY       Date of Admission: 3/4/2017  Date of Discharge:  3/16/2017  Primary Care Physician: No Known Provider    Presenting Problem/History of Present Illness:  Pneumonia of right lower lobe due to " infectious organism [J18.9]  Pneumonia of right lower lobe due to infectious organism [J18.9]     Final Discharge Diagnoses:  Hospital Problem List     COPD (chronic obstructive pulmonary disease)    Hypokalemia    Anemia    Respiratory failure      Discharge Diagnosis    Acute hypercapneic hypoxemic respiratory failure.  COPD, exacerbation  Anemia, chronic disease  Right lower lobe pulmonary nodule  Mild patchy airspace opacities right lung base.     Consults: Pulmonology, Neurology    Procedures Performed:   CT angiogram chest  IMPRESSION:  1. No CT angiographic evidence of pulmonary embolus or acute aortic  pathology.  2. Lower lobe airspace disease, likely atelectasis with small pleural  effusions.  3. Pulmonary emphysema.  Pertinent Test Results:    Specimen: Blood Updated: 03/13/17 0700         Glucose 102 (H) mg/dL         BUN 11 mg/dL         Creatinine 0.85 mg/dL         Sodium 135 mmol/L         Potassium 4.3 mmol/L         Chloride 94 (L) mmol/L         CO2 35.0 (H) mmol/L         Calcium 8.6 mg/dL         Total Protein 6.3 g/dL         Albumin 3.30 (L) g/dL         ALT (SGPT) 15 U/L         AST (SGOT) 16 U/L         Alkaline Phosphatase 50 U/L         Total Bilirubin 0.3 mg/dL         eGFR Non African Amer 67 mL/min/1.73         Globulin 3.0 gm/dL         A/G Ratio 1.1 g/dL         BUN/Creatinine Ratio 12.9         Anion Gap 6.0 mmol/L        CBC Auto Differential [[id number]] (Abnormal) Collected: 03/13/17 0553       Lab Status: Final result Specimen: Blood Updated: 03/13/17 0641        WBC 6.42 10*3/mm3         RBC 3.61 (L) 10*6/mm3         Hemoglobin 11.0 (L) g/dL         Hematocrit 35.0 (L) %         MCV 97.0 fL         MCH 30.5 pg         MCHC 31.4 (L) g/dL         RDW 15.7 (H) %         RDW-SD 55.4 (H) fl         MPV 11.1 fL         Platelets 214 10*3/mm3         Neutrophil % 63.3 %         Lymphocyte % 18.2 %         Monocyte % 15.3 (H) %         Eosinophil % 1.7 %         Basophil % 0.3 %          Immature Grans % 1.2 %         Neutrophils, Absolute 4.06 10*3/mm3         Lymphocytes, Absolute 1.17 10*3/mm3         Monocytes, Absolute 0.98 10*3/mm3         Eosinophils, Absolute 0.11 10*3/mm3         Basophils, Absolute 0.02 10*3/mm3         Immature Grans, Absolute 0.08 (H)          Chief Complaint on Day of Discharge: Still has cough that is frequent with thin clear sputum.  Mucomyst breathing treatment caused nausea.          Hospital Course:  The patient is a 65 y.o. female who presented to [facility] with increased somnolence and breathing difficulties. .    It was felt she may have had mild patchy infiltrate on the chest xray   She was hypercapneic/hypoxemic with underlying COPD.  She was placed in the CCU. She was intubated.  She also initially required pressor agent for maintenance of blood pressure.  The ventilator was weaned.  Sedation weaned and discontinued and subsequently the ventilator was discontinued.   Pressor agent weaned off.  Patient was placed on antibiotics via IV at admission.  She was transferred to the medical floor.  Physical therapy consulted.   Mobilization of patient undertaken.  Patient slowly improved.  Oxygen was weaned.  She does have drops in her oxygen status with activity.  She has significant underlying lung disease.   It is noted that the patient takes significant amounts of narcotic and sedating medications.    She has issues with hypersomnolence that I feel are related to her medications.  During her stay several of her sedating medications were held.  They will not be restarted on discharge.   On day of discharge she is stable.  She continues to require oxygen routinely and will likely do so for the remainder of her life.   A lengthy discussion was held on day prior to discharge and day of discharge regarding smoking cessation.  Patient seems to understand the need for smoking cessation but seems to lack the motivation to attempt.      Condition on  "Discharge:  stable    Physical Exam on Discharge:  Visit Vitals   • /54 (BP Location: Left arm, Patient Position: Lying)   • Pulse 89   • Temp 98.4 °F (36.9 °C) (Temporal Artery )   • Resp 18   • Ht 64.5\" (163.8 cm)   • Wt 159 lb 6.4 oz (72.3 kg)   • LMP  (LMP Unknown)  Comment: hysterectomy   • SpO2 93%   • Breastfeeding No   • BMI 26.94 kg/m2     Physical Exam   Constitutional: She is oriented to person, place, and time. She appears well-developed and well-nourished.   Eyes: Conjunctivae and EOM are normal. Pupils are equal, round, and reactive to light.   Neck: Neck supple.   Cardiovascular: Normal rate and regular rhythm.  Exam reveals no gallop and no friction rub.    No murmur heard.  Pulmonary/Chest: Effort normal and breath sounds normal.   Cough with clear sputum.   Abdominal: Soft. Bowel sounds are normal. There is no hepatosplenomegaly. There is no tenderness.   Musculoskeletal: Normal range of motion. She exhibits no edema.   Neurological: She is alert and oriented to person, place, and time. No cranial nerve deficit.   Skin: Skin is warm and dry.   Psychiatric: Her behavior is normal.   Nursing note and vitals reviewed.        Discharge Disposition:  Home-Health Care [facility]    Discharge Medications:   Lei, [facility]   Home Medication Instructions KELLIE:[id number]    Printed on:03/16/17 8429   Medication Information                      albuterol (PROVENTIL HFA;VENTOLIN HFA) 108 (90 BASE) MCG/ACT inhaler  Inhale 2 puffs Every 4 (Four) Hours As Needed for wheezing.             ALPRAZolam (XANAX) 1 MG tablet  Take 1 mg by mouth 4 (Four) Times a Day As Needed for anxiety.             amphetamine-dextroamphetamine (ADDERALL) 20 MG tablet  Take 20 mg by mouth 2 (Two) Times a Day.             budesonide-formoterol (SYMBICORT) 160-4.5 MCG/ACT inhaler  Inhale 2 puffs 2 (Two) Times a Day.             cetirizine (zyrTEC) 10 MG tablet  Take 10 mg by mouth Daily.             citalopram (CeleXA) 40 MG tablet  Take " 40 mg by mouth Daily.             furosemide (LASIX) 40 MG tablet  Take 40 mg by mouth Daily.             gabapentin (NEURONTIN) 600 MG tablet  Take 600 mg by mouth Every 8 (Eight) Hours.             ipratropium-albuterol (COMBIVENT RESPIMAT)  MCG/ACT inhaler  Inhale 1 puff 4 (Four) Times a Day As Needed for wheezing.             lisinopril (PRINIVIL,ZESTRIL) 5 MG tablet  Take 5 mg by mouth Daily.             nicotine (NICODERM CQ) 21 MG/24HR patch  Place 1 patch on the skin Daily.             ondansetron (ZOFRAN) 4 MG tablet  Take 4 mg by mouth Every 8 (Eight) Hours As Needed for nausea or vomiting.             OxyCODONE HCl ER (oxyCONTIN) 30 MG tablet extended-release 12 hour  Take 30 mg by mouth Every 12 (Twelve) Hours.             oxyCODONE-acetaminophen (PERCOCET)  MG per tablet  Take 1 tablet by mouth Every 6 (Six) Hours As Needed for moderate pain (4-6) (every 4-6 hours).             promethazine (PHENERGAN) 25 MG tablet  Take 25 mg by mouth Every 6 (Six) Hours As Needed for nausea or vomiting.                 Discharge Diet:   Diet Instructions     Diet: Regular; Thin Liquids, No Restrictions       Discharge Diet:  Regular   Fluid Consistency:  Thin Liquids, No Restrictions                 Activity at Discharge:   Activity Instructions     Activity as Tolerated                     Discharge Care Plan/Instructions: Home Health to follow for disease process and strengthening.    Follow-up Appointments:   Dr Reyes one week    Test Results Pending at Discharge: none     Mirna Galvez DO  03/16/17  11:35 AM    Time: 40 minutes           Electronically signed by Mirna Galvez DO at 3/16/2017 11:54 AM

## 2017-03-30 ENCOUNTER — HOSPITAL ENCOUNTER (OUTPATIENT)
Dept: PAIN MANAGEMENT | Age: 67
Discharge: HOME | End: 2017-03-30
Payer: MEDICARE

## 2017-03-30 ENCOUNTER — OFFICE VISIT (OUTPATIENT)
Dept: PRIMARY CARE CLINIC | Age: 67
End: 2017-03-30
Payer: MEDICARE

## 2017-03-30 ENCOUNTER — TELEPHONE (OUTPATIENT)
Dept: PRIMARY CARE CLINIC | Age: 67
End: 2017-03-30

## 2017-03-30 VITALS
BODY MASS INDEX: 23.48 KG/M2 | WEIGHT: 149.6 LBS | DIASTOLIC BLOOD PRESSURE: 78 MMHG | SYSTOLIC BLOOD PRESSURE: 122 MMHG | HEART RATE: 81 BPM | HEIGHT: 67 IN | OXYGEN SATURATION: 96 %

## 2017-03-30 VITALS
HEIGHT: 66 IN | WEIGHT: 148 LBS | SYSTOLIC BLOOD PRESSURE: 151 MMHG | BODY MASS INDEX: 23.78 KG/M2 | OXYGEN SATURATION: 96 % | HEART RATE: 75 BPM | RESPIRATION RATE: 20 BRPM | TEMPERATURE: 98.5 F | DIASTOLIC BLOOD PRESSURE: 77 MMHG

## 2017-03-30 DIAGNOSIS — J18.9 CAP (COMMUNITY ACQUIRED PNEUMONIA): Primary | ICD-10-CM

## 2017-03-30 DIAGNOSIS — K44.9 HIATAL HERNIA: ICD-10-CM

## 2017-03-30 DIAGNOSIS — M54.16 LUMBAR RADICULOPATHY: Primary | ICD-10-CM

## 2017-03-30 DIAGNOSIS — K21.9 GASTROESOPHAGEAL REFLUX DISEASE WITHOUT ESOPHAGITIS: ICD-10-CM

## 2017-03-30 DIAGNOSIS — M54.2 CERVICAL PAIN (NECK): ICD-10-CM

## 2017-03-30 DIAGNOSIS — I10 ESSENTIAL HYPERTENSION: ICD-10-CM

## 2017-03-30 PROCEDURE — 3017F COLORECTAL CA SCREEN DOC REV: CPT | Performed by: FAMILY MEDICINE

## 2017-03-30 PROCEDURE — 1123F ACP DISCUSS/DSCN MKR DOCD: CPT | Performed by: FAMILY MEDICINE

## 2017-03-30 PROCEDURE — G8420 CALC BMI NORM PARAMETERS: HCPCS | Performed by: FAMILY MEDICINE

## 2017-03-30 PROCEDURE — 1090F PRES/ABSN URINE INCON ASSESS: CPT | Performed by: FAMILY MEDICINE

## 2017-03-30 PROCEDURE — G8484 FLU IMMUNIZE NO ADMIN: HCPCS | Performed by: FAMILY MEDICINE

## 2017-03-30 PROCEDURE — 99214 OFFICE O/P EST MOD 30 MIN: CPT

## 2017-03-30 PROCEDURE — 99214 OFFICE O/P EST MOD 30 MIN: CPT | Performed by: FAMILY MEDICINE

## 2017-03-30 PROCEDURE — 4040F PNEUMOC VAC/ADMIN/RCVD: CPT | Performed by: FAMILY MEDICINE

## 2017-03-30 PROCEDURE — G8399 PT W/DXA RESULTS DOCUMENT: HCPCS | Performed by: FAMILY MEDICINE

## 2017-03-30 PROCEDURE — G8427 DOCREV CUR MEDS BY ELIG CLIN: HCPCS | Performed by: FAMILY MEDICINE

## 2017-03-30 PROCEDURE — 3014F SCREEN MAMMO DOC REV: CPT | Performed by: FAMILY MEDICINE

## 2017-03-30 PROCEDURE — 4004F PT TOBACCO SCREEN RCVD TLK: CPT | Performed by: FAMILY MEDICINE

## 2017-03-30 PROCEDURE — 80307 DRUG TEST PRSMV CHEM ANLYZR: CPT

## 2017-03-30 RX ORDER — PROMETHAZINE HYDROCHLORIDE 25 MG/1
25 TABLET ORAL EVERY 6 HOURS PRN
COMMUNITY
End: 2017-08-10 | Stop reason: ALTCHOICE

## 2017-03-30 RX ORDER — OXYCODONE AND ACETAMINOPHEN 10; 325 MG/1; MG/1
TABLET ORAL
Qty: 150 TABLET | Refills: 0 | Status: SHIPPED | OUTPATIENT
Start: 2017-03-30 | End: 2017-04-24 | Stop reason: SDUPTHER

## 2017-03-30 RX ORDER — DEXTROMETHORPHAN HYDROBROMIDE AND PROMETHAZINE HYDROCHLORIDE 15; 6.25 MG/5ML; MG/5ML
5 SYRUP ORAL 4 TIMES DAILY PRN
Qty: 180 ML | Refills: 0 | Status: SHIPPED | OUTPATIENT
Start: 2017-03-30 | End: 2017-03-31 | Stop reason: SDUPTHER

## 2017-03-30 RX ORDER — METOCLOPRAMIDE 5 MG/1
5 TABLET ORAL 4 TIMES DAILY
Qty: 120 TABLET | Refills: 3 | Status: SHIPPED | OUTPATIENT
Start: 2017-03-30

## 2017-03-30 RX ORDER — BUDESONIDE AND FORMOTEROL FUMARATE DIHYDRATE 160; 4.5 UG/1; UG/1
2 AEROSOL RESPIRATORY (INHALATION) 2 TIMES DAILY
COMMUNITY
End: 2017-03-30 | Stop reason: SDUPTHER

## 2017-03-30 RX ORDER — BUDESONIDE AND FORMOTEROL FUMARATE DIHYDRATE 160; 4.5 UG/1; UG/1
2 AEROSOL RESPIRATORY (INHALATION) 2 TIMES DAILY
Qty: 1 INHALER | Refills: 0 | Status: SHIPPED | OUTPATIENT
Start: 2017-03-30 | End: 2017-05-01 | Stop reason: SDUPTHER

## 2017-03-30 RX ORDER — OXYCODONE HYDROCHLORIDE 30 MG/1
30 TABLET, FILM COATED, EXTENDED RELEASE ORAL EVERY 12 HOURS
Qty: 60 EACH | Refills: 0 | Status: SHIPPED | OUTPATIENT
Start: 2017-03-30 | End: 2017-04-24 | Stop reason: SDUPTHER

## 2017-03-30 RX ORDER — OMEPRAZOLE 20 MG/1
20 CAPSULE, DELAYED RELEASE ORAL 2 TIMES DAILY
COMMUNITY
End: 2017-08-10 | Stop reason: ALTCHOICE

## 2017-03-30 ASSESSMENT — PAIN DESCRIPTION - ORIENTATION: ORIENTATION: LOWER

## 2017-03-30 ASSESSMENT — PAIN DESCRIPTION - LOCATION: LOCATION: BACK

## 2017-03-30 ASSESSMENT — PAIN DESCRIPTION - ONSET: ONSET: ON-GOING

## 2017-03-30 ASSESSMENT — PAIN DESCRIPTION - FREQUENCY: FREQUENCY: CONTINUOUS

## 2017-03-30 ASSESSMENT — PAIN SCALES - GENERAL: PAINLEVEL_OUTOF10: 7

## 2017-03-30 ASSESSMENT — PAIN DESCRIPTION - PAIN TYPE: TYPE: CHRONIC PAIN

## 2017-03-30 ASSESSMENT — PAIN DESCRIPTION - PROGRESSION: CLINICAL_PROGRESSION: NOT CHANGED

## 2017-03-30 ASSESSMENT — ACTIVITIES OF DAILY LIVING (ADL): EFFECT OF PAIN ON DAILY ACTIVITIES: DAILY ACTIVITY

## 2017-03-30 ASSESSMENT — PAIN DESCRIPTION - DESCRIPTORS: DESCRIPTORS: ACHING;BURNING;CONSTANT

## 2017-03-31 ENCOUNTER — TELEPHONE (OUTPATIENT)
Dept: PRIMARY CARE CLINIC | Age: 67
End: 2017-03-31

## 2017-03-31 DIAGNOSIS — J18.9 CAP (COMMUNITY ACQUIRED PNEUMONIA): ICD-10-CM

## 2017-03-31 RX ORDER — DEXTROMETHORPHAN HYDROBROMIDE AND PROMETHAZINE HYDROCHLORIDE 15; 6.25 MG/5ML; MG/5ML
5 SYRUP ORAL 4 TIMES DAILY PRN
Qty: 180 ML | Refills: 0 | Status: SHIPPED | OUTPATIENT
Start: 2017-03-31 | End: 2017-04-18 | Stop reason: SDUPTHER

## 2017-03-31 RX ORDER — PANTOPRAZOLE SODIUM 20 MG/1
20 TABLET, DELAYED RELEASE ORAL 2 TIMES DAILY
Qty: 60 TABLET | Refills: 3 | Status: SHIPPED | OUTPATIENT
Start: 2017-03-31 | End: 2017-08-24 | Stop reason: SDUPTHER

## 2017-03-31 ASSESSMENT — ENCOUNTER SYMPTOMS
NAUSEA: 1
ABDOMINAL PAIN: 1
COUGH: 0
SHORTNESS OF BREATH: 0

## 2017-04-05 ENCOUNTER — TELEPHONE (OUTPATIENT)
Dept: PRIMARY CARE CLINIC | Age: 67
End: 2017-04-05

## 2017-04-05 RX ORDER — LEVOFLOXACIN 500 MG/1
500 TABLET, FILM COATED ORAL DAILY
Qty: 10 TABLET | Refills: 0 | Status: SHIPPED | OUTPATIENT
Start: 2017-04-05 | End: 2017-04-15

## 2017-04-06 DIAGNOSIS — J42 CHRONIC BRONCHITIS WITH ACUTE EXACERBATION (HCC): Primary | ICD-10-CM

## 2017-04-06 DIAGNOSIS — J44.9 CHRONIC OBSTRUCTIVE PULMONARY DISEASE, UNSPECIFIED COPD TYPE (HCC): ICD-10-CM

## 2017-04-06 DIAGNOSIS — J20.9 CHRONIC BRONCHITIS WITH ACUTE EXACERBATION (HCC): Primary | ICD-10-CM

## 2017-04-06 LAB
AMPHETAMINES, URINE: NEGATIVE NG/ML
BARBITURATES, URINE: NEGATIVE NG/ML
BENZODIAZEPINES, URINE: ABNORMAL NG/ML
BENZODIAZEPINES, URINE: NEGATIVE NG/ML
CANNABINOIDS, URINE: NEGATIVE NG/ML
COCAINE METABOLITE, URINE: NEGATIVE NG/ML
CREATININE, URINE: 9.9 MG/DL (ref 20–300)
ETHANOL U, QUAN: NEGATIVE %
FENTANYL URINE: NEGATIVE PG/ML
MEPERIDINE, UR: NEGATIVE NG/ML
METHADONE SCREEN, URINE: NEGATIVE NG/ML
OPIATES, URINE: NEGATIVE NG/ML
OXYCODONE/OXYMORPHONE, UR: NEGATIVE NG/ML
PH, URINE: 6.6 (ref 4.5–8.9)
PHENCYCLIDINE, URINE: NEGATIVE NG/ML
PROPOXYPHENE, URINE: NEGATIVE NG/ML
SPECIFIC GRAVITY, URINE: 1

## 2017-04-06 RX ORDER — FLUTICASONE PROPIONATE 50 MCG
1 SPRAY, SUSPENSION (ML) NASAL DAILY
Qty: 1 BOTTLE | Refills: 3 | Status: SHIPPED | OUTPATIENT
Start: 2017-04-06 | End: 2017-10-03 | Stop reason: SDUPTHER

## 2017-04-06 RX ORDER — IPRATROPIUM BROMIDE AND ALBUTEROL SULFATE 2.5; .5 MG/3ML; MG/3ML
1 SOLUTION RESPIRATORY (INHALATION) EVERY 4 HOURS
Qty: 360 ML | Refills: 2 | Status: ON HOLD | OUTPATIENT
Start: 2017-04-06 | End: 2022-08-23 | Stop reason: HOSPADM

## 2017-04-18 ENCOUNTER — TELEPHONE (OUTPATIENT)
Dept: PRIMARY CARE CLINIC | Age: 67
End: 2017-04-18

## 2017-04-18 DIAGNOSIS — J18.9 CAP (COMMUNITY ACQUIRED PNEUMONIA): ICD-10-CM

## 2017-04-18 RX ORDER — DEXTROMETHORPHAN HYDROBROMIDE AND PROMETHAZINE HYDROCHLORIDE 15; 6.25 MG/5ML; MG/5ML
5 SYRUP ORAL 4 TIMES DAILY PRN
Qty: 180 ML | Refills: 0 | Status: SHIPPED | OUTPATIENT
Start: 2017-04-18 | End: 2017-12-01 | Stop reason: SDUPTHER

## 2017-04-24 RX ORDER — OXYCODONE AND ACETAMINOPHEN 10; 325 MG/1; MG/1
TABLET ORAL
Qty: 150 TABLET | Refills: 0 | Status: SHIPPED | OUTPATIENT
Start: 2017-04-29 | End: 2017-05-23 | Stop reason: SDUPTHER

## 2017-04-24 RX ORDER — OXYCODONE HYDROCHLORIDE 30 MG/1
30 TABLET, FILM COATED, EXTENDED RELEASE ORAL EVERY 12 HOURS
Qty: 60 EACH | Refills: 0 | Status: SHIPPED | OUTPATIENT
Start: 2017-04-29 | End: 2017-05-23 | Stop reason: SDUPTHER

## 2017-04-25 DIAGNOSIS — I10 ESSENTIAL HYPERTENSION: ICD-10-CM

## 2017-04-25 RX ORDER — LISINOPRIL 5 MG/1
TABLET ORAL
Qty: 30 TABLET | Refills: 0 | Status: SHIPPED | OUTPATIENT
Start: 2017-04-25 | End: 2017-05-26 | Stop reason: SDUPTHER

## 2017-05-01 DIAGNOSIS — J18.9 CAP (COMMUNITY ACQUIRED PNEUMONIA): ICD-10-CM

## 2017-05-01 RX ORDER — ALBUTEROL SULFATE 90 UG/1
2 AEROSOL, METERED RESPIRATORY (INHALATION) EVERY 6 HOURS PRN
Qty: 3 INHALER | Refills: 11 | Status: SHIPPED | OUTPATIENT
Start: 2017-05-01 | End: 2018-04-27 | Stop reason: SDUPTHER

## 2017-05-01 RX ORDER — BUDESONIDE AND FORMOTEROL FUMARATE DIHYDRATE 160; 4.5 UG/1; UG/1
2 AEROSOL RESPIRATORY (INHALATION) 2 TIMES DAILY
Qty: 1 INHALER | Refills: 11 | Status: SHIPPED | OUTPATIENT
Start: 2017-05-01 | End: 2018-04-27 | Stop reason: SDUPTHER

## 2017-05-03 DIAGNOSIS — J18.9 CAP (COMMUNITY ACQUIRED PNEUMONIA): ICD-10-CM

## 2017-05-12 ENCOUNTER — TELEPHONE (OUTPATIENT)
Dept: PRIMARY CARE CLINIC | Age: 67
End: 2017-05-12

## 2017-05-12 DIAGNOSIS — J01.00 ACUTE NON-RECURRENT MAXILLARY SINUSITIS: ICD-10-CM

## 2017-05-12 RX ORDER — AZITHROMYCIN 250 MG/1
TABLET, FILM COATED ORAL
Qty: 1 PACKET | Refills: 0 | Status: SHIPPED | OUTPATIENT
Start: 2017-05-12 | End: 2017-05-22

## 2017-05-12 RX ORDER — DEXTROMETHORPHAN HYDROBROMIDE AND PROMETHAZINE HYDROCHLORIDE 15; 6.25 MG/5ML; MG/5ML
5 SYRUP ORAL 4 TIMES DAILY PRN
Qty: 240 ML | Refills: 0 | Status: SHIPPED | OUTPATIENT
Start: 2017-05-12 | End: 2017-05-19

## 2017-05-24 RX ORDER — OXYCODONE HYDROCHLORIDE 30 MG/1
30 TABLET, FILM COATED, EXTENDED RELEASE ORAL EVERY 12 HOURS
Qty: 60 EACH | Refills: 0 | Status: SHIPPED | OUTPATIENT
Start: 2017-05-29 | End: 2017-06-28 | Stop reason: SDUPTHER

## 2017-05-24 RX ORDER — OXYCODONE AND ACETAMINOPHEN 10; 325 MG/1; MG/1
TABLET ORAL
Qty: 150 TABLET | Refills: 0 | Status: SHIPPED | OUTPATIENT
Start: 2017-05-29 | End: 2017-06-28 | Stop reason: SDUPTHER

## 2017-05-25 ENCOUNTER — TELEPHONE (OUTPATIENT)
Dept: PRIMARY CARE CLINIC | Age: 67
End: 2017-05-25

## 2017-05-25 DIAGNOSIS — R60.1 GENERALIZED EDEMA: ICD-10-CM

## 2017-05-25 RX ORDER — FUROSEMIDE 40 MG/1
TABLET ORAL
Qty: 30 TABLET | Refills: 0 | Status: SHIPPED | OUTPATIENT
Start: 2017-05-25 | End: 2017-08-10 | Stop reason: ALTCHOICE

## 2017-05-26 DIAGNOSIS — I10 ESSENTIAL HYPERTENSION: Primary | ICD-10-CM

## 2017-05-26 RX ORDER — LISINOPRIL 5 MG/1
TABLET ORAL
Qty: 30 TABLET | Refills: 0 | Status: SHIPPED | OUTPATIENT
Start: 2017-05-26 | End: 2017-09-15 | Stop reason: SDUPTHER

## 2017-06-20 ENCOUNTER — HOSPITAL ENCOUNTER (OUTPATIENT)
Dept: MRI IMAGING | Age: 67
Discharge: HOME | End: 2017-06-20
Payer: MEDICARE

## 2017-06-20 DIAGNOSIS — M54.16 LUMBAR RADICULOPATHY: ICD-10-CM

## 2017-06-20 DIAGNOSIS — M54.2 CERVICAL PAIN (NECK): ICD-10-CM

## 2017-06-20 PROCEDURE — 72148 MRI LUMBAR SPINE W/O DYE: CPT

## 2017-06-28 ENCOUNTER — HOSPITAL ENCOUNTER (OUTPATIENT)
Dept: PAIN MANAGEMENT | Age: 67
Discharge: HOME | End: 2017-06-28
Payer: MEDICARE

## 2017-06-28 VITALS
DIASTOLIC BLOOD PRESSURE: 67 MMHG | HEART RATE: 87 BPM | TEMPERATURE: 97.4 F | SYSTOLIC BLOOD PRESSURE: 132 MMHG | WEIGHT: 146 LBS | RESPIRATION RATE: 18 BRPM | BODY MASS INDEX: 23.46 KG/M2 | HEIGHT: 66 IN | OXYGEN SATURATION: 93 %

## 2017-06-28 PROCEDURE — 99214 OFFICE O/P EST MOD 30 MIN: CPT

## 2017-06-28 RX ORDER — OXYCODONE HYDROCHLORIDE 30 MG/1
30 TABLET, FILM COATED, EXTENDED RELEASE ORAL EVERY 12 HOURS
Qty: 60 EACH | Refills: 0
Start: 2017-06-28 | End: 2017-06-29 | Stop reason: SDUPTHER

## 2017-06-28 RX ORDER — OXYCODONE AND ACETAMINOPHEN 10; 325 MG/1; MG/1
TABLET ORAL
Qty: 150 TABLET | Refills: 0
Start: 2017-06-28 | End: 2017-07-24 | Stop reason: SDUPTHER

## 2017-06-28 ASSESSMENT — PAIN DESCRIPTION - PROGRESSION: CLINICAL_PROGRESSION: NOT CHANGED

## 2017-06-28 ASSESSMENT — PAIN SCALES - GENERAL: PAINLEVEL_OUTOF10: 7

## 2017-06-28 ASSESSMENT — PAIN DESCRIPTION - DIRECTION: RADIATING_TOWARDS: INTO BILATERAL HIPS AND LEGS

## 2017-06-28 ASSESSMENT — PAIN DESCRIPTION - ONSET: ONSET: ON-GOING

## 2017-06-28 ASSESSMENT — PAIN DESCRIPTION - DESCRIPTORS: DESCRIPTORS: ACHING;BURNING;CONSTANT

## 2017-06-28 ASSESSMENT — PAIN DESCRIPTION - PAIN TYPE: TYPE: CHRONIC PAIN

## 2017-06-28 ASSESSMENT — PAIN DESCRIPTION - FREQUENCY: FREQUENCY: CONTINUOUS

## 2017-06-28 ASSESSMENT — ACTIVITIES OF DAILY LIVING (ADL): EFFECT OF PAIN ON DAILY ACTIVITIES: LIMITS ACTIVITY

## 2017-06-28 ASSESSMENT — PAIN DESCRIPTION - ORIENTATION: ORIENTATION: LOWER

## 2017-06-28 ASSESSMENT — PAIN DESCRIPTION - LOCATION: LOCATION: BACK;NECK

## 2017-06-29 RX ORDER — OXYCODONE HYDROCHLORIDE 30 MG/1
30 TABLET, FILM COATED, EXTENDED RELEASE ORAL EVERY 12 HOURS
Qty: 60 EACH | Refills: 0
Start: 2017-06-29 | End: 2017-07-24 | Stop reason: SDUPTHER

## 2017-07-03 ENCOUNTER — TELEPHONE (OUTPATIENT)
Dept: PRIMARY CARE CLINIC | Age: 67
End: 2017-07-03

## 2017-07-03 RX ORDER — MONTELUKAST SODIUM 10 MG/1
10 TABLET ORAL DAILY
Qty: 30 TABLET | Refills: 3 | Status: SHIPPED | OUTPATIENT
Start: 2017-07-03 | End: 2017-08-10 | Stop reason: ALTCHOICE

## 2017-07-25 RX ORDER — OXYCODONE AND ACETAMINOPHEN 10; 325 MG/1; MG/1
TABLET ORAL
Qty: 150 TABLET | Refills: 0 | Status: SHIPPED | OUTPATIENT
Start: 2017-07-28 | End: 2017-08-22 | Stop reason: SDUPTHER

## 2017-07-25 RX ORDER — OXYCODONE HYDROCHLORIDE 30 MG/1
30 TABLET, FILM COATED, EXTENDED RELEASE ORAL EVERY 12 HOURS
Qty: 60 EACH | Refills: 0 | Status: SHIPPED | OUTPATIENT
Start: 2017-07-29 | End: 2017-08-22 | Stop reason: SDUPTHER

## 2017-08-02 ENCOUNTER — TELEPHONE (OUTPATIENT)
Dept: PRIMARY CARE CLINIC | Age: 67
End: 2017-08-02

## 2017-08-10 ENCOUNTER — OFFICE VISIT (OUTPATIENT)
Dept: CARDIOLOGY | Age: 67
End: 2017-08-10
Payer: MEDICARE

## 2017-08-10 VITALS
SYSTOLIC BLOOD PRESSURE: 140 MMHG | WEIGHT: 146 LBS | DIASTOLIC BLOOD PRESSURE: 78 MMHG | BODY MASS INDEX: 23.46 KG/M2 | HEIGHT: 66 IN | HEART RATE: 93 BPM

## 2017-08-10 DIAGNOSIS — F41.9 ANXIETY AND DEPRESSION: ICD-10-CM

## 2017-08-10 DIAGNOSIS — I34.0 NON-RHEUMATIC MITRAL REGURGITATION: ICD-10-CM

## 2017-08-10 DIAGNOSIS — I10 ESSENTIAL HYPERTENSION: Primary | ICD-10-CM

## 2017-08-10 DIAGNOSIS — F32.A ANXIETY AND DEPRESSION: ICD-10-CM

## 2017-08-10 DIAGNOSIS — J43.9 PULMONARY EMPHYSEMA, UNSPECIFIED EMPHYSEMA TYPE (HCC): ICD-10-CM

## 2017-08-10 PROCEDURE — 3017F COLORECTAL CA SCREEN DOC REV: CPT | Performed by: CLINICAL NURSE SPECIALIST

## 2017-08-10 PROCEDURE — G8420 CALC BMI NORM PARAMETERS: HCPCS | Performed by: CLINICAL NURSE SPECIALIST

## 2017-08-10 PROCEDURE — G8926 SPIRO NO PERF OR DOC: HCPCS | Performed by: CLINICAL NURSE SPECIALIST

## 2017-08-10 PROCEDURE — 4040F PNEUMOC VAC/ADMIN/RCVD: CPT | Performed by: CLINICAL NURSE SPECIALIST

## 2017-08-10 PROCEDURE — 3014F SCREEN MAMMO DOC REV: CPT | Performed by: CLINICAL NURSE SPECIALIST

## 2017-08-10 PROCEDURE — 4004F PT TOBACCO SCREEN RCVD TLK: CPT | Performed by: CLINICAL NURSE SPECIALIST

## 2017-08-10 PROCEDURE — 1123F ACP DISCUSS/DSCN MKR DOCD: CPT | Performed by: CLINICAL NURSE SPECIALIST

## 2017-08-10 PROCEDURE — 99213 OFFICE O/P EST LOW 20 MIN: CPT | Performed by: CLINICAL NURSE SPECIALIST

## 2017-08-10 PROCEDURE — G8399 PT W/DXA RESULTS DOCUMENT: HCPCS | Performed by: CLINICAL NURSE SPECIALIST

## 2017-08-10 PROCEDURE — 93000 ELECTROCARDIOGRAM COMPLETE: CPT | Performed by: CLINICAL NURSE SPECIALIST

## 2017-08-10 PROCEDURE — G8427 DOCREV CUR MEDS BY ELIG CLIN: HCPCS | Performed by: CLINICAL NURSE SPECIALIST

## 2017-08-10 PROCEDURE — 1090F PRES/ABSN URINE INCON ASSESS: CPT | Performed by: CLINICAL NURSE SPECIALIST

## 2017-08-10 PROCEDURE — 3023F SPIROM DOC REV: CPT | Performed by: CLINICAL NURSE SPECIALIST

## 2017-08-23 RX ORDER — OXYCODONE HYDROCHLORIDE 30 MG/1
30 TABLET, FILM COATED, EXTENDED RELEASE ORAL EVERY 12 HOURS
Qty: 60 EACH | Refills: 0 | Status: SHIPPED | OUTPATIENT
Start: 2017-08-28 | End: 2017-09-22 | Stop reason: SDUPTHER

## 2017-08-23 RX ORDER — OXYCODONE AND ACETAMINOPHEN 10; 325 MG/1; MG/1
TABLET ORAL
Qty: 150 TABLET | Refills: 0 | Status: SHIPPED | OUTPATIENT
Start: 2017-08-28 | End: 2017-09-22 | Stop reason: SDUPTHER

## 2017-08-25 RX ORDER — PANTOPRAZOLE SODIUM 20 MG/1
TABLET, DELAYED RELEASE ORAL
Qty: 60 TABLET | Refills: 3 | Status: SHIPPED | OUTPATIENT
Start: 2017-08-25 | End: 2017-12-16 | Stop reason: SDUPTHER

## 2017-09-15 DIAGNOSIS — I10 ESSENTIAL HYPERTENSION: ICD-10-CM

## 2017-09-16 ENCOUNTER — OFFICE VISIT (OUTPATIENT)
Dept: URGENT CARE | Age: 67
End: 2017-09-16
Payer: MEDICARE

## 2017-09-16 VITALS
WEIGHT: 147 LBS | SYSTOLIC BLOOD PRESSURE: 118 MMHG | OXYGEN SATURATION: 93 % | BODY MASS INDEX: 23.73 KG/M2 | TEMPERATURE: 98.7 F | HEART RATE: 80 BPM | RESPIRATION RATE: 18 BRPM | DIASTOLIC BLOOD PRESSURE: 74 MMHG

## 2017-09-16 DIAGNOSIS — J06.9 URI WITH COUGH AND CONGESTION: Primary | ICD-10-CM

## 2017-09-16 PROCEDURE — 4004F PT TOBACCO SCREEN RCVD TLK: CPT | Performed by: PHYSICIAN ASSISTANT

## 2017-09-16 PROCEDURE — 99213 OFFICE O/P EST LOW 20 MIN: CPT | Performed by: PHYSICIAN ASSISTANT

## 2017-09-16 PROCEDURE — 4040F PNEUMOC VAC/ADMIN/RCVD: CPT | Performed by: PHYSICIAN ASSISTANT

## 2017-09-16 PROCEDURE — 1090F PRES/ABSN URINE INCON ASSESS: CPT | Performed by: PHYSICIAN ASSISTANT

## 2017-09-16 PROCEDURE — G8420 CALC BMI NORM PARAMETERS: HCPCS | Performed by: PHYSICIAN ASSISTANT

## 2017-09-16 PROCEDURE — 3014F SCREEN MAMMO DOC REV: CPT | Performed by: PHYSICIAN ASSISTANT

## 2017-09-16 PROCEDURE — 96372 THER/PROPH/DIAG INJ SC/IM: CPT | Performed by: PHYSICIAN ASSISTANT

## 2017-09-16 PROCEDURE — 3017F COLORECTAL CA SCREEN DOC REV: CPT | Performed by: PHYSICIAN ASSISTANT

## 2017-09-16 PROCEDURE — G8399 PT W/DXA RESULTS DOCUMENT: HCPCS | Performed by: PHYSICIAN ASSISTANT

## 2017-09-16 PROCEDURE — 1123F ACP DISCUSS/DSCN MKR DOCD: CPT | Performed by: PHYSICIAN ASSISTANT

## 2017-09-16 PROCEDURE — G8427 DOCREV CUR MEDS BY ELIG CLIN: HCPCS | Performed by: PHYSICIAN ASSISTANT

## 2017-09-16 RX ORDER — BENZONATATE 100 MG/1
100 CAPSULE ORAL 3 TIMES DAILY PRN
Qty: 21 CAPSULE | Refills: 0 | Status: SHIPPED | OUTPATIENT
Start: 2017-09-16 | End: 2017-09-23

## 2017-09-16 RX ORDER — METHYLPREDNISOLONE SODIUM SUCCINATE 125 MG/2ML
100 INJECTION, POWDER, LYOPHILIZED, FOR SOLUTION INTRAMUSCULAR; INTRAVENOUS ONCE
Status: COMPLETED | OUTPATIENT
Start: 2017-09-16 | End: 2017-09-16

## 2017-09-16 RX ORDER — LEVOFLOXACIN 500 MG/1
500 TABLET, FILM COATED ORAL DAILY
Qty: 7 TABLET | Refills: 0 | Status: SHIPPED | OUTPATIENT
Start: 2017-09-16 | End: 2017-09-23

## 2017-09-16 RX ADMIN — METHYLPREDNISOLONE SODIUM SUCCINATE 100 MG: 125 INJECTION, POWDER, LYOPHILIZED, FOR SOLUTION INTRAMUSCULAR; INTRAVENOUS at 16:47

## 2017-09-16 ASSESSMENT — ENCOUNTER SYMPTOMS
SORE THROAT: 1
EYES NEGATIVE: 1
GASTROINTESTINAL NEGATIVE: 1
SHORTNESS OF BREATH: 0
WHEEZING: 0
COUGH: 1

## 2017-09-18 RX ORDER — LISINOPRIL 5 MG/1
TABLET ORAL
Qty: 30 TABLET | Refills: 5 | Status: SHIPPED | OUTPATIENT
Start: 2017-09-18 | End: 2018-04-10 | Stop reason: SDUPTHER

## 2017-09-25 RX ORDER — OXYCODONE AND ACETAMINOPHEN 10; 325 MG/1; MG/1
TABLET ORAL
Qty: 150 TABLET | Refills: 0 | Status: SHIPPED | OUTPATIENT
Start: 2017-09-27 | End: 2017-10-23 | Stop reason: SDUPTHER

## 2017-09-25 RX ORDER — OXYCODONE HYDROCHLORIDE 30 MG/1
30 TABLET, FILM COATED, EXTENDED RELEASE ORAL EVERY 12 HOURS
Qty: 60 EACH | Refills: 0 | Status: SHIPPED | OUTPATIENT
Start: 2017-09-27 | End: 2017-10-23 | Stop reason: SDUPTHER

## 2017-10-04 RX ORDER — FLUTICASONE PROPIONATE 50 MCG
SPRAY, SUSPENSION (ML) NASAL
Qty: 1 BOTTLE | Refills: 3 | Status: SHIPPED | OUTPATIENT
Start: 2017-10-04

## 2017-10-06 RX ORDER — TIZANIDINE 4 MG/1
4 TABLET ORAL 3 TIMES DAILY PRN
Qty: 90 TABLET | Refills: 2 | Status: SHIPPED | OUTPATIENT
Start: 2017-10-06 | End: 2018-01-10 | Stop reason: SDUPTHER

## 2017-10-24 RX ORDER — OXYCODONE HYDROCHLORIDE 30 MG/1
30 TABLET, FILM COATED, EXTENDED RELEASE ORAL EVERY 12 HOURS
Qty: 60 EACH | Refills: 0 | Status: SHIPPED | OUTPATIENT
Start: 2017-10-27 | End: 2017-11-21 | Stop reason: SDUPTHER

## 2017-10-24 RX ORDER — OXYCODONE AND ACETAMINOPHEN 10; 325 MG/1; MG/1
TABLET ORAL
Qty: 150 TABLET | Refills: 0 | Status: SHIPPED | OUTPATIENT
Start: 2017-10-27 | End: 2017-11-21 | Stop reason: SDUPTHER

## 2017-10-26 RX ORDER — DEXTROMETHORPHAN HYDROBROMIDE AND PROMETHAZINE HYDROCHLORIDE 15; 6.25 MG/5ML; MG/5ML
5 SYRUP ORAL 4 TIMES DAILY PRN
Qty: 180 ML | Refills: 0 | Status: SHIPPED | OUTPATIENT
Start: 2017-10-26 | End: 2017-11-02

## 2017-11-22 RX ORDER — OXYCODONE HYDROCHLORIDE 30 MG/1
30 TABLET, FILM COATED, EXTENDED RELEASE ORAL EVERY 12 HOURS
Qty: 18 EACH | Refills: 0 | Status: SHIPPED | OUTPATIENT
Start: 2017-11-26 | End: 2017-12-05 | Stop reason: SDUPTHER

## 2017-11-22 RX ORDER — OXYCODONE AND ACETAMINOPHEN 10; 325 MG/1; MG/1
TABLET ORAL
Qty: 45 TABLET | Refills: 0 | Status: SHIPPED | OUTPATIENT
Start: 2017-11-26 | End: 2017-12-05 | Stop reason: SDUPTHER

## 2017-12-01 DIAGNOSIS — J18.9 CAP (COMMUNITY ACQUIRED PNEUMONIA): ICD-10-CM

## 2017-12-04 RX ORDER — DEXTROMETHORPHAN HYDROBROMIDE AND PROMETHAZINE HYDROCHLORIDE 15; 6.25 MG/5ML; MG/5ML
5 SYRUP ORAL 4 TIMES DAILY PRN
Qty: 180 ML | Refills: 0 | Status: SHIPPED | OUTPATIENT
Start: 2017-12-04 | End: 2017-12-11

## 2017-12-05 ENCOUNTER — HOSPITAL ENCOUNTER (OUTPATIENT)
Dept: PAIN MANAGEMENT | Age: 67
Discharge: HOME | End: 2017-12-05
Payer: MEDICARE

## 2017-12-05 VITALS
WEIGHT: 144 LBS | RESPIRATION RATE: 18 BRPM | HEIGHT: 66 IN | BODY MASS INDEX: 23.14 KG/M2 | OXYGEN SATURATION: 93 % | SYSTOLIC BLOOD PRESSURE: 158 MMHG | HEART RATE: 92 BPM | TEMPERATURE: 97.2 F | DIASTOLIC BLOOD PRESSURE: 83 MMHG

## 2017-12-05 DIAGNOSIS — G89.29 CHRONIC BILATERAL LOW BACK PAIN WITHOUT SCIATICA: ICD-10-CM

## 2017-12-05 DIAGNOSIS — M54.2 CERVICALGIA: ICD-10-CM

## 2017-12-05 DIAGNOSIS — M54.50 CHRONIC BILATERAL LOW BACK PAIN WITHOUT SCIATICA: ICD-10-CM

## 2017-12-05 PROCEDURE — 80307 DRUG TEST PRSMV CHEM ANLYZR: CPT

## 2017-12-05 PROCEDURE — 99214 OFFICE O/P EST MOD 30 MIN: CPT

## 2017-12-05 RX ORDER — OXYCODONE HYDROCHLORIDE 30 MG/1
30 TABLET, FILM COATED, EXTENDED RELEASE ORAL EVERY 12 HOURS
Qty: 60 EACH | Refills: 0 | Status: SHIPPED | OUTPATIENT
Start: 2017-12-05 | End: 2017-12-29 | Stop reason: SDUPTHER

## 2017-12-05 RX ORDER — OXYCODONE AND ACETAMINOPHEN 10; 325 MG/1; MG/1
TABLET ORAL
Qty: 150 TABLET | Refills: 0 | Status: SHIPPED | OUTPATIENT
Start: 2017-12-05 | End: 2017-12-29 | Stop reason: SDUPTHER

## 2017-12-05 ASSESSMENT — PAIN DESCRIPTION - LOCATION: LOCATION: BACK;NECK

## 2017-12-05 ASSESSMENT — PAIN DESCRIPTION - ONSET: ONSET: ON-GOING

## 2017-12-05 ASSESSMENT — PAIN DESCRIPTION - DESCRIPTORS: DESCRIPTORS: ACHING;BURNING;CONSTANT

## 2017-12-05 ASSESSMENT — PAIN DESCRIPTION - DIRECTION: RADIATING_TOWARDS: INTO BILATERAL HIPS AND LEGS

## 2017-12-05 ASSESSMENT — PAIN SCALES - GENERAL: PAINLEVEL_OUTOF10: 8

## 2017-12-05 ASSESSMENT — PAIN DESCRIPTION - PAIN TYPE: TYPE: CHRONIC PAIN

## 2017-12-05 ASSESSMENT — PAIN DESCRIPTION - ORIENTATION: ORIENTATION: LOWER;MID

## 2017-12-05 ASSESSMENT — ACTIVITIES OF DAILY LIVING (ADL): EFFECT OF PAIN ON DAILY ACTIVITIES: LIMITS ACTIVITY

## 2017-12-05 ASSESSMENT — PAIN DESCRIPTION - PROGRESSION: CLINICAL_PROGRESSION: NOT CHANGED

## 2017-12-05 ASSESSMENT — PAIN DESCRIPTION - FREQUENCY: FREQUENCY: CONTINUOUS

## 2017-12-05 NOTE — PROGRESS NOTES
activity: Not Asked     Other Topics Concern    None     Social History Narrative    None      reports that she does not use drugs. History   Alcohol Use No       Tobacco Use:    She was counseled regarding smoking cessation    Review of Systems:  Other than stated above in the HPI, is negative     UDS done today? yes  Are you currently pregnant? no     [facility] compliant? yes  Concern for prescription abuse? no          Past Medical History      Diagnosis Date    ADHD (attention deficit hyperactivity disorder)     Anxiety     [facility]     Depression     Fibromyalgia     GERD (gastroesophageal reflux disease)     Hypertension     RA (rheumatoid arthritis) ([facility] [address]        Surgical History  Past Surgical History:   Procedure Laterality Date    CHOLECYSTECTOMY      HERNIA REPAIR      HYSTERECTOMY      LEG SURGERY      steel garth placement. 2009       Medications  Current Outpatient Prescriptions   Medication Sig Dispense Refill    oxyCODONE-acetaminophen (PERCOCET)  MG per tablet TAKE 1 TABLET Q 4 TO 6 HRS PRN. (MAX 5/DAY). Earliest Fill Date: 12/5/17 150 tablet 0    oxyCODONE (OXYCONTIN) 30 MG T12A extended release tablet Take 30 mg by mouth every 12 hours .  Earliest Fill Date: 12/5/17 60 each 0    promethazine-dextromethorphan (PROMETHAZINE-DM) 6.25-15 MG/5ML syrup TAKE 5 MLS BY MOUTH 4 TIMES DAILY AS NEEDED FOR COUGH 180 mL 0    tiZANidine (ZANAFLEX) 4 MG tablet Take 1 tablet by mouth 3 times daily as needed (.) 90 tablet 2    fluticasone (FLONASE) 50 MCG/ACT nasal spray INSTILL 1 SPRAY IN EACH NOSTRIL DAILY 1 Bottle 3    lisinopril (PRINIVIL;ZESTRIL) 5 MG tablet TAKE 1 TABLET BY MOUTH DAILY 30 tablet 5    OXYGEN Inhale into the lungs      pantoprazole (PROTONIX) 20 MG tablet TAKE 1 TABLET BY MOUTH 2 TIMES DAILY 60 tablet 3    albuterol-ipratropium (COMBIVENT RESPIMAT)  MCG/ACT AERS inhaler Inhale 1 puff into the lungs every 6 hours 1 Inhaler 11 UDS:  Lab Results   Component Value Date    BARBITURATES Negative 03/30/2017    BENZODIAZURI SEE BELOW 03/30/2017    BENZODIAZURI Negative 03/30/2017    OPIAU Negative 03/30/2017    OXYCOOXYMO Negative 03/30/2017    PHENCYCU Negative 03/30/2017    LABMETH Negative 03/30/2017    PPXUR Negative 03/30/2017    MEPERIDU Negative 03/30/2017    FENTU Negative 03/30/2017    ETHUQN Negative 03/30/2017    CANNANBINURI Negative 03/30/2017    AMPHETUR Negative 03/30/2017    COCAINEMETUR Negative 03/30/2017       Physical therapy during the last 6 months: no    Injections for pain control discussed: yes    ASSESSMENT  Patient Active Problem List   Diagnosis    Chronic pain    Anxiety and depression    GERD (gastroesophageal reflux disease)    [facility]chronic obstructive pulmonary disease) ([facility])    Chronic bronchitis with acute exacerbation (HCC)    Hypertension    Echocardiogram abnormal    COPD (chronic obstructive pulmonary disease) (HCC)    Low back pain  cervicalgia    PLAN  1. Patient is to call with any questions or concerns which may arise prior to the next office visit   2. Continue current dosage of Oxycontin and Percocet per [facility] fill date   3. F/U appointment in 3 months with me    Discussion:  Patient still needs cervical MRI    Education Provided:  [x] Review of Leida Lees [x] Agreement Review [] Compliance Issues Discussed   [] Cognitive Behavior Needs [x] Exercise [] Review of Test [] Financial Issues  [] Tobacco/Alcohol Use [x] Teaching [] New Patient [] Picture Obtained    Controlled Substance Monitoring:   Discussed with patient possible medication side effects, risk of tolerance and/or dependence, and alternative treatments. Discussed the growing epidemic in the [address] [place] [address] with the overprescribing and at times the abuse of narcotics. Discussed the detrimental effects of long term narcotic use in younger patients. Patient encouraged to set daily goals of exercising and decreasing daily narcotic intake. Discussed with the patient about the development of hyperalgesia with long term narcotic intake.      Electronically signed by TEN Thomason on 12/5/2017 at 2:46 PM

## 2017-12-05 NOTE — PROGRESS NOTES
Nursing Admission Record    Current Issues / Falls / ER Visits:  No    Percentage of Pain Relief after Last Procedure:  na %    How long lasted:  0 days    Radiology exams received during the last 12 months: No       When na                                              Where na       Imaging on chart: No         Imaging records requested: No  MRI exams received in the past 2 years:  Yes MRI Lumbar Spine 6/2017  Physical therapy during the last 6 months: No       When: na                                             Where  na  Labs during the last 12 months: Yes    Education Provided:  [x] Review of Bettyann Rides  [x] Agreement Review  [x] Compliance Issues Discussed    [] Cognitive Behavior Needs [x] Exercise [] Review of Test [] Financial Issues  [] Tobacco/Alcohol Use [x] Teaching [] New Patient [] Picture Obtained    Physician Plan:  [] Outgoing Referral  [] Pharmacy Consult  [] Test Ordered   [] Obtained Test Results / Consult Notes  [] UDS due at next visit, verified per EPIC      [] Suspected Physical Abuse or Suicide Risk assessed - IF YES COMPLETE QUESTIONS BELOW    If any of the following questions are answered yes - contact attending physician for referral:    Has been considering harming self to escape stress, pain problems? [] YES  [x] NO  Has a suicide plan? [] YES  [x] NO  Has attempted suicide in the past?   [] YES  [x] NO  Has a close friend or family member who committed suicide? [] YES  [x] NO    Patient Referred To :      Additional Notes:    Assessment Completed by:  Electronically signed by Teto Garner RN on 12/5/2017 at 2:25 PM

## 2017-12-12 LAB
ALPRAZOLAM, URINE CONFIRM: 341 NG/ML
ALPRAZOLAM, URINE: POSITIVE
AMPHETAMINES, URINE: NEGATIVE NG/ML
BARBITURATES, URINE: NEGATIVE NG/ML
BENZODIAZEPINES, URINE: ABNORMAL NG/ML
BENZODIAZEPINES, URINE: POSITIVE NG/ML
CANNABINOIDS, URINE: NEGATIVE NG/ML
CLONAZEPAM, URINE: NEGATIVE
COCAINE METABOLITE, URINE: NEGATIVE NG/ML
CREATININE, URINE: 32.5 MG/DL (ref 20–300)
ETHANOL U, QUAN: NEGATIVE %
FENTANYL URINE: NEGATIVE PG/ML
FLURAZEPAM, UR: NEGATIVE
LORAZEPAM, URINE: NEGATIVE
MEPERIDINE, UR: NEGATIVE NG/ML
METHADONE SCREEN, URINE: NEGATIVE NG/ML
MIDAZOLAM, URINE: NEGATIVE
NORDIAZEPAM, URINE: NEGATIVE
OPIATES, URINE: NEGATIVE NG/ML
OXAZEPAM, URINE: NEGATIVE
OXYCODONE URINE: POSITIVE
OXYCODONE, UR GC/MS: 247 NG/ML
OXYCODONE/OXYMORPH, UR: POSITIVE
OXYCODONE/OXYMORPHONE, UR: ABNORMAL NG/ML
OXYMORPHONE, URINE: NEGATIVE
PH, URINE: 6.4 (ref 4.5–8.9)
PHENCYCLIDINE, URINE: NEGATIVE NG/ML
PROPOXYPHENE, URINE: NEGATIVE NG/ML
TEMAZEPAM, URINE: NEGATIVE
TRIAZOLAM, URINE: NEGATIVE

## 2017-12-13 RX ORDER — NICOTINE 21 MG/24HR
1 PATCH, TRANSDERMAL 24 HOURS TRANSDERMAL
COMMUNITY
Start: 2017-03-16 | End: 2017-12-13 | Stop reason: SDUPTHER

## 2017-12-13 RX ORDER — NICOTINE 21 MG/24HR
1 PATCH, TRANSDERMAL 24 HOURS TRANSDERMAL EVERY 24 HOURS
Qty: 30 PATCH | Refills: 2 | Status: SHIPPED | OUTPATIENT
Start: 2017-12-13 | End: 2018-05-01

## 2017-12-18 RX ORDER — PANTOPRAZOLE SODIUM 20 MG/1
TABLET, DELAYED RELEASE ORAL
Qty: 60 TABLET | Refills: 3 | Status: SHIPPED | OUTPATIENT
Start: 2017-12-18

## 2018-01-05 RX ORDER — OXYCODONE AND ACETAMINOPHEN 10; 325 MG/1; MG/1
TABLET ORAL
Qty: 150 TABLET | Refills: 0
Start: 2018-01-05 | End: 2018-01-29 | Stop reason: SDUPTHER

## 2018-01-05 RX ORDER — OXYCODONE HYDROCHLORIDE 30 MG/1
30 TABLET, FILM COATED, EXTENDED RELEASE ORAL EVERY 12 HOURS
Qty: 60 EACH | Refills: 0
Start: 2018-01-05 | End: 2018-01-29 | Stop reason: SDUPTHER

## 2018-01-10 RX ORDER — TIZANIDINE 4 MG/1
4 TABLET ORAL 3 TIMES DAILY PRN
Qty: 90 TABLET | Refills: 2 | Status: SHIPPED | OUTPATIENT
Start: 2018-01-10 | End: 2018-04-27 | Stop reason: SDUPTHER

## 2018-01-30 RX ORDER — OXYCODONE AND ACETAMINOPHEN 10; 325 MG/1; MG/1
TABLET ORAL
Qty: 150 TABLET | Refills: 0 | Status: SHIPPED | OUTPATIENT
Start: 2018-02-03 | End: 2018-03-05 | Stop reason: SDUPTHER

## 2018-01-30 RX ORDER — OXYCODONE HYDROCHLORIDE 30 MG/1
30 TABLET, FILM COATED, EXTENDED RELEASE ORAL EVERY 12 HOURS
Qty: 60 EACH | Refills: 0 | Status: SHIPPED | OUTPATIENT
Start: 2018-02-03 | End: 2018-03-05 | Stop reason: SDUPTHER

## 2018-03-08 RX ORDER — OXYCODONE HYDROCHLORIDE 30 MG/1
30 TABLET, FILM COATED, EXTENDED RELEASE ORAL EVERY 12 HOURS
Qty: 32 EACH | Refills: 0
Start: 2018-03-08 | End: 2018-03-21 | Stop reason: SDUPTHER

## 2018-03-08 RX ORDER — OXYCODONE AND ACETAMINOPHEN 10; 325 MG/1; MG/1
TABLET ORAL
Qty: 80 TABLET | Refills: 0
Start: 2018-03-08 | End: 2018-03-21 | Stop reason: SDUPTHER

## 2018-03-21 ENCOUNTER — HOSPITAL ENCOUNTER (OUTPATIENT)
Dept: PAIN MANAGEMENT | Age: 68
Discharge: HOME | End: 2018-03-21
Payer: MEDICARE

## 2018-03-21 PROCEDURE — 99213 OFFICE O/P EST LOW 20 MIN: CPT

## 2018-03-21 RX ORDER — OXYCODONE HYDROCHLORIDE 30 MG/1
30 TABLET, FILM COATED, EXTENDED RELEASE ORAL EVERY 12 HOURS
Qty: 60 EACH | Refills: 0 | Status: SHIPPED | OUTPATIENT
Start: 2018-03-21 | End: 2018-04-16 | Stop reason: SDUPTHER

## 2018-03-21 RX ORDER — OXYCODONE AND ACETAMINOPHEN 10; 325 MG/1; MG/1
TABLET ORAL
Qty: 150 TABLET | Refills: 0 | Status: SHIPPED | OUTPATIENT
Start: 2018-03-21 | End: 2018-04-16 | Stop reason: SDUPTHER

## 2018-03-21 NOTE — PROGRESS NOTES
every 6 hours as needed for Wheezing 3 Inhaler 11    budesonide-formoterol (SYMBICORT) 160-4.5 MCG/ACT AERO Inhale 2 puffs into the lungs 2 times daily 1 Inhaler 11    ipratropium-albuterol (DUONEB) 0.5-2.5 (3) MG/3ML SOLN nebulizer solution Inhale 3 mLs into the lungs every 4 hours 360 mL 2    metoclopramide (REGLAN) 5 MG tablet Take 1 tablet by mouth 4 times daily 120 tablet 3    albuterol (PROVENTIL) (2.5 MG/3ML) 0.083% nebulizer solution Take 3 mLs by nebulization every 6 hours as needed for Wheezing 120 each 0    ondansetron (ZOFRAN) 4 MG tablet Take 1 tablet by mouth every 8 hours as needed for Nausea or Vomiting 30 tablet 0    gabapentin (NEURONTIN) 600 MG tablet Take 600 mg by mouth 3 times daily      ALPRAZolam (XANAX) 1 MG tablet Take 1 mg by mouth nightly as needed Takes 4 times a day  1    amphetamine-dextroamphetamine (ADDERALL) 20 MG tablet Take 20 mg by mouth daily. 0    zolpidem (AMBIEN) 10 MG tablet Take 5 mg by mouth nightly as needed. 0    citalopram (CELEXA) 40 MG tablet Take 40 mg by mouth daily. No current facility-administered medications for this encounter. Allergies  Aspirin; Codeine; Demerol; Neosporin [bacitracin-neomycin-polymyxin]; Nsaids; Pcn [penicillins]; Pentazocine lactate; Sulfa antibiotics; Talwin [pentazocine]; and Influenza vaccines    Family History  family history includes Cancer in her father. Social History  Social History     Social History    Marital status:      Spouse name: N/A    Number of children: N/A    Years of education: N/A     Social History Main Topics    Smoking status: Current Some Day Smoker     Packs/day: 0.25     Years: 30.00     Types: Cigarettes    Smokeless tobacco: Never Used    Alcohol use No    Drug use: No    Sexual activity: Not Asked     Other Topics Concern    None     Social History Narrative    None      reports that she does not use drugs.         REVIEW OF SYSTEMS:  ROS         GENERAL PHYSICAL

## 2018-03-21 NOTE — PROGRESS NOTES
Patient still feeling depressed. She is seeing a psychiatrist- Dr Tamie Maki. Patient needs a total knee and garth removed from [address]  Patient Pain Assessment  Consultation - @ATTENDING ZVYZHFH@    Primary Care Physician: Sabrina Childers MD    Chief complaint:   Chief Complaint   Patient presents with    Lower Back Pain    Neck Pain    Leg Pain     BILATERAL   . HISTORY OF PRESENT ILLNESS:  Jonathan Mcneill is 69 y.o. female with    HPI    [employer] compliant? yes  Concern for prescription abuse?no    Current Pain Assessment                                 Past Medical History      Diagnosis Date    ADHD (attention deficit hyperactivity disorder)     Anxiety     [facility]     Depression     Fibromyalgia     GERD (gastroesophageal reflux disease)     Hypertension     RA (rheumatoid arthritis) ([facility] [address]        Surgical History  Past Surgical History:   Procedure Laterality Date    CHOLECYSTECTOMY      HERNIA REPAIR      HYSTERECTOMY      LEG SURGERY      steel garth placement. 2009       Medications  Current Outpatient Prescriptions   Medication Sig Dispense Refill    oxyCODONE (OXYCONTIN) 30 MG T12A extended release tablet Take 30 mg by mouth every 12 hours for 30 days. Earliest Fill Date: 3/21/18 60 each 0    oxyCODONE-acetaminophen (PERCOCET)  MG per tablet TAKE 1 TABLET Q 4 TO 6 HRS PRN. (MAX 5/DAY).  Earliest Fill Date: 3/21/18 150 tablet 0    tiZANidine (ZANAFLEX) 4 MG tablet Take 1 tablet by mouth 3 times daily as needed (.) 90 tablet 2    pantoprazole (PROTONIX) 20 MG tablet TAKE 1 TABLET BY MOUTH 2 TIMES DAILY 60 tablet 3    nicotine (NICODERM CQ) 21 MG/24HR Place 1 patch onto the skin every 24 hours 30 patch 2    fluticasone (FLONASE) 50 MCG/ACT nasal spray INSTILL 1 SPRAY IN EACH NOSTRIL DAILY 1 Bottle 3    lisinopril (PRINIVIL;ZESTRIL) 5 MG tablet TAKE 1 TABLET BY MOUTH DAILY 30 tablet 5    OXYGEN Inhale into the lungs  albuterol-ipratropium (COMBIVENT RESPIMAT)  MCG/ACT AERS inhaler Inhale 1 puff into the lungs every 6 hours 1 Inhaler 11    albuterol sulfate HFA (VENTOLIN HFA) 108 (90 BASE) MCG/ACT inhaler Inhale 2 puffs into the lungs every 6 hours as needed for Wheezing 3 Inhaler 11    budesonide-formoterol (SYMBICORT) 160-4.5 MCG/ACT AERO Inhale 2 puffs into the lungs 2 times daily 1 Inhaler 11    ipratropium-albuterol (DUONEB) 0.5-2.5 (3) MG/3ML SOLN nebulizer solution Inhale 3 mLs into the lungs every 4 hours 360 mL 2    metoclopramide (REGLAN) 5 MG tablet Take 1 tablet by mouth 4 times daily 120 tablet 3    albuterol (PROVENTIL) (2.5 MG/3ML) 0.083% nebulizer solution Take 3 mLs by nebulization every 6 hours as needed for Wheezing 120 each 0    ondansetron (ZOFRAN) 4 MG tablet Take 1 tablet by mouth every 8 hours as needed for Nausea or Vomiting 30 tablet 0    gabapentin (NEURONTIN) 600 MG tablet Take 600 mg by mouth 3 times daily      ALPRAZolam (XANAX) 1 MG tablet Take 1 mg by mouth nightly as needed Takes 4 times a day  1    amphetamine-dextroamphetamine (ADDERALL) 20 MG tablet Take 20 mg by mouth daily. 0    zolpidem (AMBIEN) 10 MG tablet Take 5 mg by mouth nightly as needed. 0    citalopram (CELEXA) 40 MG tablet Take 40 mg by mouth daily. No current facility-administered medications for this encounter. Allergies  Aspirin; Codeine; Demerol; Neosporin [bacitracin-neomycin-polymyxin]; Nsaids; Pcn [penicillins]; Pentazocine lactate; Sulfa antibiotics; Talwin [pentazocine]; and Influenza vaccines    Family History  family history includes Cancer in her father.     Social History  Social History     Social History    Marital status:      Spouse name: N/A    Number of children: N/A    Years of education: N/A     Social History Main Topics    Smoking status: Current Some Day Smoker     Packs/day: 0.25     Years: 30.00     Types: Cigarettes    Smokeless tobacco: Never Used   

## 2018-04-10 DIAGNOSIS — I10 ESSENTIAL HYPERTENSION: ICD-10-CM

## 2018-04-10 RX ORDER — LISINOPRIL 5 MG/1
TABLET ORAL
Qty: 30 TABLET | Refills: 5 | Status: SHIPPED | OUTPATIENT
Start: 2018-04-10 | End: 2018-10-04 | Stop reason: SDUPTHER

## 2018-04-16 RX ORDER — OXYCODONE HYDROCHLORIDE 30 MG/1
30 TABLET, FILM COATED, EXTENDED RELEASE ORAL EVERY 12 HOURS
Qty: 60 EACH | Refills: 0 | Status: SHIPPED | OUTPATIENT
Start: 2018-04-20 | End: 2018-05-14 | Stop reason: SDUPTHER

## 2018-04-16 RX ORDER — OXYCODONE AND ACETAMINOPHEN 10; 325 MG/1; MG/1
TABLET ORAL
Qty: 150 TABLET | Refills: 0 | Status: SHIPPED | OUTPATIENT
Start: 2018-04-20 | End: 2018-05-14 | Stop reason: SDUPTHER

## 2018-04-17 RX ORDER — PANTOPRAZOLE SODIUM 20 MG/1
TABLET, DELAYED RELEASE ORAL
Qty: 60 TABLET | Refills: 3 | OUTPATIENT
Start: 2018-04-17

## 2018-04-27 DIAGNOSIS — J18.9 COMMUNITY ACQUIRED PNEUMONIA, UNSPECIFIED LATERALITY: ICD-10-CM

## 2018-04-27 RX ORDER — BUDESONIDE AND FORMOTEROL FUMARATE DIHYDRATE 160; 4.5 UG/1; UG/1
2 AEROSOL RESPIRATORY (INHALATION) 2 TIMES DAILY
Qty: 1 INHALER | Refills: 11 | Status: SHIPPED | OUTPATIENT
Start: 2018-04-27

## 2018-04-27 RX ORDER — ALBUTEROL SULFATE 90 UG/1
2 AEROSOL, METERED RESPIRATORY (INHALATION) EVERY 6 HOURS PRN
Qty: 3 INHALER | Refills: 11 | Status: ON HOLD | OUTPATIENT
Start: 2018-04-27 | End: 2022-08-23 | Stop reason: HOSPADM

## 2018-04-30 ENCOUNTER — TELEPHONE (OUTPATIENT)
Dept: PRIMARY CARE CLINIC | Age: 68
End: 2018-04-30

## 2018-04-30 RX ORDER — TIZANIDINE 4 MG/1
4 TABLET ORAL 3 TIMES DAILY PRN
Qty: 90 TABLET | Refills: 2 | Status: SHIPPED | OUTPATIENT
Start: 2018-04-30

## 2018-05-01 ENCOUNTER — APPOINTMENT (OUTPATIENT)
Dept: GENERAL RADIOLOGY | Age: 68
End: 2018-05-01
Payer: MEDICARE

## 2018-05-01 ENCOUNTER — APPOINTMENT (OUTPATIENT)
Dept: CT IMAGING | Age: 68
End: 2018-05-01
Payer: MEDICARE

## 2018-05-01 ENCOUNTER — HOSPITAL ENCOUNTER (EMERGENCY)
Age: 68
Discharge: HOME | End: 2018-05-01
Payer: MEDICARE

## 2018-05-01 VITALS
TEMPERATURE: 98 F | BODY MASS INDEX: 21.69 KG/M2 | SYSTOLIC BLOOD PRESSURE: 122 MMHG | WEIGHT: 135 LBS | RESPIRATION RATE: 20 BRPM | HEIGHT: 66 IN | DIASTOLIC BLOOD PRESSURE: 76 MMHG | OXYGEN SATURATION: 99 % | HEART RATE: 94 BPM

## 2018-05-01 DIAGNOSIS — G89.29 OTHER CHRONIC PAIN: ICD-10-CM

## 2018-05-01 DIAGNOSIS — R06.00 DYSPNEA, UNSPECIFIED TYPE: ICD-10-CM

## 2018-05-01 DIAGNOSIS — S22.41XA CLOSED FRACTURE OF MULTIPLE RIBS OF RIGHT SIDE, INITIAL ENCOUNTER: Primary | ICD-10-CM

## 2018-05-01 LAB
ALBUMIN SERPL-MCNC: 4.4 G/DL (ref 3.5–5.2)
ALP BLD-CCNC: 87 U/L (ref 35–104)
ALT SERPL-CCNC: 13 U/L (ref 5–33)
ANION GAP SERPL CALCULATED.3IONS-SCNC: 13 MMOL/L (ref 7–19)
AST SERPL-CCNC: 16 U/L (ref 5–32)
BASE EXCESS ARTERIAL: 3.2 MMOL/L (ref -2–2)
BILIRUB SERPL-MCNC: 0.3 MG/DL (ref 0.2–1.2)
BUN BLDV-MCNC: 9 MG/DL (ref 8–23)
CALCIUM SERPL-MCNC: 9.8 MG/DL (ref 8.8–10.2)
CARBOXYHEMOGLOBIN ARTERIAL: 3.3 % (ref 0–5)
CHLORIDE BLD-SCNC: 99 MMOL/L (ref 98–111)
CO2: 29 MMOL/L (ref 22–29)
CREAT SERPL-MCNC: 0.6 MG/DL (ref 0.5–0.9)
GFR NON-AFRICAN AMERICAN: >60
GLUCOSE BLD-MCNC: 100 MG/DL (ref 74–109)
HCO3 ARTERIAL: 28.5 MMOL/L (ref 22–26)
HCT VFR BLD CALC: 45.9 % (ref 37–47)
HEMOGLOBIN, ART, EXTENDED: 14.8 G/DL (ref 12–16)
HEMOGLOBIN: 14.6 G/DL (ref 12–16)
MCH RBC QN AUTO: 30.3 PG (ref 27–31)
MCHC RBC AUTO-ENTMCNC: 31.8 G/DL (ref 33–37)
MCV RBC AUTO: 95.2 FL (ref 81–99)
METHEMOGLOBIN ARTERIAL: 1.1 %
O2 CONTENT ARTERIAL: 19 ML/DL
O2 SAT, ARTERIAL: 91.2 %
O2 THERAPY: ABNORMAL
PCO2 ARTERIAL: 45 MMHG (ref 35–45)
PDW BLD-RTO: 12.3 % (ref 11.5–14.5)
PH ARTERIAL: 7.41 (ref 7.35–7.45)
PLATELET # BLD: 263 K/UL (ref 130–400)
PMV BLD AUTO: 11 FL (ref 9.4–12.3)
PO2 ARTERIAL: 65 MMHG (ref 80–100)
POTASSIUM SERPL-SCNC: 4.5 MMOL/L (ref 3.5–5)
POTASSIUM, WHOLE BLOOD: 4.1
RBC # BLD: 4.82 M/UL (ref 4.2–5.4)
SODIUM BLD-SCNC: 141 MMOL/L (ref 136–145)
TOTAL PROTEIN: 7.8 G/DL (ref 6.6–8.7)
WBC # BLD: 8.5 K/UL (ref 4.8–10.8)

## 2018-05-01 PROCEDURE — 6360000004 HC RX CONTRAST MEDICATION: Performed by: NURSE PRACTITIONER

## 2018-05-01 PROCEDURE — 80053 COMPREHEN METABOLIC PANEL: CPT

## 2018-05-01 PROCEDURE — 71046 X-RAY EXAM CHEST 2 VIEWS: CPT

## 2018-05-01 PROCEDURE — 82803 BLOOD GASES ANY COMBINATION: CPT

## 2018-05-01 PROCEDURE — 6360000002 HC RX W HCPCS: Performed by: NURSE PRACTITIONER

## 2018-05-01 PROCEDURE — 96375 TX/PRO/DX INJ NEW DRUG ADDON: CPT

## 2018-05-01 PROCEDURE — 85027 COMPLETE CBC AUTOMATED: CPT

## 2018-05-01 PROCEDURE — 71260 CT THORAX DX C+: CPT

## 2018-05-01 PROCEDURE — 93005 ELECTROCARDIOGRAM TRACING: CPT

## 2018-05-01 PROCEDURE — 72072 X-RAY EXAM THORAC SPINE 3VWS: CPT

## 2018-05-01 PROCEDURE — 36600 WITHDRAWAL OF ARTERIAL BLOOD: CPT

## 2018-05-01 PROCEDURE — 99285 EMERGENCY DEPT VISIT HI MDM: CPT

## 2018-05-01 PROCEDURE — 36415 COLL VENOUS BLD VENIPUNCTURE: CPT

## 2018-05-01 PROCEDURE — 96376 TX/PRO/DX INJ SAME DRUG ADON: CPT

## 2018-05-01 PROCEDURE — 84132 ASSAY OF SERUM POTASSIUM: CPT

## 2018-05-01 PROCEDURE — 96374 THER/PROPH/DIAG INJ IV PUSH: CPT

## 2018-05-01 PROCEDURE — 71100 X-RAY EXAM RIBS UNI 2 VIEWS: CPT

## 2018-05-01 PROCEDURE — 99284 EMERGENCY DEPT VISIT MOD MDM: CPT | Performed by: NURSE PRACTITIONER

## 2018-05-01 RX ORDER — HYDROMORPHONE HCL IN 0.9% NACL 0.5 MG/ML
1 SYRINGE (ML) INTRAVENOUS ONCE
Status: COMPLETED | OUTPATIENT
Start: 2018-05-01 | End: 2018-05-01

## 2018-05-01 RX ORDER — LIDOCAINE 50 MG/G
1 PATCH TOPICAL DAILY
Qty: 30 PATCH | Refills: 0 | Status: ON HOLD | OUTPATIENT
Start: 2018-05-01 | End: 2022-08-04

## 2018-05-01 RX ORDER — ONDANSETRON 2 MG/ML
4 INJECTION INTRAMUSCULAR; INTRAVENOUS ONCE
Status: COMPLETED | OUTPATIENT
Start: 2018-05-01 | End: 2018-05-01

## 2018-05-01 RX ORDER — ONDANSETRON 4 MG/1
4 TABLET, ORALLY DISINTEGRATING ORAL EVERY 8 HOURS PRN
Qty: 15 TABLET | Refills: 0 | Status: SHIPPED | OUTPATIENT
Start: 2018-05-01

## 2018-05-01 RX ORDER — NICOTINE 21 MG/24HR
1 PATCH, TRANSDERMAL 24 HOURS TRANSDERMAL EVERY 24 HOURS
Qty: 30 PATCH | Refills: 2 | Status: SHIPPED | OUTPATIENT
Start: 2018-05-01

## 2018-05-01 RX ADMIN — Medication 1 MG: at 21:55

## 2018-05-01 RX ADMIN — ONDANSETRON 4 MG: 2 INJECTION INTRAMUSCULAR; INTRAVENOUS at 18:52

## 2018-05-01 RX ADMIN — Medication 1 MG: at 18:53

## 2018-05-01 RX ADMIN — ONDANSETRON 4 MG: 2 INJECTION INTRAMUSCULAR; INTRAVENOUS at 18:53

## 2018-05-01 RX ADMIN — IOPAMIDOL 90 ML: 755 INJECTION, SOLUTION INTRAVENOUS at 21:04

## 2018-05-01 ASSESSMENT — ENCOUNTER SYMPTOMS
SHORTNESS OF BREATH: 1
VOMITING: 1

## 2018-05-01 ASSESSMENT — PAIN SCALES - GENERAL
PAINLEVEL_OUTOF10: 8
PAINLEVEL_OUTOF10: 6
PAINLEVEL_OUTOF10: 8
PAINLEVEL_OUTOF10: 8
PAINLEVEL_OUTOF10: 7

## 2018-05-02 LAB
EKG P AXIS: 86 DEGREES
EKG P-R INTERVAL: 140 MS
EKG Q-T INTERVAL: 346 MS
EKG QRS DURATION: 72 MS
EKG QTC CALCULATION (BAZETT): 398 MS
EKG T AXIS: 68 DEGREES

## 2018-05-14 ENCOUNTER — APPOINTMENT (OUTPATIENT)
Dept: CT IMAGING | Facility: HOSPITAL | Age: 68
End: 2018-05-14

## 2018-05-14 ENCOUNTER — HOSPITAL ENCOUNTER (EMERGENCY)
Facility: HOSPITAL | Age: 68
Discharge: HOME | End: 2018-05-15
Attending: EMERGENCY MEDICINE | Admitting: EMERGENCY MEDICINE

## 2018-05-14 DIAGNOSIS — R05.9 COUGH: ICD-10-CM

## 2018-05-14 DIAGNOSIS — R10.11 RUQ PAIN: ICD-10-CM

## 2018-05-14 DIAGNOSIS — R91.1 PULMONARY NODULE: ICD-10-CM

## 2018-05-14 DIAGNOSIS — R11.2 NON-INTRACTABLE VOMITING WITH NAUSEA, UNSPECIFIED VOMITING TYPE: ICD-10-CM

## 2018-05-14 DIAGNOSIS — S22.49XA MULTIPLE RIB FRACTURES INVOLVING FOUR OR MORE RIBS: Primary | ICD-10-CM

## 2018-05-14 LAB
ALBUMIN SERPL-MCNC: 4.3 G/DL (ref 3.5–5)
ALBUMIN/GLOB SERPL: 1.2 G/DL (ref 1.1–2.5)
ALP SERPL-CCNC: 68 U/L (ref 24–120)
ALT SERPL W P-5'-P-CCNC: 15 U/L (ref 0–54)
ANION GAP SERPL CALCULATED.3IONS-SCNC: 6 MMOL/L (ref 4–13)
AST SERPL-CCNC: 19 U/L (ref 7–45)
BACTERIA UR QL AUTO: ABNORMAL /HPF
BASOPHILS # BLD AUTO: 0.04 10*3/MM3 (ref 0–0.2)
BASOPHILS NFR BLD AUTO: 0.4 % (ref 0–2)
BILIRUB SERPL-MCNC: 0.5 MG/DL (ref 0.1–1)
BILIRUB UR QL STRIP: NEGATIVE
BUN BLD-MCNC: 4 MG/DL (ref 5–21)
BUN/CREAT SERPL: 6.7 (ref 7–25)
CALCIUM SPEC-SCNC: 9.6 MG/DL (ref 8.4–10.4)
CHLORIDE SERPL-SCNC: 100 MMOL/L (ref 98–110)
CLARITY UR: CLEAR
CO2 SERPL-SCNC: 36 MMOL/L (ref 24–31)
COLOR UR: YELLOW
CREAT BLD-MCNC: 0.6 MG/DL (ref 0.5–1.4)
D-LACTATE SERPL-SCNC: 0.9 MMOL/L (ref 0.5–2)
DEPRECATED RDW RBC AUTO: 42.5 FL (ref 40–54)
EOSINOPHIL # BLD AUTO: 0.07 10*3/MM3 (ref 0–0.7)
EOSINOPHIL NFR BLD AUTO: 0.7 % (ref 0–4)
ERYTHROCYTE [DISTWIDTH] IN BLOOD BY AUTOMATED COUNT: 12.5 % (ref 12–15)
GFR SERPL CREATININE-BSD FRML MDRD: 99 ML/MIN/1.73
GLOBULIN UR ELPH-MCNC: 3.5 GM/DL
GLUCOSE BLD-MCNC: 101 MG/DL (ref 70–100)
GLUCOSE UR STRIP-MCNC: NEGATIVE MG/DL
HCT VFR BLD AUTO: 40.4 % (ref 37–47)
HGB BLD-MCNC: 13.3 G/DL (ref 12–16)
HGB UR QL STRIP.AUTO: NEGATIVE
HYALINE CASTS UR QL AUTO: ABNORMAL /LPF
IMM GRANULOCYTES # BLD: 0.04 10*3/MM3 (ref 0–0.03)
IMM GRANULOCYTES NFR BLD: 0.4 % (ref 0–5)
KETONES UR QL STRIP: NEGATIVE
LEUKOCYTE ESTERASE UR QL STRIP.AUTO: ABNORMAL
LIPASE SERPL-CCNC: 31 U/L (ref 23–203)
LYMPHOCYTES # BLD AUTO: 0.88 10*3/MM3 (ref 0.72–4.86)
LYMPHOCYTES NFR BLD AUTO: 8.6 % (ref 15–45)
MCH RBC QN AUTO: 30.4 PG (ref 28–32)
MCHC RBC AUTO-ENTMCNC: 32.9 G/DL (ref 33–36)
MCV RBC AUTO: 92.2 FL (ref 82–98)
MONOCYTES # BLD AUTO: 0.76 10*3/MM3 (ref 0.19–1.3)
MONOCYTES NFR BLD AUTO: 7.4 % (ref 4–12)
NEUTROPHILS # BLD AUTO: 8.46 10*3/MM3 (ref 1.87–8.4)
NEUTROPHILS NFR BLD AUTO: 82.5 % (ref 39–78)
NITRITE UR QL STRIP: NEGATIVE
NRBC BLD MANUAL-RTO: 0 /100 WBC (ref 0–0)
PH UR STRIP.AUTO: 8.5 [PH] (ref 5–8)
PLATELET # BLD AUTO: 247 10*3/MM3 (ref 130–400)
PMV BLD AUTO: 10.5 FL (ref 6–12)
POTASSIUM BLD-SCNC: 4.2 MMOL/L (ref 3.5–5.3)
PROT SERPL-MCNC: 7.8 G/DL (ref 6.3–8.7)
PROT UR QL STRIP: NEGATIVE
RBC # BLD AUTO: 4.38 10*6/MM3 (ref 4.2–5.4)
RBC # UR: ABNORMAL /HPF
REF LAB TEST METHOD: ABNORMAL
SODIUM BLD-SCNC: 142 MMOL/L (ref 135–145)
SP GR UR STRIP: 1.01 (ref 1–1.03)
SQUAMOUS #/AREA URNS HPF: ABNORMAL /HPF
TROPONIN I SERPL-MCNC: <0.012 NG/ML (ref 0–0.03)
UROBILINOGEN UR QL STRIP: ABNORMAL
WBC NRBC COR # BLD: 10.25 10*3/MM3 (ref 4.8–10.8)
WBC UR QL AUTO: ABNORMAL /HPF

## 2018-05-14 PROCEDURE — 71275 CT ANGIOGRAPHY CHEST: CPT

## 2018-05-14 PROCEDURE — 93005 ELECTROCARDIOGRAM TRACING: CPT | Performed by: EMERGENCY MEDICINE

## 2018-05-14 PROCEDURE — 87040 BLOOD CULTURE FOR BACTERIA: CPT | Performed by: EMERGENCY MEDICINE

## 2018-05-14 PROCEDURE — 96374 THER/PROPH/DIAG INJ IV PUSH: CPT

## 2018-05-14 PROCEDURE — 74177 CT ABD & PELVIS W/CONTRAST: CPT

## 2018-05-14 PROCEDURE — 93010 ELECTROCARDIOGRAM REPORT: CPT | Performed by: INTERNAL MEDICINE

## 2018-05-14 PROCEDURE — 99284 EMERGENCY DEPT VISIT MOD MDM: CPT

## 2018-05-14 PROCEDURE — 83605 ASSAY OF LACTIC ACID: CPT | Performed by: EMERGENCY MEDICINE

## 2018-05-14 PROCEDURE — 0 IOPAMIDOL PER 1 ML: Performed by: EMERGENCY MEDICINE

## 2018-05-14 PROCEDURE — 80053 COMPREHEN METABOLIC PANEL: CPT | Performed by: EMERGENCY MEDICINE

## 2018-05-14 PROCEDURE — 25010000002 HYDROMORPHONE PER 4 MG: Performed by: EMERGENCY MEDICINE

## 2018-05-14 PROCEDURE — 84484 ASSAY OF TROPONIN QUANT: CPT | Performed by: EMERGENCY MEDICINE

## 2018-05-14 PROCEDURE — 83690 ASSAY OF LIPASE: CPT | Performed by: EMERGENCY MEDICINE

## 2018-05-14 PROCEDURE — 25010000002 ONDANSETRON PER 1 MG: Performed by: EMERGENCY MEDICINE

## 2018-05-14 PROCEDURE — 96375 TX/PRO/DX INJ NEW DRUG ADDON: CPT

## 2018-05-14 PROCEDURE — 72125 CT NECK SPINE W/O DYE: CPT

## 2018-05-14 PROCEDURE — 87086 URINE CULTURE/COLONY COUNT: CPT | Performed by: EMERGENCY MEDICINE

## 2018-05-14 PROCEDURE — 85025 COMPLETE CBC W/AUTO DIFF WBC: CPT | Performed by: EMERGENCY MEDICINE

## 2018-05-14 PROCEDURE — 81001 URINALYSIS AUTO W/SCOPE: CPT | Performed by: EMERGENCY MEDICINE

## 2018-05-14 RX ORDER — ONDANSETRON 2 MG/ML
4 INJECTION INTRAMUSCULAR; INTRAVENOUS ONCE
Status: COMPLETED | OUTPATIENT
Start: 2018-05-14 | End: 2018-05-14

## 2018-05-14 RX ORDER — HYDROMORPHONE HYDROCHLORIDE 1 MG/ML
1 INJECTION, SOLUTION INTRAMUSCULAR; INTRAVENOUS; SUBCUTANEOUS ONCE
Status: COMPLETED | OUTPATIENT
Start: 2018-05-14 | End: 2018-05-14

## 2018-05-14 RX ORDER — SODIUM CHLORIDE 0.9 % (FLUSH) 0.9 %
10 SYRINGE (ML) INJECTION AS NEEDED
Status: DISCONTINUED | OUTPATIENT
Start: 2018-05-14 | End: 2018-05-15 | Stop reason: HOSPADM

## 2018-05-14 RX ORDER — OXYCODONE HYDROCHLORIDE AND ACETAMINOPHEN 5; 325 MG/1; MG/1
1 TABLET ORAL ONCE
Status: COMPLETED | OUTPATIENT
Start: 2018-05-14 | End: 2018-05-14

## 2018-05-14 RX ADMIN — ONDANSETRON 4 MG: 2 INJECTION INTRAMUSCULAR; INTRAVENOUS at 20:45

## 2018-05-14 RX ADMIN — OXYCODONE HYDROCHLORIDE AND ACETAMINOPHEN 1 TABLET: 5; 325 TABLET ORAL at 23:27

## 2018-05-14 RX ADMIN — HYDROMORPHONE HYDROCHLORIDE 1 MG: 1 INJECTION, SOLUTION INTRAMUSCULAR; INTRAVENOUS; SUBCUTANEOUS at 20:46

## 2018-05-14 RX ADMIN — IOPAMIDOL 150 ML: 755 INJECTION, SOLUTION INTRAVENOUS at 21:53

## 2018-05-15 VITALS
HEART RATE: 99 BPM | WEIGHT: 136 LBS | DIASTOLIC BLOOD PRESSURE: 46 MMHG | TEMPERATURE: 98.3 F | HEIGHT: 66 IN | RESPIRATION RATE: 16 BRPM | SYSTOLIC BLOOD PRESSURE: 124 MMHG | BODY MASS INDEX: 21.86 KG/M2 | OXYGEN SATURATION: 92 %

## 2018-05-15 LAB — TROPONIN I SERPL-MCNC: <0.012 NG/ML (ref 0–0.03)

## 2018-05-15 PROCEDURE — 96361 HYDRATE IV INFUSION ADD-ON: CPT

## 2018-05-15 RX ORDER — ONDANSETRON 4 MG/1
4 TABLET, ORALLY DISINTEGRATING ORAL EVERY 6 HOURS PRN
Qty: 12 TABLET | Refills: 0 | Status: SHIPPED | OUTPATIENT
Start: 2018-05-15 | End: 2018-12-03

## 2018-05-15 RX ORDER — OXYCODONE HYDROCHLORIDE 30 MG/1
30 TABLET, FILM COATED, EXTENDED RELEASE ORAL EVERY 12 HOURS
Qty: 60 EACH | Refills: 0 | Status: SHIPPED | OUTPATIENT
Start: 2018-05-20 | End: 2018-06-13 | Stop reason: SDUPTHER

## 2018-05-15 RX ORDER — BENZONATATE 100 MG/1
100 CAPSULE ORAL 3 TIMES DAILY PRN
Qty: 12 CAPSULE | Refills: 0 | Status: SHIPPED | OUTPATIENT
Start: 2018-05-15 | End: 2018-12-03

## 2018-05-15 RX ORDER — OXYCODONE AND ACETAMINOPHEN 10; 325 MG/1; MG/1
TABLET ORAL
Qty: 150 TABLET | Refills: 0 | Status: SHIPPED | OUTPATIENT
Start: 2018-05-20 | End: 2018-06-13 | Stop reason: SDUPTHER

## 2018-05-15 RX ADMIN — SODIUM CHLORIDE 1000 ML: 9 INJECTION, SOLUTION INTRAVENOUS at 00:10

## 2018-05-15 NOTE — ED PROVIDER NOTES
[facility]  emergency department encounter      Pt Name: Justyna Lei  MRN: [id number]  Birthdate 3/25/1951  Date of evaluation: 5/15/2018      CHIEF COMPLAINT       Chief Complaint   Patient presents with   • Flank Pain   • Cough       Nurses Notes reviewed and I agree except as noted in the HPI.      HISTORY OF PRESENT ILLNESS    Justyna Lei is a 67 y.o. female who presents To the emergency department complaining of neck pain as well as pain on the right side of her chest since she fell 2 weeks ago.  Patient was seen at an outside facility where she had a CT scan performed which showed fractures of the right fourth through seventh ribs and patient notes that she has also had a cough, subjective fever, abdominal pain, nausea, and vomiting.  She has been unable to hold down her OxyContin at home due to vomiting.  She also notes some abdominal pain.  She denies dysuria, hematemesis, hematochezia.  She has been using her incentive spirometer as directed.     REVIEW OF SYSTEMS     Review of Systems       Review of systems negative except as listed in HPI          PAST MEDICAL HISTORY     Past Medical History:   Diagnosis Date   • Anxiety    • Anxiety     pain clinic patient Dr Sunshine x 5yrs   • Chronic pain due to injury     jarad LE & back   • COPD (chronic obstructive pulmonary disease)    • Depression    • Hypertension    • Smoker        SURGICAL HISTORY      has no past surgical history on file.    CURRENT MEDICATIONS        Medication List      START taking these medications    benzonatate 100 MG capsule  Commonly known as:  TESSALON  Take 1 capsule by mouth 3 (Three) Times a Day As Needed for Cough.     ondansetron ODT 4 MG disintegrating tablet  Commonly known as:  ZOFRAN-ODT  Take 1 tablet by mouth Every 6 (Six) Hours As Needed for Nausea or   Vomiting.        CONTINUE taking these medications    albuterol 108 (90 Base) MCG/ACT inhaler  Commonly known as:  PROVENTIL HFA;VENTOLIN HFA    "  ALPRAZolam 1 MG tablet  Commonly known as:  XANAX     amphetamine-dextroamphetamine 20 MG tablet  Commonly known as:  ADDERALL     budesonide-formoterol 160-4.5 MCG/ACT inhaler  Commonly known as:  SYMBICORT     cetirizine 10 MG tablet  Commonly known as:  zyrTEC     citalopram 40 MG tablet  Commonly known as:  CeleXA     furosemide 40 MG tablet  Commonly known as:  LASIX     gabapentin 600 MG tablet  Commonly known as:  NEURONTIN     ipratropium-albuterol  MCG/ACT inhaler  Commonly known as:  COMBIVENT RESPIMAT     lisinopril 5 MG tablet  Commonly known as:  PRINIVIL,ZESTRIL     nicotine 21 MG/24HR patch  Commonly known as:  NICODERM CQ  Place 1 patch on the skin Daily.     ondansetron 4 MG tablet  Commonly known as:  ZOFRAN     OxyCODONE HCl ER 30 MG tablet extended-release 12 hour  Commonly known as:  oxyCONTIN     oxyCODONE-acetaminophen  MG per tablet  Commonly known as:  PERCOCET     promethazine 25 MG tablet  Commonly known as:  PHENERGAN          ALLERGIES     is allergic to aspirin; demerol [meperidine]; ibuprofen; neosporin [neomycin-bacitracin zn-polymyx]; penicillins; sulfa antibiotics; codeine; talwin [pentazocine]; tetracyclines & related; and morphine and related.    FAMILY HISTORY     has no family status information on file.    family history is not on file.    SOCIAL HISTORY      reports that she has been smoking Cigarettes.  She has been smoking about 1.00 pack per day. She does not have any smokeless tobacco history on file. She reports that she does not drink alcohol or use drugs.    PHYSICAL EXAM     INITIAL VITALS:  height is 167.6 cm (66\") and weight is 61.7 kg (136 lb). Her temperature is 99.2 °F (37.3 °C). Her blood pressure is 151/89 and her pulse is 111. Her respiration is 20 and oxygen saturation is 94%.    Physical Exam    CONSTITUTIONAL: Well developed, well nourished, not diaphoretic, mildly distressed  HENT: Normocephalic, atraumatic, oropharynx clear and moist  EYES: " PERRL, EOM normal, no discharge, no scleral icterus  NECK: ROM normal, supple, no tracheal deviation nor JVD, no stridor, posterior midline tenderness  CARDIOVASCULAR: Tachycardic rate, normal rhythm, heart sounds normal, no rub no gallop, intact distal pulses, normal cap refill  PULMONARY: Normal effort and breath sounds, no distress, no wheezes, rhonchi or rales, no chest tenderness  ABDOMINAL: Soft, right upper quadrant tenderness, no guarding, no mass, no rebound, no hernia  GENITOURINARY/ANORECTAL: deferred  MUSCULOSKELETAL: ROM normal, right anterior lateral chest wall tenderness, no deformity, no edema  NEUROLOGICAL: Alert, oriented x 3, DTRS normal, CN x 10 normal, normal tone  SKIN: Warm, dry, no erythema, no rash, normal color  PSYCH: Mood and affect normal, behavior normal, thought content and judgement normal.        DIAGNOSTIC RESULTS     EKG: All EKG's are interpreted by the Emergency Department Physician who either signs or Co-signs this chart in the absence of a cardiologist.  EKG performed at 2124 shows sinus tachycardia with a rate of 102 bpm, normal axis, normal intervals, normal ST segments, normal T waves    EKG performed at 2340 shows no changes from initial EKG performed today    RADIOLOGY: non-plain film images(s) such as CT, Ultrasound and MRI are read by the radiologist.  Plain radiographic images are visualized and preliminarily interpreted by the emergency physician unless otherwise stated below.  CT cervical spine reviewed and interpreted by radiologist shows no acute fracture or subluxation.  There is evidence of degenerative changes    CTA chest reviewed and interpreted by radiologist shows right anterolateral fourth through seventh rib fractures.  No pneumothorax.  No aortic injury.  No PE or aortic dissection.  No focal consolidation or pleural effusion.  Emphysematous lung changes.  Right lower lobe nodular density, follow-up per final report.    CT abdomen and pelvis with contrast  reviewed and interpreted by radiologist shows no acute abnormalities         LABS:   Lab Results (last 24 hours)     Procedure Component Value Units Date/Time    CBC & Differential [[id number]] Collected:  05/14/18 2042    Specimen:  Blood Updated:  05/14/18 2105    Narrative:       The following orders were created for panel order CBC & Differential.  Procedure                               Abnormality         Status                     ---------                               -----------         ------                     CBC Auto Differential[[id number]]         Abnormal            Final result                 Please view results for these tests on the individual orders.    Comprehensive Metabolic Panel [[id number]]  (Abnormal) Collected:  05/14/18 2042    Specimen:  Blood Updated:  05/14/18 2128     Glucose 101 (H) mg/dL      BUN 4 (L) mg/dL      Creatinine 0.60 mg/dL      Sodium 142 mmol/L      Potassium 4.2 mmol/L      Chloride 100 mmol/L      CO2 36.0 (H) mmol/L      Calcium 9.6 mg/dL      Total Protein 7.8 g/dL      Albumin 4.30 g/dL      ALT (SGPT) 15 U/L      AST (SGOT) 19 U/L      Alkaline Phosphatase 68 U/L      Total Bilirubin 0.5 mg/dL      eGFR Non African Amer 99 mL/min/1.73      Globulin 3.5 gm/dL      A/G Ratio 1.2 g/dL      BUN/Creatinine Ratio 6.7 (L)     Anion Gap 6.0 mmol/L     Lipase [[id number]]  (Normal) Collected:  05/14/18 2042    Specimen:  Blood Updated:  05/14/18 2128     Lipase 31 U/L     Blood Culture - Blood, [[id number]] Collected:  05/14/18 2042    Specimen:  Blood from Arm, Right Updated:  05/14/18 2107    Lactic Acid, Plasma [[id number]]  (Normal) Collected:  05/14/18 2042    Specimen:  Blood Updated:  05/14/18 2118     Lactate 0.9 mmol/L     Troponin [[id number]]  (Normal) Collected:  05/14/18 2042    Specimen:  Blood Updated:  05/14/18 2139     Troponin I <0.012 ng/mL     CBC Auto Differential [[id number]]  (Abnormal) Collected:  05/14/18 2042    Specimen:  Blood Updated:  05/14/18 2105     WBC  10.25 10*3/mm3      RBC 4.38 10*6/mm3      Hemoglobin 13.3 g/dL      Hematocrit 40.4 %      MCV 92.2 fL      MCH 30.4 pg      MCHC 32.9 (L) g/dL      RDW 12.5 %      RDW-SD 42.5 fl      MPV 10.5 fL      Platelets 247 10*3/mm3      Neutrophil % 82.5 (H) %      Lymphocyte % 8.6 (L) %      Monocyte % 7.4 %      Eosinophil % 0.7 %      Basophil % 0.4 %      Immature Grans % 0.4 %      Neutrophils, Absolute 8.46 (H) 10*3/mm3      Lymphocytes, Absolute 0.88 10*3/mm3      Monocytes, Absolute 0.76 10*3/mm3      Eosinophils, Absolute 0.07 10*3/mm3      Basophils, Absolute 0.04 10*3/mm3      Immature Grans, Absolute 0.04 (H) 10*3/mm3      nRBC 0.0 /100 WBC     Blood Culture - Blood, [[id number]] Collected:  05/14/18 2053    Specimen:  Blood from Arm, Left Updated:  05/14/18 2107    Urinalysis With / Culture If Indicated - Urine, Clean Catch [[id number]]  (Abnormal) Collected:  05/14/18 2057    Specimen:  Urine from Urine, Clean Catch Updated:  05/14/18 2110     Color, UA Yellow     Appearance, UA Clear     pH, UA 8.5 (H)     Specific Gravity, UA 1.007     Glucose, UA Negative     Ketones, UA Negative     Bilirubin, UA Negative     Blood, UA Negative     Protein, UA Negative     Leuk Esterase, UA Moderate (2+) (A)     Nitrite, UA Negative     Urobilinogen, UA 0.2 E.U./dL    Urine Culture - Urine, Urine, Clean Catch [[id number]] Collected:  05/14/18 2057    Specimen:  Urine from Urine, Clean Catch Updated:  05/14/18 2106    Urinalysis, Microscopic Only - Urine, Clean Catch [[id number]]  (Abnormal) Collected:  05/14/18 2057    Specimen:  Urine from Urine, Clean Catch Updated:  05/14/18 2110     RBC, UA 0-2 (A) /HPF      WBC, UA 6-12 (A) /HPF      Bacteria, UA None Seen /HPF      Squamous Epithelial Cells, UA 0-2 /HPF      Hyaline Casts, UA None Seen /LPF      Methodology Automated Microscopy    Troponin [[id number]]  (Normal) Collected:  05/14/18 2341    Specimen:  Blood Updated:  05/15/18 0011     Troponin I <0.012 ng/mL      "      EMERGENCY DEPARTMENT COURSE:   Vitals:    Vitals:    05/14/18 1932 05/14/18 1937   BP:  151/89   Pulse: 111    Resp: 20    Temp: 99.2 °F (37.3 °C)    SpO2: 94%    Weight: 61.7 kg (136 lb)    Height: 167.6 cm (66\")        The patient was given the following medications:  Medications   sodium chloride 0.9 % flush 10 mL (not administered)   sodium chloride 0.9 % bolus 1,000 mL (1,000 mL Intravenous New Bag 5/15/18 0010)   HYDROmorphone (DILAUDID) injection 1 mg (1 mg Intravenous Given 5/14/18 2046)   ondansetron (ZOFRAN) injection 4 mg (4 mg Intravenous Given 5/14/18 2045)   iopamidol (ISOVUE-370) 76 % injection 150 mL (150 mL Intravenous Given 5/14/18 2153)   oxyCODONE-acetaminophen (PERCOCET) 5-325 MG per tablet 1 tablet (1 tablet Oral Given 5/14/18 2327)       ED Course   Patient presents to the emergency department complaining of right-sided chest pain, cough, subjective fever, and right upper quadrant abdominal pain as well as neck pain after a fall 2 weeks ago.  She was seen at an outside facility and had CT scan of the chest performed which showed 4 rib fractures.  She did not have any imaging of her cervical spine.  She has posterior midline tenderness which resolved upon reevaluation.  Patient is neurovascularly intact.  She has right upper quadrant abdominal tenderness.  CT cervical spine as well as abdomen and pelvis are unremarkable.  CTA chest shows right lower lobe pulmonary nodule as well as  right anterolateral fourth through seventh rib fractures.  Patient felt better after Zofran, Dilaudid, Percocet here in the emergency department.  She will be given a prescription for Zofran and Tessalon Perles.  She is currently taking OxyContin at home and has an incentive spirometer.  She will follow-up with her primary care physician.  Patient is comfortable at time of discharge and agreeable with plan of care.  Please note that patient does not have any genitourinary complaints so antibiotics were not " given for 6-12 white blood cells and 2+ leukocyte Esterase in the urine.    CRITICAL CARE:  none    CONSULTS:  none    PROCEDURES:  None    FINAL IMPRESSION      1. Multiple rib fractures involving four or more ribs    2. RUQ pain    3. Non-intractable vomiting with nausea, unspecified vomiting type    4. Cough    5. Pulmonary nodule          DISPOSITION/PLAN   Discharge      PATIENT REFERRED TO:  No follow-up provider specified.    DISCHARGE MEDICATIONS:     Medication List      START taking these medications    benzonatate 100 MG capsule  Commonly known as:  TESSALON  Take 1 capsule by mouth 3 (Three) Times a Day As Needed for Cough.     ondansetron ODT 4 MG disintegrating tablet  Commonly known as:  ZOFRAN-ODT  Take 1 tablet by mouth Every 6 (Six) Hours As Needed for Nausea or   Vomiting.        CONTINUE taking these medications    albuterol 108 (90 Base) MCG/ACT inhaler  Commonly known as:  PROVENTIL HFA;VENTOLIN HFA     ALPRAZolam 1 MG tablet  Commonly known as:  XANAX     amphetamine-dextroamphetamine 20 MG tablet  Commonly known as:  ADDERALL     budesonide-formoterol 160-4.5 MCG/ACT inhaler  Commonly known as:  SYMBICORT     cetirizine 10 MG tablet  Commonly known as:  zyrTEC     citalopram 40 MG tablet  Commonly known as:  CeleXA     furosemide 40 MG tablet  Commonly known as:  LASIX     gabapentin 600 MG tablet  Commonly known as:  NEURONTIN     ipratropium-albuterol  MCG/ACT inhaler  Commonly known as:  COMBIVENT RESPIMAT     lisinopril 5 MG tablet  Commonly known as:  PRINIVIL,ZESTRIL     nicotine 21 MG/24HR patch  Commonly known as:  NICODERM CQ  Place 1 patch on the skin Daily.     ondansetron 4 MG tablet  Commonly known as:  ZOFRAN     OxyCODONE HCl ER 30 MG tablet extended-release 12 hour  Commonly known as:  oxyCONTIN     oxyCODONE-acetaminophen  MG per tablet  Commonly known as:  PERCOCET     promethazine 25 MG tablet  Commonly known as:  PHENERGAN          (Please note that portions of  this note were completed with a voice recognition program.)    DO Cale Carey DO  05/15/18 0101

## 2018-05-15 NOTE — DISCHARGE INSTRUCTIONS
Read and follow all instructions in this handout.    Call your primary care physician tomorrow for a follow-up appointment in 1-2 days.    Fill all prescriptions and take as directed.     Return to the emergency department as soon as possible for worsening of your  current symptoms or for any concerns that you may have.

## 2018-05-16 LAB — BACTERIA SPEC AEROBE CULT: ABNORMAL

## 2018-05-19 LAB
BACTERIA SPEC AEROBE CULT: NORMAL
BACTERIA SPEC AEROBE CULT: NORMAL

## 2018-05-21 NOTE — ED NOTES
"ED Call Back Questions    1. How are you doing since leaving the Emergency Department?    Y\"all are so good. I feel a little better  2. Do you have any questions about your discharge instructions? No     3. Have you filled your new prescriptions yet? Yes   a. Do you have any questions about those medications? N/A    4. Were you able to make a follow-up appointment with the physician? Yes     5. Do you have a primary care physician? Yes   a. If No, would you like for me to set you up with one? No   i. If Yes, “I will have our ED  give you a call right back at this number to work with you on the best time for an appointment.”    6. We are always looking to get better at what we do. Do you have any suggestions for what we can do to be even better? N/A  a. If Yes, \"Thank you for sharing your concerns. I apologize. I will follow up with our manager and patient . Would you like someone to call you back?\" N/A    7. Is there anything else I can do for you? N/A my visit was excellent , everyone was so nice       Jeramy Renteria  05/21/18 0380    "

## 2018-06-07 ENCOUNTER — HOSPITAL ENCOUNTER (EMERGENCY)
Facility: HOSPITAL | Age: 68
Discharge: HOME | End: 2018-06-07
Admitting: EMERGENCY MEDICINE

## 2018-06-07 VITALS
OXYGEN SATURATION: 99 % | TEMPERATURE: 98.8 F | RESPIRATION RATE: 18 BRPM | WEIGHT: 136 LBS | DIASTOLIC BLOOD PRESSURE: 58 MMHG | HEART RATE: 81 BPM | SYSTOLIC BLOOD PRESSURE: 103 MMHG | BODY MASS INDEX: 21.35 KG/M2 | HEIGHT: 67 IN

## 2018-06-07 DIAGNOSIS — T63.334A BROWN RECLUSE SPIDER BITE OR STING, UNDETERMINED INTENT, INITIAL ENCOUNTER: Primary | ICD-10-CM

## 2018-06-07 LAB
ALBUMIN SERPL-MCNC: 4.2 G/DL (ref 3.5–5)
ALBUMIN/GLOB SERPL: 1.2 G/DL (ref 1.1–2.5)
ALP SERPL-CCNC: 77 U/L (ref 24–120)
ALT SERPL W P-5'-P-CCNC: 22 U/L (ref 0–54)
ANION GAP SERPL CALCULATED.3IONS-SCNC: 5 MMOL/L (ref 4–13)
APTT PPP: 33.5 SECONDS (ref 24.1–34.8)
AST SERPL-CCNC: 20 U/L (ref 7–45)
BASOPHILS # BLD AUTO: 0.05 10*3/MM3 (ref 0–0.2)
BASOPHILS NFR BLD AUTO: 0.4 % (ref 0–2)
BILIRUB SERPL-MCNC: 0.6 MG/DL (ref 0.1–1)
BUN BLD-MCNC: 7 MG/DL (ref 5–21)
BUN/CREAT SERPL: 9.9 (ref 7–25)
CALCIUM SPEC-SCNC: 9.4 MG/DL (ref 8.4–10.4)
CHLORIDE SERPL-SCNC: 99 MMOL/L (ref 98–110)
CO2 SERPL-SCNC: 36 MMOL/L (ref 24–31)
CREAT BLD-MCNC: 0.71 MG/DL (ref 0.5–1.4)
DEPRECATED RDW RBC AUTO: 42.6 FL (ref 40–54)
EOSINOPHIL # BLD AUTO: 0.08 10*3/MM3 (ref 0–0.7)
EOSINOPHIL NFR BLD AUTO: 0.6 % (ref 0–4)
ERYTHROCYTE [DISTWIDTH] IN BLOOD BY AUTOMATED COUNT: 12.3 % (ref 12–15)
GFR SERPL CREATININE-BSD FRML MDRD: 82 ML/MIN/1.73
GLOBULIN UR ELPH-MCNC: 3.6 GM/DL
GLUCOSE BLD-MCNC: 106 MG/DL (ref 70–100)
HCT VFR BLD AUTO: 40.5 % (ref 37–47)
HGB BLD-MCNC: 13.5 G/DL (ref 12–16)
IMM GRANULOCYTES # BLD: 0.04 10*3/MM3 (ref 0–0.03)
IMM GRANULOCYTES NFR BLD: 0.3 % (ref 0–5)
INR PPP: 0.96 (ref 0.91–1.09)
LYMPHOCYTES # BLD AUTO: 1.56 10*3/MM3 (ref 0.72–4.86)
LYMPHOCYTES NFR BLD AUTO: 12.3 % (ref 15–45)
MCH RBC QN AUTO: 30.9 PG (ref 28–32)
MCHC RBC AUTO-ENTMCNC: 33.3 G/DL (ref 33–36)
MCV RBC AUTO: 92.7 FL (ref 82–98)
MONOCYTES # BLD AUTO: 0.91 10*3/MM3 (ref 0.19–1.3)
MONOCYTES NFR BLD AUTO: 7.2 % (ref 4–12)
NEUTROPHILS # BLD AUTO: 10.03 10*3/MM3 (ref 1.87–8.4)
NEUTROPHILS NFR BLD AUTO: 79.2 % (ref 39–78)
NRBC BLD MANUAL-RTO: 0 /100 WBC (ref 0–0)
PLATELET # BLD AUTO: 282 10*3/MM3 (ref 130–400)
PMV BLD AUTO: 11.3 FL (ref 6–12)
POTASSIUM BLD-SCNC: 3.6 MMOL/L (ref 3.5–5.3)
PROT SERPL-MCNC: 7.8 G/DL (ref 6.3–8.7)
PROTHROMBIN TIME: 13.1 SECONDS (ref 11.9–14.6)
RBC # BLD AUTO: 4.37 10*6/MM3 (ref 4.2–5.4)
SODIUM BLD-SCNC: 140 MMOL/L (ref 135–145)
WBC NRBC COR # BLD: 12.67 10*3/MM3 (ref 4.8–10.8)

## 2018-06-07 PROCEDURE — 85610 PROTHROMBIN TIME: CPT | Performed by: PHYSICIAN ASSISTANT

## 2018-06-07 PROCEDURE — 85025 COMPLETE CBC W/AUTO DIFF WBC: CPT | Performed by: PHYSICIAN ASSISTANT

## 2018-06-07 PROCEDURE — 99283 EMERGENCY DEPT VISIT LOW MDM: CPT

## 2018-06-07 PROCEDURE — 80053 COMPREHEN METABOLIC PANEL: CPT | Performed by: PHYSICIAN ASSISTANT

## 2018-06-07 PROCEDURE — 96372 THER/PROPH/DIAG INJ SC/IM: CPT

## 2018-06-07 PROCEDURE — 85730 THROMBOPLASTIN TIME PARTIAL: CPT | Performed by: PHYSICIAN ASSISTANT

## 2018-06-07 RX ORDER — CLINDAMYCIN PHOSPHATE 150 MG/ML
600 INJECTION, SOLUTION INTRAVENOUS ONCE
Status: COMPLETED | OUTPATIENT
Start: 2018-06-07 | End: 2018-06-07

## 2018-06-07 RX ORDER — OXYCODONE AND ACETAMINOPHEN 10; 325 MG/1; MG/1
1 TABLET ORAL ONCE
Status: COMPLETED | OUTPATIENT
Start: 2018-06-07 | End: 2018-06-07

## 2018-06-07 RX ORDER — ONDANSETRON 4 MG/1
4 TABLET, ORALLY DISINTEGRATING ORAL ONCE
Status: COMPLETED | OUTPATIENT
Start: 2018-06-07 | End: 2018-06-07

## 2018-06-07 RX ORDER — CLINDAMYCIN HYDROCHLORIDE 300 MG/1
300 CAPSULE ORAL 3 TIMES DAILY
Qty: 30 CAPSULE | Refills: 0 | Status: SHIPPED | OUTPATIENT
Start: 2018-06-07 | End: 2018-12-03

## 2018-06-07 RX ORDER — ONDANSETRON 4 MG/1
4 TABLET, ORALLY DISINTEGRATING ORAL 4 TIMES DAILY
Qty: 15 TABLET | Refills: 0 | Status: SHIPPED | OUTPATIENT
Start: 2018-06-07 | End: 2018-12-03

## 2018-06-07 RX ADMIN — CLINDAMYCIN PHOSPHATE 600 MG: 150 INJECTION, SOLUTION INTRAMUSCULAR; INTRAVENOUS at 18:18

## 2018-06-07 RX ADMIN — OXYCODONE HYDROCHLORIDE AND ACETAMINOPHEN 1 TABLET: 10; 325 TABLET ORAL at 18:59

## 2018-06-07 RX ADMIN — ONDANSETRON 4 MG: 4 TABLET, ORALLY DISINTEGRATING ORAL at 18:30

## 2018-06-07 NOTE — ED PROVIDER NOTES
"Subjective   History of Present Illness  67-year-old female presents chief complaint of right lower leg pain.  The patient reports 3-4 days ago she had felt itching and a blister developed on her right lower leg.  Since then, she has had significant pain and redness as well as central area of \"scabbing\"  Review of Systems   All other systems reviewed and are negative.      Past Medical History:   Diagnosis Date   • Anxiety    • Anxiety     pain clinic patient Dr Sunshine x 5yrs   • Chronic pain due to injury     jarad LE & back   • COPD (chronic obstructive pulmonary disease)    • Depression    • Hypertension    • Smoker        Allergies   Allergen Reactions   • Aspirin Anaphylaxis   • Demerol [Meperidine] Anaphylaxis   • Ibuprofen Anaphylaxis   • Neosporin [Neomycin-Bacitracin Zn-Polymyx] Anaphylaxis   • Penicillins Anaphylaxis   • Sulfa Antibiotics Anaphylaxis   • Codeine Hives     Pt can take codeine as long is it is not alot   • Talwin [Pentazocine] Hives   • Tetracyclines & Related    • Morphine And Related Hives       Past Surgical History:   Procedure Laterality Date   • ABDOMINAL SURGERY     • APPENDECTOMY     • CHOLECYSTECTOMY     • FEMUR FRACTURE SURGERY     • HERNIA REPAIR     • HYSTERECTOMY         History reviewed. No pertinent family history.    Social History     Social History   • Marital status:      Social History Main Topics   • Smoking status: Current Every Day Smoker     Packs/day: 1.00     Types: Cigarettes   • Alcohol use No   • Drug use: No   • Sexual activity: Defer     Other Topics Concern   • Not on file           Objective   Physical Exam   Constitutional: She is oriented to person, place, and time. She appears well-developed and well-nourished.   HENT:   Head: Normocephalic and atraumatic.   Eyes: EOM are normal. Pupils are equal, round, and reactive to light.   Neck: Normal range of motion. Neck supple.   Cardiovascular: Normal rate and regular rhythm.    Pulmonary/Chest: Effort normal " and breath sounds normal.   Abdominal: Soft. Bowel sounds are normal.   Musculoskeletal: Normal range of motion.   Neurological: She is alert and oriented to person, place, and time. No cranial nerve deficit. Coordination normal.   Skin: Skin is warm and dry.   6x6cm circular area of erythema with warmth, central necrosis, and some fluctuance    Psychiatric: She has a normal mood and affect. Her behavior is normal.   Nursing note and vitals reviewed.      Procedures           ED Course                  MDM  Number of Diagnoses or Management Options  Diagnosis management comments: Will dc in stable condition with clindamycin and zofran, spoke with Dr. Dozier, she will follow up with with patient and encourage patient to call office tomorrow        Amount and/or Complexity of Data Reviewed  Clinical lab tests: reviewed and ordered  Tests in the medicine section of CPT®: ordered and reviewed    Risk of Complications, Morbidity, and/or Mortality  Presenting problems: moderate  Diagnostic procedures: moderate  Management options: moderate    Patient Progress  Patient progress: stable        Final diagnoses:   Brown recluse spider bite or sting, undetermined intent, initial encounter            Benton Au PA-C  06/08/18 2819

## 2018-06-13 DIAGNOSIS — M54.2 CERVICALGIA: Primary | ICD-10-CM

## 2018-06-14 RX ORDER — OXYCODONE AND ACETAMINOPHEN 10; 325 MG/1; MG/1
TABLET ORAL
Qty: 150 TABLET | Refills: 0 | Status: SHIPPED | OUTPATIENT
Start: 2018-06-19 | End: 2018-06-27 | Stop reason: SDUPTHER

## 2018-06-14 RX ORDER — OXYCODONE HYDROCHLORIDE 30 MG/1
30 TABLET, FILM COATED, EXTENDED RELEASE ORAL EVERY 12 HOURS
Qty: 60 EACH | Refills: 0 | Status: SHIPPED | OUTPATIENT
Start: 2018-06-19 | End: 2018-06-27 | Stop reason: SDUPTHER

## 2018-06-27 DIAGNOSIS — M54.2 CERVICALGIA: ICD-10-CM

## 2018-06-27 RX ORDER — OXYCODONE HYDROCHLORIDE 30 MG/1
30 TABLET, FILM COATED, EXTENDED RELEASE ORAL EVERY 12 HOURS
Qty: 60 EACH | Refills: 0 | Status: ON HOLD | OUTPATIENT
Start: 2018-07-19 | End: 2021-10-08 | Stop reason: HOSPADM

## 2018-06-27 RX ORDER — OXYCODONE AND ACETAMINOPHEN 10; 325 MG/1; MG/1
TABLET ORAL
Qty: 150 TABLET | Refills: 0 | Status: SHIPPED | OUTPATIENT
Start: 2018-07-19 | End: 2018-08-18

## 2018-10-04 DIAGNOSIS — I10 ESSENTIAL HYPERTENSION: ICD-10-CM

## 2018-10-04 RX ORDER — LISINOPRIL 5 MG/1
TABLET ORAL
Qty: 30 TABLET | Refills: 5 | Status: ON HOLD | OUTPATIENT
Start: 2018-10-04 | End: 2022-06-13

## 2018-11-27 ENCOUNTER — TELEPHONE (OUTPATIENT)
Dept: PRIMARY CARE CLINIC | Age: 68
End: 2018-11-27

## 2018-12-03 ENCOUNTER — OFFICE VISIT (OUTPATIENT)
Dept: PULMONOLOGY | Facility: CLINIC | Age: 68
End: 2018-12-03

## 2018-12-03 VITALS
WEIGHT: 125 LBS | SYSTOLIC BLOOD PRESSURE: 140 MMHG | OXYGEN SATURATION: 94 % | BODY MASS INDEX: 20.09 KG/M2 | HEIGHT: 66 IN | DIASTOLIC BLOOD PRESSURE: 80 MMHG | HEART RATE: 74 BPM

## 2018-12-03 DIAGNOSIS — Z87.891 PERSONAL HISTORY OF NICOTINE DEPENDENCE: ICD-10-CM

## 2018-12-03 DIAGNOSIS — J44.9 STAGE 3 SEVERE COPD BY GOLD CLASSIFICATION (HCC): Primary | ICD-10-CM

## 2018-12-03 DIAGNOSIS — J00 NASOPHARYNGITIS: ICD-10-CM

## 2018-12-03 DIAGNOSIS — R91.1 LUNG NODULE: ICD-10-CM

## 2018-12-03 DIAGNOSIS — K21.9 GERD WITHOUT ESOPHAGITIS: ICD-10-CM

## 2018-12-03 DIAGNOSIS — F17.210 CIGARETTE NICOTINE DEPENDENCE WITHOUT COMPLICATION: ICD-10-CM

## 2018-12-03 DIAGNOSIS — Z14.8 ALPHA-1-ANTITRYPSIN DEFICIENCY CARRIER: ICD-10-CM

## 2018-12-03 DIAGNOSIS — J96.11 CHRONIC RESPIRATORY FAILURE WITH HYPOXIA (HCC): ICD-10-CM

## 2018-12-03 PROCEDURE — 99214 OFFICE O/P EST MOD 30 MIN: CPT | Performed by: NURSE PRACTITIONER

## 2018-12-03 RX ORDER — FLUTICASONE PROPIONATE 50 MCG
2 SPRAY, SUSPENSION (ML) NASAL DAILY
Qty: 1 BOTTLE | Refills: 11 | Status: SHIPPED | OUTPATIENT
Start: 2018-12-03 | End: 2019-10-27 | Stop reason: SDUPTHER

## 2018-12-03 RX ORDER — BUDESONIDE AND FORMOTEROL FUMARATE DIHYDRATE 160; 4.5 UG/1; UG/1
2 AEROSOL RESPIRATORY (INHALATION)
Qty: 3 INHALER | Refills: 3 | Status: SHIPPED | OUTPATIENT
Start: 2018-12-03 | End: 2020-02-03

## 2018-12-03 RX ORDER — PANTOPRAZOLE SODIUM 20 MG/1
20 TABLET, DELAYED RELEASE ORAL DAILY
Qty: 30 TABLET | Refills: 11 | Status: SHIPPED | OUTPATIENT
Start: 2018-12-03 | End: 2020-02-05

## 2018-12-03 RX ORDER — MONTELUKAST SODIUM 10 MG/1
10 TABLET ORAL NIGHTLY
Qty: 30 TABLET | Refills: 11 | Status: SHIPPED | OUTPATIENT
Start: 2018-12-03 | End: 2019-10-27 | Stop reason: SDUPTHER

## 2018-12-03 RX ORDER — AZELASTINE 1 MG/ML
2 SPRAY, METERED NASAL 2 TIMES DAILY
Qty: 1 EACH | Refills: 12 | Status: SHIPPED | OUTPATIENT
Start: 2018-12-03 | End: 2019-10-27 | Stop reason: SDUPTHER

## 2018-12-03 NOTE — PATIENT INSTRUCTIONS
Smoking Tobacco Information  Smoking tobacco will very likely harm your health. Tobacco contains a poisonous (toxic), colorless chemical called nicotine. Nicotine affects the brain and makes tobacco addictive. This change in your brain can make it hard to stop smoking. Tobacco also has other toxic chemicals that can hurt your body and raise your risk of many cancers.  How can smoking tobacco affect me?  Smoking tobacco can increase your chances of having serious health conditions, such as:  · Cancer. Smoking is most commonly associated with lung cancer, but can lead to cancer in other parts of the body.  · Chronic obstructive pulmonary disease (COPD). This is a long-term lung condition that makes it hard to breathe. It also gets worse over time.  · High blood pressure (hypertension), heart disease, stroke, or heart attack.  · Lung infections, such as pneumonia.  · Cataracts. This is when the lenses in the eyes become clouded.  · Digestive problems. This may include peptic ulcers, heartburn, and gastroesophageal reflux disease (GERD).  · Oral health problems, such as gum disease and tooth loss.  · Loss of taste and smell.    Smoking can affect your appearance by causing:  · Wrinkles.  · Yellow or stained teeth, fingers, and fingernails.    Smoking tobacco can also affect your social life.  · Many workplaces, restaurants, hotels, and public places are tobacco-free. This means that you may experience challenges in finding places to smoke when away from home.  · The cost of a smoking habit can be expensive. Expenses for someone who smokes come in two ways:  ? You spend money on a regular basis to buy tobacco.  ? Your health care costs in the long-term are higher if you smoke.  · Tobacco smoke can also affect the health of those around you. Children of smokers have greater chances of:  ? Sudden infant death syndrome (SIDS).  ? Ear infections.  ? Lung infections.    What lifestyle changes can be made?  · Do not start  smoking. Quit if you already do.  · To quit smoking:  ? Make a plan to quit smoking and commit yourself to it. Look for programs to help you and ask your health care provider for recommendations and ideas.  ? Talk with your health care provider about using nicotine replacement medicines to help you quit. Medicine replacement medicines include gum, lozenges, patches, sprays, or pills.  ? Do not replace cigarette smoking with electronic cigarettes, which are commonly called e-cigarettes. The safety of e-cigarettes is not known, and some may contain harmful chemicals.  ? Avoid places, people, or situations that tempt you to smoke.  ? If you try to quit but return to smoking, don't give up hope. It is very common for people to try a number of times before they fully succeed. When you feel ready again, give it another try.  · Quitting smoking might affect the way you eat as well as your weight. Be prepared to monitor your eating habits. Get support in planning and following a healthy diet.  · Ask your health care provider about having regular tests (screenings) to check for cancer. This may include blood tests, imaging tests, and other tests.  · Exercise regularly. Consider taking walks, joining a gym, or doing yoga or exercise classes.  · Develop skills to manage your stress. These skills include meditation.  What are the benefits of quitting smoking?  By quitting smoking, you may:  · Lower your risk of getting cancer and other diseases caused by smoking.  · Live longer.  · Breathe better.  · Lower your blood pressure and heart rate.  · Stop your addiction to tobacco.  · Stop creating secondhand smoke that hurts other people.  · Improve your sense of taste and smell.  · Look better over time, due to having fewer wrinkles and less staining.    What can happen if changes are not made?  If you do not stop smoking, you may:  · Get cancer and other diseases.  · Develop COPD or other long-term (chronic) lung  conditions.  · Develop serious problems with your heart and blood vessels (cardiovascular system).  · Need more tests to screen for problems caused by smoking.  · Have higher, long-term healthcare costs from medicines or treatments related to smoking.  · Continue to have worsening changes in your lungs, mouth, and nose.    Where to find support:  To get support to quit smoking, consider:  · Asking your health care provider for more information and resources.  · Taking classes to learn more about quitting smoking.  · Looking for local organizations that offer resources about quitting smoking.  · Joining a support group for people who want to quit smoking in your local community.    Where to find more information:  You may find more information about quitting smoking from:  · HelpGuide.org: www.helpguide.org/articles/addictions/how-to-quit-smoking.htm  · Smokefree.gov: smokefree.gov  · American Lung Association: www.lung.org    Contact a health care provider if:  · You have problems breathing.  · Your lips, nose, or fingers turn blue.  · You have chest pain.  · You are coughing up blood.  · You feel faint or you pass out.  · You have other noticeable changes that cause you to worry.  Summary  · Smoking tobacco can negatively affect your health, the health of those around you, your finances, and your social life.  · Do not start smoking. Quit if you already do. If you need help quitting, ask your health care provider.  · Think about joining a support group for people who want to quit smoking in your local community. There are many effective programs that will help you to quit this behavior.  This information is not intended to replace advice given to you by your health care provider. Make sure you discuss any questions you have with your health care provider.  Document Released: 01/02/2018 Document Revised: 01/02/2018 Document Reviewed: 01/02/2018  Elsevier Interactive Patient Education © 2018 [employer]  Chronic  Obstructive Pulmonary Disease  Chronic obstructive pulmonary disease (COPD) is a long-term (chronic) lung problem. When you have COPD, it is hard for air to get in and out of your lungs. The way your lungs work will never return to normal. Usually the condition gets worse over time. There are things you can do to keep yourself as healthy as possible. Your doctor may treat your condition with:  · Medicines.  · Quitting smoking, if you smoke.  · Rehabilitation. This may involve a team of specialists.  · Oxygen.  · Exercise and changes to your diet.  · Lung surgery.  · Comfort measures (palliative care).    Follow these instructions at home:  Medicines  · Take over-the-counter and prescription medicines only as told by your doctor.  · Talk to your doctor before taking any cough or allergy medicines. You may need to avoid medicines that cause your lungs to be dry.  Lifestyle  · If you smoke, stop. Smoking makes the problem worse. If you need help quitting, ask your doctor.  · Avoid being around things that make your breathing worse. This may include smoke, chemicals, and fumes.  · Stay active, but remember to also rest.  · Learn and use tips on how to relax.  · Make sure you get enough sleep. Most adults need at least 7 hours a night.  · Eat healthy foods. Eat smaller meals more often. Rest before meals.  Controlled breathing  · Learn and use tips on how to control your breathing as told by your doctor. Try:  ? Breathing in (inhaling) through your nose for 1 second. Then, pucker your lips and breath out (exhale) through your lips for 2 seconds.  ? Putting one hand on your belly (abdomen). Breathe in slowly through your nose for 1 second. Your hand on your belly should move out. Pucker your lips and breathe out slowly through your lips. Your hand on your belly should move in as you breathe out.  Controlled coughing  · Learn and use controlled coughing to clear mucus from your lungs. The steps are:  1. Lean your head a  little forward.  2. Breathe in deeply.  3. Try to hold your breath for 3 seconds.  4. Keep your mouth slightly open while coughing 2 times.  5. Spit any mucus out into a tissue.  6. Rest and do the steps again 1 or 2 times as needed.  General instructions  · Make sure you get all the shots (vaccines) that your doctor recommends. Ask your doctor about a flu shot and a pneumonia shot.  · Use oxygen therapy and therapy to help improve your lungs (pulmonary rehabilitation) if told by your doctor. If you need home oxygen therapy, ask your doctor if you should buy a tool to measure your oxygen level (oximeter).  · Make a COPD action plan with your doctor. This helps you know what to do if you feel worse than usual.  · Manage any other conditions you have as told by your doctor.  · Avoid going outside when it is very hot, cold, or humid.  · Avoid people who have a sickness you can catch (contagious).  · Keep all follow-up visits as told by your doctor. This is important.  Contact a doctor if:  · You cough up more mucus than usual.  · There is a change in the color or thickness of the mucus.  · It is harder to breathe than usual.  · Your breathing is faster than usual.  · You have trouble sleeping.  · You need to use your medicines more often than usual.  · You have trouble doing your normal activities such as getting dressed or walking around the house.  Get help right away if:  · You have shortness of breath while resting.  · You have shortness of breath that stops you from:  ? Being able to talk.  ? Doing normal activities.  · Your chest hurts for longer than 5 minutes.  · Your skin color is more blue than usual.  · Your pulse oximeter shows that you have low oxygen for longer than 5 minutes.  · You have a fever.  · You feel too tired to breathe normally.  Summary  · Chronic obstructive pulmonary disease (COPD) is a long-term lung problem.  · The way your lungs work will never return to normal. Usually the condition gets  worse over time. There are things you can do to keep yourself as healthy as possible.  · Take over-the-counter and prescription medicines only as told by your doctor.  · If you smoke, stop. Smoking makes the problem worse.  This information is not intended to replace advice given to you by your health care provider. Make sure you discuss any questions you have with your health care provider.  Document Released: 06/05/2009 Document Revised: 05/25/2017 Document Reviewed: 08/14/2014  Elsevier Interactive Patient Education © 2017 Elsevier Inc.

## 2018-12-04 PROBLEM — K21.9 GERD WITHOUT ESOPHAGITIS: Status: ACTIVE | Noted: 2018-12-04

## 2018-12-04 PROBLEM — J00 NASOPHARYNGITIS: Status: ACTIVE | Noted: 2018-12-04

## 2018-12-04 PROBLEM — F17.210 CIGARETTE NICOTINE DEPENDENCE WITHOUT COMPLICATION: Status: ACTIVE | Noted: 2018-12-04

## 2018-12-04 PROBLEM — Z87.891 PERSONAL HISTORY OF NICOTINE DEPENDENCE: Status: ACTIVE | Noted: 2018-12-04

## 2018-12-04 PROBLEM — R91.1 LUNG NODULE: Status: ACTIVE | Noted: 2018-12-04

## 2018-12-04 PROBLEM — Z14.8 ALPHA-1-ANTITRYPSIN DEFICIENCY CARRIER: Status: ACTIVE | Noted: 2018-12-04

## 2018-12-04 NOTE — PROGRESS NOTES
THIERNO Arthur  [facility]   Respiratory Disease Clinic  [address] [place] [address]  Phone: [phone]  Fax: [phone]     Justyna Lei is a 68 y.o. female.   CC:   Chief Complaint   Patient presents with   • COPD      HPI: Justyna Lei is a pleasant 68 y.o. female. The patient is here today for follow up of COPD and lung nodule.  The patient states that she is slowly getting over a recent respiratory infection.  No increasing shortness of breath, no fever, no chills, no cough with purulent sputum.  She trialed stiolto from her last office visit but did not find any benefit from this.  She is now back on Symbicort and Combivent.  She wishes to continue these.  The patient's PCP is Melodie Dozier MD.  The patient is not current with flu vaccine.  The patient is not current with pneumonia vaccine.      The following portions of the patient's history were reviewed and updated as appropriate: allergies, current medications, past family history, past medical history, past social history, past surgical history and problem list.  Past Medical History:   Diagnosis Date   • Anxiety    • Anxiety     pain clinic patient Dr Sunshine x 5yrs   • Chronic pain due to injury     jarad LE & back   • COPD (chronic obstructive pulmonary disease) (CMS/[facility])    • Depression    • Hypertension    • Smoker      No family history on file.  Social History     Socioeconomic History   • Marital status:      Spouse name: Not on file   • Number of children: Not on file   • Years of education: Not on file   • Highest education level: Not on file   Social Needs   • Financial resource strain: Not on file   • Food insecurity - worry: Not on file   • Food insecurity - inability: Not on file   • Transportation needs - medical: Not on file   • Transportation needs - non-medical: Not on file   Occupational History   • Not on file   Tobacco Use   • Smoking status: Current Every Day Smoker      Packs/day: 1.00     Types: Cigarettes   • Smokeless tobacco: Never Used   Substance and Sexual Activity   • Alcohol use: No   • Drug use: No   • Sexual activity: Defer   Other Topics Concern   • Not on file   Social History Narrative   • Not on file     Review of Systems   Constitutional: Negative for chills and fever.   HENT: Negative for congestion.    Eyes: Negative for blurred vision.   Respiratory: Negative for cough and shortness of breath.    Cardiovascular: Negative for chest pain.   Gastrointestinal: Negative for diarrhea, nausea and vomiting.   Endocrine: Negative for cold intolerance and heat intolerance.   Genitourinary: Negative for dysuria.   Musculoskeletal: Negative for arthralgias.   Skin: Negative for rash.   Neurological: Negative for dizziness, weakness and light-headedness.   Hematological: Does not bruise/bleed easily.   Psychiatric/Behavioral: Negative for agitation. The patient is not nervous/anxious.      Vitals:    12/03/18 1540   BP: 140/80   Pulse: 74   SpO2: 94%     Physical Exam   Constitutional: She is oriented to person, place, and time. She appears well-developed and well-nourished. No distress. Nasal cannula in place.   HENT:   Head: Normocephalic and atraumatic.   Eyes: Conjunctivae and EOM are normal. Pupils are equal, round, and reactive to light. No scleral icterus.   Neck: Normal range of motion. Neck supple.   Cardiovascular: Normal rate, regular rhythm and normal heart sounds. Exam reveals no friction rub.   No murmur heard.  Pulmonary/Chest: Effort normal. No respiratory distress. She has decreased breath sounds. She has no wheezes. She has no rales.   Abdominal: Soft. Bowel sounds are normal. She exhibits no distension. There is no tenderness.   Musculoskeletal: Normal range of motion. She exhibits no edema.   Mild clubbing   Neurological: She is alert and oriented to person, place, and time.   Skin: Skin is warm and dry.   Psychiatric: She has a normal mood and affect. Her  behavior is normal. Judgment and thought content normal.   Nursing note and vitals reviewed.    Pulmonary Functions Testing Results:  No results found for: FEV1, FVC, QTL2HBC, TLC, DLCO  My PFT Interpretation: None today  Imaging: None today    Assessment and Plan:   Justyna was seen today for copd.    Diagnoses and all orders for this visit:    Stage 3 severe COPD by GOLD classification (CMS/[facility])  -     budesonide-formoterol (SYMBICORT) 160-4.5 MCG/ACT inhaler; Inhale 2 puffs 2 (Two) Times a Day for 90 days.  -     ipratropium-albuterol (COMBIVENT RESPIMAT)  MCG/ACT inhaler; Inhale 1 puff 4 (Four) Times a Day As Needed for Wheezing.  The patient is to continue Symbicort and Combivent.  She has trialed stiolto and did not see a benefit from this.  We will repeat flow-volume loop and diffusion at next office visit.  Nasopharyngitis  -     azelastine (ASTELIN) 0.1 % nasal spray; 2 sprays into the nostril(s) as directed by provider 2 (Two) Times a Day. Use in each nostril as directed  -     fluticasone (FLONASE) 50 MCG/ACT nasal spray; 2 sprays into the nostril(s) as directed by provider Daily.  -     montelukast (SINGULAIR) 10 MG tablet; Take 1 tablet by mouth Every Night.  The patient is to continue as a lasting and Flonase.  She was also provided prescription for Singulair.  We did discuss that uncontrolled nasopharyngitis can lead to COPD exacerbations.  GERD without esophagitis  -     pantoprazole (PROTONIX) 20 MG EC tablet; Take 1 tablet by mouth Daily.  Continue Protonix  Lung nodule  -     CT Chest Without Contrast; Future  Lung nodule has been stable.  She is due for repeat CT scan in May 2019. Order has been placed.  Cigarette nicotine dependence without complication  The patient continues as a current smoker.  Chronic respiratory failure with hypoxia (CMS/[facility])  Continue home oxygen, she is benefiting from this, and wishes to continue it.  Alpha-1-antitrypsin deficiency carrier  (CMS/[facility])  Comments:  phenotype SS  The patient does carry a mild form of the alpha-1 antitrypsin gene.  She was given educational material today from the office.  She was also strongly encouraged to stop smoking.  Personal history of nicotine dependence  Smoking cessation education was provided.    Patient's Body mass index is 20.18 kg/m². BMI is below normal parameters. Recommendations include: treating the underlying disease process.    Follow up: 5 months, flow volume loop plus diffusion and CT of the chest  THIERNO Arthur  12/4/2018  12:04 PM

## 2019-01-13 ENCOUNTER — APPOINTMENT (OUTPATIENT)
Dept: GENERAL RADIOLOGY | Facility: HOSPITAL | Age: 69
End: 2019-01-13

## 2019-01-13 ENCOUNTER — HOSPITAL ENCOUNTER (EMERGENCY)
Facility: HOSPITAL | Age: 69
Discharge: HOME | End: 2019-01-13
Attending: EMERGENCY MEDICINE | Admitting: EMERGENCY MEDICINE

## 2019-01-13 ENCOUNTER — APPOINTMENT (OUTPATIENT)
Dept: CT IMAGING | Facility: HOSPITAL | Age: 69
End: 2019-01-13

## 2019-01-13 VITALS
BODY MASS INDEX: 21.21 KG/M2 | TEMPERATURE: 98.1 F | HEART RATE: 85 BPM | SYSTOLIC BLOOD PRESSURE: 128 MMHG | WEIGHT: 132 LBS | DIASTOLIC BLOOD PRESSURE: 67 MMHG | OXYGEN SATURATION: 95 % | HEIGHT: 66 IN | RESPIRATION RATE: 19 BRPM

## 2019-01-13 DIAGNOSIS — J06.9 VIRAL URI: Primary | ICD-10-CM

## 2019-01-13 DIAGNOSIS — J44.1 COPD EXACERBATION (HCC): ICD-10-CM

## 2019-01-13 LAB
ALBUMIN SERPL-MCNC: 4.1 G/DL (ref 3.5–5)
ALBUMIN/GLOB SERPL: 1.2 G/DL (ref 1.1–2.5)
ALP SERPL-CCNC: 62 U/L (ref 24–120)
ALT SERPL W P-5'-P-CCNC: 17 U/L (ref 0–54)
ANION GAP SERPL CALCULATED.3IONS-SCNC: 8 MMOL/L (ref 4–13)
ARTERIAL PATENCY WRIST A: POSITIVE
AST SERPL-CCNC: 24 U/L (ref 7–45)
ATMOSPHERIC PRESS: 756 MMHG
BASE EXCESS BLDA CALC-SCNC: 6.8 MMOL/L (ref 0–2)
BASOPHILS # BLD AUTO: 0.06 10*3/MM3 (ref 0–0.2)
BASOPHILS NFR BLD AUTO: 0.7 % (ref 0–2)
BDY SITE: ABNORMAL
BILIRUB SERPL-MCNC: 0.5 MG/DL (ref 0.1–1)
BODY TEMPERATURE: 37 C
BUN BLD-MCNC: 3 MG/DL (ref 5–21)
BUN/CREAT SERPL: 5.5 (ref 7–25)
CALCIUM SPEC-SCNC: 9.2 MG/DL (ref 8.4–10.4)
CHLORIDE SERPL-SCNC: 90 MMOL/L (ref 98–110)
CO2 SERPL-SCNC: 33 MMOL/L (ref 24–31)
CREAT BLD-MCNC: 0.55 MG/DL (ref 0.5–1.4)
DEPRECATED RDW RBC AUTO: 42.3 FL (ref 40–54)
EOSINOPHIL # BLD AUTO: 0.07 10*3/MM3 (ref 0–0.7)
EOSINOPHIL NFR BLD AUTO: 0.8 % (ref 0–4)
ERYTHROCYTE [DISTWIDTH] IN BLOOD BY AUTOMATED COUNT: 12.5 % (ref 12–15)
GAS FLOW AIRWAY: 2 LPM
GFR SERPL CREATININE-BSD FRML MDRD: 109 ML/MIN/1.73
GLOBULIN UR ELPH-MCNC: 3.5 GM/DL
GLUCOSE BLD-MCNC: 112 MG/DL (ref 70–100)
HCO3 BLDA-SCNC: 32 MMOL/L (ref 20–26)
HCT VFR BLD AUTO: 40.4 % (ref 37–47)
HGB BLD-MCNC: 13.9 G/DL (ref 12–16)
IMM GRANULOCYTES # BLD AUTO: 0.03 10*3/MM3 (ref 0–0.03)
IMM GRANULOCYTES NFR BLD AUTO: 0.3 % (ref 0–5)
LYMPHOCYTES # BLD AUTO: 2.41 10*3/MM3 (ref 0.72–4.86)
LYMPHOCYTES NFR BLD AUTO: 26.8 % (ref 15–45)
Lab: ABNORMAL
MCH RBC QN AUTO: 31.7 PG (ref 28–32)
MCHC RBC AUTO-ENTMCNC: 34.4 G/DL (ref 33–36)
MCV RBC AUTO: 92.2 FL (ref 82–98)
MODALITY: ABNORMAL
MONOCYTES # BLD AUTO: 0.61 10*3/MM3 (ref 0.19–1.3)
MONOCYTES NFR BLD AUTO: 6.8 % (ref 4–12)
NEUTROPHILS # BLD AUTO: 5.8 10*3/MM3 (ref 1.87–8.4)
NEUTROPHILS NFR BLD AUTO: 64.6 % (ref 39–78)
NRBC BLD AUTO-RTO: 0 /100 WBC (ref 0–0)
NT-PROBNP SERPL-MCNC: 290 PG/ML (ref 0–900)
PCO2 BLDA: 46.3 MM HG (ref 35–45)
PH BLDA: 7.45 PH UNITS (ref 7.35–7.45)
PLATELET # BLD AUTO: 260 10*3/MM3 (ref 130–400)
PMV BLD AUTO: 11.3 FL (ref 6–12)
PO2 BLDA: 65.5 MM HG (ref 83–108)
POTASSIUM BLD-SCNC: 3.6 MMOL/L (ref 3.5–5.3)
PROT SERPL-MCNC: 7.6 G/DL (ref 6.3–8.7)
RBC # BLD AUTO: 4.38 10*6/MM3 (ref 4.2–5.4)
SAO2 % BLDCOA: 94.5 % (ref 94–99)
SODIUM BLD-SCNC: 131 MMOL/L (ref 135–145)
VENTILATOR MODE: ABNORMAL
WBC NRBC COR # BLD: 8.98 10*3/MM3 (ref 4.8–10.8)

## 2019-01-13 PROCEDURE — 93005 ELECTROCARDIOGRAM TRACING: CPT | Performed by: EMERGENCY MEDICINE

## 2019-01-13 PROCEDURE — 36600 WITHDRAWAL OF ARTERIAL BLOOD: CPT

## 2019-01-13 PROCEDURE — 93010 ELECTROCARDIOGRAM REPORT: CPT | Performed by: INTERNAL MEDICINE

## 2019-01-13 PROCEDURE — 71275 CT ANGIOGRAPHY CHEST: CPT

## 2019-01-13 PROCEDURE — 94640 AIRWAY INHALATION TREATMENT: CPT

## 2019-01-13 PROCEDURE — 36415 COLL VENOUS BLD VENIPUNCTURE: CPT | Performed by: EMERGENCY MEDICINE

## 2019-01-13 PROCEDURE — 0 IOPAMIDOL PER 1 ML: Performed by: EMERGENCY MEDICINE

## 2019-01-13 PROCEDURE — 87040 BLOOD CULTURE FOR BACTERIA: CPT | Performed by: EMERGENCY MEDICINE

## 2019-01-13 PROCEDURE — 82803 BLOOD GASES ANY COMBINATION: CPT

## 2019-01-13 PROCEDURE — 80053 COMPREHEN METABOLIC PANEL: CPT | Performed by: EMERGENCY MEDICINE

## 2019-01-13 PROCEDURE — 71045 X-RAY EXAM CHEST 1 VIEW: CPT

## 2019-01-13 PROCEDURE — 85025 COMPLETE CBC W/AUTO DIFF WBC: CPT | Performed by: EMERGENCY MEDICINE

## 2019-01-13 PROCEDURE — 94799 UNLISTED PULMONARY SVC/PX: CPT

## 2019-01-13 PROCEDURE — 99284 EMERGENCY DEPT VISIT MOD MDM: CPT

## 2019-01-13 PROCEDURE — 83880 ASSAY OF NATRIURETIC PEPTIDE: CPT | Performed by: EMERGENCY MEDICINE

## 2019-01-13 RX ORDER — AZITHROMYCIN 250 MG/1
250 TABLET, FILM COATED ORAL DAILY
Qty: 6 TABLET | Refills: 0 | Status: SHIPPED | OUTPATIENT
Start: 2019-01-13 | End: 2019-08-19 | Stop reason: ALTCHOICE

## 2019-01-13 RX ORDER — PREDNISONE 20 MG/1
60 TABLET ORAL DAILY
Qty: 15 TABLET | Refills: 0 | Status: SHIPPED | OUTPATIENT
Start: 2019-01-13 | End: 2019-08-19 | Stop reason: ALTCHOICE

## 2019-01-13 RX ORDER — ALBUTEROL SULFATE 2.5 MG/3ML
2.5 SOLUTION RESPIRATORY (INHALATION) ONCE
Status: COMPLETED | OUTPATIENT
Start: 2019-01-13 | End: 2019-01-13

## 2019-01-13 RX ORDER — SODIUM CHLORIDE 0.9 % (FLUSH) 0.9 %
10 SYRINGE (ML) INJECTION AS NEEDED
Status: DISCONTINUED | OUTPATIENT
Start: 2019-01-13 | End: 2019-01-13 | Stop reason: HOSPADM

## 2019-01-13 RX ADMIN — IOPAMIDOL 100 ML: 755 INJECTION, SOLUTION INTRAVENOUS at 12:13

## 2019-01-13 RX ADMIN — ALBUTEROL SULFATE 2.5 MG: 2.5 SOLUTION RESPIRATORY (INHALATION) at 10:15

## 2019-01-13 NOTE — DISCHARGE INSTRUCTIONS
Follow up with one of the [facility] physician groups below to setup primary care. If you have trouble following up, please call the [facility] Nurse Line at [phone]    (Dr. Corbin Nunez DO, Dr. Paul Ritchie DO,  THIERNO Hooper, and THIERNO Nevarez)  [facility], Primary Care   [address] [address], [address], [place], [place] [id number] [phone]     (Dr. Camelia Hernandez MD, THIERNO Perez, and THIERNO Browne)  [facility], Primary Care   [address] [address], [place] [address] [phone]    (Dr. Josep Ruffin MD and Dr. Nikolas Sanabria MD)  [facility], Primary Care  [address], [address] [phone]    (Dr. Guerrero La MD)  [facility], Primary Care  [address], [address], [place], [address] [phone]

## 2019-01-13 NOTE — ED PROVIDER NOTES
Subjective   Patient is a 68-year-old female who presents to the ER with shortness of air.  Patient states that for the last week she has had cold symptoms including congestion, productive cough, nausea vomiting diarrhea, and shortness of breath.  Patient states her symptoms have been progressively worsening over the last 2 days.  Patient states it was hard for her to lie flat this morning without getting short of breath.  Patient has a history of COPD and is on 2-3 L of oxygen at all times.  Patient was given albuterol and steroids in route by EMS.  Patient denies any fever, chest pain, abdominal pain, urinary changes, neurological changes, leg pain or swelling.            Review of Systems   Constitutional: Negative.    HENT: Positive for congestion.    Eyes: Negative.    Respiratory: Positive for cough and shortness of breath.    Cardiovascular: Negative.    Gastrointestinal: Positive for diarrhea, nausea and vomiting.   Endocrine: Negative.    Genitourinary: Negative.    Musculoskeletal: Negative.    Skin: Negative.    Allergic/Immunologic: Negative.    Neurological: Negative.    Hematological: Negative.    Psychiatric/Behavioral: Negative.    All other systems reviewed and are negative.      Past Medical History:   Diagnosis Date   • Anxiety    • Anxiety     pain clinic patient Dr Sunshine x 5yrs   • Chronic pain due to injury     jarad LE & back   • COPD (chronic obstructive pulmonary disease) (CMS/[facility])    • Depression    • Hypertension    • Smoker        Allergies   Allergen Reactions   • Aspirin Anaphylaxis   • Demerol [Meperidine] Anaphylaxis   • Ibuprofen Anaphylaxis   • Neosporin [Neomycin-Bacitracin Zn-Polymyx] Anaphylaxis   • Penicillins Anaphylaxis   • Sulfa Antibiotics Anaphylaxis   • Codeine Hives     Pt can take codeine as long is it is not alot   • Talwin [Pentazocine] Hives   • Tetracyclines & Related        Past Surgical History:   Procedure Laterality Date   • ABDOMINAL SURGERY     • APPENDECTOMY      • CHOLECYSTECTOMY     • FEMUR FRACTURE SURGERY     • HERNIA REPAIR     • HYSTERECTOMY         No family history on file.    Social History     Socioeconomic History   • Marital status:      Spouse name: Not on file   • Number of children: Not on file   • Years of education: Not on file   • Highest education level: Not on file   Tobacco Use   • Smoking status: Current Every Day Smoker     Packs/day: 1.00     Types: Cigarettes   • Smokeless tobacco: Never Used   Substance and Sexual Activity   • Alcohol use: No   • Drug use: No   • Sexual activity: Defer           Objective   Physical Exam   Constitutional: She is oriented to person, place, and time. She appears well-developed and well-nourished.   HENT:   Head: Normocephalic and atraumatic.   Eyes: Conjunctivae are normal. Pupils are equal, round, and reactive to light.   Neck: Normal range of motion.   Cardiovascular: Normal rate, regular rhythm and normal heart sounds.   Pulmonary/Chest: Effort normal. Tachypnea noted. No respiratory distress. She has wheezes.   Abdominal: Soft. There is no tenderness.   Musculoskeletal: Normal range of motion. She exhibits no edema or deformity.   Neurological: She is alert and oriented to person, place, and time. She has normal strength.   Skin: Skin is warm.   Psychiatric: She has a normal mood and affect. Her behavior is normal.   Nursing note and vitals reviewed.      Procedures           ED Course      ECG: Sinus rhythm with a rate of 78 with PVCs, no acute ischemia or infarction    Patient was given albuterol.  Patient improved with treatment.  Good sats.    Lab Results (last 24 hours)     Procedure Component Value Units Date/Time    CBC & Differential [[id number]] Collected:  01/13/19 1000    Specimen:  Blood Updated:  01/13/19 1008    Narrative:       The following orders were created for panel order CBC & Differential.  Procedure                               Abnormality         Status                      ---------                               -----------         ------                     CBC Auto Differential[[id number]]        Normal              Final result                 Please view results for these tests on the individual orders.    Comprehensive Metabolic Panel [[id number]]  (Abnormal) Collected:  01/13/19 1000    Specimen:  Blood Updated:  01/13/19 1019     Glucose 112 mg/dL      BUN 3 mg/dL      Creatinine 0.55 mg/dL      Sodium 131 mmol/L      Potassium 3.6 mmol/L      Chloride 90 mmol/L      CO2 33.0 mmol/L      Calcium 9.2 mg/dL      Total Protein 7.6 g/dL      Albumin 4.10 g/dL      ALT (SGPT) 17 U/L      AST (SGOT) 24 U/L      Alkaline Phosphatase 62 U/L      Total Bilirubin 0.5 mg/dL      eGFR Non African Amer 109 mL/min/1.73      Globulin 3.5 gm/dL      A/G Ratio 1.2 g/dL      BUN/Creatinine Ratio 5.5     Anion Gap 8.0 mmol/L     BNP [[id number]]  (Normal) Collected:  01/13/19 1000    Specimen:  Blood Updated:  01/13/19 1027     proBNP 290.0 pg/mL     Blood Culture - Blood, Arm, Right [[id number]] Collected:  01/13/19 1000    Specimen:  Blood from Arm, Right Updated:  01/13/19 1155    CBC Auto Differential [[id number]]  (Normal) Collected:  01/13/19 1000    Specimen:  Blood Updated:  01/13/19 1008     WBC 8.98 10*3/mm3      RBC 4.38 10*6/mm3      Hemoglobin 13.9 g/dL      Hematocrit 40.4 %      MCV 92.2 fL      MCH 31.7 pg      MCHC 34.4 g/dL      RDW 12.5 %      RDW-SD 42.3 fl      MPV 11.3 fL      Platelets 260 10*3/mm3      Neutrophil % 64.6 %      Lymphocyte % 26.8 %      Monocyte % 6.8 %      Eosinophil % 0.8 %      Basophil % 0.7 %      Immature Grans % 0.3 %      Neutrophils, Absolute 5.80 10*3/mm3      Lymphocytes, Absolute 2.41 10*3/mm3      Monocytes, Absolute 0.61 10*3/mm3      Eosinophils, Absolute 0.07 10*3/mm3      Basophils, Absolute 0.06 10*3/mm3      Immature Grans, Absolute 0.03 10*3/mm3      nRBC 0.0 /100 WBC     Blood Gas, Arterial [[id number]]  (Abnormal) Collected:  01/13/19 1015     Specimen:  Arterial Blood Updated:  01/13/19 1028     Site Right Radial     Michael's Test Positive     pH, Arterial 7.448 pH units      pCO2, Arterial 46.3 mm Hg      Comment: 83 Value above reference range        pO2, Arterial 65.5 mm Hg      Comment: 84 Value below reference range        HCO3, Arterial 32.0 mmol/L      Comment: 83 Value above reference range        Base Excess, Arterial 6.8 mmol/L      Comment: 83 Value above reference range        O2 Saturation, Arterial 94.5 %      Temperature 37.0 C      Barometric Pressure for Blood Gas 756 mmHg      Modality Nasal Cannula     Flow Rate 2.0 lpm      Ventilator Mode NA     Collected by [id number]     Comment: Meter: [id number]     :  [id number]       Blood Culture - Blood, Arm, Left [[id number]] Collected:  01/13/19 1028    Specimen:  Blood from Arm, Left Updated:  01/13/19 1155        CT Angiogram Chest With Contrast   Final Result       1. No acute findings. Specifically negative for pulmonary embolus or   thoracic aortic dissection.   2. Chronic biapical scarring, slight nodular component on the left   probably accounting for plain film finding. This is very likely benign.   Follow-up CT chest in 6 months is advised to ensure stability.   Eccentric. Severe emphysema.       This report was finalized on 01/13/2019 12:36 by Dr Minesh He, .      XR Chest 1 View   Final Result   No acute findings. Possible nodule left upper lobe apex. Elective CT   chest is recommended.   This report was finalized on 01/13/2019 10:53 by Dr Minesh He, .        Labs showed mild hyponatremia and hypochloremia and mild low PaO2 that was baseline for the patient.  Chest x-ray showed no acute findings.  Patient did have a nodule in the left upper lobe.  CTA was then performed and showed no acute findings.  Patient did have chronic biapical scarring and they recommended a follow-up CT scan in 6 months.  Patient also has severe emphysema.  Workup consistent with a COPD  exacerbation.  Patient will be discharged home with azithromycin and prednisone to follow up with PCP.  Continue albuterol.  Return for any fever, chest pain, shortness of air or other concerns.                MDM      Final diagnoses:   Viral URI   COPD exacerbation (CMS/[facility])            Candace Chapa MD  01/13/19 9367

## 2019-01-13 NOTE — ED NOTES
"PATIENT REPORTS SHE CANNOT TAKE STEROIDS DUE TO \"STOMACH ISSUES.\" DR. LOVE NOTIFIED. WILL UPDATE PATIENT.     Nat Fierro RN  01/13/19 8884    "

## 2019-01-13 NOTE — ED NOTES
PT UNABLE TO SIGN DUE TO SHAKINESS. VERBAL CONSENT GIVEN FOR CTA WITH IV CONTRAST. CONSENT SIGNED BY THIS RN AND K HAILEY HOPE Karli C, RN  01/13/19 5541

## 2019-01-13 NOTE — ED NOTES
PT'S GRANDSON CALLED BACK AND STATED THAT HE IS ON HIS WAY TO  PT.     Nat Fierro, RN  01/13/19 7914

## 2019-01-13 NOTE — ED NOTES
"PATIENT'S DAUGHTER STATED THAT PT'S GRANDSON \"SHOULD BE HERE ANY MINUTE.\"      Nat Fierro, RN  01/13/19 0571    "

## 2019-01-18 LAB
BACTERIA SPEC AEROBE CULT: NORMAL
BACTERIA SPEC AEROBE CULT: NORMAL

## 2019-03-14 ENCOUNTER — TELEPHONE (OUTPATIENT)
Dept: PULMONOLOGY | Facility: CLINIC | Age: 69
End: 2019-03-14

## 2019-03-14 NOTE — TELEPHONE ENCOUNTER
"Patient called to say she was sick in January. She states she was treated or flu and pneumonia. Since then she has kept a nagging cough. She states she just feels \"run down\" . She is currently on Symbicort and combivent. She has been using some over the counter cough meds, with no relief.  She states she is allergic to multiple medicines, including steroids.   Suggested patient move her appointment up to discuss.   "

## 2019-05-15 ENCOUNTER — TELEPHONE (OUTPATIENT)
Dept: PULMONOLOGY | Facility: CLINIC | Age: 69
End: 2019-05-15

## 2019-05-15 DIAGNOSIS — J32.9 SINUSITIS, UNSPECIFIED CHRONICITY, UNSPECIFIED LOCATION: Primary | ICD-10-CM

## 2019-05-15 RX ORDER — AZITHROMYCIN 250 MG/1
TABLET, FILM COATED ORAL
Qty: 6 TABLET | Refills: 0 | Status: SHIPPED | OUTPATIENT
Start: 2019-05-15 | End: 2019-08-19 | Stop reason: ALTCHOICE

## 2019-05-15 NOTE — TELEPHONE ENCOUNTER
Patient is asking if you will send in a prescription for her sinus infection. She states she still hasn't found a family doctor yet. She has had head congestion for the last 4 days, sinus drainage with thick, white sputum. She has a nagging cough from the drainage.  She is on Symbicort, azelastine, flonase and an antihistamine. She said she doesn't drive anymore and can't go to urgent care.   She has an appointment at the end of the month with you.

## 2019-06-17 ENCOUNTER — APPOINTMENT (OUTPATIENT)
Dept: CT IMAGING | Facility: HOSPITAL | Age: 69
End: 2019-06-17

## 2019-07-15 ENCOUNTER — APPOINTMENT (OUTPATIENT)
Dept: CT IMAGING | Facility: HOSPITAL | Age: 69
End: 2019-07-15

## 2019-07-22 ENCOUNTER — TELEPHONE (OUTPATIENT)
Dept: PULMONOLOGY | Facility: CLINIC | Age: 69
End: 2019-07-22

## 2019-07-22 ENCOUNTER — APPOINTMENT (OUTPATIENT)
Dept: CT IMAGING | Facility: HOSPITAL | Age: 69
End: 2019-07-22

## 2019-07-22 NOTE — TELEPHONE ENCOUNTER
Patient cancelled her appt today. She is complaining of head and chest congestion. She states she is running a temp of 100. She states it started out with vomiting on Friday. She said today is the first day she has been able to eat.

## 2019-08-09 ENCOUNTER — TELEPHONE (OUTPATIENT)
Dept: PULMONOLOGY | Facility: CLINIC | Age: 69
End: 2019-08-09

## 2019-08-09 NOTE — TELEPHONE ENCOUNTER
Keep appt in August.  In the mean time she can check with her insurance company to see what else is covered besides symbicort if she is wanting to change inhalers. I would not recommend coming off an maintenance inhaler with her severe COPD.

## 2019-08-09 NOTE — TELEPHONE ENCOUNTER
Patient states she continues to have sinus drainage. She thinks the Symbicort irritates her sinus' and makes her have drainage. She has an appt with you on 8/19/19.  She is on flonase, benadryl, symbicort and mucinex.

## 2019-08-19 ENCOUNTER — HOSPITAL ENCOUNTER (OUTPATIENT)
Dept: CT IMAGING | Facility: HOSPITAL | Age: 69
Discharge: HOME | End: 2019-08-19
Admitting: NURSE PRACTITIONER

## 2019-08-19 ENCOUNTER — PROCEDURE VISIT (OUTPATIENT)
Dept: PULMONOLOGY | Facility: CLINIC | Age: 69
End: 2019-08-19

## 2019-08-19 ENCOUNTER — OFFICE VISIT (OUTPATIENT)
Dept: PULMONOLOGY | Facility: CLINIC | Age: 69
End: 2019-08-19

## 2019-08-19 VITALS
OXYGEN SATURATION: 90 % | SYSTOLIC BLOOD PRESSURE: 130 MMHG | HEART RATE: 83 BPM | DIASTOLIC BLOOD PRESSURE: 64 MMHG | WEIGHT: 133.4 LBS | HEIGHT: 64 IN | BODY MASS INDEX: 22.77 KG/M2

## 2019-08-19 DIAGNOSIS — K21.9 GERD WITHOUT ESOPHAGITIS: ICD-10-CM

## 2019-08-19 DIAGNOSIS — R91.1 LUNG NODULE: Primary | ICD-10-CM

## 2019-08-19 DIAGNOSIS — Z14.8 ALPHA-1-ANTITRYPSIN DEFICIENCY CARRIER: ICD-10-CM

## 2019-08-19 DIAGNOSIS — R91.1 LUNG NODULE: ICD-10-CM

## 2019-08-19 DIAGNOSIS — J44.9 STAGE 4 VERY SEVERE COPD BY GOLD CLASSIFICATION (HCC): ICD-10-CM

## 2019-08-19 DIAGNOSIS — J00 NASOPHARYNGITIS: ICD-10-CM

## 2019-08-19 DIAGNOSIS — J96.11 CHRONIC RESPIRATORY FAILURE WITH HYPOXIA (HCC): ICD-10-CM

## 2019-08-19 DIAGNOSIS — J43.9 PULMONARY EMPHYSEMA, UNSPECIFIED EMPHYSEMA TYPE (HCC): ICD-10-CM

## 2019-08-19 DIAGNOSIS — F17.210 CIGARETTE NICOTINE DEPENDENCE WITHOUT COMPLICATION: ICD-10-CM

## 2019-08-19 DIAGNOSIS — Z87.891 PERSONAL HISTORY OF NICOTINE DEPENDENCE: ICD-10-CM

## 2019-08-19 LAB
DIFF CAP.CO: NORMAL ML/MMHG SEC
FEV1/FVC: NORMAL %
FEV1: NORMAL LITERS
FVC VOL RESPIRATORY: NORMAL LITERS

## 2019-08-19 PROCEDURE — 94010 BREATHING CAPACITY TEST: CPT | Performed by: NURSE PRACTITIONER

## 2019-08-19 PROCEDURE — 71250 CT THORAX DX C-: CPT

## 2019-08-19 PROCEDURE — 94729 DIFFUSING CAPACITY: CPT | Performed by: NURSE PRACTITIONER

## 2019-08-19 PROCEDURE — 99214 OFFICE O/P EST MOD 30 MIN: CPT | Performed by: NURSE PRACTITIONER

## 2019-08-19 RX ORDER — ZOLPIDEM TARTRATE 10 MG/1
10 TABLET ORAL
Refills: 1 | Status: ON HOLD | COMMUNITY
Start: 2019-07-05 | End: 2019-09-05

## 2019-08-19 RX ORDER — ALBUTEROL SULFATE 90 UG/1
2 AEROSOL, METERED RESPIRATORY (INHALATION) EVERY 4 HOURS PRN
Qty: 1 INHALER | Refills: 11 | Status: SHIPPED | OUTPATIENT
Start: 2019-08-19 | End: 2019-09-18

## 2019-08-19 RX ORDER — BUDESONIDE AND FORMOTEROL FUMARATE DIHYDRATE 160; 4.5 UG/1; UG/1
AEROSOL RESPIRATORY (INHALATION)
Refills: 3 | COMMUNITY
Start: 2019-08-14 | End: 2019-08-29

## 2019-08-19 RX ORDER — TIZANIDINE 4 MG/1
TABLET ORAL
Refills: 1 | Status: ON HOLD | COMMUNITY
Start: 2019-08-05 | End: 2019-09-05

## 2019-08-19 NOTE — PROGRESS NOTES
[facility]  [facility]   [place] [address]  Phone: [phone]  Fax: [phone]     Justyna Lei is a 68 y.o. female.   : 3/25/1951  CC:   Chief Complaint   Patient presents with   • F/u of Copd      HPI: Justyna Lei is a pleasant 68 y.o. female. The patient is here today for follow up of COPD.  The patient has known COPD, alpha-1-SS, current everyday smoker, chronic respiratory failure with hypoxemia, lung nodule, and emphysema.  The patient currently utilizes Symbicort and Combivent for treatment of COPD.  She is also on Astelin, Flonase, Singulair and chronic oxygen therapy.  The patient states that she believes her Symbicort is affecting her vision and causing her to have a runny nose and would like to trial something different.  The patient reports no hospitalizations since her last office visit but she was treated for a URI in January.  The patient's PCP is Melodie Dozier MD.    The following portions of the patient's history were reviewed and updated as appropriate: allergies, current medications, past family history, past medical history, past social history, past surgical history and problem list.  Past Medical History:   Diagnosis Date   • Anxiety    • Anxiety     pain clinic patient Dr Sunshine x 5yrs   • Chronic pain due to injury     jarad LE & back   • COPD (chronic obstructive pulmonary disease) (CMS/[facility])    • Depression    • Hypertension    • Smoker      Family History   Problem Relation Age of Onset   • Suicidality Mother    • Cancer Father    • No Known Problems Brother      Social History     Socioeconomic History   • Marital status:      Spouse name: Not on file   • Number of children: Not on file   • Years of education: Not on file   • Highest education level: Not on file   Tobacco Use   • Smoking status: Current Some Day Smoker     Packs/day: 25.00     Years: 45.00     Pack years: 1125.00      "Types: Cigarettes   • Smokeless tobacco: Never Used   Substance and Sexual Activity   • Alcohol use: No   • Drug use: No   • Sexual activity: Defer     Review of Systems   Constitutional: Negative for chills and fever.   HENT: Negative for congestion.    Eyes: Negative for blurred vision.   Respiratory: Positive for shortness of breath (chronic with exertion ). Negative for cough.    Cardiovascular: Negative for chest pain.   Gastrointestinal: Negative for diarrhea, nausea and vomiting.   Endocrine: Negative for cold intolerance and heat intolerance.   Genitourinary: Negative for dysuria.   Musculoskeletal: Negative for arthralgias.   Skin: Negative for rash.   Neurological: Negative for dizziness, weakness and light-headedness.   Hematological: Does not bruise/bleed easily.   Psychiatric/Behavioral: Negative for agitation. The patient is not nervous/anxious.      /64   Pulse 83   Ht 162.6 cm (64\")   Wt 60.5 kg (133 lb 6.4 oz)   SpO2 90% Comment: 3L  Breastfeeding? No   BMI 22.90 kg/m²   Physical Exam   Constitutional: She is oriented to person, place, and time. She appears well-developed and well-nourished. No distress. Nasal cannula in place.   HENT:   Head: Normocephalic and atraumatic.   Eyes: Conjunctivae and EOM are normal. Pupils are equal, round, and reactive to light. No scleral icterus.   Neck: Normal range of motion. Neck supple.   Cardiovascular: Normal rate, regular rhythm and normal heart sounds. Exam reveals no friction rub.   No murmur heard.  Pulmonary/Chest: Effort normal. No respiratory distress. She has decreased breath sounds. She has no wheezes. She has no rales.   Abdominal: Soft. Bowel sounds are normal. She exhibits no distension. There is no tenderness.   Musculoskeletal: Normal range of motion. She exhibits edema.   Neurological: She is alert and oriented to person, place, and time.   Skin: Skin is warm and dry.   Psychiatric: She has a normal mood and affect. Her behavior is " normal. Judgment and thought content normal.   Nursing note and vitals reviewed.    Pulmonary Functions Testing Results:  FEV1   Date Value Ref Range Status   08/19/2019 25% liters Final     FVC   Date Value Ref Range Status   08/19/2019 55% liters Final     FEV1/FVC   Date Value Ref Range Status   08/19/2019 35.22% % Final     DLCO   Date Value Ref Range Status   08/19/2019 27% ml/mmHg sec Final     My PFT Interpretation: Spirometry is consistent with very severe obstructive disease and very severe small airway disease.  Diffusion shows a very severe diffusion impairment that improves to moderate when corrected for alveolar volume.  Imaging: [facility] CT of the chest without contrast August 19, 2019  IMPRESSION:  1. Several pulmonary nodules. The largest new pulmonary nodule measures  6 mm. Per Fleischner criteria, if the patient has high risk (history of  smoking, family history of lung cancer or other risk factor), recommend  follow-up CT in 3-6 months. If the patient is low risk, consider CT on  6-12 months. Then, follow-up at 8-24 months.  2. Mild diffuse thickening of the small airways, which could be seen  with bronchitis or chronic lung disease.  3. Emphysema.  4. Borderline pulmonary artery caliber, which can be seen with pulmonary  artery hypertension.    Assessment and Plan:   Justyna was seen today for f/u of copd.    Diagnoses and all orders for this visit:    Lung nodule  -     CT Chest Without Contrast; Future  Repeat CT of the chest in 6 months.  There are several pulmonary nodules.  The largest new pulmonary nodule measures are 6 mm.  Stage 4 very severe COPD by GOLD classification (CMS/[facility])  -     Pulmonary Function Test  -     albuterol sulfate  (90 Base) MCG/ACT inhaler; Inhale 2 puffs Every 4 (Four) Hours As Needed for Wheezing or Shortness of Air for up to 30 days.  -     Fluticasone-Umeclidin-Vilant (TRELEGY ELLIPTA) 100-62.5-25 MCG/INH aerosol powder ; Inhale 1 each Daily  for 30 days.  -     mometasone-formoterol (DULERA) 200-5 MCG/ACT inhaler; Inhale 2 puffs 2 (Two) Times a Day for 30 days. Give 2 sample inhalers.  The patient wishes to trial something besides Symbicort.  She thinks that Symbicort is affecting her vision and causing her to have a runny nose.  I have samples of Dulera and Trelegy available for her to trial.  We discussed both Trelegy and Dulera.  Inhaler demonstration was provided.  The patient is going to trial Trelegy with an albuterol rescue inhaler first for 2 weeks.  She will then switch to Dulera with her Combivent inhaler.  She will notify us which regimen works the best for her to try and send to her pharmacy.  She knows not to use Dulera and Trelegy together.  Alpha-1-antitrypsin deficiency carrier (CMS/HCC)  The patient has an SS phenotype.  She is not a candidate for any replacement therapy as she is a current everyday smoker.  Strongly recommend smoking cessation.  Chronic respiratory failure with hypoxia (CMS/HCC)  The patient is using her home oxygen, benefiting from it, and wishes to continue it.  Personal history of nicotine dependence  Current everyday smoker.  Cigarette nicotine dependence without complication  Recommend smoking cessation.  GERD without esophagitis  Continue Protonix  Nasopharyngitis  Continue Singulair, Flonase, and Astelin.  Pulmonary emphysema, unspecified emphysema type (CMS/HCC)  Continue current treatment regimen.    Health maintenance:   Influenza vaccine: no-allergic   Pneumovax 23: no   Prevnar 13: no   Patient's Body mass index is 22.9 kg/m². BMI is below normal parameters. Recommendations include: Defer to PCP.    Follow up: 6 months with CT of the chest  Keisha Simon, APRN  8/19/2019  9:33 PM    Please note that portions of this note were completed with a voice recognition program.

## 2019-08-19 NOTE — PATIENT INSTRUCTIONS
Chronic Obstructive Pulmonary Disease  Chronic obstructive pulmonary disease (COPD) is a long-term (chronic) lung problem. When you have COPD, it is hard for air to get in and out of your lungs. Usually the condition gets worse over time, and your lungs will never return to normal. There are things you can do to keep yourself as healthy as possible.  · Your doctor may treat your condition with:  ? Medicines.  ? Oxygen.  ? Lung surgery.  · Your doctor may also recommend:  ? Rehabilitation. This includes steps to make your body work better. It may involve a team of specialists.  ? Quitting smoking, if you smoke.  ? Exercise and changes to your diet.  ? Comfort measures (palliative care).  Follow these instructions at home:  Medicines  · Take over-the-counter and prescription medicines only as told by your doctor.  · Talk to your doctor before taking any cough or allergy medicines. You may need to avoid medicines that cause your lungs to be dry.  Lifestyle  · If you smoke, stop. Smoking makes the problem worse. If you need help quitting, ask your doctor.  · Avoid being around things that make your breathing worse. This may include smoke, chemicals, and fumes.  · Stay active, but remember to rest as well.  · Learn and use tips on how to relax.  · Make sure you get enough sleep. Most adults need at least 7 hours of sleep every night.  · Eat healthy foods. Eat smaller meals more often. Rest before meals.  Controlled breathing  Learn and use tips on how to control your breathing as told by your doctor. Try:  · Breathing in (inhaling) through your nose for 1 second. Then, pucker your lips and breath out (exhale) through your lips for 2 seconds.  · Putting one hand on your belly (abdomen). Breathe in slowly through your nose for 1 second. Your hand on your belly should move out. Pucker your lips and breathe out slowly through your lips. Your hand on your belly should move in as you breathe out.    Controlled coughing  Learn  and use controlled coughing to clear mucus from your lungs. Follow these steps:  1. Lean your head a little forward.  2. Breathe in deeply.  3. Try to hold your breath for 3 seconds.  4. Keep your mouth slightly open while coughing 2 times.  5. Spit any mucus out into a tissue.  6. Rest and do the steps again 1 or 2 times as needed.  General instructions  · Make sure you get all the shots (vaccines) that your doctor recommends. Ask your doctor about a flu shot and a pneumonia shot.  · Use oxygen therapy and pulmonary rehabilitation if told by your doctor. If you need home oxygen therapy, ask your doctor if you should buy a tool to measure your oxygen level (oximeter).  · Make a COPD action plan with your doctor. This helps you to know what to do if you feel worse than usual.  · Manage any other conditions you have as told by your doctor.  · Avoid going outside when it is very hot, cold, or humid.  · Avoid people who have a sickness you can catch (contagious).  · Keep all follow-up visits as told by your doctor. This is important.  Contact a doctor if:  · You cough up more mucus than usual.  · There is a change in the color or thickness of the mucus.  · It is harder to breathe than usual.  · Your breathing is faster than usual.  · You have trouble sleeping.  · You need to use your medicines more often than usual.  · You have trouble doing your normal activities such as getting dressed or walking around the house.  Get help right away if:  · You have shortness of breath while resting.  · You have shortness of breath that stops you from:  ? Being able to talk.  ? Doing normal activities.  · Your chest hurts for longer than 5 minutes.  · Your skin color is more blue than usual.  · Your pulse oximeter shows that you have low oxygen for longer than 5 minutes.  · You have a fever.  · You feel too tired to breathe normally.  Summary  · Chronic obstructive pulmonary disease (COPD) is a long-term lung problem.  · The way your  lungs work will never return to normal. Usually the condition gets worse over time. There are things you can do to keep yourself as healthy as possible.  · Take over-the-counter and prescription medicines only as told by your doctor.  · If you smoke, stop. Smoking makes the problem worse.  This information is not intended to replace advice given to you by your health care provider. Make sure you discuss any questions you have with your health care provider.  Document Released: 06/05/2009 Document Revised: 01/22/2018 Document Reviewed: 01/22/2018  [email or web address] Interactive Patient Education © 2019 [email or web address]

## 2019-08-21 ENCOUNTER — TELEPHONE (OUTPATIENT)
Dept: PULMONOLOGY | Facility: CLINIC | Age: 69
End: 2019-08-21

## 2019-08-21 DIAGNOSIS — F17.210 CIGARETTE NICOTINE DEPENDENCE WITHOUT COMPLICATION: Primary | ICD-10-CM

## 2019-08-21 NOTE — TELEPHONE ENCOUNTER
Patient left a voicemail requesting a prescription for nicotine patches to be sent to [facility].

## 2019-08-23 RX ORDER — NICOTINE 21 MG/24HR
1 PATCH, TRANSDERMAL 24 HOURS TRANSDERMAL EVERY 24 HOURS
Qty: 30 PATCH | Refills: 0 | Status: SHIPPED | OUTPATIENT
Start: 2019-08-23 | End: 2019-09-04

## 2019-08-23 NOTE — TELEPHONE ENCOUNTER
Nicotine patches not covered on insurance.   Try to do a PA.   This is the message I got.      The medication you have requested is not covered by Medicare Part D Law. If you believe the medication is being used for a medically accepted or compendia supported indication approved by CMS, please contact your patient's plan.

## 2019-08-28 DIAGNOSIS — R05.9 COUGH: Primary | ICD-10-CM

## 2019-08-28 DIAGNOSIS — J44.9 STAGE 4 VERY SEVERE COPD BY GOLD CLASSIFICATION (HCC): ICD-10-CM

## 2019-08-28 NOTE — TELEPHONE ENCOUNTER
Patient called and stated that she is needing a prescription for Trelegy and that it is working really good and was also wondering if you could send her something for a cough for her chronic coughing at night. Patient states that she is on a budget and can't really afford anything over the counter.

## 2019-08-29 RX ORDER — DEXTROMETHORPHAN POLISTIREX 30 MG/5ML
60 SUSPENSION ORAL NIGHTLY PRN
Qty: 280 ML | Refills: 3 | Status: ON HOLD | OUTPATIENT
Start: 2019-08-29 | End: 2019-09-17

## 2019-09-01 ENCOUNTER — NURSE TRIAGE (OUTPATIENT)
Dept: CALL CENTER | Facility: HOSPITAL | Age: 69
End: 2019-09-01

## 2019-09-01 NOTE — TELEPHONE ENCOUNTER
"Caller states I got up and took my daily medication early because I thought it was morning already. Caller states I'm doing fine and having no difficulty I was just wondering if I'll be ok? She was advised call back as needed and don't forget and take again this morning as this would be her daily dose.     Reason for Disposition  • Caller has medication question only, adult not sick, and triager answers question    Additional Information  • Negative: Drug overdose and nurse unable to answer question  • Negative: Caller requesting information not related to medicine  • Negative: Caller requesting a prescription for Strep throat and has a positive culture result  • Negative: Rash while taking a medication or within 3 days of stopping it  • Negative: Immunization reaction suspected  • Negative: [1] Asthma and [2] having symptoms of asthma (cough, wheezing, etc)  • Negative: MORE THAN A DOUBLE DOSE of a prescription or over-the-counter (OTC) drug  • Negative: [1] DOUBLE DOSE (an extra dose or lesser amount) of over-the-counter (OTC) drug AND [2] any symptoms (e.g., dizziness, nausea, pain, sleepiness)  • Negative: [1] DOUBLE DOSE (an extra dose or lesser amount) of prescription drug AND [2] any symptoms (e.g., dizziness, nausea, pain, sleepiness)  • Negative: Took another person's prescription drug  • Negative: [1] DOUBLE DOSE (an extra dose or lesser amount) of prescription drug AND [2] NO symptoms (Exception: a double dose of antibiotics)  • Negative: Diabetes drug error or overdose (e.g., insulin or extra dose)  • Negative: [1] Request for URGENT new prescription or refill of \"essential\" medication (i.e., likelihood of harm to patient if not taken) AND [2] triager unable to fill per unit policy  • Negative: [1] Prescription not at pharmacy AND [2] was prescribed today by PCP  • Negative: Pharmacy calling with prescription questions and triager unable to answer question  • Negative: Caller has URGENT medication " "question about med that PCP prescribed and triager unable to answer question  • Negative: Caller has NON-URGENT medication question about med that PCP prescribed and triager unable to answer question  • Negative: Caller requesting a NON-URGENT new prescription or refill and triager unable to refill per unit policy  • Negative: Caller has medication question about med not prescribed by PCP and triager unable to answer question (e.g., compatibility with other med, storage)  • Negative: [1] DOUBLE DOSE (an extra dose or lesser amount) of over-the-counter (OTC) drug AND [2] NO symptoms  • Negative: [1] DOUBLE DOSE (an extra dose or lesser amount) of antibiotic drug AND [2] NO symptoms    Answer Assessment - Initial Assessment Questions  1. SYMPTOMS: \"Do you have any symptoms?\"      Denies   2. SEVERITY: If symptoms are present, ask \"Are they mild, moderate or severe?\"      Denies    Protocols used: MEDICATION QUESTION CALL-ADULT-      "

## 2019-09-04 ENCOUNTER — APPOINTMENT (OUTPATIENT)
Dept: CT IMAGING | Facility: HOSPITAL | Age: 69
End: 2019-09-04

## 2019-09-04 ENCOUNTER — NURSE TRIAGE (OUTPATIENT)
Dept: CALL CENTER | Facility: HOSPITAL | Age: 69
End: 2019-09-04

## 2019-09-04 ENCOUNTER — APPOINTMENT (OUTPATIENT)
Dept: GENERAL RADIOLOGY | Facility: HOSPITAL | Age: 69
End: 2019-09-04

## 2019-09-04 ENCOUNTER — HOSPITAL ENCOUNTER (INPATIENT)
Facility: HOSPITAL | Age: 69
LOS: 14 days | Discharge: HOME | End: 2019-09-18
Attending: INTERNAL MEDICINE | Admitting: INTERNAL MEDICINE

## 2019-09-04 DIAGNOSIS — Z74.09 IMPAIRED FUNCTIONAL MOBILITY, BALANCE, GAIT, AND ENDURANCE: ICD-10-CM

## 2019-09-04 DIAGNOSIS — Z74.09 IMPAIRED MOBILITY: ICD-10-CM

## 2019-09-04 DIAGNOSIS — Z78.9 DECREASED ACTIVITIES OF DAILY LIVING (ADL): ICD-10-CM

## 2019-09-04 DIAGNOSIS — J44.1 COPD EXACERBATION (HCC): Primary | ICD-10-CM

## 2019-09-04 DIAGNOSIS — Z87.891 PERSONAL HISTORY OF NICOTINE DEPENDENCE: ICD-10-CM

## 2019-09-04 DIAGNOSIS — R05.9 COUGH: ICD-10-CM

## 2019-09-04 DIAGNOSIS — R06.89 HYPERCAPNIA: ICD-10-CM

## 2019-09-04 PROBLEM — Z79.899 POLYPHARMACY: Status: ACTIVE | Noted: 2019-09-04

## 2019-09-04 PROBLEM — J96.21 ACUTE ON CHRONIC RESPIRATORY FAILURE WITH HYPOXIA AND HYPERCAPNIA: Status: ACTIVE | Noted: 2019-09-04

## 2019-09-04 PROBLEM — J96.22 ACUTE ON CHRONIC RESPIRATORY FAILURE WITH HYPOXIA AND HYPERCAPNIA (HCC): Status: ACTIVE | Noted: 2019-09-04

## 2019-09-04 LAB
A-A DO2: ABNORMAL
ALBUMIN SERPL-MCNC: 3 G/DL (ref 3.5–5)
ALBUMIN/GLOB SERPL: 0.9 G/DL (ref 1.1–2.5)
ALP SERPL-CCNC: 72 U/L (ref 24–120)
ALT SERPL W P-5'-P-CCNC: 20 U/L (ref 0–54)
ANION GAP SERPL CALCULATED.3IONS-SCNC: ABNORMAL MMOL/L
ARTERIAL PATENCY WRIST A: POSITIVE
ARTERIAL PATENCY WRIST A: POSITIVE
AST SERPL-CCNC: 27 U/L (ref 7–45)
ATMOSPHERIC PRESS: 751 MMHG
ATMOSPHERIC PRESS: 751 MMHG
BASE EXCESS BLDA CALC-SCNC: 13.2 MMOL/L (ref 0–2)
BASE EXCESS BLDA CALC-SCNC: 15.6 MMOL/L (ref 0–2)
BASOPHILS # BLD AUTO: 0.03 10*3/MM3 (ref 0–0.2)
BASOPHILS NFR BLD AUTO: 0.4 % (ref 0–1.5)
BDY SITE: ABNORMAL
BDY SITE: ABNORMAL
BILIRUB SERPL-MCNC: 0.3 MG/DL (ref 0.1–1)
BODY TEMPERATURE: 37 C
BODY TEMPERATURE: 37 C
BUN BLD-MCNC: 8 MG/DL (ref 5–21)
BUN/CREAT SERPL: 11.8 (ref 7–25)
CALCIUM SPEC-SCNC: 7.9 MG/DL (ref 8.4–10.4)
CHLORIDE SERPL-SCNC: 92 MMOL/L (ref 98–110)
CO2 SERPL-SCNC: >40 MMOL/L (ref 24–31)
COHGB MFR BLD: 3.4 % (ref 0–5)
CREAT BLD-MCNC: 0.68 MG/DL (ref 0.5–1.4)
D DIMER PPP FEU-MCNC: 1.72 MG/L (FEU) (ref 0–0.5)
D-LACTATE SERPL-SCNC: 1 MMOL/L (ref 0.5–2)
DEPRECATED RDW RBC AUTO: 49.8 FL (ref 37–54)
EOSINOPHIL # BLD AUTO: 0.1 10*3/MM3 (ref 0–0.4)
EOSINOPHIL NFR BLD AUTO: 1.5 % (ref 0.3–6.2)
ERYTHROCYTE [DISTWIDTH] IN BLOOD BY AUTOMATED COUNT: 13.2 % (ref 12.3–15.4)
GAS FLOW AIRWAY: 28 LPM
GAS FLOW AIRWAY: 3 LPM
GFR SERPL CREATININE-BSD FRML MDRD: 86 ML/MIN/1.73
GLOBULIN UR ELPH-MCNC: 3.2 GM/DL
GLUCOSE BLD-MCNC: 109 MG/DL (ref 70–100)
HCO3 BLDA-SCNC: 40.1 MMOL/L (ref 20–26)
HCO3 BLDA-SCNC: 42.8 MMOL/L (ref 20–26)
HCT VFR BLD AUTO: 33.1 % (ref 34–46.6)
HCT VFR BLD CALC: 32.4 % (ref 38–51)
HGB BLD-MCNC: 10.3 G/DL (ref 12–15.9)
HGB BLDA-MCNC: 10.6 G/DL (ref 12–16)
HOLD SPECIMEN: NORMAL
HOROWITZ INDEX BLD+IHG-RTO: 50 %
IMM GRANULOCYTES # BLD AUTO: 0.02 10*3/MM3 (ref 0–0.05)
IMM GRANULOCYTES NFR BLD AUTO: 0.3 % (ref 0–0.5)
INR PPP: 0.95 (ref 0.91–1.09)
LYMPHOCYTES # BLD AUTO: 1.07 10*3/MM3 (ref 0.7–3.1)
LYMPHOCYTES NFR BLD AUTO: 15.9 % (ref 19.6–45.3)
Lab: ABNORMAL
MCH RBC QN AUTO: 31.9 PG (ref 26.6–33)
MCHC RBC AUTO-ENTMCNC: 31.1 G/DL (ref 31.5–35.7)
MCV RBC AUTO: 102.5 FL (ref 79–97)
METHGB BLD QL: 0.9 % (ref 0–3)
MODALITY: ABNORMAL
MODALITY: ABNORMAL
MONOCYTES # BLD AUTO: 0.64 10*3/MM3 (ref 0.1–0.9)
MONOCYTES NFR BLD AUTO: 9.5 % (ref 5–12)
NEUTROPHILS # BLD AUTO: 4.87 10*3/MM3 (ref 1.7–7)
NEUTROPHILS NFR BLD AUTO: 72.4 % (ref 42.7–76)
NOTE: ABNORMAL
NOTIFIED BY: ABNORMAL
NOTIFIED WHO: ABNORMAL
NRBC BLD AUTO-RTO: 0 /100 WBC (ref 0–0.2)
NT-PROBNP SERPL-MCNC: 1270 PG/ML (ref 0–900)
OXYHGB MFR BLDV: 86 % (ref 94–99)
PCO2 BLDA: 64.6 MM HG (ref 35–45)
PCO2 BLDA: 66.9 MM HG (ref 35–45)
PCO2 TEMP ADJ BLD: ABNORMAL MM[HG]
PH BLDA: 7.4 PH UNITS (ref 7.35–7.45)
PH BLDA: 7.41 PH UNITS (ref 7.35–7.45)
PH, TEMP CORRECTED: ABNORMAL
PLATELET # BLD AUTO: 246 10*3/MM3 (ref 140–450)
PMV BLD AUTO: 11.1 FL (ref 6–12)
PO2 BLDA: 56.2 MM HG (ref 83–108)
PO2 BLDA: 61.8 MM HG (ref 83–108)
PO2 TEMP ADJ BLD: ABNORMAL MM[HG]
POTASSIUM BLD-SCNC: 4.1 MMOL/L (ref 3.5–5.3)
POTASSIUM BLDA-SCNC: 3.8 MMOL/L (ref 3.5–5.2)
PROCALCITONIN SERPL-MCNC: <0.25 NG/ML
PROT SERPL-MCNC: 6.2 G/DL (ref 6.3–8.7)
PROTHROMBIN TIME: 13 SECONDS (ref 11.9–14.6)
RBC # BLD AUTO: 3.23 10*6/MM3 (ref 3.77–5.28)
SAO2 % BLDCOA: 89.9 % (ref 94–99)
SAO2 % BLDCOA: 92.1 % (ref 94–99)
SODIUM BLD-SCNC: 136 MMOL/L (ref 135–145)
SODIUM BLDA-SCNC: 139 MMOL/L (ref 136–145)
TROPONIN I SERPL-MCNC: <0.012 NG/ML (ref 0–0.03)
VENTILATOR MODE: ABNORMAL
VENTILATOR MODE: ABNORMAL
WBC NRBC COR # BLD: 6.73 10*3/MM3 (ref 3.4–10.8)
WHOLE BLOOD HOLD SPECIMEN: NORMAL
WHOLE BLOOD HOLD SPECIMEN: NORMAL

## 2019-09-04 PROCEDURE — 71045 X-RAY EXAM CHEST 1 VIEW: CPT

## 2019-09-04 PROCEDURE — 80053 COMPREHEN METABOLIC PANEL: CPT | Performed by: NURSE PRACTITIONER

## 2019-09-04 PROCEDURE — 36600 WITHDRAWAL OF ARTERIAL BLOOD: CPT

## 2019-09-04 PROCEDURE — 84484 ASSAY OF TROPONIN QUANT: CPT | Performed by: NURSE PRACTITIONER

## 2019-09-04 PROCEDURE — 83605 ASSAY OF LACTIC ACID: CPT | Performed by: NURSE PRACTITIONER

## 2019-09-04 PROCEDURE — 82375 ASSAY CARBOXYHB QUANT: CPT

## 2019-09-04 PROCEDURE — 82803 BLOOD GASES ANY COMBINATION: CPT

## 2019-09-04 PROCEDURE — 94799 UNLISTED PULMONARY SVC/PX: CPT

## 2019-09-04 PROCEDURE — 85610 PROTHROMBIN TIME: CPT | Performed by: NURSE PRACTITIONER

## 2019-09-04 PROCEDURE — 94760 N-INVAS EAR/PLS OXIMETRY 1: CPT

## 2019-09-04 PROCEDURE — 71275 CT ANGIOGRAPHY CHEST: CPT

## 2019-09-04 PROCEDURE — 85025 COMPLETE CBC W/AUTO DIFF WBC: CPT | Performed by: NURSE PRACTITIONER

## 2019-09-04 PROCEDURE — 25010000002 ENOXAPARIN PER 10 MG: Performed by: NURSE PRACTITIONER

## 2019-09-04 PROCEDURE — 0 IOPAMIDOL PER 1 ML: Performed by: INTERNAL MEDICINE

## 2019-09-04 PROCEDURE — 25010000002 METHYLPREDNISOLONE PER 125 MG: Performed by: NURSE PRACTITIONER

## 2019-09-04 PROCEDURE — 99285 EMERGENCY DEPT VISIT HI MDM: CPT

## 2019-09-04 PROCEDURE — 25010000002 LEVOFLOXACIN PER 250 MG: Performed by: NURSE PRACTITIONER

## 2019-09-04 PROCEDURE — 93005 ELECTROCARDIOGRAM TRACING: CPT | Performed by: FAMILY MEDICINE

## 2019-09-04 PROCEDURE — 82805 BLOOD GASES W/O2 SATURATION: CPT

## 2019-09-04 PROCEDURE — 83880 ASSAY OF NATRIURETIC PEPTIDE: CPT | Performed by: NURSE PRACTITIONER

## 2019-09-04 PROCEDURE — 87040 BLOOD CULTURE FOR BACTERIA: CPT | Performed by: NURSE PRACTITIONER

## 2019-09-04 PROCEDURE — 85379 FIBRIN DEGRADATION QUANT: CPT | Performed by: NURSE PRACTITIONER

## 2019-09-04 PROCEDURE — 93005 ELECTROCARDIOGRAM TRACING: CPT | Performed by: NURSE PRACTITIONER

## 2019-09-04 PROCEDURE — 83050 HGB METHEMOGLOBIN QUAN: CPT

## 2019-09-04 PROCEDURE — 94640 AIRWAY INHALATION TREATMENT: CPT

## 2019-09-04 PROCEDURE — 84145 PROCALCITONIN (PCT): CPT | Performed by: NURSE PRACTITIONER

## 2019-09-04 PROCEDURE — 93010 ELECTROCARDIOGRAM REPORT: CPT | Performed by: INTERNAL MEDICINE

## 2019-09-04 RX ORDER — SODIUM CHLORIDE 0.9 % (FLUSH) 0.9 %
10 SYRINGE (ML) INJECTION AS NEEDED
Status: DISCONTINUED | OUTPATIENT
Start: 2019-09-04 | End: 2019-09-04

## 2019-09-04 RX ORDER — ONDANSETRON 4 MG/1
4 TABLET, FILM COATED ORAL EVERY 6 HOURS PRN
Status: DISCONTINUED | OUTPATIENT
Start: 2019-09-04 | End: 2019-09-18 | Stop reason: HOSPADM

## 2019-09-04 RX ORDER — GUAIFENESIN 600 MG/1
1200 TABLET, EXTENDED RELEASE ORAL EVERY 12 HOURS SCHEDULED
Status: DISCONTINUED | OUTPATIENT
Start: 2019-09-04 | End: 2019-09-05

## 2019-09-04 RX ORDER — NICOTINE 21 MG/24HR
1 PATCH, TRANSDERMAL 24 HOURS TRANSDERMAL EVERY 24 HOURS
Status: DISCONTINUED | OUTPATIENT
Start: 2019-09-04 | End: 2019-09-18 | Stop reason: HOSPADM

## 2019-09-04 RX ORDER — CITALOPRAM 20 MG/1
40 TABLET ORAL DAILY
Status: DISCONTINUED | OUTPATIENT
Start: 2019-09-04 | End: 2019-09-18 | Stop reason: HOSPADM

## 2019-09-04 RX ORDER — OXYCODONE AND ACETAMINOPHEN 10; 325 MG/1; MG/1
1 TABLET ORAL EVERY 6 HOURS PRN
Status: DISCONTINUED | OUTPATIENT
Start: 2019-09-04 | End: 2019-09-18 | Stop reason: HOSPADM

## 2019-09-04 RX ORDER — AZELASTINE 1 MG/ML
2 SPRAY, METERED NASAL 2 TIMES DAILY
Status: DISCONTINUED | OUTPATIENT
Start: 2019-09-04 | End: 2019-09-18 | Stop reason: HOSPADM

## 2019-09-04 RX ORDER — LEVOFLOXACIN 5 MG/ML
750 INJECTION, SOLUTION INTRAVENOUS DAILY
Status: CANCELLED | OUTPATIENT
Start: 2019-09-04 | End: 2019-09-11

## 2019-09-04 RX ORDER — ALPRAZOLAM 0.5 MG/1
0.5 TABLET ORAL 4 TIMES DAILY PRN
Status: DISCONTINUED | OUTPATIENT
Start: 2019-09-04 | End: 2019-09-18 | Stop reason: HOSPADM

## 2019-09-04 RX ORDER — LEVOFLOXACIN 5 MG/ML
750 INJECTION, SOLUTION INTRAVENOUS EVERY 24 HOURS
Status: COMPLETED | OUTPATIENT
Start: 2019-09-05 | End: 2019-09-08

## 2019-09-04 RX ORDER — MONTELUKAST SODIUM 10 MG/1
10 TABLET ORAL NIGHTLY
Status: DISCONTINUED | OUTPATIENT
Start: 2019-09-04 | End: 2019-09-18 | Stop reason: HOSPADM

## 2019-09-04 RX ORDER — LEVOFLOXACIN 5 MG/ML
500 INJECTION, SOLUTION INTRAVENOUS ONCE
Status: COMPLETED | OUTPATIENT
Start: 2019-09-04 | End: 2019-09-04

## 2019-09-04 RX ORDER — TIZANIDINE 4 MG/1
4 TABLET ORAL EVERY 8 HOURS PRN
Status: DISCONTINUED | OUTPATIENT
Start: 2019-09-04 | End: 2019-09-18 | Stop reason: HOSPADM

## 2019-09-04 RX ORDER — DEXTROMETHORPHAN POLISTIREX 30 MG/5ML
60 SUSPENSION ORAL NIGHTLY PRN
Status: DISPENSED | OUTPATIENT
Start: 2019-09-04 | End: 2019-09-08

## 2019-09-04 RX ORDER — LEVOFLOXACIN 5 MG/ML
250 INJECTION, SOLUTION INTRAVENOUS ONCE
Status: COMPLETED | OUTPATIENT
Start: 2019-09-04 | End: 2019-09-04

## 2019-09-04 RX ORDER — SODIUM CHLORIDE 0.9 % (FLUSH) 0.9 %
10 SYRINGE (ML) INJECTION EVERY 12 HOURS SCHEDULED
Status: DISCONTINUED | OUTPATIENT
Start: 2019-09-04 | End: 2019-09-18 | Stop reason: HOSPADM

## 2019-09-04 RX ORDER — OXYCODONE HYDROCHLORIDE 15 MG/1
30 TABLET, FILM COATED, EXTENDED RELEASE ORAL EVERY 12 HOURS SCHEDULED
Status: DISCONTINUED | OUTPATIENT
Start: 2019-09-04 | End: 2019-09-18 | Stop reason: HOSPADM

## 2019-09-04 RX ORDER — SODIUM CHLORIDE 0.9 % (FLUSH) 0.9 %
10 SYRINGE (ML) INJECTION AS NEEDED
Status: DISCONTINUED | OUTPATIENT
Start: 2019-09-04 | End: 2019-09-18 | Stop reason: HOSPADM

## 2019-09-04 RX ORDER — GABAPENTIN 300 MG/1
600 CAPSULE ORAL EVERY 8 HOURS SCHEDULED
Status: DISCONTINUED | OUTPATIENT
Start: 2019-09-04 | End: 2019-09-18 | Stop reason: HOSPADM

## 2019-09-04 RX ORDER — BUDESONIDE 0.5 MG/2ML
0.5 INHALANT ORAL
Status: DISCONTINUED | OUTPATIENT
Start: 2019-09-04 | End: 2019-09-15

## 2019-09-04 RX ORDER — ONDANSETRON 2 MG/ML
4 INJECTION INTRAMUSCULAR; INTRAVENOUS EVERY 6 HOURS PRN
Status: DISCONTINUED | OUTPATIENT
Start: 2019-09-04 | End: 2019-09-18 | Stop reason: HOSPADM

## 2019-09-04 RX ORDER — SODIUM CHLORIDE, SODIUM LACTATE, POTASSIUM CHLORIDE, CALCIUM CHLORIDE 600; 310; 30; 20 MG/100ML; MG/100ML; MG/100ML; MG/100ML
75 INJECTION, SOLUTION INTRAVENOUS CONTINUOUS
Status: DISCONTINUED | OUTPATIENT
Start: 2019-09-04 | End: 2019-09-18 | Stop reason: HOSPADM

## 2019-09-04 RX ORDER — PANTOPRAZOLE SODIUM 20 MG/1
20 TABLET, DELAYED RELEASE ORAL
Status: DISCONTINUED | OUTPATIENT
Start: 2019-09-05 | End: 2019-09-05

## 2019-09-04 RX ORDER — METHYLPREDNISOLONE SODIUM SUCCINATE 125 MG/2ML
60 INJECTION, POWDER, LYOPHILIZED, FOR SOLUTION INTRAMUSCULAR; INTRAVENOUS EVERY 6 HOURS
Status: DISCONTINUED | OUTPATIENT
Start: 2019-09-04 | End: 2019-09-06

## 2019-09-04 RX ORDER — IPRATROPIUM BROMIDE AND ALBUTEROL SULFATE 2.5; .5 MG/3ML; MG/3ML
3 SOLUTION RESPIRATORY (INHALATION) ONCE
Status: COMPLETED | OUTPATIENT
Start: 2019-09-04 | End: 2019-09-04

## 2019-09-04 RX ORDER — IPRATROPIUM BROMIDE AND ALBUTEROL SULFATE 2.5; .5 MG/3ML; MG/3ML
3 SOLUTION RESPIRATORY (INHALATION)
Status: DISCONTINUED | OUTPATIENT
Start: 2019-09-04 | End: 2019-09-09

## 2019-09-04 RX ORDER — FLUTICASONE PROPIONATE 50 MCG
2 SPRAY, SUSPENSION (ML) NASAL DAILY
Status: DISCONTINUED | OUTPATIENT
Start: 2019-09-04 | End: 2019-09-18 | Stop reason: HOSPADM

## 2019-09-04 RX ORDER — METHYLPREDNISOLONE SODIUM SUCCINATE 125 MG/2ML
125 INJECTION, POWDER, LYOPHILIZED, FOR SOLUTION INTRAMUSCULAR; INTRAVENOUS ONCE
Status: COMPLETED | OUTPATIENT
Start: 2019-09-04 | End: 2019-09-04

## 2019-09-04 RX ORDER — ZOLPIDEM TARTRATE 5 MG/1
10 TABLET ORAL NIGHTLY
Status: DISCONTINUED | OUTPATIENT
Start: 2019-09-04 | End: 2019-09-05 | Stop reason: SDUPTHER

## 2019-09-04 RX ADMIN — GUAIFENESIN 1200 MG: 600 TABLET, EXTENDED RELEASE ORAL at 21:17

## 2019-09-04 RX ADMIN — LEVOFLOXACIN 250 MG: 5 INJECTION, SOLUTION INTRAVENOUS at 15:40

## 2019-09-04 RX ADMIN — IPRATROPIUM BROMIDE AND ALBUTEROL SULFATE 3 ML: 2.5; .5 SOLUTION RESPIRATORY (INHALATION) at 14:34

## 2019-09-04 RX ADMIN — FLUTICASONE PROPIONATE 2 SPRAY: 50 SPRAY, METERED NASAL at 21:17

## 2019-09-04 RX ADMIN — AZELASTINE HYDROCHLORIDE 2 SPRAY: 137 SPRAY, METERED NASAL at 23:27

## 2019-09-04 RX ADMIN — GABAPENTIN 600 MG: 300 CAPSULE ORAL at 21:16

## 2019-09-04 RX ADMIN — SODIUM CHLORIDE, PRESERVATIVE FREE 10 ML: 5 INJECTION INTRAVENOUS at 21:47

## 2019-09-04 RX ADMIN — LEVOFLOXACIN 500 MG: 500 INJECTION, SOLUTION INTRAVENOUS at 23:15

## 2019-09-04 RX ADMIN — BUDESONIDE 0.5 MG: 0.5 INHALANT RESPIRATORY (INHALATION) at 21:42

## 2019-09-04 RX ADMIN — ENOXAPARIN SODIUM 60 MG: 60 INJECTION SUBCUTANEOUS at 21:18

## 2019-09-04 RX ADMIN — IOPAMIDOL 100 ML: 755 INJECTION, SOLUTION INTRAVENOUS at 18:35

## 2019-09-04 RX ADMIN — METHYLPREDNISOLONE SODIUM SUCCINATE 125 MG: 125 INJECTION, POWDER, FOR SOLUTION INTRAMUSCULAR; INTRAVENOUS at 15:40

## 2019-09-04 RX ADMIN — SODIUM CHLORIDE, POTASSIUM CHLORIDE, SODIUM LACTATE AND CALCIUM CHLORIDE 75 ML/HR: 600; 310; 30; 20 INJECTION, SOLUTION INTRAVENOUS at 21:46

## 2019-09-04 RX ADMIN — MONTELUKAST SODIUM 10 MG: 10 TABLET, FILM COATED ORAL at 21:17

## 2019-09-04 RX ADMIN — MUPIROCIN: 20 OINTMENT TOPICAL at 21:17

## 2019-09-04 RX ADMIN — IPRATROPIUM BROMIDE AND ALBUTEROL SULFATE 3 ML: 2.5; .5 SOLUTION RESPIRATORY (INHALATION) at 21:42

## 2019-09-04 RX ADMIN — SODIUM CHLORIDE 1000 ML: 9 INJECTION, SOLUTION INTRAVENOUS at 14:52

## 2019-09-04 RX ADMIN — METHYLPREDNISOLONE SODIUM SUCCINATE 60 MG: 125 INJECTION, POWDER, FOR SOLUTION INTRAMUSCULAR; INTRAVENOUS at 22:00

## 2019-09-04 RX ADMIN — NICOTINE 1 PATCH: 21 PATCH, EXTENDED RELEASE TRANSDERMAL at 21:17

## 2019-09-04 RX ADMIN — OXYCODONE HYDROCHLORIDE 30 MG: 20 TABLET, FILM COATED, EXTENDED RELEASE ORAL at 21:16
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
## 2019-09-04 NOTE — H&P
"    [facility] Medicine Services  HISTORY AND PHYSICAL    Date of Admission: 9/4/2019  Primary Care Physician: Provider, No Known    Subjective     Chief Complaint: Shortness of breathing    History of Present Illness  Nazia Lei is a 68-year-old female with past medical history of COPD with ongoing tobacco use followed by Dr. Wm Valdes, pulmonology, anxiety depression, hypertension, polypharmacy.  Patient states she developed increasing shortness of breathing 2 to 3 days ago.  She does wear oxygen 2 to 3 L continuously.  She states today she was unable to read and checked her home saturation it was 74% on 3 L.  EMS reported 70% on 3 L.  She can planes of sinus drainage without any increase in cough or sputum production.  No fevers however she reports chills.  She has had no chest pain, changes in bowel or bladder habits.  Patient reports 2-3 falls over the past 2 weeks.  She states she noticed her right lower extremity swelling excellently 8 days ago.  She states it is painful.  She also reports \"working on her phone\" when Salazar came from the phone and she felt them on her right cheek and quickly removed her oxygen this did fall on her right foot and she has a quarter size burn on the lateral aspect of the foot.  She has been cleansing it with peroxide and utilizing antibiotic cream for treatment.  The area is quite raw without drainage.  Patient is admitted for further evaluation and treatment to the critical care unit due to ongoing hypoxia despite Vapotherm therapy.    Review of Systems   10 point review of systems was completed, all negative except for those discussed in HPI    Past Medical History:   Past Medical History:   Diagnosis Date   • Anxiety    • Anxiety     pain clinic patient Dr Sunshine x 5yrs   • Chronic pain due to injury     jarad LE & back   • COPD (chronic obstructive pulmonary disease) (CMS/[facility])    • Depression    • Hypertension    • Polypharmacy    • Smoker  "       Past Surgical History:   Past Surgical History:   Procedure Laterality Date   • ABDOMINAL SURGERY     • APPENDECTOMY     • CHOLECYSTECTOMY     • FEMUR FRACTURE SURGERY     • HERNIA REPAIR     • HYSTERECTOMY         Family History: family history includes Cancer in her father; No Known Problems in her brother; Suicidality in her mother.    Social History:  reports that she has been smoking cigarettes.  She has a 1125.00 pack-year smoking history. She has never used smokeless tobacco. She reports that she does not drink alcohol or use drugs.    Code Status: Full, but unable speak for herself her daughter Bertha Guerrero will speak for her      Allergies:  Allergies   Allergen Reactions   • Aspirin Anaphylaxis   • Demerol [Meperidine] Anaphylaxis   • Ibuprofen Anaphylaxis   • Neosporin [Neomycin-Bacitracin Zn-Polymyx] Anaphylaxis   • Penicillins Anaphylaxis   • Sulfa Antibiotics Anaphylaxis   • Codeine Hives     Pt can take codeine as long is it is not alot   • Nsaids Unknown (See Comments)   • Talwin [Pentazocine] Hives   • Tetracyclines & Related    • Influenza Vaccines Hives and Rash       Medications:  Prior to Admission medications    Medication Sig Start Date End Date Taking? Authorizing Provider   albuterol (PROVENTIL HFA;VENTOLIN HFA) 108 (90 BASE) MCG/ACT inhaler Inhale 2 puffs Every 4 (Four) Hours As Needed for wheezing.    ProviderAnatoly MD   albuterol sulfate  (90 Base) MCG/ACT inhaler Inhale 2 puffs Every 4 (Four) Hours As Needed for Wheezing or Shortness of Air for up to 30 days. 8/19/19 9/18/19  Keisha Simon APRN   ALPRAZolam (XANAX) 1 MG tablet Take 1 mg by mouth 4 (Four) Times a Day As Needed for anxiety.    ProviderAnatoly MD   amphetamine-dextroamphetamine (ADDERALL) 20 MG tablet Take 20 mg by mouth 2 (Two) Times a Day.    ProviderAnatoly MD   azelastine (ASTELIN) 0.1 % nasal spray 2 sprays into the nostril(s) as directed by provider 2 (Two) Times a Day. Use in  each nostril as directed 12/3/18   Keisha Simon APRN   citalopram (CeleXA) 40 MG tablet Take 40 mg by mouth Daily.    ProviderAnatoly MD   dextromethorphan polistirex ER (DELSYM) 30 MG/5ML Suspension Extended Release oral suspension Take 60 mg by mouth At Night As Needed (cough) for up to 10 days. 8/29/19 9/8/19  Keisha Simon APRN   fluticasone (FLONASE) 50 MCG/ACT nasal spray 2 sprays into the nostril(s) as directed by provider Daily. 12/3/18   Keisha Simon APRN   Fluticasone-Umeclidin-Vilant (TRELEGY ELLIPTA) 100-62.5-25 MCG/INH aerosol powder  Inhale 1 each Daily for 30 days. 8/19/19 9/18/19  Keisha Simon APRN   Fluticasone-Umeclidin-Vilant (TRELEGY ELLIPTA) 100-62.5-25 MCG/INH aerosol powder  Inhale 1 each Daily. 8/29/19   Keisha Simon APRN   gabapentin (NEURONTIN) 600 MG tablet Take 600 mg by mouth Every 8 (Eight) Hours.    ProviderAnatoly MD   ipratropium-albuterol (COMBIVENT RESPIMAT)  MCG/ACT inhaler Inhale 1 puff 4 (Four) Times a Day As Needed for Wheezing. 12/3/18   Keisha Simon APRN   montelukast (SINGULAIR) 10 MG tablet Take 1 tablet by mouth Every Night. 12/3/18   Keisha Simon APRN   nicotine (NICODERM CQ) 21 MG/24HR patch Place 1 patch on the skin Daily. 3/16/17   Mirna Galvez DO   nicotine (NICODERM CQ) 21 MG/24HR patch Place 1 patch on the skin as directed by provider Daily for 30 days. 8/23/19 9/22/19  Keisha Simon APRN   O2 (OXYGEN) Inhale 2 L.    ProviderAnatoly MD   ondansetron (ZOFRAN) 4 MG tablet Take 4 mg by mouth Every 8 (Eight) Hours As Needed for nausea or vomiting.    ProviderAnatoly MD   OxyCODONE HCl ER (oxyCONTIN) 30 MG tablet extended-release 12 hour Take 30 mg by mouth Every 12 (Twelve) Hours.    Provider, MD Anatoly   oxyCODONE-acetaminophen (PERCOCET)  MG per tablet Take 1 tablet by mouth Every 6 (Six) Hours As Needed for moderate pain (4-6) (every 4-6 hours).     "Provider, MD Anatoly   pantoprazole (PROTONIX) 20 MG EC tablet Take 1 tablet by mouth Daily. 12/3/18   Aurora, THIERNO Garcia   tiZANidine (ZANAFLEX) 4 MG tablet TAKE 1 TABLET BY MOUTH THREE TIMES A DAY FILL ON OR AFTER 7-10-19 8/5/19   Anatoly Ryan MD   zolpidem (AMBIEN) 10 MG tablet Take 10 mg by mouth every night at bedtime. 7/5/19   Anatoly Ryan MD       Objective     /52 (BP Location: Right arm, Patient Position: Sitting)   Pulse 74   Temp 98.7 °F (37.1 °C) (Oral)   Resp 24   Ht 167.6 cm (66\")   Wt 59 kg (130 lb)   LMP  (LMP Unknown) Comment: hysterectomy  SpO2 90%   BMI 20.98 kg/m²   Physical Exam   Constitutional: She is oriented to person, place, and time. She appears well-developed and well-nourished. No distress.   Unkempt    HENT:   Head: Normocephalic and atraumatic.   Eyes: Conjunctivae and EOM are normal. Pupils are equal, round, and reactive to light. No scleral icterus.   Neck: Normal range of motion. Neck supple. No JVD present. No tracheal deviation present.   Cardiovascular: Normal rate, regular rhythm, normal heart sounds and intact distal pulses. Exam reveals no gallop.   No murmur heard.  Pulmonary/Chest: She is in respiratory distress. She has wheezes. She has no rales.   Severely diminished throughout   Abdominal: Soft. Bowel sounds are normal. She exhibits no distension. There is no tenderness. There is no guarding.   Musculoskeletal: Normal range of motion. She exhibits no edema.   Neurological: She is alert and oriented to person, place, and time.   Mildly somnolent however arouses easily   Skin: Skin is warm and dry. No rash noted. She is not diaphoretic. No erythema. No pallor.   Psychiatric: She has a normal mood and affect. Her behavior is normal.   Vitals reviewed.      Pertinent Data:   Lab Results (last 72 hours)     Procedure Component Value Units Date/Time    Blood Gas, Arterial [[id number]]  (Abnormal) Collected:  09/04/19 1620    Specimen:  " Arterial Blood Updated:  09/04/19 1618     Site Left Radial     Michael's Test Positive     pH, Arterial 7.401 pH units      pCO2, Arterial 64.6 mm Hg      Comment: 83 Value above reference range        pO2, Arterial 61.8 mm Hg      Comment: 84 Value below reference range        HCO3, Arterial 40.1 mmol/L      Comment: 83 Value above reference range        Base Excess, Arterial 13.2 mmol/L      Comment: 83 Value above reference range        O2 Saturation, Arterial 92.1 %      Comment: 84 Value below reference range        Temperature 37.0 C      Barometric Pressure for Blood Gas 751 mmHg      Modality Heated HFNC     FIO2 50 %      Flow Rate 28.0 lpm      Ventilator Mode NA     Collected by [id number]     Comment: Meter: [id number]     :  [id number]       Blood Culture - Blood, Wrist, Left [[id number]] Collected:  09/04/19 1450    Specimen:  Blood from Wrist, Left Updated:  09/04/19 1559    BNP [[id number]]  (Abnormal) Collected:  09/04/19 1450    Specimen:  Blood Updated:  09/04/19 1536     proBNP 1,270.0 pg/mL     Troponin [[id number]]  (Normal) Collected:  09/04/19 1450    Specimen:  Blood Updated:  09/04/19 1536     Troponin I <0.012 ng/mL     Comprehensive Metabolic Panel [[id number]]  (Abnormal) Collected:  09/04/19 1450    Specimen:  Blood Updated:  09/04/19 1528     Glucose 109 mg/dL      BUN 8 mg/dL      Creatinine 0.68 mg/dL      Sodium 136 mmol/L      Potassium 4.1 mmol/L      Chloride 92 mmol/L      CO2 >40.0 mmol/L      Calcium 7.9 mg/dL      Total Protein 6.2 g/dL      Albumin 3.00 g/dL      ALT (SGPT) 20 U/L      AST (SGOT) 27 U/L      Alkaline Phosphatase 72 U/L      Total Bilirubin 0.3 mg/dL      eGFR Non African Amer 86 mL/min/1.73      Globulin 3.2 gm/dL      A/G Ratio 0.9 g/dL      BUN/Creatinine Ratio 11.8     Anion Gap --     Comment: Unable to calculate Anion Gap.       Protime-INR [[id number]]  (Normal) Collected:  09/04/19 1450    Specimen:  Blood Updated:  09/04/19 1523     Protime 13.0  Seconds      INR 0.95    Blood Culture - Blood, Arm, Left [[id number]] Collected:  09/04/19 1450    Specimen:  Blood from Arm, Left Updated:  09/04/19 1520    Blood Gas, Arterial With Co-Ox [[id number]]  (Abnormal) Collected:  09/04/19 1440    Specimen:  Arterial Blood Updated:  09/04/19 1442     Site Left Radial     Michael's Test Positive     pH, Arterial 7.414 pH units      pCO2, Arterial 66.9 mm Hg      Comment: 86 Value above critical limit        pO2, Arterial 56.2 mm Hg      Comment: 84 Value below reference range        HCO3, Arterial 42.8 mmol/L      Comment: 83 Value above reference range        Base Excess, Arterial 15.6 mmol/L      Comment: 83 Value above reference range        O2 Saturation, Arterial 89.9 %      Comment: 84 Value below reference range        Hemoglobin, Blood Gas 10.6 g/dL      Comment: 84 Value below reference range        Hematocrit, Blood Gas 32.4 %      Comment: 84 Value below reference range        Oxyhemoglobin 86.0 %      Comment: 84 Value below reference range        Methemoglobin 0.90 %      Carboxyhemoglobin 3.4 %      A-a Gradiant --     Comment: UNABLE TO CALCULATE        Temperature 37.0 C      Sodium, Arterial 139 mmol/L      Potassium, Arterial 3.8 mmol/L      Barometric Pressure for Blood Gas 751 mmHg      Modality Nasal Cannula     Flow Rate 3.0 lpm      Ventilator Mode NA     Note --     Notified [facility] OSMAN ROUSSEAU     Notified By [id number]     Notified Time 09/04/2019 14:49     Collected by [id number]     Comment: Meter: [id number]     :  [id number]        pH, Temp Corrected --     pCO2, Temperature Corrected --     pO2, Temperature Corrected --    CBC Auto Differential [[id number]]  (Abnormal) Collected:  09/04/19 1423    Specimen:  Blood Updated:  09/04/19 1439     WBC 6.73 10*3/mm3      RBC 3.23 10*6/mm3      Hemoglobin 10.3 g/dL      Hematocrit 33.1 %      .5 fL      MCH 31.9 pg      MCHC 31.1 g/dL      RDW 13.2 %      RDW-SD 49.8 fl      MPV 11.1 fL       Platelets 246 10*3/mm3      Neutrophil % 72.4 %      Lymphocyte % 15.9 %      Monocyte % 9.5 %      Eosinophil % 1.5 %      Basophil % 0.4 %      Immature Grans % 0.3 %      Neutrophils, Absolute 4.87 10*3/mm3      Lymphocytes, Absolute 1.07 10*3/mm3      Monocytes, Absolute 0.64 10*3/mm3      Eosinophils, Absolute 0.10 10*3/mm3      Basophils, Absolute 0.03 10*3/mm3      Immature Grans, Absolute 0.02 10*3/mm3      nRBC 0.0 /100 WBC         Imaging Results (last 24 hours)     Procedure Component Value Units Date/Time    XR Chest 1 View [[id number]] Collected:  09/04/19 1455     Updated:  09/04/19 1459    Narrative:       Frontal upright radiograph of the chest 9/4/2019 2:38 PM CDT     COMPARISON: 01/13/2019.     HISTORY: Dyspnea hypoxia.     FINDINGS:   Hyperinflation flattening diaphragms noted. Chronic changes present in  the pulmonary parenchyma.. The cardiomediastinal silhouette and  pulmonary vascularity are within normal limits.      The osseous structures and surrounding soft tissues demonstrate no acute  abnormality.       Impression:       1. COPD no acute cardiopulmonary process.        This report was finalized on 09/04/2019 14:56 by Dr. Adan Myers MD.          I have personally reviewed and interpreted the radiology studies and ECG obtained at time of admission.     Assessment / Plan     Assessment:   Active Hospital Problems    Diagnosis   • COPD exacerbation (CMS/HCC)   • Acute on chronic respiratory failure with hypoxia and hypercapnia (CMS/HCC)   • Polypharmacy   • Personal history of nicotine dependence   • Anemia   Frequent falls  Suspect right lower extremity DVT  Wound right lateral foot    Plan:   1.  Admit to critical care  2.  Continue Levaquin 750 mg daily  3.  Consult Dr. Wm Valdes, pulmonology-follows on outpatient  4.  Duo nebs, budesonide neb, incentive spirometry, continuous pulse oximetry, Mucinex  5.  Stat d-dimer, CT angiogram of the chest with and without contrast  6.  Full  dose Lovenox until PE/DVT ruled out  7.  Consult PT and OT due to frequent falls  8.  Solu-Medrol 60 mg IV every 6 hours  9.  Labs in a.m.  10.  Home medications reviewed and restarted as appropriate, benzodiazepine decreased-patient on multiple sedating medications  11.  NicoDerm patch will need smoking cessation education at discharge  12.  Consult wound nurse to evaluate and treat wound on right foot  13. Check urinalysis with microscopic evaluation  14.  Stat lactic acid, procalcitonin, d-dimer    I discussed the patient's findings and my recommendations with: Abraham Jacques MD  Time spent: 50 minutes    Dory Calvillo, THIERNO  09/04/19   6:00 PM     I have personally seen the patient on date of admit.  The interested reader is referred to my note for details of encounter in conjunction with above note as discussed with Dory.   Abraham Jacques MD  09/05/19  7:01 PM

## 2019-09-04 NOTE — TELEPHONE ENCOUNTER
"    Answer Assessment - Initial Assessment Questions  1. MAIN CONCERN OR SYMPTOM : \"What's your main concern?\" (e.g., low oxygen level, breathing difficulty) \"What question do you have? Short of air  2. ONSET: \"When did the  ________  start?\"       today  3. OXYGEN THERAPY:     - \"Do you currently use home oxygen?\" (e.g., yes, no).     - If yes, \"What is your oxygen source?\" (e.g., O2 tank, O2 concentrator).     - If yes, \"How do you get the oxygen?\" (e.g., nasal prongs, face mask).     - If yes, \"How much oxygen are you supposed to use?\" (e.g., 1-2 L NC)      yes  4. PULSE OXIMETER:     - \"Do you have a pulse oximeter (pulse ox)?\"  (e.g., yes, no)     - If yes, \"Where do you place the probe?\" (e.g., fingertip, ear lobe)      81-83  5. O2 MONITORING: \"What is the oxygen level (pulse ox reading)?\" (e.g., %)  6: VSS MONITORING \"Do you monitor/measure your oxygen level or vital signs (e.g., yes, no, measurements are automatically sent to call center). Document CURRENT and NORMAL BASELINE values if available.      -  O2 SAT: \"What is the oxygen level (pulse ox reading)?\" (e.g., %)    -  P: \"What is your pulse rate per minute?\"    -  RR: \"What is your respiratory rate per minute?\"      na  7. BREATHING DIFFICULTY: \"Are you having any difficulty breathing?\" If so, ask \"How bad is it?\"  (e.g., none, mild, moderate, severe)    - MILD: No SOB at rest, mild SOB with walking, speaks normally in sentences, able to lie down, no retractions, pulse < 100.    - MODERATE: SOB at rest, SOB with minimal exertion and prefers to sit, cannot lie down flat, speaks in phrases, mild retractions, audible wheezing, pulse 100-120.    - SEVERE: Very SOB at rest, speaks in single words, struggling to breathe, sitting hunched forward, retractions, pulse > 120       severe  8. OTHER SYMPTOMS: \"Do you have any other symptoms?\" (e.g., fever, change in sputum)      na  9. SMOKING: \"Do you smoke currently?\" (Note: smoking around oxygen is " dangerous!)      na    Protocols used: COPD OXYGEN MONITORING AND HYPOXIA-ADULT-AH

## 2019-09-04 NOTE — PROGRESS NOTES
She is a 68-year old woman who presented in the emergency room with complaints of shortness of breath and low oxygen.  It was reported that her oxygen was in the low 70s.  She has not been feeling well for the past 2 days.  She normally wears oxygen at 3 L nasal cannula.  When she arrived in the hospital her oxygen saturation was 87%.  Otherwise vital signs are within normal limits.  We were asked to admit her for COPD exacerbation.  Review of record indicates that she has macrocytic anemia.  Her blood gas showed compensated respiratory acidosis and hypoxia on 3 L nasal cannula; her CO2 in the complete metabolic panel is greater than 40; she was subsequently placed on 50% FiO2 high flow at 28 L/min with improvement in her PaO2 to 62.  pH remains normal at 7.40 while PCO2 is 65.  proBNP is elevated at 1270  Chest x-ray showed COPD changes with no acute cardiopulmonary process.  Prior CT scan chest dated August 19, 2019 showed several pulmonary nodules.  At that time she has mild diffuse thickening of the small airways which could be seen in bronchitis or chronic lung disease.  She has emphysema.  She also has borderline pulmonary artery caliber which can be seen in pulmonary artery hypertension.    He was last seen at pulmonary clinic on August 19.  Review of record indicates that she has alpha-1 antitrypsin deficiency carrier;  she continues to smoke.  Pulmonary is aware of the lung nodules which they will follow with CT of the chest without contrast in 6 months.  Patient has known severe COPD stage IV.  It seems that pulmonary has change some of her medication (Trelegy with albuterol rescue inhalers).    She is seated on the gurney.  She is short of breath, tachypneic with oxygen saturation of 86% on 50% FiO2 via high flow.  He has diminished breath sound and has some wheezing.  She has no obvious accessory muscle use  She has swelling of right lower extremity.  She has no pain on her calf.  She has a drying wound  on the lateral aspect of right foot  I think this can be locally treated.  We will consult wound care    I think  d-dimer will not be useful on this situation for ruling out dvt.  I think vl us will be most prudent test.  With increasing need for oxygen I think it is reasonable to do ctA of chest to rule out PE.  I also will empirically start him on Lovenox 1milligram per kilogram body weight until ruled out.    We will treat for COPD exacerbation with acute on chronic hypoxic respiratory failure; chronic compensated respiratory acidosis and patient with alpha-1 antitrypsin deficiency.

## 2019-09-04 NOTE — ED PROVIDER NOTES
Subjective   History of Present Illness    Review of Systems    Past Medical History:   Diagnosis Date   • Anxiety    • Anxiety     pain clinic patient Dr Sunshine x 5yrs   • Chronic pain due to injury     jarad LE & back   • COPD (chronic obstructive pulmonary disease) (CMS/[facility])    • Depression    • Hypertension    • Smoker        Allergies   Allergen Reactions   • Aspirin Anaphylaxis   • Demerol [Meperidine] Anaphylaxis   • Ibuprofen Anaphylaxis   • Neosporin [Neomycin-Bacitracin Zn-Polymyx] Anaphylaxis   • Penicillins Anaphylaxis   • Sulfa Antibiotics Anaphylaxis   • Codeine Hives     Pt can take codeine as long is it is not alot   • Nsaids Unknown (See Comments)   • Talwin [Pentazocine] Hives   • Tetracyclines & Related    • Influenza Vaccines Hives and Rash       Past Surgical History:   Procedure Laterality Date   • ABDOMINAL SURGERY     • APPENDECTOMY     • CHOLECYSTECTOMY     • FEMUR FRACTURE SURGERY     • HERNIA REPAIR     • HYSTERECTOMY         Family History   Problem Relation Age of Onset   • Suicidality Mother    • Cancer Father    • No Known Problems Brother        Social History     Socioeconomic History   • Marital status:      Spouse name: Not on file   • Number of children: Not on file   • Years of education: Not on file   • Highest education level: Not on file   Tobacco Use   • Smoking status: Current Some Day Smoker     Packs/day: 25.00     Years: 45.00     Pack years: 1125.00     Types: Cigarettes   • Smokeless tobacco: Never Used   Substance and Sexual Activity   • Alcohol use: No   • Drug use: No   • Sexual activity: Defer           Objective   Physical Exam    Procedures           ED Course  ED Course as of Sep 04 1636   Wed Sep 04, 2019   1446 Reviewed abgs - will place on vapotherm at this time  [CW]   1457 Pending the remainder of workup at this time. Reviewed pt and pt care plan with Elba WOODWARD Care of pt transferred at this time   [CW]      ED Course User Index  [CW] Lucina  Antoinette Murphy, THIERNO         Final Diagnosis: as of Sep 04 1636   COPD exacerbation (CMS/[facility])   Hypercapnia        Labs Reviewed   COMPREHENSIVE METABOLIC PANEL - Abnormal; Notable for the following components:       Result Value    Glucose 109 (*)     Chloride 92 (*)     CO2 >40.0 (*)     Calcium 7.9 (*)     Total Protein 6.2 (*)     Albumin 3.00 (*)     A/G Ratio 0.9 (*)     All other components within normal limits    Narrative:     GFR Normal >60  Chronic Kidney Disease <60  Kidney Failure <15   BNP (IN-HOUSE) - Abnormal; Notable for the following components:    proBNP 1,270.0 (*)     All other components within normal limits   CBC WITH AUTO DIFFERENTIAL - Abnormal; Notable for the following components:    RBC 3.23 (*)     Hemoglobin 10.3 (*)     Hematocrit 33.1 (*)     .5 (*)     MCHC 31.1 (*)     Lymphocyte % 15.9 (*)     All other components within normal limits   BLOOD GAS, ARTERIAL W/CO-OXIMETRY - Abnormal; Notable for the following components:    pCO2, Arterial 66.9 (*)     pO2, Arterial 56.2 (*)     HCO3, Arterial 42.8 (*)     Base Excess, Arterial 15.6 (*)     O2 Saturation, Arterial 89.9 (*)     Hemoglobin, Blood Gas 10.6 (*)     Hematocrit, Blood Gas 32.4 (*)     Oxyhemoglobin 86.0 (*)     All other components within normal limits   BLOOD GAS, ARTERIAL - Abnormal; Notable for the following components:    pCO2, Arterial 64.6 (*)     pO2, Arterial 61.8 (*)     HCO3, Arterial 40.1 (*)     Base Excess, Arterial 13.2 (*)     O2 Saturation, Arterial 92.1 (*)     All other components within normal limits   PROTIME-INR - Normal   TROPONIN (IN-HOUSE) - Normal   BLOOD CULTURE   BLOOD CULTURE   RAINBOW DRAW    Narrative:     The following orders were created for panel order [place] Draw.  Procedure                               Abnormality         Status                     ---------                               -----------         ------                     Light Blue Top[[id number]]                                    Final result               Green Top (Gel)[[id number]]                                  Final result               Lavender Top[[id number]]                                     Final result               Red Top[[id number]]                                          Final result               Coward Blood Culture Theo...[[id number]]                      Final result                 Please view results for these tests on the individual orders.   BLOOD GAS, ARTERIAL W/CO-OXIMETRY   BLOOD GAS, ARTERIAL   LACTIC ACID, PLASMA   LIGHT BLUE TOP   GREEN TOP   LAVENDER TOP   RED TOP   RAINBOW BLOOD CULTURE BOTTLES - 1 SET   CBC AND DIFFERENTIAL    Narrative:     The following orders were created for panel order CBC & Differential.  Procedure                               Abnormality         Status                     ---------                               -----------         ------                     CBC Auto Differential[[id number]]        Abnormal            Final result                 Please view results for these tests on the individual orders.     XR Chest 1 View   Final Result   1. COPD no acute cardiopulmonary process.           This report was finalized on 09/04/2019 14:56 by Dr. Adan Myers MD.                  MDM  Number of Diagnoses or Management Options  Diagnosis management comments: Repeat co2 still elevated, the patient is lethargic on my exam, will admit to hospitalist, Dr. Jacques per Dr. Coffman        Amount and/or Complexity of Data Reviewed  Clinical lab tests: reviewed and ordered  Tests in the radiology section of CPT®: reviewed and ordered  Tests in the medicine section of CPT®: ordered and reviewed  Decide to obtain previous medical records or to obtain history from someone other than the patient: yes    Risk of Complications, Morbidity, and/or Mortality  Presenting problems: moderate  Diagnostic procedures: moderate  Management options: moderate    Patient Progress  Patient progress:  aurora Au, Benton Rodriguez PA-C  09/04/19 0433

## 2019-09-04 NOTE — TELEPHONE ENCOUNTER
Patient  will be going to the ED of Choice. Patient has a pulse ox reading of 81-83%  Reason for Disposition  • Sounds like a life-threatening emergency to the triager    Additional Information  • Negative: SEVERE difficulty breathing (e.g., struggling for each breath, speaks in single words, pulse > 120)  • Negative: Bluish (or gray) lips or face now  • Negative: Difficult to awaken or acting confused (e.g., disoriented, slurred speech)    Protocols used: COPD OXYGEN MONITORING AND HYPOXIA-ADULT-

## 2019-09-05 ENCOUNTER — APPOINTMENT (OUTPATIENT)
Dept: ULTRASOUND IMAGING | Facility: HOSPITAL | Age: 69
End: 2019-09-05

## 2019-09-05 LAB
ALBUMIN SERPL-MCNC: 2.6 G/DL (ref 3.5–5)
ALBUMIN/GLOB SERPL: 0.9 G/DL (ref 1.1–2.5)
ALP SERPL-CCNC: 66 U/L (ref 24–120)
ALT SERPL W P-5'-P-CCNC: 24 U/L (ref 0–54)
ANION GAP SERPL CALCULATED.3IONS-SCNC: 3 MMOL/L (ref 4–13)
ARTICHOKE IGE QN: 58 MG/DL (ref 0–99)
AST SERPL-CCNC: 11 U/L (ref 7–45)
BASOPHILS # BLD AUTO: 0 10*3/MM3 (ref 0–0.2)
BASOPHILS NFR BLD AUTO: 0 % (ref 0–1.5)
BILIRUB SERPL-MCNC: 0.1 MG/DL (ref 0.1–1)
BUN BLD-MCNC: 5 MG/DL (ref 5–21)
BUN/CREAT SERPL: 9.3 (ref 7–25)
CALCIUM SPEC-SCNC: 8.2 MG/DL (ref 8.4–10.4)
CHLORIDE SERPL-SCNC: 98 MMOL/L (ref 98–110)
CHOLEST SERPL-MCNC: 102 MG/DL (ref 130–200)
CO2 SERPL-SCNC: 36 MMOL/L (ref 24–31)
CREAT BLD-MCNC: 0.54 MG/DL (ref 0.5–1.4)
DEPRECATED RDW RBC AUTO: 49.9 FL (ref 37–54)
EOSINOPHIL # BLD AUTO: 0 10*3/MM3 (ref 0–0.4)
EOSINOPHIL NFR BLD AUTO: 0 % (ref 0.3–6.2)
ERYTHROCYTE [DISTWIDTH] IN BLOOD BY AUTOMATED COUNT: 13.2 % (ref 12.3–15.4)
GFR SERPL CREATININE-BSD FRML MDRD: 112 ML/MIN/1.73
GLOBULIN UR ELPH-MCNC: 2.8 GM/DL
GLUCOSE BLD-MCNC: 157 MG/DL (ref 70–100)
HBA1C MFR BLD: 5 % (ref 4.8–5.9)
HCT VFR BLD AUTO: 30.7 % (ref 34–46.6)
HDLC SERPL-MCNC: 45 MG/DL
HGB BLD-MCNC: 9.5 G/DL (ref 12–15.9)
IMM GRANULOCYTES # BLD AUTO: 0.02 10*3/MM3 (ref 0–0.05)
IMM GRANULOCYTES NFR BLD AUTO: 0.4 % (ref 0–0.5)
LDLC/HDLC SERPL: 1.02 {RATIO}
LYMPHOCYTES # BLD AUTO: 0.37 10*3/MM3 (ref 0.7–3.1)
LYMPHOCYTES NFR BLD AUTO: 8.1 % (ref 19.6–45.3)
MCH RBC QN AUTO: 31.7 PG (ref 26.6–33)
MCHC RBC AUTO-ENTMCNC: 30.9 G/DL (ref 31.5–35.7)
MCV RBC AUTO: 102.3 FL (ref 79–97)
MONOCYTES # BLD AUTO: 0.03 10*3/MM3 (ref 0.1–0.9)
MONOCYTES NFR BLD AUTO: 0.7 % (ref 5–12)
NEUTROPHILS # BLD AUTO: 4.13 10*3/MM3 (ref 1.7–7)
NEUTROPHILS NFR BLD AUTO: 90.8 % (ref 42.7–76)
NRBC BLD AUTO-RTO: 0 /100 WBC (ref 0–0.2)
PLATELET # BLD AUTO: 226 10*3/MM3 (ref 140–450)
PMV BLD AUTO: 11 FL (ref 6–12)
POTASSIUM BLD-SCNC: 4.9 MMOL/L (ref 3.5–5.3)
PROT SERPL-MCNC: 5.4 G/DL (ref 6.3–8.7)
RBC # BLD AUTO: 3 10*6/MM3 (ref 3.77–5.28)
SODIUM BLD-SCNC: 137 MMOL/L (ref 135–145)
TRIGL SERPL-MCNC: 55 MG/DL (ref 0–149)
WBC NRBC COR # BLD: 4.55 10*3/MM3 (ref 3.4–10.8)

## 2019-09-05 PROCEDURE — 85025 COMPLETE CBC W/AUTO DIFF WBC: CPT | Performed by: NURSE PRACTITIONER

## 2019-09-05 PROCEDURE — 93971 EXTREMITY STUDY: CPT

## 2019-09-05 PROCEDURE — 99223 1ST HOSP IP/OBS HIGH 75: CPT | Performed by: INTERNAL MEDICINE

## 2019-09-05 PROCEDURE — 93971 EXTREMITY STUDY: CPT | Performed by: SURGERY

## 2019-09-05 PROCEDURE — 25010000002 LEVOFLOXACIN PER 250 MG: Performed by: NURSE PRACTITIONER

## 2019-09-05 PROCEDURE — 97166 OT EVAL MOD COMPLEX 45 MIN: CPT | Performed by: OCCUPATIONAL THERAPIST

## 2019-09-05 PROCEDURE — 80053 COMPREHEN METABOLIC PANEL: CPT | Performed by: NURSE PRACTITIONER

## 2019-09-05 PROCEDURE — 25010000002 METHYLPREDNISOLONE PER 125 MG: Performed by: NURSE PRACTITIONER

## 2019-09-05 PROCEDURE — 25010000002 ENOXAPARIN PER 10 MG: Performed by: NURSE PRACTITIONER

## 2019-09-05 PROCEDURE — 97162 PT EVAL MOD COMPLEX 30 MIN: CPT

## 2019-09-05 PROCEDURE — 83036 HEMOGLOBIN GLYCOSYLATED A1C: CPT | Performed by: NURSE PRACTITIONER

## 2019-09-05 PROCEDURE — 94799 UNLISTED PULMONARY SVC/PX: CPT

## 2019-09-05 PROCEDURE — 80061 LIPID PANEL: CPT | Performed by: NURSE PRACTITIONER

## 2019-09-05 RX ORDER — TRAZODONE HYDROCHLORIDE 50 MG/1
25 TABLET ORAL NIGHTLY
Status: DISCONTINUED | OUTPATIENT
Start: 2019-09-05 | End: 2019-09-18 | Stop reason: HOSPADM

## 2019-09-05 RX ORDER — TIZANIDINE 4 MG/1
4 TABLET ORAL 3 TIMES DAILY
COMMUNITY

## 2019-09-05 RX ORDER — PANTOPRAZOLE SODIUM 40 MG/1
40 TABLET, DELAYED RELEASE ORAL
Status: DISCONTINUED | OUTPATIENT
Start: 2019-09-06 | End: 2019-09-18 | Stop reason: HOSPADM

## 2019-09-05 RX ORDER — BENZONATATE 100 MG/1
200 CAPSULE ORAL 3 TIMES DAILY PRN
Status: DISCONTINUED | OUTPATIENT
Start: 2019-09-05 | End: 2019-09-18 | Stop reason: HOSPADM

## 2019-09-05 RX ORDER — DIAPER,BRIEF,INFANT-TODD,DISP
EACH MISCELLANEOUS DAILY
Status: DISCONTINUED | OUTPATIENT
Start: 2019-09-05 | End: 2019-09-09

## 2019-09-05 RX ORDER — TRAZODONE HYDROCHLORIDE 50 MG/1
50 TABLET ORAL AS NEEDED
COMMUNITY
End: 2020-11-23 | Stop reason: ALTCHOICE

## 2019-09-05 RX ORDER — ALBUTEROL SULFATE 2.5 MG/3ML
2.5 SOLUTION RESPIRATORY (INHALATION) EVERY 6 HOURS PRN
Status: DISCONTINUED | OUTPATIENT
Start: 2019-09-05 | End: 2019-09-05

## 2019-09-05 RX ADMIN — GUAIFENESIN 400 MG: 100 SOLUTION ORAL at 09:43

## 2019-09-05 RX ADMIN — MONTELUKAST SODIUM 10 MG: 10 TABLET, FILM COATED ORAL at 20:18

## 2019-09-05 RX ADMIN — METHYLPREDNISOLONE SODIUM SUCCINATE 60 MG: 125 INJECTION, POWDER, FOR SOLUTION INTRAMUSCULAR; INTRAVENOUS at 04:27

## 2019-09-05 RX ADMIN — GABAPENTIN 600 MG: 300 CAPSULE ORAL at 06:48

## 2019-09-05 RX ADMIN — BENZONATATE 200 MG: 100 CAPSULE ORAL at 06:48

## 2019-09-05 RX ADMIN — AZELASTINE HYDROCHLORIDE 2 SPRAY: 137 SPRAY, METERED NASAL at 08:18

## 2019-09-05 RX ADMIN — AZELASTINE HYDROCHLORIDE 2 SPRAY: 137 SPRAY, METERED NASAL at 20:18

## 2019-09-05 RX ADMIN — IPRATROPIUM BROMIDE AND ALBUTEROL SULFATE 3 ML: 2.5; .5 SOLUTION RESPIRATORY (INHALATION) at 00:38

## 2019-09-05 RX ADMIN — METHYLPREDNISOLONE SODIUM SUCCINATE 60 MG: 125 INJECTION, POWDER, FOR SOLUTION INTRAMUSCULAR; INTRAVENOUS at 21:33

## 2019-09-05 RX ADMIN — TRAZODONE HYDROCHLORIDE 25 MG: 50 TABLET ORAL at 20:18

## 2019-09-05 RX ADMIN — GABAPENTIN 600 MG: 300 CAPSULE ORAL at 21:33

## 2019-09-05 RX ADMIN — OXYCODONE HYDROCHLORIDE AND ACETAMINOPHEN 1 TABLET: 10; 325 TABLET ORAL at 18:39

## 2019-09-05 RX ADMIN — IPRATROPIUM BROMIDE AND ALBUTEROL SULFATE 3 ML: 2.5; .5 SOLUTION RESPIRATORY (INHALATION) at 06:47

## 2019-09-05 RX ADMIN — ALPRAZOLAM 0.5 MG: 0.5 TABLET ORAL at 23:51

## 2019-09-05 RX ADMIN — CITALOPRAM 40 MG: 20 TABLET, FILM COATED ORAL at 08:17

## 2019-09-05 RX ADMIN — OXYCODONE HYDROCHLORIDE 30 MG: 20 TABLET, FILM COATED, EXTENDED RELEASE ORAL at 08:17

## 2019-09-05 RX ADMIN — GUAIFENESIN 400 MG: 100 SOLUTION ORAL at 17:06

## 2019-09-05 RX ADMIN — ENOXAPARIN SODIUM 60 MG: 60 INJECTION SUBCUTANEOUS at 08:17

## 2019-09-05 RX ADMIN — GUAIFENESIN 400 MG: 100 SOLUTION ORAL at 21:33

## 2019-09-05 RX ADMIN — ALPRAZOLAM 0.5 MG: 0.5 TABLET ORAL at 00:24

## 2019-09-05 RX ADMIN — METHYLPREDNISOLONE SODIUM SUCCINATE 60 MG: 125 INJECTION, POWDER, FOR SOLUTION INTRAMUSCULAR; INTRAVENOUS at 09:49

## 2019-09-05 RX ADMIN — PANTOPRAZOLE SODIUM 20 MG: 20 TABLET, DELAYED RELEASE ORAL at 06:48

## 2019-09-05 RX ADMIN — SODIUM CHLORIDE, PRESERVATIVE FREE 10 ML: 5 INJECTION INTRAVENOUS at 08:18

## 2019-09-05 RX ADMIN — SODIUM CHLORIDE, POTASSIUM CHLORIDE, SODIUM LACTATE AND CALCIUM CHLORIDE 75 ML/HR: 600; 310; 30; 20 INJECTION, SOLUTION INTRAVENOUS at 12:08

## 2019-09-05 RX ADMIN — IPRATROPIUM BROMIDE AND ALBUTEROL SULFATE 3 ML: 2.5; .5 SOLUTION RESPIRATORY (INHALATION) at 20:27

## 2019-09-05 RX ADMIN — LEVOFLOXACIN 750 MG: 750 INJECTION, SOLUTION INTRAVENOUS at 20:18

## 2019-09-05 RX ADMIN — FLUTICASONE PROPIONATE 2 SPRAY: 50 SPRAY, METERED NASAL at 08:18

## 2019-09-05 RX ADMIN — ENOXAPARIN SODIUM 60 MG: 60 INJECTION SUBCUTANEOUS at 20:18

## 2019-09-05 RX ADMIN — GUAIFENESIN 400 MG: 100 SOLUTION ORAL at 13:55

## 2019-09-05 RX ADMIN — SODIUM CHLORIDE, PRESERVATIVE FREE 10 ML: 5 INJECTION INTRAVENOUS at 20:20

## 2019-09-05 RX ADMIN — ALPRAZOLAM 0.5 MG: 0.5 TABLET ORAL at 09:57

## 2019-09-05 RX ADMIN — NICOTINE 1 PATCH: 21 PATCH, EXTENDED RELEASE TRANSDERMAL at 17:18

## 2019-09-05 RX ADMIN — BENZONATATE 200 MG: 100 CAPSULE ORAL at 17:09

## 2019-09-05 RX ADMIN — GABAPENTIN 600 MG: 300 CAPSULE ORAL at 13:55

## 2019-09-05 RX ADMIN — METHYLPREDNISOLONE SODIUM SUCCINATE 60 MG: 125 INJECTION, POWDER, FOR SOLUTION INTRAMUSCULAR; INTRAVENOUS at 17:07

## 2019-09-05 RX ADMIN — IPRATROPIUM BROMIDE AND ALBUTEROL SULFATE 3 ML: 2.5; .5 SOLUTION RESPIRATORY (INHALATION) at 13:05

## 2019-09-05 RX ADMIN — MUPIROCIN: 20 OINTMENT TOPICAL at 08:18

## 2019-09-05 NOTE — THERAPY EVALUATION
Acute Care - Occupational Therapy Initial Evaluation  [facility]     Patient Name: Justyna Lei  : 3/25/1951  MRN: [id number]  Today's Date: 2019  Onset of Illness/Injury or Date of Surgery: 19  Date of Referral to OT: 19  Referring Physician: Dory Calvillo APRN    Admit Date: 2019       ICD-10-CM ICD-9-CM   1. COPD exacerbation (CMS/HCC) J44.1 491.21   2. Hypercapnia R06.89 786.09   3. Decreased activities of daily living (ADL) R68.89 780.99     Patient Active Problem List   Diagnosis   • Stage 4 very severe COPD by GOLD classification (CMS/HCC)   • Hypokalemia   • Anemia   • Respiratory failure (CMS/HCC)   • Nasopharyngitis   • GERD without esophagitis   • Lung nodule   • Alpha-1-antitrypsin deficiency carrier (CMS/HCC)   • Personal history of nicotine dependence   • COPD exacerbation (CMS/HCC)   • Acute on chronic respiratory failure with hypoxia and hypercapnia (CMS/HCC)   • Polypharmacy     Past Medical History:   Diagnosis Date   • Anxiety    • Anxiety     pain clinic patient Dr Sunshine x 5yrs   • Chronic pain due to injury     jarad LE & back   • COPD (chronic obstructive pulmonary disease) (CMS/HCC)    • Depression    • Hypertension    • Polypharmacy    • Smoker      Past Surgical History:   Procedure Laterality Date   • ABDOMINAL SURGERY     • APPENDECTOMY     • CHOLECYSTECTOMY     • FEMUR FRACTURE SURGERY     • HERNIA REPAIR     • HYSTERECTOMY            OT ASSESSMENT FLOWSHEET (last 12 hours)      Occupational Therapy Evaluation     Row Name 19 1327                   OT Evaluation Time/Intention    Subjective Information  complains of;fatigue;dyspnea;swelling R foot swelling  -JJ        Document Type  evaluation see MAR  -JJ        Mode of Treatment  occupational therapy;concurrent therapy  -JJ           General Information    Patient Profile Reviewed?  yes  -JJ        Onset of Illness/Injury or Date of Surgery  19  -JJ        Referring Physician  Dory Calvillo,  APRN  -JJ        Patient Observations  alert;cooperative;agree to therapy  -JJ        Patient/Family Observations  no family present in room   -JJ        General Observations of Patient  fowlers in bed, IV infusing, O2/NC.   -JJ        Prior Level of Function  independent:;all household mobility;transfer;bed mobility;ADL's  -JJ        Equipment Currently Used at Home  wheelchair  -JJ        Pertinent History of Current Functional Problem  Pt presented to ED with SOB, increased falls over past 2 weeks. Pt has a hx of COPD, anxiety, depression, HTN, polypharmacy. Dx: COPD exacerbation, acute on chronic respiratory failure with hypoxia and hypercapnia, polypharmacy and anemia.   -JJ        Existing Precautions/Restrictions  fall  -JJ        Risks Reviewed  patient:;LOB;nausea/vomiting;dizziness;increased discomfort;change in vital signs;increased drainage;lines disloged  -JJ        Benefits Reviewed  patient:;improve function;increase independence;increase balance;increase strength;decrease pain;decrease risk of DVT;improve skin integrity;increase knowledge  -JJ           Relationship/Environment    Lives With  child(nani), adult  -JJ        Family Caregiver if Needed  child(nani), adult  -JJ           Resource/Environmental Concerns    Current Living Arrangements  home/apartment/condo  -JJ        Resource/Environmental Concerns  none  -JJ        Transportation Concerns  car, none  -JJ           Home Main Entrance    Number of Stairs, Main Entrance  three  -JJ        Stair Railings, Main Entrance  railing on left side (ascending)  -JJ           Cognitive Assessment/Interventions    Additional Documentation  Cognitive Assessment/Intervention (Group)  -JJ           Cognitive Assessment/Intervention- PT/OT    Affect/Mental Status (Cognitive)  WFL  -JJ        Orientation Status (Cognition)  oriented x 4  -JJ        Follows Commands (Cognition)  WFL  -JJ        Cognitive Function (Cognitive)  WFL  -JJ        Personal Safety  Interventions  fall prevention program maintained;gait belt;muscle strengthening facilitated;nonskid shoes/slippers when out of bed;supervised activity  -J           Safety Issues, Functional Mobility    Safety Issues Affecting Function (Mobility)  friction/shear risk  -        Impairments Affecting Function (Mobility)  endurance/activity tolerance;shortness of breath;balance;strength  -JJ           Bed Mobility Assessment/Treatment    Bed Mobility Assessment/Treatment  supine-sit;sit-supine  -J        Supine-Sit Wake (Bed Mobility)  supervision  -        Sit-Supine Wake (Bed Mobility)  supervision  -        Assistive Device (Bed Mobility)  head of bed elevated;bed rails  -           Functional Mobility    Functional Mobility- Ind. Level  contact guard assist;minimum assist (75% patient effort)  -        Functional Mobility- Safety Issues  supplemental O2  -        Functional Mobility- Comment  able to take steps from bed, lateral sway requiring Min A   -JJ           Transfer Assessment/Treatment    Transfer Assessment/Treatment  sit-stand transfer;stand-sit transfer  -J           Sit-Stand Transfer    Sit-Stand Wake (Transfers)  contact guard  -J           Stand-Sit Transfer    Stand-Sit Wake (Transfers)  contact guard  -J           ADL Assessment/Intervention    BADL Assessment/Intervention  lower body dressing  -JJ           Lower Body Dressing Assessment/Training    Lower Body Dressing Wake Level  don;socks;moderate assist (50% patient effort)  -J        Lower Body Dressing Position  edge of bed sitting  -J           BADL Safety/Performance    Impairments, BADL Safety/Performance  balance;endurance/activity tolerance;strength;shortness of breath  -J        Skilled BADL Treatment/Intervention  BADL process/adaptation training  -JJ           General ROM    GENERAL ROM COMMENTS  BUE AROM WFL   -JJ           MMT (Manual Muscle Testing)    General MMT  Comments  BUE strength functionally 4/5  -JJ           Motor Assessment/Interventions    Additional Documentation  Balance (Group)  -JJ           Balance    Balance  static sitting balance;static standing balance  -JJ           Static Sitting Balance    Level of [place] (Unsupported Sitting, Static Balance)  supervision  -JJ        Sitting Position (Unsupported Sitting, Static Balance)  sitting on edge of bed  -JJ        Time Able to Maintain Position (Unsupported Sitting, Static Balance)  3 to 4 minutes  -JJ           Static Standing Balance    Level of [place] (Supported Standing, Static Balance)  contact guard assist;minimal assist, 75% patient effort  -JJ        Time Able to Maintain Position (Supported Standing, Static Balance)  1 to 2 minutes  -JJ        Comment (Supported Standing, Static Balance)  lateral sway demo'd during mobility.   -JJ           Sensory Assessment/Intervention    Sensory General Assessment  no sensation deficits identified in BUEs per pt  -JJ           Positioning and Restraints    Pre-Treatment Position  in bed  -JJ        Post Treatment Position  bed  -JJ        In Bed  fowlers;call light within reach;encouraged to call for assist;notified nsg;with nsg;side rails up x3  -JJ           Pain Assessment    Additional Documentation  Pain Scale: Numbers Pre/Post-Treatment (Group)  -JJ           Pain Scale: Numbers Pre/Post-Treatment    Pain Scale: Numbers, Pretreatment  0/10 - no pain  -JJ        Pain Scale: Numbers, Post-Treatment  0/10 - no pain  -JJ        Pain Intervention(s)  Repositioned  -JJ           Wound 09/04/19 1500 Right foot Blisters    Wound - Properties Group Date first assessed: 09/04/19  -ER Time first assessed: 1500  -ER Side: Right  -ER Location: foot  -ER Primary Wound Type: Blisters  -ER, burn injury  Stage, Pressure Injury: Stage 2  -ER       Plan of Care Review    Plan of Care Reviewed With  patient  -JJ           Clinical Impression (OT)    Date of Referral  to OT  09/03/19  -JJ        OT Diagnosis  decreased adls  -JJ        Prognosis (OT Eval)  good  -JJ        Patient/Family Goals Statement (OT Eval)  return home  -J        Criteria for Skilled Therapeutic Interventions Met (OT Eval)  yes;treatment indicated  -JJ        Rehab Potential (OT Eval)  good, to achieve stated therapy goals  -JJ        Therapy Frequency (OT Eval)  5 times/wk  -JJ        Predicted Duration of Therapy Intervention (Therapy Eval)  until d/c from facility  -JJ        Care Plan Review (OT)  evaluation/treatment results reviewed;care plan/treatment goals reviewed;risks/benefits reviewed;current/potential barriers reviewed;patient/other agree to care plan  -JJ        Anticipated Discharge Disposition (OT)  home with assist;home with home health  -JJ           Vital Signs    Pre Systolic BP Rehab  19  -JJ        Pre Treatment Diastolic BP  53  -JJ        Pretreatment Heart Rate (beats/min)  72  -JJ        Pretreatment Resp Rate (breaths/min)  13  -JJ        Pre SpO2 (%)  95  -JJ        O2 Delivery Pre Treatment  -- vapotherm  -JJ        Intra SpO2 (%)  88  -JJ        O2 Delivery Intra Treatment  -- vapotherm  -JJ        Post SpO2 (%)  91  -JJ        O2 Delivery Post Treatment  -- vapotherm  -JJ        Pre Patient Position  Supine  -JJ        Intra Patient Position  Standing  -JJ        Post Patient Position  Supine  -JJ           Planned OT Interventions    Planned Therapy Interventions (OT Eval)  activity tolerance training;BADL retraining;functional balance retraining;occupation/activity based interventions;patient/caregiver education/training;strengthening exercise;transfer/mobility retraining  -JJ           OT Goals    Dressing Goal Selection (OT)  dressing, OT goal 1  -JJ        Toileting Goal Selection (OT)  toileting, OT goal 1  -JJ        Activity Tolerance Goal Selection (OT)  activity tolerance, OT goal 1  -JJ        Additional Documentation  Activity Tolerance Goal Selection (OT) (Row)   -JJ           Dressing Goal 1 (OT)    Activity/Assistive Device (Dressing Goal 1, OT)  dressing skills, all  -JJ        [place]/Cues Needed (Dressing Goal 1, OT)  independent  -JJ        Time Frame (Dressing Goal 1, OT)  long term goal (LTG);by discharge  -JJ        Progress/Outcome (Dressing Goal 1, OT)  goal ongoing  -J           Toileting Goal 1 (OT)    Activity/Device (Toileting Goal 1, OT)  toileting skills, all;commode  -JJ        [place] Level/Cues Needed (Toileting Goal 1, OT)  independent  -JJ        Time Frame (Toileting Goal 1, OT)  long term goal (LTG);by discharge  -JJ        Progress/Outcome (Toileting Goal 1, OT)  goal ongoing  -J            Activity Tolerance Goal 1 (OT)    Activity Tolerance Goal 1 (OT)  Pt will pariticipate in functional task in standing for 8 minutes with no LOB for increased independence with adls, t/fs, and mobility.   -JJ        Activity Level (Endurance Goal 1, OT)  O2 sat >/ equal to 88% 8 mins  -JJ        Time Frame (Activity Tolerance Goal 1, OT)  long term goal (LTG);by discharge  -JJ        Progress/Outcome (Activity Tolerance Goal 1, OT)  goal ongoing  -           Living Environment    Home Accessibility  stairs to enter home walk-in shower  -          User Key  (r) = Recorded By, (t) = Taken By, (c) = Cosigned By    Initials Name Effective Dates    Rose Watson RN 08/02/16 -     Gabi Alba OTR/L 10/12/18 -          Occupational Therapy Education     Title: PT OT SLP Therapies (In Progress)     Topic: Occupational Therapy (In Progress)     Point: ADL training (Done)     Description: Instruct learner(s) on proper safety adaptation and remediation techniques during self care or transfers.   Instruct in proper use of assistive devices.    Learning Progress Summary           Patient Acceptance, E, VU by VI at 9/5/2019  2:05 PM    Comment:  OT POC, adls, t/fs, bed mobility, activity tolerance.                               User Key      Initials Effective Dates Name Provider Type Discipline     10/12/18 -  Gabi Faustin OTR/L Occupational Therapist OT                  OT Recommendation and Plan  Outcome Summary/Treatment Plan (OT)  Anticipated Discharge Disposition (OT): home with assist, home with home health  Planned Therapy Interventions (OT Eval): activity tolerance training, BADL retraining, functional balance retraining, occupation/activity based interventions, patient/caregiver education/training, strengthening exercise, transfer/mobility retraining  Therapy Frequency (OT Eval): 5 times/wk  Plan of Care Review  Plan of Care Reviewed With: patient  Plan of Care Reviewed With: patient  Outcome Summary: OT eval completed. Pt is alert and oriented. Able to comlpete bed mobility with supervision only. Mod A for donning socks. CGA for sit <> stand t/f from EOB. CGA/Min A for functional mobility. Demo'd lateral sway during mobility requiring Min A. Pt would benefit from skilled OT services to address decreased strength, balance, activity tolerance/endurance, and increased SOB. Anticipate d/c home with HH.     Outcome Measures     Row Name 09/05/19 1400             How much help from another is currently needed...    Putting on and taking off regular lower body clothing?  2  -JJ      Bathing (including washing, rinsing, and drying)  2  -JJ      Toileting (which includes using toilet bed pan or urinal)  3  -JJ      Putting on and taking off regular upper body clothing  4  -JJ      Taking care of personal grooming (such as brushing teeth)  4  -JJ      Eating meals  4  -JJ      AM-PAC 6 Clicks Score (OT)  19  -JJ         Functional Assessment    Outcome Measure Options  AM-PAC 6 Clicks Daily Activity (OT)  -        User Key  (r) = Recorded By, (t) = Taken By, (c) = Cosigned By    Initials Name Provider Type     Gabi Faustin OTR/L Occupational Therapist          Time Calculation:   Time Calculation- OT     Row Name 09/05/19 0326              Time Calculation- OT    OT Start Time  1337 add 15 minutes for chart review   -HODA      OT Stop Time  1401  -      OT Time Calculation (min)  24 min  -HODA      OT Received On  09/05/19  -VI      OT Goal Re-Cert Due Date  09/15/19  -IV        User Key  (r) = Recorded By, (t) = Taken By, (c) = Cosigned By    Initials Name Provider Type    Gabi Alba OTR/L Occupational Therapist        Therapy Charges for Today     Code Description Service Date Service Provider Modifiers Qty    [id number] HC OT EVAL MOD COMPLEXITY 3 9/5/2019 Gabi Faustin OTR/L GO 1               SYEDA Heath/SARAH  9/5/2019
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
## 2019-09-05 NOTE — PLAN OF CARE
Problem: Patient Care Overview  Goal: Plan of Care Review  Outcome: Ongoing (interventions implemented as appropriate)   09/05/19 6210   Coping/Psychosocial   Plan of Care Reviewed With patient   OTHER   Outcome Summary PT jonathan completed. She presents alert and oriented and ready to get OOB with therapy. She was able to stand and take steps in her room while on the vapotherm. She needed CGA/min assist to ambulate in her room. She demos one LOB needing min assist to maintain dynamic balance. PT will continue to progress her functional mobility, balance and strength. I anticipate she will d.c home with assist and HH.,

## 2019-09-05 NOTE — THERAPY EVALUATION
Patient Name: Justyna Sosa Lei  : 3/25/1951    MRN: [id number]                              Today's Date: 2019       Admit Date: 2019    Visit Dx:     ICD-10-CM ICD-9-CM   1. COPD exacerbation (CMS/[facility]) J44.1 491.21   2. Hypercapnia R06.89 786.09   3. Decreased activities of daily living (ADL) R68.89 780.99   4. Impaired mobility Z74.09 799.89     Patient Active Problem List   Diagnosis   • Stage 4 very severe COPD by GOLD classification (CMS/[facility])   • Hypokalemia   • Anemia   • Respiratory failure (CMS/[facility])   • Nasopharyngitis   • GERD without esophagitis   • Lung nodule   • Alpha-1-antitrypsin deficiency carrier (CMS/[facility])   • Personal history of nicotine dependence   • COPD exacerbation (CMS/[facility])   • Acute on chronic respiratory failure with hypoxia and hypercapnia (CMS/[facility])   • Polypharmacy     Past Medical History:   Diagnosis Date   • Anxiety    • Anxiety     pain clinic patient Dr Sunshine x 5yrs   • Chronic pain due to injury     jarad LE & back   • COPD (chronic obstructive pulmonary disease) (CMS/[facility])    • Depression    • Hypertension    • Polypharmacy    • Smoker      Past Surgical History:   Procedure Laterality Date   • ABDOMINAL SURGERY     • APPENDECTOMY     • CHOLECYSTECTOMY     • FEMUR FRACTURE SURGERY     • HERNIA REPAIR     • HYSTERECTOMY       General Information     Row Name [id number]          PT Evaluation Time/Intention    Document Type  evaluation Pt presented to ED with SOB, increased falls over past 2 weeks. Pt has a hx of COPD, anxiety, depression, HTN, polypharmacy. Dx: COPD exacerbation, acute on chronic respiratory failure with hypoxia and hypercapnia, polypharmacy and anemia.  -MELISSA     Mode of Treatment  physical therapy  -MELISSA     Row Name [id number]          General Information    Patient Profile Reviewed?  yes  -MELISSA     Prior Level of Function  independent:;all household mobility  -MELISSA     Existing Precautions/Restrictions  fall;oxygen therapy device and L/min Vapotherm  -MELISSA      Barriers to Rehab  medically complex  -MELISSA     Row Name 09/05/19 1330          Relationship/Environment    Lives With  child(nani), adult  -MELISSA     [facility] Name 09/05/19 1330          Resource/Environmental Concerns    Current Living Arrangements  home/apartment/condo  -MELISSA     [facility] Name 09/05/19 1330          Home Main Entrance    Number of Stairs, Main Entrance  three  -MELISSA     Stair Railings, Main Entrance  railing on left side (ascending)  -MELISSA     [facility] Name 09/05/19 1330          Cognitive Assessment/Intervention- PT/OT    Orientation Status (Cognition)  oriented x 4  -MELISSA     Personal Safety Interventions  fall prevention program maintained;gait belt;muscle strengthening facilitated;nonskid shoes/slippers when out of bed  -MELISSA     [facility] Name 09/05/19 1330          Safety Issues, Functional Mobility    Impairments Affecting Function (Mobility)  balance;endurance/activity tolerance;strength;shortness of breath  -MELISSA       User Key  (r) = Recorded By, (t) = Taken By, (c) = Cosigned By    Initials Name Provider Type    Larry Tillman PT DPT Physical Therapist        Mobility     [facility] Name 09/05/19 1330          Bed Mobility Assessment/Treatment    Bed Mobility Assessment/Treatment  supine-sit;sit-supine  -MELISSA     Supine-Sit [place] (Bed Mobility)  supervision  -MELISSA     Sit-Supine [place] (Bed Mobility)  supervision  -MELISSA     Assistive Device (Bed Mobility)  head of bed elevated;bed rails  -MELISSA     [facility] Name 09/05/19 1330          Sit-Stand Transfer    Sit-Stand [place] (Transfers)  contact guard  -MELISSA     [facility] Name 09/05/19 1330          Gait/Stairs Assessment/Training    [place] Level (Gait)  contact guard;minimum assist (75% patient effort)  -MELISSA     Distance in Feet (Gait)  20 Vapotherm on  -MELISSA     Comment (Gait/Stairs)  1 LOB needing min assist to maintain balance.   (Significant)   -MELISSA       User Key  (r) = Recorded By, (t) = Taken By, (c) = Cosigned By    Initials Name Provider Type    Larry Tillman  A, PT DPT Physical Therapist        Obj/Interventions     Row Name 09/05/19 1330          General ROM    GENERAL ROM COMMENTS  B LE AROM WFL  -MELISSA     Row Name 09/05/19 1330          MMT (Manual Muscle Testing)    General MMT Comments  B LE functionally 4-/5  -MELISSA     Row Name 09/05/19 1330          Static Sitting Balance    Level of [place] (Unsupported Sitting, Static Balance)  supervision  -MELISSA     Sitting Position (Unsupported Sitting, Static Balance)  sitting on edge of bed  -EMLISSA     Row Name 09/05/19 1330          Static Standing Balance    Level of [place] (Supported Standing, Static Balance)  contact guard assist;minimal assist, 75% patient effort  -MELISSA     Time Able to Maintain Position (Supported Standing, Static Balance)  1 to 2 minutes  -MELISSA     Comment (Supported Standing, Static Balance)  lateral sway/LOB   -MELISSA       User Key  (r) = Recorded By, (t) = Taken By, (c) = Cosigned By    Initials Name Provider Type    Larry Tillman A, PT DPT Physical Therapist        Goals/Plan     Row Name 09/05/19 1330          Transfer Goal 1 (PT)    Activity/Assistive Device (Transfer Goal 1, PT)  sit-to-stand/stand-to-sit;bed-to-chair/chair-to-bed  -MELISSA     [place] Level/Cues Needed (Transfer Goal 1, PT)  independent  -MELISSA     Time Frame (Transfer Goal 1, PT)  long term goal (LTG);10 days  -MELISSA     Progress/Outcome (Transfer Goal 1, PT)  goal ongoing  -MELISSA     Row Name 09/05/19 1330          Gait Training Goal 1 (PT)    Activity/Assistive Device (Gait Training Goal 1, PT)  gait (walking locomotion);decrease fall risk;improve balance and speed;increase endurance/gait distance  -MELISSA     [place] Level (Gait Training Goal 1, PT)  independent  -MELISSA     Distance (Gait Goal 1, PT)  200  -MELISSA     Time Frame (Gait Training Goal 1, PT)  long term goal (LTG);10 days  -MELISSA     Progress/Outcome (Gait Training Goal 1, PT)  goal ongoing  -MELISSA     Row Name 09/05/19 1330          Stairs Goal 1 (PT)    Activity/Assistive Device  (Stairs Goal 1, PT)  ascending stairs;descending stairs;using handrail, left  -MELISSA     [place] Level/Cues Needed (Stairs Goal 1, PT)  contact guard assist  -MELISSA     Number of Stairs (Stairs Goal 1, PT)  3  -MELISSA     Time Frame (Stairs Goal 1, PT)  long term goal (LTG);10 days  -MELISSA     Progress/Outcome (Stairs Goal 1, PT)  goal ongoing  -MELISSA       User Key  (r) = Recorded By, (t) = Taken By, (c) = Cosigned By    Initials Name Provider Type    Larry Tillman, PT DPT Physical Therapist        Clinical Impression     Row Name 09/05/19 1330          Pain Assessment    Additional Documentation  Pain Scale: Numbers Pre/Post-Treatment (Group)  -MELISSA     Row Name 09/05/19 1330          Pain Scale: Numbers Pre/Post-Treatment    Pain Scale: Numbers, Pretreatment  0/10 - no pain  -MELISSA     Pain Scale: Numbers, Post-Treatment  0/10 - no pain  -MELISSA     Pre/Post Treatment Pain Comment  c/o SOB, fatigue  -MELISSA     Row Name 09/05/19 1330          Plan of Care Review    Plan of Care Reviewed With  patient  -MELISSA     Row Name 09/05/19 1330          Physical Therapy Clinical Impression    Patient/Family Goals Statement (PT Clinical Impression)  Go home  -MELISSA     Criteria for Skilled Interventions Met (PT Clinical Impression)  yes;treatment indicated  -MELISSA     Rehab Potential (PT Clinical Summary)  good, to achieve stated therapy goals  -MELISSA     Predicted Duration of Therapy (PT)  until d/c  -MELISSA     Row Name 09/05/19 1330          Vital Signs    Pre SpO2 (%)  95  -MELISSA     O2 Delivery Pre Treatment  -- vapotherm  -MELISSA     Intra SpO2 (%)  88 vapotherm  -MELISSA     Post SpO2 (%)  91  -MELISSA     O2 Delivery Post Treatment  -- vapotherm  -MELISSA     Pre Patient Position  Supine  -MELISSA     Intra Patient Position  Standing  -MELISSA     Post Patient Position  Supine  -MELISSA     Row Name 09/05/19 1330          Positioning and Restraints    Pre-Treatment Position  in bed  -MELISSA     Post Treatment Position  bed  -MELISSA     In Bed  fowlers;call light within reach;encouraged to  call for assist;with nsg  -MELISSA       User Key  (r) = Recorded By, (t) = Taken By, (c) = Cosigned By    Initials Name Provider Type    Larry Tillman PT DPT Physical Therapist        Outcome Measures     Row Name 09/05/19 1330          How much help from another person do you currently need...    Turning from your back to your side while in flat bed without using bedrails?  3  -MELISSA     Moving from lying on back to sitting on the side of a flat bed without bedrails?  3  -MELISSA     Moving to and from a bed to a chair (including a wheelchair)?  3  -MELISSA     Standing up from a chair using your arms (e.g., wheelchair, bedside chair)?  3  -MELISSA     Climbing 3-5 steps with a railing?  2  -MELISSA     To walk in hospital room?  3  -MELISSA     AM-PAC 6 Clicks Score (PT)  17  -MELISSA     Row Name 09/05/19 1330          Functional Assessment    Outcome Measure Options  AM-PAC 6 Clicks Basic Mobility (PT)  -MELISSA       User Key  (r) = Recorded By, (t) = Taken By, (c) = Cosigned By    Initials Name Provider Type    Larry Tillman, PT DPT Physical Therapist        Physical Therapy Education     Title: PT OT SLP Therapies (In Progress)     Topic: Physical Therapy (In Progress)     Point: Mobility training (Done)     Learning Progress Summary           Patient Acceptance, E, VU by MELISSA at 9/5/2019  1:30 PM    Comment:  Educated pt on progression of PT POC and benefits of activity                               User Key     Initials Effective Dates Name Provider Type Maxwell TORRES 08/02/16 -  Larry Veliz, PT DPT Physical Therapist PT              PT Recommendation and Plan  Planned Therapy Interventions (PT Eval): bed mobility training, transfer training, gait training, balance training, home exercise program, patient/family education, postural re-education, stair training, strengthening  Outcome Summary/Treatment Plan (PT)  Anticipated Discharge Disposition (PT): home with assist, home with home health  Plan of Care Reviewed With:  patient  Outcome Summary: PT jonathan completed. She presents alert and oriented and ready to get OOB with therapy. She was able to stand and take steps in her room while on the vapotherm. She needed CGA/min assist to ambulate in her room. She demos one LOB needing min assist to maintain dynamic balance. PT will continue to progress her functional mobility, balance and strength. I anticipate she will d.c home with assist and HH.,      Time Calculation:   PT Charges     Row Name 09/05/19 1414             Time Calculation    Start Time  1330  -MELISSA      Stop Time  1410  -MELISSA      Time Calculation (min)  40 min  -MELISSA      PT Received On  09/05/19  -MELISSA      PT Goal Re-Cert Due Date  09/15/19  -MELISSA        User Key  (r) = Recorded By, (t) = Taken By, (c) = Cosigned By    Initials Name Provider Type    Larry Tillman, PT DPT Physical Therapist        Therapy Charges for Today     Code Description Service Date Service Provider Modifiers Qty    [id number] HC PT DAXAL MOD COMPLEXITY 3 9/5/2019 Larry Veliz PT DPT GP 1          PT G-Codes  Outcome Measure Options: AM-PAC 6 Clicks Daily Activity (OT)  AM-PAC 6 Clicks Score (PT): 17  AM-PAC 6 Clicks Score (OT): 19    Larry Veliz PT DPT  9/5/2019
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
## 2019-09-05 NOTE — PROGRESS NOTES
Pharmacy Dosing Service  Antimicrobials  Levofloxacin    Assessment/Action/Plan:  One time dose of Levofloxacin 250mg IV given in the Ed. Pharmacy to dose consult placed for Upper Respiratory Tract Infection. Ordered an additional dose of Levofloxacin 500mg IV for today, followed by Levofloxacin 750mg IV Q24H starting tomorrow 9/5/19 at 2000. Current end date: 9/9/19 (5 day course)     Subjective:  Justyna Lei is a 68 y.o. female currently being treated for upper respiratory tract infection.    Objective:  Estimated Creatinine Clearance: 65.8 mL/min (by C-G formula based on SCr of 0.68 mg/dL).   Lab Results   Component Value Date    CREATININE 0.68 09/04/2019    CREATININE 0.55 01/13/2019    CREATININE 0.71 06/07/2018    CREATININE 0.6 05/01/2018     Lab Results   Component Value Date    WBC 6.73 09/04/2019     Temp Readings from Last 1 Encounters:   09/04/19 98.9 °F (37.2 °C) (Oral)       Culture Results:  Microbiology Results (last 10 days)       ** No results found for the last 240 hours. **          Current Antimicrobials:   Anti-Infectives (From admission, onward)      Ordered     Dose/Rate Route Frequency Start Stop    09/04/19 1901  levoFLOXacin (LEVAQUIN) 750 mg/150 mL D5W (premix) (LEVAQUIN) 750 mg     Ordering Provider:  Dory Calvillo APRN    750 mg Intravenous Every 24 Hours 09/05/19 2000 09/09/19 1959 09/04/19 1901  levoFLOXacin (LEVAQUIN) 500 mg/100 mL D5W (premix) (LEVAQUIN) 500 mg     Ordering Provider:  Dory Calvillo APRN    500 mg Intravenous Once 09/04/19 2000 09/04/19 1457  levoFLOXacin (LEVAQUIN) 250 mg/50 mL D5W (premix) 250 mg     Ordering Provider:  Antoinette Verdugo APRN    250 mg Intravenous Once 09/04/19 1459 09/04/19 1640            Zonia Mcmanus, PharmD  09/04/19 7:02 PM

## 2019-09-05 NOTE — PROGRESS NOTES
Discharge Planning Assessment   [place]     Patient Name: Justyna Sosa Lei  MRN: [id number]  Today's Date: 9/5/2019    Admit Date: 9/4/2019    Discharge Needs Assessment     Row Name 09/05/19 1120       Living Environment    Lives With  child(nani), adult    Name(s) of Who Lives With Patient  daughter    Current Living Arrangements  home/apartment/condo    Primary Care Provided by  self    Provides Primary Care For  no one    Family Caregiver if Needed  child(nani), adult    Quality of Family Relationships  supportive;helpful;involved    Able to Return to Prior Arrangements  yes       Resource/Environmental Concerns    Resource/Environmental Concerns  none    Transportation Concerns  car, none       Transition Planning    Patient/Family Anticipates Transition to  home with family;home with help/services    Patient/Family Anticipated Services at Transition  community agency    Transportation Anticipated  family or friend will provide       Discharge Needs Assessment    Readmission Within the Last 30 Days  no previous admission in last 30 days    Concerns to be Addressed  discharge planning;substance/tobacco abuse/use    Concerns Comments  tobacco usage/smoking    Equipment Currently Used at Home  wheelchair;oxygen    Anticipated Changes Related to Illness  none    Equipment Needed After Discharge  walker, standard    Outpatient/Agency/Support Group Needs  public health nursing    Current Discharge Risk  chronically ill;substance use/abuse    Discharge Coordination/Progress  SW spoke with patient regarding discharge plan/needs.  Patient states she resides at home with her daughter.  Patient states she drives and is independent with ADL's.  Patient has home O2 supplied by [employer].  Patient expressed need for standard walker.  Patient is agreeable to referral to [facility].  [facility] will help with smoking cessation resources so this will be a positive choice for patient to follow  through with.  Will advise when referral to [facility] completed.  Patient states she does not currently have a PCP and would like to set up with Dr. Alin Astorga.  Will provide patient with contact information for Dr. Astorga as she would like to schedule her own appointment.        Discharge Plan    No documentation.       Destination      No service coordination in this encounter.      Durable Medical Equipment      No service coordination in this encounter.      Dialysis/Infusion      No service coordination in this encounter.      Home Medical Care      No service coordination in this encounter.      Therapy      No service coordination in this encounter.      Community Resources      No service coordination in this encounter.          Demographic Summary    No documentation.       Functional Status    No documentation.       Psychosocial    No documentation.       Abuse/Neglect    No documentation.       Legal    No documentation.       Substance Abuse    No documentation.       Patient Forms    No documentation.           YOLA Carlson

## 2019-09-05 NOTE — PLAN OF CARE
Problem: Patient Care Overview  Goal: Plan of Care Review  Outcome: Ongoing (interventions implemented as appropriate)   09/05/19 1398   Coping/Psychosocial   Plan of Care Reviewed With patient   Plan of Care Review   Progress improving   OTHER   Outcome Summary OT eval completed. Pt is alert and oriented. Able to comlpete bed mobility with supervision only. Mod A for donning socks. CGA for sit <> stand t/f from EOB. CGA/Min A for functional mobility. Demo'd lateral sway during mobility requiring Min A. Pt would benefit from skilled OT services to address decreased strength, balance, activity tolerance/endurance, and increased SOB. Anticipate d/c home with HH.

## 2019-09-06 ENCOUNTER — APPOINTMENT (OUTPATIENT)
Dept: GENERAL RADIOLOGY | Facility: HOSPITAL | Age: 69
End: 2019-09-06

## 2019-09-06 LAB
ANION GAP SERPL CALCULATED.3IONS-SCNC: 2 MMOL/L (ref 4–13)
ARTERIAL PATENCY WRIST A: ABNORMAL
ATMOSPHERIC PRESS: 749 MMHG
BASE EXCESS BLDA CALC-SCNC: 14.6 MMOL/L (ref 0–2)
BDY SITE: ABNORMAL
BILIRUB UR QL STRIP: NEGATIVE
BODY TEMPERATURE: 37 C
BUN BLD-MCNC: 7 MG/DL (ref 5–21)
BUN/CREAT SERPL: 12.1 (ref 7–25)
CALCIUM SPEC-SCNC: 8.6 MG/DL (ref 8.4–10.4)
CHLORIDE SERPL-SCNC: 96 MMOL/L (ref 98–110)
CLARITY UR: CLEAR
CO2 SERPL-SCNC: 39 MMOL/L (ref 24–31)
COLOR UR: YELLOW
CREAT BLD-MCNC: 0.58 MG/DL (ref 0.5–1.4)
FOLATE SERPL-MCNC: 3.45 NG/ML (ref 4.78–24.2)
GFR SERPL CREATININE-BSD FRML MDRD: 103 ML/MIN/1.73
GLUCOSE BLD-MCNC: 150 MG/DL (ref 70–100)
GLUCOSE UR STRIP-MCNC: NEGATIVE MG/DL
HCO3 BLDA-SCNC: 41.4 MMOL/L (ref 20–26)
HGB UR QL STRIP.AUTO: NEGATIVE
KETONES UR QL STRIP: NEGATIVE
LEUKOCYTE ESTERASE UR QL STRIP.AUTO: NEGATIVE
Lab: ABNORMAL
MODALITY: ABNORMAL
NITRITE UR QL STRIP: NEGATIVE
NOTIFIED BY: ABNORMAL
NOTIFIED WHO: ABNORMAL
PCO2 BLDA: 61.5 MM HG (ref 35–45)
PH BLDA: 7.44 PH UNITS (ref 7.35–7.45)
PH UR STRIP.AUTO: 7 [PH] (ref 5–8)
PO2 BLDA: 38.8 MM HG (ref 83–108)
POTASSIUM BLD-SCNC: 4.7 MMOL/L (ref 3.5–5.3)
PROT UR QL STRIP: NEGATIVE
SAO2 % BLDCOA: 72.4 % (ref 94–99)
SODIUM BLD-SCNC: 137 MMOL/L (ref 135–145)
SP GR UR STRIP: 1.01 (ref 1–1.03)
UROBILINOGEN UR QL STRIP: NORMAL
VENTILATOR MODE: ABNORMAL
VIT B12 BLD-MCNC: 564 PG/ML (ref 239–931)

## 2019-09-06 PROCEDURE — 25010000002 METHYLPREDNISOLONE PER 125 MG: Performed by: NURSE PRACTITIONER

## 2019-09-06 PROCEDURE — 94669 MECHANICAL CHEST WALL OSCILL: CPT

## 2019-09-06 PROCEDURE — 25010000002 LEVOFLOXACIN PER 250 MG: Performed by: NURSE PRACTITIONER

## 2019-09-06 PROCEDURE — 25010000002 ENOXAPARIN PER 10 MG: Performed by: NURSE PRACTITIONER

## 2019-09-06 PROCEDURE — 99233 SBSQ HOSP IP/OBS HIGH 50: CPT | Performed by: INTERNAL MEDICINE

## 2019-09-06 PROCEDURE — 25010000002 METHYLPREDNISOLONE PER 125 MG: Performed by: INTERNAL MEDICINE

## 2019-09-06 PROCEDURE — 82607 VITAMIN B-12: CPT | Performed by: INTERNAL MEDICINE

## 2019-09-06 PROCEDURE — 80048 BASIC METABOLIC PNL TOTAL CA: CPT | Performed by: INTERNAL MEDICINE

## 2019-09-06 PROCEDURE — 81003 URINALYSIS AUTO W/O SCOPE: CPT | Performed by: NURSE PRACTITIONER

## 2019-09-06 PROCEDURE — 94799 UNLISTED PULMONARY SVC/PX: CPT

## 2019-09-06 PROCEDURE — 82746 ASSAY OF FOLIC ACID SERUM: CPT | Performed by: INTERNAL MEDICINE

## 2019-09-06 PROCEDURE — 71045 X-RAY EXAM CHEST 1 VIEW: CPT

## 2019-09-06 PROCEDURE — 82803 BLOOD GASES ANY COMBINATION: CPT

## 2019-09-06 RX ORDER — METHYLPREDNISOLONE SODIUM SUCCINATE 125 MG/2ML
80 INJECTION, POWDER, LYOPHILIZED, FOR SOLUTION INTRAMUSCULAR; INTRAVENOUS EVERY 6 HOURS
Status: DISCONTINUED | OUTPATIENT
Start: 2019-09-06 | End: 2019-09-07

## 2019-09-06 RX ORDER — METHYLPREDNISOLONE SODIUM SUCCINATE 40 MG/ML
40 INJECTION, POWDER, LYOPHILIZED, FOR SOLUTION INTRAMUSCULAR; INTRAVENOUS EVERY 6 HOURS
Status: DISCONTINUED | OUTPATIENT
Start: 2019-09-06 | End: 2019-09-06

## 2019-09-06 RX ORDER — FOLIC ACID 1 MG/1
1 TABLET ORAL DAILY
Status: DISCONTINUED | OUTPATIENT
Start: 2019-09-06 | End: 2019-09-18 | Stop reason: HOSPADM

## 2019-09-06 RX ORDER — SIMETHICONE 80 MG
80 TABLET,CHEWABLE ORAL 4 TIMES DAILY PRN
Status: DISCONTINUED | OUTPATIENT
Start: 2019-09-06 | End: 2019-09-18 | Stop reason: HOSPADM

## 2019-09-06 RX ADMIN — OXYCODONE HYDROCHLORIDE AND ACETAMINOPHEN 1 TABLET: 10; 325 TABLET ORAL at 14:01

## 2019-09-06 RX ADMIN — AZELASTINE HYDROCHLORIDE 2 SPRAY: 137 SPRAY, METERED NASAL at 08:09

## 2019-09-06 RX ADMIN — GABAPENTIN 600 MG: 300 CAPSULE ORAL at 05:57

## 2019-09-06 RX ADMIN — SODIUM CHLORIDE, POTASSIUM CHLORIDE, SODIUM LACTATE AND CALCIUM CHLORIDE 75 ML/HR: 600; 310; 30; 20 INJECTION, SOLUTION INTRAVENOUS at 15:01

## 2019-09-06 RX ADMIN — CITALOPRAM 40 MG: 20 TABLET, FILM COATED ORAL at 08:09

## 2019-09-06 RX ADMIN — ALPRAZOLAM 0.5 MG: 0.5 TABLET ORAL at 13:30

## 2019-09-06 RX ADMIN — ALPRAZOLAM 0.5 MG: 0.5 TABLET ORAL at 19:58

## 2019-09-06 RX ADMIN — IPRATROPIUM BROMIDE AND ALBUTEROL SULFATE 3 ML: 2.5; .5 SOLUTION RESPIRATORY (INHALATION) at 06:25

## 2019-09-06 RX ADMIN — IPRATROPIUM BROMIDE AND ALBUTEROL SULFATE 3 ML: 2.5; .5 SOLUTION RESPIRATORY (INHALATION) at 13:09

## 2019-09-06 RX ADMIN — LEVOFLOXACIN 750 MG: 750 INJECTION, SOLUTION INTRAVENOUS at 19:23

## 2019-09-06 RX ADMIN — FLUTICASONE PROPIONATE 2 SPRAY: 50 SPRAY, METERED NASAL at 08:09

## 2019-09-06 RX ADMIN — FOLIC ACID 1 MG: 1 TABLET ORAL at 14:56

## 2019-09-06 RX ADMIN — GABAPENTIN 600 MG: 300 CAPSULE ORAL at 13:30

## 2019-09-06 RX ADMIN — IPRATROPIUM BROMIDE AND ALBUTEROL SULFATE 3 ML: 2.5; .5 SOLUTION RESPIRATORY (INHALATION) at 00:49

## 2019-09-06 RX ADMIN — OXYCODONE HYDROCHLORIDE AND ACETAMINOPHEN 1 TABLET: 10; 325 TABLET ORAL at 01:00

## 2019-09-06 RX ADMIN — GUAIFENESIN 400 MG: 100 SOLUTION ORAL at 01:38

## 2019-09-06 RX ADMIN — GUAIFENESIN 400 MG: 100 SOLUTION ORAL at 09:57

## 2019-09-06 RX ADMIN — GABAPENTIN 600 MG: 300 CAPSULE ORAL at 21:03

## 2019-09-06 RX ADMIN — GUAIFENESIN 400 MG: 100 SOLUTION ORAL at 21:02

## 2019-09-06 RX ADMIN — SIMETHICONE 80 MG: 80 TABLET, CHEWABLE ORAL at 14:56

## 2019-09-06 RX ADMIN — IPRATROPIUM BROMIDE AND ALBUTEROL SULFATE 3 ML: 2.5; .5 SOLUTION RESPIRATORY (INHALATION) at 19:14

## 2019-09-06 RX ADMIN — SODIUM CHLORIDE, PRESERVATIVE FREE 10 ML: 5 INJECTION INTRAVENOUS at 08:09

## 2019-09-06 RX ADMIN — AZELASTINE HYDROCHLORIDE 2 SPRAY: 137 SPRAY, METERED NASAL at 21:07

## 2019-09-06 RX ADMIN — ENOXAPARIN SODIUM 60 MG: 60 INJECTION SUBCUTANEOUS at 08:09

## 2019-09-06 RX ADMIN — OXYCODONE HYDROCHLORIDE 30 MG: 20 TABLET, FILM COATED, EXTENDED RELEASE ORAL at 21:03

## 2019-09-06 RX ADMIN — GUAIFENESIN 400 MG: 100 SOLUTION ORAL at 05:57

## 2019-09-06 RX ADMIN — METHYLPREDNISOLONE SODIUM SUCCINATE 60 MG: 125 INJECTION, POWDER, FOR SOLUTION INTRAMUSCULAR; INTRAVENOUS at 03:50

## 2019-09-06 RX ADMIN — METHYLPREDNISOLONE SODIUM SUCCINATE 80 MG: 125 INJECTION, POWDER, FOR SOLUTION INTRAMUSCULAR; INTRAVENOUS at 16:01

## 2019-09-06 RX ADMIN — METHYLPREDNISOLONE SODIUM SUCCINATE 80 MG: 125 INJECTION, POWDER, FOR SOLUTION INTRAMUSCULAR; INTRAVENOUS at 21:03

## 2019-09-06 RX ADMIN — SIMETHICONE 80 MG: 80 TABLET, CHEWABLE ORAL at 23:45

## 2019-09-06 RX ADMIN — ALPRAZOLAM 0.5 MG: 0.5 TABLET ORAL at 08:09

## 2019-09-06 RX ADMIN — GUAIFENESIN 400 MG: 100 SOLUTION ORAL at 13:30

## 2019-09-06 RX ADMIN — NICOTINE 1 PATCH: 21 PATCH, EXTENDED RELEASE TRANSDERMAL at 19:23

## 2019-09-06 RX ADMIN — MUPIROCIN: 20 OINTMENT TOPICAL at 08:09

## 2019-09-06 RX ADMIN — GUAIFENESIN 400 MG: 100 SOLUTION ORAL at 17:33

## 2019-09-06 RX ADMIN — SODIUM CHLORIDE, POTASSIUM CHLORIDE, SODIUM LACTATE AND CALCIUM CHLORIDE 75 ML/HR: 600; 310; 30; 20 INJECTION, SOLUTION INTRAVENOUS at 02:55

## 2019-09-06 RX ADMIN — OXYCODONE HYDROCHLORIDE 30 MG: 20 TABLET, FILM COATED, EXTENDED RELEASE ORAL at 08:09

## 2019-09-06 RX ADMIN — BACITRACIN: 500 OINTMENT TOPICAL at 08:09

## 2019-09-06 RX ADMIN — METHYLPREDNISOLONE SODIUM SUCCINATE 40 MG: 125 INJECTION, POWDER, FOR SOLUTION INTRAMUSCULAR; INTRAVENOUS at 09:58

## 2019-09-06 RX ADMIN — PANTOPRAZOLE SODIUM 40 MG: 40 TABLET, DELAYED RELEASE ORAL at 05:57

## 2019-09-06 NOTE — PLAN OF CARE
Problem: Chronic Obstructive Pulmonary Disease (Adult)  Goal: Signs and Symptoms of Listed Potential Problems Will be Absent, Minimized or Managed (Chronic Obstructive Pulmonary Disease)  Outcome: Ongoing (interventions implemented as appropriate)      Problem: Patient Care Overview  Goal: Plan of Care Review  Outcome: Ongoing (interventions implemented as appropriate)   09/06/19 0800 09/06/19 1740   Coping/Psychosocial   Plan of Care Reviewed With patient --    Plan of Care Review   Progress --  no change   OTHER   Outcome Summary --  patient remains on vapotherm with increased o2, vss, one episode of soa, improved with CT, medicated as ordered for pain, will cont to monitor        Problem: Skin Injury Risk (Adult)  Goal: Identify Related Risk Factors and Signs and Symptoms  Outcome: Outcome(s) achieved Date Met: 09/06/19    Goal: Skin Health and Integrity  Outcome: Ongoing (interventions implemented as appropriate)      Problem: Fall Risk (Adult)  Goal: Identify Related Risk Factors and Signs and Symptoms  Outcome: Outcome(s) achieved Date Met: 09/06/19    Goal: Absence of Fall  Outcome: Ongoing (interventions implemented as appropriate)      Problem: Pain, Chronic (Adult)  Goal: Identify Related Risk Factors and Signs and Symptoms  Outcome: Outcome(s) achieved Date Met: 09/06/19    Goal: Acceptable Pain/Comfort Level and Functional Ability  Outcome: Ongoing (interventions implemented as appropriate)      Problem: Nutrition, Imbalanced: Inadequate Oral Intake (Adult)  Goal: Improved Oral Intake  Outcome: Ongoing (interventions implemented as appropriate)    Goal: Prevent Further Weight Loss  Outcome: Ongoing (interventions implemented as appropriate)

## 2019-09-06 NOTE — PROGRESS NOTES
[facility] Medicine Services  INPATIENT PROGRESS NOTE    Patient Name: Justyna Sosa Lei  Date of Admission: 9/4/2019  Today's Date: 09/06/19  Length of Stay: 2  Primary Care Physician: Provider, No Known    Subjective   Chief Complaint: f/u   HPI   Patient remains  Sob  Coughing clear thick secretions  02 sat is in mid 80's    Her o2 requirement is now at [id number].  Still on Vapotherm  ABG pending  cxr pending    Review of Systems     All pertinent negatives and positives are as above. All other systems have been reviewed and are negative unless otherwise stated.     Objective    Temp:  [97.1 °F (36.2 °C)-98.4 °F (36.9 °C)] 97.1 °F (36.2 °C)  Heart Rate:  [] 86  Resp:  [15-22] 22  BP: ()/() 132/100  Physical Exam  tachypneic calm as she get stik for iv access  Speaks in short sentence/prhase  No acc. Muscle use  On 75% fio2 and 40 lpm vapotherm  Alert and oriented x 3  Normocephalic and atraumatic   No ant neck mass  No jvd  Diminished breath sounds. No gross wheezes, barrel chested  s1 s2 rrr  Soft abd, no hsm  No c/c/e  Warm dry skin     Results Review:  I have reviewed the labs, radiology results, and diagnostic studies.    Laboratory Data:   Results from last 7 days   Lab Units 09/05/19  0224 09/04/19  1423   WBC 10*3/mm3 4.55 6.73   HEMOGLOBIN g/dL 9.5* 10.3*   HEMATOCRIT % 30.7* 33.1*   PLATELETS 10*3/mm3 226 246        Results from last 7 days   Lab Units 09/06/19  0328 09/05/19  0224 09/04/19  1450   SODIUM mmol/L 137 137 136   POTASSIUM mmol/L 4.7 4.9 4.1   CHLORIDE mmol/L 96* 98 92*   CO2 mmol/L 39.0* 36.0* >40.0*   BUN mg/dL 7 5 8   CREATININE mg/dL 0.58 0.54 0.68   CALCIUM mg/dL 8.6 8.2* 7.9*   BILIRUBIN mg/dL  --  0.1 0.3   ALK PHOS U/L  --  66 72   ALT (SGPT) U/L  --  24 20   AST (SGOT) U/L  --  11 27   GLUCOSE mg/dL 150* 157* 109*       Culture Data:   Blood Culture   Date Value Ref Range Status   09/04/2019 No growth at 24 hours  Preliminary    09/04/2019 No growth at 24 hours  Preliminary       Radiology Data:   Imaging Results (last 24 hours)     Procedure Component Value Units Date/Time    US Venous Doppler Lower Extremity Right (duplex) [[id number]] Collected:  09/05/19 1658     Updated:  09/05/19 1701    Narrative:       History: Pain and swelling       Impression:       Impression: There is no evidence of deep venous thrombosis or  superficial thrombophlebitis of the right lower extremity.     Comments: Right lower extremity venous duplex exam was performed using  color Doppler flow, Doppler wave form analysis, and grayscale imaging,  with and without compression. There is no evidence of deep venous  thrombosis of the common femoral, superficial femoral, popliteal,  posterior tibial, and peroneal veins. There is no thrombus identified in  the saphenofemoral junction or the greater saphenous vein. There is no  evidence of clot in the left common femoral vein.     This report was finalized on 09/05/2019 16:58 by Dr. Corey Hayes MD.          I have reviewed the patient's current medications.     Assessment/Plan     Active Hospital Problems    Diagnosis   • COPD exacerbation (CMS/HCC)   • Acute on chronic respiratory failure with hypoxia and hypercapnia (CMS/HCC)   • Polypharmacy   • Personal history of nicotine dependence   • Anemia       cxr showed hyperinflated lung. No gross consolidation. Trachea is in midline.  I didn't see any PTX. Will get official radiology report  abg pending  Cont vapotherm to spo2 90-94% per Pulmonary   Cont steroid, neb treatment  Change her lovenox to dvt dosing no PE or dvt  Cpt  Appreciate pulmonary input    azelastine 2 spray Each Nare BID   bacitracin  Topical Daily   budesonide 0.5 mg Nebulization BID - RT   citalopram 40 mg Oral Daily   enoxaparin 1 mg/kg Subcutaneous Q12H   fluticasone 2 spray Nasal Daily   gabapentin 600 mg Oral Q8H   guaiFENesin 400 mg Oral Q4H   ipratropium-albuterol 3 mL Nebulization Q6H -  RT   levoFLOXacin 750 mg Intravenous Q24H   methylPREDNISolone sodium succinate 40 mg Intravenous Q6H   montelukast 10 mg Oral Nightly   mupirocin  Topical Q12H   nicotine 1 patch Transdermal Q24H   oxyCODONE HCl ER 30 mg Oral Q12H   pantoprazole 40 mg Oral Q AM   sodium chloride 10 mL Intravenous Q12H   traZODone 25 mg Oral Nightly       Guarded condition            Discharge Planning:to be determined    Abraham Jacques MD   09/06/19   12:30 PM

## 2019-09-06 NOTE — PROGRESS NOTES
PULMONARY AND CRITICAL CARE PROGRESS NOTE - [facility]    Patient: Justyna Sosa Lei  3/25/1951   MR# [id number]   Acct# [id number]  09/06/19   8:40 AM  Referring Provider: Abraham Jacques*    Chief Complaint: Acute on chronic respiratory failure with hypoxemia  Interval history: The patient is resting in bed on Vapotherm 40 L, 60% FiO2.  O2 sat 97%.  Decrease FiO2 to 50%. No other aggravating or alleviating factors.     Meds:    azelastine 2 spray Each Nare BID   bacitracin  Topical Daily   budesonide 0.5 mg Nebulization BID - RT   citalopram 40 mg Oral Daily   enoxaparin 1 mg/kg Subcutaneous Q12H   fluticasone 2 spray Nasal Daily   gabapentin 600 mg Oral Q8H   guaiFENesin 400 mg Oral Q4H   ipratropium-albuterol 3 mL Nebulization Q6H - RT   levoFLOXacin 750 mg Intravenous Q24H   methylPREDNISolone sodium succinate 60 mg Intravenous Q6H   montelukast 10 mg Oral Nightly   mupirocin  Topical Q12H   nicotine 1 patch Transdermal Q24H   oxyCODONE HCl ER 30 mg Oral Q12H   pantoprazole 40 mg Oral Q AM   sodium chloride 10 mL Intravenous Q12H   traZODone 25 mg Oral Nightly       lactated ringers 75 mL/hr Last Rate: 75 mL/hr (09/06/19 0255)     Review of Systems:   Review of Systems   Constitutional: Negative for chills and fever.   Respiratory: Positive for shortness of breath (chronic). Negative for cough.    Gastrointestinal: Negative for diarrhea, nausea and vomiting.     Physical Exam:  SpO2 Percentage    09/06/19 0720 09/06/19 0735 09/06/19 0750   SpO2: 97% (!) 86% (!) 88%     Temp:  [97.1 °F (36.2 °C)-98.4 °F (36.9 °C)] 97.1 °F (36.2 °C)  Heart Rate:  [] 76  Resp:  [15-22] 22  BP: ()/(47-97) 97/82    Intake/Output Summary (Last 24 hours) at 9/6/2019 0840  Last data filed at 9/6/2019 0759  Gross per 24 hour   Intake 1326.2 ml   Output 1850 ml   Net -523.8 ml     Physical Exam   Constitutional: She is oriented to person, place, and time. She appears well-developed and  well-nourished. No distress. Nasal cannula in place.   HENT:   Head: Normocephalic and atraumatic.   Eyes: Conjunctivae and EOM are normal. Pupils are equal, round, and reactive to light. No scleral icterus.   Neck: Normal range of motion. Neck supple.   Cardiovascular: Normal rate, regular rhythm and normal heart sounds. Exam reveals no friction rub.   No murmur heard.  Pulmonary/Chest: Effort normal. No respiratory distress. She has decreased breath sounds. She has no wheezes. She has rhonchi. She has no rales.   Abdominal: Soft. Bowel sounds are normal. She exhibits no distension. There is no tenderness.   Musculoskeletal: Normal range of motion. She exhibits no edema.   Neurological: She is alert and oriented to person, place, and time.   Skin: Skin is warm and dry.   Psychiatric: She has a normal mood and affect. Her behavior is normal. Judgment and thought content normal.   Nursing note and vitals reviewed.    Laboratory Data:  Results from last 7 days   Lab Units 09/05/19  0224 09/04/19  1423   WBC 10*3/mm3 4.55 6.73   HEMOGLOBIN g/dL 9.5* 10.3*   PLATELETS 10*3/mm3 226 246     Results from last 7 days   Lab Units 09/06/19  0328 09/05/19  0224 09/04/19  1450   SODIUM mmol/L 137 137 136   POTASSIUM mmol/L 4.7 4.9 4.1   BUN mg/dL 7 5 8   CREATININE mg/dL 0.58 0.54 0.68   INR   --   --  0.95     Results from last 7 days   Lab Units 09/04/19  1620 09/04/19  1440   PH, ARTERIAL pH units 7.401 7.414   PCO2, ARTERIAL mm Hg 64.6* 66.9*   PO2 ART mm Hg 61.8* 56.2*   FIO2 % 50  --      Blood Culture   Date Value Ref Range Status   09/04/2019 No growth at 24 hours  Preliminary   09/04/2019 No growth at 24 hours  Preliminary     Recent films:  Ct Angiogram Chest With & Without Contrast    Result Date: 9/4/2019  1. No evidence of pulmonary embolus. 2. New subsegmental consolidation in the lateral segment right middle lobe, either atelectasis or developing pneumonia. 3. Advanced lung emphysema. 4. 1 cm right lower lobe  pulmonary nodule which is suspicious, perhaps a primary lung malignancy given patient's risk factors. This is mildly enlarged when compared with an older 01/29/2019 exam.   This report was finalized on 09/04/2019 19:02 by Dr Te Jensen, .    Xr Chest 1 View    Result Date: 9/4/2019  1. COPD no acute cardiopulmonary process.   This report was finalized on 09/04/2019 14:56 by Dr. Adan Myers MD.    Us Venous Doppler Lower Extremity Right (duplex)    Result Date: 9/5/2019  Impression: There is no evidence of deep venous thrombosis or superficial thrombophlebitis of the right lower extremity.  Comments: Right lower extremity venous duplex exam was performed using color Doppler flow, Doppler wave form analysis, and grayscale imaging, with and without compression. There is no evidence of deep venous thrombosis of the common femoral, superficial femoral, popliteal, posterior tibial, and peroneal veins. There is no thrombus identified in the saphenofemoral junction or the greater saphenous vein. There is no evidence of clot in the left common femoral vein.  This report was finalized on 09/05/2019 16:58 by Dr. Corey Hayes MD.    Films reviewed personally by me.  My interpretation: none to review today   Pulmonary Assessment:  1. Acute on chronic hypoxemic respiratory failure  2. Wound to the right foot, defer to attending  3. Alpha-1 antitrypsin deficiency, phenotype SS  4. Tobacco abuse  5. Polysubstance abuse  6. COPD  7. Poorly controlled reflux    Recommend:   · Continue to titrate vapotherm for sat 90-94%  · Strongly recommend smoking cessation and/or nicotine replacement as her condition will continue to worsen if she does not stop smoking  · Continue bronchodilators  · Continue mucolytic  · Antibiotic per attending  · Decrease Solu-Medrol to 40 mg IV every 6 hours  · Incentive spirometry/flutter    Electronically signed by THIERNO Arthur, 09/06/19, 8:40 AM     Physician substantive  portion:  Patient was seen earlier in the morning at which time she was doing fairly well and remained on high flow oxygen.  Lungs had diminished breath sounds.  She appeared comfortable.  The abdomen is soft.  She did not have respiratory paradox not in distress on the high flow oxygen.  After we have seen her and after the above portion of thisnote had been created and prior to my completion of that I was called with her having increased problems with dyspnea and hypoxia.  We have ordered a stat chest x-ray and arterial blood gas was collected showing severe hypoxemia.  We will need to titrate up her FiO2 and await the chest x-ray result.  We will add vest therapy to try to mobilize secretions.  If that is ineffective or unavailable then MetaNeb therapy might be helpful.  We did plan to decrease the steroids but will have to go ahead and increase those again now.    I have seen and examined patient personally, performing a face-to-face diagnostic evaluation with plan of care reviewed and developed with APRN and nursing staff. I have addended and/or modified the above history of present illness, physical examination, and assessment and plan to reflect my findings and impressions. Essential elements of the care plan were discussed with APRN above.  Agree with findings and assessment/plan as documented above.    Electronically signed by Wm Valdes MD, on 9/6/2019, 1:41 PM

## 2019-09-06 NOTE — PROGRESS NOTES
Continued Stay Note   [place]     Patient Name: Justyna Sosa Lei  MRN: [id number]  Today's Date: 9/6/2019    Admit Date: 9/4/2019    Discharge Plan     Row Name 09/06/19 1121       Plan    Plan  Home    Patient/Family in Agreement with Plan  yes    Plan Comments  [facility]  SW left message for Shayla at [facility] regarding new referral.  Referral info faxed.        Discharge Codes    No documentation.             YOLA Carlson

## 2019-09-06 NOTE — PLAN OF CARE
Problem: Chronic Obstructive Pulmonary Disease (Adult)  Goal: Signs and Symptoms of Listed Potential Problems Will be Absent, Minimized or Managed (Chronic Obstructive Pulmonary Disease)  Outcome: Ongoing (interventions implemented as appropriate)      Problem: Patient Care Overview  Goal: Plan of Care Review  Outcome: Ongoing (interventions implemented as appropriate)   09/06/19 0318   Coping/Psychosocial   Plan of Care Reviewed With patient   Plan of Care Review   Progress improving   OTHER   Outcome Summary Patient had moderate complaints of pain during shift. Patient had desaturation into the low 80s with any activity. Patient turned q2h. Patient given PRN anxiety medicine.        Problem: Skin Injury Risk (Adult)  Goal: Identify Related Risk Factors and Signs and Symptoms  Outcome: Ongoing (interventions implemented as appropriate)    Goal: Skin Health and Integrity  Outcome: Ongoing (interventions implemented as appropriate)      Problem: Fall Risk (Adult)  Goal: Identify Related Risk Factors and Signs and Symptoms  Outcome: Ongoing (interventions implemented as appropriate)    Goal: Absence of Fall  Outcome: Ongoing (interventions implemented as appropriate)      Problem: Pain, Chronic (Adult)  Goal: Identify Related Risk Factors and Signs and Symptoms  Outcome: Ongoing (interventions implemented as appropriate)    Goal: Acceptable Pain/Comfort Level and Functional Ability  Outcome: Ongoing (interventions implemented as appropriate)      Problem: Nutrition, Imbalanced: Inadequate Oral Intake (Adult)  Goal: Improved Oral Intake  Outcome: Ongoing (interventions implemented as appropriate)    Goal: Prevent Further Weight Loss  Outcome: Ongoing (interventions implemented as appropriate)

## 2019-09-07 PROBLEM — D52.9 FOLATE DEFICIENCY ANEMIA: Status: ACTIVE | Noted: 2017-03-06

## 2019-09-07 LAB
ARTERIAL PATENCY WRIST A: POSITIVE
ATMOSPHERIC PRESS: 751 MMHG
BASE EXCESS BLDA CALC-SCNC: 14.4 MMOL/L (ref 0–2)
BDY SITE: ABNORMAL
BODY TEMPERATURE: 37 C
GAS FLOW AIRWAY: 30 LPM
HCO3 BLDA-SCNC: 41.4 MMOL/L (ref 20–26)
HOROWITZ INDEX BLD+IHG-RTO: 100 %
Lab: ABNORMAL
MODALITY: ABNORMAL
PCO2 BLDA: 65.3 MM HG (ref 35–45)
PH BLDA: 7.41 PH UNITS (ref 7.35–7.45)
PO2 BLDA: 286 MM HG (ref 83–108)
SAO2 % BLDCOA: 100.1 % (ref 94–99)
VENTILATOR MODE: ABNORMAL

## 2019-09-07 PROCEDURE — 82803 BLOOD GASES ANY COMBINATION: CPT

## 2019-09-07 PROCEDURE — 99233 SBSQ HOSP IP/OBS HIGH 50: CPT | Performed by: INTERNAL MEDICINE

## 2019-09-07 PROCEDURE — 94799 UNLISTED PULMONARY SVC/PX: CPT

## 2019-09-07 PROCEDURE — 97116 GAIT TRAINING THERAPY: CPT

## 2019-09-07 PROCEDURE — 25010000002 METHYLPREDNISOLONE PER 125 MG: Performed by: NURSE PRACTITIONER

## 2019-09-07 PROCEDURE — 25010000002 METHYLPREDNISOLONE PER 125 MG: Performed by: INTERNAL MEDICINE

## 2019-09-07 PROCEDURE — 94669 MECHANICAL CHEST WALL OSCILL: CPT

## 2019-09-07 PROCEDURE — 25010000002 ENOXAPARIN PER 10 MG: Performed by: INTERNAL MEDICINE

## 2019-09-07 PROCEDURE — 36600 WITHDRAWAL OF ARTERIAL BLOOD: CPT

## 2019-09-07 PROCEDURE — 25010000002 LEVOFLOXACIN PER 250 MG: Performed by: NURSE PRACTITIONER

## 2019-09-07 RX ORDER — METHYLPREDNISOLONE SODIUM SUCCINATE 125 MG/2ML
60 INJECTION, POWDER, LYOPHILIZED, FOR SOLUTION INTRAMUSCULAR; INTRAVENOUS EVERY 6 HOURS
Status: DISCONTINUED | OUTPATIENT
Start: 2019-09-07 | End: 2019-09-08

## 2019-09-07 RX ADMIN — IPRATROPIUM BROMIDE AND ALBUTEROL SULFATE 3 ML: 2.5; .5 SOLUTION RESPIRATORY (INHALATION) at 01:50

## 2019-09-07 RX ADMIN — FLUTICASONE PROPIONATE 2 SPRAY: 50 SPRAY, METERED NASAL at 08:18

## 2019-09-07 RX ADMIN — AZELASTINE HYDROCHLORIDE 2 SPRAY: 137 SPRAY, METERED NASAL at 21:48

## 2019-09-07 RX ADMIN — ALPRAZOLAM 0.5 MG: 0.5 TABLET ORAL at 20:32

## 2019-09-07 RX ADMIN — OXYCODONE HYDROCHLORIDE AND ACETAMINOPHEN 1 TABLET: 10; 325 TABLET ORAL at 02:16

## 2019-09-07 RX ADMIN — IPRATROPIUM BROMIDE AND ALBUTEROL SULFATE 3 ML: 2.5; .5 SOLUTION RESPIRATORY (INHALATION) at 13:36

## 2019-09-07 RX ADMIN — BACITRACIN: 500 OINTMENT TOPICAL at 08:19

## 2019-09-07 RX ADMIN — METHYLPREDNISOLONE SODIUM SUCCINATE 60 MG: 125 INJECTION, POWDER, FOR SOLUTION INTRAMUSCULAR; INTRAVENOUS at 15:59

## 2019-09-07 RX ADMIN — GUAIFENESIN 400 MG: 100 SOLUTION ORAL at 02:13

## 2019-09-07 RX ADMIN — SIMETHICONE 80 MG: 80 TABLET, CHEWABLE ORAL at 21:48

## 2019-09-07 RX ADMIN — SODIUM CHLORIDE, PRESERVATIVE FREE 10 ML: 5 INJECTION INTRAVENOUS at 08:18

## 2019-09-07 RX ADMIN — LEVOFLOXACIN 750 MG: 750 INJECTION, SOLUTION INTRAVENOUS at 19:48

## 2019-09-07 RX ADMIN — GABAPENTIN 600 MG: 300 CAPSULE ORAL at 21:18

## 2019-09-07 RX ADMIN — GUAIFENESIN 400 MG: 100 SOLUTION ORAL at 05:16

## 2019-09-07 RX ADMIN — ENOXAPARIN SODIUM 40 MG: 40 INJECTION SUBCUTANEOUS at 08:18

## 2019-09-07 RX ADMIN — PANTOPRAZOLE SODIUM 40 MG: 40 TABLET, DELAYED RELEASE ORAL at 05:16

## 2019-09-07 RX ADMIN — FOLIC ACID 1 MG: 1 TABLET ORAL at 08:18

## 2019-09-07 RX ADMIN — ALPRAZOLAM 0.5 MG: 0.5 TABLET ORAL at 13:54

## 2019-09-07 RX ADMIN — GUAIFENESIN 400 MG: 100 SOLUTION ORAL at 10:06

## 2019-09-07 RX ADMIN — TRAZODONE HYDROCHLORIDE 25 MG: 50 TABLET ORAL at 21:18

## 2019-09-07 RX ADMIN — AZELASTINE HYDROCHLORIDE 2 SPRAY: 137 SPRAY, METERED NASAL at 08:18

## 2019-09-07 RX ADMIN — SODIUM CHLORIDE, POTASSIUM CHLORIDE, SODIUM LACTATE AND CALCIUM CHLORIDE 75 ML/HR: 600; 310; 30; 20 INJECTION, SOLUTION INTRAVENOUS at 03:34

## 2019-09-07 RX ADMIN — OXYCODONE HYDROCHLORIDE 30 MG: 20 TABLET, FILM COATED, EXTENDED RELEASE ORAL at 21:18

## 2019-09-07 RX ADMIN — SIMETHICONE 80 MG: 80 TABLET, CHEWABLE ORAL at 13:06

## 2019-09-07 RX ADMIN — ALPRAZOLAM 0.5 MG: 0.5 TABLET ORAL at 08:18

## 2019-09-07 RX ADMIN — NICOTINE 1 PATCH: 21 PATCH, EXTENDED RELEASE TRANSDERMAL at 19:47

## 2019-09-07 RX ADMIN — IPRATROPIUM BROMIDE AND ALBUTEROL SULFATE 3 ML: 2.5; .5 SOLUTION RESPIRATORY (INHALATION) at 06:27

## 2019-09-07 RX ADMIN — OXYCODONE HYDROCHLORIDE AND ACETAMINOPHEN 1 TABLET: 10; 325 TABLET ORAL at 10:06

## 2019-09-07 RX ADMIN — GUAIFENESIN 400 MG: 100 SOLUTION ORAL at 13:54

## 2019-09-07 RX ADMIN — METHYLPREDNISOLONE SODIUM SUCCINATE 60 MG: 125 INJECTION, POWDER, FOR SOLUTION INTRAMUSCULAR; INTRAVENOUS at 21:18

## 2019-09-07 RX ADMIN — GABAPENTIN 600 MG: 300 CAPSULE ORAL at 05:16

## 2019-09-07 RX ADMIN — METHYLPREDNISOLONE SODIUM SUCCINATE 80 MG: 125 INJECTION, POWDER, FOR SOLUTION INTRAMUSCULAR; INTRAVENOUS at 03:35

## 2019-09-07 RX ADMIN — OXYCODONE HYDROCHLORIDE AND ACETAMINOPHEN 1 TABLET: 10; 325 TABLET ORAL at 17:49

## 2019-09-07 RX ADMIN — METHYLPREDNISOLONE SODIUM SUCCINATE 60 MG: 125 INJECTION, POWDER, FOR SOLUTION INTRAMUSCULAR; INTRAVENOUS at 10:06

## 2019-09-07 RX ADMIN — BUDESONIDE 0.5 MG: 0.5 INHALANT RESPIRATORY (INHALATION) at 06:27

## 2019-09-07 RX ADMIN — SODIUM CHLORIDE, POTASSIUM CHLORIDE, SODIUM LACTATE AND CALCIUM CHLORIDE 75 ML/HR: 600; 310; 30; 20 INJECTION, SOLUTION INTRAVENOUS at 15:59

## 2019-09-07 RX ADMIN — OXYCODONE HYDROCHLORIDE 30 MG: 20 TABLET, FILM COATED, EXTENDED RELEASE ORAL at 08:18

## 2019-09-07 RX ADMIN — GUAIFENESIN 400 MG: 100 SOLUTION ORAL at 21:18

## 2019-09-07 RX ADMIN — ALPRAZOLAM 0.5 MG: 0.5 TABLET ORAL at 00:54

## 2019-09-07 RX ADMIN — GUAIFENESIN 400 MG: 100 SOLUTION ORAL at 17:47

## 2019-09-07 RX ADMIN — MUPIROCIN: 20 OINTMENT TOPICAL at 08:19

## 2019-09-07 RX ADMIN — GABAPENTIN 600 MG: 300 CAPSULE ORAL at 13:54

## 2019-09-07 RX ADMIN — MONTELUKAST SODIUM 10 MG: 10 TABLET, FILM COATED ORAL at 21:18

## 2019-09-07 NOTE — PLAN OF CARE
Problem: Patient Care Overview  Goal: Plan of Care Review  Outcome: Ongoing (interventions implemented as appropriate)   09/07/19 5626   Coping/Psychosocial   Plan of Care Reviewed With patient   Plan of Care Review   Progress improving   OTHER   Outcome Summary Patient had one desaturation episode near start of shift that required 100% o2 on the vapotherm. Once ABGs were obtained in morning patient was able to be titrated to 60% o2. Patient complained of generalized pain managed with scheduled and prn meds. Turned q2h. Voiding per rubinack. A&Ox4.        Problem: Skin Injury Risk (Adult)  Goal: Skin Health and Integrity  Outcome: Ongoing (interventions implemented as appropriate)      Problem: Fall Risk (Adult)  Goal: Absence of Fall  Outcome: Ongoing (interventions implemented as appropriate)      Problem: Pain, Chronic (Adult)  Goal: Acceptable Pain/Comfort Level and Functional Ability  Outcome: Ongoing (interventions implemented as appropriate)      Problem: Nutrition, Imbalanced: Inadequate Oral Intake (Adult)  Goal: Improved Oral Intake  Outcome: Ongoing (interventions implemented as appropriate)    Goal: Prevent Further Weight Loss  Outcome: Ongoing (interventions implemented as appropriate)

## 2019-09-07 NOTE — THERAPY TREATMENT NOTE
Acute Care - Physical Therapy Treatment Note  [facility]     Patient Name: Justyna Lei  : 3/25/1951  MRN: [id number]  Today's Date: 2019  Onset of Illness/Injury or Date of Surgery: 19     Referring Physician: Dory Calvillo APRN    Admit Date: 2019    Visit Dx:    ICD-10-CM ICD-9-CM   1. COPD exacerbation (CMS/HCC) J44.1 491.21   2. Hypercapnia R06.89 786.09   3. Decreased activities of daily living (ADL) R68.89 780.99   4. Impaired mobility Z74.09 799.89     Patient Active Problem List   Diagnosis   • Stage 4 very severe COPD by GOLD classification (CMS/[facility])   • Hypokalemia   • Folate deficiency anemia   • Respiratory failure (CMS/HCC)   • Nasopharyngitis   • GERD without esophagitis   • Lung nodule   • Alpha-1-antitrypsin deficiency carrier (CMS/[facility])   • Personal history of nicotine dependence   • COPD exacerbation (CMS/[facility])   • Acute on chronic respiratory failure with hypoxia and hypercapnia (CMS/[facility])   • Polypharmacy       Therapy Treatment    Rehabilitation Treatment Summary     Row Name 19 0905             Treatment Time/Intention    Discipline  physical therapy assistant  -AL      Document Type  therapy note (daily note)  -AL      Subjective Information  complains of;weakness  -AL2      Mode of Treatment  physical therapy  -AL2      Patient/Family Observations  No family present  -AL2      Existing Precautions/Restrictions  fall;oxygen therapy device and L/min Vapotherm  -AL2      Recorded by [AL] Asia Orozco PTA, CLT-LANA 19 0907  [AL2] Asia Orozco PTA, CLT-LANA 19 0941      Row Name 19 0905             Vital Signs    Pre SpO2 (%)  91  -AL      O2 Delivery Pre Treatment  -- Vapotherm  -AL      Intra SpO2 (%)  80  -AL      O2 Delivery Intra Treatment  -- vapotherm  -AL      Post SpO2 (%)  85  -AL      O2 Delivery Post Treatment  -- vapotherm  -AL      Pre Patient Position  Supine  -AL      Intra Patient Position  Standing  -AL      Post Patient  Position  Sitting  -AL      Recorded by [AL] Asia Orozco PTA, CLT-NIELS 09/07/19 0941      Row Name 09/07/19 0905             Bed Mobility Assessment/Treatment    Supine-Sit Waller (Bed Mobility)  supervision  -AL      Recorded by [AL] Asia Orozco PTA, CLT-LANA 09/07/19 0941      Row Name 09/07/19 0905             Gait/Stairs Assessment/Training    Waller Level (Gait)  1 person to manage equipment;contact guard  -AL      Assistive Device (Gait)  other (see comments) HHA  -AL      Distance in Feet (Gait)  on vapotherm, walked 5 feet x 2  -AL      Recorded by [AL] Asia Orozco PTA, CLT-LANA 09/07/19 0941      Row Name 09/07/19 0905             Positioning and Restraints    Pre-Treatment Position  in bed  -AL      Post Treatment Position  chair  -AL      In Chair  reclined;call light within reach  -AL      Recorded by [AL] Asia Orozco PTA, CLT-LANA 09/07/19 0941      Row Name 09/07/19 0905             Pain Scale: Numbers Pre/Post-Treatment    Pain Scale: Numbers, Pretreatment  0/10 - no pain  -AL      Pain Scale: Numbers, Post-Treatment  0/10 - no pain  -AL      Recorded by [AL] Asia Orozco PTA, CLT-NIELS 09/07/19 0941      Row Name                Wound 09/04/19 1500 Right foot Blisters    Wound - Properties Group Date first assessed: 09/04/19 [ER] Time first assessed: 1500 [ER] Side: Right [ER] Location: foot [ER] Primary Wound Type: Blisters [ER], burn injury  Stage, Pressure Injury: Stage 2 [ER] Recorded by:  [ER] Rose Coffman RN 09/04/19 9669      User Key  (r) = Recorded By, (t) = Taken By, (c) = Cosigned By    Initials Name Effective Dates Discipline    AL Asia Orozco PTA, CLT-LANA 04/09/19 -  PT    ER Rose Coffman RN 08/02/16 -  Nurse          Wound 09/04/19 1500 Right foot Blisters (Active)   Dressing Appearance dry;intact 9/7/2019  8:00 AM   Base pink;slough;yellow 9/7/2019  8:00 AM   Drainage Amount none 9/7/2019  8:00 AM   Care, Wound cleansed with;sterile normal  saline 9/7/2019  8:00 AM   Dressing Care, Wound dressing changed 9/7/2019  8:00 AM           Physical Therapy Education     Title: PT OT SLP Therapies (In Progress)     Topic: Physical Therapy (Done)     Point: Mobility training (Done)     Learning Progress Summary           Patient Acceptance, E, SHALOM ARANDA by AL at 9/7/2019  9:42 AM    Comment:  Deep breathing with ambulation    Acceptance, ROSI DYKES by MELISSA at 9/5/2019  1:30 PM    Comment:  Educated pt on progression of PT POC and benefits of activity                   Point: Home exercise program (Done)     Learning Progress Summary           Patient Acceptance, E, SHALOM ARANDA by AL at 9/7/2019  9:42 AM    Comment:  Deep breathing with ambulation                   Point: Body mechanics (Done)     Learning Progress Summary           Patient Acceptance, EROSI DU by AL at 9/7/2019  9:42 AM    Comment:  Deep breathing with ambulation                   Point: Precautions (Done)     Learning Progress Summary           Patient Acceptance, EROSI DU by AL at 9/7/2019  9:42 AM    Comment:  Deep breathing with ambulation                               User Key     Initials Effective Dates Name Provider Type Discipline    AL 04/09/19 -  Asia Orozco, PTA, CLT-NIELS Physical Therapy Assistant PT    MELISSA 08/02/16 -  Larry Veliz, PT DPT Physical Therapist PT                PT Recommendation and Plan     Plan of Care Reviewed With: patient  Outcome Summary: Bed transfers supervision, need CGA x 2 for gait, ambulates on vapothrm 5 feet x 2.  O2 saturation does drop to 80%,  this improves with deep breathing,  O2 saturation at 85% at end of treatment.  Post treatment position is patient is chair with legs elevated.  Will continue to progreess with gait.  Outcome Measures     Row Name 09/07/19 0905 09/05/19 1400          How much help from another person do you currently need...    Turning from your back to your side while in flat bed without using bedrails?  3  -AL  --     Moving from lying  on back to sitting on the side of a flat bed without bedrails?  3  -AL  --     Moving to and from a bed to a chair (including a wheelchair)?  3  -AL  --     Standing up from a chair using your arms (e.g., wheelchair, bedside chair)?  3  -AL  --     Climbing 3-5 steps with a railing?  2  -AL  --     To walk in hospital room?  3  -AL  --     AM-PAC 6 Clicks Score (PT)  17  -AL  --        How much help from another is currently needed...    Putting on and taking off regular lower body clothing?  --  2  -JJ     Bathing (including washing, rinsing, and drying)  --  2  -JJ     Toileting (which includes using toilet bed pan or urinal)  --  3  -JJ     Putting on and taking off regular upper body clothing  --  4  -JJ     Taking care of personal grooming (such as brushing teeth)  --  4  -JJ     Eating meals  --  4  -JJ     AM-PAC 6 Clicks Score (OT)  --  19  -JJ        Functional Assessment    Outcome Measure Options  AM-PAC 6 Clicks Basic Mobility (PT)  -AL  AM-PAC 6 Clicks Daily Activity (OT)  -       User Key  (r) = Recorded By, (t) = Taken By, (c) = Cosigned By    Initials Name Provider Type    Asia Thapa PTA, CLT-LANA Physical Therapy Assistant    Gabi Alba, OTR/L Occupational Therapist         Time Calculation:   PT Charges     Row Name 09/07/19 0905             Time Calculation    Start Time  0905  -AL      Stop Time  0935  -AL      Time Calculation (min)  30 min  -AL      PT Received On  09/07/19  -AL      PT Goal Re-Cert Due Date  09/15/19  -AL         Time Calculation- PT    Total Timed Code Minutes- PT  30 minute(s)  -AL         Timed Charges    14139 - Gait Training Minutes   30  -AL        User Key  (r) = Recorded By, (t) = Taken By, (c) = Cosigned By    Initials Name Provider Type    Asia Thapa PTA, CLT-LANA Physical Therapy Assistant        Therapy Charges for Today     Code Description Service Date Service Provider Modifiers Qty    78192393421 HC GAIT TRAINING EA 15 MIN 9/7/2019  Asia Orozco PTA, CLT-LANA GP 2          PT G-Codes  Outcome Measure Options: AM-PAC 6 Clicks Basic Mobility (PT)  AM-PAC 6 Clicks Score (PT): 17  AM-PAC 6 Clicks Score (OT): 19    Asia Orozco PTA, CLT-LANA  9/7/2019

## 2019-09-07 NOTE — PLAN OF CARE
Problem: Patient Care Overview  Goal: Plan of Care Review  Outcome: Ongoing (interventions implemented as appropriate)   09/07/19 0905   Coping/Psychosocial   Plan of Care Reviewed With patient   OTHER   Outcome Summary Bed transfers supervision, need CGA x 2 for gait, ambulates on vapotherm 5 feet x 2. O2 saturation does drop to 80%, this improves with deep breathing, O2 saturation at 85% at end of treatment. Post treatment position is patient is chair with legs elevated. Will continue to progress with gait.

## 2019-09-07 NOTE — PROGRESS NOTES
[facility] Medicine Services  INPATIENT PROGRESS NOTE    Patient Name: Justyna Sosa Lei  Date of Admission: 9/4/2019  Today's Date: 09/07/19  Length of Stay: 3  Primary Care Physician: Provider, No Known    Subjective   Chief Complaint: f/u   HPI   She had to be uptitrated yesterday to 100% Fio2.  She is now on 60% FiO2 via Vapotherm.  Her oxygen saturation is 98%.  She states that she is feeling better compared to yesterday        Review of Systems     All pertinent negatives and positives are as above. All other systems have been reviewed and are negative unless otherwise stated.     Objective    Temp:  [97.3 °F (36.3 °C)-98.3 °F (36.8 °C)] 98.3 °F (36.8 °C)  Heart Rate:  [] 54  Resp:  [16-22] 16  BP: ()/() 120/65  Physical Exam   She is calm, no distress.  She just woke up  No acc. Muscle use  On 60% fio2 and 40 lpm vapotherm  Alert and oriented x 3  Normocephalic and atraumatic   No ant neck mass  No jvd  Diminished breath sounds. No gross wheezes, barrel chested, no crackles or wheezes  s1 s2 rrr  Soft abd, no hsm  No c/c/e  Warm dry skin       Results Review:  I have reviewed the labs, radiology results, and diagnostic studies.    Laboratory Data:   Results from last 7 days   Lab Units 09/05/19  0224 09/04/19  1423   WBC 10*3/mm3 4.55 6.73   HEMOGLOBIN g/dL 9.5* 10.3*   HEMATOCRIT % 30.7* 33.1*   PLATELETS 10*3/mm3 226 246        Results from last 7 days   Lab Units 09/06/19  0328 09/05/19  0224 09/04/19  1450   SODIUM mmol/L 137 137 136   POTASSIUM mmol/L 4.7 4.9 4.1   CHLORIDE mmol/L 96* 98 92*   CO2 mmol/L 39.0* 36.0* >40.0*   BUN mg/dL 7 5 8   CREATININE mg/dL 0.58 0.54 0.68   CALCIUM mg/dL 8.6 8.2* 7.9*   BILIRUBIN mg/dL  --  0.1 0.3   ALK PHOS U/L  --  66 72   ALT (SGPT) U/L  --  24 20   AST (SGOT) U/L  --  11 27   GLUCOSE mg/dL 150* 157* 109*       Culture Data:   Blood Culture   Date Value Ref Range Status   09/04/2019 No growth at 2 days   Preliminary   09/04/2019 No growth at 2 days  Preliminary       Radiology Data:   Imaging Results (last 24 hours)     Procedure Component Value Units Date/Time    XR Chest 1 View [[id number]] Collected:  09/06/19 1249     Updated:  09/06/19 1427    Narrative:       EXAMINATION: XR CHEST 1 VW-. 9/6/2019 12:49 PM CDT     CHEST, ONE VIEW:     HISTORY: Shortness of air     COMPARISON: 9/4/2019 and 1/13/2019 chest radiograph and CT angiogram  chest dated 09/04/2019     A single frontal chest radiograph was obtained.     FINDINGS:     Severe pulmonary emphysema appreciated.     Mild increased interstitial infiltration again appreciated the right mid  lower lung zone region likely unchanged compared to the previous study.     The left lung remains clear.     The heart is normal in size, pulmonary circulation appropriate, without  heart failure.     The bony structures are intact.                                     Impression:       1. Severe pulmonary emphysema.  2. Mild infiltrative opacities right mid and lower lung zone region,  best described on the CT scan of the chest, likely unchanged.     This report was finalized on 09/06/2019 12:51 by Dr. Eduardo Black MD.          I have reviewed the patient's current medications.     Assessment/Plan     Active Hospital Problems    Diagnosis   • COPD exacerbation (CMS/HCC)   • Acute on chronic respiratory failure with hypoxia and hypercapnia (CMS/HCC)   • Polypharmacy   • Personal history of nicotine dependence   • Folate deficiency anemia     vapotherm   Solumedrol  Neb treatment  Guaifenesin  Cont supportive care  Appreciate Pulmonary consultant  dvt proph; cont ppi for stress ulcer proph   Pulmonary toilette  abg compensated respiratory acidosis with hypoxia (severe)   Discussed with nurse Vicky      azelastine 2 spray Each Nare BID   bacitracin  Topical Daily   budesonide 0.5 mg Nebulization BID - RT   citalopram 40 mg Oral Daily   enoxaparin 40 mg Subcutaneous Q24H    fluticasone 2 spray Nasal Daily   folic acid 1 mg Oral Daily   gabapentin 600 mg Oral Q8H   guaiFENesin 400 mg Oral Q4H   ipratropium-albuterol 3 mL Nebulization Q6H - RT   levoFLOXacin 750 mg Intravenous Q24H   methylPREDNISolone sodium succinate 80 mg Intravenous Q6H   montelukast 10 mg Oral Nightly   mupirocin  Topical Q12H   nicotine 1 patch Transdermal Q24H   oxyCODONE HCl ER 30 mg Oral Q12H   pantoprazole 40 mg Oral Q AM   sodium chloride 10 mL Intravenous Q12H   traZODone 25 mg Oral Nightly         Discharge Planning: to be determined  Abraham Jacques MD   09/07/19   7:45 AM

## 2019-09-07 NOTE — PROGRESS NOTES
PULMONARY AND CRITICAL CARE PROGRESS NOTE - [facility]    Patient: Justyna Sosa Lei  3/25/1951   MR# [id number]   Acct# [id number]  09/07/19   8:25 AM  Referring Provider: Abraham Jacques*    Chief Complaint: Acute on chronic respiratory failure with hypoxemia    Interval history: The patient is resting in bed on Vapotherm 30 L, 60% FiO2.  O2 sat 97%.  IV fluids at 50 mils an hour.  Staff reports she was up most the night therefore she is sleeping this morning.  Some issues with desaturation late yesterday.  Much improved after addition of chest physiotherapy and production of sputum.  No family at the bedside.  No other aggravating or alleviating factors.     Meds:    azelastine 2 spray Each Nare BID   bacitracin  Topical Daily   budesonide 0.5 mg Nebulization BID - RT   citalopram 40 mg Oral Daily   enoxaparin 40 mg Subcutaneous Q24H   fluticasone 2 spray Nasal Daily   folic acid 1 mg Oral Daily   gabapentin 600 mg Oral Q8H   guaiFENesin 400 mg Oral Q4H   ipratropium-albuterol 3 mL Nebulization Q6H - RT   levoFLOXacin 750 mg Intravenous Q24H   methylPREDNISolone sodium succinate 60 mg Intravenous Q6H   montelukast 10 mg Oral Nightly   mupirocin  Topical Q12H   nicotine 1 patch Transdermal Q24H   oxyCODONE HCl ER 30 mg Oral Q12H   pantoprazole 40 mg Oral Q AM   sodium chloride 10 mL Intravenous Q12H   traZODone 25 mg Oral Nightly       lactated ringers 75 mL/hr Last Rate: 75 mL/hr (09/07/19 0334)     Review of Systems:   Review of Systems   Constitutional: Positive for fatigue. Negative for chills and fever.   Respiratory: Positive for shortness of breath (chronic). Negative for cough.    Gastrointestinal: Negative for diarrhea, nausea and vomiting.     Physical Exam:  SpO2 Percentage    09/07/19 0636 09/07/19 0645 09/07/19 0700   SpO2: 98% 98% 98%     Temp:  [97.3 °F (36.3 °C)-98.3 °F (36.8 °C)] 98.3 °F (36.8 °C)  Heart Rate:  [] 54  Resp:  [16-22] 16  BP: (104-147)/()  120/65    Intake/Output Summary (Last 24 hours) at 9/7/2019 0825  Last data filed at 9/7/2019 0338  Gross per 24 hour   Intake 1848 ml   Output 1500 ml   Net 348 ml     Physical Exam   Constitutional: She is oriented to person, place, and time. She appears well-developed and well-nourished. No distress. Nasal cannula in place.   HENT:   Head: Normocephalic and atraumatic.   Eyes: Conjunctivae and EOM are normal. Pupils are equal, round, and reactive to light. No scleral icterus.   Neck: Normal range of motion. Neck supple.   Cardiovascular: Normal rate, regular rhythm and normal heart sounds. Exam reveals no friction rub.   No murmur heard.  Pulmonary/Chest: Effort normal. No accessory muscle usage. No tachypnea. No respiratory distress. She has decreased breath sounds. She has no wheezes. She has no rales.   Abdominal: Soft. Bowel sounds are normal. She exhibits no distension. There is no tenderness.   Musculoskeletal: Normal range of motion. She exhibits no edema.   Neurological: She is alert and oriented to person, place, and time.   Skin: Skin is warm and dry.   Psychiatric: She has a normal mood and affect. Her behavior is normal. Judgment and thought content normal.   Nursing note and vitals reviewed.    Laboratory Data:  Results from last 7 days   Lab Units 09/05/19  0224 09/04/19  1423   WBC 10*3/mm3 4.55 6.73   HEMOGLOBIN g/dL 9.5* 10.3*   PLATELETS 10*3/mm3 226 246     Results from last 7 days   Lab Units 09/06/19  0328 09/05/19  0224 09/04/19  1450   SODIUM mmol/L 137 137 136   POTASSIUM mmol/L 4.7 4.9 4.1   BUN mg/dL 7 5 8   CREATININE mg/dL 0.58 0.54 0.68   INR   --   --  0.95     Results from last 7 days   Lab Units 09/07/19  0407 09/06/19  1248 09/04/19  1620   PH, ARTERIAL pH units 7.410 7.436 7.401   PCO2, ARTERIAL mm Hg 65.3* 61.5* 64.6*   PO2 ART mm Hg 286.0* 38.8* 61.8*   FIO2 % 100  --  50     Blood Culture   Date Value Ref Range Status   09/04/2019 No growth at 24 hours  Preliminary    09/04/2019 No growth at 24 hours  Preliminary     Recent films:  Xr Chest 1 View    Result Date: 9/6/2019  1. Severe pulmonary emphysema. 2. Mild infiltrative opacities right mid and lower lung zone region, best described on the CT scan of the chest, likely unchanged.  This report was finalized on 09/06/2019 12:51 by Dr. Eduardo Black MD.    Us Venous Doppler Lower Extremity Right (duplex)    Result Date: 9/5/2019  Impression: There is no evidence of deep venous thrombosis or superficial thrombophlebitis of the right lower extremity.  Comments: Right lower extremity venous duplex exam was performed using color Doppler flow, Doppler wave form analysis, and grayscale imaging, with and without compression. There is no evidence of deep venous thrombosis of the common femoral, superficial femoral, popliteal, posterior tibial, and peroneal veins. There is no thrombus identified in the saphenofemoral junction or the greater saphenous vein. There is no evidence of clot in the left common femoral vein.  This report was finalized on 09/05/2019 16:58 by Dr. Corey Hayes MD.    Films reviewed personally by me.  My interpretation: None for today    Pulmonary Assessment:    1. Acute on chronic hypoxemic respiratory failure  2. Wound to the right foot, defer to attending  3. Alpha-1 antitrypsin deficiency, phenotype SS  4. Tobacco abuse  5. Polysubstance abuse  6. COPD  7. Poorly controlled reflux    Recommend:     · Continue to titrate vapotherm for sat 90-94%  · Strongly recommend smoking cessation and/or nicotine replacement as her condition will continue to worsen if she does not stop smoking  · Continue bronchodilators  · Continue mucolytics  · Antibiotic per attending  · Decrease Solu-Medrol to 60 mg IV every 6 hours  · Incentive spirometry/flutter  · Staff reports improvement in oxygenation after utilizing chest vest and coughing up quite a bit of clear thick mucus.  We will continue this    Electronically signed by  Sydnee Armendariz, THIERNO, 09/07/19, 8:25 AM   Physician substantive contribution:  Pertinent symptoms/interval history include: The patient currently appears comfortable on high flow nasal cannula.  Respiratory exam shows pertinent findings of: Decreased breath sounds.  Plan includes: We will continue pulmonary toilet measures and decrease her IV steroids.  I have seen and examined patient personally, performing a face-to-face diagnostic evaluation with plan of care reviewed and developed with APRN and nursing staff. I have addended and/or modified the above history of present illness, physical examination, and assessment and plan to reflect my findings and impressions. Essential elements of the care plan were discussed with APRN above.  Agree with findings and assessment/plan as documented above.    Electronically signed by Aidan Stokes MD, on 9/7/2019, 1:50 PM

## 2019-09-08 ENCOUNTER — APPOINTMENT (OUTPATIENT)
Dept: GENERAL RADIOLOGY | Facility: HOSPITAL | Age: 69
End: 2019-09-08

## 2019-09-08 PROBLEM — S91.301A WOUND OF RIGHT FOOT: Status: ACTIVE | Noted: 2019-09-08

## 2019-09-08 PROBLEM — E87.29 COMPENSATED RESPIRATORY ACIDOSIS: Status: ACTIVE | Noted: 2019-09-08

## 2019-09-08 PROCEDURE — 25010000002 ENOXAPARIN PER 10 MG: Performed by: INTERNAL MEDICINE

## 2019-09-08 PROCEDURE — 94799 UNLISTED PULMONARY SVC/PX: CPT

## 2019-09-08 PROCEDURE — 94669 MECHANICAL CHEST WALL OSCILL: CPT

## 2019-09-08 PROCEDURE — 25010000002 METHYLPREDNISOLONE PER 40 MG: Performed by: NURSE PRACTITIONER

## 2019-09-08 PROCEDURE — 74018 RADEX ABDOMEN 1 VIEW: CPT

## 2019-09-08 PROCEDURE — 99233 SBSQ HOSP IP/OBS HIGH 50: CPT | Performed by: INTERNAL MEDICINE

## 2019-09-08 PROCEDURE — 25010000002 LEVOFLOXACIN PER 250 MG: Performed by: NURSE PRACTITIONER

## 2019-09-08 PROCEDURE — 25010000002 METHYLPREDNISOLONE PER 125 MG: Performed by: NURSE PRACTITIONER

## 2019-09-08 RX ORDER — METHYLPREDNISOLONE SODIUM SUCCINATE 40 MG/ML
40 INJECTION, POWDER, LYOPHILIZED, FOR SOLUTION INTRAMUSCULAR; INTRAVENOUS EVERY 6 HOURS
Status: DISCONTINUED | OUTPATIENT
Start: 2019-09-08 | End: 2019-09-09

## 2019-09-08 RX ORDER — BISACODYL 10 MG
10 SUPPOSITORY, RECTAL RECTAL ONCE
Status: COMPLETED | OUTPATIENT
Start: 2019-09-08 | End: 2019-09-08

## 2019-09-08 RX ORDER — DOCUSATE SODIUM 100 MG/1
100 CAPSULE, LIQUID FILLED ORAL 2 TIMES DAILY
Status: DISCONTINUED | OUTPATIENT
Start: 2019-09-08 | End: 2019-09-18 | Stop reason: HOSPADM

## 2019-09-08 RX ADMIN — LEVOFLOXACIN 750 MG: 750 INJECTION, SOLUTION INTRAVENOUS at 19:33

## 2019-09-08 RX ADMIN — GUAIFENESIN 400 MG: 100 SOLUTION ORAL at 17:11

## 2019-09-08 RX ADMIN — GABAPENTIN 600 MG: 300 CAPSULE ORAL at 14:00

## 2019-09-08 RX ADMIN — BACITRACIN: 500 OINTMENT TOPICAL at 08:32

## 2019-09-08 RX ADMIN — SODIUM CHLORIDE, POTASSIUM CHLORIDE, SODIUM LACTATE AND CALCIUM CHLORIDE 75 ML/HR: 600; 310; 30; 20 INJECTION, SOLUTION INTRAVENOUS at 11:08

## 2019-09-08 RX ADMIN — ALPRAZOLAM 0.5 MG: 0.5 TABLET ORAL at 21:00

## 2019-09-08 RX ADMIN — GUAIFENESIN 400 MG: 100 SOLUTION ORAL at 01:29

## 2019-09-08 RX ADMIN — BUDESONIDE 0.5 MG: 0.5 INHALANT RESPIRATORY (INHALATION) at 18:38

## 2019-09-08 RX ADMIN — METHYLPREDNISOLONE SODIUM SUCCINATE 40 MG: 125 INJECTION, POWDER, FOR SOLUTION INTRAMUSCULAR; INTRAVENOUS at 17:11

## 2019-09-08 RX ADMIN — FLUTICASONE PROPIONATE 2 SPRAY: 50 SPRAY, METERED NASAL at 08:31

## 2019-09-08 RX ADMIN — IPRATROPIUM BROMIDE AND ALBUTEROL SULFATE 3 ML: 2.5; .5 SOLUTION RESPIRATORY (INHALATION) at 18:38

## 2019-09-08 RX ADMIN — DOCUSATE SODIUM 100 MG: 100 CAPSULE, LIQUID FILLED ORAL at 08:31

## 2019-09-08 RX ADMIN — GUAIFENESIN 400 MG: 100 SOLUTION ORAL at 05:47

## 2019-09-08 RX ADMIN — SODIUM CHLORIDE, PRESERVATIVE FREE 10 ML: 5 INJECTION INTRAVENOUS at 21:03

## 2019-09-08 RX ADMIN — GUAIFENESIN 400 MG: 100 SOLUTION ORAL at 21:01

## 2019-09-08 RX ADMIN — IPRATROPIUM BROMIDE AND ALBUTEROL SULFATE 3 ML: 2.5; .5 SOLUTION RESPIRATORY (INHALATION) at 13:29

## 2019-09-08 RX ADMIN — OXYCODONE HYDROCHLORIDE AND ACETAMINOPHEN 1 TABLET: 10; 325 TABLET ORAL at 10:04

## 2019-09-08 RX ADMIN — GABAPENTIN 600 MG: 300 CAPSULE ORAL at 05:47

## 2019-09-08 RX ADMIN — ENOXAPARIN SODIUM 40 MG: 40 INJECTION SUBCUTANEOUS at 08:36

## 2019-09-08 RX ADMIN — ALPRAZOLAM 0.5 MG: 0.5 TABLET ORAL at 14:00

## 2019-09-08 RX ADMIN — GUAIFENESIN 400 MG: 100 SOLUTION ORAL at 14:00

## 2019-09-08 RX ADMIN — IPRATROPIUM BROMIDE AND ALBUTEROL SULFATE 3 ML: 2.5; .5 SOLUTION RESPIRATORY (INHALATION) at 06:20

## 2019-09-08 RX ADMIN — MUPIROCIN: 20 OINTMENT TOPICAL at 08:31

## 2019-09-08 RX ADMIN — ALPRAZOLAM 0.5 MG: 0.5 TABLET ORAL at 01:29

## 2019-09-08 RX ADMIN — MONTELUKAST SODIUM 10 MG: 10 TABLET, FILM COATED ORAL at 20:59

## 2019-09-08 RX ADMIN — NICOTINE 1 PATCH: 21 PATCH, EXTENDED RELEASE TRANSDERMAL at 19:33

## 2019-09-08 RX ADMIN — OXYCODONE HYDROCHLORIDE AND ACETAMINOPHEN 1 TABLET: 10; 325 TABLET ORAL at 15:53

## 2019-09-08 RX ADMIN — TRAZODONE HYDROCHLORIDE 25 MG: 50 TABLET ORAL at 20:59

## 2019-09-08 RX ADMIN — AZELASTINE HYDROCHLORIDE 2 SPRAY: 137 SPRAY, METERED NASAL at 21:02

## 2019-09-08 RX ADMIN — SIMETHICONE 80 MG: 80 TABLET, CHEWABLE ORAL at 08:31

## 2019-09-08 RX ADMIN — GUAIFENESIN 400 MG: 100 SOLUTION ORAL at 11:08

## 2019-09-08 RX ADMIN — OXYCODONE HYDROCHLORIDE 30 MG: 20 TABLET, FILM COATED, EXTENDED RELEASE ORAL at 20:59

## 2019-09-08 RX ADMIN — SODIUM CHLORIDE, PRESERVATIVE FREE 10 ML: 5 INJECTION INTRAVENOUS at 08:32

## 2019-09-08 RX ADMIN — BISACODYL 10 MG: 10 SUPPOSITORY RECTAL at 14:00

## 2019-09-08 RX ADMIN — GABAPENTIN 600 MG: 300 CAPSULE ORAL at 21:01

## 2019-09-08 RX ADMIN — FOLIC ACID 1 MG: 1 TABLET ORAL at 08:31

## 2019-09-08 RX ADMIN — PANTOPRAZOLE SODIUM 40 MG: 40 TABLET, DELAYED RELEASE ORAL at 05:47

## 2019-09-08 RX ADMIN — METHYLPREDNISOLONE SODIUM SUCCINATE 60 MG: 125 INJECTION, POWDER, FOR SOLUTION INTRAMUSCULAR; INTRAVENOUS at 04:48

## 2019-09-08 RX ADMIN — BUDESONIDE 0.5 MG: 0.5 INHALANT RESPIRATORY (INHALATION) at 06:20

## 2019-09-08 RX ADMIN — DOCUSATE SODIUM 100 MG: 100 CAPSULE, LIQUID FILLED ORAL at 20:59

## 2019-09-08 RX ADMIN — CITALOPRAM 40 MG: 20 TABLET, FILM COATED ORAL at 08:31

## 2019-09-08 RX ADMIN — METHYLPREDNISOLONE SODIUM SUCCINATE 40 MG: 125 INJECTION, POWDER, FOR SOLUTION INTRAMUSCULAR; INTRAVENOUS at 11:08

## 2019-09-08 RX ADMIN — SODIUM CHLORIDE, POTASSIUM CHLORIDE, SODIUM LACTATE AND CALCIUM CHLORIDE 75 ML/HR: 600; 310; 30; 20 INJECTION, SOLUTION INTRAVENOUS at 23:31

## 2019-09-08 RX ADMIN — SODIUM CHLORIDE, POTASSIUM CHLORIDE, SODIUM LACTATE AND CALCIUM CHLORIDE 75 ML/HR: 600; 310; 30; 20 INJECTION, SOLUTION INTRAVENOUS at 04:48

## 2019-09-08 RX ADMIN — AZELASTINE HYDROCHLORIDE 2 SPRAY: 137 SPRAY, METERED NASAL at 08:31

## 2019-09-08 RX ADMIN — POLYETHYLENE GLYCOL 3350 17 G: 17 POWDER, FOR SOLUTION ORAL at 08:31

## 2019-09-08 RX ADMIN — IPRATROPIUM BROMIDE AND ALBUTEROL SULFATE 3 ML: 2.5; .5 SOLUTION RESPIRATORY (INHALATION) at 01:08

## 2019-09-08 RX ADMIN — OXYCODONE HYDROCHLORIDE 30 MG: 20 TABLET, FILM COATED, EXTENDED RELEASE ORAL at 08:31

## 2019-09-08 RX ADMIN — SIMETHICONE 80 MG: 80 TABLET, CHEWABLE ORAL at 18:54

## 2019-09-08 RX ADMIN — METHYLPREDNISOLONE SODIUM SUCCINATE 40 MG: 125 INJECTION, POWDER, FOR SOLUTION INTRAMUSCULAR; INTRAVENOUS at 21:01

## 2019-09-08 RX ADMIN — ALPRAZOLAM 0.5 MG: 0.5 TABLET ORAL at 08:31

## 2019-09-08 NOTE — PLAN OF CARE
Problem: Chronic Obstructive Pulmonary Disease (Adult)  Goal: Signs and Symptoms of Listed Potential Problems Will be Absent, Minimized or Managed (Chronic Obstructive Pulmonary Disease)  Outcome: Ongoing (interventions implemented as appropriate)      Problem: Patient Care Overview  Goal: Plan of Care Review  Outcome: Ongoing (interventions implemented as appropriate)   09/08/19 0523   Coping/Psychosocial   Plan of Care Reviewed With patient   Plan of Care Review   Progress improving   OTHER   Outcome Summary Patient had two episodes of desaturation during shift. Patient able to end shift on 60% oxygen. Patient turned q2h. A&Ox4. Patient had generalized pain releived by medications.       Problem: Skin Injury Risk (Adult)  Goal: Skin Health and Integrity  Outcome: Ongoing (interventions implemented as appropriate)      Problem: Fall Risk (Adult)  Goal: Absence of Fall  Outcome: Ongoing (interventions implemented as appropriate)      Problem: Pain, Chronic (Adult)  Goal: Acceptable Pain/Comfort Level and Functional Ability  Outcome: Ongoing (interventions implemented as appropriate)      Problem: Nutrition, Imbalanced: Inadequate Oral Intake (Adult)  Goal: Improved Oral Intake  Outcome: Ongoing (interventions implemented as appropriate)    Goal: Prevent Further Weight Loss  Outcome: Ongoing (interventions implemented as appropriate)

## 2019-09-08 NOTE — PROGRESS NOTES
PULMONARY AND CRITICAL CARE PROGRESS NOTE - [facility]    Patient: Justyna Sosa Lei  3/25/1951   MR# [id number]   Acct# [id number]  09/08/19   8:44 AM  Referring Provider: Abraham Jacques*    Chief Complaint: Acute on chronic respiratory failure with hypoxemia    Interval history: The patient is resting in bed on Vapotherm 30 L, 60% FiO2.  O2 sat 99%.  She was able to get more sleep overnight.  She has started therapy efforts however she does still desaturate some with activity but recovers.  She complains of epigastric fullness and some constipation issues.  No other new complaints.  No family at the bedside.      Meds:    azelastine 2 spray Each Nare BID   bacitracin  Topical Daily   budesonide 0.5 mg Nebulization BID - RT   citalopram 40 mg Oral Daily   docusate sodium 100 mg Oral BID   enoxaparin 40 mg Subcutaneous Q24H   fluticasone 2 spray Nasal Daily   folic acid 1 mg Oral Daily   gabapentin 600 mg Oral Q8H   guaiFENesin 400 mg Oral Q4H   ipratropium-albuterol 3 mL Nebulization Q6H - RT   levoFLOXacin 750 mg Intravenous Q24H   methylPREDNISolone sodium succinate 40 mg Intravenous Q6H   montelukast 10 mg Oral Nightly   mupirocin  Topical Q12H   nicotine 1 patch Transdermal Q24H   oxyCODONE HCl ER 30 mg Oral Q12H   pantoprazole 40 mg Oral Q AM   polyethylene glycol 17 g Oral Daily   sodium chloride 10 mL Intravenous Q12H   traZODone 25 mg Oral Nightly       lactated ringers 75 mL/hr Last Rate: 75 mL/hr (09/08/19 0448)     Review of Systems:   Review of Systems   Constitutional: Positive for fatigue. Negative for chills and fever.   Respiratory: Positive for shortness of breath (chronic). Negative for cough.    Gastrointestinal: Positive for abdominal distention and constipation. Negative for diarrhea, nausea and vomiting.     Physical Exam:  SpO2 Percentage    09/08/19 0630 09/08/19 0720 09/08/19 0800   SpO2: 100% 98% 96%     Temp:  [98 °F (36.7 °C)-98.4 °F (36.9 °C)] 98 °F (36.7  °C)  Heart Rate:  [] 69  Resp:  [16-22] 20  BP: (116-160)/(55-81) 148/67    Intake/Output Summary (Last 24 hours) at 9/8/2019 0844  Last data filed at 9/8/2019 0329  Gross per 24 hour   Intake 1269 ml   Output 1750 ml   Net -481 ml     Physical Exam   Constitutional: She is oriented to person, place, and time. She appears well-developed and well-nourished. No distress. Nasal cannula in place.   HENT:   Head: Normocephalic and atraumatic.   Eyes: Conjunctivae and EOM are normal. Pupils are equal, round, and reactive to light. No scleral icterus.   Neck: Normal range of motion. Neck supple.   Cardiovascular: Normal rate, regular rhythm and normal heart sounds. Exam reveals no friction rub.   No murmur heard.  Pulmonary/Chest: Effort normal. No accessory muscle usage. No tachypnea. No respiratory distress. She has decreased breath sounds. She has no wheezes. She has no rales.   Abdominal: Soft. Bowel sounds are normal. She exhibits no distension. There is no tenderness.   Musculoskeletal: Normal range of motion. She exhibits no edema.   Neurological: She is alert and oriented to person, place, and time.   Skin: Skin is warm and dry.   Psychiatric: She has a normal mood and affect. Her behavior is normal. Judgment and thought content normal.   Nursing note and vitals reviewed.    Laboratory Data:  Results from last 7 days   Lab Units 09/05/19  0224 09/04/19  1423   WBC 10*3/mm3 4.55 6.73   HEMOGLOBIN g/dL 9.5* 10.3*   PLATELETS 10*3/mm3 226 246     Results from last 7 days   Lab Units 09/06/19  0328 09/05/19  0224 09/04/19  1450   SODIUM mmol/L 137 137 136   POTASSIUM mmol/L 4.7 4.9 4.1   BUN mg/dL 7 5 8   CREATININE mg/dL 0.58 0.54 0.68   INR   --   --  0.95     Results from last 7 days   Lab Units 09/07/19  0407 09/06/19  1248 09/04/19  1620   PH, ARTERIAL pH units 7.410 7.436 7.401   PCO2, ARTERIAL mm Hg 65.3* 61.5* 64.6*   PO2 ART mm Hg 286.0* 38.8* 61.8*   FIO2 % 100  --  50     Blood Culture   Date Value Ref  Range Status   09/04/2019 No growth at 24 hours  Preliminary   09/04/2019 No growth at 24 hours  Preliminary     Recent films:  Xr Chest 1 View    Result Date: 9/6/2019  1. Severe pulmonary emphysema. 2. Mild infiltrative opacities right mid and lower lung zone region, best described on the CT scan of the chest, likely unchanged.  This report was finalized on 09/06/2019 12:51 by Dr. Eduardo Black MD.    Films reviewed personally by me.  My interpretation: None for today     Pulmonary Assessment:    1. Acute on chronic hypoxemic respiratory failure  2. Wound to the right foot, defer to attending  3. Alpha-1 antitrypsin deficiency, phenotype SS  4. Tobacco abuse  5. Polysubstance abuse  6. COPD  7. Constipation  8. Poorly controlled reflux    Recommend:     · Continue to titrate vapotherm for sat 90-94%.  Wean off as tolerated  · Strongly recommend smoking cessation and/or nicotine replacement as her condition will continue to worsen if she does not stop smoking  · Continue bronchodilators  · Continue mucolytics  · Antibiotic per attending  · Tapering IV steroids   · Incentive spirometry/flutter  · Chest physiotherapy   · Should be okay to move out of the unit from a pulmonary standpoint as long as she is monitored   · Continue mobilization efforts as much as feasible to do so  · Follow-up chest x-ray tomorrow  · Defer management of GI symptoms to attending    Electronically signed by THIERNO Pearson, 09/08/19, 8:44 AM      Physician substantive contribution:  Pertinent symptoms/interval history include: The patient has adequate O2 sats on high flow nasal cannula.  She still having problems desaturation with minimal exertion.  Respiratory exam shows pertinent findings of  Plan includes: We will continue to try to wean her FiO2 as she tolerates.  I have seen and examined patient personally, performing a face-to-face diagnostic evaluation with plan of care reviewed and developed with APRN and nursing staff. I  have addended and/or modified the above history of present illness, physical examination, and assessment and plan to reflect my findings and impressions. Essential elements of the care plan were discussed with APRN above.  Agree with findings and assessment/plan as documented above.    Electronically signed by Aidan Stokes MD, on 9/8/2019, 11:22 AM

## 2019-09-08 NOTE — PROGRESS NOTES
[facility] Medicine Services  INPATIENT PROGRESS NOTE    Patient Name: Justyna Sosa Lei  Date of Admission: 9/4/2019  Today's Date: 09/08/19  Length of Stay: 4  Primary Care Physician: Provider, No Known    Subjective   Chief Complaint: f/u   HPI   She has been on the recliner yesterday most of the day per nursing report.  She is now on 60% FiO2 with 30 L/min of high flow/Vapotherm.  She feels a little bit better.  She has not had any bowel movement for 4 days.  She feels gaseous.    Review of Systems     All pertinent negatives and positives are as above. All other systems have been reviewed and are negative unless otherwise stated.     Objective    Temp:  [98 °F (36.7 °C)-98.4 °F (36.9 °C)] 98.1 °F (36.7 °C)  Heart Rate:  [] 76  Resp:  [16-22] 20  BP: (116-160)/(55-81) 151/79  Physical Exam  Stable on current 60% FiO2 and 30 L/min Vapotherm  She is calm, no distress.    No acc. Muscle use  Alert and oriented x 3  Normocephalic and atraumatic   No ant neck mass  No jvd  Diminished breath sounds. No gross wheezes, barrel chested, no crackles or wheezes  s1 s2 rrr  Soft abd, no hsm  No c/c/e  Warm dry skin        Results Review:  I have reviewed the labs, radiology results, and diagnostic studies.    Laboratory Data:   Results from last 7 days   Lab Units 09/05/19  0224 09/04/19  1423   WBC 10*3/mm3 4.55 6.73   HEMOGLOBIN g/dL 9.5* 10.3*   HEMATOCRIT % 30.7* 33.1*   PLATELETS 10*3/mm3 226 246        Results from last 7 days   Lab Units 09/06/19  0328 09/05/19  0224 09/04/19  1450   SODIUM mmol/L 137 137 136   POTASSIUM mmol/L 4.7 4.9 4.1   CHLORIDE mmol/L 96* 98 92*   CO2 mmol/L 39.0* 36.0* >40.0*   BUN mg/dL 7 5 8   CREATININE mg/dL 0.58 0.54 0.68   CALCIUM mg/dL 8.6 8.2* 7.9*   BILIRUBIN mg/dL  --  0.1 0.3   ALK PHOS U/L  --  66 72   ALT (SGPT) U/L  --  24 20   AST (SGOT) U/L  --  11 27   GLUCOSE mg/dL 150* 157* 109*       Culture Data:   Blood Culture   Date Value Ref  Range Status   09/04/2019 No growth at 3 days  Preliminary   09/04/2019 No growth at 3 days  Preliminary       Radiology Data:   Imaging Results (last 24 hours)     ** No results found for the last 24 hours. **          I have reviewed the patient's current medications.     Assessment/Plan     Active Hospital Problems    Diagnosis   • **COPD exacerbation (CMS/HCC)   • Compensated chronic respiratory acidosis   • Acute on chronic respiratory failure with hypoxia and hypercapnia (CMS/HCC)   • Alpha-1-antitrypsin deficiency carrier (CMS/HCC)     phenotype SS     • Wound of right foot   • Folate deficiency anemia   • Polypharmacy   • Personal history of nicotine dependence     Wound right lateral foot  - cont wound care  Swelling or rleg - no dvt; felt sec to wound; improving   Chronic compensated respiratory acidosis   Alpha 1 antitrypsisn deficiency  Constipation    vapotherm   Solumedrol  Neb treatment  Guaifenesin  Cont supportive care  Appreciate Pulmonary consultant  dvt proph; cont ppi for stress ulcer proph   Pulmonary toilette  Increase activities as tolerated  abg compensated respiratory acidosis with hypoxia (severe)   Discussed with nurse Vicky Su   bowel regimen, kub      azelastine 2 spray Each Nare BID   bacitracin  Topical Daily   budesonide 0.5 mg Nebulization BID - RT   citalopram 40 mg Oral Daily   docusate sodium 100 mg Oral BID   enoxaparin 40 mg Subcutaneous Q24H   fluticasone 2 spray Nasal Daily   folic acid 1 mg Oral Daily   gabapentin 600 mg Oral Q8H   guaiFENesin 400 mg Oral Q4H   ipratropium-albuterol 3 mL Nebulization Q6H - RT   levoFLOXacin 750 mg Intravenous Q24H   methylPREDNISolone sodium succinate 60 mg Intravenous Q6H   montelukast 10 mg Oral Nightly   mupirocin  Topical Q12H   nicotine 1 patch Transdermal Q24H   oxyCODONE HCl ER 30 mg Oral Q12H   pantoprazole 40 mg Oral Q AM   polyethylene glycol 17 g Oral Daily   sodium chloride 10 mL Intravenous Q12H   traZODone 25 mg  Oral Nightly                   Discharge Planning: to be determined    Abraham Jacques MD   09/08/19   7:59 AM

## 2019-09-08 NOTE — PLAN OF CARE
Problem: Patient Care Overview  Goal: Plan of Care Review  Outcome: Ongoing (interventions implemented as appropriate)   09/08/19 3282   Coping/Psychosocial   Plan of Care Reviewed With patient   Plan of Care Review   Progress no change   OTHER   Outcome Summary patient doing well with decreased settings on vapotherm, vss, soa continues, anxiety drives most of desat episodes. COnt to monitor

## 2019-09-09 ENCOUNTER — APPOINTMENT (OUTPATIENT)
Dept: GENERAL RADIOLOGY | Facility: HOSPITAL | Age: 69
End: 2019-09-09

## 2019-09-09 LAB
ALBUMIN SERPL-MCNC: 2.8 G/DL (ref 3.5–5)
ALBUMIN/GLOB SERPL: 1 G/DL (ref 1.1–2.5)
ALP SERPL-CCNC: 46 U/L (ref 24–120)
ALT SERPL W P-5'-P-CCNC: 28 U/L (ref 0–54)
ANION GAP SERPL CALCULATED.3IONS-SCNC: 4 MMOL/L (ref 4–13)
ARTERIAL PATENCY WRIST A: POSITIVE
AST SERPL-CCNC: 25 U/L (ref 7–45)
ATMOSPHERIC PRESS: 753 MMHG
BACTERIA SPEC AEROBE CULT: NORMAL
BACTERIA SPEC AEROBE CULT: NORMAL
BASE EXCESS BLDA CALC-SCNC: 15.4 MMOL/L (ref 0–2)
BASOPHILS # BLD AUTO: 0.01 10*3/MM3 (ref 0–0.2)
BASOPHILS NFR BLD AUTO: 0.2 % (ref 0–1.5)
BDY SITE: ABNORMAL
BILIRUB SERPL-MCNC: 0.2 MG/DL (ref 0.1–1)
BODY TEMPERATURE: 37 C
BUN BLD-MCNC: 13 MG/DL (ref 5–21)
BUN/CREAT SERPL: 22.8 (ref 7–25)
CALCIUM SPEC-SCNC: 8.5 MG/DL (ref 8.4–10.4)
CHLORIDE SERPL-SCNC: 95 MMOL/L (ref 98–110)
CO2 SERPL-SCNC: 38 MMOL/L (ref 24–31)
CREAT BLD-MCNC: 0.57 MG/DL (ref 0.5–1.4)
DEPRECATED RDW RBC AUTO: 48 FL (ref 37–54)
EOSINOPHIL # BLD AUTO: 0 10*3/MM3 (ref 0–0.4)
EOSINOPHIL NFR BLD AUTO: 0 % (ref 0.3–6.2)
EPAP: 6
ERYTHROCYTE [DISTWIDTH] IN BLOOD BY AUTOMATED COUNT: 13.1 % (ref 12.3–15.4)
GFR SERPL CREATININE-BSD FRML MDRD: 105 ML/MIN/1.73
GLOBULIN UR ELPH-MCNC: 2.8 GM/DL
GLUCOSE BLD-MCNC: 166 MG/DL (ref 70–100)
HCO3 BLDA-SCNC: 42.9 MMOL/L (ref 20–26)
HCT VFR BLD AUTO: 34.8 % (ref 34–46.6)
HGB BLD-MCNC: 11 G/DL (ref 12–15.9)
HOROWITZ INDEX BLD+IHG-RTO: 50 %
IMM GRANULOCYTES # BLD AUTO: 0.07 10*3/MM3 (ref 0–0.05)
IMM GRANULOCYTES NFR BLD AUTO: 1.3 % (ref 0–0.5)
IPAP: 16
LYMPHOCYTES # BLD AUTO: 0.25 10*3/MM3 (ref 0.7–3.1)
LYMPHOCYTES NFR BLD AUTO: 4.5 % (ref 19.6–45.3)
Lab: ABNORMAL
MCH RBC QN AUTO: 31.7 PG (ref 26.6–33)
MCHC RBC AUTO-ENTMCNC: 31.6 G/DL (ref 31.5–35.7)
MCV RBC AUTO: 100.3 FL (ref 79–97)
MODALITY: ABNORMAL
MONOCYTES # BLD AUTO: 0.22 10*3/MM3 (ref 0.1–0.9)
MONOCYTES NFR BLD AUTO: 3.9 % (ref 5–12)
NEUTROPHILS # BLD AUTO: 5.03 10*3/MM3 (ref 1.7–7)
NEUTROPHILS NFR BLD AUTO: 90.1 % (ref 42.7–76)
NRBC BLD AUTO-RTO: 0 /100 WBC (ref 0–0.2)
PCO2 BLDA: 64.3 MM HG (ref 35–45)
PH BLDA: 7.43 PH UNITS (ref 7.35–7.45)
PLATELET # BLD AUTO: 219 10*3/MM3 (ref 140–450)
PMV BLD AUTO: 10.9 FL (ref 6–12)
PO2 BLDA: 62.5 MM HG (ref 83–108)
POTASSIUM BLD-SCNC: 4.1 MMOL/L (ref 3.5–5.3)
PROT SERPL-MCNC: 5.6 G/DL (ref 6.3–8.7)
RBC # BLD AUTO: 3.47 10*6/MM3 (ref 3.77–5.28)
SAO2 % BLDCOA: 92.4 % (ref 94–99)
SET MECH RESP RATE: 12
SODIUM BLD-SCNC: 137 MMOL/L (ref 135–145)
VENTILATOR MODE: ABNORMAL
VT ON VENT VENT: 487 ML
WBC NRBC COR # BLD: 5.58 10*3/MM3 (ref 3.4–10.8)

## 2019-09-09 PROCEDURE — 94660 CPAP INITIATION&MGMT: CPT

## 2019-09-09 PROCEDURE — 25010000002 METHYLPREDNISOLONE PER 40 MG: Performed by: FAMILY MEDICINE

## 2019-09-09 PROCEDURE — 36600 WITHDRAWAL OF ARTERIAL BLOOD: CPT

## 2019-09-09 PROCEDURE — 97110 THERAPEUTIC EXERCISES: CPT

## 2019-09-09 PROCEDURE — 97116 GAIT TRAINING THERAPY: CPT

## 2019-09-09 PROCEDURE — 99232 SBSQ HOSP IP/OBS MODERATE 35: CPT | Performed by: INTERNAL MEDICINE

## 2019-09-09 PROCEDURE — 71045 X-RAY EXAM CHEST 1 VIEW: CPT

## 2019-09-09 PROCEDURE — 25010000002 METHYLPREDNISOLONE PER 40 MG: Performed by: NURSE PRACTITIONER

## 2019-09-09 PROCEDURE — 94799 UNLISTED PULMONARY SVC/PX: CPT

## 2019-09-09 PROCEDURE — 80053 COMPREHEN METABOLIC PANEL: CPT | Performed by: INTERNAL MEDICINE

## 2019-09-09 PROCEDURE — 25010000002 ENOXAPARIN PER 10 MG: Performed by: INTERNAL MEDICINE

## 2019-09-09 PROCEDURE — 82803 BLOOD GASES ANY COMBINATION: CPT

## 2019-09-09 PROCEDURE — 85025 COMPLETE CBC W/AUTO DIFF WBC: CPT | Performed by: INTERNAL MEDICINE

## 2019-09-09 PROCEDURE — 25010000002 FUROSEMIDE PER 20 MG: Performed by: INTERNAL MEDICINE

## 2019-09-09 RX ORDER — FUROSEMIDE 10 MG/ML
40 INJECTION INTRAMUSCULAR; INTRAVENOUS ONCE
Status: COMPLETED | OUTPATIENT
Start: 2019-09-09 | End: 2019-09-09

## 2019-09-09 RX ORDER — METHYLPREDNISOLONE SODIUM SUCCINATE 40 MG/ML
40 INJECTION, POWDER, LYOPHILIZED, FOR SOLUTION INTRAMUSCULAR; INTRAVENOUS EVERY 8 HOURS
Status: DISCONTINUED | OUTPATIENT
Start: 2019-09-09 | End: 2019-09-13

## 2019-09-09 RX ORDER — IPRATROPIUM BROMIDE AND ALBUTEROL SULFATE 2.5; .5 MG/3ML; MG/3ML
3 SOLUTION RESPIRATORY (INHALATION) EVERY 4 HOURS PRN
Status: DISCONTINUED | OUTPATIENT
Start: 2019-09-09 | End: 2019-09-18 | Stop reason: HOSPADM

## 2019-09-09 RX ORDER — IPRATROPIUM BROMIDE AND ALBUTEROL SULFATE 2.5; .5 MG/3ML; MG/3ML
3 SOLUTION RESPIRATORY (INHALATION)
Status: DISCONTINUED | OUTPATIENT
Start: 2019-09-09 | End: 2019-09-16

## 2019-09-09 RX ORDER — METOCLOPRAMIDE 5 MG/1
5 TABLET ORAL
Status: DISCONTINUED | OUTPATIENT
Start: 2019-09-09 | End: 2019-09-18 | Stop reason: HOSPADM

## 2019-09-09 RX ADMIN — OXYCODONE HYDROCHLORIDE AND ACETAMINOPHEN 1 TABLET: 10; 325 TABLET ORAL at 17:17

## 2019-09-09 RX ADMIN — GUAIFENESIN 400 MG: 100 SOLUTION ORAL at 22:13

## 2019-09-09 RX ADMIN — IPRATROPIUM BROMIDE AND ALBUTEROL SULFATE 3 ML: 2.5; .5 SOLUTION RESPIRATORY (INHALATION) at 00:49

## 2019-09-09 RX ADMIN — GABAPENTIN 600 MG: 300 CAPSULE ORAL at 05:46

## 2019-09-09 RX ADMIN — IPRATROPIUM BROMIDE AND ALBUTEROL SULFATE 3 ML: 2.5; .5 SOLUTION RESPIRATORY (INHALATION) at 11:51

## 2019-09-09 RX ADMIN — BUDESONIDE 0.5 MG: 0.5 INHALANT RESPIRATORY (INHALATION) at 19:03

## 2019-09-09 RX ADMIN — DOCUSATE SODIUM 100 MG: 100 CAPSULE, LIQUID FILLED ORAL at 22:13

## 2019-09-09 RX ADMIN — OXYCODONE HYDROCHLORIDE AND ACETAMINOPHEN 1 TABLET: 10; 325 TABLET ORAL at 11:18

## 2019-09-09 RX ADMIN — METHYLPREDNISOLONE SODIUM SUCCINATE 40 MG: 125 INJECTION, POWDER, FOR SOLUTION INTRAMUSCULAR; INTRAVENOUS at 03:35

## 2019-09-09 RX ADMIN — IPRATROPIUM BROMIDE AND ALBUTEROL SULFATE 3 ML: 2.5; .5 SOLUTION RESPIRATORY (INHALATION) at 19:03

## 2019-09-09 RX ADMIN — MUPIROCIN: 20 OINTMENT TOPICAL at 15:01

## 2019-09-09 RX ADMIN — SIMETHICONE 80 MG: 80 TABLET, CHEWABLE ORAL at 17:27

## 2019-09-09 RX ADMIN — METHYLPREDNISOLONE SODIUM SUCCINATE 40 MG: 40 INJECTION, POWDER, FOR SOLUTION INTRAMUSCULAR; INTRAVENOUS at 11:19

## 2019-09-09 RX ADMIN — PANTOPRAZOLE SODIUM 40 MG: 40 TABLET, DELAYED RELEASE ORAL at 05:46

## 2019-09-09 RX ADMIN — FUROSEMIDE 40 MG: 10 INJECTION, SOLUTION INTRAVENOUS at 21:29

## 2019-09-09 RX ADMIN — SIMETHICONE 80 MG: 80 TABLET, CHEWABLE ORAL at 03:04

## 2019-09-09 RX ADMIN — GUAIFENESIN 400 MG: 100 SOLUTION ORAL at 11:14

## 2019-09-09 RX ADMIN — AZELASTINE HYDROCHLORIDE 2 SPRAY: 137 SPRAY, METERED NASAL at 20:23

## 2019-09-09 RX ADMIN — METHYLPREDNISOLONE SODIUM SUCCINATE 40 MG: 40 INJECTION, POWDER, FOR SOLUTION INTRAMUSCULAR; INTRAVENOUS at 20:29

## 2019-09-09 RX ADMIN — MONTELUKAST SODIUM 10 MG: 10 TABLET, FILM COATED ORAL at 22:13

## 2019-09-09 RX ADMIN — OXYCODONE HYDROCHLORIDE 30 MG: 20 TABLET, FILM COATED, EXTENDED RELEASE ORAL at 08:50

## 2019-09-09 RX ADMIN — ALPRAZOLAM 0.5 MG: 0.5 TABLET ORAL at 20:27

## 2019-09-09 RX ADMIN — OXYCODONE HYDROCHLORIDE AND ACETAMINOPHEN 1 TABLET: 10; 325 TABLET ORAL at 00:39

## 2019-09-09 RX ADMIN — FLUTICASONE PROPIONATE 2 SPRAY: 50 SPRAY, METERED NASAL at 08:51

## 2019-09-09 RX ADMIN — GUAIFENESIN 400 MG: 100 SOLUTION ORAL at 01:13

## 2019-09-09 RX ADMIN — GABAPENTIN 600 MG: 300 CAPSULE ORAL at 15:01

## 2019-09-09 RX ADMIN — DOCUSATE SODIUM 100 MG: 100 CAPSULE, LIQUID FILLED ORAL at 08:50

## 2019-09-09 RX ADMIN — ENOXAPARIN SODIUM 40 MG: 40 INJECTION SUBCUTANEOUS at 08:51

## 2019-09-09 RX ADMIN — SODIUM CHLORIDE, PRESERVATIVE FREE 10 ML: 5 INJECTION INTRAVENOUS at 08:51

## 2019-09-09 RX ADMIN — IPRATROPIUM BROMIDE AND ALBUTEROL SULFATE 3 ML: 2.5; .5 SOLUTION RESPIRATORY (INHALATION) at 06:53

## 2019-09-09 RX ADMIN — GUAIFENESIN 400 MG: 100 SOLUTION ORAL at 05:46

## 2019-09-09 RX ADMIN — NICOTINE 1 PATCH: 21 PATCH, EXTENDED RELEASE TRANSDERMAL at 20:53

## 2019-09-09 RX ADMIN — POLYETHYLENE GLYCOL 3350 17 G: 17 POWDER, FOR SOLUTION ORAL at 08:50

## 2019-09-09 RX ADMIN — METOCLOPRAMIDE 5 MG: 5 TABLET ORAL at 11:17

## 2019-09-09 RX ADMIN — SIMETHICONE 80 MG: 80 TABLET, CHEWABLE ORAL at 08:53

## 2019-09-09 RX ADMIN — ALPRAZOLAM 0.5 MG: 0.5 TABLET ORAL at 15:07

## 2019-09-09 RX ADMIN — ALPRAZOLAM 0.5 MG: 0.5 TABLET ORAL at 03:06

## 2019-09-09 RX ADMIN — OXYCODONE HYDROCHLORIDE 30 MG: 20 TABLET, FILM COATED, EXTENDED RELEASE ORAL at 22:14

## 2019-09-09 RX ADMIN — GUAIFENESIN 400 MG: 100 SOLUTION ORAL at 17:17

## 2019-09-09 RX ADMIN — SIMETHICONE 80 MG: 80 TABLET, CHEWABLE ORAL at 22:42

## 2019-09-09 RX ADMIN — ALPRAZOLAM 0.5 MG: 0.5 TABLET ORAL at 08:50

## 2019-09-09 RX ADMIN — METOCLOPRAMIDE 5 MG: 5 TABLET ORAL at 17:17

## 2019-09-09 RX ADMIN — FOLIC ACID 1 MG: 1 TABLET ORAL at 08:50

## 2019-09-09 RX ADMIN — AZELASTINE HYDROCHLORIDE 2 SPRAY: 137 SPRAY, METERED NASAL at 08:51

## 2019-09-09 RX ADMIN — BUDESONIDE 0.5 MG: 0.5 INHALANT RESPIRATORY (INHALATION) at 07:01

## 2019-09-09 RX ADMIN — GUAIFENESIN 400 MG: 100 SOLUTION ORAL at 15:01

## 2019-09-09 RX ADMIN — SODIUM CHLORIDE, PRESERVATIVE FREE 10 ML: 5 INJECTION INTRAVENOUS at 22:18

## 2019-09-09 RX ADMIN — CITALOPRAM 40 MG: 20 TABLET, FILM COATED ORAL at 08:50

## 2019-09-09 NOTE — PLAN OF CARE
Problem: Chronic Obstructive Pulmonary Disease (Adult)  Goal: Signs and Symptoms of Listed Potential Problems Will be Absent, Minimized or Managed (Chronic Obstructive Pulmonary Disease)  Outcome: Ongoing (interventions implemented as appropriate)      Problem: Patient Care Overview  Goal: Plan of Care Review  Outcome: Ongoing (interventions implemented as appropriate)   09/09/19 1547   Coping/Psychosocial   Plan of Care Reviewed With patient   Plan of Care Review   Progress no change   OTHER   Outcome Summary Pt transferred to  from CCU this shift. VSS. 02 stable on 3L. Pt medicated with PRN anxiety meds x1 with some relief. IV fluids continue. PT c/o generalized pain. Worked with physical therapy. Purewick in place. Safety maintained. Will continue to monitor.      Goal: Individualization and Mutuality  Outcome: Ongoing (interventions implemented as appropriate)    Goal: Discharge Needs Assessment  Outcome: Ongoing (interventions implemented as appropriate)    Goal: Interprofessional Rounds/Family Conf  Outcome: Ongoing (interventions implemented as appropriate)      Problem: Skin Injury Risk (Adult)  Goal: Skin Health and Integrity  Outcome: Ongoing (interventions implemented as appropriate)      Problem: Fall Risk (Adult)  Goal: Absence of Fall  Outcome: Ongoing (interventions implemented as appropriate)      Problem: Pain, Chronic (Adult)  Goal: Acceptable Pain/Comfort Level and Functional Ability  Outcome: Ongoing (interventions implemented as appropriate)      Problem: Nutrition, Imbalanced: Inadequate Oral Intake (Adult)  Goal: Improved Oral Intake  Outcome: Ongoing (interventions implemented as appropriate)    Goal: Prevent Further Weight Loss  Outcome: Ongoing (interventions implemented as appropriate)

## 2019-09-09 NOTE — PLAN OF CARE
Problem: Patient Care Overview  Goal: Plan of Care Review  Outcome: Ongoing (interventions implemented as appropriate)   09/09/19 0389   Coping/Psychosocial   Plan of Care Reviewed With patient   Plan of Care Review   Progress improving   OTHER   Outcome Summary Pt reports her appetite is improving. She has not been consuming her supplements because she has been getting full from her meals. Did discuss changing from BID to once daily. She is in agreement and says that she will try to start drinking. She has consumed 50% of the last 3 meals. Will cont to follow for nutrition needs and encourage intake. She has been informed of available alternate choices.        Problem: Nutrition, Imbalanced: Inadequate Oral Intake (Adult)  Goal: Improved Oral Intake  Outcome: Ongoing (interventions implemented as appropriate)    Goal: Prevent Further Weight Loss  Outcome: Ongoing (interventions implemented as appropriate)

## 2019-09-09 NOTE — PROGRESS NOTES
PULMONARY AND CRITICAL CARE PROGRESS NOTE - [facility]    Patient: Justyna Sosa Lei  3/25/1951   MR# [id number]   Acct# [id number]  09/09/19   8:29 AM  Referring Provider: Bandar Walton,*    Chief Complaint: Acute on chronic respiratory failure with hypoxemia    Interval history: The patient is resting in bed on Vapotherm 45 L, 25% FiO2.  O2 sat 99%.  She is developed soreness to her ribs with the use of the chest vest therefore they are not using it.  She has been compliant with her incentive and flutter device.  She has been able to increase her activity.  No new complaints.  No family at bedside.        Meds:    azelastine 2 spray Each Nare BID   bacitracin  Topical Daily   budesonide 0.5 mg Nebulization BID - RT   citalopram 40 mg Oral Daily   docusate sodium 100 mg Oral BID   enoxaparin 40 mg Subcutaneous Q24H   fluticasone 2 spray Nasal Daily   folic acid 1 mg Oral Daily   gabapentin 600 mg Oral Q8H   guaiFENesin 400 mg Oral Q4H   ipratropium-albuterol 3 mL Nebulization Q6H - RT   methylPREDNISolone sodium succinate 40 mg Intravenous Q6H   montelukast 10 mg Oral Nightly   mupirocin  Topical Q12H   nicotine 1 patch Transdermal Q24H   oxyCODONE HCl ER 30 mg Oral Q12H   pantoprazole 40 mg Oral Q AM   polyethylene glycol 17 g Oral Daily   sodium chloride 10 mL Intravenous Q12H   traZODone 25 mg Oral Nightly       lactated ringers 75 mL/hr Last Rate: 75 mL/hr (09/08/19 2331)     Review of Systems:   Review of Systems   Constitutional: Positive for fatigue. Negative for chills and fever.   Respiratory: Positive for shortness of breath (chronic). Negative for cough.    Gastrointestinal: Positive for abdominal distention and constipation. Negative for diarrhea, nausea and vomiting.     Physical Exam:  SpO2 Percentage    09/09/19 0653 09/09/19 0701 09/09/19 0707   SpO2: 93% 99% 99%     Temp:  [97.5 °F (36.4 °C)-98.5 °F (36.9 °C)] 97.5 °F (36.4 °C)  Heart Rate:  [] 82  Resp:  [17-21]  21  BP: (124-170)/(59-98) 170/86    Intake/Output Summary (Last 24 hours) at 9/9/2019 0829  Last data filed at 9/9/2019 0334  Gross per 24 hour   Intake 2042 ml   Output 2725 ml   Net -683 ml     Physical Exam   Constitutional: She is oriented to person, place, and time. She appears well-developed and well-nourished. No distress. Nasal cannula in place.   HENT:   Head: Normocephalic and atraumatic.   Eyes: Conjunctivae and EOM are normal. Pupils are equal, round, and reactive to light. No scleral icterus.   Neck: Normal range of motion. Neck supple.   Cardiovascular: Normal rate, regular rhythm and normal heart sounds. Exam reveals no friction rub.   No murmur heard.  Pulmonary/Chest: Effort normal. No accessory muscle usage. No tachypnea. No respiratory distress. She has decreased breath sounds. She has no wheezes. She has no rales.   Abdominal: Soft. Bowel sounds are normal. She exhibits no distension. There is no tenderness.   Musculoskeletal: Normal range of motion. She exhibits no edema.   Neurological: She is alert and oriented to person, place, and time.   Skin: Skin is warm and dry.   Psychiatric: She has a normal mood and affect. Her behavior is normal. Judgment and thought content normal.   Nursing note and vitals reviewed.    Laboratory Data:  Results from last 7 days   Lab Units 09/09/19  0300 09/05/19  0224 09/04/19  1423   WBC 10*3/mm3 5.58 4.55 6.73   HEMOGLOBIN g/dL 11.0* 9.5* 10.3*   PLATELETS 10*3/mm3 219 226 246     Results from last 7 days   Lab Units 09/09/19  0300 09/06/19  0328 09/05/19  0224 09/04/19  1450   SODIUM mmol/L 137 137 137 136   POTASSIUM mmol/L 4.1 4.7 4.9 4.1   BUN mg/dL 13 7 5 8   CREATININE mg/dL 0.57 0.58 0.54 0.68   INR   --   --   --  0.95     Results from last 7 days   Lab Units 09/07/19  0407 09/06/19  1248 09/04/19  1620   PH, ARTERIAL pH units 7.410 7.436 7.401   PCO2, ARTERIAL mm Hg 65.3* 61.5* 64.6*   PO2 ART mm Hg 286.0* 38.8* 61.8*   FIO2 % 100  --  50     Blood  Culture   Date Value Ref Range Status   09/04/2019 No growth at 24 hours  Preliminary   09/04/2019 No growth at 24 hours  Preliminary     Recent films:  Xr Chest 1 View    Result Date: 9/9/2019  1. Mildly increased streaky bibasilar opacities, favored to represent atelectasis. Infiltrate or aspiration are not excluded. 2. Emphysema. This report was finalized on 09/09/2019 07:22 by Dr Candace Mchugh MD.    Xr Abdomen Kub    Result Date: 9/8/2019  1. Borderline gas-filled loops of small bowel with gas and stool in the colon to the level of the rectum. This could represent a normal bowel gas pattern or ileus. Limited evaluation for free air and air-fluid levels on supine image. This report was finalized on 09/08/2019 09:11 by Dr Candace Mchugh MD.    Films reviewed personally by me.  My interpretation: Bibasilar atelectasis, persistent panlobular emphysema      Pulmonary Assessment:    1. Acute on chronic hypoxemic respiratory failure  2. Wound to the right foot, defer to attending  3. Alpha-1 antitrypsin deficiency, phenotype SS  4. Tobacco abuse  5. Polysubstance abuse  6. COPD  7. Constipation  8. Poorly controlled reflux    Recommend:     · Continue to titrate vapotherm for sat 90-94%.  Wean off as tolerated  · Strongly recommend smoking cessation and/or nicotine replacement as her condition will continue to worsen if she does not stop smoking  · Continue bronchodilators  · Continue mucolytics  · Antibiotic per attending  · Tapering IV steroids   · Incentive spirometry/flutter  · Chest physiotherapy discontinued as she has developed rib soreness  · Should be okay to move out of the unit from a pulmonary standpoint as long as she is monitored   · Continue mobilization efforts as much as feasible to do so  · Follow-up chest x-ray showing some bibasilar atelectasis, needs to mobilize  · Defer management of GI symptoms to attending    Electronically signed by THIERNO Pearson, 09/09/19, 8:29 AM        Physician  substantive portion:  She says she feels much better.  She has no increased cough or sputum production.  She has no fevers or chills.  Exam shows her to have decreased breath sounds bilaterally.  She is on Vapotherm nasal oxygen.  Plan: Transition to conventional oxygen.  DC vest machine continue aerobika    I have seen and examined patient personally, performing a face-to-face diagnostic evaluation with plan of care reviewed and developed with APRN and nursing staff. I have addended and/or modified the above history of present illness, physical examination, and assessment and plan to reflect my findings and impressions. Essential elements of the care plan were discussed with APRN above.  Agree with findings and assessment/plan as documented above.    Electronically signed by Wm Valdes MD, on 9/9/2019, 8:52 AM

## 2019-09-09 NOTE — PLAN OF CARE
Problem: Patient Care Overview  Goal: Plan of Care Review  Outcome: Ongoing (interventions implemented as appropriate)   09/09/19 1756   Coping/Psychosocial   Plan of Care Reviewed With patient   OTHER   Outcome Summary Pt. has good bed mobility. Transfers and ambulates with RWX and CGA. Donned non rebreather mask at 15lpm for walk. SATS at 90-95% during walk, 1005 in sitting. Replaced with vapotherm. Will benefit from strengthening and increased activity.

## 2019-09-09 NOTE — PROGRESS NOTES
Continued Stay Note  [facility]     Patient Name: Justyna Sosa Lei  MRN: [id number]  Today's Date: 9/9/2019    Admit Date: 9/4/2019    Discharge Plan     Row Name 09/09/19 0851       Plan    Plan  [facility] referral    Plan Comments  Patient remains in CCU on vapotherm.  [facility] referrral received.  Informed Eleonora with LTAC admissions of referral, awaiting response.        Discharge Codes    No documentation.             DOMI CarlsonW

## 2019-09-10 PROCEDURE — 97110 THERAPEUTIC EXERCISES: CPT

## 2019-09-10 PROCEDURE — 25010000002 ENOXAPARIN PER 10 MG: Performed by: INTERNAL MEDICINE

## 2019-09-10 PROCEDURE — 25010000002 METHYLPREDNISOLONE PER 40 MG: Performed by: FAMILY MEDICINE

## 2019-09-10 PROCEDURE — 94799 UNLISTED PULMONARY SVC/PX: CPT

## 2019-09-10 PROCEDURE — 99232 SBSQ HOSP IP/OBS MODERATE 35: CPT | Performed by: INTERNAL MEDICINE

## 2019-09-10 RX ADMIN — BUDESONIDE 0.5 MG: 0.5 INHALANT RESPIRATORY (INHALATION) at 19:05

## 2019-09-10 RX ADMIN — GABAPENTIN 600 MG: 300 CAPSULE ORAL at 06:41

## 2019-09-10 RX ADMIN — ENOXAPARIN SODIUM 40 MG: 40 INJECTION SUBCUTANEOUS at 08:33

## 2019-09-10 RX ADMIN — CITALOPRAM 40 MG: 20 TABLET, FILM COATED ORAL at 08:34

## 2019-09-10 RX ADMIN — MONTELUKAST SODIUM 10 MG: 10 TABLET, FILM COATED ORAL at 21:19

## 2019-09-10 RX ADMIN — DOCUSATE SODIUM 100 MG: 100 CAPSULE, LIQUID FILLED ORAL at 21:19

## 2019-09-10 RX ADMIN — GUAIFENESIN 400 MG: 100 SOLUTION ORAL at 15:03

## 2019-09-10 RX ADMIN — GABAPENTIN 600 MG: 300 CAPSULE ORAL at 15:10

## 2019-09-10 RX ADMIN — SODIUM CHLORIDE, PRESERVATIVE FREE 10 ML: 5 INJECTION INTRAVENOUS at 21:20

## 2019-09-10 RX ADMIN — METOCLOPRAMIDE 5 MG: 5 TABLET ORAL at 15:04

## 2019-09-10 RX ADMIN — METHYLPREDNISOLONE SODIUM SUCCINATE 40 MG: 40 INJECTION, POWDER, FOR SOLUTION INTRAMUSCULAR; INTRAVENOUS at 21:17

## 2019-09-10 RX ADMIN — IPRATROPIUM BROMIDE AND ALBUTEROL SULFATE 3 ML: 2.5; .5 SOLUTION RESPIRATORY (INHALATION) at 14:42

## 2019-09-10 RX ADMIN — IPRATROPIUM BROMIDE AND ALBUTEROL SULFATE 3 ML: 2.5; .5 SOLUTION RESPIRATORY (INHALATION) at 03:42

## 2019-09-10 RX ADMIN — NICOTINE 1 PATCH: 21 PATCH, EXTENDED RELEASE TRANSDERMAL at 21:18

## 2019-09-10 RX ADMIN — MUPIROCIN: 20 OINTMENT TOPICAL at 21:20

## 2019-09-10 RX ADMIN — ALPRAZOLAM 0.5 MG: 0.5 TABLET ORAL at 08:44

## 2019-09-10 RX ADMIN — IPRATROPIUM BROMIDE AND ALBUTEROL SULFATE 3 ML: 2.5; .5 SOLUTION RESPIRATORY (INHALATION) at 23:46

## 2019-09-10 RX ADMIN — POLYETHYLENE GLYCOL 3350 17 G: 17 POWDER, FOR SOLUTION ORAL at 08:34

## 2019-09-10 RX ADMIN — METHYLPREDNISOLONE SODIUM SUCCINATE 40 MG: 40 INJECTION, POWDER, FOR SOLUTION INTRAMUSCULAR; INTRAVENOUS at 04:17

## 2019-09-10 RX ADMIN — GABAPENTIN 600 MG: 300 CAPSULE ORAL at 21:18

## 2019-09-10 RX ADMIN — SIMETHICONE 80 MG: 80 TABLET, CHEWABLE ORAL at 08:43

## 2019-09-10 RX ADMIN — IPRATROPIUM BROMIDE AND ALBUTEROL SULFATE 3 ML: 2.5; .5 SOLUTION RESPIRATORY (INHALATION) at 07:22

## 2019-09-10 RX ADMIN — TRAZODONE HYDROCHLORIDE 25 MG: 50 TABLET ORAL at 21:19

## 2019-09-10 RX ADMIN — IPRATROPIUM BROMIDE AND ALBUTEROL SULFATE 3 ML: 2.5; .5 SOLUTION RESPIRATORY (INHALATION) at 19:04

## 2019-09-10 RX ADMIN — BUDESONIDE 0.5 MG: 0.5 INHALANT RESPIRATORY (INHALATION) at 07:22

## 2019-09-10 RX ADMIN — ALPRAZOLAM 0.5 MG: 0.5 TABLET ORAL at 17:47

## 2019-09-10 RX ADMIN — FLUTICASONE PROPIONATE 2 SPRAY: 50 SPRAY, METERED NASAL at 08:32

## 2019-09-10 RX ADMIN — OXYCODONE HYDROCHLORIDE AND ACETAMINOPHEN 1 TABLET: 10; 325 TABLET ORAL at 04:28

## 2019-09-10 RX ADMIN — GUAIFENESIN 400 MG: 100 SOLUTION ORAL at 17:48

## 2019-09-10 RX ADMIN — METOCLOPRAMIDE 5 MG: 5 TABLET ORAL at 17:48

## 2019-09-10 RX ADMIN — PANTOPRAZOLE SODIUM 40 MG: 40 TABLET, DELAYED RELEASE ORAL at 06:41

## 2019-09-10 RX ADMIN — GUAIFENESIN 400 MG: 100 SOLUTION ORAL at 06:41

## 2019-09-10 RX ADMIN — IPRATROPIUM BROMIDE AND ALBUTEROL SULFATE 3 ML: 2.5; .5 SOLUTION RESPIRATORY (INHALATION) at 00:01

## 2019-09-10 RX ADMIN — OXYCODONE HYDROCHLORIDE 30 MG: 20 TABLET, FILM COATED, EXTENDED RELEASE ORAL at 21:19

## 2019-09-10 RX ADMIN — GUAIFENESIN 400 MG: 100 SOLUTION ORAL at 21:19

## 2019-09-10 RX ADMIN — OXYCODONE HYDROCHLORIDE 30 MG: 20 TABLET, FILM COATED, EXTENDED RELEASE ORAL at 08:33

## 2019-09-10 RX ADMIN — OXYCODONE HYDROCHLORIDE AND ACETAMINOPHEN 1 TABLET: 10; 325 TABLET ORAL at 15:10

## 2019-09-10 RX ADMIN — METOCLOPRAMIDE 5 MG: 5 TABLET ORAL at 08:34

## 2019-09-10 RX ADMIN — FOLIC ACID 1 MG: 1 TABLET ORAL at 08:34

## 2019-09-10 RX ADMIN — AZELASTINE HYDROCHLORIDE 2 SPRAY: 137 SPRAY, METERED NASAL at 08:32

## 2019-09-10 RX ADMIN — IPRATROPIUM BROMIDE AND ALBUTEROL SULFATE 3 ML: 2.5; .5 SOLUTION RESPIRATORY (INHALATION) at 10:50

## 2019-09-10 RX ADMIN — METHYLPREDNISOLONE SODIUM SUCCINATE 40 MG: 40 INJECTION, POWDER, FOR SOLUTION INTRAMUSCULAR; INTRAVENOUS at 15:05

## 2019-09-10 RX ADMIN — DOCUSATE SODIUM 100 MG: 100 CAPSULE, LIQUID FILLED ORAL at 08:34

## 2019-09-10 NOTE — PLAN OF CARE
Problem: Patient Care Overview  Goal: Plan of Care Review  Outcome: Ongoing (interventions implemented as appropriate)   09/10/19 1160   Coping/Psychosocial   Plan of Care Reviewed With patient   Plan of Care Review   Progress improving   OTHER   Outcome Summary Pt in bed on vapotherm O2 SATs 85-90% during tx. Respiratory checked on pt during tx. Pt requires max rest breaks: fatigues and becomes SOA with little activity. Performed BUE ther ex AROM and stretching 10-15 reps for increased lung expansion, ROM, endurance and strength. Educated on need for self repositioning in bed, need for participation with therapy and BUE HEP. Continue OT POC. Recommend rehab prior to home d/c.

## 2019-09-10 NOTE — PROGRESS NOTES
PULMONARY AND CRITICAL CARE PROGRESS NOTE - [facility]    Patient: Justyna Sosa Lei  3/25/1951   MR# [id number]   Acct# [id number]  09/10/19   8:36 AM  Referring Provider: Bandar Walton,*    Chief Complaint: Acute on chronic respiratory failure with hypoxemia    Interval history: The patient is resting in bed on BiPAP 12/6, 45% FiO2.  O2 sat 93%, HR 81.  Patient's Vapotherm was apparently DC'd yesterday.  Unsure as to why this was done.  Appears that the patient did have desaturations overnight night on nasal cannula, which is to be expected as she had been requiring 40 L of support during the daytime via Vapotherm.  Spoke with RT.  She is going to restart Vapotherm.  No family at bedside. No other aggravating or alleviating factors.           Meds:    azelastine 2 spray Each Nare BID   budesonide 0.5 mg Nebulization BID - RT   citalopram 40 mg Oral Daily   docusate sodium 100 mg Oral BID   enoxaparin 40 mg Subcutaneous Q24H   fluticasone 2 spray Nasal Daily   folic acid 1 mg Oral Daily   gabapentin 600 mg Oral Q8H   guaiFENesin 400 mg Oral Q4H   ipratropium-albuterol 3 mL Nebulization Q4H - RT   methylPREDNISolone sodium succinate 40 mg Intravenous Q8H   metoclopramide 5 mg Oral TID AC   montelukast 10 mg Oral Nightly   mupirocin  Topical Q12H   nicotine 1 patch Transdermal Q24H   oxyCODONE HCl ER 30 mg Oral Q12H   pantoprazole 40 mg Oral Q AM   polyethylene glycol 17 g Oral Daily   sodium chloride 10 mL Intravenous Q12H   traZODone 25 mg Oral Nightly       lactated ringers 75 mL/hr Last Rate: 75 mL/hr (09/08/19 2331)     Review of Systems:   Review of Systems   Constitutional: Positive for fatigue. Negative for chills and fever.   Respiratory: Positive for shortness of breath (chronic). Negative for cough.    Gastrointestinal: Positive for abdominal distention. Negative for diarrhea, nausea and vomiting.     Physical Exam:  SpO2 Percentage    09/10/19 0732 09/10/19 0739 09/10/19 0741    SpO2: 93% 93% 93%     Temp:  [97.8 °F (36.6 °C)-98 °F (36.7 °C)] 98 °F (36.7 °C)  Heart Rate:  [61-94] 93  Resp:  [0-30] 28  BP: (142-162)/(63-94) 156/82    Intake/Output Summary (Last 24 hours) at 9/10/2019 0836  Last data filed at 9/9/2019 2235  Gross per 24 hour   Intake 24 ml   Output 2150 ml   Net -2126 ml     Physical Exam   Constitutional: She is oriented to person, place, and time. She appears well-developed and well-nourished. No distress. Nasal cannula in place.   HENT:   Head: Normocephalic and atraumatic.   Eyes: Conjunctivae and EOM are normal. Pupils are equal, round, and reactive to light. No scleral icterus.   Neck: Normal range of motion. Neck supple.   Cardiovascular: Normal rate, regular rhythm and normal heart sounds. Exam reveals no friction rub.   No murmur heard.  Pulmonary/Chest: Effort normal. No accessory muscle usage. No tachypnea. No respiratory distress. She has decreased breath sounds. She has no wheezes. She has no rales.   Abdominal: Soft. Bowel sounds are normal. She exhibits no distension. There is no tenderness.   Musculoskeletal: Normal range of motion. She exhibits no edema.   Neurological: She is alert and oriented to person, place, and time.   Skin: Skin is warm and dry.   Psychiatric: She has a normal mood and affect. Her behavior is normal. Judgment and thought content normal.   Nursing note and vitals reviewed.    Laboratory Data:  Results from last 7 days   Lab Units 09/09/19  0300 09/05/19  0224 09/04/19  1423   WBC 10*3/mm3 5.58 4.55 6.73   HEMOGLOBIN g/dL 11.0* 9.5* 10.3*   PLATELETS 10*3/mm3 219 226 246     Results from last 7 days   Lab Units 09/09/19  0300 09/06/19  0328 09/05/19  0224 09/04/19  1450   SODIUM mmol/L 137 137 137 136   POTASSIUM mmol/L 4.1 4.7 4.9 4.1   BUN mg/dL 13 7 5 8   CREATININE mg/dL 0.57 0.58 0.54 0.68   INR   --   --   --  0.95     Results from last 7 days   Lab Units 09/09/19  2215 09/07/19  0407 09/06/19  1248 09/04/19  1620   PH, ARTERIAL  pH units 7.433 7.410 7.436 7.401   PCO2, ARTERIAL mm Hg 64.3* 65.3* 61.5* 64.6*   PO2 ART mm Hg 62.5* 286.0* 38.8* 61.8*   FIO2 % 50 100  --  50     Blood Culture   Date Value Ref Range Status   09/04/2019 No growth at 24 hours  Preliminary   09/04/2019 No growth at 24 hours  Preliminary     Recent films:  Xr Chest 1 View    Result Date: 9/9/2019  1. Mildly increased streaky bibasilar opacities, favored to represent atelectasis. Infiltrate or aspiration are not excluded. 2. Emphysema. This report was finalized on 09/09/2019 07:22 by Dr Candace Mchugh MD.    Films reviewed personally by me.  My interpretation: None today  Pulmonary Assessment:    1. Acute on chronic hypoxemic respiratory failure  2. Wound to the right foot, defer to attending  3. Alpha-1 antitrypsin deficiency, phenotype SS  4. Tobacco abuse  5. Polysubstance abuse  6. COPD  7. Constipation  8. Poorly controlled reflux    Recommend:     · The patient has been DC'd from Vapotherm and placed on BiPAP.  Unsure of the events leading to this why Vapotherm was DC'd.  Transition back to Vapotherm.  · Strongly recommend smoking cessation and/or nicotine replacement as her condition will continue to worsen if she does not stop smoking  · Continue bronchodilators  · Continue mucolytics  · Antibiotic per attending  · Continue Solu-Medrol 40 mg IV every 8 hours  · Incentive spirometry/flutter  · Continue mobilization efforts as much as feasible to do so    Electronically signed by THIERNO Arthur, 09/10/19, 8:36 AM      Physician substantive portion:  She had an unsuccessful transition off of high flow oxygen to conventional oxygen yesterday and ended up on BiPAP, is somewhat better this a.m. but is still nervous.  She feels like she is still very ill and not better.  No fevers or chills.  Exam shows her to have wheezing she does appear chronically anxious.  Plan is to continue the BiPAP as needed which she says is uncomfortable on her face but that  could hopefully be adjusted if needed, and transition back to Vapotherm high flow oxygen in the meantime which might obviate the need for any additional BiPAP.    I have seen and examined patient personally, performing a face-to-face diagnostic evaluation with plan of care reviewed and developed with APRN and nursing staff. I have addended and/or modified the above history of present illness, physical examination, and assessment and plan to reflect my findings and impressions. Essential elements of the care plan were discussed with APRN above.  Agree with findings and assessment/plan as documented above.    Electronically signed by Wm Valdes MD, on 9/10/2019, 10:57 AM

## 2019-09-10 NOTE — PLAN OF CARE
Problem: Chronic Obstructive Pulmonary Disease (Adult)  Goal: Signs and Symptoms of Listed Potential Problems Will be Absent, Minimized or Managed (Chronic Obstructive Pulmonary Disease)  Outcome: Ongoing (interventions implemented as appropriate)      Problem: Patient Care Overview  Goal: Plan of Care Review  Outcome: Ongoing (interventions implemented as appropriate)   09/10/19 0459 09/10/19 1554   Coping/Psychosocial   Plan of Care Reviewed With patient --    Plan of Care Review   Progress declining --    OTHER   Outcome Summary --  changed to vapotherm this mornign because she was not tolerating bipap, refused some of her medication but was about to participate in therapy       Problem: Skin Injury Risk (Adult)  Goal: Skin Health and Integrity  Outcome: Ongoing (interventions implemented as appropriate)      Problem: Fall Risk (Adult)  Goal: Absence of Fall  Outcome: Ongoing (interventions implemented as appropriate)      Problem: Pain, Chronic (Adult)  Goal: Acceptable Pain/Comfort Level and Functional Ability  Outcome: Ongoing (interventions implemented as appropriate)

## 2019-09-10 NOTE — PROGRESS NOTES
[facility] Medicine Services  INPATIENT PROGRESS NOTE    Patient Name: Justyna Sosa Lei  Date of Admission: 9/4/2019  Today's Date: 09/10/19  Length of Stay: 6  Primary Care Physician: Provider, No Known    Subjective   Chief Complaint: Shortness of breath  HPI   Dyspneic today, did not tolerate nasal canula and required BiPAP. Back on Vapotherm, anxious about not being able to breathe.    Review of Systems   All pertinent negatives and positives are as above. All other systems have been reviewed and are negative unless otherwise stated.     Objective    Temp:  [97.8 °F (36.6 °C)-98 °F (36.7 °C)] 98 °F (36.7 °C)  Heart Rate:  [61-93] 72  Resp:  [0-30] 20  BP: (132-156)/(65-87) 132/73  Physical Exam   Constitutional: She is oriented to person, place, and time. She appears well-developed.   Dyspneic at rest   HENT:   Head: Normocephalic and atraumatic.   Right Ear: External ear normal.   Left Ear: External ear normal.   Eyes: EOM are normal. Pupils are equal, round, and reactive to light.   Neck: Normal range of motion. Neck supple. No tracheal deviation present. No thyromegaly present.   Cardiovascular: Normal rate and regular rhythm.   Pulmonary/Chest:   Increased effort, using accessory muscles. Air entry is diminished, mild expiratory wheezing   Abdominal: Soft. Bowel sounds are normal. She exhibits no mass. There is no tenderness.   Musculoskeletal: Normal range of motion. She exhibits no edema or deformity.   Neurological: She is alert and oriented to person, place, and time. She displays normal reflexes. No cranial nerve deficit. She exhibits normal muscle tone.   Skin: Skin is warm. Capillary refill takes less than 2 seconds. She is not diaphoretic. No erythema.   Psychiatric:   Anxious, concerned     Results Review:  I have reviewed the labs, radiology results, and diagnostic studies.    Laboratory Data:   Results from last 7 days   Lab Units 09/09/19  0300 09/05/19  3775  09/04/19  1423   WBC 10*3/mm3 5.58 4.55 6.73   HEMOGLOBIN g/dL 11.0* 9.5* 10.3*   HEMATOCRIT % 34.8 30.7* 33.1*   PLATELETS 10*3/mm3 219 226 246        Results from last 7 days   Lab Units 09/09/19  0300 09/06/19  0328 09/05/19  0224 09/04/19  1450   SODIUM mmol/L 137 137 137 136   POTASSIUM mmol/L 4.1 4.7 4.9 4.1   CHLORIDE mmol/L 95* 96* 98 92*   CO2 mmol/L 38.0* 39.0* 36.0* >40.0*   BUN mg/dL 13 7 5 8   CREATININE mg/dL 0.57 0.58 0.54 0.68   CALCIUM mg/dL 8.5 8.6 8.2* 7.9*   BILIRUBIN mg/dL 0.2  --  0.1 0.3   ALK PHOS U/L 46  --  66 72   ALT (SGPT) U/L 28  --  24 20   AST (SGOT) U/L 25  --  11 27   GLUCOSE mg/dL 166* 150* 157* 109*       Culture Data:   Blood Culture   Date Value Ref Range Status   09/04/2019 No growth at 5 days  Final   09/04/2019 No growth at 5 days  Final     Radiology Data:   Imaging Results (last 24 hours)     ** No results found for the last 24 hours. **        I have reviewed the patient's current medications.     Assessment/Plan     Active Hospital Problems    Diagnosis   • **COPD exacerbation (CMS/HCC)   • Compensated chronic respiratory acidosis   • Wound of right foot   • Acute on chronic respiratory failure with hypoxia and hypercapnia (CMS/HCC)   • Polypharmacy   • Personal history of nicotine dependence   • Alpha-1-antitrypsin deficiency carrier (CMS/HCC)     phenotype SS     • Folate deficiency anemia       Continue care in medical floor.  High flow oxygen. Pulmonology input noted.   Do not anticipate a fast recovery, likely will need placement if there is any chance of continued improvement.   Solumedrol 40 mg q8h. Douneb q4h.   Reglan for GERD.  Medications reviewed.  Prognosis guarded.      Discharge Planning: To be determined, considering LTAC placement.    Bandar Walton MD   09/10/19   1:01 PM

## 2019-09-10 NOTE — PLAN OF CARE
Problem: Chronic Obstructive Pulmonary Disease (Adult)  Goal: Signs and Symptoms of Listed Potential Problems Will be Absent, Minimized or Managed (Chronic Obstructive Pulmonary Disease)  Outcome: Ongoing (interventions implemented as appropriate)   09/10/19 0459   Goal/Outcome Evaluation   Problems Assessed (Chronic Obstructive Pulmonary Disease (COPD)) all   Problems Present (COPD, Bronch/Emphy) respiratory compromise;situational response       Problem: Patient Care Overview  Goal: Plan of Care Review  Outcome: Ongoing (interventions implemented as appropriate)   09/10/19 0459   Coping/Psychosocial   Plan of Care Reviewed With patient   Plan of Care Review   Progress declining   OTHER   Outcome Summary Pt. noted to desat into the 70's - 80's on O2 per nasal cannula. O2 per venti mask attempted. O2 sat noted to remain in the low 80's.  Crackles noted in lung bases per lung auscultation.   Dr. Vizcaino notified. Orders received. Pt. placed on BIPAP per RT.  Xanax given for anxiousness.  O2 sat noted to be WNL on BIPAP.  ABG's drawn with no critical values noted.  Lasix IV also given this shift with good results.  Scheduled pain meds and PRN Percocet given this shift for c/o headache.  Resting quietly at this time. NSR with HR in the 70's @ present.  Safety maintained.  Call light within reach.  Will continue to monitor.  Continuous O2 monitoring ongoing.      Goal: Individualization and Mutuality  Outcome: Ongoing (interventions implemented as appropriate)   09/10/19 0459   Individualization   Patient Specific Goals (Include Timeframe) O2 Sat WNL this shift.   Patient Specific Interventions Bipap ordered this shift.     Goal: Interprofessional Rounds/Family Conf  Outcome: Ongoing (interventions implemented as appropriate)   09/10/19 0459   Interdisciplinary Rounds/Family Conf   Participants nursing;patient       Problem: Skin Injury Risk (Adult)  Goal: Skin Health and Integrity  Outcome: Ongoing (interventions  implemented as appropriate)   09/10/19 0459   Skin Injury Risk (Adult)   Skin Health and Integrity making progress toward outcome       Problem: Fall Risk (Adult)  Goal: Absence of Fall  Outcome: Ongoing (interventions implemented as appropriate)   09/10/19 0459   Fall Risk (Adult)   Absence of Fall making progress toward outcome       Problem: Pain, Chronic (Adult)  Goal: Acceptable Pain/Comfort Level and Functional Ability  Outcome: Ongoing (interventions implemented as appropriate)   09/10/19 0459   Pain, Chronic (Adult)   Acceptable Pain/Comfort Level and Functional Ability making progress toward outcome       Problem: Nutrition, Imbalanced: Inadequate Oral Intake (Adult)  Goal: Improved Oral Intake  Outcome: Ongoing (interventions implemented as appropriate)   09/10/19 0459   Nutrition, Imbalanced: Inadequate Oral Intake (Adult)   Improved Oral Intake making progress toward outcome     Goal: Prevent Further Weight Loss  Outcome: Ongoing (interventions implemented as appropriate)   09/10/19 0459   Nutrition, Imbalanced: Inadequate Oral Intake (Adult)   Prevent Further Weight Loss making progress toward outcome

## 2019-09-10 NOTE — THERAPY TREATMENT NOTE
Acute Care - Occupational Therapy Treatment Note  [facility]     Patient Name: Justyna Lei  : 3/25/1951  MRN: [id number]  Today's Date: 9/10/2019  Onset of Illness/Injury or Date of Surgery: 19  Date of Referral to OT: 19  Referring Physician: Dory Calvillo APRN    Admit Date: 2019       ICD-10-CM ICD-9-CM   1. COPD exacerbation (CMS/HCC) J44.1 491.21   2. Hypercapnia R06.89 786.09   3. Decreased activities of daily living (ADL) R68.89 780.99   4. Impaired mobility Z74.09 799.89     Patient Active Problem List   Diagnosis   • Stage 4 very severe COPD by GOLD classification (CMS/[facility])   • Hypokalemia   • Folate deficiency anemia   • Respiratory failure (CMS/HCC)   • Nasopharyngitis   • GERD without esophagitis   • Lung nodule   • Alpha-1-antitrypsin deficiency carrier (CMS/[facility])   • Personal history of nicotine dependence   • COPD exacerbation (CMS/[facility])   • Acute on chronic respiratory failure with hypoxia and hypercapnia (CMS/HCC)   • Polypharmacy   • Compensated chronic respiratory acidosis   • Wound of right foot     Past Medical History:   Diagnosis Date   • Anxiety    • Anxiety     pain clinic patient Dr Sunshine x 5yrs   • Chronic pain due to injury     jarad LE & back   • COPD (chronic obstructive pulmonary disease) (CMS/HCC)    • Depression    • Hypertension    • Polypharmacy    • Smoker      Past Surgical History:   Procedure Laterality Date   • ABDOMINAL SURGERY     • APPENDECTOMY     • CHOLECYSTECTOMY     • FEMUR FRACTURE SURGERY     • HERNIA REPAIR     • HYSTERECTOMY         Therapy Treatment    Rehabilitation Treatment Summary     Row Name 09/10/19 1515 09/10/19 1343 09/10/19 1124       Treatment Time/Intention    Discipline  occupational therapy assistant  -MM  physical therapy assistant  -JOSE ELIAS  physical therapy assistant  -JOSE ELIAS    Document Type  therapy note (daily note)  -MM  therapy note (daily note)  -JOSE ELIAS  therapy note (daily note)  -JOSE ELIAS    Subjective Information  complains  "of;weakness;fatigue;pain;dyspnea  -MM  --  --    Mode of Treatment  individual therapy;occupational therapy  -MM  --  --    Patient/Family Observations  no family present   -MM  --  --    Comment  performed tx in bed: pt on vapotherm 40lpm at 55% O2  -MM  --  Pt. sleeping, since had bad nite last nite will let sleep & ck back in p.m.  -JP2    Reason Treatment Not Performed  --  patient/family declined treatment  -JP2  --    Existing Precautions/Restrictions  fall;oxygen therapy device and L/min  -MM  fall;oxygen therapy device and L/min  -JOSE ELIAS  --    Recorded by [MM] Carmen Wong COTA/L 09/10/19 1536 [JOSE ELIAS] Danelle Stevens, PTA 09/10/19 1344  [JP2] Danelle Stevens, PTA 09/10/19 1345 [JOSE ELIAS] Danelle Stevens, PTA 09/10/19 1125  [JP2] Danelle Stevens, PTA 09/10/19 1126    Row Name 09/10/19 0941 09/10/19 0925          Treatment Time/Intention    Discipline  occupational therapy assistant  -MM  physical therapy assistant  -JOSE ELIAS     Comment  Spoke with NSG: pt on vapotherm and respiratory status not good. Per NSG pt had difficult time attempting to swallow pills. Will check with NSG in pm  -MM  \"I had a bad nite.\"  -JOSE ELIAS     Reason Treatment Not Performed  other (see comments)  -MM  patient/family declined treatment  -JOSE ELIAS     Recorded by [MM] Carmen Wong COTA/L 09/10/19 0942 [JOSE ELIAS] Danelle Stevens, PTA 09/10/19 0926     Row Name 09/10/19 1515             Vital Signs    Pre SpO2 (%)  88  -MM      O2 Delivery Pre Treatment  other (see comments) Vapotherm   -MM      Intra SpO2 (%)  85  -MM      O2 Delivery Intra Treatment  other (see comments)  -MM      Post SpO2 (%)  90  -MM      Pre Patient Position  Sitting  -MM      Intra Patient Position  Sitting  -MM      Post Patient Position  Sitting  -MM      Recorded by [MM] Carmen Wong COTA/L 09/10/19 1606      Row Name 09/10/19 1515             Cognitive Assessment/Intervention- PT/OT    Personal Safety Interventions  fall prevention program maintained;supervised " activity  -MM      Recorded by [MM] Carmen Wong COTA/L 09/10/19 1606      Row Name 09/10/19 1515             Safety Issues, Functional Mobility    Safety Issues Affecting Function (Mobility)  friction/shear risk  -MM      Impairments Affecting Function (Mobility)  endurance/activity tolerance;shortness of breath;strength  -MM      Recorded by [MM] Carmen Wong COTA/L 09/10/19 1606      Row Name 09/10/19 1515             Bed Mobility Assessment/Treatment    Comment (Bed Mobility)  fowlers in bed   -MM      Recorded by [MM] Carmen Wong FLORES/L 09/10/19 1606      Row Name 09/10/19 1515             Transfer Assessment/Treatment    Comment (Transfers)  declined OOB  -MM      Recorded by [MM] Carmen Wong FLORES/L 09/10/19 1606      Row Name 09/10/19 1515             BADL Safety/Performance    Impairments, BADL Safety/Performance  endurance/activity tolerance;shortness of breath;strength  -MM      Skilled BADL Treatment/Intervention  BADL process/adaptation training  -MM      Recorded by [MM] Carmen Wong COTA/L 09/10/19 1606      Row Name 09/10/19 1515             Motor Skills Assessment/Interventions    Additional Documentation  Therapeutic Exercise (Group);Therapeutic Exercise Interventions (Group)  -MM      Recorded by [MM] Carmen Wong COTA/L 09/10/19 1606      Row Name 09/10/19 1515             Therapeutic Exercise    Exercise Type (Therapeutic Exercise)  AROM (active range of motion);static stretching  -MM      Position (Therapeutic Exercise)  seated sitting up in bed   -MM      Sets/Reps (Therapeutic Exercise)  10-15  -MM      Expected Outcome (Therapeutic Exercise)  facilitate normal movement patterns;improve functional stability;improve functional tolerance, household activity;improve functional tolerance, self-care activity;improve performance, BADLs;improve performance, gait skills;improve performance, transfer skills;improve object manipulation, self-care activity;improve  object manipulation, home environment  -MM      Comment (Therapeutic Exercise)  AROM/stretching BUEs, edema reduction in B hands. 10-15 reps each ther ex with rest breaks and cues for pursed lip breathing.   -MM      Recorded by [MM] Carmen Wong COTA/L 09/10/19 1606      Row Name 09/10/19 1515             Positioning and Restraints    Pre-Treatment Position  in bed  -MM      Post Treatment Position  bed  -MM      In Bed  sitting;call light within reach;encouraged to call for assist;side rails up x2;notified nsg  -MM      Recorded by [MM] Carmen Wong COTA/L 09/10/19 1606      Row Name 09/10/19 1515             Pain Scale: Numbers Pre/Post-Treatment    Pain Scale: Numbers, Pretreatment  3/10  -MM      Pain Scale: Numbers, Post-Treatment  5/10  -MM      Pain Location  back  -MM      Pain Intervention(s)  Repositioned  -MM      Recorded by [MM] Carmen Wong COTA/L 09/10/19 1606      Row Name                Wound 09/04/19 1500 Right foot Blisters    Wound - Properties Group Date first assessed: 09/04/19 [ER] Time first assessed: 1500 [ER] Side: Right [ER] Location: foot [ER] Primary Wound Type: Blisters [ER], burn injury  Stage, Pressure Injury: Stage 2 [ER] Recorded by:  [ER] Rose Coffman, RN 09/04/19 1715    Row Name 09/10/19 1515             Plan of Care Review    Plan of Care Reviewed With  patient  -MM      Recorded by [MM] Carmen Wong COTA/L 09/10/19 1606      Row Name 09/10/19 1515             Outcome Summary/Treatment Plan (OT)    Daily Summary of Progress (OT)  progress toward functional goals is gradual  -MM      Recorded by [MM] Carmen Wong COTA/L 09/10/19 1606        User Key  (r) = Recorded By, (t) = Taken By, (c) = Cosigned By    Initials Name Effective Dates Discipline    JOSE ELIAS Danelle Stevens, PTA 08/02/16 -  PT    ER Rose Coffman, RN 08/02/16 -  Nurse    MM Carmen Wong COTA/L 10/15/18 -  OT        Wound 09/04/19 1500 Right foot Blisters (Active)    Dressing Appearance dry;intact 9/10/2019  7:39 AM       Occupational Therapy Education     Title: PT OT SLP Therapies (In Progress)     Topic: Occupational Therapy (In Progress)     Point: ADL training (Done)     Description: Instruct learner(s) on proper safety adaptation and remediation techniques during self care or transfers.   Instruct in proper use of assistive devices.    Learning Progress Summary           Patient Acceptance, E,TB, VU by JACQUELIN at 9/10/2019  4:07 PM    Comment:  HEP for AROM: pt demo'd understanding and verbalized importance for movement    Acceptance, E, VU by VI at 9/5/2019  2:05 PM    Comment:  OT POC, adls, t/fs, bed mobility, activity tolerance.                   Point: Home exercise program (Done)     Description: Instruct learner(s) on appropriate technique for monitoring, assisting and/or progressing therapeutic exercises/activities.    Learning Progress Summary           Patient Acceptance, E,TB, VU by JACQUELIN at 9/10/2019  4:07 PM    Comment:  HEP for AROM: pt demo'd understanding and verbalized importance for movement                               User Key     Initials Effective Dates Name Provider Type Discipline    JACQUELIN 10/15/18 -  Carmen Wong COTA/L Occupational Therapy Assistant OT    VI 10/12/18 -  Gabi Faustin OTR/L Occupational Therapist OT                OT Recommendation and Plan  Outcome Summary/Treatment Plan (OT)  Daily Summary of Progress (OT): progress toward functional goals is gradual  Daily Summary of Progress (OT): progress toward functional goals is gradual  Plan of Care Review  Plan of Care Reviewed With: patient  Plan of Care Reviewed With: patient  Outcome Summary: Pt in bed on vapotherm O2 SATs 85-90% during tx. Respiratory checked on pt during tx. Pt requires max rest breaks: fatigues and becomes SOA with little activity. Performed BUE ther ex AROM and stretching for increased lung expansion, ROM, endurance and strength. Educated on need for self  repositioning in bed, need for participation with therapy and BUE HEP. Continue OT POC. Recommend rehab prior to home d/c.   Outcome Measures     Row Name 09/10/19 1515             How much help from another is currently needed...    Putting on and taking off regular lower body clothing?  2  -MM      Bathing (including washing, rinsing, and drying)  2  -MM      Toileting (which includes using toilet bed pan or urinal)  2  -MM      Putting on and taking off regular upper body clothing  3  -MM      Taking care of personal grooming (such as brushing teeth)  4  -MM      Eating meals  4  -MM      AM-PAC 6 Clicks Score (OT)  17  -MM        User Key  (r) = Recorded By, (t) = Taken By, (c) = Cosigned By    Initials Name Provider Type    Carmen Spivey COTA/L Occupational Therapy Assistant           Time Calculation:   Time Calculation- OT     Row Name 09/10/19 1515             Time Calculation- OT    OT Start Time  1515  -MM      OT Stop Time  1558  -MM      OT Time Calculation (min)  43 min  -MM      Total Timed Code Minutes- OT  43 minute(s)  -MM      OT Received On  09/10/19  -MM         Timed Charges    50305 - OT Therapeutic Exercise Minutes  43  -MM        User Key  (r) = Recorded By, (t) = Taken By, (c) = Cosigned By    Initials Name Provider Type    Carmen Spivey COTA/L Occupational Therapy Assistant        Therapy Charges for Today     Code Description Service Date Service Provider Modifiers Qty    12491075047 HC OT THER PROC EA 15 MIN 9/10/2019 Carmen Wong COTA/L GO 3               JOSEFA Gonzales  9/10/2019

## 2019-09-10 NOTE — DISCHARGE PLACEMENT REQUEST
"Jhon Lei (68 y.o. Female)     Date of Birth Social Security Number Address Home Phone MRN    03/25/1951  [id number] [place] [address] [phone] [id number]    Jain Marital Status          Other        Admission Date Admission Type Admitting Provider Attending Provider Department, Room/Bed    9/4/19 Emergency Bandar Walton MD Polidori, Mariano Ariel, MD [facility] 4C, 496/1    Discharge Date Discharge Disposition Discharge Destination                       Attending Provider:  Bandar Walton MD    Allergies:  Aspirin, Demerol [Meperidine], Ibuprofen, Neosporin [Neomycin-bacitracin Zn-polymyx], Penicillins, Sulfa Antibiotics, Codeine, Nsaids, Talwin [Pentazocine], Tetracyclines & Related, Influenza Vaccines    Isolation:  None   Infection:  None   Code Status:  CPR    Ht:  167.6 cm (66\")   Wt:  66.9 kg (147 lb 6.4 oz)    Admission Cmt:  None   Principal Problem:  COPD exacerbation (CMS/[facility]) [J44.1]                 Active Insurance as of 9/4/2019     Primary Coverage     Payor Plan Insurance Group Employer/Plan Group    MEDICARE MEDICARE A & B      Payor Plan Address Payor Plan Phone Number Payor Plan Fax Number Effective Dates    [address] [phone]  5/1/1989 - None Entered    [facility] [id number]       Subscriber Name Subscriber Birth Date Member ID       JHON LEI 3/25/1951 [id number]           Secondary Coverage     Payor Plan Insurance Group Employer/Plan Group    [employer]HE[place] MEDICAID [place] MED[place]ID [id number]     Payor Plan Address Payor Plan Phone Number Payor Plan Fax Number Effective Dates    [address] [address] [phone]  1/1/2016 - None Entered    [facility] [id number]       Subscriber Name Subscriber Birth Date Member ID       JHON LEI 3/25/1951 [id number]                 Emergency Contacts      (Rel.) Home Phone Work Phone Mobile Phone    Ella Guerrero (Daughter) [phone] -- [phone]    " Shama Patterson (Daughter) [phone] -- --            {Documentation Categories:[id number]}

## 2019-09-11 PROCEDURE — 97530 THERAPEUTIC ACTIVITIES: CPT

## 2019-09-11 PROCEDURE — 94799 UNLISTED PULMONARY SVC/PX: CPT

## 2019-09-11 PROCEDURE — 99232 SBSQ HOSP IP/OBS MODERATE 35: CPT | Performed by: INTERNAL MEDICINE

## 2019-09-11 PROCEDURE — 97110 THERAPEUTIC EXERCISES: CPT

## 2019-09-11 PROCEDURE — 25010000002 METHYLPREDNISOLONE PER 40 MG: Performed by: FAMILY MEDICINE

## 2019-09-11 PROCEDURE — 94660 CPAP INITIATION&MGMT: CPT

## 2019-09-11 PROCEDURE — 25010000002 ENOXAPARIN PER 10 MG: Performed by: INTERNAL MEDICINE

## 2019-09-11 PROCEDURE — 94760 N-INVAS EAR/PLS OXIMETRY 1: CPT

## 2019-09-11 PROCEDURE — 97535 SELF CARE MNGMENT TRAINING: CPT

## 2019-09-11 RX ADMIN — METHYLPREDNISOLONE SODIUM SUCCINATE 40 MG: 40 INJECTION, POWDER, FOR SOLUTION INTRAMUSCULAR; INTRAVENOUS at 11:08

## 2019-09-11 RX ADMIN — IPRATROPIUM BROMIDE AND ALBUTEROL SULFATE 3 ML: 2.5; .5 SOLUTION RESPIRATORY (INHALATION) at 18:48

## 2019-09-11 RX ADMIN — GABAPENTIN 600 MG: 300 CAPSULE ORAL at 06:22

## 2019-09-11 RX ADMIN — FOLIC ACID 1 MG: 1 TABLET ORAL at 09:03

## 2019-09-11 RX ADMIN — GUAIFENESIN 400 MG: 100 SOLUTION ORAL at 09:03

## 2019-09-11 RX ADMIN — GUAIFENESIN 400 MG: 100 SOLUTION ORAL at 02:46

## 2019-09-11 RX ADMIN — METOCLOPRAMIDE 5 MG: 5 TABLET ORAL at 11:08

## 2019-09-11 RX ADMIN — MONTELUKAST SODIUM 10 MG: 10 TABLET, FILM COATED ORAL at 20:01

## 2019-09-11 RX ADMIN — IPRATROPIUM BROMIDE AND ALBUTEROL SULFATE 3 ML: 2.5; .5 SOLUTION RESPIRATORY (INHALATION) at 07:24

## 2019-09-11 RX ADMIN — METOCLOPRAMIDE 5 MG: 5 TABLET ORAL at 09:04

## 2019-09-11 RX ADMIN — OXYCODONE HYDROCHLORIDE 30 MG: 20 TABLET, FILM COATED, EXTENDED RELEASE ORAL at 20:00

## 2019-09-11 RX ADMIN — OXYCODONE HYDROCHLORIDE AND ACETAMINOPHEN 1 TABLET: 10; 325 TABLET ORAL at 02:52

## 2019-09-11 RX ADMIN — GUAIFENESIN 400 MG: 100 SOLUTION ORAL at 21:30

## 2019-09-11 RX ADMIN — BUDESONIDE 0.5 MG: 0.5 INHALANT RESPIRATORY (INHALATION) at 10:38

## 2019-09-11 RX ADMIN — ENOXAPARIN SODIUM 40 MG: 40 INJECTION SUBCUTANEOUS at 09:11

## 2019-09-11 RX ADMIN — FLUTICASONE PROPIONATE 2 SPRAY: 50 SPRAY, METERED NASAL at 09:02

## 2019-09-11 RX ADMIN — IPRATROPIUM BROMIDE AND ALBUTEROL SULFATE 3 ML: 2.5; .5 SOLUTION RESPIRATORY (INHALATION) at 15:51

## 2019-09-11 RX ADMIN — OXYCODONE HYDROCHLORIDE AND ACETAMINOPHEN 1 TABLET: 10; 325 TABLET ORAL at 11:08

## 2019-09-11 RX ADMIN — TIZANIDINE 4 MG: 4 TABLET ORAL at 16:21

## 2019-09-11 RX ADMIN — ALPRAZOLAM 0.5 MG: 0.5 TABLET ORAL at 19:55

## 2019-09-11 RX ADMIN — AZELASTINE HYDROCHLORIDE 2 SPRAY: 137 SPRAY, METERED NASAL at 09:02

## 2019-09-11 RX ADMIN — ALPRAZOLAM 0.5 MG: 0.5 TABLET ORAL at 12:39

## 2019-09-11 RX ADMIN — METHYLPREDNISOLONE SODIUM SUCCINATE 40 MG: 40 INJECTION, POWDER, FOR SOLUTION INTRAMUSCULAR; INTRAVENOUS at 02:46

## 2019-09-11 RX ADMIN — GUAIFENESIN 400 MG: 100 SOLUTION ORAL at 06:22

## 2019-09-11 RX ADMIN — CITALOPRAM 40 MG: 20 TABLET, FILM COATED ORAL at 09:04

## 2019-09-11 RX ADMIN — SODIUM CHLORIDE, PRESERVATIVE FREE 10 ML: 5 INJECTION INTRAVENOUS at 20:00

## 2019-09-11 RX ADMIN — GABAPENTIN 600 MG: 300 CAPSULE ORAL at 16:18

## 2019-09-11 RX ADMIN — SODIUM CHLORIDE, PRESERVATIVE FREE 10 ML: 5 INJECTION INTRAVENOUS at 09:03

## 2019-09-11 RX ADMIN — POLYETHYLENE GLYCOL 3350 17 G: 17 POWDER, FOR SOLUTION ORAL at 09:03

## 2019-09-11 RX ADMIN — ALPRAZOLAM 0.5 MG: 0.5 TABLET ORAL at 02:52

## 2019-09-11 RX ADMIN — DOCUSATE SODIUM 100 MG: 100 CAPSULE, LIQUID FILLED ORAL at 20:01

## 2019-09-11 RX ADMIN — MUPIROCIN: 20 OINTMENT TOPICAL at 12:39

## 2019-09-11 RX ADMIN — IPRATROPIUM BROMIDE AND ALBUTEROL SULFATE 3 ML: 2.5; .5 SOLUTION RESPIRATORY (INHALATION) at 10:36

## 2019-09-11 RX ADMIN — OXYCODONE HYDROCHLORIDE 30 MG: 20 TABLET, FILM COATED, EXTENDED RELEASE ORAL at 09:11

## 2019-09-11 RX ADMIN — GABAPENTIN 600 MG: 300 CAPSULE ORAL at 21:30

## 2019-09-11 RX ADMIN — DOCUSATE SODIUM 100 MG: 100 CAPSULE, LIQUID FILLED ORAL at 09:04

## 2019-09-11 RX ADMIN — PANTOPRAZOLE SODIUM 40 MG: 40 TABLET, DELAYED RELEASE ORAL at 06:22

## 2019-09-11 RX ADMIN — GUAIFENESIN 400 MG: 100 SOLUTION ORAL at 16:20

## 2019-09-11 RX ADMIN — BUDESONIDE 0.5 MG: 0.5 INHALANT RESPIRATORY (INHALATION) at 18:48

## 2019-09-11 RX ADMIN — METHYLPREDNISOLONE SODIUM SUCCINATE 40 MG: 40 INJECTION, POWDER, FOR SOLUTION INTRAMUSCULAR; INTRAVENOUS at 19:55

## 2019-09-11 RX ADMIN — TRAZODONE HYDROCHLORIDE 25 MG: 50 TABLET ORAL at 20:01

## 2019-09-11 RX ADMIN — IPRATROPIUM BROMIDE AND ALBUTEROL SULFATE 3 ML: 2.5; .5 SOLUTION RESPIRATORY (INHALATION) at 02:57

## 2019-09-11 RX ADMIN — NICOTINE 1 PATCH: 21 PATCH, EXTENDED RELEASE TRANSDERMAL at 19:57

## 2019-09-11 NOTE — THERAPY TREATMENT NOTE
Acute Care - Occupational Therapy Treatment Note  [facility]     Patient Name: Justyna Lei  : 3/25/1951  MRN: [id number]  Today's Date: 2019  Onset of Illness/Injury or Date of Surgery: 19  Date of Referral to OT: 19  Referring Physician: Dory Calvillo APRN    Admit Date: 2019       ICD-10-CM ICD-9-CM   1. COPD exacerbation (CMS/HCC) J44.1 491.21   2. Hypercapnia R06.89 786.09   3. Decreased activities of daily living (ADL) R68.89 780.99   4. Impaired mobility Z74.09 799.89     Patient Active Problem List   Diagnosis   • Stage 4 very severe COPD by GOLD classification (CMS/[facility])   • Hypokalemia   • Folate deficiency anemia   • Respiratory failure (CMS/HCC)   • Nasopharyngitis   • GERD without esophagitis   • Lung nodule   • Alpha-1-antitrypsin deficiency carrier (CMS/[facility])   • Personal history of nicotine dependence   • COPD exacerbation (CMS/[facility])   • Acute on chronic respiratory failure with hypoxia and hypercapnia (CMS/HCC)   • Polypharmacy   • Compensated chronic respiratory acidosis   • Wound of right foot     Past Medical History:   Diagnosis Date   • Anxiety    • Anxiety     pain clinic patient Dr Sunshine x 5yrs   • Chronic pain due to injury     jarad LE & back   • COPD (chronic obstructive pulmonary disease) (CMS/HCC)    • Depression    • Hypertension    • Polypharmacy    • Smoker      Past Surgical History:   Procedure Laterality Date   • ABDOMINAL SURGERY     • APPENDECTOMY     • CHOLECYSTECTOMY     • FEMUR FRACTURE SURGERY     • HERNIA REPAIR     • HYSTERECTOMY         Therapy Treatment    Rehabilitation Treatment Summary     Row Name 19 1333 19 1138          Treatment Time/Intention    Discipline  occupational therapy assistant  -MM  physical therapy assistant  -AB     Document Type  therapy note (daily note)  -MM  therapy note (daily note)  -AB     Subjective Information  complains of;fatigue;dyspnea  -MM2  complains of;dyspnea  -AB     Mode of Treatment   individual therapy;occupational therapy  -MM2  physical therapy  -AB     Patient/Family Observations  no family present   -MM2  --     Existing Precautions/Restrictions  fall;oxygen therapy device and L/min  -MM2  fall;oxygen therapy device and L/min  -AB     Recorded by [MM] Carmen Wong COTA/L 09/11/19 1355  [MM2] Carmen Wong COTA/L 09/11/19 1405 [AB] Camelia Domínguez, PTA 09/11/19 1233     Row Name 09/11/19 1333 09/11/19 1138          Vital Signs    Pre SpO2 (%)  95  -MM  92  -AB     O2 Delivery Pre Treatment  other (see comments) vapotherm   -MM  -- vapotherm  -AB     Intra SpO2 (%)  87  -MM  87  -AB     O2 Delivery Intra Treatment  -- vapotherm   -MM  -- vapotherm  -AB     Post SpO2 (%)  89  -MM  --     O2 Delivery Post Treatment  other (see comments) vapotherm   -MM  --     Pre Patient Position  Sitting  -MM  --     Intra Patient Position  Standing  -MM  --     Post Patient Position  Sitting  -MM  --     Recorded by [MM] Carmen Wong COTA/L 09/11/19 1405 [AB] Camelia Domínguez, PTA 09/11/19 1233     Row Name 09/11/19 1333             Cognitive Assessment/Intervention- PT/OT    Orientation Status (Cognition)  oriented x 4  -MM      Personal Safety Interventions  fall prevention program maintained;gait belt;nonskid shoes/slippers when out of bed;supervised activity  -MM      Recorded by [MM] Carmen Wong COTA/L 09/11/19 1405      Row Name 09/11/19 1333             Safety Issues, Functional Mobility    Safety Issues Affecting Function (Mobility)  friction/shear risk  -MM      Impairments Affecting Function (Mobility)  endurance/activity tolerance;shortness of breath;strength  -MM      Recorded by [MM] Carmen Wong COTA/L 09/11/19 1405      Row Name 09/11/19 1333 09/11/19 1138          Bed Mobility Assessment/Treatment    Supine-Sit Island (Bed Mobility)  supervision  -MM  verbal cues;supervision  -AB     Sit-Supine [place] (Bed Mobility)  supervision  -MM  verbal  cues;supervision  -AB     Assistive Device (Bed Mobility)  head of bed elevated  -MM  head of bed elevated  -AB     Comment (Bed Mobility)  sat up EOB 2x to self adjust pillow/sheets   -MM  --     Recorded by [MM] Carmen Wong COTA/L 09/11/19 1405 [AB] Camelia Domínguez, PTA 09/11/19 1233     Row Name 09/11/19 1333             Functional Mobility    Functional Mobility- Ind. Level  verbal cues required;contact guard assist  -MM      Functional Mobility- Device  other (see comments) HHA  -MM      Functional Mobility- Safety Issues  supplemental O2  -MM      Functional Mobility- Comment  limited mobility d/t vapotherm   -MM      Recorded by [MM] Carmen Wong COTA/L 09/11/19 1405      Row Name 09/11/19 1333             Transfer Assessment/Treatment    Transfer Assessment/Treatment  sit-stand transfer;stand-sit transfer;toilet transfer  -MM      Recorded by [MM] Carmen Wong COTA/L 09/11/19 1405      Row Name 09/11/19 1333 09/11/19 1138          Sit-Stand Transfer    Sit-Stand [place] (Transfers)  verbal cues;contact guard  -MM  verbal cues;contact guard  -AB     Recorded by [MM] Carmen Wong COTA/L 09/11/19 1405 [AB] Camelia Domínguez, PTA 09/11/19 1233     Row Name 09/11/19 1333 09/11/19 1138          Stand-Sit Transfer    Stand-Sit [place] (Transfers)  verbal cues;contact guard  -MM  verbal cues;contact guard  -AB     Recorded by [MM] Carmen Wong COTA/L 09/11/19 1405 [AB] Camelia Domínguez, PTA 09/11/19 1233     Row Name 09/11/19 1333             Toilet Transfer    Type (Toilet Transfer)  stand pivot/stand step  -MM      [place] Level (Toilet Transfer)  verbal cues;contact guard  -MM      Assistive Device (Toilet Transfer)  commode, bedside without drop arms HHA  -MM      Recorded by [MM] Carmen Wong COTA/L 09/11/19 1405      Row Name 09/11/19 1138             Gait/Stairs Assessment/Training    [place] Level (Gait)  contact guard  -AB      Assistive Device (Gait)   -- HHA  -AB      Distance in Feet (Gait)  2' bed to chair due to vapotherm  -AB      Recorded by [AB] Camelia Domínguez, PTA 09/11/19 1233      Row Name 09/11/19 1333             ADL Assessment/Intervention    BADL Assessment/Intervention  toileting;grooming  -MM      Recorded by [MM] Carmen Wong COTA/L 09/11/19 1405      Row Name 09/11/19 1333             Grooming Assessment/Training    [place] Level (Grooming)  wash face, hands;set up  -MM      Grooming Position  supported sitting  -MM      Recorded by [MM] Carmen Wong COTA/L 09/11/19 1405      Row Name 09/11/19 1333             Toileting Assessment/Training    [place] Level (Toileting)  toileting skills;adjust/manage clothing;perform perineal hygiene;set up;contact guard assist  -MM      Assistive Devices (Toileting)  commode, bedside with drop arms  -MM      Toileting Position  supported sitting;supported standing  -MM      Recorded by [MM] Carmen Wong COTA/L 09/11/19 1405      Row Name 09/11/19 1333             BADL Safety/Performance    Impairments, BADL Safety/Performance  endurance/activity tolerance;shortness of breath;strength  -MM      Skilled BADL Treatment/Intervention  BADL process/adaptation training;energy conservation  -MM      Progress in BADL Status  improvement noted  -MM      Recorded by [MM] Carmen Wong COTA/L 09/11/19 1405      Row Name 09/11/19 1333 09/11/19 1130          Therapeutic Exercise    Lower Extremity (Therapeutic Exercise)  --  LAQ (long arc quad), bilateral;marching while seated  -AB     Lower Extremity Range of Motion (Therapeutic Exercise)  --  ankle dorsiflexion/plantar flexion, bilateral;hip abduction/adduction, bilateral  -AB     Exercise Type (Therapeutic Exercise)  --  AROM (active range of motion)  -AB     Position (Therapeutic Exercise)  --  seated  -AB     Sets/Reps (Therapeutic Exercise)  --  10reps 2 sets  -AB     Comment (Therapeutic Exercise)  stated she had performed HEP  educated on prior tx with no difficulty and rest breaks when needed. Educated to increase reps if ther ex was too easy for pt   -MM  --     Recorded by [MM] Carmen Wong COTA/L 09/11/19 1405 [AB] Camelia Domínguez, PTA 09/11/19 1233     Row Name 09/11/19 1333 09/11/19 1138          Positioning and Restraints    Pre-Treatment Position  sitting in chair/recliner  -MM  in bed  -AB     Post Treatment Position  bed  -MM  chair  -AB     In Bed  fowlers;call light within reach;encouraged to call for assist;notified nsg;side rails up x2  -MM  --     In Chair  --  sitting;call light within reach  -AB     Recorded by [MM] Carmen Wong COTA/L 09/11/19 1405 [AB] Camelia Domínguez, PTA 09/11/19 1233     Row Name 09/11/19 [facility] 09/11/19 1138          Pain Scale: Numbers Pre/Post-Treatment    Pain Scale: Numbers, Pretreatment  0/10 - no pain  -MM  0/10 - no pain  -AB     Pain Scale: Numbers, Post-Treatment  0/10 - no pain  -MM  0/10 - no pain  -AB     Recorded by [MM] Carmen Wong COTA/L 09/11/19 1405 [AB] Camelia Domínguez, PTA 09/11/19 1233     Row Name                Wound 09/04/19 1500 Right foot Blisters    Wound - Properties Group Date first assessed: 09/04/19 [ER] Time first assessed: 1500 [ER] Side: Right [ER] Location: foot [ER] Primary Wound Type: Blisters [ER], burn injury  Stage, Pressure Injury: Stage 2 [ER] Recorded by:  [ER] Rose Coffman RN 09/04/19 1715    Row Name 09/11/19 1333             Plan of Care Review    Plan of Care Reviewed With  patient  -MM      Recorded by [MM] Carmen Wong COTA/L 09/11/19 1405      Row Name 09/11/19 1333             Outcome Summary/Treatment Plan (OT)    Daily Summary of Progress (OT)  progress toward functional goals is good  -MM      Barriers to Overall Progress (OT)  most limited by SOA: O2 maintained this date   -MM      Recorded by [MM] Carmen Wong COTA/L 09/11/19 1405        User Key  (r) = Recorded By, (t) = Taken By, (c) = Cosigned By     Initials Name Effective Dates Discipline    AB DomínguezCamelia, PTA 08/02/16 -  PT    Rose Watson, RN 08/02/16 -  Nurse    Carmen Spivey COTA/L 10/15/18 -  OT        Wound 09/04/19 1500 Right foot Blisters (Active)   Closure MANDI 9/10/2019  8:20 PM   Base dressing in place, unable to visualize 9/10/2019  8:20 PM       Occupational Therapy Education     Title: PT OT SLP Therapies (In Progress)     Topic: Occupational Therapy (In Progress)     Point: ADL training (Done)     Description: Instruct learner(s) on proper safety adaptation and remediation techniques during self care or transfers.   Instruct in proper use of assistive devices.    Learning Progress Summary           Patient Acceptance, E,TB, VU by JACQUELIN at 9/11/2019  2:05 PM    Comment:  t.f safety, need to get up to use BSC not bedpan/purewick    Acceptance, E,TB, VU by JACQUELIN at 9/10/2019  4:07 PM    Comment:  HEP for AROM: pt demo'd understanding and verbalized importance for movement    Acceptance, E, VU by VI at 9/5/2019  2:05 PM    Comment:  OT POC, adls, t/fs, bed mobility, activity tolerance.                   Point: Home exercise program (Done)     Description: Instruct learner(s) on appropriate technique for monitoring, assisting and/or progressing therapeutic exercises/activities.    Learning Progress Summary           Patient Acceptance, E,TB, VU by JACQUELIN at 9/11/2019  2:05 PM    Comment:  t.f safety, need to get up to use BSC not bedpan/purewick    Acceptance, E,TB, VU by JACQUELIN at 9/10/2019  4:07 PM    Comment:  HEP for AROM: pt demo'd understanding and verbalized importance for movement                               User Key     Initials Effective Dates Name Provider Type Discipline     10/15/18 -  Carmen Wong COTA/L Occupational Therapy Assistant OT    VI 10/12/18 -  Gabi Faustin OTR/L Occupational Therapist OT                OT Recommendation and Plan  Outcome Summary/Treatment Plan (OT)  Daily Summary of Progress (OT):  progress toward functional goals is good  Barriers to Overall Progress (OT): most limited by SOA: O2 maintained this date   Daily Summary of Progress (OT): progress toward functional goals is good  Plan of Care Review  Plan of Care Reviewed With: patient  Plan of Care Reviewed With: patient  Outcome Summary: Pt on vapotherm: up in chair and agreed to OT tx. Sit<>stand CGA. t/f >BSC and toileted CGA. BSC>bed CGA. limited fxl mobility d/t vapotherm. Maintained 87%-95% O2 sats during tx. Encouraged pt to use BSC with assist for increased activity during day. Pt would benefit from LTACH vs SNF at d/c. Continue OT POC  Outcome Measures     Row Name 09/11/19 1333 09/10/19 1515          How much help from another is currently needed...    Putting on and taking off regular lower body clothing?  2  -MM  2  -MM     Bathing (including washing, rinsing, and drying)  2  -MM  2  -MM     Toileting (which includes using toilet bed pan or urinal)  3  -MM  2  -MM     Putting on and taking off regular upper body clothing  3  -MM  3  -MM     Taking care of personal grooming (such as brushing teeth)  4  -MM  4  -MM     Eating meals  4  -MM  4  -MM     AM-PAC 6 Clicks Score (OT)  18  -MM  17  -MM       User Key  (r) = Recorded By, (t) = Taken By, (c) = Cosigned By    Initials Name Provider Type    Carmen Spivey COTA/L Occupational Therapy Assistant           Time Calculation:   Time Calculation- OT     Row Name 09/11/19 1333             Time Calculation- OT    OT Start Time  1333  -MM      OT Stop Time  1411  -MM      OT Time Calculation (min)  38 min  -MM      Total Timed Code Minutes- OT  38 minute(s)  -MM         Timed Charges    18744 - OT Therapeutic Exercise Minutes  8  -MM      83708 - OT Self Care/Mgmt Minutes  30  -MM        User Key  (r) = Recorded By, (t) = Taken By, (c) = Cosigned By    Initials Name Provider Type    Carmen Spivey COTA/L Occupational Therapy Assistant        Therapy Charges for Today     Code  Description Service Date Service Provider Modifiers Qty    83464984639 HC OT THER PROC EA 15 MIN 9/10/2019 Carmen Wong COTA/L GO 3    94540396366 HC OT THER PROC EA 15 MIN 9/11/2019 Carmen Wong COTA/L GO 1    39566816566 HC OT SELF CARE/MGMT/TRAIN EA 15 MIN 9/11/2019 Carmen Wong COTA/L GO 2               JOSEFA Gonzales  9/11/2019

## 2019-09-11 NOTE — PROGRESS NOTES
[facility] Medicine Services  INPATIENT PROGRESS NOTE    Patient Name: Justyna Sosa Lei  Date of Admission: 9/4/2019  Today's Date: 09/11/19  Length of Stay: 7  Primary Care Physician: Provider, No Known    Subjective   Chief Complaint: Shortness of breath  HPI   Still requiring high flow oxygen, appears less distressed and cyanotic than yesterday.    Oral intake remains poor. I do think she is malnourished, will ask for a nutrition evaluation.    Review of Systems   Respiratory: Positive for shortness of breath.       All pertinent negatives and positives are as above. All other systems have been reviewed and are negative unless otherwise stated.     Objective    Temp:  [98.1 °F (36.7 °C)-98.2 °F (36.8 °C)] 98.2 °F (36.8 °C)  Heart Rate:  [61-86] 86  Resp:  [20-30] 24  BP: (125-144)/(59-78) 139/64  Physical Exam   Constitutional: She is oriented to person, place, and time. She appears well-developed.   Dyspneic at rest   HENT:   Head: Normocephalic and atraumatic.   Right Ear: External ear normal.   Left Ear: External ear normal.   Eyes: EOM are normal. Pupils are equal, round, and reactive to light.   Neck: Normal range of motion. Neck supple. No tracheal deviation present. No thyromegaly present.   Cardiovascular: Normal rate and regular rhythm.   Pulmonary/Chest:   Increased effort, using accessory muscles. Air entry is diminished, wheezing improved   Abdominal: Soft. Bowel sounds are normal. She exhibits no mass. There is no tenderness.   Musculoskeletal: Normal range of motion. She exhibits no edema or deformity.   Neurological: She is alert and oriented to person, place, and time. She displays normal reflexes. No cranial nerve deficit. She exhibits normal muscle tone.   Skin: Skin is warm. Capillary refill takes less than 2 seconds. She is not diaphoretic. No erythema.   Psychiatric:   Anxious, concerned     Results Review:  I have reviewed the labs, radiology results,  and diagnostic studies.    Laboratory Data:   Results from last 7 days   Lab Units 09/09/19  0300 09/05/19  0224   WBC 10*3/mm3 5.58 4.55   HEMOGLOBIN g/dL 11.0* 9.5*   HEMATOCRIT % 34.8 30.7*   PLATELETS 10*3/mm3 219 226        Results from last 7 days   Lab Units 09/09/19  0300 09/06/19  0328 09/05/19  0224   SODIUM mmol/L 137 137 137   POTASSIUM mmol/L 4.1 4.7 4.9   CHLORIDE mmol/L 95* 96* 98   CO2 mmol/L 38.0* 39.0* 36.0*   BUN mg/dL 13 7 5   CREATININE mg/dL 0.57 0.58 0.54   CALCIUM mg/dL 8.5 8.6 8.2*   BILIRUBIN mg/dL 0.2  --  0.1   ALK PHOS U/L 46  --  66   ALT (SGPT) U/L 28  --  24   AST (SGOT) U/L 25  --  11   GLUCOSE mg/dL 166* 150* 157*       Culture Data:   Blood Culture   Date Value Ref Range Status   09/04/2019 No growth at 5 days  Final   09/04/2019 No growth at 5 days  Final     Radiology Data:   Imaging Results (last 24 hours)     ** No results found for the last 24 hours. **        I have reviewed the patient's current medications.     Assessment/Plan     Active Hospital Problems    Diagnosis   • **COPD exacerbation (CMS/HCC)   • Compensated chronic respiratory acidosis   • Wound of right foot   • Acute on chronic respiratory failure with hypoxia and hypercapnia (CMS/HCC)   • Polypharmacy   • Personal history of nicotine dependence   • Alpha-1-antitrypsin deficiency carrier (CMS/HCC)     phenotype SS     • Folate deficiency anemia       Continue care in medical floor.  High flow oxygen. Pulmonology input noted. Case discussed with Dr Valdes.   Do not anticipate a fast recovery, likely will need placement if there is any chance of continued improvement.   Solumedrol 40 mg q8h. Douneb q4h.   Reglan for GERD.  Medications reviewed.  Prognosis guarded.    Nutrition consult. Likely malnourished due to ongoing poor intake, chronic severe illness and low albumin with minimal fluid accumulation.       Discharge Planning: To be determined, considering LTAC placement.    Bandar Walton MD   09/11/19    4:18 PM

## 2019-09-11 NOTE — PLAN OF CARE
Problem: Patient Care Overview  Goal: Plan of Care Review  Outcome: Ongoing (interventions implemented as appropriate)   09/11/19 4635   Coping/Psychosocial   Plan of Care Reviewed With patient   Plan of Care Review   Progress improving   OTHER   Outcome Summary Pt on vapotherm: up in chair and agreed to OT tx. Sit<>stand CGA. t/f >BSC and toileted CGA. BSC>bed CGA. limited fxl mobility d/t vapotherm. Maintained 87%-95% O2 sats during tx. Encouraged pt to use BSC with assist for increased activity during day. Educated pt to increase HEP reps as pt reports she completed with little difficulty this a.m. Pt would benefit from LTACH vs SNF at d/c. Continue OT POC

## 2019-09-11 NOTE — PROGRESS NOTES
PULMONARY AND CRITICAL CARE PROGRESS NOTE - [facility]    Patient: Justyna Sosa Lei  3/25/1951   MR# [id number]   Acct# [id number]  09/11/19   8:57 AM  Referring Provider: Bandar Walton,*    Chief Complaint: Acute on chronic respiratory failure with hypoxemia    Interval history: The patient is resting in bed on Vapotherm 40 L and FiO2 0.70 with a saturation of 98%.  She reports she rested well.  She is starting to feel some better.  She has no new complaints.  No family at the bedside.          Meds:    azelastine 2 spray Each Nare BID   budesonide 0.5 mg Nebulization BID - RT   citalopram 40 mg Oral Daily   docusate sodium 100 mg Oral BID   enoxaparin 40 mg Subcutaneous Q24H   fluticasone 2 spray Nasal Daily   folic acid 1 mg Oral Daily   gabapentin 600 mg Oral Q8H   guaiFENesin 400 mg Oral Q4H   ipratropium-albuterol 3 mL Nebulization Q4H - RT   methylPREDNISolone sodium succinate 40 mg Intravenous Q8H   metoclopramide 5 mg Oral TID AC   montelukast 10 mg Oral Nightly   mupirocin  Topical Q12H   nicotine 1 patch Transdermal Q24H   oxyCODONE HCl ER 30 mg Oral Q12H   pantoprazole 40 mg Oral Q AM   polyethylene glycol 17 g Oral Daily   sodium chloride 10 mL Intravenous Q12H   traZODone 25 mg Oral Nightly       lactated ringers 75 mL/hr Last Rate: 75 mL/hr (09/08/19 2331)     Review of Systems:   Review of Systems   Constitutional: Positive for fatigue. Negative for chills and fever.   Respiratory: Positive for shortness of breath (chronic). Negative for cough.    Gastrointestinal: Positive for abdominal distention. Negative for diarrhea, nausea and vomiting.     Physical Exam:  SpO2 Percentage    09/11/19 0724 09/11/19 0732 09/11/19 0757   SpO2: 95% 96% 92%     Temp:  [98 °F (36.7 °C)-98.2 °F (36.8 °C)] 98.2 °F (36.8 °C)  Heart Rate:  [61-92] 74  Resp:  [18-22] 20  BP: (125-144)/(59-78) 144/78    Intake/Output Summary (Last 24 hours) at 9/11/2019 0857  Last data filed at 9/11/2019  0334  Gross per 24 hour   Intake --   Output 2850 ml   Net -2850 ml     Physical Exam   Constitutional: She is oriented to person, place, and time. She appears well-developed and well-nourished. No distress. Nasal cannula in place.   HENT:   Head: Normocephalic and atraumatic.   Eyes: Conjunctivae and EOM are normal. Pupils are equal, round, and reactive to light. No scleral icterus.   Neck: Normal range of motion. Neck supple.   Cardiovascular: Normal rate, regular rhythm and normal heart sounds. Exam reveals no friction rub.   No murmur heard.  Pulmonary/Chest: Effort normal. No accessory muscle usage. No tachypnea. No respiratory distress. She has decreased breath sounds. She has no wheezes. She has no rales.   Abdominal: Soft. Bowel sounds are normal. She exhibits no distension. There is no tenderness.   Musculoskeletal: Normal range of motion. She exhibits no edema.   Neurological: She is alert and oriented to person, place, and time.   Skin: Skin is warm and dry.   Psychiatric: She has a normal mood and affect. Her behavior is normal. Judgment and thought content normal.   Nursing note and vitals reviewed.    Laboratory Data:  Results from last 7 days   Lab Units 09/09/19  0300 09/05/19  0224 09/04/19  1423   WBC 10*3/mm3 5.58 4.55 6.73   HEMOGLOBIN g/dL 11.0* 9.5* 10.3*   PLATELETS 10*3/mm3 219 226 246     Results from last 7 days   Lab Units 09/09/19  0300 09/06/19  0328 09/05/19  0224 09/04/19  1450   SODIUM mmol/L 137 137 137 136   POTASSIUM mmol/L 4.1 4.7 4.9 4.1   BUN mg/dL 13 7 5 8   CREATININE mg/dL 0.57 0.58 0.54 0.68   INR   --   --   --  0.95     Results from last 7 days   Lab Units 09/09/19  2215 09/07/19  0407 09/06/19  1248 09/04/19  1620   PH, ARTERIAL pH units 7.433 7.410 7.436 7.401   PCO2, ARTERIAL mm Hg 64.3* 65.3* 61.5* 64.6*   PO2 ART mm Hg 62.5* 286.0* 38.8* 61.8*   FIO2 % 50 100  --  50     Blood Culture   Date Value Ref Range Status   09/04/2019 No growth at 24 hours  Preliminary    09/04/2019 No growth at 24 hours  Preliminary     Recent films:  No radiology results for the last day  Films reviewed personally by me.  My interpretation: None today  Pulmonary Assessment:    1. Acute on chronic hypoxemic respiratory failure  2. Wound to the right foot, defer to attending  3. Alpha-1 antitrypsin deficiency, phenotype SS  4. Tobacco abuse  5. Polysubstance abuse  6. COPD  7. Constipation  8. Poorly controlled reflux    Recommend:     · She continues to do well on Vapotherm however she is requiring too much oxygen to discharge home   · BiPAP as needed at this point  · Strongly recommend smoking cessation and/or nicotine replacement as her condition will continue to worsen if she does not stop smoking  · Continue bronchodilators  · Continue mucolytics  · Antibiotic per attending  · Continue Solu-Medrol 40 mg IV every 8 hours.  Would not reduce additionally until she is showing significant improvement  · Incentive spirometry/flutter  · Continue mobilization efforts as much as feasible to do so    Electronically signed by Sydnee Armendariz, THIERNO, 09/11/19, 8:57 AM      Physician substantive portion:  Patient is without new complaints today.  She is still on the high flow oxygen which were trying to titrate down today.  She feels like she is a little bit less short of breath than before but remains short of breath continuously moderately with no aggravating or alleviating factors.  Exam shows her to be in no distress.  Chest has coarse breath sounds.  Wheezing is improved.  Plan continue oxygen.  We will need to watch and titrate.  There has been some discussion of her going to long-term acute care.  Certainly if her oxygenation does not improve dramatically over the next 1 to 2days then I would anticipate a longer term stay need, in which case I think LTAC would be an excellent choice.    I have seen and examined patient personally, performing a face-to-face diagnostic evaluation with plan of care  reviewed and developed with APRN and nursing staff. I have addended and/or modified the above history of present illness, physical examination, and assessment and plan to reflect my findings and impressions. Essential elements of the care plan were discussed with APRN above.  Agree with findings and assessment/plan as documented above.    Electronically signed by Wm Valdes MD, on 9/11/2019, 1:52 PM

## 2019-09-11 NOTE — THERAPY TREATMENT NOTE
Acute Care - Physical Therapy Treatment Note  [facility]     Patient Name: Justyna Lei  : 3/25/1951  MRN: [id number]  Today's Date: 2019  Onset of Illness/Injury or Date of Surgery: 19     Referring Physician: Dory Calvillo APRN    Admit Date: 2019    Visit Dx:    ICD-10-CM ICD-9-CM   1. COPD exacerbation (CMS/HCC) J44.1 491.21   2. Hypercapnia R06.89 786.09   3. Decreased activities of daily living (ADL) R68.89 780.99   4. Impaired mobility Z74.09 799.89     Patient Active Problem List   Diagnosis   • Stage 4 very severe COPD by GOLD classification (CMS/[facility])   • Hypokalemia   • Folate deficiency anemia   • Respiratory failure (CMS/[facility])   • Nasopharyngitis   • GERD without esophagitis   • Lung nodule   • Alpha-1-antitrypsin deficiency carrier (CMS/[facility])   • Personal history of nicotine dependence   • COPD exacerbation (CMS/[facility])   • Acute on chronic respiratory failure with hypoxia and hypercapnia (CMS/[facility])   • Polypharmacy   • Compensated chronic respiratory acidosis   • Wound of right foot       Therapy Treatment    Rehabilitation Treatment Summary     Row Name 19 1138             Treatment Time/Intention    Discipline  physical therapy assistant  -AB      Document Type  therapy note (daily note)  -AB      Subjective Information  complains of;dyspnea  -AB      Mode of Treatment  physical therapy  -AB      Existing Precautions/Restrictions  fall;oxygen therapy device and L/min  -AB      Recorded by [AB] Camelia Domínguez, PTA 19 1233      Row Name 19 1138             Vital Signs    Pre SpO2 (%)  92  -AB      O2 Delivery Pre Treatment  -- vapotherm  -AB      Intra SpO2 (%)  87  -AB      O2 Delivery Intra Treatment  -- vapotherm  -AB      Recorded by [AB] Camelia Domínguez, PTA 19 1233      Row Name 19 1138             Bed Mobility Assessment/Treatment    Supine-Sit [place] (Bed Mobility)  verbal cues;supervision  -AB      Sit-Supine [place] (Bed  Mobility)  verbal cues;supervision  -AB      Assistive Device (Bed Mobility)  head of bed elevated  -AB      Recorded by [AB] Camelia Domínguez, PTA 09/11/19 1233      Row Name 09/11/19 1138             Sit-Stand Transfer    Sit-Stand [place] (Transfers)  verbal cues;contact guard  -AB      Recorded by [AB] Camelia Domínguez, PTA 09/11/19 1233      Row Name 09/11/19 1138             Stand-Sit Transfer    Stand-Sit [place] (Transfers)  verbal cues;contact guard  -AB      Recorded by [AB] Camelia Domínguez, PTA 09/11/19 1233      Row Name 09/11/19 1138             Gait/Stairs Assessment/Training    [place] Level (Gait)  contact guard  -AB      Assistive Device (Gait)  -- HHA  -AB      Distance in Feet (Gait)  2' bed to chair due to vapotherm  -AB      Recorded by [AB] Camelia Domínguez, PTA 09/11/19 1233      Row Name 09/11/19 1138             Therapeutic Exercise    Lower Extremity (Therapeutic Exercise)  LAQ (long arc quad), bilateral;marching while seated  -AB      Lower Extremity Range of Motion (Therapeutic Exercise)  ankle dorsiflexion/plantar flexion, bilateral;hip abduction/adduction, bilateral  -AB      Exercise Type (Therapeutic Exercise)  AROM (active range of motion)  -AB      Position (Therapeutic Exercise)  seated  -AB      Sets/Reps (Therapeutic Exercise)  10reps 2 sets  -AB      Recorded by [AB] Camelia Domínguez, PTA 09/11/19 1233      Row Name 09/11/19 1138             Positioning and Restraints    Pre-Treatment Position  in bed  -AB      Post Treatment Position  chair  -AB      In Chair  sitting;call light within reach  -AB      Recorded by [AB] Camelia Domínguez, PTA 09/11/19 1233      Row Name 09/11/19 1138             Pain Scale: Numbers Pre/Post-Treatment    Pain Scale: Numbers, Pretreatment  0/10 - no pain  -AB      Pain Scale: Numbers, Post-Treatment  0/10 - no pain  -AB      Recorded by [AB] Camelia Domínguez, PTA 09/11/19 1233      Row Name                Wound 09/04/19 1500 Right  foot Blisters    Wound - Properties Group Date first assessed: 09/04/19 [ER] Time first assessed: 1500 [ER] Side: Right [ER] Location: foot [ER] Primary Wound Type: Blisters [ER], burn injury  Stage, Pressure Injury: Stage 2 [ER] Recorded by:  [ER] Rose Coffman RN 09/04/19 1715      User Key  (r) = Recorded By, (t) = Taken By, (c) = Cosigned By    Initials Name Effective Dates Discipline    AB Camelia Domínguez PTA 08/02/16 -  PT    ER Rose Coffman RN 08/02/16 -  Nurse          Wound 09/04/19 1500 Right foot Blisters (Active)   Closure MANDI 9/10/2019  8:20 PM   Base dressing in place, unable to visualize 9/10/2019  8:20 PM           Physical Therapy Education     Title: PT OT SLP Therapies (In Progress)     Topic: Physical Therapy (Done)     Point: Mobility training (Done)     Learning Progress Summary           Patient Acceptance, EROSI DU by AL at 9/7/2019  9:42 AM    Comment:  Deep breathing with ambulation    AcceptanceDONIS VU by MELISSA at 9/5/2019  1:30 PM    Comment:  Educated pt on progression of PT POC and benefits of activity                   Point: Home exercise program (Done)     Learning Progress Summary           Patient Acceptance, EROSI DU by AL at 9/7/2019  9:42 AM    Comment:  Deep breathing with ambulation                   Point: Body mechanics (Done)     Learning Progress Summary           Patient Acceptance, EROSI DU by AL at 9/7/2019  9:42 AM    Comment:  Deep breathing with ambulation                   Point: Precautions (Done)     Learning Progress Summary           Patient Acceptance, EROSI DU by AL at 9/7/2019  9:42 AM    Comment:  Deep breathing with ambulation                               User Key     Initials Effective Dates Name Provider Type Discipline    AL 04/09/19 -  Asia Orozco PTA, CLT-NIELS Physical Therapy Assistant PT    MELISSA 08/02/16 -  Larry Veliz PT DPT Physical Therapist PT                PT Recommendation and Plan        Outcome Measures     Row Name  09/10/19 1515             How much help from another is currently needed...    Putting on and taking off regular lower body clothing?  2  -MM      Bathing (including washing, rinsing, and drying)  2  -MM      Toileting (which includes using toilet bed pan or urinal)  2  -MM      Putting on and taking off regular upper body clothing  3  -MM      Taking care of personal grooming (such as brushing teeth)  4  -MM      Eating meals  4  -MM      AM-PAC 6 Clicks Score (OT)  17  -MM        User Key  (r) = Recorded By, (t) = Taken By, (c) = Cosigned By    Initials Name Provider Type    MM Carmen Wong COTA/L Occupational Therapy Assistant         Time Calculation:   PT Charges     Row Name 09/11/19 1138             Time Calculation    Start Time  1138  -AB      Stop Time  1201  -AB      Time Calculation (min)  23 min  -AB      PT Received On  09/11/19  -AB      PT Goal Re-Cert Due Date  09/15/19  -AB         Time Calculation- PT    Total Timed Code Minutes- PT  23 minute(s)  -AB        User Key  (r) = Recorded By, (t) = Taken By, (c) = Cosigned By    Initials Name Provider Type    AB Camelia Domínguez PTA Physical Therapy Assistant        Therapy Charges for Today     Code Description Service Date Service Provider Modifiers Qty    48398173896 HC PT THERAPEUTIC ACT EA 15 MIN 9/11/2019 Camelia Domínguez PTA GP 1    39458546897 HC PT THER PROC EA 15 MIN 9/11/2019 Camelia Domínguez PTA GP 1          PT G-Codes  Outcome Measure Options: AM-PAC 6 Clicks Basic Mobility (PT)  AM-PAC 6 Clicks Score (PT): 17  AM-PAC 6 Clicks Score (OT): 17    Camelia Domínguez PTA  9/11/2019

## 2019-09-11 NOTE — PLAN OF CARE
Problem: Patient Care Overview  Goal: Plan of Care Review  Outcome: Ongoing (interventions implemented as appropriate)   09/11/19 0353   Coping/Psychosocial   Plan of Care Reviewed With patient   Plan of Care Review   Progress no change   OTHER   Outcome Summary VSS, pt rested off and on through night. Remains on vapotherm for o2 therapy. Plan is possible d/c to LTACH. Safety maintained and will continue to monitor.

## 2019-09-12 PROCEDURE — 94760 N-INVAS EAR/PLS OXIMETRY 1: CPT

## 2019-09-12 PROCEDURE — 97535 SELF CARE MNGMENT TRAINING: CPT | Performed by: OCCUPATIONAL THERAPIST

## 2019-09-12 PROCEDURE — 94799 UNLISTED PULMONARY SVC/PX: CPT

## 2019-09-12 PROCEDURE — 94660 CPAP INITIATION&MGMT: CPT

## 2019-09-12 PROCEDURE — 97530 THERAPEUTIC ACTIVITIES: CPT

## 2019-09-12 PROCEDURE — 25010000002 ENOXAPARIN PER 10 MG: Performed by: INTERNAL MEDICINE

## 2019-09-12 PROCEDURE — 99232 SBSQ HOSP IP/OBS MODERATE 35: CPT | Performed by: INTERNAL MEDICINE

## 2019-09-12 PROCEDURE — 97110 THERAPEUTIC EXERCISES: CPT

## 2019-09-12 PROCEDURE — 25010000002 METHYLPREDNISOLONE PER 40 MG: Performed by: FAMILY MEDICINE

## 2019-09-12 RX ORDER — CETIRIZINE HYDROCHLORIDE 5 MG/1
5 TABLET ORAL DAILY
Status: DISCONTINUED | OUTPATIENT
Start: 2019-09-12 | End: 2019-09-18 | Stop reason: HOSPADM

## 2019-09-12 RX ADMIN — FLUTICASONE PROPIONATE 2 SPRAY: 50 SPRAY, METERED NASAL at 08:46

## 2019-09-12 RX ADMIN — SODIUM CHLORIDE, PRESERVATIVE FREE 10 ML: 5 INJECTION INTRAVENOUS at 21:45

## 2019-09-12 RX ADMIN — ENOXAPARIN SODIUM 40 MG: 40 INJECTION SUBCUTANEOUS at 08:43

## 2019-09-12 RX ADMIN — IPRATROPIUM BROMIDE AND ALBUTEROL SULFATE 3 ML: 2.5; .5 SOLUTION RESPIRATORY (INHALATION) at 20:04

## 2019-09-12 RX ADMIN — MUPIROCIN: 20 OINTMENT TOPICAL at 21:44

## 2019-09-12 RX ADMIN — METOCLOPRAMIDE 5 MG: 5 TABLET ORAL at 11:08

## 2019-09-12 RX ADMIN — DOCUSATE SODIUM 100 MG: 100 CAPSULE, LIQUID FILLED ORAL at 21:40

## 2019-09-12 RX ADMIN — BUDESONIDE 0.5 MG: 0.5 INHALANT RESPIRATORY (INHALATION) at 06:34

## 2019-09-12 RX ADMIN — ALPRAZOLAM 0.5 MG: 0.5 TABLET ORAL at 06:19

## 2019-09-12 RX ADMIN — GUAIFENESIN 400 MG: 100 SOLUTION ORAL at 13:09

## 2019-09-12 RX ADMIN — MUPIROCIN: 20 OINTMENT TOPICAL at 08:46

## 2019-09-12 RX ADMIN — IPRATROPIUM BROMIDE AND ALBUTEROL SULFATE 3 ML: 2.5; .5 SOLUTION RESPIRATORY (INHALATION) at 01:29

## 2019-09-12 RX ADMIN — NICOTINE 1 PATCH: 21 PATCH, EXTENDED RELEASE TRANSDERMAL at 18:46

## 2019-09-12 RX ADMIN — ALPRAZOLAM 0.5 MG: 0.5 TABLET ORAL at 23:40

## 2019-09-12 RX ADMIN — OXYCODONE HYDROCHLORIDE 30 MG: 20 TABLET, FILM COATED, EXTENDED RELEASE ORAL at 08:52

## 2019-09-12 RX ADMIN — GUAIFENESIN 400 MG: 100 SOLUTION ORAL at 17:38

## 2019-09-12 RX ADMIN — METHYLPREDNISOLONE SODIUM SUCCINATE 40 MG: 40 INJECTION, POWDER, FOR SOLUTION INTRAMUSCULAR; INTRAVENOUS at 03:24

## 2019-09-12 RX ADMIN — METHYLPREDNISOLONE SODIUM SUCCINATE 40 MG: 40 INJECTION, POWDER, FOR SOLUTION INTRAMUSCULAR; INTRAVENOUS at 11:08

## 2019-09-12 RX ADMIN — DOCUSATE SODIUM 100 MG: 100 CAPSULE, LIQUID FILLED ORAL at 08:44

## 2019-09-12 RX ADMIN — OXYCODONE HYDROCHLORIDE AND ACETAMINOPHEN 1 TABLET: 10; 325 TABLET ORAL at 01:10

## 2019-09-12 RX ADMIN — GUAIFENESIN 400 MG: 100 SOLUTION ORAL at 11:08

## 2019-09-12 RX ADMIN — IPRATROPIUM BROMIDE AND ALBUTEROL SULFATE 3 ML: 2.5; .5 SOLUTION RESPIRATORY (INHALATION) at 23:17

## 2019-09-12 RX ADMIN — OXYCODONE HYDROCHLORIDE 30 MG: 20 TABLET, FILM COATED, EXTENDED RELEASE ORAL at 21:52

## 2019-09-12 RX ADMIN — GABAPENTIN 600 MG: 300 CAPSULE ORAL at 13:09

## 2019-09-12 RX ADMIN — CETIRIZINE HYDROCHLORIDE 5 MG: 5 TABLET ORAL at 14:27

## 2019-09-12 RX ADMIN — OXYCODONE HYDROCHLORIDE AND ACETAMINOPHEN 1 TABLET: 10; 325 TABLET ORAL at 15:18

## 2019-09-12 RX ADMIN — GUAIFENESIN 400 MG: 100 SOLUTION ORAL at 01:10

## 2019-09-12 RX ADMIN — METOCLOPRAMIDE 5 MG: 5 TABLET ORAL at 06:19

## 2019-09-12 RX ADMIN — ALPRAZOLAM 0.5 MG: 0.5 TABLET ORAL at 13:14

## 2019-09-12 RX ADMIN — IPRATROPIUM BROMIDE AND ALBUTEROL SULFATE 3 ML: 2.5; .5 SOLUTION RESPIRATORY (INHALATION) at 14:29

## 2019-09-12 RX ADMIN — GABAPENTIN 600 MG: 300 CAPSULE ORAL at 06:19

## 2019-09-12 RX ADMIN — AZELASTINE HYDROCHLORIDE 2 SPRAY: 137 SPRAY, METERED NASAL at 08:45

## 2019-09-12 RX ADMIN — SIMETHICONE 80 MG: 80 TABLET, CHEWABLE ORAL at 18:49

## 2019-09-12 RX ADMIN — IPRATROPIUM BROMIDE AND ALBUTEROL SULFATE 3 ML: 2.5; .5 SOLUTION RESPIRATORY (INHALATION) at 10:44

## 2019-09-12 RX ADMIN — GUAIFENESIN 400 MG: 100 SOLUTION ORAL at 06:19

## 2019-09-12 RX ADMIN — GUAIFENESIN 400 MG: 100 SOLUTION ORAL at 21:41

## 2019-09-12 RX ADMIN — TRAZODONE HYDROCHLORIDE 25 MG: 50 TABLET ORAL at 21:40

## 2019-09-12 RX ADMIN — IPRATROPIUM BROMIDE AND ALBUTEROL SULFATE 3 ML: 2.5; .5 SOLUTION RESPIRATORY (INHALATION) at 06:34

## 2019-09-12 RX ADMIN — IPRATROPIUM BROMIDE AND ALBUTEROL SULFATE 3 ML: 2.5; .5 SOLUTION RESPIRATORY (INHALATION) at 03:52

## 2019-09-12 RX ADMIN — GABAPENTIN 600 MG: 300 CAPSULE ORAL at 21:52

## 2019-09-12 RX ADMIN — METOCLOPRAMIDE 5 MG: 5 TABLET ORAL at 17:38

## 2019-09-12 RX ADMIN — METHYLPREDNISOLONE SODIUM SUCCINATE 40 MG: 40 INJECTION, POWDER, FOR SOLUTION INTRAMUSCULAR; INTRAVENOUS at 18:45

## 2019-09-12 RX ADMIN — MONTELUKAST SODIUM 10 MG: 10 TABLET, FILM COATED ORAL at 21:40

## 2019-09-12 RX ADMIN — AZELASTINE HYDROCHLORIDE 2 SPRAY: 137 SPRAY, METERED NASAL at 21:44

## 2019-09-12 NOTE — PLAN OF CARE
Problem: Patient Care Overview  Goal: Plan of Care Review  Outcome: Ongoing (interventions implemented as appropriate)   09/12/19 1127   Coping/Psychosocial   Plan of Care Reviewed With patient   Plan of Care Review   Progress improving   OTHER   Outcome Summary PO intake remains poor at this time, 33% avg of 3 meals. Pt states she is feeling better and ate well for breakfast this AM. Pt also reports she is drinking Boost now whereas she hadn't been before. NFPE completed, MSA submitted to MD. Will continue to follow.

## 2019-09-12 NOTE — PROGRESS NOTES
Continued Stay Note   [facility]     Patient Name: Justyna Sosa Lei  MRN: [id number]  Today's Date: 9/12/2019    Admit Date: 9/4/2019    Discharge Plan     Row Name 09/12/19 0905       Plan    Plan  LTAC following    Patient/Family in Agreement with Plan  yes    Plan Comments  Micaela from [facility] is still gathering needing information from MD and will inform this SW when decision made. Pt currently still on high flow O2. Will follow.         Discharge Codes    No documentation.             IMELDA Jean-Baptiste

## 2019-09-12 NOTE — PLAN OF CARE
Problem: Chronic Obstructive Pulmonary Disease (Adult)  Goal: Signs and Symptoms of Listed Potential Problems Will be Absent, Minimized or Managed (Chronic Obstructive Pulmonary Disease)  Outcome: Ongoing (interventions implemented as appropriate)      Problem: Patient Care Overview  Goal: Plan of Care Review  Outcome: Ongoing (interventions implemented as appropriate)   09/12/19 1525   Coping/Psychosocial   Plan of Care Reviewed With patient   Plan of Care Review   Progress improving   OTHER   Outcome Summary Pt c/o generalized pain and anxiety. Medicated with PRN medication with some relief. Set up in chair most of shift. Multiple bowel movements today. VSS. Weaning vapotherm as tolertaed. Looking into LTACH placement possibility. Safety maintained. Will continue to monitor.      Goal: Individualization and Mutuality  Outcome: Ongoing (interventions implemented as appropriate)    Goal: Discharge Needs Assessment  Outcome: Ongoing (interventions implemented as appropriate)    Goal: Interprofessional Rounds/Family Conf  Outcome: Ongoing (interventions implemented as appropriate)      Problem: Skin Injury Risk (Adult)  Goal: Skin Health and Integrity  Outcome: Ongoing (interventions implemented as appropriate)      Problem: Fall Risk (Adult)  Goal: Absence of Fall  Outcome: Ongoing (interventions implemented as appropriate)      Problem: Pain, Chronic (Adult)  Goal: Acceptable Pain/Comfort Level and Functional Ability  Outcome: Ongoing (interventions implemented as appropriate)      Problem: Nutrition, Imbalanced: Inadequate Oral Intake (Adult)  Goal: Improved Oral Intake  Outcome: Ongoing (interventions implemented as appropriate)    Goal: Prevent Further Weight Loss  Outcome: Ongoing (interventions implemented as appropriate)

## 2019-09-12 NOTE — PROGRESS NOTES
Malnutrition Severity Assessment    Patient Name:  Justyna Sosa [place]  YOB: 1951  MRN: [id number]  Admit Date:  9/4/2019    Patient meets criteria for : Moderate (non-severe) Malnutrition    Comments:  If in agreement with malnutrition assessment, please attest documentation. Thanks.     Malnutrition Severity Assessment  Malnutrition Type: Chronic Disease - Related Malnutrition     Malnutrition Type (last 8 hours)      Malnutrition Severity Assessment     Row Name 09/12/19 0957       Malnutrition Severity Assessment    Malnutrition Type  Chronic Disease - Related Malnutrition    Row Name 09/12/19 0957       Insufficient Energy Intake     Insufficient Energy Intake   < or equal to 50% of est. energy requirement for > or equal to 5d)    Row Name 09/12/19 0957       Muscle Loss    Loss of Muscle Mass Findings  Moderate    Anabaptist Region  Moderate - slight depression    Clavicle Bone Region  Moderate - some protrusion in females, visible in males    Acromion Bone Region  Moderate - acromion may slightly protrude    Anterior Thigh Region  Moderate - mild depression on inner thigh    Posterior Calf Region  Moderate - some roundness, slight firmness    Row Name 09/12/19 0957       Fat Loss    Subcutaneous Fat Loss Findings  Moderate    Orbital Region   Moderate -  somewhat hollowness, slightly dark circles    Upper Arm Region  Moderate - some fat tissue, not ample    Thoracic & Lumbar Region  Moderate - ribs visible with mild depressions, iliac crest somewhat prominent    Row Name 09/12/19 0957       Fluid Accumulation (Edema)    Fluid Acumulation Findings  Mild    Fluid Accumulation   Mild equals 1+ pitting edema    Row Name 09/12/19 0957       Criteria Met (Must meet criteria for severity in at least 2 of these categories: M Wasting, Fat Loss, Fluid, Secondary Signs, Wt. Status, Intake)    Patient meets criteria for   Moderate (non-severe) Malnutrition          Electronically signed by:  Susan Rodriguez  PETR MEDINA  09/12/19 9:59 AM

## 2019-09-12 NOTE — PROGRESS NOTES
[facility] Medicine Services  INPATIENT PROGRESS NOTE    Patient Name: Justyna Sosa Lei  Date of Admission: 9/4/2019  Today's Date: 09/12/19  Length of Stay: 8  Primary Care Physician: Provider, No Known    Subjective   Chief Complaint: Shortness of breath  Shortness of Breath        Patient continues to require high flow oxygen and BiPAP at bedtime. No new complaints today, she is not very interested in going to LTAC.     Review of Systems   Respiratory: Positive for shortness of breath.       All pertinent negatives and positives are as above. All other systems have been reviewed and are negative unless otherwise stated.     Objective    Temp:  [98.2 °F (36.8 °C)-98.6 °F (37 °C)] 98.4 °F (36.9 °C)  Heart Rate:  [64-96] 70  Resp:  [20-30] 20  BP: (129-141)/(56-76) 129/60  Physical Exam   Constitutional: She is oriented to person, place, and time. She appears well-developed.   Dyspneic at rest   HENT:   Head: Normocephalic and atraumatic.   Right Ear: External ear normal.   Left Ear: External ear normal.   Eyes: EOM are normal. Pupils are equal, round, and reactive to light.   Neck: Normal range of motion. Neck supple. No tracheal deviation present. No thyromegaly present.   Cardiovascular: Normal rate and regular rhythm.   Pulmonary/Chest:   Increased effort, using accessory muscles. Air entry is diminished, wheezing improved   Abdominal: Soft. Bowel sounds are normal. She exhibits no mass. There is no tenderness.   Musculoskeletal: Normal range of motion. She exhibits no edema or deformity.   Neurological: She is alert and oriented to person, place, and time. She displays normal reflexes. No cranial nerve deficit. She exhibits normal muscle tone.   Skin: Skin is warm. Capillary refill takes less than 2 seconds. She is not diaphoretic. No erythema.   Psychiatric: She has a normal mood and affect.     Results Review:  I have reviewed the labs, radiology results, and diagnostic  studies.    Laboratory Data:   Results from last 7 days   Lab Units 09/09/19  0300   WBC 10*3/mm3 5.58   HEMOGLOBIN g/dL 11.0*   HEMATOCRIT % 34.8   PLATELETS 10*3/mm3 219        Results from last 7 days   Lab Units 09/09/19  0300 09/06/19  0328   SODIUM mmol/L 137 137   POTASSIUM mmol/L 4.1 4.7   CHLORIDE mmol/L 95* 96*   CO2 mmol/L 38.0* 39.0*   BUN mg/dL 13 7   CREATININE mg/dL 0.57 0.58   CALCIUM mg/dL 8.5 8.6   BILIRUBIN mg/dL 0.2  --    ALK PHOS U/L 46  --    ALT (SGPT) U/L 28  --    AST (SGOT) U/L 25  --    GLUCOSE mg/dL 166* 150*       Culture Data:   Blood Culture   Date Value Ref Range Status   09/04/2019 No growth at 5 days  Final   09/04/2019 No growth at 5 days  Final     Radiology Data:   Imaging Results (last 24 hours)     ** No results found for the last 24 hours. **        I have reviewed the patient's current medications.     Assessment/Plan     Active Hospital Problems    Diagnosis   • **COPD exacerbation (CMS/HCC)   • Compensated chronic respiratory acidosis   • Wound of right foot   • Acute on chronic respiratory failure with hypoxia and hypercapnia (CMS/HCC)   • Polypharmacy   • Personal history of nicotine dependence   • Alpha-1-antitrypsin deficiency carrier (CMS/HCC)     phenotype SS     • Folate deficiency anemia       Continue care in medical floor.  High flow oxygen. Pulmonology input noted. .   Do not anticipate a fast recovery, likely will need placement if there is any chance of continued improvement.   Solumedrol 40 mg q8h. Douneb q4h.   Reglan for GERD.  Medications reviewed.  Prognosis guarded.    Nutrition consult noted.    Discharge Planning: To be determined, considering LTAC placement.    Bandar Walton MD   09/12/19   3:04 PM

## 2019-09-12 NOTE — THERAPY TREATMENT NOTE
Acute Care - Occupational Therapy Treatment Note  [facility]     Patient Name: Justyna Lei  : 3/25/1951  MRN: [id number]  Today's Date: 2019  Onset of Illness/Injury or Date of Surgery: 19  Date of Referral to OT: 19  Referring Physician: Dory Calvillo APRN    Admit Date: 2019       ICD-10-CM ICD-9-CM   1. COPD exacerbation (CMS/HCC) J44.1 491.21   2. Hypercapnia R06.89 786.09   3. Decreased activities of daily living (ADL) R68.89 780.99   4. Impaired mobility Z74.09 799.89     Patient Active Problem List   Diagnosis   • Stage 4 very severe COPD by GOLD classification (CMS/[facility])   • Hypokalemia   • Folate deficiency anemia   • Respiratory failure (CMS/HCC)   • Nasopharyngitis   • GERD without esophagitis   • Lung nodule   • Alpha-1-antitrypsin deficiency carrier (CMS/[facility])   • Personal history of nicotine dependence   • COPD exacerbation (CMS/[facility])   • Acute on chronic respiratory failure with hypoxia and hypercapnia (CMS/HCC)   • Polypharmacy   • Compensated chronic respiratory acidosis   • Wound of right foot     Past Medical History:   Diagnosis Date   • Anxiety    • Anxiety     pain clinic patient Dr Sunshine x 5yrs   • Chronic pain due to injury     jarad LE & back   • COPD (chronic obstructive pulmonary disease) (CMS/HCC)    • Depression    • Hypertension    • Polypharmacy    • Smoker      Past Surgical History:   Procedure Laterality Date   • ABDOMINAL SURGERY     • APPENDECTOMY     • CHOLECYSTECTOMY     • FEMUR FRACTURE SURGERY     • HERNIA REPAIR     • HYSTERECTOMY         Therapy Treatment    Rehabilitation Treatment Summary     Row Name 19 1410 19 1103 19 0949       Treatment Time/Intention    Discipline  physical therapy assistant  -AB  physical therapy assistant  -AB  occupational therapist  -CH    Document Type  therapy note (daily note)  -AB  therapy note (daily note)  -AB  therapy note (daily note)  -CH    Subjective Information  complains  of;weakness;fatigue;pain;dyspnea  -AB  complains of;dyspnea  -AB  complains of;weakness  -CH2    Mode of Treatment  physical therapy  -AB  physical therapy  -AB  occupational therapy  -CH2    Therapy Frequency (OT Eval)  --  --  5 times/wk  -CH2    Patient Effort  --  --  good  -CH2    Existing Precautions/Restrictions  fall;oxygen therapy device and L/min  -AB  fall;oxygen therapy device and L/min  -AB  fall;oxygen therapy device and L/min  -CH2    Recorded by [AB] Camelia Domínguez, PTA 09/12/19 1523 [AB] Camelia Domínguez, PTA 09/12/19 1202 [CH] Lorri Nicholson, OTR/L 09/12/19 0952  [CH2] Lorri Nicholson, OTR/L 09/12/19 1541    Row Name 09/12/19 1103             Vital Signs    Intra SpO2 (%)  87  -AB      O2 Delivery Intra Treatment  other (see comments) vapotherm  -AB      Recorded by [AB] Camelia Domínguez, PTA 09/12/19 1202      Row Name 09/12/19 1410             Bed Mobility Assessment/Treatment    Comment (Bed Mobility)  up in chair  -AB      Recorded by [AB] Camelia Domínguez, PTA 09/12/19 1523      Row Name 09/12/19 0949             Transfer Assessment/Treatment    Transfer Assessment/Treatment  bed-chair transfer  -CH      Recorded by [CH] Lorri Nicholson, OTR/L 09/12/19 1541      Row Name 09/12/19 0949             Bed-Chair Transfer    Bed-Chair Red Level (Transfers)  contact guard;verbal cues  -CH      Recorded by [CH] Lorri Nicholson, OTR/L 09/12/19 1541      Row Name 09/12/19 1410             Sit-Stand Transfer    Sit-Stand [place] (Transfers)  verbal cues;stand by assist  -AB      Recorded by [AB] Camelia Domínguez, PTA 09/12/19 1523      Row Name 09/12/19 1410             Stand-Sit Transfer    Stand-Sit [place] (Transfers)  verbal cues;stand by assist  -AB      Recorded by [AB] Camelia Domínguez, PTA 09/12/19 1523      Row Name 09/12/19 1410             Toilet Transfer    Type (Toilet Transfer)  stand pivot/stand step  -AB      [place] Level (Toilet Transfer)  verbal cues;contact guard  -AB       Assistive Device (Toilet Transfer)  commode, bedside without drop arms  -AB      Recorded by [AB] Camelia Domínguez, PTA 09/12/19 1523      Row Name 09/12/19 0949             ADL Assessment/Intervention    BADL Assessment/Intervention  upper body dressing;bathing;lower body dressing  -CH      Recorded by [CH] Lorri Nicholson OTR/L 09/12/19 1541      Row Name 09/12/19 0949             Bathing Assessment/Intervention    Bathing [place] Level  set up;upper extremities;chest/trunk;proximal lower extremities;perineal area;maximum assist (25% patient effort);distal lower extremities/feet  -CH      Bathing Position  edge of bed sitting  -CH      Recorded by [CH] Lorri Nicholson OTR/L 09/12/19 1541      Row Name 09/12/19 0949             Upper Body Dressing Assessment/Training    Upper Body Dressing [place] Level  minimum assist (75% patient effort);don;doff  -CH      Upper Body Dressing Position  edge of bed sitting  -CH      Recorded by [CH] Lorri Nicholson OTR/L 09/12/19 1541      Row Name 09/12/19 0949             Lower Body Dressing Assessment/Training    Lower Body Dressing [place] Level  don;socks;moderate assist (50% patient effort)  -CH      Lower Body Dressing Position  edge of bed sitting  -CH      Recorded by [CH] Lorri Nicholson OTR/L 09/12/19 1541      Row Name 09/12/19 1410 09/12/19 1103          Therapeutic Exercise    Lower Extremity (Therapeutic Exercise)  LAQ (long arc quad), bilateral;marching while seated  -AB  LAQ (long arc quad), bilateral;marching while seated  -AB     Lower Extremity Range of Motion (Therapeutic Exercise)  hip abduction/adduction, bilateral;ankle dorsiflexion/plantar flexion, bilateral  -AB  hip abduction/adduction, bilateral;ankle dorsiflexion/plantar flexion, bilateral  -AB     Exercise Type (Therapeutic Exercise)  AROM (active range of motion)  -AB  AROM (active range of motion)  -AB     Position (Therapeutic Exercise)  seated  -AB  seated  -AB     Sets/Reps  (Therapeutic Exercise)  20  -AB  20  -AB     Recorded by [AB] Camelia Domínguez, PTA 09/12/19 1523 [AB] Camelia Domínguez, PTA 09/12/19 1202     Row Name 09/12/19 1410 09/12/19 1103 09/12/19 0949       Positioning and Restraints    Pre-Treatment Position  sitting in chair/recliner  -AB  sitting in chair/recliner  -AB  in bed  -CH    Post Treatment Position  chair  -AB  chair  -AB  chair  -CH    In Chair  reclined;sitting;call light within reach  -AB  sitting;call light within reach  -AB  sitting;call light within reach;encouraged to call for assist  -CH    Recorded by [AB] Camelia Domínguez, PTA 09/12/19 1523 [AB] Camelia Domínguez, PTA 09/12/19 1202 [CH] Lorri Nicholson OTR/L 09/12/19 1541    Row Name 09/12/19 1103             Pain Scale: Numbers Pre/Post-Treatment    Pain Scale: Numbers, Pretreatment  0/10 - no pain  -AB      Pain Scale: Numbers, Post-Treatment  0/10 - no pain  -AB      Recorded by [AB] Camelia Domínguez, PTA 09/12/19 1202      Row Name                Wound 09/04/19 1500 Right foot Blisters    Wound - Properties Group Date first assessed: 09/04/19 [ER] Time first assessed: 1500 [ER] Side: Right [ER] Location: foot [ER] Primary Wound Type: Blisters [ER], burn injury  Stage, Pressure Injury: Stage 2 [ER] Recorded by:  [ER] Rose Coffman RN 09/04/19 1715    Row Name 09/12/19 0949             Outcome Summary/Treatment Plan (OT)    Daily Summary of Progress (OT)  progress toward functional goals is good  -CH      Anticipated Discharge Disposition (OT)  home with assist;home with home health;transitional care  -CH      Recorded by [CH] Lorri Nicholson OTR/L 09/12/19 1541        User Key  (r) = Recorded By, (t) = Taken By, (c) = Cosigned By    Initials Name Effective Dates Discipline    AB Camelia Domínguez, PTA 08/02/16 -  PT    CH Lorri Nicholson OTR/L 08/02/16 -  OT    Rose Watson, RN 08/02/16 -  Nurse        Wound 09/04/19 1500 Right foot Blisters (Active)   Dressing Appearance dry;intact  9/12/2019  8:00 AM   Closure MANDI 9/12/2019  8:00 AM   Base dressing in place, unable to visualize 9/12/2019  8:00 AM   Periwound edematous;redness 9/12/2019 11:48 AM   Periwound Temperature warm 9/12/2019 11:48 AM   Edges open 9/12/2019 11:48 AM   Drainage Characteristics/Odor yellow;serous 9/12/2019 11:48 AM   Drainage Amount scant 9/12/2019 11:48 AM   Care, Wound cleansed with;sterile normal saline 9/12/2019 11:48 AM   Dressing Care, Wound other (see comments);dressing changed 9/12/2019 11:48 AM       Occupational Therapy Education     Title: PT OT SLP Therapies (In Progress)     Topic: Occupational Therapy (In Progress)     Point: ADL training (Done)     Description: Instruct learner(s) on proper safety adaptation and remediation techniques during self care or transfers.   Instruct in proper use of assistive devices.    Learning Progress Summary           Patient Acceptance, E,TB, VU by JACQUELIN at 9/11/2019  2:05 PM    Comment:  t.f safety, need to get up to use BSC not bedpan/purewick    Acceptance, E,TB, VU by JACQUELIN at 9/10/2019  4:07 PM    Comment:  HEP for AROM: pt demo'd understanding and verbalized importance for movement    Acceptance, E, VU by VI at 9/5/2019  2:05 PM    Comment:  OT POC, adls, t/fs, bed mobility, activity tolerance.                   Point: Home exercise program (Done)     Description: Instruct learner(s) on appropriate technique for monitoring, assisting and/or progressing therapeutic exercises/activities.    Learning Progress Summary           Patient Acceptance, E,TB, VU by JACQUELIN at 9/11/2019  2:05 PM    Comment:  t.f safety, need to get up to use BSC not bedpan/purewick    Acceptance, E,TB, VU by JACQUELIN at 9/10/2019  4:07 PM    Comment:  HEP for AROM: pt demo'd understanding and verbalized importance for movement                               User Key     Initials Effective Dates Name Provider Type Discipline    JACQUELIN 10/15/18 -  Carmen Wong COTA/L Occupational Therapy Assistant OT    VI  10/12/18 -  Gabi Faustin OTR/L Occupational Therapist OT                OT Recommendation and Plan  Outcome Summary/Treatment Plan (OT)  Daily Summary of Progress (OT): progress toward functional goals is good  Anticipated Discharge Disposition (OT): home with assist, home with home health, transitional care  Therapy Frequency (OT Eval): 5 times/wk  Daily Summary of Progress (OT): progress toward functional goals is good  Outcome Measures     Row Name 09/11/19 1333 09/10/19 1515          How much help from another is currently needed...    Putting on and taking off regular lower body clothing?  2  -MM  2  -MM     Bathing (including washing, rinsing, and drying)  2  -MM  2  -MM     Toileting (which includes using toilet bed pan or urinal)  3  -MM  2  -MM     Putting on and taking off regular upper body clothing  3  -MM  3  -MM     Taking care of personal grooming (such as brushing teeth)  4  -MM  4  -MM     Eating meals  4  -MM  4  -MM     AM-PAC 6 Clicks Score (OT)  18  -MM  17  -MM       User Key  (r) = Recorded By, (t) = Taken By, (c) = Cosigned By    Initials Name Provider Type    MM Carmen Wong COTA/L Occupational Therapy Assistant           Time Calculation:   Time Calculation- OT     Row Name 09/12/19 0949             Time Calculation- OT    OT Start Time  0949  -      OT Stop Time  1045  -      OT Time Calculation (min)  56 min  -      Total Timed Code Minutes- OT  56 minute(s)  -      OT Received On  09/12/19  -      OT Goal Re-Cert Due Date  09/15/19  -        User Key  (r) = Recorded By, (t) = Taken By, (c) = Cosigned By    Initials Name Provider Type     Lorri Nicholson OTR/L Occupational Therapist        Therapy Charges for Today     Code Description Service Date Service Provider Modifiers Qty    [id number]  OT SELF CARE/MGMT/TRAIN EA 15 MIN 9/12/2019 Lorri Nicholson OTR/L GO 4               SYEDA Villaseñor/SARAH  9/12/2019

## 2019-09-12 NOTE — PLAN OF CARE
Problem: Patient Care Overview  Goal: Plan of Care Review  Outcome: Ongoing (interventions implemented as appropriate)   09/12/19 9338   Coping/Psychosocial   Plan of Care Reviewed With patient   Plan of Care Review   Progress improving   OTHER   Outcome Summary VSS, pt rested off and on during night. States that she wants to get out of bed and work on building her strength back up to go home. Safety maintained and will continue to monitor.

## 2019-09-12 NOTE — PROGRESS NOTES
PULMONARY AND CRITICAL CARE PROGRESS NOTE - [facility]    Patient: Justyna Sosa Lei  3/25/1951   MR# [id number]   Acct# [id number]  09/12/19   1:59 PM  Referring Provider: Bandar Walton,*    Chief Complaint: Acute on chronic respiratory failure with hypoxemia    Interval history: The patient remains on Vapotherm 40 L, 75% FiO2.  She is complaining of sinus drainage.  No family at the bedside.          Meds:    azelastine 2 spray Each Nare BID   budesonide 0.5 mg Nebulization BID - RT   citalopram 40 mg Oral Daily   docusate sodium 100 mg Oral BID   enoxaparin 40 mg Subcutaneous Q24H   fluticasone 2 spray Nasal Daily   folic acid 1 mg Oral Daily   gabapentin 600 mg Oral Q8H   guaiFENesin 400 mg Oral Q4H   ipratropium-albuterol 3 mL Nebulization Q4H - RT   methylPREDNISolone sodium succinate 40 mg Intravenous Q8H   metoclopramide 5 mg Oral TID AC   montelukast 10 mg Oral Nightly   mupirocin  Topical Q12H   nicotine 1 patch Transdermal Q24H   oxyCODONE HCl ER 30 mg Oral Q12H   pantoprazole 40 mg Oral Q AM   polyethylene glycol 17 g Oral Daily   sodium chloride 10 mL Intravenous Q12H   traZODone 25 mg Oral Nightly       lactated ringers 75 mL/hr Last Rate: 75 mL/hr (09/08/19 2331)     Review of Systems:   Review of Systems   Constitutional: Positive for fatigue. Negative for chills and fever.   Respiratory: Positive for shortness of breath (chronic). Negative for cough.    Gastrointestinal: Positive for abdominal distention. Negative for diarrhea, nausea and vomiting.     Physical Exam:  SpO2 Percentage    09/12/19 0805 09/12/19 1044 09/12/19 1126   SpO2: 90% 90% 98%     Temp:  [98.2 °F (36.8 °C)-98.6 °F (37 °C)] 98.4 °F (36.9 °C)  Heart Rate:  [64-96] 83  Resp:  [20-30] 20  BP: (129-141)/(56-76) 129/60    Intake/Output Summary (Last 24 hours) at 9/12/2019 1359  Last data filed at 9/12/2019 1237  Gross per 24 hour   Intake 600 ml   Output 2100 ml   Net -1500 ml     Physical Exam    Constitutional: She is oriented to person, place, and time. She appears well-developed and well-nourished. No distress. Nasal cannula in place.   HENT:   Head: Normocephalic and atraumatic.   Eyes: Conjunctivae and EOM are normal. Pupils are equal, round, and reactive to light. No scleral icterus.   Neck: Normal range of motion. Neck supple.   Cardiovascular: Normal rate, regular rhythm and normal heart sounds. Exam reveals no friction rub.   No murmur heard.  Pulmonary/Chest: Effort normal. No accessory muscle usage. No tachypnea. No respiratory distress. She has decreased breath sounds. She has no wheezes. She has no rales.   Abdominal: Soft. Bowel sounds are normal. She exhibits no distension. There is no tenderness.   Musculoskeletal: Normal range of motion. She exhibits no edema.   Neurological: She is alert and oriented to person, place, and time.   Skin: Skin is warm and dry.   Psychiatric: She has a normal mood and affect. Her behavior is normal. Judgment and thought content normal.   Nursing note and vitals reviewed.    Laboratory Data:  Results from last 7 days   Lab Units 09/09/19  0300   WBC 10*3/mm3 5.58   HEMOGLOBIN g/dL 11.0*   PLATELETS 10*3/mm3 219     Results from last 7 days   Lab Units 09/09/19  0300 09/06/19  0328   SODIUM mmol/L 137 137   POTASSIUM mmol/L 4.1 4.7   BUN mg/dL 13 7   CREATININE mg/dL 0.57 0.58     Results from last 7 days   Lab Units 09/09/19  2215 09/07/19  0407 09/06/19  1248   PH, ARTERIAL pH units 7.433 7.410 7.436   PCO2, ARTERIAL mm Hg 64.3* 65.3* 61.5*   PO2 ART mm Hg 62.5* 286.0* 38.8*   FIO2 % 50 100  --      Blood Culture   Date Value Ref Range Status   09/04/2019 No growth at 24 hours  Preliminary   09/04/2019 No growth at 24 hours  Preliminary     Recent films:  No radiology results for the last day  Films reviewed personally by me.  My interpretation: None today  Pulmonary Assessment:    1. Acute on chronic hypoxemic respiratory failure  2. Wound to the right foot,  defer to attending  3. Alpha-1 antitrypsin deficiency, phenotype SS  4. Tobacco abuse  5. Polysubstance abuse  6. COPD  7. Constipation  8. Poorly controlled reflux    Recommend:     · Continue to titrate Vapotherm as tolerated.  · BiPAP as needed  · Strongly recommend smoking cessation and/or nicotine replacement as her condition will continue to worsen if she does not stop smoking  · Continue bronchodilators  · Continue mucolytics  · Antibiotic per attending  · Continue Solu-Medrol 40 mg IV every 8 hours.  Would not reduce additionally until she is showing significant improvement  · Incentive spirometry/flutter  · Continue mobilization efforts as much as feasible to do so    Electronically signed by THIERNO Arthur, 09/12/19, 1:59 PM     Physician substantive portion:  Due to epic issues with inbox issues this note did not come to me for completion until the day after the service.  Patient was seen and examined by me on the date of service in conjunction with APRN above.  Patient reported feeling better with no increased sputum production at this point.  Breath sounds were diminished.  Wheezing improved.  Plan continue oxygen, taper steroids, continue respiratory therapy, monitor saturation carefully and titrate the oxygen flow.  Perhaps taper steroids tomorrow.    I have seen and examined patient personally, performing a face-to-face diagnostic evaluation with plan of care reviewed and developed with APRN and nursing staff. I have addended and/or modified the above history of present illness, physical examination, and assessment and plan to reflect my findings and impressions. Essential elements of the care plan were discussed with APRN above.  Agree with findings and assessment/plan as documented above.    Electronically signed by Wm Valdes MD, on 9/13/2019, 10:12 AM

## 2019-09-12 NOTE — THERAPY TREATMENT NOTE
Acute Care - Physical Therapy Treatment Note  [facility]     Patient Name: Justyna Sosa Lei  : 3/25/1951  MRN: [id number]  Today's Date: 2019  Onset of Illness/Injury or Date of Surgery: 19     Referring Physician: Dory Calvillo APRN    Admit Date: 2019    Visit Dx:    ICD-10-CM ICD-9-CM   1. COPD exacerbation (CMS/HCC) J44.1 491.21   2. Hypercapnia R06.89 786.09   3. Decreased activities of daily living (ADL) R68.89 780.99   4. Impaired mobility Z74.09 799.89     Patient Active Problem List   Diagnosis   • Stage 4 very severe COPD by GOLD classification (CMS/[facility])   • Hypokalemia   • Folate deficiency anemia   • Respiratory failure (CMS/[facility])   • Nasopharyngitis   • GERD without esophagitis   • Lung nodule   • Alpha-1-antitrypsin deficiency carrier (CMS/[facility])   • Personal history of nicotine dependence   • COPD exacerbation (CMS/[facility])   • Acute on chronic respiratory failure with hypoxia and hypercapnia (CMS/[facility])   • Polypharmacy   • Compensated chronic respiratory acidosis   • Wound of right foot       Therapy Treatment    Rehabilitation Treatment Summary     Row Name 19 1103 19 0949          Treatment Time/Intention    Discipline  physical therapy assistant  -AB  occupational therapist  -CH     Document Type  therapy note (daily note)  -AB  therapy note (daily note)  -     Subjective Information  complains of;dyspnea  -AB  --     Mode of Treatment  physical therapy  -AB  --     Existing Precautions/Restrictions  fall;oxygen therapy device and L/min  -AB  --     Recorded by [AB] Camelia Domínguez, PTA 19 1202 [CH] Lorri Nicholson, OTR/L 19 0952     Row Name 19 1103             Vital Signs    Intra SpO2 (%)  87  -AB      O2 Delivery Intra Treatment  other (see comments) vapotherm  -AB      Recorded by [AB] Camelia Domínguez, MARLA 19 1202      Row Name 19 1103             Therapeutic Exercise    Lower Extremity (Therapeutic Exercise)  LAQ (long arc quad),  bilateral;marching while seated  -AB      Lower Extremity Range of Motion (Therapeutic Exercise)  hip abduction/adduction, bilateral;ankle dorsiflexion/plantar flexion, bilateral  -AB      Exercise Type (Therapeutic Exercise)  AROM (active range of motion)  -AB      Position (Therapeutic Exercise)  seated  -AB      Sets/Reps (Therapeutic Exercise)  20  -AB      Recorded by [AB] Camelia Domínguez, PTA 09/12/19 1202      Row Name 09/12/19 1103             Positioning and Restraints    Pre-Treatment Position  sitting in chair/recliner  -AB      Post Treatment Position  chair  -AB      In Chair  sitting;call light within reach  -AB      Recorded by [AB] Camelia Domínguez, PTA 09/12/19 1202      Row Name 09/12/19 1103             Pain Scale: Numbers Pre/Post-Treatment    Pain Scale: Numbers, Pretreatment  0/10 - no pain  -AB      Pain Scale: Numbers, Post-Treatment  0/10 - no pain  -AB      Recorded by [AB] Camelia Domínguez, PTA 09/12/19 1202      Row Name                Wound 09/04/19 1500 Right foot Blisters    Wound - Properties Group Date first assessed: 09/04/19 [ER] Time first assessed: 1500 [ER] Side: Right [ER] Location: foot [ER] Primary Wound Type: Blisters [ER], burn injury  Stage, Pressure Injury: Stage 2 [ER] Recorded by:  [ER] Rose Coffman RN 09/04/19 6789      User Key  (r) = Recorded By, (t) = Taken By, (c) = Cosigned By    Initials Name Effective Dates Discipline    AB Camelia Domínguez, PTA 08/02/16 -  PT    Lorri Fang, OTR/L 08/02/16 -  OT    ER Rose Coffman RN 08/02/16 -  Nurse          Wound 09/04/19 1500 Right foot Blisters (Active)   Dressing Appearance dry;intact 9/12/2019  8:00 AM   Closure MANDI 9/12/2019  8:00 AM   Base dressing in place, unable to visualize 9/12/2019  8:00 AM   Periwound edematous;redness 9/12/2019 11:48 AM   Periwound Temperature warm 9/12/2019 11:48 AM   Edges open 9/12/2019 11:48 AM   Drainage Characteristics/Odor yellow;serous 9/12/2019 11:48 AM   Drainage  Amount scant 9/12/2019 11:48 AM   Care, Wound cleansed with;sterile normal saline 9/12/2019 11:48 AM   Dressing Care, Wound other (see comments);dressing changed 9/12/2019 11:48 AM           Physical Therapy Education     Title: PT OT SLP Therapies (In Progress)     Topic: Physical Therapy (Done)     Point: Mobility training (Done)     Learning Progress Summary           Patient Acceptance, E, SHALOM ARANDA by AL at 9/7/2019  9:42 AM    Comment:  Deep breathing with ambulation    Acceptance, E, VU by MELISSA at 9/5/2019  1:30 PM    Comment:  Educated pt on progression of PT POC and benefits of activity                   Point: Home exercise program (Done)     Learning Progress Summary           Patient Acceptance, E, SHALOM ARANDA by AL at 9/7/2019  9:42 AM    Comment:  Deep breathing with ambulation                   Point: Body mechanics (Done)     Learning Progress Summary           Patient Acceptance, E, SHALOM ARANDA by AL at 9/7/2019  9:42 AM    Comment:  Deep breathing with ambulation                   Point: Precautions (Done)     Learning Progress Summary           Patient Acceptance, EROSI DU by AL at 9/7/2019  9:42 AM    Comment:  Deep breathing with ambulation                               User Key     Initials Effective Dates Name Provider Type Discipline    AL 04/09/19 -  Asia Orozco, PTA, CLT-NIELS Physical Therapy Assistant PT    MELISSA 08/02/16 -  Larry Veliz, PT DPT Physical Therapist PT                PT Recommendation and Plan        Outcome Measures     Row Name 09/11/19 1333 09/10/19 1515          How much help from another is currently needed...    Putting on and taking off regular lower body clothing?  2  -MM  2  -MM     Bathing (including washing, rinsing, and drying)  2  -MM  2  -MM     Toileting (which includes using toilet bed pan or urinal)  3  -MM  2  -MM     Putting on and taking off regular upper body clothing  3  -MM  3  -MM     Taking care of personal grooming (such as brushing teeth)  4  -MM  4  -MM      Eating meals  4  -MM  4  -MM     AM-PAC 6 Clicks Score (OT)  18  -MM  17  -MM       User Key  (r) = Recorded By, (t) = Taken By, (c) = Cosigned By    Initials Name Provider Type    MM Carmen Wong COTA/L Occupational Therapy Assistant         Time Calculation:   PT Charges     Row Name 09/12/19 1103             Time Calculation    Start Time  1103  -AB      Stop Time  1119  -AB      Time Calculation (min)  16 min  -AB      PT Received On  09/12/19  -AB      PT Goal Re-Cert Due Date  09/15/19  -AB         Time Calculation- PT    Total Timed Code Minutes- PT  16 minute(s)  -AB        User Key  (r) = Recorded By, (t) = Taken By, (c) = Cosigned By    Initials Name Provider Type    Camelia Flores, MARLA Physical Therapy Assistant        Therapy Charges for Today     Code Description Service Date Service Provider Modifiers Qty    75622837403 HC PT THERAPEUTIC ACT EA 15 MIN 9/11/2019 Camelia Domínguez, PTA GP 1    85585942015 HC PT THER PROC EA 15 MIN 9/11/2019 Camelia Domínguez, PTA GP 1    85327123644 HC PT THER PROC EA 15 MIN 9/12/2019 Camelia Domínguez, PTA GP 1          PT G-Codes  Outcome Measure Options: AM-PAC 6 Clicks Basic Mobility (PT)  AM-PAC 6 Clicks Score (PT): 17  AM-PAC 6 Clicks Score (OT): 18    Camelia Domínguez PTA  9/12/2019

## 2019-09-13 PROCEDURE — 94799 UNLISTED PULMONARY SVC/PX: CPT

## 2019-09-13 PROCEDURE — 97535 SELF CARE MNGMENT TRAINING: CPT

## 2019-09-13 PROCEDURE — 99232 SBSQ HOSP IP/OBS MODERATE 35: CPT | Performed by: INTERNAL MEDICINE

## 2019-09-13 PROCEDURE — 94760 N-INVAS EAR/PLS OXIMETRY 1: CPT

## 2019-09-13 PROCEDURE — 25010000002 ENOXAPARIN PER 10 MG: Performed by: INTERNAL MEDICINE

## 2019-09-13 PROCEDURE — 97110 THERAPEUTIC EXERCISES: CPT

## 2019-09-13 PROCEDURE — 94660 CPAP INITIATION&MGMT: CPT

## 2019-09-13 PROCEDURE — 25010000002 METHYLPREDNISOLONE PER 40 MG: Performed by: INTERNAL MEDICINE

## 2019-09-13 PROCEDURE — 25010000002 METHYLPREDNISOLONE PER 40 MG: Performed by: FAMILY MEDICINE

## 2019-09-13 RX ORDER — METHYLPREDNISOLONE SODIUM SUCCINATE 40 MG/ML
40 INJECTION, POWDER, LYOPHILIZED, FOR SOLUTION INTRAMUSCULAR; INTRAVENOUS EVERY 12 HOURS
Status: DISCONTINUED | OUTPATIENT
Start: 2019-09-13 | End: 2019-09-14

## 2019-09-13 RX ADMIN — OXYCODONE HYDROCHLORIDE AND ACETAMINOPHEN 1 TABLET: 10; 325 TABLET ORAL at 02:33

## 2019-09-13 RX ADMIN — ENOXAPARIN SODIUM 40 MG: 40 INJECTION SUBCUTANEOUS at 08:09

## 2019-09-13 RX ADMIN — GUAIFENESIN 400 MG: 100 SOLUTION ORAL at 21:06

## 2019-09-13 RX ADMIN — GUAIFENESIN 400 MG: 100 SOLUTION ORAL at 13:19

## 2019-09-13 RX ADMIN — BUDESONIDE 0.5 MG: 0.5 INHALANT RESPIRATORY (INHALATION) at 06:17

## 2019-09-13 RX ADMIN — AZELASTINE HYDROCHLORIDE 2 SPRAY: 137 SPRAY, METERED NASAL at 21:08

## 2019-09-13 RX ADMIN — GUAIFENESIN 400 MG: 100 SOLUTION ORAL at 17:03

## 2019-09-13 RX ADMIN — TRAZODONE HYDROCHLORIDE 25 MG: 50 TABLET ORAL at 21:07

## 2019-09-13 RX ADMIN — IPRATROPIUM BROMIDE AND ALBUTEROL SULFATE 3 ML: 2.5; .5 SOLUTION RESPIRATORY (INHALATION) at 14:48

## 2019-09-13 RX ADMIN — OXYCODONE HYDROCHLORIDE AND ACETAMINOPHEN 1 TABLET: 10; 325 TABLET ORAL at 13:19

## 2019-09-13 RX ADMIN — METOCLOPRAMIDE 5 MG: 5 TABLET ORAL at 08:10

## 2019-09-13 RX ADMIN — IPRATROPIUM BROMIDE AND ALBUTEROL SULFATE 3 ML: 2.5; .5 SOLUTION RESPIRATORY (INHALATION) at 06:17

## 2019-09-13 RX ADMIN — BUDESONIDE 0.5 MG: 0.5 INHALANT RESPIRATORY (INHALATION) at 19:21

## 2019-09-13 RX ADMIN — FLUTICASONE PROPIONATE 2 SPRAY: 50 SPRAY, METERED NASAL at 08:09

## 2019-09-13 RX ADMIN — METHYLPREDNISOLONE SODIUM SUCCINATE 40 MG: 40 INJECTION, POWDER, FOR SOLUTION INTRAMUSCULAR; INTRAVENOUS at 04:17

## 2019-09-13 RX ADMIN — NICOTINE 1 PATCH: 21 PATCH, EXTENDED RELEASE TRANSDERMAL at 19:30

## 2019-09-13 RX ADMIN — GABAPENTIN 600 MG: 300 CAPSULE ORAL at 06:52

## 2019-09-13 RX ADMIN — IPRATROPIUM BROMIDE AND ALBUTEROL SULFATE 3 ML: 2.5; .5 SOLUTION RESPIRATORY (INHALATION) at 03:34

## 2019-09-13 RX ADMIN — GABAPENTIN 600 MG: 300 CAPSULE ORAL at 21:06

## 2019-09-13 RX ADMIN — OXYCODONE HYDROCHLORIDE 30 MG: 20 TABLET, FILM COATED, EXTENDED RELEASE ORAL at 21:06

## 2019-09-13 RX ADMIN — DOCUSATE SODIUM 100 MG: 100 CAPSULE, LIQUID FILLED ORAL at 08:09

## 2019-09-13 RX ADMIN — MONTELUKAST SODIUM 10 MG: 10 TABLET, FILM COATED ORAL at 21:07

## 2019-09-13 RX ADMIN — SODIUM CHLORIDE, PRESERVATIVE FREE 10 ML: 5 INJECTION INTRAVENOUS at 21:07

## 2019-09-13 RX ADMIN — METOCLOPRAMIDE 5 MG: 5 TABLET ORAL at 17:03

## 2019-09-13 RX ADMIN — METOCLOPRAMIDE 5 MG: 5 TABLET ORAL at 11:07

## 2019-09-13 RX ADMIN — GUAIFENESIN 400 MG: 100 SOLUTION ORAL at 06:52

## 2019-09-13 RX ADMIN — GUAIFENESIN 400 MG: 100 SOLUTION ORAL at 11:06

## 2019-09-13 RX ADMIN — GUAIFENESIN 400 MG: 100 SOLUTION ORAL at 02:17

## 2019-09-13 RX ADMIN — MUPIROCIN: 20 OINTMENT TOPICAL at 08:11

## 2019-09-13 RX ADMIN — IPRATROPIUM BROMIDE AND ALBUTEROL SULFATE 3 ML: 2.5; .5 SOLUTION RESPIRATORY (INHALATION) at 10:10

## 2019-09-13 RX ADMIN — IPRATROPIUM BROMIDE AND ALBUTEROL SULFATE 3 ML: 2.5; .5 SOLUTION RESPIRATORY (INHALATION) at 19:16

## 2019-09-13 RX ADMIN — PANTOPRAZOLE SODIUM 40 MG: 40 TABLET, DELAYED RELEASE ORAL at 06:52

## 2019-09-13 RX ADMIN — METHYLPREDNISOLONE SODIUM SUCCINATE 40 MG: 40 INJECTION, POWDER, FOR SOLUTION INTRAMUSCULAR; INTRAVENOUS at 17:03

## 2019-09-13 RX ADMIN — SODIUM CHLORIDE, PRESERVATIVE FREE 10 ML: 5 INJECTION INTRAVENOUS at 08:10

## 2019-09-13 RX ADMIN — OXYCODONE HYDROCHLORIDE 30 MG: 20 TABLET, FILM COATED, EXTENDED RELEASE ORAL at 08:10

## 2019-09-13 RX ADMIN — MUPIROCIN: 20 OINTMENT TOPICAL at 21:13

## 2019-09-13 RX ADMIN — ALPRAZOLAM 0.5 MG: 0.5 TABLET ORAL at 08:16

## 2019-09-13 RX ADMIN — CETIRIZINE HYDROCHLORIDE 5 MG: 5 TABLET ORAL at 08:09

## 2019-09-13 RX ADMIN — ALPRAZOLAM 0.5 MG: 0.5 TABLET ORAL at 22:13

## 2019-09-13 RX ADMIN — GABAPENTIN 600 MG: 300 CAPSULE ORAL at 13:19

## 2019-09-13 RX ADMIN — IPRATROPIUM BROMIDE AND ALBUTEROL SULFATE 3 ML: 2.5; .5 SOLUTION RESPIRATORY (INHALATION) at 22:47

## 2019-09-13 NOTE — THERAPY TREATMENT NOTE
Acute Care - Occupational Therapy Treatment Note  [facility]     Patient Name: Justyna Lei  : 3/25/1951  MRN: [id number]  Today's Date: 2019  Onset of Illness/Injury or Date of Surgery: 19  Date of Referral to OT: 19  Referring Physician: Dory Calvillo APRN    Admit Date: 2019       ICD-10-CM ICD-9-CM   1. COPD exacerbation (CMS/HCC) J44.1 491.21   2. Hypercapnia R06.89 786.09   3. Decreased activities of daily living (ADL) R68.89 780.99   4. Impaired mobility Z74.09 799.89     Patient Active Problem List   Diagnosis   • Stage 4 very severe COPD by GOLD classification (CMS/[facility])   • Hypokalemia   • Folate deficiency anemia   • Respiratory failure (CMS/[facility])   • Nasopharyngitis   • GERD without esophagitis   • Lung nodule   • Alpha-1-antitrypsin deficiency carrier (CMS/[facility])   • Personal history of nicotine dependence   • COPD exacerbation (CMS/[facility])   • Acute on chronic respiratory failure with hypoxia and hypercapnia (CMS/[facility])   • Polypharmacy   • Compensated chronic respiratory acidosis   • Wound of right foot     Past Medical History:   Diagnosis Date   • Anxiety    • Anxiety     pain clinic patient Dr Sunshine x 5yrs   • Chronic pain due to injury     jarad LE & back   • COPD (chronic obstructive pulmonary disease) (CMS/HCC)    • Depression    • Hypertension    • Polypharmacy    • Smoker      Past Surgical History:   Procedure Laterality Date   • ABDOMINAL SURGERY     • APPENDECTOMY     • CHOLECYSTECTOMY     • FEMUR FRACTURE SURGERY     • HERNIA REPAIR     • HYSTERECTOMY         Therapy Treatment    Rehabilitation Treatment Summary     Row Name 19 1156 19 1033          Treatment Time/Intention    Discipline  occupational therapy assistant  -MM  physical therapy assistant  -NW     Document Type  therapy note (daily note)  -MM  --     Subjective Information  no complaints  -MM  --     Mode of Treatment  individual therapy;occupational therapy  -MM  --     Patient/Family  Observations  no family: pt up in chair   -MM  --     Patient Effort  excellent  -MM  --     Comment  --  w/ nsg getting cleaned up  -NW     Treatment Considerations/Comments  extra time spent educating on home mods, energy conservation and work simplification tasks  -MM  --     Recorded by [MM] Carmen Wong COTA/SARAH 09/13/19 1325 [NW] Jodie Vicente, PTA 09/13/19 1033     Row Name 09/13/19 1156             Vital Signs    Pre SpO2 (%)  94  -MM      O2 Delivery Pre Treatment  nasal cannula 5L  -MM      Intra SpO2 (%)  90  -MM      O2 Delivery Intra Treatment  nasal cannula  -MM      Post SpO2 (%)  96  -MM      O2 Delivery Post Treatment  nasal cannula  -MM      Pre Patient Position  Sitting  -MM      Intra Patient Position  Standing  -MM      Post Patient Position  Sitting  -MM      Recorded by [MM] Carmen Wong COTA/L 09/13/19 1325      Row Name 09/13/19 1156             Cognitive Assessment/Intervention- PT/OT    Personal Safety Interventions  fall prevention program maintained;nonskid shoes/slippers when out of bed;supervised activity  -MM      Recorded by [MM] Carmen Wong COTA/L 09/13/19 1325      Row Name 09/13/19 1156             Safety Issues, Functional Mobility    Safety Issues Affecting Function (Mobility)  friction/shear risk  -MM      Impairments Affecting Function (Mobility)  endurance/activity tolerance;strength;shortness of breath  -MM      Recorded by [MM] Carmen Wong COTA/L 09/13/19 1325      Row Name 09/13/19 1156             Bed Mobility Assessment/Treatment    Comment (Bed Mobility)  up in chair   -MM      Recorded by [MM] Carmen Wong COTA/L 09/13/19 1325      Row Name 09/13/19 1156             Transfer Assessment/Treatment    Comment (Transfers)  5 sit<>stands for endurance training   -MM      Recorded by [MM] Carmen Wong COTA/L 09/13/19 1325      Row Name 09/13/19 1156             Sit-Stand Transfer    Sit-Stand [place] (Transfers)  verbal cues;stand  by assist  -MM      Recorded by [MM] Carmen Wong COTA/L 09/13/19 1325      Row Name 09/13/19 1156             Stand-Sit Transfer    Stand-Sit [place] (Transfers)  verbal cues;stand by assist  -MM      Recorded by [MM] Carmen Wong COTA/L 09/13/19 1325      Row Name 09/13/19 1156             Bathing Assessment/Intervention    Comment (Bathing)  reports I sponge bathing after set up this date   -MM      Recorded by [MM] Carmen Wong COTA/L 09/13/19 1325      Row Name 09/13/19 1156             BADL Safety/Performance    Impairments, BADL Safety/Performance  endurance/activity tolerance;shortness of breath;strength  -MM      Skilled BADL Treatment/Intervention  BADL process/adaptation training;energy conservation;environmental modifications  -MM      Progress in BADL Status  improvement noted  -MM      Recorded by [MM] Carmen Wong COTA/L 09/13/19 1325      Row Name 09/13/19 1156             Therapeutic Exercise    Exercise Type (Therapeutic Exercise)  AROM (active range of motion)  -MM      Position (Therapeutic Exercise)  standing  -MM      Sets/Reps (Therapeutic Exercise)  10  -MM      Expected Outcome (Therapeutic Exercise)  improve functional tolerance, household activity;improve functional tolerance, self-care activity;improve performance, BADLs;improve performance, transfer skills;improve performance, gait skills  -MM      Comment (Therapeutic Exercise)  Standing BUE ther ex in multiple planes/ranges for increased standing tolerance and UE strength: stood 7 mins total with standing rest breaks from ther ex  -MM      Recorded by [MM] Carmen Wong COTA/L 09/13/19 1325      Row Name 09/13/19 1156             Positioning and Restraints    Pre-Treatment Position  sitting in chair/recliner  -MM      Post Treatment Position  chair  -MM      In Chair  reclined;call light within reach;encouraged to call for assist  -MM      Recorded by [MM] Carmen Wong COTA/SARAH 09/13/19 1325       Row Name 09/13/19 1156             Pain Scale: Numbers Pre/Post-Treatment    Pain Scale: Numbers, Pretreatment  0/10 - no pain  -MM      Pain Scale: Numbers, Post-Treatment  0/10 - no pain  -MM      Recorded by [MM] Carmen Wong COTA/L 09/13/19 1325      Row Name                Wound 09/04/19 1500 Right foot Blisters    Wound - Properties Group Date first assessed: 09/04/19 [ER] Time first assessed: 1500 [ER] Side: Right [ER] Location: foot [ER] Primary Wound Type: Blisters [ER], burn injury  Stage, Pressure Injury: Stage 2 [ER] Recorded by:  [ER] Rose Coffman, RN 09/04/19 1715    Row Name 09/13/19 1156             Plan of Care Review    Plan of Care Reviewed With  patient  -MM      Recorded by [MM] Carmen Wong COTA/L 09/13/19 1325      Row Name 09/13/19 1156             Outcome Summary/Treatment Plan (OT)    Daily Summary of Progress (OT)  progress toward functional goals is good  -MM      Recorded by [MM] Carmen Wong COTA/SARAH 09/13/19 1325        User Key  (r) = Recorded By, (t) = Taken By, (c) = Cosigned By    Initials Name Effective Dates Discipline    NW Jodie Vicente, PTA 08/02/16 -  PT    ER Rose Coffman, RN 08/02/16 -  Nurse    MM Carmen Wong COTA/L 10/15/18 -  OT        Wound 09/04/19 1500 Right foot Blisters (Active)   Dressing Appearance dry;intact 9/13/2019  8:00 AM   Closure MANDI 9/13/2019  8:00 AM   Base dressing in place, unable to visualize 9/13/2019  8:00 AM       Occupational Therapy Education     Title: PT OT SLP Therapies (In Progress)     Topic: Occupational Therapy (In Progress)     Point: ADL training (Done)     Description: Instruct learner(s) on proper safety adaptation and remediation techniques during self care or transfers.   Instruct in proper use of assistive devices.    Learning Progress Summary           Patient Acceptance, E,TB, VU by MM at 9/13/2019  1:25 PM    Comment:  ther ex, HEP, energy conservation    Acceptance, E,TB, VU by MM at 9/11/2019   2:05 PM    Comment:  marine safety, need to get up to use BSC not bedpan/purewick    Acceptance, E,TB, VU by JACQUELIN at 9/10/2019  4:07 PM    Comment:  HEP for AROM: pt demo'd understanding and verbalized importance for movement    Acceptance, E, VU by VI at 9/5/2019  2:05 PM    Comment:  OT POC, adls, t/fs, bed mobility, activity tolerance.                   Point: Home exercise program (Done)     Description: Instruct learner(s) on appropriate technique for monitoring, assisting and/or progressing therapeutic exercises/activities.    Learning Progress Summary           Patient Acceptance, E,TB, VU by JACQUELIN at 9/13/2019  1:25 PM    Comment:  ther ex, HEP, energy conservation    Acceptance, E,TB, VU by MM at 9/11/2019  2:05 PM    Comment:  marine safety, need to get up to use BSC not bedpan/purewick    Acceptance, E,TB, VU by MM at 9/10/2019  4:07 PM    Comment:  HEP for AROM: pt demo'lionel understanding and verbalized importance for movement                               User Key     Initials Effective Dates Name Provider Type Discipline    MM 10/15/18 -  Carmen Wong COTA/L Occupational Therapy Assistant OT    VI 10/12/18 -  Gabi Faustin OTR/L Occupational Therapist OT                OT Recommendation and Plan  Outcome Summary/Treatment Plan (OT)  Daily Summary of Progress (OT): progress toward functional goals is good  Barriers to Overall Progress (OT): most limited by SOA: O2 maintained this date   Daily Summary of Progress (OT): progress toward functional goals is good  Plan of Care Review  Plan of Care Reviewed With: patient  Plan of Care Reviewed With: patient  Outcome Summary: Very eager and thankful to work with therapy. Sit<>stand 5x at chair SBA for endurance training. Tolerated BUE ther ex standing for 7 mins in multiple planes: required standing rests and cues for breathing. Pt on NC O2 SATs 90 and above. Pt improving limited by endurance and strength. Recommended HH therapy: pt requests home ther ex  programs from OT/PT to complete I.   Outcome Measures     Row Name 09/13/19 1156 09/11/19 1333 09/10/19 1515       How much help from another is currently needed...    Putting on and taking off regular lower body clothing?  3  -MM  2  -MM  2  -MM    Bathing (including washing, rinsing, and drying)  2  -MM  2  -MM  2  -MM    Toileting (which includes using toilet bed pan or urinal)  3  -MM  3  -MM  2  -MM    Putting on and taking off regular upper body clothing  4  -MM  3  -MM  3  -MM    Taking care of personal grooming (such as brushing teeth)  4  -MM  4  -MM  4  -MM    Eating meals  4  -MM  4  -MM  4  -MM    AM-PAC 6 Clicks Score (OT)  20  -MM  18  -MM  17  -MM      User Key  (r) = Recorded By, (t) = Taken By, (c) = Cosigned By    Initials Name Provider Type    Carmen Spivey COTA/L Occupational Therapy Assistant           Time Calculation:   Time Calculation- OT     Row Name 09/13/19 1156             Time Calculation- OT    OT Start Time  1156  -MM      OT Stop Time  1222  -MM      OT Time Calculation (min)  26 min  -MM      Total Timed Code Minutes- OT  26 minute(s)  -MM      OT Received On  09/13/19  -MM         Timed Charges    62695 - OT Therapeutic Exercise Minutes  16  -MM      92417 - OT Self Care/Mgmt Minutes  10  -MM        User Key  (r) = Recorded By, (t) = Taken By, (c) = Cosigned By    Initials Name Provider Type    Carmen Spivey COTA/L Occupational Therapy Assistant        Therapy Charges for Today     Code Description Service Date Service Provider Modifiers Qty    98822299664 HC OT THER PROC EA 15 MIN 9/13/2019 Carmen Wong COTA/L GO 1    72624341737 HC OT SELF CARE/MGMT/TRAIN EA 15 MIN 9/13/2019 Carmen Wong COTA/L GO 1               JOSEFA Gonzales  9/13/2019

## 2019-09-13 NOTE — PROGRESS NOTES
PULMONARY AND CRITICAL CARE PROGRESS NOTE - [facility]    Patient: Justyna Sosa Lei  3/25/1951   MR# [id number]   Acct# [id number]  09/13/19   10:16 AM  Referring Provider: Bandar Walton,*    Chief Complaint: Acute on chronic respiratory failure with hypoxemia    Interval history: The patient has been transitioned to conventional nasal cannula oxygen and says she feels much better today and she is very satisfied with her care.  She remains on IV steroids.  Moderate continuous dyspnea in the chest is improved for the past day aggravated by exertion alleviated by oxygen and steroids associated with improving wheeze and an improving burn on her foot    Meds:    azelastine 2 spray Each Nare BID   budesonide 0.5 mg Nebulization BID - RT   cetirizine 5 mg Oral Daily   citalopram 40 mg Oral Daily   docusate sodium 100 mg Oral BID   enoxaparin 40 mg Subcutaneous Q24H   fluticasone 2 spray Nasal Daily   folic acid 1 mg Oral Daily   gabapentin 600 mg Oral Q8H   guaiFENesin 400 mg Oral Q4H   ipratropium-albuterol 3 mL Nebulization Q4H - RT   methylPREDNISolone sodium succinate 40 mg Intravenous Q8H   metoclopramide 5 mg Oral TID AC   montelukast 10 mg Oral Nightly   mupirocin  Topical Q12H   nicotine 1 patch Transdermal Q24H   oxyCODONE HCl ER 30 mg Oral Q12H   pantoprazole 40 mg Oral Q AM   polyethylene glycol 17 g Oral Daily   sodium chloride 10 mL Intravenous Q12H   traZODone 25 mg Oral Nightly       lactated ringers 75 mL/hr Last Rate: 75 mL/hr (09/08/19 2331)     Review of Systems:   Review of Systems   Constitutional: Positive for fatigue. Negative for chills and fever.   Respiratory: Positive for shortness of breath (improved). Negative for cough.    Cardiovascular: Negative for chest pain.   Gastrointestinal: Positive for abdominal distention. Negative for diarrhea, nausea and vomiting.     Physical Exam:  SpO2 Percentage    09/13/19 0617 09/13/19 0840 09/13/19 1010   SpO2: 97% 93% 94%      Temp:  [98.1 °F (36.7 °C)-98.5 °F (36.9 °C)] 98.5 °F (36.9 °C)  Heart Rate:  [70-96] 86  Resp:  [16-20] 18  BP: (100-143)/(52-66) 123/52    Intake/Output Summary (Last 24 hours) at 9/13/2019 1016  Last data filed at 9/13/2019 0300  Gross per 24 hour   Intake 240 ml   Output 900 ml   Net -660 ml     Physical Exam   Constitutional: She appears well-developed. She is active.  Non-toxic appearance. She has a sickly appearance. No distress. Nasal cannula in place.   HENT:   Nose: Nose normal.   Eyes: EOM are normal. No scleral icterus.   Neck: No JVD present. No tracheal deviation present.   Cardiovascular: Normal rate and regular rhythm.   Pulmonary/Chest: No respiratory distress. She has wheezes (much improved).   Abdominal: Soft. There is no tenderness. There is no guarding.   Musculoskeletal: She exhibits no edema.   Neurological: She is alert.   Skin: Skin is warm and dry. She is not diaphoretic.   Psychiatric: She has a normal mood and affect. Her behavior is normal.     Laboratory Data:  Results from last 7 days   Lab Units 09/09/19  0300   WBC 10*3/mm3 5.58   HEMOGLOBIN g/dL 11.0*   PLATELETS 10*3/mm3 219     Results from last 7 days   Lab Units 09/09/19  0300   SODIUM mmol/L 137   POTASSIUM mmol/L 4.1   BUN mg/dL 13   CREATININE mg/dL 0.57     Results from last 7 days   Lab Units 09/09/19  2215 09/07/19  0407 09/06/19  1248   PH, ARTERIAL pH units 7.433 7.410 7.436   PCO2, ARTERIAL mm Hg 64.3* 65.3* 61.5*   PO2 ART mm Hg 62.5* 286.0* 38.8*   FIO2 % 50 100  --      Blood Culture   Date Value Ref Range Status   09/04/2019 No growth at 24 hours  Preliminary   09/04/2019 No growth at 24 hours  Preliminary     Pulmonary Assessment:    1. Acute on chronic hypoxemic respiratory failure improved  2. Wound to the right foot, deferred to attending  3. Alpha-1 antitrypsin deficiency, phenotype SS  4. Tobacco abuse needs to discontinue  5. Polysubstance abuse needs to discontinue  6. COPD exacerbation  improved  7. Constipation chronic and stable  8. Poorly controlled reflux needs to stay on treatment    Recommend:     · On oxygen, now on conventional oxygen  · Discontinue BiPAP which she is no longer using  · Strongly recommend smoking cessation and/or nicotine replacement as her condition will continue to worsen if she does not stop smoking  · Continue bronchodilators  · Continue mucolytics  · Antibiotic per attending  · Taper steroids today  · Continue incentive spirometry/flutter valve  · O2 walks  · Defer fluid management to primary care service    Electronically signed by Wm Valdes MD, 09/13/19, 10:16 AM

## 2019-09-13 NOTE — PLAN OF CARE
Problem: Chronic Obstructive Pulmonary Disease (Adult)  Goal: Signs and Symptoms of Listed Potential Problems Will be Absent, Minimized or Managed (Chronic Obstructive Pulmonary Disease)  Outcome: Ongoing (interventions implemented as appropriate)      Problem: Patient Care Overview  Goal: Plan of Care Review  Outcome: Ongoing (interventions implemented as appropriate)   09/13/19 1526   Coping/Psychosocial   Plan of Care Reviewed With patient   Plan of Care Review   Progress improving   OTHER   Outcome Summary Pt c/o generalized pain. Medicated with PRN medication with good relief. 1x prn xanax given with good relief. VSS. 02 weaned to 5L currently. Wound care performed to right foot. Worked with PT. Set up in chair part of shift. Safety maintained. Will continue to monitor.      Goal: Individualization and Mutuality  Outcome: Ongoing (interventions implemented as appropriate)    Goal: Discharge Needs Assessment  Outcome: Ongoing (interventions implemented as appropriate)    Goal: Interprofessional Rounds/Family Conf  Outcome: Ongoing (interventions implemented as appropriate)      Problem: Skin Injury Risk (Adult)  Goal: Skin Health and Integrity  Outcome: Ongoing (interventions implemented as appropriate)      Problem: Fall Risk (Adult)  Goal: Absence of Fall  Outcome: Ongoing (interventions implemented as appropriate)      Problem: Pain, Chronic (Adult)  Goal: Acceptable Pain/Comfort Level and Functional Ability  Outcome: Ongoing (interventions implemented as appropriate)      Problem: Nutrition, Imbalanced: Inadequate Oral Intake (Adult)  Goal: Improved Oral Intake  Outcome: Ongoing (interventions implemented as appropriate)    Goal: Prevent Further Weight Loss  Outcome: Ongoing (interventions implemented as appropriate)

## 2019-09-13 NOTE — PLAN OF CARE
Problem: Chronic Obstructive Pulmonary Disease (Adult)  Goal: Signs and Symptoms of Listed Potential Problems Will be Absent, Minimized or Managed (Chronic Obstructive Pulmonary Disease)  Outcome: Ongoing (interventions implemented as appropriate)      Problem: Patient Care Overview  Goal: Plan of Care Review  Outcome: Ongoing (interventions implemented as appropriate)   09/12/19 1525 09/12/19 2100 09/13/19 0450   Coping/Psychosocial   Plan of Care Reviewed With --  patient --    Plan of Care Review   Progress improving --  --    OTHER   Outcome Summary --  --  pt c/o pain, prn po pain meds given. pt c/o anxiety, prn po xanax given. vss. pt resting comfortably       Problem: Fall Risk (Adult)  Goal: Absence of Fall  Outcome: Ongoing (interventions implemented as appropriate)      Problem: Pain, Chronic (Adult)  Goal: Acceptable Pain/Comfort Level and Functional Ability  Outcome: Ongoing (interventions implemented as appropriate)      Problem: Nutrition, Imbalanced: Inadequate Oral Intake (Adult)  Goal: Improved Oral Intake  Outcome: Ongoing (interventions implemented as appropriate)    Goal: Prevent Further Weight Loss  Outcome: Ongoing (interventions implemented as appropriate)

## 2019-09-13 NOTE — PROGRESS NOTES
Continued Stay Note   [place]     Patient Name: Justyna Sosa Lei  MRN: [id number]  Today's Date: 9/13/2019    Admit Date: 9/4/2019    Discharge Plan     Row Name 09/13/19 1027       Plan    Plan Comments  Spoke to Micaela with LTAC and patient does not meet criteria so LTAC denied; patient is also refusing home health        Discharge Codes    No documentation.             Keisha Rodriguez RN

## 2019-09-13 NOTE — PLAN OF CARE
Problem: Patient Care Overview  Goal: Plan of Care Review  Outcome: Ongoing (interventions implemented as appropriate)   09/13/19 3539   Coping/Psychosocial   Plan of Care Reviewed With patient   Plan of Care Review   Progress improving   OTHER   Outcome Summary Very eager and thankful to work with therapy. Sit<>stand 5x at chair SBA for endurance training. Tolerated BUE ther ex standing for 7 mins in multiple planes: required standing rests and cues for breathing. Pt on NC O2 SATs 90 and above. Pt improving limited by endurance and strength. Recommended HH therapy: pt requests home ther ex programs from OT/PT to complete I and no HH.

## 2019-09-14 PROCEDURE — 25010000002 METHYLPREDNISOLONE PER 40 MG: Performed by: INTERNAL MEDICINE

## 2019-09-14 PROCEDURE — 97110 THERAPEUTIC EXERCISES: CPT

## 2019-09-14 PROCEDURE — 94799 UNLISTED PULMONARY SVC/PX: CPT

## 2019-09-14 PROCEDURE — 25010000002 ENOXAPARIN PER 10 MG: Performed by: INTERNAL MEDICINE

## 2019-09-14 PROCEDURE — 94760 N-INVAS EAR/PLS OXIMETRY 1: CPT

## 2019-09-14 PROCEDURE — 97116 GAIT TRAINING THERAPY: CPT

## 2019-09-14 RX ORDER — PREDNISONE 20 MG/1
20 TABLET ORAL 2 TIMES DAILY WITH MEALS
Status: DISCONTINUED | OUTPATIENT
Start: 2019-09-14 | End: 2019-09-15

## 2019-09-14 RX ADMIN — BUDESONIDE 0.5 MG: 0.5 INHALANT RESPIRATORY (INHALATION) at 08:23

## 2019-09-14 RX ADMIN — GUAIFENESIN 400 MG: 100 SOLUTION ORAL at 06:01

## 2019-09-14 RX ADMIN — GUAIFENESIN 400 MG: 100 SOLUTION ORAL at 02:09

## 2019-09-14 RX ADMIN — IPRATROPIUM BROMIDE AND ALBUTEROL SULFATE 3 ML: 2.5; .5 SOLUTION RESPIRATORY (INHALATION) at 08:17

## 2019-09-14 RX ADMIN — MUPIROCIN: 20 OINTMENT TOPICAL at 11:02

## 2019-09-14 RX ADMIN — GUAIFENESIN 400 MG: 100 SOLUTION ORAL at 15:56

## 2019-09-14 RX ADMIN — FLUTICASONE PROPIONATE 2 SPRAY: 50 SPRAY, METERED NASAL at 08:24

## 2019-09-14 RX ADMIN — SIMETHICONE 80 MG: 80 TABLET, CHEWABLE ORAL at 08:24

## 2019-09-14 RX ADMIN — GUAIFENESIN 400 MG: 100 SOLUTION ORAL at 21:52

## 2019-09-14 RX ADMIN — PANTOPRAZOLE SODIUM 40 MG: 40 TABLET, DELAYED RELEASE ORAL at 06:02

## 2019-09-14 RX ADMIN — SODIUM CHLORIDE, PRESERVATIVE FREE 10 ML: 5 INJECTION INTRAVENOUS at 21:51

## 2019-09-14 RX ADMIN — GUAIFENESIN 400 MG: 100 SOLUTION ORAL at 18:28

## 2019-09-14 RX ADMIN — ALPRAZOLAM 0.5 MG: 0.5 TABLET ORAL at 18:28

## 2019-09-14 RX ADMIN — SODIUM CHLORIDE, PRESERVATIVE FREE 10 ML: 5 INJECTION INTRAVENOUS at 08:25

## 2019-09-14 RX ADMIN — FOLIC ACID 1 MG: 1 TABLET ORAL at 08:24

## 2019-09-14 RX ADMIN — CITALOPRAM 40 MG: 20 TABLET, FILM COATED ORAL at 08:24

## 2019-09-14 RX ADMIN — ALPRAZOLAM 0.5 MG: 0.5 TABLET ORAL at 08:24

## 2019-09-14 RX ADMIN — GABAPENTIN 600 MG: 300 CAPSULE ORAL at 21:52

## 2019-09-14 RX ADMIN — METHYLPREDNISOLONE SODIUM SUCCINATE 40 MG: 40 INJECTION, POWDER, FOR SOLUTION INTRAMUSCULAR; INTRAVENOUS at 06:02

## 2019-09-14 RX ADMIN — POLYETHYLENE GLYCOL 3350 17 G: 17 POWDER, FOR SOLUTION ORAL at 08:24

## 2019-09-14 RX ADMIN — METOCLOPRAMIDE 5 MG: 5 TABLET ORAL at 12:46

## 2019-09-14 RX ADMIN — GUAIFENESIN 400 MG: 100 SOLUTION ORAL at 11:01

## 2019-09-14 RX ADMIN — BUDESONIDE 0.5 MG: 0.5 INHALANT RESPIRATORY (INHALATION) at 20:25

## 2019-09-14 RX ADMIN — OXYCODONE HYDROCHLORIDE 30 MG: 20 TABLET, FILM COATED, EXTENDED RELEASE ORAL at 21:52

## 2019-09-14 RX ADMIN — OXYCODONE HYDROCHLORIDE AND ACETAMINOPHEN 1 TABLET: 10; 325 TABLET ORAL at 06:32

## 2019-09-14 RX ADMIN — IPRATROPIUM BROMIDE AND ALBUTEROL SULFATE 3 ML: 2.5; .5 SOLUTION RESPIRATORY (INHALATION) at 20:25

## 2019-09-14 RX ADMIN — METOCLOPRAMIDE 5 MG: 5 TABLET ORAL at 08:24

## 2019-09-14 RX ADMIN — CETIRIZINE HYDROCHLORIDE 5 MG: 5 TABLET ORAL at 08:24

## 2019-09-14 RX ADMIN — OXYCODONE HYDROCHLORIDE AND ACETAMINOPHEN 1 TABLET: 10; 325 TABLET ORAL at 14:05

## 2019-09-14 RX ADMIN — OXYCODONE HYDROCHLORIDE 30 MG: 20 TABLET, FILM COATED, EXTENDED RELEASE ORAL at 08:24

## 2019-09-14 RX ADMIN — ENOXAPARIN SODIUM 40 MG: 40 INJECTION SUBCUTANEOUS at 08:23

## 2019-09-14 RX ADMIN — GABAPENTIN 600 MG: 300 CAPSULE ORAL at 06:02

## 2019-09-14 RX ADMIN — METOCLOPRAMIDE 5 MG: 5 TABLET ORAL at 18:28

## 2019-09-14 RX ADMIN — MONTELUKAST SODIUM 10 MG: 10 TABLET, FILM COATED ORAL at 21:51

## 2019-09-14 RX ADMIN — SIMETHICONE 80 MG: 80 TABLET, CHEWABLE ORAL at 22:27

## 2019-09-14 RX ADMIN — AZELASTINE HYDROCHLORIDE 2 SPRAY: 137 SPRAY, METERED NASAL at 08:24

## 2019-09-14 RX ADMIN — DOCUSATE SODIUM 100 MG: 100 CAPSULE, LIQUID FILLED ORAL at 08:24

## 2019-09-14 RX ADMIN — IPRATROPIUM BROMIDE AND ALBUTEROL SULFATE 3 ML: 2.5; .5 SOLUTION RESPIRATORY (INHALATION) at 23:49

## 2019-09-14 RX ADMIN — GABAPENTIN 600 MG: 300 CAPSULE ORAL at 15:56

## 2019-09-14 RX ADMIN — NICOTINE 1 PATCH: 21 PATCH, EXTENDED RELEASE TRANSDERMAL at 18:32

## 2019-09-14 RX ADMIN — TRAZODONE HYDROCHLORIDE 25 MG: 50 TABLET ORAL at 21:51

## 2019-09-14 NOTE — PROGRESS NOTES
PROGRESS NOTE  Patient Name: Justyna Sosa Lei  Age/Sex: 68 y.o. female  : 3/25/1951  MRN: [id number]    Date of Admission: 2019  Date of Encounter Visit: 19   LOS: 10 days   Patient Care Team:  Provider, No Known as PCP - Shawn Valladares DO as Consulting Physician (Gastroenterology)  Leigh Lynn MD as Consulting Physician (Gastroenterology)  AuroraKeisha APRN as Nurse Practitioner (Family Medicine)  [place]     Chief Complaint: Gradually improving    Hospital course: Admitted with acute exacerbation of her bronchitis, patient has alpha-1 antitrypsin deficiency was homozygous SS genotype.  She was initially on antibiotic which were discontinued, she is now on the steroid and that is being tapered.  She is having dyspnea with activity but it is getting better, she has minimal symptoms while at rest.  She was started on Symbicort however patient reported that she cannot tolerate that medication and it caused significant nasal irritation, she uses Trelegy at home and she can bring it if we allow her to use it.    Interval History: Improving shortness of breath was still dyspnea on exertion but overall sounding and looking better    REVIEW OF SYSTEMS:   CONSTITUTIONAL: no fever or chills  CARDIOVASCULAR: No chest pain, chest pressure or chest discomfort. No palpitations or edema.   RESPIRATORY: Improving shortness of breath with less cough and less tightness.   GASTROINTESTINAL: No anorexia, nausea, vomiting or diarrhea. No abdominal pain or blood.   HEMATOLOGIC: No bleeding or bruising.     Ventilator/Non-Invasive Ventilation Settings (From admission, onward)    Start     Ordered    19  NIPPV (CPAP or BIPAP)  Until Discontinued,   Status:  Canceled     Comments:  RT to manage.  Currently  with  02 @ 50%   Question Answer Comment   Indication: Acute Respiratory Failure    Type: BIPAP    NIPPV Mask Interface: Per Patient Preference    IPAP 16    EPAP 6    Oxygen FIO2   "  FIO2 % 50    Titrate for SPO2 90%        09/09/19 2122            Vital Signs  Temp:  [98 °F (36.7 °C)-98.3 °F (36.8 °C)] 98 °F (36.7 °C)  Heart Rate:  [] 84  Resp:  [16-20] 16  BP: (102-145)/(45-76) 134/53  SpO2:  [90 %-95 %] 94 %  on  Flow (L/min):  [0.3-5] 3 Device (Oxygen Therapy): nasal cannula    Intake/Output Summary (Last 24 hours) at 9/14/2019 1236  Last data filed at 9/14/2019 0438  Gross per 24 hour   Intake --   Output 500 ml   Net -500 ml     Flowsheet Rows      First Filed Value   Admission Height  167.6 cm (66\") Documented at 09/04/2019 1405   Admission Weight  59 kg (130 lb) Documented at 09/04/2019 1405        Body mass index is 24.39 kg/m².      09/11/19 2020 09/12/19 2047 09/13/19 1958   Weight: 69.2 kg (152 lb 8 oz) 69.7 kg (153 lb 9.6 oz) 68.5 kg (151 lb 1.6 oz)       Physical Exam:  GEN:  No acute distress, alert, cooperative, well developed, on nasal cannula oxygen  EYES:   Sclera clear. No icterus. PERRL. Normal EOM  ENT:   External ears/nose normal, no oral lesions, no thrush, mucous membranes moist  NECK:  Supple, midline trachea, no JVD  LUNGS: Normal chest on inspection, diminished breath sounds bilaterally, there is a very minimal expiratory wheezes,. Respirations regular, even and unlabored.   CV:  Regular rhythm and rate. Normal S1/S2. No murmurs, gallops, or rubs noted.  ABD:  Soft, non-tender and non-distended. Normal bowel sounds. No guarding  EXT:  Moves all extremities well. No cyanosis. No redness. No edema.   Skin: dry, intact, no bleeding    Results Review:      Results from last 7 days   Lab Units 09/09/19  0300   SODIUM mmol/L 137   POTASSIUM mmol/L 4.1   CHLORIDE mmol/L 95*   CO2 mmol/L 38.0*   BUN mg/dL 13   CREATININE mg/dL 0.57   CALCIUM mg/dL 8.5   AST (SGOT) U/L 25   ALT (SGPT) U/L 28   ANION GAP mmol/L 4.0   ALBUMIN g/dL 2.80*                 Results from last 7 days   Lab Units 09/09/19  0300   WBC 10*3/mm3 5.58   HEMOGLOBIN g/dL 11.0*   HEMATOCRIT % 34.8 "   PLATELETS 10*3/mm3 219   MCV fL 100.3*   NEUTROPHIL % % 90.1*   LYMPHOCYTE % % 4.5*   MONOCYTES % % 3.9*   EOSINOPHIL % % 0.0*   BASOPHIL % % 0.2   IMM GRAN % % 1.3*                   Invalid input(s): LDLCALC  Results from last 7 days   Lab Units 09/09/19  2215   PH, ARTERIAL pH units 7.433   PCO2, ARTERIAL mm Hg 64.3*   PO2 ART mm Hg 62.5*   HCO3 ART mmol/L 42.9*         No results found for: POCGLU                        Imaging:   Imaging Results (all)     Procedure Component Value Units Date/Time    XR Chest 1 View [[id number]] Collected:  09/09/19 0720     Updated:  09/09/19 0725    Narrative:       EXAM: XR CHEST 1 VW- - 9/9/2019 3:05 AM CDT     HISTORY: Follow-up lung infiltrate; J44.1-Chronic obstructive pulmonary  disease with (acute) exacerbation; R06.89-Other abnormalities of  breathing; R68.89-Other general symptoms and signs; Z74.09-Other reduced  mobility       COMPARISON: 09/06/2019, 09/04/2019.      TECHNIQUE:  1 images.  Frontal view of the chest.     FINDINGS:    No pneumothorax or large pleural effusion. Mildly increased streaky  bibasilar opacities. Emphysema. Cardiac mediastinal silhouette within  normal limits. Calcified aortic atherosclerosis. Surgical clips at the  epigastrium. No acute bony pathology.          Impression:       1. Mildly increased streaky bibasilar opacities, favored to represent  atelectasis. Infiltrate or aspiration are not excluded.  2. Emphysema.  This report was finalized on 09/09/2019 07:22 by Dr Candace Mchugh MD.    XR Abdomen KUB [[id number]] Collected:  09/08/19 0908     Updated:  09/08/19 0914    Narrative:       EXAM: XR ABDOMEN KUB- - 9/8/2019 8:17 AM CDT     HISTORY: Abdominal pain; J44.1-Chronic obstructive pulmonary disease  with (acute) exacerbation; R06.89-Other abnormalities of breathing;  R68.89-Other general symptoms and signs; Z74.09-Other reduced mobility       COMPARISON: None.      TECHNIQUE:  1 images.  Supine view of the abdomen.      FINDINGS:   Borderline gas-filled loops of small bowel. There is gas and  stool in the colon to the level of the rectum. This could represent a  normal bowel gas pattern or ileus. Surgical clips at the epigastrium,  right upper quadrant and mid abdomen. Correlate with surgical history. A  few pelvic phleboliths. Degenerative changes in visualized spine and  hips. Lung bases limited in assessment, grossly clear.          Impression:       1. Borderline gas-filled loops of small bowel with gas and stool in the  colon to the level of the rectum. This could represent a normal bowel  gas pattern or ileus. Limited evaluation for free air and air-fluid  levels on supine image.  This report was finalized on 09/08/2019 09:11 by Dr Candace Mchugh MD.    XR Chest 1 View [[id number]] Collected:  09/06/19 1249     Updated:  09/06/19 1427    Narrative:       EXAMINATION: XR CHEST 1 VW-. 9/6/2019 12:49 PM CDT     CHEST, ONE VIEW:     HISTORY: Shortness of air     COMPARISON: 9/4/2019 and 1/13/2019 chest radiograph and CT angiogram  chest dated 09/04/2019     A single frontal chest radiograph was obtained.     FINDINGS:     Severe pulmonary emphysema appreciated.     Mild increased interstitial infiltration again appreciated the right mid  lower lung zone region likely unchanged compared to the previous study.     The left lung remains clear.     The heart is normal in size, pulmonary circulation appropriate, without  heart failure.     The bony structures are intact.                                     Impression:       1. Severe pulmonary emphysema.  2. Mild infiltrative opacities right mid and lower lung zone region,  best described on the CT scan of the chest, likely unchanged.     This report was finalized on 09/06/2019 12:51 by Dr. Eduardo Black MD.    US Venous Doppler Lower Extremity Right (duplex) [[id number]] Collected:  09/05/19 1658     Updated:  09/05/19 1701    Narrative:       History: Pain and swelling       Impression:        Impression: There is no evidence of deep venous thrombosis or  superficial thrombophlebitis of the right lower extremity.     Comments: Right lower extremity venous duplex exam was performed using  color Doppler flow, Doppler wave form analysis, and grayscale imaging,  with and without compression. There is no evidence of deep venous  thrombosis of the common femoral, superficial femoral, popliteal,  posterior tibial, and peroneal veins. There is no thrombus identified in  the saphenofemoral junction or the greater saphenous vein. There is no  evidence of clot in the left common femoral vein.     This report was finalized on 09/05/2019 16:58 by Dr. Corey Hayes MD.    CT Angiogram Chest With & Without Contrast [[id number]] Collected:  09/04/19 1857     Updated:  09/04/19 1905    Narrative:       CT ANGIOGRAM CHEST W WO CONTRAST- 9/4/2019 6:23 PM CDT      HISTORY: Dyspnea, cardiac origin suspected; J44.1-Chronic obstructive  pulmonary disease with (acute) exacerbation; R06.89-Other abnormalities  of breathing      COMPARISON: CT scan dated 08/19/2019.      DOSE LENGTH PRODUCT: 246 mGy cm. Automated exposure control was also  utilized to decrease patient radiation dose.     TECHNIQUE: Helical tomographic images of the chest were obtained after  the administration of intravenous contrast following angiogram protocol.  Additionally, 3D and multiplanar reformatted images were provided.        FINDINGS:    Pulmonary arteries: There is adequate enhancement of the pulmonary  arteries to evaluate for central and segmental pulmonary emboli. There  are no filling defects within the main, lobar, segmental or visualized  subsegmental pulmonary arteries. The pulmonary arteries are within  normal limits for size.      Aorta and great vessels: The aorta is well opacified and demonstrates no  dissection or aneurysm. Mild calcification in the arch. Great vessel  origins are normal in caliber.     Visualized neck base: The imaged  portion of the base of the neck appears  unremarkable.      Lungs: Advanced lung emphysema. Reidentified 1 cm right lower lobe  pulmonary nodule (series 5 image image 99). There is a new consolidation  in the lateral segment right middle lobe. Notable peribronchial  thickening in the right middle and right lower lobe. No pleural  effusion.     Heart: The heart is normal in size. There is no pericardial effusion.  Moderate coronary atheromatous calcification.     Mediastinum and lymph nodes: No enlarged mediastinal, hilar, or axillary  lymph nodes are present.      Skeletal and soft tissues: The osseous structures of the thorax and  surrounding soft tissues demonstrate no acute process.     Upper abdomen: The imaged portion of the upper abdomen demonstrates no  acute process.        Impression:       1. No evidence of pulmonary embolus.  2. New subsegmental consolidation in the lateral segment right middle  lobe, either atelectasis or developing pneumonia.  3. Advanced lung emphysema.  4. 1 cm right lower lobe pulmonary nodule which is suspicious, perhaps a  primary lung malignancy given patient's risk factors. This is mildly  enlarged when compared with an older 01/29/2019 exam.        This report was finalized on 09/04/2019 19:02 by Dr Te Jensen, .    XR Chest 1 View [[id number]] Collected:  09/04/19 1455     Updated:  09/04/19 1459    Narrative:       Frontal upright radiograph of the chest 9/4/2019 2:38 PM CDT     COMPARISON: 01/13/2019.     HISTORY: Dyspnea hypoxia.     FINDINGS:   Hyperinflation flattening diaphragms noted. Chronic changes present in  the pulmonary parenchyma.. The cardiomediastinal silhouette and  pulmonary vascularity are within normal limits.      The osseous structures and surrounding soft tissues demonstrate no acute  abnormality.       Impression:       1. COPD no acute cardiopulmonary process.        This report was finalized on 09/04/2019 14:56 by Dr. Adan Myers MD.          I  reviewed the patient's new clinical results.  I personally viewed and interpreted the patient's imaging results:        Medication Review:     azelastine 2 spray Each Nare BID   budesonide 0.5 mg Nebulization BID - RT   cetirizine 5 mg Oral Daily   citalopram 40 mg Oral Daily   docusate sodium 100 mg Oral BID   enoxaparin 40 mg Subcutaneous Q24H   fluticasone 2 spray Nasal Daily   folic acid 1 mg Oral Daily   gabapentin 600 mg Oral Q8H   guaiFENesin 400 mg Oral Q4H   ipratropium-albuterol 3 mL Nebulization Q4H - RT   methylPREDNISolone sodium succinate 40 mg Intravenous Q12H   metoclopramide 5 mg Oral TID AC   montelukast 10 mg Oral Nightly   mupirocin  Topical Q12H   nicotine 1 patch Transdermal Q24H   oxyCODONE HCl ER 30 mg Oral Q12H   pantoprazole 40 mg Oral Q AM   polyethylene glycol 17 g Oral Daily   sodium chloride 10 mL Intravenous Q12H   traZODone 25 mg Oral Nightly         lactated ringers 75 mL/hr Last Rate: 75 mL/hr (09/08/19 6092)       ASSESSMENT:   1. Acute on chronic hypoxemic respiratory failure improved  2. Wound to the right foot, deferred to attending  3. Alpha-1 antitrypsin deficiency, phenotype SS  4. Tobacco abuse needs to discontinue  5. Polysubstance abuse needs to discontinue  6. COPD exacerbation improved  7. Constipation chronic and stable  8. Poorly controlled reflux needs to stay on treatment    PLAN:  Patient is doing better, she will be transitioned to oral prednisone and continue to taper as tolerated  Re educated on the need to abstain from smoking  She can not tolerate the symbicort, she will bring her home Trelegy inhaler and she should be ok to use it  Discussed with the family the need to get screened for alpha-1 antitrypsin genetic mutation since they patient has homozygous trait so definitely each 1 of her offsprings is at least heterozygous for that gene.  Patient is doing well on the BiPAP is no longer needed  Fine off of the antibiotics  Discussed with patient and with the  bedside    Disposition: Per primary team    Thomas Leal MD  09/14/19  12:36 PM           Dictated utilizing Dragon dictation

## 2019-09-14 NOTE — PROGRESS NOTES
[facility]  INPATIENT PROGRESS NOTE    Patient Name: Justyna Sosa Lei  Date of Admission: 9/4/2019  Today's Date: 09/13/19  Length of Stay: 9  Primary Care Physician: Provider, No Known    Subjective   Chief Complaint: Shortness of breath  Shortness of Breath     Tolerating oxygen by nasal canula today without significant resting dyspnea.    Review of Systems   Respiratory: Positive for shortness of breath.       All pertinent negatives and positives are as above. All other systems have been reviewed and are negative unless otherwise stated.     Objective    Temp:  [97.4 °F (36.3 °C)-98.5 °F (36.9 °C)] 98.1 °F (36.7 °C)  Heart Rate:  [77-96] 94  Resp:  [16-20] 20  BP: (100-143)/(45-63) 118/45  Physical Exam   Constitutional: She is oriented to person, place, and time. She appears well-developed.   Dyspneic at rest   HENT:   Head: Normocephalic and atraumatic.   Right Ear: External ear normal.   Left Ear: External ear normal.   Eyes: EOM are normal. Pupils are equal, round, and reactive to light.   Neck: Normal range of motion. Neck supple. No tracheal deviation present. No thyromegaly present.   Cardiovascular: Normal rate and regular rhythm.   Pulmonary/Chest:   Increased effort, mild use of accessory muscles. Air entry is diminished, wheezing improved   Abdominal: Soft. Bowel sounds are normal. She exhibits no mass. There is no tenderness.   Musculoskeletal: Normal range of motion. She exhibits no edema or deformity.   Neurological: She is alert and oriented to person, place, and time. She displays normal reflexes. No cranial nerve deficit. She exhibits normal muscle tone.   Skin: Skin is warm. Capillary refill takes less than 2 seconds. She is not diaphoretic. No erythema.   Psychiatric: She has a normal mood and affect.     Results Review:  I have reviewed the labs, radiology results, and diagnostic studies.    Laboratory Data:   Results from last 7 days    Lab Units 09/09/19  0300   WBC 10*3/mm3 5.58   HEMOGLOBIN g/dL 11.0*   HEMATOCRIT % 34.8   PLATELETS 10*3/mm3 219        Results from last 7 days   Lab Units 09/09/19  0300   SODIUM mmol/L 137   POTASSIUM mmol/L 4.1   CHLORIDE mmol/L 95*   CO2 mmol/L 38.0*   BUN mg/dL 13   CREATININE mg/dL 0.57   CALCIUM mg/dL 8.5   BILIRUBIN mg/dL 0.2   ALK PHOS U/L 46   ALT (SGPT) U/L 28   AST (SGOT) U/L 25   GLUCOSE mg/dL 166*       Culture Data:   Blood Culture   Date Value Ref Range Status   09/04/2019 No growth at 5 days  Final   09/04/2019 No growth at 5 days  Final     Radiology Data:   Imaging Results (last 24 hours)     ** No results found for the last 24 hours. **        I have reviewed the patient's current medications.     Assessment/Plan     Active Hospital Problems    Diagnosis   • **COPD exacerbation (CMS/HCC)   • Compensated chronic respiratory acidosis   • Wound of right foot   • Acute on chronic respiratory failure with hypoxia and hypercapnia (CMS/HCC)   • Polypharmacy   • Personal history of nicotine dependence   • Alpha-1-antitrypsin deficiency carrier (CMS/HCC)     phenotype SS     • Folate deficiency anemia       Continue care in medical floor.  High flow oxygen. Pulmonology input noted. .   Do not anticipate a fast recovery, likely will need placement if there is any chance of continued improvement.   Solumedrol 40 mg q8h. Douneb q4h.   Reglan for GERD.  Medications reviewed.  Prognosis guarded.    Nutrition consult noted.    Discharge Planning: To be determined, considering LTAC placement although if she continues to improve could consider home discharge.    Bandar Walton MD   09/13/19   7:24 PM

## 2019-09-14 NOTE — PROGRESS NOTES
[facility] Medicine Services  INPATIENT PROGRESS NOTE    Patient Name: Justyna Sosa Lei  Date of Admission: 9/4/2019  Today's Date: 09/14/19  Length of Stay: 10  Primary Care Physician: Provider, No Known    Subjective   Chief Complaint: Shortness of breath  Shortness of Breath     Continues to tolerated oxygen by nasal canula. No chest pain. Dry cough.    Review of Systems   Respiratory: Positive for shortness of breath.       All pertinent negatives and positives are as above. All other systems have been reviewed and are negative unless otherwise stated.     Objective    Temp:  [97.9 °F (36.6 °C)-98.3 °F (36.8 °C)] 97.9 °F (36.6 °C)  Heart Rate:  [] 77  Resp:  [16-20] 16  BP: (102-145)/(52-76) 143/53  Physical Exam   Constitutional: She is oriented to person, place, and time. She appears well-developed.   Dyspneic at rest   HENT:   Head: Normocephalic and atraumatic.   Right Ear: External ear normal.   Left Ear: External ear normal.   Eyes: EOM are normal. Pupils are equal, round, and reactive to light.   Neck: Normal range of motion. Neck supple. No tracheal deviation present. No thyromegaly present.   Cardiovascular: Normal rate and regular rhythm.   Pulmonary/Chest:   Normal effort, dry cough. Retractions improving. Bilateral wheezing improving. Air entry improving.    Abdominal: Soft. Bowel sounds are normal. She exhibits no mass. There is no tenderness.   Musculoskeletal: Normal range of motion. She exhibits no edema or deformity.   Neurological: She is alert and oriented to person, place, and time. She displays normal reflexes. No cranial nerve deficit. She exhibits normal muscle tone.   Skin: Skin is warm. Capillary refill takes less than 2 seconds. She is not diaphoretic. No erythema.   Psychiatric: She has a normal mood and affect.     Results Review:  I have reviewed the labs, radiology results, and diagnostic studies.    Laboratory Data:   Results from last 7  days   Lab Units 09/09/19  0300   WBC 10*3/mm3 5.58   HEMOGLOBIN g/dL 11.0*   HEMATOCRIT % 34.8   PLATELETS 10*3/mm3 219        Results from last 7 days   Lab Units 09/09/19  0300   SODIUM mmol/L 137   POTASSIUM mmol/L 4.1   CHLORIDE mmol/L 95*   CO2 mmol/L 38.0*   BUN mg/dL 13   CREATININE mg/dL 0.57   CALCIUM mg/dL 8.5   BILIRUBIN mg/dL 0.2   ALK PHOS U/L 46   ALT (SGPT) U/L 28   AST (SGOT) U/L 25   GLUCOSE mg/dL 166*       Culture Data:   Blood Culture   Date Value Ref Range Status   09/04/2019 No growth at 5 days  Final   09/04/2019 No growth at 5 days  Final     Radiology Data:   Imaging Results (last 24 hours)     ** No results found for the last 24 hours. **        I have reviewed the patient's current medications.     Assessment/Plan     Active Hospital Problems    Diagnosis   • **COPD exacerbation (CMS/HCC)   • Compensated chronic respiratory acidosis   • Wound of right foot   • Acute on chronic respiratory failure with hypoxia and hypercapnia (CMS/HCC)   • Polypharmacy   • Personal history of nicotine dependence   • Alpha-1-antitrypsin deficiency carrier (CMS/HCC)     phenotype SS     • Folate deficiency anemia       Continue care in medical floor.  High flow oxygen to nasal canula. Off BiPAP. Pulmonology input noted.    Solumedrol to prednisone. Duoneb.  Reglan for GERD tolerated.  Medications reviewed.  Prognosis guarded.    Nutrition consult noted.    Discharge Planning: Hopefully home in 1-2 days.    Bandar Walton MD   09/14/19   5:31 PM

## 2019-09-14 NOTE — PLAN OF CARE
Problem: Chronic Obstructive Pulmonary Disease (Adult)  Goal: Signs and Symptoms of Listed Potential Problems Will be Absent, Minimized or Managed (Chronic Obstructive Pulmonary Disease)  Outcome: Ongoing (interventions implemented as appropriate)      Problem: Patient Care Overview  Goal: Plan of Care Review  Outcome: Ongoing (interventions implemented as appropriate)   09/14/19 0465   Coping/Psychosocial   Plan of Care Reviewed With patient   Plan of Care Review   Progress improving   OTHER   Outcome Summary Pt medicated for c/o with scheduled and prn pain meds. Oxygen at 3l/nc. Sats wnl. Ambulated in hallway with PT. Fall protocol in place.        Problem: Nutrition, Imbalanced: Inadequate Oral Intake (Adult)  Goal: Improved Oral Intake  Outcome: Ongoing (interventions implemented as appropriate)    Goal: Prevent Further Weight Loss  Outcome: Ongoing (interventions implemented as appropriate)

## 2019-09-14 NOTE — PLAN OF CARE
Problem: Patient Care Overview  Goal: Plan of Care Review  Outcome: Ongoing (interventions implemented as appropriate)   09/14/19 2658   Coping/Psychosocial   Plan of Care Reviewed With patient   OTHER   Outcome Summary Pt. is independent with bed transfers. Stands and sits with CGA. Ambulated with RWX on 4LPM with CGA for 110'. SATS 88-92%. Tolerated well.

## 2019-09-14 NOTE — PLAN OF CARE
Problem: Chronic Obstructive Pulmonary Disease (Adult)  Goal: Signs and Symptoms of Listed Potential Problems Will be Absent, Minimized or Managed (Chronic Obstructive Pulmonary Disease)  Outcome: Ongoing (interventions implemented as appropriate)      Problem: Patient Care Overview  Goal: Plan of Care Review  Outcome: Ongoing (interventions implemented as appropriate)   09/13/19 1526 09/13/19 2100 09/14/19 0346   Coping/Psychosocial   Plan of Care Reviewed With --  patient --    Plan of Care Review   Progress improving --  --    OTHER   Outcome Summary --  --  pt c/o pain, scheduled po pain meds given. pt c/o anxiety, prn po xanax given. o2 5L nc. vss. pt resting comfortably       Problem: Skin Injury Risk (Adult)  Goal: Skin Health and Integrity  Outcome: Ongoing (interventions implemented as appropriate)      Problem: Fall Risk (Adult)  Goal: Absence of Fall  Outcome: Ongoing (interventions implemented as appropriate)      Problem: Pain, Chronic (Adult)  Goal: Acceptable Pain/Comfort Level and Functional Ability  Outcome: Ongoing (interventions implemented as appropriate)      Problem: Nutrition, Imbalanced: Inadequate Oral Intake (Adult)  Goal: Improved Oral Intake  Outcome: Ongoing (interventions implemented as appropriate)    Goal: Prevent Further Weight Loss  Outcome: Ongoing (interventions implemented as appropriate)

## 2019-09-14 NOTE — PLAN OF CARE
Problem: Fall Risk (Adult)  Goal: Absence of Fall  Outcome: Ongoing (interventions implemented as appropriate)      Problem: Pain, Chronic (Adult)  Goal: Acceptable Pain/Comfort Level and Functional Ability  Outcome: Ongoing (interventions implemented as appropriate)

## 2019-09-15 PROCEDURE — 25010000002 ENOXAPARIN PER 10 MG: Performed by: INTERNAL MEDICINE

## 2019-09-15 PROCEDURE — 94799 UNLISTED PULMONARY SVC/PX: CPT

## 2019-09-15 PROCEDURE — 25010000002 METHYLPREDNISOLONE PER 40 MG: Performed by: INTERNAL MEDICINE

## 2019-09-15 PROCEDURE — 97116 GAIT TRAINING THERAPY: CPT

## 2019-09-15 RX ORDER — METHYLPREDNISOLONE SODIUM SUCCINATE 40 MG/ML
40 INJECTION, POWDER, LYOPHILIZED, FOR SOLUTION INTRAMUSCULAR; INTRAVENOUS DAILY
Status: COMPLETED | OUTPATIENT
Start: 2019-09-15 | End: 2019-09-16

## 2019-09-15 RX ADMIN — IPRATROPIUM BROMIDE AND ALBUTEROL SULFATE 3 ML: 2.5; .5 SOLUTION RESPIRATORY (INHALATION) at 06:33

## 2019-09-15 RX ADMIN — METOCLOPRAMIDE 5 MG: 5 TABLET ORAL at 17:48

## 2019-09-15 RX ADMIN — DOCUSATE SODIUM 100 MG: 100 CAPSULE, LIQUID FILLED ORAL at 21:07

## 2019-09-15 RX ADMIN — ALPRAZOLAM 0.5 MG: 0.5 TABLET ORAL at 15:47

## 2019-09-15 RX ADMIN — IPRATROPIUM BROMIDE AND ALBUTEROL SULFATE 3 ML: 2.5; .5 SOLUTION RESPIRATORY (INHALATION) at 11:04

## 2019-09-15 RX ADMIN — SODIUM CHLORIDE, PRESERVATIVE FREE 10 ML: 5 INJECTION INTRAVENOUS at 21:08

## 2019-09-15 RX ADMIN — GUAIFENESIN 400 MG: 100 SOLUTION ORAL at 02:00

## 2019-09-15 RX ADMIN — OXYCODONE HYDROCHLORIDE 30 MG: 20 TABLET, FILM COATED, EXTENDED RELEASE ORAL at 21:07

## 2019-09-15 RX ADMIN — ALPRAZOLAM 0.5 MG: 0.5 TABLET ORAL at 04:23

## 2019-09-15 RX ADMIN — FOLIC ACID 1 MG: 1 TABLET ORAL at 09:03

## 2019-09-15 RX ADMIN — GABAPENTIN 600 MG: 300 CAPSULE ORAL at 21:07

## 2019-09-15 RX ADMIN — IPRATROPIUM BROMIDE AND ALBUTEROL SULFATE 3 ML: 2.5; .5 SOLUTION RESPIRATORY (INHALATION) at 23:28

## 2019-09-15 RX ADMIN — PANTOPRAZOLE SODIUM 40 MG: 40 TABLET, DELAYED RELEASE ORAL at 06:26

## 2019-09-15 RX ADMIN — FLUTICASONE PROPIONATE 2 SPRAY: 50 SPRAY, METERED NASAL at 09:01

## 2019-09-15 RX ADMIN — GUAIFENESIN 400 MG: 100 SOLUTION ORAL at 09:02

## 2019-09-15 RX ADMIN — OXYCODONE HYDROCHLORIDE 30 MG: 20 TABLET, FILM COATED, EXTENDED RELEASE ORAL at 09:03

## 2019-09-15 RX ADMIN — TRAZODONE HYDROCHLORIDE 25 MG: 50 TABLET ORAL at 21:08

## 2019-09-15 RX ADMIN — GABAPENTIN 600 MG: 300 CAPSULE ORAL at 15:42

## 2019-09-15 RX ADMIN — MUPIROCIN: 20 OINTMENT TOPICAL at 09:07

## 2019-09-15 RX ADMIN — IPRATROPIUM BROMIDE AND ALBUTEROL SULFATE 3 ML: 2.5; .5 SOLUTION RESPIRATORY (INHALATION) at 03:16

## 2019-09-15 RX ADMIN — GUAIFENESIN 400 MG: 100 SOLUTION ORAL at 06:26

## 2019-09-15 RX ADMIN — NYSTATIN 500000 UNITS: 100000 SUSPENSION ORAL at 15:43

## 2019-09-15 RX ADMIN — METHYLPREDNISOLONE SODIUM SUCCINATE 40 MG: 40 INJECTION, POWDER, FOR SOLUTION INTRAMUSCULAR; INTRAVENOUS at 11:56

## 2019-09-15 RX ADMIN — ENOXAPARIN SODIUM 40 MG: 40 INJECTION SUBCUTANEOUS at 09:02

## 2019-09-15 RX ADMIN — IPRATROPIUM BROMIDE AND ALBUTEROL SULFATE 3 ML: 2.5; .5 SOLUTION RESPIRATORY (INHALATION) at 14:23

## 2019-09-15 RX ADMIN — NYSTATIN 500000 UNITS: 100000 SUSPENSION ORAL at 21:08

## 2019-09-15 RX ADMIN — METOCLOPRAMIDE 5 MG: 5 TABLET ORAL at 11:55

## 2019-09-15 RX ADMIN — OXYCODONE HYDROCHLORIDE AND ACETAMINOPHEN 1 TABLET: 10; 325 TABLET ORAL at 11:55

## 2019-09-15 RX ADMIN — MONTELUKAST SODIUM 10 MG: 10 TABLET, FILM COATED ORAL at 21:07

## 2019-09-15 RX ADMIN — NYSTATIN 500000 UNITS: 100000 SUSPENSION ORAL at 17:48

## 2019-09-15 RX ADMIN — GUAIFENESIN 400 MG: 100 SOLUTION ORAL at 17:48

## 2019-09-15 RX ADMIN — SODIUM CHLORIDE, PRESERVATIVE FREE 10 ML: 5 INJECTION INTRAVENOUS at 09:06

## 2019-09-15 RX ADMIN — GUAIFENESIN 400 MG: 100 SOLUTION ORAL at 15:43

## 2019-09-15 RX ADMIN — CETIRIZINE HYDROCHLORIDE 5 MG: 5 TABLET ORAL at 09:03

## 2019-09-15 RX ADMIN — AZELASTINE HYDROCHLORIDE 2 SPRAY: 137 SPRAY, METERED NASAL at 09:01

## 2019-09-15 RX ADMIN — OXYCODONE HYDROCHLORIDE AND ACETAMINOPHEN 1 TABLET: 10; 325 TABLET ORAL at 02:07

## 2019-09-15 RX ADMIN — GABAPENTIN 600 MG: 300 CAPSULE ORAL at 06:26

## 2019-09-15 RX ADMIN — GUAIFENESIN 400 MG: 100 SOLUTION ORAL at 21:07

## 2019-09-15 RX ADMIN — NICOTINE 1 PATCH: 21 PATCH, EXTENDED RELEASE TRANSDERMAL at 21:08

## 2019-09-15 RX ADMIN — OXYCODONE HYDROCHLORIDE AND ACETAMINOPHEN 1 TABLET: 10; 325 TABLET ORAL at 17:48

## 2019-09-15 RX ADMIN — METOCLOPRAMIDE 5 MG: 5 TABLET ORAL at 09:03

## 2019-09-15 RX ADMIN — IPRATROPIUM BROMIDE AND ALBUTEROL SULFATE 3 ML: 2.5; .5 SOLUTION RESPIRATORY (INHALATION) at 18:46

## 2019-09-15 NOTE — PLAN OF CARE
Problem: Chronic Obstructive Pulmonary Disease (Adult)  Goal: Signs and Symptoms of Listed Potential Problems Will be Absent, Minimized or Managed (Chronic Obstructive Pulmonary Disease)  Outcome: Ongoing (interventions implemented as appropriate)   09/15/19 0356   Goal/Outcome Evaluation   Problems Assessed (Chronic Obstructive Pulmonary Disease (COPD)) all   Problems Present (COPD, Bronch/Emphy) respiratory compromise;situational response       Problem: Patient Care Overview  Goal: Plan of Care Review   09/15/19 0356   Coping/Psychosocial   Plan of Care Reviewed With patient   Plan of Care Review   Progress improving   OTHER   Outcome Summary Pt alert and oriented X 4. Daughter at bedside. Up to BSC during the day and pt requested purewick at night. No c/o SOA. Pt c/o pain. Scheduled and PRN pain meds given with some relief. O2 sats WNL. Pt stated she is worried about her O2 machine at home and how to clean the filter. Pt unaware of brand, but will find out. Pt stated that she wants to get better, and plans to do better once discharged. VSS. Safety maintained. Will continue to monitor.       Problem: Skin Injury Risk (Adult)  Goal: Skin Health and Integrity  Outcome: Ongoing (interventions implemented as appropriate)   09/15/19 0356   Skin Injury Risk (Adult)   Skin Health and Integrity making progress toward outcome       Problem: Fall Risk (Adult)  Goal: Absence of Fall  Outcome: Ongoing (interventions implemented as appropriate)   09/15/19 0356   Fall Risk (Adult)   Absence of Fall making progress toward outcome       Problem: Pain, Chronic (Adult)  Goal: Acceptable Pain/Comfort Level and Functional Ability  Outcome: Ongoing (interventions implemented as appropriate)   09/15/19 0356   Pain, Chronic (Adult)   Acceptable Pain/Comfort Level and Functional Ability making progress toward outcome       Problem: Nutrition, Imbalanced: Inadequate Oral Intake (Adult)  Goal: Prevent Further Weight Loss  Outcome: Ongoing  (interventions implemented as appropriate)   09/15/19 0356   Nutrition, Imbalanced: Inadequate Oral Intake (Adult)   Prevent Further Weight Loss making progress toward outcome

## 2019-09-15 NOTE — PROGRESS NOTES
[facility] Medicine Services  INPATIENT PROGRESS NOTE    Patient Name: Justyna Sosa Lei  Date of Admission: 9/4/2019  Today's Date: 09/15/19  Length of Stay: 11  Primary Care Physician: Provider, No Known    Subjective   Chief Complaint: Shortness of breath  Shortness of Breath     Tolerating oxygen with nasal canula, dyspneic at baseline, not distressed.   Moved bowels. No chest pain.     Review of Systems   Respiratory: Positive for shortness of breath.       All pertinent negatives and positives are as above. All other systems have been reviewed and are negative unless otherwise stated.     Objective    Temp:  [98 °F (36.7 °C)-98.3 °F (36.8 °C)] 98 °F (36.7 °C)  Heart Rate:  [67-91] 77  Resp:  [16-19] 16  BP: (112-149)/(52-68) 113/68  Physical Exam   Constitutional: She is oriented to person, place, and time. She appears well-developed.   Dyspneic at rest   HENT:   Head: Normocephalic and atraumatic.   Right Ear: External ear normal.   Left Ear: External ear normal.   Eyes: EOM are normal. Pupils are equal, round, and reactive to light.   Neck: Normal range of motion. Neck supple. No tracheal deviation present. No thyromegaly present.   Cardiovascular: Normal rate and regular rhythm.   Pulmonary/Chest:   Normal effort, dry cough. Retractions improving. Bilateral wheezing improving. Air entry improving.    Abdominal: Soft. Bowel sounds are normal. She exhibits no mass. There is no tenderness.   Musculoskeletal: Normal range of motion. She exhibits no edema or deformity.   Neurological: She is alert and oriented to person, place, and time. She displays normal reflexes. No cranial nerve deficit. She exhibits normal muscle tone.   Skin: Skin is warm. Capillary refill takes less than 2 seconds. She is not diaphoretic. No erythema.   Psychiatric: She has a normal mood and affect.     Results Review:  I have reviewed the labs, radiology results, and diagnostic studies.    Laboratory  Data:   Results from last 7 days   Lab Units 09/09/19  0300   WBC 10*3/mm3 5.58   HEMOGLOBIN g/dL 11.0*   HEMATOCRIT % 34.8   PLATELETS 10*3/mm3 219        Results from last 7 days   Lab Units 09/09/19  0300   SODIUM mmol/L 137   POTASSIUM mmol/L 4.1   CHLORIDE mmol/L 95*   CO2 mmol/L 38.0*   BUN mg/dL 13   CREATININE mg/dL 0.57   CALCIUM mg/dL 8.5   BILIRUBIN mg/dL 0.2   ALK PHOS U/L 46   ALT (SGPT) U/L 28   AST (SGOT) U/L 25   GLUCOSE mg/dL 166*       Culture Data:   Blood Culture   Date Value Ref Range Status   09/04/2019 No growth at 5 days  Final   09/04/2019 No growth at 5 days  Final     Radiology Data:   Imaging Results (last 24 hours)     ** No results found for the last 24 hours. **        I have reviewed the patient's current medications.     Assessment/Plan     Active Hospital Problems    Diagnosis   • **COPD exacerbation (CMS/HCC)   • Compensated chronic respiratory acidosis   • Wound of right foot   • Acute on chronic respiratory failure with hypoxia and hypercapnia (CMS/HCC)   • Polypharmacy   • Personal history of nicotine dependence   • Alpha-1-antitrypsin deficiency carrier (CMS/HCC)     phenotype SS     • Folate deficiency anemia       Continue care in medical floor.  High flow oxygen to nasal canula. Off BiPAP. Pulmonology input noted. Probably home with trilogy.     Solumedrol to prednisone not tolerated, restarted Solumedrol by patient preference. Duoneb.  Reglan for GERD tolerated. Will need at discharge.  Medications reviewed.  Prognosis guarded.    Nutrition consult noted.    Discharge Planning: Hopefully home in 1-2 days. Offered LTAC, patient refused.    Bandar Walton MD   09/15/19   5:27 PM

## 2019-09-15 NOTE — PLAN OF CARE
Problem: Chronic Obstructive Pulmonary Disease (Adult)  Goal: Signs and Symptoms of Listed Potential Problems Will be Absent, Minimized or Managed (Chronic Obstructive Pulmonary Disease)  Outcome: Ongoing (interventions implemented as appropriate)      Problem: Patient Care Overview  Goal: Plan of Care Review  Outcome: Ongoing (interventions implemented as appropriate)   09/15/19 0800 09/15/19 9591   Coping/Psychosocial   Plan of Care Reviewed With patient;family --    Plan of Care Review   Progress --  improving       Problem: Skin Injury Risk (Adult)  Goal: Skin Health and Integrity  Outcome: Ongoing (interventions implemented as appropriate)      Problem: Fall Risk (Adult)  Goal: Absence of Fall  Outcome: Ongoing (interventions implemented as appropriate)      Problem: Pain, Chronic (Adult)  Goal: Acceptable Pain/Comfort Level and Functional Ability  Outcome: Ongoing (interventions implemented as appropriate)      Problem: Nutrition, Imbalanced: Inadequate Oral Intake (Adult)  Goal: Improved Oral Intake  Outcome: Ongoing (interventions implemented as appropriate)    Goal: Prevent Further Weight Loss  Outcome: Ongoing (interventions implemented as appropriate)

## 2019-09-15 NOTE — PROGRESS NOTES
PROGRESS NOTE  Patient Name: Justyna Sosa Lei  Age/Sex: 68 y.o. female  : 3/25/1951  MRN: [id number]    Date of Admission: 2019  Date of Encounter Visit: 09/15/19   LOS: 11 days   Patient Care Team:  Provider, No Known as PCP - Shawn Valladares DO as Consulting Physician (Gastroenterology)  Leigh Lynn MD as Consulting Physician (Gastroenterology)  AuroraKeisha APRN as Nurse Practitioner (Family Medicine)  [place]     Chief Complaint: Gradually improving    Hospital course: Admitted with acute exacerbation of her bronchitis, patient has alpha-1 antitrypsin deficiency was homozygous SS genotype.  She was initially on antibiotic which were discontinued, she is now on the steroid and that is being tapered.  She is having dyspnea with activity but it is getting better, she has minimal symptoms while at rest.  She was started on Symbicort however patient reported that she cannot tolerate that medication and it caused significant nasal irritation, she uses Trelegy at home and she can bring it if we allow her to use it.  Patient was switched from systemic steroid to p.o. however she reported that because of her GI problems she cannot take p.o. prednisone.  She is complaining of some sore throat today    Interval History: Improving shortness of breath was still dyspnea on exertion but overall sounding and looking better, positive for sore throat    REVIEW OF SYSTEMS:   CONSTITUTIONAL: no fever or chills  CARDIOVASCULAR: No chest pain, chest pressure or chest discomfort. No palpitations or edema.   RESPIRATORY: Improving shortness of breath with less cough and less tightness.   GASTROINTESTINAL: No anorexia, nausea, vomiting or diarrhea. No abdominal pain or blood.  Sores in the mouth  HEMATOLOGIC: No bleeding or bruising    Ventilator/Non-Invasive Ventilation Settings (From admission, onward)    Start     Ordered    19  NIPPV (CPAP or BIPAP)  Until Discontinued,   Status:   "Canceled     Comments:  RT to manage.  Currently 16/6 with  02 @ 50%   Question Answer Comment   Indication: Acute Respiratory Failure    Type: BIPAP    NIPPV Mask Interface: Per Patient Preference    IPAP 16    EPAP 6    Oxygen FIO2    FIO2 % 50    Titrate for SPO2 90%        09/09/19 2122            Vital Signs  Temp:  [97.9 °F (36.6 °C)-98.3 °F (36.8 °C)] 98 °F (36.7 °C)  Heart Rate:  [67-91] 84  Resp:  [16-19] 18  BP: (119-149)/(52-56) 124/54  SpO2:  [92 %-98 %] 94 %  on  Flow (L/min):  [3] 3 Device (Oxygen Therapy): nasal cannula    Intake/Output Summary (Last 24 hours) at 9/15/2019 1028  Last data filed at 9/14/2019 1941  Gross per 24 hour   Intake --   Output 1200 ml   Net -1200 ml     Flowsheet Rows      First Filed Value   Admission Height  167.6 cm (66\") Documented at 09/04/2019 1405   Admission Weight  59 kg (130 lb) Documented at 09/04/2019 1405        Body mass index is 24.39 kg/m².      09/11/19 2020 09/12/19 2047 09/13/19  1958   Weight: 69.2 kg (152 lb 8 oz) 69.7 kg (153 lb 9.6 oz) 68.5 kg (151 lb 1.6 oz)       Physical Exam:  GEN:  No acute distress, alert, cooperative, well developed, on nasal cannula oxygen at 3 lpm  EYES:   Sclera clear. No icterus. PERRL. Normal EOM  ENT:   External ears/nose normal, no oral lesions, positive early thrush affecting the posterior hard palate and muscular palate, mucous membranes moist  NECK:  Supple, midline trachea, no JVD  LUNGS: Normal chest on inspection, diminished breath sounds bilaterally, there is a very minimal expiratory wheezes,. Respirations regular, even and unlabored.   CV:  Regular rhythm and rate. Normal S1/S2. No murmurs, gallops, or rubs noted.  ABD:  Soft, non-tender and non-distended. Normal bowel sounds. No guarding  EXT:  Moves all extremities well. No cyanosis. No redness. No edema.   Skin: dry, intact, no bleeding    Results Review:      Results from last 7 days   Lab Units 09/09/19  0300   SODIUM mmol/L 137   POTASSIUM mmol/L 4.1 "   CHLORIDE mmol/L 95*   CO2 mmol/L 38.0*   BUN mg/dL 13   CREATININE mg/dL 0.57   CALCIUM mg/dL 8.5   AST (SGOT) U/L 25   ALT (SGPT) U/L 28   ANION GAP mmol/L 4.0   ALBUMIN g/dL 2.80*                 Results from last 7 days   Lab Units 09/09/19  0300   WBC 10*3/mm3 5.58   HEMOGLOBIN g/dL 11.0*   HEMATOCRIT % 34.8   PLATELETS 10*3/mm3 219   MCV fL 100.3*   NEUTROPHIL % % 90.1*   LYMPHOCYTE % % 4.5*   MONOCYTES % % 3.9*   EOSINOPHIL % % 0.0*   BASOPHIL % % 0.2   IMM GRAN % % 1.3*                   Invalid input(s): LDLCALC  Results from last 7 days   Lab Units 09/09/19  2215   PH, ARTERIAL pH units 7.433   PCO2, ARTERIAL mm Hg 64.3*   PO2 ART mm Hg 62.5*   HCO3 ART mmol/L 42.9*         No results found for: POCGLU                        Imaging:   Imaging Results (all)     Procedure Component Value Units Date/Time         Medication Review:     azelastine 2 spray Each Nare BID   budesonide 0.5 mg Nebulization BID - RT   cetirizine 5 mg Oral Daily   citalopram 40 mg Oral Daily   docusate sodium 100 mg Oral BID   enoxaparin 40 mg Subcutaneous Q24H   fluticasone 2 spray Nasal Daily   folic acid 1 mg Oral Daily   gabapentin 600 mg Oral Q8H   guaiFENesin 400 mg Oral Q4H   ipratropium-albuterol 3 mL Nebulization Q4H - RT   metoclopramide 5 mg Oral TID AC   montelukast 10 mg Oral Nightly   mupirocin  Topical Q12H   nicotine 1 patch Transdermal Q24H   oxyCODONE HCl ER 30 mg Oral Q12H   pantoprazole 40 mg Oral Q AM   polyethylene glycol 17 g Oral Daily   predniSONE 20 mg Oral BID With Meals   sodium chloride 10 mL Intravenous Q12H   traZODone 25 mg Oral Nightly         lactated ringers 75 mL/hr Last Rate: 75 mL/hr (09/08/19 2331)       ASSESSMENT:   1. Acute on chronic hypoxemic respiratory failure improved  2. Wound to the right foot, deferred to attending  3. Alpha-1 antitrypsin deficiency, phenotype SS  4. Tobacco abuse needs to discontinue  5. Polysubstance abuse needs to discontinue  6. COPD exacerbation  improved  7. Constipation chronic and stable  8. Poorly controlled reflux needs to stay on treatment  9. New onset thrush    PLAN:  Patient is refusing p.o. prednisone, will give her IV Solu-Medrol and she will get 1 dose today and 1 dose tomorrow and then will discontinue steroids afterwards  She is to start her home Trelegy, the daughter is supposed to bring it later today  Patient should be cleared for discharge home tomorrow if she continues to improve with the above regimen, she already have her oxygen and her nebulizer and her home inhalers available at home.    Discussed with the patient and with the daughter at the bedside    Summary of recommendations:  · Since unable to take prednisone, will give the steroid IV for today and tomorrow then discontinue  · Give nystatin for oral thrush  · Okay to discharge home tomorrow if she continues to slowly improve with no p.o. prednisone on discharge    Disposition:  patient would like to go home     Thomas Leal MD  09/15/19  10:28 AM           Dictated utilizing Dragon dictation

## 2019-09-16 PROCEDURE — 25010000002 ENOXAPARIN PER 10 MG: Performed by: INTERNAL MEDICINE

## 2019-09-16 PROCEDURE — 97535 SELF CARE MNGMENT TRAINING: CPT

## 2019-09-16 PROCEDURE — 94799 UNLISTED PULMONARY SVC/PX: CPT

## 2019-09-16 PROCEDURE — 25010000002 METHYLPREDNISOLONE PER 40 MG: Performed by: INTERNAL MEDICINE

## 2019-09-16 PROCEDURE — 97168 OT RE-EVAL EST PLAN CARE: CPT

## 2019-09-16 PROCEDURE — 97165 OT EVAL LOW COMPLEX 30 MIN: CPT

## 2019-09-16 RX ORDER — IPRATROPIUM BROMIDE AND ALBUTEROL SULFATE 2.5; .5 MG/3ML; MG/3ML
3 SOLUTION RESPIRATORY (INHALATION)
Status: DISCONTINUED | OUTPATIENT
Start: 2019-09-16 | End: 2019-09-18 | Stop reason: HOSPADM

## 2019-09-16 RX ADMIN — GUAIFENESIN 400 MG: 100 SOLUTION ORAL at 02:24

## 2019-09-16 RX ADMIN — MUPIROCIN: 20 OINTMENT TOPICAL at 20:56

## 2019-09-16 RX ADMIN — NYSTATIN 500000 UNITS: 100000 SUSPENSION ORAL at 09:55

## 2019-09-16 RX ADMIN — ENOXAPARIN SODIUM 40 MG: 40 INJECTION SUBCUTANEOUS at 09:55

## 2019-09-16 RX ADMIN — GUAIFENESIN 400 MG: 100 SOLUTION ORAL at 20:55

## 2019-09-16 RX ADMIN — OXYCODONE HYDROCHLORIDE AND ACETAMINOPHEN 1 TABLET: 10; 325 TABLET ORAL at 00:47

## 2019-09-16 RX ADMIN — ALPRAZOLAM 0.5 MG: 0.5 TABLET ORAL at 11:29

## 2019-09-16 RX ADMIN — GABAPENTIN 600 MG: 300 CAPSULE ORAL at 15:39

## 2019-09-16 RX ADMIN — GABAPENTIN 600 MG: 300 CAPSULE ORAL at 20:55

## 2019-09-16 RX ADMIN — IPRATROPIUM BROMIDE AND ALBUTEROL SULFATE 3 ML: 2.5; .5 SOLUTION RESPIRATORY (INHALATION) at 21:40

## 2019-09-16 RX ADMIN — ALPRAZOLAM 0.5 MG: 0.5 TABLET ORAL at 02:24

## 2019-09-16 RX ADMIN — SIMETHICONE 80 MG: 80 TABLET, CHEWABLE ORAL at 21:51

## 2019-09-16 RX ADMIN — GABAPENTIN 600 MG: 300 CAPSULE ORAL at 06:47

## 2019-09-16 RX ADMIN — METOCLOPRAMIDE 5 MG: 5 TABLET ORAL at 18:10

## 2019-09-16 RX ADMIN — GUAIFENESIN 400 MG: 100 SOLUTION ORAL at 15:38

## 2019-09-16 RX ADMIN — OXYCODONE HYDROCHLORIDE 30 MG: 20 TABLET, FILM COATED, EXTENDED RELEASE ORAL at 10:03

## 2019-09-16 RX ADMIN — CITALOPRAM 40 MG: 20 TABLET, FILM COATED ORAL at 09:55

## 2019-09-16 RX ADMIN — OXYCODONE HYDROCHLORIDE 30 MG: 20 TABLET, FILM COATED, EXTENDED RELEASE ORAL at 20:56

## 2019-09-16 RX ADMIN — GUAIFENESIN 400 MG: 100 SOLUTION ORAL at 18:10

## 2019-09-16 RX ADMIN — FOLIC ACID 1 MG: 1 TABLET ORAL at 09:55

## 2019-09-16 RX ADMIN — MONTELUKAST SODIUM 10 MG: 10 TABLET, FILM COATED ORAL at 20:56

## 2019-09-16 RX ADMIN — NYSTATIN 500000 UNITS: 100000 SUSPENSION ORAL at 11:29

## 2019-09-16 RX ADMIN — METOCLOPRAMIDE 5 MG: 5 TABLET ORAL at 09:55

## 2019-09-16 RX ADMIN — NICOTINE 1 PATCH: 21 PATCH, EXTENDED RELEASE TRANSDERMAL at 18:35

## 2019-09-16 RX ADMIN — TRAZODONE HYDROCHLORIDE 25 MG: 50 TABLET ORAL at 20:56

## 2019-09-16 RX ADMIN — PANTOPRAZOLE SODIUM 40 MG: 40 TABLET, DELAYED RELEASE ORAL at 06:46

## 2019-09-16 RX ADMIN — FLUTICASONE PROPIONATE 2 SPRAY: 50 SPRAY, METERED NASAL at 09:54

## 2019-09-16 RX ADMIN — AZELASTINE HYDROCHLORIDE 2 SPRAY: 137 SPRAY, METERED NASAL at 09:54

## 2019-09-16 RX ADMIN — METOCLOPRAMIDE 5 MG: 5 TABLET ORAL at 11:29

## 2019-09-16 RX ADMIN — IPRATROPIUM BROMIDE AND ALBUTEROL SULFATE 3 ML: 2.5; .5 SOLUTION RESPIRATORY (INHALATION) at 12:10

## 2019-09-16 RX ADMIN — OXYCODONE HYDROCHLORIDE AND ACETAMINOPHEN 1 TABLET: 10; 325 TABLET ORAL at 16:41

## 2019-09-16 RX ADMIN — DOCUSATE SODIUM 100 MG: 100 CAPSULE, LIQUID FILLED ORAL at 09:55

## 2019-09-16 RX ADMIN — GUAIFENESIN 400 MG: 100 SOLUTION ORAL at 06:47

## 2019-09-16 RX ADMIN — METHYLPREDNISOLONE SODIUM SUCCINATE 40 MG: 40 INJECTION, POWDER, FOR SOLUTION INTRAMUSCULAR; INTRAVENOUS at 09:55

## 2019-09-16 RX ADMIN — MUPIROCIN: 20 OINTMENT TOPICAL at 15:39

## 2019-09-16 RX ADMIN — ALPRAZOLAM 0.5 MG: 0.5 TABLET ORAL at 20:56

## 2019-09-16 RX ADMIN — IPRATROPIUM BROMIDE AND ALBUTEROL SULFATE 3 ML: 2.5; .5 SOLUTION RESPIRATORY (INHALATION) at 06:15

## 2019-09-16 RX ADMIN — SODIUM CHLORIDE, PRESERVATIVE FREE 10 ML: 5 INJECTION INTRAVENOUS at 20:56

## 2019-09-16 RX ADMIN — SODIUM CHLORIDE, PRESERVATIVE FREE 10 ML: 5 INJECTION INTRAVENOUS at 09:56

## 2019-09-16 RX ADMIN — CETIRIZINE HYDROCHLORIDE 5 MG: 5 TABLET ORAL at 09:55

## 2019-09-16 RX ADMIN — OXYCODONE HYDROCHLORIDE AND ACETAMINOPHEN 1 TABLET: 10; 325 TABLET ORAL at 06:46

## 2019-09-16 RX ADMIN — GUAIFENESIN 400 MG: 100 SOLUTION ORAL at 09:55

## 2019-09-16 NOTE — PROGRESS NOTES
"  PROGRESS NOTE  Patient Name: Justyna Sosa Lei  Age/Sex: 68 y.o. female  : 3/25/1951  MRN: [id number]    Date of Admission: 2019  Date of Encounter Visit: 19   LOS: 12 days   Patient Care Team:  Provider, No Known as PCP - Shawn Valladares DO as Consulting Physician (Gastroenterology)  Leigh Lynn MD as Consulting Physician (Gastroenterology)  [place], THIERNO Garcia as Nurse Practitioner (Family Medicine)  [facility]     Chief Complaint: Gradually improving    Hospital course: Admitted with acute exacerbation of her bronchitis, patient has alpha-1 antitrypsin deficiency was homozygous SS genotype.  She was initially on antibiotic which were discontinued, she is now on the steroid and that is being tapered.  Does not tolerate Symbicort or oral prednisone, she got her last dose of IV steroids today.  Still having some tachycardia with exertion but overall continues to improve, down to 2 L/min and at home she uses 3 L/min    Interval History: Improving shortness of breath     REVIEW OF SYSTEMS:     RESPIRATORY: Improving shortness of breath with less cough and less tightness.     Ventilator/Non-Invasive Ventilation Settings (From admission, onward)    Start     Ordered       Vital Signs  Temp:  [97.9 °F (36.6 °C)-98.2 °F (36.8 °C)] 97.9 °F (36.6 °C)  Heart Rate:  [70-90] 76  Resp:  [16-18] 16  BP: (112-122)/(48-68) 120/54  SpO2:  [93 %-98 %] 94 %  on  Flow (L/min):  [2.5] 2.5 Device (Oxygen Therapy): nasal cannula  No intake or output data in the 24 hours ending 19 1055  Flowsheet Rows      First Filed Value   Admission Height  167.6 cm (66\") Documented at 2019 1405   Admission Weight  59 kg (130 lb) Documented at 2019 1405        Body mass index is 24.39 kg/m².      19   Weight: 69.2 kg (152 lb 8 oz) 69.7 kg (153 lb 9.6 oz) 68.5 kg (151 lb 1.6 oz)       Physical Exam:  GEN:  No acute distress, alert, cooperative, well developed, " on nasal cannula oxygen at 2 lpm   hard palate and muscular palate, mucous membranes moist  NECK:  Supple, midline trachea, no JVD  LUNGS: Normal chest on inspection, diminished breath sounds bilaterally, there is a very minimal expiratory wheezes,. Respirations regular, even and unlabored.       Results Review:            Invalid input(s):  BILIRUBIN                                Invalid input(s): LDLCALC  Results from last 7 days   Lab Units 09/09/19  2215   PH, ARTERIAL pH units 7.433   PCO2, ARTERIAL mm Hg 64.3*   PO2 ART mm Hg 62.5*   HCO3 ART mmol/L 42.9*         No results found for: POCGLU                        Imaging:   Imaging Results (all)     Procedure Component Value Units Date/Time         Medication Review:     azelastine 2 spray Each Nare BID   cetirizine 5 mg Oral Daily   citalopram 40 mg Oral Daily   docusate sodium 100 mg Oral BID   enoxaparin 40 mg Subcutaneous Q24H   fluticasone 2 spray Nasal Daily   folic acid 1 mg Oral Daily   gabapentin 600 mg Oral Q8H   guaiFENesin 400 mg Oral Q4H   ipratropium-albuterol 3 mL Nebulization Q6H While Awake - RT   metoclopramide 5 mg Oral TID AC   montelukast 10 mg Oral Nightly   mupirocin  Topical Q12H   nicotine 1 patch Transdermal Q24H   nystatin 5 mL Oral 4x Daily   oxyCODONE HCl ER 30 mg Oral Q12H   pantoprazole 40 mg Oral Q AM   polyethylene glycol 17 g Oral Daily   sodium chloride 10 mL Intravenous Q12H   traZODone 25 mg Oral Nightly         lactated ringers 75 mL/hr Last Rate: 75 mL/hr (09/08/19 2331)       ASSESSMENT:   1. Acute on chronic hypoxemic respiratory failure improved  2. Wound to the right foot, deferred to attending  3. Alpha-1 antitrypsin deficiency, phenotype SS  4. Tobacco abuse needs to discontinue  5. Polysubstance abuse needs to discontinue  6. COPD exacerbation improved  7. Constipation chronic and stable  8. Poorly controlled reflux needs to stay on treatment  9. New onset thrush    PLAN:  Doing better, done with the steroid  after today's dose  Okay to discharge home on her home regimen of Trelegy with PRN albuterol      Disposition: Follow-up with Dr. Valdes as scheduled e to go home     Thomas Leal MD  09/16/19  10:55 AM           Dictated utilizing Dragon dictation

## 2019-09-16 NOTE — THERAPY RE-EVALUATION
Acute Care - Occupational Therapy Re-Evaluation  [facility]     Patient Name: Justyna Lei  : 3/25/1951  MRN: [id number]  Today's Date: 2019  Onset of Illness/Injury or Date of Surgery: 19  Date of Referral to OT: 19  Referring Physician: Dory Calvillo APRN    Admit Date: 2019       ICD-10-CM ICD-9-CM   1. COPD exacerbation (CMS/HCC) J44.1 491.21   2. Hypercapnia R06.89 786.09   3. Decreased activities of daily living (ADL) R68.89 780.99   4. Impaired mobility Z74.09 799.89     Patient Active Problem List   Diagnosis   • Stage 4 very severe COPD by GOLD classification (CMS/HCC)   • Hypokalemia   • Folate deficiency anemia   • Respiratory failure (CMS/HCC)   • Nasopharyngitis   • GERD without esophagitis   • Lung nodule   • Alpha-1-antitrypsin deficiency carrier (CMS/[facility])   • Personal history of nicotine dependence   • COPD exacerbation (CMS/HCC)   • Acute on chronic respiratory failure with hypoxia and hypercapnia (CMS/HCC)   • Polypharmacy   • Compensated chronic respiratory acidosis   • Wound of right foot     Past Medical History:   Diagnosis Date   • Anxiety    • Anxiety     pain clinic patient Dr Sunshine x 5yrs   • Chronic pain due to injury     jarad LE & back   • COPD (chronic obstructive pulmonary disease) (CMS/HCC)    • Depression    • Hypertension    • Polypharmacy    • Smoker      Past Surgical History:   Procedure Laterality Date   • ABDOMINAL SURGERY     • APPENDECTOMY     • CHOLECYSTECTOMY     • FEMUR FRACTURE SURGERY     • HERNIA REPAIR     • HYSTERECTOMY            OT ASSESSMENT FLOWSHEET (last 12 hours)      Occupational Therapy Evaluation     Row Name 19 1330                   OT Evaluation Time/Intention    Subjective Information  complains of;fatigue;swelling swelling in feet  -AC (r) LS (t) AC (c)        Document Type  re-evaluation  -AC (r) LS (t) AC (c)        Mode of Treatment  occupational therapy  -AC (r) LS (t) AC (c)        Patient Effort  excellent  -AC  (r) LS (t) AC (c)        Symptoms Noted During/After Treatment  fatigue;shortness of breath  -AC (r) LS (t) AC (c)           General Information    Patient Observations  alert;cooperative;agree to therapy  -AC (r) LS (t) AC (c)        Patient/Family Observations  no family present  -AC (r) LS (t) AC (c)        General Observations of Patient  lying in fowlers, cont. pulse ox, supplemental O2 @ 2L  -AC (r) LS (t) AC (c)        Existing Precautions/Restrictions  fall;oxygen therapy device and L/min  -AC (r) LS (t) AC (c)        Risks Reviewed  patient:;LOB;nausea/vomiting;dizziness;increased discomfort;change in vital signs;lines disloged  -AC (r) LS (t) AC (c)        Benefits Reviewed  patient:;improve function;increase independence;increase strength;increase balance;decrease pain;decrease risk of DVT;improve skin integrity;increase knowledge  -AC (r) LS (t) AC (c)           Cognitive Assessment/Interventions    Additional Documentation  Cognitive Assessment/Intervention (Group)  -AC (r) LS (t) AC (c)           Cognitive Assessment/Intervention- PT/OT    Affect/Mental Status (Cognitive)  WNL  -AC (r) LS (t) AC (c)        Orientation Status (Cognition)  oriented x 4  -AC (r) LS (t) AC (c)        Cognitive Function (Cognitive)  WNL  -AC (r) LS (t) AC (c)           Safety Issues, Functional Mobility    Impairments Affecting Function (Mobility)  endurance/activity tolerance;shortness of breath;balance  -AC (r) LS (t) AC (c)           Bed Mobility Assessment/Treatment    Bed Mobility Assessment/Treatment  scooting/bridging;supine-sit;sit-supine  -AC (r) LS (t) AC (c)        Scooting/Bridging Protection (Bed Mobility)  independent  -AC (r) LS (t) AC (c)        Supine-Sit Protection (Bed Mobility)  independent  -AC (r) LS (t) AC (c)        Sit-Supine Protection (Bed Mobility)  independent  -AC (r) LS (t) AC (c)        Assistive Device (Bed Mobility)  head of bed elevated  -AC (r) LS (t) AC (c)           Functional  Mobility    Functional Mobility- Ind. Level  independent  -AC (r) LS (t) AC (c)        Functional Mobility- Device  rolling walker  -AC (r) LS (t) AC (c)        Functional Mobility- Safety Issues  supplemental O2  -AC (r) LS (t) AC (c)        Functional Mobility- Comment  walked to bathroom; 0 LOB  -AC (r) LS (t) AC (c)           Transfer Assessment/Treatment    Transfer Assessment/Treatment  sit-stand transfer;stand-sit transfer;toilet transfer  -AC (r) LS (t) AC (c)           Sit-Stand Transfer    Sit-Stand [place] (Transfers)  independent  -AC (r) LS (t) AC (c)           Stand-Sit Transfer    Stand-Sit [place] (Transfers)  independent  -AC (r) LS (t) AC (c)           Toilet Transfer    Type (Toilet Transfer)  sit-stand;stand-sit  -AC (r) LS (t) AC (c)        [place] Level (Toilet Transfer)  independent  -AC (r) LS (t) AC (c)        Assistive Device (Toilet Transfer)  commode  -AC (r) LS (t) AC (c)           ADL Assessment/Intervention    BADL Assessment/Intervention  lower body dressing;grooming;toileting  -AC (r) LS (t) AC (c)           Lower Body Dressing Assessment/Training    Lower Body Dressing [place] Level  don;socks;independent  -AC (r) LS (t) AC (c)        Lower Body Dressing Position  edge of bed sitting  -AC (r) LS (t) AC (c)           Grooming Assessment/Training    [place] Level (Grooming)  oral care regimen;wash face, hands;independent  -AC (r) LS (t) AC (c)        Grooming Position  supported standing  -AC (r) LS (t) AC (c)           Toileting Assessment/Training    [place] Level (Toileting)  adjust/manage clothing;perform perineal hygiene;independent  -AC (r) LS (t) AC (c)        Assistive Devices (Toileting)  commode  -AC (r) LS (t) AC (c)        Toileting Position  unsupported sitting;unsupported standing  -AC (r) LS (t) AC (c)           BADL Safety/Performance    Impairments, BADL Safety/Performance  balance;endurance/activity tolerance;shortness of breath  -AC (r)  LS (t) AC (c)           General ROM    GENERAL ROM COMMENTS  BUE AROM WFL  -AC (r) LS (t) AC (c)           MMT (Manual Muscle Testing)    General MMT Comments  MMT not formally tested; demonstrated good functional strength during act  -AC (r) LS (t) AC (c)           Motor Assessment/Interventions    Additional Documentation  Balance (Group)  -AC (r) LS (t) AC (c)           Balance    Balance  static sitting balance;static standing balance;dynamic sitting balance;dynamic standing balance  -AC (r) LS (t) AC (c)           Static Sitting Balance    Level of [place] (Unsupported Sitting, Static Balance)  independent  -AC (r) LS (t) AC (c)        Sitting Position (Unsupported Sitting, Static Balance)  sitting on edge of bed  -AC (r) LS (t) AC (c)           Dynamic Sitting Balance    Level of [place], Reaches Outside Midline (Sitting, Dynamic Balance)  independent  -AC (r) LS (t) AC (c)        Sitting Position, Reaches Outside Midline (Sitting, Dynamic Balance)  sitting on edge of bed  -AC (r) LS (t) AC (c)           Static Standing Balance    Level of [place] (Supported Standing, Static Balance)  independent  -AC (r) LS (t) AC (c)        Assistive Device Utilized (Supported Standing, Static Balance)  walker, rolling  -AC (r) LS (t) AC (c)           Dynamic Standing Balance    Level of [place], Reaches Outside Midline (Standing, Dynamic Balance)  independent  -AC (r) LS (t) AC (c)        Assistive Device Utilized (Supported Standing, Dynamic Balance)  walker, rolling  -AC (r) LS (t) AC (c)           Sensory Assessment/Intervention    Sensory General Assessment  no sensation deficits identified  -AC (r) LS (t) AC (c)           Positioning and Restraints    Pre-Treatment Position  in bed  -AC (r) LS (t) AC (c)        Post Treatment Position  bed  -AC (r) LS (t) AC (c)        In Bed  sitting;call light within reach;encouraged to call for assist;side rails up x2  -AC (r) LS (t) AC (c)           Pain  Assessment    Additional Documentation  Pain Scale: Numbers Pre/Post-Treatment (Group)  -AC (r) LS (t) AC (c)           Pain Scale: Numbers Pre/Post-Treatment    Pain Scale: Numbers, Pretreatment  0/10 - no pain  -AC (r) LS (t) AC (c)        Pain Scale: Numbers, Post-Treatment  0/10 - no pain  -AC (r) LS (t) AC (c)           Wound 09/04/19 1500 Right foot Blisters    Wound - Properties Group Date first assessed: 09/04/19  -ER Time first assessed: 1500  -ER Side: Right  -ER Location: foot  -ER Primary Wound Type: Blisters  -ER, burn injury  Stage, Pressure Injury: Stage 2  -ER       Plan of Care Review    Plan of Care Reviewed With  patient  -AC (r) LS (t) AC (c)           Clinical Impression (OT)    OT Diagnosis  decreased adl  -AC (r) LS (t) AC (c)        Prognosis (OT Eval)  good  -AC (r) LS (t) AC (c)        Criteria for Skilled Therapeutic Interventions Met (OT Eval)  yes;treatment indicated  -AC (r) LS (t) AC (c)        Rehab Potential (OT Eval)  good, to achieve stated therapy goals  -AC (r) LS (t) AC (c)        Therapy Frequency (OT Eval)  3 times/wk  -AC (r) LS (t) AC (c)        Predicted Duration of Therapy Intervention (Therapy Eval)  10 days  -AC (r) LS (t) AC (c)        Care Plan Review (OT)  evaluation/treatment results reviewed;care plan/treatment goals reviewed;risks/benefits reviewed;current/potential barriers reviewed;patient/other agree to care plan  -AC (r) LS (t) AC (c)        Anticipated Discharge Disposition (OT)  home with assist;home with home health  -AC (r) LS (t) AC (c)           Vital Signs    Pre SpO2 (%)  92  -AC (r) LS (t) AC (c)        O2 Delivery Pre Treatment  supplemental O2  -AC (r) LS (t) AC (c)        Intra SpO2 (%)  86  -AC (r) LS (t) AC (c)        O2 Delivery Intra Treatment  supplemental O2  -AC (r) LS (t) AC (c)        Post SpO2 (%)  94  -AC (r) LS (t) AC (c)        O2 Delivery Post Treatment  supplemental O2  -AC (r) LS (t) AC (c)        Pre Patient Position  Sitting  -AC  (r) LS (t) AC (c)        Intra Patient Position  Standing  -AC (r) LS (t) AC (c)        Post Patient Position  Sitting  -AC (r) LS (t) AC (c)           Planned OT Interventions    Planned Therapy Interventions (OT Eval)  patient/caregiver education/training;strengthening exercise;activity tolerance training;BADL retraining;functional balance retraining;occupation/activity based interventions  -AC (r) LS (t) AC (c)          User Key  (r) = Recorded By, (t) = Taken By, (c) = Cosigned By    Initials Name Effective Dates    AC Ezequiel Card, OTR/L, CNT 04/09/19 -     ER Rose Coffman RN 08/02/16 -     LS SharerMena OT Student 08/14/19 -          Occupational Therapy Education     Title: PT OT SLP Therapies (In Progress)     Topic: Occupational Therapy (In Progress)     Point: ADL training (Done)     Description: Instruct learner(s) on proper safety adaptation and remediation techniques during self care or transfers.   Instruct in proper use of assistive devices.    Learning Progress Summary           Patient Acceptance, E, VU,DU by HE at 9/16/2019  3:29 PM    Comment:  Benefits of exercise, importance of HEP, energy conservation during ADL, OT POC    Acceptance, E,TB, VU by JACQUELIN at 9/13/2019  1:25 PM    Comment:  ther ex, HEP, energy conservation    Acceptance, E,TB, VU by MM at 9/11/2019  2:05 PM    Comment:  t.f safety, need to get up to use BSC not bedpan/purewick    Acceptance, E,TB, VU by JACQUELIN at 9/10/2019  4:07 PM    Comment:  HEP for AROM: pt demo'd understanding and verbalized importance for movement    Acceptance, E, VU by VI at 9/5/2019  2:05 PM    Comment:  OT POC, adls, t/fs, bed mobility, activity tolerance.                   Point: Home exercise program (Done)     Description: Instruct learner(s) on appropriate technique for monitoring, assisting and/or progressing therapeutic exercises/activities.    Learning Progress Summary           Patient Acceptance, E, VU,DU by HE at 9/16/2019  3:29 PM     Comment:  Benefits of exercise, importance of HEP, energy conservation during ADL, OT POC    Acceptance, E,TB, VU by MM at 9/13/2019  1:25 PM    Comment:  ther ex, HEP, energy conservation    Acceptance, E,TB, VU by MM at 9/11/2019  2:05 PM    Comment:  t.f safety, need to get up to use BSC not bedpan/purewick    Acceptance, E,TB, VU by MM at 9/10/2019  4:07 PM    Comment:  HEP for AROM: pt demo'd understanding and verbalized importance for movement                   Point: Body mechanics (Done)     Description: Instruct learner(s) on proper positioning and spine alignment during self-care, functional mobility activities and/or exercises.    Learning Progress Summary           Patient Acceptance, E, VU,DU by HE at 9/16/2019  3:29 PM    Comment:  Benefits of exercise, importance of HEP, energy conservation during ADL, OT POC                               User Key     Initials Effective Dates Name Provider Type Discipline    MM 10/15/18 -  Carmen Wong COTA/L Occupational Therapy Assistant OT    VI 10/12/18 -  Gabi Faustin OTR/L Occupational Therapist OT    HE 08/14/19 -  Mena Rodriguez OT Student OT Student OT                  OT Recommendation and Plan  Outcome Summary/Treatment Plan (OT)  Anticipated Discharge Disposition (OT): home with assist, home with home health  Planned Therapy Interventions (OT Eval): patient/caregiver education/training, strengthening exercise, activity tolerance training, BADL retraining, functional balance retraining, occupation/activity based interventions  Therapy Frequency (OT Eval): 3 times/wk  Plan of Care Review  Plan of Care Reviewed With: patient  Plan of Care Reviewed With: patient  Outcome Summary: OT re-eval complete. Pt performed bed mobility, T/Fs, donning of socks, toileting, and grooming I'ly. Pt progressing well w/ therapy. Pt is completing most BADL I'ly. She continues to have O2 desats w/ approx. 10 minutes of act. O2 sats dropped to 86% after completing  toileting and grooming, recovering to <90% following a 1 minute rest break. OT recommends HH, however pt continues to refuse at this time. OT will continue to work on strengthening and will provide education on energy conservation and work simplification prior to D/C home.     Outcome Measures     Row Name 09/16/19 1400             How much help from another is currently needed...    Putting on and taking off regular lower body clothing?  4  -AC (r) LS (t) AC (c)      Bathing (including washing, rinsing, and drying)  4  -AC (r) LS (t) AC (c)      Toileting (which includes using toilet bed pan or urinal)  4  -AC (r) LS (t) AC (c)      Putting on and taking off regular upper body clothing  4  -AC (r) LS (t) AC (c)      Taking care of personal grooming (such as brushing teeth)  4  -AC (r) LS (t) AC (c)      Eating meals  4  -AC (r) LS (t) AC (c)      AM-PAC 6 Clicks Score (OT)  24  -AC (r) LS (t)         Functional Assessment    Outcome Measure Options  AM-PAC 6 Clicks Daily Activity (OT)  -AC (r) LS (t) AC (c)        User Key  (r) = Recorded By, (t) = Taken By, (c) = Cosigned By    Initials Name Provider Type    Ezequiel Yang, OTR/L, HEATHER Occupational Therapist    LS NelsonrMena, OT Student OT Student          Time Calculation:   Time Calculation- OT     Row Name 09/16/19 1530             Time Calculation- OT    OT Start Time  1330  -AC (r) LS (t) AC (c)      OT Stop Time  1500  -AC (r) LS (t) AC (c)      OT Time Calculation (min)  90 min  -AC (r) LS (t)      Total Timed Code Minutes- OT  30 minute(s)  -AC (r) LS (t) AC (c)      OT Received On  09/16/19  -AC (r) LS (t) AC (c)      OT Goal Re-Cert Due Date  09/26/19  -AC (r) LS (t) AC (c)        User Key  (r) = Recorded By, (t) = Taken By, (c) = Cosigned By    Initials Name Provider Type    Ezequiel Yang, OTR/L, CNT Occupational Therapist    HE DamonrMena, OT Student OT Student        Therapy Charges for Today     Code Description Service Date  Service Provider Modifiers Qty    36594663638 HC OT RE-EVAL 2 9/16/2019 Mena Rodriguez, OT Student GO 1    17408724342 HC OT SELF CARE/MGMT/TRAIN EA 15 MIN 9/16/2019 Mena Rodriguez OT Student GO 4               Mena Rodriguez, OT Student  9/16/2019

## 2019-09-16 NOTE — PROGRESS NOTES
Continued Stay Note   [facility]     Patient Name: Justyna Sosa Lei  MRN: [id number]  Today's Date: 9/16/2019    Admit Date: 9/4/2019    Discharge Plan     Row Name 09/16/19 0908       Plan    Plan  Home    Patient/Family in Agreement with Plan  yes    Plan Comments  Pt will return home with dtr upon d/c as before. Pt refuses HH.  Pt does request standard walker to be ordered  for home use. She does have O2 at home via [facility]. She has been set up with [facility]. Will follow.         Discharge Codes    No documentation.             IMELDA Jean-Baptiste

## 2019-09-16 NOTE — PROGRESS NOTES
[facility] Medicine Services  INPATIENT PROGRESS NOTE    Patient Name: Justyna Sosa Lei  Date of Admission: 9/4/2019  Today's Date: 09/16/19  Length of Stay: 12  Primary Care Physician: Provider, No Known    Subjective   Chief Complaint: Follow-up  HPI   I admitted her for severe respiratory failure.  She carries history of chronic obstructive pulmonary disease, alpha-1 antitrypsin deficiency carrier, COPD with exacerbation.  She was successfully transferred out to regular floor where she continued to use high flow oxygen.  She has been off BiPAP.  Pulmonary service has been following and anticipating that she may need to be discharged on trilogy.  She was switched to oral steroid however not tolerated this according to yesterday's progress note.  She was restarted back on Solu-Medrol by patient's preference.  LTAC that has been offered however refused.  She desires to get stronger before discharge.  Dr. Walton was anticipating that she can possibly go home in 1 to 2 days.  Patient states that she is coughing and easier to expectorate phlegm.  She said that she walked with therapies twice and had walked about 100 feet.  She desires no skilled nursing facility rehabilitation.  She desires no home health but still considering this.  She states that her daughter is a CNA who would stay with her.  States that she is doing much better.  She is now on 2 and half liters of oxygen with oxygen saturation range 94 to 95%  Review of Systems   States she has burning when she swallows.  All pertinent negatives and positives are as above. All other systems have been reviewed and are negative unless otherwise stated.     Objective    Temp:  [97.9 °F (36.6 °C)-98.2 °F (36.8 °C)] 97.9 °F (36.6 °C)  Heart Rate:  [70-90] 76  Resp:  [16-18] 16  BP: (112-122)/(48-68) 120/54  Physical Exam   Speaks in full sentences, no gross accessory muscle use   constitutional: She is oriented to person, place,  and time. She appears well-developed.     HENT:   Head: Normocephalic and atraumatic.   Right Ear: External ear normal.   Left Ear: External ear normal.   Eyes: EOM are normal. Pupils are equal, round, and reactive to light.   Neck: Normal range of motion. Neck supple. No tracheal deviation present. No thyromegaly present.   Cardiovascular: Normal rate and regular rhythm.   Pulmonary/Chest:   Normal effort, dry cough.  No wheezing or crackles. Air entry improving.    Abdominal: Soft. Bowel sounds are normal. She exhibits no mass. There is no tenderness.   Musculoskeletal: Normal range of motion. She exhibits no edema or deformity.   Neurological: She is alert and oriented to person, place, and time. She displays normal reflexes. No cranial nerve deficit. She exhibits normal muscle tone.   Skin: Skin is warm. Capillary refill takes less than 2 seconds. She is not diaphoretic. No erythema.   Psychiatric: She has a normal mood and affect.        Results Review:  I have reviewed the labs, radiology results, and diagnostic studies.    Laboratory Data:                Invalid input(s): LABALBU, PROT    Culture Data:        Radiology Data:   Imaging Results (last 24 hours)     ** No results found for the last 24 hours. **          I have reviewed the patient's current medications.     Assessment/Plan     Active Hospital Problems    Diagnosis   • **COPD exacerbation (CMS/HCC)   • Compensated chronic respiratory acidosis   • Acute on chronic respiratory failure with hypoxia and hypercapnia (CMS/HCC)   • Alpha-1-antitrypsin deficiency carrier (CMS/HCC)     phenotype SS     • Wound of right foot   • Folate deficiency anemia   • Polypharmacy   • Personal history of nicotine dependence       Patient is doing better.  She still on Solu-Medrol but anticipate to complete course of treatment.  Normally I would have placed the patient on tapered dose however in her case, she does not tolerate oral steroid.  She is no longer wheezing.   I think this is reasonable.  Noted that she still on DuoNeb every 4 hours.  We may have to switch it to every 6 hourly and see how she does through the day.  Continue to work with physical rehabilitation.  I anticipate that she go home tomorrow morning.    azelastine 2 spray Each Nare BID   cetirizine 5 mg Oral Daily   citalopram 40 mg Oral Daily   docusate sodium 100 mg Oral BID   enoxaparin 40 mg Subcutaneous Q24H   fluticasone 2 spray Nasal Daily   folic acid 1 mg Oral Daily   gabapentin 600 mg Oral Q8H   guaiFENesin 400 mg Oral Q4H   ipratropium-albuterol 3 mL Nebulization Q6H While Awake - RT   methylPREDNISolone sodium succinate 40 mg Intravenous Daily   metoclopramide 5 mg Oral TID AC   montelukast 10 mg Oral Nightly   mupirocin  Topical Q12H   nicotine 1 patch Transdermal Q24H   nystatin 5 mL Oral 4x Daily   oxyCODONE HCl ER 30 mg Oral Q12H   pantoprazole 40 mg Oral Q AM   polyethylene glycol 17 g Oral Daily   sodium chloride 10 mL Intravenous Q12H   traZODone 25 mg Oral Nightly       Abraham Jacques MD   09/16/19   8:54 AM     Ambulatory

## 2019-09-16 NOTE — PLAN OF CARE
Problem: Patient Care Overview  Goal: Plan of Care Review  Outcome: Ongoing (interventions implemented as appropriate)   09/16/19 3978   Coping/Psychosocial   Plan of Care Reviewed With patient   Plan of Care Review   Progress improving   OTHER   Outcome Summary OT re-eval complete. Pt performed bed mobility, T/Fs, donning of socks, toileting, and grooming I'ly. Pt progressing well w/ therapy. Pt is completing most BADL I'ly. She continues to have O2 desats w/ approx. 10 minutes of act. O2 sats dropped to 86% after completing toileting and grooming, recovering to <90% following a 1 minute rest break. OT recommends HH, however pt continues to refuse at this time. OT will continue to work on strengthening and will provide education on energy conservation and work simplification prior to D/C home.

## 2019-09-16 NOTE — PLAN OF CARE
Problem: Chronic Obstructive Pulmonary Disease (Adult)  Goal: Signs and Symptoms of Listed Potential Problems Will be Absent, Minimized or Managed (Chronic Obstructive Pulmonary Disease)  Outcome: Ongoing (interventions implemented as appropriate)      Problem: Patient Care Overview  Goal: Plan of Care Review   09/16/19 8097   Coping/Psychosocial   Plan of Care Reviewed With patient   Plan of Care Review   Progress improving       Problem: Skin Injury Risk (Adult)  Goal: Skin Health and Integrity  Outcome: Ongoing (interventions implemented as appropriate)      Problem: Fall Risk (Adult)  Goal: Absence of Fall  Outcome: Ongoing (interventions implemented as appropriate)      Problem: Pain, Chronic (Adult)  Goal: Acceptable Pain/Comfort Level and Functional Ability  Outcome: Ongoing (interventions implemented as appropriate)      Problem: Nutrition, Imbalanced: Inadequate Oral Intake (Adult)  Goal: Improved Oral Intake  Outcome: Ongoing (interventions implemented as appropriate)    Goal: Prevent Further Weight Loss  Outcome: Ongoing (interventions implemented as appropriate)

## 2019-09-16 NOTE — PLAN OF CARE
Problem: Chronic Obstructive Pulmonary Disease (Adult)  Goal: Signs and Symptoms of Listed Potential Problems Will be Absent, Minimized or Managed (Chronic Obstructive Pulmonary Disease)  Outcome: Ongoing (interventions implemented as appropriate)   09/16/19 0324   Goal/Outcome Evaluation   Problems Assessed (Chronic Obstructive Pulmonary Disease (COPD)) all   Problems Present (COPD, Bronch/Emphy) respiratory compromise;situational response       Problem: Patient Care Overview  Goal: Plan of Care Review   09/16/19 0324   Coping/Psychosocial   Plan of Care Reviewed With patient   Plan of Care Review   Progress improving   OTHER   Outcome Summary A&O X4. Pt c/o chronic back and rib pain. Scheduled and PRN pain meds given with some relief. Up to BSC independently. VSS. Safety maintained. Will continue to monitor.       Problem: Skin Injury Risk (Adult)  Goal: Skin Health and Integrity  Outcome: Ongoing (interventions implemented as appropriate)   09/16/19 0324   Skin Injury Risk (Adult)   Skin Health and Integrity making progress toward outcome       Problem: Fall Risk (Adult)  Goal: Absence of Fall  Outcome: Ongoing (interventions implemented as appropriate)   09/16/19 0324   Fall Risk (Adult)   Absence of Fall making progress toward outcome       Problem: Pain, Chronic (Adult)  Goal: Acceptable Pain/Comfort Level and Functional Ability  Outcome: Ongoing (interventions implemented as appropriate)   09/16/19 0324   Pain, Chronic (Adult)   Acceptable Pain/Comfort Level and Functional Ability making progress toward outcome       Problem: Nutrition, Imbalanced: Inadequate Oral Intake (Adult)  Goal: Prevent Further Weight Loss  Outcome: Ongoing (interventions implemented as appropriate)   09/16/19 0324   Nutrition, Imbalanced: Inadequate Oral Intake (Adult)   Prevent Further Weight Loss making progress toward outcome

## 2019-09-17 PROCEDURE — 97164 PT RE-EVAL EST PLAN CARE: CPT

## 2019-09-17 PROCEDURE — 94799 UNLISTED PULMONARY SVC/PX: CPT

## 2019-09-17 PROCEDURE — 25010000002 ENOXAPARIN PER 10 MG: Performed by: INTERNAL MEDICINE

## 2019-09-17 RX ORDER — ALPRAZOLAM 0.5 MG/1
0.5 TABLET ORAL 4 TIMES DAILY PRN
Qty: 12 TABLET | Refills: 0 | Status: SHIPPED | OUTPATIENT
Start: 2019-09-17 | End: 2020-09-12

## 2019-09-17 RX ORDER — PSEUDOEPHEDRINE HCL 30 MG
100 TABLET ORAL 2 TIMES DAILY
Qty: 10 EACH | Refills: 0 | Status: SHIPPED | OUTPATIENT
Start: 2019-09-17 | End: 2020-11-23

## 2019-09-17 RX ORDER — LORATADINE 10 MG/1
10 TABLET ORAL DAILY
Qty: 30 TABLET | Refills: 0 | Status: SHIPPED | OUTPATIENT
Start: 2019-09-17 | End: 2020-02-05

## 2019-09-17 RX ORDER — METOCLOPRAMIDE 5 MG/1
5 TABLET ORAL
Qty: 30 TABLET | Refills: 0 | Status: SHIPPED | OUTPATIENT
Start: 2019-09-17 | End: 2020-02-05 | Stop reason: SDUPTHER

## 2019-09-17 RX ORDER — DEXTROMETHORPHAN POLISTIREX 30 MG/5ML
60 SUSPENSION ORAL NIGHTLY PRN
Qty: 280 ML | Refills: 3 | Status: SHIPPED | OUTPATIENT
Start: 2019-09-17 | End: 2019-09-27

## 2019-09-17 RX ORDER — FOLIC ACID 1 MG/1
1 TABLET ORAL DAILY
Qty: 30 TABLET | Refills: 0 | Status: SHIPPED | OUTPATIENT
Start: 2019-09-18 | End: 2020-11-23

## 2019-09-17 RX ADMIN — CETIRIZINE HYDROCHLORIDE 5 MG: 5 TABLET ORAL at 09:44

## 2019-09-17 RX ADMIN — OXYCODONE HYDROCHLORIDE 30 MG: 20 TABLET, FILM COATED, EXTENDED RELEASE ORAL at 21:26

## 2019-09-17 RX ADMIN — AZELASTINE HYDROCHLORIDE 2 SPRAY: 137 SPRAY, METERED NASAL at 09:45

## 2019-09-17 RX ADMIN — GABAPENTIN 600 MG: 300 CAPSULE ORAL at 06:03

## 2019-09-17 RX ADMIN — IPRATROPIUM BROMIDE AND ALBUTEROL SULFATE 3 ML: 2.5; .5 SOLUTION RESPIRATORY (INHALATION) at 21:34

## 2019-09-17 RX ADMIN — GUAIFENESIN 400 MG: 100 SOLUTION ORAL at 02:16

## 2019-09-17 RX ADMIN — GUAIFENESIN 400 MG: 100 SOLUTION ORAL at 21:26

## 2019-09-17 RX ADMIN — GUAIFENESIN 400 MG: 100 SOLUTION ORAL at 06:03

## 2019-09-17 RX ADMIN — MUPIROCIN: 20 OINTMENT TOPICAL at 21:26

## 2019-09-17 RX ADMIN — IPRATROPIUM BROMIDE AND ALBUTEROL SULFATE 3 ML: 2.5; .5 SOLUTION RESPIRATORY (INHALATION) at 13:42

## 2019-09-17 RX ADMIN — METOCLOPRAMIDE 5 MG: 5 TABLET ORAL at 17:56

## 2019-09-17 RX ADMIN — SODIUM CHLORIDE, PRESERVATIVE FREE 10 ML: 5 INJECTION INTRAVENOUS at 09:44

## 2019-09-17 RX ADMIN — TRAZODONE HYDROCHLORIDE 25 MG: 50 TABLET ORAL at 21:25

## 2019-09-17 RX ADMIN — ALPRAZOLAM 0.5 MG: 0.5 TABLET ORAL at 19:38

## 2019-09-17 RX ADMIN — METOCLOPRAMIDE 5 MG: 5 TABLET ORAL at 12:02

## 2019-09-17 RX ADMIN — ENOXAPARIN SODIUM 40 MG: 40 INJECTION SUBCUTANEOUS at 09:45

## 2019-09-17 RX ADMIN — MONTELUKAST SODIUM 10 MG: 10 TABLET, FILM COATED ORAL at 21:25

## 2019-09-17 RX ADMIN — MUPIROCIN: 20 OINTMENT TOPICAL at 09:45

## 2019-09-17 RX ADMIN — SIMETHICONE 80 MG: 80 TABLET, CHEWABLE ORAL at 19:38

## 2019-09-17 RX ADMIN — GABAPENTIN 600 MG: 300 CAPSULE ORAL at 14:11

## 2019-09-17 RX ADMIN — AZELASTINE HYDROCHLORIDE 2 SPRAY: 137 SPRAY, METERED NASAL at 21:25

## 2019-09-17 RX ADMIN — CITALOPRAM 40 MG: 20 TABLET, FILM COATED ORAL at 09:44

## 2019-09-17 RX ADMIN — GABAPENTIN 600 MG: 300 CAPSULE ORAL at 21:26

## 2019-09-17 RX ADMIN — IPRATROPIUM BROMIDE AND ALBUTEROL SULFATE 3 ML: 2.5; .5 SOLUTION RESPIRATORY (INHALATION) at 06:20

## 2019-09-17 RX ADMIN — FOLIC ACID 1 MG: 1 TABLET ORAL at 09:44

## 2019-09-17 RX ADMIN — OXYCODONE HYDROCHLORIDE AND ACETAMINOPHEN 1 TABLET: 10; 325 TABLET ORAL at 16:31

## 2019-09-17 RX ADMIN — DOCUSATE SODIUM 100 MG: 100 CAPSULE, LIQUID FILLED ORAL at 09:44

## 2019-09-17 RX ADMIN — ALPRAZOLAM 0.5 MG: 0.5 TABLET ORAL at 12:04

## 2019-09-17 RX ADMIN — GUAIFENESIN 400 MG: 100 SOLUTION ORAL at 09:45

## 2019-09-17 RX ADMIN — OXYCODONE HYDROCHLORIDE 30 MG: 20 TABLET, FILM COATED, EXTENDED RELEASE ORAL at 09:45

## 2019-09-17 RX ADMIN — GUAIFENESIN 400 MG: 100 SOLUTION ORAL at 17:56

## 2019-09-17 RX ADMIN — SODIUM CHLORIDE, PRESERVATIVE FREE 10 ML: 5 INJECTION INTRAVENOUS at 21:26

## 2019-09-17 RX ADMIN — METOCLOPRAMIDE 5 MG: 5 TABLET ORAL at 09:44

## 2019-09-17 RX ADMIN — OXYCODONE HYDROCHLORIDE AND ACETAMINOPHEN 1 TABLET: 10; 325 TABLET ORAL at 02:16

## 2019-09-17 RX ADMIN — FLUTICASONE PROPIONATE 2 SPRAY: 50 SPRAY, METERED NASAL at 09:45

## 2019-09-17 RX ADMIN — GUAIFENESIN 400 MG: 100 SOLUTION ORAL at 14:11

## 2019-09-17 RX ADMIN — NICOTINE 1 PATCH: 21 PATCH, EXTENDED RELEASE TRANSDERMAL at 17:58

## 2019-09-17 RX ADMIN — NICOTINE 1 PATCH: 21 PATCH, EXTENDED RELEASE TRANSDERMAL at 18:00

## 2019-09-17 RX ADMIN — PANTOPRAZOLE SODIUM 40 MG: 40 TABLET, DELAYED RELEASE ORAL at 06:03

## 2019-09-17 NOTE — PLAN OF CARE
Problem: Patient Care Overview  Goal: Plan of Care Review   09/17/19 1443   Coping/Psychosocial   Plan of Care Reviewed With patient   Plan of Care Review   Progress improving   OTHER   Outcome Summary PT re-eval completed. Pt A&Ox4. Pt BLAS jovelht 4/5, Pt BLE strength 3/5. Pt independent in bed mobility, and transfers w/ use of rolling walker. Pt standby assist during gait w/ use of rolling walker. Pt demonstrates reduced stride length, slowed gait speed, and mild unsteadiness that increase Pt fall risk at home. Pt had no LOB and was able to self-corret and maintain balance without PT intervention. Pt on 2L of supplemental O2 throughout re-eval. Pt is progressing well towards current goals. Pt is still appropriate for skilled intervention to improve Pt safety awareness, strength, balance, and activity tolerance. Recommend Pt D/C to home with assist and home health PT.

## 2019-09-17 NOTE — PLAN OF CARE
Problem: Chronic Obstructive Pulmonary Disease (Adult)  Goal: Signs and Symptoms of Listed Potential Problems Will be Absent, Minimized or Managed (Chronic Obstructive Pulmonary Disease)  Outcome: Ongoing (interventions implemented as appropriate)   09/17/19 0530   Goal/Outcome Evaluation   Problems Assessed (Chronic Obstructive Pulmonary Disease (COPD)) all   Problems Present (COPD, Bronch/Emphy) respiratory compromise;situational response       Problem: Patient Care Overview  Goal: Plan of Care Review   09/17/19 0530   Coping/Psychosocial   Plan of Care Reviewed With patient   Plan of Care Review   Progress improving   OTHER   Outcome Summary Pt has beem anxious. PRN xanax given with relief. PT c/o constant pain. PRN and Scheduled pain meds given with some relief. Up to BSC. Pt slept some during the night. VSS. Safety maintained. Will continue to monitor.       Problem: Skin Injury Risk (Adult)  Goal: Skin Health and Integrity  Outcome: Ongoing (interventions implemented as appropriate)   09/17/19 0530   Skin Injury Risk (Adult)   Skin Health and Integrity making progress toward outcome       Problem: Fall Risk (Adult)  Goal: Absence of Fall  Outcome: Ongoing (interventions implemented as appropriate)   09/17/19 0530   Fall Risk (Adult)   Absence of Fall making progress toward outcome       Problem: Pain, Chronic (Adult)  Goal: Acceptable Pain/Comfort Level and Functional Ability  Outcome: Ongoing (interventions implemented as appropriate)   09/17/19 0530   Pain, Chronic (Adult)   Acceptable Pain/Comfort Level and Functional Ability making progress toward outcome       Problem: Nutrition, Imbalanced: Inadequate Oral Intake (Adult)  Goal: Prevent Further Weight Loss  Outcome: Ongoing (interventions implemented as appropriate)   09/17/19 0530   Nutrition, Imbalanced: Inadequate Oral Intake (Adult)   Prevent Further Weight Loss making progress toward outcome
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
## 2019-09-17 NOTE — PROGRESS NOTES
[facility] Medicine Services  INPATIENT PROGRESS NOTE    Patient Name: Justyna Sosa Lei  Date of Admission: 9/4/2019  Today's Date: 09/17/19  Length of Stay: 13  Primary Care Physician: Provider, No Known    Subjective   Chief Complaint: Follow-up  HPI   Daughter is working on arranging house and anticipates that she can return home tomorrow.  Patient is doing better.  PT and OT notes reviewed.  Patient continued to desaturate.  She desaturated to 86% after completing toileting and grooming recovering to less than 90% following 1 minute of rest break.  Patient refused skilled nursing facility or home health.  She claims that her daughter is a CNA and will be moving in with her.  Pulmonary signed off yesterday and cleared her for discharge with Trelegy and PRN albuterol.    She claims she had good bowel movement today  Review of Systems     All pertinent negatives and positives are as above. All other systems have been reviewed and are negative unless otherwise stated.     Objective    Temp:  [97.4 °F (36.3 °C)-98.7 °F (37.1 °C)] 97.9 °F (36.6 °C)  Heart Rate:  [71-92] 82  Resp:  [16-18] 18  BP: (101-136)/(41-57) 118/41  Physical Exam   Constitutional: She is oriented to person, place, and time. She appears well-developed. No distress.   Barrel chested   HENT:   Head: Normocephalic and atraumatic.   Right Ear: External ear normal.   Left Ear: External ear normal.   Nose: Nose normal.   Mouth/Throat: Oropharynx is clear and moist. No oropharyngeal exudate.   Eyes: Conjunctivae and EOM are normal. Pupils are equal, round, and reactive to light. Right eye exhibits no discharge. Left eye exhibits no discharge. No scleral icterus.   Neck: Normal range of motion. Neck supple. No tracheal deviation present. No thyromegaly present.   Pulmonary/Chest: Effort normal.   She is on 2 L nasal cannula with oxygen saturation of 95%.  Diminished breath sounds.  No crackles or wheezes   Abdominal:  Soft. Bowel sounds are normal. She exhibits no distension. There is no tenderness. There is no guarding.   Musculoskeletal: Normal range of motion. She exhibits no edema or tenderness.   Neurological: She is alert and oriented to person, place, and time. No cranial nerve deficit. She exhibits normal muscle tone. Coordination normal.   Skin: Skin is warm and dry. Capillary refill takes less than 2 seconds. No rash noted. She is not diaphoretic. No erythema.   Psychiatric: She has a normal mood and affect. Her behavior is normal. Judgment and thought content normal.   Vitals reviewed.          Results Review:  I have reviewed the labs, radiology results, and diagnostic studies.    Laboratory Data:                Invalid input(s): LABALBU, PROT    Culture Data:        Radiology Data:   Imaging Results (last 24 hours)     ** No results found for the last 24 hours. **          I have reviewed the patient's current medications.     Assessment/Plan     Active Hospital Problems    Diagnosis   • **COPD exacerbation (CMS/HCC)   • Compensated chronic respiratory acidosis   • Acute on chronic respiratory failure with hypoxia and hypercapnia (CMS/HCC)   • Alpha-1-antitrypsin deficiency carrier (CMS/HCC)     phenotype SS     • Wound of right foot   • Folate deficiency anemia   • Polypharmacy   • Personal history of nicotine dependence       Patient is anticipated to go home tomorrow.  Continue current management  Counseled on continued smoking cessation  She declined home health and skilled nursing facility placement  Activities as tolerated      azelastine 2 spray Each Nare BID   cetirizine 5 mg Oral Daily   citalopram 40 mg Oral Daily   docusate sodium 100 mg Oral BID   enoxaparin 40 mg Subcutaneous Q24H   fluticasone 2 spray Nasal Daily   folic acid 1 mg Oral Daily   gabapentin 600 mg Oral Q8H   guaiFENesin 400 mg Oral Q4H   ipratropium-albuterol 3 mL Nebulization Q6H While Awake - RT   metoclopramide 5 mg Oral TID AC    montelukast 10 mg Oral Nightly   mupirocin  Topical Q12H   nicotine 1 patch Transdermal Q24H   oxyCODONE HCl ER 30 mg Oral Q12H   pantoprazole 40 mg Oral Q AM   polyethylene glycol 17 g Oral Daily   sodium chloride 10 mL Intravenous Q12H   traZODone 25 mg Oral Nightly     Discussed with nurse and   I will start working on her discharge medications and instructions to facilitate her discharge tomorrow.  Abraham Jacques MD   09/17/19   4:16 PM

## 2019-09-17 NOTE — THERAPY EVALUATION
Patient Name: Justyna Sosa Wainscott  : 3/25/1951    MRN: [id number]                              Today's Date: 2019       Admit Date: 2019    Visit Dx:     ICD-10-CM ICD-9-CM   1. COPD exacerbation (CMS/HCC) J44.1 491.21   2. Hypercapnia R06.89 786.09   3. Decreased activities of daily living (ADL) R68.89 780.99   4. Impaired mobility Z74.09 799.89   5. Impaired functional mobility, balance, gait, and endurance Z74.09 V49.89     Patient Active Problem List   Diagnosis   • Stage 4 very severe COPD by GOLD classification (CMS/[facility])   • Hypokalemia   • Folate deficiency anemia   • Respiratory failure (CMS/[facility])   • Nasopharyngitis   • GERD without esophagitis   • Lung nodule   • Alpha-1-antitrypsin deficiency carrier (CMS/[facility])   • Personal history of nicotine dependence   • COPD exacerbation (CMS/[facility])   • Acute on chronic respiratory failure with hypoxia and hypercapnia (CMS/[facility])   • Polypharmacy   • Compensated chronic respiratory acidosis   • Wound of right foot     Past Medical History:   Diagnosis Date   • Anxiety    • Anxiety     pain clinic patient Dr Sunshine x 5yrs   • Chronic pain due to injury     jarad LE & back   • COPD (chronic obstructive pulmonary disease) (CMS/[facility])    • Depression    • Hypertension    • Polypharmacy    • Smoker      Past Surgical History:   Procedure Laterality Date   • ABDOMINAL SURGERY     • APPENDECTOMY     • CHOLECYSTECTOMY     • FEMUR FRACTURE SURGERY     • HERNIA REPAIR     • HYSTERECTOMY       General Information     Row Name 19 1408          PT Evaluation Time/Intention    Document Type  re-evaluation Pt Dx of COPD exacerbation. PT re-eval due to time passed.   -MELISSA (r) CR (t) MELISSA (c)     Mode of Treatment  physical therapy  -MELISSA (r) CR (t) MELISSA (c)     Row Name 19 5683          General Information    Patient Profile Reviewed?  yes  -MELISSA (r) CR (t) MELISSA (c)     Prior Level of Function  independent:;community mobility;gait;ADL's  -MELISSA (r) CR (t) MELISSA (c)     Existing  Precautions/Restrictions  oxygen therapy device and L/min;fall 2L of O2  -MELISSA (r) CR (t) MELISSA (c)     Barriers to Rehab  medically complex  -MELISSA (r) CR (t) MELISSA (c)     Row Name 09/17/19 1408          Relationship/Environment    Lives With  child(nani), adult  -MELISSA (r) CR (t) MELISSA (c)     Name(s) of Who Lives With Patient  Pt lives with daughter.  -MELISSA (r) CR (t) MELISSA (c)     Row Name 09/17/19 1408          Resource/Environmental Concerns    Current Living Arrangements  home/apartment/condo  -MELISSA (r) CR (t) MELISSA (c)     Row Name 09/17/19 1408          Home Main Entrance    Number of Stairs, Main Entrance  three  -MELISSA (r) CR (t) MELISSA (c)     Stair Railings, Main Entrance  railing on left side (ascending)  -MELISSA (r) CR (t) MELISSA (c)     Row Name 09/17/19 1408          Cognitive Assessment/Intervention- PT/OT    Orientation Status (Cognition)  oriented x 4  -MELISSA (r) CR (t) MELISSA (c)     Personal Safety Interventions  fall prevention program maintained;nonskid shoes/slippers when out of bed;muscle strengthening facilitated;gait belt;supervised activity  -MELISSA (r) CR (t) MELISSA (c)     Row Name 09/17/19 1408          Safety Issues, Functional Mobility    Impairments Affecting Function (Mobility)  balance;endurance/activity tolerance;strength;shortness of breath  -MELISSA (r) CR (t) MELISSA (c)       User Key  (r) = Recorded By, (t) = Taken By, (c) = Cosigned By    Initials Name Provider Type    Larry Tillman, PT DPT Physical Therapist    Bry Huerta, PT Student PT Student        Mobility     Row Name 09/17/19 1408          Bed Mobility Assessment/Treatment    Bed Mobility Assessment/Treatment  supine-sit;sit-supine  -MELISSA (r) CR (t) MELISSA (c)     Supine-Sit [place] (Bed Mobility)  independent  -MELISSA (r) CR (t) MELISSA (c)     Sit-Supine [place] (Bed Mobility)  independent  -MELISSA (r) CR (t) MELISSA (c)     Assistive Device (Bed Mobility)  head of bed elevated  -MELISSA (r) CR (t) MELISSA (c)     Row Name 09/17/19 1408          Sit-Stand Transfer    Sit-Stand [place]  (Transfers)  supervision  -MELISSA (r) CR (t) MELISSA (c)     Row Name 09/17/19 1408          Gait/Stairs Assessment/Training    Gait/Stairs Assessment/Training  gait/ambulation independence;gait/ambulation assistive device  -MELISSA (r) CR (t) MELISSA (c)     [place] Level (Gait)  stand by assist  -MELISSA (r) CR (t) MELISSA (c)     Assistive Device (Gait)  walker, front-wheeled  -MELISSA (r) CR (t) MELISSA (c)     Distance in Feet (Gait)  100 ft  -MELISSA (r) CR (t) MELISSA (c)     Deviations/Abnormal Patterns (Gait)  ailyn decreased;gait speed decreased;base of support, narrow  -MELISSA (r) CR (t) MELISSA (c)       User Key  (r) = Recorded By, (t) = Taken By, (c) = Cosigned By    Initials Name Provider Type    MELISSA Larry Veliz, PT DPT Physical Therapist    Bry Huerta, PT Student PT Student        Obj/Interventions     Row Name 09/17/19 1408          General ROM    GENERAL ROM COMMENTS  Pt BUE, BLE AROM WFL.  -MELISSA (r) CR (t) MELISSA (c)     [facility] Name 09/17/19 1408          MMT (Manual Muscle Testing)    General MMT Comments  Pt BUE strengh 4/5, Pt BLE strength 3+/5.   -MELISSA (r) CR (t) MELISSA (c)     Row Name 09/17/19 1408          Static Sitting Balance    Level of [place] (Unsupported Sitting, Static Balance)  independent  -MELISSA (r) CR (t) MELISSA (c)     Sitting Position (Unsupported Sitting, Static Balance)  sitting on edge of bed  -MELISSA (r) CR (t) MELISSA (c)     [facility] Name 09/17/19 1408          Static Standing Balance    Level of [place] (Supported Standing, Static Balance)  independent  -MELISSA (r) CR (t) MELISSA (c)     Assistive Device Utilized (Supported Standing, Static Balance)  walker, rolling  -MELISSA (r) CR (t) MELISSA (c)     Row Name 09/17/19 1408          Dynamic Standing Balance    Level of [place], Reaches Outside Midline (Standing, Dynamic Balance)  standby assist  -MELISSA (r) CR (t) MELISSA (c)     Assistive Device Utilized (Supported Standing, Dynamic Balance)  walker, rolling  -MELISSA (r) CR (t) MELISSA (c)     Row Name 09/17/19 1400          Sensory Assessment/Intervention     Sensory General Assessment  no sensation deficits identified  -MELISSA (r) CR (t) MELISSA (c)       User Key  (r) = Recorded By, (t) = Taken By, (c) = Cosigned By    Initials Name Provider Type    Larry Tillman, PT DPT Physical Therapist    Bry Huerta, PT Student PT Student        Goals/Plan     Row Name 09/17/19 1408          Transfer Goal 1 (PT)    Activity/Assistive Device (Transfer Goal 1, PT)  sit-to-stand/stand-to-sit;bed-to-chair/chair-to-bed  -MELISSA     [place] Level/Cues Needed (Transfer Goal 1, PT)  independent  -MELISSA (r) CR (t) MELISSA (c)     Time Frame (Transfer Goal 1, PT)  10 days  -MELISSA (r) CR (t) MELISSA (c)     Progress/Outcome (Transfer Goal 1, PT)  continuing progress toward goal;goal ongoing  -MELISSA     Row Name 09/17/19 1408          Gait Training Goal 1 (PT)    Activity/Assistive Device (Gait Training Goal 1, PT)  gait (walking locomotion)  -MELISSA (r) CR (t) MELISSA (c)     [place] Level (Gait Training Goal 1, PT)  standby assist  -MELISSA (r) CR (t) MELISSA (c)     Distance (Gait Goal 1, PT)  100 ft  -MELISSA (r) CR (t) MELISSA (c)     Time Frame (Gait Training Goal 1, PT)  10 days  -MELISSA (r) CR (t) MELISSA (c)     Progress/Outcome (Gait Training Goal 1, PT)  continuing progress toward goal;goal ongoing  -MELISSA     Row Name 09/17/19 1408          Stairs Goal 1 (PT)    Activity/Assistive Device (Stairs Goal 1, PT)  ascending stairs;descending stairs;using handrail, left  -MELISSA     [place] Level/Cues Needed (Stairs Goal 1, PT)  contact guard assist  -MELISSA     Number of Stairs (Stairs Goal 1, PT)  3  -MELISSA     Time Frame (Stairs Goal 1, PT)  long term goal (LTG);10 days  -MELISSA     Progress/Outcome (Stairs Goal 1, PT)  goal not met;goal ongoing  -MELISSA       User Key  (r) = Recorded By, (t) = Taken By, (c) = Cosigned By    Initials Name Provider Type    Larry Tillman, PT DPT Physical Therapist    Bry Huerta, PT Student PT Student        Clinical Impression     Row Name 09/17/19 6632          Pain Assessment    Additional  Documentation  Pain Scale: Numbers Pre/Post-Treatment (Group)  -MELISSA (r) CR (t) MELISSA (c)     Row Name 09/17/19 1408          Pain Scale: Numbers Pre/Post-Treatment    Pain Scale: Numbers, Pretreatment  0/10 - no pain  -MELISSA (r) CR (t) MELISSA (c)     Pain Scale: Numbers, Post-Treatment  0/10 - no pain  -MELISSA (r) CR (t) MELISSA (c)     Row Name 09/17/19 1408          Plan of Care Review    Plan of Care Reviewed With  patient  -MELISSA (r) CR (t) MELISSA (c)     Row Name 09/17/19 1408          Physical Therapy Clinical Impression    Patient/Family Goals Statement (PT Clinical Impression)  Pt goal is to return to home.  -MELISSA (r) CR (t) MELISSA (c)     Criteria for Skilled Interventions Met (PT Clinical Impression)  yes;treatment indicated  -MELISSA (r) CR (t) MELISSA (c)     Rehab Potential (PT Clinical Summary)  good, to achieve stated therapy goals  -MELISSA (r) CR (t) MELISSA (c)     Predicted Duration of Therapy (PT)  Untill D/C  -MELISSA (r) CR (t) MELISSA (c)     Row Name 09/17/19 140          Vital Signs    Pre SpO2 (%)  94  -MELISSA (r) CR (t) MELISSA (c)     O2 Delivery Pre Treatment  supplemental O2 2L  -MELISSA (r) CR (t) MELISSA (c)     O2 Delivery Intra Treatment  supplemental O2  -MELISSA (r) CR (t) MELISSA (c)     Post SpO2 (%)  91  -MELISSA (r) CR (t) MELISSA (c)     O2 Delivery Post Treatment  supplemental O2 2L  -MELISSA (r) CR (t) MELISSA (c)     Row Name 09/17/19 1408          Positioning and Restraints    Pre-Treatment Position  in bed  -MELISSA (r) CR (t) MELISSA (c)     Post Treatment Position  bed  -MELISSA (r) CR (t) MELISSA (c)     In Bed  fowlers;call light within reach;encouraged to call for assist;side rails up x2  -MELISSA (r) CR (t) MELISSA (c)       User Key  (r) = Recorded By, (t) = Taken By, (c) = Cosigned By    Initials Name Provider Type    MELISSA Larry Veliz, PT DPT Physical Therapist    Bry Huerta, PT Student PT Student        Outcome Measures     Row Name 09/17/19 7228          How much help from another person do you currently need...    Turning from your back to your side while in flat bed without using  bedrails?  4  -MELISSA (r) CR (t) MELISSA (c)     Moving from lying on back to sitting on the side of a flat bed without bedrails?  4  -MELISSA (r) CR (t) MELISSA (c)     Moving to and from a bed to a chair (including a wheelchair)?  4  -MELISSA (r) CR (t) MELISSA (c)     Standing up from a chair using your arms (e.g., wheelchair, bedside chair)?  3  -MELISSA (r) CR (t) MELISSA (c)     Climbing 3-5 steps with a railing?  3  -MELISSA (r) CR (t) MELISSA (c)     To walk in hospital room?  3  -MELISSA (r) CR (t) MELISSA (c)     AM-PAC 6 Clicks Score (PT)  21  -MELISSA (r) CR (t)     Row Name 09/17/19 1408          Functional Assessment    Outcome Measure Options  AM-PAC 6 Clicks Basic Mobility (PT)  -MELISSA (r) CR (t) MELISSA (c)       User Key  (r) = Recorded By, (t) = Taken By, (c) = Cosigned By    Initials Name Provider Type    Larry Tillman, PT DPT Physical Therapist    Bry Huerta, PT Student PT Student        Physical Therapy Education     Title: PT OT SLP Therapies (In Progress)     Topic: Physical Therapy (Done)     Point: Mobility training (Done)     Learning Progress Summary           Patient Acceptance, E, VU,NR by KAMALJIT at 9/17/2019  2:43 PM    Comment:  Pt educated on safety awareness during bed mobility, transfers, and gait.    AcceptanceDONIS VU, DU by AL at 9/7/2019  9:42 AM    Comment:  Deep breathing with ambulation    AcceptanceDONIS VU by MELISSA at 9/5/2019  1:30 PM    Comment:  Educated pt on progression of PT POC and benefits of activity                   Point: Home exercise program (Done)     Learning Progress Summary           Patient Acceptance, EROSI DU by AL at 9/7/2019  9:42 AM    Comment:  Deep breathing with ambulation                   Point: Body mechanics (Done)     Learning Progress Summary           Patient Acceptance, E, VU,NR by KAMALJIT at 9/17/2019  2:43 PM    Comment:  Pt educated on safety awareness during bed mobility, transfers, and gait.    AcceptanceDONIS VU, DU by AL at 9/7/2019  9:42 AM    Comment:  Deep breathing with ambulation                    Point: Precautions (Done)     Learning Progress Summary           Patient Acceptance, ROSI DYKES NR by CR at 9/17/2019  2:43 PM    Comment:  Pt educated on safety awareness during bed mobility, transfers, and gait.    AcceptanceDONIS VU, DU by AL at 9/7/2019  9:42 AM    Comment:  Deep breathing with ambulation                               User Key     Initials Effective Dates Name Provider Type Discipline    AL 04/09/19 -  Asia Orozco, PTA, CLT-NIELS Physical Therapy Assistant PT    MELISSA 08/02/16 -  Larry Veliz, PT DPT Physical Therapist PT    CR 08/02/19 -  Bry Delatorre, PT Student PT Student PT              PT Recommendation and Plan     Outcome Summary/Treatment Plan (PT)  Anticipated Discharge Disposition (PT): home with assist, home with home health  Plan of Care Reviewed With: patient  Progress: improving  Outcome Summary: PT re-eval completed. Pt A&Ox4. Pt BUE strenght 4/5, Pt BLE strength 3/5. Pt independent in bed mobility, and transfers w/ use of rolling walker. Pt standby assist during gait w/ use of rolling walker. Pt demonstrates reduced stride length, slowed gait speed, and mild unsteadiness that increase Pt fall risk at home. Pt had no LOB and was able to self-corret and maintain balance without PT intervention. Pt on 2L of supplemental O2 throughout re-eval. Pt is progressing well towards current goals. Pt is still appropriate for skilled intervention to improve Pt safety awareness, strength, balance, and activity tolerance. Recommend Pt D/C to home with assist and home health PT.      Time Calculation:   PT Charges     Row Name 09/17/19 1408             Time Calculation    Start Time  1408 PT chart review from [id number] for a total time of 33 minutes.   -MELISSA (r) CR (t) MELISSA (c)      Stop Time  1432  -MELISSA (r) CR (t) MELISSA (c)      Time Calculation (min)  24 min  -MELISSA (r) CR (t)      PT Received On  09/17/19  -MELISSA (r) CR (t) MELISSA (c)      PT Goal Re-Cert Due Date  09/27/19  -MELISSA (r) CR (t) MELISSA (c)        User Key   (r) = Recorded By, (t) = Taken By, (c) = Cosigned By    Initials Name Provider Type    Larry Tillman, PT DPT Physical Therapist    Bry Huerta, PT Student PT Student        Therapy Charges for Today     Code Description Service Date Service Provider Modifiers Qty    [id number] HC PT RE-EVAL ESTABLISHED PLAN 2 9/17/2019 Bry Delatorre, PT Student GP 1          PT G-Codes  Outcome Measure Options: AM-PAC 6 Clicks Basic Mobility (PT)  AM-PAC 6 Clicks Score (PT): 21  AM-PAC 6 Clicks Score (OT): 24    Bry Delatorre PT Student  9/17/2019

## 2019-09-17 NOTE — PLAN OF CARE
Problem: Chronic Obstructive Pulmonary Disease (Adult)  Goal: Signs and Symptoms of Listed Potential Problems Will be Absent, Minimized or Managed (Chronic Obstructive Pulmonary Disease)  Outcome: Ongoing (interventions implemented as appropriate)      Problem: Patient Care Overview  Goal: Plan of Care Review   09/17/19 4598   Coping/Psychosocial   Plan of Care Reviewed With patient   Plan of Care Review   Progress improving       Problem: Skin Injury Risk (Adult)  Goal: Skin Health and Integrity  Outcome: Ongoing (interventions implemented as appropriate)      Problem: Fall Risk (Adult)  Goal: Absence of Fall  Outcome: Ongoing (interventions implemented as appropriate)      Problem: Pain, Chronic (Adult)  Goal: Acceptable Pain/Comfort Level and Functional Ability  Outcome: Ongoing (interventions implemented as appropriate)      Problem: Nutrition, Imbalanced: Inadequate Oral Intake (Adult)  Goal: Improved Oral Intake  Outcome: Ongoing (interventions implemented as appropriate)    Goal: Prevent Further Weight Loss  Outcome: Ongoing (interventions implemented as appropriate)

## 2019-09-18 VITALS
SYSTOLIC BLOOD PRESSURE: 140 MMHG | HEART RATE: 74 BPM | DIASTOLIC BLOOD PRESSURE: 53 MMHG | TEMPERATURE: 98 F | HEIGHT: 66 IN | BODY MASS INDEX: 24.56 KG/M2 | RESPIRATION RATE: 16 BRPM | OXYGEN SATURATION: 93 % | WEIGHT: 152.8 LBS

## 2019-09-18 PROCEDURE — 25010000002 ENOXAPARIN PER 10 MG: Performed by: INTERNAL MEDICINE

## 2019-09-18 PROCEDURE — 94799 UNLISTED PULMONARY SVC/PX: CPT

## 2019-09-18 PROCEDURE — 97535 SELF CARE MNGMENT TRAINING: CPT

## 2019-09-18 RX ADMIN — CETIRIZINE HYDROCHLORIDE 5 MG: 5 TABLET ORAL at 08:23

## 2019-09-18 RX ADMIN — GUAIFENESIN 400 MG: 100 SOLUTION ORAL at 11:24

## 2019-09-18 RX ADMIN — OXYCODONE HYDROCHLORIDE 30 MG: 20 TABLET, FILM COATED, EXTENDED RELEASE ORAL at 08:23

## 2019-09-18 RX ADMIN — AZELASTINE HYDROCHLORIDE 2 SPRAY: 137 SPRAY, METERED NASAL at 08:23

## 2019-09-18 RX ADMIN — OXYCODONE HYDROCHLORIDE AND ACETAMINOPHEN 1 TABLET: 10; 325 TABLET ORAL at 01:00

## 2019-09-18 RX ADMIN — METOCLOPRAMIDE 5 MG: 5 TABLET ORAL at 11:24

## 2019-09-18 RX ADMIN — GUAIFENESIN 400 MG: 100 SOLUTION ORAL at 05:33

## 2019-09-18 RX ADMIN — GUAIFENESIN 400 MG: 100 SOLUTION ORAL at 01:01

## 2019-09-18 RX ADMIN — CITALOPRAM 40 MG: 20 TABLET, FILM COATED ORAL at 08:23

## 2019-09-18 RX ADMIN — ALPRAZOLAM 0.5 MG: 0.5 TABLET ORAL at 11:30

## 2019-09-18 RX ADMIN — FOLIC ACID 1 MG: 1 TABLET ORAL at 08:23

## 2019-09-18 RX ADMIN — METOCLOPRAMIDE 5 MG: 5 TABLET ORAL at 08:24

## 2019-09-18 RX ADMIN — DOCUSATE SODIUM 100 MG: 100 CAPSULE, LIQUID FILLED ORAL at 08:23

## 2019-09-18 RX ADMIN — FLUTICASONE PROPIONATE 2 SPRAY: 50 SPRAY, METERED NASAL at 08:23

## 2019-09-18 RX ADMIN — ENOXAPARIN SODIUM 40 MG: 40 INJECTION SUBCUTANEOUS at 08:24

## 2019-09-18 RX ADMIN — PANTOPRAZOLE SODIUM 40 MG: 40 TABLET, DELAYED RELEASE ORAL at 05:01

## 2019-09-18 RX ADMIN — GABAPENTIN 600 MG: 300 CAPSULE ORAL at 05:01

## 2019-09-18 RX ADMIN — IPRATROPIUM BROMIDE AND ALBUTEROL SULFATE 3 ML: 2.5; .5 SOLUTION RESPIRATORY (INHALATION) at 07:18

## 2019-09-18 RX ADMIN — ALPRAZOLAM 0.5 MG: 0.5 TABLET ORAL at 05:01

## 2019-09-18 RX ADMIN — SODIUM CHLORIDE, PRESERVATIVE FREE 10 ML: 5 INJECTION INTRAVENOUS at 08:23

## 2019-09-18 RX ADMIN — MUPIROCIN: 20 OINTMENT TOPICAL at 08:23

## 2019-09-18 NOTE — THERAPY DISCHARGE NOTE
Acute Care - Occupational Therapy Treatment Note/Discharge  [facility]     Patient Name: Justyna Lei  : 1950  MRN: [id number]  Today's Date: 2019  Onset of Illness/Injury or Date of Surgery: 19  Date of Referral to OT: 19  Referring Physician: oDry Calvillo APRN      Admit Date: 2019    Visit Dx:     ICD-10-CM ICD-9-CM   1. COPD exacerbation (CMS/HCC) J44.1 491.21   2. Hypercapnia R06.89 786.09   3. Decreased activities of daily living (ADL) R68.89 780.99   4. Impaired mobility Z74.09 799.89   5. Impaired functional mobility, balance, gait, and endurance Z74.09 V49.89   6. Cough R05 786.2   7. Personal history of nicotine dependence Z87.891 V15.82     Patient Active Problem List   Diagnosis   • Stage 4 very severe COPD by GOLD classification (CMS/[facility])   • Hypokalemia   • Folate deficiency anemia   • Respiratory failure (CMS/HCC)   • Nasopharyngitis   • GERD without esophagitis   • Lung nodule   • Alpha-1-antitrypsin deficiency carrier (CMS/[facility])   • Personal history of nicotine dependence   • COPD exacerbation (CMS/HCC)   • Acute on chronic respiratory failure with hypoxia and hypercapnia (CMS/[facility])   • Polypharmacy   • Compensated chronic respiratory acidosis   • Wound of right foot       Therapy Treatment    Rehabilitation Treatment Summary     Row Name 19 0816             Treatment Time/Intention    Discipline  occupational therapy assistant  -MM      Document Type  therapy note (daily note)  -MM      Subjective Information  no complaints  -MM      Mode of Treatment  individual therapy;occupational therapy  -MM      Patient/Family Observations  no family   -MM      Patient Effort  good  -MM      Comment  Pt able to stated and demo 3 energy conservation techs/simplification of tasks. Pt feels nervous about home d/c: extra time educating on home safety/mods to decrease fall risk   -MM2      Existing Precautions/Restrictions  oxygen therapy device and L/min;fall  -MM       Recorded by [MM] Carmen Wong COTA/L 09/18/19 0844  [MM2] Carmen Wong COTA/L 09/18/19 0855      Row Name 09/18/19 0816             Cognitive Assessment/Intervention- PT/OT    Orientation Status (Cognition)  oriented x 4  -MM      Personal Safety Interventions  fall prevention program maintained;gait belt;supervised activity;nonskid shoes/slippers when out of bed  -MM      Recorded by [MM] Carmen Wong COTA/L 09/18/19 0844      Row Name 09/18/19 0816             Safety Issues, Functional Mobility    Safety Issues Affecting Function (Mobility)  friction/shear risk  -MM      Impairments Affecting Function (Mobility)  balance;endurance/activity tolerance;strength;shortness of breath  -MM      Recorded by [MM] Carmen Wong COTA/L 09/18/19 0844      Row Name 09/18/19 0816             Bed Mobility Assessment/Treatment    Scooting/Bridging [place] (Bed Mobility)  independent  -MM      Supine-Sit [place] (Bed Mobility)  independent  -MM      Sit-Supine [place] (Bed Mobility)  independent  -MM      Recorded by [MM] Carmen Wong COTA/L 09/18/19 0844      Row Name 09/18/19 0816             Functional Mobility    Functional Mobility- Ind. Level  independent  -MM      Functional Mobility- Device  rolling walker  -MM      Functional Mobility- Safety Issues  other (see comments)  -MM      Functional Mobility- Comment  in room: declined more d/t breakfast   -MM      Recorded by [MM] Carmen Wong COTA/L 09/18/19 0844      Row Name 09/18/19 0816             Transfer Assessment/Treatment    Transfer Assessment/Treatment  sit-stand transfer;stand-sit transfer  -MM      Recorded by [MM] Carmen Wong COTA/L 09/18/19 0844      Row Name 09/18/19 0816             Sit-Stand Transfer    Sit-Stand [place] (Transfers)  conditional independence  -MM      Assistive Device (Sit-Stand Transfers)  walker, front-wheeled  -MM      Recorded by [MM] Carmen Wong FLORES/L 09/18/19 0844       Row Name 09/18/19 0816             Stand-Sit Transfer    Stand-Sit [place] (Transfers)  conditional independence  -MM      Assistive Device (Stand-Sit Transfers)  walker, front-wheeled  -MM      Recorded by [MM] Carmen Wong COTA/L 09/18/19 0844      Row Name 09/18/19 0816             ADL Assessment/Intervention    BADL Assessment/Intervention  bathing;upper body dressing;lower body dressing  -MM      Recorded by [MM] Carmen Wong COTA/L 09/18/19 0851      Row Name 09/18/19 0816             Bathing Assessment/Intervention    Bathing [place] Level  set up simulated EOB   -MM      Comment (Bathing)  educated on need/safety for seated task and how to bring LEs up to opposite leg to decrease compression on lungs and SOA   -MM      Recorded by [MM] Carmen Wong COTA/L 09/18/19 0851      Row Name 09/18/19 0816             Upper Body Dressing Assessment/Training    Upper Body Dressing [place] Level  don;doff;independent  -MM      Upper Body Dressing Position  edge of bed sitting  -MM      Comment (Upper Body Dressing)  simulated   -MM      Recorded by [MM] Carmen Wong COTA/L 09/18/19 0851      Row Name 09/18/19 0816             Lower Body Dressing Assessment/Training    Lower Body Dressing [place] Level  lower body dressing skills  -MM      Lower Body Dressing Position  edge of bed sitting  -MM      Comment (Lower Body Dressing)  simulated: I   -MM      Recorded by [MM] Carmen Wong COTA/L 09/18/19 0851      Row Name 09/18/19 0816             BADL Safety/Performance    Impairments, BADL Safety/Performance  endurance/activity tolerance;strength;shortness of breath  -MM      Skilled BADL Treatment/Intervention  BADL process/adaptation training;energy conservation  -MM      Progress in BADL Status  improvement noted  -MM      Recorded by [MM] Carmen Wong COTA/L 09/18/19 0851      Row Name 09/18/19 0816             Positioning and Restraints    Pre-Treatment Position   in bed  -MM      Post Treatment Position  bed  -MM      In Bed  sitting;call light within reach;encouraged to call for assist;side rails up x2  -MM      Recorded by [MM] Carmen Wong COTA/L 09/18/19 0851      Row Name 09/18/19 0816             Pain Scale: Numbers Pre/Post-Treatment    Pain Scale: Numbers, Pretreatment  0/10 - no pain  -MM      Pain Scale: Numbers, Post-Treatment  0/10 - no pain  -MM      Recorded by [MM] Carmen Wong COTA/L 09/18/19 0851      Row Name                [REMOVED] Wound 09/04/19 1500 Right foot Blisters    Wound - Properties Group Date first assessed: 09/04/19 [ER] Time first assessed: 1500 [ER] Side: Right [ER] Location: foot [ER] Primary Wound Type: Blisters [ER], burn injury  Stage, Pressure Injury: Stage 2 [ER] Resolution Date: 09/18/19 [RL] Resolution Time: 0841 [RL] Recorded by:  [ER] Rose Coffman RN 09/04/19 1715 [RL] Benny Chávez RN 09/18/19 0841    Row Name 09/18/19 0816             Plan of Care Review    Plan of Care Reviewed With  patient  -MM      Recorded by [MM] Carmen Wong COTA/L 09/18/19 0851      Row Name 09/18/19 0816             Outcome Summary/Treatment Plan (OT)    Daily Summary of Progress (OT)  progress toward functional goals as expected  -MM      Recorded by [MM] Carmen Wong COTA/SARAH 09/18/19 0851        User Key  (r) = Recorded By, (t) = Taken By, (c) = Cosigned By    Initials Name Effective Dates Discipline    RL Benny Chávez RN 08/02/16 -  Nurse    ER Rose Coffman RN 08/02/16 -  Nurse    MM Carmen Wong COTA/L 10/15/18 -  OT             Rehab Goal Summary     Row Name 09/18/19 0816             Dressing Goal 1 (OT)    Activity/Assistive Device (Dressing Goal 1, OT)  dressing skills, all  -MM      [place]/Cues Needed (Dressing Goal 1, OT)  independent  -MM      Time Frame (Dressing Goal 1, OT)  long term goal (LTG);by discharge  -MM      Progress/Outcome (Dressing Goal 1, OT)  goal met per anticipation  from simulated tasks  -MM         Toileting Goal 1 (OT)    Activity/Device (Toileting Goal 1, OT)  toileting skills, all;commode  -MM      [place] Level/Cues Needed (Toileting Goal 1, OT)  independent  -MM      Time Frame (Toileting Goal 1, OT)  long term goal (LTG);by discharge  -MM      Progress/Outcome (Toileting Goal 1, OT)  goal met  -MM          Activity Tolerance Goal 1 (OT)    Activity Tolerance Goal 1 (OT)  fx; standing task 8mins no LOB   -MM      Activity Level (Endurance Goal 1, OT)  O2 sat >/ equal to 88%;10 min activity  -MM      Time Frame (Activity Tolerance Goal 1, OT)  long term goal (LTG)  -MM      Progress/Outcome (Activity Tolerance Goal 1, OT)  goal not met  -MM         Patient Education Goal (OT)    Activity (Patient Education Goal, OT)  energy conservation, work simplification  -MM      [place]/Cues/Accuracy (Memory Goal 2, OT)  demonstrates adequately;independent;verbalizes understanding  -MM      Time Frame (Patient Education Goal, OT)  long term goal (LTG);10 days  -MM      Progress/Outcome (Patient Education Goal, OT)  goal met  -MM        User Key  (r) = Recorded By, (t) = Taken By, (c) = Cosigned By    Initials Name Provider Type Discipline    Carmen Spivey COTA/L Occupational Therapy Assistant OT          Occupational Therapy Education     Title: PT OT SLP Therapies (Resolved)     Topic: Occupational Therapy (Resolved)     Point: ADL training (Resolved)     Description: Instruct learner(s) on proper safety adaptation and remediation techniques during self care or transfers.   Instruct in proper use of assistive devices.    Learning Progress Summary           Patient Acceptance, E, VU,DU by HE at 9/16/2019  3:29 PM    Comment:  Benefits of exercise, importance of HEP, energy conservation during ADL, OT POC    Acceptance, E,TB, VU by JACQUELIN at 9/13/2019  1:25 PM    Comment:  ther ex, HEP, energy conservation    Acceptance, E,TB, VU by JACQUELIN at 9/11/2019  2:05 PM    Comment:   t.f safety, need to get up to use BSC not bedpan/purewick    Acceptance, E,TB, VU by JACQUELIN at 9/10/2019  4:07 PM    Comment:  HEP for AROM: pt demo'd understanding and verbalized importance for movement    Acceptance, E, VU by VI at 9/5/2019  2:05 PM    Comment:  OT POC, adls, t/fs, bed mobility, activity tolerance.                   Point: Home exercise program (Resolved)     Description: Instruct learner(s) on appropriate technique for monitoring, assisting and/or progressing therapeutic exercises/activities.    Learning Progress Summary           Patient Acceptance, E, VU,DU by HE at 9/16/2019  3:29 PM    Comment:  Benefits of exercise, importance of HEP, energy conservation during ADL, OT POC    Acceptance, E,TB, VU by MM at 9/13/2019  1:25 PM    Comment:  ther ex, HEP, energy conservation    Acceptance, E,TB, VU by MM at 9/11/2019  2:05 PM    Comment:  t.f safety, need to get up to use BSC not bedpan/purewick    Acceptance, E,TB, VU by JACQUELIN at 9/10/2019  4:07 PM    Comment:  HEP for AROM: pt demo'lionel understanding and verbalized importance for movement                   Point: Body mechanics (Resolved)     Description: Instruct learner(s) on proper positioning and spine alignment during self-care, functional mobility activities and/or exercises.    Learning Progress Summary           Patient Acceptance, E, VU,DU by  at 9/16/2019  3:29 PM    Comment:  Benefits of exercise, importance of HEP, energy conservation during ADL, OT POC                               User Key     Initials Effective Dates Name Provider Type Discipline     10/15/18 -  Carmen Wong COTA/L Occupational Therapy Assistant OT    JJ 10/12/18 -  Gabi Faustin OTR/L Occupational Therapist OT     08/14/19 -  Mena Rodriguez OT Student OT Student OT                OT Recommendation and Plan  Outcome Summary/Treatment Plan (OT)  Daily Summary of Progress (OT): progress toward functional goals as expected  Barriers to Overall Progress  (OT): most limited by SOA: O2 maintained this date   Daily Summary of Progress (OT): progress toward functional goals as expected  Plan of Care Review  Plan of Care Reviewed With: patient  Plan of Care Reviewed With: patient  Outcome Summary: Very eager and thankful to work with therapy. Sit<>stand 5x at chair SBA for endurance training. Tolerated BUE ther ex standing for 7 mins in multiple planes: required standing rests and cues for breathing. Pt on NC O2 SATs 90 and above. Pt improving limited by endurance and strength. Recommended HH therapy: pt requests home ther ex programs from OT/PT to complete I.     Outcome Measures     Row Name 09/18/19 0816 09/16/19 1400          How much help from another is currently needed...    Putting on and taking off regular lower body clothing?  4  -MM  4  -AC (r) LS (t) AC (c)     Bathing (including washing, rinsing, and drying)  4  -MM  4  -AC (r) LS (t) AC (c)     Toileting (which includes using toilet bed pan or urinal)  4  -MM  4  -AC (r) LS (t) AC (c)     Putting on and taking off regular upper body clothing  4  -MM  4  -AC (r) LS (t) AC (c)     Taking care of personal grooming (such as brushing teeth)  4  -MM  4  -AC (r) LS (t) AC (c)     Eating meals  4  -MM  4  -AC (r) LS (t) AC (c)     AM-PAC 6 Clicks Score (OT)  24  -MM  24  -AC (r) LS (t)        Functional Assessment    Outcome Measure Options  --  AM-PAC 6 Clicks Daily Activity (OT)  -AC (r) LS (t) AC (c)       User Key  (r) = Recorded By, (t) = Taken By, (c) = Cosigned By    Initials Name Provider Type    AC Ezequiel Card, OTR/L, CNT Occupational Therapist    MM Carmen Wong COTA/L Occupational Therapy Assistant    LS Mena Rodriguez, OT Student OT Student           Time Calculation:    Time Calculation- OT     Row Name 09/18/19 0816             Time Calculation- OT    OT Start Time  0816  -MM      OT Stop Time  0854  -MM      OT Time Calculation (min)  38 min  -MM      Total Timed Code Minutes- OT  38  minute(s)  -MM      OT Received On  09/18/19  -MM         Timed Charges    77380 - OT Self Care/Mgmt Minutes  38  -MM        User Key  (r) = Recorded By, (t) = Taken By, (c) = Cosigned By    Initials Name Provider Type    Carmen Spivey COTA/L Occupational Therapy Assistant        Therapy Suggested Charges     Code   Minutes Charges    67136 (CPT®) Hc Ot Neuromusc Re Education Ea 15 Min      14770 (CPT®) Hc Ot Ther Proc Ea 15 Min      38722 (CPT®) Hc Ot Therapeutic Act Ea 15 Min      36640 (CPT®) Hc Ot Manual Therapy Ea 15 Min      46774 (CPT®) Hc Ot Iontophoresis Ea 15 Min      09832 (CPT®) Hc Ot Elec Stim Ea-Per 15 Min      44597 (CPT®) Hc Ot Ultrasound Ea 15 Min      27697 (CPT®) Hc Ot Self Care/Mgmt/Train Ea 15 Min 38 3    Total  38 3        Therapy Charges for Today     Code Description Service Date Service Provider Modifiers Qty    [id number] HC OT SELF CARE/MGMT/TRAIN EA 15 MIN 9/18/2019 Carmen Wong COTA/L GO 3               OT Discharge Summary  Reason for Discharge: Discharge from facility  Outcomes Achieved: Refer to plan of care for updates on goals achieved  Discharge Destination: Home with assist    JOSEFA Gonzales  9/18/2019

## 2019-09-18 NOTE — DISCHARGE PLACEMENT REQUEST
"Maye Gao [phone]  Jhon Lei (69 y.o. Female)     Date of Birth Social Security Number Address Home Phone MRN    1950  [address] [place] [address] [phone] [id number]    [facility] Marital Status          Other        Admission Date Admission Type Admitting Provider Attending Provider Department, Room/Bed    9/4/19 Emergency Abraham Jacques MD [place], Glenn Riego, MD [facility] 4C, 496/1    Discharge Date Discharge Disposition Discharge Destination         Home or Self Care              Attending Provider:  Abraham Jacques MD    Allergies:  Aspirin, Demerol [Meperidine], Ibuprofen, Neosporin [Neomycin-bacitracin Zn-polymyx], Penicillins, Sulfa Antibiotics, Codeine, Nsaids, Talwin [Pentazocine], Tetracyclines & Related, Influenza Vaccines    Isolation:  None   Infection:  None   Code Status:  CPR    Ht:  167.6 cm (66\")   Wt:  69.3 kg (152 lb 12.8 oz)    Admission Cmt:  None   Principal Problem:  COPD exacerbation (CMS/[facility]) [J44.1]                 Active Insurance as of 9/4/2019     Primary Coverage     Payor Plan Insurance Group Employer/Plan Group    MEDICARE MEDICARE A & B      Payor Plan Address Payor Plan Phone Number Payor Plan Fax Number Effective Dates    [address] [phone]  5/1/1989 - None Entered    [facility] [id number]       Subscriber Name Subscriber Birth Date Member ID       JHON LEI 3/25/1951 [id number]           Secondary Coverage     Payor Plan Insurance Group Employer/Plan Group    ANTHEM MEDICAID ANTHEM MEDICAID [id number]     Payor Plan Address Payor Plan Phone Number Payor Plan Fax Number Effective Dates    [address] [address] [phone]  1/1/2016 - None Entered    [facility] [id number]       Subscriber Name Subscriber Birth Date Member ID       JHON LEI 3/25/1951 [id number]                 Emergency Contacts      (Rel.) Home Phone Work Phone Mobile Phone    Ella Guerrero " (Daughter) [phone] -- [phone]    Shama Patterson (Daughter) [phone] -- --        [address] [address]  Phone:  [phone]  Fax:          Patient:     Justyna Lei MRN:  [id number]   [address]  [facility] [address] :  1950  SSN:    Phone: [phone] Sex:  F      INSURANCE PAYOR PLAN GROUP # SUBSCRIBER ID   Primary:  Secondary:    MEDICARE  ANTHEM MEDICAID [id number]  [id number]    [id number]  [id number]   Admitting Diagnosis: COPD exacerbation (CMS/[facility]) [J44.1]  Order Date:  Sep 18, 2019         Case Management  Consult       (Order ID: [id number])     Diagnosis:         Priority:  Routine Expected Date:   Expiration Date:        Interval:   Count:    Reason for Consult? Walker for discharge-- Uses [place] for O2     Specimen Type:   Specimen Source:   Specimen Taken Date:   Specimen Taken Time:                   Verbal Order Mode: Per protocol: cosign required  Authorizing Provider: Abraham Jacques MD  Authorizing Provider's NPI: [id number]     Order Entered By: Emily Min RN 2019 12:07 PM     Electronically signed by:                   History & Physical      Abraham Jacques MD at 19 1657              [facility] Medicine Services  HISTORY AND PHYSICAL    Date of Admission: 2019  Primary Care Physician: Provider, No Known    Subjective     Chief Complaint: Shortness of breathing    History of Present Illness  Nazia Lei is a 68-year-old female with past medical history of COPD with ongoing tobacco use followed by Dr. Wm Valdes, pulmonology, anxiety depression, hypertension, polypharmacy.  Patient states she developed increasing shortness of breathing 2 to 3 days ago.  She does wear oxygen 2 to 3 L continuously.  She states today she was unable to read and checked her home saturation it was 74% on 3 L.  EMS reported  "70% on 3 L.  She can planes of sinus drainage without any increase in cough or sputum production.  No fevers however she reports chills.  She has had no chest pain, changes in bowel or bladder habits.  Patient reports 2-3 falls over the past 2 weeks.  She states she noticed her right lower extremity swelling excellently 8 days ago.  She states it is painful.  She also reports \"working on her phone\" when Salazar came from the phone and she felt them on her right cheek and quickly removed her oxygen this did fall on her right foot and she has a quarter size burn on the lateral aspect of the foot.  She has been cleansing it with peroxide and utilizing antibiotic cream for treatment.  The area is quite raw without drainage.  Patient is admitted for further evaluation and treatment to the critical care unit due to ongoing hypoxia despite Vapotherm therapy.    Review of Systems   10 point review of systems was completed, all negative except for those discussed in HPI    Past Medical History:   Past Medical History:   Diagnosis Date   • Anxiety    • Anxiety     pain clinic patient Dr Sunshine x 5yrs   • Chronic pain due to injury     jarad LE & back   • COPD (chronic obstructive pulmonary disease) (CMS/HCC)    • Depression    • Hypertension    • Polypharmacy    • Smoker        Past Surgical History:   Past Surgical History:   Procedure Laterality Date   • ABDOMINAL SURGERY     • APPENDECTOMY     • CHOLECYSTECTOMY     • FEMUR FRACTURE SURGERY     • HERNIA REPAIR     • HYSTERECTOMY         Family History: family history includes Cancer in her father; No Known Problems in her brother; Suicidality in her mother.    Social History:  reports that she has been smoking cigarettes.  She has a 1125.00 pack-year smoking history. She has never used smokeless tobacco. She reports that she does not drink alcohol or use drugs.    Code Status: Full, but unable speak for herself her daughter Bertha Guerrero will speak for " her      Allergies:  Allergies   Allergen Reactions   • Aspirin Anaphylaxis   • Demerol [Meperidine] Anaphylaxis   • Ibuprofen Anaphylaxis   • Neosporin [Neomycin-Bacitracin Zn-Polymyx] Anaphylaxis   • Penicillins Anaphylaxis   • Sulfa Antibiotics Anaphylaxis   • Codeine Hives     Pt can take codeine as long is it is not alot   • Nsaids Unknown (See Comments)   • Talwin [Pentazocine] Hives   • Tetracyclines & Related    • Influenza Vaccines Hives and Rash       Medications:  Prior to Admission medications    Medication Sig Start Date End Date Taking? Authorizing Provider   albuterol (PROVENTIL HFA;VENTOLIN HFA) 108 (90 BASE) MCG/ACT inhaler Inhale 2 puffs Every 4 (Four) Hours As Needed for wheezing.    ProviderAnatoly MD   albuterol sulfate  (90 Base) MCG/ACT inhaler Inhale 2 puffs Every 4 (Four) Hours As Needed for Wheezing or Shortness of Air for up to 30 days. 8/19/19 9/18/19  Keisha Simon APRN   ALPRAZolam (XANAX) 1 MG tablet Take 1 mg by mouth 4 (Four) Times a Day As Needed for anxiety.    ProviderAnatoly MD   amphetamine-dextroamphetamine (ADDERALL) 20 MG tablet Take 20 mg by mouth 2 (Two) Times a Day.    ProviderAnatoly MD   azelastine (ASTELIN) 0.1 % nasal spray 2 sprays into the nostril(s) as directed by provider 2 (Two) Times a Day. Use in each nostril as directed 12/3/18   Keisha Simon APRN   citalopram (CeleXA) 40 MG tablet Take 40 mg by mouth Daily.    ProviderAnatoly MD   dextromethorphan polistirex ER (DELSYM) 30 MG/5ML Suspension Extended Release oral suspension Take 60 mg by mouth At Night As Needed (cough) for up to 10 days. 8/29/19 9/8/19  Keisha Simon APRN   fluticasone (FLONASE) 50 MCG/ACT nasal spray 2 sprays into the nostril(s) as directed by provider Daily. 12/3/18   Keisha Simon APRN   Fluticasone-Umeclidin-Vilant (TRELEGY ELLIPTA) 100-62.5-25 MCG/INH aerosol powder  Inhale 1 each Daily for 30 days. 8/19/19 9/18/19  [place],  "THIERNO Garcia   Fluticasone-Umeclidin-Vilant (TRELEGY ELLIPTA) 100-62.5-25 MCG/INH aerosol powder  Inhale 1 each Daily. 8/29/19   Keisha Simon APRN   gabapentin (NEURONTIN) 600 MG tablet Take 600 mg by mouth Every 8 (Eight) Hours.    Anatoly Ryan MD   ipratropium-albuterol (COMBIVENT RESPIMAT)  MCG/ACT inhaler Inhale 1 puff 4 (Four) Times a Day As Needed for Wheezing. 12/3/18   Keisha Simon APRN   montelukast (SINGULAIR) 10 MG tablet Take 1 tablet by mouth Every Night. 12/3/18   Keisha Simon APRN   nicotine (NICODERM CQ) 21 MG/24HR patch Place 1 patch on the skin Daily. 3/16/17   Mirna Galvez DO   nicotine (NICODERM CQ) 21 MG/24HR patch Place 1 patch on the skin as directed by provider Daily for 30 days. 8/23/19 9/22/19  Keisha Simon APRN   O2 (OXYGEN) Inhale 2 L.    Anatoly Ryan MD   ondansetron (ZOFRAN) 4 MG tablet Take 4 mg by mouth Every 8 (Eight) Hours As Needed for nausea or vomiting.    Anatoly Ryan MD   OxyCODONE HCl ER (oxyCONTIN) 30 MG tablet extended-release 12 hour Take 30 mg by mouth Every 12 (Twelve) Hours.    Anatoly Ryan MD   oxyCODONE-acetaminophen (PERCOCET)  MG per tablet Take 1 tablet by mouth Every 6 (Six) Hours As Needed for moderate pain (4-6) (every 4-6 hours).    Anatoly Ryan MD   pantoprazole (PROTONIX) 20 MG EC tablet Take 1 tablet by mouth Daily. 12/3/18   Keisha Simon APRN   tiZANidine (ZANAFLEX) 4 MG tablet TAKE 1 TABLET BY MOUTH THREE TIMES A DAY FILL ON OR AFTER 7-10-19 8/5/19   Anatoly Ryan MD   zolpidem (AMBIEN) 10 MG tablet Take 10 mg by mouth every night at bedtime. 7/5/19   Anatoly Ryan MD       Objective     /52 (BP Location: Right arm, Patient Position: Sitting)   Pulse 74   Temp 98.7 °F (37.1 °C) (Oral)   Resp 24   Ht 167.6 cm (66\")   Wt 59 kg (130 lb)   LMP  (LMP Unknown) Comment: hysterectomy  SpO2 90%   BMI 20.98 kg/m²  "   Physical Exam   Constitutional: She is oriented to person, place, and time. She appears well-developed and well-nourished. No distress.   Unkempt    HENT:   Head: Normocephalic and atraumatic.   Eyes: Conjunctivae and EOM are normal. Pupils are equal, round, and reactive to light. No scleral icterus.   Neck: Normal range of motion. Neck supple. No JVD present. No tracheal deviation present.   Cardiovascular: Normal rate, regular rhythm, normal heart sounds and intact distal pulses. Exam reveals no gallop.   No murmur heard.  Pulmonary/Chest: She is in respiratory distress. She has wheezes. She has no rales.   Severely diminished throughout   Abdominal: Soft. Bowel sounds are normal. She exhibits no distension. There is no tenderness. There is no guarding.   Musculoskeletal: Normal range of motion. She exhibits no edema.   Neurological: She is alert and oriented to person, place, and time.   Mildly somnolent however arouses easily   Skin: Skin is warm and dry. No rash noted. She is not diaphoretic. No erythema. No pallor.   Psychiatric: She has a normal mood and affect. Her behavior is normal.   Vitals reviewed.      Pertinent Data:   Lab Results (last 72 hours)     Procedure Component Value Units Date/Time    Blood Gas, Arterial [[id number]]  (Abnormal) Collected:  09/04/19 1620    Specimen:  Arterial Blood Updated:  09/04/19 1618     Site Left Radial     Michael's Test Positive     pH, Arterial 7.401 pH units      pCO2, Arterial 64.6 mm Hg      Comment: 83 Value above reference range        pO2, Arterial 61.8 mm Hg      Comment: 84 Value below reference range        HCO3, Arterial 40.1 mmol/L      Comment: 83 Value above reference range        Base Excess, Arterial 13.2 mmol/L      Comment: 83 Value above reference range        O2 Saturation, Arterial 92.1 %      Comment: 84 Value below reference range        Temperature 37.0 C      Barometric Pressure for Blood Gas 751 mmHg      Modality Heated HFNC     FIO2 50 %       Flow Rate 28.0 lpm      Ventilator Mode NA     Collected by [id number]     Comment: Meter: [id number]     :  [id number]       Blood Culture - Blood, Wrist, Left [[id number]] Collected:  09/04/19 1450    Specimen:  Blood from Wrist, Left Updated:  09/04/19 1559    BNP [[id number]]  (Abnormal) Collected:  09/04/19 1450    Specimen:  Blood Updated:  09/04/19 1536     proBNP 1,270.0 pg/mL     Troponin [[id number]]  (Normal) Collected:  09/04/19 1450    Specimen:  Blood Updated:  09/04/19 1536     Troponin I <0.012 ng/mL     Comprehensive Metabolic Panel [[id number]]  (Abnormal) Collected:  09/04/19 1450    Specimen:  Blood Updated:  09/04/19 1528     Glucose 109 mg/dL      BUN 8 mg/dL      Creatinine 0.68 mg/dL      Sodium 136 mmol/L      Potassium 4.1 mmol/L      Chloride 92 mmol/L      CO2 >40.0 mmol/L      Calcium 7.9 mg/dL      Total Protein 6.2 g/dL      Albumin 3.00 g/dL      ALT (SGPT) 20 U/L      AST (SGOT) 27 U/L      Alkaline Phosphatase 72 U/L      Total Bilirubin 0.3 mg/dL      eGFR Non African Amer 86 mL/min/1.73      Globulin 3.2 gm/dL      A/G Ratio 0.9 g/dL      BUN/Creatinine Ratio 11.8     Anion Gap --     Comment: Unable to calculate Anion Gap.       Protime-INR [[id number]]  (Normal) Collected:  09/04/19 1450    Specimen:  Blood Updated:  09/04/19 1523     Protime 13.0 Seconds      INR 0.95    Blood Culture - Blood, Arm, Left [[id number]] Collected:  09/04/19 1450    Specimen:  Blood from Arm, Left Updated:  09/04/19 1520    Blood Gas, Arterial With Co-Ox [[id number]]  (Abnormal) Collected:  09/04/19 1440    Specimen:  Arterial Blood Updated:  09/04/19 1442     Site Left Radial     Michael's Test Positive     pH, Arterial 7.414 pH units      pCO2, Arterial 66.9 mm Hg      Comment: 86 Value above critical limit        pO2, Arterial 56.2 mm Hg      Comment: 84 Value below reference range        HCO3, Arterial 42.8 mmol/L      Comment: 83 Value above reference range        Base Excess, Arterial 15.6  mmol/L      Comment: 83 Value above reference range        O2 Saturation, Arterial 89.9 %      Comment: 84 Value below reference range        Hemoglobin, Blood Gas 10.6 g/dL      Comment: 84 Value below reference range        Hematocrit, Blood Gas 32.4 %      Comment: 84 Value below reference range        Oxyhemoglobin 86.0 %      Comment: 84 Value below reference range        Methemoglobin 0.90 %      Carboxyhemoglobin 3.4 %      A-a Gradiant --     Comment: UNABLE TO CALCULATE        Temperature 37.0 C      Sodium, Arterial 139 mmol/L      Potassium, Arterial 3.8 mmol/L      Barometric Pressure for Blood Gas 751 mmHg      Modality Nasal Cannula     Flow Rate 3.0 lpm      Ventilator Mode NA     Note --     Notified [facility]     Notified By [id number]     Notified Time 09/04/2019 14:49     Collected by [id number]     Comment: Meter: [id number]     :  [id number]        pH, Temp Corrected --     pCO2, Temperature Corrected --     pO2, Temperature Corrected --    CBC Auto Differential [[id number]]  (Abnormal) Collected:  09/04/19 1423    Specimen:  Blood Updated:  09/04/19 1439     WBC 6.73 10*3/mm3      RBC 3.23 10*6/mm3      Hemoglobin 10.3 g/dL      Hematocrit 33.1 %      .5 fL      MCH 31.9 pg      MCHC 31.1 g/dL      RDW 13.2 %      RDW-SD 49.8 fl      MPV 11.1 fL      Platelets 246 10*3/mm3      Neutrophil % 72.4 %      Lymphocyte % 15.9 %      Monocyte % 9.5 %      Eosinophil % 1.5 %      Basophil % 0.4 %      Immature Grans % 0.3 %      Neutrophils, Absolute 4.87 10*3/mm3      Lymphocytes, Absolute 1.07 10*3/mm3      Monocytes, Absolute 0.64 10*3/mm3      Eosinophils, Absolute 0.10 10*3/mm3      Basophils, Absolute 0.03 10*3/mm3      Immature Grans, Absolute 0.02 10*3/mm3      nRBC 0.0 /100 WBC         Imaging Results (last 24 hours)     Procedure Component Value Units Date/Time    XR Chest 1 View [[id number]] Collected:  09/04/19 1455     Updated:  09/04/19 1459    Narrative:        Frontal upright radiograph of the chest 9/4/2019 2:38 PM CDT     COMPARISON: 01/13/2019.     HISTORY: Dyspnea hypoxia.     FINDINGS:   Hyperinflation flattening diaphragms noted. Chronic changes present in  the pulmonary parenchyma.. The cardiomediastinal silhouette and  pulmonary vascularity are within normal limits.      The osseous structures and surrounding soft tissues demonstrate no acute  abnormality.       Impression:       1. COPD no acute cardiopulmonary process.        This report was finalized on 09/04/2019 14:56 by Dr. Adan Myers MD.          I have personally reviewed and interpreted the radiology studies and ECG obtained at time of admission.     Assessment / Plan     Assessment:   Active Hospital Problems    Diagnosis   • COPD exacerbation (CMS/HCC)   • Acute on chronic respiratory failure with hypoxia and hypercapnia (CMS/HCC)   • Polypharmacy   • Personal history of nicotine dependence   • Anemia   Frequent falls  Suspect right lower extremity DVT  Wound right lateral foot    Plan:   1.  Admit to critical care  2.  Continue Levaquin 750 mg daily  3.  Consult Dr. Wm Valdes, pulmonology-follows on outpatient  4.  Duo nebs, budesonide neb, incentive spirometry, continuous pulse oximetry, Mucinex  5.  Stat d-dimer, CT angiogram of the chest with and without contrast  6.  Full dose Lovenox until PE/DVT ruled out  7.  Consult PT and OT due to frequent falls  8.  Solu-Medrol 60 mg IV every 6 hours  9.  Labs in a.m.  10.  Home medications reviewed and restarted as appropriate, benzodiazepine decreased-patient on multiple sedating medications  11.  NicoDerm patch will need smoking cessation education at discharge  12.  Consult wound nurse to evaluate and treat wound on right foot  13. Check urinalysis with microscopic evaluation  14.  Stat lactic acid, procalcitonin, d-dimer    I discussed the patient's findings and my recommendations with: Abraham Jacques MD  Time spent: 50 minutes    Dory OLVERA  THIERNO Calvillo  09/04/19   6:00 PM     I have personally seen the patient on date of admit.  The interested reader is referred to my note for details of encounter in conjunction with above note as discussed with Dory.   Abraham Jacques MD  09/05/19  7:01 PM      Electronically signed by Abraham Jacques MD at 09/05/19 2468          Discharge Summary      Abraham Jacques MD at 09/18/19 0742              [facility] Medicine Services  DISCHARGE SUMMARY       Date of Admission: 9/4/2019  Date of Discharge:  9/18/2019  Primary Care Physician: Provider, No Known    Discharge Diagnoses:  Active Hospital Problems    Diagnosis   • **COPD exacerbation (CMS/HCC)   • Compensated chronic respiratory acidosis   • Acute on chronic respiratory failure with hypoxia and hypercapnia (CMS/HCC)   • Alpha-1-antitrypsin deficiency carrier (CMS/HCC)     phenotype SS     • Wound of right foot   • Folate deficiency anemia   • Polypharmacy   • Personal history of nicotine dependence         Presenting Problem/History of Present Illness:  COPD exacerbation (CMS/HCC) [J44.1]  COPD exacerbation (CMS/HCC) [J44.1]  COPD exacerbation (CMS/HCC) [J44.1]         Hospital Course  She is a 68 year old woman who had a protracted hospital stay for severe respiratory failure requiring intensive care unit care.  She carries history of chronic obstructive pulmonary disease, alpha-1 antitrypsin deficiency carrier.  She remains a smoker prior to this admission. She was successfully transferred out to regular floor where she continued to use high flow oxygen.  She has been off BiPAP.  Pulmonary service followed him.  She refused HH. She felt her daughter who is CNA is moving with her and felt will be sufficient for her needs.   She  worked with therapist on strengthening. Pt had no LOB and was able to self-correct and maintain balance without PT intervention.  She is completing most BADL per  OT.  Overall, she is doing better and felt at her baseline.  I strongly emphasized smoking abstinence and influenza vaccination.   She is medically stable and appropriate for discharge.   Procedures Performed:   None    Consults:   Pulmonary Medicine    Pertinent Test Results:   Imaging Results (all)     Procedure Component Value Units Date/Time    XR Chest 1 View [[id number]] Collected:  09/09/19 0720     Updated:  09/09/19 0725    Narrative:       EXAM: XR CHEST 1 VW- - 9/9/2019 3:05 AM CDT     HISTORY: Follow-up lung infiltrate; J44.1-Chronic obstructive pulmonary  disease with (acute) exacerbation; R06.89-Other abnormalities of  breathing; R68.89-Other general symptoms and signs; Z74.09-Other reduced  mobility       COMPARISON: 09/06/2019, 09/04/2019.      TECHNIQUE:  1 images.  Frontal view of the chest.     FINDINGS:    No pneumothorax or large pleural effusion. Mildly increased streaky  bibasilar opacities. Emphysema. Cardiac mediastinal silhouette within  normal limits. Calcified aortic atherosclerosis. Surgical clips at the  epigastrium. No acute bony pathology.          Impression:       1. Mildly increased streaky bibasilar opacities, favored to represent  atelectasis. Infiltrate or aspiration are not excluded.  2. Emphysema.  This report was finalized on 09/09/2019 07:22 by Dr Candace Mchugh MD.    XR Abdomen KUB [[id number]] Collected:  09/08/19 0908     Updated:  09/08/19 0914    Narrative:       EXAM: XR ABDOMEN KUB- - 9/8/2019 8:17 AM CDT     HISTORY: Abdominal pain; J44.1-Chronic obstructive pulmonary disease  with (acute) exacerbation; R06.89-Other abnormalities of breathing;  R68.89-Other general symptoms and signs; Z74.09-Other reduced mobility       COMPARISON: None.      TECHNIQUE:  1 images.  Supine view of the abdomen.      FINDINGS:  Borderline gas-filled loops of small bowel. There is gas and  stool in the colon to the level of the rectum. This could represent a  normal bowel gas pattern or  ileus. Surgical clips at the epigastrium,  right upper quadrant and mid abdomen. Correlate with surgical history. A  few pelvic phleboliths. Degenerative changes in visualized spine and  hips. Lung bases limited in assessment, grossly clear.          Impression:       1. Borderline gas-filled loops of small bowel with gas and stool in the  colon to the level of the rectum. This could represent a normal bowel  gas pattern or ileus. Limited evaluation for free air and air-fluid  levels on supine image.  This report was finalized on 09/08/2019 09:11 by Dr Candace Mchugh MD.    XR Chest 1 View [[id number]] Collected:  09/06/19 1249     Updated:  09/06/19 1427    Narrative:       EXAMINATION: XR CHEST 1 VW-. 9/6/2019 12:49 PM CDT     CHEST, ONE VIEW:     HISTORY: Shortness of air     COMPARISON: 9/4/2019 and 1/13/2019 chest radiograph and CT angiogram  chest dated 09/04/2019     A single frontal chest radiograph was obtained.     FINDINGS:     Severe pulmonary emphysema appreciated.     Mild increased interstitial infiltration again appreciated the right mid  lower lung zone region likely unchanged compared to the previous study.     The left lung remains clear.     The heart is normal in size, pulmonary circulation appropriate, without  heart failure.     The bony structures are intact.                                     Impression:       1. Severe pulmonary emphysema.  2. Mild infiltrative opacities right mid and lower lung zone region,  best described on the CT scan of the chest, likely unchanged.     This report was finalized on 09/06/2019 12:51 by Dr. Eduardo Black MD.    US Venous Doppler Lower Extremity Right (duplex) [[id number]] Collected:  09/05/19 1658     Updated:  09/05/19 1701    Narrative:       History: Pain and swelling       Impression:       Impression: There is no evidence of deep venous thrombosis or  superficial thrombophlebitis of the right lower extremity.     Comments: Right lower extremity  venous duplex exam was performed using  color Doppler flow, Doppler wave form analysis, and grayscale imaging,  with and without compression. There is no evidence of deep venous  thrombosis of the common femoral, superficial femoral, popliteal,  posterior tibial, and peroneal veins. There is no thrombus identified in  the saphenofemoral junction or the greater saphenous vein. There is no  evidence of clot in the left common femoral vein.     This report was finalized on 09/05/2019 16:58 by Dr. Corey Hayes MD.    CT Angiogram Chest With & Without Contrast [[id number]] Collected:  09/04/19 1857     Updated:  09/04/19 1905    Narrative:       CT ANGIOGRAM CHEST W WO CONTRAST- 9/4/2019 6:23 PM CDT      HISTORY: Dyspnea, cardiac origin suspected; J44.1-Chronic obstructive  pulmonary disease with (acute) exacerbation; R06.89-Other abnormalities  of breathing      COMPARISON: CT scan dated 08/19/2019.      DOSE LENGTH PRODUCT: 246 mGy cm. Automated exposure control was also  utilized to decrease patient radiation dose.     TECHNIQUE: Helical tomographic images of the chest were obtained after  the administration of intravenous contrast following angiogram protocol.  Additionally, 3D and multiplanar reformatted images were provided.        FINDINGS:    Pulmonary arteries: There is adequate enhancement of the pulmonary  arteries to evaluate for central and segmental pulmonary emboli. There  are no filling defects within the main, lobar, segmental or visualized  subsegmental pulmonary arteries. The pulmonary arteries are within  normal limits for size.      Aorta and great vessels: The aorta is well opacified and demonstrates no  dissection or aneurysm. Mild calcification in the arch. Great vessel  origins are normal in caliber.     Visualized neck base: The imaged portion of the base of the neck appears  unremarkable.      Lungs: Advanced lung emphysema. Reidentified 1 cm right lower lobe  pulmonary nodule (series 5  image image 99). There is a new consolidation  in the lateral segment right middle lobe. Notable peribronchial  thickening in the right middle and right lower lobe. No pleural  effusion.     Heart: The heart is normal in size. There is no pericardial effusion.  Moderate coronary atheromatous calcification.     Mediastinum and lymph nodes: No enlarged mediastinal, hilar, or axillary  lymph nodes are present.      Skeletal and soft tissues: The osseous structures of the thorax and  surrounding soft tissues demonstrate no acute process.     Upper abdomen: The imaged portion of the upper abdomen demonstrates no  acute process.        Impression:       1. No evidence of pulmonary embolus.  2. New subsegmental consolidation in the lateral segment right middle  lobe, either atelectasis or developing pneumonia.  3. Advanced lung emphysema.  4. 1 cm right lower lobe pulmonary nodule which is suspicious, perhaps a  primary lung malignancy given patient's risk factors. This is mildly  enlarged when compared with an older 01/29/2019 exam.        This report was finalized on 09/04/2019 19:02 by Dr Te Jensen, .    XR Chest 1 View [[id number]] Collected:  09/04/19 1455     Updated:  09/04/19 1459    Narrative:       Frontal upright radiograph of the chest 9/4/2019 2:38 PM CDT     COMPARISON: 01/13/2019.     HISTORY: Dyspnea hypoxia.     FINDINGS:   Hyperinflation flattening diaphragms noted. Chronic changes present in  the pulmonary parenchyma.. The cardiomediastinal silhouette and  pulmonary vascularity are within normal limits.      The osseous structures and surrounding soft tissues demonstrate no acute  abnormality.       Impression:       1. COPD no acute cardiopulmonary process.        This report was finalized on 09/04/2019 14:56 by Dr. Adan Myers MD.          Condition on Discharge:  Stable    Physical Exam on Discharge:  /53 (BP Location: Right arm, Patient Position: Lying)   Pulse 74   Temp 98 °F (36.7 °C)  "(Axillary)   Resp 16   Ht 167.6 cm (66\")   Wt 69.3 kg (152 lb 12.8 oz)   LMP  (LMP Unknown) Comment: hysterectomy  SpO2 93%   BMI 24.66 kg/m²    Physical Exam  Constitutional: She is oriented to person, place, and time. She appears well-developed. No distress.   Barrel chested   HENT:   Head: Normocephalic and atraumatic.   Right Ear: External ear normal.   Left Ear: External ear normal.   Nose: Nose normal.   Mouth/Throat: Oropharynx is clear and moist. No oropharyngeal exudate.   Eyes: Conjunctivae and EOM are normal. Pupils are equal, round, and reactive to light. Right eye exhibits no discharge. Left eye exhibits no discharge. No scleral icterus.   Neck: Normal range of motion. Neck supple. No tracheal deviation present. No thyromegaly present.   Pulmonary/Chest: Effort normal.   She is on 2 L nasal cannula with oxygen saturation of 94-95%.  Diminished breath sounds.  No crackles or wheezes   Abdominal: Soft. Bowel sounds are normal. She exhibits no distension. There is no tenderness. There is no guarding.   Musculoskeletal: Normal range of motion. She exhibits no edema or tenderness.   Neurological: She is alert and oriented to person, place, and time. No cranial nerve deficit. She exhibits normal muscle tone. Coordination normal.   Skin: Skin is warm and dry. Capillary refill takes less than 2 seconds. No rash noted. She is not diaphoretic. No erythema.  Improve clean right ankle wound  Psychiatric: She has a normal mood and affect. Her behavior is normal. Judgment and thought content normal.   Vitals reviewed.          Discharge Disposition:  Home or Self Care    Discharge Medications:     Discharge Medications      New Medications      Instructions Start Date   docusate sodium 100 MG capsule   100 mg, Oral, 2 Times Daily      folic acid 1 MG tablet  Commonly known as:  FOLVITE   1 mg, Oral, Daily      guaiFENesin 100 MG/5ML solution oral solution  Commonly known as:  ROBITUSSIN   400 mg, Oral, Every 4 " Hours      loratadine 10 MG tablet  Commonly known as:  CLARITIN   10 mg, Oral, Daily      metoclopramide 5 MG tablet  Commonly known as:  REGLAN   5 mg, Oral, 3 Times Daily Before Meals      mupirocin 2 % ointment  Commonly known as:  BACTROBAN   Topical, Every 12 Hours Scheduled         Changes to Medications      Instructions Start Date   ALPRAZolam 0.5 MG tablet  Commonly known as:  XANAX  What changed:    · medication strength  · how much to take   0.5 mg, Oral, 4 Times Daily PRN         Continue These Medications      Instructions Start Date   albuterol sulfate  (90 Base) MCG/ACT inhaler  Commonly known as:  PROVENTIL HFA;VENTOLIN HFA;PROAIR HFA   2 puffs, Inhalation, Every 4 Hours PRN      amphetamine-dextroamphetamine 20 MG tablet  Commonly known as:  ADDERALL   20 mg, Oral, 2 Times Daily      azelastine 0.1 % nasal spray  Commonly known as:  ASTELIN   2 sprays, Nasal, 2 Times Daily, Use in each nostril as directed      citalopram 40 MG tablet  Commonly known as:  CeleXA   40 mg, Oral, Daily      dextromethorphan polistirex ER 30 MG/5ML Suspension Extended Release oral suspension  Commonly known as:  DELSYM   60 mg, Oral, Nightly PRN      fluticasone 50 MCG/ACT nasal spray  Commonly known as:  FLONASE   2 sprays, Nasal, Daily      Fluticasone-Umeclidin-Vilant 100-62.5-25 MCG/INH aerosol powder   Commonly known as:  TRELEGY ELLIPTA   1 each, Inhalation, Daily      gabapentin 600 MG tablet  Commonly known as:  NEURONTIN   600 mg, Oral, Every 8 Hours Scheduled      montelukast 10 MG tablet  Commonly known as:  SINGULAIR   10 mg, Oral, Nightly      nicotine 21 MG/24HR patch  Commonly known as:  NICODERM CQ   1 patch, Transdermal, Every 24 Hours      ondansetron 4 MG tablet  Commonly known as:  ZOFRAN   4 mg, Oral, Every 6 Hours PRN      oxyCODONE HCl ER 30 MG tablet extended-release 12 hour  Commonly known as:  oxyCONTIN   30 mg, Oral, Every 12 Hours Scheduled      oxyCODONE-acetaminophen  MG per  tablet  Commonly known as:  PERCOCET   1 tablet, Oral, Every 6 Hours PRN      pantoprazole 20 MG EC tablet  Commonly known as:  PROTONIX   20 mg, Oral, Daily      tiZANidine 4 MG tablet  Commonly known as:  ZANAFLEX   4 mg, Oral, 3 Times Daily      traZODone 50 MG tablet  Commonly known as:  DESYREL   50 mg, Oral, Nightly             Discharge Diet:   Diet Instructions     Diet: Regular; Thin      Discharge Diet:  Regular    Fluid Consistency:  Thin          Discharge Care Plan / Instructions:   Activity at Discharge:   Activity Instructions     Discharge Activity      As tolerated          Follow-up Appointments: Primary care provider in 1 week, pulmonary per their recommendation   Test Results Pending at Discharge:      Abraham Jacques MD  09/18/19  7:54 AM    Time: > 30 mins      Part of this note may be an electronic transcription/translation of spoken language to printed text using the Dragon Dictation System.            Electronically signed by Abraham Jacques MD at 09/18/19 0865

## 2019-09-18 NOTE — PLAN OF CARE
Problem: Chronic Obstructive Pulmonary Disease (Adult)  Goal: Signs and Symptoms of Listed Potential Problems Will be Absent, Minimized or Managed (Chronic Obstructive Pulmonary Disease)  Outcome: Ongoing (interventions implemented as appropriate)      Problem: Patient Care Overview  Goal: Plan of Care Review  Outcome: Ongoing (interventions implemented as appropriate)   09/17/19 1835 09/17/19 2030 09/18/19 0331   Coping/Psychosocial   Plan of Care Reviewed With --  patient --    Plan of Care Review   Progress improving --  --    OTHER   Outcome Summary --  --  pt c/o pain, scheduled and prn po pain given. o2 2L/40% nc. pt possible dc in am. vss. pt resting comfortably       Problem: Skin Injury Risk (Adult)  Goal: Skin Health and Integrity  Outcome: Ongoing (interventions implemented as appropriate)      Problem: Fall Risk (Adult)  Goal: Absence of Fall  Outcome: Ongoing (interventions implemented as appropriate)      Problem: Pain, Chronic (Adult)  Goal: Acceptable Pain/Comfort Level and Functional Ability  Outcome: Ongoing (interventions implemented as appropriate)      Problem: Nutrition, Imbalanced: Inadequate Oral Intake (Adult)  Goal: Improved Oral Intake  Outcome: Ongoing (interventions implemented as appropriate)    Goal: Prevent Further Weight Loss  Outcome: Ongoing (interventions implemented as appropriate)

## 2019-09-18 NOTE — DISCHARGE SUMMARY
[facility] Medicine Services  DISCHARGE SUMMARY       Date of Admission: 9/4/2019  Date of Discharge:  9/18/2019  Primary Care Physician: Provider, No Known    Discharge Diagnoses:  Active Hospital Problems    Diagnosis   • **COPD exacerbation (CMS/HCC)   • Compensated chronic respiratory acidosis   • Acute on chronic respiratory failure with hypoxia and hypercapnia (CMS/HCC)   • Alpha-1-antitrypsin deficiency carrier (CMS/HCC)     phenotype SS     • Wound of right foot   • Folate deficiency anemia   • Polypharmacy   • Personal history of nicotine dependence         Presenting Problem/History of Present Illness:  COPD exacerbation (CMS/HCC) [J44.1]  COPD exacerbation (CMS/HCC) [J44.1]  COPD exacerbation (CMS/HCC) [J44.1]         Hospital Course  She is a 68 year old woman who had a protracted hospital stay for severe respiratory failure requiring intensive care unit care.  She carries history of chronic obstructive pulmonary disease, alpha-1 antitrypsin deficiency carrier.  She remains a smoker prior to this admission. She was successfully transferred out to regular floor where she continued to use high flow oxygen.  She has been off BiPAP.  Pulmonary service followed him.  She refused HH. She felt her daughter who is CNA is moving with her and felt will be sufficient for her needs.   She worked with therapist on strengthening. Pt had no LOB and was able to self-correct and maintain balance without PT intervention.  She is completing most BADL per OT.  Overall, she is doing better and felt at her baseline.  I strongly emphasized smoking abstinence and influenza vaccination.   She is medically stable and appropriate for discharge.   Procedures Performed:   None    Consults:   Pulmonary Medicine    Pertinent Test Results:   Imaging Results (all)     Procedure Component Value Units Date/Time    XR Chest 1 View [[id number]] Collected:  09/09/19 0720     Updated:  09/09/19 0791     Narrative:       EXAM: XR CHEST 1 VW- - 9/9/2019 3:05 AM CDT     HISTORY: Follow-up lung infiltrate; J44.1-Chronic obstructive pulmonary  disease with (acute) exacerbation; R06.89-Other abnormalities of  breathing; R68.89-Other general symptoms and signs; Z74.09-Other reduced  mobility       COMPARISON: 09/06/2019, 09/04/2019.      TECHNIQUE:  1 images.  Frontal view of the chest.     FINDINGS:    No pneumothorax or large pleural effusion. Mildly increased streaky  bibasilar opacities. Emphysema. Cardiac mediastinal silhouette within  normal limits. Calcified aortic atherosclerosis. Surgical clips at the  epigastrium. No acute bony pathology.          Impression:       1. Mildly increased streaky bibasilar opacities, favored to represent  atelectasis. Infiltrate or aspiration are not excluded.  2. Emphysema.  This report was finalized on 09/09/2019 07:22 by Dr Candace Mchugh MD.    XR Abdomen KUB [[id number]] Collected:  09/08/19 0908     Updated:  09/08/19 0914    Narrative:       EXAM: XR ABDOMEN KUB- - 9/8/2019 8:17 AM CDT     HISTORY: Abdominal pain; J44.1-Chronic obstructive pulmonary disease  with (acute) exacerbation; R06.89-Other abnormalities of breathing;  R68.89-Other general symptoms and signs; Z74.09-Other reduced mobility       COMPARISON: None.      TECHNIQUE:  1 images.  Supine view of the abdomen.      FINDINGS:  Borderline gas-filled loops of small bowel. There is gas and  stool in the colon to the level of the rectum. This could represent a  normal bowel gas pattern or ileus. Surgical clips at the epigastrium,  right upper quadrant and mid abdomen. Correlate with surgical history. A  few pelvic phleboliths. Degenerative changes in visualized spine and  hips. Lung bases limited in assessment, grossly clear.          Impression:       1. Borderline gas-filled loops of small bowel with gas and stool in the  colon to the level of the rectum. This could represent a normal bowel  gas pattern or ileus.  Limited evaluation for free air and air-fluid  levels on supine image.  This report was finalized on 09/08/2019 09:11 by Dr Candace Mchugh MD.    XR Chest 1 View [[id number]] Collected:  09/06/19 1249     Updated:  09/06/19 1427    Narrative:       EXAMINATION: XR CHEST 1 VW-. 9/6/2019 12:49 PM CDT     CHEST, ONE VIEW:     HISTORY: Shortness of air     COMPARISON: 9/4/2019 and 1/13/2019 chest radiograph and CT angiogram  chest dated 09/04/2019     A single frontal chest radiograph was obtained.     FINDINGS:     Severe pulmonary emphysema appreciated.     Mild increased interstitial infiltration again appreciated the right mid  lower lung zone region likely unchanged compared to the previous study.     The left lung remains clear.     The heart is normal in size, pulmonary circulation appropriate, without  heart failure.     The bony structures are intact.                                     Impression:       1. Severe pulmonary emphysema.  2. Mild infiltrative opacities right mid and lower lung zone region,  best described on the CT scan of the chest, likely unchanged.     This report was finalized on 09/06/2019 12:51 by Dr. Eduardo Black MD.    US Venous Doppler Lower Extremity Right (duplex) [[id number]] Collected:  09/05/19 1658     Updated:  09/05/19 1701    Narrative:       History: Pain and swelling       Impression:       Impression: There is no evidence of deep venous thrombosis or  superficial thrombophlebitis of the right lower extremity.     Comments: Right lower extremity venous duplex exam was performed using  color Doppler flow, Doppler wave form analysis, and grayscale imaging,  with and without compression. There is no evidence of deep venous  thrombosis of the common femoral, superficial femoral, popliteal,  posterior tibial, and peroneal veins. There is no thrombus identified in  the saphenofemoral junction or the greater saphenous vein. There is no  evidence of clot in the left common femoral  vein.     This report was finalized on 09/05/2019 16:58 by Dr. Corey Hayes MD.    CT Angiogram Chest With & Without Contrast [[id number]] Collected:  09/04/19 1857     Updated:  09/04/19 1905    Narrative:       CT ANGIOGRAM CHEST W WO CONTRAST- 9/4/2019 6:23 PM CDT      HISTORY: Dyspnea, cardiac origin suspected; J44.1-Chronic obstructive  pulmonary disease with (acute) exacerbation; R06.89-Other abnormalities  of breathing      COMPARISON: CT scan dated 08/19/2019.      DOSE LENGTH PRODUCT: 246 mGy cm. Automated exposure control was also  utilized to decrease patient radiation dose.     TECHNIQUE: Helical tomographic images of the chest were obtained after  the administration of intravenous contrast following angiogram protocol.  Additionally, 3D and multiplanar reformatted images were provided.        FINDINGS:    Pulmonary arteries: There is adequate enhancement of the pulmonary  arteries to evaluate for central and segmental pulmonary emboli. There  are no filling defects within the main, lobar, segmental or visualized  subsegmental pulmonary arteries. The pulmonary arteries are within  normal limits for size.      Aorta and great vessels: The aorta is well opacified and demonstrates no  dissection or aneurysm. Mild calcification in the arch. Great vessel  origins are normal in caliber.     Visualized neck base: The imaged portion of the base of the neck appears  unremarkable.      Lungs: Advanced lung emphysema. Reidentified 1 cm right lower lobe  pulmonary nodule (series 5 image image 99). There is a new consolidation  in the lateral segment right middle lobe. Notable peribronchial  thickening in the right middle and right lower lobe. No pleural  effusion.     Heart: The heart is normal in size. There is no pericardial effusion.  Moderate coronary atheromatous calcification.     Mediastinum and lymph nodes: No enlarged mediastinal, hilar, or axillary  lymph nodes are present.      Skeletal and soft  "tissues: The osseous structures of the thorax and  surrounding soft tissues demonstrate no acute process.     Upper abdomen: The imaged portion of the upper abdomen demonstrates no  acute process.        Impression:       1. No evidence of pulmonary embolus.  2. New subsegmental consolidation in the lateral segment right middle  lobe, either atelectasis or developing pneumonia.  3. Advanced lung emphysema.  4. 1 cm right lower lobe pulmonary nodule which is suspicious, perhaps a  primary lung malignancy given patient's risk factors. This is mildly  enlarged when compared with an older 01/29/2019 exam.        This report was finalized on 09/04/2019 19:02 by Dr Te Jensen, .    XR Chest 1 View [[id number]] Collected:  09/04/19 1455     Updated:  09/04/19 1459    Narrative:       Frontal upright radiograph of the chest 9/4/2019 2:38 PM CDT     COMPARISON: 01/13/2019.     HISTORY: Dyspnea hypoxia.     FINDINGS:   Hyperinflation flattening diaphragms noted. Chronic changes present in  the pulmonary parenchyma.. The cardiomediastinal silhouette and  pulmonary vascularity are within normal limits.      The osseous structures and surrounding soft tissues demonstrate no acute  abnormality.       Impression:       1. COPD no acute cardiopulmonary process.        This report was finalized on 09/04/2019 14:56 by Dr. Adan Myers MD.          Condition on Discharge:  Stable    Physical Exam on Discharge:  /53 (BP Location: Right arm, Patient Position: Lying)   Pulse 74   Temp 98 °F (36.7 °C) (Axillary)   Resp 16   Ht 167.6 cm (66\")   Wt 69.3 kg (152 lb 12.8 oz)   LMP  (LMP Unknown) Comment: hysterectomy  SpO2 93%   BMI 24.66 kg/m²   Physical Exam  Constitutional: She is oriented to person, place, and time. She appears well-developed. No distress.   Barrel chested   HENT:   Head: Normocephalic and atraumatic.   Right Ear: External ear normal.   Left Ear: External ear normal.   Nose: Nose normal.   Mouth/Throat: " Oropharynx is clear and moist. No oropharyngeal exudate.   Eyes: Conjunctivae and EOM are normal. Pupils are equal, round, and reactive to light. Right eye exhibits no discharge. Left eye exhibits no discharge. No scleral icterus.   Neck: Normal range of motion. Neck supple. No tracheal deviation present. No thyromegaly present.   Pulmonary/Chest: Effort normal.   She is on 2 L nasal cannula with oxygen saturation of 94-95%.  Diminished breath sounds.  No crackles or wheezes   Abdominal: Soft. Bowel sounds are normal. She exhibits no distension. There is no tenderness. There is no guarding.   Musculoskeletal: Normal range of motion. She exhibits no edema or tenderness.   Neurological: She is alert and oriented to person, place, and time. No cranial nerve deficit. She exhibits normal muscle tone. Coordination normal.   Skin: Skin is warm and dry. Capillary refill takes less than 2 seconds. No rash noted. She is not diaphoretic. No erythema. Improve clean right ankle wound  Psychiatric: She has a normal mood and affect. Her behavior is normal. Judgment and thought content normal.   Vitals reviewed.          Discharge Disposition:  Home or Self Care    Discharge Medications:     Discharge Medications      New Medications      Instructions Start Date   docusate sodium 100 MG capsule   100 mg, Oral, 2 Times Daily      folic acid 1 MG tablet  Commonly known as:  FOLVITE   1 mg, Oral, Daily      guaiFENesin 100 MG/5ML solution oral solution  Commonly known as:  ROBITUSSIN   400 mg, Oral, Every 4 Hours      loratadine 10 MG tablet  Commonly known as:  CLARITIN   10 mg, Oral, Daily      metoclopramide 5 MG tablet  Commonly known as:  REGLAN   5 mg, Oral, 3 Times Daily Before Meals      mupirocin 2 % ointment  Commonly known as:  BACTROBAN   Topical, Every 12 Hours Scheduled         Changes to Medications      Instructions Start Date   ALPRAZolam 0.5 MG tablet  Commonly known as:  XANAX  What changed:    · medication  strength  · how much to take   0.5 mg, Oral, 4 Times Daily PRN         Continue These Medications      Instructions Start Date   albuterol sulfate  (90 Base) MCG/ACT inhaler  Commonly known as:  PROVENTIL HFA;VENTOLIN HFA;PROAIR HFA   2 puffs, Inhalation, Every 4 Hours PRN      amphetamine-dextroamphetamine 20 MG tablet  Commonly known as:  ADDERALL   20 mg, Oral, 2 Times Daily      azelastine 0.1 % nasal spray  Commonly known as:  ASTELIN   2 sprays, Nasal, 2 Times Daily, Use in each nostril as directed      citalopram 40 MG tablet  Commonly known as:  CeleXA   40 mg, Oral, Daily      dextromethorphan polistirex ER 30 MG/5ML Suspension Extended Release oral suspension  Commonly known as:  DELSYM   60 mg, Oral, Nightly PRN      fluticasone 50 MCG/ACT nasal spray  Commonly known as:  FLONASE   2 sprays, Nasal, Daily      Fluticasone-Umeclidin-Vilant 100-62.5-25 MCG/INH aerosol powder   Commonly known as:  TRELEGY ELLIPTA   1 each, Inhalation, Daily      gabapentin 600 MG tablet  Commonly known as:  NEURONTIN   600 mg, Oral, Every 8 Hours Scheduled      montelukast 10 MG tablet  Commonly known as:  SINGULAIR   10 mg, Oral, Nightly      nicotine 21 MG/24HR patch  Commonly known as:  NICODERM CQ   1 patch, Transdermal, Every 24 Hours      ondansetron 4 MG tablet  Commonly known as:  ZOFRAN   4 mg, Oral, Every 6 Hours PRN      oxyCODONE HCl ER 30 MG tablet extended-release 12 hour  Commonly known as:  oxyCONTIN   30 mg, Oral, Every 12 Hours Scheduled      oxyCODONE-acetaminophen  MG per tablet  Commonly known as:  PERCOCET   1 tablet, Oral, Every 6 Hours PRN      pantoprazole 20 MG EC tablet  Commonly known as:  PROTONIX   20 mg, Oral, Daily      tiZANidine 4 MG tablet  Commonly known as:  ZANAFLEX   4 mg, Oral, 3 Times Daily      traZODone 50 MG tablet  Commonly known as:  DESYREL   50 mg, Oral, Nightly             Discharge Diet:   Diet Instructions     Diet: Regular; Thin      Discharge Diet:  Regular     Fluid Consistency:  Thin          Discharge Care Plan / Instructions:   Activity at Discharge:   Activity Instructions     Discharge Activity      As tolerated          Follow-up Appointments: Primary care provider in 1 week, pulmonary per their recommendation   Test Results Pending at Discharge:      Abraham Jacques MD  09/18/19  7:54 AM    Time: > 30 mins      Part of this note may be an electronic transcription/translation of spoken language to printed text using the Dragon Dictation System.

## 2019-09-19 ENCOUNTER — READMISSION MANAGEMENT (OUTPATIENT)
Dept: CALL CENTER | Facility: HOSPITAL | Age: 69
End: 2019-09-19

## 2019-09-19 ENCOUNTER — NURSE TRIAGE (OUTPATIENT)
Dept: CALL CENTER | Facility: HOSPITAL | Age: 69
End: 2019-09-19

## 2019-09-19 NOTE — TELEPHONE ENCOUNTER
Was discharged yesterday and noticed leg swelling today. Care advice given per guideline. Patient very complimentary of nurses at hospital. Had a good hospital experience. She says her PCP has left town, he is in [place]. She is looking for a PCP.    Reason for Disposition  • [1] MILD swelling of both ankles (i.e., pedal edema) AND [2] new onset or worsening    Additional Information  • Negative: Severe difficulty breathing (e.g., struggling for each breath, speaks in single words)  • Negative: Looks like a broken bone or dislocated joint (e.g., crooked or deformed)  • Negative: Sounds like a life-threatening emergency to the triager  • Negative: Chest pain  • Negative: Followed a leg injury  • Negative: [1] Small area of swelling AND [2] followed an insect bite to the area  • Negative: Swelling of one ankle joint  • Negative: Swelling of knee is main symptom  • Negative: Pregnant  • Negative: Postpartum (< 1 month since delivery)  • Negative: Difficulty breathing at rest  • Negative: Entire foot is cool or blue in comparison to other side  • Negative: [1] Can't walk or can barely walk AND [2] new onset  • Negative: [1] Difficulty breathing with exertion (e.g., walking) AND [2] new onset or worsening  • Negative: [1] Red area or streak AND [2] fever  • Negative: [1] Swelling is painful to touch AND [2] fever  • Negative: [1] Cast on leg or ankle AND [2] now increased pain  • Negative: Patient sounds very sick or weak to the triager  • Negative: SEVERE leg swelling (e.g., swelling extends above knee, entire leg is swollen, weeping fluid)  • Negative: [1] Red area or streak [2] large (> 2 in. or 5 cm)  • Negative: [1] Thigh or calf pain AND [2] only 1 side AND [3] present > 1 hour  • Negative: [1] Thigh, calf, or ankle swelling AND [2] only 1 side  • Negative: [1] Thigh, calf, or ankle swelling AND [2] bilateral AND [3] 1 side is more swollen  • Negative: [1] MODERATE leg swelling (e.g., swelling extends up to  "knees) AND [2] new onset or worsening  • Negative: Swelling of face, arm or hands  (Exception: slight puffiness of fingers occurring during hot weather)  • Negative: Looks like a boil, infected sore, deep ulcer or other infected rash (spreading redness, pus)    Answer Assessment - Initial Assessment Questions  1. ONSET: \"When did the swelling start?\" (e.g., minutes, hours, days)    Noticed it today  2. LOCATION: \"What part of the leg is swollen?\"  \"Are both legs swollen or just one leg?\"      Both L>R  3. SEVERITY: \"How bad is the swelling?\" (e.g., localized; mild, moderate, severe)   - Localized - small area of swelling localized to one leg   - MILD pedal edema - swelling limited to foot and ankle, pitting edema < 1/4 inch (6 mm) deep, rest and elevation eliminate most or all swelling   - MODERATE edema - swelling of lower leg to knee, pitting edema > 1/4 inch (6 mm) deep, rest and elevation only partially reduce swelling   - SEVERE edema - swelling extends above knee, facial or hand swelling present       Feet ankle almost to knee  4. REDNESS: \"Does the swelling look red or infected?\"      no  5. PAIN: \"Is the swelling painful to touch?\" If so, ask: \"How painful is it?\"   (Scale 1-10; mild, moderate or severe)      0/10  6. FEVER: \"Do you have a fever?\" If so, ask: \"What is it, how was it measured, and when did it start?\"       No  7. CAUSE: \"What do you think is causing the leg swelling?\"      Doesn't know  8. MEDICAL HISTORY: \"Do you have a history of heart failure, kidney disease, liver failure, or cancer?\"      COPD  9. RECURRENT SYMPTOM: \"Have you had leg swelling before?\" If so, ask: \"When was the last time?\" \"What happened that time?\"      Maybe once or twice  10. OTHER SYMPTOMS: \"Do you have any other symptoms?\" (e.g., chest pain, difficulty breathing)        no11. PREGNANCY: \"Is there any chance you are pregnant?\" \"When was your last menstrual period?\"        na    Protocols used: LEG SWELLING AND " EDEMA-ADULT-AH

## 2019-09-19 NOTE — THERAPY DISCHARGE NOTE
Acute Care - Physical Therapy Discharge Summary  [facility]       Patient Name: Justyna Sosa Lei  : 1950  MRN: [id number]    Today's Date: 2019  Onset of Illness/Injury or Date of Surgery: 19       Referring Physician: Dory Calvillo APRN      Admit Date: 2019      PT Recommendation and Plan    Visit Dx:    ICD-10-CM ICD-9-CM   1. COPD exacerbation (CMS/[facility]) J44.1 491.21   2. Hypercapnia R06.89 786.09   3. Decreased activities of daily living (ADL) R68.89 780.99   4. Impaired mobility Z74.09 799.89   5. Impaired functional mobility, balance, gait, and endurance Z74.09 V49.89   6. Cough R05 786.2   7. Personal history of nicotine dependence Z87.891 V15.82       Outcome Measures     Row Name 19 0816 19 1400          How much help from another is currently needed...    Putting on and taking off regular lower body clothing?  4  -MM  4  -AC (r) LS (t) AC (c)     Bathing (including washing, rinsing, and drying)  4  -MM  4  -AC (r) LS (t) AC (c)     Toileting (which includes using toilet bed pan or urinal)  4  -MM  4  -AC (r) LS (t) AC (c)     Putting on and taking off regular upper body clothing  4  -MM  4  -AC (r) LS (t) AC (c)     Taking care of personal grooming (such as brushing teeth)  4  -MM  4  -AC (r) LS (t) AC (c)     Eating meals  4  -MM  4  -AC (r) LS (t) AC (c)     AM-PAC 6 Clicks Score (OT)  24  -MM  24  -AC (r) LS (t)        Functional Assessment    Outcome Measure Options  --  AM-PAC 6 Clicks Daily Activity (OT)  -AC (r) LS (t) AC (c)       User Key  (r) = Recorded By, (t) = Taken By, (c) = Cosigned By    Initials Name Provider Type    Ezequiel Yang, OTR/L, CNT Occupational Therapist    Carmen Spivey COTA/L Occupational Therapy Assistant    Mena Madrid OT Student OT Student              Rehab Goal Summary     Row Name 19 1040             Transfer Goal 1 (PT)    Activity/Assistive Device (Transfer Goal 1, PT)   sit-to-stand/stand-to-sit;bed-to-chair/chair-to-bed  -NW      Cloud Level/Cues Needed (Transfer Goal 1, PT)  independent  -NW      Time Frame (Transfer Goal 1, PT)  10 days  -NW      Progress/Outcome (Transfer Goal 1, PT)  goal not met  -NW         Gait Training Goal 1 (PT)    Activity/Assistive Device (Gait Training Goal 1, PT)  gait (walking locomotion)  -NW      Cloud Level (Gait Training Goal 1, PT)  standby assist  -NW      Distance (Gait Goal 1, PT)  100 ft  -NW      Time Frame (Gait Training Goal 1, PT)  10 days  -NW      Progress/Outcome (Gait Training Goal 1, PT)  goal not met  -NW         Stairs Goal 1 (PT)    Activity/Assistive Device (Stairs Goal 1, PT)  ascending stairs;descending stairs;using handrail, left  -NW      Cloud Level/Cues Needed (Stairs Goal 1, PT)  contact guard assist  -NW      Number of Stairs (Stairs Goal 1, PT)  3  -NW      Time Frame (Stairs Goal 1, PT)  long term goal (LTG);10 days  -NW      Progress/Outcome (Stairs Goal 1, PT)  goal not met  -NW        User Key  (r) = Recorded By, (t) = Taken By, (c) = Cosigned By    Initials Name Provider Type Discipline    Jodie Carey PTA Physical Therapy Assistant PT              PT Discharge Summary  Anticipated Discharge Disposition (PT): home  Reason for Discharge: Discharge from facility  Outcomes Achieved: Refer to plan of care for updates on goals achieved  Discharge Destination: Home      Jodie Vicente PTA   9/19/2019

## 2019-09-19 NOTE — DISCHARGE PLACEMENT REQUEST
"To: [facility] (Attn: Shayla)    From: Karen Smart [phone]      Jhon Lei (69 y.o. Female)     Date of Birth Social Security Number Address Home Phone MRN    [id number]  [id number] [place] [address] [phone] [id number]    [employer] Marital Status          Other        Admission Date Admission Type Admitting Provider Attending Provider Department, Room/Bed    9/4/19 Emergency Abraham Jacques MD  [facility] 4C, 496/1    Discharge Date Discharge Disposition Discharge Destination        9/18/2019 Home or Self Care              Attending Provider:  (none)   Allergies:  Aspirin, Demerol [Meperidine], Ibuprofen, Neosporin [Neomycin-bacitracin Zn-polymyx], Penicillins, Sulfa Antibiotics, Codeine, Nsaids, Talwin [Pentazocine], Tetracyclines & Related, Influenza Vaccines    Isolation:  None   Infection:  None   Code Status:  Prior    Ht:  167.6 cm (66\")   Wt:  69.3 kg (152 lb 12.8 oz)    Admission Cmt:  None   Principal Problem:  COPD exacerbation (CMS/[facility]) [J44.1]                 Active Insurance as of 9/4/2019     Primary Coverage     Payor Plan Insurance Group Employer/Plan Group    MEDICARE MEDICARE A & B      Payor Plan Address Payor Plan Phone Number Payor Plan Fax Number Effective Dates    [address] [phone]  5/1/1989 - None Entered    [facility] [id number]       Subscriber Name Subscriber Birth Date Member ID       JHON LEI 1950 [id number]           Secondary Coverage     Payor Plan Insurance Group Employer/Plan Group    ANTHEM MEDICAID ANTHEM MEDICAID [id number]     Payor Plan Address Payor Plan Phone Number Payor Plan Fax Number Effective Dates    [address] [address] [phone]  1/1/2016 - None Entered    [facility] [address]       Subscriber Name Subscriber Birth Date Member ID       JHON LEI 1950 [id number]                 Emergency Contacts      (Rel.) Home Phone Work Phone Mobile Phone    Ella Guerrero " "(Daughter) [phone] -- [phone]    Shama Patterson (Daughter) [phone] -- --               History & Physical      Abraham Jacques MD at 09/04/19 2362              [facility] Medicine Services  HISTORY AND PHYSICAL    Date of Admission: 9/4/2019  Primary Care Physician: Provider, No Known    Subjective     Chief Complaint: Shortness of breathing    History of Present Illness  Nazia Lei is a 68-year-old female with past medical history of COPD with ongoing tobacco use followed by Dr. Wm Valdes, pulmonology, anxiety depression, hypertension, polypharmacy.  Patient states she developed increasing shortness of breathing 2 to 3 days ago.  She does wear oxygen 2 to 3 L continuously.  She states today she was unable to read and checked her home saturation it was 74% on 3 L.  EMS reported 70% on 3 L.  She can planes of sinus drainage without any increase in cough or sputum production.  No fevers however she reports chills.  She has had no chest pain, changes in bowel or bladder habits.  Patient reports 2-3 falls over the past 2 weeks.  She states she noticed her right lower extremity swelling excellently 8 days ago.  She states it is painful.  She also reports \"working on her phone\" when Salazar came from the phone and she felt them on her right cheek and quickly removed her oxygen this did fall on her right foot and she has a quarter size burn on the lateral aspect of the foot.  She has been cleansing it with peroxide and utilizing antibiotic cream for treatment.  The area is quite raw without drainage.  Patient is admitted for further evaluation and treatment to the critical care unit due to ongoing hypoxia despite Vapotherm therapy.    Review of Systems   10 point review of systems was completed, all negative except for those discussed in HPI    Past Medical History:   Past Medical History:   Diagnosis Date   • Anxiety    • Anxiety     pain clinic patient Dr" Love x 5yrs   • Chronic pain due to injury     jarad LE & back   • COPD (chronic obstructive pulmonary disease) (CMS/[facility])    • Depression    • Hypertension    • Polypharmacy    • Smoker        Past Surgical History:   Past Surgical History:   Procedure Laterality Date   • ABDOMINAL SURGERY     • APPENDECTOMY     • CHOLECYSTECTOMY     • FEMUR FRACTURE SURGERY     • HERNIA REPAIR     • HYSTERECTOMY         Family History: family history includes Cancer in her father; No Known Problems in her brother; Suicidality in her mother.    Social History:  reports that she has been smoking cigarettes.  She has a 1125.00 pack-year smoking history. She has never used smokeless tobacco. She reports that she does not drink alcohol or use drugs.    Code Status: Full, but unable speak for herself her daughter Bertha Guerrero will speak for her      Allergies:  Allergies   Allergen Reactions   • Aspirin Anaphylaxis   • Demerol [Meperidine] Anaphylaxis   • Ibuprofen Anaphylaxis   • Neosporin [Neomycin-Bacitracin Zn-Polymyx] Anaphylaxis   • Penicillins Anaphylaxis   • Sulfa Antibiotics Anaphylaxis   • Codeine Hives     Pt can take codeine as long is it is not alot   • Nsaids Unknown (See Comments)   • Talwin [Pentazocine] Hives   • Tetracyclines & Related    • Influenza Vaccines Hives and Rash       Medications:  Prior to Admission medications    Medication Sig Start Date End Date Taking? Authorizing Provider   albuterol (PROVENTIL HFA;VENTOLIN HFA) 108 (90 BASE) MCG/ACT inhaler Inhale 2 puffs Every 4 (Four) Hours As Needed for wheezing.    Provider, MD Anatoly   albuterol sulfate  (90 Base) MCG/ACT inhaler Inhale 2 puffs Every 4 (Four) Hours As Needed for Wheezing or Shortness of Air for up to 30 days. 8/19/19 9/18/19  AuroraKeisha APRN   ALPRAZolam (XANAX) 1 MG tablet Take 1 mg by mouth 4 (Four) Times a Day As Needed for anxiety.    Provider, MD Anatoly   amphetamine-dextroamphetamine (ADDERALL) 20 MG tablet Take  20 mg by mouth 2 (Two) Times a Day.    Provider, MD Anatoly   azelastine (ASTELIN) 0.1 % nasal spray 2 sprays into the nostril(s) as directed by provider 2 (Two) Times a Day. Use in each nostril as directed 12/3/18   Keisha Simon APRN   citalopram (CeleXA) 40 MG tablet Take 40 mg by mouth Daily.    Provider, MD Anatoly   dextromethorphan polistirex ER (DELSYM) 30 MG/5ML Suspension Extended Release oral suspension Take 60 mg by mouth At Night As Needed (cough) for up to 10 days. 8/29/19 9/8/19  Keisha Simon APRN   fluticasone (FLONASE) 50 MCG/ACT nasal spray 2 sprays into the nostril(s) as directed by provider Daily. 12/3/18   Keisha Simon APRN   Fluticasone-Umeclidin-Vilant (TRELEGY ELLIPTA) 100-62.5-25 MCG/INH aerosol powder  Inhale 1 each Daily for 30 days. 8/19/19 9/18/19  Keisha Simon APRN   Fluticasone-Umeclidin-Vilant (TRELEGY ELLIPTA) 100-62.5-25 MCG/INH aerosol powder  Inhale 1 each Daily. 8/29/19   Keisha Simon APRN   gabapentin (NEURONTIN) 600 MG tablet Take 600 mg by mouth Every 8 (Eight) Hours.    Provider, MD Anatoly   ipratropium-albuterol (COMBIVENT RESPIMAT)  MCG/ACT inhaler Inhale 1 puff 4 (Four) Times a Day As Needed for Wheezing. 12/3/18   Keisha Simon APRN   montelukast (SINGULAIR) 10 MG tablet Take 1 tablet by mouth Every Night. 12/3/18   Keisha Simon APRN   nicotine (NICODERM CQ) 21 MG/24HR patch Place 1 patch on the skin Daily. 3/16/17   Mirna Galvez DO   nicotine (NICODERM CQ) 21 MG/24HR patch Place 1 patch on the skin as directed by provider Daily for 30 days. 8/23/19 9/22/19  Keisha Simon APRN   O2 (OXYGEN) Inhale 2 L.    Provider, MD Anatoly   ondansetron (ZOFRAN) 4 MG tablet Take 4 mg by mouth Every 8 (Eight) Hours As Needed for nausea or vomiting.    Provider, MD Anatoly   OxyCODONE HCl ER (oxyCONTIN) 30 MG tablet extended-release 12 hour Take 30 mg by mouth Every 12 (Twelve) Hours.   "  Anatoly Ryan MD   oxyCODONE-acetaminophen (PERCOCET)  MG per tablet Take 1 tablet by mouth Every 6 (Six) Hours As Needed for moderate pain (4-6) (every 4-6 hours).    Anatoly Ryan MD   pantoprazole (PROTONIX) 20 MG EC tablet Take 1 tablet by mouth Daily. 12/3/18   Aurora, Keisha MaldonadoTHIERNO   tiZANidine (ZANAFLEX) 4 MG tablet TAKE 1 TABLET BY MOUTH THREE TIMES A DAY FILL ON OR AFTER 7-10-19 8/5/19   Anatoly Ryan MD   zolpidem (AMBIEN) 10 MG tablet Take 10 mg by mouth every night at bedtime. 7/5/19   Anatoly Ryan MD       Objective     /52 (BP Location: Right arm, Patient Position: Sitting)   Pulse 74   Temp 98.7 °F (37.1 °C) (Oral)   Resp 24   Ht 167.6 cm (66\")   Wt 59 kg (130 lb)   LMP  (LMP Unknown) Comment: hysterectomy  SpO2 90%   BMI 20.98 kg/m²    Physical Exam   Constitutional: She is oriented to person, place, and time. She appears well-developed and well-nourished. No distress.   Unkempt    HENT:   Head: Normocephalic and atraumatic.   Eyes: Conjunctivae and EOM are normal. Pupils are equal, round, and reactive to light. No scleral icterus.   Neck: Normal range of motion. Neck supple. No JVD present. No tracheal deviation present.   Cardiovascular: Normal rate, regular rhythm, normal heart sounds and intact distal pulses. Exam reveals no gallop.   No murmur heard.  Pulmonary/Chest: She is in respiratory distress. She has wheezes. She has no rales.   Severely diminished throughout   Abdominal: Soft. Bowel sounds are normal. She exhibits no distension. There is no tenderness. There is no guarding.   Musculoskeletal: Normal range of motion. She exhibits no edema.   Neurological: She is alert and oriented to person, place, and time.   Mildly somnolent however arouses easily   Skin: Skin is warm and dry. No rash noted. She is not diaphoretic. No erythema. No pallor.   Psychiatric: She has a normal mood and affect. Her behavior is normal.   Vitals " reviewed.      Pertinent Data:   Lab Results (last 72 hours)     Procedure Component Value Units Date/Time    Blood Gas, Arterial [[id number]]  (Abnormal) Collected:  09/04/19 1620    Specimen:  Arterial Blood Updated:  09/04/19 1618     Site Left Radial     Michael's Test Positive     pH, Arterial 7.401 pH units      pCO2, Arterial 64.6 mm Hg      Comment: 83 Value above reference range        pO2, Arterial 61.8 mm Hg      Comment: 84 Value below reference range        HCO3, Arterial 40.1 mmol/L      Comment: 83 Value above reference range        Base Excess, Arterial 13.2 mmol/L      Comment: 83 Value above reference range        O2 Saturation, Arterial 92.1 %      Comment: 84 Value below reference range        Temperature 37.0 C      Barometric Pressure for Blood Gas 751 mmHg      Modality Heated HFNC     FIO2 50 %      Flow Rate 28.0 lpm      Ventilator Mode NA     Collected by [id number]     Comment: Meter: [id number]     :  [id number]       Blood Culture - Blood, Wrist, Left [[id number]] Collected:  09/04/19 1450    Specimen:  Blood from Wrist, Left Updated:  09/04/19 1559    BNP [[id number]]  (Abnormal) Collected:  09/04/19 1450    Specimen:  Blood Updated:  09/04/19 1536     proBNP 1,270.0 pg/mL     Troponin [[id number]]  (Normal) Collected:  09/04/19 1450    Specimen:  Blood Updated:  09/04/19 1536     Troponin I <0.012 ng/mL     Comprehensive Metabolic Panel [[id number]]  (Abnormal) Collected:  09/04/19 1450    Specimen:  Blood Updated:  09/04/19 1528     Glucose 109 mg/dL      BUN 8 mg/dL      Creatinine 0.68 mg/dL      Sodium 136 mmol/L      Potassium 4.1 mmol/L      Chloride 92 mmol/L      CO2 >40.0 mmol/L      Calcium 7.9 mg/dL      Total Protein 6.2 g/dL      Albumin 3.00 g/dL      ALT (SGPT) 20 U/L      AST (SGOT) 27 U/L      Alkaline Phosphatase 72 U/L      Total Bilirubin 0.3 mg/dL      eGFR Non African Amer 86 mL/min/1.73      Globulin 3.2 gm/dL      A/G Ratio 0.9 g/dL      BUN/Creatinine Ratio  11.8     Anion Gap --     Comment: Unable to calculate Anion Gap.       Protime-INR [[id number]]  (Normal) Collected:  09/04/19 1450    Specimen:  Blood Updated:  09/04/19 1523     Protime 13.0 Seconds      INR 0.95    Blood Culture - Blood, Arm, Left [[id number]] Collected:  09/04/19 1450    Specimen:  Blood from Arm, Left Updated:  09/04/19 1520    Blood Gas, Arterial With Co-Ox [[id number]]  (Abnormal) Collected:  09/04/19 1440    Specimen:  Arterial Blood Updated:  09/04/19 1442     Site Left Radial     Michael's Test Positive     pH, Arterial 7.414 pH units      pCO2, Arterial 66.9 mm Hg      Comment: 86 Value above critical limit        pO2, Arterial 56.2 mm Hg      Comment: 84 Value below reference range        HCO3, Arterial 42.8 mmol/L      Comment: 83 Value above reference range        Base Excess, Arterial 15.6 mmol/L      Comment: 83 Value above reference range        O2 Saturation, Arterial 89.9 %      Comment: 84 Value below reference range        Hemoglobin, Blood Gas 10.6 g/dL      Comment: 84 Value below reference range        Hematocrit, Blood Gas 32.4 %      Comment: 84 Value below reference range        Oxyhemoglobin 86.0 %      Comment: 84 Value below reference range        Methemoglobin 0.90 %      Carboxyhemoglobin 3.4 %      A-a Gradiant --     Comment: UNABLE TO CALCULATE        Temperature 37.0 C      Sodium, Arterial 139 mmol/L      Potassium, Arterial 3.8 mmol/L      Barometric Pressure for Blood Gas 751 mmHg      Modality Nasal Cannula     Flow Rate 3.0 lpm      Ventilator Mode NA     Note --     Notified [facility]     Notified By [id number]     Notified Time 09/04/2019 14:49     Collected by [id number]     Comment: Meter: [id number]     :  [id number]        pH, Temp Corrected --     pCO2, Temperature Corrected --     pO2, Temperature Corrected --    CBC Auto Differential [[id number]]  (Abnormal) Collected:  09/04/19 1423    Specimen:  Blood Updated:  09/04/19 1439     WBC  6.73 10*3/mm3      RBC 3.23 10*6/mm3      Hemoglobin 10.3 g/dL      Hematocrit 33.1 %      .5 fL      MCH 31.9 pg      MCHC 31.1 g/dL      RDW 13.2 %      RDW-SD 49.8 fl      MPV 11.1 fL      Platelets 246 10*3/mm3      Neutrophil % 72.4 %      Lymphocyte % 15.9 %      Monocyte % 9.5 %      Eosinophil % 1.5 %      Basophil % 0.4 %      Immature Grans % 0.3 %      Neutrophils, Absolute 4.87 10*3/mm3      Lymphocytes, Absolute 1.07 10*3/mm3      Monocytes, Absolute 0.64 10*3/mm3      Eosinophils, Absolute 0.10 10*3/mm3      Basophils, Absolute 0.03 10*3/mm3      Immature Grans, Absolute 0.02 10*3/mm3      nRBC 0.0 /100 WBC         Imaging Results (last 24 hours)     Procedure Component Value Units Date/Time    XR Chest 1 View [[id number]] Collected:  09/04/19 1455     Updated:  09/04/19 1459    Narrative:       Frontal upright radiograph of the chest 9/4/2019 2:38 PM CDT     COMPARISON: 01/13/2019.     HISTORY: Dyspnea hypoxia.     FINDINGS:   Hyperinflation flattening diaphragms noted. Chronic changes present in  the pulmonary parenchyma.. The cardiomediastinal silhouette and  pulmonary vascularity are within normal limits.      The osseous structures and surrounding soft tissues demonstrate no acute  abnormality.       Impression:       1. COPD no acute cardiopulmonary process.        This report was finalized on 09/04/2019 14:56 by Dr. Adan Myers MD.          I have personally reviewed and interpreted the radiology studies and ECG obtained at time of admission.     Assessment / Plan     Assessment:   Active Hospital Problems    Diagnosis   • COPD exacerbation (CMS/HCC)   • Acute on chronic respiratory failure with hypoxia and hypercapnia (CMS/HCC)   • Polypharmacy   • Personal history of nicotine dependence   • Anemia   Frequent falls  Suspect right lower extremity DVT  Wound right lateral foot    Plan:   1.  Admit to critical care  2.  Continue Levaquin 750 mg daily  3.  Consult Dr. Wm Valdes  pulmonology-follows on outpatient  4.  Duo nebs, budesonide neb, incentive spirometry, continuous pulse oximetry, Mucinex  5.  Stat d-dimer, CT angiogram of the chest with and without contrast  6.  Full dose Lovenox until PE/DVT ruled out  7.  Consult PT and OT due to frequent falls  8.  Solu-Medrol 60 mg IV every 6 hours  9.  Labs in a.m.  10.  Home medications reviewed and restarted as appropriate, benzodiazepine decreased-patient on multiple sedating medications  11.  NicoDerm patch will need smoking cessation education at discharge  12.  Consult wound nurse to evaluate and treat wound on right foot  13. Check urinalysis with microscopic evaluation  14.  Stat lactic acid, procalcitonin, d-dimer    I discussed the patient's findings and my recommendations with: Abraham Jacques MD  Time spent: 50 minutes    Dory Calvillo, THIERNO  09/04/19   6:00 PM     I have personally seen the patient on date of admit.  The interested reader is referred to my note for details of encounter in conjunction with above note as discussed with Dory.   Abraham Jacques MD  09/05/19  7:01 PM      Electronically signed by Abraham Jacques MD at 09/05/19 1905          Discharge Summary      Abraham Jacques MD at 09/18/19 0722              [facility] Medicine Services  DISCHARGE SUMMARY       Date of Admission: 9/4/2019  Date of Discharge:  9/18/2019  Primary Care Physician: Provider, No Known    Discharge Diagnoses:  Active Hospital Problems    Diagnosis   • **COPD exacerbation (CMS/HCC)   • Compensated chronic respiratory acidosis   • Acute on chronic respiratory failure with hypoxia and hypercapnia (CMS/HCC)   • Alpha-1-antitrypsin deficiency carrier (CMS/HCC)     phenotype SS     • Wound of right foot   • Folate deficiency anemia   • Polypharmacy   • Personal history of nicotine dependence         Presenting Problem/History of Present Illness:  COPD exacerbation (CMS/HCC)  [J44.1]  COPD exacerbation (CMS/HCC) [J44.1]  COPD exacerbation (CMS/HCC) [J44.1]         Hospital Course  She is a 68 year old woman who had a protracted hospital stay for severe respiratory failure requiring intensive care unit care.  She carries history of chronic obstructive pulmonary disease, alpha-1 antitrypsin deficiency carrier.  She remains a smoker prior to this admission. She was successfully transferred out to regular floor where she continued to use high flow oxygen.  She has been off BiPAP.  Pulmonary service followed him.  She refused HH. She felt her daughter who is CNA is moving with her and felt will be sufficient for her needs.   She  worked with therapist on strengthening. Pt had no LOB and was able to self-correct and maintain balance without PT intervention.  She is completing most BADL per OT.  Overall, she is doing better and felt at her baseline.  I strongly emphasized smoking abstinence and influenza vaccination.   She is medically stable and appropriate for discharge.   Procedures Performed:   None    Consults:   Pulmonary Medicine    Pertinent Test Results:   Imaging Results (all)     Procedure Component Value Units Date/Time    XR Chest 1 View [[id number]] Collected:  09/09/19 0720     Updated:  09/09/19 0725    Narrative:       EXAM: XR CHEST 1 VW- - 9/9/2019 3:05 AM CDT     HISTORY: Follow-up lung infiltrate; J44.1-Chronic obstructive pulmonary  disease with (acute) exacerbation; R06.89-Other abnormalities of  breathing; R68.89-Other general symptoms and signs; Z74.09-Other reduced  mobility       COMPARISON: 09/06/2019, 09/04/2019.      TECHNIQUE:  1 images.  Frontal view of the chest.     FINDINGS:    No pneumothorax or large pleural effusion. Mildly increased streaky  bibasilar opacities. Emphysema. Cardiac mediastinal silhouette within  normal limits. Calcified aortic atherosclerosis. Surgical clips at the  epigastrium. No acute bony pathology.          Impression:       1. Mildly  increased streaky bibasilar opacities, favored to represent  atelectasis. Infiltrate or aspiration are not excluded.  2. Emphysema.  This report was finalized on 09/09/2019 07:22 by Dr Candace Mchugh MD.    XR Abdomen KUB [[id number]] Collected:  09/08/19 0908     Updated:  09/08/19 0914    Narrative:       EXAM: XR ABDOMEN KUB- - 9/8/2019 8:17 AM CDT     HISTORY: Abdominal pain; J44.1-Chronic obstructive pulmonary disease  with (acute) exacerbation; R06.89-Other abnormalities of breathing;  R68.89-Other general symptoms and signs; Z74.09-Other reduced mobility       COMPARISON: None.      TECHNIQUE:  1 images.  Supine view of the abdomen.      FINDINGS:  Borderline gas-filled loops of small bowel. There is gas and  stool in the colon to the level of the rectum. This could represent a  normal bowel gas pattern or ileus. Surgical clips at the epigastrium,  right upper quadrant and mid abdomen. Correlate with surgical history. A  few pelvic phleboliths. Degenerative changes in visualized spine and  hips. Lung bases limited in assessment, grossly clear.          Impression:       1. Borderline gas-filled loops of small bowel with gas and stool in the  colon to the level of the rectum. This could represent a normal bowel  gas pattern or ileus. Limited evaluation for free air and air-fluid  levels on supine image.  This report was finalized on 09/08/2019 09:11 by Dr Candace Mchugh MD.    XR Chest 1 View [[id number]] Collected:  09/06/19 1249     Updated:  09/06/19 1427    Narrative:       EXAMINATION: XR CHEST 1 VW-. 9/6/2019 12:49 PM CDT     CHEST, ONE VIEW:     HISTORY: Shortness of air     COMPARISON: 9/4/2019 and 1/13/2019 chest radiograph and CT angiogram  chest dated 09/04/2019     A single frontal chest radiograph was obtained.     FINDINGS:     Severe pulmonary emphysema appreciated.     Mild increased interstitial infiltration again appreciated the right mid  lower lung zone region likely unchanged compared to  the previous study.     The left lung remains clear.     The heart is normal in size, pulmonary circulation appropriate, without  heart failure.     The bony structures are intact.                                     Impression:       1. Severe pulmonary emphysema.  2. Mild infiltrative opacities right mid and lower lung zone region,  best described on the CT scan of the chest, likely unchanged.     This report was finalized on 09/06/2019 12:51 by Dr. Eduardo Black MD.    US Venous Doppler Lower Extremity Right (duplex) [[id number]] Collected:  09/05/19 1658     Updated:  09/05/19 1701    Narrative:       History: Pain and swelling       Impression:       Impression: There is no evidence of deep venous thrombosis or  superficial thrombophlebitis of the right lower extremity.     Comments: Right lower extremity venous duplex exam was performed using  color Doppler flow, Doppler wave form analysis, and grayscale imaging,  with and without compression. There is no evidence of deep venous  thrombosis of the common femoral, superficial femoral, popliteal,  posterior tibial, and peroneal veins. There is no thrombus identified in  the saphenofemoral junction or the greater saphenous vein. There is no  evidence of clot in the left common femoral vein.     This report was finalized on 09/05/2019 16:58 by Dr. Corey Hayes MD.    CT Angiogram Chest With & Without Contrast [[id number]] Collected:  09/04/19 1857     Updated:  09/04/19 1905    Narrative:       CT ANGIOGRAM CHEST W WO CONTRAST- 9/4/2019 6:23 PM CDT      HISTORY: Dyspnea, cardiac origin suspected; J44.1-Chronic obstructive  pulmonary disease with (acute) exacerbation; R06.89-Other abnormalities  of breathing      COMPARISON: CT scan dated 08/19/2019.      DOSE LENGTH PRODUCT: 246 mGy cm. Automated exposure control was also  utilized to decrease patient radiation dose.     TECHNIQUE: Helical tomographic images of the chest were obtained after  the administration  of intravenous contrast following angiogram protocol.  Additionally, 3D and multiplanar reformatted images were provided.        FINDINGS:    Pulmonary arteries: There is adequate enhancement of the pulmonary  arteries to evaluate for central and segmental pulmonary emboli. There  are no filling defects within the main, lobar, segmental or visualized  subsegmental pulmonary arteries. The pulmonary arteries are within  normal limits for size.      Aorta and great vessels: The aorta is well opacified and demonstrates no  dissection or aneurysm. Mild calcification in the arch. Great vessel  origins are normal in caliber.     Visualized neck base: The imaged portion of the base of the neck appears  unremarkable.      Lungs: Advanced lung emphysema. Reidentified 1 cm right lower lobe  pulmonary nodule (series 5 image image 99). There is a new consolidation  in the lateral segment right middle lobe. Notable peribronchial  thickening in the right middle and right lower lobe. No pleural  effusion.     Heart: The heart is normal in size. There is no pericardial effusion.  Moderate coronary atheromatous calcification.     Mediastinum and lymph nodes: No enlarged mediastinal, hilar, or axillary  lymph nodes are present.      Skeletal and soft tissues: The osseous structures of the thorax and  surrounding soft tissues demonstrate no acute process.     Upper abdomen: The imaged portion of the upper abdomen demonstrates no  acute process.        Impression:       1. No evidence of pulmonary embolus.  2. New subsegmental consolidation in the lateral segment right middle  lobe, either atelectasis or developing pneumonia.  3. Advanced lung emphysema.  4. 1 cm right lower lobe pulmonary nodule which is suspicious, perhaps a  primary lung malignancy given patient's risk factors. This is mildly  enlarged when compared with an older 01/29/2019 exam.        This report was finalized on 09/04/2019 19:02 by Dr Te Jensen, .    XR Chest  "1 View [[id number]] Collected:  09/04/19 1455     Updated:  09/04/19 1459    Narrative:       Frontal upright radiograph of the chest 9/4/2019 2:38 PM CDT     COMPARISON: 01/13/2019.     HISTORY: Dyspnea hypoxia.     FINDINGS:   Hyperinflation flattening diaphragms noted. Chronic changes present in  the pulmonary parenchyma.. The cardiomediastinal silhouette and  pulmonary vascularity are within normal limits.      The osseous structures and surrounding soft tissues demonstrate no acute  abnormality.       Impression:       1. COPD no acute cardiopulmonary process.        This report was finalized on 09/04/2019 14:56 by Dr. Adan Myers MD.          Condition on Discharge:  Stable    Physical Exam on Discharge:  /53 (BP Location: Right arm, Patient Position: Lying)   Pulse 74   Temp 98 °F (36.7 °C) (Axillary)   Resp 16   Ht 167.6 cm (66\")   Wt 69.3 kg (152 lb 12.8 oz)   LMP  (LMP Unknown) Comment: hysterectomy  SpO2 93%   BMI 24.66 kg/m²    Physical Exam  Constitutional: She is oriented to person, place, and time. She appears well-developed. No distress.   Barrel chested   HENT:   Head: Normocephalic and atraumatic.   Right Ear: External ear normal.   Left Ear: External ear normal.   Nose: Nose normal.   Mouth/Throat: Oropharynx is clear and moist. No oropharyngeal exudate.   Eyes: Conjunctivae and EOM are normal. Pupils are equal, round, and reactive to light. Right eye exhibits no discharge. Left eye exhibits no discharge. No scleral icterus.   Neck: Normal range of motion. Neck supple. No tracheal deviation present. No thyromegaly present.   Pulmonary/Chest: Effort normal.   She is on 2 L nasal cannula with oxygen saturation of 94-95%.  Diminished breath sounds.  No crackles or wheezes   Abdominal: Soft. Bowel sounds are normal. She exhibits no distension. There is no tenderness. There is no guarding.   Musculoskeletal: Normal range of motion. She exhibits no edema or tenderness.   Neurological: " She is alert and oriented to person, place, and time. No cranial nerve deficit. She exhibits normal muscle tone. Coordination normal.   Skin: Skin is warm and dry. Capillary refill takes less than 2 seconds. No rash noted. She is not diaphoretic. No erythema.  Improve clean right ankle wound  Psychiatric: She has a normal mood and affect. Her behavior is normal. Judgment and thought content normal.   Vitals reviewed.          Discharge Disposition:  Home or Self Care    Discharge Medications:     Discharge Medications      New Medications      Instructions Start Date   docusate sodium 100 MG capsule   100 mg, Oral, 2 Times Daily      folic acid 1 MG tablet  Commonly known as:  FOLVITE   1 mg, Oral, Daily      guaiFENesin 100 MG/5ML solution oral solution  Commonly known as:  ROBITUSSIN   400 mg, Oral, Every 4 Hours      loratadine 10 MG tablet  Commonly known as:  CLARITIN   10 mg, Oral, Daily      metoclopramide 5 MG tablet  Commonly known as:  REGLAN   5 mg, Oral, 3 Times Daily Before Meals      mupirocin 2 % ointment  Commonly known as:  BACTROBAN   Topical, Every 12 Hours Scheduled         Changes to Medications      Instructions Start Date   ALPRAZolam 0.5 MG tablet  Commonly known as:  XANAX  What changed:    · medication strength  · how much to take   0.5 mg, Oral, 4 Times Daily PRN         Continue These Medications      Instructions Start Date   albuterol sulfate  (90 Base) MCG/ACT inhaler  Commonly known as:  PROVENTIL HFA;VENTOLIN HFA;PROAIR HFA   2 puffs, Inhalation, Every 4 Hours PRN      amphetamine-dextroamphetamine 20 MG tablet  Commonly known as:  ADDERALL   20 mg, Oral, 2 Times Daily      azelastine 0.1 % nasal spray  Commonly known as:  ASTELIN   2 sprays, Nasal, 2 Times Daily, Use in each nostril as directed      citalopram 40 MG tablet  Commonly known as:  CeleXA   40 mg, Oral, Daily      dextromethorphan polistirex ER 30 MG/5ML Suspension Extended Release oral suspension  Commonly known  as:  DELSYM   60 mg, Oral, Nightly PRN      fluticasone 50 MCG/ACT nasal spray  Commonly known as:  FLONASE   2 sprays, Nasal, Daily      Fluticasone-Umeclidin-Vilant 100-62.5-25 MCG/INH aerosol powder   Commonly known as:  TRELEGY ELLIPTA   1 each, Inhalation, Daily      gabapentin 600 MG tablet  Commonly known as:  NEURONTIN   600 mg, Oral, Every 8 Hours Scheduled      montelukast 10 MG tablet  Commonly known as:  SINGULAIR   10 mg, Oral, Nightly      nicotine 21 MG/24HR patch  Commonly known as:  NICODERM CQ   1 patch, Transdermal, Every 24 Hours      ondansetron 4 MG tablet  Commonly known as:  ZOFRAN   4 mg, Oral, Every 6 Hours PRN      oxyCODONE HCl ER 30 MG tablet extended-release 12 hour  Commonly known as:  oxyCONTIN   30 mg, Oral, Every 12 Hours Scheduled      oxyCODONE-acetaminophen  MG per tablet  Commonly known as:  PERCOCET   1 tablet, Oral, Every 6 Hours PRN      pantoprazole 20 MG EC tablet  Commonly known as:  PROTONIX   20 mg, Oral, Daily      tiZANidine 4 MG tablet  Commonly known as:  ZANAFLEX   4 mg, Oral, 3 Times Daily      traZODone 50 MG tablet  Commonly known as:  DESYREL   50 mg, Oral, Nightly             Discharge Diet:   Diet Instructions     Diet: Regular; Thin      Discharge Diet:  Regular    Fluid Consistency:  Thin          Discharge Care Plan / Instructions:   Activity at Discharge:   Activity Instructions     Discharge Activity      As tolerated          Follow-up Appointments: Primary care provider in 1 week, pulmonary per their recommendation   Test Results Pending at Discharge:      Abraham Jacques MD  09/18/19  7:54 AM    Time: > 30 mins      Part of this note may be an electronic transcription/translation of spoken language to printed text using the Dragon Dictation System.            Electronically signed by Abraham Jacques MD at 09/18/19 2025

## 2019-09-20 ENCOUNTER — TELEPHONE (OUTPATIENT)
Dept: PULMONOLOGY | Facility: CLINIC | Age: 69
End: 2019-09-20

## 2019-09-20 DIAGNOSIS — Z87.891 PERSONAL HISTORY OF NICOTINE DEPENDENCE: Primary | ICD-10-CM

## 2019-09-20 DIAGNOSIS — F17.210 CIGARETTE NICOTINE DEPENDENCE WITHOUT COMPLICATION: ICD-10-CM

## 2019-09-20 RX ORDER — NICOTINE 21-14-7MG
1 KIT TRANSDERMAL TAKE AS DIRECTED
Qty: 1 EACH | Refills: 0 | Status: SHIPPED | OUTPATIENT
Start: 2019-09-20 | End: 2020-11-23

## 2019-09-20 NOTE — TELEPHONE ENCOUNTER
"Patient called stating that her feet are swelling and was wanting some lasix for this.   She is also wanting some nicotine patches and she says they are probably going to have to be \"pre-approved\" since insurance is not paying for anything like that.    Do you want to send these things in?  "

## 2019-09-20 NOTE — TELEPHONE ENCOUNTER
Tried to call patient and no answer and after ringing about 5-6 times it cuts me off and hangs up.

## 2019-09-23 ENCOUNTER — READMISSION MANAGEMENT (OUTPATIENT)
Dept: CALL CENTER | Facility: HOSPITAL | Age: 69
End: 2019-09-23

## 2019-09-23 NOTE — OUTREACH NOTE
COPD/PN Week 1 Survey      Responses   Facility patient discharged from?  [place] [facility]   Does the patient have one of the following disease processes/diagnoses(primary or secondary)?  COPD/Pneumonia   Is there a successful TCM telephone encounter documented?  No   Was the primary reason for admission:  COPD exacerbation   Week 1 attempt successful?  No   Unsuccessful attempts  Attempt 1          Anai Chance RN

## 2019-09-25 ENCOUNTER — READMISSION MANAGEMENT (OUTPATIENT)
Dept: CALL CENTER | Facility: HOSPITAL | Age: 69
End: 2019-09-25

## 2019-09-25 NOTE — OUTREACH NOTE
COPD/PN Week 1 Survey      Responses   Facility patient discharged from?  [facility]   Does the patient have one of the following disease processes/diagnoses(primary or secondary)?  COPD/Pneumonia   Is there a successful TCM telephone encounter documented?  No   Was the primary reason for admission:  COPD exacerbation   Week 1 attempt successful?  Yes   Call start time  1530   Call end time  1541   Discharge diagnosis  COPD exacerbation    Meds reviewed with patient/caregiver?  Yes   Is the patient having any side effects they believe may be caused by any medication additions or changes?  No   Does the patient have all medications ordered at discharge?  Yes   Is the patient taking all medications as directed (includes completed medication regime)?  Yes   Does the patient have a primary care provider?   No   Does the patient have an appointment with their PCP or pulmonologist within 7 days of discharge?  No   Comments regarding PCP  PCP has moved away, pt is in process of finding new PCP, 9/25 appt was cancelled   What is preventing the patient from scheduling follow up appointments within 7 days of discharge?  Unsure of when or with whom [see above]   Has the patient kept scheduled appointments due by today?  N/A   Has home health visited the patient within 72 hours of discharge?  N/A   Psychosocial issues?  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  Yes   Nursing interventions  Reviewed instructions with patient   What is the patient's perception of their health status since discharge?  Improving   Nursing Interventions  Nurse provided patient education   Is the patient/caregiver able to teach back the hierarchy of who to call/visit for symptoms/problems? PCP, Specialist, Home health nurse, Urgent Care, ED, 911  Yes   Is the patient able to teach back COPD zones?  Yes   Nursing interventions  Education provided  on various zones   Patient reports what zone on this call?  Green Zone   Green Zone  Reports doing well, Breathing without shortness of breath, Usual activity and exercise level, Usual amount of phlegm/mucus without difficulty coughing up, Sleeping well, Appetite is good   Green Zone interventions:  Take daily medications, Continue regular exercise/diet plan, Do not smoke, Use oxygen as prescribed, Avoid indoor/outdoor triggers   Week 1 call completed?  Yes          Pat Bess RN

## 2019-10-04 ENCOUNTER — READMISSION MANAGEMENT (OUTPATIENT)
Dept: CALL CENTER | Facility: HOSPITAL | Age: 69
End: 2019-10-04

## 2019-10-04 NOTE — OUTREACH NOTE
COPD/PN Week 2 Survey      Responses   Facility patient discharged from?  [place]   Does the patient have one of the following disease processes/diagnoses(primary or secondary)?  COPD/Pneumonia   Was the primary reason for admission:  COPD exacerbation   Week 2 attempt successful?  Yes   Call start time  1118   Revoke  Decline to participate   Call end time  1118   Discharge diagnosis  COPD exacerbation           Rey Chou RN

## 2019-10-07 ENCOUNTER — TELEPHONE (OUTPATIENT)
Dept: PULMONOLOGY | Facility: CLINIC | Age: 69
End: 2019-10-07

## 2019-10-07 DIAGNOSIS — J44.9 STAGE 4 VERY SEVERE COPD BY GOLD CLASSIFICATION (HCC): Primary | ICD-10-CM

## 2019-10-07 DIAGNOSIS — F17.210 CIGARETTE NICOTINE DEPENDENCE WITHOUT COMPLICATION: ICD-10-CM

## 2019-10-07 DIAGNOSIS — R05.9 COUGH: ICD-10-CM

## 2019-10-07 RX ORDER — NICOTINE 21 MG/24HR
1 PATCH, TRANSDERMAL 24 HOURS TRANSDERMAL EVERY 24 HOURS
Qty: 30 PATCH | Refills: 2 | Status: SHIPPED | OUTPATIENT
Start: 2019-10-07 | End: 2019-11-06

## 2019-10-07 RX ORDER — BENZONATATE 200 MG/1
200 CAPSULE ORAL 3 TIMES DAILY PRN
Qty: 42 CAPSULE | Refills: 2 | Status: SHIPPED | OUTPATIENT
Start: 2019-10-07 | End: 2020-11-23

## 2019-10-07 NOTE — TELEPHONE ENCOUNTER
Patient is needing a rx for Trelegy and was also wondering about nicotine patches. Patient was also wondering if she could get a one time rx for Reglan. Patient was prescribed this in the hospital but is needing more refills; patient has to find a new PCP. Also, patient was wondering if you would send something in for her cough as well.

## 2019-10-08 DIAGNOSIS — Z12.31 ENCOUNTER FOR SCREENING MAMMOGRAM FOR BREAST CANCER: Primary | ICD-10-CM

## 2019-10-08 NOTE — PROGRESS NOTES
Continued Stay Note  [facility]     Patient Name: Justyna Sosa Lei  MRN: [id number]  Today's Date: 9/18/2019    Admit Date: 9/4/2019    Discharge Plan     Row Name 09/18/19 1324       Plan    Plan  Home    Patient/Family in Agreement with Plan  yes    Final Discharge Disposition Code  01 - home or self-care    Final Note  Pt is being discharged home. Pt has order for standard walker and pt prefers [facility], gets O2 there already. Informed Rey at [facility] of referral [phone].        Discharge Codes    No documentation.       Expected Discharge Date and Time     Expected Discharge Date Expected Discharge Time    Sep 18, 2019             IMELDA Jean-Baptiste     Pt. is a 52year old homeless female with history of Bipolar Disorder and Alcohol Dependence. Pt. was admitted the hospital for ideations harm self. Pt.s case was discussed with the treating psychiatrist.   
  
 Contact:  ANJU met with the pt.  to discuss dc planning. Pt. states she is feeling better. SW assisted pt with application for [facility]. ANJU explained to pt she has to get enrolled in services at [facility]  prior to submitting application to . Pt. decline board and care provider homes. Pt.plans to go to the shelter. ANJU provided pt. with positive feedback for her compliance. Pt. is cooperative, and has fair insight. ANJU will continue assist pt with dc planning

## 2019-10-25 ENCOUNTER — TELEPHONE (OUTPATIENT)
Dept: PULMONOLOGY | Facility: CLINIC | Age: 69
End: 2019-10-25

## 2019-10-25 NOTE — TELEPHONE ENCOUNTER
Patient was having problems with nausea and headaches. She states she thinks the Trelegy is causing it. She hasn't taken it for the last couple of days and thinks she is some better.

## 2019-10-27 DIAGNOSIS — K21.9 GERD WITHOUT ESOPHAGITIS: ICD-10-CM

## 2019-10-27 DIAGNOSIS — J44.9 STAGE 3 SEVERE COPD BY GOLD CLASSIFICATION (HCC): ICD-10-CM

## 2019-10-27 DIAGNOSIS — J00 NASOPHARYNGITIS: ICD-10-CM

## 2019-10-28 RX ORDER — MONTELUKAST SODIUM 10 MG/1
TABLET ORAL
Qty: 90 TABLET | Refills: 3 | Status: SHIPPED | OUTPATIENT
Start: 2019-10-28 | End: 2020-10-20

## 2019-10-28 RX ORDER — IPRATROPIUM/ALBUTEROL SULFATE 20-100 MCG
MIST INHALER (GRAM) INHALATION
Qty: 1 INHALER | Refills: 3 | Status: SHIPPED | OUTPATIENT
Start: 2019-10-28 | End: 2020-04-13

## 2019-10-28 RX ORDER — PANTOPRAZOLE SODIUM 20 MG/1
TABLET, DELAYED RELEASE ORAL
Qty: 90 TABLET | Refills: 3 | OUTPATIENT
Start: 2019-10-28

## 2019-10-28 RX ORDER — AZELASTINE 1 MG/ML
SPRAY, METERED NASAL
Qty: 1 EACH | Refills: 4 | Status: SHIPPED | OUTPATIENT
Start: 2019-10-28 | End: 2020-01-27

## 2019-10-28 RX ORDER — FLUTICASONE PROPIONATE 50 MCG
2 SPRAY, SUSPENSION (ML) NASAL DAILY
Qty: 48 ML | Refills: 3 | Status: SHIPPED | OUTPATIENT
Start: 2019-10-28 | End: 2020-10-20

## 2019-11-01 ENCOUNTER — TELEPHONE (OUTPATIENT)
Dept: GASTROENTEROLOGY | Age: 69
End: 2019-11-01

## 2019-11-05 DIAGNOSIS — K21.9 GERD WITHOUT ESOPHAGITIS: ICD-10-CM

## 2019-11-05 RX ORDER — PANTOPRAZOLE SODIUM 20 MG/1
20 TABLET, DELAYED RELEASE ORAL DAILY
Qty: 30 TABLET | Refills: 11 | OUTPATIENT
Start: 2019-11-05

## 2020-01-27 DIAGNOSIS — J44.9 STAGE 3 SEVERE COPD BY GOLD CLASSIFICATION (HCC): ICD-10-CM

## 2020-01-27 DIAGNOSIS — J00 NASOPHARYNGITIS: ICD-10-CM

## 2020-01-27 RX ORDER — AZELASTINE 1 MG/ML
SPRAY, METERED NASAL
Qty: 1 EACH | Refills: 1 | Status: SHIPPED | OUTPATIENT
Start: 2020-01-27 | End: 2020-01-28

## 2020-01-27 RX ORDER — IPRATROPIUM/ALBUTEROL SULFATE 20-100 MCG
MIST INHALER (GRAM) INHALATION
Qty: 1 INHALER | Refills: 1 | OUTPATIENT
Start: 2020-01-27

## 2020-01-27 NOTE — TELEPHONE ENCOUNTER
The patient had a refill request for combivent and astilin.  Ok to refill astilin, but if she is still using trelegy then she does not need to use combivent with this.

## 2020-01-28 DIAGNOSIS — J00 NASOPHARYNGITIS: ICD-10-CM

## 2020-01-28 RX ORDER — AZELASTINE 1 MG/ML
SPRAY, METERED NASAL
Qty: 1 EACH | Refills: 11 | Status: SHIPPED | OUTPATIENT
Start: 2020-01-28 | End: 2021-01-18

## 2020-02-01 DIAGNOSIS — J44.9 STAGE 3 SEVERE COPD BY GOLD CLASSIFICATION (HCC): ICD-10-CM

## 2020-02-03 RX ORDER — BUDESONIDE AND FORMOTEROL FUMARATE DIHYDRATE 160; 4.5 UG/1; UG/1
AEROSOL RESPIRATORY (INHALATION)
Qty: 1 INHALER | Refills: 11 | Status: SHIPPED | OUTPATIENT
Start: 2020-02-03 | End: 2020-02-05

## 2020-02-05 ENCOUNTER — OFFICE VISIT (OUTPATIENT)
Dept: INTERNAL MEDICINE | Facility: CLINIC | Age: 70
End: 2020-02-05

## 2020-02-05 VITALS
TEMPERATURE: 97.4 F | HEIGHT: 64 IN | DIASTOLIC BLOOD PRESSURE: 70 MMHG | RESPIRATION RATE: 18 BRPM | SYSTOLIC BLOOD PRESSURE: 120 MMHG | OXYGEN SATURATION: 93 % | BODY MASS INDEX: 21 KG/M2 | WEIGHT: 123 LBS | HEART RATE: 61 BPM

## 2020-02-05 DIAGNOSIS — J44.9 STAGE 4 VERY SEVERE COPD BY GOLD CLASSIFICATION (HCC): Primary | ICD-10-CM

## 2020-02-05 DIAGNOSIS — R11.0 NAUSEA: ICD-10-CM

## 2020-02-05 DIAGNOSIS — J30.9 ALLERGIC RHINITIS, UNSPECIFIED SEASONALITY, UNSPECIFIED TRIGGER: ICD-10-CM

## 2020-02-05 DIAGNOSIS — K21.9 GASTROESOPHAGEAL REFLUX DISEASE, ESOPHAGITIS PRESENCE NOT SPECIFIED: ICD-10-CM

## 2020-02-05 DIAGNOSIS — R30.0 DYSURIA: ICD-10-CM

## 2020-02-05 DIAGNOSIS — F43.21 UNRESOLVED GRIEF: ICD-10-CM

## 2020-02-05 PROCEDURE — 99204 OFFICE O/P NEW MOD 45 MIN: CPT | Performed by: FAMILY MEDICINE

## 2020-02-05 RX ORDER — METOCLOPRAMIDE 5 MG/1
5 TABLET ORAL
Qty: 90 TABLET | Refills: 5 | Status: SHIPPED | OUTPATIENT
Start: 2020-02-05

## 2020-02-05 RX ORDER — GUAIFENESIN 600 MG/1
600 TABLET, EXTENDED RELEASE ORAL 2 TIMES DAILY
Qty: 60 TABLET | Refills: 11 | Status: SHIPPED | OUTPATIENT
Start: 2020-02-05 | End: 2020-06-05

## 2020-02-05 RX ORDER — LORATADINE 10 MG/1
10 TABLET ORAL DAILY
Qty: 90 TABLET | Refills: 3 | Status: SHIPPED | OUTPATIENT
Start: 2020-02-05

## 2020-02-05 RX ORDER — OMEPRAZOLE 40 MG/1
40 CAPSULE, DELAYED RELEASE ORAL DAILY
Qty: 90 CAPSULE | Refills: 3 | Status: SHIPPED | OUTPATIENT
Start: 2020-02-05 | End: 2021-01-15

## 2020-02-05 NOTE — PROGRESS NOTES
CC:   Chief Complaint   Patient presents with   • Establish Care     New patient.     History:  Justyna Lei is a 69 y.o. female who presents today for evaluation of the above problems.      Has a history of very severe COPD based on last PFTs, currently uses O2 as needed and uses trilogy prescribed by pulmonology.    Sees Dr. He for mood, who prescribes Xanax and Adderall    Followed by pain management, sees Dr. Sunshine.    Struggles with depression and feeling lonely, says that this has to do with family deaths in the past, currently not seeing a counselor.    ROS:  Review of Systems   Constitutional: Positive for fatigue.   Respiratory: Positive for shortness of breath.    Genitourinary: Positive for dysuria.   Psychiatric/Behavioral: Positive for behavioral problems, decreased concentration and dysphoric mood.   All other systems reviewed and are negative.      Allergies   Allergen Reactions   • Aspirin Anaphylaxis   • Demerol [Meperidine] Anaphylaxis   • Ibuprofen Anaphylaxis   • Neosporin [Neomycin-Bacitracin Zn-Polymyx] Anaphylaxis   • Nsaids Hives   • Penicillins Anaphylaxis   • Sulfa Antibiotics Anaphylaxis   • Codeine Hives     Pt can take codeine as long is it is not alot   • Talwin [Pentazocine] Hives   • Tetracyclines & Related    • Influenza Vaccines Hives and Rash     Past Medical History:   Diagnosis Date   • Anxiety    • Anxiety     pain clinic patient Dr Sunshine x 5yrs   • Arthritis    • Chronic pain due to injury     jarad LE & back   • COPD (chronic obstructive pulmonary disease) (CMS/[facility])    • Depression    • GERD (gastroesophageal reflux disease)    • Hypertension    • Neuropathy    • Polypharmacy    • Smoker      Past Surgical History:   Procedure Laterality Date   • ABDOMINAL SURGERY     • APPENDECTOMY     • CHOLECYSTECTOMY     • FEMUR FRACTURE SURGERY     • HERNIA REPAIR     • HYSTERECTOMY       Family History   Problem Relation Age of Onset   • Suicidality Mother    • Alcohol abuse Mother     • Cancer Father    • Heart disease Father    • Heart disease Maternal Grandmother       reports that she has been smoking cigarettes. She has a 1125.00 pack-year smoking history. She has never used smokeless tobacco. She reports that she does not drink alcohol or use drugs.      Current Outpatient Medications:   •  ALPRAZolam (XANAX) 0.5 MG tablet, Take 1 tablet by mouth 4 (Four) Times a Day As Needed for Anxiety., Disp: 12 tablet, Rfl: 0  •  amphetamine-dextroamphetamine (ADDERALL) 20 MG tablet, Take 20 mg by mouth 2 (Two) Times a Day As Needed., Disp: , Rfl:   •  COMBIVENT RESPIMAT  MCG/ACT inhaler, INHALE 1 PUFF 4 (FOUR) TIMES A DAY AS NEEDED FOR WHEEZING., Disp: 1 inhaler, Rfl: 3  •  fluticasone (FLONASE) 50 MCG/ACT nasal spray, 2 SPRAYS INTO THE NOSTRIL(S) AS DIRECTED BY PROVIDER DAILY., Disp: 48 mL, Rfl: 3  •  gabapentin (NEURONTIN) 600 MG tablet, Take 600 mg by mouth Every 8 (Eight) Hours., Disp: , Rfl:   •  montelukast (SINGULAIR) 10 MG tablet, TAKE 1 TABLET BY MOUTH EVERY DAY AT NIGHT, Disp: 90 tablet, Rfl: 3  •  ondansetron (ZOFRAN) 4 MG tablet, Take 4 mg by mouth Every 6 (Six) Hours As Needed for Nausea or Vomiting., Disp: , Rfl:   •  oxyCODONE-acetaminophen (PERCOCET)  MG per tablet, Take 1 tablet by mouth Every 6 (Six) Hours As Needed for Moderate Pain ., Disp: , Rfl:   •  OXYCONTIN 20 MG 12 hr tablet, 1 tablet 2 (Two) Times a Day As Needed., Disp: , Rfl:   •  tiZANidine (ZANAFLEX) 4 MG tablet, Take 4 mg by mouth 3 (Three) Times a Day., Disp: , Rfl:   •  traZODone (DESYREL) 50 MG tablet, Take 50 mg by mouth As Needed., Disp: , Rfl:   •  TRELEGY ELLIPTA 100-62.5-25 MCG/INH aerosol powder , 1 puff Daily., Disp: , Rfl:   •  azelastine (ASTELIN) 0.1 % nasal spray, PLEASE SEE ATTACHED FOR DETAILED DIRECTIONS, Disp: 1 each, Rfl: 11  •  benzonatate (TESSALON) 200 MG capsule, Take 1 capsule by mouth 3 (Three) Times a Day As Needed for Cough., Disp: 42 capsule, Rfl: 2  •  citalopram (CeleXA) 40 MG  "tablet, Take 40 mg by mouth Daily., Disp: , Rfl:   •  docusate sodium 100 MG capsule, Take 100 mg by mouth 2 (Two) Times a Day., Disp: 10 each, Rfl: 0  •  folic acid (FOLVITE) 1 MG tablet, Take 1 tablet by mouth Daily., Disp: 30 tablet, Rfl: 0  •  guaiFENesin (MUCINEX) 600 MG 12 hr tablet, Take 1 tablet by mouth 2 (Two) Times a Day., Disp: 60 tablet, Rfl: 11  •  loratadine (CLARITIN) 10 MG tablet, Take 1 tablet by mouth Daily., Disp: 90 tablet, Rfl: 3  •  metoclopramide (REGLAN) 5 MG tablet, Take 1 tablet by mouth 3 (Three) Times a Day Before Meals., Disp: 90 tablet, Rfl: 5  •  mupirocin (BACTROBAN) 2 % ointment, Apply  topically to the appropriate area as directed Every 12 (Twelve) Hours., Disp: 30 g, Rfl: 0  •  Nicotine 21-14-7 MG/24HR kit, Place 1 kit on the skin as directed by provider Take As Directed., Disp: 1 each, Rfl: 0  •  omeprazole (priLOSEC) 40 MG capsule, Take 1 capsule by mouth Daily., Disp: 90 capsule, Rfl: 3  •  OxyCODONE HCl ER (oxyCONTIN) 30 MG tablet extended-release 12 hour, Take 30 mg by mouth Every 12 (Twelve) Hours., Disp: , Rfl:     OBJECTIVE:  /70 (BP Location: Left arm, Patient Position: Sitting, Cuff Size: Adult)   Pulse 61   Temp 97.4 °F (36.3 °C) (Oral)   Resp 18   Ht 162.6 cm (64\")   Wt 55.8 kg (123 lb)   LMP  (LMP Unknown) Comment: hysterectomy  SpO2 93%   BMI 21.11 kg/m²    Physical Exam   Constitutional: She is oriented to person, place, and time. No distress.   HENT:   Head: Normocephalic and atraumatic.   Nose: Nose normal.   Eyes: Conjunctivae are normal. Right eye exhibits no discharge. Left eye exhibits no discharge. No scleral icterus.   Neck: No tracheal deviation present.   Cardiovascular: Normal rate, regular rhythm and normal heart sounds.   Pulmonary/Chest: Effort normal.   Decreased breath sounds throughout   Neurological: She is alert and oriented to person, place, and time.   Skin: Skin is warm and dry. She is not diaphoretic. No pallor.   Psychiatric: " Her behavior is normal. Judgment and thought content normal.   Depressed   Nursing note and vitals reviewed.      Assessment/Plan     Diagnosis Plan   1. Stage 4 very severe COPD by GOLD classification (CMS/[facility])  guaiFENesin (MUCINEX) 600 MG 12 hr tablet   2. Dysuria  Urinalysis With Culture If Indicated - Urine, Clean Catch   3. Unresolved grief     4. Allergic rhinitis, unspecified seasonality, unspecified trigger  loratadine (CLARITIN) 10 MG tablet    guaiFENesin (MUCINEX) 600 MG 12 hr tablet   5. Gastroesophageal reflux disease, esophagitis presence not specified  omeprazole (priLOSEC) 40 MG capsule   6. Nausea  metoclopramide (REGLAN) 5 MG tablet   Continue with pulmonology recommendations, did prescribe Mucinex to aid in productive cough  Recent dysuria, will order UA today  Needs refill of Claritin for allergic rhinitis  Needs refill of omeprazole for chronic GERD  Needs refill for chronic nausea  Discussed counseling referral for unresolved grief, patient would like to wait for now    An After Visit Summary was printed and given to the patient at discharge.  Return in about 3 months (around 5/5/2020).         Corbin Nunez D.O.  Family Medicine  Osteopathic Neuromusculoskeletal Medicine  2/6/2020

## 2020-02-06 ENCOUNTER — TELEPHONE (OUTPATIENT)
Dept: INTERNAL MEDICINE | Facility: CLINIC | Age: 70
End: 2020-02-06

## 2020-02-07 NOTE — TELEPHONE ENCOUNTER
Spoke with Lab they have no record of patient being down for the UA, NOT SURE WHERE SHE WENT to have this test done.

## 2020-02-12 ENCOUNTER — TELEPHONE (OUTPATIENT)
Dept: INTERNAL MEDICINE | Facility: CLINIC | Age: 70
End: 2020-02-12

## 2020-02-13 NOTE — TELEPHONE ENCOUNTER
Patient informed UA lab order is listed as active in her chart and lab has no record of her providing a specimen, per Kumar.  Patient was given the number to lab and she said she would call them.

## 2020-02-17 ENCOUNTER — NURSE TRIAGE (OUTPATIENT)
Dept: CALL CENTER | Facility: HOSPITAL | Age: 70
End: 2020-02-17

## 2020-02-17 NOTE — TELEPHONE ENCOUNTER
"She saw the PCP she has copd and alpha 1 deficiency- She went to the lab and had a urine specimen completed. She has been trying to reach someone since last Wednesday and she has not received the results. She was called back and was told she did not have the urine test completed. She is not happy, about lab loosing the urine.  She is asking if there is such a thing as a bladder stone. Explained that yes, there is-  the PCP would have to explain this. She is very frustrated.     Reason for Disposition  • [1] Caller requesting NON-URGENT health information AND [2] PCP's office is the best resource    Additional Information  • Negative: [1] Caller is not with the adult (patient) AND [2] reporting urgent symptoms  • Negative: Lab result questions  • Negative: Medication questions  • Negative: Caller can't be reached by phone  • Negative: Caller has already spoken to PCP or another triager  • Negative: RN needs further essential information from caller in order to complete triage  • Negative: Requesting regular office appointment    Answer Assessment - Initial Assessment Questions  1. REASON FOR CALL or QUESTION: \"What is your reason for calling today?\" or \"How can I best help you?\" or \"What question do you have that I can help answer?\"      See note    Protocols used: INFORMATION ONLY CALL-ADULT-      "

## 2020-02-18 DIAGNOSIS — R30.0 DYSURIA: Primary | ICD-10-CM

## 2020-03-03 ENCOUNTER — APPOINTMENT (OUTPATIENT)
Dept: CT IMAGING | Facility: HOSPITAL | Age: 70
End: 2020-03-03

## 2020-03-09 ENCOUNTER — APPOINTMENT (OUTPATIENT)
Dept: CT IMAGING | Facility: HOSPITAL | Age: 70
End: 2020-03-09

## 2020-03-13 ENCOUNTER — LAB (OUTPATIENT)
Dept: LAB | Facility: HOSPITAL | Age: 70
End: 2020-03-13

## 2020-03-13 ENCOUNTER — HOSPITAL ENCOUNTER (OUTPATIENT)
Dept: CT IMAGING | Facility: HOSPITAL | Age: 70
Discharge: HOME | End: 2020-03-13
Admitting: NURSE PRACTITIONER

## 2020-03-13 DIAGNOSIS — R30.0 DYSURIA: ICD-10-CM

## 2020-03-13 DIAGNOSIS — R91.1 LUNG NODULE: ICD-10-CM

## 2020-03-13 LAB
BACTERIA UR QL AUTO: ABNORMAL /HPF
BILIRUB UR QL STRIP: NEGATIVE
CLARITY UR: CLEAR
COLOR UR: ABNORMAL
GLUCOSE UR STRIP-MCNC: NEGATIVE MG/DL
HGB UR QL STRIP.AUTO: NEGATIVE
HYALINE CASTS UR QL AUTO: ABNORMAL /LPF
KETONES UR QL STRIP: NEGATIVE
LEUKOCYTE ESTERASE UR QL STRIP.AUTO: ABNORMAL
NITRITE UR QL STRIP: NEGATIVE
PH UR STRIP.AUTO: 6.5 [PH] (ref 5–8)
PROT UR QL STRIP: NEGATIVE
RBC # UR: ABNORMAL /HPF
REF LAB TEST METHOD: ABNORMAL
SP GR UR STRIP: 1.01 (ref 1–1.03)
SQUAMOUS #/AREA URNS HPF: ABNORMAL /HPF
UROBILINOGEN UR QL STRIP: ABNORMAL
WBC UR QL AUTO: ABNORMAL /HPF

## 2020-03-13 PROCEDURE — 87186 SC STD MICRODIL/AGAR DIL: CPT | Performed by: FAMILY MEDICINE

## 2020-03-13 PROCEDURE — 71250 CT THORAX DX C-: CPT

## 2020-03-13 PROCEDURE — 87086 URINE CULTURE/COLONY COUNT: CPT | Performed by: FAMILY MEDICINE

## 2020-03-13 PROCEDURE — 87077 CULTURE AEROBIC IDENTIFY: CPT | Performed by: FAMILY MEDICINE

## 2020-03-13 PROCEDURE — 81001 URINALYSIS AUTO W/SCOPE: CPT | Performed by: FAMILY MEDICINE

## 2020-03-14 DIAGNOSIS — R30.0 DYSURIA: Primary | ICD-10-CM

## 2020-03-14 RX ORDER — NITROFURANTOIN 25; 75 MG/1; MG/1
100 CAPSULE ORAL 2 TIMES DAILY
Qty: 10 CAPSULE | Refills: 0 | Status: SHIPPED | OUTPATIENT
Start: 2020-03-14 | End: 2020-03-19

## 2020-03-16 ENCOUNTER — TELEPHONE (OUTPATIENT)
Dept: PULMONOLOGY | Facility: CLINIC | Age: 70
End: 2020-03-16

## 2020-03-16 DIAGNOSIS — R05.9 COUGH: Primary | ICD-10-CM

## 2020-03-16 DIAGNOSIS — R91.1 LUNG NODULE: ICD-10-CM

## 2020-03-16 LAB
BACTERIA SPEC AEROBE CULT: ABNORMAL
BACTERIA SPEC AEROBE CULT: ABNORMAL

## 2020-03-16 RX ORDER — BENZONATATE 200 MG/1
200 CAPSULE ORAL 3 TIMES DAILY PRN
Qty: 42 CAPSULE | Refills: 3 | Status: SHIPPED | OUTPATIENT
Start: 2020-03-16 | End: 2020-11-23

## 2020-03-16 NOTE — TELEPHONE ENCOUNTER
Attempt made to call patient today after patient cancelled her OV to discuss results of CT chest.  At this time, I would recommend a PET scan.  If her PET is positive I would recommend referral to radiation oncology.

## 2020-03-16 NOTE — TELEPHONE ENCOUNTER
Spoke to the patient regarding the results of her CT scan.  She is agreeable to PET scan at this time.  The patient is c/o of cough.  She is asking for cough med.  Recommend delsym or tessalon pearls.  Benzonatate sent to pharmacy.

## 2020-04-03 ENCOUNTER — TELEPHONE (OUTPATIENT)
Dept: INTERNAL MEDICINE | Facility: CLINIC | Age: 70
End: 2020-04-03

## 2020-04-03 NOTE — TELEPHONE ENCOUNTER
Patient left a voicemail requesting a returned call.    Contacted patient, unable to leave a voicemail, voicemail no set up

## 2020-04-08 ENCOUNTER — TELEPHONE (OUTPATIENT)
Dept: PULMONOLOGY | Facility: CLINIC | Age: 70
End: 2020-04-08

## 2020-04-08 NOTE — TELEPHONE ENCOUNTER
Patient is wanting to speak to you about her upcoming PET scan on 4/20. She states she has several questions.

## 2020-04-09 PROBLEM — J96.21 ACUTE ON CHRONIC RESPIRATORY FAILURE WITH HYPOXIA AND HYPERCAPNIA (HCC): Status: RESOLVED | Noted: 2019-09-04 | Resolved: 2020-04-09

## 2020-04-09 PROBLEM — J96.90 RESPIRATORY FAILURE: Status: RESOLVED | Noted: 2017-03-06 | Resolved: 2020-04-09

## 2020-04-09 PROBLEM — J44.1 COPD EXACERBATION (HCC): Status: RESOLVED | Noted: 2019-09-04 | Resolved: 2020-04-09

## 2020-04-09 PROBLEM — J96.22 ACUTE ON CHRONIC RESPIRATORY FAILURE WITH HYPOXIA AND HYPERCAPNIA: Status: RESOLVED | Noted: 2019-09-04 | Resolved: 2020-04-09

## 2020-04-09 NOTE — PROGRESS NOTES
THIERNO Arthur  [facility]   [place] [address]  Phone: [phone]  Fax: [phone]     Justyna Lei is a 69 y.o. female.   : 3/25/1951  CC:   Chief Complaint   Patient presents with   • COPD     HPI: This visit is conducted today via telephone 2' covid-19 epidemic.  This is a pleasant 69 y.o. female.  The patient has known COPD, alpha-1-SS, current everyday smoker, chronic respiratory failure with hypoxemia, lung nodule, and emphysema.  The patient currently utilizes Trelegy, albuterol HFA, Astelin, Flonase, Singulair, mucinex, and chronic oxygen therapy.  She states she currently has a head cold, broken arm, and bladder spasms.  She states she is up all night, every hour with bladder spasms.  She states she will talk to her PCP regarding this.  She denies fever or chills.  She is scheduled for a PET scan on  to further evaluate a lung nodule.  The patient is asking for the PET scan to be rescheduled until next month.  I explained to the patient that she needs to call scheduling and reschedule this herself.  She states that she understands.  I explained again that the PET scan is being ordered to further evaluate her enlarging lung nodule.  She would be a poor candidate for any invasive procedure or surgical work-up.  Therefore, if her PET scan is positive I would recommend referral to radiation oncology.  The patient states that she understands.  The patient's PCP is Corbin Nunez DO.    The following portions of the patient's history were reviewed and updated as appropriate: allergies, current medications, past family history, past medical history, past social history, past surgical history and problem list.  Past Medical History:   Diagnosis Date   • Anxiety    • Anxiety     pain clinic patient Dr Sunshine x 5yrs   • Arthritis    • Chronic pain due to injury     jarad LE & back   • COPD (chronic obstructive pulmonary  "disease) (CMS/[facility])    • Depression    • GERD (gastroesophageal reflux disease)    • Hypertension    • Neuropathy    • Polypharmacy    • Smoker      Family History   Problem Relation Age of Onset   • Suicidality Mother    • Alcohol abuse Mother    • Cancer Father    • Heart disease Father    • Heart disease Maternal Grandmother      Social History     Socioeconomic History   • Marital status:      Spouse name: Not on file   • Number of children: Not on file   • Years of education: Not on file   • Highest education level: Not on file   Tobacco Use   • Smoking status: Current Some Day Smoker     Packs/day: 25.00     Years: 45.00     Pack years: 1125.00     Types: Cigarettes   • Smokeless tobacco: Never Used   • Tobacco comment: Smokes 2-3 ciragettes per day. Former 2 pack a day smoker for 50 years.   Substance and Sexual Activity   • Alcohol use: No   • Drug use: No   • Sexual activity: Defer     Review of Systems   Constitutional: Negative for chills and fever.   HENT: Positive for congestion.    Eyes: Negative for blurred vision.   Respiratory: Positive for shortness of breath (chronic ). Negative for cough.    Cardiovascular: Negative for chest pain.   Gastrointestinal: Negative for diarrhea, nausea and vomiting.   Endocrine: Negative for cold intolerance and heat intolerance.   Genitourinary: Negative for dysuria.        \"Bladder spasms\"   Musculoskeletal: Negative for arthralgias.        \"Broken arm\"   Skin: Negative for rash.   Neurological: Negative for dizziness, weakness and light-headedness.   Hematological: Does not bruise/bleed easily.   Psychiatric/Behavioral: Negative for agitation. The patient is not nervous/anxious.      LMP  (LMP Unknown) Comment: hysterectomy  Unable to perform physical exam 2' telephone visit.      Pulmonary Functions Testing Results:  PFT Values        Some values may be hidden. Unless noted otherwise, only the newest values recorded on each date are displayed.         Old " Values PFT Results 8/19/19   FVC 55%   FEV1 25%   FEV1/FVC 35.22%   DLCO 27%      Pre Drug PFT Results 8/19/19   No data to display.      Post Drug PFT Results 8/19/19   No data to display.      Other Tests PFT Results 8/19/19   No data to display.              Imaging: None to review today    Assessment and Plan:   Justyna was seen today for copd.    Diagnoses and all orders for this visit:    Lung nodule  The patient has a known enlarging 11 mm right lower lobe pulmonary nodule.  A PET scan is scheduled for April 20.  The patient now states that she wants her PET scan scheduled for next month.  I explained to her again with a PET scan is for and to help us determine if this could possibly be a malignancy.  The patient is a poor candidate for any invasive work-up.  If the PET scan were to be positive then would likely recommend referral to radiation oncology if the patient is agreeable.  The patient states that she understands.  She will call scheduling herself to reschedule the PET scan for May.  Stage 4 very severe COPD by GOLD classification (CMS/[facility])  Continue Trelegy and albuterol rescue inhaler as needed.  Continue Mucinex 1200 mg twice a day with full glass of water.  GERD without esophagitis  Continue Prilosec  Alpha-1-antitrypsin deficiency carrier  The patient is an SS phenotype.  She is not a candidate for replacement therapy as she is a current everyday smoker.  Would strongly recommend smoking cessation.  Personal history of nicotine dependence  Current everyday smoker.  Pulmonary emphysema  Continue current treatment regimen.  Allergic rhinitis  Continue Astelin, Flonase, Claritin, and Singulair.      Health maintenance:   Influenza vaccine: No.  She states she is allergic.  Pneumovax 23: No  Prevnar 13: No  BMI: unable to obtain   Follow up: Keep follow-up in May.  The patient states that she will reschedule her PET scan for that time.    This visit has been rescheduled as a phone visit to comply  with patient safety concerns in accordance with CDC recommendations. Total time of discussion was 15 minutes.    Keisha Simon, APRN  4/13/2020  16:47    Please note that portions of this note were completed with a voice recognition program.

## 2020-04-09 NOTE — TELEPHONE ENCOUNTER
She has a known right lower lobe pulmonary nodule that has at least been present since 3/5/17.  Please set up a phone visit to discuss this again next week.  Thank you.

## 2020-04-13 ENCOUNTER — TELEPHONE (OUTPATIENT)
Dept: INTERNAL MEDICINE | Facility: CLINIC | Age: 70
End: 2020-04-13

## 2020-04-13 ENCOUNTER — TELEPHONE (OUTPATIENT)
Dept: PULMONOLOGY | Facility: CLINIC | Age: 70
End: 2020-04-13

## 2020-04-13 ENCOUNTER — OFFICE VISIT (OUTPATIENT)
Dept: PULMONOLOGY | Facility: CLINIC | Age: 70
End: 2020-04-13

## 2020-04-13 DIAGNOSIS — J44.9 STAGE 4 VERY SEVERE COPD BY GOLD CLASSIFICATION (HCC): ICD-10-CM

## 2020-04-13 DIAGNOSIS — E88.01 ALPHA-1-ANTITRYPSIN DEFICIENCY (HCC): ICD-10-CM

## 2020-04-13 DIAGNOSIS — Z87.891 PERSONAL HISTORY OF NICOTINE DEPENDENCE: ICD-10-CM

## 2020-04-13 DIAGNOSIS — R91.1 LUNG NODULE: Primary | ICD-10-CM

## 2020-04-13 DIAGNOSIS — Z14.8 ALPHA-1-ANTITRYPSIN DEFICIENCY CARRIER: ICD-10-CM

## 2020-04-13 DIAGNOSIS — J43.9 PULMONARY EMPHYSEMA, UNSPECIFIED EMPHYSEMA TYPE (HCC): ICD-10-CM

## 2020-04-13 DIAGNOSIS — K21.9 GERD WITHOUT ESOPHAGITIS: ICD-10-CM

## 2020-04-13 PROCEDURE — 99442 PR PHYS/QHP TELEPHONE EVALUATION 11-20 MIN: CPT | Performed by: NURSE PRACTITIONER

## 2020-04-13 RX ORDER — ONDANSETRON 4 MG/1
4 TABLET, ORALLY DISINTEGRATING ORAL EVERY 6 HOURS PRN
COMMUNITY
Start: 2020-03-23

## 2020-04-13 RX ORDER — ALBUTEROL SULFATE 90 UG/1
AEROSOL, METERED RESPIRATORY (INHALATION)
COMMUNITY
Start: 2020-03-28 | End: 2020-08-12 | Stop reason: SDUPTHER

## 2020-04-13 RX ORDER — ALPRAZOLAM 1 MG/1
1 TABLET ORAL 4 TIMES DAILY PRN
COMMUNITY
Start: 2020-04-04 | End: 2020-09-12

## 2020-04-13 NOTE — TELEPHONE ENCOUNTER
Yes. If her cough medicine also has guaifenesin in it then no reason to double up on that.  She could hold on mucinex.  Take one or the other for mucolytic if the cough medicine has it in it too.  I do not know which cough medication she has.

## 2020-04-13 NOTE — TELEPHONE ENCOUNTER
FYI  Patient has not been on Mucinex. She has a cough syrup that she has been using. I told her you recommend Mucinex 1200MG twice a day. And I told her not to take the cough syrup if it has guaifenesin in it.

## 2020-04-14 ENCOUNTER — TELEMEDICINE (OUTPATIENT)
Dept: INTERNAL MEDICINE | Facility: CLINIC | Age: 70
End: 2020-04-14

## 2020-04-14 DIAGNOSIS — F41.1 GAD (GENERALIZED ANXIETY DISORDER): ICD-10-CM

## 2020-04-14 DIAGNOSIS — J44.9 STAGE 4 VERY SEVERE COPD BY GOLD CLASSIFICATION (HCC): Primary | ICD-10-CM

## 2020-04-14 DIAGNOSIS — R91.1 PULMONARY NODULE: ICD-10-CM

## 2020-04-14 PROCEDURE — 99214 OFFICE O/P EST MOD 30 MIN: CPT | Performed by: FAMILY MEDICINE

## 2020-04-14 NOTE — PROGRESS NOTES
CC: follow up    History:  Justyna Lei is a 69 y.o. female who presents today for follow-up for evaluation of the above:    Seen via video visit, spent 31 min with discussion of the following listed below    ROS:  Review of Systems   Constitutional: Activity change: social distancing.   Respiratory: Positive for shortness of breath.    Psychiatric/Behavioral: Positive for agitation and behavioral problems. The patient is nervous/anxious.        Ms. Lei  reports that she has been smoking cigarettes. She has a 1125.00 pack-year smoking history. She has never used smokeless tobacco. She reports that she does not drink alcohol or use drugs.      Current Outpatient Medications:   •  ALPRAZolam (XANAX) 0.5 MG tablet, Take 1 tablet by mouth 4 (Four) Times a Day As Needed for Anxiety., Disp: 12 tablet, Rfl: 0  •  ALPRAZolam (XANAX) 1 MG tablet, , Disp: , Rfl:   •  amphetamine-dextroamphetamine (ADDERALL) 20 MG tablet, Take 20 mg by mouth 2 (Two) Times a Day As Needed., Disp: , Rfl:   •  azelastine (ASTELIN) 0.1 % nasal spray, PLEASE SEE ATTACHED FOR DETAILED DIRECTIONS, Disp: 1 each, Rfl: 11  •  benzonatate (TESSALON) 200 MG capsule, Take 1 capsule by mouth 3 (Three) Times a Day As Needed for Cough., Disp: 42 capsule, Rfl: 2  •  benzonatate (TESSALON) 200 MG capsule, Take 1 capsule by mouth 3 (Three) Times a Day As Needed for Cough., Disp: 42 capsule, Rfl: 3  •  citalopram (CeleXA) 40 MG tablet, Take 40 mg by mouth Daily., Disp: , Rfl:   •  docusate sodium 100 MG capsule, Take 100 mg by mouth 2 (Two) Times a Day., Disp: 10 each, Rfl: 0  •  fluticasone (FLONASE) 50 MCG/ACT nasal spray, 2 SPRAYS INTO THE NOSTRIL(S) AS DIRECTED BY PROVIDER DAILY., Disp: 48 mL, Rfl: 3  •  folic acid (FOLVITE) 1 MG tablet, Take 1 tablet by mouth Daily., Disp: 30 tablet, Rfl: 0  •  gabapentin (NEURONTIN) 600 MG tablet, Take 600 mg by mouth Every 8 (Eight) Hours., Disp: , Rfl:   •  guaiFENesin (MUCINEX) 600 MG 12 hr tablet, Take 1 tablet by  mouth 2 (Two) Times a Day., Disp: 60 tablet, Rfl: 11  •  loratadine (CLARITIN) 10 MG tablet, Take 1 tablet by mouth Daily., Disp: 90 tablet, Rfl: 3  •  metoclopramide (REGLAN) 5 MG tablet, Take 1 tablet by mouth 3 (Three) Times a Day Before Meals., Disp: 90 tablet, Rfl: 5  •  montelukast (SINGULAIR) 10 MG tablet, TAKE 1 TABLET BY MOUTH EVERY DAY AT NIGHT, Disp: 90 tablet, Rfl: 3  •  mupirocin (BACTROBAN) 2 % ointment, Apply  topically to the appropriate area as directed Every 12 (Twelve) Hours., Disp: 30 g, Rfl: 0  •  Nicotine 21-14-7 MG/24HR kit, Place 1 kit on the skin as directed by provider Take As Directed., Disp: 1 each, Rfl: 0  •  omeprazole (priLOSEC) 40 MG capsule, Take 1 capsule by mouth Daily., Disp: 90 capsule, Rfl: 3  •  ondansetron (ZOFRAN) 4 MG tablet, Take 4 mg by mouth Every 6 (Six) Hours As Needed for Nausea or Vomiting., Disp: , Rfl:   •  ondansetron ODT (ZOFRAN-ODT) 4 MG disintegrating tablet, 4 mg Every 6 (Six) Hours As Needed., Disp: , Rfl:   •  OxyCODONE HCl ER (oxyCONTIN) 30 MG tablet extended-release 12 hour, Take 30 mg by mouth Every 12 (Twelve) Hours., Disp: , Rfl:   •  oxyCODONE-acetaminophen (PERCOCET)  MG per tablet, Take 1 tablet by mouth Every 6 (Six) Hours As Needed for Moderate Pain ., Disp: , Rfl:   •  OXYCONTIN 20 MG 12 hr tablet, 1 tablet 2 (Two) Times a Day As Needed., Disp: , Rfl:   •  tiZANidine (ZANAFLEX) 4 MG tablet, Take 4 mg by mouth 3 (Three) Times a Day., Disp: , Rfl:   •  traZODone (DESYREL) 50 MG tablet, Take 50 mg by mouth As Needed., Disp: , Rfl:   •  TRELEGY ELLIPTA 100-62.5-25 MCG/INH aerosol powder , 1 puff Daily., Disp: , Rfl:   •  VENTOLIN  (90 Base) MCG/ACT inhaler, INHALE 2 PUFFS EVERY 4HRS AS NEEDED FOR WHEEZING OR SHORTNESS OF AIR, Disp: , Rfl:       OBJECTIVE:  LMP  (LMP Unknown) Comment: hysterectomy   Physical Exam   Constitutional: She is oriented to person, place, and time. No distress.   HENT:   Head: Normocephalic and atraumatic.   Nose:  Nose normal.   Eyes: Conjunctivae are normal. Right eye exhibits no discharge. Left eye exhibits no discharge. No scleral icterus.   Neck: No tracheal deviation present.   Pulmonary/Chest: Effort normal.   Neurological: She is alert and oriented to person, place, and time.   Skin: She is not diaphoretic. No pallor.   Psychiatric: Her behavior is normal. Judgment and thought content normal.   Anxious, tearful at times       Assessment/Plan     Diagnosis Plan   1. Stage 4 very severe COPD by GOLD classification (CMS/HCC)     2. SOY (generalized anxiety disorder)     3. Pulmonary nodule     Despite her COPD being at a basic baseline for her and practicing social distancing, her anxiety is worsened in the setting of having an enlarging pulmonary nodule needing PET scan surveillance.  Her father had lung cancer, and she is fearful of malignancy.  She does have follow-up with psychiatry later on this week, she does not feel like she needs further counseling assistance at this time.  She will continue her current psychiatric and pulmonary medications at this point and follow through with PET surveillance    An After Visit Summary was printed and given to the patient at discharge.  Return in about 3 months (around 7/14/2020). Sooner if problems arise.         Corbin Nunez D.O.  Family Medicine  Osteopathic Neuromusculoskeletal Medicine

## 2020-04-16 ENCOUNTER — APPOINTMENT (OUTPATIENT)
Dept: CT IMAGING | Facility: HOSPITAL | Age: 70
End: 2020-04-16

## 2020-04-20 ENCOUNTER — HOSPITAL ENCOUNTER (OUTPATIENT)
Dept: CT IMAGING | Facility: HOSPITAL | Age: 70
End: 2020-04-20

## 2020-04-20 ENCOUNTER — APPOINTMENT (OUTPATIENT)
Dept: CT IMAGING | Facility: HOSPITAL | Age: 70
End: 2020-04-20

## 2020-05-05 ENCOUNTER — APPOINTMENT (OUTPATIENT)
Dept: CT IMAGING | Facility: HOSPITAL | Age: 70
End: 2020-05-05

## 2020-05-05 ENCOUNTER — TELEPHONE (OUTPATIENT)
Dept: PULMONOLOGY | Facility: CLINIC | Age: 70
End: 2020-05-05

## 2020-05-05 DIAGNOSIS — B37.0 ORAL CANDIDA: Primary | ICD-10-CM

## 2020-05-05 NOTE — TELEPHONE ENCOUNTER
Patient left a voicemail that she is wanting a script for thrush. She said she has it on the roof of her mouth and the back of her throat.  She wants it sent to [facility].

## 2020-05-11 ENCOUNTER — HOSPITAL ENCOUNTER (OUTPATIENT)
Dept: CT IMAGING | Facility: HOSPITAL | Age: 70
Discharge: HOME | End: 2020-05-11
Admitting: NURSE PRACTITIONER

## 2020-05-11 ENCOUNTER — HOSPITAL ENCOUNTER (OUTPATIENT)
Dept: CT IMAGING | Facility: HOSPITAL | Age: 70
Discharge: HOME | End: 2020-05-11

## 2020-05-11 DIAGNOSIS — R91.1 LUNG NODULE: ICD-10-CM

## 2020-05-11 PROCEDURE — A9552 F18 FDG: HCPCS | Performed by: NURSE PRACTITIONER

## 2020-05-11 PROCEDURE — 0 FLUDEOXYGLUCOSE F18 SOLUTION: Performed by: NURSE PRACTITIONER

## 2020-05-11 PROCEDURE — 78815 PET IMAGE W/CT SKULL-THIGH: CPT

## 2020-05-11 RX ADMIN — FLUDEOXYGLUCOSE F18 1 DOSE: 300 INJECTION INTRAVENOUS at 11:22

## 2020-05-12 ENCOUNTER — OFFICE VISIT (OUTPATIENT)
Dept: PULMONOLOGY | Facility: CLINIC | Age: 70
End: 2020-05-12

## 2020-05-12 ENCOUNTER — TELEPHONE (OUTPATIENT)
Dept: PULMONOLOGY | Facility: CLINIC | Age: 70
End: 2020-05-12

## 2020-05-12 VITALS — SYSTOLIC BLOOD PRESSURE: 106 MMHG | DIASTOLIC BLOOD PRESSURE: 60 MMHG | HEART RATE: 86 BPM | OXYGEN SATURATION: 95 %

## 2020-05-12 DIAGNOSIS — Z14.8 ALPHA-1-ANTITRYPSIN DEFICIENCY CARRIER: ICD-10-CM

## 2020-05-12 DIAGNOSIS — J43.9 PULMONARY EMPHYSEMA, UNSPECIFIED EMPHYSEMA TYPE (HCC): ICD-10-CM

## 2020-05-12 DIAGNOSIS — Z87.891 PERSONAL HISTORY OF NICOTINE DEPENDENCE: ICD-10-CM

## 2020-05-12 DIAGNOSIS — R91.1 LUNG NODULE: ICD-10-CM

## 2020-05-12 DIAGNOSIS — J44.9 STAGE 4 VERY SEVERE COPD BY GOLD CLASSIFICATION (HCC): Primary | ICD-10-CM

## 2020-05-12 DIAGNOSIS — F17.210 CIGARETTE NICOTINE DEPENDENCE WITHOUT COMPLICATION: ICD-10-CM

## 2020-05-12 PROCEDURE — 99442 PR PHYS/QHP TELEPHONE EVALUATION 11-20 MIN: CPT | Performed by: NURSE PRACTITIONER

## 2020-05-12 RX ORDER — FLUOXETINE HYDROCHLORIDE 20 MG/1
CAPSULE ORAL
COMMUNITY
Start: 2020-05-11 | End: 2020-11-23 | Stop reason: ALTCHOICE

## 2020-05-12 NOTE — PATIENT INSTRUCTIONS
Chronic Obstructive Pulmonary Disease  Chronic obstructive pulmonary disease (COPD) is a long-term (chronic) lung problem. When you have COPD, it is hard for air to get in and out of your lungs. Usually the condition gets worse over time, and your lungs will never return to normal. There are things you can do to keep yourself as healthy as possible.  · Your doctor may treat your condition with:  ? Medicines.  ? Oxygen.  ? Lung surgery.  · Your doctor may also recommend:  ? Rehabilitation. This includes steps to make your body work better. It may involve a team of specialists.  ? Quitting smoking, if you smoke.  ? Exercise and changes to your diet.  ? Comfort measures (palliative care).  Follow these instructions at home:  Medicines  · Take over-the-counter and prescription medicines only as told by your doctor.  · Talk to your doctor before taking any cough or allergy medicines. You may need to avoid medicines that cause your lungs to be dry.  Lifestyle  · If you smoke, stop. Smoking makes the problem worse. If you need help quitting, ask your doctor.  · Avoid being around things that make your breathing worse. This may include smoke, chemicals, and fumes.  · Stay active, but remember to rest as well.  · Learn and use tips on how to relax.  · Make sure you get enough sleep. Most adults need at least 7 hours of sleep every night.  · Eat healthy foods. Eat smaller meals more often. Rest before meals.  Controlled breathing  Learn and use tips on how to control your breathing as told by your doctor. Try:  · Breathing in (inhaling) through your nose for 1 second. Then, pucker your lips and breath out (exhale) through your lips for 2 seconds.  · Putting one hand on your belly (abdomen). Breathe in slowly through your nose for 1 second. Your hand on your belly should move out. Pucker your lips and breathe out slowly through your lips. Your hand on your belly should move in as you breathe out.    Controlled coughing  Learn  and use controlled coughing to clear mucus from your lungs. Follow these steps:  1. Lean your head a little forward.  2. Breathe in deeply.  3. Try to hold your breath for 3 seconds.  4. Keep your mouth slightly open while coughing 2 times.  5. Spit any mucus out into a tissue.  6. Rest and do the steps again 1 or 2 times as needed.  General instructions  · Make sure you get all the shots (vaccines) that your doctor recommends. Ask your doctor about a flu shot and a pneumonia shot.  · Use oxygen therapy and pulmonary rehabilitation if told by your doctor. If you need home oxygen therapy, ask your doctor if you should buy a tool to measure your oxygen level (oximeter).  · Make a COPD action plan with your doctor. This helps you to know what to do if you feel worse than usual.  · Manage any other conditions you have as told by your doctor.  · Avoid going outside when it is very hot, cold, or humid.  · Avoid people who have a sickness you can catch (contagious).  · Keep all follow-up visits as told by your doctor. This is important.  Contact a doctor if:  · You cough up more mucus than usual.  · There is a change in the color or thickness of the mucus.  · It is harder to breathe than usual.  · Your breathing is faster than usual.  · You have trouble sleeping.  · You need to use your medicines more often than usual.  · You have trouble doing your normal activities such as getting dressed or walking around the house.  Get help right away if:  · You have shortness of breath while resting.  · You have shortness of breath that stops you from:  ? Being able to talk.  ? Doing normal activities.  · Your chest hurts for longer than 5 minutes.  · Your skin color is more blue than usual.  · Your pulse oximeter shows that you have low oxygen for longer than 5 minutes.  · You have a fever.  · You feel too tired to breathe normally.  Summary  · Chronic obstructive pulmonary disease (COPD) is a long-term lung problem.  · The way your  lungs work will never return to normal. Usually the condition gets worse over time. There are things you can do to keep yourself as healthy as possible.  · Take over-the-counter and prescription medicines only as told by your doctor.  · If you smoke, stop. Smoking makes the problem worse.  This information is not intended to replace advice given to you by your health care provider. Make sure you discuss any questions you have with your health care provider.  Document Released: 06/05/2009 Document Revised: 01/22/2018 Document Reviewed: 01/22/2018  [employer] Interactive Patient Education © 2020 [employer]      Smoking Tobacco Information, Adult  Smoking tobacco can be harmful to your health. Tobacco contains a poisonous (toxic), colorless chemical called nicotine. Nicotine is addictive. It changes the brain and can make it hard to stop smoking. Tobacco also has other toxic chemicals that can hurt your body and raise your risk of many cancers.  How can smoking tobacco affect me?  Smoking tobacco puts you at risk for:  · Cancer. Smoking is most commonly associated with lung cancer, but can also lead to cancer in other parts of the body.  · Chronic obstructive pulmonary disease (COPD). This is a long-term lung condition that makes it hard to breathe. It also gets worse over time.  · High blood pressure (hypertension), heart disease, stroke, or heart attack.  · Lung infections, such as pneumonia.  · Cataracts. This is when the lenses in the eyes become clouded.  · Digestive problems. This may include peptic ulcers, heartburn, and gastroesophageal reflux disease (GERD).  · Oral health problems, such as gum disease and tooth loss.  · Loss of taste and smell.  Smoking can affect your appearance by causing:  · Wrinkles.  · Yellow or stained teeth, fingers, and fingernails.  Smoking tobacco can also affect your social life, because:  · It may be challenging to find places to smoke when away from home. Many workplaces,  restaurants, hotels, and public places are tobacco-free.  · Smoking is expensive. This is due to the cost of tobacco and the long-term costs of treating health problems from smoking.  · Secondhand smoke may affect those around you. Secondhand smoke can cause lung cancer, breathing problems, and heart disease. Children of smokers have a higher risk for:  ? Sudden infant death syndrome (SIDS).  ? Ear infections.  ? Lung infections.  If you currently smoke tobacco, quitting now can help you:  · Lead a longer and healthier life.  · Look, smell, breathe, and feel better over time.  · Save money.  · Protect others from the harms of secondhand smoke.  What actions can I take to prevent health problems?  Quit smoking    · Do not start smoking. Quit if you already do.  · Make a plan to quit smoking and commit to it. Look for programs to help you and ask your health care provider for recommendations and ideas.  · Set a date and write down all the reasons you want to quit.  · Let your friends and family know you are quitting so they can help and support you. Consider finding friends who also want to quit. It can be easier to quit with someone else, so that you can support each other.  · Talk with your health care provider about using nicotine replacement medicines to help you quit, such as gum, lozenges, patches, sprays, or pills.  · Do not replace cigarette smoking with electronic cigarettes, which are commonly called e-cigarettes. The safety of e-cigarettes is not known, and some may contain harmful chemicals.  · If you try to quit but return to smoking, stay positive. It is common to slip up when you first quit, so take it one day at a time.  · Be prepared for cravings. When you feel the urge to smoke, chew gum or suck on hard candy.  Lifestyle  · Stay busy and take care of your body.  · Drink enough fluid to keep your urine pale yellow.  · Get plenty of exercise and eat a healthy diet. This can help prevent weight gain  after quitting.  · Monitor your eating habits. Quitting smoking can cause you to have a larger appetite than when you smoke.  · Find ways to relax. Go out with friends or family to a movie or a restaurant where people do not smoke.  · Ask your health care provider about having regular tests (screenings) to check for cancer. This may include blood tests, imaging tests, and other tests.  · Find ways to manage your stress, such as meditation, yoga, or exercise.  Where to find support  To get support to quit smoking, consider:  · Asking your health care provider for more information and resources.  · Taking classes to learn more about quitting smoking.  · Looking for local organizations that offer resources about quitting smoking.  · Joining a support group for people who want to quit smoking in your local community.  · Calling the [employer] counselor helpline: -033-Quit-Now (1-224.625.2275)  Where to find more information  You may find more information about quitting smoking from:  · HelpGuide.org: www.helpguide.org  · Smokefree.gov: smokefree.gov  · American Lung Association: www.lung.org  Contact a health care provider if you:  · Have problems breathing.  · Notice that your lips, nose, or fingers turn blue.  · Have chest pain.  · Are coughing up blood.  · Feel faint or you pass out.  · Have other health changes that cause you to worry.  Summary  · Smoking tobacco can negatively affect your health, the health of those around you, your finances, and your social life.  · Do not start smoking. Quit if you already do. If you need help quitting, ask your health care provider.  · Think about joining a support group for people who want to quit smoking in your local community. There are many effective programs that will help you to quit this behavior.  This information is not intended to replace advice given to you by your health care provider. Make sure you discuss any questions you have with your health care  provider.  Document Released: 01/02/2018 Document Revised: 02/06/2019 Document Reviewed: 01/02/2018  [employer] Interactive Patient Education © 2020 [employer]

## 2020-05-12 NOTE — PROGRESS NOTES
THIERNO Arthur  [facility]   Respiratory [place] [address]  Phone: [phone]  Fax: [phone]     Justyna Lei is a 69 y.o. female.   : 3/25/1951  CC:   Chief Complaint   Patient presents with   • Lung Nodule     HPI: This visit is conducted today via telephone 2' covid-19 epidemic.  This is a pleasant 69 y.o. female.  The patient has known very severe COPD, alpha-1-SS, current everyday smoker, chronic respiratory failure with hypoxemia, lung nodule, and emphysema.  The patient currently utilizes Trelegy, albuterol HFA, Astelin, Flonase, Singulair, mucinex, and chronic oxygen therapy.  The patient finally completed her PET scan on May 11, 2020.  Unfortunately her PET is positive.  Discussed the patient again with Dr. Valdes.  We do not feel that the patient is a candidate for invasive procedures or surgical work-up secondary to her very severe COPD and chronic respiratory failure with hypoxemia.  The patient is agreeable to radiation oncology referral.  Referral will be made.  The patient is understandably very upset regarding the results of her PET scan.  The patient's PCP is Corbin Nunez DO.    The following portions of the patient's history were reviewed and updated as appropriate: allergies, current medications, past family history, past medical history, past social history, past surgical history and problem list.  Past Medical History:   Diagnosis Date   • Anxiety    • Anxiety     pain clinic patient Dr Sunshine x 5yrs   • Arthritis    • Chronic pain due to injury     jarad LE & back   • COPD (chronic obstructive pulmonary disease) (CMS/[facility])    • Depression    • GERD (gastroesophageal reflux disease)    • Hypertension    • Neuropathy    • Polypharmacy    • Smoker      Family History   Problem Relation Age of Onset   • Suicidality Mother    • Alcohol abuse Mother    • Cancer Father    • Heart disease Father    • Heart disease Maternal  Grandmother      Social History     Socioeconomic History   • Marital status:      Spouse name: Not on file   • Number of children: Not on file   • Years of education: Not on file   • Highest education level: Not on file   Tobacco Use   • Smoking status: Current Some Day Smoker     Packs/day: 25.00     Years: 45.00     Pack years: 1125.00     Types: Cigarettes   • Smokeless tobacco: Never Used   • Tobacco comment: Smokes 2-3 ciragettes per day. Former 2 pack a day smoker for 50 years.   Substance and Sexual Activity   • Alcohol use: No   • Drug use: No   • Sexual activity: Defer     Review of Systems   Constitutional: Negative for chills and fever.   HENT: Negative for congestion.    Eyes: Negative for blurred vision.   Respiratory: Positive for shortness of breath (with exertion ). Negative for cough.    Cardiovascular: Negative for chest pain.   Gastrointestinal: Negative for diarrhea, nausea and vomiting.   Endocrine: Negative for cold intolerance and heat intolerance.   Genitourinary: Negative for dysuria.   Musculoskeletal: Negative for arthralgias.   Skin: Negative for rash.   Neurological: Negative for dizziness, weakness and light-headedness.   Hematological: Does not bruise/bleed easily.   Psychiatric/Behavioral: Negative for agitation. The patient is not nervous/anxious.      /60   Pulse 86   LMP  (LMP Unknown) Comment: hysterectomy  SpO2 95% Comment: RA per patient   Unable to perform physical exam 2' telephone visit.      Pulmonary Functions Testing Results:  PFT Values        Some values may be hidden. Unless noted otherwise, only the newest values recorded on each date are displayed.         Old Values PFT Results 8/19/19   FVC 55%   FEV1 25%   FEV1/FVC 35.22%   DLCO 27%      Pre Drug PFT Results 8/19/19   No data to display.      Post Drug PFT Results 8/19/19   No data to display.      Other Tests PFT Results 8/19/19   No data to display.              Imaging:   PET CT 5/11/2020 11:11  AM CDT     Reason for study: solitary pulmonary nodule; R91.1-Solitary pulmonary  nodule     Comparison: CT scan dated 03/13/2020      Radiopharmaceutical: 11.18 mCi F-18 FDG intravenously.      Technique: Prior to injection of the radiotracer, the patient's blood  glucose is 147 mg/dL. Following injection and a 60 minute distribution  interval, PET scan is performed from the skull base to the midthigh. Low  dose, noncontrast CT is performed in the same anatomic distribution for  attenuation correction of the PET scan and to assist in localizing the  PET findings.       FINDINGS:      HEAD AND NECK: The visualized brain demonstrates no definite focal  abnormal FDG activity. Salivary, tonsillar and laryngeal activity  appears ordinary. Focal radiotracer uptake along the right maxillary  alveolar ridge (SUV max 6.2). No hypermetabolic cervical or  supraclavicular lymphadenopathy is demonstrated.      CHEST: There is a hypermetabolic nodule identified within a previously  described left apical juxtapleural consolidation. This nodule displays  an SUV max of 11.9. No other hypermetabolic pulmonary nodules. No  hypermetabolic hilar or mediastinal adenopathy. Physiologic radiotracer  uptake in the myocardium.     ABDOMEN AND PELVIS: Normal physiologic activity is noted in the liver,  spleen, stomach and bowel. No adrenal hypermetabolism is noted. Urinary  activity is noted in the kidneys and bladder. No FDG-avid  lymphadenopathy is appreciated.      MUSCULOSKELETAL: No FDG-avid osseous metastases are demonstrated.      IMPRESSION:  1. Hypermetabolic left apical juxtapleural nodule displaying an SUV max 11.9, concerning for primary lung malignancy. No other hypermetabolic  pulmonary nodules.  2. No hypermetabolic hilar/mediastinal iris metastases.  3. Suspicious radiotracer uptake along the right maxillary alveolar  ridge, SUV max 6.2. I would recommend direct visualization of the  alveolar ridge to help exclude oral  squamous carcinoma. No suspicious  cervical adenopathy.     This report was finalized on 05/11/2020 13:43 by Dr Te Jensen, .    Assessment and Plan:   Justyna was seen today for lung nodule.    Diagnoses and all orders for this visit:    Stage 4 very severe COPD by GOLD classification (CMS/HCC)  Continue Trelegy and albuterol rescue inhaler.  Pulmonary emphysema, unspecified emphysema type (CMS/HCC)  Continue current treatment regimen.  Alpha-1-antitrypsin deficiency carrier  phenotype SS  The patient does carry a mild form of the alpha-1 antitrypsin gene.  She was given educational material today from the office.  She was also strongly encouraged to stop smoking.  Personal history of nicotine dependence  Current everyday smoker.  Lung nodule  -     Ambulatory Referral to Radiation Oncology-[place]  A PET scan was ordered 3/16/20 and completed on 5/11/20 secondary to an enlarging 11 mm right lower lobe pulmonary nodule per CT chest 3/13/20.  The PET actually shows a hypermetabolic left apical juxtapleural nodule displaying an SUV max 11.9, concerning for primary lung malignancy.  Discussed results with Dr. Valdes.  We do not feel that the patient is a candidate for any invasive work-up given her very severe COPD and chronic respiratory failure.  Recommend referral to radiation oncology for further evaluation and treatment.  The patient is agreeable to this plan and referral will be made.  Cigarette nicotine dependence without complication  Recommend smoking cessation    Health maintenance:   Influenza vaccine: no   Pneumovax 23: no   Prevnar 13: no  BMI: unable to obtain   Follow up: 3 months with [facility]     This visit has been rescheduled as a phone visit to comply with patient safety concerns in accordance with CDC recommendations. Total time of discussion was 15 minutes.    Keisha Simon, APRN  5/12/2020  15:24    Please note that portions of this note were completed with a voice recognition program.

## 2020-05-14 ENCOUNTER — TELEPHONE (OUTPATIENT)
Dept: INTERNAL MEDICINE | Facility: CLINIC | Age: 70
End: 2020-05-14

## 2020-05-17 PROBLEM — F17.200 CURRENT EVERY DAY SMOKER: Status: ACTIVE | Noted: 2020-05-17

## 2020-05-18 ENCOUNTER — TELEPHONE (OUTPATIENT)
Dept: RADIATION ONCOLOGY | Facility: HOSPITAL | Age: 70
End: 2020-05-18

## 2020-05-18 NOTE — TELEPHONE ENCOUNTER
Patient was scheduled to see Dr. Dave Mejia today however she called to reschedule due to having diarrhea. Patient is now scheduled 5/21 at 1:00pm

## 2020-05-19 ENCOUNTER — HOSPITAL ENCOUNTER (OUTPATIENT)
Dept: RADIATION ONCOLOGY | Facility: HOSPITAL | Age: 70
Setting detail: RADIATION/ONCOLOGY SERIES
End: 2020-05-19

## 2020-05-20 ENCOUNTER — HOSPITAL ENCOUNTER (OUTPATIENT)
Dept: RADIATION ONCOLOGY | Facility: HOSPITAL | Age: 70
Setting detail: RADIATION/ONCOLOGY SERIES
Discharge: HOME | End: 2020-05-20

## 2020-05-20 ENCOUNTER — CONSULT (OUTPATIENT)
Dept: RADIATION ONCOLOGY | Facility: HOSPITAL | Age: 70
End: 2020-05-20

## 2020-05-20 VITALS
BODY MASS INDEX: 21.34 KG/M2 | WEIGHT: 125 LBS | HEIGHT: 64 IN | DIASTOLIC BLOOD PRESSURE: 65 MMHG | SYSTOLIC BLOOD PRESSURE: 121 MMHG | OXYGEN SATURATION: 100 %

## 2020-05-20 DIAGNOSIS — Z14.8 ALPHA-1-ANTITRYPSIN DEFICIENCY CARRIER: ICD-10-CM

## 2020-05-20 DIAGNOSIS — F17.200 CURRENT EVERY DAY SMOKER: ICD-10-CM

## 2020-05-20 DIAGNOSIS — C34.11 MALIGNANT NEOPLASM OF UPPER LOBE OF RIGHT LUNG (HCC): Primary | ICD-10-CM

## 2020-05-20 DIAGNOSIS — J44.9 STAGE 4 VERY SEVERE COPD BY GOLD CLASSIFICATION (HCC): ICD-10-CM

## 2020-05-21 PROBLEM — C34.11 MALIGNANT NEOPLASM OF UPPER LOBE OF RIGHT LUNG: Status: ACTIVE | Noted: 2018-12-04

## 2020-05-22 ENCOUNTER — TELEPHONE (OUTPATIENT)
Dept: RADIATION ONCOLOGY | Facility: HOSPITAL | Age: 70
End: 2020-05-22

## 2020-05-22 NOTE — TELEPHONE ENCOUNTER
DANG consultation requested due to high distress score. Ms. Lei is a 70 year old female who plans to be treated for lung cancer. She has a strong support system which includes her two daughters, her son, grandchildren, and her Catholic. She currently lives with her daughter and grandson. Her family plans to bring her for treatments. Ms. Lei states she had been extremely worried and nervous about her cancer diagnosis. She said she is now more relaxed due to the doctor answering all her questions and she is confident with the treatment she will receive. She has had several losses throughout her lifetime. Her mother committed suicide when she was 11 years old and her first  committed suicide when she was 25 years old. Her second  of 30 years passed away four years ago. She said after he passed it was hard to motivate herself to keep going. She is thankful for her family and say they give her strength. She has been seeing a psychiatrist, Dr. Muñoz, for quite a few years. She has always been an independent person and has worked two jobs, therefore it has been an adjustment for her ask for help. Emotional support provided and discussed effective strategies to reduce stress. Resources offered and she declined. DANG provided Ms. Lei with contact information and she will contact if additional assistance is needed.

## 2020-05-26 ENCOUNTER — TELEPHONE (OUTPATIENT)
Dept: RADIATION ONCOLOGY | Facility: HOSPITAL | Age: 70
End: 2020-05-26

## 2020-06-01 ENCOUNTER — HOSPITAL ENCOUNTER (OUTPATIENT)
Dept: RADIATION ONCOLOGY | Facility: HOSPITAL | Age: 70
Setting detail: RADIATION/ONCOLOGY SERIES
End: 2020-06-01

## 2020-06-03 ENCOUNTER — HOSPITAL ENCOUNTER (OUTPATIENT)
Dept: RADIATION ONCOLOGY | Facility: HOSPITAL | Age: 70
Setting detail: RADIATION/ONCOLOGY SERIES
Discharge: HOME | End: 2020-06-03

## 2020-06-03 PROCEDURE — 77334 RADIATION TREATMENT AID(S): CPT | Performed by: RADIOLOGY

## 2020-06-04 ENCOUNTER — TELEPHONE (OUTPATIENT)
Dept: PULMONOLOGY | Facility: CLINIC | Age: 70
End: 2020-06-04

## 2020-06-04 DIAGNOSIS — B37.0 ORAL CANDIDA: Primary | ICD-10-CM

## 2020-06-04 DIAGNOSIS — R05.9 COUGH: ICD-10-CM

## 2020-06-04 NOTE — TELEPHONE ENCOUNTER
"Patient thinks she is having a problem with Trelegy. She said her tongue was very \"raw\" and felt \"swollen\". She said she does have white patches in her throat and it is sore. She said she does rinse her mouth after using the Trelegy.  She was also requesting a prescription for liquid Mucinex to be sent also to help save her some money on prescriptions.  Patient will check with the pharmacy tomorrow around lunch for her prescriptions.  "

## 2020-06-05 PROCEDURE — 77301 RADIOTHERAPY DOSE PLAN IMRT: CPT | Performed by: RADIOLOGY

## 2020-06-05 PROCEDURE — 77338 DESIGN MLC DEVICE FOR IMRT: CPT | Performed by: RADIOLOGY

## 2020-06-05 PROCEDURE — 77300 RADIATION THERAPY DOSE PLAN: CPT | Performed by: RADIOLOGY

## 2020-06-05 PROCEDURE — 77293 RESPIRATOR MOTION MGMT SIMUL: CPT | Performed by: RADIOLOGY

## 2020-06-05 RX ORDER — GUAIFENESIN 200 MG/10ML
200 LIQUID ORAL 4 TIMES DAILY PRN
Qty: 1000 ML | Refills: 11 | Status: SHIPPED | OUTPATIENT
Start: 2020-06-05 | End: 2020-07-05

## 2020-06-05 NOTE — TELEPHONE ENCOUNTER
She needs to ensure she is rinsing her mouth really well after using steroid containing inhalers.  She also needs to ensure she is cleaning her dentures really well if she wears those.  I will send in nystatin again.  She needs to use the nystatin swish and swallow for at least 10 days and up to 3 days after her symptoms are gone. Did she use the nystatin I had prescribed on 5/7??? If she continues to have thrush after this treatment then she again needs to contact her PCP.  I can try to send liquid mucolytic.

## 2020-06-15 ENCOUNTER — TELEPHONE (OUTPATIENT)
Dept: INTERNAL MEDICINE | Facility: CLINIC | Age: 70
End: 2020-06-15

## 2020-06-23 ENCOUNTER — HOSPITAL ENCOUNTER (OUTPATIENT)
Dept: RADIATION ONCOLOGY | Facility: HOSPITAL | Age: 70
Setting detail: RADIATION/ONCOLOGY SERIES
Discharge: HOME | End: 2020-06-23

## 2020-06-23 PROCEDURE — 77373 STRTCTC BDY RAD THER TX DLVR: CPT | Performed by: RADIOLOGY

## 2020-06-25 ENCOUNTER — HOSPITAL ENCOUNTER (OUTPATIENT)
Dept: RADIATION ONCOLOGY | Facility: HOSPITAL | Age: 70
Setting detail: RADIATION/ONCOLOGY SERIES
Discharge: HOME | End: 2020-06-25

## 2020-06-25 PROCEDURE — 77373 STRTCTC BDY RAD THER TX DLVR: CPT | Performed by: RADIOLOGY

## 2020-06-29 ENCOUNTER — HOSPITAL ENCOUNTER (OUTPATIENT)
Dept: RADIATION ONCOLOGY | Facility: HOSPITAL | Age: 70
Setting detail: RADIATION/ONCOLOGY SERIES
Discharge: HOME | End: 2020-06-29

## 2020-06-29 PROCEDURE — 77373 STRTCTC BDY RAD THER TX DLVR: CPT | Performed by: RADIOLOGY

## 2020-07-01 ENCOUNTER — HOSPITAL ENCOUNTER (OUTPATIENT)
Dept: RADIATION ONCOLOGY | Facility: HOSPITAL | Age: 70
Setting detail: RADIATION/ONCOLOGY SERIES
End: 2020-07-01

## 2020-07-01 ENCOUNTER — HOSPITAL ENCOUNTER (OUTPATIENT)
Dept: RADIATION ONCOLOGY | Facility: HOSPITAL | Age: 70
Setting detail: RADIATION/ONCOLOGY SERIES
Discharge: HOME | End: 2020-07-01

## 2020-07-01 PROCEDURE — 77373 STRTCTC BDY RAD THER TX DLVR: CPT | Performed by: RADIOLOGY

## 2020-07-01 PROCEDURE — 77336 RADIATION PHYSICS CONSULT: CPT | Performed by: RADIOLOGY

## 2020-07-06 ENCOUNTER — TELEPHONE (OUTPATIENT)
Dept: PULMONOLOGY | Facility: CLINIC | Age: 70
End: 2020-07-06

## 2020-07-06 ENCOUNTER — HOSPITAL ENCOUNTER (OUTPATIENT)
Dept: RADIATION ONCOLOGY | Facility: HOSPITAL | Age: 70
Setting detail: RADIATION/ONCOLOGY SERIES
Discharge: HOME | End: 2020-07-06

## 2020-07-06 PROCEDURE — 77373 STRTCTC BDY RAD THER TX DLVR: CPT | Performed by: RADIOLOGY

## 2020-07-06 NOTE — TELEPHONE ENCOUNTER
"Pt called stating \"I'm running out of breath 3-4 hours before it's time to take the trelegy again. I'm having to take my rescue inhaler more often as well\".  Pt is wondering if she should go back on her symbicort from before.   "

## 2020-07-13 NOTE — TELEPHONE ENCOUNTER
That is completely her choice.  When she was on symbicort she stated she had problems with it as well.  If she chooses to go back on symbicort then I would also recommend spriva with symbicort.  She would have to stop trelegy.

## 2020-07-14 DIAGNOSIS — F17.200 CURRENT EVERY DAY SMOKER: ICD-10-CM

## 2020-07-14 DIAGNOSIS — Z92.3 STATUS POST STEREOTACTIC RADIOSURGERY: ICD-10-CM

## 2020-07-14 DIAGNOSIS — C34.11 MALIGNANT NEOPLASM OF UPPER LOBE OF RIGHT LUNG (HCC): Primary | ICD-10-CM

## 2020-07-14 DIAGNOSIS — J44.9 STAGE 4 VERY SEVERE COPD BY GOLD CLASSIFICATION (HCC): ICD-10-CM

## 2020-07-20 NOTE — TELEPHONE ENCOUNTER
I spoke with pt.  She will continue the trelegy and will discuss her options on her 8/11/20 appt.

## 2020-08-07 ENCOUNTER — TELEPHONE (OUTPATIENT)
Dept: FAMILY MEDICINE CLINIC | Facility: CLINIC | Age: 70
End: 2020-08-07

## 2020-08-07 ENCOUNTER — TELEPHONE (OUTPATIENT)
Dept: PULMONOLOGY | Facility: CLINIC | Age: 70
End: 2020-08-07

## 2020-08-07 DIAGNOSIS — B37.0 THRUSH: Primary | ICD-10-CM

## 2020-08-07 DIAGNOSIS — R05.9 COUGH: ICD-10-CM

## 2020-08-07 DIAGNOSIS — H53.9 VISION CHANGES: ICD-10-CM

## 2020-08-07 DIAGNOSIS — J44.9 STAGE 4 VERY SEVERE COPD BY GOLD CLASSIFICATION (HCC): ICD-10-CM

## 2020-08-07 RX ORDER — FLUCONAZOLE 150 MG/1
150 TABLET ORAL ONCE
Qty: 1 TABLET | Refills: 0 | Status: SHIPPED | OUTPATIENT
Start: 2020-08-07 | End: 2020-08-07

## 2020-08-07 RX ORDER — BENZONATATE 200 MG/1
200 CAPSULE ORAL 3 TIMES DAILY PRN
Qty: 30 CAPSULE | Refills: 0 | Status: SHIPPED | OUTPATIENT
Start: 2020-08-07 | End: 2020-11-23

## 2020-08-07 NOTE — TELEPHONE ENCOUNTER
Patient called stating that she wasn't able to make her appointment on Tuesday. She will isn't feeling well. She is falling a lot with trouble walking. She is also having trouble breathing. She is coughing a lot more. She feels it is from the Trelegy that she has been on less that a year. She said she read the side effects and having those that were listed.   She is also having thrush issues. She has some medicine to treat that right now from the last time that she used it.  She will call back to reschedule appointment when she is better.  I wasn't sure what to tell her on the Trelegy problem.

## 2020-08-07 NOTE — TELEPHONE ENCOUNTER
PATIENT STATES SHE HAS A VERY BAD COUGH AND AND CANT SWALLOW ANYTHING. PATIENT IS WANTING TO KNOW IF COUGH SYRUP CAN BE CALLED IN FOR HER.

## 2020-08-07 NOTE — TELEPHONE ENCOUNTER
Patient requesting medication for worsening cough, thinks this is from Trelegy, having multiple side effects per patient

## 2020-08-10 ENCOUNTER — NURSE TRIAGE (OUTPATIENT)
Dept: CALL CENTER | Facility: HOSPITAL | Age: 70
End: 2020-08-10

## 2020-08-10 NOTE — TELEPHONE ENCOUNTER
Please call pt and see if cough improved following last week's treatment and if she is still interested in switching inhaler therapy

## 2020-08-10 NOTE — TELEPHONE ENCOUNTER
Patient stated that she is still with cough, and thrush.  Wanting to change inhaler.  States that she is also having other side effects from inhaler, such as vision changes.

## 2020-08-10 NOTE — TELEPHONE ENCOUNTER
"    Reason for Disposition  • [1] Follow-up call from patient regarding patient's clinical status AND [2] information NON-URGENT    Additional Information  • Negative: Lab calling with strep throat test results and triager can call in prescription  • Negative: Lab calling with urinalysis test results and triager can call in prescription  • Negative: Medication questions  • Negative: ED call to PCP  • Negative: Physician call to PCP  • Negative: Call about patient who is currently hospitalized  • Negative: Lab or radiology calling with CRITICAL test results  • Negative: [1] Prescription not at pharmacy AND [2] was prescribed today by PCP  • Negative: [1] Follow-up call from patient regarding patient's clinical status AND [2] information urgent  • Negative: [1] Caller requests to speak ONLY to PCP AND [2] URGENT question  • Negative: [1] Caller requests to speak to PCP now AND [2] won't tell us reason for call  (Exception: if 10 pm to 6 am, caller must first discuss reason for the call)  • Negative: Notification of hospital admission  • Negative: Notification of death  • Negative: Caller requesting lab results  • Negative: Lab or radiology calling with test results    Answer Assessment - Initial Assessment Questions  1. REASON FOR CALL or QUESTION: \"What is your reason for calling today?\" or \"How can I best  help you?\" or \"What question do you have that I can help answer?\"      Missed a call from office  2. CALLER: Document the source of call. (e.g., laboratory, patient).      Dr. Nunez ordered her new inhalers 3 days ago, she thinks maybe they were calling to check on her.    Protocols used: PCP CALL - NO TRIAGE-ADULT-      "

## 2020-08-10 NOTE — TELEPHONE ENCOUNTER
Is she coughing up sputum? If so, is it colored? Is she running fever.  She needs to follow up with her PCP regarding the frequent falls.  It is her decision whether or not to stop trelegy.  If she stops trelegy then she will need to start symbicort/spiriva combo or something similar to that for triple therapy for her lung disease.  She needs to ensure she is rinsing her mouth out well after using any inhalers.

## 2020-08-11 NOTE — TELEPHONE ENCOUNTER
Attempted to reach patient, both today and yesterday (please see note from 8/10/20 for further details.)

## 2020-08-11 NOTE — TELEPHONE ENCOUNTER
She said she isn't coughing anything up and no fever.  I told patient that she needed to get with her PCP regarding her falls. She said she has and they haven't got back with her. She doesn't want you to call anything else in to replace the Trelegy. She said she would take care of it.

## 2020-08-12 NOTE — TELEPHONE ENCOUNTER
"Patient wanted to let you know that she has picked up the Stiloto and Qvar inhalers and this has already made a difference in her breathing.  Patient states that she is no longer \"breathing heavily\", feels as though she can take a deep breath again.  Also, wanted to thank Dr. Nunez for changing the inhalers.      Would like refill on rescue inhaler.   "

## 2020-08-13 RX ORDER — ALBUTEROL SULFATE 90 UG/1
AEROSOL, METERED RESPIRATORY (INHALATION)
Qty: 1 G | Refills: 11 | Status: SHIPPED | OUTPATIENT
Start: 2020-08-13 | End: 2020-09-12

## 2020-08-13 RX ORDER — ALBUTEROL SULFATE 90 UG/1
2 AEROSOL, METERED RESPIRATORY (INHALATION) EVERY 4 HOURS PRN
Qty: 18 G | Refills: 11 | Status: SHIPPED | OUTPATIENT
Start: 2020-08-13 | End: 2021-06-03 | Stop reason: SDUPTHER

## 2020-09-09 DIAGNOSIS — J44.9 STAGE 4 VERY SEVERE COPD BY GOLD CLASSIFICATION (HCC): ICD-10-CM

## 2020-09-09 DIAGNOSIS — F41.1 GAD (GENERALIZED ANXIETY DISORDER): Primary | ICD-10-CM

## 2020-09-09 NOTE — TELEPHONE ENCOUNTER
PATIENT IS REQUESTING A REFILL OF   ALPRAZolam (XANAX) 0.5 MG tablet  tiotropium bromide-olodaterol (STIOLTO RESPIMAT) 2.5-2.5 MCG/ACT aerosol solution inhaler  2 puff, Daily - RT     SHE IS ALSO REQUESTING SOMETHING FOR A POSSIBLE SINUS INFECTION HER SYMPTOMS ARE HEADACHES,SORE THROAT, COUGH, DRAINAGE       GOOD CONTACT NUMBER   [phone] (H)      VERIFIED PHARMACY   [facility]/pharmacy #[id number] - [place], [place] - [address] - [phone]  - [phone] FX

## 2020-09-12 RX ORDER — ALPRAZOLAM 0.5 MG/1
0.5 TABLET ORAL 3 TIMES DAILY PRN
Qty: 30 TABLET | Refills: 0 | Status: SHIPPED | OUTPATIENT
Start: 2020-09-12 | End: 2020-11-23 | Stop reason: DRUGHIGH

## 2020-09-14 DIAGNOSIS — J44.9 STAGE 4 VERY SEVERE COPD BY GOLD CLASSIFICATION (HCC): Primary | ICD-10-CM

## 2020-09-14 NOTE — TELEPHONE ENCOUNTER
SHE IS NEEDING HER MEDICINE THAT GOES IN HER NEBULIZER CALLED INTO [facility].

## 2020-09-17 RX ORDER — ALBUTEROL SULFATE 2.5 MG/3ML
2.5 SOLUTION RESPIRATORY (INHALATION) EVERY 4 HOURS PRN
Qty: 120 VIAL | Refills: 12 | Status: SHIPPED | OUTPATIENT
Start: 2020-09-17 | End: 2022-02-21 | Stop reason: SDUPTHER

## 2020-09-30 ENCOUNTER — APPOINTMENT (OUTPATIENT)
Dept: CT IMAGING | Facility: HOSPITAL | Age: 70
End: 2020-09-30

## 2020-09-30 ENCOUNTER — TELEPHONE (OUTPATIENT)
Dept: FAMILY MEDICINE CLINIC | Facility: CLINIC | Age: 70
End: 2020-09-30

## 2020-09-30 DIAGNOSIS — J44.9 STAGE 4 VERY SEVERE COPD BY GOLD CLASSIFICATION (HCC): Primary | ICD-10-CM

## 2020-09-30 NOTE — TELEPHONE ENCOUNTER
NEEDS A SYBICORT INHALER CALLED INTO THE PHARMACY.  SHE DOESN'T LIKE THE POWDER INHALERS.  SHE IS ALSO NEEDING AN ANITBIOTIC CALLED IN FOR HER POSSIBLE SINUS INFECTION.  PLEASE CALL HER TO DISCUSS THIS MEDICATIONS.  SHE IS A NERVOUS WRECK.            EMT/paramedic

## 2020-10-01 ENCOUNTER — TELEPHONE (OUTPATIENT)
Dept: FAMILY MEDICINE CLINIC | Facility: CLINIC | Age: 70
End: 2020-10-01

## 2020-10-01 DIAGNOSIS — C34.11 MALIGNANT NEOPLASM OF UPPER LOBE OF RIGHT LUNG (HCC): Primary | ICD-10-CM

## 2020-10-01 DIAGNOSIS — D52.9 ANEMIA DUE TO FOLIC ACID DEFICIENCY, UNSPECIFIED DEFICIENCY TYPE: ICD-10-CM

## 2020-10-01 DIAGNOSIS — E87.6 HYPOKALEMIA: ICD-10-CM

## 2020-10-01 RX ORDER — BUDESONIDE AND FORMOTEROL FUMARATE DIHYDRATE 160; 4.5 UG/1; UG/1
2 AEROSOL RESPIRATORY (INHALATION)
Qty: 10.2 G | Refills: 12 | Status: SHIPPED | OUTPATIENT
Start: 2020-10-01 | End: 2021-11-22 | Stop reason: SDUPTHER

## 2020-10-01 NOTE — TELEPHONE ENCOUNTER
Patient is requesting levaquin for sinus infection.  States that she has chest congestion and pain, nasal congestion, and sore throat.  She has an oncology appt next week

## 2020-10-06 ENCOUNTER — APPOINTMENT (OUTPATIENT)
Dept: CT IMAGING | Facility: HOSPITAL | Age: 70
End: 2020-10-06

## 2020-10-20 DIAGNOSIS — J00 NASOPHARYNGITIS: ICD-10-CM

## 2020-10-20 RX ORDER — FLUTICASONE PROPIONATE 50 MCG
2 SPRAY, SUSPENSION (ML) NASAL DAILY
Qty: 48 ML | Refills: 3 | Status: SHIPPED | OUTPATIENT
Start: 2020-10-20

## 2020-10-20 RX ORDER — MONTELUKAST SODIUM 10 MG/1
TABLET ORAL
Qty: 90 TABLET | Refills: 3 | Status: SHIPPED | OUTPATIENT
Start: 2020-10-20 | End: 2021-09-27

## 2020-10-20 NOTE — TELEPHONE ENCOUNTER
Pharmacy sent a request for refills on Flonase and Singulair. Patient was last seen on 5/12/20 and does not have a return appointment.  Refill request sent to Keisha PA.

## 2020-10-26 ENCOUNTER — APPOINTMENT (OUTPATIENT)
Dept: CT IMAGING | Facility: HOSPITAL | Age: 70
End: 2020-10-26

## 2020-10-26 ENCOUNTER — TELEPHONE (OUTPATIENT)
Dept: RADIATION ONCOLOGY | Facility: HOSPITAL | Age: 70
End: 2020-10-26

## 2020-10-26 NOTE — TELEPHONE ENCOUNTER
Pt called and stated she was running a fever of 100 and she was unable to get her scan today. She was calling to r/s our appt. She stated they told her to call us to r/s and they would work around our appt. I contacted scheduling and spoke to Brigid, she was able to r/s appt at the [facility] on 11/05 @ 10:30. I r/s her appt with our office to 11/09 @ 2 PM. Contacted pt and gave her both appts.

## 2020-11-05 ENCOUNTER — TELEPHONE (OUTPATIENT)
Dept: RADIATION ONCOLOGY | Facility: HOSPITAL | Age: 70
End: 2020-11-05

## 2020-11-05 ENCOUNTER — APPOINTMENT (OUTPATIENT)
Dept: CT IMAGING | Facility: HOSPITAL | Age: 70
End: 2020-11-05

## 2020-11-05 NOTE — TELEPHONE ENCOUNTER
Leny from [facility] called stating that patient was due this afternoon for CT chest that Dr. Mejia ordered.  She had called to cancel because she is running a temp of 99.6 and has a cough.  Leny is asking if it is okay to reschedule CT for 1-2 weeks out.    I give Leny the okay to reschedule.   We will contact patient when CT is rescheduled to move her appointment with Dr. Mejia.

## 2020-11-06 ENCOUNTER — TRANSCRIBE ORDERS (OUTPATIENT)
Dept: ADMINISTRATIVE | Facility: HOSPITAL | Age: 70
End: 2020-11-06

## 2020-11-06 ENCOUNTER — TELEPHONE (OUTPATIENT)
Dept: PULMONOLOGY | Facility: CLINIC | Age: 70
End: 2020-11-06

## 2020-11-06 DIAGNOSIS — R50.9 FEVER, UNSPECIFIED FEVER CAUSE: Primary | ICD-10-CM

## 2020-11-06 RX ORDER — PREDNISONE 10 MG/1
TABLET ORAL
Qty: 31 TABLET | Refills: 0 | Status: SHIPPED | OUTPATIENT
Start: 2020-11-06 | End: 2020-11-23

## 2020-11-06 RX ORDER — AZITHROMYCIN 250 MG/1
TABLET, FILM COATED ORAL
Qty: 6 TABLET | Refills: 0 | Status: SHIPPED | OUTPATIENT
Start: 2020-11-06 | End: 2020-11-23

## 2020-11-06 NOTE — TELEPHONE ENCOUNTER
"This patient has been sick for the last 2 weeks with \"cold symptoms\". She states last weekend she started running a temp of .2. She has a productive cough with white sputum. She states that she has been \"so sick\" and \"can't hardly walk\" because of shortness of breath.   She has been unable to have her work up by Dr. Mejia. His office told her to call here to get antibiotics and get scheduled for Covid testing at . She could go tomorrow for the testing, if this is something you think would be appropriate for her?     "

## 2020-11-06 NOTE — TELEPHONE ENCOUNTER
She definitely needs to go have Covid testing done ASAP.  In the meantime I will get her started on antibiotics and send her in steroids but please stress the importance of her going and having that test done.  Also, ask her if she has a pulse ox monitor at home?

## 2020-11-07 ENCOUNTER — RESULTS ENCOUNTER (OUTPATIENT)
Dept: PULMONOLOGY | Facility: CLINIC | Age: 70
End: 2020-11-07

## 2020-11-07 DIAGNOSIS — R50.9 FEVER, UNSPECIFIED FEVER CAUSE: ICD-10-CM

## 2020-11-09 NOTE — TELEPHONE ENCOUNTER
Spoke with the patient and she did not get the Covid test Saturday. She states she is going to Urgent care today to be evaluated. She did get the Zpak Friday, but not the prednisone.

## 2020-11-19 ENCOUNTER — HOSPITAL ENCOUNTER (OUTPATIENT)
Dept: CT IMAGING | Facility: HOSPITAL | Age: 70
Discharge: HOME | End: 2020-11-19
Admitting: PHYSICIAN ASSISTANT

## 2020-11-19 DIAGNOSIS — J44.9 STAGE 4 VERY SEVERE COPD BY GOLD CLASSIFICATION (HCC): ICD-10-CM

## 2020-11-19 DIAGNOSIS — Z92.3 STATUS POST STEREOTACTIC RADIOSURGERY: ICD-10-CM

## 2020-11-19 DIAGNOSIS — F17.200 CURRENT EVERY DAY SMOKER: ICD-10-CM

## 2020-11-19 DIAGNOSIS — C34.11 MALIGNANT NEOPLASM OF UPPER LOBE OF RIGHT LUNG (HCC): ICD-10-CM

## 2020-11-19 LAB — CREAT BLDA-MCNC: 0.8 MG/DL (ref 0.6–1.3)

## 2020-11-19 PROCEDURE — 82565 ASSAY OF CREATININE: CPT

## 2020-11-19 PROCEDURE — 25010000002 IOPAMIDOL 61 % SOLUTION: Performed by: PHYSICIAN ASSISTANT

## 2020-11-19 PROCEDURE — 71260 CT THORAX DX C+: CPT

## 2020-11-19 RX ADMIN — IOPAMIDOL 100 ML: 612 INJECTION, SOLUTION INTRAVENOUS at 15:25

## 2020-11-20 ENCOUNTER — APPOINTMENT (OUTPATIENT)
Dept: CT IMAGING | Facility: HOSPITAL | Age: 70
End: 2020-11-20

## 2020-11-20 ENCOUNTER — TELEPHONE (OUTPATIENT)
Dept: PULMONOLOGY | Facility: CLINIC | Age: 70
End: 2020-11-20

## 2020-11-20 ENCOUNTER — APPOINTMENT (OUTPATIENT)
Dept: GENERAL RADIOLOGY | Facility: HOSPITAL | Age: 70
End: 2020-11-20

## 2020-11-20 ENCOUNTER — HOSPITAL ENCOUNTER (EMERGENCY)
Facility: HOSPITAL | Age: 70
Discharge: HOME | End: 2020-11-21
Attending: FAMILY MEDICINE | Admitting: EMERGENCY MEDICINE

## 2020-11-20 DIAGNOSIS — K11.20 SIALADENITIS: Primary | ICD-10-CM

## 2020-11-20 LAB
ALBUMIN SERPL-MCNC: 4.1 G/DL (ref 3.5–5.2)
ALBUMIN/GLOB SERPL: 1.3 G/DL
ALP SERPL-CCNC: 70 U/L (ref 39–117)
ALT SERPL W P-5'-P-CCNC: 7 U/L (ref 1–33)
ANION GAP SERPL CALCULATED.3IONS-SCNC: 5 MMOL/L (ref 5–15)
AST SERPL-CCNC: 13 U/L (ref 1–32)
BASOPHILS # BLD AUTO: 0.03 10*3/MM3 (ref 0–0.2)
BASOPHILS NFR BLD AUTO: 0.4 % (ref 0–1.5)
BILIRUB SERPL-MCNC: 0.2 MG/DL (ref 0–1.2)
BUN SERPL-MCNC: 8 MG/DL (ref 8–23)
BUN/CREAT SERPL: 14.8 (ref 7–25)
CALCIUM SPEC-SCNC: 9.3 MG/DL (ref 8.6–10.5)
CHLORIDE SERPL-SCNC: 100 MMOL/L (ref 98–107)
CO2 SERPL-SCNC: 36 MMOL/L (ref 22–29)
CREAT SERPL-MCNC: 0.54 MG/DL (ref 0.57–1)
DEPRECATED RDW RBC AUTO: 42.5 FL (ref 37–54)
EOSINOPHIL # BLD AUTO: 0.14 10*3/MM3 (ref 0–0.4)
EOSINOPHIL NFR BLD AUTO: 1.9 % (ref 0.3–6.2)
ERYTHROCYTE [DISTWIDTH] IN BLOOD BY AUTOMATED COUNT: 12.1 % (ref 12.3–15.4)
GFR SERPL CREATININE-BSD FRML MDRD: 111 ML/MIN/1.73
GLOBULIN UR ELPH-MCNC: 3.2 GM/DL
GLUCOSE SERPL-MCNC: 99 MG/DL (ref 65–99)
HCT VFR BLD AUTO: 33.7 % (ref 34–46.6)
HGB BLD-MCNC: 10.8 G/DL (ref 12–15.9)
IMM GRANULOCYTES # BLD AUTO: 0.02 10*3/MM3 (ref 0–0.05)
IMM GRANULOCYTES NFR BLD AUTO: 0.3 % (ref 0–0.5)
LYMPHOCYTES # BLD AUTO: 1.38 10*3/MM3 (ref 0.7–3.1)
LYMPHOCYTES NFR BLD AUTO: 18.6 % (ref 19.6–45.3)
MCH RBC QN AUTO: 30.9 PG (ref 26.6–33)
MCHC RBC AUTO-ENTMCNC: 32 G/DL (ref 31.5–35.7)
MCV RBC AUTO: 96.6 FL (ref 79–97)
MONOCYTES # BLD AUTO: 0.68 10*3/MM3 (ref 0.1–0.9)
MONOCYTES NFR BLD AUTO: 9.2 % (ref 5–12)
NEUTROPHILS NFR BLD AUTO: 5.18 10*3/MM3 (ref 1.7–7)
NEUTROPHILS NFR BLD AUTO: 69.6 % (ref 42.7–76)
NRBC BLD AUTO-RTO: 0 /100 WBC (ref 0–0.2)
PLATELET # BLD AUTO: 219 10*3/MM3 (ref 140–450)
PMV BLD AUTO: 10.8 FL (ref 6–12)
POTASSIUM SERPL-SCNC: 5 MMOL/L (ref 3.5–5.2)
PROT SERPL-MCNC: 7.3 G/DL (ref 6–8.5)
RBC # BLD AUTO: 3.49 10*6/MM3 (ref 3.77–5.28)
SARS-COV-2 RDRP RESP QL NAA+PROBE: NOT DETECTED
SODIUM SERPL-SCNC: 141 MMOL/L (ref 136–145)
WBC # BLD AUTO: 7.43 10*3/MM3 (ref 3.4–10.8)

## 2020-11-20 PROCEDURE — 25010000002 METHYLPREDNISOLONE PER 125 MG: Performed by: FAMILY MEDICINE

## 2020-11-20 PROCEDURE — 25010000002 IOPAMIDOL 61 % SOLUTION: Performed by: FAMILY MEDICINE

## 2020-11-20 PROCEDURE — 94640 AIRWAY INHALATION TREATMENT: CPT

## 2020-11-20 PROCEDURE — 96374 THER/PROPH/DIAG INJ IV PUSH: CPT

## 2020-11-20 PROCEDURE — 87635 SARS-COV-2 COVID-19 AMP PRB: CPT | Performed by: FAMILY MEDICINE

## 2020-11-20 PROCEDURE — 80053 COMPREHEN METABOLIC PANEL: CPT | Performed by: FAMILY MEDICINE

## 2020-11-20 PROCEDURE — 85025 COMPLETE CBC W/AUTO DIFF WBC: CPT | Performed by: FAMILY MEDICINE

## 2020-11-20 PROCEDURE — 71045 X-RAY EXAM CHEST 1 VIEW: CPT

## 2020-11-20 PROCEDURE — 99284 EMERGENCY DEPT VISIT MOD MDM: CPT

## 2020-11-20 PROCEDURE — 96375 TX/PRO/DX INJ NEW DRUG ADDON: CPT

## 2020-11-20 PROCEDURE — 70491 CT SOFT TISSUE NECK W/DYE: CPT

## 2020-11-20 PROCEDURE — 94799 UNLISTED PULMONARY SVC/PX: CPT

## 2020-11-20 RX ORDER — METHYLPREDNISOLONE SODIUM SUCCINATE 125 MG/2ML
125 INJECTION, POWDER, LYOPHILIZED, FOR SOLUTION INTRAMUSCULAR; INTRAVENOUS ONCE
Status: COMPLETED | OUTPATIENT
Start: 2020-11-20 | End: 2020-11-20

## 2020-11-20 RX ORDER — PANTOPRAZOLE SODIUM 40 MG/10ML
40 INJECTION, POWDER, LYOPHILIZED, FOR SOLUTION INTRAVENOUS ONCE
Status: COMPLETED | OUTPATIENT
Start: 2020-11-20 | End: 2020-11-20

## 2020-11-20 RX ORDER — IPRATROPIUM BROMIDE AND ALBUTEROL SULFATE 2.5; .5 MG/3ML; MG/3ML
3 SOLUTION RESPIRATORY (INHALATION) ONCE
Status: COMPLETED | OUTPATIENT
Start: 2020-11-20 | End: 2020-11-20

## 2020-11-20 RX ADMIN — PANTOPRAZOLE SODIUM 40 MG: 40 INJECTION, POWDER, FOR SOLUTION INTRAVENOUS at 18:51

## 2020-11-20 RX ADMIN — METHYLPREDNISOLONE SODIUM SUCCINATE 125 MG: 125 INJECTION, POWDER, FOR SOLUTION INTRAMUSCULAR; INTRAVENOUS at 18:50

## 2020-11-20 RX ADMIN — IOPAMIDOL 100 ML: 612 INJECTION, SOLUTION INTRAVENOUS at 22:14

## 2020-11-20 RX ADMIN — IPRATROPIUM BROMIDE AND ALBUTEROL SULFATE 3 ML: 2.5; .5 SOLUTION RESPIRATORY (INHALATION) at 20:49

## 2020-11-20 NOTE — TELEPHONE ENCOUNTER
She called stating that she is coughing and nothing is working for her. She is on robitussin, claritin, singulair and her inhalers.   We recommended she get robitussin DM and take by itself or take delsym with the robitussin.   She said that is what she will do.

## 2020-11-21 VITALS
BODY MASS INDEX: 22.18 KG/M2 | HEIGHT: 66 IN | WEIGHT: 138 LBS | RESPIRATION RATE: 17 BRPM | DIASTOLIC BLOOD PRESSURE: 64 MMHG | HEART RATE: 81 BPM | OXYGEN SATURATION: 100 % | SYSTOLIC BLOOD PRESSURE: 110 MMHG | TEMPERATURE: 97.9 F

## 2020-11-21 RX ORDER — LEVOFLOXACIN 500 MG/1
500 TABLET, FILM COATED ORAL ONCE
Status: COMPLETED | OUTPATIENT
Start: 2020-11-21 | End: 2020-11-21

## 2020-11-21 RX ORDER — LEVOFLOXACIN 500 MG/1
500 TABLET, FILM COATED ORAL DAILY
Qty: 10 TABLET | Refills: 0 | Status: SHIPPED | OUTPATIENT
Start: 2020-11-21 | End: 2021-05-26

## 2020-11-21 RX ADMIN — LEVOFLOXACIN 500 MG: 500 TABLET, FILM COATED ORAL at 01:44

## 2020-11-22 PROBLEM — Z92.3 STATUS POST STEREOTACTIC RADIOSURGERY: Status: ACTIVE | Noted: 2020-11-22

## 2020-11-23 ENCOUNTER — OFFICE VISIT (OUTPATIENT)
Dept: RADIATION ONCOLOGY | Facility: HOSPITAL | Age: 70
End: 2020-11-23

## 2020-11-23 ENCOUNTER — HOSPITAL ENCOUNTER (OUTPATIENT)
Dept: RADIATION ONCOLOGY | Facility: HOSPITAL | Age: 70
Setting detail: RADIATION/ONCOLOGY SERIES
End: 2020-11-23

## 2020-11-23 VITALS
SYSTOLIC BLOOD PRESSURE: 153 MMHG | BODY MASS INDEX: 22.71 KG/M2 | WEIGHT: 133 LBS | DIASTOLIC BLOOD PRESSURE: 67 MMHG | HEART RATE: 81 BPM | HEIGHT: 64 IN | OXYGEN SATURATION: 100 %

## 2020-11-23 DIAGNOSIS — Z92.3 STATUS POST STEREOTACTIC RADIOSURGERY: ICD-10-CM

## 2020-11-23 DIAGNOSIS — J44.9 STAGE 4 VERY SEVERE COPD BY GOLD CLASSIFICATION (HCC): ICD-10-CM

## 2020-11-23 DIAGNOSIS — F17.200 CURRENT EVERY DAY SMOKER: ICD-10-CM

## 2020-11-23 DIAGNOSIS — C34.11 MALIGNANT NEOPLASM OF UPPER LOBE OF RIGHT LUNG (HCC): Primary | ICD-10-CM

## 2020-11-23 PROCEDURE — G0463 HOSPITAL OUTPT CLINIC VISIT: HCPCS | Performed by: RADIOLOGY

## 2020-11-23 RX ORDER — ALPRAZOLAM 1 MG/1
1 TABLET ORAL 4 TIMES DAILY PRN
COMMUNITY
Start: 2020-10-29

## 2020-11-23 RX ORDER — ZOLPIDEM TARTRATE 10 MG/1
10 TABLET ORAL
COMMUNITY
Start: 2020-09-30

## 2020-11-23 RX ORDER — ESCITALOPRAM OXALATE 10 MG/1
10 TABLET ORAL DAILY
COMMUNITY
Start: 2020-11-04

## 2020-12-04 ENCOUNTER — TELEPHONE (OUTPATIENT)
Dept: PULMONOLOGY | Facility: CLINIC | Age: 70
End: 2020-12-04

## 2020-12-04 DIAGNOSIS — B37.0 ORAL CANDIDA: Primary | ICD-10-CM

## 2020-12-04 NOTE — TELEPHONE ENCOUNTER
Please let the know that she needs to establish with a primary care provider.  I will send in nystatin.

## 2021-01-15 DIAGNOSIS — K21.9 GASTROESOPHAGEAL REFLUX DISEASE: ICD-10-CM

## 2021-01-15 RX ORDER — OMEPRAZOLE 40 MG/1
CAPSULE, DELAYED RELEASE ORAL
Qty: 90 CAPSULE | Refills: 2 | Status: SHIPPED | OUTPATIENT
Start: 2021-01-15

## 2021-01-17 DIAGNOSIS — J00 NASOPHARYNGITIS: ICD-10-CM

## 2021-01-18 RX ORDER — AZELASTINE 1 MG/ML
SPRAY, METERED NASAL
Qty: 1 EACH | Refills: 11 | Status: SHIPPED | OUTPATIENT
Start: 2021-01-18

## 2021-01-18 NOTE — TELEPHONE ENCOUNTER
Pharmacy sent a request for refills on azelastine nasal spray. Patient was last seen on 5/15/20 and has a return appointment on 4/12/21.  Refill request sent to Keisha PA.

## 2021-01-21 ENCOUNTER — TELEPHONE (OUTPATIENT)
Dept: RADIATION ONCOLOGY | Facility: HOSPITAL | Age: 71
End: 2021-01-21

## 2021-01-21 NOTE — TELEPHONE ENCOUNTER
"    Requested Prescriptions     Signed Prescriptions Disp Refills   • dextromethorphan 15 MG/5ML syrup 118 mL 0     Sig: Take 10 mL by mouth 4 (Four) Times a Day As Needed for Cough for up to 20 doses.     Authorizing Provider: BRIEN CHU       ----- Message from Christi Noe RN sent at 1/21/2021  3:33 PM CST -----  Patient called this afternoon asking if we would call in cough medicine for her?  She is an SBRT lung patient that completed in 7/2020.  She is due to see us in February with a scan.  She states that she has tessalon perles, and they help some, but not with \"the real bad cough that makes me lose my breath.\"  She states that she has been buying OTC cough medicine, but has a limited income and it is getting expensive.  She wanted to know if a different medicine other than tessalon perles could be called in for when she has that \"real bad cough.\"  She states that she does not have a PCP to ask.        "

## 2021-01-21 NOTE — TELEPHONE ENCOUNTER
"Patient called to verify time for her appointment with Dr. Mejia in February.  She also asks if Dr. Mejia would call in prescription for her for her cough.  She states that she is on a limited income and is spending lots of money for OTC cough medication.  She states that she has tessalon perles, but that they do not help when she \"gets a real bad cough and lose my breath.\"  She asks if he would send something in for times when she has that kind of cough.  I told her that I would pass information to Dalia/Dr. Mejia to see if they would.  She states that she does not have a PCP anymore or she would have contacted them.  "

## 2021-02-01 ENCOUNTER — NURSE TRIAGE (OUTPATIENT)
Dept: CALL CENTER | Facility: HOSPITAL | Age: 71
End: 2021-02-01

## 2021-02-02 NOTE — TELEPHONE ENCOUNTER
Patient exposed to someone on 1/30/21 who tested positive for COVID today but is asymptomatic.  Patient has lung disease and history of lung cancer.  Patient denies symptoms.  Patient is wondering what to do next.  Advised per care advice.  Verbalizes understanding.  Since patient does not have a PCP she will contact her pulmonologist.  Will call back with symptoms or questions.    Reason for Disposition  • [1] CLOSE CONTACT COVID-19 EXPOSURE within last 14 days AND [2] NO symptoms    Additional Information  • Negative: COVID-19 lab test positive  • Negative: [1] Lives with someone known to have influenza (flu test positive) AND [2] flu-like symptoms (e.g., cough, runny nose, sore throat, SOB; with or without fever)  • Negative: [1] Symptoms of COVID-19 (e.g., cough, fever, SOB, or others) AND [2] HCP diagnosed COVID-19 based on symptoms  • Negative: [1] Symptoms of COVID-19 (e.g., cough, fever, SOB, or others) AND [2] lives in an area with community spread  • Negative: [1] Symptoms of COVID-19 (e.g., cough, fever, SOB, or others) AND [2] within 14 days of EXPOSURE (close contact) with diagnosed or suspected COVID-19 patient  • Negative: [1] Symptoms of COVID-19 (e.g., cough, fever, SOB, or others) AND [2] within 14 days of travel from high-risk area for COVID-19 community spread (identified by CDC)  • Negative: [1] Difficulty breathing (shortness of breath) occurs AND [2] onset > 14 days after COVID-19 EXPOSURE (Close Contact)  • Negative: [1] Dry cough occurs AND [2] onset > 14 days after COVID-19 EXPOSURE  • Negative: [1] Wet cough (i.e., white-yellow, yellow, green, or tyshawn colored sputum) AND [2] onset > 14 days after COVID-19 EXPOSURE  • Negative: [1] Common cold symptoms AND [2] onset > 14 days after COVID-19 EXPOSURE  • Negative: COVID-19 vaccine reaction suspected (e.g., fever, headache, muscle aches) occurring during days 1-3 after getting vaccine  • Negative: COVID-19 vaccine, questions about  •  "Negative: [1] CLOSE CONTACT COVID-19 EXPOSURE within last 14 days AND [2] needs COVID-19 lab test to return to work AND [3] NO symptoms  • Negative: [1] CLOSE CONTACT COVID-19 EXPOSURE within last 14 days AND [2] exposed person is a  (e.g., police or paramedic) AND [3] NO symptoms  • Negative: [1] CLOSE CONTACT COVID-19 EXPOSURE within last 14 days AND [2] exposed person is a healthcare worker who was NOT using all recommended personal protective equipment (i.e., a respirator-N95 mask, eye protection, gloves, and gown) AND [3] NO symptoms  • Negative: [1] Living or working in a correctional facility, long-term care facility, or shelter (i.e., congregate setting; densely populated) AND [2] where an outbreak has occurred AND [3] NO symptoms    Answer Assessment - Initial Assessment Questions  1. COVID-19 CLOSE CONTACT: \"Who is the person with the confirmed or suspected COVID-19 infection that you were exposed to?\"      Friend  2. PLACE of CONTACT: \"Where were you when you were exposed to COVID-19?\" (e.g., home, school, medical waiting room; which city?)      Home  3. TYPE of CONTACT: \"How much contact was there?\" (e.g., sitting next to, live in same house, work in same office, same building)      Same room 3-4 feet away, no mask  4. DURATION of CONTACT: \"How long were you in contact with the COVID-19 patient?\" (e.g., a few seconds, passed by person, a few minutes, 15 minutes or longer, live with the patient)      30-45 minutes  5. MASK: \"Were you wearing a mask?\" \"Was the other person wearing a mask?\" Note: wearing a mask reduces the risk of an otherwise close contact.      No  6. DATE of CONTACT: \"When did you have contact with a COVID-19 patient?\" (e.g., how many days ago)      1/30/21  7. COMMUNITY SPREAD: \"Are there lots of cases of COVID-19 (community spread) where you live?\" (See public health department website, if unsure)        Yes  8. SYMPTOMS: \"Do you have any symptoms?\" (e.g., fever, cough, " "breathing difficulty, loss of taste or smell)      No  9. PREGNANCY OR POSTPARTUM: \"Is there any chance you are pregnant?\" \"When was your last menstrual period?\" \"Did you deliver in the last 2 weeks?\"      No  10. HIGH RISK: \"Do you have any heart or lung problems? Do you have a weak immune system?\" (e.g., heart failure, COPD, asthma, HIV positive, chemotherapy, renal failure, diabetes mellitus, sickle cell anemia, obesity)        COPD, in remission for lung cancer  11. TRAVEL: \"Have you traveled out of the country recently?\" If so, \"When and where?\" Also ask about out-of-state travel, since the CDC has identified some high-risk cities for community spread in the  Note: Travel becomes less relevant if there is widespread community transmission where the patient lives.        No    Protocols used: CORONAVIRUS (COVID-19) EXPOSURE-ADULT-AH      "

## 2021-02-17 ENCOUNTER — APPOINTMENT (OUTPATIENT)
Dept: CT IMAGING | Facility: HOSPITAL | Age: 71
End: 2021-02-17

## 2021-02-19 ENCOUNTER — TELEPHONE (OUTPATIENT)
Dept: PULMONOLOGY | Facility: CLINIC | Age: 71
End: 2021-02-19

## 2021-02-19 NOTE — TELEPHONE ENCOUNTER
Cover my meds sent us a authorization request regarding Singulair. I ran it and it said available without authorization. Called pharmacy regarding Singulair being prior authorized. Pharmacy said it was covered and copay was $1.32.

## 2021-02-22 ENCOUNTER — APPOINTMENT (OUTPATIENT)
Dept: CT IMAGING | Facility: HOSPITAL | Age: 71
End: 2021-02-22

## 2021-02-23 ENCOUNTER — TELEPHONE (OUTPATIENT)
Dept: RADIATION ONCOLOGY | Facility: HOSPITAL | Age: 71
End: 2021-02-23

## 2021-02-23 NOTE — TELEPHONE ENCOUNTER
Pt called  She and daughter both have the flu.  She cancelled her CT Scan and will need it rescheduled as well as follow up appt with us.    I told her she'd get a call in a week or so to see about rescheduling.

## 2021-04-05 ENCOUNTER — APPOINTMENT (OUTPATIENT)
Dept: CT IMAGING | Facility: HOSPITAL | Age: 71
End: 2021-04-05

## 2021-04-09 ENCOUNTER — APPOINTMENT (OUTPATIENT)
Dept: CT IMAGING | Facility: HOSPITAL | Age: 71
End: 2021-04-09

## 2021-04-12 ENCOUNTER — TELEPHONE (OUTPATIENT)
Dept: PULMONOLOGY | Facility: CLINIC | Age: 71
End: 2021-04-12

## 2021-04-18 ENCOUNTER — APPOINTMENT (OUTPATIENT)
Dept: GENERAL RADIOLOGY | Facility: HOSPITAL | Age: 71
End: 2021-04-18

## 2021-04-18 ENCOUNTER — HOSPITAL ENCOUNTER (EMERGENCY)
Facility: HOSPITAL | Age: 71
Discharge: HOME | End: 2021-04-19
Attending: EMERGENCY MEDICINE | Admitting: EMERGENCY MEDICINE

## 2021-04-18 ENCOUNTER — APPOINTMENT (OUTPATIENT)
Dept: CT IMAGING | Facility: HOSPITAL | Age: 71
End: 2021-04-18

## 2021-04-18 DIAGNOSIS — S22.000A COMPRESSION FRACTURE OF BODY OF THORACIC VERTEBRA (HCC): Primary | ICD-10-CM

## 2021-04-18 LAB
ALBUMIN SERPL-MCNC: 3.4 G/DL (ref 3.5–5.2)
ALBUMIN/GLOB SERPL: 1 G/DL
ALP SERPL-CCNC: 85 U/L (ref 39–117)
ALT SERPL W P-5'-P-CCNC: 7 U/L (ref 1–33)
ANION GAP SERPL CALCULATED.3IONS-SCNC: 5 MMOL/L (ref 5–15)
ARTERIAL PATENCY WRIST A: POSITIVE
AST SERPL-CCNC: 11 U/L (ref 1–32)
ATMOSPHERIC PRESS: 750 MMHG
BASE EXCESS BLDA CALC-SCNC: 16.3 MMOL/L (ref 0–2)
BASOPHILS # BLD AUTO: 0.05 10*3/MM3 (ref 0–0.2)
BASOPHILS NFR BLD AUTO: 0.5 % (ref 0–1.5)
BDY SITE: ABNORMAL
BILIRUB SERPL-MCNC: 0.2 MG/DL (ref 0–1.2)
BILIRUB UR QL STRIP: NEGATIVE
BODY TEMPERATURE: 37 C
BUN SERPL-MCNC: 6 MG/DL (ref 8–23)
BUN/CREAT SERPL: 13 (ref 7–25)
CALCIUM SPEC-SCNC: 8.7 MG/DL (ref 8.6–10.5)
CHLORIDE SERPL-SCNC: 96 MMOL/L (ref 98–107)
CLARITY UR: CLEAR
CO2 SERPL-SCNC: 42 MMOL/L (ref 22–29)
COLOR UR: YELLOW
CREAT SERPL-MCNC: 0.46 MG/DL (ref 0.57–1)
DEPRECATED RDW RBC AUTO: 54 FL (ref 37–54)
EOSINOPHIL # BLD AUTO: 0.09 10*3/MM3 (ref 0–0.4)
EOSINOPHIL NFR BLD AUTO: 1 % (ref 0.3–6.2)
ERYTHROCYTE [DISTWIDTH] IN BLOOD BY AUTOMATED COUNT: 14.4 % (ref 12.3–15.4)
GAS FLOW AIRWAY: 3 LPM
GFR SERPL CREATININE-BSD FRML MDRD: 133 ML/MIN/1.73
GLOBULIN UR ELPH-MCNC: 3.5 GM/DL
GLUCOSE SERPL-MCNC: 83 MG/DL (ref 65–99)
GLUCOSE UR STRIP-MCNC: NEGATIVE MG/DL
HCO3 BLDA-SCNC: 44 MMOL/L (ref 20–26)
HCT VFR BLD AUTO: 31.6 % (ref 34–46.6)
HGB BLD-MCNC: 9.6 G/DL (ref 12–15.9)
HGB UR QL STRIP.AUTO: NEGATIVE
IMM GRANULOCYTES # BLD AUTO: 0.02 10*3/MM3 (ref 0–0.05)
IMM GRANULOCYTES NFR BLD AUTO: 0.2 % (ref 0–0.5)
KETONES UR QL STRIP: NEGATIVE
LEUKOCYTE ESTERASE UR QL STRIP.AUTO: NEGATIVE
LIPASE SERPL-CCNC: 9 U/L (ref 13–60)
LYMPHOCYTES # BLD AUTO: 1.27 10*3/MM3 (ref 0.7–3.1)
LYMPHOCYTES NFR BLD AUTO: 13.7 % (ref 19.6–45.3)
Lab: ABNORMAL
Lab: ABNORMAL
MCH RBC QN AUTO: 31.2 PG (ref 26.6–33)
MCHC RBC AUTO-ENTMCNC: 30.4 G/DL (ref 31.5–35.7)
MCV RBC AUTO: 102.6 FL (ref 79–97)
MODALITY: ABNORMAL
MONOCYTES # BLD AUTO: 1.06 10*3/MM3 (ref 0.1–0.9)
MONOCYTES NFR BLD AUTO: 11.4 % (ref 5–12)
NEUTROPHILS NFR BLD AUTO: 6.79 10*3/MM3 (ref 1.7–7)
NEUTROPHILS NFR BLD AUTO: 73.2 % (ref 42.7–76)
NITRITE UR QL STRIP: NEGATIVE
NOTIFIED BY: ABNORMAL
NOTIFIED WHO: ABNORMAL
NRBC BLD AUTO-RTO: 0 /100 WBC (ref 0–0.2)
PCO2 BLDA: 71.7 MM HG (ref 35–45)
PCO2 TEMP ADJ BLD: 71.7 MM HG (ref 35–45)
PH BLDA: 7.4 PH UNITS (ref 7.35–7.45)
PH UR STRIP.AUTO: 8.5 [PH] (ref 5–8)
PH, TEMP CORRECTED: 7.4 PH UNITS (ref 7.35–7.45)
PLATELET # BLD AUTO: 208 10*3/MM3 (ref 140–450)
PMV BLD AUTO: 11.1 FL (ref 6–12)
PO2 BLDA: 107 MM HG (ref 83–108)
PO2 TEMP ADJ BLD: 107 MM HG (ref 83–108)
POTASSIUM SERPL-SCNC: 3.8 MMOL/L (ref 3.5–5.2)
PROT SERPL-MCNC: 6.9 G/DL (ref 6–8.5)
PROT UR QL STRIP: NEGATIVE
RBC # BLD AUTO: 3.08 10*6/MM3 (ref 3.77–5.28)
SAO2 % BLDCOA: 99 % (ref 94–99)
SARS-COV-2 RDRP RESP QL NAA+PROBE: NORMAL
SODIUM SERPL-SCNC: 143 MMOL/L (ref 136–145)
SP GR UR STRIP: 1.01 (ref 1–1.03)
TROPONIN T SERPL-MCNC: <0.01 NG/ML (ref 0–0.03)
UROBILINOGEN UR QL STRIP: ABNORMAL
VENTILATOR MODE: ABNORMAL
WBC # BLD AUTO: 9.28 10*3/MM3 (ref 3.4–10.8)

## 2021-04-18 PROCEDURE — P9612 CATHETERIZE FOR URINE SPEC: HCPCS

## 2021-04-18 PROCEDURE — 99284 EMERGENCY DEPT VISIT MOD MDM: CPT

## 2021-04-18 PROCEDURE — 87635 SARS-COV-2 COVID-19 AMP PRB: CPT | Performed by: PHYSICIAN ASSISTANT

## 2021-04-18 PROCEDURE — 80053 COMPREHEN METABOLIC PANEL: CPT | Performed by: PHYSICIAN ASSISTANT

## 2021-04-18 PROCEDURE — 82803 BLOOD GASES ANY COMBINATION: CPT

## 2021-04-18 PROCEDURE — 0 IOPAMIDOL PER 1 ML: Performed by: PHYSICIAN ASSISTANT

## 2021-04-18 PROCEDURE — 72131 CT LUMBAR SPINE W/O DYE: CPT

## 2021-04-18 PROCEDURE — 85025 COMPLETE CBC W/AUTO DIFF WBC: CPT | Performed by: PHYSICIAN ASSISTANT

## 2021-04-18 PROCEDURE — 71275 CT ANGIOGRAPHY CHEST: CPT

## 2021-04-18 PROCEDURE — 93010 ELECTROCARDIOGRAM REPORT: CPT | Performed by: EMERGENCY MEDICINE

## 2021-04-18 PROCEDURE — 83690 ASSAY OF LIPASE: CPT | Performed by: PHYSICIAN ASSISTANT

## 2021-04-18 PROCEDURE — 71045 X-RAY EXAM CHEST 1 VIEW: CPT

## 2021-04-18 PROCEDURE — 74177 CT ABD & PELVIS W/CONTRAST: CPT

## 2021-04-18 PROCEDURE — 81003 URINALYSIS AUTO W/O SCOPE: CPT | Performed by: PHYSICIAN ASSISTANT

## 2021-04-18 PROCEDURE — 36600 WITHDRAWAL OF ARTERIAL BLOOD: CPT

## 2021-04-18 PROCEDURE — 93005 ELECTROCARDIOGRAM TRACING: CPT | Performed by: PHYSICIAN ASSISTANT

## 2021-04-18 PROCEDURE — 72128 CT CHEST SPINE W/O DYE: CPT

## 2021-04-18 PROCEDURE — 84484 ASSAY OF TROPONIN QUANT: CPT | Performed by: PHYSICIAN ASSISTANT

## 2021-04-18 PROCEDURE — 99285 EMERGENCY DEPT VISIT HI MDM: CPT

## 2021-04-18 RX ORDER — SODIUM CHLORIDE 0.9 % (FLUSH) 0.9 %
10 SYRINGE (ML) INJECTION AS NEEDED
Status: DISCONTINUED | OUTPATIENT
Start: 2021-04-18 | End: 2021-04-19 | Stop reason: HOSPADM

## 2021-04-18 RX ADMIN — IOPAMIDOL 100 ML: 755 INJECTION, SOLUTION INTRAVENOUS at 22:54

## 2021-04-19 ENCOUNTER — APPOINTMENT (OUTPATIENT)
Dept: GENERAL RADIOLOGY | Facility: HOSPITAL | Age: 71
End: 2021-04-19

## 2021-04-19 VITALS
BODY MASS INDEX: 21.69 KG/M2 | WEIGHT: 135 LBS | OXYGEN SATURATION: 97 % | TEMPERATURE: 98.4 F | DIASTOLIC BLOOD PRESSURE: 57 MMHG | HEIGHT: 66 IN | HEART RATE: 98 BPM | SYSTOLIC BLOOD PRESSURE: 124 MMHG | RESPIRATION RATE: 28 BRPM

## 2021-04-19 LAB
QT INTERVAL: 348 MS
QTC INTERVAL: 423 MS
TROPONIN T SERPL-MCNC: <0.01 NG/ML (ref 0–0.03)

## 2021-04-19 PROCEDURE — 72070 X-RAY EXAM THORAC SPINE 2VWS: CPT

## 2021-04-19 PROCEDURE — 84484 ASSAY OF TROPONIN QUANT: CPT | Performed by: PHYSICIAN ASSISTANT

## 2021-04-19 PROCEDURE — 94640 AIRWAY INHALATION TREATMENT: CPT

## 2021-04-19 PROCEDURE — 94799 UNLISTED PULMONARY SVC/PX: CPT

## 2021-04-19 RX ORDER — ACETAMINOPHEN 325 MG/1
650 TABLET ORAL ONCE
Status: COMPLETED | OUTPATIENT
Start: 2021-04-19 | End: 2021-04-19

## 2021-04-19 RX ORDER — ALBUTEROL SULFATE 2.5 MG/3ML
2.5 SOLUTION RESPIRATORY (INHALATION) ONCE
Status: COMPLETED | OUTPATIENT
Start: 2021-04-19 | End: 2021-04-19

## 2021-04-19 RX ADMIN — ACETAMINOPHEN 650 MG: 325 TABLET, FILM COATED ORAL at 00:34

## 2021-04-19 RX ADMIN — ALBUTEROL SULFATE 2.5 MG: 2.5 SOLUTION RESPIRATORY (INHALATION) at 01:04

## 2021-04-19 NOTE — ED PROVIDER NOTES
"Subjective   History of Present Illness    Patient is a 71-year-old female presenting to ED with NVD and not feeling well. PMH significant for 3L chronic NC oxygen use, COPD, GERD, HTN, chronic pain, . Family member at bedside to help provide additional history. Patient reports she had a mechanical fall 2 weeks ago where she landed directly on her back.  Patient reports she has had some soreness which feels like it was improving.  Patient stated however since that time she has developed \"not feeling well with a lot of issues.\"  Patient describes that she is having epigastric abdominal pain with nausea, vomiting, and mild diarrhea.  Patient's family member at bedside reports she is concerned because a couple times patient complained of chest pain.  Patient denies any changes in her chronic shortness of breath or troubles breathing.  Patient reports she is a chronic cough and is well denies changes with this.  Patient denies any increased oxygen use.  Patient denies any other injuries in the fall such as head injuries, loss of consciousness, anterior abdominal injuries, or extremity injuries.  Patient was not seen or evaluated after her fall.  Patient denies any fevers, chills, diaphoresis, hematuria, dysuria, flank pain.  Patient denies hematemesis or black/bloody/tarry stools.    Records reviewed show patient was last seen in ED on 11/20/20 for sialadenitis.     Review of Systems   Constitutional: Negative.  Negative for chills, diaphoresis and fever.   HENT: Negative.    Eyes: Negative.    Respiratory: Positive for shortness of breath (no change in chronic per patient).    Cardiovascular: Negative for chest pain.   Gastrointestinal: Positive for abdominal pain (epigastric), diarrhea and nausea. Negative for constipation and vomiting.   Genitourinary: Negative.    Musculoskeletal: Positive for back pain (mid back). Negative for neck pain.   Skin: Negative.  Negative for wound.   Neurological: Negative.         " Denies head injury  Denies LOC   Psychiatric/Behavioral: Negative.    All other systems reviewed and are negative.      Past Medical History:   Diagnosis Date   • Anxiety    • Anxiety     pain clinic patient Dr Sunshine x 5yrs   • Arthritis    • Chronic pain due to injury     jarad LE & back   • COPD (chronic obstructive pulmonary disease) (CMS/[facility])    • Depression    • GERD (gastroesophageal reflux disease)    • Hypertension    • Neuropathy    • Polypharmacy    • Smoker        Allergies   Allergen Reactions   • Aspirin Anaphylaxis   • Demerol [Meperidine] Anaphylaxis   • Ibuprofen Anaphylaxis   • Neosporin [Neomycin-Bacitracin Zn-Polymyx] Anaphylaxis   • Nsaids Hives   • Penicillins Anaphylaxis   • Sulfa Antibiotics Anaphylaxis   • Codeine Hives     Pt can take codeine as long is it is not alot   • Talwin [Pentazocine] Hives   • Tetracyclines & Related Hives   • Influenza Vaccines Hives and Rash       Past Surgical History:   Procedure Laterality Date   • ABDOMINAL SURGERY     • APPENDECTOMY     • CHOLECYSTECTOMY     • FEMUR FRACTURE SURGERY     • HERNIA REPAIR     • HYSTERECTOMY         Family History   Problem Relation Age of Onset   • Suicidality Mother    • Alcohol abuse Mother    • Cancer Father    • Heart disease Father    • Heart disease Maternal Grandmother        Social History     Socioeconomic History   • Marital status:      Spouse name: Not on file   • Number of children: Not on file   • Years of education: Not on file   • Highest education level: Not on file   Tobacco Use   • Smoking status: Current Some Day Smoker     Packs/day: 25.00     Years: 45.00     Pack years: 1125.00     Types: Cigarettes   • Smokeless tobacco: Never Used   • Tobacco comment: Smokes 2-3 ciragettes per day. Former 2 pack a day smoker for 50 years.   Substance and Sexual Activity   • Alcohol use: No   • Drug use: No   • Sexual activity: Defer           Objective   Physical Exam  Vitals and nursing note reviewed.    Constitutional:       General: She is in acute distress.      Appearance: Normal appearance. She is well-developed, well-groomed and normal weight. She is ill-appearing.      Interventions: Nasal cannula in place.   HENT:      Head: Normocephalic and atraumatic.      Mouth/Throat:      Mouth: Mucous membranes are moist.      Pharynx: Oropharynx is clear.   Eyes:      Conjunctiva/sclera: Conjunctivae normal.      Pupils: Pupils are equal, round, and reactive to light.   Cardiovascular:      Rate and Rhythm: Regular rhythm. Tachycardia present.   Pulmonary:      Effort: Tachypnea and accessory muscle usage present.      Breath sounds: No stridor. Decreased breath sounds and wheezing (Expiratory, throughout all lung fields) present.      Comments: Patient with short and shallow breaths at approximately 5-6 word dyspnea using which she reports is her 3 L of nasal cannula home oxygen  Abdominal:      General: Bowel sounds are normal.      Palpations: Abdomen is soft.      Tenderness: There is abdominal tenderness in the epigastric area. There is no right CVA tenderness, left CVA tenderness, guarding or rebound.   Musculoskeletal:      Cervical back: Normal range of motion and neck supple. Tenderness present.      Thoracic back: Tenderness present. No signs of trauma.      Lumbar back: Tenderness present. No signs of trauma. Normal range of motion. Negative right straight leg raise test and negative left straight leg raise test.        Back:       Comments: No evidence of injury to back including no bruising, healing wounds.   Skin:     General: Skin is warm and dry.   Neurological:      General: No focal deficit present.      Mental Status: She is alert and oriented to person, place, and time.   Psychiatric:         Mood and Affect: Mood is anxious.         Speech: Speech normal.         Behavior: Behavior normal. Behavior is cooperative.         Procedures           ED Course  ED Course as of Apr 19 0136 Mon Apr 19,  2021 0134 Using govea her Co2 is chronic.     [JH]      ED Course User Index  [JH] Savage Salazar MD            XR Chest 1 View   ED Interpretation   No acute findings       CT Abdomen Pelvis With Contrast    (Results Pending)   CT Thoracic Spine Without Contrast    (Results Pending)   CT Lumbar Spine Without Contrast    (Results Pending)   CT Angiogram Chest    (Results Pending)   XR Spine Thoracic 2 View    (Results Pending)     Labs Reviewed   COMPREHENSIVE METABOLIC PANEL - Abnormal; Notable for the following components:       Result Value    BUN 6 (*)     Creatinine 0.46 (*)     Chloride 96 (*)     CO2 42.0 (*)     Albumin 3.40 (*)     All other components within normal limits    Narrative:     GFR Normal >60  Chronic Kidney Disease <60  Kidney Failure <15     URINALYSIS W/ CULTURE IF INDICATED - Abnormal; Notable for the following components:    pH, UA 8.5 (*)     All other components within normal limits    Narrative:     Urine microscopic not indicated.   LIPASE - Abnormal; Notable for the following components:    Lipase 9 (*)     All other components within normal limits   CBC WITH AUTO DIFFERENTIAL - Abnormal; Notable for the following components:    RBC 3.08 (*)     Hemoglobin 9.6 (*)     Hematocrit 31.6 (*)     .6 (*)     MCHC 30.4 (*)     Lymphocyte % 13.7 (*)     Monocytes, Absolute 1.06 (*)     All other components within normal limits   BLOOD GAS, ARTERIAL - Abnormal; Notable for the following components:    pCO2, Arterial 71.7 (*)     HCO3, Arterial 44.0 (*)     Base Excess, Arterial 16.3 (*)     pCO2, Temperature Corrected 71.7 (*)     All other components within normal limits   COVID-19,ABBOTT IN-HOUSE,NASAL SWAB (NO TRANSPORT MEDIA) 2 HR TAT - Normal    Narrative:     Fact sheet for providers: https://www.fda.gov/media/461674/download     Fact sheet for patients: https://www.fda.gov/media/913940/download    Test performed by PCR.  Fact sheet for providers:  https://www.fda.gov/media/576606/download     Fact sheet for patients: https://www.fda.gov/media/513142/download    Test performed by PCR.  If inconsistent with clinical signs and symptoms patient should be tested with different authorized molecular test.   TROPONIN (IN-HOUSE) - Normal    Narrative:     Troponin T Reference Range:  <= 0.03 ng/mL-   Negative for AMI  >0.03 ng/mL-     Abnormal for myocardial necrosis.  Clinicians would have to utilize clinical acumen, EKG, Troponin and serial changes to determine if it is an Acute Myocardial Infarction or myocardial injury due to an underlying chronic condition.       Results may be falsely decreased if patient taking Biotin.     TROPONIN (IN-HOUSE) - Normal    Narrative:     Troponin T Reference Range:  <= 0.03 ng/mL-   Negative for AMI  >0.03 ng/mL-     Abnormal for myocardial necrosis.  Clinicians would have to utilize clinical acumen, EKG, Troponin and serial changes to determine if it is an Acute Myocardial Infarction or myocardial injury due to an underlying chronic condition.       Results may be falsely decreased if patient taking Biotin.     BLOOD GAS, ARTERIAL   CBC AND DIFFERENTIAL    Narrative:     The following orders were created for panel order CBC & Differential.  Procedure                               Abnormality         Status                     ---------                               -----------         ------                     CBC Auto Differential[[id number]]        Abnormal            Final result                 Please view results for these tests on the individual orders.                                       MDM    Patient is a 71-year-old female presenting to ED with NVD and not feeling well. PMH significant for 3L chronic NC oxygen use, COPD, GERD, HTN, chronic pain who follows with pain management.  Patient with mechanical fall 2 weeks ago landing on her back who has not been medically evaluated since.CBC with normal white blood cell  count 9.28, H&H stable for patient's chronic diseases at 9.6/31.6. CMP with elevated CO2 42, decreased BUN 6, decreased Creatine 0.46.  Decreased lipase 9.  Covid negative.  Urine studies with no evidence of infection or hematuria.  Initial troponin negative.  ABG with elevated PCO2 at 71.7, HCO3 44, pO2 107 on 2L. pH 7.396. Appears to be compensated from previous. Chest CTA shows: Acute appearing compression fracture of the superior endplate of T12 vertebral body with approximately 40% height loss, no significant bony retropulsion, patchy bilateral groundglass opacities may be infectious or inflammatory.  Bilateral emphysematous changes, peribronchial thickening, biapical pleural parenchymal scar, no central pulmonary emboli.  No thoracic aortic aneurysm or dissection.  No pneumothorax or significant pleural effusions.  CT imaging of the abdomen and pelvis with IV contrast shows: No acute evidence of traumatic injury in the abdomen or pelvis, aortic atherosclerosis, no AAA.  CT imaging of the thoracic spine without contrast shows: Acute compression fracture of the superior endplate of the T12 vertebral body with approximately 40% height loss, no significant bony retropulsion.  CT of the lumbar spine without contrast shows no acute fracture or malalignment, osteopenia, mild degenerative changes.  Patient with persistent increase in anxiety while in ED.  Case was discussed with Dr. Savage Salazar who is in agree with neurosurgery consult.  Case was discussed with Dr. Mercer who requested that patient be placed in a TLSO brace.  Requested additional x-ray imaging.  Supine and recumbent lateral x-rays.  At this time case was handed over to Dr. Savage Salazar.  Pending the results of stability on the additional thoracic x-rays patient will be placed in a TLSO brace for continued inpatient versus outpatient follow-up.  Patient was made aware of this.  Please see Dr. Salazar's note below for further ED course as well  as final diagnosis.      Final diagnoses:   Compression fracture of body of thoracic vertebra (CMS/HCC)       ED Disposition  ED Disposition     ED Disposition Condition Comment    Discharge Stable           Provider, No Known  [facility]  [facility] [address]  [phone]          Shabbir Mercer MD  [address]  [address]  [facility] [address]  [phone]               Medication List      No changes were made to your prescriptions during this visit.          Savage Salazar MD  04/19/21 0136

## 2021-04-19 NOTE — DISCHARGE INSTRUCTIONS
Justyna,    Please wear the brace when you are walking, you can take this off when you are sitting or resting. Please return here for worsening symptoms or any other issues.

## 2021-04-20 ENCOUNTER — TELEPHONE (OUTPATIENT)
Dept: NEUROSURGERY | Facility: CLINIC | Age: 71
End: 2021-04-20

## 2021-04-20 ENCOUNTER — APPOINTMENT (OUTPATIENT)
Dept: CT IMAGING | Facility: HOSPITAL | Age: 71
End: 2021-04-20

## 2021-04-20 NOTE — TELEPHONE ENCOUNTER
Provider: MALACHI BEAL  Caller: JHON VALDIVIA  Relationship to Patient: SELF    Phone Number: [phone]  Reason for Call: PATIENT STATED SHE WAS PUT IN A BRACE AT THE ER FOR A  BROKEN BACK AND SHE HAS BREATHING ISSUES AND STATED SHE IS HAVING DIFFICULTY WITH  STANDING AND BREATHING.  SHE STATED SHE WAS UNAWARE OF HER APPT YESTERDAY 4/19/21 AND WOULD LIKE TO KNOW HOW TO PROCEED.   When was the patient last seen: 4/18/21 (ER)  When did it start: 4/19/21  Where is it located: BACK AND CHEST   Characteristics of symptom/severity: TROUBLE BREATHING AND STANDING  Timing- Is it constant or intermittent: INTERMITTENT  What makes it worse: COUGHING

## 2021-04-20 NOTE — TELEPHONE ENCOUNTER
"Returned call. Patient states that the weight of the brace is causing her pain to be much worse. She also feels that she is not able to \"catch her breath\" with it on. She has a very extensive pulmonary history with current 3L home O2 use. Reports a fall approximately 3 weeks ago, but no complaints of pain. Came to ED 04/18/21 for cold symptoms & found fracture. No real complaints of back pain until she was put in the brace. Pain today 9/10. I have advised her if she is in bed, or sitting she may take brace off but if up/moving would recommend keeping it on. Questioned pain medication, states that she ws told per ER not to take her home narcotics due to her breathing. ?? Explained I can not advise her to take/not take pain medications due to her pulmonary status and us being spine specialist. Recommend she contact pulmonary provider. Explained I would review this with Dr. Mercer/THIERNO Browne and contact her ASAP with further recommendations.     ?if she would even be considered kypho candidate given pulm status, also known lung cancer.  "

## 2021-04-21 ENCOUNTER — TELEPHONE (OUTPATIENT)
Dept: RADIATION ONCOLOGY | Facility: HOSPITAL | Age: 71
End: 2021-04-21

## 2021-04-21 ENCOUNTER — HOSPITAL ENCOUNTER (OUTPATIENT)
Dept: RADIATION ONCOLOGY | Facility: HOSPITAL | Age: 71
Setting detail: RADIATION/ONCOLOGY SERIES
End: 2021-04-21

## 2021-04-21 NOTE — TELEPHONE ENCOUNTER
Patient left message asking for return call.    I have called her three times and received a message that her phone does not accept messages.

## 2021-04-21 NOTE — TELEPHONE ENCOUNTER
Reviewed with provider, I have contacted patient. I have provided an appointment in office. Encouraged to use pain medications as indicated for pain as prescribed per pain management. Wear brace when out of bed. Daughter and patient voiced understanding.

## 2021-04-22 ENCOUNTER — TELEPHONE (OUTPATIENT)
Dept: RADIATION ONCOLOGY | Facility: HOSPITAL | Age: 71
End: 2021-04-22

## 2021-04-22 NOTE — TELEPHONE ENCOUNTER
"Returned patient's phone call re: appointment today.  Patient states that she needs to cancel today's appointment d/t recent hospitalization and back surgery.  States that she is wearing a back brace and its \"really heavy\" and it makes it more difficult to her ambulate and breathe.  She states that she did have a CT chest while hospitalized.   States that she is due to see Dr. Mercer on 4-.  I told her to contact our office after her appointment with Dr. Mercer and we would get her rescheduled.  Patient verbalized understanding.  "

## 2021-04-27 ENCOUNTER — TELEPHONE (OUTPATIENT)
Dept: NEUROSURGERY | Facility: CLINIC | Age: 71
End: 2021-04-27

## 2021-04-27 NOTE — TELEPHONE ENCOUNTER
PT STATES THAT SHE DOES NOT WEAR THE BRACE AS IT IS TOO HARD FOR HER TO WEAR. IT COMPLICATES HER BREATHING.

## 2021-05-04 ENCOUNTER — TELEPHONE (OUTPATIENT)
Dept: NEUROSURGERY | Facility: CLINIC | Age: 71
End: 2021-05-04

## 2021-05-11 ENCOUNTER — HOSPITAL ENCOUNTER (EMERGENCY)
Age: 71
Discharge: HOME | End: 2021-05-11
Attending: EMERGENCY MEDICINE
Payer: MEDICARE

## 2021-05-11 ENCOUNTER — APPOINTMENT (OUTPATIENT)
Dept: GENERAL RADIOLOGY | Age: 71
End: 2021-05-11
Payer: MEDICARE

## 2021-05-11 VITALS
HEIGHT: 66 IN | WEIGHT: 130 LBS | DIASTOLIC BLOOD PRESSURE: 52 MMHG | HEART RATE: 91 BPM | OXYGEN SATURATION: 93 % | SYSTOLIC BLOOD PRESSURE: 105 MMHG | RESPIRATION RATE: 27 BRPM | TEMPERATURE: 98.7 F | BODY MASS INDEX: 20.89 KG/M2

## 2021-05-11 DIAGNOSIS — J44.1 COPD EXACERBATION (HCC): Primary | ICD-10-CM

## 2021-05-11 DIAGNOSIS — J96.11 CHRONIC RESPIRATORY FAILURE WITH HYPOXIA (HCC): ICD-10-CM

## 2021-05-11 DIAGNOSIS — N39.0 URINARY TRACT INFECTION WITHOUT HEMATURIA, SITE UNSPECIFIED: ICD-10-CM

## 2021-05-11 LAB
ADENOVIRUS BY PCR: NOT DETECTED
ALBUMIN SERPL-MCNC: 3.2 G/DL (ref 3.5–5.2)
ALP BLD-CCNC: 115 U/L (ref 35–104)
ALT SERPL-CCNC: 8 U/L (ref 5–33)
ANION GAP SERPL CALCULATED.3IONS-SCNC: 5 MMOL/L (ref 7–19)
AST SERPL-CCNC: 12 U/L (ref 5–32)
BACTERIA: ABNORMAL /HPF
BASOPHILS ABSOLUTE: 0 K/UL (ref 0–0.2)
BASOPHILS RELATIVE PERCENT: 0.2 % (ref 0–1)
BILIRUB SERPL-MCNC: 0.3 MG/DL (ref 0.2–1.2)
BILIRUBIN URINE: NEGATIVE
BLOOD, URINE: ABNORMAL
BORDETELLA PARAPERTUSSIS BY PCR: NOT DETECTED
BORDETELLA PERTUSSIS BY PCR: NOT DETECTED
BUN BLDV-MCNC: 7 MG/DL (ref 8–23)
CALCIUM SERPL-MCNC: 9 MG/DL (ref 8.8–10.2)
CHLAMYDOPHILIA PNEUMONIAE BY PCR: NOT DETECTED
CHLORIDE BLD-SCNC: 95 MMOL/L (ref 98–111)
CLARITY: ABNORMAL
CO2: 40 MMOL/L (ref 22–29)
COLOR: YELLOW
CORONAVIRUS 229E BY PCR: NOT DETECTED
CORONAVIRUS HKU1 BY PCR: NOT DETECTED
CORONAVIRUS NL63 BY PCR: NOT DETECTED
CORONAVIRUS OC43 BY PCR: NOT DETECTED
CREAT SERPL-MCNC: 0.6 MG/DL (ref 0.5–0.9)
CRYSTALS, UA: ABNORMAL /HPF
EKG P AXIS: 94 DEGREES
EKG P-R INTERVAL: 132 MS
EKG Q-T INTERVAL: 346 MS
EKG QRS DURATION: 68 MS
EKG QTC CALCULATION (BAZETT): 395 MS
EKG T AXIS: 74 DEGREES
EOSINOPHILS ABSOLUTE: 0.1 K/UL (ref 0–0.6)
EOSINOPHILS RELATIVE PERCENT: 0.8 % (ref 0–5)
EPITHELIAL CELLS, UA: 0 /HPF (ref 0–5)
GFR AFRICAN AMERICAN: >59
GFR NON-AFRICAN AMERICAN: >60
GLUCOSE BLD-MCNC: 125 MG/DL (ref 74–109)
GLUCOSE URINE: NEGATIVE MG/DL
HCT VFR BLD CALC: 30.9 % (ref 37–47)
HEMOGLOBIN: 9.4 G/DL (ref 12–16)
HUMAN METAPNEUMOVIRUS BY PCR: NOT DETECTED
HUMAN RHINOVIRUS/ENTEROVIRUS BY PCR: NOT DETECTED
HYALINE CASTS: 1 /HPF (ref 0–8)
IMMATURE GRANULOCYTES #: 0 K/UL
INFLUENZA A BY PCR: NOT DETECTED
INFLUENZA B BY PCR: NOT DETECTED
INR BLD: 1.04 (ref 0.88–1.18)
KETONES, URINE: NEGATIVE MG/DL
LACTIC ACID: 0.7 MMOL/L (ref 0.5–1.9)
LEUKOCYTE ESTERASE, URINE: ABNORMAL
LIPASE: 7 U/L (ref 13–60)
LYMPHOCYTES ABSOLUTE: 0.7 K/UL (ref 1.1–4.5)
LYMPHOCYTES RELATIVE PERCENT: 7.9 % (ref 20–40)
MCH RBC QN AUTO: 30.8 PG (ref 27–31)
MCHC RBC AUTO-ENTMCNC: 30.4 G/DL (ref 33–37)
MCV RBC AUTO: 101.3 FL (ref 81–99)
MONOCYTES ABSOLUTE: 0.7 K/UL (ref 0–0.9)
MONOCYTES RELATIVE PERCENT: 8.1 % (ref 0–10)
MYCOPLASMA PNEUMONIAE BY PCR: NOT DETECTED
NEUTROPHILS ABSOLUTE: 7.5 K/UL (ref 1.5–7.5)
NEUTROPHILS RELATIVE PERCENT: 82.6 % (ref 50–65)
NITRITE, URINE: POSITIVE
PARAINFLUENZA VIRUS 1 BY PCR: NOT DETECTED
PARAINFLUENZA VIRUS 2 BY PCR: NOT DETECTED
PARAINFLUENZA VIRUS 3 BY PCR: NOT DETECTED
PARAINFLUENZA VIRUS 4 BY PCR: NOT DETECTED
PDW BLD-RTO: 13.9 % (ref 11.5–14.5)
PH UA: 6.5 (ref 5–8)
PLATELET # BLD: 195 K/UL (ref 130–400)
PMV BLD AUTO: 10.5 FL (ref 9.4–12.3)
POTASSIUM SERPL-SCNC: 3.5 MMOL/L (ref 3.5–5)
PROTEIN UA: 30 MG/DL
PROTHROMBIN TIME: 13.5 SEC (ref 12–14.6)
RBC # BLD: 3.05 M/UL (ref 4.2–5.4)
RBC UA: 3 /HPF (ref 0–4)
RESPIRATORY SYNCYTIAL VIRUS BY PCR: NOT DETECTED
SARS-COV-2, PCR: NOT DETECTED
SODIUM BLD-SCNC: 140 MMOL/L (ref 136–145)
SPECIFIC GRAVITY UA: 1.01 (ref 1–1.03)
TOTAL PROTEIN: 7.2 G/DL (ref 6.6–8.7)
UROBILINOGEN, URINE: 1 E.U./DL
WBC # BLD: 9.1 K/UL (ref 4.8–10.8)
WBC UA: 350 /HPF (ref 0–5)

## 2021-05-11 PROCEDURE — 94640 AIRWAY INHALATION TREATMENT: CPT

## 2021-05-11 PROCEDURE — 71045 X-RAY EXAM CHEST 1 VIEW: CPT

## 2021-05-11 PROCEDURE — 83690 ASSAY OF LIPASE: CPT

## 2021-05-11 PROCEDURE — 87040 BLOOD CULTURE FOR BACTERIA: CPT

## 2021-05-11 PROCEDURE — 6370000000 HC RX 637 (ALT 250 FOR IP): Performed by: EMERGENCY MEDICINE

## 2021-05-11 PROCEDURE — 83605 ASSAY OF LACTIC ACID: CPT

## 2021-05-11 PROCEDURE — 85610 PROTHROMBIN TIME: CPT

## 2021-05-11 PROCEDURE — 2580000003 HC RX 258: Performed by: EMERGENCY MEDICINE

## 2021-05-11 PROCEDURE — 87186 SC STD MICRODIL/AGAR DIL: CPT

## 2021-05-11 PROCEDURE — 93010 ELECTROCARDIOGRAM REPORT: CPT | Performed by: INTERNAL MEDICINE

## 2021-05-11 PROCEDURE — 6360000002 HC RX W HCPCS: Performed by: EMERGENCY MEDICINE

## 2021-05-11 PROCEDURE — 87086 URINE CULTURE/COLONY COUNT: CPT

## 2021-05-11 PROCEDURE — 36415 COLL VENOUS BLD VENIPUNCTURE: CPT

## 2021-05-11 PROCEDURE — 85025 COMPLETE CBC W/AUTO DIFF WBC: CPT

## 2021-05-11 PROCEDURE — 0202U NFCT DS 22 TRGT SARS-COV-2: CPT

## 2021-05-11 PROCEDURE — 81001 URINALYSIS AUTO W/SCOPE: CPT

## 2021-05-11 PROCEDURE — 87077 CULTURE AEROBIC IDENTIFY: CPT

## 2021-05-11 PROCEDURE — 99284 EMERGENCY DEPT VISIT MOD MDM: CPT

## 2021-05-11 PROCEDURE — 96374 THER/PROPH/DIAG INJ IV PUSH: CPT

## 2021-05-11 PROCEDURE — 96375 TX/PRO/DX INJ NEW DRUG ADDON: CPT

## 2021-05-11 PROCEDURE — 80053 COMPREHEN METABOLIC PANEL: CPT

## 2021-05-11 PROCEDURE — 93005 ELECTROCARDIOGRAM TRACING: CPT | Performed by: EMERGENCY MEDICINE

## 2021-05-11 RX ORDER — LEVOFLOXACIN 500 MG/1
500 TABLET, FILM COATED ORAL DAILY
Qty: 7 TABLET | Refills: 0 | Status: SHIPPED | OUTPATIENT
Start: 2021-05-11 | End: 2021-05-18

## 2021-05-11 RX ORDER — PREDNISONE 20 MG/1
20 TABLET ORAL 2 TIMES DAILY
Qty: 10 TABLET | Refills: 0 | Status: SHIPPED | OUTPATIENT
Start: 2021-05-11 | End: 2021-05-16

## 2021-05-11 RX ORDER — DEXAMETHASONE SODIUM PHOSPHATE 10 MG/ML
4 INJECTION, SOLUTION INTRAMUSCULAR; INTRAVENOUS ONCE
Status: COMPLETED | OUTPATIENT
Start: 2021-05-11 | End: 2021-05-11

## 2021-05-11 RX ORDER — IPRATROPIUM BROMIDE AND ALBUTEROL SULFATE 2.5; .5 MG/3ML; MG/3ML
1 SOLUTION RESPIRATORY (INHALATION) ONCE
Status: COMPLETED | OUTPATIENT
Start: 2021-05-11 | End: 2021-05-11

## 2021-05-11 RX ADMIN — DEXAMETHASONE SODIUM PHOSPHATE 4 MG: 10 INJECTION, SOLUTION INTRAMUSCULAR; INTRAVENOUS at 14:07

## 2021-05-11 RX ADMIN — CEFTRIAXONE 1000 MG: 1 INJECTION, POWDER, FOR SOLUTION INTRAMUSCULAR; INTRAVENOUS at 14:07

## 2021-05-11 RX ADMIN — IPRATROPIUM BROMIDE AND ALBUTEROL SULFATE 1 AMPULE: .5; 3 SOLUTION RESPIRATORY (INHALATION) at 14:38

## 2021-05-11 ASSESSMENT — ENCOUNTER SYMPTOMS
WHEEZING: 1
COUGH: 1
ABDOMINAL PAIN: 0
SHORTNESS OF BREATH: 1

## 2021-05-11 NOTE — ED NOTES
Bed: 13  Expected date:   Expected time:   Means of arrival:   Comments:  EMS       Johanne Hays, YUE  05/11/21 2739

## 2021-05-11 NOTE — ED PROVIDER NOTES
[facility]  eMERGENCY dEPARTMENT eNCOUnter      Pt Name: Adline Gosselin  MRN: [id number]  [place] 1950  Date of evaluation: 5/11/2021  Provider: Joan Chance MD    CHIEF COMPLAINT       Chief Complaint   Patient presents with    Shortness of Breath         HISTORY OF PRESENT ILLNESS   (Location/Symptom, Timing/Onset,Context/Setting, Quality, Duration, Modifying Factors, Severity)  Note limiting factors. Adline Gosselin is a 66 y.o. female who presents to the emergency department with shortness of breath. The patient tells me she has been sick x3 weeks. She was seen at [facility] ER she tells me she states she thinks she got a UTI there when they catheterize her. She has had pain ever since. The patient very infrequently smokes. She uses 3-1/2 L oxygen at home today she was very short of breath EMS arrived she was given a DuoNeb treatment on my medical direction orders. She continued on a nonrebreather to the ER. She was saturating in the high 80s on 3-1/2 L earlier when they arrived. The patient states she is not felt well she has no fevers she has no known Covid. The patient has not been vaccinated. She goes to Dr. Odilon Cruz office for pulmonology she states she is had trouble following up due to Covid restrictions over the last 6 months. The patient is on inhaled steroids. She is not on antibiotics at this time. She does have a albuterol breathing machine at home. The history is provided by the patient and the EMS personnel. NursingNotes were reviewed. REVIEW OF SYSTEMS    (2-9 systems for level 4, 10 or more for level 5)     Review of Systems   Constitutional: Negative for fever. Respiratory: Positive for cough, shortness of breath and wheezing. Cardiovascular: Negative for chest pain. Gastrointestinal: Negative for abdominal pain. Genitourinary: Positive for dysuria. Neurological: Negative for seizures and syncope.    Psychiatric/Behavioral: Negative for TABLET    Take 1 tablet by mouth 4 times daily    NICOTINE (NICODERM CQ) 21 MG/24HR    Place 1 patch onto the skin every 24 hours    NICOTINE (NICODERM CQ) 7 MG/24HR    Place 1 patch onto the skin every 24 hours    ONDANSETRON (ZOFRAN ODT) 4 MG DISINTEGRATING TABLET    Take 1 tablet by mouth every 8 hours as needed for Nausea or Vomiting    ONDANSETRON (ZOFRAN) 4 MG TABLET    Take 1 tablet by mouth every 8 hours as needed for Nausea or Vomiting    OXYCODONE (OXYCONTIN) 30 MG T12A EXTENDED RELEASE TABLET    Take 30 mg by mouth every 12 hours for 30 days. [place] List Date: 7/19/18    OXYCODONE-ACETAMINOPHEN (PERCOCET)  MG PER TABLET    TAKE 1 TABLET Q 4 TO 6 HRS PRN. (MAX 5/DAY). Earliest Fill Date: 7/19/18    OXYGEN    Inhale into the lungs    PANTOPRAZOLE (PROTONIX) 20 MG TABLET    TAKE 1 TABLET BY MOUTH 2 TIMES DAILY    TIZANIDINE (ZANAFLEX) 4 MG TABLET    Take 1 tablet by mouth 3 times daily as needed (.)    ZOLPIDEM (AMBIEN) 10 MG TABLET    Take 5 mg by mouth nightly as needed.        ALLERGIES     Aspirin, Codeine, Demerol, Neosporin [bacitracin-neomycin-polymyxin], Nsaids, Pcn [penicillins], Pentazocine lactate, Sulfa antibiotics, Talwin [pentazocine], and Influenza vaccines    FAMILY HISTORY       Family History   Problem Relation Age of Onset    Cancer Father           SOCIAL HISTORY       Social History     Socioeconomic History    Marital status:      Spouse name: None    Number of children: None    Years of education: None    Highest education level: None   Occupational History    None   Social Needs    Financial resource strain: None    Food insecurity     Worry: None     Inability: None    Transportation needs     Medical: None     Non-medical: None   Tobacco Use    Smoking status: Current Some Day Smoker     Packs/day: 0.25     Years: 30.00     Pack years: 7.50     Types: Cigarettes    Smokeless tobacco: Never Used   Substance and Sexual Activity    Alcohol use: No    Drug use: No    Sexual activity: None   Lifestyle    Physical activity     Days per week: None     Minutes per session: None    Stress: None   Relationships    Social connections     Talks on phone: None     Gets together: None     Attends Restorationism service: None     Active member of club or organization: None     Attends meetings of clubs or organizations: None     Relationship status: None    Intimate partner violence     Fear of current or ex partner: None     Emotionally abused: None     Physically abused: None     Forced sexual activity: None   Other Topics Concern    None   Social History Narrative    None       SCREENINGS             PHYSICAL EXAM    (up to 7 for level 4, 8 or more for level 5)     ED Triage Vitals [05/11/21 1159]   BP Temp Temp Source Pulse Resp SpO2 Height Weight   (!) 111/44 98.7 °F (37.1 °C) Oral 88 16 98 % 5' 6\" (1.676 m) 130 lb (59 kg)       Physical Exam  Vitals signs and nursing note reviewed. Constitutional:       Appearance: She is normal weight. Comments: Mildly acute distress with shortness of breath    Cough   HENT:      Head: Normocephalic and atraumatic. Eyes:      Extraocular Movements: Extraocular movements intact. Pupils: Pupils are equal, round, and reactive to light. Neck:      Musculoskeletal: Normal range of motion and neck supple. Cardiovascular:      Rate and Rhythm: Normal rate and regular rhythm. Pulmonary:      Effort: Respiratory distress present. Breath sounds: Wheezing present. Abdominal:      General: Abdomen is flat. There is no distension. Palpations: Abdomen is soft. Tenderness: There is no abdominal tenderness. Musculoskeletal: Normal range of motion. General: No tenderness. Skin:     General: Skin is warm and dry. Neurological:      General: No focal deficit present. Mental Status: She is alert and oriented to person, place, and time. Motor: No weakness.    Psychiatric:         Thought Content: Thought content normal.         Judgment: Judgment normal.      Comments: Mildly anxious         DIAGNOSTIC RESULTS     EKG: All EKG's are interpreted by the Emergency Department Physician who either signs or Co-signs this chart in the absence of a cardiologist.    Patient with a EKG sinus rhythm rate 89 some mild artifact. RADIOLOGY:   Non-plain film images such as CT, Ultrasound and MRI are read by the radiologist. Carol Gonzales images are visualized and preliminarily interpreted by the emergency physician with the below findings:        Interpretation per the Radiologist below, if available at the time of this note:    XR CHEST PORTABLE   Final Result   1. COPD no acute cardiopulmonary process.    Signed by Dr Pat Campos on 5/11/2021 1:01 PM            ED BEDSIDE ULTRASOUND:   Performed by ED Physician - none    LABS:  Labs Reviewed   COMPREHENSIVE METABOLIC PANEL - Abnormal; Notable for the following components:       Result Value    Chloride 95 (*)     CO2 40 (*)     Anion Gap 5 (*)     Glucose 125 (*)     BUN 7 (*)     Albumin 3.2 (*)     Alkaline Phosphatase 115 (*)     All other components within normal limits   CBC WITH AUTO DIFFERENTIAL - Abnormal; Notable for the following components:    RBC 3.05 (*)     Hemoglobin 9.4 (*)     Hematocrit 30.9 (*)     .3 (*)     MCHC 30.4 (*)     Neutrophils % 82.6 (*)     Lymphocytes % 7.9 (*)     Lymphocytes Absolute 0.7 (*)     All other components within normal limits   LIPASE - Abnormal; Notable for the following components:    Lipase 7 (*)     All other components within normal limits   URINE RT REFLEX TO CULTURE - Abnormal; Notable for the following components:    Clarity, UA CLOUDY (*)     Blood, Urine TRACE (*)     Protein, UA 30 (*)     Nitrite, Urine POSITIVE (*)     Leukocyte Esterase, Urine LARGE (*)     All other components within normal limits   MICROSCOPIC URINALYSIS - Abnormal; Notable for the following components:    Bacteria, UA 4+ (*) Crystals, UA NEG (*)     WBC,  (*)     All other components within normal limits   RESPIRATORY PANEL, MOLECULAR, WITH COVID-19   CULTURE, BLOOD 1   CULTURE, BLOOD 2   CULTURE, URINE   LACTIC ACID, PLASMA   PROTIME-INR       All other labs were within normal range or not returned as of this dictation. EMERGENCY DEPARTMENT COURSE and DIFFERENTIALDIAGNOSIS/MDM:   Vitals:    Vitals:    05/11/21 1159   BP: (!) 111/44   Pulse: 88   Resp: 16   Temp: 98.7 °F (37.1 °C)   TempSrc: Oral   SpO2: 98%   Weight: 130 lb (59 kg)   Height: 5' 6\" (1.676 m)       MDM  Number of Diagnoses or Management Options  Chronic respiratory failure with hypoxia (HCC)  COPD exacerbation (HCC)  Urinary tract infection without hematuria, site unspecified  Diagnosis management comments: Patient with COPD exacerbation with productive cough. Given IV steroids as well as antibiotics. Also found of UTI. Patient is no longer on Zanaflex she states she is a lot of allergy she actually does well on Levaquin therefore we will switch her to [address] [place] [address] outpatient along with oral steroid she also has chronic oxygen use at home she saturating 99% after a breathing treatment here. And in no distress she actually fell asleep not because she was hypercapnic but because she could breathe again. I woke her up and we had a very fluid conversation and she is completely alert and oriented. Organ to try and discharge her back to home on her chronic oxygen she will take her nebulizer treatments every 4 to 6 hours we discussed she cannot smoke at all start steroids and Levaquin and have her follow-up with pulmonology as she sees them outpatient. She is good with the plan. Her vital signs and she look much improved from arrival.  She is in no distress.        Amount and/or Complexity of Data Reviewed  Clinical lab tests: ordered and reviewed  Tests in the radiology section of CPT®: reviewed and ordered  Decide to obtain previous medical records or to

## 2021-05-13 ENCOUNTER — TELEPHONE (OUTPATIENT)
Dept: PULMONOLOGY | Facility: CLINIC | Age: 71
End: 2021-05-13

## 2021-05-13 ENCOUNTER — HOSPITAL ENCOUNTER (OUTPATIENT)
Dept: RADIATION ONCOLOGY | Facility: HOSPITAL | Age: 71
Setting detail: RADIATION/ONCOLOGY SERIES
End: 2021-05-13

## 2021-05-13 LAB
ORGANISM: ABNORMAL
ORGANISM: ABNORMAL
URINE CULTURE, ROUTINE: ABNORMAL

## 2021-05-14 ENCOUNTER — DOCUMENTATION (OUTPATIENT)
Dept: CT IMAGING | Facility: HOSPITAL | Age: 71
End: 2021-05-14

## 2021-05-16 LAB
BLOOD CULTURE, ROUTINE: NORMAL
CULTURE, BLOOD 2: NORMAL

## 2021-05-26 ENCOUNTER — OFFICE VISIT (OUTPATIENT)
Dept: RADIATION ONCOLOGY | Facility: HOSPITAL | Age: 71
End: 2021-05-26

## 2021-05-26 VITALS
OXYGEN SATURATION: 97 % | SYSTOLIC BLOOD PRESSURE: 135 MMHG | HEIGHT: 66 IN | WEIGHT: 135 LBS | DIASTOLIC BLOOD PRESSURE: 58 MMHG | BODY MASS INDEX: 21.69 KG/M2

## 2021-05-26 DIAGNOSIS — R63.4 WEIGHT LOSS: ICD-10-CM

## 2021-05-26 DIAGNOSIS — J44.9 STAGE 4 VERY SEVERE COPD BY GOLD CLASSIFICATION (HCC): ICD-10-CM

## 2021-05-26 DIAGNOSIS — Z92.3 STATUS POST STEREOTACTIC RADIOSURGERY: ICD-10-CM

## 2021-05-26 DIAGNOSIS — R91.8 LUNG NODULE, MULTIPLE: ICD-10-CM

## 2021-05-26 DIAGNOSIS — C34.11 MALIGNANT NEOPLASM OF UPPER LOBE OF RIGHT LUNG (HCC): Primary | ICD-10-CM

## 2021-05-26 DIAGNOSIS — F17.200 CURRENT EVERY DAY SMOKER: ICD-10-CM

## 2021-05-26 DIAGNOSIS — Z99.81 OXYGEN DEPENDENT: ICD-10-CM

## 2021-05-26 PROCEDURE — G0463 HOSPITAL OUTPT CLINIC VISIT: HCPCS | Performed by: RADIOLOGY

## 2021-06-03 DIAGNOSIS — J44.9 STAGE 4 VERY SEVERE COPD BY GOLD CLASSIFICATION (HCC): Primary | ICD-10-CM

## 2021-06-03 RX ORDER — ALBUTEROL SULFATE 90 UG/1
2 AEROSOL, METERED RESPIRATORY (INHALATION) EVERY 4 HOURS PRN
Qty: 18 G | Refills: 11 | Status: SHIPPED | OUTPATIENT
Start: 2021-06-03 | End: 2022-02-21 | Stop reason: SDUPTHER

## 2021-06-03 NOTE — TELEPHONE ENCOUNTER
Patient called to request refills on Ventolin. Patient was last seen on 5/12/20 and has a follow up visit on 6/14/21.  Refill request sent to Keisha PA.

## 2021-06-14 ENCOUNTER — HOSPITAL ENCOUNTER (OUTPATIENT)
Dept: CT IMAGING | Facility: HOSPITAL | Age: 71
End: 2021-06-14

## 2021-06-14 ENCOUNTER — HOSPITAL ENCOUNTER (OUTPATIENT)
Dept: CT IMAGING | Facility: HOSPITAL | Age: 71
Discharge: HOME | End: 2021-06-14

## 2021-06-14 DIAGNOSIS — F17.200 CURRENT EVERY DAY SMOKER: ICD-10-CM

## 2021-06-14 DIAGNOSIS — C34.11 MALIGNANT NEOPLASM OF UPPER LOBE OF RIGHT LUNG (HCC): ICD-10-CM

## 2021-06-14 DIAGNOSIS — Z92.3 STATUS POST STEREOTACTIC RADIOSURGERY: ICD-10-CM

## 2021-06-14 DIAGNOSIS — J44.9 STAGE 4 VERY SEVERE COPD BY GOLD CLASSIFICATION (HCC): ICD-10-CM

## 2021-06-21 ENCOUNTER — APPOINTMENT (OUTPATIENT)
Dept: CT IMAGING | Facility: HOSPITAL | Age: 71
End: 2021-06-21

## 2021-06-21 ENCOUNTER — HOSPITAL ENCOUNTER (OUTPATIENT)
Dept: CT IMAGING | Facility: HOSPITAL | Age: 71
End: 2021-06-21

## 2021-06-28 ENCOUNTER — HOSPITAL ENCOUNTER (OUTPATIENT)
Dept: CT IMAGING | Facility: HOSPITAL | Age: 71
End: 2021-06-28

## 2021-06-28 ENCOUNTER — APPOINTMENT (OUTPATIENT)
Dept: CT IMAGING | Facility: HOSPITAL | Age: 71
End: 2021-06-28

## 2021-06-28 ENCOUNTER — DOCUMENTATION (OUTPATIENT)
Dept: RADIATION ONCOLOGY | Facility: HOSPITAL | Age: 71
End: 2021-06-28

## 2021-06-28 NOTE — PROGRESS NOTES
"Today received this note regarding this patient's PET scan which she has canceled on multiple times.    Unfortunately this precludes her from further scheduling of PET scans at this institution according to this note.    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     Dalia saw her in follow-up May 26th and ordered an PET/CT due to her April 2021 CT results.     This PET/CT has been rescheduled x 4 times, due to patient calling the day of and cancelling.  Because of this, the dose has to be trashed.  [facility]  has stated that they will not be rescheduling her PET because of this.     Just wanted to pass on to you.     Thanks!   Christi \"    "

## 2021-07-02 ENCOUNTER — TELEPHONE (OUTPATIENT)
Dept: PULMONOLOGY | Facility: CLINIC | Age: 71
End: 2021-07-02

## 2021-07-02 NOTE — TELEPHONE ENCOUNTER
Patient called stating that she was was having some shortness of breath. She said she was coughing up clear congestion and no fever. Her oxygen level was 97%.   Keisha was in on the conversation. She said she hasn't seen her in over a year so she couldn't recommend anything other than ER or urgent care.  I called patient back to let her know this information.

## 2021-07-12 ENCOUNTER — APPOINTMENT (OUTPATIENT)
Dept: CT IMAGING | Facility: HOSPITAL | Age: 71
End: 2021-07-12

## 2021-07-12 ENCOUNTER — TELEPHONE (OUTPATIENT)
Dept: RADIATION ONCOLOGY | Facility: HOSPITAL | Age: 71
End: 2021-07-12

## 2021-07-12 ENCOUNTER — HOSPITAL ENCOUNTER (OUTPATIENT)
Dept: CT IMAGING | Facility: HOSPITAL | Age: 71
End: 2021-07-12

## 2021-07-12 DIAGNOSIS — R91.8 LUNG NODULE, MULTIPLE: ICD-10-CM

## 2021-07-12 DIAGNOSIS — C34.11 MALIGNANT NEOPLASM OF UPPER LOBE OF RIGHT LUNG (HCC): Primary | ICD-10-CM

## 2021-07-12 NOTE — TELEPHONE ENCOUNTER
----- Message from Kaylee Pyle sent at 7/12/2021  9:13 AM CDT -----  Patient is requesting to speak to you regarding PET being canceled.    Thank you

## 2021-07-12 NOTE — TELEPHONE ENCOUNTER
"Called and spoke with patient, let her know that PET/CT will not be rescheduled d/t her previously cancelling and rescheduling x 4 different occasions.    Patient became upset on the phone, explained that this had not happened.  I went through the referral log and gave her dates on when these instances happened.  Daughter began yelling in the background.  Patient states that she \"does not want to lose Dr. Mejia as a doctor.\"  I told her that WAS NOT what was happening.  It just meant that she would have to have other scans other than a PET/CT.  Told her that I would discuss situation again with Dr. Mejia to see what he wanted to do, and would get back her with.  She verbalized understanding.  "

## 2021-07-13 NOTE — TELEPHONE ENCOUNTER
Instructed patient that CT chest had been ordered by Dr. Mejia.  She verbalized understanding of instructions.

## 2021-07-30 ENCOUNTER — HOSPITAL ENCOUNTER (OUTPATIENT)
Dept: CT IMAGING | Facility: HOSPITAL | Age: 71
Discharge: HOME | End: 2021-07-30
Admitting: RADIOLOGY

## 2021-07-30 DIAGNOSIS — R91.8 LUNG NODULE, MULTIPLE: ICD-10-CM

## 2021-07-30 DIAGNOSIS — C34.11 MALIGNANT NEOPLASM OF UPPER LOBE OF RIGHT LUNG (HCC): ICD-10-CM

## 2021-07-30 LAB — CREAT BLDA-MCNC: 0.7 MG/DL (ref 0.6–1.3)

## 2021-07-30 PROCEDURE — 71260 CT THORAX DX C+: CPT

## 2021-07-30 PROCEDURE — 82565 ASSAY OF CREATININE: CPT

## 2021-07-30 PROCEDURE — 25010000002 IOPAMIDOL 61 % SOLUTION: Performed by: RADIOLOGY

## 2021-07-30 RX ADMIN — IOPAMIDOL 100 ML: 612 INJECTION, SOLUTION INTRAVENOUS at 14:33

## 2021-07-31 ENCOUNTER — NURSE TRIAGE (OUTPATIENT)
Dept: CALL CENTER | Facility: HOSPITAL | Age: 71
End: 2021-07-31

## 2021-07-31 NOTE — TELEPHONE ENCOUNTER
"    Reason for Disposition  • Patient sounds very sick or weak to the triager    Additional Information  • Negative: SEVERE difficulty breathing (e.g., struggling for each breath, speaks in single words, stridor)  • Negative: Sounds like a life-threatening emergency to the triager  • Negative: [1] Diagnosed strep throat AND [2] taking antibiotic AND [3] symptoms continue  • Negative: Throat culture results, call about  • Negative: Productive cough is main symptom  • Negative: Non-productive cough is main symptom  • Negative: Hoarseness is main symptom  • Negative: Runny nose is main symptom  • Negative: [1] Drooling or spitting out saliva (because can't swallow) AND [2] normal breathing  • Negative: Unable to open mouth completely  • Negative: [1] Difficulty breathing AND [2] not severe  • Negative: Fever > 104 F (40 C)  • Negative: [1] Refuses to drink anything AND [2] for > 12 hours  • Negative: [1] Drinking very little AND [2] dehydration suspected (e.g., no urine > 12 hours, very dry mouth, very lightheaded)    Answer Assessment - Initial Assessment Questions  1. ONSET: \"When did the throat start hurting?\" (Hours or days ago)       This morning   2. SEVERITY: \"How bad is the sore throat?\" (Scale 1-10; mild, moderate or severe)    - MILD (1-3):  doesn't interfere with eating or normal activities    - MODERATE (4-7): interferes with eating some solids and normal activities    - SEVERE (8-10):  excruciating pain, interferes with most normal activities    - SEVERE DYSPHAGIA: can't swallow liquids, drooling      Moderate to severe   3. STREP EXPOSURE: \"Has there been any exposure to strep within the past week?\" If Yes, ask: \"What type of contact occurred?\"       Unknown   4.  VIRAL SYMPTOMS: \"Are there any symptoms of a cold, such as a runny nose, cough, hoarse voice or red eyes?\"       Lymph nodes are so swollen that it looks like golf balls on her neck   5. FEVER: \"Do you have a fever?\" If Yes, ask: \"What is your " "temperature, how was it measured, and when did it start?\"      Unknown   6. PUS ON THE TONSILS: \"Is there pus on the tonsils in the back of your throat?\"      Unknown   7. OTHER SYMPTOMS: \"Do you have any other symptoms?\" (e.g., difficulty breathing, headache, rash)      Wears oxygen, but sounds SOA   8. PREGNANCY: \"Is there any chance you are pregnant?\" \"When was your last menstrual period?\"      No    Protocols used: SORE THROAT-ADULT-AH      "

## 2021-08-02 ENCOUNTER — OFFICE VISIT (OUTPATIENT)
Dept: PULMONOLOGY | Facility: CLINIC | Age: 71
End: 2021-08-02

## 2021-08-02 VITALS
BODY MASS INDEX: 20.18 KG/M2 | HEIGHT: 66 IN | OXYGEN SATURATION: 98 % | HEART RATE: 98 BPM | SYSTOLIC BLOOD PRESSURE: 136 MMHG | DIASTOLIC BLOOD PRESSURE: 88 MMHG | WEIGHT: 125.6 LBS

## 2021-08-02 DIAGNOSIS — Z92.3 STATUS POST STEREOTACTIC RADIOSURGERY: ICD-10-CM

## 2021-08-02 DIAGNOSIS — F17.200 CURRENT EVERY DAY SMOKER: ICD-10-CM

## 2021-08-02 DIAGNOSIS — J43.9 PULMONARY EMPHYSEMA, UNSPECIFIED EMPHYSEMA TYPE (HCC): ICD-10-CM

## 2021-08-02 DIAGNOSIS — Z99.81 HYPOXEMIA REQUIRING SUPPLEMENTAL OXYGEN: ICD-10-CM

## 2021-08-02 DIAGNOSIS — R59.0 ANTERIOR CERVICAL LYMPHADENOPATHY: ICD-10-CM

## 2021-08-02 DIAGNOSIS — R91.8 LUNG NODULE, MULTIPLE: ICD-10-CM

## 2021-08-02 DIAGNOSIS — J44.9 STAGE 4 VERY SEVERE COPD BY GOLD CLASSIFICATION (HCC): Primary | ICD-10-CM

## 2021-08-02 DIAGNOSIS — Z14.8 ALPHA-1-ANTITRYPSIN DEFICIENCY CARRIER: ICD-10-CM

## 2021-08-02 DIAGNOSIS — Z99.81 OXYGEN DEPENDENT: ICD-10-CM

## 2021-08-02 DIAGNOSIS — Z87.891 PERSONAL HISTORY OF NICOTINE DEPENDENCE: ICD-10-CM

## 2021-08-02 DIAGNOSIS — R09.02 HYPOXEMIA REQUIRING SUPPLEMENTAL OXYGEN: ICD-10-CM

## 2021-08-02 PROCEDURE — 99406 BEHAV CHNG SMOKING 3-10 MIN: CPT | Performed by: INTERNAL MEDICINE

## 2021-08-02 PROCEDURE — 99214 OFFICE O/P EST MOD 30 MIN: CPT | Performed by: INTERNAL MEDICINE

## 2021-08-02 RX ORDER — AZITHROMYCIN 250 MG/1
TABLET, FILM COATED ORAL
Qty: 6 TABLET | Refills: 0 | Status: SHIPPED | OUTPATIENT
Start: 2021-08-02 | End: 2022-05-17

## 2021-08-02 RX ORDER — BENZONATATE 100 MG/1
100 CAPSULE ORAL 3 TIMES DAILY PRN
Qty: 42 CAPSULE | Refills: 3 | Status: SHIPPED | OUTPATIENT
Start: 2021-08-02

## 2021-08-02 NOTE — PROGRESS NOTES
[facility] OUTPATIENT PROGRESS NOTE    Patient: Justyna Lei  : 1950  Age: 71 y.o.  Date of Service: 2021    REASON FOR CLINIC VISIT:  Chief Complaint   Patient presents with   • Lung Nodule     has swelling in neck lymph nodes and face states due to contast dye ct scan   • Stage 4 very severe COPD by GOLD classification       Subjective:    History of Present Illness:  Justyna Lei is a 71 y.o. female who presents to the office today to be seen for    Diagnosis Plan   1. Stage 4 very severe COPD by GOLD classification (CMS/HCC)  Pulmonary Function Test   2. Pulmonary emphysema, unspecified emphysema type (CMS/HCC)     3. Lung nodule, multiple     4. Personal history of nicotine dependence     5. Oxygen dependent     6. Alpha-1-antitrypsin deficiency carrier     7. Current every day smoker     8. Hypoxemia requiring supplemental oxygen     9. Anterior cervical lymphadenopathy     10. Status post stereotactic radiosurgery     .  Other problems per record.  Patient is a 71 years old very pleasant  female returns the pulmonary clinic today with her stepdaughter for follow-up visit.    She was seen and followed by Keisha Simon the APRN in the past.  She was last seen in the pulmonary clinic in May 2020 as a telephone visit.. She has a complex medical history.  She has very severe chronic obstructive pulmonary disease with FEV1 25% of predicted and she is on home oxygen 2 to 3 L all the time.  She continues to smoke half pack a day.  She was diagnosed with a left upper lobe malignant lung nodule in the CT scan which was later found to be strongly PET positive.  This was worked up last year and she was seen by Dr. Dave Mejia the radiation oncologist.  She received 5 or 6 cycles of chemotherapy and apparently was cancer free and was getting regular follow-up with Dr. Sue.  She had a follow-up PET scan ordered but she missed a few appointments due to  misunderstanding and she had a CT scan done recently which showed she had stable right-sided peripheral lung nodule and severe emphysematous changes in both lung and hilar lymphadenopathy was improved.      She was referred to pulmonary clinic for discussed the CT results with me today.  The patient reported since she had a CT scan done a few days ago she had significant swelling around the neck which is decreased but she also had some pain on the left side of the neck and has difficulty in swallowing.  For her COPD she used Trelegy Ellipta in the past but had some problem with Trelegy and currently she is using Symbicort and albuterol nebulizer and rescue inhaler.  She uses allergy medications including Flonase, Singulair and loratadine but still continues to have allergy problems.  She is using oxygen all the time.  She still gets short of breath.  She lost some weight over the last year and currently weighs 125 pounds.    She did not have Covid infection or exposure to Covid.  She has anxiety issues.  She did not have any recent hospitalizations and ER visit urgent care visit.  She did not have any fever but has persistent cough and is requesting some cough medications.  She is usually tired and fatigued.        PFT done today:  Not done today    PFT Values        Some values may be hidden. Unless noted otherwise, only the newest values recorded on each date are displayed.         Old Values PFT Results 8/19/19   FVC 55%   FEV1 25%   FEV1/FVC 35.22%   DLCO 27%      Pre Drug PFT Results 8/19/19   No data to display.      Post Drug PFT Results 8/19/19   No data to display.      Other Tests PFT Results 8/19/19   No data to display.           Results for orders placed in visit on 08/19/19    Pulmonary Function Test         Bronchodilator therapy: Symbicort and     Smoking Status:   Social History     Tobacco Use   Smoking Status Current Some Day Smoker   • Packs/day: 25.00   • Years: 45.00   • Pack years: 1125.00   •  Types: Cigarettes   Smokeless Tobacco Never Used   Tobacco Comment    Smokes 2-3 ciragettes per day. Former 2 pack a day smoker for 50 years.     Pulm Rehab: no  Sleep: yes    Support System: lives with their family    Code Status:   There are no questions and answers to display.        Review of Systems:  A complete review of systems is performed and all other systems were reviewed and negative as note above in the HPI.  Review of Systems   Constitutional: Positive for fatigue.   HENT: Positive for congestion and postnasal drip.    Eyes: Negative.    Respiratory: Positive for cough, chest tightness and shortness of breath.    Cardiovascular: Negative.    Gastrointestinal: Negative.    Endocrine: Negative.    Genitourinary: Negative.    Musculoskeletal: Positive for arthralgias and back pain.   Skin: Negative.    Allergic/Immunologic: Positive for food allergies.   Neurological: Positive for weakness.   Hematological: Negative.    Psychiatric/Behavioral: The patient is nervous/anxious.        CAT/ACT Score:  Not done today    Medications:  Outpatient Encounter Medications as of 8/2/2021   Medication Sig Dispense Refill   • albuterol (PROVENTIL) (2.5 MG/3ML) 0.083% nebulizer solution Take 2.5 mg by nebulization Every 4 (Four) Hours As Needed for Wheezing. 120 vial 12   • ALPRAZolam (XANAX) 1 MG tablet Take 1 mg by mouth 4 (Four) Times a Day As Needed.     • amphetamine-dextroamphetamine (ADDERALL) 20 MG tablet Take 20 mg by mouth 2 (Two) Times a Day As Needed.     • azelastine (ASTELIN) 0.1 % nasal spray PLEASE SEE ATTACHED FOR DETAILED DIRECTIONS 1 each 11   • budesonide-formoterol (Symbicort) 160-4.5 MCG/ACT inhaler Inhale 2 puffs 2 (Two) Times a Day. 10.2 g 12   • dextromethorphan 15 MG/5ML syrup Take 5 mL by mouth 4 (Four) Times a Day As Needed for Cough. 118 mL 4   • escitalopram (LEXAPRO) 10 MG tablet Take 10 mg by mouth Daily.     • fluticasone (FLONASE) 50 MCG/ACT nasal spray 2 SPRAYS INTO THE NOSTRIL(S) AS  DIRECTED BY PROVIDER DAILY. 48 mL 3   • gabapentin (NEURONTIN) 600 MG tablet Take 600 mg by mouth Every 8 (Eight) Hours.     • loratadine (CLARITIN) 10 MG tablet Take 1 tablet by mouth Daily. 90 tablet 3   • metoclopramide (REGLAN) 5 MG tablet Take 1 tablet by mouth 3 (Three) Times a Day Before Meals. 90 tablet 5   • montelukast (SINGULAIR) 10 MG tablet TAKE 1 TABLET BY MOUTH EVERY DAY AT NIGHT 90 tablet 3   • nystatin (MYCOSTATIN) 345929 UNIT/ML suspension Take 5 mL by mouth 4 (Four) Times a Day. Swish in mouth and swallow.  Use for 48 hours after symptoms have ceased. 280 mL 3   • omeprazole (priLOSEC) 40 MG capsule TAKE 1 CAPSULE BY MOUTH EVERY DAY 90 capsule 2   • ondansetron ODT (ZOFRAN-ODT) 4 MG disintegrating tablet 4 mg Every 6 (Six) Hours As Needed.     • oxyCODONE-acetaminophen (PERCOCET)  MG per tablet Take 1 tablet by mouth Every 6 (Six) Hours As Needed for Moderate Pain .     • Ventolin  (90 Base) MCG/ACT inhaler Inhale 2 puffs Every 4 (Four) Hours As Needed for Wheezing or Shortness of Air. 18 g 11   • zolpidem (AMBIEN) 10 MG tablet Take 10 mg by mouth every night at bedtime.     • [DISCONTINUED] dextromethorphan 15 MG/5ML syrup Take 5 mL by mouth 4 (Four) Times a Day As Needed for Cough. 118 mL 4   • tiZANidine (ZANAFLEX) 4 MG tablet Take 4 mg by mouth 3 (Three) Times a Day.       No facility-administered encounter medications on file as of 8/2/2021.       Allergies:  Allergies   Allergen Reactions   • Aspirin Anaphylaxis   • Demerol [Meperidine] Anaphylaxis   • Ibuprofen Anaphylaxis   • Neosporin [Neomycin-Bacitracin Zn-Polymyx] Anaphylaxis   • Nsaids Hives   • Penicillins Anaphylaxis   • Sulfa Antibiotics Anaphylaxis   • Codeine Hives     Pt can take codeine as long is it is not alot   • Talwin [Pentazocine] Hives   • Tetracyclines & Related Hives   • Influenza Vaccines Hives and Rash       Immunizations:  There is no immunization history for the selected administration types on file  "for this patient.    Objective:    Vitals:  /88   Pulse 98   Ht 167.6 cm (66\")   Wt 57 kg (125 lb 9.6 oz)   LMP  (LMP Unknown) Comment: hysterectomy  SpO2 98% Comment: 3l  BMI 20.27 kg/m²     Physical Exam:  General: Patient is a 71 y.o. elderly thin built  female. Looks stated age. Appears to be in no acute distress.  Eyes: EOMI. PERRLA. Vision intact. No scleral icterus.  Ear, Nose, Mouth and Throat: Hearing is grossly intact. No Leukoplakia, pharyngitis, stomatitis or thrush. Swollen nasal mucosa with post nasal drop.  Neck: Range of motion of neck normal. No thyromegaly or masses. Mallampati Class 3,  No JVD.  She had anterior cervical lymphadenopathy noted which is firm indurated and slightly tender on the left side.  Respiratory: Clear to auscultation bilaterally. No use of accessory muscles. Decreased breath sounds.  Cardiovascular: Normal heart sounds. Regularly regular rhythm without murmur.  Gastrointestinal: Non tender, non distended, soft. Bowel sounds positive in all four quadrants. No organomegaly.  Skin: No obvious rashes, lesions, ulcers or large amount of bruising. No edema.   Neurological: No new motor deficits. Cranial nerves appear intact.  Psychiatric: Patient is alert and oriented to person, place and time.    Chest Imaging:    CT scan of chest with contrast showed    IMPRESSION:  1. Centrilobular emphysematous changes noted throughout the pulmonary  parenchyma.  2. 0.8 mm nodule periphery of the right lung on image 94 is similar to  April 18, 2021.  2. Right hilar lymph node may be minimally decreased in size.  3. Previously described anterior mediastinal 19 mm nodule is completely  resolved with no residual.        4. Tree-in-bud peripheral nodularity in the right lung just above the  peripheral nodule. This may be an inflammatory process. This is also  similar to April 18, 2021.        This report was finalized on 07/30/2021 14:53 by Dr. Adan Myers, " MD.    Assessment:  1. Stage 4 very severe COPD by GOLD classification (CMS/HCC)    2. Pulmonary emphysema, unspecified emphysema type (CMS/HCC)    3. Lung nodule, multiple    4. Personal history of nicotine dependence    5. Oxygen dependent    6. Alpha-1-antitrypsin deficiency carrier    7. Current every day smoker    8. Hypoxemia requiring supplemental oxygen    9. Anterior cervical lymphadenopathy    10. Status post stereotactic radiosurgery        Plan/Recommendations:    1.  I reviewed the CT scan results with the patient and her stepdaughter and explained the results to them.  Although she has stable lung nodules a PET scan will be essential for further work-up which she did not do in the last few months and will probably need 1 soon.  2.  He has cervical lymphadenopathy noted which could be lymphadenitis from infection but as she has a history of cancer there is always a concern about malignant involvement of the lymph nodes and further work-up and a CT guided needle biopsy will be needed otherwise the PET scan needs to be done again.  3.  Called and personally talked to Dr. Dave Mejia patient oncologist but he was scheduled to see the patient on 26 August 2021 but he agreed to see the patient sooner and the family to call and make an earlier appointment.  In the meantime I gave the patient a course of azithromycin for possible infectious etiology of lymphadenopathy and if it does not improve further work-up will be needed as discussed.  4.  For her COPD she will continue using Symbicort and albuterol nebulizer and rescue inhaler.  For the chronic respiratory flow she should continues on oxygen all the time.  5.  She will continue allergy medications with Flonase Claritin and Singulair and also will use Tessalon Perles and dextromethorphan cough syrup as needed and the patient sent to the pharmacy.  Smoking cessation was discussed with the patient and 7 minutes spent on smoking cessation counseling.  She  was strongly encouraged to quit smoking.  She appears motivated to quit date has been set up in a month time.  All necessary information was given.  She did not want any pharmacologic intervention.  6.  Continue all other treatment as before and return to pulmonary clinic in 3 months time for follow-up visit or earlier if needed.    Follow up:  3 Months    Time Spent;  30 minutes    I appreciate the opportunity of participating in this patient's care. I would like to thank the PCP for the referral.  Please feel free to contact me with any other questions.    Isabela Flores MD   Pulmonologist/Intensivist     Electronically signed by: Isabela Flores MD, 8/2/2021 16:48 CDT     Addendum:  She had called our office reporting chest congestion and more difficulty in breathing.  She missed a few appointments and was last seen by me on August 2, 2021.  She was also seen by Dr. Jean for radiation treatment for her malignant lung nodule and going to see Dr. Jean again tomorrow.  She still continues to smoke a few cigarettes a day.  She is currently using Symbicort but could not afford Trelegy Ellipta.  She is using albuterol nebulizer as well.  She is on 2 to 3 L oxygen all the time at home.  Her last PFT showed very severe COPD with FEV1 25% of predicted.  She reported having fever and was in the ER a few times mostly with gastroenteritis and stomach trouble.  She did not use nicotine patch to quit smoking because this caused some tachycardia.  She was advised to return to the pulmonary clinic on Thursday because she had an appointment with Dr. Mejia and she can come to my office after that visit in the afternoon.  Again she was advised to quit smoking.  I called in a prescription for azithromycin for her chest congestion and also called in a prescription for prednisone taper for COPD exacerbation with possible bronchitis.  I informed my office to schedule appointment for Thursday afternoon on 19 May 2022.    Isabela  MD Mark  Pulmonologist/Intensivist  5/17/2022 16:07 CDT

## 2021-08-04 ENCOUNTER — TELEPHONE (OUTPATIENT)
Dept: PULMONOLOGY | Facility: CLINIC | Age: 71
End: 2021-08-04

## 2021-08-04 DIAGNOSIS — C34.11 MALIGNANT NEOPLASM OF UPPER LOBE OF RIGHT LUNG (HCC): Primary | ICD-10-CM

## 2021-08-04 NOTE — TELEPHONE ENCOUNTER
Pharmacy sent a note needing clarification on the medication Dextromethorphan 15mg/5ml r/t to this dosage not existing. Alt dose is 3mg/ml. Would you like to change this?

## 2021-08-05 RX ORDER — DEXTROMETHORPHAN POLISTIREX 30 MG/5ML
15 SUSPENSION ORAL 4 TIMES DAILY
Qty: 118 ML | Refills: 4 | Status: SHIPPED | OUTPATIENT
Start: 2021-08-05

## 2021-08-27 ENCOUNTER — NURSE TRIAGE (OUTPATIENT)
Dept: CALL CENTER | Facility: HOSPITAL | Age: 71
End: 2021-08-27

## 2021-08-27 NOTE — TELEPHONE ENCOUNTER
"If do not urinate > 12 hours, fever> 103 or severe pain >1 hour then go to ER or if SPO2 <90% on her O2.     Reason for Disposition  • [1] MILD or MODERATE vomiting AND [2] present > 48 hours (2 days) (Exception: mild vomiting with associated diarrhea)    Additional Information  • Negative: Shock suspected (e.g., cold/pale/clammy skin, too weak to stand, low BP, rapid pulse)  • Negative: Difficult to awaken or acting confused (e.g., disoriented, slurred speech)  • Negative: Sounds like a life-threatening emergency to the triager  • Negative: Vomiting occurs only while coughing  • Negative: [1] Pregnant < 20 Weeks AND [2] nausea/vomiting began in early pregnancy (i.e., 4-8 weeks pregnant)  • Negative: Chest pain  • Negative: Headache is main symptom  • Negative: Vomiting (or Nausea) in a cancer patient who is currently (or recently) receiving chemotherapy or radiation therapy, or cancer patient who has metastatic or end-stage cancer and is receiving palliative care  • Negative: [1] Vomiting AND [2] contains red blood or black (\"coffee ground\") material  (Exception: few red streaks in vomit that only happened once)  • Negative: Severe pain in one eye  • Negative: Recent head injury (within last 3 days)  • Negative: Recent abdominal injury (within last 3 days)  • Negative: [1] Insulin-dependent diabetes (Type I) AND [2] glucose > 400 mg/dl (22 mmol/l)  • Negative: [1] Vomiting AND [2] hernia is more painful or swollen than usual  • Negative: [1] SEVERE vomiting (e.g., 6 or more times/day) AND [2] present > 8 hours (Exception: patient sounds well, is drinking liquids, does not sound dehydrated, and vomiting has lasted less than 24 hours)  • Negative: [1] MODERATE vomiting (e.g., 3 - 5 times/day) AND [2] age > 60 years  • Negative: Severe headache (e.g., excruciating) (Exception: similar to previous migraines)  • Negative: High-risk adult (e.g., diabetes mellitus, brain tumor, V-P shunt, hernia)  • Negative: [1] Drinking " "very little AND [2] dehydration suspected (e.g., no urine > 12 hours, very dry mouth, very lightheaded)  • Negative: Patient sounds very sick or weak to the triager  • Negative: [1] Vomiting AND [2] abdomen looks much more swollen than usual  • Negative: [1] Vomiting AND [2] contains bile (green color)  • Negative: [1] Constant abdominal pain AND [2] present > 2 hours  • Negative: [1] Fever > 103 F (39.4 C) AND [2] not able to get the fever down using Fever Care Advice  • Negative: [1] Fever > 101 F (38.3 C) AND [2] age > 60 years  • Negative: [1] Fever > 100.0 F (37.8 C) AND [2] bedridden (e.g., nursing home patient, CVA, chronic illness, recovering from surgery)  • Negative: [1] Fever > 100.0 F (37.8 C) AND [2] weak immune system (e.g., HIV positive, cancer chemo, splenectomy, organ transplant, chronic steroids)  • Negative: Taking any of the following medications: digoxin (Lanoxin), lithium, theophylline, phenytoin (Dilantin)    Answer Assessment - Initial Assessment Questions  1. VOMITING SEVERITY: \"How many times have you vomited in the past 24 hours?\"      - MILD:  1 - 2 times/day     - MODERATE: 3 - 5 times/day, decreased oral intake without significant weight loss or symptoms of dehydration     - SEVERE: 6 or more times/day, vomits everything or nearly everything, with significant weight loss, symptoms of dehydration       Has stopped,, this was two days ago  2. ONSET: \"When did the vomiting begin?\"       Two days  3. FLUIDS: \"What fluids or food have you vomited up today?\" \"Have you been able to keep any fluids down?\"      water  4. ABDOMINAL PAIN: \"Are your having any abdominal pain?\" If yes : \"How bad is it and what does it feel like?\" (e.g., crampy, dull, intermittent, constant)       Neck and back sore from heaving  5. DIARRHEA: \"Is there any diarrhea?\" If Yes, ask: \"How many times today?\"       no  6. CONTACTS: \"Is there anyone else in the family with the same symptoms?\"       none  7. CAUSE: \"What do " "you think is causing your vomiting?\"      Virus, covid negative x2  8. HYDRATION STATUS: \"Any signs of dehydration?\" (e.g., dry mouth [not only dry lips], too weak to stand) \"When did you last urinate?\"      Has peed today x 4-5 times  9. OTHER SYMPTOMS: \"Do you have any other symptoms?\" (e.g., fever, headache, vertigo, vomiting blood or coffee grounds, recent head injury)      Fever 100 and hurts and is on O2  10. PREGNANCY: \"Is there any chance you are pregnant?\" \"When was your last menstrual period?\"        NA    Protocols used: VOMITING-ADULT-AH      "

## 2021-09-14 ENCOUNTER — HOSPITAL ENCOUNTER (OUTPATIENT)
Dept: RADIATION ONCOLOGY | Facility: HOSPITAL | Age: 71
Setting detail: RADIATION/ONCOLOGY SERIES
End: 2021-09-14

## 2021-09-23 ENCOUNTER — NURSE TRIAGE (OUTPATIENT)
Dept: CALL CENTER | Facility: HOSPITAL | Age: 71
End: 2021-09-23

## 2021-09-23 NOTE — TELEPHONE ENCOUNTER
Caller reporting severe diarrhea on and off for days now. Caller states she has had headache and wondering if she has COVID? Caller states chronic cough, sinus drainage. Caller reporting some abdominal cramping rates four. Caller reporting urination small amount about hour and half ago. Caller states other family members with GI complaints. Caller denies blood in stool. Caller states she is weak and scared. Caller advised per guideline.           Reason for Disposition  • [1] SEVERE diarrhea (e.g., 7 or more times / day more than normal) AND [2] age > 60 years    Additional Information  • Negative: [1] Abdominal pain, moderate-severe AND [2] upper  • Negative: [1] Abdominal pain AND [2] pregnant 20 or more weeks  • Negative: [1] Abdominal pain AND [2] pregnant < 20 weeks  • Negative: [1] Abdominal pain, moderate-severe AND [2] female  • Negative: [1] Abdominal pain, moderate-severe AND [2] male  • Negative: [1] Pregnant < 20 Weeks AND [2] nausea/vomiting began in early pregnancy (i.e., 4-8 weeks pregnant)  • Negative: Vomiting  • Diarrhea  • Negative: Shock suspected (e.g., cold/pale/clammy skin, too weak to stand, low BP, rapid pulse)  • Negative: Difficult to awaken or acting confused (e.g., disoriented, slurred speech)  • Negative: Sounds like a life-threatening emergency to the triager  • Negative: Vomiting also present and worse than the diarrhea  • Negative: [1] Blood in stool AND [2] without diarrhea  • Negative: Diarrhea in a cancer patient who is currently (or recently) receiving chemotherapy or radiation therapy, or cancer patient who has metastatic or end-stage cancer and is receiving palliative care  • Negative: [1] SEVERE abdominal pain (e.g., excruciating) AND [2] present > 1 hour  • Negative: [1] SEVERE abdominal pain AND [2] age > 60 years  • Negative: [1] Blood in the stool AND [2] moderate or large amount of blood  • Negative: Black or tarry bowel movements (Exception: chronic-unchanged black-grey  "bowel movements AND is taking iron pills or Pepto-bismol)  • Negative: [1] Drinking very little AND [2] dehydration suspected (e.g., no urine > 12 hours, very dry mouth, very lightheaded)  • Negative: Patient sounds very sick or weak to the triager    Answer Assessment - Initial Assessment Questions  1. DIARRHEA SEVERITY: \"How bad is the diarrhea?\" \"How many extra stools have you had in the past 24 hours than normal?\"     - NO DIARRHEA (SCALE 0)    - MILD (SCALE 1-3): Few loose or mushy BMs; increase of 1-3 stools over normal daily number of stools; mild increase in ostomy output.    -  MODERATE (SCALE 4-7): Increase of 4-6 stools daily over normal; moderate increase in ostomy output.  * SEVERE (SCALE 8-10; OR 'WORST POSSIBLE'): Increase of 7 or more stools daily over normal; moderate increase in ostomy output; incontinence.      Severe  2. ONSET: \"When did the diarrhea begin?\"        On and off couple days   3. BM CONSISTENCY: \"How loose or watery is the diarrhea?\"       Watery   4. VOMITING: \"Are you also vomiting?\" If Yes, ask: \"How many times in the past 24 hours?\"       Once more like water   5. ABDOMINAL PAIN: \"Are you having any abdominal pain?\" If Yes, ask: \"What does it feel like?\" (e.g., crampy, dull, intermittent, constant)       Cramping like comes and goes   6. ABDOMINAL PAIN SEVERITY: If present, ask: \"How bad is the pain?\"  (e.g., Scale 1-10; mild, moderate, or severe)    - MILD (1-3): doesn't interfere with normal activities, abdomen soft and not tender to touch     - MODERATE (4-7): interferes with normal activities or awakens from sleep, tender to touch     - SEVERE (8-10): excruciating pain, doubled over, unable to do any normal activities        Four right now   7. ORAL INTAKE: If vomiting, \"Have you been able to drink liquids?\" \"How much fluids have you had in the past 24 hours?\"       I don't know about six   8. HYDRATION: \"Any signs of dehydration?\" (e.g., dry mouth [not just dry lips], too " "weak to stand, dizziness, new weight loss) \"When did you last urinate?\"      Last urination about hour and half ago   9. EXPOSURE: \"Have you traveled to a foreign country recently?\" \"Have you been exposed to anyone with diarrhea?\" \"Could you have eaten any food that was spoiled?\"      Denies   10. ANTIBIOTIC USE: \"Are you taking antibiotics now or have you taken antibiotics in the past 2 months?\"        Yes last month   11. OTHER SYMPTOMS: \"Do you have any other symptoms?\" (e.g., fever, blood in stool)        Denies all but states weak   12. PREGNANCY: \"Is there any chance you are pregnant?\" \"When was your last menstrual period?\"    Protocols used: DIARRHEA-ADULT-AH, GI MULTIPLE SYMPTOMS - GUIDELINE SELECTION-ADULT-AH      "

## 2021-09-26 DIAGNOSIS — J00 NASOPHARYNGITIS: ICD-10-CM

## 2021-09-27 RX ORDER — MONTELUKAST SODIUM 10 MG/1
TABLET ORAL
Qty: 90 TABLET | Refills: 3 | Status: SHIPPED | OUTPATIENT
Start: 2021-09-27 | End: 2022-10-10 | Stop reason: SDUPTHER

## 2021-09-27 NOTE — TELEPHONE ENCOUNTER
Pharmacy sent a request for refills on Singulair.   Rx Refill Note  Requested Prescriptions     Pending Prescriptions Disp Refills   • montelukast (SINGULAIR) 10 MG tablet [Pharmacy Med Name: MONTELUKAST SOD 10 MG TABLET] 90 tablet 3     Sig: TAKE 1 TABLET BY MOUTH EVERY DAY AT NIGHT      Last office visit with prescribing clinician: 5/12/2020      Next office visit with prescribing clinician: 12/6/21 with Dr. Mark Patel, [facility]  09/27/21, 09:47 CDT

## 2021-09-30 ENCOUNTER — OFFICE VISIT (OUTPATIENT)
Dept: RADIATION ONCOLOGY | Facility: HOSPITAL | Age: 71
End: 2021-09-30

## 2021-09-30 VITALS
OXYGEN SATURATION: 91 % | RESPIRATION RATE: 24 BRPM | BODY MASS INDEX: 19.44 KG/M2 | DIASTOLIC BLOOD PRESSURE: 50 MMHG | HEART RATE: 80 BPM | HEIGHT: 66 IN | SYSTOLIC BLOOD PRESSURE: 98 MMHG | WEIGHT: 121 LBS

## 2021-09-30 DIAGNOSIS — F17.200 CURRENT EVERY DAY SMOKER: ICD-10-CM

## 2021-09-30 DIAGNOSIS — J44.9 STAGE 4 VERY SEVERE COPD BY GOLD CLASSIFICATION (HCC): ICD-10-CM

## 2021-09-30 DIAGNOSIS — Z92.3 STATUS POST STEREOTACTIC RADIOSURGERY: ICD-10-CM

## 2021-09-30 DIAGNOSIS — C34.11 MALIGNANT NEOPLASM OF UPPER LOBE OF RIGHT LUNG (HCC): Primary | ICD-10-CM

## 2021-09-30 PROCEDURE — G0463 HOSPITAL OUTPT CLINIC VISIT: HCPCS | Performed by: RADIOLOGY

## 2021-09-30 RX ORDER — OLANZAPINE 10 MG/1
10 TABLET ORAL
COMMUNITY
Start: 2021-08-16

## 2021-10-02 ENCOUNTER — HOSPITAL ENCOUNTER (INPATIENT)
Age: 71
LOS: 6 days | Discharge: HOME | DRG: 193 | End: 2021-10-08
Attending: EMERGENCY MEDICINE
Payer: MEDICARE

## 2021-10-02 ENCOUNTER — APPOINTMENT (OUTPATIENT)
Dept: GENERAL RADIOLOGY | Age: 71
DRG: 193 | End: 2021-10-02
Payer: MEDICARE

## 2021-10-02 DIAGNOSIS — J96.02 ACUTE RESPIRATORY FAILURE WITH HYPOXIA AND HYPERCAPNIA (HCC): Primary | ICD-10-CM

## 2021-10-02 DIAGNOSIS — J96.01 ACUTE RESPIRATORY FAILURE WITH HYPOXIA AND HYPERCAPNIA (HCC): Primary | ICD-10-CM

## 2021-10-02 DIAGNOSIS — J18.9 PNEUMONIA OF RIGHT LUNG DUE TO INFECTIOUS ORGANISM, UNSPECIFIED PART OF LUNG: ICD-10-CM

## 2021-10-02 DIAGNOSIS — A41.9 SEPTICEMIA (HCC): ICD-10-CM

## 2021-10-02 LAB
ADENOVIRUS BY PCR: NOT DETECTED
ALBUMIN SERPL-MCNC: 3 G/DL (ref 3.5–5.2)
ALP BLD-CCNC: 74 U/L (ref 35–104)
ALT SERPL-CCNC: 8 U/L (ref 5–33)
ANION GAP SERPL CALCULATED.3IONS-SCNC: 7 MMOL/L (ref 7–19)
AST SERPL-CCNC: 25 U/L (ref 5–32)
BASE EXCESS ARTERIAL: 15 MMOL/L (ref -2–2)
BASE EXCESS VENOUS: 15 MMOL/L
BASOPHILS ABSOLUTE: 0 K/UL (ref 0–0.2)
BASOPHILS RELATIVE PERCENT: 0.2 % (ref 0–1)
BILIRUB SERPL-MCNC: <0.2 MG/DL (ref 0.2–1.2)
BILIRUBIN URINE: NEGATIVE
BLOOD, URINE: NEGATIVE
BORDETELLA PARAPERTUSSIS BY PCR: NOT DETECTED
BORDETELLA PERTUSSIS BY PCR: NOT DETECTED
BUN BLDV-MCNC: 8 MG/DL (ref 8–23)
CALCIUM SERPL-MCNC: 7.7 MG/DL (ref 8.8–10.2)
CARBOXYHEMOGLOBIN ARTERIAL: 2.2 % (ref 0–5)
CARBOXYHEMOGLOBIN: 3.2 %
CHLAMYDOPHILIA PNEUMONIAE BY PCR: NOT DETECTED
CHLORIDE BLD-SCNC: 97 MMOL/L (ref 98–111)
CLARITY: CLEAR
CO2: 36 MMOL/L (ref 22–29)
COLOR: YELLOW
CORONAVIRUS 229E BY PCR: NOT DETECTED
CORONAVIRUS HKU1 BY PCR: NOT DETECTED
CORONAVIRUS NL63 BY PCR: NOT DETECTED
CORONAVIRUS OC43 BY PCR: NOT DETECTED
CREAT SERPL-MCNC: 0.4 MG/DL (ref 0.5–0.9)
EOSINOPHILS ABSOLUTE: 0.1 K/UL (ref 0–0.6)
EOSINOPHILS RELATIVE PERCENT: 0.7 % (ref 0–5)
GFR AFRICAN AMERICAN: >59
GFR NON-AFRICAN AMERICAN: >60
GLUCOSE BLD-MCNC: 111 MG/DL (ref 74–109)
GLUCOSE URINE: NEGATIVE MG/DL
HCO3 ARTERIAL: 44.7 MMOL/L (ref 22–26)
HCO3 VENOUS: 42 MMOL/L (ref 23–29)
HCT VFR BLD CALC: 31.9 % (ref 37–47)
HEMOGLOBIN, ART, EXTENDED: 10 G/DL (ref 12–16)
HEMOGLOBIN: 9.5 G/DL (ref 12–16)
HUMAN METAPNEUMOVIRUS BY PCR: NOT DETECTED
HUMAN RHINOVIRUS/ENTEROVIRUS BY PCR: NOT DETECTED
IMMATURE GRANULOCYTES #: 0.1 K/UL
INFLUENZA A BY PCR: NOT DETECTED
INFLUENZA B BY PCR: NOT DETECTED
KETONES, URINE: ABNORMAL MG/DL
LACTIC ACID: 0.7 MMOL/L (ref 0.5–1.9)
LEUKOCYTE ESTERASE, URINE: NEGATIVE
LYMPHOCYTES ABSOLUTE: 1.7 K/UL (ref 1.1–4.5)
LYMPHOCYTES RELATIVE PERCENT: 10.8 % (ref 20–40)
MCH RBC QN AUTO: 31.8 PG (ref 27–31)
MCHC RBC AUTO-ENTMCNC: 29.8 G/DL (ref 33–37)
MCV RBC AUTO: 106.7 FL (ref 81–99)
METHEMOGLOBIN ARTERIAL: 1 %
METHEMOGLOBIN VENOUS: 1.2 %
MONOCYTES ABSOLUTE: 1 K/UL (ref 0–0.9)
MONOCYTES RELATIVE PERCENT: 6.8 % (ref 0–10)
MYCOPLASMA PNEUMONIAE BY PCR: NOT DETECTED
NEUTROPHILS ABSOLUTE: 12.3 K/UL (ref 1.5–7.5)
NEUTROPHILS RELATIVE PERCENT: 81.1 % (ref 50–65)
NITRITE, URINE: NEGATIVE
O2 CONTENT ARTERIAL: 13.5 ML/DL
O2 CONTENT, VEN: 10 ML/DL
O2 SAT, ARTERIAL: 95.1 %
O2 SAT, VEN: 76 %
O2 THERAPY: ABNORMAL
PARAINFLUENZA VIRUS 1 BY PCR: NOT DETECTED
PARAINFLUENZA VIRUS 2 BY PCR: NOT DETECTED
PARAINFLUENZA VIRUS 3 BY PCR: NOT DETECTED
PARAINFLUENZA VIRUS 4 BY PCR: NOT DETECTED
PCO2 ARTERIAL: 93 MMHG (ref 35–45)
PCO2, VEN: 69 MMHG (ref 40–50)
PDW BLD-RTO: 14 % (ref 11.5–14.5)
PH ARTERIAL: 7.29 (ref 7.35–7.45)
PH UA: 7.5 (ref 5–8)
PH VENOUS: 7.39 (ref 7.35–7.45)
PLATELET # BLD: 219 K/UL (ref 130–400)
PMV BLD AUTO: 11.3 FL (ref 9.4–12.3)
PO2 ARTERIAL: 96 MMHG (ref 80–100)
PO2, VEN: 42 MMHG
POTASSIUM SERPL-SCNC: 4.9 MMOL/L (ref 3.5–5)
POTASSIUM, WHOLE BLOOD: 3.2
PRO-BNP: 1176 PG/ML (ref 0–900)
PROTEIN UA: ABNORMAL MG/DL
RBC # BLD: 2.99 M/UL (ref 4.2–5.4)
RESPIRATORY SYNCYTIAL VIRUS BY PCR: NOT DETECTED
SARS-COV-2, NAAT: NOT DETECTED
SARS-COV-2, PCR: NOT DETECTED
SODIUM BLD-SCNC: 140 MMOL/L (ref 136–145)
SPECIFIC GRAVITY UA: 1.01 (ref 1–1.03)
TOTAL PROTEIN: 7 G/DL (ref 6.6–8.7)
TROPONIN: <0.01 NG/ML (ref 0–0.03)
UROBILINOGEN, URINE: 1 E.U./DL
WBC # BLD: 15.2 K/UL (ref 4.8–10.8)

## 2021-10-02 PROCEDURE — 99284 EMERGENCY DEPT VISIT MOD MDM: CPT

## 2021-10-02 PROCEDURE — 84132 ASSAY OF SERUM POTASSIUM: CPT

## 2021-10-02 PROCEDURE — 6370000000 HC RX 637 (ALT 250 FOR IP): Performed by: EMERGENCY MEDICINE

## 2021-10-02 PROCEDURE — 71045 X-RAY EXAM CHEST 1 VIEW: CPT

## 2021-10-02 PROCEDURE — 36600 WITHDRAWAL OF ARTERIAL BLOOD: CPT

## 2021-10-02 PROCEDURE — 2580000003 HC RX 258: Performed by: EMERGENCY MEDICINE

## 2021-10-02 PROCEDURE — 87040 BLOOD CULTURE FOR BACTERIA: CPT

## 2021-10-02 PROCEDURE — 87635 SARS-COV-2 COVID-19 AMP PRB: CPT

## 2021-10-02 PROCEDURE — 96375 TX/PRO/DX INJ NEW DRUG ADDON: CPT

## 2021-10-02 PROCEDURE — 2000000000 HC ICU R&B

## 2021-10-02 PROCEDURE — 6360000002 HC RX W HCPCS: Performed by: EMERGENCY MEDICINE

## 2021-10-02 PROCEDURE — 6360000002 HC RX W HCPCS

## 2021-10-02 PROCEDURE — 85025 COMPLETE CBC W/AUTO DIFF WBC: CPT

## 2021-10-02 PROCEDURE — 80053 COMPREHEN METABOLIC PANEL: CPT

## 2021-10-02 PROCEDURE — 2700000000 HC OXYGEN THERAPY PER DAY

## 2021-10-02 PROCEDURE — 6360000002 HC RX W HCPCS: Performed by: INTERNAL MEDICINE

## 2021-10-02 PROCEDURE — 94660 CPAP INITIATION&MGMT: CPT

## 2021-10-02 PROCEDURE — 83605 ASSAY OF LACTIC ACID: CPT

## 2021-10-02 PROCEDURE — 82803 BLOOD GASES ANY COMBINATION: CPT

## 2021-10-02 PROCEDURE — 6370000000 HC RX 637 (ALT 250 FOR IP): Performed by: INTERNAL MEDICINE

## 2021-10-02 PROCEDURE — 96365 THER/PROPH/DIAG IV INF INIT: CPT

## 2021-10-02 PROCEDURE — 51798 US URINE CAPACITY MEASURE: CPT

## 2021-10-02 PROCEDURE — 81003 URINALYSIS AUTO W/O SCOPE: CPT

## 2021-10-02 PROCEDURE — 94640 AIRWAY INHALATION TREATMENT: CPT

## 2021-10-02 PROCEDURE — 0202U NFCT DS 22 TRGT SARS-COV-2: CPT

## 2021-10-02 PROCEDURE — 36415 COLL VENOUS BLD VENIPUNCTURE: CPT

## 2021-10-02 PROCEDURE — 84484 ASSAY OF TROPONIN QUANT: CPT

## 2021-10-02 PROCEDURE — 83880 ASSAY OF NATRIURETIC PEPTIDE: CPT

## 2021-10-02 PROCEDURE — 93005 ELECTROCARDIOGRAM TRACING: CPT | Performed by: EMERGENCY MEDICINE

## 2021-10-02 RX ORDER — ALBUTEROL SULFATE 90 UG/1
2 AEROSOL, METERED RESPIRATORY (INHALATION) EVERY 6 HOURS PRN
Status: DISCONTINUED | OUTPATIENT
Start: 2021-10-02 | End: 2021-10-02 | Stop reason: CLARIF

## 2021-10-02 RX ORDER — ALPRAZOLAM 0.5 MG/1
1 TABLET ORAL NIGHTLY PRN
Status: DISCONTINUED | OUTPATIENT
Start: 2021-10-02 | End: 2021-10-02

## 2021-10-02 RX ORDER — ALPRAZOLAM 0.5 MG/1
1 TABLET ORAL 4 TIMES DAILY PRN
Status: DISCONTINUED | OUTPATIENT
Start: 2021-10-02 | End: 2021-10-08 | Stop reason: HOSPADM

## 2021-10-02 RX ORDER — BUDESONIDE AND FORMOTEROL FUMARATE DIHYDRATE 160; 4.5 UG/1; UG/1
2 AEROSOL RESPIRATORY (INHALATION) 2 TIMES DAILY
Status: DISCONTINUED | OUTPATIENT
Start: 2021-10-02 | End: 2021-10-02 | Stop reason: CLARIF

## 2021-10-02 RX ORDER — ONDANSETRON 2 MG/ML
INJECTION INTRAMUSCULAR; INTRAVENOUS
Status: COMPLETED
Start: 2021-10-02 | End: 2021-10-02

## 2021-10-02 RX ORDER — LISINOPRIL 5 MG/1
5 TABLET ORAL DAILY
Status: DISCONTINUED | OUTPATIENT
Start: 2021-10-02 | End: 2021-10-06

## 2021-10-02 RX ORDER — METHYLPREDNISOLONE SODIUM SUCCINATE 40 MG/ML
40 INJECTION, POWDER, LYOPHILIZED, FOR SOLUTION INTRAMUSCULAR; INTRAVENOUS EVERY 8 HOURS
Status: DISCONTINUED | OUTPATIENT
Start: 2021-10-02 | End: 2021-10-04

## 2021-10-02 RX ORDER — ALBUTEROL SULFATE 2.5 MG/3ML
2.5 SOLUTION RESPIRATORY (INHALATION) EVERY 6 HOURS PRN
Status: DISCONTINUED | OUTPATIENT
Start: 2021-10-02 | End: 2021-10-04 | Stop reason: SDUPTHER

## 2021-10-02 RX ORDER — LORAZEPAM 2 MG/ML
INJECTION INTRAMUSCULAR
Status: COMPLETED
Start: 2021-10-02 | End: 2021-10-02

## 2021-10-02 RX ORDER — ONDANSETRON 4 MG/1
4 TABLET, ORALLY DISINTEGRATING ORAL EVERY 8 HOURS PRN
Status: DISCONTINUED | OUTPATIENT
Start: 2021-10-02 | End: 2021-10-08 | Stop reason: HOSPADM

## 2021-10-02 RX ORDER — ALBUTEROL SULFATE 2.5 MG/3ML
2.5 SOLUTION RESPIRATORY (INHALATION) EVERY 6 HOURS PRN
Status: DISCONTINUED | OUTPATIENT
Start: 2021-10-02 | End: 2021-10-08 | Stop reason: HOSPADM

## 2021-10-02 RX ORDER — IPRATROPIUM BROMIDE AND ALBUTEROL SULFATE 2.5; .5 MG/3ML; MG/3ML
1 SOLUTION RESPIRATORY (INHALATION) EVERY 4 HOURS
Status: DISCONTINUED | OUTPATIENT
Start: 2021-10-02 | End: 2021-10-07

## 2021-10-02 RX ORDER — NICOTINE 21 MG/24HR
1 PATCH, TRANSDERMAL 24 HOURS TRANSDERMAL DAILY
Status: DISCONTINUED | OUTPATIENT
Start: 2021-10-02 | End: 2021-10-08 | Stop reason: HOSPADM

## 2021-10-02 RX ORDER — ACETAMINOPHEN 325 MG/1
650 TABLET ORAL EVERY 6 HOURS PRN
Status: DISCONTINUED | OUTPATIENT
Start: 2021-10-02 | End: 2021-10-08 | Stop reason: HOSPADM

## 2021-10-02 RX ORDER — GABAPENTIN 600 MG/1
600 TABLET ORAL 3 TIMES DAILY
Status: DISCONTINUED | OUTPATIENT
Start: 2021-10-02 | End: 2021-10-08 | Stop reason: HOSPADM

## 2021-10-02 RX ORDER — METHYLPREDNISOLONE SODIUM SUCCINATE 125 MG/2ML
125 INJECTION, POWDER, LYOPHILIZED, FOR SOLUTION INTRAMUSCULAR; INTRAVENOUS ONCE
Status: COMPLETED | OUTPATIENT
Start: 2021-10-02 | End: 2021-10-02

## 2021-10-02 RX ORDER — IPRATROPIUM BROMIDE AND ALBUTEROL SULFATE 2.5; .5 MG/3ML; MG/3ML
1 SOLUTION RESPIRATORY (INHALATION) ONCE
Status: COMPLETED | OUTPATIENT
Start: 2021-10-02 | End: 2021-10-02

## 2021-10-02 RX ORDER — DEXTROAMPHETAMINE SACCHARATE, AMPHETAMINE ASPARTATE, DEXTROAMPHETAMINE SULFATE AND AMPHETAMINE SULFATE 5; 5; 5; 5 MG/1; MG/1; MG/1; MG/1
20 TABLET ORAL DAILY
Status: DISCONTINUED | OUTPATIENT
Start: 2021-10-02 | End: 2021-10-02

## 2021-10-02 RX ORDER — ACETAMINOPHEN 650 MG/1
650 SUPPOSITORY RECTAL EVERY 6 HOURS PRN
Status: DISCONTINUED | OUTPATIENT
Start: 2021-10-02 | End: 2021-10-08 | Stop reason: HOSPADM

## 2021-10-02 RX ORDER — BUDESONIDE 0.5 MG/2ML
0.5 INHALANT ORAL 2 TIMES DAILY
Status: DISCONTINUED | OUTPATIENT
Start: 2021-10-02 | End: 2021-10-08 | Stop reason: HOSPADM

## 2021-10-02 RX ORDER — ARFORMOTEROL TARTRATE 15 UG/2ML
15 SOLUTION RESPIRATORY (INHALATION) 2 TIMES DAILY
Status: DISCONTINUED | OUTPATIENT
Start: 2021-10-02 | End: 2021-10-08 | Stop reason: HOSPADM

## 2021-10-02 RX ORDER — FLUTICASONE PROPIONATE 50 MCG
2 SPRAY, SUSPENSION (ML) NASAL DAILY
Status: DISCONTINUED | OUTPATIENT
Start: 2021-10-02 | End: 2021-10-08 | Stop reason: HOSPADM

## 2021-10-02 RX ORDER — METOCLOPRAMIDE 5 MG/1
5 TABLET ORAL 4 TIMES DAILY
Status: DISCONTINUED | OUTPATIENT
Start: 2021-10-02 | End: 2021-10-08 | Stop reason: HOSPADM

## 2021-10-02 RX ORDER — DEXTROAMPHETAMINE SACCHARATE, AMPHETAMINE ASPARTATE, DEXTROAMPHETAMINE SULFATE AND AMPHETAMINE SULFATE 2.5; 2.5; 2.5; 2.5 MG/1; MG/1; MG/1; MG/1
20 TABLET ORAL 2 TIMES DAILY
Status: DISCONTINUED | OUTPATIENT
Start: 2021-10-02 | End: 2021-10-08 | Stop reason: HOSPADM

## 2021-10-02 RX ORDER — PANTOPRAZOLE SODIUM 40 MG/1
40 TABLET, DELAYED RELEASE ORAL
Status: DISCONTINUED | OUTPATIENT
Start: 2021-10-02 | End: 2021-10-08 | Stop reason: HOSPADM

## 2021-10-02 RX ORDER — 0.9 % SODIUM CHLORIDE 0.9 %
30 INTRAVENOUS SOLUTION INTRAVENOUS ONCE
Status: COMPLETED | OUTPATIENT
Start: 2021-10-02 | End: 2021-10-02

## 2021-10-02 RX ORDER — ONDANSETRON 2 MG/ML
4 INJECTION INTRAMUSCULAR; INTRAVENOUS ONCE
Status: COMPLETED | OUTPATIENT
Start: 2021-10-02 | End: 2021-10-02

## 2021-10-02 RX ORDER — CITALOPRAM 20 MG/1
40 TABLET ORAL DAILY
Status: DISCONTINUED | OUTPATIENT
Start: 2021-10-02 | End: 2021-10-08 | Stop reason: HOSPADM

## 2021-10-02 RX ADMIN — ENOXAPARIN SODIUM 40 MG: 40 INJECTION SUBCUTANEOUS at 12:00

## 2021-10-02 RX ADMIN — Medication 500 MG: at 08:08

## 2021-10-02 RX ADMIN — IPRATROPIUM BROMIDE AND ALBUTEROL SULFATE 3 ML: .5; 2.5 SOLUTION RESPIRATORY (INHALATION) at 18:42

## 2021-10-02 RX ADMIN — PANTOPRAZOLE SODIUM 40 MG: 40 TABLET, DELAYED RELEASE ORAL at 12:00

## 2021-10-02 RX ADMIN — ARFORMOTEROL TARTRATE 15 MCG: 15 SOLUTION RESPIRATORY (INHALATION) at 11:38

## 2021-10-02 RX ADMIN — METHYLPREDNISOLONE SODIUM SUCCINATE 40 MG: 40 INJECTION, POWDER, FOR SOLUTION INTRAMUSCULAR; INTRAVENOUS at 15:51

## 2021-10-02 RX ADMIN — ACETAMINOPHEN 650 MG: 325 TABLET ORAL at 23:57

## 2021-10-02 RX ADMIN — IPRATROPIUM BROMIDE AND ALBUTEROL SULFATE 3 ML: .5; 2.5 SOLUTION RESPIRATORY (INHALATION) at 11:33

## 2021-10-02 RX ADMIN — ARFORMOTEROL TARTRATE 15 MCG: 15 SOLUTION RESPIRATORY (INHALATION) at 18:42

## 2021-10-02 RX ADMIN — BUDESONIDE 500 MCG: 0.5 SUSPENSION RESPIRATORY (INHALATION) at 11:38

## 2021-10-02 RX ADMIN — ALPRAZOLAM 1 MG: 0.5 TABLET ORAL at 18:40

## 2021-10-02 RX ADMIN — METHYLPREDNISOLONE SODIUM SUCCINATE 40 MG: 40 INJECTION, POWDER, FOR SOLUTION INTRAMUSCULAR; INTRAVENOUS at 23:57

## 2021-10-02 RX ADMIN — SODIUM CHLORIDE 1632 ML: 9 INJECTION, SOLUTION INTRAVENOUS at 11:52

## 2021-10-02 RX ADMIN — FLUTICASONE PROPIONATE 2 SPRAY: 50 SPRAY, METERED NASAL at 12:03

## 2021-10-02 RX ADMIN — ONDANSETRON 4 MG: 2 INJECTION INTRAMUSCULAR; INTRAVENOUS at 08:05

## 2021-10-02 RX ADMIN — IPRATROPIUM BROMIDE AND ALBUTEROL SULFATE 3 ML: .5; 2.5 SOLUTION RESPIRATORY (INHALATION) at 14:09

## 2021-10-02 RX ADMIN — CEFTRIAXONE 1000 MG: 1 INJECTION, POWDER, FOR SOLUTION INTRAMUSCULAR; INTRAVENOUS at 08:07

## 2021-10-02 RX ADMIN — IPRATROPIUM BROMIDE AND ALBUTEROL SULFATE 1 AMPULE: .5; 3 SOLUTION RESPIRATORY (INHALATION) at 08:11

## 2021-10-02 RX ADMIN — METHYLPREDNISOLONE SODIUM SUCCINATE 125 MG: 125 INJECTION, POWDER, FOR SOLUTION INTRAMUSCULAR; INTRAVENOUS at 08:06

## 2021-10-02 RX ADMIN — CITALOPRAM HYDROBROMIDE 40 MG: 20 TABLET ORAL at 12:00

## 2021-10-02 RX ADMIN — IPRATROPIUM BROMIDE AND ALBUTEROL SULFATE 3 ML: .5; 2.5 SOLUTION RESPIRATORY (INHALATION) at 22:57

## 2021-10-02 RX ADMIN — LORAZEPAM 0.5 MG: 2 INJECTION INTRAMUSCULAR; INTRAVENOUS at 08:06

## 2021-10-02 RX ADMIN — METOCLOPRAMIDE 5 MG: 5 TABLET ORAL at 12:00

## 2021-10-02 RX ADMIN — BUDESONIDE 500 MCG: 0.5 SUSPENSION RESPIRATORY (INHALATION) at 18:42

## 2021-10-02 ASSESSMENT — ENCOUNTER SYMPTOMS
COUGH: 1
RHINORRHEA: 0
SORE THROAT: 0
DIARRHEA: 0
ABDOMINAL PAIN: 0
CHEST TIGHTNESS: 1
NAUSEA: 0
BACK PAIN: 0
WHEEZING: 1
VOMITING: 0
SHORTNESS OF BREATH: 1

## 2021-10-02 ASSESSMENT — PAIN SCALES - GENERAL
PAINLEVEL_OUTOF10: 0
PAINLEVEL_OUTOF10: 0
PAINLEVEL_OUTOF10: 6

## 2021-10-02 NOTE — ED PROVIDER NOTES
[facility] EMERGENCY DEPT  eMERGENCY dEPARTMENT eNCOUnter      Pt Name: Ena Primrose  MRN: [id number]  [place] 1950  Date of evaluation: 10/2/2021  Provider: Miguelina Gradner MD    [address] [place] [address]       Chief Complaint   Patient presents with    Respiratory Distress         HISTORY OF PRESENT ILLNESS   (Location/Symptom, Timing/Onset,Context/Setting, Quality, Duration, Modifying Factors, Severity)  Note limiting factors. Ena Primrose is a 66 y.o. female who presents to the emergency department for shortness of breath. Patient states that she has had progressively worsening dyspnea since yesterday afternoon. She admits to productive cough fever and chills. She has known COPD and wears 3-1/2 L of oxygen at home at baseline. She also has a known lung cancer in her right upper lung. Oxygen saturations were in the 70s on EMS arrival and she was placed on nonrebreather and transported here. She was given 4 mg of Zofran in route. Fever of 102 with EMS. Patient is not vaccinated for Covid. HPI    NursingNotes were reviewed. REVIEW OF SYSTEMS    (2-9 systems for level 4, 10 or more for level 5)     Review of Systems   Constitutional: Positive for activity change, chills and fever. HENT: Negative for rhinorrhea and sore throat. Respiratory: Positive for cough, chest tightness, shortness of breath and wheezing. Cardiovascular: Negative for chest pain, palpitations and leg swelling. Gastrointestinal: Negative for abdominal pain, diarrhea, nausea and vomiting. Genitourinary: Negative for dysuria, frequency and urgency. Musculoskeletal: Negative for back pain and neck pain. Neurological: Negative for dizziness and headaches. All other systems reviewed and are negative.            PAST MEDICALHISTORY     Past Medical History:   Diagnosis Date    ADHD (attention deficit hyperactivity disorder)     Anxiety     COPD (chronic obstructive pulmonary disease) (HCC)     Depression     Fibromyalgia     Take 1 tablet by mouth every 8 hours as needed for Nausea or Vomiting    OXYCODONE (OXYCONTIN) 30 MG T12A EXTENDED RELEASE TABLET    Take 30 mg by mouth every 12 hours for 30 days. Mario Ramirez Date: 7/19/18    OXYCODONE-ACETAMINOPHEN (PERCOCET)  MG PER TABLET    TAKE 1 TABLET Q 4 TO 6 HRS PRN. (MAX 5/DAY). Earliest Fill Date: 7/19/18    OXYGEN    Inhale into the lungs    PANTOPRAZOLE (PROTONIX) 20 MG TABLET    TAKE 1 TABLET BY MOUTH 2 TIMES DAILY    TIZANIDINE (ZANAFLEX) 4 MG TABLET    Take 1 tablet by mouth 3 times daily as needed (.)    ZOLPIDEM (AMBIEN) 10 MG TABLET    Take 5 mg by mouth nightly as needed. ALLERGIES     Aspirin, Codeine, Demerol, Neosporin [bacitracin-neomycin-polymyxin], Nsaids, Pcn [penicillins], Pentazocine lactate, Sulfa antibiotics, Talwin [pentazocine], and Influenza vaccines    FAMILY HISTORY       Family History   Problem Relation Age of Onset    Cancer Father           SOCIAL HISTORY       Social History     Socioeconomic History    Marital status:      Spouse name: None    Number of children: None    Years of education: None    Highest education level: None   Occupational History    None   Tobacco Use    Smoking status: Current Some Day Smoker     Packs/day: 0.25     Years: 30.00     Pack years: 7.50     Types: Cigarettes    Smokeless tobacco: Never Used   Substance and Sexual Activity    Alcohol use: No    Drug use: No    Sexual activity: None   Other Topics Concern    None   Social History Narrative    None     Social Determinants of Health     Financial Resource Strain:     Difficulty of Paying Living Expenses:    Food Insecurity:     Worried About Running Out of Food in the Last Year:     Ran Out of Food in the Last Year:    Transportation Needs:     Lack of Transportation (Medical):      Lack of Transportation (Non-Medical):    Physical Activity:     Days of Exercise per Week:     Minutes of Exercise per Session:    Stress:     Feeling of Stress :    Social Connections:     Frequency of Communication with Friends and Family:     Frequency of Social Gatherings with Friends and Family:     Attends Anglican Services:     Active Member of Clubs or Organizations:     Attends Club or Organization Meetings:     Marital Status:    Intimate Partner Violence:     Fear of Current or Ex-Partner:     Emotionally Abused:     Physically Abused:     Sexually Abused:        SCREENINGS             PHYSICAL EXAM    (up to 7 for level 4, 8 or more for level 5)     ED Triage Vitals [10/02/21 0719]   BP Temp Temp src Pulse Resp SpO2 Height Weight   (!) 129/46 -- -- 117 (!) 34 100 % 5' 5\" (1.651 m) 120 lb (54.4 kg)       Physical Exam  Vitals and nursing note reviewed. Constitutional:       Appearance: She is well-developed. She is ill-appearing. She is not diaphoretic. HENT:      Head: Normocephalic and atraumatic. Right Ear: External ear normal.      Left Ear: External ear normal.      Nose: Nose normal.      Mouth/Throat:      Mouth: Mucous membranes are moist.   Eyes:      Conjunctiva/sclera: Conjunctivae normal.   Neck:      Trachea: No tracheal deviation. Cardiovascular:      Rate and Rhythm: Normal rate and regular rhythm. Heart sounds: Normal heart sounds. No murmur heard. Pulmonary:      Effort: Tachypnea, accessory muscle usage and respiratory distress present. Breath sounds: Decreased air movement present. Wheezing present. No rales. Comments: Diffuse wheezes  Abdominal:      Palpations: Abdomen is soft. There is no mass. Tenderness: There is no abdominal tenderness. Musculoskeletal:         General: Normal range of motion. Cervical back: Normal range of motion. Right lower leg: No edema. Left lower leg: No edema. Skin:     General: Skin is warm and dry. Neurological:      Mental Status: She is alert and oriented to person, place, and time. GCS: GCS eye subscore is 4.  GCS verbal subscore is 5. GCS motor subscore is 6. DIAGNOSTIC RESULTS     EKG: All EKG's areinterpreted by the Emergency Department Physician who either signs or Co-signs this chart in the absence of a cardiologist.    117 normal sinus rhythm no ST changes nondiagnostic EKG    RADIOLOGY:  Non-plain film images such as CT, Ultrasound and MRI are read by the radiologist. Plain radiographic images are visualized and preliminarily interpreted bythe emergency physician with the below findings:      XR CHEST PORTABLE   Final Result   New RIGHT mid/lower lobe peripheral predominant air space opacity. Leading differential is infectious process, including viral pneumonia.    Signed by Dr Zenia Paiz:  Rosalee Inks - Abnormal; Notable for the following components:       Result Value    Pro-BNP 1,176 (*)     All other components within normal limits   CBC WITH AUTO DIFFERENTIAL - Abnormal; Notable for the following components:    WBC 15.2 (*)     RBC 2.99 (*)     Hemoglobin 9.5 (*)     Hematocrit 31.9 (*)     .7 (*)     MCH 31.8 (*)     MCHC 29.8 (*)     Neutrophils % 81.1 (*)     Lymphocytes % 10.8 (*)     Neutrophils Absolute 12.3 (*)     Monocytes Absolute 1.00 (*)     All other components within normal limits   COMPREHENSIVE METABOLIC PANEL - Abnormal; Notable for the following components:    Chloride 97 (*)     CO2 36 (*)     Glucose 111 (*)     CREATININE 0.4 (*)     Calcium 7.7 (*)     Albumin 3.0 (*)     All other components within normal limits   BLOOD GAS, ARTERIAL - Abnormal; Notable for the following components:    pH, Arterial 7.290 (*)     pCO2, Arterial 93.0 (*)     HCO3, Arterial 44.7 (*)     Base Excess, Arterial 15.0 (*)     Hemoglobin, Art, Extended 10.0 (*)     All other components within normal limits    Narrative:     CALL  [facility] tel. ,  Dr. Jessica Barnes , 10/02/2021 07:28, by GAYLA   COVID-19, RAPID   CULTURE, BLOOD 1   CULTURE, BLOOD 2   TROPONIN   LACTIC ACID, PLASMA   POTASSIUM, WHOLE BLOOD       All other labs were within normal range or not returned as of this dictation. EMERGENCY DEPARTMENT COURSE and DIFFERENTIAL DIAGNOSIS/MDM:   Vitals:    Vitals:    10/02/21 0750 10/02/21 0800 10/02/21 0830 10/02/21 0907   BP: 112/76  99/68 100/76   Pulse: 114  97 104   Resp: 30  20 20   Temp:    98.9 °F (37.2 °C)   TempSrc:    Axillary   SpO2: 90% 92% 92% 92%   Weight:       Height:           MDM  Number of Diagnoses or Management Options     Amount and/or Complexity of Data Reviewed  Clinical lab tests: ordered and reviewed  Tests in the radiology section of CPT®: ordered and reviewed  Discuss the patient with other providers: yes  Independent visualization of images, tracings, or specimens: yes    Critical Care  Total time providing critical care: 30-74 minutes      Pt presents with resp distress secondary to copd and pneumonia, diffuse wheezing on exam, sats in 70s at home on her 3.5L, transported on NRB, RLL infiltrate, resp acidosis and hypoxia, started on bipap, neb, medrol, antibx, pt quite anxious given 0.5mg ativan, much more comfortable now on 6L bipap with sat of 91, labs reviewed, d/w Dr. Doe De La Cruz for admission to ICU      CRITICAL CARE TIME   Total Critical Care time was 45 minutes, excluding separately reportable procedures. There was a high probability of clinically significant/life threatening deterioration in the patient's condition which required my urgent intervention. CONSULTS:  None    PROCEDURES:  Unless otherwise noted below, none     Procedures    FINAL IMPRESSION      1. Acute respiratory failure with hypoxia and hypercapnia (HCC)    2. Pneumonia of right lung due to infectious organism, unspecified part of lung    3. Septicemia ([facility] [address]          DISPOSITION/PLAN   DISPOSITION Admitted 10/02/2021 08:57:32 AM      PATIENT REFERRED TO:  No follow-up provider specified.     DISCHARGE MEDICATIONS:  New Prescriptions    No medications on file (Please note that portions of this note were completed with a voice recognition program.  Efforts were made to edit thedictations but occasionally words are mis-transcribed.)    James Aldrich MD (electronically signed)  Attending Emergency Physician        Shante Hermosillo MD  10/02/21 7726

## 2021-10-02 NOTE — PROGRESS NOTES
Results for Chris Almaguer (MRN [id number]) as of 10/2/2021 07:28   Ref.  Range 10/2/2021 07:27   Hemoglobin, Art, Extended Latest Ref Range: 12.0 - 16.0 g/dL 10.0 (L)   pH, Arterial Latest Ref Range: 7.350 - 7.450  7.290 (LL)   pCO2, Arterial Latest Ref Range: 35.0 - 45.0 mmHg 93.0 (HH)   pO2, Arterial Latest Ref Range: 80.0 - 100.0 mmHg 96.0   HCO3, Arterial Latest Ref Range: 22.0 - 26.0 mmol/L 44.7 (H)   Base Excess, Arterial Latest Ref Range: -2.0 - 2.0 mmol/L 15.0 (H)   O2 Sat, Arterial Latest Ref Range: >92 % 95.1   O2 Content, Arterial Latest Ref Range: Not Established mL/dL 13.5   Methemoglobin, Arterial Latest Ref Range: <1.5 % 1.0   Carboxyhgb, Arterial Latest Ref Range: 0.0 - 5.0 % 2.2   Pt on 3 lpm nc, RR, AT+

## 2021-10-02 NOTE — ED NOTES
Pt's family updated on plan of care.   [address] [facility] (pt's daughter)     Mariah Zhao RN  10/02/21 8009

## 2021-10-02 NOTE — H&P
[address]    Patient: Yas Mcgraw   : 1950   MRN: [id number]  Code Status: Full Code  PCP: TEN Logan  Date of Service: 10/2/2021    Chief Complaint:   Shortness of breath    History of Present Illness:   70-year-old female with past medical history as listed below presented to ER with shortness of breath. Associated cough with mild clear sputum production x1 week. Fever. History of COPD and lung cancer. Requires 3.5 L supplemental O2 at baseline. Continued tobacco dependence. No further history provided. Review of Systems:   A comprehensive review of systems was negative except for: Respiratory: positive for cough, shortness of breath and sputum    Past Medical History:     Past Medical History:   Diagnosis Date    ADHD (attention deficit hyperactivity disorder)     Anxiety     [facility]ch[facility] disease) ([facility]     Depression     Fibromyalgia     GERD (gastroesophageal reflux disease)     Hypertension     RA (rheumatoid arthritis) ([facility] [address]        Past Surgical History:     Past Surgical History:   Procedure Laterality Date    CHOLECYSTECTOMY      HERNIA REPAIR      HYSTERECTOMY      LEG SURGERY      steel garth placement.         Family History:     Family History   Problem Relation Age of Onset    Cancer Father         Social History:     Social History     Socioeconomic History    Marital status:      Spouse name: None    Number of children: None    Years of education: None    Highest education level: None   Occupational History    None   Tobacco Use    Smoking status: Current Some Day Smoker     Packs/day: 0.25     Years: 30.00     Pack years: 7.50     Types: Cigarettes    Smokeless tobacco: Never Used   Substance and Sexual Activity    Alcohol use: No    Drug use: No    Sexual activity: None   Other Topics Concern    None   Social History Narrative    None     Social Determinants of Health     Financial Resource Strain:     Difficulty of Paying Living Expenses:    Food Insecurity:     Worried About Running Out of Food in the Last Year:     [address] in the Last Year:    Transportation Needs:     Lack of Transportation (Medical):  Lack of Transportation (Non-Medical):    Physical Activity:     Days of Exercise per Week:     Minutes of Exercise per Session:    Stress:     Feeling of Stress :    Social Connections:     Frequency of Communication with Friends and Family:     Frequency of Social Gatherings with Friends and Family:     Attends Rastafarian Services:     Active Member of Clubs or Organizations:     Attends Club or Organization Meetings:     Marital Status:    Intimate Partner Violence:     Fear of Current or Ex-Partner:     Emotionally Abused:     Physically Abused:     Sexually Abused:        Prior to Admission Medications:   Medications Prior to Admission: lisinopril (PRINIVIL;ZESTRIL) 5 MG tablet, TAKE 1 TABLET BY MOUTH DAILY  oxyCODONE (OXYCONTIN) 30 MG T12A extended release tablet, Take 30 mg by mouth every 12 hours for 30 days. Aly Brink Date: 7/19/18  oxyCODONE-acetaminophen (PERCOCET)  MG per tablet, TAKE 1 TABLET Q 4 TO 6 HRS PRN. (MAX 5/DAY).  Earliest Fill Date: 7/19/18  ondansetron (ZOFRAN ODT) 4 MG disintegrating tablet, Take 1 tablet by mouth every 8 hours as needed for Nausea or Vomiting  lidocaine (LIDODERM) 5 %, Place 1 patch onto the skin daily 12 hours on, 12 hours off.  nicotine (NICODERM CQ) 7 MG/24HR, Place 1 patch onto the skin every 24 hours  nicotine (NICODERM CQ) 21 MG/24HR, Place 1 patch onto the skin every 24 hours  tiZANidine (ZANAFLEX) 4 MG tablet, Take 1 tablet by mouth 3 times daily as needed (.)  albuterol-ipratropium (COMBIVENT RESPIMAT)  MCG/ACT AERS inhaler, Inhale 1 puff into the lungs every 6 hours  albuterol sulfate HFA (VENTOLIN HFA) 108 (90 Base) MCG/ACT inhaler, Inhale 2 puffs into the lungs every 6 hours as needed for Wheezing  budesonide-formoterol (SYMBICORT) 160-4.5 MCG/ACT AERO, Inhale 2 puffs into the lungs 2 times daily  pantoprazole (PROTONIX) 20 MG tablet, TAKE 1 TABLET BY MOUTH 2 TIMES DAILY  fluticasone (FLONASE) 50 MCG/ACT nasal spray, INSTILL 1 SPRAY IN EACH NOSTRIL DAILY  OXYGEN, Inhale into the lungs  ipratropium-albuterol (DUONEB) 0.5-2.5 (3) MG/3ML SOLN nebulizer solution, Inhale 3 mLs into the lungs every 4 hours  metoclopramide (REGLAN) 5 MG tablet, Take 1 tablet by mouth 4 times daily  albuterol (PROVENTIL) (2.5 MG/3ML) 0.083% nebulizer solution, Take 3 mLs by nebulization every 6 hours as needed for Wheezing  ondansetron (ZOFRAN) 4 MG tablet, Take 1 tablet by mouth every 8 hours as needed for Nausea or Vomiting  gabapentin (NEURONTIN) 600 MG tablet, Take 600 mg by mouth 3 times daily  ALPRAZolam (XANAX) 1 MG tablet, Take 1 mg by mouth nightly as needed Takes 4 times a day  amphetamine-dextroamphetamine (ADDERALL) 20 MG tablet, Take 20 mg by mouth 2 times daily. zolpidem (AMBIEN) 10 MG tablet, Take 5 mg by mouth nightly as needed. citalopram (CELEXA) 40 MG tablet, Take 40 mg by mouth daily. Allergies:      Allergies   Allergen Reactions    Aspirin Hives    Codeine     Demerol     Neosporin [Bacitracin-Neomycin-Polymyxin]     Nsaids     Pcn [Penicillins]     Pentazocine Lactate     Sulfa Antibiotics     Talwin [Pentazocine] Hives    Influenza Vaccines Hives and Rash         Physical Exam:   BP (!) 118/49   Pulse 90   Temp 98.5 °F (36.9 °C) (Temporal)   Resp 24   Ht 5' 5\" (1.651 m)   Wt 120 lb (54.4 kg)   SpO2 94%   BMI 19.97 kg/m²     General: no acute distress  HEENT: normocephalic, atraumatic  Neck: supple, symmetrical, trachea midline   Lungs: bilateral expiratory wheezing/scattered rhonchi  Cardiovascular: s1 and s2 normal  Abdomen: soft, positive bowel sounds  Extremities: no edema or cyanosis   Neuro: aaox3, no focal deficits   Skin: normal color and texture Recent Results (from the past 72 hour(s))   Brain Natriuretic Peptide    Collection Time: 10/02/21  7:20 AM   Result Value Ref Range    Pro-BNP 1,176 (H) 0 - 900 pg/mL   CBC Auto Differential    Collection Time: 10/02/21  7:20 AM   Result Value Ref Range    WBC 15.2 (H) 4.8 - 10.8 K/uL    RBC 2.99 (L) 4.20 - 5.40 M/uL    Hemoglobin 9.5 (L) 12.0 - 16.0 g/dL    Hematocrit 31.9 (L) 37.0 - 47.0 %    .7 (H) 81.0 - 99.0 fL    MCH 31.8 (H) 27.0 - 31.0 pg    MCHC 29.8 (L) 33.0 - 37.0 g/dL    RDW 14.0 11.5 - 14.5 %    Platelets 695 979 - 659 K/uL    MPV 11.3 9.4 - 12.3 fL    Neutrophils % 81.1 (H) 50.0 - 65.0 %    Lymphocytes % 10.8 (L) 20.0 - 40.0 %    Monocytes % 6.8 0.0 - 10.0 %    Eosinophils % 0.7 0.0 - 5.0 %    Basophils % 0.2 0.0 - 1.0 %    Neutrophils Absolute 12.3 (H) 1.5 - 7.5 K/uL    Immature Granulocytes # 0.1 K/uL    Lymphocytes Absolute 1.7 1.1 - 4.5 K/uL    Monocytes Absolute 1.00 (H) 0.00 - 0.90 K/uL    Eosinophils Absolute 0.10 0.00 - 0.60 K/uL    Basophils Absolute 0.00 0.00 - 0.20 K/uL   Comprehensive Metabolic Panel    Collection Time: 10/02/21  7:20 AM   Result Value Ref Range    Sodium 140 136 - 145 mmol/L    Potassium 4.9 3.5 - 5.0 mmol/L    Chloride 97 (L) 98 - 111 mmol/L    CO2 36 (H) 22 - 29 mmol/L    Anion Gap 7 7 - 19 mmol/L    Glucose 111 (H) 74 - 109 mg/dL    BUN 8 8 - 23 mg/dL    CREATININE 0.4 (L) 0.5 - 0.9 mg/dL    GFR Non-African American >60 >60    GFR African American >59 >59    Calcium 7.7 (L) 8.8 - 10.2 mg/dL    Total Protein 7.0 6.6 - 8.7 g/dL    Albumin 3.0 (L) 3.5 - 5.2 g/dL    Total Bilirubin <0.2 0.2 - 1.2 mg/dL    Alkaline Phosphatase 74 35 - 104 U/L    ALT 8 5 - 33 U/L    AST 25 5 - 32 U/L   Troponin    Collection Time: 10/02/21  7:20 AM   Result Value Ref Range    Troponin <0.01 0.00 - 0.03 ng/mL   BLOOD GAS, ARTERIAL    Collection Time: 10/02/21  7:27 AM   Result Value Ref Range    pH, Arterial 7.290 (LL) 7.350 - 7.450    pCO2, Arterial 93.0 (HH) 35.0 - 45.0 mmHg pO2, Arterial 96.0 80.0 - 100.0 mmHg    HCO3, Arterial 44.7 (H) 22.0 - 26.0 mmol/L    Base Excess, Arterial 15.0 (H) -2.0 - 2.0 mmol/L    Hemoglobin, Art, Extended 10.0 (L) 12.0 - 16.0 g/dL    O2 Sat, Arterial 95.1 >92 %    Carboxyhgb, Arterial 2.2 0.0 - 5.0 %    Methemoglobin, Arterial 1.0 <1.5 %    O2 Content, Arterial 13.5 Not Established mL/dL    O2 Therapy Unknown    Potassium, Whole Blood    Collection Time: 10/02/21  7:27 AM   Result Value Ref Range    Potassium, Whole Blood 3.2    Lactic Acid, Plasma    Collection Time: 10/02/21  8:05 AM   Result Value Ref Range    Lactic Acid 0.7 0.5 - 1.9 mmol/L   COVID-19, Rapid    Collection Time: 10/02/21  9:05 AM    Specimen: Nasopharyngeal Swab   Result Value Ref Range    SARS-CoV-2, NAAT Not Detected Not Detected   Urinalysis    Collection Time: 10/02/21  3:21 PM   Result Value Ref Range    Color, UA YELLOW Straw/Yellow    Clarity, UA Clear Clear    Glucose, Ur Negative Negative mg/dL    Bilirubin Urine Negative Negative    Ketones, Urine TRACE (A) Negative mg/dL    Specific Gravity, UA 1.011 1.005 - 1.030    Blood, Urine Negative Negative    pH, UA 7.5 5.0 - 8.0    Protein, UA TRACE (A) Negative mg/dL    Urobilinogen, Urine 1.0 <2.0 E.U./dL    Nitrite, Urine Negative Negative    Leukocyte Esterase, Urine Negative Negative   Respiratory Panel, Molecular, with COVID-19 (Restricted: peds pts or suitable admitted adults)    Collection Time: 10/02/21  5:43 PM    Specimen: Nasopharyngeal; Nasal   Result Value Ref Range    Adenovirus by PCR Not Detected Not Detected    Bordetella parapertussis by PCR Not Detected Not Detected    Bordetella pertussis by PCR Not Detected Not Detected    Chlamydophilia pneumoniae by PCR Not Detected Not Detected    Coronavirus 229E by PCR Not Detected Not Detected    Coronavirus HKU1 by PCR Not Detected Not Detected    Coronavirus NL63 by PCR Not Detected Not Detected    Coronavirus OC43 by PCR Not Detected Not Detected    SARS-CoV-2, PCR Not Detected Not Detected    Human Metapneumovirus by PCR Not Detected Not Detected    Human Rhinovirus/Enterovirus by PCR Not Detected Not Detected    Influenza A by PCR Not Detected Not Detected    Influenza B by PCR Not Detected Not Detected    Mycoplasma pneumoniae by PCR Not Detected Not Detected    Parainfluenza Virus 1 by PCR Not Detected Not Detected    Parainfluenza Virus 2 by PCR Not Detected Not Detected    Parainfluenza Virus 3 by PCR Not Detected Not Detected    Parainfluenza Virus 4 by PCR Not Detected Not Detected    Respiratory Syncytial Virus by PCR Not Detected Not Detected   Venous Blood Gas    Collection Time: 10/02/21  6:23 PM   Result Value Ref Range    pH, Jones 7.39 7.35 - 7.45    pCO2, Jones 69.0 (H) 40.0 - 50.0 mmHg    pO2, Jones 42 Not Established mmHg    HCO3, Venous 42 (H) 23 - 29 mmol/L    Base Excess, Jones 15 Not Established mmol/L    O2 Sat, Jones 76 Not Established %    Carboxyhemoglobin 3.2 %    MetHgb, Jones 1.2 <1.5 %    O2 Content, Jones 10 Not Established mL/dL       No intake/output data recorded. XR CHEST PORTABLE    Result Date: 10/2/2021  New RIGHT mid/lower lobe peripheral predominant air space opacity. Leading differential is infectious process, including viral pneumonia.  Signed by Dr Shanice Hines and Plan:   CAP  Antibiotics  Follow cultures  Rapid COVID-19 negative  Molecular respiratory panel ordered    COPD exacerbation  Steroids  Nebs  Antibiotics  Goal sats 88-92%    Acute on chronic hypercapnic respiratory failure  Suspect secondary to above processes  BiPAP 16/6  Follow ABG  Requires 3.5 L at baseline    History of lung cancer  Routine outpatient follow-up    Continued tobacco dependence  Counseled    DVT prophylaxis  Lovenox    Total critical care time: 70 minutes  Total time spent managing the care of this patient: 70 minutes    Carson Shaver MD  10/2/2021 6:57 PM

## 2021-10-02 NOTE — ED NOTES
Called ICU for report, they were unable to take report at this time, they will return my call momentarily.      Lin Strong RN  10/02/21 1010

## 2021-10-02 NOTE — PROGRESS NOTES
Pt arrived to  at 10:48 from ER with respiratory failure. [facility] initiated. BiPAP 12/6 c 8L O2 resumed; O2 sat 92%. Pt is lethargic but alert and oriented x 4 when she rouses. Azithromycin infusing via 18G in LFA. VSS at this time. Dr. Andrea Barney aware of pt's arrival to unit. Will continue to monitor.

## 2021-10-02 NOTE — ED NOTES
ASSESSMENT:  Pt to ED in resp distress via ems with initial sat RA 77 (pt on home 02 3.5 L). Pt states her SOB increased over the night, to the point where she 'couldn't catch her breath' this am.   SKIN:  Warm, dry, pink. Cap refill < 2 sec  CARDIAC:  S1 S2 noted. Denies CP. LUNGS: wheezing upper and lower lobes, diminished. Resp labored and tachypneic with accessory muscle use noted. ABDOMEN: bowel sounds noted upper and lower quadrants. Soft and tender. EXTREMITIES: bilateral DP and PT. No edema noted. Pt alert and oriented x4. Pupils equal and reactive. Side rails up and call light within reach. Britney Joshi RN  10/02/21 4410

## 2021-10-02 NOTE — PROGRESS NOTES
pH, Jones 7.39  7.35 - 7.45 Final 10/02/2021  6:23 PM [address] [facility]   pCO2, Jones 69.0High   40.0 - 50.0 mmHg Final 10/02/2021  6:23 PM [address] [facility]   pO2, Jones 42  Not Established mmHg Final 10/02/2021  6:23 PM [address] [facility]   HCO3, Venous 42High   23 - 29 mmol/L Final 10/02/2021  6:23 PM [facility] Excess, [place] 15  Not Established mmol/L Final 10/02/2021  6:23 PM [address] [facility]   O2 Sat, [place] 76  Not Established % Final 10/02/2021  6:23 PM [address] [facility]   Carboxyhemoglobin 3.2  % Final 10/02/2021  6:23 PM [facility]        0.0-1.5   (Smokers 1.5-5.0)    MetHgb, Jones 1.2  <1.5 % Final 10/02/2021  6:23 PM [address] [facility]   O2 Content, Jones 10          vbg

## 2021-10-03 LAB
ALBUMIN SERPL-MCNC: 3 G/DL (ref 3.5–5.2)
ALP BLD-CCNC: 62 U/L (ref 35–104)
ALT SERPL-CCNC: 5 U/L (ref 5–33)
ANION GAP SERPL CALCULATED.3IONS-SCNC: 7 MMOL/L (ref 7–19)
AST SERPL-CCNC: 8 U/L (ref 5–32)
BASOPHILS ABSOLUTE: 0 K/UL (ref 0–0.2)
BASOPHILS RELATIVE PERCENT: 0 % (ref 0–1)
BILIRUB SERPL-MCNC: <0.2 MG/DL (ref 0.2–1.2)
BUN BLDV-MCNC: 10 MG/DL (ref 8–23)
CALCIUM SERPL-MCNC: 8 MG/DL (ref 8.8–10.2)
CHLORIDE BLD-SCNC: 100 MMOL/L (ref 98–111)
CO2: 36 MMOL/L (ref 22–29)
CREAT SERPL-MCNC: 0.3 MG/DL (ref 0.5–0.9)
EOSINOPHILS ABSOLUTE: 0 K/UL (ref 0–0.6)
EOSINOPHILS RELATIVE PERCENT: 0 % (ref 0–5)
GFR AFRICAN AMERICAN: >59
GFR NON-AFRICAN AMERICAN: >60
GLUCOSE BLD-MCNC: 125 MG/DL (ref 74–109)
HCT VFR BLD CALC: 28.3 % (ref 37–47)
HEMOGLOBIN: 8.4 G/DL (ref 12–16)
IMMATURE GRANULOCYTES #: 0 K/UL
LYMPHOCYTES ABSOLUTE: 0.4 K/UL (ref 1.1–4.5)
LYMPHOCYTES RELATIVE PERCENT: 6.6 % (ref 20–40)
MAGNESIUM: 1.2 MG/DL (ref 1.6–2.4)
MCH RBC QN AUTO: 30.5 PG (ref 27–31)
MCHC RBC AUTO-ENTMCNC: 29.7 G/DL (ref 33–37)
MCV RBC AUTO: 102.9 FL (ref 81–99)
MONOCYTES ABSOLUTE: 0.1 K/UL (ref 0–0.9)
MONOCYTES RELATIVE PERCENT: 2.1 % (ref 0–10)
NEUTROPHILS ABSOLUTE: 4.8 K/UL (ref 1.5–7.5)
NEUTROPHILS RELATIVE PERCENT: 90.7 % (ref 50–65)
PDW BLD-RTO: 13.7 % (ref 11.5–14.5)
PLATELET # BLD: 181 K/UL (ref 130–400)
PMV BLD AUTO: 11.4 FL (ref 9.4–12.3)
POTASSIUM SERPL-SCNC: 3.7 MMOL/L (ref 3.5–5)
RBC # BLD: 2.75 M/UL (ref 4.2–5.4)
SODIUM BLD-SCNC: 143 MMOL/L (ref 136–145)
TOTAL PROTEIN: 6.4 G/DL (ref 6.6–8.7)
WBC # BLD: 5.3 K/UL (ref 4.8–10.8)

## 2021-10-03 PROCEDURE — 94640 AIRWAY INHALATION TREATMENT: CPT

## 2021-10-03 PROCEDURE — 2700000000 HC OXYGEN THERAPY PER DAY

## 2021-10-03 PROCEDURE — 36415 COLL VENOUS BLD VENIPUNCTURE: CPT

## 2021-10-03 PROCEDURE — 6370000000 HC RX 637 (ALT 250 FOR IP): Performed by: INTERNAL MEDICINE

## 2021-10-03 PROCEDURE — 6360000002 HC RX W HCPCS: Performed by: INTERNAL MEDICINE

## 2021-10-03 PROCEDURE — 2000000000 HC ICU R&B

## 2021-10-03 PROCEDURE — 80053 COMPREHEN METABOLIC PANEL: CPT

## 2021-10-03 PROCEDURE — 85025 COMPLETE CBC W/AUTO DIFF WBC: CPT

## 2021-10-03 PROCEDURE — 83735 ASSAY OF MAGNESIUM: CPT

## 2021-10-03 PROCEDURE — 2580000003 HC RX 258: Performed by: INTERNAL MEDICINE

## 2021-10-03 PROCEDURE — 94660 CPAP INITIATION&MGMT: CPT

## 2021-10-03 RX ORDER — OXYCODONE AND ACETAMINOPHEN 10; 325 MG/1; MG/1
1 TABLET ORAL EVERY 6 HOURS PRN
Status: DISCONTINUED | OUTPATIENT
Start: 2021-10-03 | End: 2021-10-08 | Stop reason: HOSPADM

## 2021-10-03 RX ORDER — MAGNESIUM SULFATE 1 G/100ML
1000 INJECTION INTRAVENOUS PRN
Status: DISCONTINUED | OUTPATIENT
Start: 2021-10-03 | End: 2021-10-08 | Stop reason: HOSPADM

## 2021-10-03 RX ADMIN — AZITHROMYCIN MONOHYDRATE 500 MG: 500 INJECTION, POWDER, LYOPHILIZED, FOR SOLUTION INTRAVENOUS at 13:53

## 2021-10-03 RX ADMIN — FLUTICASONE PROPIONATE 2 SPRAY: 50 SPRAY, METERED NASAL at 09:36

## 2021-10-03 RX ADMIN — IPRATROPIUM BROMIDE AND ALBUTEROL SULFATE 3 ML: .5; 2.5 SOLUTION RESPIRATORY (INHALATION) at 03:44

## 2021-10-03 RX ADMIN — OXYCODONE AND ACETAMINOPHEN 1 TABLET: 10; 325 TABLET ORAL at 18:53

## 2021-10-03 RX ADMIN — ALPRAZOLAM 1 MG: 0.5 TABLET ORAL at 21:23

## 2021-10-03 RX ADMIN — DEXTROAMPHETAMINE SACCHARATE, AMPHETAMINE ASPARTATE, DEXTROAMPHETAMINE SULFATE, AMPHETAMINE SULFATE TABLETS, 10 MG,CLL 20 MG: 2.5; 2.5; 2.5; 2.5 TABLET ORAL at 09:36

## 2021-10-03 RX ADMIN — IPRATROPIUM BROMIDE AND ALBUTEROL SULFATE 3 ML: .5; 2.5 SOLUTION RESPIRATORY (INHALATION) at 14:19

## 2021-10-03 RX ADMIN — GABAPENTIN 600 MG: 600 TABLET, FILM COATED ORAL at 14:01

## 2021-10-03 RX ADMIN — ALPRAZOLAM 1 MG: 0.5 TABLET ORAL at 09:51

## 2021-10-03 RX ADMIN — ARFORMOTEROL TARTRATE 15 MCG: 15 SOLUTION RESPIRATORY (INHALATION) at 06:23

## 2021-10-03 RX ADMIN — GABAPENTIN 600 MG: 600 TABLET, FILM COATED ORAL at 20:06

## 2021-10-03 RX ADMIN — ALPRAZOLAM 1 MG: 0.5 TABLET ORAL at 01:13

## 2021-10-03 RX ADMIN — ACETAMINOPHEN 650 MG: 325 TABLET ORAL at 23:24

## 2021-10-03 RX ADMIN — BUDESONIDE 500 MCG: 0.5 SUSPENSION RESPIRATORY (INHALATION) at 18:31

## 2021-10-03 RX ADMIN — IPRATROPIUM BROMIDE AND ALBUTEROL SULFATE 3 ML: .5; 2.5 SOLUTION RESPIRATORY (INHALATION) at 06:13

## 2021-10-03 RX ADMIN — ENOXAPARIN SODIUM 40 MG: 40 INJECTION SUBCUTANEOUS at 09:37

## 2021-10-03 RX ADMIN — IPRATROPIUM BROMIDE AND ALBUTEROL SULFATE 3 ML: .5; 2.5 SOLUTION RESPIRATORY (INHALATION) at 22:12

## 2021-10-03 RX ADMIN — OXYCODONE AND ACETAMINOPHEN 1 TABLET: 10; 325 TABLET ORAL at 12:23

## 2021-10-03 RX ADMIN — BUDESONIDE 500 MCG: 0.5 SUSPENSION RESPIRATORY (INHALATION) at 06:23

## 2021-10-03 RX ADMIN — METHYLPREDNISOLONE SODIUM SUCCINATE 40 MG: 40 INJECTION, POWDER, FOR SOLUTION INTRAMUSCULAR; INTRAVENOUS at 17:17

## 2021-10-03 RX ADMIN — METHYLPREDNISOLONE SODIUM SUCCINATE 40 MG: 40 INJECTION, POWDER, FOR SOLUTION INTRAMUSCULAR; INTRAVENOUS at 23:24

## 2021-10-03 RX ADMIN — IPRATROPIUM BROMIDE AND ALBUTEROL SULFATE 3 ML: .5; 2.5 SOLUTION RESPIRATORY (INHALATION) at 10:30

## 2021-10-03 RX ADMIN — METHYLPREDNISOLONE SODIUM SUCCINATE 40 MG: 40 INJECTION, POWDER, FOR SOLUTION INTRAMUSCULAR; INTRAVENOUS at 07:54

## 2021-10-03 RX ADMIN — CITALOPRAM HYDROBROMIDE 40 MG: 20 TABLET ORAL at 09:35

## 2021-10-03 RX ADMIN — IPRATROPIUM BROMIDE AND ALBUTEROL SULFATE 3 ML: .5; 2.5 SOLUTION RESPIRATORY (INHALATION) at 18:18

## 2021-10-03 RX ADMIN — METOCLOPRAMIDE 5 MG: 5 TABLET ORAL at 14:01

## 2021-10-03 RX ADMIN — METOCLOPRAMIDE 5 MG: 5 TABLET ORAL at 09:36

## 2021-10-03 RX ADMIN — PANTOPRAZOLE SODIUM 40 MG: 40 TABLET, DELAYED RELEASE ORAL at 06:27

## 2021-10-03 RX ADMIN — MAGNESIUM SULFATE HEPTAHYDRATE 1000 MG: 1 INJECTION, SOLUTION INTRAVENOUS at 07:37

## 2021-10-03 RX ADMIN — ARFORMOTEROL TARTRATE 15 MCG: 15 SOLUTION RESPIRATORY (INHALATION) at 18:31

## 2021-10-03 RX ADMIN — METOCLOPRAMIDE 5 MG: 5 TABLET ORAL at 20:02

## 2021-10-03 RX ADMIN — MAGNESIUM SULFATE HEPTAHYDRATE 1000 MG: 1 INJECTION, SOLUTION INTRAVENOUS at 08:51

## 2021-10-03 RX ADMIN — IPRATROPIUM BROMIDE AND ALBUTEROL SULFATE 3 ML: .5; 2.5 SOLUTION RESPIRATORY (INHALATION) at 14:21

## 2021-10-03 RX ADMIN — SODIUM CHLORIDE, PRESERVATIVE FREE 1000 MG: 5 INJECTION INTRAVENOUS at 07:47

## 2021-10-03 RX ADMIN — LISINOPRIL 5 MG: 5 TABLET ORAL at 09:36

## 2021-10-03 RX ADMIN — GABAPENTIN 600 MG: 600 TABLET, FILM COATED ORAL at 09:36

## 2021-10-03 ASSESSMENT — PAIN - FUNCTIONAL ASSESSMENT: PAIN_FUNCTIONAL_ASSESSMENT: ACTIVITIES ARE NOT PREVENTED

## 2021-10-03 ASSESSMENT — PAIN SCALES - GENERAL
PAINLEVEL_OUTOF10: 3
PAINLEVEL_OUTOF10: 0
PAINLEVEL_OUTOF10: 8
PAINLEVEL_OUTOF10: 2
PAINLEVEL_OUTOF10: 7
PAINLEVEL_OUTOF10: 0

## 2021-10-03 ASSESSMENT — PAIN DESCRIPTION - PAIN TYPE: TYPE: ACUTE PAIN

## 2021-10-03 ASSESSMENT — PAIN DESCRIPTION - LOCATION: LOCATION: HEAD

## 2021-10-03 ASSESSMENT — PAIN DESCRIPTION - PROGRESSION: CLINICAL_PROGRESSION: GRADUALLY WORSENING

## 2021-10-03 ASSESSMENT — PAIN DESCRIPTION - ORIENTATION: ORIENTATION: RIGHT

## 2021-10-03 ASSESSMENT — PAIN DESCRIPTION - ONSET: ONSET: GRADUAL

## 2021-10-03 ASSESSMENT — PAIN DESCRIPTION - FREQUENCY: FREQUENCY: INTERMITTENT

## 2021-10-03 ASSESSMENT — PAIN DESCRIPTION - DESCRIPTORS: DESCRIPTORS: DULL

## 2021-10-03 NOTE — PROGRESS NOTES
Patient more awake wanting to know \"How long have I been here? \"  Answered all questions. States \"I want a cigarette so bad\" xanax given  as requested by patient.   B/P stable 107/7, resp 16, Bipap with 7 L O2 sat 96%

## 2021-10-03 NOTE — PROGRESS NOTES
Hospitalist Progress Note  [facility]     Patient: Sherif Díaz  : 1950  MRN: [id number]  Code Status: Full Code    Hospital Day: 1   Date of Service: 10/3/2021    Subjective:   Patient seen and examined. Clinical improvement. Off BiPAP. No current complaints. Past Medical History:   Diagnosis Date    ADHD (attention deficit hyperactivity disorder)     Anxiety     [facility]     Depression     Fibromyalgia     GERD (gastroesophageal reflux disease)     Hypertension     RA (rheumatoid arthritis) ([facility] [address]        Past Surgical History:   Procedure Laterality Date    CHOLECYSTECTOMY      HERNIA REPAIR      HYSTERECTOMY      LEG SURGERY      steel garth placement.        Family History   Problem Relation Age of Onset    Cancer Father        Social History     Socioeconomic History    Marital status:      Spouse name: Not on file    Number of children: Not on file    Years of education: Not on file    Highest education level: Not on file   Occupational History    Not on file   Tobacco Use    Smoking status: Current Some Day Smoker     Packs/day: 0.25     Years: 30.00     Pack years: 7.50     Types: Cigarettes    Smokeless tobacco: Never Used   Substance and Sexual Activity    Alcohol use: No    Drug use: No    Sexual activity: Not on file   Other Topics Concern    Not on file   Social History Narrative    Not on file     Social Determinants of Health     Financial Resource Strain:     Difficulty of Paying Living Expenses:    Food Insecurity:     Worried About Running Out of Food in the Last Year:     Ran Out of Food in the Last Year:    Transportation Needs:     Lack of Transportation (Medical):      Lack of Transportation (Non-Medical):    Physical Activity:     Days of Exercise per Week:     Minutes of Exercise per Session:    Stress:     Feeling of Stress :    Social Connections:     Frequency of Communication with Friends and Family:     Frequency of Social Gatherings with Friends and Family:     Attends Religion Services:     Active Member of Clubs or Organizations:     Attends Club or Organization Meetings:     Marital Status:    Intimate Partner Violence:     Fear of Current or Ex-Partner:     Emotionally Abused:     Physically Abused:     Sexually Abused:        Current Facility-Administered Medications   Medication Dose Route Frequency Provider Last Rate Last Admin    magnesium sulfate 1000 mg in dextrose 5% 100 mL IVPB  1,000 mg IntraVENous PRN Octavia Vasquez MD   Stopped at 10/03/21 0849    oxyCODONE-acetaminophen (PERCOCET)  MG per tablet 1 tablet  1 tablet Oral Q6H PRN Octavia Vasquez MD        albuterol (PROVENTIL) nebulizer solution 2.5 mg  2.5 mg Nebulization Q6H PRN Octavia Vasquez MD        citalopram (CELEXA) tablet 40 mg  40 mg Oral Daily Octavia Vasquez MD   40 mg at 10/03/21 0935    fluticasone (FLONASE) 50 MCG/ACT nasal spray 2 spray  2 spray Each Nostril Daily Octavia Vasquez MD   2 spray at 10/03/21 0936    gabapentin (NEURONTIN) tablet 600 mg  600 mg Oral TID Octavia Vasquez MD   600 mg at 10/03/21 0936    ipratropium-albuterol (DUONEB) nebulizer solution 3 mL  1 vial Inhalation Q4H Octavia Vasquez MD   3 mL at 10/03/21 1030    lisinopril (PRINIVIL;ZESTRIL) tablet 5 mg  5 mg Oral Daily Octavia Vasquez MD   5 mg at 10/03/21 0936    metoclopramide (REGLAN) tablet 5 mg  5 mg Oral 4x Daily Octavia Vasquez MD   5 mg at 10/03/21 0936    nicotine (NICODERM CQ) 21 MG/24HR 1 patch  1 patch TransDERmal Daily Octavia Vasquez MD   1 patch at 10/02/21 1200    ondansetron (ZOFRAN-ODT) disintegrating tablet 4 mg  4 mg Oral Q8H PRN Octavia Vasquez MD        pantoprazole (PROTONIX) tablet 40 mg  40 mg Oral QAM AC Octavia Vasquez MD   40 mg at 10/03/21 2152    enoxaparin (LOVENOX) injection 40 mg  40 mg SubCUTAneous Daily Octavia Vasquez MD   40 mg at 10/03/21 1464    acetaminophen (TYLENOL) tablet 650 mg  650 mg Oral Q6H PRN Negrita Montgomery MD   650 mg at 10/02/21 2357    Or    acetaminophen (TYLENOL) suppository 650 mg  650 mg Rectal Q6H PRN Negrita Montgomery MD        cefTRIAXone (ROCEPHIN) 1,000 mg in sodium chloride (PF) 10 mL IV syringe  1,000 mg IntraVENous Q24H Negrita Montgomery MD   1,000 mg at 10/03/21 0747    azithromycin (ZITHROMAX) 500 mg in dextrose 5% 250 mL IVPB  500 mg IntraVENous Q24H Negrita Montgomery MD        methylPREDNISolone sodium (SOLU-MEDROL) injection 40 mg  40 mg IntraVENous Q8H Negrita Montgomery MD   40 mg at 10/03/21 0754    budesonide (PULMICORT) nebulizer suspension 500 mcg  0.5 mg Nebulization BID Negrita Montgomery MD   500 mcg at 10/03/21 9238    And    Arformoterol Tartrate (BROVANA) nebulizer solution 15 mcg  15 mcg Nebulization BID Negrita Montgomery MD   15 mcg at 10/03/21 2295    albuterol (PROVENTIL) nebulizer solution 2.5 mg  2.5 mg Nebulization Q6H PRN Negrita Montgomery MD        amphetamine-dextroamphetamine (ADDERALL) tablet 20 mg  20 mg Oral BID Negrita Montgomery MD   20 mg at 10/03/21 6143    ALPRAZolam Otelia Adjutant) tablet 1 mg  1 mg Oral 4x Daily PRN Negrita Montgomery MD   1 mg at 10/03/21 0951             Objective:   BP (!) 104/47   Pulse 111   Temp 97.9 °F (36.6 °C)   Resp 16   Ht 5' 5\" (1.651 m)   Wt 126 lb (57.2 kg)   SpO2 99%   BMI 20.97 kg/m²     General: no acute distress  HEENT: normocephalic, atraumatic  Neck: supple, symmetrical, trachea midline   Lungs: improving bilateral expiratory wheezing/scattered rhonchi  Cardiovascular: s1 and s2 normal  Abdomen: soft, positive bowel sounds  Extremities: no edema or cyanosis   Neuro: aaox3, no focal deficits   Skin: normal color and texture     Recent Labs     10/02/21  0720 10/03/21  0340   WBC 15.2* 5.3   RBC 2.99* 2.75*   HGB 9.5* 8.4*   HCT 31.9* 28.3*   .7* 102.9*   MCH 31.8* 30.5   MCHC 29.8* 29.7*    181     Recent Labs     10/02/21  0720 10/02/21  0727 10/03/21  0340     --  143   K 4.9 3.2 3.7   ANIONGAP 7  --  7   CL 97*  --  100   CO2 36*  --  36*   BUN 8  --  10   CREATININE 0.4*  --  0.3*   GLUCOSE 111*  --  125*   CALCIUM 7.7*  --  8.0*     Recent Labs     10/03/21  0340   MG 1.2*     Recent Labs     10/02/21  0720 10/03/21  0340   AST 25 8   ALT 8 5   BILITOT <0.2 <0.2   ALKPHOS 74 62     No results for input(s): PH, PO2, PCO2, HCO3, BE, O2SAT in the last 72 hours. Recent Labs     10/02/21  0720   TROPONINI <0.01     No results for input(s): INR in the last 72 hours. Recent Labs     10/02/21  0805   LACTA 0.7         Intake/Output Summary (Last 24 hours) at 10/3/2021 1144  Last data filed at 10/3/2021 4619  Gross per 24 hour   Intake 2093 ml   Output 1360 ml   Net 733 ml       XR CHEST PORTABLE    Result Date: 10/2/2021  EXAM: XR CHEST PORTABLE - 10/2/2021 7:28 AM INDICATION: Shortness of breath COMPARISON: 5/11/2021 FINDINGS: Cardiac silhouette is normal in size. No pleural effusion or visible pneumothorax. New peripheral airspace opacity in the RIGHT mid and lower lung zone. Chronic interstitial changes throughout both lungs. Biapical pleural/parenchymal scarring. No acute osseous finding. Surgical clips in the upper abdomen. New RIGHT mid/lower lobe peripheral predominant air space opacity. Leading differential is infectious process, including viral pneumonia.  Signed by Dr Rosalee Jeter and Plan:   CAP  Antibiotics  Follow cultures  Rapid COVID-19 negative  Molecular respiratory panel negative     COPD exacerbation  Steroids  Nebs  Antibiotics  Goal sats 88-92%     Acute on chronic hypercapnic respiratory failure  Suspect secondary to above processes  BiPAP 16/6  Follow-up blood gas compensated  Trial off BiPAP  Currently on 5 L  Requires 3.5 L at baseline     History of lung cancer  Routine outpatient follow-up     Continued tobacco dependence  Counseled     DVT prophylaxis  Lovenox    Total critical care time: 45 minutes    Ru Yousif MD   10/3/2021 11:44

## 2021-10-03 NOTE — PROGRESS NOTES
Patient on NC with 4 L sating 95%.   Drowsy and lethargic Bipap back on with  L O2 sat 87% titrated up to 7 L O2 sat up to 92%

## 2021-10-04 LAB
ANION GAP SERPL CALCULATED.3IONS-SCNC: 8 MMOL/L (ref 7–19)
BASOPHILS ABSOLUTE: 0 K/UL (ref 0–0.2)
BASOPHILS RELATIVE PERCENT: 0 % (ref 0–1)
BUN BLDV-MCNC: 17 MG/DL (ref 8–23)
CALCIUM SERPL-MCNC: 8.2 MG/DL (ref 8.8–10.2)
CHLORIDE BLD-SCNC: 97 MMOL/L (ref 98–111)
CO2: 35 MMOL/L (ref 22–29)
CREAT SERPL-MCNC: 0.6 MG/DL (ref 0.5–0.9)
EKG P AXIS: 63 DEGREES
EKG P AXIS: NORMAL DEGREES
EKG P-R INTERVAL: 98 MS
EKG P-R INTERVAL: NORMAL MS
EKG Q-T INTERVAL: 296 MS
EKG Q-T INTERVAL: 350 MS
EKG QRS DURATION: 68 MS
EKG QRS DURATION: 70 MS
EKG QTC CALCULATION (BAZETT): 394 MS
EKG QTC CALCULATION (BAZETT): 427 MS
EKG T AXIS: 70 DEGREES
EKG T AXIS: 86 DEGREES
EOSINOPHILS ABSOLUTE: 0 K/UL (ref 0–0.6)
EOSINOPHILS RELATIVE PERCENT: 0 % (ref 0–5)
GFR AFRICAN AMERICAN: >59
GFR NON-AFRICAN AMERICAN: >60
GLUCOSE BLD-MCNC: 235 MG/DL (ref 74–109)
HCT VFR BLD CALC: 30.5 % (ref 37–47)
HEMOGLOBIN: 9 G/DL (ref 12–16)
IMMATURE GRANULOCYTES #: 0 K/UL
LYMPHOCYTES ABSOLUTE: 0.2 K/UL (ref 1.1–4.5)
LYMPHOCYTES RELATIVE PERCENT: 2.5 % (ref 20–40)
MAGNESIUM: 1.9 MG/DL (ref 1.6–2.4)
MCH RBC QN AUTO: 31.6 PG (ref 27–31)
MCHC RBC AUTO-ENTMCNC: 29.5 G/DL (ref 33–37)
MCV RBC AUTO: 107 FL (ref 81–99)
MONOCYTES ABSOLUTE: 0.3 K/UL (ref 0–0.9)
MONOCYTES RELATIVE PERCENT: 4 % (ref 0–10)
NEUTROPHILS ABSOLUTE: 7.2 K/UL (ref 1.5–7.5)
NEUTROPHILS RELATIVE PERCENT: 93.1 % (ref 50–65)
PDW BLD-RTO: 13.7 % (ref 11.5–14.5)
PLATELET # BLD: 240 K/UL (ref 130–400)
PMV BLD AUTO: 11.3 FL (ref 9.4–12.3)
POTASSIUM SERPL-SCNC: 3.9 MMOL/L (ref 3.5–5)
RBC # BLD: 2.85 M/UL (ref 4.2–5.4)
SODIUM BLD-SCNC: 140 MMOL/L (ref 136–145)
WBC # BLD: 7.7 K/UL (ref 4.8–10.8)

## 2021-10-04 PROCEDURE — 36415 COLL VENOUS BLD VENIPUNCTURE: CPT

## 2021-10-04 PROCEDURE — 85025 COMPLETE CBC W/AUTO DIFF WBC: CPT

## 2021-10-04 PROCEDURE — 6370000000 HC RX 637 (ALT 250 FOR IP): Performed by: INTERNAL MEDICINE

## 2021-10-04 PROCEDURE — 1210000000 HC MED SURG R&B

## 2021-10-04 PROCEDURE — 80048 BASIC METABOLIC PNL TOTAL CA: CPT

## 2021-10-04 PROCEDURE — 94640 AIRWAY INHALATION TREATMENT: CPT

## 2021-10-04 PROCEDURE — 2580000003 HC RX 258: Performed by: INTERNAL MEDICINE

## 2021-10-04 PROCEDURE — 94660 CPAP INITIATION&MGMT: CPT

## 2021-10-04 PROCEDURE — 83735 ASSAY OF MAGNESIUM: CPT

## 2021-10-04 PROCEDURE — 6360000002 HC RX W HCPCS: Performed by: INTERNAL MEDICINE

## 2021-10-04 PROCEDURE — 2700000000 HC OXYGEN THERAPY PER DAY

## 2021-10-04 PROCEDURE — 93010 ELECTROCARDIOGRAM REPORT: CPT | Performed by: INTERNAL MEDICINE

## 2021-10-04 RX ORDER — METHYLPREDNISOLONE SODIUM SUCCINATE 40 MG/ML
40 INJECTION, POWDER, LYOPHILIZED, FOR SOLUTION INTRAMUSCULAR; INTRAVENOUS DAILY
Status: DISCONTINUED | OUTPATIENT
Start: 2021-10-05 | End: 2021-10-05

## 2021-10-04 RX ADMIN — ALPRAZOLAM 1 MG: 0.5 TABLET ORAL at 09:16

## 2021-10-04 RX ADMIN — ALPRAZOLAM 1 MG: 0.5 TABLET ORAL at 18:01

## 2021-10-04 RX ADMIN — CITALOPRAM HYDROBROMIDE 40 MG: 20 TABLET ORAL at 07:53

## 2021-10-04 RX ADMIN — GABAPENTIN 600 MG: 600 TABLET, FILM COATED ORAL at 14:23

## 2021-10-04 RX ADMIN — FLUTICASONE PROPIONATE 2 SPRAY: 50 SPRAY, METERED NASAL at 07:55

## 2021-10-04 RX ADMIN — IPRATROPIUM BROMIDE AND ALBUTEROL SULFATE 3 ML: .5; 2.5 SOLUTION RESPIRATORY (INHALATION) at 16:18

## 2021-10-04 RX ADMIN — ENOXAPARIN SODIUM 40 MG: 40 INJECTION SUBCUTANEOUS at 07:54

## 2021-10-04 RX ADMIN — BUDESONIDE 500 MCG: 0.5 SUSPENSION RESPIRATORY (INHALATION) at 20:43

## 2021-10-04 RX ADMIN — ARFORMOTEROL TARTRATE 15 MCG: 15 SOLUTION RESPIRATORY (INHALATION) at 20:45

## 2021-10-04 RX ADMIN — AZITHROMYCIN MONOHYDRATE 500 MG: 500 INJECTION, POWDER, LYOPHILIZED, FOR SOLUTION INTRAVENOUS at 07:54

## 2021-10-04 RX ADMIN — METHYLPREDNISOLONE SODIUM SUCCINATE 40 MG: 40 INJECTION, POWDER, FOR SOLUTION INTRAMUSCULAR; INTRAVENOUS at 07:52

## 2021-10-04 RX ADMIN — IPRATROPIUM BROMIDE AND ALBUTEROL SULFATE 3 ML: .5; 2.5 SOLUTION RESPIRATORY (INHALATION) at 02:00

## 2021-10-04 RX ADMIN — OXYCODONE AND ACETAMINOPHEN 1 TABLET: 10; 325 TABLET ORAL at 01:49

## 2021-10-04 RX ADMIN — PANTOPRAZOLE SODIUM 40 MG: 40 TABLET, DELAYED RELEASE ORAL at 05:32

## 2021-10-04 RX ADMIN — OXYCODONE AND ACETAMINOPHEN 1 TABLET: 10; 325 TABLET ORAL at 13:01

## 2021-10-04 RX ADMIN — METOCLOPRAMIDE 5 MG: 5 TABLET ORAL at 17:55

## 2021-10-04 RX ADMIN — ALPRAZOLAM 1 MG: 0.5 TABLET ORAL at 03:38

## 2021-10-04 RX ADMIN — ARFORMOTEROL TARTRATE 15 MCG: 15 SOLUTION RESPIRATORY (INHALATION) at 07:23

## 2021-10-04 RX ADMIN — IPRATROPIUM BROMIDE AND ALBUTEROL SULFATE 3 ML: .5; 2.5 SOLUTION RESPIRATORY (INHALATION) at 20:28

## 2021-10-04 RX ADMIN — GABAPENTIN 600 MG: 600 TABLET, FILM COATED ORAL at 07:52

## 2021-10-04 RX ADMIN — SODIUM CHLORIDE, PRESERVATIVE FREE 1000 MG: 5 INJECTION INTRAVENOUS at 07:54

## 2021-10-04 RX ADMIN — GABAPENTIN 600 MG: 600 TABLET, FILM COATED ORAL at 20:21

## 2021-10-04 RX ADMIN — IPRATROPIUM BROMIDE AND ALBUTEROL SULFATE 3 ML: .5; 2.5 SOLUTION RESPIRATORY (INHALATION) at 07:23

## 2021-10-04 RX ADMIN — BUDESONIDE 500 MCG: 0.5 SUSPENSION RESPIRATORY (INHALATION) at 07:23

## 2021-10-04 RX ADMIN — IPRATROPIUM BROMIDE AND ALBUTEROL SULFATE 3 ML: .5; 2.5 SOLUTION RESPIRATORY (INHALATION) at 11:06

## 2021-10-04 ASSESSMENT — PAIN - FUNCTIONAL ASSESSMENT: PAIN_FUNCTIONAL_ASSESSMENT: ACTIVITIES ARE NOT PREVENTED

## 2021-10-04 ASSESSMENT — PAIN DESCRIPTION - PROGRESSION: CLINICAL_PROGRESSION: NOT CHANGED

## 2021-10-04 ASSESSMENT — PAIN SCALES - GENERAL
PAINLEVEL_OUTOF10: 0
PAINLEVEL_OUTOF10: 2
PAINLEVEL_OUTOF10: 7
PAINLEVEL_OUTOF10: 4
PAINLEVEL_OUTOF10: 0
PAINLEVEL_OUTOF10: 0

## 2021-10-04 ASSESSMENT — PAIN DESCRIPTION - ORIENTATION: ORIENTATION: RIGHT;ANTERIOR

## 2021-10-04 ASSESSMENT — PAIN DESCRIPTION - FREQUENCY: FREQUENCY: CONTINUOUS

## 2021-10-04 ASSESSMENT — PAIN DESCRIPTION - LOCATION: LOCATION: HEAD

## 2021-10-04 ASSESSMENT — PAIN DESCRIPTION - DESCRIPTORS: DESCRIPTORS: HEADACHE

## 2021-10-04 ASSESSMENT — PAIN DESCRIPTION - ONSET: ONSET: ON-GOING

## 2021-10-04 ASSESSMENT — PAIN DESCRIPTION - PAIN TYPE: TYPE: ACUTE PAIN

## 2021-10-04 NOTE — CONSULTS
Palliative Care:    66year old female, alert and oriented x3, that came to the ED with resp distress/ resp failure; pt has right lung cancer (Dr Loretto Lombard). O2 sats were in 70s in ED. Pt is oxygen dependent. Pts daughter, Merry Schneider, lives with her and provides care 24/7; Merry Schneider is pts primary cg. Pt lost of her  approx 5 years ago. Good family and spiritual support per pt. Reviewed history with patient and history of COPD and lung cancer. Plans to discharge home with daughter possibly tomorrow if she continues to be stable. Pt states she gets around well at home with her oxygen, can perform her own ADLs with only standby assistance with showers. Pt will d/c home with Oxygen. Will talk to SW/ about getting pt a rollator walker and wheelchair for home. Pt does not have a vehicle at this time, neither does daughter; they have been taking the bus. May need assistance with getting patient home. Past Medical History:        Past Medical History:   Diagnosis Date    ADHD (attention deficit hyperactivity disorder)     Anxiety     [facility]ch[facility] obstructive [facility]) [facility])     Depression     Fibromyalgia     GERD (gastroesophageal reflux disease)     Hypertension     RA (rheumatoid arthritis) ([facility] [address]        Advance Directives:    FULL CODE  ACP completed. Pain/Other Symptoms:  Headache--nurse aware and administered medication--improving. Activity:    As tolerated. Psychological/Spiritual:   Good family/spiritual support noted. Plan:  D/c home with daughter with oxygen.                 Electronically signed by Glenna Homans, RN on 10/4/2021 at 3:22 PM

## 2021-10-04 NOTE — PROGRESS NOTES
Physician Progress Note      Didier Wang  CSN #:                  [id number]  :                       1950  ADMIT DATE:       10/2/2021 7:21 AM  DISCH DATE:  RESPONDING  PROVIDER #:        Brenda Cruz          QUERY TEXT:    Patient admitted with acute on chronic hypercapnic respiratory failure. Documentation reflects septicemia in ED impression on 10/2/21. If possible,   please document in the progress notes and discharge summary if septicemia was: The medical record reflects the following:  Risk Factors: pneumonia, COPD exacerbation  Clinical Indicators: WBC 15.2, temp of 102 with EMS, P 117, R 34, BP 88/53,   CXR-new right mid/lower lobe peripheral predominant air space opacity  Treatment: ABG's, blood cultures, bipap, Solu-Medrol IV, Rocephin iV,   Zithromax IV, NS bolus, ICU admit    Thank you,  Estill Ganser, CCDS  [phone]  Options provided:  -- Septicemia confirmed after study  -- Septicemia treated and resolved  -- Septicemia ruled out after study  -- Other - I will add my own diagnosis  -- Disagree - Not applicable / Not valid  -- Disagree - Clinically unable to determine / Unknown  -- Refer to Clinical Documentation Reviewer    PROVIDER RESPONSE TEXT:    Septicemia ruled out after study.     Query created by: Yoni Amador on 10/4/2021 2:59 PM      Electronically signed by:  Brenda Cruz 10/4/2021 3:09 PM

## 2021-10-04 NOTE — PROGRESS NOTES
Hospitalist Progress Note  [facility]     Patient: Gordon Franco  : 1950  MRN: [id number]  Code Status: Full Code    Hospital Day: 2   Date of Service: 10/4/2021    Subjective:   Patient seen and examined. No current complaints. Past Medical History:   Diagnosis Date    ADHD (attention deficit hyperactivity disorder)     Anxiety     [facility]     Depression     Fibromyalgia     GERD (gastroesophageal reflux disease)     Hypertension     RA (rheumatoid arthritis) ([address]        Past Surgical History:   Procedure Laterality Date    CHOLECYSTECTOMY      HERNIA REPAIR      HYSTERECTOMY      LEG SURGERY      steel garth placement. 2009       Family History   Problem Relation Age of Onset    Cancer Father        Social History     Socioeconomic History    Marital status:      Spouse name: Not on file    Number of children: Not on file    Years of education: Not on file    Highest education level: Not on file   Occupational History    Not on file   Tobacco Use    Smoking status: Current Some Day Smoker     Packs/day: 0.25     Years: 30.00     Pack years: 7.50     Types: Cigarettes    Smokeless tobacco: Never Used   Substance and Sexual Activity    Alcohol use: No    Drug use: No    Sexual activity: Not on file   Other Topics Concern    Not on file   Social History Narrative    Not on file     Social Determinants of Health     Financial Resource Strain:     Difficulty of Paying Living Expenses:    Food Insecurity:     Worried About Running Out of Food in the Last Year:     Ran Out of Food in the Last Year:    Transportation Needs:     Lack of Transportation (Medical):      Lack of Transportation (Non-Medical):    Physical Activity:     Days of Exercise per Week:     Minutes of Exercise per Session:    Stress:     Feeling of Stress :    Social Connections:     Frequency of Communication with Friends and Family:     Frequency of Social Gatherings with Friends and Family:     Attends [employer] Services:     Active Member of Clubs or Organizations:     Attends Club or Organization Meetings:     Marital Status:    Intimate Partner Violence:     Fear of Current or Ex-Partner:     Emotionally Abused:     Physically Abused:     Sexually Abused:        Current Facility-Administered Medications   Medication Dose Route Frequency Provider Last Rate Last Admin    [START ON 10/5/2021] methylPREDNISolone sodium (SOLU-MEDROL) injection 40 mg  40 mg IntraVENous Daily Shailesh Whiting MD        magnesium sulfate 1000 mg in dextrose 5% 100 mL IVPB  1,000 mg IntraVENous PRN Shailesh Whiting  mL/hr at 10/03/21 0851 1,000 mg at 10/03/21 0851    oxyCODONE-acetaminophen (PERCOCET)  MG per tablet 1 tablet  1 tablet Oral Q6H PRN Shailesh Whiting MD   1 tablet at 10/04/21 0149    albuterol (PROVENTIL) nebulizer solution 2.5 mg  2.5 mg Nebulization Q6H PRN Shailesh Whiting MD        citalopram (CELEXA) tablet 40 mg  40 mg Oral Daily Shailesh Whiting MD   40 mg at 10/04/21 0753    fluticasone (FLONASE) 50 MCG/ACT nasal spray 2 spray  2 spray Each Nostril Daily Shailesh Whiting MD   2 spray at 10/04/21 0755    gabapentin (NEURONTIN) tablet 600 mg  600 mg Oral TID Shailesh Whiting MD   600 mg at 10/04/21 0752    ipratropium-albuterol (DUONEB) nebulizer solution 3 mL  1 vial Inhalation Q4H Shailesh Whiting MD   3 mL at 10/04/21 1106    lisinopril (PRINIVIL;ZESTRIL) tablet 5 mg  5 mg Oral Daily Shailesh Whiting MD   5 mg at 10/03/21 0936    metoclopramide (REGLAN) tablet 5 mg  5 mg Oral 4x Daily Shailesh Whiting MD   5 mg at 10/03/21 2002    nicotine (NICODERM CQ) 21 MG/24HR 1 patch  1 patch TransDERmal Daily Shailesh Whiting MD   1 patch at 10/04/21 0757    ondansetron (ZOFRAN-ODT) disintegrating tablet 4 mg  4 mg Oral Q8H PRN Shailesh Whiting MD        pantoprazole (PROTONIX) tablet 40 mg  40 mg Oral QAM ELIZABETH Whiting MD   40 mg at 10/04/21 0532    enoxaparin (LOVENOX) injection 40 mg  40 mg SubCUTAneous Daily Carson Shaver MD   40 mg at 10/04/21 0754    acetaminophen (TYLENOL) tablet 650 mg  650 mg Oral Q6H PRN Carson Shaver MD   650 mg at 10/03/21 2324    Or    acetaminophen (TYLENOL) suppository 650 mg  650 mg Rectal Q6H PRN Carson Shaver MD        cefTRIAXone (ROCEPHIN) 1,000 mg in sodium chloride (PF) 10 mL IV syringe  1,000 mg IntraVENous Q24H Carson Shaver MD   1,000 mg at 10/04/21 0754    azithromycin (ZITHROMAX) 500 mg in dextrose 5% 250 mL IVPB  500 mg IntraVENous Q24H Carson Shaver MD   Stopped at 10/04/21 0854    budesonide (PULMICORT) nebulizer suspension 500 mcg  0.5 mg Nebulization BID Carson Shaver MD   500 mcg at 10/04/21 2751    And    Arformoterol Tartrate (BROVANA) nebulizer solution 15 mcg  15 mcg Nebulization BID Carson Shaver MD   15 mcg at 10/04/21 0723    albuterol (PROVENTIL) nebulizer solution 2.5 mg  2.5 mg Nebulization Q6H PRN Carson Shaver MD        amphetamine-dextroamphetamine (ADDERALL) tablet 20 mg  20 mg Oral BID Carson Shaver MD   20 mg at 10/03/21 8459    ALPRAZolam Paresh Roller) tablet 1 mg  1 mg Oral 4x Daily PRN Carson Shaver MD   1 mg at 10/04/21 1269             Objective:   BP (!) 125/59   Pulse 92   Temp 98 °F (36.7 °C) (Temporal)   Resp 22   Ht 5' 5\" (1.651 m)   Wt 125 lb 8 oz (56.9 kg)   SpO2 97%   BMI 20.88 kg/m²     General: no acute distress  HEENT: normocephalic, atraumatic  Neck: supple, symmetrical, trachea midline   Lungs: improving bilateral expiratory wheezing/scattered rhonchi  Cardiovascular: s1 and s2 normal  Abdomen: soft, positive bowel sounds  Extremities: no edema or cyanosis   Neuro: aaox3, no focal deficits   Skin: normal color and texture     Recent Labs     10/02/21  0720 10/03/21  0340 10/04/21  0346   WBC 15.2* 5.3 7.7   RBC 2.99* 2.75* 2.85*   HGB 9.5* 8.4* 9.0*   HCT 31.9* 28.3* 30.5*   .7* 102.9* 107.0*   MCH 31.8* 30.5 31.6*   MCHC 29.8* 29.7* 29.5*  181 240     Recent Labs     10/02/21  0720 10/02/21  0727 10/03/21  0340 10/04/21  0346     --  143 140   K 4.9 3.2 3.7 3.9   ANIONGAP 7  --  7 8   CL 97*  --  100 97*   CO2 36*  --  36* 35*   BUN 8  --  10 17   CREATININE 0.4*  --  0.3* 0.6   GLUCOSE 111*  --  125* 235*   CALCIUM 7.7*  --  8.0* 8.2*     Recent Labs     10/03/21  0340 10/04/21  0346   MG 1.2* 1.9     Recent Labs     10/02/21  0720 10/03/21  0340   AST 25 8   ALT 8 5   BILITOT <0.2 <0.2   ALKPHOS 74 62     No results for input(s): PH, PO2, PCO2, HCO3, BE, O2SAT in the last 72 hours. Recent Labs     10/02/21  0720   TROPONINI <0.01     No results for input(s): INR in the last 72 hours. Recent Labs     10/02/21  0805   LACTA 0.7         Intake/Output Summary (Last 24 hours) at 10/4/2021 1133  Last data filed at 10/4/2021 0800  Gross per 24 hour   Intake 1611.67 ml   Output 1110 ml   Net 501.67 ml       No results found.      Assessment and Plan:   CAP  Antibiotics  Follow cultures  Rapid COVID-19 negative  Molecular respiratory panel negative     COPD exacerbation  Steroids, tapering  Nebs  Antibiotics  Goal sats 88-92%     Acute on chronic hypercapnic respiratory failure  Suspect secondary to above processes  Weaned off BiPAP 10/3/2021  Requires 3.5 L at baseline which patient is on     History of lung cancer  Routine outpatient follow-up     Continued tobacco dependence  Counseled     DVT prophylaxis  Len Powell MD   10/4/2021 11:33 AM

## 2021-10-04 NOTE — ACP (ADVANCE CARE PLANNING)
Advance Care Planning     Advance Care Planning Clinical Specialist  Conversation Note      Date of ACP Conversation: 10/4/2021    Conversation Conducted with:   Patient: Sherif Díaz    ACP Clinical Specialist: Kofi Rdz RN    Healthcare Decision Maker:     Current Designated Healthcare Decision Maker:     Primary Decision Maker: Gloria Samano - Lilian - [phone]    Care Preferences    Hospitalization: \"If your health worsens and it becomes clear that your chance of recovery is unlikely, what would your preference be regarding hospitalization? \"    Choice:  [x] The patient wants hospitalization. [] The patient prefers comfort-focused treatment without hospitalization. Ventilation: \"If you were in your present state of health and suddenly became very ill and were unable to breathe on your own, what would your preference be about the use of a ventilator (breathing machine) if it were available to you? \"  YES     If the patient would desire the use of ventilator (breathing machine), answer \"yes\". If not, \"no\": YES    \"If your health worsens and it becomes clear that your chance of recovery is unlikely, what would your preference be about the use of a ventilator (breathing machine) if it were available to you? \" YES    Would the patient desire the use of ventilator (breathing machine)?: YES      Resuscitation  \"CPR works best to restart the heart when there is a sudden event, like a heart attack, in someone who is otherwise healthy. Unfortunately, CPR does not typically restart the heart for people who have serious health conditions or who are very sick. \"    \"In the event your heart stopped as a result of an underlying serious health condition, would you want attempts to be made to restart your heart (answer \"yes\" for attempt to resuscitate) or would you prefer a natural death (answer \"no\" for do not attempt to resuscitate)? \" YES    Conversation Outcomes:  [x] ACP discussion completed  [] Existing advance directive reviewed with patient; no changes to patient's previously recorded wishes  [] New Advance Directive completed  [] Portable Do Not Rescitate prepared for Provider review and signature  [] POLST/POST/MOLST/MOST prepared for Provider review and signature

## 2021-10-04 NOTE — CARE COORDINATION
Date / Time of Evaluation:   10/4/2021    3:25 PM  Assessment Completed by:   HEATH Onofre      Patient Name:   Malena Roberts  MRN:   [id number]  [place]:   1950    Patient Admission Status:   Inpatient [101]    Patient Contact Information: her new cell is: [phone] (confirmed with her daughter)    [address] [facility] [phone] (home)   Telephone Information:   Mobile [phone]     Above information verified? New phone number  [x]   Yes  []   No    (Best Practice:   Have patient/caregiver verify above address and phone number by stating out loud their current address and reachable phone number. Initial Assessment Completed at bedside with:      [x]   Patient  [x]   Family/Caregiver/Guardian   []   Other:      Current PCP: she noted she has not seen this provider listed; is agreeable to have PCP set-up with Deyvi 13, APRN    PCP verified? [x]   Yes  []   No    Emergency Contacts:    Extended Emergency Contact Information  Primary Emergency Contact: Norm Oshea  Address: [address] HUSBANDS Jeanie Lara [address] [facility] [address] [place] [address] Phone: [phone]  Mobile Phone: [phone]  Relation: Child  Secondary Emergency Contact: Bridgett Pate  Address: [address] [facility] [address] [place] [address] Phone: [phone]  Relation: Child    Advance Directives:    Does Ms. Horton have an advance directive in her electronic medical record? []   Yes  [x]   No    Code Status:   Full Code      Have you been vaccinated for COVID-19 (SARS-CoV-2)? She stated she has too many allergies and it was recommended she not receive it.    []   Yes  [x]   No                   If so, when?     Which :         []   [employer]  []   [employer]  []   [employer]  []   Other:       Do you have any of the following unmet social needs that would keep you from returning home safely:    []   Yes  [x]   No Unmet Social Needs:           []   Living Situation/Housing  []   Food  []   Stroke Education   []   Utilities  []   Personal Safety  []   Financial Strain  []   Employment  []   Mental Health  []   Substance Abuse  []   Transportation Barriers    Additional Unmet Social Needs Notes:           Financial:    Payor: Flavia Bustamante / Plan: Alee Born ESSENTIAL/PLUS / Product Type: *No Product type* /     Pre-Cert required for SNF:     [x]   Yes  []   No    Have Long Term Care Insurance:      []   Yes  [x]   No      Pharmacy:    [address]  [address] [facility]  [address] [place] [address]  Phone: [phone] Fax: [address] [phone] - Liz Jansen [address] [address]  Phone: [phone] Fax: [phone], [place] [address] [phone] Mili Lara [phone]  [address] [place] [address]  [address] [place] [address]  Phone: [phone] Fax: [phone]    Potential assistance purchasing medications? []   Yes  [x]   No      ADLS:       Support System:   Children      Current Home Environment:   Daughter Cheri and grandson live with her    Steps:   Not inside the home; just two to get inside    []   Yes  [x]   No    If yes, how many?     Plans to RETURN to current housing:     [x]   Yes  []   No    Barriers to RETURNING to current housing:        Currently ACTIVE with Home Health CARE:  She doesn't think she needs it    []   Yes  [x]   No    [facility]:        [facility] Provider:   Fabio=for oxygen at home       Had [address] [facility] prior to admission:      [x]   Yes  []   No    Oxygen Company:   Fabio    Has a pulse oximetry unit at home:     [x]   Yes  []   No    Informed of need to bring portable home O2 tank to hospital on day of DISCHARGE:      [x]   Yes  []   No    Name of person committed to bringing portable tank at discharge:   Daughter, Cheri       Active with HD/PD prior to admission:             []   Yes  [x]   No    Nephrologist:     HD Center:         Transition Plan:    back home with family    Transportation PLAN for Discharge:  Private vehicle    Factors facilitating achievement of predicted outcomes:     Barriers to discharge:        Patient Deficits:    []   Yes   [x]   No    If yes:    []   Confusion/Memory  []   Visual  []   Motor/Sensory         []   Right arm         []   Right leg         []   Left arm         []   Left leg  []   Language/Speech         []   Aphasia         []   Dysarthria         []   Swallow           Additional CM/SW Notes: SW visited with Pt re: d/c planning; she stated she lives at home and her daughter, Juli Hogan and grandson live with her; oxygen at home via [place]; may need w/c or walker at home; will discuss with daughter; Pt doesn't have [facility] and doesn't feel she needs it; SW helped her call her daughter while in the room as she was having issues figuring out the room phone.          [address]  [address]  Phone:   [phone]          Fax:   [phone]  Electronically signed by HEATH Ch on 10/4/2021 at 5:50 PM

## 2021-10-04 NOTE — PLAN OF CARE
Problem: Falls - Risk of:  Goal: Will remain free from falls  Description: Will remain free from falls  Outcome: Ongoing  Goal: Absence of physical injury  Description: Absence of physical injury  Outcome: Ongoing     Problem: Skin Integrity:  Goal: Will show no infection signs and symptoms  Description: Will show no infection signs and symptoms  Outcome: Ongoing  Goal: Absence of new skin breakdown  Description: Absence of new skin breakdown  Outcome: Ongoing     Problem: Urinary Elimination:  Goal: Signs and symptoms of infection will decrease  Description: Signs and symptoms of infection will decrease  Outcome: Ongoing  Goal: Complications related to the disease process, condition or treatment will be avoided or minimized  Description: Complications related to the disease process, condition or treatment will be avoided or minimized  Outcome: Ongoing

## 2021-10-05 LAB
ANION GAP SERPL CALCULATED.3IONS-SCNC: 4 MMOL/L (ref 7–19)
BASOPHILS ABSOLUTE: 0 K/UL (ref 0–0.2)
BASOPHILS RELATIVE PERCENT: 0.1 % (ref 0–1)
BUN BLDV-MCNC: 14 MG/DL (ref 8–23)
CALCIUM SERPL-MCNC: 8.4 MG/DL (ref 8.8–10.2)
CHLORIDE BLD-SCNC: 98 MMOL/L (ref 98–111)
CO2: 40 MMOL/L (ref 22–29)
CREAT SERPL-MCNC: 0.5 MG/DL (ref 0.5–0.9)
EOSINOPHILS ABSOLUTE: 0 K/UL (ref 0–0.6)
EOSINOPHILS RELATIVE PERCENT: 0 % (ref 0–5)
GFR AFRICAN AMERICAN: >59
GFR NON-AFRICAN AMERICAN: >60
GLUCOSE BLD-MCNC: 154 MG/DL (ref 74–109)
HCT VFR BLD CALC: 31 % (ref 37–47)
HEMOGLOBIN: 9.3 G/DL (ref 12–16)
IMMATURE GRANULOCYTES #: 0 K/UL
LYMPHOCYTES ABSOLUTE: 0.7 K/UL (ref 1.1–4.5)
LYMPHOCYTES RELATIVE PERCENT: 8.2 % (ref 20–40)
MAGNESIUM: 1.7 MG/DL (ref 1.6–2.4)
MCH RBC QN AUTO: 31.3 PG (ref 27–31)
MCHC RBC AUTO-ENTMCNC: 30 G/DL (ref 33–37)
MCV RBC AUTO: 104.4 FL (ref 81–99)
MONOCYTES ABSOLUTE: 0.7 K/UL (ref 0–0.9)
MONOCYTES RELATIVE PERCENT: 8.5 % (ref 0–10)
NEUTROPHILS ABSOLUTE: 6.8 K/UL (ref 1.5–7.5)
NEUTROPHILS RELATIVE PERCENT: 82.7 % (ref 50–65)
PDW BLD-RTO: 13.8 % (ref 11.5–14.5)
PLATELET # BLD: 254 K/UL (ref 130–400)
PMV BLD AUTO: 11 FL (ref 9.4–12.3)
POTASSIUM SERPL-SCNC: 4 MMOL/L (ref 3.5–5)
RBC # BLD: 2.97 M/UL (ref 4.2–5.4)
SODIUM BLD-SCNC: 142 MMOL/L (ref 136–145)
WBC # BLD: 8.2 K/UL (ref 4.8–10.8)

## 2021-10-05 PROCEDURE — 94660 CPAP INITIATION&MGMT: CPT

## 2021-10-05 PROCEDURE — 80048 BASIC METABOLIC PNL TOTAL CA: CPT

## 2021-10-05 PROCEDURE — 36415 COLL VENOUS BLD VENIPUNCTURE: CPT

## 2021-10-05 PROCEDURE — 6370000000 HC RX 637 (ALT 250 FOR IP): Performed by: INTERNAL MEDICINE

## 2021-10-05 PROCEDURE — 94761 N-INVAS EAR/PLS OXIMETRY MLT: CPT

## 2021-10-05 PROCEDURE — 94640 AIRWAY INHALATION TREATMENT: CPT

## 2021-10-05 PROCEDURE — 1210000000 HC MED SURG R&B

## 2021-10-05 PROCEDURE — 6360000002 HC RX W HCPCS: Performed by: INTERNAL MEDICINE

## 2021-10-05 PROCEDURE — 83735 ASSAY OF MAGNESIUM: CPT

## 2021-10-05 PROCEDURE — 85025 COMPLETE CBC W/AUTO DIFF WBC: CPT

## 2021-10-05 PROCEDURE — 2580000003 HC RX 258: Performed by: INTERNAL MEDICINE

## 2021-10-05 PROCEDURE — 6370000000 HC RX 637 (ALT 250 FOR IP)

## 2021-10-05 PROCEDURE — 2700000000 HC OXYGEN THERAPY PER DAY

## 2021-10-05 RX ORDER — MONTELUKAST SODIUM 10 MG/1
10 TABLET ORAL NIGHTLY
COMMUNITY

## 2021-10-05 RX ORDER — MONTELUKAST SODIUM 10 MG/1
10 TABLET ORAL NIGHTLY
Status: DISCONTINUED | OUTPATIENT
Start: 2021-10-05 | End: 2021-10-08 | Stop reason: HOSPADM

## 2021-10-05 RX ORDER — PREDNISONE 10 MG/1
40 TABLET ORAL DAILY
Status: DISCONTINUED | OUTPATIENT
Start: 2021-10-06 | End: 2021-10-08 | Stop reason: HOSPADM

## 2021-10-05 RX ADMIN — IPRATROPIUM BROMIDE AND ALBUTEROL SULFATE 3 ML: .5; 2.5 SOLUTION RESPIRATORY (INHALATION) at 11:13

## 2021-10-05 RX ADMIN — GABAPENTIN 600 MG: 600 TABLET, FILM COATED ORAL at 08:49

## 2021-10-05 RX ADMIN — METHYLPREDNISOLONE SODIUM SUCCINATE 40 MG: 40 INJECTION, POWDER, FOR SOLUTION INTRAMUSCULAR; INTRAVENOUS at 08:49

## 2021-10-05 RX ADMIN — OXYCODONE AND ACETAMINOPHEN 1 TABLET: 10; 325 TABLET ORAL at 09:55

## 2021-10-05 RX ADMIN — GABAPENTIN 600 MG: 600 TABLET, FILM COATED ORAL at 20:48

## 2021-10-05 RX ADMIN — OXYCODONE AND ACETAMINOPHEN 1 TABLET: 10; 325 TABLET ORAL at 00:32

## 2021-10-05 RX ADMIN — METOCLOPRAMIDE 5 MG: 5 TABLET ORAL at 08:47

## 2021-10-05 RX ADMIN — MONTELUKAST 10 MG: 10 TABLET, FILM COATED ORAL at 23:25

## 2021-10-05 RX ADMIN — BUDESONIDE 500 MCG: 0.5 SUSPENSION RESPIRATORY (INHALATION) at 19:27

## 2021-10-05 RX ADMIN — ACETAMINOPHEN 650 MG: 325 TABLET ORAL at 12:54

## 2021-10-05 RX ADMIN — BUDESONIDE 500 MCG: 0.5 SUSPENSION RESPIRATORY (INHALATION) at 07:16

## 2021-10-05 RX ADMIN — ARFORMOTEROL TARTRATE 15 MCG: 15 SOLUTION RESPIRATORY (INHALATION) at 19:27

## 2021-10-05 RX ADMIN — FLUTICASONE PROPIONATE 2 SPRAY: 50 SPRAY, METERED NASAL at 09:34

## 2021-10-05 RX ADMIN — METOCLOPRAMIDE 5 MG: 5 TABLET ORAL at 17:00

## 2021-10-05 RX ADMIN — ALPRAZOLAM 1 MG: 0.5 TABLET ORAL at 23:25

## 2021-10-05 RX ADMIN — CITALOPRAM HYDROBROMIDE 40 MG: 20 TABLET ORAL at 08:47

## 2021-10-05 RX ADMIN — PANTOPRAZOLE SODIUM 40 MG: 40 TABLET, DELAYED RELEASE ORAL at 06:22

## 2021-10-05 RX ADMIN — IPRATROPIUM BROMIDE AND ALBUTEROL SULFATE 3 ML: .5; 2.5 SOLUTION RESPIRATORY (INHALATION) at 00:49

## 2021-10-05 RX ADMIN — ALPRAZOLAM 1 MG: 0.5 TABLET ORAL at 15:04

## 2021-10-05 RX ADMIN — AZITHROMYCIN MONOHYDRATE 500 MG: 500 INJECTION, POWDER, LYOPHILIZED, FOR SOLUTION INTRAVENOUS at 08:45

## 2021-10-05 RX ADMIN — IPRATROPIUM BROMIDE AND ALBUTEROL SULFATE 3 ML: .5; 2.5 SOLUTION RESPIRATORY (INHALATION) at 19:17

## 2021-10-05 RX ADMIN — SODIUM CHLORIDE, PRESERVATIVE FREE 1000 MG: 5 INJECTION INTRAVENOUS at 08:48

## 2021-10-05 RX ADMIN — IPRATROPIUM BROMIDE AND ALBUTEROL SULFATE 3 ML: .5; 2.5 SOLUTION RESPIRATORY (INHALATION) at 22:11

## 2021-10-05 RX ADMIN — METOCLOPRAMIDE 5 MG: 5 TABLET ORAL at 12:54

## 2021-10-05 RX ADMIN — IPRATROPIUM BROMIDE AND ALBUTEROL SULFATE 3 ML: .5; 2.5 SOLUTION RESPIRATORY (INHALATION) at 07:16

## 2021-10-05 RX ADMIN — ENOXAPARIN SODIUM 40 MG: 40 INJECTION SUBCUTANEOUS at 08:46

## 2021-10-05 RX ADMIN — IPRATROPIUM BROMIDE AND ALBUTEROL SULFATE 3 ML: .5; 2.5 SOLUTION RESPIRATORY (INHALATION) at 14:31

## 2021-10-05 RX ADMIN — ACETAMINOPHEN 650 MG: 325 TABLET ORAL at 18:46

## 2021-10-05 RX ADMIN — ALPRAZOLAM 1 MG: 0.5 TABLET ORAL at 03:49

## 2021-10-05 RX ADMIN — GABAPENTIN 600 MG: 600 TABLET, FILM COATED ORAL at 12:54

## 2021-10-05 RX ADMIN — ARFORMOTEROL TARTRATE 15 MCG: 15 SOLUTION RESPIRATORY (INHALATION) at 07:16

## 2021-10-05 RX ADMIN — ALPRAZOLAM 1 MG: 0.5 TABLET ORAL at 09:42

## 2021-10-05 RX ADMIN — OXYCODONE AND ACETAMINOPHEN 1 TABLET: 10; 325 TABLET ORAL at 15:52

## 2021-10-05 ASSESSMENT — PAIN SCALES - GENERAL
PAINLEVEL_OUTOF10: 6
PAINLEVEL_OUTOF10: 1
PAINLEVEL_OUTOF10: 3
PAINLEVEL_OUTOF10: 1
PAINLEVEL_OUTOF10: 6
PAINLEVEL_OUTOF10: 3
PAINLEVEL_OUTOF10: 6
PAINLEVEL_OUTOF10: 1

## 2021-10-05 NOTE — CARE COORDINATION
Pt may need overnight pulse oximetry for bipap; [facility] is reviewing clinicals, she is already a pt of theirs    Electronically signed by HEATH Adams on 10/5/2021 at 1:28 PM

## 2021-10-05 NOTE — PROGRESS NOTES
Hospitalist Progress Note  [facility]     Patient: Primus Goltz  : 1950  MRN: [id number]  Code Status: Full Code    Hospital Day: 3   Date of Service: 10/5/2021    Subjective:     No acute events overnight. Dyspnea improving but still with some significant wheezes and requiring 3 L nasal cannula. No fevers or chills overnight. Summary: 68-year-old female with COPD, history of lung cancer, admitted with acute on chronic hypercapnic respiratory failure in setting of COPD exacerbation with pneumonia after presenting with severe dyspnea with increased sputum production over the past week, requiring BiPAP on admission initially to ICU. Treated with Rocephin/azithromycin, nebulized bronchodilators, corticosteroids. Improved and transferred to the floor. Severe hypercapnia noted on admission with PCO2 93 with pH 7.29 (acute on chronic). Repeat blood gas with pH 7.39 with PCO2 69 likely near her baseline. Would benefit from BiPAP or trilogy noninvasive positive pressure ventilation device. Social work assisting to see if this can be arranged, otherwise is established with pulmonology and to potentially be arranged outpatient. Past Medical History:   Diagnosis Date    ADHD (attention deficit hyperactivity disorder)     Anxiety     [facility]     Depression     Fibromyalgia     GERD (gastroesophageal reflux disease)     Hypertension     RA (rheumatoid arthritis) ([facility] [address]        Past Surgical History:   Procedure Laterality Date    CHOLECYSTECTOMY      HERNIA REPAIR      HYSTERECTOMY      LEG SURGERY      steel garth placement. 2009       Family History   Problem Relation Age of Onset    Cancer Father        Social History     Socioeconomic History    Marital status:      Spouse name: Not on file    Number of children: Not on file    Years of education: Not on file    Highest education level: Not on file Occupational History    Not on file   Tobacco Use    Smoking status: Current Some Day Smoker     Packs/day: 0.25     Years: 30.00     Pack years: 7.50     Types: Cigarettes    Smokeless tobacco: Never Used   Substance and Sexual Activity    Alcohol use: No    Drug use: No    Sexual activity: Not on file   Other Topics Concern    Not on file   Social History Narrative    Not on file     Social Determinants of Health     Financial Resource Strain:     Difficulty of Paying Living Expenses:    Food Insecurity:     Worried About Running Out of Food in the Last Year:     Ran Out of Food in the Last Year:    Transportation Needs:     Lack of Transportation (Medical):      Lack of Transportation (Non-Medical):    Physical Activity:     Days of Exercise per Week:     Minutes of Exercise per Session:    Stress:     Feeling of Stress :    Social Connections:     Frequency of Communication with Friends and Family:     Frequency of Social Gatherings with Friends and Family:     Attends [employer] Services:     Active Member of Clubs or Organizations:     Attends Club or Organization Meetings:     Marital Status:    Intimate Partner Violence:     Fear of Current or Ex-Partner:     Emotionally Abused:     Physically Abused:     Sexually Abused:        Current Facility-Administered Medications   Medication Dose Route Frequency Provider Last Rate Last Admin    [START ON 10/6/2021] predniSONE (DELTASONE) tablet 40 mg  40 mg Oral Daily Bj Hart MD        magnesium sulfate 1000 mg in dextrose 5% 100 mL IVPB  1,000 mg IntraVENous PRN Noelle Alcala  mL/hr at 10/03/21 0851 1,000 mg at 10/03/21 0851    oxyCODONE-acetaminophen (PERCOCET)  MG per tablet 1 tablet  1 tablet Oral Q6H PRN Noelle Alcala MD   1 tablet at 10/05/21 1552    citalopram (CELEXA) tablet 40 mg  40 mg Oral Daily Noelle Alcala MD   40 mg at 10/05/21 0847    fluticasone (FLONASE) 50 MCG/ACT nasal spray 2 spray  2 spray patient reports significant intolerance to prednisone  Continue nebulized bronchodilators  Goal sats 88-92%     Acute on chronic hypercapnic respiratory failure, secondary to above  Weaned off BiPAP 10/3/2021  Requires 3.5 L at baseline      History of lung cancer  Routine outpatient follow-up     tobacco dependence  Counseled, nicotine patch if desired     DVT prophylaxis  Lovenox    Overnight oximetry study to be done to see if he can qualify for BiPAP    May need repeat ABG off supplemental O2 in order to qualify for trilogy    Disposition: Pending improvement in respiratory status hopefully next 1-2 days, potential arrangement for BiPAP or trilogy NIPPV device if qualifies    Josiah Villela MD   10/5/2021 4:47 PM

## 2021-10-05 NOTE — PROGRESS NOTES
Consult received, discussions regarding goals/code status initiated by Palliative Care RN. We will follow and assist as needed. Thank you for consulting Palliative care.

## 2021-10-05 NOTE — CONSULTS
**Physician Signature**  This document was electronically signed by: Esequiel Hurley DO  10/03/2021 12:39   PM    **Consult Information**  Member Facility: [address] [place] [address] Drive MRN: [id number]  Visit/Encounter Number: [id number]  Consult ID: [id number]  Facility Time Zone: CT  Date and Time of Request: 10/03/2021 10:41 AM  CT  Requesting Clinician: Loni Mo  Patient Name: Josee Bryan  YOB: 1950  Gender: Female  Patient identity was confirmed at the beginning of the consult with the   patient/family/staff using two personal identifiers: Patient name and       **Reason for Consult**  Reason for Consult: [facility]    **Admission**  Admission Date: 10-  Chief reason for ICU admission: Respiratory Failure  Secondary reasons for ICU admission: Sepsis, Pneumonia, COPD Exacerbation    **Core Metrics**  General orienting sentence for patient: (10/3):  65 y/o female admitted with   COPD, CAP and acute on chronic respiratory failure. Bipap 16/6, 35%. She   is chronically on 3.5 L NC at home. PCO2 was 93 on admission.   More awake   this AM.  Chief physiologic deterioration: Increase in FiO2 required by   30%  Is the patient on DVT prophylaxis?: Yes  Prophylaxis type: Pharmacological  DVT Prophylaxis Comments: enoxaparin  Is the patient on GI prophylaxis?: Yes  Gi Prophylaxis Comments: pantoprazole  Has this patient reached their nutritional goal?: Yes  Are there current issues with pain management in this patient?:   No  Are there issues with skin integrity?: No  Are there issues with delirium?: No  Has the patient been mobilized?: Yes  Is this patient currently intubated?: No  Are there ethical or care philosophy or family issues?: No  Do you recommend an in depth evaluation?: No  Disposition Recommendations: Continue ICU level of care    **Physician Signature**  This document was electronically signed by: Esequiel Hurley DO  10/03/2021 12:39   PM

## 2021-10-05 NOTE — PLAN OF CARE
Problem: Falls - Risk of:  Goal: Will remain free from falls  Description: Will remain free from falls  Outcome: Ongoing  Goal: Absence of physical injury  Description: Absence of physical injury  Outcome: Ongoing     Problem: Skin Integrity:  Goal: Will show no infection signs and symptoms  Description: Will show no infection signs and symptoms  Outcome: Ongoing  Goal: Absence of new skin breakdown  Description: Absence of new skin breakdown  Outcome: Ongoing     Problem: Urinary Elimination:  Goal: Signs and symptoms of infection will decrease  Description: Signs and symptoms of infection will decrease  Outcome: Ongoing  Goal: Complications related to the disease process, condition or treatment will be avoided or minimized  Description: Complications related to the disease process, condition or treatment will be avoided or minimized  Outcome: Ongoing     Problem: Infection:  Goal: Will remain free from infection  Description: Will remain free from infection  Outcome: Ongoing     Problem: Safety:  Goal: Free from accidental physical injury  Description: Free from accidental physical injury  Outcome: Ongoing  Goal: Free from intentional harm  Description: Free from intentional harm  Outcome: Ongoing     Problem: Daily Care:  Goal: Daily care needs are met  Description: Daily care needs are met  Outcome: Ongoing     Problem: Pain:  Goal: Patient's pain/discomfort is manageable  Description: Patient's pain/discomfort is manageable  Outcome: Ongoing     Problem: Skin Integrity:  Goal: Skin integrity will stabilize  Description: Skin integrity will stabilize  Outcome: Ongoing     Problem: Discharge Planning:  Goal: Patients continuum of care needs are met  Description: Patients continuum of care needs are met  Outcome: Ongoing

## 2021-10-06 PROBLEM — Z51.5 PALLIATIVE CARE PATIENT: Status: ACTIVE | Noted: 2021-10-06

## 2021-10-06 PROBLEM — C34.11 MALIGNANT NEOPLASM OF UPPER LOBE OF RIGHT LUNG (HCC): Status: ACTIVE | Noted: 2018-12-04

## 2021-10-06 PROBLEM — Z14.8 ALPHA-1-ANTITRYPSIN DEFICIENCY CARRIER: Status: ACTIVE | Noted: 2018-12-04

## 2021-10-06 PROBLEM — D52.9 FOLATE DEFICIENCY ANEMIA: Status: ACTIVE | Noted: 2017-03-06

## 2021-10-06 PROBLEM — F17.200 CURRENT EVERY DAY SMOKER: Status: ACTIVE | Noted: 2020-05-17

## 2021-10-06 LAB
BILIRUBIN URINE: NEGATIVE
BLOOD, URINE: NEGATIVE
CLARITY: CLEAR
COLOR: YELLOW
GLUCOSE URINE: NEGATIVE MG/DL
KETONES, URINE: NEGATIVE MG/DL
LEUKOCYTE ESTERASE, URINE: NEGATIVE
NITRITE, URINE: NEGATIVE
PH UA: 8.5 (ref 5–8)
PROTEIN UA: NEGATIVE MG/DL
SPECIFIC GRAVITY UA: 1.01 (ref 1–1.03)
UROBILINOGEN, URINE: 0.2 E.U./DL

## 2021-10-06 PROCEDURE — 99222 1ST HOSP IP/OBS MODERATE 55: CPT | Performed by: PHYSICIAN ASSISTANT

## 2021-10-06 PROCEDURE — 81003 URINALYSIS AUTO W/O SCOPE: CPT

## 2021-10-06 PROCEDURE — 6360000002 HC RX W HCPCS: Performed by: STUDENT IN AN ORGANIZED HEALTH CARE EDUCATION/TRAINING PROGRAM

## 2021-10-06 PROCEDURE — 2580000003 HC RX 258: Performed by: STUDENT IN AN ORGANIZED HEALTH CARE EDUCATION/TRAINING PROGRAM

## 2021-10-06 PROCEDURE — 94660 CPAP INITIATION&MGMT: CPT

## 2021-10-06 PROCEDURE — 94640 AIRWAY INHALATION TREATMENT: CPT

## 2021-10-06 PROCEDURE — 6370000000 HC RX 637 (ALT 250 FOR IP): Performed by: INTERNAL MEDICINE

## 2021-10-06 PROCEDURE — 6360000002 HC RX W HCPCS: Performed by: INTERNAL MEDICINE

## 2021-10-06 PROCEDURE — 97161 PT EVAL LOW COMPLEX 20 MIN: CPT

## 2021-10-06 PROCEDURE — 1210000000 HC MED SURG R&B

## 2021-10-06 PROCEDURE — 6370000000 HC RX 637 (ALT 250 FOR IP): Performed by: STUDENT IN AN ORGANIZED HEALTH CARE EDUCATION/TRAINING PROGRAM

## 2021-10-06 PROCEDURE — 2700000000 HC OXYGEN THERAPY PER DAY

## 2021-10-06 PROCEDURE — 97116 GAIT TRAINING THERAPY: CPT

## 2021-10-06 PROCEDURE — 6370000000 HC RX 637 (ALT 250 FOR IP)

## 2021-10-06 PROCEDURE — 94762 N-INVAS EAR/PLS OXIMTRY CONT: CPT

## 2021-10-06 PROCEDURE — 97165 OT EVAL LOW COMPLEX 30 MIN: CPT

## 2021-10-06 RX ADMIN — ENOXAPARIN SODIUM 40 MG: 40 INJECTION SUBCUTANEOUS at 09:09

## 2021-10-06 RX ADMIN — ALPRAZOLAM 1 MG: 0.5 TABLET ORAL at 16:51

## 2021-10-06 RX ADMIN — PANTOPRAZOLE SODIUM 40 MG: 40 TABLET, DELAYED RELEASE ORAL at 05:30

## 2021-10-06 RX ADMIN — METOCLOPRAMIDE 5 MG: 5 TABLET ORAL at 09:09

## 2021-10-06 RX ADMIN — IPRATROPIUM BROMIDE AND ALBUTEROL SULFATE 3 ML: .5; 2.5 SOLUTION RESPIRATORY (INHALATION) at 14:27

## 2021-10-06 RX ADMIN — ALPRAZOLAM 1 MG: 0.5 TABLET ORAL at 23:03

## 2021-10-06 RX ADMIN — OXYCODONE AND ACETAMINOPHEN 1 TABLET: 10; 325 TABLET ORAL at 13:06

## 2021-10-06 RX ADMIN — MONTELUKAST 10 MG: 10 TABLET, FILM COATED ORAL at 20:07

## 2021-10-06 RX ADMIN — ARFORMOTEROL TARTRATE 15 MCG: 15 SOLUTION RESPIRATORY (INHALATION) at 07:08

## 2021-10-06 RX ADMIN — GABAPENTIN 600 MG: 600 TABLET, FILM COATED ORAL at 20:07

## 2021-10-06 RX ADMIN — ALPRAZOLAM 1 MG: 0.5 TABLET ORAL at 09:07

## 2021-10-06 RX ADMIN — CITALOPRAM HYDROBROMIDE 40 MG: 20 TABLET ORAL at 09:07

## 2021-10-06 RX ADMIN — OXYCODONE AND ACETAMINOPHEN 1 TABLET: 10; 325 TABLET ORAL at 05:30

## 2021-10-06 RX ADMIN — SODIUM CHLORIDE, PRESERVATIVE FREE 1000 MG: 5 INJECTION INTRAVENOUS at 09:06

## 2021-10-06 RX ADMIN — LISINOPRIL 5 MG: 5 TABLET ORAL at 09:09

## 2021-10-06 RX ADMIN — ARFORMOTEROL TARTRATE 15 MCG: 15 SOLUTION RESPIRATORY (INHALATION) at 19:27

## 2021-10-06 RX ADMIN — GABAPENTIN 600 MG: 600 TABLET, FILM COATED ORAL at 09:09

## 2021-10-06 RX ADMIN — PREDNISONE 40 MG: 10 TABLET ORAL at 09:06

## 2021-10-06 RX ADMIN — GABAPENTIN 600 MG: 600 TABLET, FILM COATED ORAL at 13:06

## 2021-10-06 RX ADMIN — BUDESONIDE 500 MCG: 0.5 SUSPENSION RESPIRATORY (INHALATION) at 06:58

## 2021-10-06 RX ADMIN — IPRATROPIUM BROMIDE AND ALBUTEROL SULFATE 3 ML: .5; 2.5 SOLUTION RESPIRATORY (INHALATION) at 10:25

## 2021-10-06 RX ADMIN — IPRATROPIUM BROMIDE AND ALBUTEROL SULFATE 3 ML: .5; 2.5 SOLUTION RESPIRATORY (INHALATION) at 19:15

## 2021-10-06 RX ADMIN — IPRATROPIUM BROMIDE AND ALBUTEROL SULFATE 3 ML: .5; 2.5 SOLUTION RESPIRATORY (INHALATION) at 06:58

## 2021-10-06 RX ADMIN — BUDESONIDE 500 MCG: 0.5 SUSPENSION RESPIRATORY (INHALATION) at 19:27

## 2021-10-06 RX ADMIN — OXYCODONE AND ACETAMINOPHEN 1 TABLET: 10; 325 TABLET ORAL at 20:07

## 2021-10-06 ASSESSMENT — PAIN SCALES - GENERAL
PAINLEVEL_OUTOF10: 6
PAINLEVEL_OUTOF10: 6
PAINLEVEL_OUTOF10: 7
PAINLEVEL_OUTOF10: 1

## 2021-10-06 NOTE — PLAN OF CARE
Problem: Falls - Risk of:  Goal: Will remain free from falls  Description: Will remain free from falls  Outcome: Ongoing  Goal: Absence of physical injury  Description: Absence of physical injury  Outcome: Ongoing     Problem: Skin Integrity:  Goal: Will show no infection signs and symptoms  Description: Will show no infection signs and symptoms  Outcome: Ongoing  Goal: Absence of new skin breakdown  Description: Absence of new skin breakdown  Outcome: Ongoing     Problem: Urinary Elimination:  Goal: Signs and symptoms of infection will decrease  Description: Signs and symptoms of infection will decrease  Outcome: Ongoing  Goal: Complications related to the disease process, condition or treatment will be avoided or minimized  Description: Complications related to the disease process, condition or treatment will be avoided or minimized  Outcome: Ongoing     Problem: Infection:  Goal: Will remain free from infection  Description: Will remain free from infection  Outcome: Ongoing     Problem: Safety:  Goal: Free from accidental physical injury  Description: Free from accidental physical injury  Outcome: Ongoing  Goal: Free from intentional harm  Description: Free from intentional harm  Outcome: Ongoing     Problem: Daily Care:  Goal: Daily care needs are met  Description: Daily care needs are met  Outcome: Ongoing     Problem: Pain:  Goal: Patient's pain/discomfort is manageable  Description: Patient's pain/discomfort is manageable  Outcome: Ongoing     Problem: Skin Integrity:  Goal: Skin integrity will stabilize  Description: Skin integrity will stabilize  Outcome: Ongoing     Problem: Discharge Planning:  Goal: Patients continuum of care needs are met  Description: Patients continuum of care needs are met  Outcome: Ongoing     Problem: ABCDS Injury Assessment  Goal: Absence of physical injury  Outcome: Ongoing

## 2021-10-06 NOTE — CONSULTS
Palliative Care Consult Note    10/6/2021 9:53 AM  Subjective:  Admit Date: 05/8/4383  PCP: TEN Agrawal    Date of Service: 10/6/2021    Reason for Consultation:  Goals of Care, Code Status, Family Support     History Obtained From: EMR/Patient and their Family    History Of Present Illness: The patient is a 66 y.o. female with PMH COPD dependent on 3 1/2 L O2, lung cancer, GERD, HTN, anxiety, fibromyalgia who presented to [facility] ED on 10/02/2021 complaining of worsening shortness of breath that started the day prior to admission associated with chills,productive cough and fever. She was found to be hypoxic with oxygen saturation in 70's upon EMS arrival. She was placed on a nonrebreather and transported to this facility. T max 102 per EMS. Patient was placed on BiPAP and admitted to Hospitalist service for acute on chronic hypercapnic respiratory failure secondary to community acquired pneumonia and COPD exacerbation. She received empiric antibiotic therapy, bronchodilators, and steroids. Molecular respiratory panel was negative. Palliative care was consulted to assist with goals of care, code status discussion. Past Medical History:        Diagnosis Date    ADHD (attention deficit hyperactivity disorder)     Anxiety     [facility]     Depression     Fibromyalgia     GERD (gastroesophageal reflux disease)     Hypertension     Malignant neoplasm of upper lobe of right lung (HCC) 12/4/2018    RA (rheumatoid arthritis) ([facility] [address]      Past Surgical History:        Procedure Laterality Date    CHOLECYSTECTOMY      HERNIA REPAIR      HYSTERECTOMY      LEG SURGERY      steel garth placement. 2009     Home Medications:  Prior to Admission medications    Medication Sig Start Date End Date Taking?  Authorizing Provider   montelukast (SINGULAIR) 10 MG tablet Take 10 mg by mouth nightly   Yes Historical Provider, MD   lisinopril (PRINIVIL;ZESTRIL) 5 MG tablet TAKE 1 TABLET BY MOUTH DAILY 10/4/18   TEN Seymour   oxyCODONE (OXYCONTIN) 30 MG T12A extended release tablet Take 30 mg by mouth every 12 hours for 30 days. Abhay Kieran Date: 7/19/18 7/19/18 8/18/18  Paris Kramer MD   oxyCODONE-acetaminophen (PERCOCET)  MG per tablet TAKE 1 TABLET Q 4 TO 6 HRS PRN. (MAX 5/DAY).  Earliest Fill Date: 7/19/18 7/19/18 8/18/18  Paris Kramer MD   ondansetron (ZOFRAN ODT) 4 MG disintegrating tablet Take 1 tablet by mouth every 8 hours as needed for Nausea or Vomiting 5/1/18 Marthenia Monday, APRN   lidocaine (LIDODERM) 5 % Place 1 patch onto the skin daily 12 hours on, 12 hours off. 5/1/18 Marthenia Monday, APRN   nicotine (NICODERM CQ) 7 MG/24HR Place 1 patch onto the skin every 24 hours 5/1/18 Marthenia Monday, APRN   nicotine (NICODERM CQ) 21 MG/24HR Place 1 patch onto the skin every 24 hours 5/1/18   Hayeshenia Monday, APRN   tiZANidine (ZANAFLEX) 4 MG tablet Take 1 tablet by mouth 3 times daily as needed (.) 4/30/18   Paris Kramer MD   albuterol-ipratropium (COMBIVENT RESPIMAT)  MCG/ACT AERS inhaler Inhale 1 puff into the lungs every 6 hours 4/27/18   Carole Rucker MD   albuterol sulfate HFA (VENTOLIN HFA) 108 (90 Base) MCG/ACT inhaler Inhale 2 puffs into the lungs every 6 hours as needed for Wheezing 4/27/18   Carole Rucker MD   budesonide-formoterol [facility]) 160-4.5 MCG/ACT AERO Inhale 2 puffs into the lungs 2 times daily 4/27/18   Carole Rucker MD   pantoprazole (PROTONIX) 20 MG tablet TAKE 1 TABLET BY MOUTH 2 TIMES DAILY 12/18/17   Carole Rucker MD   fluticasone [facility]) 50 MCG/ACT nasal spray INSTILL 1 SPRAY IN EACH NOSTRIL DAILY 10/4/17   Carole Rucker MD   OXYGEN Inhale into the lungs    Historical MD Lesa   ipratropium-albuterol (DUONEB) 0.5-2.5 (3) MG/3ML SOLN nebulizer solution Inhale 3 mLs into the lungs every 4 hours 4/6/17   Carole Rucker MD   metoclopramide (REGLAN) 5 MG tablet Take 1 tablet by mouth 4 times daily 3/30/17   Anshu Sagastume MD   albuterol (PROVENTIL) (2.5 MG/3ML) 0.083% nebulizer solution Take 3 mLs by nebulization every 6 hours as needed for Wheezing 3/17/17   Anshu Sagastume MD   ondansetron [facility]) 4 MG tablet Take 1 tablet by mouth every 8 hours as needed for Nausea or Vomiting 2/23/17   Anshu Sagatsume MD   gabapentin (NEURONTIN) 600 MG tablet Take 600 mg by mouth 3 times daily    Historical Provider, MD   ALPRAZolam Cindia Muss) 1 MG tablet Take 1 mg by mouth nightly as needed Takes 4 times a day 1/15/15   Historical Provider, MD   amphetamine-dextroamphetamine (ADDERALL) 20 MG tablet Take 20 mg by mouth 2 times daily. 1/21/15   Historical Provider, MD   zolpidem (AMBIEN) 10 MG tablet Take 5 mg by mouth nightly as needed. 1/3/15   Historical Provider, MD   citalopram (CELEXA) 40 MG tablet Take 40 mg by mouth daily. Historical Provider, MD     Allergies:    Aspirin, Codeine, Demerol, Neosporin [bacitracin-neomycin-polymyxin], Nsaids, Pcn [penicillins], Pentazocine lactate, Sulfa antibiotics, Talwin [pentazocine], and Influenza vaccines    Social History:    The patient currently lives at home with her daughter  Tobacco:   reports that she has been smoking cigarettes. She has a 7.50 pack-year smoking history. She has never used smokeless tobacco.  Alcohol:   reports no history of alcohol use.   Illicit Drugs: denies    Family History:      Problem Relation Age of Onset    Cancer Father      Review of Systems:   Constitutional / general:  Denies fever / chills / sweats +fatigue  Head:  Denies headache / neck stiffness / trauma / visual change  Eyes:  Denies blurry vision / acute visual change or loss / itching / redness  ENT: Denies sore throat / hoarseness / nasal drainage / ear pain  CV:  Denies chest pain / palpitations/ orthopnea   Respiratory:  + cough / +shortness of breath / +sputum / hemoptysis  GI: Denies nausea / vomiting / abdominal pain / diarrhea / constipation  :  Denies dysuria / hesitancy / urgency / hematuria   Neuro: Denies paralysis / syncope / seizure / dysphagia / headache / paresthesias  Musculoskeletal:  + muscle weakness /joint stiffness / pain  Vascular: Denies edema / claudication / varicosities  Heme / endocrine: Denies easy bruising / bleeding / excessive sweating / heat or cold intolerance  Psychiatric:  Denies depression /+ anxiety / insomnia / mood changes  Skin:  Denies new rashes / lesions / skin hair or nail changes    14 point review of systems is negative except as specifically addressed above. Physical Examination:  /74   Pulse 74   Temp 96.7 °F (35.9 °C)   Resp 16   Ht 5' 5\" (1.651 m)   Wt 122 lb 8 oz (55.6 kg)   SpO2 96%   BMI 20.39 kg/m²   General appearance: 67 yo female, frail appearing, no acute distress, NC oxygen in place  Head: Normocephalic, without obvious abnormality, atraumatic  Eyes: conjunctivae/corneas clear. PERRL, EOM's intact.    Ears: normal external ears and nose, throat without exudate  Neck: no adenopathy, no carotid bruit, no JVD, supple, symmetrical, trachea midline   Lungs: diminished throughout, worse on right side with coarse air entry, scattered end expiratory wheezing  Heart: regular rate and rhythm, S1, S2 normal, no murmur  Abdomen:soft, non-tender; non-distended, normal bowel sounds no masses, no organomegaly  Extremities:No lower extremity edema,  No erythema, no tenderness to palpation  Skin: Skin color, texture, turgor normal. No rashes or lesions  Lymphatic: No palpable lymph node enlargment  Neurologic: Alert and oriented X 3, generalized weakness, normal tone  Psychiatric: Alert and oriented, thought content appropriate, normal insight, mood appropriate    Diagnostic Data:  CBC:  Recent Labs     10/04/21  0346 10/05/21  0205   WBC 7.7 8.2   HGB 9.0* 9.3*   HCT 30.5* 31.0*    254     BMP:  Recent Labs     10/04/21  0346 10/05/21  0205    142   K 3.9 4.0   CL 97* 98 CO2 35* 40*   BUN 17 14   CREATININE 0.6 0.5   CALCIUM 8.2* 8.4*   ABGs:  Lab Results   Component Value Date    PHART 7.290 10/02/2021    PO2ART 96.0 10/02/2021    YID3UJX 93.0 10/02/2021     Urinalysis:  Lab Results   Component Value Date    NITRU Negative 10/02/2021    WBCUA 350 05/11/2021    BACTERIA 4+ 05/11/2021    RBCUA 3 05/11/2021    BLOODU Negative 10/02/2021    SPECGRAV 1.011 10/02/2021    GLUCOSEU Negative 10/02/2021     XR CHEST PORTABLE  New RIGHT mid/lower lobe peripheral predominant air space opacity. Leading differential is infectious process, including viral pneumonia.  Signed by Dr Evens Guevara    Palliative Performance Scale:  [] 80% Full ambulation  Some disease: Normal activity w/ some effort  Full self-care  Normal/reduced intake  Full LOC  [] 70% Reduced ambulation  Some disease: Can't do normal job or work  Full self-care  Normal/reduced intake  Full LOC  [x] 60% Reduced ambulation  Significant disease: Can't do hobbies/housework  Occasional assistance  Normal/reduced intake  LOC full/confusion  [] 50% Mainly sit/lie  Extensive disease: Can't do any work  Considerable assistance  Normal/reduced intake  LOC full/confusion  [] 40% Mainly in bed  Extensive disease  Mainly assistance  Normal/reduced intake  LOC full/confusion  [] 30% Bed Bound  Extensive disease  Total care  Reduced Intake  LOC full/confusion  [] 20% Bed Bound  Extensive disease  Total care  Minimal intake  Drowsy/coma  [] 10% Bed Bound  Extensive disease  Total care  Mouth care only  Drowsy/coma  [] 0% Death    Palliative Review of Advance Directives:     Surrogate Decision Maker:Yes-mao [employer] Power of :No    Advanced Directives/Living [place]: No    Out of hospital medical orders in place to reflect resuscitation status (MOLST/POLST): No    Information Sharing:  Patient's awareness of illness:  [] Terminal [] Life-Threatening [x] Serious [] Non life-threatening [] Not serious   [] Not discussed    Family awareness of illness:   [] Terminal [] Life-Threatening [] Serious [] Nonlife-threatening [] Not serious   [x] Not discussed    Assessment/Plan:  Active Problems:    Anxiety and depression    GERD (gastroesophageal reflux disease)    Chronic bronchitis with acute exacerbation (HCC)    Hypertension    COPD (chronic obstructive pulmonary disease) (HCC)    Acute hypercapnic respiratory failure (HCC)    Alpha-1-antitrypsin deficiency carrier    Current every day smoker    Malignant neoplasm of upper lobe of right lung ([address]  Resolved Problems:    * No resolved hospital problems. *     Visit Summary:  Chart reviewed, patient discussed with consulting service and nursing staff. Reviewed health issues, work up and treatment plan as well as factors that lead to hospitalization. I saw Mrs. Horton at her bedside. Assisted her to restroom. Upon patient's return to bed we had a lengthy discussion regarding her acute and chronic illness. She states she lives with her daughter ST SEGURA who is her decision maker. Patient would like to complete a living will that states this. Mrs. David Gomez states her daughter helps her at home and no longer works in order to care for patient. Patient states her   due to poorly managed CHF. She performed CPR on him prior to hospitalization/death. States she is full code and has expressed her wishes to her daughter. She plans to discharge home with her daughter. Has been offered home health in the past. Support/comfort offered. Recommended patient continue open/honest discussion regarding goals of care to her children/grandchildren. Recommendations:     1. Palliative Care-[facility] DC home w/ her daughter who is her caregiver once medically stable. Consider home health. Code status: FULL CODE. Patient aware of complications of CPR. She would like to complete a living will  2.  Acute on chronic respiratory failure 2/2 CAP/COPD Exac-empiric antibiotics/bronchodilators continued. Patient requiring baseline oxygen at this point. States she is unsure she can tolerate BiPAP. Has a friend who aspirated while BiPAP mask was on and this has caused her anxiety   3. Hx of lung cancer-noted  4. Anxiety-Xanax 1 mg QID prn was ordered on 10/2. Would continue    Thank you for consulting palliative care and allowing us to participate in the care of the patient.       CounselingTopics: Goals of care, Code Status, Disease process education, pt/family support    Time Spent Counseling > 50%:  YES                                   Total Time Spent with patient/family counseling, workup/treatment review, counseling and placement of orders/preparation of this note: 62 minutes    Electronically signed by Charlee Perez PA-C on 10/6/2021 at 9:53 AM    (Please note that portions of this note were completed with a voice recognition program.  Efforts were made to edit the dictations but occasionally words are mis-transcribed.)

## 2021-10-06 NOTE — PROGRESS NOTES
Progress Note  Date:10/6/2021       Room:0332/332-01  Patient Name:Iman Horton     Date of Birth:     Age:71 y.o. Subjective    Subjective: Assumed patient care 10/6/2021      49-year-old female with COPD, history of lung cancer, admitted with acute on chronic hypercapnic respiratory failure in setting of COPD exacerbation with pneumonia after presenting with severe dyspnea with increased sputum production over the past week, requiring BiPAP on admission initially to ICU. Treated with Rocephin/azithromycin, nebulized bronchodilators, corticosteroids. Improved and transferred to the floor. Severe hypercapnia noted on admission with PCO2 93 with pH 7.29 (acute on chronic). Repeat blood gas with pH 7.39 with PCO2 69 likely near her baseline. Would benefit from BiPAP or trilogy noninvasive positive pressure ventilation device. Social work assisting to see if this can be arranged, otherwise is established with pulmonology and to potentially be arranged outpatient. Seen this morning in her room, denied any acute overnight event. Today she is feeling a lot better than when she initially came in. Denied any chest pain or worsening shortness of breath uses 3.5 L at baseline. Inquiring about switching her current pulmonologist to a new one. Says she has been having increased urinary frequency since removal of Qu[place]ga catheter. Urinalysis ordered. Overnight oximetry completed and noted to have some desaturations, plans for possible qualification for BiPAP. Review of Systems: 12 point system review negative suggested above.       Objective         Vitals Last 24 Hours:  TEMPERATURE:  Temp  Av.9 °F (36.1 °C)  Min: 96.4 °F (35.8 °C)  Max: 97.7 °F (36.5 °C)  RESPIRATIONS RANGE: Resp  Av  Min: 16  Max: 18  PULSE OXIMETRY RANGE: SpO2  Av %  Min: 92 %  Max: 99 %  PULSE RANGE: Pulse  Av  Min: 74  Max: 98  BLOOD PRESSURE RANGE: Systolic (43AGD), PAV:138 , Min:105 , Max:147   ; Diastolic (92IRJ), QYQ:82, Min:57, Max:74    I/O (24Hr): Intake/Output Summary (Last 24 hours) at 10/6/2021 1820  Last data filed at 10/6/2021 1253  Gross per 24 hour   Intake 480 ml   Output 2200 ml   Net -1720 ml       Physical Examination:  General: no acute distress lying comfortably in bed. HEENT: Atraumatic normocephalic, range of motion normal JVD, no tracheal deviation noted. Cardiac: Normal S1-S2 no murmurs rub or gallop. Respiratory: clear To auscultation bilaterally, no rhonchi or rales,+ inspiratory wheezing  Abdomen: Soft, positive bowel sounds in all quadrants, no distention, nontender to palpation  Extremities: no tenderness, no edema, moves all extremities  Psych: Affect normal and good eye contact, behavioral normal.        Labs/Imaging/Diagnostics    Labs:  CBC:  Recent Labs     10/04/21  0346 10/05/21  0205   WBC 7.7 8.2   RBC 2.85* 2.97*   HGB 9.0* 9.3*   HCT 30.5* 31.0*   .0* 104.4*   RDW 13.7 13.8    254     CHEMISTRIES:  Recent Labs     10/04/21  0346 10/05/21  0205    142   K 3.9 4.0   CL 97* 98   CO2 35* 40*   BUN 17 14   CREATININE 0.6 0.5   GLUCOSE 235* 154*   MG 1.9 1.7     PT/INR:No results for input(s): PROTIME, INR in the last 72 hours. APTT:No results for input(s): APTT in the last 72 hours. LIVER PROFILE:No results for input(s): AST, ALT, BILIDIR, BILITOT, ALKPHOS in the last 72 hours. Imaging Last 24 Hours:  No results found.   Assessment//Plan           Hospital Problems         Last Modified POA    Anxiety and depression 10/6/2021 Yes    GERD (gastroesophageal reflux disease) 10/6/2021 Yes    Chronic bronchitis with acute exacerbation ([address] 10/6/2021 Yes    Hypertension 10/6/2021 Yes    COPD (chronic obstructive pulmonary disease) ([address] 10/6/2021 Yes    Acute hypercapnic respiratory failure ([address] 10/2/2021 Yes    Alpha-1-antitrypsin deficiency carrier 10/6/2021 Yes    Overview Signed 10/6/2021  9:48 AM by Eva Romano PA-C     Formatting of this note might be different from the original.  phenotype SS         Current every day smoker 10/6/2021 Yes    Malignant neoplasm of upper lobe of right lung ([address] 10/6/2021 Yes    Palliative care patient 10/6/2021 Yes        Assessment & Plan    CAP  D/C azithromycin, respiratory PCR negative, no atypical infection  Continue Rocephin,  complete 5-7 days total  Follow cultures  Follow clinically and monitor WBC     COPD exacerbation  Continue Solu-Medrol, would benefit from steroid taper at discharge, however patient reports significant intolerance to prednisone  Has received prednisone in house without any issues so far  Continue nebulized bronchodilators  Goal sats 88-92%     Acute on chronic hypercapnic respiratory failure, secondary to above  Weaned off BiPAP 10/3/2021  Requires 3.5 L at baseline   Overnight oximetry completed and desaturations noted, working on possibly qualify for BiPAP     History of lung cancer  Routine outpatient follow-up, sooner she needs a new pulmonologist will give information of Dr. Meche Estes on discharge     Tobacco dependence  Counseled, nicotine patch if desired           DVT prophylaxis - Lovenox    Disposition: Pending improvement in respiratory status hopefully next 1-2 days, potential arrangement for BiPAP        Electronically signed by   Andrew Estrella MD   Internal Medicine Hospitalist  On 10/6/2021  At 6:32 PM    EMR Dragon/Transcription disclaimer:   Much of this encounter note is an electronic transcription/translation of spoken language to printed text.  The electronic translation of spoken language may permit erroneous, or at times, nonsensical words or phrases to be inadvertently transcribed; although attempts have made to review the note for such errors, some may still exist.

## 2021-10-06 NOTE — PROGRESS NOTES
Occupational Therapy   Occupational Therapy Initial Assessment  Date: 10/6/2021   Patient Name: Gordon Franco  MRN: [id number]     : 1950    Date of Service: 10/6/2021    Discharge Recommendations:          Assessment   Assessment: Pt. doing well with ADLs and basic tfers. PT to see but OT eval only  Treatment Diagnosis: Septicemia, Pneumonia  Prognosis: Good  Decision Making: Low Complexity  REQUIRES OT FOLLOW UP: No  Activity Tolerance  Activity Tolerance: Patient Tolerated treatment well           Patient Diagnosis(es): The primary encounter diagnosis was Acute respiratory failure with hypoxia and hypercapnia ([facility] [address] Diagnoses of Pneumonia of right lung due to infectious organism, unspecified part of lung and Septicemia ([facility] [address] were also pertinent to this visit. has a past medical history of ADHD (attention deficit hyperactivity disorder), Anxiety, COPD (chronic obstructive pulmonary disease) ([facility] [address] Depression, Fibromyalgia, GERD (gastroesophageal reflux disease), Hypertension, Malignant neoplasm of upper lobe of right lung ([facility] [address] and RA (rheumatoid arthritis) ([facility] [address] has a past surgical history that includes Hysterectomy; hernia repair; Leg Surgery; and Cholecystectomy.     Treatment Diagnosis: Septicemia, Pneumonia      Restrictions       Subjective   General  Chart Reviewed: Yes  Patient assessed for rehabilitation services?: Yes  Family / Caregiver Present: No  Diagnosis: Pneumonia, septicemia  Patient Currently in Pain: Denies  Pain Assessment  Pain Level: 7  Vital Signs  Temp: 96.4 °F (35.8 °C)  Pulse: 79  Resp: 18  BP: (!) 127/59  Patient Currently in Pain: Denies  Oxygen Therapy  SpO2: 96 %  Social/Functional History  Social/Functional History  Lives With: Daughter  Type of Home: House  Home Layout: One level  Bathroom Shower/Tub: Tub/Shower unit  ADL Assistance: Independent  Ambulation Assistance: Independent  Transfer Assistance: Independent       Objective   Vision: Within Functional Limits  Hearing: Within functional limits    Orientation  Overall Orientation Status: Within Normal Limits        ADL  Feeding: Independent  Grooming: Independent  UE Bathing: Supervision  LE Bathing: Supervision  UE Dressing: Supervision  LE Dressing: Supervision  Toileting: Supervision           Transfers  Stand Step Transfers: Supervision  Sit to stand: Supervision     Cognition  Overall Cognitive Status: WNL                 LUE AROM (degrees)  LUE AROM : WNL  RUE AROM (degrees)  RUE AROM : WNL  LUE Strength  Gross LUE Strength: WFL  RUE Strength  Gross RUE Strength: WFL                   Plan   Plan  Times per week: OT eval only    G-Code     OutComes Score                                                  AM-PAC Score             Goals  Short term goals  Time Frame for Short term goals: OT eval only  Long term goals  Time Frame for Long term goals : OT eval only       Therapy Time   Individual Concurrent Group Co-treatment   Time In           Time Out           Minutes                   Noy Lang OT

## 2021-10-06 NOTE — PROGRESS NOTES
Physical Therapy    Facility/Department: [facility] 3 [facility]ST[facility]/VAS/MED  Initial Assessment    NAME: Malena Roberts  : 1950  MRN: [id number]    Date of Service: 10/6/2021    Discharge Recommendations:  Home with assist PRN   PT Equipment Recommendations  Other: pT STATES SHE WOULD LIKE A WALKER (ADVISE A ROLLATOR) AND A W/C FOR TRANSPORT TO MD    Assessment   Body structures, Functions, Activity limitations: Decreased functional mobility ; Decreased strength;Decreased endurance  Assessment: pt WOULD BENEFIT FROM SKILLED PT IN THIS SETTING TO PROGRESS HER MOBILITY AND SAFETY IN PREPARATION FOR D/C HOME WITH DTR'S ASSIST  Prognosis: Good  Decision Making: Low Complexity  PT Education: Goals;PT Role;Plan of Care; Functional Mobility Training;Gait Training;General Safety  REQUIRES PT FOLLOW UP: Yes  Activity Tolerance  Activity Tolerance: Patient Tolerated treatment well       Patient Diagnosis(es): The primary encounter diagnosis was Acute respiratory failure with hypoxia and hypercapnia ([address] Diagnoses of Pneumonia of right lung due to infectious organism, unspecified part of lung and Septicemia ([address] were also pertinent to this visit. has a past medical history of ADHD (attention deficit hyperactivity disorder), Anxiety, COPD (chronic obstructive pulmonary disease) ([address] Depression, Fibromyalgia, GERD (gastroesophageal reflux disease), Hypertension, Malignant neoplasm of upper lobe of right lung ([address] and RA (rheumatoid arthritis) ([address] has a past surgical history that includes Hysterectomy; hernia repair; Leg Surgery; and Cholecystectomy. Restrictions     Vision/Hearing        Subjective  General  Patient assessed for rehabilitation services?: Yes  Diagnosis: LUNG CA, COPD, RESP FAILURE  General Comment  Comments: WEARING 02 AND HAS EXTENSION TUBING  Subjective  Subjective: pt STATES SHE HAS HAD FALLS AT HOME AND BELIEVES SHE WOULD LIKE TO HAVE A RW FOR USE AT HOME.  REPORTS SHE HAS BEEN UP TO THE  WITH NURSING ASSIST AND IS ONLY MILDY SOA WITH THIS ACTIVITY  Pain Screening  Patient Currently in Pain: Denies  Vital Signs  Patient Currently in Pain: Denies       Orientation     Social/Functional History  Social/Functional History  Lives With: Daughter  Type of Home: House  Home Layout: One level  Bathroom Shower/Tub: Tub/Shower unit  ADL Assistance: Independent (DTR ASSISTS PRN AND IS CLOSE BY FOR SHOWERING)  Ambulation Assistance: Independent  Transfer Assistance: Independent  Cognition        Objective          AROM RLE (degrees)  RLE AROM: WFL  AROM LLE (degrees)  LLE AROM : WFL  Strength RLE  Strength RLE: WFL  Strength LLE  Strength LLE: WFL        Bed mobility  Supine to Sit: Independent  Sit to Supine: Independent  Transfers  Sit to Stand: Stand by assistance;Contact guard assistance  Stand to sit: Stand by assistance  Bed to Chair: Stand by assistance;Contact guard assistance  Ambulation  Ambulation?: Yes  Ambulation 1  Surface: level tile  Device: No Device  Other Apparatus: O2  Assistance: Contact guard assistance;Stand by assistance  Quality of Gait: pt APPEARS MILDLY GUARDED BUT NO LOB OR PATH DEVIATION  Gait Deviations: Slow Rosa;Decreased arm swing;Decreased step length  Distance: 30 FT  Comments: pT AMB IN ROOM AND REPORTED MILD FATIGUE/SOA BUT NO DISTRESS. Plan   Plan  Times per week: 5-6  Plan weeks: 2  Current Treatment Recommendations: Functional Mobility Training, Transfer Training, Positioning, Safety Education & Training, Endurance Training  Plan Comment: 02, TAKE A RW.  APPEARS SAFE IN ROOM WITHOUT RW, BUT LIKELY WILL REQUIRE ONE TO PROGRESS AMBULATION DISTANCE  Safety Devices  Type of devices: Call light within reach, Gait belt, Left in chair, Nurse notified      Goals  Short term goals  Time Frame for Short term goals: 2 WKS  Short term goal 1:  FT WITH RW, SBA       Therapy Time   Individual Concurrent Group Co-treatment   Time In           Time Out           Minutes

## 2021-10-06 NOTE — CARE COORDINATION
Workign to obtain trilogy for pt through [employer]. They are requesting 2 ABG's for pt. One on RA and one on bipap without oxygen.  Requested from MD.   Electronically signed by Toni Bae on 10/6/2021 at 11:49 AM

## 2021-10-07 LAB
BLOOD CULTURE, ROUTINE: NORMAL
CULTURE, BLOOD 2: NORMAL

## 2021-10-07 PROCEDURE — 94640 AIRWAY INHALATION TREATMENT: CPT

## 2021-10-07 PROCEDURE — 6370000000 HC RX 637 (ALT 250 FOR IP): Performed by: PHYSICIAN ASSISTANT

## 2021-10-07 PROCEDURE — 6370000000 HC RX 637 (ALT 250 FOR IP): Performed by: INTERNAL MEDICINE

## 2021-10-07 PROCEDURE — 2580000003 HC RX 258: Performed by: STUDENT IN AN ORGANIZED HEALTH CARE EDUCATION/TRAINING PROGRAM

## 2021-10-07 PROCEDURE — 6370000000 HC RX 637 (ALT 250 FOR IP): Performed by: STUDENT IN AN ORGANIZED HEALTH CARE EDUCATION/TRAINING PROGRAM

## 2021-10-07 PROCEDURE — 6360000002 HC RX W HCPCS: Performed by: INTERNAL MEDICINE

## 2021-10-07 PROCEDURE — 94761 N-INVAS EAR/PLS OXIMETRY MLT: CPT

## 2021-10-07 PROCEDURE — 94660 CPAP INITIATION&MGMT: CPT

## 2021-10-07 PROCEDURE — 6360000002 HC RX W HCPCS: Performed by: HOSPITALIST

## 2021-10-07 PROCEDURE — 6370000000 HC RX 637 (ALT 250 FOR IP)

## 2021-10-07 PROCEDURE — 1210000000 HC MED SURG R&B

## 2021-10-07 PROCEDURE — 6360000002 HC RX W HCPCS: Performed by: STUDENT IN AN ORGANIZED HEALTH CARE EDUCATION/TRAINING PROGRAM

## 2021-10-07 PROCEDURE — 99232 SBSQ HOSP IP/OBS MODERATE 35: CPT | Performed by: PHYSICIAN ASSISTANT

## 2021-10-07 PROCEDURE — 2700000000 HC OXYGEN THERAPY PER DAY

## 2021-10-07 RX ORDER — GUAIFENESIN 100 MG/5ML
200 SOLUTION ORAL EVERY 4 HOURS PRN
Status: DISCONTINUED | OUTPATIENT
Start: 2021-10-07 | End: 2021-10-08 | Stop reason: HOSPADM

## 2021-10-07 RX ORDER — MIRTAZAPINE 7.5 MG/1
7.5 TABLET, FILM COATED ORAL NIGHTLY
Status: DISCONTINUED | OUTPATIENT
Start: 2021-10-07 | End: 2021-10-08 | Stop reason: HOSPADM

## 2021-10-07 RX ORDER — LEVALBUTEROL INHALATION SOLUTION 0.63 MG/3ML
0.63 SOLUTION RESPIRATORY (INHALATION) EVERY 8 HOURS
Status: DISCONTINUED | OUTPATIENT
Start: 2021-10-07 | End: 2021-10-08 | Stop reason: HOSPADM

## 2021-10-07 RX ADMIN — GABAPENTIN 600 MG: 600 TABLET, FILM COATED ORAL at 12:29

## 2021-10-07 RX ADMIN — ALPRAZOLAM 1 MG: 0.5 TABLET ORAL at 05:37

## 2021-10-07 RX ADMIN — OXYCODONE AND ACETAMINOPHEN 1 TABLET: 10; 325 TABLET ORAL at 08:46

## 2021-10-07 RX ADMIN — GUAIFENESIN 200 MG: 100 SOLUTION ORAL at 04:09

## 2021-10-07 RX ADMIN — PREDNISONE 40 MG: 10 TABLET ORAL at 08:32

## 2021-10-07 RX ADMIN — SODIUM CHLORIDE, PRESERVATIVE FREE 1000 MG: 5 INJECTION INTRAVENOUS at 08:32

## 2021-10-07 RX ADMIN — DEXTROAMPHETAMINE SACCHARATE, AMPHETAMINE ASPARTATE, DEXTROAMPHETAMINE SULFATE, AMPHETAMINE SULFATE TABLETS, 10 MG,CLL 20 MG: 2.5; 2.5; 2.5; 2.5 TABLET ORAL at 08:32

## 2021-10-07 RX ADMIN — GABAPENTIN 600 MG: 600 TABLET, FILM COATED ORAL at 21:24

## 2021-10-07 RX ADMIN — ALPRAZOLAM 1 MG: 0.5 TABLET ORAL at 16:28

## 2021-10-07 RX ADMIN — LEVALBUTEROL HYDROCHLORIDE 0.63 MG: 0.63 SOLUTION RESPIRATORY (INHALATION) at 22:01

## 2021-10-07 RX ADMIN — IPRATROPIUM BROMIDE AND ALBUTEROL SULFATE 3 ML: .5; 2.5 SOLUTION RESPIRATORY (INHALATION) at 10:16

## 2021-10-07 RX ADMIN — BUDESONIDE 500 MCG: 0.5 SUSPENSION RESPIRATORY (INHALATION) at 22:10

## 2021-10-07 RX ADMIN — OXYCODONE AND ACETAMINOPHEN 1 TABLET: 10; 325 TABLET ORAL at 16:28

## 2021-10-07 RX ADMIN — MIRTAZAPINE 7.5 MG: 7.5 TABLET ORAL at 21:24

## 2021-10-07 RX ADMIN — ENOXAPARIN SODIUM 40 MG: 40 INJECTION SUBCUTANEOUS at 08:32

## 2021-10-07 RX ADMIN — GABAPENTIN 600 MG: 600 TABLET, FILM COATED ORAL at 08:32

## 2021-10-07 RX ADMIN — IPRATROPIUM BROMIDE AND ALBUTEROL SULFATE 3 ML: .5; 2.5 SOLUTION RESPIRATORY (INHALATION) at 14:19

## 2021-10-07 RX ADMIN — PANTOPRAZOLE SODIUM 40 MG: 40 TABLET, DELAYED RELEASE ORAL at 05:37

## 2021-10-07 RX ADMIN — MONTELUKAST 10 MG: 10 TABLET, FILM COATED ORAL at 21:24

## 2021-10-07 RX ADMIN — ALPRAZOLAM 1 MG: 0.5 TABLET ORAL at 12:29

## 2021-10-07 RX ADMIN — OXYCODONE AND ACETAMINOPHEN 1 TABLET: 10; 325 TABLET ORAL at 02:19

## 2021-10-07 RX ADMIN — ACETAMINOPHEN 650 MG: 325 TABLET ORAL at 21:05

## 2021-10-07 RX ADMIN — CITALOPRAM HYDROBROMIDE 40 MG: 20 TABLET ORAL at 08:32

## 2021-10-07 RX ADMIN — ARFORMOTEROL TARTRATE 15 MCG: 15 SOLUTION RESPIRATORY (INHALATION) at 22:10

## 2021-10-07 RX ADMIN — ONDANSETRON 4 MG: 4 TABLET, ORALLY DISINTEGRATING ORAL at 08:46

## 2021-10-07 RX ADMIN — METOCLOPRAMIDE 5 MG: 5 TABLET ORAL at 08:32

## 2021-10-07 RX ADMIN — FLUTICASONE PROPIONATE 2 SPRAY: 50 SPRAY, METERED NASAL at 08:49

## 2021-10-07 RX ADMIN — ONDANSETRON 4 MG: 4 TABLET, ORALLY DISINTEGRATING ORAL at 21:05

## 2021-10-07 ASSESSMENT — PAIN SCALES - GENERAL
PAINLEVEL_OUTOF10: 5
PAINLEVEL_OUTOF10: 6
PAINLEVEL_OUTOF10: 5
PAINLEVEL_OUTOF10: 6
PAINLEVEL_OUTOF10: 6

## 2021-10-07 ASSESSMENT — PAIN DESCRIPTION - ORIENTATION: ORIENTATION: RIGHT;ANTERIOR

## 2021-10-07 ASSESSMENT — PAIN DESCRIPTION - PAIN TYPE: TYPE: ACUTE PAIN

## 2021-10-07 ASSESSMENT — PAIN DESCRIPTION - FREQUENCY: FREQUENCY: CONTINUOUS

## 2021-10-07 ASSESSMENT — PAIN DESCRIPTION - PROGRESSION: CLINICAL_PROGRESSION: NOT CHANGED

## 2021-10-07 ASSESSMENT — PAIN DESCRIPTION - ONSET: ONSET: ON-GOING

## 2021-10-07 ASSESSMENT — PAIN DESCRIPTION - DESCRIPTORS: DESCRIPTORS: HEADACHE

## 2021-10-07 ASSESSMENT — PAIN DESCRIPTION - LOCATION: LOCATION: HEAD

## 2021-10-07 NOTE — CARE COORDINATION
Spoke with patient regarding MD orders for [facility] services. Patient agreeable and has chosen [facility]. Referral Faxed. [address] [place] [address] [phone]. [phone]. Please notify [facility] when patient discharges and fax DC Summary,  DC med list and any new [facility] orders. PLEASE NOTE: Patient states she does not have PCP. States she has never seen TEN Ramsey. SN discussed that PCP will need to sign home health orders, so patient will need to get established with new physician before home health can begin. Laila - YUE on 3rd floor notified of this and will get PCP appointment set up before patient discharges home. The Patient and/or patient representative was provided with a choice of provider and agrees   with the discharge plan. [x] Yes [] No    Freedom of choice list was provided with basic dialogue that supports the patient's individualized plan of care/goals, treatment preferences and shares the quality data associated with the providers.  [x] Yes [] No  Electronically signed by Uriel Easley RN on 10/7/2021 at 3:20 PM

## 2021-10-07 NOTE — PROGRESS NOTES
cultures  Follow clinically and monitor WBC     COPD exacerbation  Solu-Medrol transitioned to oral steroids, would benefit from steroid taper at discharge, however patient reports significant intolerance to prednisone though has tolerated in house without issues. Continue nebulized bronchodilators  Goal sats 88-92%     Acute on chronic hypercapnic respiratory failure, secondary to above  Weaned off BiPAP 10/3/2021  Requires 3.5 L at baseline   Overnight oximetry completed and desaturations noted, however does not qualify for BiPAP     History of lung cancer  Routine outpatient follow-up, states she needs a new pulmonologist will give information of Dr. Karthik Cruz on discharge     Tobacco dependence  Counseled, nicotine patch if desired           DVT prophylaxis - Lovenox    Disposition: Home with home health tomorrow. Electronically signed by   Óscar Austin MD   Internal Medicine Hospitalist  On 10/7/2021  At 2:31 PM    EMR Dragon/Transcription disclaimer:   Much of this encounter note is an electronic transcription/translation of spoken language to printed text.  The electronic translation of spoken language may permit erroneous, or at times, nonsensical words or phrases to be inadvertently transcribed; although attempts have made to review the note for such errors, some may still exist.

## 2021-10-07 NOTE — PROGRESS NOTES
Palliative Care Progress Note  10/7/2021 11:21 AM    Patient:  Sherif Díaz  YOB: 1950  Primary Care Physician: TEN Bravo  Advance Directive: Full Code  Admit Date: 10/2/2021       Hospital Day: 5  Portions of this note have been copied forward, however, changed to reflect the most current clinical status of this patient. CHIEF COMPLAINT/REASON FOR CONSULTATION Goals of care, code status, family support    SUBJECTIVE:  Mrs. Caitlyn De Jesus states she did not sleep overnight due to worsening anxiety and cough. She feels cough has improved today after taking guaifenesin but remains anxious. Has had insomnia and decreased appetite. Review of Systems:   14 point review of systems is negative except as specifically addressed above. Objective:   VITALS:  BP (!) 142/68   Pulse 85   Temp 97.3 °F (36.3 °C) (Temporal)   Resp 18   Ht 5' 5\" (1.651 m)   Wt 122 lb 8 oz (55.6 kg)   SpO2 93%   BMI 20.39 kg/m²   24HR INTAKE/OUTPUT:      Intake/Output Summary (Last 24 hours) at 10/7/2021 1121  Last data filed at 10/7/2021 1019  Gross per 24 hour   Intake 480 ml   Output 700 ml   Net -220 ml       General appearance: 65 yo female, resting comfortably in bed, no acute distress, NC oxygen in place  Head: Normocephalic, without obvious abnormality, atraumatic  Eyes: conjunctivae/corneas clear. PERRL, EOM's intact. Ears: normal external ears and nose, throat without exudate  Neck: no adenopathy, no carotid bruit, no JVD, supple, symmetrical, trachea midline   Lungs: diffuse expiratory wheezes, no rhonchi or rales  Heart: regular rate and rhythm, S1, S2 normal, no murmur  Abdomen:soft, non-tender; non-distended, bowel sounds present    Extremities:No lower extremity edema,  No erythema, no tenderness to palpation  Skin: Skin color, texture, turgor normal. No rashes or lesions  Lymphatic: No palpable lymph node enlargment  Neurologic: Alert and oriented X 3,generalized weakness, normal tone.  No focal discussed with consulting service and nursing staff. Reviewed health issues, work up and treatment plan as well as factors that lead to hospitalization. I saw Mrs. Horton at her bedside. She states anxiety has overall worsened. Tells me \"it hit me last night\" just how sick she is. States she understands that her underlying issues are not curable. Her goals of care/code status remain unchanged. She plans to discharge home where her daughter cares for her. We discussed addition of Remeron nightly to aide with both sleep and appetite. Support/comfort offered. Recommendations:   1. Palliative care : [address] Far[facility] Lema home with her daughter. Consider home health. Social work following. Code status: FULL CODE. Spiritual care following for living will completion    2. Acute on chronic respiratory failure 2/2 CAP/COPD-abx/bronchodilators continued. Improving    3. Hx of lung cancer-noted    4. Anxiety-continue Xanax as needed    5. Insomnia w/ underlying anxiety/panic attacks-will add Remeron     Thank you for consulting Palliative Care and allowing us to participate in the care of this patient.    Time Spent Counseling > 50%:  YES                                   Total Time Spent with patient/family counseling, workup/treatment review, counseling and placement of orders/preparation of this note: 30 minutes    Electronically signed by Lizy Matson PA-C on 10/7/2021 at 11:21 AM    (Please note that portions of this note were completed with a voice recognition program.  Robert Spivey made to edit the dictations but occasionally words are mis-transcribed.)

## 2021-10-07 NOTE — CARE COORDINATION
Called and spoke with Erica Rodriguez at [facility], faxed her the overnight pulse ox that was in pts soft chart. Per Erica Rodriguez, there was not enough documented time recorded for pt to meet requirements for home bipap. With pts persistent hypercapnea  A trilogy was recommended. To qualify for a trilogy, pt would need to have 1 abg done on RA  And 1 done on bipap without oxygen. Will discuss this with attending.   Electronically signed by Taz Durbin RN on 10/7/2021 at 11:15 AM

## 2021-10-07 NOTE — PLAN OF CARE
Problem: Falls - Risk of:  Goal: Will remain free from falls  Description: Will remain free from falls  10/7/2021 1014 by Ja Pedroza RN  Outcome: Ongoing  10/7/2021 0432 by Carmine Singh RN  Outcome: Ongoing  Goal: Absence of physical injury  Description: Absence of physical injury  10/7/2021 1014 by Ja Pedroza RN  Outcome: Ongoing  10/7/2021 0432 by Carmine Singh RN  Outcome: Ongoing     Problem: Skin Integrity:  Goal: Will show no infection signs and symptoms  Description: Will show no infection signs and symptoms  10/7/2021 1014 by Ja Pedroza RN  Outcome: Ongoing  10/7/2021 0432 by Carmine Singh RN  Outcome: Ongoing  Goal: Absence of new skin breakdown  Description: Absence of new skin breakdown  10/7/2021 1014 by Ja Pedroza RN  Outcome: Ongoing  10/7/2021 0432 by Carmine Singh RN  Outcome: Ongoing     Problem: Urinary Elimination:  Goal: Signs and symptoms of infection will decrease  Description: Signs and symptoms of infection will decrease  10/7/2021 1014 by Ja Pedroza RN  Outcome: Ongoing  10/7/2021 0432 by Carmine Singh RN  Outcome: Ongoing  Goal: Complications related to the disease process, condition or treatment will be avoided or minimized  Description: Complications related to the disease process, condition or treatment will be avoided or minimized  10/7/2021 1014 by Ja Pedroza RN  Outcome: Ongoing  10/7/2021 0432 by Carmine Singh RN  Outcome: Ongoing     Problem: Infection:  Goal: Will remain free from infection  Description: Will remain free from infection  10/7/2021 1014 by Ja Pedroza RN  Outcome: Ongoing  10/7/2021 0432 by Carmine Singh RN  Outcome: Ongoing     Problem: Safety:  Goal: Free from accidental physical injury  Description: Free from accidental physical injury  10/7/2021 1014 by Ja Pedroza RN  Outcome: Ongoing  10/7/2021 0432 by Carmine Singh RN  Outcome: Ongoing  Goal: Free from intentional harm  Description: Free from intentional harm  10/7/2021 1014 by Sin Valdovinos RN  Outcome: Ongoing  10/7/2021 0432 by Luis Antonio Oconnor RN  Outcome: Ongoing     Problem: Daily Care:  Goal: Daily care needs are met  Description: Daily care needs are met  10/7/2021 1014 by Sin Valdovinos RN  Outcome: Ongoing  10/7/2021 0432 by Luis Antonio Oconnor RN  Outcome: Ongoing     Problem: Pain:  Goal: Patient's pain/discomfort is manageable  Description: Patient's pain/discomfort is manageable  10/7/2021 1014 by Sin Valdovinos RN  Outcome: Ongoing  10/7/2021 0432 by Luis Antonio Oconnor RN  Outcome: Ongoing     Problem: Skin Integrity:  Goal: Skin integrity will stabilize  Description: Skin integrity will stabilize  10/7/2021 1014 by Sin Valdovinos RN  Outcome: Ongoing  10/7/2021 0432 by Luis Antonio Oconnor RN  Outcome: Ongoing     Problem: Discharge Planning:  Goal: Patients continuum of care needs are met  Description: Patients continuum of care needs are met  10/7/2021 1014 by Sin Valdovinos RN  Outcome: Ongoing  10/7/2021 0432 by Luis Antonio Oconnor RN  Outcome: Ongoing     Problem: ABCDS Injury Assessment  Goal: Absence of physical injury  10/7/2021 1014 by Sin Valdovinos RN  Outcome: Ongoing  10/7/2021 0432 by Luis Antonio Oconnor RN  Outcome: Ongoing

## 2021-10-07 NOTE — PROGRESS NOTES
Physical Therapy     10/07/21 2454   Subjective   Subjective Attempt: patient stated that she had been up walking to change clothes and go to [place] and to check on her tomorrow.     Electronically signed by Nisha Boogie on 10/7/2021 at 5:00 PM

## 2021-10-07 NOTE — PROGRESS NOTES
Follow-up visit with pt to complete an AD/LW. Pt says she had a terrible night, saying she is not eating or sleeping. Pt asked if this  could return this afternoon to complete. This  will follow-up this afternoon to assist pt. Pt also asked for ice to go with her nutritional supplement drink. This  got ice for her and pt expressed gratitude for assistance with this and for spiritual care.     Electronically signed by Bela Austin on 10/7/2021 at 11:45 AM

## 2021-10-07 NOTE — PROGRESS NOTES
This was a follow-up visit to assist pt in completing an AD/LW. Pt named her alternate decision makers her grandson and her daughter. Pt had previously named her primary decision maker. Pt also made advance care planning decisions. Pt initialed and signed the document and this  witnessed and notarized the document. Original and copies were given to pt, copy placed in pt's soft chart, and a copy goes to MR. Pt expressed gratitude for spiritual care.       Electronically signed by Xavier Ann on 10/7/2021 at 3:09 PM

## 2021-10-08 ENCOUNTER — TELEPHONE (OUTPATIENT)
Dept: INTERNAL MEDICINE CLINIC | Age: 71
End: 2021-10-08

## 2021-10-08 VITALS
BODY MASS INDEX: 20.36 KG/M2 | HEIGHT: 65 IN | RESPIRATION RATE: 20 BRPM | DIASTOLIC BLOOD PRESSURE: 67 MMHG | HEART RATE: 89 BPM | WEIGHT: 122.2 LBS | TEMPERATURE: 98.1 F | SYSTOLIC BLOOD PRESSURE: 115 MMHG | OXYGEN SATURATION: 93 %

## 2021-10-08 PROBLEM — E43 SEVERE MALNUTRITION (HCC): Status: ACTIVE | Noted: 2021-10-08

## 2021-10-08 PROCEDURE — 94640 AIRWAY INHALATION TREATMENT: CPT

## 2021-10-08 PROCEDURE — 6370000000 HC RX 637 (ALT 250 FOR IP): Performed by: INTERNAL MEDICINE

## 2021-10-08 PROCEDURE — 2580000003 HC RX 258: Performed by: STUDENT IN AN ORGANIZED HEALTH CARE EDUCATION/TRAINING PROGRAM

## 2021-10-08 PROCEDURE — 2700000000 HC OXYGEN THERAPY PER DAY

## 2021-10-08 PROCEDURE — 6360000002 HC RX W HCPCS: Performed by: HOSPITALIST

## 2021-10-08 PROCEDURE — 6360000002 HC RX W HCPCS: Performed by: STUDENT IN AN ORGANIZED HEALTH CARE EDUCATION/TRAINING PROGRAM

## 2021-10-08 PROCEDURE — 6360000002 HC RX W HCPCS: Performed by: INTERNAL MEDICINE

## 2021-10-08 PROCEDURE — 6370000000 HC RX 637 (ALT 250 FOR IP): Performed by: STUDENT IN AN ORGANIZED HEALTH CARE EDUCATION/TRAINING PROGRAM

## 2021-10-08 RX ORDER — PREDNISONE 20 MG/1
40 TABLET ORAL DAILY
Qty: 6 TABLET | Refills: 0 | Status: SHIPPED | OUTPATIENT
Start: 2021-10-09 | End: 2021-10-12

## 2021-10-08 RX ADMIN — ALPRAZOLAM 1 MG: 0.5 TABLET ORAL at 09:59

## 2021-10-08 RX ADMIN — OXYCODONE AND ACETAMINOPHEN 1 TABLET: 10; 325 TABLET ORAL at 02:56

## 2021-10-08 RX ADMIN — ARFORMOTEROL TARTRATE 15 MCG: 15 SOLUTION RESPIRATORY (INHALATION) at 07:10

## 2021-10-08 RX ADMIN — GABAPENTIN 600 MG: 600 TABLET, FILM COATED ORAL at 09:07

## 2021-10-08 RX ADMIN — ALPRAZOLAM 1 MG: 0.5 TABLET ORAL at 14:51

## 2021-10-08 RX ADMIN — LEVALBUTEROL HYDROCHLORIDE 0.63 MG: 0.63 SOLUTION RESPIRATORY (INHALATION) at 06:55

## 2021-10-08 RX ADMIN — OXYCODONE AND ACETAMINOPHEN 1 TABLET: 10; 325 TABLET ORAL at 11:54

## 2021-10-08 RX ADMIN — METOCLOPRAMIDE 5 MG: 5 TABLET ORAL at 09:06

## 2021-10-08 RX ADMIN — PANTOPRAZOLE SODIUM 40 MG: 40 TABLET, DELAYED RELEASE ORAL at 06:04

## 2021-10-08 RX ADMIN — DEXTROAMPHETAMINE SACCHARATE, AMPHETAMINE ASPARTATE, DEXTROAMPHETAMINE SULFATE, AMPHETAMINE SULFATE TABLETS, 10 MG,CLL 20 MG: 2.5; 2.5; 2.5; 2.5 TABLET ORAL at 09:07

## 2021-10-08 RX ADMIN — SODIUM CHLORIDE, PRESERVATIVE FREE 1000 MG: 5 INJECTION INTRAVENOUS at 09:06

## 2021-10-08 RX ADMIN — GUAIFENESIN 200 MG: 100 SOLUTION ORAL at 14:51

## 2021-10-08 RX ADMIN — ALBUTEROL SULFATE 2.5 MG: 2.5 SOLUTION RESPIRATORY (INHALATION) at 11:39

## 2021-10-08 RX ADMIN — PREDNISONE 40 MG: 10 TABLET ORAL at 09:07

## 2021-10-08 RX ADMIN — BUDESONIDE 500 MCG: 0.5 SUSPENSION RESPIRATORY (INHALATION) at 07:10

## 2021-10-08 RX ADMIN — GABAPENTIN 600 MG: 600 TABLET, FILM COATED ORAL at 14:51

## 2021-10-08 RX ADMIN — CITALOPRAM HYDROBROMIDE 40 MG: 20 TABLET ORAL at 09:07

## 2021-10-08 ASSESSMENT — PAIN SCALES - GENERAL: PAINLEVEL_OUTOF10: 6

## 2021-10-08 NOTE — DISCHARGE SUMMARY
Discharge Summary      Date:10/8/2021        Patient Name:Iman Horton     Date of Birth:[birth year]     Age:71 y.o. Admit Date:10/2/2021   Admission Condition:fair   Discharged Condition:fair  Discharge Date: 10/08/21       Discharge Diagnoses   Active Problems:    Anxiety and depression    GERD (gastroesophageal reflux disease)    Chronic bronchitis with acute exacerbation (HCC)    Hypertension    COPD (chronic obstructive pulmonary disease) (HCC)    Acute hypercapnic respiratory failure (HCC)    Alpha-1-antitrypsin deficiency carrier    Current every day smoker    Malignant neoplasm of upper lobe of right lung [facility])    Palliative care patient  Resolved Problems:    * No resolved hospital problems. [facility] Stay   Narrative of Hospital Course:     49-year-old female with COPD on 3.5L at baseline, history of lung cancer, admitted with acute on chronic hypercapnic respiratory failure in setting of COPD exacerbation with pneumonia after presenting with severe dyspnea with increased sputum production over the past week, requiring BiPAP on admission initially to ICU.  Treated with Rocephin/azithromycin, nebulized bronchodilators, corticosteroids. Improved and transferred to the floor.  Severe hypercapnia noted on admission with PCO2 93 with pH 7.29 (acute on chronic)with also some concerns of polypharmacy.  Repeat blood gas with pH 7.39, Would benefit from BiPAP however unable to qualify with nocturnal oximetry. Patient to follow up with pulmonologist outpt for establishment with Dr Flo Bautista as she requested switiching from her current pulmonologist. To be re-evaluated for possible qualification of Trilogy if sees fit. Counseled on tobacco cessation, patient was transitioned to oral steroids in-house received course of 3 days without any abdominal problems, will discharge home on three more days of prednisone.   To follow-up outpatient with newly established PCP, pulmonologist for continued management of chronic medical problems. Physical Examination:  General: no acute distress lying comfortably in bed. HEENT: Atraumatic normocephalic, range of motion normal   Cardiac: Normal S1-S2 no murmurs rub or gallop. Respiratory: clear To auscultation bilaterally, no rhonchi or rales,+ mild inspiratory wheezing  Abdomen: Soft, positive bowel sounds in all quadrants, no distention, nontender to palpation  Extremities: no tenderness, no edema, moves all extremities  Psych: Affect normal and good eye contact, behavioral normal.        Consultants:   PALLIATIVE CARE EVAL  IP CONSULT TO SOCIAL WORK  IP CONSULT TO PALLIATIVE CARE  IP CONSULT TO HOME CARE NEEDS    Time Spent on Discharge:  45 minutes were spent in patient examination, evaluation, counseling as well as medication reconciliation, prescriptions for required medications, discharge plan and follow up. Surgeries/Procedures Performed:  NONE      Significant Diagnostic Studies:   Recent Labs:  CBC:   Lab Results   Component Value Date    WBC 8.2 10/05/2021    RBC 2.97 10/05/2021    HGB 9.3 10/05/2021    HCT 31.0 10/05/2021    .4 10/05/2021    MCH 31.3 10/05/2021    MCHC 30.0 10/05/2021    RDW 13.8 10/05/2021     10/05/2021     BMP:    Lab Results   Component Value Date    GLUCOSE 154 10/05/2021     10/05/2021    K 4.0 10/05/2021    CL 98 10/05/2021    CO2 40 10/05/2021    ANIONGAP 4 10/05/2021    BUN 14 10/05/2021    CREATININE 0.5 10/05/2021    CALCIUM 8.4 10/05/2021    LABGLOM >60 10/05/2021    GFRAA >59 10/05/2021       Radiology Last 7 Days:  XR CHEST PORTABLE    Result Date: 10/2/2021  New RIGHT mid/lower lobe peripheral predominant air space opacity. Leading differential is infectious process, including viral pneumonia.  Signed by Dr Josef Marshall      Discharge Plan   Disposition: Home    Provider Follow-Up:   Morris Gonzales, APRN  [id number] [place] [id number] [phone]    On 10/11/2021  Hospital follow up for 10/12/21 @ 12:15 p.m. ( This will be a virtural call )         Patient Instructions   Diet: regular diet    Activity: activity as tolerated      Discharge Medications         Medication List      START taking these medications    predniSONE 20 MG tablet  Commonly known as: DELTASONE  Take 2 tablets by mouth daily for 3 days  Start taking on: October 9, 2021        CONTINUE taking these medications    * albuterol (2.5 MG/3ML) 0.083% nebulizer solution  Commonly known as: PROVENTIL  Take 3 mLs by nebulization every 6 hours as needed for Wheezing     * albuterol sulfate  (90 Base) MCG/ACT inhaler  Commonly known as: Ventolin HFA  Inhale 2 puffs into the lungs every 6 hours as needed for Wheezing     ALPRAZolam 1 MG tablet  Commonly known as: XANAX     amphetamine-dextroamphetamine 20 MG tablet  Commonly known as: ADDERALL     budesonide-formoterol 160-4.5 MCG/ACT Aero  Commonly known as: SYMBICORT  Inhale 2 puffs into the lungs 2 times daily     citalopram 40 MG tablet  Commonly known as: CELEXA     fluticasone 50 MCG/ACT nasal spray  Commonly known as: FLONASE  INSTILL 1 SPRAY IN EACH NOSTRIL DAILY     gabapentin 600 MG tablet  Commonly known as: NEURONTIN     * ipratropium-albuterol 0.5-2.5 (3) MG/3ML Soln nebulizer solution  Commonly known as: DUONEB  Inhale 3 mLs into the lungs every 4 hours     * albuterol-ipratropium  MCG/ACT Aers inhaler  Commonly known as: COMBIVENT RESPIMAT  Inhale 1 puff into the lungs every 6 hours     lidocaine 5 %  Commonly known as: Lidoderm  Place 1 patch onto the skin daily 12 hours on, 12 hours off.     lisinopril 5 MG tablet  Commonly known as: PRINIVIL;ZESTRIL  TAKE 1 TABLET BY MOUTH DAILY     metoclopramide 5 MG tablet  Commonly known as: REGLAN  Take 1 tablet by mouth 4 times daily     montelukast 10 MG tablet  Commonly known as: SINGULAIR     * nicotine 7 MG/24HR  Commonly known as: Nicoderm CQ  Place 1 patch onto the skin every 24 hours     * nicotine 21 MG/24HR  Commonly known as: [id number] [facility] 1 patch onto the skin every 24 hours     ondansetron 4 MG disintegrating tablet  Commonly known as: Zofran ODT  Take 1 tablet by mouth every 8 hours as needed for Nausea or Vomiting     ondansetron 4 MG tablet  Commonly known as: Zofran  Take 1 tablet by mouth every 8 hours as needed for Nausea or Vomiting     oxyCODONE-acetaminophen  MG per tablet  Commonly known as: PERCOCET  TAKE 1 TABLET Q 4 TO 6 HRS PRN. (MAX 5/DAY). Earliest Fill Date: 7/19/18     OXYGEN     pantoprazole 20 MG tablet  Commonly known as: PROTONIX  TAKE 1 TABLET BY MOUTH 2 TIMES DAILY     tiZANidine 4 MG tablet  Commonly known as: ZANAFLEX  Take 1 tablet by mouth 3 times daily as needed (.)     zolpidem 10 MG tablet  Commonly known as: AMBIEN         * This list has 6 medication(s) that are the same as other medications prescribed for you. Read the directions carefully, and ask your doctor or other care provider to review them with you.             STOP taking these medications    oxyCODONE 30 MG T12a extended release tablet  Commonly known as: OXYCONTIN           Where to Get Your Medications      These medications were sent to [facility]  [address] , [address]    Phone: [phone]   · predniSONE 20 MG tablet         Electronically signed by Cira Ennis MD on 10/8/21 at 12:29 PM CDT

## 2021-10-08 NOTE — TELEPHONE ENCOUNTER
[facility] has a referral for this pt from [facility] [address] with  PCP to follow   Pt has appt to establish with Janice Cornejo on 22/78     Will Janice CAAL follow pt for [facility] contingent on pt keeping that appt ? Or would she want HH to wait to admit her until after that appt  ?      Please advise   Pt being dc from [facility] today

## 2021-10-08 NOTE — PROGRESS NOTES
Comprehensive Nutrition Assessment    Type and Reason for Visit:  Initial, RD Nutrition Re-Screen/LOS    Nutrition Recommendations/Plan: continue current nutritional interventions    Nutrition Assessment:  Pt appears thin with  muscle mass and fat depletion. PO intake has been ranging from 0-75%. Would take some of ONS  Aware of possible discharge    Malnutrition Assessment:  Malnutrition Status:       Context:  Acute Illness     Findings of the 6 clinical characteristics of malnutrition:  Energy Intake:  Mild decrease in energy intake (Comment)  Weight Loss:  No significant weight loss     Body Fat Loss:  7 - Moderate body fat loss     Muscle Mass Loss:  7 - Moderate muscle mass loss    Fluid Accumulation:  No significant fluid accumulation Extremities   Strength:  Not Performed    Estimated Daily Nutrient Needs:  Energy (kcal):  5476-9575 kcals (25-30 kcals/kg); Weight Used for Energy Requirements:  Current     Protein (g):   g; Weight Used for Protein Requirements:  Current        Fluid (ml/day):  1385-1662ml; Method Used for Fluid Requirements:  1 ml/kcal      Nutrition Related Findings:  thin appearing      Wounds:  None       Current Nutrition Therapies:    ADULT DIET; Regular;  No Added Salt (3-4 gm)  Adult Oral Nutrition Supplement; Standard High Calorie/High Protein Oral Supplement    Anthropometric Measures:  · Height: 5' 5\" (165.1 cm)  · Current Body Weight: 122 lb 3.2 oz (55.4 kg)   · Admission Body Weight: 120 lb (54.4 kg)    · Usual Body Weight: 121 lb (54.9 kg) (9/2021)     · Ideal Body Weight: 125 lbs; % Ideal Body Weight 97.8 %   · BMI: 20.3  · BMI Categories: Underweight (BMI less than 22) age over 72       Nutrition Diagnosis:   · Inadequate oral intake related to acute injury/trauma, biting/chewing (masticatory) difficulty, early satiety as evidenced by intake 0-25%, intake 26-50%, intake 51-75%, poor intake prior to admission, poor dentition      Nutrition Interventions:   Food and/or Nutrient Delivery:  Continue Current Diet, Continue Oral Nutrition Supplement  Nutrition Education/Counseling:  No recommendation at this time   Coordination of Nutrition Care:  Continue to monitor while inpatient    Goals:  po intake 50% or greater for all meals.        Nutrition Monitoring and Evaluation:   Behavioral-Environmental Outcomes:  None Identified   Food/Nutrient Intake Outcomes:  Food and Nutrient Intake, Supplement Intake  Physical Signs/Symptoms Outcomes:  Biochemical Data, Chewing or Swallowing, Weight, Skin, Nutrition Focused Physical Findings, Fluid Status or Edema     Discharge Planning:    Continue current diet     Electronically signed by Arlet Whaley MS, RD, LD on 10/8/21 at 2:19 PM CDT    Contact: [phone] 133.1

## 2021-10-08 NOTE — CARE COORDINATION
Jonathan ordered from [employer]. Requested that they deliver to pt's home.    [facility] [employer]   P  [phone] [id number]  Electronically signed by Joy Tijerina on 10/8/2021 at 11:52 AM

## 2021-10-08 NOTE — PROGRESS NOTES
CLINICAL PHARMACY NOTE: MEDS TO BEDS    Total # of Prescriptions Filled: 1   The following medications were delivered to the patient:  · Prednisone 20 mg    Additional Documentation:    Handed script to patient at bedside.

## 2021-10-11 ENCOUNTER — TELEPHONE (OUTPATIENT)
Dept: PRIMARY CARE CLINIC | Age: 71
End: 2021-10-11

## 2021-10-11 NOTE — TELEPHONE ENCOUNTER
[facility] Transitions Initial Follow Up Call    Outreach made within 2 business days of discharge: Yes    Patient: Gordon Franco   Patient : 1950   MRN: [id number]    Reason for Admission: Respiratory failure  Discharge Date: 10/8/21       Spoke with: Unable to reach, second attempt.  Unidentified voicemail, mailbox full            Scheduled appointment with PCP within 7-14 days    Follow Up  Future Appointments   Date Time Provider Lynda Acevedo    06:25 PM Darell Baltazar, APRN [address]

## 2021-10-12 ENCOUNTER — PATIENT OUTREACH (OUTPATIENT)
Dept: CASE MANAGEMENT | Facility: OTHER | Age: 71
End: 2021-10-12

## 2021-10-12 NOTE — OUTREACH NOTE
"Ambulatory Case Management Note    Patient Outreach    Abdi MA ACO patient discharged from [facility] 10.8.21 with pneumonia and acute respiratory failure. Spoke with patient. She reports placing a call today to her new PCP for the scheduled telehealth phone call but nobody called her back. She is awaiting admission from [facility]. She is also supposed to schedule an appointment \"with a new lung doctor\" at [facility]. [facility] offered to attempt scheduling the PCP follow up but she declined. She did indicated the written discharge instructions are very \"confusing\" compared to the printed instructions. Provided patient with [facility] contact information.         Yeni Woo RN  Ambulatory Case Management    10/12/2021, 15:42 CDT    "

## 2021-10-13 ENCOUNTER — TELEPHONE (OUTPATIENT)
Dept: INTERNAL MEDICINE CLINIC | Age: 71
End: 2021-10-13

## 2021-10-13 NOTE — TELEPHONE ENCOUNTER
[facility] contacted pt dgt to set up time for [place] admit and both pt and her dgt did not understand why HH was ordered by the Hospital and they did not think pt needed it   Pt and dgt declined [place] admit

## 2021-10-27 ENCOUNTER — TELEPHONE (OUTPATIENT)
Dept: PRIMARY CARE CLINIC | Age: 71
End: 2021-10-27

## 2021-10-27 NOTE — TELEPHONE ENCOUNTER
----- Message from Willie Lindsey sent at 10/26/2021  3:57 PM CDT -----  Subject: Hospital Follow Up    QUESTIONS  What hospital was the Patient Discharged from? [address]  Date of Discharge? 2021-10-08  Discharge Location? Home  Reason for hospitalization as patient stated? pneumonia, and other health   issues   What question does the patient have, if applicable? Patient needs to   schedule hospital f/u appt. Please reach out to set up appt. If listed   number doesn't work call [phone]  ---------------------------------------------------------------------------  --------------  Omelia Counter INFO  What is the best way for the office to contact you? Do not leave any   message, patient will call back for answer  Preferred Call Back Phone Number? [phone]  ---------------------------------------------------------------------------  --------------  SCRIPT ANSWERS  Relationship to Patient? Self  (Patient requests to see provider urgently. )? No  (Has the patient been discharged from the hospital within 2 business days   AND does not have a Telephone Encounter  Follow Up From [address] [place] [address]   documented in [address]?)?  Yes

## 2021-11-03 ENCOUNTER — APPOINTMENT (OUTPATIENT)
Dept: CT IMAGING | Facility: HOSPITAL | Age: 71
End: 2021-11-03

## 2021-11-04 ENCOUNTER — TELEPHONE (OUTPATIENT)
Dept: PULMONOLOGY | Facility: CLINIC | Age: 71
End: 2021-11-04

## 2021-11-04 DIAGNOSIS — B37.0 ORAL CANDIDA: Primary | ICD-10-CM

## 2021-11-04 NOTE — TELEPHONE ENCOUNTER
Patient states she has thrush and is asking for a prescription to be sent to [facility].

## 2021-11-05 ENCOUNTER — APPOINTMENT (OUTPATIENT)
Dept: CT IMAGING | Facility: HOSPITAL | Age: 71
End: 2021-11-05

## 2021-11-08 ENCOUNTER — APPOINTMENT (OUTPATIENT)
Dept: GENERAL RADIOLOGY | Age: 71
DRG: 372 | End: 2021-11-08
Payer: MEDICARE

## 2021-11-08 ENCOUNTER — APPOINTMENT (OUTPATIENT)
Dept: CT IMAGING | Age: 71
DRG: 372 | End: 2021-11-08
Payer: MEDICARE

## 2021-11-08 ENCOUNTER — HOSPITAL ENCOUNTER (INPATIENT)
Age: 71
LOS: 5 days | Discharge: HOME | DRG: 372 | End: 2021-11-14
Attending: EMERGENCY MEDICINE | Admitting: INTERNAL MEDICINE
Payer: MEDICARE

## 2021-11-08 ENCOUNTER — APPOINTMENT (OUTPATIENT)
Dept: NUCLEAR MEDICINE | Age: 71
DRG: 372 | End: 2021-11-08
Payer: MEDICARE

## 2021-11-08 ENCOUNTER — APPOINTMENT (OUTPATIENT)
Dept: CT IMAGING | Facility: HOSPITAL | Age: 71
End: 2021-11-08

## 2021-11-08 DIAGNOSIS — R06.00 DYSPNEA, UNSPECIFIED TYPE: ICD-10-CM

## 2021-11-08 DIAGNOSIS — F13.930 BENZODIAZEPINE WITHDRAWAL WITHOUT COMPLICATION (HCC): ICD-10-CM

## 2021-11-08 DIAGNOSIS — R11.2 NAUSEA AND VOMITING, INTRACTABILITY OF VOMITING NOT SPECIFIED, UNSPECIFIED VOMITING TYPE: Primary | ICD-10-CM

## 2021-11-08 LAB
ADENOVIRUS BY PCR: NOT DETECTED
ALBUMIN SERPL-MCNC: 5.1 G/DL (ref 3.5–5.2)
ALP BLD-CCNC: 96 U/L (ref 35–104)
ALT SERPL-CCNC: 12 U/L (ref 5–33)
ANION GAP SERPL CALCULATED.3IONS-SCNC: 14 MMOL/L (ref 7–19)
APTT: 32.5 SEC (ref 26–36.2)
AST SERPL-CCNC: 25 U/L (ref 5–32)
BACTERIA: NEGATIVE /HPF
BASE EXCESS ARTERIAL: 7.1 MMOL/L (ref -2–2)
BASOPHILS ABSOLUTE: 0.1 K/UL (ref 0–0.2)
BASOPHILS RELATIVE PERCENT: 0.6 % (ref 0–1)
BILIRUB SERPL-MCNC: 0.4 MG/DL (ref 0.2–1.2)
BILIRUBIN URINE: NEGATIVE
BLOOD, URINE: NEGATIVE
BORDETELLA PARAPERTUSSIS BY PCR: NOT DETECTED
BORDETELLA PERTUSSIS BY PCR: NOT DETECTED
BUN BLDV-MCNC: 8 MG/DL (ref 8–23)
CALCIUM SERPL-MCNC: 10 MG/DL (ref 8.8–10.2)
CARBOXYHEMOGLOBIN ARTERIAL: 3.2 % (ref 0–5)
CHLAMYDOPHILIA PNEUMONIAE BY PCR: NOT DETECTED
CHLORIDE BLD-SCNC: 92 MMOL/L (ref 98–111)
CLARITY: CLEAR
CO2: 30 MMOL/L (ref 22–29)
COLOR: ABNORMAL
CORONAVIRUS 229E BY PCR: NOT DETECTED
CORONAVIRUS HKU1 BY PCR: NOT DETECTED
CORONAVIRUS NL63 BY PCR: NOT DETECTED
CORONAVIRUS OC43 BY PCR: NOT DETECTED
CREAT SERPL-MCNC: 0.5 MG/DL (ref 0.5–0.9)
CRYSTALS, UA: ABNORMAL /HPF
D DIMER: 0.62 UG/ML FEU (ref 0–0.48)
EOSINOPHILS ABSOLUTE: 0 K/UL (ref 0–0.6)
EOSINOPHILS RELATIVE PERCENT: 0.1 % (ref 0–5)
EPITHELIAL CELLS, UA: 5 /HPF (ref 0–5)
GFR AFRICAN AMERICAN: >59
GFR NON-AFRICAN AMERICAN: >60
GLUCOSE BLD-MCNC: 111 MG/DL (ref 74–109)
GLUCOSE URINE: NEGATIVE MG/DL
HCO3 ARTERIAL: 34.5 MMOL/L (ref 22–26)
HCT VFR BLD CALC: 41.1 % (ref 37–47)
HEMOGLOBIN, ART, EXTENDED: 12.6 G/DL (ref 12–16)
HEMOGLOBIN: 12.9 G/DL (ref 12–16)
HUMAN METAPNEUMOVIRUS BY PCR: NOT DETECTED
HUMAN RHINOVIRUS/ENTEROVIRUS BY PCR: NOT DETECTED
HYALINE CASTS: 28 /HPF (ref 0–8)
IMMATURE GRANULOCYTES #: 0 K/UL
INFLUENZA A BY PCR: NOT DETECTED
INFLUENZA B BY PCR: NOT DETECTED
INR BLD: 0.9 (ref 0.88–1.18)
KETONES, URINE: 80 MG/DL
LEUKOCYTE ESTERASE, URINE: ABNORMAL
LYMPHOCYTES ABSOLUTE: 1 K/UL (ref 1.1–4.5)
LYMPHOCYTES RELATIVE PERCENT: 12 % (ref 20–40)
MCH RBC QN AUTO: 31.9 PG (ref 27–31)
MCHC RBC AUTO-ENTMCNC: 31.4 G/DL (ref 33–37)
MCV RBC AUTO: 101.5 FL (ref 81–99)
METHEMOGLOBIN ARTERIAL: 1 %
MONOCYTES ABSOLUTE: 0.5 K/UL (ref 0–0.9)
MONOCYTES RELATIVE PERCENT: 5.8 % (ref 0–10)
MYCOPLASMA PNEUMONIAE BY PCR: NOT DETECTED
NEUTROPHILS ABSOLUTE: 6.5 K/UL (ref 1.5–7.5)
NEUTROPHILS RELATIVE PERCENT: 81.2 % (ref 50–65)
NITRITE, URINE: NEGATIVE
O2 CONTENT ARTERIAL: 16.9 ML/DL
O2 SAT, ARTERIAL: 94.6 %
O2 THERAPY: ABNORMAL
PARAINFLUENZA VIRUS 1 BY PCR: NOT DETECTED
PARAINFLUENZA VIRUS 2 BY PCR: NOT DETECTED
PARAINFLUENZA VIRUS 3 BY PCR: NOT DETECTED
PARAINFLUENZA VIRUS 4 BY PCR: NOT DETECTED
PCO2 ARTERIAL: 61 MMHG (ref 35–45)
PDW BLD-RTO: 12.9 % (ref 11.5–14.5)
PH ARTERIAL: 7.36 (ref 7.35–7.45)
PH UA: 6 (ref 5–8)
PLATELET # BLD: 246 K/UL (ref 130–400)
PMV BLD AUTO: 11.4 FL (ref 9.4–12.3)
PO2 ARTERIAL: 98 MMHG (ref 80–100)
POTASSIUM SERPL-SCNC: 4.5 MMOL/L (ref 3.5–5)
POTASSIUM, WHOLE BLOOD: 3.6
PRO-BNP: 114 PG/ML (ref 0–900)
PROTEIN UA: 100 MG/DL
PROTHROMBIN TIME: 12.4 SEC (ref 12–14.6)
RBC # BLD: 4.05 M/UL (ref 4.2–5.4)
RBC UA: 4 /HPF (ref 0–4)
RESPIRATORY SYNCYTIAL VIRUS BY PCR: NOT DETECTED
SARS-COV-2, PCR: NOT DETECTED
SODIUM BLD-SCNC: 136 MMOL/L (ref 136–145)
SPECIFIC GRAVITY UA: 1.03 (ref 1–1.03)
TOTAL PROTEIN: 8.1 G/DL (ref 6.6–8.7)
TROPONIN: <0.01 NG/ML (ref 0–0.03)
UROBILINOGEN, URINE: 1 E.U./DL
WBC # BLD: 7.9 K/UL (ref 4.8–10.8)
WBC UA: 5 /HPF (ref 0–5)

## 2021-11-08 PROCEDURE — 85730 THROMBOPLASTIN TIME PARTIAL: CPT

## 2021-11-08 PROCEDURE — 87040 BLOOD CULTURE FOR BACTERIA: CPT

## 2021-11-08 PROCEDURE — 87150 DNA/RNA AMPLIFIED PROBE: CPT

## 2021-11-08 PROCEDURE — 71101 X-RAY EXAM UNILAT RIBS/CHEST: CPT

## 2021-11-08 PROCEDURE — 82803 BLOOD GASES ANY COMBINATION: CPT

## 2021-11-08 PROCEDURE — 81001 URINALYSIS AUTO W/SCOPE: CPT

## 2021-11-08 PROCEDURE — 96361 HYDRATE IV INFUSION ADD-ON: CPT

## 2021-11-08 PROCEDURE — 85610 PROTHROMBIN TIME: CPT

## 2021-11-08 PROCEDURE — 85379 FIBRIN DEGRADATION QUANT: CPT

## 2021-11-08 PROCEDURE — 96374 THER/PROPH/DIAG INJ IV PUSH: CPT

## 2021-11-08 PROCEDURE — 85025 COMPLETE CBC W/AUTO DIFF WBC: CPT

## 2021-11-08 PROCEDURE — 74176 CT ABD & PELVIS W/O CONTRAST: CPT

## 2021-11-08 PROCEDURE — 36600 WITHDRAWAL OF ARTERIAL BLOOD: CPT

## 2021-11-08 PROCEDURE — 36415 COLL VENOUS BLD VENIPUNCTURE: CPT

## 2021-11-08 PROCEDURE — 84484 ASSAY OF TROPONIN QUANT: CPT

## 2021-11-08 PROCEDURE — 6360000002 HC RX W HCPCS: Performed by: EMERGENCY MEDICINE

## 2021-11-08 PROCEDURE — 78580 LUNG PERFUSION IMAGING: CPT

## 2021-11-08 PROCEDURE — 0202U NFCT DS 22 TRGT SARS-COV-2: CPT

## 2021-11-08 PROCEDURE — 96375 TX/PRO/DX INJ NEW DRUG ADDON: CPT

## 2021-11-08 PROCEDURE — 2580000003 HC RX 258: Performed by: EMERGENCY MEDICINE

## 2021-11-08 PROCEDURE — 93005 ELECTROCARDIOGRAM TRACING: CPT

## 2021-11-08 PROCEDURE — 80053 COMPREHEN METABOLIC PANEL: CPT

## 2021-11-08 PROCEDURE — 3430000000 HC RX DIAGNOSTIC RADIOPHARMACEUTICAL: Performed by: EMERGENCY MEDICINE

## 2021-11-08 PROCEDURE — 99283 EMERGENCY DEPT VISIT LOW MDM: CPT

## 2021-11-08 PROCEDURE — A9540 TC99M MAA: HCPCS | Performed by: EMERGENCY MEDICINE

## 2021-11-08 PROCEDURE — 84132 ASSAY OF SERUM POTASSIUM: CPT

## 2021-11-08 PROCEDURE — 83880 ASSAY OF NATRIURETIC PEPTIDE: CPT

## 2021-11-08 RX ORDER — MORPHINE SULFATE 4 MG/ML
4 INJECTION, SOLUTION INTRAMUSCULAR; INTRAVENOUS ONCE
Status: COMPLETED | OUTPATIENT
Start: 2021-11-08 | End: 2021-11-08

## 2021-11-08 RX ORDER — PROMETHAZINE HYDROCHLORIDE 25 MG/ML
12.5 INJECTION, SOLUTION INTRAMUSCULAR; INTRAVENOUS ONCE
Status: COMPLETED | OUTPATIENT
Start: 2021-11-08 | End: 2021-11-08

## 2021-11-08 RX ORDER — 0.9 % SODIUM CHLORIDE 0.9 %
1000 INTRAVENOUS SOLUTION INTRAVENOUS ONCE
Status: COMPLETED | OUTPATIENT
Start: 2021-11-08 | End: 2021-11-09

## 2021-11-08 RX ORDER — LORAZEPAM 2 MG/ML
1 INJECTION INTRAMUSCULAR ONCE
Status: COMPLETED | OUTPATIENT
Start: 2021-11-08 | End: 2021-11-08

## 2021-11-08 RX ORDER — ONDANSETRON 2 MG/ML
4 INJECTION INTRAMUSCULAR; INTRAVENOUS ONCE
Status: COMPLETED | OUTPATIENT
Start: 2021-11-08 | End: 2021-11-08

## 2021-11-08 RX ADMIN — PROMETHAZINE HYDROCHLORIDE 12.5 MG: 25 INJECTION INTRAMUSCULAR; INTRAVENOUS at 21:29

## 2021-11-08 RX ADMIN — LORAZEPAM 1 MG: 2 INJECTION INTRAMUSCULAR; INTRAVENOUS at 22:17

## 2021-11-08 RX ADMIN — SODIUM CHLORIDE 1000 ML: 9 INJECTION, SOLUTION INTRAVENOUS at 21:09

## 2021-11-08 RX ADMIN — MORPHINE SULFATE 4 MG: 4 INJECTION INTRAVENOUS at 20:36

## 2021-11-08 RX ADMIN — ONDANSETRON 4 MG: 2 INJECTION INTRAMUSCULAR; INTRAVENOUS at 20:36

## 2021-11-08 RX ADMIN — Medication 5 MILLICURIE: at 23:24

## 2021-11-08 ASSESSMENT — PAIN SCALES - GENERAL: PAINLEVEL_OUTOF10: 9

## 2021-11-08 ASSESSMENT — ENCOUNTER SYMPTOMS
ABDOMINAL PAIN: 1
BACK PAIN: 0
NAUSEA: 1
DIARRHEA: 1
VOMITING: 1
RHINORRHEA: 0
SORE THROAT: 0
SHORTNESS OF BREATH: 1

## 2021-11-09 PROBLEM — Z71.6 TOBACCO ABUSE COUNSELING: Status: ACTIVE | Noted: 2021-11-09

## 2021-11-09 PROBLEM — E83.42 HYPOMAGNESEMIA: Status: ACTIVE | Noted: 2021-11-09

## 2021-11-09 PROBLEM — R11.2 INTRACTABLE NAUSEA AND VOMITING: Status: ACTIVE | Noted: 2021-11-09

## 2021-11-09 PROBLEM — F17.219 CIGARETTE NICOTINE DEPENDENCE WITH NICOTINE-INDUCED DISORDER: Status: ACTIVE | Noted: 2021-11-09

## 2021-11-09 PROBLEM — R78.81 GRAM-NEGATIVE BACTEREMIA: Status: ACTIVE | Noted: 2021-11-09

## 2021-11-09 LAB
ACINETOBACTER CALCOAC BAUMANNII COMPLEX BY PCR: NOT DETECTED
ALBUMIN SERPL-MCNC: 4.2 G/DL (ref 3.5–5.2)
ALP BLD-CCNC: 75 U/L (ref 35–104)
ALT SERPL-CCNC: 9 U/L (ref 5–33)
ANION GAP SERPL CALCULATED.3IONS-SCNC: 13 MMOL/L (ref 7–19)
AST SERPL-CCNC: 15 U/L (ref 5–32)
BACTEROIDES FRAGILIS BY PCR: NOT DETECTED
BASOPHILS ABSOLUTE: 0 K/UL (ref 0–0.2)
BASOPHILS RELATIVE PERCENT: 0.3 % (ref 0–1)
BILIRUB SERPL-MCNC: 0.4 MG/DL (ref 0.2–1.2)
BUN BLDV-MCNC: 8 MG/DL (ref 8–23)
CALCIUM SERPL-MCNC: 9.3 MG/DL (ref 8.8–10.2)
CANDIDA ALBICANS BY PCR: NOT DETECTED
CANDIDA AURIS BY PCR: NOT DETECTED
CANDIDA GLABRATA BY PCR: NOT DETECTED
CANDIDA KRUSEI BY PCR: NOT DETECTED
CANDIDA PARAPSILOSIS BY PCR: NOT DETECTED
CANDIDA TROPICALIS BY PCR: NOT DETECTED
CHLORIDE BLD-SCNC: 98 MMOL/L (ref 98–111)
CO2: 28 MMOL/L (ref 22–29)
CREAT SERPL-MCNC: 0.5 MG/DL (ref 0.5–0.9)
CRYPTOCOCCUS NEOFORMANS/GATTII BY PCR: NOT DETECTED
ENTEROBACTER CLOACAE COMPLEX BY PCR: NOT DETECTED
ENTEROBACTERALES BY PCR: NOT DETECTED
ENTEROCOCCUS FAECALIS BY PCR: NOT DETECTED
ENTEROCOCCUS FAECIUM BY PCR: NOT DETECTED
EOSINOPHILS ABSOLUTE: 0 K/UL (ref 0–0.6)
EOSINOPHILS RELATIVE PERCENT: 0.4 % (ref 0–5)
ESCHERICHIA COLI BY PCR: NOT DETECTED
GFR AFRICAN AMERICAN: >59
GFR NON-AFRICAN AMERICAN: >60
GLUCOSE BLD-MCNC: 77 MG/DL (ref 74–109)
HAEMOPHILUS INFLUENZAE BY PCR: NOT DETECTED
HCT VFR BLD CALC: 35.2 % (ref 37–47)
HEMOGLOBIN: 11.1 G/DL (ref 12–16)
IMMATURE GRANULOCYTES #: 0 K/UL
KLEBSIELLA AEROGENES BY PCR: NOT DETECTED
KLEBSIELLA OXYTOCA BY PCR: NOT DETECTED
KLEBSIELLA PNEUMONIAE GROUP BY PCR: NOT DETECTED
LISTERIA MONOCYTOGENES BY PCR: NOT DETECTED
LYMPHOCYTES ABSOLUTE: 1.4 K/UL (ref 1.1–4.5)
LYMPHOCYTES RELATIVE PERCENT: 18.9 % (ref 20–40)
MAGNESIUM: 1.5 MG/DL (ref 1.6–2.4)
MCH RBC QN AUTO: 32.2 PG (ref 27–31)
MCHC RBC AUTO-ENTMCNC: 31.5 G/DL (ref 33–37)
MCV RBC AUTO: 102 FL (ref 81–99)
MONOCYTES ABSOLUTE: 0.8 K/UL (ref 0–0.9)
MONOCYTES RELATIVE PERCENT: 10.9 % (ref 0–10)
NEISSERIA MENINGITIDIS BY PCR: NOT DETECTED
NEUTROPHILS ABSOLUTE: 5.2 K/UL (ref 1.5–7.5)
NEUTROPHILS RELATIVE PERCENT: 69.2 % (ref 50–65)
PDW BLD-RTO: 13.1 % (ref 11.5–14.5)
PLATELET # BLD: 212 K/UL (ref 130–400)
PMV BLD AUTO: 11.3 FL (ref 9.4–12.3)
POTASSIUM SERPL-SCNC: 3.9 MMOL/L (ref 3.5–5)
PROTEUS SPECIES BY PCR: NOT DETECTED
PSEUDOMONAS AERUGINOSA BY PCR: NOT DETECTED
RBC # BLD: 3.45 M/UL (ref 4.2–5.4)
SALMONELLA SPECIES BY PCR: NOT DETECTED
SERRATIA MARCESCENS BY PCR: NOT DETECTED
SODIUM BLD-SCNC: 139 MMOL/L (ref 136–145)
STAPHYLOCOCCUS AUREUS BY PCR: NOT DETECTED
STAPHYLOCOCCUS EPIDERMIDIS BY PCR: NOT DETECTED
STAPHYLOCOCCUS LUGDUNENSIS BY PCR: NOT DETECTED
STAPHYLOCOCCUS SPECIES BY PCR: NOT DETECTED
STENOTROPHOMONAS MALTOPHILIA BY PCR: NOT DETECTED
STREPTOCOCCUS AGALACTIAE BY PCR: NOT DETECTED
STREPTOCOCCUS PNEUMONIAE BY PCR: NOT DETECTED
STREPTOCOCCUS PYOGENES  BY PCR: NOT DETECTED
STREPTOCOCCUS SPECIES BY PCR: NOT DETECTED
TOTAL PROTEIN: 6.3 G/DL (ref 6.6–8.7)
WBC # BLD: 7.5 K/UL (ref 4.8–10.8)

## 2021-11-09 PROCEDURE — 2580000003 HC RX 258: Performed by: HOSPITALIST

## 2021-11-09 PROCEDURE — 83735 ASSAY OF MAGNESIUM: CPT

## 2021-11-09 PROCEDURE — 2700000000 HC OXYGEN THERAPY PER DAY

## 2021-11-09 PROCEDURE — 6360000002 HC RX W HCPCS: Performed by: INTERNAL MEDICINE

## 2021-11-09 PROCEDURE — 87040 BLOOD CULTURE FOR BACTERIA: CPT

## 2021-11-09 PROCEDURE — 2580000003 HC RX 258: Performed by: INTERNAL MEDICINE

## 2021-11-09 PROCEDURE — 6370000000 HC RX 637 (ALT 250 FOR IP): Performed by: INTERNAL MEDICINE

## 2021-11-09 PROCEDURE — 6370000000 HC RX 637 (ALT 250 FOR IP): Performed by: HOSPITALIST

## 2021-11-09 PROCEDURE — 85025 COMPLETE CBC W/AUTO DIFF WBC: CPT

## 2021-11-09 PROCEDURE — 36415 COLL VENOUS BLD VENIPUNCTURE: CPT

## 2021-11-09 PROCEDURE — 6360000002 HC RX W HCPCS: Performed by: HOSPITALIST

## 2021-11-09 PROCEDURE — 94640 AIRWAY INHALATION TREATMENT: CPT

## 2021-11-09 PROCEDURE — 1210000000 HC MED SURG R&B

## 2021-11-09 PROCEDURE — 80053 COMPREHEN METABOLIC PANEL: CPT

## 2021-11-09 RX ORDER — ALPRAZOLAM 0.5 MG/1
1 TABLET ORAL NIGHTLY PRN
Status: DISCONTINUED | OUTPATIENT
Start: 2021-11-09 | End: 2021-11-13

## 2021-11-09 RX ORDER — LISINOPRIL 5 MG/1
5 TABLET ORAL DAILY
Status: DISCONTINUED | OUTPATIENT
Start: 2021-11-09 | End: 2021-11-14 | Stop reason: HOSPADM

## 2021-11-09 RX ORDER — LORAZEPAM 2 MG/ML
1 INJECTION INTRAMUSCULAR EVERY 6 HOURS PRN
Status: DISCONTINUED | OUTPATIENT
Start: 2021-11-09 | End: 2021-11-09

## 2021-11-09 RX ORDER — GABAPENTIN 600 MG/1
600 TABLET ORAL 3 TIMES DAILY
Status: DISCONTINUED | OUTPATIENT
Start: 2021-11-09 | End: 2021-11-11

## 2021-11-09 RX ORDER — ACETAMINOPHEN 650 MG/1
650 SUPPOSITORY RECTAL EVERY 6 HOURS PRN
Status: DISCONTINUED | OUTPATIENT
Start: 2021-11-09 | End: 2021-11-14 | Stop reason: HOSPADM

## 2021-11-09 RX ORDER — SODIUM CHLORIDE, SODIUM LACTATE, POTASSIUM CHLORIDE, CALCIUM CHLORIDE 600; 310; 30; 20 MG/100ML; MG/100ML; MG/100ML; MG/100ML
INJECTION, SOLUTION INTRAVENOUS CONTINUOUS
Status: DISCONTINUED | OUTPATIENT
Start: 2021-11-09 | End: 2021-11-13

## 2021-11-09 RX ORDER — METOCLOPRAMIDE 10 MG/1
5 TABLET ORAL 4 TIMES DAILY
Status: DISCONTINUED | OUTPATIENT
Start: 2021-11-09 | End: 2021-11-14 | Stop reason: HOSPADM

## 2021-11-09 RX ORDER — OXYCODONE AND ACETAMINOPHEN 10; 325 MG/1; MG/1
1 TABLET ORAL EVERY 4 HOURS PRN
Status: DISCONTINUED | OUTPATIENT
Start: 2021-11-09 | End: 2021-11-13

## 2021-11-09 RX ORDER — BUDESONIDE AND FORMOTEROL FUMARATE DIHYDRATE 160; 4.5 UG/1; UG/1
2 AEROSOL RESPIRATORY (INHALATION) 2 TIMES DAILY
Status: DISCONTINUED | OUTPATIENT
Start: 2021-11-09 | End: 2021-11-09 | Stop reason: CLARIF

## 2021-11-09 RX ORDER — ALBUTEROL SULFATE 90 UG/1
2 AEROSOL, METERED RESPIRATORY (INHALATION) EVERY 6 HOURS PRN
Status: DISCONTINUED | OUTPATIENT
Start: 2021-11-09 | End: 2021-11-09 | Stop reason: ALTCHOICE

## 2021-11-09 RX ORDER — LORAZEPAM 2 MG/ML
1 INJECTION INTRAMUSCULAR EVERY 8 HOURS PRN
Status: DISCONTINUED | OUTPATIENT
Start: 2021-11-09 | End: 2021-11-09

## 2021-11-09 RX ORDER — ALBUTEROL SULFATE 2.5 MG/3ML
2.5 SOLUTION RESPIRATORY (INHALATION) EVERY 6 HOURS PRN
Status: DISCONTINUED | OUTPATIENT
Start: 2021-11-09 | End: 2021-11-14 | Stop reason: HOSPADM

## 2021-11-09 RX ORDER — ONDANSETRON 4 MG/1
4 TABLET, ORALLY DISINTEGRATING ORAL EVERY 8 HOURS PRN
Status: DISCONTINUED | OUTPATIENT
Start: 2021-11-09 | End: 2021-11-10 | Stop reason: SDUPTHER

## 2021-11-09 RX ORDER — ONDANSETRON 2 MG/ML
4 INJECTION INTRAMUSCULAR; INTRAVENOUS EVERY 6 HOURS PRN
Status: DISCONTINUED | OUTPATIENT
Start: 2021-11-09 | End: 2021-11-14 | Stop reason: HOSPADM

## 2021-11-09 RX ORDER — NICOTINE 21 MG/24HR
1 PATCH, TRANSDERMAL 24 HOURS TRANSDERMAL EVERY 24 HOURS
Status: DISCONTINUED | OUTPATIENT
Start: 2021-11-09 | End: 2021-11-14 | Stop reason: HOSPADM

## 2021-11-09 RX ORDER — MAGNESIUM SULFATE IN WATER 40 MG/ML
2000 INJECTION, SOLUTION INTRAVENOUS ONCE
Status: COMPLETED | OUTPATIENT
Start: 2021-11-09 | End: 2021-11-09

## 2021-11-09 RX ORDER — ARFORMOTEROL TARTRATE 15 UG/2ML
15 SOLUTION RESPIRATORY (INHALATION) 2 TIMES DAILY
Status: DISCONTINUED | OUTPATIENT
Start: 2021-11-09 | End: 2021-11-14 | Stop reason: HOSPADM

## 2021-11-09 RX ORDER — DEXTROAMPHETAMINE SACCHARATE, AMPHETAMINE ASPARTATE, DEXTROAMPHETAMINE SULFATE AND AMPHETAMINE SULFATE 2.5; 2.5; 2.5; 2.5 MG/1; MG/1; MG/1; MG/1
20 TABLET ORAL 2 TIMES DAILY
Status: DISCONTINUED | OUTPATIENT
Start: 2021-11-09 | End: 2021-11-14 | Stop reason: HOSPADM

## 2021-11-09 RX ORDER — CITALOPRAM 20 MG/1
40 TABLET ORAL DAILY
Status: DISCONTINUED | OUTPATIENT
Start: 2021-11-09 | End: 2021-11-14 | Stop reason: HOSPADM

## 2021-11-09 RX ORDER — ALPRAZOLAM 0.25 MG/1
0.25 TABLET ORAL ONCE
Status: COMPLETED | OUTPATIENT
Start: 2021-11-09 | End: 2021-11-09

## 2021-11-09 RX ORDER — FENTANYL CITRATE 50 UG/ML
25 INJECTION, SOLUTION INTRAMUSCULAR; INTRAVENOUS ONCE
Status: DISCONTINUED | OUTPATIENT
Start: 2021-11-09 | End: 2021-11-09

## 2021-11-09 RX ORDER — BUDESONIDE 0.5 MG/2ML
0.5 INHALANT ORAL 2 TIMES DAILY
Status: DISCONTINUED | OUTPATIENT
Start: 2021-11-09 | End: 2021-11-14 | Stop reason: HOSPADM

## 2021-11-09 RX ORDER — LORAZEPAM 2 MG/ML
0.5 INJECTION INTRAMUSCULAR EVERY 8 HOURS PRN
Status: DISCONTINUED | OUTPATIENT
Start: 2021-11-09 | End: 2021-11-10

## 2021-11-09 RX ORDER — PANTOPRAZOLE SODIUM 40 MG/1
40 TABLET, DELAYED RELEASE ORAL
Status: DISCONTINUED | OUTPATIENT
Start: 2021-11-09 | End: 2021-11-14 | Stop reason: HOSPADM

## 2021-11-09 RX ORDER — IPRATROPIUM BROMIDE AND ALBUTEROL SULFATE 2.5; .5 MG/3ML; MG/3ML
1 SOLUTION RESPIRATORY (INHALATION) EVERY 4 HOURS
Status: DISCONTINUED | OUTPATIENT
Start: 2021-11-09 | End: 2021-11-14 | Stop reason: HOSPADM

## 2021-11-09 RX ORDER — FLUTICASONE PROPIONATE 50 MCG
2 SPRAY, SUSPENSION (ML) NASAL DAILY
Status: DISCONTINUED | OUTPATIENT
Start: 2021-11-09 | End: 2021-11-14 | Stop reason: HOSPADM

## 2021-11-09 RX ORDER — ACETAMINOPHEN 325 MG/1
650 TABLET ORAL EVERY 6 HOURS PRN
Status: DISCONTINUED | OUTPATIENT
Start: 2021-11-09 | End: 2021-11-14 | Stop reason: HOSPADM

## 2021-11-09 RX ORDER — ONDANSETRON 4 MG/1
4 TABLET, ORALLY DISINTEGRATING ORAL EVERY 8 HOURS PRN
Status: DISCONTINUED | OUTPATIENT
Start: 2021-11-09 | End: 2021-11-14 | Stop reason: HOSPADM

## 2021-11-09 RX ORDER — LORAZEPAM 2 MG/ML
1 INJECTION INTRAMUSCULAR ONCE
Status: COMPLETED | OUTPATIENT
Start: 2021-11-09 | End: 2021-11-09

## 2021-11-09 RX ORDER — MONTELUKAST SODIUM 10 MG/1
10 TABLET ORAL NIGHTLY
Status: DISCONTINUED | OUTPATIENT
Start: 2021-11-09 | End: 2021-11-14 | Stop reason: HOSPADM

## 2021-11-09 RX ADMIN — ONDANSETRON 4 MG: 2 INJECTION INTRAMUSCULAR; INTRAVENOUS at 19:58

## 2021-11-09 RX ADMIN — GABAPENTIN 600 MG: 600 TABLET, FILM COATED ORAL at 20:37

## 2021-11-09 RX ADMIN — IPRATROPIUM BROMIDE AND ALBUTEROL SULFATE 3 ML: .5; 2.5 SOLUTION RESPIRATORY (INHALATION) at 18:17

## 2021-11-09 RX ADMIN — OXYCODONE AND ACETAMINOPHEN 1 TABLET: 10; 325 TABLET ORAL at 10:22

## 2021-11-09 RX ADMIN — PANTOPRAZOLE SODIUM 40 MG: 40 TABLET, DELAYED RELEASE ORAL at 06:18

## 2021-11-09 RX ADMIN — LISINOPRIL 5 MG: 5 TABLET ORAL at 09:03

## 2021-11-09 RX ADMIN — LORAZEPAM 0.5 MG: 2 INJECTION INTRAMUSCULAR; INTRAVENOUS at 12:51

## 2021-11-09 RX ADMIN — BUDESONIDE 500 MCG: 0.5 SUSPENSION RESPIRATORY (INHALATION) at 18:18

## 2021-11-09 RX ADMIN — MONTELUKAST SODIUM 10 MG: 10 TABLET, FILM COATED ORAL at 02:11

## 2021-11-09 RX ADMIN — IPRATROPIUM BROMIDE AND ALBUTEROL SULFATE 3 ML: .5; 2.5 SOLUTION RESPIRATORY (INHALATION) at 10:29

## 2021-11-09 RX ADMIN — CITALOPRAM HYDROBROMIDE 40 MG: 20 TABLET ORAL at 09:03

## 2021-11-09 RX ADMIN — WATER 2000 MG: 1 INJECTION INTRAMUSCULAR; INTRAVENOUS; SUBCUTANEOUS at 16:11

## 2021-11-09 RX ADMIN — IPRATROPIUM BROMIDE AND ALBUTEROL SULFATE 3 ML: .5; 2.5 SOLUTION RESPIRATORY (INHALATION) at 06:34

## 2021-11-09 RX ADMIN — ACETAMINOPHEN 650 MG: 325 TABLET ORAL at 04:54

## 2021-11-09 RX ADMIN — LORAZEPAM 0.5 MG: 2 INJECTION INTRAMUSCULAR; INTRAVENOUS at 19:58

## 2021-11-09 RX ADMIN — GABAPENTIN 600 MG: 600 TABLET, FILM COATED ORAL at 09:04

## 2021-11-09 RX ADMIN — ARFORMOTEROL TARTRATE 15 MCG: 15 SOLUTION RESPIRATORY (INHALATION) at 18:17

## 2021-11-09 RX ADMIN — ONDANSETRON 4 MG: 2 INJECTION INTRAMUSCULAR; INTRAVENOUS at 12:51

## 2021-11-09 RX ADMIN — IPRATROPIUM BROMIDE AND ALBUTEROL SULFATE 3 ML: .5; 2.5 SOLUTION RESPIRATORY (INHALATION) at 14:37

## 2021-11-09 RX ADMIN — ALPRAZOLAM 0.25 MG: 0.25 TABLET ORAL at 06:18

## 2021-11-09 RX ADMIN — SODIUM CHLORIDE, POTASSIUM CHLORIDE, SODIUM LACTATE AND CALCIUM CHLORIDE: 600; 310; 30; 20 INJECTION, SOLUTION INTRAVENOUS at 02:11

## 2021-11-09 RX ADMIN — METOCLOPRAMIDE 5 MG: 10 TABLET ORAL at 20:37

## 2021-11-09 RX ADMIN — MAGNESIUM SULFATE HEPTAHYDRATE 2000 MG: 40 INJECTION, SOLUTION INTRAVENOUS at 10:19

## 2021-11-09 RX ADMIN — LORAZEPAM 1 MG: 2 INJECTION INTRAMUSCULAR; INTRAVENOUS at 02:10

## 2021-11-09 RX ADMIN — VANCOMYCIN HYDROCHLORIDE 1250 MG: 10 INJECTION, POWDER, LYOPHILIZED, FOR SOLUTION INTRAVENOUS at 17:29

## 2021-11-09 RX ADMIN — ENOXAPARIN SODIUM 40 MG: 100 INJECTION SUBCUTANEOUS at 09:04

## 2021-11-09 RX ADMIN — METOCLOPRAMIDE 5 MG: 10 TABLET ORAL at 02:11

## 2021-11-09 RX ADMIN — IPRATROPIUM BROMIDE AND ALBUTEROL SULFATE 3 ML: .5; 2.5 SOLUTION RESPIRATORY (INHALATION) at 22:45

## 2021-11-09 RX ADMIN — ARFORMOTEROL TARTRATE 15 MCG: 15 SOLUTION RESPIRATORY (INHALATION) at 06:34

## 2021-11-09 RX ADMIN — SODIUM CHLORIDE, POTASSIUM CHLORIDE, SODIUM LACTATE AND CALCIUM CHLORIDE: 600; 310; 30; 20 INJECTION, SOLUTION INTRAVENOUS at 20:03

## 2021-11-09 RX ADMIN — MONTELUKAST SODIUM 10 MG: 10 TABLET, FILM COATED ORAL at 20:37

## 2021-11-09 RX ADMIN — BUDESONIDE 500 MCG: 0.5 SUSPENSION RESPIRATORY (INHALATION) at 06:35

## 2021-11-09 RX ADMIN — METOCLOPRAMIDE 5 MG: 10 TABLET ORAL at 09:04

## 2021-11-09 RX ADMIN — METOCLOPRAMIDE 5 MG: 10 TABLET ORAL at 16:28

## 2021-11-09 RX ADMIN — OXYCODONE AND ACETAMINOPHEN 1 TABLET: 10; 325 TABLET ORAL at 20:39

## 2021-11-09 RX ADMIN — ONDANSETRON 4 MG: 2 INJECTION INTRAMUSCULAR; INTRAVENOUS at 04:16

## 2021-11-09 ASSESSMENT — PAIN DESCRIPTION - LOCATION
LOCATION: CHEST;RIB CAGE
LOCATION: CHEST;RIB CAGE
LOCATION: RIB CAGE

## 2021-11-09 ASSESSMENT — PAIN DESCRIPTION - FREQUENCY
FREQUENCY: CONTINUOUS

## 2021-11-09 ASSESSMENT — PAIN SCALES - GENERAL
PAINLEVEL_OUTOF10: 7
PAINLEVEL_OUTOF10: 3
PAINLEVEL_OUTOF10: 4
PAINLEVEL_OUTOF10: 8
PAINLEVEL_OUTOF10: 7
PAINLEVEL_OUTOF10: 8

## 2021-11-09 ASSESSMENT — PAIN - FUNCTIONAL ASSESSMENT
PAIN_FUNCTIONAL_ASSESSMENT: PREVENTS OR INTERFERES WITH MANY ACTIVE NOT PASSIVE ACTIVITIES
PAIN_FUNCTIONAL_ASSESSMENT: PREVENTS OR INTERFERES SOME ACTIVE ACTIVITIES AND ADLS

## 2021-11-09 ASSESSMENT — PAIN DESCRIPTION - ORIENTATION
ORIENTATION: OTHER (COMMENT)
ORIENTATION: LEFT

## 2021-11-09 ASSESSMENT — PAIN DESCRIPTION - DESCRIPTORS
DESCRIPTORS: ACHING;CONSTANT
DESCRIPTORS: ACHING;CRAMPING
DESCRIPTORS: CONSTANT

## 2021-11-09 ASSESSMENT — PAIN DESCRIPTION - PAIN TYPE
TYPE: ACUTE PAIN;INTRACTABLE PAIN
TYPE: ACUTE PAIN

## 2021-11-09 ASSESSMENT — PAIN DESCRIPTION - PROGRESSION: CLINICAL_PROGRESSION: NOT CHANGED

## 2021-11-09 ASSESSMENT — PAIN DESCRIPTION - ONSET: ONSET: GRADUAL

## 2021-11-09 NOTE — ED PROVIDER NOTES
[facility] EM[place]GENCY DEPT  eMERGENCYdEPARTMENT eNCOUnter      Pt Name: Joe Hebert  MRN: [id number]  M[place]fenoch 1950  Date of evaluation: 11/8/2021  Lou Rojas MD    CHIEF COMPLAINT       Chief Complaint   Patient presents with    Shortness of Breath     pt has COPD and lung CA; c/o increased SOA over the past several days    Emesis     pt c/o emesis and diarrhea; unable to keep anything down x 3 days     Care assumed from Dr. Dorian Dorsey. Patient is a 77-year-old female presents complaining of increased dyspnea over the past few days. Also having vomiting and diarrhea and has been unable to keep anything down. Takes Xanax 4 times daily has not been keeping her meds down. Some concern for withdrawal.  VQ scan is pending at this time and she is also getting some IV Ativan. Will reassess after VQ scan and Ativan and see if symptoms are improving. PHYSICAL EXAM    (up to 7 for level 4, 8 or more for level 5)     ED Triage Vitals [11/08/21 2010]   BP Temp Temp Source Pulse Resp SpO2 Height Weight   139/77 98.1 °F (36.7 °C) Oral 101 17 96 % 5' 5\" (1.651 m) 120 lb (54.4 kg)       Physical Exam  Vitals reviewed. Constitutional:       General: She is not in acute distress. Appearance: She is well-developed. HENT:      Head: Normocephalic and atraumatic. Eyes:      General: No scleral icterus. Pupils: Pupils are equal, round, and reactive to light. Neck:      Vascular: No JVD. Cardiovascular:      Rate and Rhythm: Regular rhythm. Tachycardia present. Heart sounds: Normal heart sounds. Pulmonary:      Effort: Pulmonary effort is normal. No respiratory distress. Breath sounds: Wheezing (scant) present. Abdominal:      General: There is no distension. Palpations: Abdomen is soft. Tenderness: There is no abdominal tenderness. There is no guarding or rebound. Musculoskeletal:      Cervical back: Normal range of motion and neck supple.    Skin:     General: Skin (*)     Leukocyte Esterase, Urine TRACE (*)     All other components within normal limits   MICROSCOPIC URINALYSIS - Abnormal; Notable for the following components:    Bacteria, UA NEGATIVE (*)     Crystals, UA NEG (*)     Hyaline Casts, UA 28 (*)     All other components within normal limits   RESPIRATORY PANEL, MOLECULAR, WITH COVID-19   CULTURE, BLOOD 1   CULTURE, BLOOD 2   APTT   BRAIN NATRIURETIC PEPTIDE   PROTIME-INR   TROPONIN   POTASSIUM, WHOLE BLOOD       All other labs were within normal range or not returned as of this dictation. EMERGENCY DEPARTMENT COURSE and DIFFERENTIALDIAGNOSIS/MDM:   Vitals:    Vitals:    11/08/21 2010 11/08/21 2329   BP: 139/77 (!) 121/57   Pulse: 101 95   Resp: 17 20   Temp: 98.1 °F (36.7 °C)    TempSrc: Oral    SpO2: 96% 96%   Weight: 120 lb (54.4 kg)    Height: 5' 5\" (1.651 m)        MDM  Patient doing maybe a little better but still nauseated and does not feel like she will be able to keep her medications down. Feel that admission is warranted. Case discussed with Dr. Prakash Angeles, hospitalist, who is agreeable plan of care and admission. Patient updated about plan. CONSULTS:  None    PROCEDURES:  Unlessotherwise noted below, none      Procedures    FINAL IMPRESSION      1. Nausea and vomiting, intractability of vomiting not specified, unspecified vomiting type    2. Dyspnea, unspecified type    3. Benzodiazepine withdrawal without complication (HCC)          DISPOSITION/PLAN   DISPOSITION Decision To Admit    PATIENT REFERRED TO:  No follow-up provider specified.     DISCHARGE MEDICATIONS:  New Prescriptions    No medications on file          (Please note that portions of this note were completed with a voice recognition program.  Efforts were made to edit the dictations but occasionally words are mis-transcribed.)    Bozena Sinclair MD (electronically signed)  Attending Emergency Physician             Bozena Sinclair MD  11/09/21 9718

## 2021-11-09 NOTE — PROGRESS NOTES
Pharmacy Note  Vancomycin Consult    Marcin Person is a 66 y.o. female started on Vancomycin for bacteremia; consult received from Dr. Bobby Harris to manage therapy. Also receiving the following antibiotics: zosyn. Principal Problem:    Gram-negative bacteremia  Active Problems:    Chronic pain    Anxiety and depression    GERD (gastroesophageal reflux disease)    Hypertension    COPD (chronic obstructive pulmonary disease) (HCC)    Alpha-1-antitrypsin deficiency carrier    Folate deficiency anemia    Malignant neoplasm of upper lobe of right lung (HCC)    Palliative care patient    Intractable nausea and vomiting    Hypomagnesemia    Tobacco abuse counseling    Cigarette nicotine dependence with nicotine-induced disorder  Resolved Problems:    * No resolved hospital problems. *      Allergies:  Aspirin, Codeine, Demerol, Fentanyl, Iv dye [iodides], Neosporin [bacitracin-neomycin-polymyxin], Nsaids, Pcn [penicillins], Pentazocine lactate, Sulfa antibiotics, Talwin [pentazocine], and Influenza vaccines     Temp max: 99.5    Recent Labs     11/08/21 2040 11/09/21  0353   BUN 8 8       Recent Labs     11/08/21 2040 11/09/21  0353   CREATININE 0.5 0.5       Recent Labs     11/08/21 2040 11/09/21  0352   WBC 7.9 7.5         Intake/Output Summary (Last 24 hours) at 11/9/2021 1549  Last data filed at 11/9/2021 1438  Gross per 24 hour   Intake 1437 ml   Output 1200 ml   Net 237 ml       Culture Date Source Results   11/08/21 Blood Gram - rods   11/09/21 Blood x 2 Sent       Ht Readings from Last 1 Encounters:   11/09/21 5' 5\" (1.651 m)        Wt Readings from Last 1 Encounters:   11/09/21 116 lb 3.2 oz (52.7 kg)         Body mass index is 19.34 kg/m². Estimated Creatinine Clearance: 83 mL/min (based on SCr of 0.5 mg/dL). Assessment/Plan:  Will initiate vancomycin 1250 mg IV x 1 dose then vancomycin 750 mg IV every 12 hours.   Timing of trough level will be determined based on culture results, renal function, and clinical response. Loading dose: 1250 mg at 17:30 11/09/2021. Regimen: 750 mg IV every 12 hours. Start time: 05:30 on 11/10/2021  Exposure target: AUC24 (range)400-600 mg/L.hr   AUC24,ss: 445 mg/L.hr  Probability of AUC24 > 400: 61 %  Ctrough,ss: 13.4 mg/L  Probability of Ctrough,ss > 20: 19 %  Probability of nephrotoxicity (Lodise BRANDY 2009): 9 %    Thank you for the consult. Will continue to follow.     Shobha Preston, PHARM D, 11/9/2021, 3:51 PM

## 2021-11-09 NOTE — PROGRESS NOTES
4 Eyes Skin Assessment    Primus Goltz is being assessed upon: Admission    I agree that I, Aung Fontaine RN, along with Mariely Aguila RN (either 2 RN's or 1 LPN and 1 RN) have performed a thorough Head to Toe Skin Assessment on the patient. ALL assessment sites listed below have been assessed. Areas assessed by both nurses:     [x]   Head, Face, and Ears   [x]   Shoulders, Back, and Chest  [x]   Arms, Elbows, and Hands   [x]   Coccyx, Sacrum, and Ischium  [x]   Legs, Feet, and Heels    Does the Patient have Skin Breakdown?  No    Burt Prevention initiated: No  Wound Care Orders initiated: No    WOC nurse consulted for Pressure Injury (Stage 3,4, Unstageable, DTI, NWPT, and Complex wounds) and New or Established Ostomies: No        Primary Nurse eSignature: Aung Fontaine RN on 11/9/2021 at 3:10 AM      Co-Signer eSignature: Electronically signed by Mariely Aguila RN on 11/9/21 at 3:21 AM CST

## 2021-11-09 NOTE — H&P
Hospitalist History & Physical  [facility]    Patient: Joe Hebert   : 1950   MRN: [id number]  Code Status: Full Code  PCP: TEN Gray  Date of Service: 2021    Chief Complaint:   Intractable N/V/D    History of Present Illness:   67-year-old female with past medical history as listed below presented to ER with intractable N/V/D since this past Saturday. Hospitalized at this facility 1 month ago for CAP and COPD exacerbation. Ongoing polypharmacy. Prescribed Xanax however unable to keep down. Upon interview and examination, patient is anxious but denies any specific complaints. No further history provided. Review of Systems:   A comprehensive review of systems was negative except for: Gastrointestinal: positive for diarrhea, nausea and vomiting    Past Medical History:     Past Medical History:   Diagnosis Date    ADHD (attention deficit hyperactivity disorder)     Anxiety     COPD (chronic obstructive pulmonary disease) (HCC)     Depression     Fibromyalgia     GERD (gastroesophageal reflux disease)     Hypertension     Malignant neoplasm of upper lobe of right lung (HCC) 2018    RA (rheumatoid arthritis) ([facility] [address]          Past Surgical History:     Past Surgical History:   Procedure Laterality Date    CHOLECYSTECTOMY      HERNIA REPAIR      HYSTERECTOMY      LEG SURGERY      steel garth placement.         Family History:     Family History   Problem Relation Age of Onset    Cancer Father         Social History:     Social History     Socioeconomic History    Marital status:      Spouse name: None    Number of children: None    Years of education: None    Highest education level: None   Occupational History    None   Tobacco Use    Smoking status: Current Some Day Smoker     Packs/day: 0.25     Years: 30.00     Pack years: 7.50     Types: Cigarettes    Smokeless tobacco: Never Used   Vaping Use    Vaping Use: Never used Substance and Sexual Activity    Alcohol use: No    Drug use: No    Sexual activity: None   Other Topics Concern    None   Social History Narrative    None     Social Determinants of Health     Financial Resource Strain:     Difficulty of Paying Living Expenses: Not on file   Food Insecurity:     Worried About Running Out of Food in the Last Year: Not on file    David of Food in the Last Year: Not on file   Transportation Needs:     Lack of Transportation (Medical): Not on file    Lack of Transportation (Non-Medical): Not on file   Physical Activity:     Days of Exercise per Week: Not on file    Minutes of Exercise per Session: Not on file   Stress:     Feeling of Stress : Not on file   Social Connections:     Frequency of Communication with Friends and Family: Not on file    Frequency of Social Gatherings with Friends and Family: Not on file    Attends Sikhism Services: Not on file    Active Member of [address] [place] [address] [employer] or Organizations: Not on file    Attends Club or Organization Meetings: Not on file    Marital Status: Not on file   Intimate Partner Violence:     Fear of Current or Ex-Partner: Not on file    Emotionally Abused: Not on file    Physically Abused: Not on file    Sexually Abused: Not on file   Housing Stability:     Unable to Pay for Housing in the Last Year: Not on file    Number of [place] in the Last Year: Not on file    Unstable Housing in the Last Year: Not on file       Prior to Admission Medications:   Not in a hospital admission. Allergies:      Allergies   Allergen Reactions    Aspirin Hives    Codeine     Demerol     Iv Dye [Iodides]     Neosporin [Bacitracin-Neomycin-Polymyxin]     Nsaids     Pcn [Penicillins]     Pentazocine Lactate     Sulfa Antibiotics     Talwin [Pentazocine] Hives    Influenza Vaccines Hives and Rash         Physical Exam:   BP (!) 121/57   Pulse 95   Temp 98.1 °F (36.7 °C) (Oral)   Resp 20   Ht 5' 5\" (1.651 m)   Wt 120 lb U/L    ALT 12 5 - 33 U/L    AST 25 5 - 32 U/L   Troponin    Collection Time: 11/08/21  8:40 PM   Result Value Ref Range    Troponin <0.01 0.00 - 0.03 ng/mL   Blood Gas, Arterial    Collection Time: 11/08/21  8:51 PM   Result Value Ref Range    pH, Arterial 7.360 7.350 - 7.450    pCO2, Arterial 61.0 (H) 35.0 - 45.0 mmHg    pO2, Arterial 98.0 80.0 - 100.0 mmHg    HCO3, Arterial 34.5 (H) 22.0 - 26.0 mmol/L    Base Excess, Arterial 7.1 (H) -2.0 - 2.0 mmol/L    Hemoglobin, Art, Extended 12.6 12.0 - 16.0 g/dL    O2 Sat, Arterial 94.6 >92 %    Carboxyhgb, Arterial 3.2 0.0 - 5.0 %    Methemoglobin, Arterial 1.0 <1.5 %    O2 Content, Arterial 16.9 Not Established mL/dL    O2 Therapy Unknown    Potassium, Whole Blood    Collection Time: 11/08/21  8:51 PM   Result Value Ref Range    Potassium, Whole Blood 3.6    APTT    Collection Time: 11/08/21  9:12 PM   Result Value Ref Range    aPTT 32.5 26.0 - 36.2 sec   Protime-INR    Collection Time: 11/08/21  9:12 PM   Result Value Ref Range    Protime 12.4 12.0 - 14.6 sec    INR 0.90 0.88 - 1.18   D-Dimer, Quantitative    Collection Time: 11/08/21  9:12 PM   Result Value Ref Range    D-Dimer, Quant 0.62 (H) 0.00 - 0.48 ug/mL FEU   Respiratory Panel, Molecular, with COVID-19 (Restricted: peds pts or suitable admitted adults)    Collection Time: 11/08/21  9:12 PM    Specimen: Nasopharyngeal   Result Value Ref Range    Adenovirus by PCR Not Detected Not Detected    Bordetella parapertussis by PCR Not Detected Not Detected    Bordetella pertussis by PCR Not Detected Not Detected    Chlamydophilia pneumoniae by PCR Not Detected Not Detected    Coronavirus 229E by PCR Not Detected Not Detected    Coronavirus HKU1 by PCR Not Detected Not Detected    Coronavirus NL63 by PCR Not Detected Not Detected    Coronavirus OC43 by PCR Not Detected Not Detected    SARS-CoV-2, PCR Not Detected Not Detected    Human Metapneumovirus by PCR Not Detected Not Detected    Human Rhinovirus/Enterovirus by PCR Not Detected Not Detected    Influenza A by PCR Not Detected Not Detected    Influenza B by PCR Not Detected Not Detected    Mycoplasma pneumoniae by PCR Not Detected Not Detected    Parainfluenza Virus 1 by PCR Not Detected Not Detected    Parainfluenza Virus 2 by PCR Not Detected Not Detected    Parainfluenza Virus 3 by PCR Not Detected Not Detected    Parainfluenza Virus 4 by PCR Not Detected Not Detected    Respiratory Syncytial Virus by PCR Not Detected Not Detected   Urinalysis Reflex to Culture    Collection Time: 11/08/21 10:38 PM    Specimen: Urine, clean catch   Result Value Ref Range    Color, UA DARK YELLOW (A) Straw/Yellow    Clarity, UA Clear Clear    Glucose, Ur Negative Negative mg/dL    Bilirubin Urine Negative Negative    Ketones, Urine 80 (A) Negative mg/dL    Specific Gravity, UA 1.033 1.005 - 1.030    Blood, Urine Negative Negative    pH, UA 6.0 5.0 - 8.0    Protein,  (A) Negative mg/dL    Urobilinogen, Urine 1.0 <2.0 E.U./dL    Nitrite, Urine Negative Negative    Leukocyte Esterase, Urine TRACE (A) Negative   Microscopic Urinalysis    Collection Time: 11/08/21 10:38 PM   Result Value Ref Range    Bacteria, UA NEGATIVE (A) None Seen /HPF    Crystals, UA NEG (A) None Seen /HPF    Hyaline Casts, UA 28 (H) 0 - 8 /HPF    WBC, UA 5 0 - 5 /HPF    RBC, UA 4 0 - 4 /HPF    Epithelial Cells, UA 5 0 - 5 /HPF       No intake/output data recorded. CT ABDOMEN PELVIS WO CONTRAST Additional Contrast? None    Result Date: 11/8/2021  1. No mass, fluid collection, or inflammatory process is seen. Signed by Dr Gary Young (MIN 3 VIEWS)    Result Date: 11/8/2021  1. Chronic lung changes. 2. No rib fracture is seen.  Signed by Dr Phuong Lozada and Plan:   Intractable N/V/D  Hospitalized at this facility 1 month ago for CAP/COPD exacerbation  IVF resuscitation  Follow electrolytes  Antiemetics  CT A/P 11/8/2021: Unremarkable  Afebrile without leukocytosis  Molecular respiratory panel negative  UA unremarkable  Follow cultures    Query BZD withdrawal  Prescribed Xanax  Unable to keep meds down  IV diazepam as warranted  Monitor cardiopulmonary status closely  Required 3.5 L at baseline  Polypharmacy needs to be addressed by PCP    History of lung cancer  Routine outpatient follow-up    Continued tobacco dependence  Counseled    DVT prophylaxis  Len Ventura MD  11/9/2021 1:09 AM

## 2021-11-09 NOTE — PROGRESS NOTES
[place], [facility], [address]    DEPARTMENT OF HOSPITALIST MEDICINE      PLAN  OF  CARE  NOTE:      Patient was admitted earlier today by our nocturnist.  Please see the history and physical for details. I saw and examined the patient at the bedside today. Her nausea and vomiting is slowly improving but she still feels very tired and fatigued. Blood cultures were done which are growing gram-negative rods. Empiric antibiotics in the form of Zosyn and vancomycin until culture and sensitivity is available of positive blood cultures. ID consult given for further evaluation and antibiotic recommendations. Patient has no leukocytosis at this point. Continue with antinausea and vomiting medications. Magnesium was low at 1.5 which is being replaced. PT/OT evaluation ordered. Patient being advanced to full inpatient status. Repeat labs in a.m. Electrolyte replacement as per protocol. Patient will be monitored very closely on the floor. Further recommendations as per the hospital course. Patient  is on DVT prophylaxis  Current medications reviewed  Lab work reviewed  Radiology/Chest x-ray films reviewed  Treatment recommendations from suspecialities reviewed, appreciated and agreed with  Discussed with the nurse and addressed all questions/concerns  Discussed with Patient and/or Family at the bedside in detail . .. they understand and agree with the management plan.         Qasim Forbes MD  11/9/2021, 3:06 PM

## 2021-11-09 NOTE — CONSULTS
Palliative Care:    Pt is known to palliative care team.  She presents to ED with c/o SOA x several days. Pt has hx of COPD, lung ca. Past Medical History:        Past Medical History:   Diagnosis Date    ADHD (attention deficit hyperactivity disorder)     Anxiety     COPD (chronic obstructive pulmonary disease) (HCC)     Depression     Fibromyalgia     GERD (gastroesophageal reflux disease)     Hypertension     Malignant neoplasm of upper lobe of right lung ([facility] 12/04/2018    Palliative care patient 10/06/2021    RA (rheumatoid arthritis) ([facility]        Advance Directives: On file. Full code. Reviewed, no changes. Pain/Other Symptoms:  Left rib pain and nausea. States meds have been administered and beginning to [employer] better\". Activity:   Bed rest          Psychological/Spiritual:     Pt states she has good spiritual support, unable to attend Worship currently. Good family support, dtr lives with her and cares for her. Plan:        Patient/family discussion r/t goals:  Pt plans to return to her home with dtr. Pt reports she needs assist with all her ADL's. Dtr cooks and cleans, errand, etc.  Pt also still sees Dr. Belem Glaser, she has completed her radiation tx, f/u visits for scan but unsure what dates are at this time. Palliative will follow for support. Have asked PC  to see pt tomorrow.               Electronically signed by Lennox Ares, RN on 11/9/2021 at 1:33 PM

## 2021-11-09 NOTE — CONSULTS
INFECTIOUS DISEASES CONSULT NOTE    Patient:  Jessika Beard 66 y.o. female  ROOM # [unfilled]  YOB: 1950  MRN: [id number]  [facility]:  [id number]  Admit date: 11/8/2021   Admitting Physician: Jayce Heart MD  Primary Care Physician: TEN Alfredo  REFERRING PROVIDER: No ref. provider found    Reason for Consultation: Gram-negative bacteremia. Please evaluate and advise. History of Present Illness/Chief Complaint: Pleasant 77-year-old woman. She was in her usual state of health until Saturday evening. She indicates she developed some nausea. She had vomiting. She then developed loose diarrheal stool. Symptoms persisted into Sunday. She felt weak. She felt tired. She presented to the hospital and was admitted for further management. Blood cultures on admission have grown gram-negative rods from anaerobic bottle of 1 set of blood cultures. She has been on empiric antibiotic treatment with Zosyn. Clinically she indicates she is feeling better. Infectious disease asked to evaluate and offer recommendations. She indicates she had shared a similar meal with her daughter. She indicates her daughter had some nausea but no vomiting or diarrhea. No other family members ill.     Current Scheduled Medications:    amphetamine-dextroamphetamine  20 mg Oral BID    citalopram  40 mg Oral Daily    fluticasone  2 spray Each Nostril Daily    gabapentin  600 mg Oral TID    ipratropium-albuterol  1 vial Inhalation Q4H    lisinopril  5 mg Oral Daily    metoclopramide  5 mg Oral 4x Daily    montelukast  10 mg Oral Nightly    nicotine  1 patch TransDERmal Q24H    pantoprazole  40 mg Oral QAM AC    enoxaparin  40 mg SubCUTAneous Daily    budesonide  0.5 mg Nebulization BID    Arformoterol Tartrate  15 mcg Nebulization BID    piperacillin-tazobactam  3,375 mg IntraVENous Q8H    vancomycin  1,500 mg IntraVENous Q12H     Current PRN Medications:  albuterol, [Held by provider] ALPRAZolam, ondansetron, ondansetron **OR** ondansetron, acetaminophen **OR** acetaminophen, LORazepam, oxyCODONE-acetaminophen    Allergies: Allergies   Allergen Reactions    Aspirin Hives    Codeine     Demerol     Fentanyl Hives    Iv Dye [Iodides]     Neosporin [Bacitracin-Neomycin-Polymyxin]     Nsaids     Pcn [Penicillins]     Pentazocine Lactate     Sulfa Antibiotics     Talwin [Pentazocine] Hives    Influenza Vaccines Hives and Rash     Past Medical History: Chronic obstructive pulmonary disease. She is on home oxygen therapy. Anxiety. Attention deficit disorder. Gastroesophageal reflux disease. Fibromyalgia. Hypertension. Previous history of lung cancer. Rheumatoid arthritis. Past Surgical History: Hiatal hernia repair. Hysterectomy. Leg surgery with hardware placement. Cholecystectomy. Social History: . Some tobacco use. No illicit drug use. No heavy alcohol use. Family History: Father history of cancer. Exposure History: See HPI    Review of Systems: See HPI    Vital Signs:  BP (!) 142/68   Pulse 99   Temp 99.5 °F (37.5 °C) (Temporal)   Resp 22   Ht 5' 5\" (1.651 m)   Wt 116 lb 3.2 oz (52.7 kg)   SpO2 97%   BMI 19.34 kg/m²  Temp (24hrs), Av °F (37.2 °C), Min:98.1 °F (36.7 °C), Max:99.5 °F (37.5 °C)    Physical Exam:   Vital signs reviewed. Alert, pleasant, no distress  No cervical axillary or inguinal adenopathy  Lungs clear to auscultation without crackles or wheezes  Heart regular rhythm without murmur  Abdomen is soft and nontender. No guarding or rebound.   No suprapubic or flank tenderness  Extremities no edema  Skin without rash    Lab Results:  CBC:   Recent Labs     21  0352   WBC 7.9 7.5   HGB 12.9 11.1*   HCT 41.1 35.2*    212   LYMPHOPCT 12.0* 18.9*   MONOPCT 5.8 10.9*     CMP:   Recent Labs     21  0353     --  139   K 4.5 3.6 3.9   CL 92*  --  98   CO2 30*  --  28   BUN 8  -- 8   CREATININE 0.5  --  0.5   CALCIUM 10.0  --  9.3   BILITOT 0.4  --  0.4   ALKPHOS 96  --  75   ALT 12  --  9   AST 25  --  15   GLUCOSE 111*  --  77     Urinalysis yesterday-5 white blood cells per high-power field and 4 red blood cells per high-power field    Culture:   Blood culture yesterday-gram-negative garth from Gram stain from anaerobic bottle. BC ID identification-pending    Radiology:   CT scan of the abdomen and pelvis from yesterday:  Impression   1. No mass, fluid collection, or inflammatory process is seen. Signed by Dr Shana Perez       Ventilation/perfusion scan:  Impression   A limited diagnostic study in the absence of a ventilation   scan. A low probability of pulmonary embolism. Signed by Dr Sherrill Becerra     Additional Studies Reviewed:     Impression:   1. Gram-negative bacteremia-definite etiology uncertain. Her urinalysis does not suggest urinary source and she has had a paucity of urinary symptoms. Pulmonary source seems unlikely. Identification of the gram-negative garth may provide some indication as to source. She seems to be showing improvement with current antibiotic treatment.   2.  Chronic obstructive pulmonary disease    Recommendations:    Continue IV fluids  Continue Zosyn  Follow-up blood cultures  Await identification of current blood culture  Continue to follow        Kd Alonso MD  11/09/21  3:37 PM

## 2021-11-09 NOTE — ED PROVIDER NOTES
pulmonary disease) ([email or web address]     Depression     Fibromyalgia     GERD (gastroesophageal reflux disease)     Hypertension     Malignant neoplasm of upper lobe of right lung (HCC) 12/4/2018    RA (rheumatoid arthritis) ([email or web address]          SURGICAL HISTORY       Past Surgical History:   Procedure Laterality Date    CHOLECYSTECTOMY      HERNIA REPAIR      HYSTERECTOMY      LEG SURGERY      steel garth placement. 2009         CURRENT MEDICATIONS       Previous Medications    ALBUTEROL (PROVENTIL) (2.5 MG/3ML) 0.083% NEBULIZER SOLUTION    Take 3 mLs by nebulization every 6 hours as needed for Wheezing    ALBUTEROL SULFATE HFA (VENTOLIN HFA) 108 (90 BASE) MCG/ACT INHALER    Inhale 2 puffs into the lungs every 6 hours as needed for Wheezing    ALBUTEROL-IPRATROPIUM (COMBIVENT RESPIMAT)  MCG/ACT AERS INHALER    Inhale 1 puff into the lungs every 6 hours    ALPRAZOLAM (XANAX) 1 MG TABLET    Take 1 mg by mouth nightly as needed Takes 4 times a day    AMPHETAMINE-DEXTROAMPHETAMINE (ADDERALL) 20 MG TABLET    Take 20 mg by mouth 2 times daily. BUDESONIDE-FORMOTEROL (SYMBICORT) 160-4.5 MCG/ACT AERO    Inhale 2 puffs into the lungs 2 times daily    CITALOPRAM (CELEXA) 40 MG TABLET    Take 40 mg by mouth daily. FLUTICASONE (FLONASE) 50 MCG/ACT NASAL SPRAY    INSTILL 1 SPRAY IN EACH NOSTRIL DAILY    GABAPENTIN (NEURONTIN) 600 MG TABLET    Take 600 mg by mouth 3 times daily    IPRATROPIUM-ALBUTEROL (DUONEB) 0.5-2.5 (3) MG/3ML SOLN NEBULIZER SOLUTION    Inhale 3 mLs into the lungs every 4 hours    LIDOCAINE (LIDODERM) 5 %    Place 1 patch onto the skin daily 12 hours on, 12 hours off.     LISINOPRIL (PRINIVIL;ZESTRIL) 5 MG TABLET    TAKE 1 TABLET BY MOUTH DAILY    METOCLOPRAMIDE (REGLAN) 5 MG TABLET    Take 1 tablet by mouth 4 times daily    MONTELUKAST (SINGULAIR) 10 MG TABLET    Take 10 mg by mouth nightly    NICOTINE (NICODERM CQ) 21 MG/24HR    Place 1 patch onto the skin every 24 hours    NICOTINE (NICODERM CQ) 7 MG/24HR Place 1 patch onto the skin every 24 hours    ONDANSETRON (ZOFRAN ODT) 4 MG DISINTEGRATING TABLET    Take 1 tablet by mouth every 8 hours as needed for Nausea or Vomiting    ONDANSETRON (ZOFRAN) 4 MG TABLET    Take 1 tablet by mouth every 8 hours as needed for Nausea or Vomiting    OXYCODONE-ACETAMINOPHEN (PERCOCET)  MG PER TABLET    TAKE 1 TABLET Q 4 TO 6 HRS PRN. (MAX 5/DAY). Earliest Fill Date: 7/19/18    OXYGEN    Inhale into the lungs    PANTOPRAZOLE (PROTONIX) 20 MG TABLET    TAKE 1 TABLET BY MOUTH 2 TIMES DAILY    TIZANIDINE (ZANAFLEX) 4 MG TABLET    Take 1 tablet by mouth 3 times daily as needed (.)    ZOLPIDEM (AMBIEN) 10 MG TABLET    Take 5 mg by mouth nightly as needed.        ALLERGIES     Aspirin, Codeine, Demerol, Iv dye [iodides], Neosporin [bacitracin-neomycin-polymyxin], Nsaids, Pcn [penicillins], Pentazocine lactate, Sulfa antibiotics, Talwin [pentazocine], and Influenza vaccines    FAMILY HISTORY       Family History   Problem Relation Age of Onset    Cancer Father           SOCIAL HISTORY       Social History     Socioeconomic History    Marital status:      Spouse name: None    Number of children: None    Years of education: None    Highest education level: None   Occupational History    None   Tobacco Use    Smoking status: Current Some Day Smoker     Packs/day: 0.25     Years: 30.00     Pack years: 7.50     Types: Cigarettes    Smokeless tobacco: Never Used   Vaping Use    Vaping Use: Never used   Substance and Sexual Activity    Alcohol use: No    Drug use: No    Sexual activity: None   Other Topics Concern    None   Social History Narrative    None     Social Determinants of Health     Financial Resource Strain:     Difficulty of Paying Living Expenses: Not on file   Food Insecurity:     Worried About Running Out of Food in the Last Year: Not on file    David of Food in the Last Year: Not on file   Transportation Needs:     Lack of Transportation (Medical): Not on file    Lack of Transportation (Non-Medical): Not on file   Physical Activity:     Days of Exercise per Week: Not on file    Minutes of Exercise per Session: Not on file   Stress:     Feeling of Stress : Not on file   Social Connections:     Frequency of Communication with Friends and Family: Not on file    Frequency of Social Gatherings with Friends and Family: Not on file    Attends Alevism Services: Not on file    Active Member of [address] [place] [address] [employer] or Organizations: Not on file    Attends Club or Organization Meetings: Not on file    Marital Status: Not on file   Intimate Partner Violence:     Fear of Current or Ex-Partner: Not on file    Emotionally Abused: Not on file    Physically Abused: Not on file    Sexually Abused: Not on file   Housing Stability:     Unable to Pay for Housing in the Last Year: Not on file    Number of [place] in the Last Year: Not on file    Unstable Housing in the Last Year: Not on file       SCREENINGS             PHYSICAL EXAM    (up to 7 for level 4, 8 or more for level 5)     ED Triage Vitals [11/08/21 2010]   BP Temp Temp Source Pulse Resp SpO2 Height Weight   139/77 98.1 °F (36.7 °C) Oral 101 17 96 % 5' 5\" (1.651 m) 120 lb (54.4 kg)       Physical Exam  Vitals and nursing note reviewed. Constitutional:       General: She is not in acute distress. Appearance: She is well-developed. She is not diaphoretic. HENT:      Head: Normocephalic and atraumatic. Eyes:      Pupils: Pupils are equal, round, and reactive to light. Cardiovascular:      Rate and Rhythm: Normal rate and regular rhythm. Heart sounds: Normal heart sounds. Pulmonary:      Effort: Pulmonary effort is normal. No respiratory distress. Breath sounds: Normal breath sounds. Chest:      Chest wall: Tenderness present. Abdominal:      General: Bowel sounds are normal. There is no distension. Palpations: Abdomen is soft. Tenderness: There is abdominal tenderness. Musculoskeletal:         General: Normal range of motion. Cervical back: Normal range of motion and neck supple. Skin:     General: Skin is warm and dry. Findings: No rash. Neurological:      Mental Status: She is alert and oriented to person, place, and time. Cranial Nerves: No cranial nerve deficit. Motor: No abnormal muscle tone. Coordination: Coordination normal.   Psychiatric:         Behavior: Behavior normal.         DIAGNOSTIC RESULTS     EKG: All EKG's are interpreted by the Emergency Department Physician who either signs or Co-signs this chart in the absence of a cardiologist.    Sinus tachycardia rate 100 nonspecific ST waves    RADIOLOGY:   Non-plain film images such as CT, Ultrasound and MRI are read by the radiologist. Plainradiographic images are visualized and preliminarily interpreted by the emergency physician with the below findings:        Interpretation per the Radiologist below, if available at the time of this note:    XR RIBS LEFT INCLUDE CHEST (MIN 3 VIEWS)   Final Result   1. Chronic lung changes. 2. No rib fracture is seen. Signed by Dr Cesar Berry Additional Contrast? None   Final Result   1. No mass, fluid collection, or inflammatory process is seen.    Signed by Dr Ben Peterson    (Results Pending)         ED BEDSIDE ULTRASOUND:   Performed by ED Physician - none    LABS:  Labs Reviewed   BLOOD GAS, ARTERIAL - Abnormal; Notable for the following components:       Result Value    pCO2, Arterial 61.0 (*)     HCO3, Arterial 34.5 (*)     Base Excess, Arterial 7.1 (*)     All other components within normal limits   CBC WITH AUTO DIFFERENTIAL - Abnormal; Notable for the following components:    RBC 4.05 (*)     .5 (*)     MCH 31.9 (*)     MCHC 31.4 (*)     Neutrophils % 81.2 (*)     Lymphocytes % 12.0 (*)     Lymphocytes Absolute 1.0 (*)     All other components within normal limits   COMPREHENSIVE METABOLIC PANEL - Abnormal; Notable for the following components:    Chloride 92 (*)     CO2 30 (*)     Glucose 111 (*)     All other components within normal limits   D-DIMER, QUANTITATIVE - Abnormal; Notable for the following components:    D-Dimer, Quant 0.62 (*)     All other components within normal limits   CULTURE, BLOOD 1   CULTURE, BLOOD 2   RESPIRATORY PANEL, MOLECULAR, WITH COVID-19   APTT   BRAIN NATRIURETIC PEPTIDE   PROTIME-INR   TROPONIN   POTASSIUM, WHOLE BLOOD   URINE RT REFLEX TO CULTURE       All other labs were within normal range or not returned as of this dictation. EMERGENCY DEPARTMENT COURSE and DIFFERENTIALDIAGNOSIS/MDM:   Vitals:    Vitals:    11/08/21 2010   BP: 139/77   Pulse: 101   Resp: 17   Temp: 98.1 °F (36.7 °C)   TempSrc: Oral   SpO2: 96%   Weight: 120 lb (54.4 kg)   Height: 5' 5\" (1.651 m)       MDM  States she felt shaky and her blood pressure was high. She normally takes 1 mg of Xanax 4 times a day and has not had her medication for at least 2 days. aga to Dr Magdalene Worley at end of shift pending nuc med, re=evaluation after iv ativan. Pt doesn't want to be admitted and thinks she will be better after she gets her meds. CONSULTS:  None    PROCEDURES:  Unless otherwise notedbelow, none     Procedures    FINAL IMPRESSION     1. Nausea and vomiting, intractability of vomiting not specified, unspecified vomiting type    2. Dyspnea, unspecified type    3.  Benzodiazepine withdrawal without complication [facility]          DISPOSITION/PLAN   DISPOSITION        PATIENT REFERRED TO:  @FUP@    DISCHARGE MEDICATIONS:  New Prescriptions    No medications on file          (Please note that portions of this note were completed with a voice recognition program.  Efforts were made to edit the dictations butoccasionally words are mis-transcribed.)    Galindo Brock MD (electronically signed)  AttendingEmergency Physician         Galindo Brock MD  11/08/21 2865

## 2021-11-09 NOTE — PROGRESS NOTES
Contains abnormal data Blood Gas, Arterial     Status: Final result    Component Ref Range & Units 11/08/21 2051   pH, Arterial 7.350 - 7.450 7.360    pCO2, Arterial 35.0 - 45.0 mmHg 61.0 High     pO2, Arterial 80.0 - 100.0 mmHg 98.0    HCO3, Arterial 22.0 - 26.0 mmol/L 34.5 High     Base Excess, Arterial -2.0 - 2.0 mmol/L 7.1 High     Hemoglobin, Art, Extended 12.0 - 16.0 g/dL 12.6    O2 Sat, Arterial >92 % 94.6    Carboxyhgb, Arterial 0.0 - 5.0 % 3.2    Comment:      0.0-1.5   (Smokers 1.5-5.0)    Methemoglobin, Arterial <1.5 % 1.0    O2 Content, Arterial Not Established mL/dL 16.9    O2 Therapy  Unknown    Resulting Agency  [facility]        Specimen Collected: 11/08/21 2051 Last Resulted: 11/08/21 2052 View Other Order Details        4 L/M nasal cannula  LR site choice

## 2021-11-09 NOTE — PROGRESS NOTES
Yas Mcgraw received from ER to room # 533 . Mental Status: Patient is oriented, alert, coherent, logical, thought processes intact and able to concentrate and follow conversation. Vitals:    11/09/21 0136   BP: (!) 158/78   Pulse: 104   Resp: 26   Temp: 99.4 °F (37.4 °C)   SpO2: 99%     Placed on cardiac monitor: Yes. Box # [id number]. Belongings: Jody Anderson, personal oxygen tank, and purse with patient at bedside . Family at bedside No.  Oriented Patient to room. Call light within reach. Yes. Transfer was: Well tolerated by patient. .    Electronically signed by Luke Jacob RN on 11/9/2021 at 2:03 AM

## 2021-11-10 LAB
ANION GAP SERPL CALCULATED.3IONS-SCNC: 11 MMOL/L (ref 7–19)
BASOPHILS ABSOLUTE: 0 K/UL (ref 0–0.2)
BASOPHILS RELATIVE PERCENT: 0.6 % (ref 0–1)
BLOOD CULTURE, ROUTINE: ABNORMAL
BLOOD CULTURE, ROUTINE: ABNORMAL
BUN BLDV-MCNC: 7 MG/DL (ref 8–23)
CALCIUM SERPL-MCNC: 8.9 MG/DL (ref 8.8–10.2)
CHLORIDE BLD-SCNC: 97 MMOL/L (ref 98–111)
CO2: 30 MMOL/L (ref 22–29)
CREAT SERPL-MCNC: 0.4 MG/DL (ref 0.5–0.9)
EOSINOPHILS ABSOLUTE: 0 K/UL (ref 0–0.6)
EOSINOPHILS RELATIVE PERCENT: 0.3 % (ref 0–5)
GFR AFRICAN AMERICAN: >59
GFR NON-AFRICAN AMERICAN: >60
GLUCOSE BLD-MCNC: 87 MG/DL (ref 74–109)
HCT VFR BLD CALC: 32.2 % (ref 37–47)
HEMOGLOBIN: 10.3 G/DL (ref 12–16)
IMMATURE GRANULOCYTES #: 0 K/UL
LYMPHOCYTES ABSOLUTE: 1.2 K/UL (ref 1.1–4.5)
LYMPHOCYTES RELATIVE PERCENT: 18.6 % (ref 20–40)
MAGNESIUM: 1.7 MG/DL (ref 1.6–2.4)
MCH RBC QN AUTO: 32.5 PG (ref 27–31)
MCHC RBC AUTO-ENTMCNC: 32 G/DL (ref 33–37)
MCV RBC AUTO: 101.6 FL (ref 81–99)
MONOCYTES ABSOLUTE: 0.7 K/UL (ref 0–0.9)
MONOCYTES RELATIVE PERCENT: 11.8 % (ref 0–10)
NEUTROPHILS ABSOLUTE: 4.2 K/UL (ref 1.5–7.5)
NEUTROPHILS RELATIVE PERCENT: 68.5 % (ref 50–65)
ORGANISM: ABNORMAL
PDW BLD-RTO: 13.1 % (ref 11.5–14.5)
PHOSPHORUS: 2.9 MG/DL (ref 2.5–4.5)
PLATELET # BLD: 190 K/UL (ref 130–400)
PMV BLD AUTO: 11.3 FL (ref 9.4–12.3)
POTASSIUM SERPL-SCNC: 3.5 MMOL/L (ref 3.5–5)
RBC # BLD: 3.17 M/UL (ref 4.2–5.4)
SODIUM BLD-SCNC: 138 MMOL/L (ref 136–145)
WBC # BLD: 6.2 K/UL (ref 4.8–10.8)

## 2021-11-10 PROCEDURE — 1210000000 HC MED SURG R&B

## 2021-11-10 PROCEDURE — 6360000002 HC RX W HCPCS: Performed by: HOSPITALIST

## 2021-11-10 PROCEDURE — 84100 ASSAY OF PHOSPHORUS: CPT

## 2021-11-10 PROCEDURE — 80048 BASIC METABOLIC PNL TOTAL CA: CPT

## 2021-11-10 PROCEDURE — 6360000002 HC RX W HCPCS: Performed by: INTERNAL MEDICINE

## 2021-11-10 PROCEDURE — 2580000003 HC RX 258: Performed by: INTERNAL MEDICINE

## 2021-11-10 PROCEDURE — 94640 AIRWAY INHALATION TREATMENT: CPT

## 2021-11-10 PROCEDURE — 6370000000 HC RX 637 (ALT 250 FOR IP): Performed by: HOSPITALIST

## 2021-11-10 PROCEDURE — 6370000000 HC RX 637 (ALT 250 FOR IP): Performed by: INTERNAL MEDICINE

## 2021-11-10 PROCEDURE — 85025 COMPLETE CBC W/AUTO DIFF WBC: CPT

## 2021-11-10 PROCEDURE — 2580000003 HC RX 258: Performed by: HOSPITALIST

## 2021-11-10 PROCEDURE — 36415 COLL VENOUS BLD VENIPUNCTURE: CPT

## 2021-11-10 PROCEDURE — 2700000000 HC OXYGEN THERAPY PER DAY

## 2021-11-10 PROCEDURE — 83735 ASSAY OF MAGNESIUM: CPT

## 2021-11-10 RX ORDER — ALPRAZOLAM 1 MG/1
1 TABLET ORAL 4 TIMES DAILY PRN
Status: DISCONTINUED | OUTPATIENT
Start: 2021-11-10 | End: 2021-11-13

## 2021-11-10 RX ORDER — GUAIFENESIN/DEXTROMETHORPHAN 100-10MG/5
5 SYRUP ORAL EVERY 6 HOURS PRN
Status: DISCONTINUED | OUTPATIENT
Start: 2021-11-10 | End: 2021-11-14 | Stop reason: HOSPADM

## 2021-11-10 RX ORDER — METRONIDAZOLE 500 MG/1
500 TABLET ORAL EVERY 8 HOURS SCHEDULED
Status: DISCONTINUED | OUTPATIENT
Start: 2021-11-10 | End: 2021-11-14 | Stop reason: HOSPADM

## 2021-11-10 RX ADMIN — LISINOPRIL 5 MG: 5 TABLET ORAL at 08:27

## 2021-11-10 RX ADMIN — ACETAMINOPHEN 650 MG: 325 TABLET ORAL at 21:08

## 2021-11-10 RX ADMIN — METOCLOPRAMIDE 5 MG: 10 TABLET ORAL at 08:27

## 2021-11-10 RX ADMIN — IPRATROPIUM BROMIDE AND ALBUTEROL SULFATE 3 ML: .5; 2.5 SOLUTION RESPIRATORY (INHALATION) at 06:40

## 2021-11-10 RX ADMIN — ALPRAZOLAM 1 MG: 1 TABLET ORAL at 10:56

## 2021-11-10 RX ADMIN — VANCOMYCIN HYDROCHLORIDE 750 MG: 750 INJECTION, POWDER, LYOPHILIZED, FOR SOLUTION INTRAVENOUS at 05:06

## 2021-11-10 RX ADMIN — METOCLOPRAMIDE 5 MG: 10 TABLET ORAL at 17:30

## 2021-11-10 RX ADMIN — BUDESONIDE 500 MCG: 0.5 SUSPENSION RESPIRATORY (INHALATION) at 18:22

## 2021-11-10 RX ADMIN — CITALOPRAM HYDROBROMIDE 40 MG: 20 TABLET ORAL at 08:26

## 2021-11-10 RX ADMIN — GABAPENTIN 600 MG: 600 TABLET, FILM COATED ORAL at 08:26

## 2021-11-10 RX ADMIN — FLUTICASONE PROPIONATE 2 SPRAY: 50 SPRAY, METERED NASAL at 08:27

## 2021-11-10 RX ADMIN — DEXTROAMPHETAMINE SACCHARATE, AMPHETAMINE ASPARTATE, DEXTROAMPHETAMINE SULFATE, AMPHETAMINE SULFATE TABLETS, 10 MG,CLL 20 MG: 2.5; 2.5; 2.5; 2.5 TABLET ORAL at 08:26

## 2021-11-10 RX ADMIN — ENOXAPARIN SODIUM 40 MG: 100 INJECTION SUBCUTANEOUS at 08:29

## 2021-11-10 RX ADMIN — METOCLOPRAMIDE 5 MG: 10 TABLET ORAL at 20:44

## 2021-11-10 RX ADMIN — ALPRAZOLAM 1 MG: 1 TABLET ORAL at 14:28

## 2021-11-10 RX ADMIN — GABAPENTIN 600 MG: 600 TABLET, FILM COATED ORAL at 20:45

## 2021-11-10 RX ADMIN — IPRATROPIUM BROMIDE AND ALBUTEROL SULFATE 3 ML: .5; 2.5 SOLUTION RESPIRATORY (INHALATION) at 18:10

## 2021-11-10 RX ADMIN — ONDANSETRON 4 MG: 2 INJECTION INTRAMUSCULAR; INTRAVENOUS at 07:48

## 2021-11-10 RX ADMIN — OXYCODONE AND ACETAMINOPHEN 1 TABLET: 10; 325 TABLET ORAL at 08:27

## 2021-11-10 RX ADMIN — METOCLOPRAMIDE 5 MG: 10 TABLET ORAL at 14:05

## 2021-11-10 RX ADMIN — WATER 2000 MG: 1 INJECTION INTRAMUSCULAR; INTRAVENOUS; SUBCUTANEOUS at 00:31

## 2021-11-10 RX ADMIN — MONTELUKAST SODIUM 10 MG: 10 TABLET, FILM COATED ORAL at 20:43

## 2021-11-10 RX ADMIN — METRONIDAZOLE 500 MG: 500 TABLET ORAL at 20:44

## 2021-11-10 RX ADMIN — LORAZEPAM 0.5 MG: 2 INJECTION INTRAMUSCULAR; INTRAVENOUS at 04:02

## 2021-11-10 RX ADMIN — BUDESONIDE 500 MCG: 0.5 SUSPENSION RESPIRATORY (INHALATION) at 06:47

## 2021-11-10 RX ADMIN — ARFORMOTEROL TARTRATE 15 MCG: 15 SOLUTION RESPIRATORY (INHALATION) at 18:22

## 2021-11-10 RX ADMIN — OXYCODONE AND ACETAMINOPHEN 1 TABLET: 10; 325 TABLET ORAL at 00:31

## 2021-11-10 RX ADMIN — PANTOPRAZOLE SODIUM 40 MG: 40 TABLET, DELAYED RELEASE ORAL at 05:06

## 2021-11-10 RX ADMIN — ARFORMOTEROL TARTRATE 15 MCG: 15 SOLUTION RESPIRATORY (INHALATION) at 06:47

## 2021-11-10 RX ADMIN — IPRATROPIUM BROMIDE AND ALBUTEROL SULFATE 3 ML: .5; 2.5 SOLUTION RESPIRATORY (INHALATION) at 14:04

## 2021-11-10 RX ADMIN — WATER 2000 MG: 1 INJECTION INTRAMUSCULAR; INTRAVENOUS; SUBCUTANEOUS at 17:09

## 2021-11-10 RX ADMIN — OXYCODONE AND ACETAMINOPHEN 1 TABLET: 10; 325 TABLET ORAL at 04:02

## 2021-11-10 RX ADMIN — WATER 2000 MG: 1 INJECTION INTRAMUSCULAR; INTRAVENOUS; SUBCUTANEOUS at 08:27

## 2021-11-10 RX ADMIN — SODIUM CHLORIDE, POTASSIUM CHLORIDE, SODIUM LACTATE AND CALCIUM CHLORIDE: 600; 310; 30; 20 INJECTION, SOLUTION INTRAVENOUS at 21:07

## 2021-11-10 RX ADMIN — IPRATROPIUM BROMIDE AND ALBUTEROL SULFATE 3 ML: .5; 2.5 SOLUTION RESPIRATORY (INHALATION) at 10:17

## 2021-11-10 ASSESSMENT — PAIN DESCRIPTION - LOCATION
LOCATION: RIB CAGE

## 2021-11-10 ASSESSMENT — PAIN DESCRIPTION - ORIENTATION
ORIENTATION: LEFT

## 2021-11-10 ASSESSMENT — PAIN SCALES - GENERAL
PAINLEVEL_OUTOF10: 8
PAINLEVEL_OUTOF10: 9
PAINLEVEL_OUTOF10: 4
PAINLEVEL_OUTOF10: 7
PAINLEVEL_OUTOF10: 4
PAINLEVEL_OUTOF10: 4

## 2021-11-10 ASSESSMENT — PAIN DESCRIPTION - FREQUENCY
FREQUENCY: CONTINUOUS
FREQUENCY: CONTINUOUS

## 2021-11-10 ASSESSMENT — PAIN DESCRIPTION - ONSET
ONSET: ON-GOING
ONSET: ON-GOING

## 2021-11-10 ASSESSMENT — PAIN DESCRIPTION - PROGRESSION
CLINICAL_PROGRESSION: NOT CHANGED
CLINICAL_PROGRESSION: NOT CHANGED

## 2021-11-10 ASSESSMENT — PAIN DESCRIPTION - DIRECTION: RADIATING_TOWARDS: NO

## 2021-11-10 ASSESSMENT — PAIN DESCRIPTION - PAIN TYPE
TYPE: ACUTE PAIN

## 2021-11-10 ASSESSMENT — PAIN DESCRIPTION - DESCRIPTORS
DESCRIPTORS: ACHING;CONSTANT;SHARP;SHOOTING
DESCRIPTORS: CONSTANT

## 2021-11-10 ASSESSMENT — ENCOUNTER SYMPTOMS: ABDOMINAL PAIN: 1

## 2021-11-10 NOTE — PROGRESS NOTES
Patient BP was lower BP was 82/48 manual at 1635 than 84/52 at 5pm patient is sleepy but calm and responds to voice when called. Patient is a lot better than earlier when she flipped out with an anxiety attack.

## 2021-11-10 NOTE — PROGRESS NOTES
[facility]      Patient:  Tiarra Farrell  YOB: 1950  Date of Service: 11/10/2021  MRN: [id number]   Acct: [de-identified]   Primary Care Physician: TEN Henderson  Advance Directive: Full Code  Admit Date: 11/8/2021       Hospital Day: 1    CHIEF COMPLAINT intractable n/v/d    SUBJECTIVE: left upper quadrant pain     History of Present Illness:   Patient presents to [facility] ER due to nausea vomiting diarrhea. She states that this has been going on for the past week. She was previously hospitalized at this facility for community-acquired pneumonia and COPD exacerbation. Ongoing polypharmacy. She is prescribed Xanax however unable to keep down. Currently patient denies chest pain, shortness of breath, and fever. However she is anxious. Work-up in ER no mass, fluid collection, or inflammatory process. XR left ribs no rib fractures seen, chronic lung change. NM lung scan perfusion low probability of PE. Blood cultures obtained, no growth to date. 11/10/2021-patient has been intermittently confused. Currently she is alert to person place and time. Needs reorientation to current situation. She is calm and cooperative with care. Does endorse dull left-sided abdominal pain. Patient has been hypotensive, manual bp 94/78. Review of Systems:   Review of Systems   HENT: Negative. Gastrointestinal: Positive for abdominal pain. Skin: Negative. Psychiatric/Behavioral: Positive for confusion. 14 point review of systems is negative except as specifically addressed above.       Objective:   VITALS:  BP (!) 115/43   Pulse 84   Temp 97.5 °F (36.4 °C) (Temporal)   Resp 20   Ht 5' 5\" (1.651 m)   Wt 118 lb 6.4 oz (53.7 kg)   SpO2 94%   BMI 19.70 kg/m²   24HR INTAKE/OUTPUT:    Intake/Output Summary (Last 24 hours) at 11/10/2021 0841  Last data filed at 11/10/2021 0748  Gross per 24 hour   Intake 1367 ml   Output 1250 ml   Net 117 ml       Physical Exam  Constitutional:  nicotine  1 patch TransDERmal Q24H    pantoprazole  40 mg Oral QAM AC    enoxaparin  40 mg SubCUTAneous Daily    budesonide  0.5 mg Nebulization BID    Arformoterol Tartrate  15 mcg Nebulization BID    vancomycin (VANCOCIN) intermittent dosing (placeholder)   Other RX Placeholder    vancomycin  750 mg IntraVENous Q12H    cefepime  2,000 mg IntraVENous Q8H     albuterol, [Held by provider] ALPRAZolam, ondansetron, ondansetron **OR** ondansetron, acetaminophen **OR** acetaminophen, LORazepam, oxyCODONE-acetaminophen  ADULT DIET; Regular; Low Fat/Low Chol/High Fiber/2 gm Na     Lab and other Data:     Recent Labs     11/08/21  2040 11/09/21  0352 11/10/21  0357   WBC 7.9 7.5 6.2   HGB 12.9 11.1* 10.3*    212 190     Recent Labs     11/08/21  2040 11/08/21  2051 11/09/21  0353 11/10/21  0357     --  139 138   K 4.5 3.6 3.9 3.5   CL 92*  --  98 97*   CO2 30*  --  28 30*   BUN 8  --  8 7*   CREATININE 0.5  --  0.5 0.4*   GLUCOSE 111*  --  77 87     Recent Labs     11/08/21 2040 11/09/21 0353   AST 25 15   ALT 12 9   BILITOT 0.4 0.4   ALKPHOS 96 75     Troponin T:   Recent Labs     11/08/21 2040   TROPONINI <0.01     Pro-BNP: No results for input(s): BNP in the last 72 hours. INR:   Recent Labs     11/08/21 2112   INR 0.90     UA:  Recent Labs     11/08/21 2238   COLORU DARK YELLOW*   PHUR 6.0   WBCUA 5   RBCUA 4   BACTERIA NEGATIVE*   CLARITYU Clear   SPECGRAV 1.033   LEUKOCYTESUR TRACE*   UROBILINOGEN 1.0   BILIRUBINUR Negative   BLOODU Negative   GLUCOSEU Negative     A1C: No results for input(s): LABA1C in the last 72 hours. ABG:  Recent Labs     11/08/21 2051   PHART 7.360   BYK2WAX 61.0*   PO2ART 98.0   AVH4MBA 34.5*   BEART 7.1*   HGBAE 12.6   [id number] 94.6   CARBOXHGBART 3.2       RAD:   CT ABDOMEN PELVIS WO CONTRAST Additional Contrast? None    Result Date: 11/8/2021  1. No mass, fluid collection, or inflammatory process is seen.  Signed by Dr Harry Young    XR [address] [facility] INCLUDE CHEST (MIN 3 VIEWS)    Result Date: 11/8/2021  1. Chronic lung changes. 2. No rib fracture is seen. Signed by Dr Pelon Rock    Result Date: 11/9/2021  A limited diagnostic study in the absence of a ventilation scan. A low probability of pulmonary embolism.  Signed by Dr Farideh Petersen         Assessment/Plan       Gram-negative bacteremia   -IVF   -IV antibiotic   -Infectious disease following    -daily labs   -urinalysis & culture    -as needed antiemetic    -follow blood cultures   Confusion    -noted   -bed alarm intact    -hold sedative drugs   -monitor   Smoker   -noted   -Nicotine patch     DVT Prophylaxis: Lovenox         TEN Conway - CNP, 11/10/2021 8:41 AM

## 2021-11-10 NOTE — CARE COORDINATION
Date / Time of Evaluation: 11/10/2021 11:08 AM  Assessment Completed by: Tlema García    Patient Admission Status: Inpatient [101]    [address] [phone] (home)   Telephone Information:   Mobile [phone]       (Best Practice:  Have patient / caregiver verify above address and phone number by stating out loud their current address and reachable phone number.)  Is above information correct? yes      Current PCP:  TEN Castillo    Initial Assessment Completed at bedside with:  patient    Emergency Contacts:  Extended Emergency Contact Information  Primary Emergency Contact: Sheila Parra  Address: [address]USBAND[address] Phone: [phone]  Mobile Phone: [phone]  Relation: Child  Secondary Emergency Contact: Bridgett Pate  Address: [address] Phone: [phone]  Relation: Child    Advance Directives: Code Status:  Full Code    Financial:  Payor: Gio Kim / Plan: Rafiq Colin ESSENTIAL/PLUS / Product Type: *No Product type* /     Pre-Cert required for SNF:  N/A    Pharmacy:   Ayala Ayers [address], [address]o [place] [address]  Phone: [phone] Fax: [phone] [facility] [address] Aylin Fox [address] [place] [address] [phone] [place] [address] Dr Aylin Fox [facility]6 [facility] [address]  Phone: [phone] Fax: [phone] [phone] [facility] [phone]  [address] [place] [address]  [address] [place] [address]  Phone: [phone] Fax: [phone]      Potential assistance purchasing medications?   No    ADLS:  Support System:  Children    Current Home Environment:  Home w daughter  Steps:  Yes    Plans to RETURN to current housing: Yes  Barriers to RETURNING to current housing: No    Currently ACTIVE with Home Health CARE:  No  Home Health Care Agency:  N/A    DME Provider:  kristin     Has a pulse oximetry unit at home: no    Had [address] prior to admission:  Yes  [address]:  kristin  Informed of need to bring portable home O2 tank to hospital on day of DISCHARGE:  No she has one at the hospital with her  Name of person committed to bringing portable tank at discharge: Active with HD/PD prior to admission: No  Nephrologist:  N/A  HD Center:  N/A    Transition Plan:  Home-family support (poss hh if iv abx needed)    Transportation PLAN for Discharge:  Personal vehicle    Factors facilitating achievement of predicted outcomes: awaiting medical clearance from the hospital    Barriers to discharge:  None at this time      Additional CM/SW Notes: SW met with Pt in the room to discuss possible needs at DC. Pt stated she may need IV ABX at DC but she is unsure at this time. If Pt does not IV ABX at DC then SW will coordinate setting that up along with home health services. Pt stated she lives at home with her daughter and has good support system. Pt denied any other needs at this time. SW left contact card and asked to be called if plans/needs change. Lizbeth Fiore and/or her family were provided with choice of provider.         12 [facility] Management Department  Ph:    Fax: [phone]

## 2021-11-10 NOTE — PLAN OF CARE
Nutrition Problem #1: Inadequate oral intake  Intervention: Food and/or Nutrient Delivery: Continue Current Diet  Nutritional Goals: Pt will consume 75% or more of meals with no s/s intolerance

## 2021-11-10 NOTE — PROGRESS NOTES
Infectious Diseases Progress Note    Patient:  Meera Diaz  YOB: 1950  MRN: [id number]   Admit date: 11/8/2021   Admitting Physician: Usha Thomas MD  Primary Care Physician: TEN Briones    Chief Complaint/Interval History: She is tired today. She has some mild generalized abdominal discomfort. No nausea or vomiting. She does not describe diarrhea. No cardiopulmonary symptoms. No urinary complaints. Blood culture has been identified as Clostridium perfringens. In/Out    Intake/Output Summary (Last 24 hours) at 11/10/2021 1539  Last data filed at 11/10/2021 1200  Gross per 24 hour   Intake 1167 ml   Output 700 ml   Net 467 ml     Allergies:    Allergies   Allergen Reactions    Aspirin Hives    Codeine     Demerol     Fentanyl Hives    Iv Dye [Iodides]     Neosporin [Bacitracin-Neomycin-Polymyxin]     Nsaids     Pcn [Penicillins]     Pentazocine Lactate     Sulfa Antibiotics     Talwin [Pentazocine] Hives    Influenza Vaccines Hives and Rash     Current Meds: [Held by provider] ALPRAZolam (XANAX) tablet 1 mg, 4x Daily PRN  albuterol (PROVENTIL) nebulizer solution 2.5 mg, Q6H PRN  [Held by provider] ALPRAZolam (XANAX) tablet 1 mg, Nightly PRN  amphetamine-dextroamphetamine (ADDERALL) tablet 20 mg, BID  citalopram (CELEXA) tablet 40 mg, Daily  fluticasone (FLONASE) 50 MCG/ACT nasal spray 2 spray, Daily  gabapentin (NEURONTIN) tablet 600 mg, TID  ipratropium-albuterol (DUONEB) nebulizer solution 3 mL, Q4H  lisinopril (PRINIVIL;ZESTRIL) tablet 5 mg, Daily  metoclopramide (REGLAN) tablet 5 mg, 4x Daily  montelukast (SINGULAIR) tablet 10 mg, Nightly  nicotine (NICODERM CQ) 21 MG/24HR 1 patch, Q24H  pantoprazole (PROTONIX) tablet 40 mg, QAM AC  enoxaparin (LOVENOX) injection 40 mg, Daily  ondansetron (ZOFRAN-ODT) disintegrating tablet 4 mg, Q8H PRN   Or  ondansetron (ZOFRAN) injection 4 mg, Q6H PRN  acetaminophen (TYLENOL) tablet 650 mg, Q6H PRN   Or  acetaminophen (TYLENOL) suppository 650 mg, Q6H PRN  lactated ringers infusion, Continuous  budesonide (PULMICORT) nebulizer suspension 500 mcg, BID  Arformoterol Tartrate (BROVANA) nebulizer solution 15 mcg, BID  [Held by provider] oxyCODONE-acetaminophen (PERCOCET)  MG per tablet 1 tablet, Q4H PRN  ceFEPIme (MAXIPIME) 2,000 mg in sterile water 20 mL IV syringe, Q8H      Review of Systems see HPI. VitalSigns:  BP (!) 87/44   Pulse 73   Temp 97.9 °F (36.6 °C) (Temporal)   Resp 18   Ht 5' 5\" (1.651 m)   Wt 118 lb 6.4 oz (53.7 kg)   SpO2 97%   BMI 19.70 kg/m²      Physical Exam  Line/IV site: No erythema, warmth, induration, or tenderness. Lungs clear to auscultation no crackles  Abdomen with normal bowel sounds. There is some mild diffuse tenderness. No mass or hepatosplenomegaly. No guarding or rebound. Extremities without significant edema    Lab Results:  CBC:   Recent Labs     11/08/21 2040 11/09/21  0352 11/10/21  0357   WBC 7.9 7.5 6.2   HGB 12.9 11.1* 10.3*    212 190     BMP:  Recent Labs     11/08/21 2040 11/08/21 2051 11/09/21  0353 11/10/21  0357     --  139 138   K 4.5 3.6 3.9 3.5   CL 92*  --  98 97*   CO2 30*  --  28 30*   BUN 8  --  8 7*   CREATININE 0.5  --  0.5 0.4*   GLUCOSE 111*  --  77 87     CultureResults:  Blood cultures November 8, 2021-Clostridium perfringens  Blood cultures yesterday no growth    Radiology:  CT scan of the abdomen and pelvis November 8, 2021:  CT ABDOMEN PELVIS WO CONTRAST    11/8/2021 10:05 PM   History: Vomiting. Left side abdominal pain. Noncontrast abdomen/pelvis CT. In order to have a CT radiation dose as low as reasonably achievable   Automated Exposure Control was utilized for adjustment of the mA   and/or KV according to patient size. DLP in mGycm= 261. Normal heart size. Hyperexpanded lung bases with chronic parenchymal changes and focal   right middle lobe scarring. Mildly distended fluid-filled stomach.    Surgical clips within the upper abdomen producing streak artifact   which obscures portions of some of the upper abdominal images. Cholecystectomy clips. Normal appearance of liver, pancreas, and spleen. Aortic calcification with no aneurysm. Normal and symmetric kidneys. No hydronephrosis. No bowel obstruction. No pelvic mass or fluid. Small fat-containing supraumbilical ventral hernia.       Impression   1. No mass, fluid collection, or inflammatory process is seen. Signed by Dr Marni Yañez     Additional Studies Reviewed:  None    Impression:  1. Positive blood culture for Clostridium perfringens-yesterday culture read as gram-negative garth. Must of been in over discoloration Gram stain. Per review of culture no other pathogens isolated at present. Although she had a CT scan of the abdomen that did not identify a site for infection, suspect intra-abdominal source. 2.  Chronic obstructive pulmonary disease    Recommendations:  Continue cefepime  Add metronidazole  Repeat lab in a.m.   Follow abdominal exam and clinical course    Alger Lesch, MD

## 2021-11-10 NOTE — PROGRESS NOTES
Comprehensive Nutrition Assessment    Type and Reason for Visit:  Initial, Positive Nutrition Screen    Nutrition Assessment:  Pt asleep at time of visit. PO intake is fair to good per intake records. Will continue to monitor nutritional status and implement nutrition intervention as needed. Malnutrition Assessment:  Malnutrition Status: At risk for malnutrition (Comment)    Context:  Acute Illness     Findings of the 6 clinical characteristics of malnutrition:  Energy Intake:  Mild decrease in energy intake (Comment)  Weight Loss:  No significant weight loss     Body Fat Loss:  Unable to assess     Muscle Mass Loss:  Unable to assess    Fluid Accumulation:  Unable to assess     Strength:  Not Performed    Estimated Daily Nutrient Needs:  Energy (kcal):  3880-5959 kcals/day; Weight Used for Energy Requirements:  Current (30-35)     Protein (g):   g/PRO/day; Weight Used for Protein Requirements:  Current (1.5-2.0)        Fluid (ml/day):  7210-0725 mL/day; Method Used for Fluid Requirements:  1 ml/kcal      Current Nutrition Therapies:    ADULT DIET;  Regular; Low Fat/Low Chol/High Fiber/2 gm Na    Anthropometric Measures:  · Height: 5' 5\" (165.1 cm)  · Current Body Weight: 118 lb (53.5 kg)    · Ideal Body Weight: 125 lbs  · BMI: 19.6   · BMI Categories: Underweight (BMI less than 22) age over 72       Nutrition Diagnosis:   · Inadequate oral intake related to acute injury/trauma as evidenced by intake 51-75%    Nutrition Interventions:   Food and/or Nutrient Delivery:  Continue Current Diet  Coordination of Nutrition Care:  Continue to monitor while inpatient    Goals:  Pt will consume 75% or more of meals with no s/s intolerance       Nutrition Monitoring and Evaluation:   Food/Nutrient Intake Outcomes:  Food and Nutrient Intake  Physical Signs/Symptoms Outcomes:  Biochemical Data, Nausea or Vomiting, Nutrition Focused Physical Findings, Weight     Electronically signed by Aurora Shone, MS, RD, ZAIN on 11/10/21 at 2:03 PM CST    Contact: [phone]

## 2021-11-10 NOTE — PROGRESS NOTES
Occupational Therapy    Attempted to see pt. For OT this afternoon. Pt unable to be awakened and was sleeping soundly. Will try to see later.   Electronically signed by Daina Solis OT on 11/10/2021 at 1:28 PM

## 2021-11-11 PROBLEM — B96.7 CLOSTRIDIUM PERFRINGENS INFECTION: Status: ACTIVE | Noted: 2021-11-11

## 2021-11-11 LAB
ALBUMIN SERPL-MCNC: 3.4 G/DL (ref 3.5–5.2)
ALP BLD-CCNC: 53 U/L (ref 35–104)
ALT SERPL-CCNC: 7 U/L (ref 5–33)
ANION GAP SERPL CALCULATED.3IONS-SCNC: 10 MMOL/L (ref 7–19)
AST SERPL-CCNC: 12 U/L (ref 5–32)
BASOPHILS ABSOLUTE: 0 K/UL (ref 0–0.2)
BASOPHILS RELATIVE PERCENT: 0.4 % (ref 0–1)
BILIRUB SERPL-MCNC: <0.2 MG/DL (ref 0.2–1.2)
BUN BLDV-MCNC: 8 MG/DL (ref 8–23)
CALCIUM SERPL-MCNC: 8.5 MG/DL (ref 8.8–10.2)
CHLORIDE BLD-SCNC: 103 MMOL/L (ref 98–111)
CO2: 29 MMOL/L (ref 22–29)
CREAT SERPL-MCNC: 0.4 MG/DL (ref 0.5–0.9)
EOSINOPHILS ABSOLUTE: 0.1 K/UL (ref 0–0.6)
EOSINOPHILS RELATIVE PERCENT: 1.2 % (ref 0–5)
GFR AFRICAN AMERICAN: >59
GFR NON-AFRICAN AMERICAN: >60
GLUCOSE BLD-MCNC: 102 MG/DL (ref 74–109)
HCT VFR BLD CALC: 29.2 % (ref 37–47)
HEMOGLOBIN: 9.2 G/DL (ref 12–16)
IMMATURE GRANULOCYTES #: 0 K/UL
LYMPHOCYTES ABSOLUTE: 1.1 K/UL (ref 1.1–4.5)
LYMPHOCYTES RELATIVE PERCENT: 22.2 % (ref 20–40)
MCH RBC QN AUTO: 32.6 PG (ref 27–31)
MCHC RBC AUTO-ENTMCNC: 31.5 G/DL (ref 33–37)
MCV RBC AUTO: 103.5 FL (ref 81–99)
MONOCYTES ABSOLUTE: 0.6 K/UL (ref 0–0.9)
MONOCYTES RELATIVE PERCENT: 11.2 % (ref 0–10)
NEUTROPHILS ABSOLUTE: 3.3 K/UL (ref 1.5–7.5)
NEUTROPHILS RELATIVE PERCENT: 64.8 % (ref 50–65)
PDW BLD-RTO: 13.2 % (ref 11.5–14.5)
PLATELET # BLD: 144 K/UL (ref 130–400)
PMV BLD AUTO: 11.2 FL (ref 9.4–12.3)
POTASSIUM SERPL-SCNC: 3.8 MMOL/L (ref 3.5–5)
RBC # BLD: 2.82 M/UL (ref 4.2–5.4)
SODIUM BLD-SCNC: 142 MMOL/L (ref 136–145)
TOTAL PROTEIN: 5.1 G/DL (ref 6.6–8.7)
WBC # BLD: 5.1 K/UL (ref 4.8–10.8)

## 2021-11-11 PROCEDURE — 1210000000 HC MED SURG R&B

## 2021-11-11 PROCEDURE — 6360000002 HC RX W HCPCS: Performed by: INTERNAL MEDICINE

## 2021-11-11 PROCEDURE — 6370000000 HC RX 637 (ALT 250 FOR IP): Performed by: HOSPITALIST

## 2021-11-11 PROCEDURE — 85025 COMPLETE CBC W/AUTO DIFF WBC: CPT

## 2021-11-11 PROCEDURE — 6370000000 HC RX 637 (ALT 250 FOR IP): Performed by: NURSE PRACTITIONER

## 2021-11-11 PROCEDURE — 94640 AIRWAY INHALATION TREATMENT: CPT

## 2021-11-11 PROCEDURE — 6360000002 HC RX W HCPCS: Performed by: HOSPITALIST

## 2021-11-11 PROCEDURE — 2580000003 HC RX 258: Performed by: INTERNAL MEDICINE

## 2021-11-11 PROCEDURE — 2700000000 HC OXYGEN THERAPY PER DAY

## 2021-11-11 PROCEDURE — 2580000003 HC RX 258: Performed by: HOSPITALIST

## 2021-11-11 PROCEDURE — 36415 COLL VENOUS BLD VENIPUNCTURE: CPT

## 2021-11-11 PROCEDURE — 6370000000 HC RX 637 (ALT 250 FOR IP): Performed by: INTERNAL MEDICINE

## 2021-11-11 PROCEDURE — 80053 COMPREHEN METABOLIC PANEL: CPT

## 2021-11-11 RX ORDER — OXYCODONE HYDROCHLORIDE AND ACETAMINOPHEN 5; 325 MG/1; MG/1
1 TABLET ORAL EVERY 6 HOURS PRN
Status: DISCONTINUED | OUTPATIENT
Start: 2021-11-11 | End: 2021-11-13

## 2021-11-11 RX ORDER — GABAPENTIN 600 MG/1
300 TABLET ORAL 3 TIMES DAILY
Status: DISCONTINUED | OUTPATIENT
Start: 2021-11-12 | End: 2021-11-14 | Stop reason: HOSPADM

## 2021-11-11 RX ORDER — ALPRAZOLAM 0.5 MG/1
0.5 TABLET ORAL EVERY 8 HOURS PRN
Status: DISCONTINUED | OUTPATIENT
Start: 2021-11-11 | End: 2021-11-13

## 2021-11-11 RX ADMIN — ENOXAPARIN SODIUM 40 MG: 100 INJECTION SUBCUTANEOUS at 09:37

## 2021-11-11 RX ADMIN — METOCLOPRAMIDE 5 MG: 10 TABLET ORAL at 09:38

## 2021-11-11 RX ADMIN — SODIUM CHLORIDE, POTASSIUM CHLORIDE, SODIUM LACTATE AND CALCIUM CHLORIDE: 600; 310; 30; 20 INJECTION, SOLUTION INTRAVENOUS at 09:34

## 2021-11-11 RX ADMIN — ARFORMOTEROL TARTRATE 15 MCG: 15 SOLUTION RESPIRATORY (INHALATION) at 19:38

## 2021-11-11 RX ADMIN — WATER 2000 MG: 1 INJECTION INTRAMUSCULAR; INTRAVENOUS; SUBCUTANEOUS at 17:17

## 2021-11-11 RX ADMIN — METRONIDAZOLE 500 MG: 500 TABLET ORAL at 13:56

## 2021-11-11 RX ADMIN — IPRATROPIUM BROMIDE AND ALBUTEROL SULFATE 3 ML: .5; 2.5 SOLUTION RESPIRATORY (INHALATION) at 11:16

## 2021-11-11 RX ADMIN — BUDESONIDE 500 MCG: 0.5 SUSPENSION RESPIRATORY (INHALATION) at 19:38

## 2021-11-11 RX ADMIN — IPRATROPIUM BROMIDE AND ALBUTEROL SULFATE 3 ML: .5; 2.5 SOLUTION RESPIRATORY (INHALATION) at 07:06

## 2021-11-11 RX ADMIN — METOCLOPRAMIDE 5 MG: 10 TABLET ORAL at 17:17

## 2021-11-11 RX ADMIN — MONTELUKAST SODIUM 10 MG: 10 TABLET, FILM COATED ORAL at 21:14

## 2021-11-11 RX ADMIN — METRONIDAZOLE 500 MG: 500 TABLET ORAL at 21:14

## 2021-11-11 RX ADMIN — CITALOPRAM HYDROBROMIDE 40 MG: 20 TABLET ORAL at 12:31

## 2021-11-11 RX ADMIN — ALPRAZOLAM 0.5 MG: 0.5 TABLET ORAL at 19:54

## 2021-11-11 RX ADMIN — WATER 2000 MG: 1 INJECTION INTRAMUSCULAR; INTRAVENOUS; SUBCUTANEOUS at 09:36

## 2021-11-11 RX ADMIN — OXYCODONE HYDROCHLORIDE AND ACETAMINOPHEN 1 TABLET: 5; 325 TABLET ORAL at 23:19

## 2021-11-11 RX ADMIN — GUAIFENESIN SYRUP AND DEXTROMETHORPHAN 5 ML: 100; 10 SYRUP ORAL at 19:54

## 2021-11-11 RX ADMIN — METRONIDAZOLE 500 MG: 500 TABLET ORAL at 06:54

## 2021-11-11 RX ADMIN — GABAPENTIN 600 MG: 600 TABLET, FILM COATED ORAL at 09:38

## 2021-11-11 RX ADMIN — IPRATROPIUM BROMIDE AND ALBUTEROL SULFATE 3 ML: .5; 2.5 SOLUTION RESPIRATORY (INHALATION) at 19:24

## 2021-11-11 RX ADMIN — WATER 2000 MG: 1 INJECTION INTRAMUSCULAR; INTRAVENOUS; SUBCUTANEOUS at 00:17

## 2021-11-11 RX ADMIN — ALPRAZOLAM 0.5 MG: 0.5 TABLET ORAL at 11:08

## 2021-11-11 RX ADMIN — GABAPENTIN 600 MG: 600 TABLET, FILM COATED ORAL at 13:55

## 2021-11-11 RX ADMIN — GUAIFENESIN SYRUP AND DEXTROMETHORPHAN 5 ML: 100; 10 SYRUP ORAL at 00:17

## 2021-11-11 RX ADMIN — GABAPENTIN 600 MG: 600 TABLET, FILM COATED ORAL at 21:15

## 2021-11-11 RX ADMIN — METOCLOPRAMIDE 5 MG: 10 TABLET ORAL at 21:15

## 2021-11-11 RX ADMIN — FLUTICASONE PROPIONATE 2 SPRAY: 50 SPRAY, METERED NASAL at 09:32

## 2021-11-11 RX ADMIN — OXYCODONE HYDROCHLORIDE AND ACETAMINOPHEN 1 TABLET: 5; 325 TABLET ORAL at 17:17

## 2021-11-11 RX ADMIN — ACETAMINOPHEN 650 MG: 325 TABLET ORAL at 02:41

## 2021-11-11 RX ADMIN — BUDESONIDE 500 MCG: 0.5 SUSPENSION RESPIRATORY (INHALATION) at 07:12

## 2021-11-11 RX ADMIN — OXYCODONE HYDROCHLORIDE AND ACETAMINOPHEN 1 TABLET: 5; 325 TABLET ORAL at 11:08

## 2021-11-11 RX ADMIN — IPRATROPIUM BROMIDE AND ALBUTEROL SULFATE 3 ML: .5; 2.5 SOLUTION RESPIRATORY (INHALATION) at 22:19

## 2021-11-11 RX ADMIN — IPRATROPIUM BROMIDE AND ALBUTEROL SULFATE 3 ML: .5; 2.5 SOLUTION RESPIRATORY (INHALATION) at 01:23

## 2021-11-11 RX ADMIN — METOCLOPRAMIDE 5 MG: 10 TABLET ORAL at 13:56

## 2021-11-11 RX ADMIN — ARFORMOTEROL TARTRATE 15 MCG: 15 SOLUTION RESPIRATORY (INHALATION) at 07:12

## 2021-11-11 RX ADMIN — PANTOPRAZOLE SODIUM 40 MG: 40 TABLET, DELAYED RELEASE ORAL at 06:54

## 2021-11-11 RX ADMIN — IPRATROPIUM BROMIDE AND ALBUTEROL SULFATE 3 ML: .5; 2.5 SOLUTION RESPIRATORY (INHALATION) at 14:49

## 2021-11-11 ASSESSMENT — PAIN DESCRIPTION - PAIN TYPE
TYPE: ACUTE PAIN;CHRONIC PAIN
TYPE: ACUTE PAIN

## 2021-11-11 ASSESSMENT — PAIN DESCRIPTION - ORIENTATION
ORIENTATION: LEFT
ORIENTATION: LEFT

## 2021-11-11 ASSESSMENT — PAIN - FUNCTIONAL ASSESSMENT
PAIN_FUNCTIONAL_ASSESSMENT: PREVENTS OR INTERFERES SOME ACTIVE ACTIVITIES AND ADLS
PAIN_FUNCTIONAL_ASSESSMENT: PREVENTS OR INTERFERES SOME ACTIVE ACTIVITIES AND ADLS

## 2021-11-11 ASSESSMENT — PAIN DESCRIPTION - ONSET
ONSET: ON-GOING
ONSET: ON-GOING

## 2021-11-11 ASSESSMENT — PAIN DESCRIPTION - PROGRESSION
CLINICAL_PROGRESSION: NOT CHANGED
CLINICAL_PROGRESSION: NOT CHANGED

## 2021-11-11 ASSESSMENT — ENCOUNTER SYMPTOMS: ABDOMINAL PAIN: 1

## 2021-11-11 ASSESSMENT — PAIN SCALES - GENERAL
PAINLEVEL_OUTOF10: 8
PAINLEVEL_OUTOF10: 6
PAINLEVEL_OUTOF10: 8
PAINLEVEL_OUTOF10: 8
PAINLEVEL_OUTOF10: 6
PAINLEVEL_OUTOF10: 8

## 2021-11-11 ASSESSMENT — PAIN DESCRIPTION - FREQUENCY
FREQUENCY: CONTINUOUS
FREQUENCY: CONTINUOUS

## 2021-11-11 ASSESSMENT — PAIN DESCRIPTION - LOCATION
LOCATION: ABDOMEN
LOCATION: ABDOMEN

## 2021-11-11 ASSESSMENT — PAIN DESCRIPTION - DESCRIPTORS: DESCRIPTORS: ACHING

## 2021-11-11 NOTE — PROGRESS NOTES
Infectious Diseases Progress Note    Patient:  Ammon Degroot  YOB: 1950  MRN: [id number]   Admit date: 11/8/2021   Admitting Physician: Negrita Lozano MD  Primary Care Physician: TEN Hobbs    Chief Complaint/Interval History: She has had some abdominal discomfort. She is without fever. She has not had Percocet overnight or Xanax. She has been on these chronically through pain management. She has had some suprapubic discomfort. In/Out    Intake/Output Summary (Last 24 hours) at 11/11/2021 0840  Last data filed at 11/11/2021 0653  Gross per 24 hour   Intake 3107.99 ml   Output 1400 ml   Net 1707.99 ml     Allergies:    Allergies   Allergen Reactions    Aspirin Hives    Codeine     Demerol     Fentanyl Hives    Iv Dye [Iodides]     Neosporin [Bacitracin-Neomycin-Polymyxin]     Nsaids     Pcn [Penicillins]     Pentazocine Lactate     Sulfa Antibiotics     Talwin [Pentazocine] Hives    Influenza Vaccines Hives and Rash     Current Meds: [Held by provider] ALPRAZolam (XANAX) tablet 1 mg, 4x Daily PRN  metroNIDAZOLE (FLAGYL) tablet 500 mg, 3 times per day  guaiFENesin-dextromethorphan (ROBITUSSIN DM) 100-10 MG/5ML syrup 5 mL, Q6H PRN  albuterol (PROVENTIL) nebulizer solution 2.5 mg, Q6H PRN  [Held by provider] ALPRAZolam (XANAX) tablet 1 mg, Nightly PRN  amphetamine-dextroamphetamine (ADDERALL) tablet 20 mg, BID  citalopram (CELEXA) tablet 40 mg, Daily  fluticasone (FLONASE) 50 MCG/ACT nasal spray 2 spray, Daily  gabapentin (NEURONTIN) tablet 600 mg, TID  ipratropium-albuterol (DUONEB) nebulizer solution 3 mL, Q4H  lisinopril (PRINIVIL;ZESTRIL) tablet 5 mg, Daily  metoclopramide (REGLAN) tablet 5 mg, 4x Daily  montelukast (SINGULAIR) tablet 10 mg, Nightly  nicotine (NICODERM CQ) 21 MG/24HR 1 patch, Q24H  pantoprazole (PROTONIX) tablet 40 mg, QAM AC  enoxaparin (LOVENOX) injection 40 mg, Daily  ondansetron (ZOFRAN-ODT) disintegrating tablet 4 mg, Q8H PRN   Or  ondansetron (ZOFRAN) injection 4 mg, Q6H PRN  acetaminophen (TYLENOL) tablet 650 mg, Q6H PRN   Or  acetaminophen (TYLENOL) suppository 650 mg, Q6H PRN  lactated ringers infusion, Continuous  budesonide (PULMICORT) nebulizer suspension 500 mcg, BID  Arformoterol Tartrate (BROVANA) nebulizer solution 15 mcg, BID  [Held by provider] oxyCODONE-acetaminophen (PERCOCET)  MG per tablet 1 tablet, Q4H PRN  ceFEPIme (MAXIPIME) 2,000 mg in sterile water 20 mL IV syringe, Q8H      Review of Systems see HPI    VitalSigns:  BP (!) 114/44   Pulse 74   Temp 97.5 °F (36.4 °C) (Temporal)   Resp 16   Ht 5' 5\" (1.651 m)   Wt 118 lb 6.4 oz (53.7 kg)   SpO2 97%   BMI 19.70 kg/m²      Physical Exam  Line/IV site: No erythema, warmth, induration, or tenderness. Lungs without crackles  Abdomen with bowel sounds present  Mild abdominal tenderness  No rebound    Lab Results:  CBC:   Recent Labs     11/09/21  0352 11/10/21  0357 11/11/21  0440   WBC 7.5 6.2 5.1   HGB 11.1* 10.3* 9.2*    190 144     BMP:  Recent Labs     11/09/21  0353 11/10/21  0357 11/11/21  0440    138 142   K 3.9 3.5 3.8   CL 98 97* 103   CO2 28 30* 29   BUN 8 7* 8   CREATININE 0.5 0.4* 0.4*   GLUCOSE 77 87 102     CultureResults:  Blood cultures November 8, 2021-Clostridium perfringens    Radiology: None    Additional Studies Reviewed:  None    Impression:  1. Possible culture Clostridium perfringens-suspect gastrointestinal source, however, no source identified on prior CT  2. Chronic obstructive pulmonary disease  3.   Some suprapubic discomfort    Recommendations:  Continue cefepime  Continue metronidazole  Discussed with nursing  Bladder scan  Hospitalist addressing pain/anxiety medicines  Continue to follow  If ongoing abdominal pain, leukocytosis, or recurrent fever-repeat abdomen CT scan    Steph Subramanian MD

## 2021-11-12 ENCOUNTER — APPOINTMENT (OUTPATIENT)
Dept: CT IMAGING | Facility: HOSPITAL | Age: 71
End: 2021-11-12

## 2021-11-12 LAB
ANION GAP SERPL CALCULATED.3IONS-SCNC: 10 MMOL/L (ref 7–19)
BASOPHILS ABSOLUTE: 0 K/UL (ref 0–0.2)
BASOPHILS RELATIVE PERCENT: 0.6 % (ref 0–1)
BUN BLDV-MCNC: 10 MG/DL (ref 8–23)
CALCIUM SERPL-MCNC: 8.7 MG/DL (ref 8.8–10.2)
CHLORIDE BLD-SCNC: 103 MMOL/L (ref 98–111)
CO2: 30 MMOL/L (ref 22–29)
CREAT SERPL-MCNC: 0.4 MG/DL (ref 0.5–0.9)
EKG P AXIS: 80 DEGREES
EKG P-R INTERVAL: 156 MS
EKG Q-T INTERVAL: 336 MS
EKG QRS DURATION: 64 MS
EKG QTC CALCULATION (BAZETT): 406 MS
EKG T AXIS: 74 DEGREES
EOSINOPHILS ABSOLUTE: 0.1 K/UL (ref 0–0.6)
EOSINOPHILS RELATIVE PERCENT: 1.7 % (ref 0–5)
FOLATE: 2.9 NG/ML (ref 4.8–37.3)
GFR AFRICAN AMERICAN: >59
GFR NON-AFRICAN AMERICAN: >60
GLUCOSE BLD-MCNC: 106 MG/DL (ref 74–109)
HCT VFR BLD CALC: 30.3 % (ref 37–47)
HEMOGLOBIN: 9.5 G/DL (ref 12–16)
IMMATURE GRANULOCYTES #: 0 K/UL
LYMPHOCYTES ABSOLUTE: 1 K/UL (ref 1.1–4.5)
LYMPHOCYTES RELATIVE PERCENT: 21 % (ref 20–40)
MCH RBC QN AUTO: 32.4 PG (ref 27–31)
MCHC RBC AUTO-ENTMCNC: 31.4 G/DL (ref 33–37)
MCV RBC AUTO: 103.4 FL (ref 81–99)
MONOCYTES ABSOLUTE: 0.6 K/UL (ref 0–0.9)
MONOCYTES RELATIVE PERCENT: 12 % (ref 0–10)
NEUTROPHILS ABSOLUTE: 3.1 K/UL (ref 1.5–7.5)
NEUTROPHILS RELATIVE PERCENT: 64.5 % (ref 50–65)
PDW BLD-RTO: 13.4 % (ref 11.5–14.5)
PLATELET # BLD: 142 K/UL (ref 130–400)
PMV BLD AUTO: 11 FL (ref 9.4–12.3)
POTASSIUM SERPL-SCNC: 3.7 MMOL/L (ref 3.5–5)
RBC # BLD: 2.93 M/UL (ref 4.2–5.4)
SODIUM BLD-SCNC: 143 MMOL/L (ref 136–145)
VITAMIN B-12: 261 PG/ML (ref 211–946)
WBC # BLD: 4.8 K/UL (ref 4.8–10.8)

## 2021-11-12 PROCEDURE — 6370000000 HC RX 637 (ALT 250 FOR IP): Performed by: INTERNAL MEDICINE

## 2021-11-12 PROCEDURE — 1210000000 HC MED SURG R&B

## 2021-11-12 PROCEDURE — 2700000000 HC OXYGEN THERAPY PER DAY

## 2021-11-12 PROCEDURE — 97535 SELF CARE MNGMENT TRAINING: CPT

## 2021-11-12 PROCEDURE — 6360000002 HC RX W HCPCS: Performed by: INTERNAL MEDICINE

## 2021-11-12 PROCEDURE — 6370000000 HC RX 637 (ALT 250 FOR IP): Performed by: HOSPITALIST

## 2021-11-12 PROCEDURE — 2580000003 HC RX 258: Performed by: HOSPITALIST

## 2021-11-12 PROCEDURE — 36415 COLL VENOUS BLD VENIPUNCTURE: CPT

## 2021-11-12 PROCEDURE — 6360000002 HC RX W HCPCS: Performed by: HOSPITALIST

## 2021-11-12 PROCEDURE — 97165 OT EVAL LOW COMPLEX 30 MIN: CPT

## 2021-11-12 PROCEDURE — 80048 BASIC METABOLIC PNL TOTAL CA: CPT

## 2021-11-12 PROCEDURE — 82746 ASSAY OF FOLIC ACID SERUM: CPT

## 2021-11-12 PROCEDURE — 85025 COMPLETE CBC W/AUTO DIFF WBC: CPT

## 2021-11-12 PROCEDURE — 94640 AIRWAY INHALATION TREATMENT: CPT

## 2021-11-12 PROCEDURE — 82607 VITAMIN B-12: CPT

## 2021-11-12 RX ORDER — FOLIC ACID 1 MG/1
1 TABLET ORAL DAILY
Status: DISCONTINUED | OUTPATIENT
Start: 2021-11-12 | End: 2021-11-14 | Stop reason: HOSPADM

## 2021-11-12 RX ORDER — CYANOCOBALAMIN 1000 UG/ML
1000 INJECTION INTRAMUSCULAR; SUBCUTANEOUS ONCE
Status: COMPLETED | OUTPATIENT
Start: 2021-11-12 | End: 2021-11-12

## 2021-11-12 RX ADMIN — METOCLOPRAMIDE 5 MG: 10 TABLET ORAL at 17:18

## 2021-11-12 RX ADMIN — CITALOPRAM HYDROBROMIDE 40 MG: 20 TABLET ORAL at 08:39

## 2021-11-12 RX ADMIN — ARFORMOTEROL TARTRATE 15 MCG: 15 SOLUTION RESPIRATORY (INHALATION) at 19:01

## 2021-11-12 RX ADMIN — IPRATROPIUM BROMIDE AND ALBUTEROL SULFATE 3 ML: .5; 2.5 SOLUTION RESPIRATORY (INHALATION) at 14:15

## 2021-11-12 RX ADMIN — METRONIDAZOLE 500 MG: 500 TABLET ORAL at 05:21

## 2021-11-12 RX ADMIN — ENOXAPARIN SODIUM 40 MG: 100 INJECTION SUBCUTANEOUS at 08:40

## 2021-11-12 RX ADMIN — WATER 2000 MG: 1 INJECTION INTRAMUSCULAR; INTRAVENOUS; SUBCUTANEOUS at 08:35

## 2021-11-12 RX ADMIN — PANTOPRAZOLE SODIUM 40 MG: 40 TABLET, DELAYED RELEASE ORAL at 05:20

## 2021-11-12 RX ADMIN — OXYCODONE HYDROCHLORIDE AND ACETAMINOPHEN 1 TABLET: 5; 325 TABLET ORAL at 11:26

## 2021-11-12 RX ADMIN — METRONIDAZOLE 500 MG: 500 TABLET ORAL at 21:07

## 2021-11-12 RX ADMIN — GABAPENTIN 300 MG: 600 TABLET, FILM COATED ORAL at 08:40

## 2021-11-12 RX ADMIN — IPRATROPIUM BROMIDE AND ALBUTEROL SULFATE 3 ML: .5; 2.5 SOLUTION RESPIRATORY (INHALATION) at 06:18

## 2021-11-12 RX ADMIN — IPRATROPIUM BROMIDE AND ALBUTEROL SULFATE 3 ML: .5; 2.5 SOLUTION RESPIRATORY (INHALATION) at 02:13

## 2021-11-12 RX ADMIN — METOCLOPRAMIDE 5 MG: 10 TABLET ORAL at 21:07

## 2021-11-12 RX ADMIN — FOLIC ACID 1 MG: 1 TABLET ORAL at 18:53

## 2021-11-12 RX ADMIN — BUDESONIDE 500 MCG: 0.5 SUSPENSION RESPIRATORY (INHALATION) at 19:01

## 2021-11-12 RX ADMIN — GABAPENTIN 300 MG: 600 TABLET, FILM COATED ORAL at 13:20

## 2021-11-12 RX ADMIN — BUDESONIDE 500 MCG: 0.5 SUSPENSION RESPIRATORY (INHALATION) at 06:21

## 2021-11-12 RX ADMIN — WATER 2000 MG: 1 INJECTION INTRAMUSCULAR; INTRAVENOUS; SUBCUTANEOUS at 01:36

## 2021-11-12 RX ADMIN — ONDANSETRON 4 MG: 2 INJECTION INTRAMUSCULAR; INTRAVENOUS at 09:17

## 2021-11-12 RX ADMIN — IPRATROPIUM BROMIDE AND ALBUTEROL SULFATE 3 ML: .5; 2.5 SOLUTION RESPIRATORY (INHALATION) at 10:26

## 2021-11-12 RX ADMIN — METRONIDAZOLE 500 MG: 500 TABLET ORAL at 13:18

## 2021-11-12 RX ADMIN — ALPRAZOLAM 0.5 MG: 0.5 TABLET ORAL at 13:17

## 2021-11-12 RX ADMIN — ALPRAZOLAM 0.5 MG: 0.5 TABLET ORAL at 03:54

## 2021-11-12 RX ADMIN — WATER 2000 MG: 1 INJECTION INTRAMUSCULAR; INTRAVENOUS; SUBCUTANEOUS at 16:55

## 2021-11-12 RX ADMIN — ARFORMOTEROL TARTRATE 15 MCG: 15 SOLUTION RESPIRATORY (INHALATION) at 06:21

## 2021-11-12 RX ADMIN — METOCLOPRAMIDE 5 MG: 10 TABLET ORAL at 08:39

## 2021-11-12 RX ADMIN — METOCLOPRAMIDE 5 MG: 10 TABLET ORAL at 13:19

## 2021-11-12 RX ADMIN — OXYCODONE HYDROCHLORIDE AND ACETAMINOPHEN 1 TABLET: 5; 325 TABLET ORAL at 17:18

## 2021-11-12 RX ADMIN — GABAPENTIN 300 MG: 600 TABLET, FILM COATED ORAL at 21:06

## 2021-11-12 RX ADMIN — MONTELUKAST SODIUM 10 MG: 10 TABLET, FILM COATED ORAL at 21:07

## 2021-11-12 RX ADMIN — OXYCODONE HYDROCHLORIDE AND ACETAMINOPHEN 1 TABLET: 5; 325 TABLET ORAL at 05:21

## 2021-11-12 RX ADMIN — CYANOCOBALAMIN 1000 MCG: 1000 INJECTION, SOLUTION INTRAMUSCULAR; SUBCUTANEOUS at 18:53

## 2021-11-12 RX ADMIN — IPRATROPIUM BROMIDE AND ALBUTEROL SULFATE 3 ML: .5; 2.5 SOLUTION RESPIRATORY (INHALATION) at 18:51

## 2021-11-12 RX ADMIN — LISINOPRIL 5 MG: 5 TABLET ORAL at 08:40

## 2021-11-12 ASSESSMENT — PAIN DESCRIPTION - LOCATION
LOCATION: ABDOMEN

## 2021-11-12 ASSESSMENT — PAIN DESCRIPTION - DESCRIPTORS
DESCRIPTORS: ACHING
DESCRIPTORS: ACHING
DESCRIPTORS: ACHING;CRAMPING;DISCOMFORT

## 2021-11-12 ASSESSMENT — PAIN SCALES - GENERAL
PAINLEVEL_OUTOF10: 6
PAINLEVEL_OUTOF10: 4
PAINLEVEL_OUTOF10: 7
PAINLEVEL_OUTOF10: 2
PAINLEVEL_OUTOF10: 3
PAINLEVEL_OUTOF10: 7
PAINLEVEL_OUTOF10: 6

## 2021-11-12 ASSESSMENT — PAIN DESCRIPTION - PAIN TYPE
TYPE: ACUTE PAIN

## 2021-11-12 ASSESSMENT — PAIN DESCRIPTION - ONSET
ONSET: ON-GOING
ONSET: ON-GOING

## 2021-11-12 ASSESSMENT — ENCOUNTER SYMPTOMS: ABDOMINAL PAIN: 1

## 2021-11-12 ASSESSMENT — PAIN - FUNCTIONAL ASSESSMENT
PAIN_FUNCTIONAL_ASSESSMENT: PREVENTS OR INTERFERES SOME ACTIVE ACTIVITIES AND ADLS

## 2021-11-12 ASSESSMENT — PAIN DESCRIPTION - ORIENTATION
ORIENTATION: LEFT

## 2021-11-12 ASSESSMENT — PAIN DESCRIPTION - PROGRESSION
CLINICAL_PROGRESSION: NOT CHANGED

## 2021-11-12 ASSESSMENT — PAIN DESCRIPTION - FREQUENCY
FREQUENCY: CONTINUOUS

## 2021-11-12 NOTE — PROGRESS NOTES
[facility] Hospitalists      Patient:  Fermin Butler  YOB: 1950  Date of Service: 11/11/2021  MRN: [id number]   Acct: [de-identified]   Primary Care Physician: TEN Andrea  Advance Directive: Full Code  Admit Date: 11/8/2021       Hospital Day: 2    CHIEF COMPLAINT intractable n/v/d    SUBJECTIVE:   Patient is feeling little better today. She is asking for her pain and anxiety meds. Cumulative Hospital course    11/9/2021  History of Present Illness:   Patient presents to [facility] ER due to nausea vomiting diarrhea. She states that this has been going on for the past week. She was previously hospitalized at this facility for community-acquired pneumonia and COPD exacerbation. Ongoing polypharmacy. She is prescribed Xanax however unable to keep down. Currently patient denies chest pain, shortness of breath, and fever. However she is anxious. Work-up in ER no mass, fluid collection, or inflammatory process. XR left ribs no rib fractures seen, chronic lung change. NM lung scan perfusion low probability of PE. Blood cultures obtained, no growth to date. 11/10/2021  Patient has been intermittently confused. Currently she is alert to person place and time. Needs reorientation to current situation. She is calm and cooperative with care. Does endorse dull left-sided abdominal pain. Patient has been hypotensive, manual bp 94/78.     11/11/2021  Patient is feeling tired and fatigued. Her systolic blood pressure is 100. She is on high doses of pain/anxiety medication. I cut down on her gabapentin, Xanax and requested by 50%. Increased IV fluids to 100 cc/h. She is receiving IV antibiotics for Clostridium perfringens bacteremia. Review of Systems:   Review of Systems   HENT: Negative. Gastrointestinal: Positive for abdominal pain. Skin: Negative. Psychiatric/Behavioral: Positive for confusion.        14 point review of systems is negative except as specifically addressed above.      Objective:   VITALS:  BP (!) 106/48   Pulse 92   Temp 98.2 °F (36.8 °C) (Temporal)   Resp 17   Ht 5' 5\" (1.651 m)   Wt 118 lb 6.4 oz (53.7 kg)   SpO2 96%   BMI 19.70 kg/m²   24HR INTAKE/OUTPUT:      Intake/Output Summary (Last 24 hours) at 11/11/2021 2135  Last data filed at 11/11/2021 1622  Gross per 24 hour   Intake 851.2 ml   Output 750 ml   Net 101.2 ml       Physical Exam  Constitutional:       Appearance: Normal appearance. She is not ill-appearing. Eyes:      Extraocular Movements: Extraocular movements intact. Conjunctiva/sclera: Conjunctivae normal.      Pupils: Pupils are equal, round, and reactive to light. Cardiovascular:      Rate and Rhythm: Normal rate and regular rhythm. Pulses: Normal pulses. Heart sounds: Normal heart sounds. No murmur heard. Pulmonary:      Breath sounds: Examination of the right-upper field reveals wheezing. Examination of the left-upper field reveals wheezing. Wheezing present. Abdominal:      General: Bowel sounds are normal.      Palpations: Abdomen is soft. Tenderness: There is abdominal tenderness in the left upper quadrant. There is no right CVA tenderness, left CVA tenderness, guarding or rebound. Musculoskeletal:         General: No swelling. Normal range of motion. Cervical back: Normal range of motion and neck supple. No rigidity. Right lower leg: No edema. Left lower leg: No edema. Skin:     General: Skin is warm and dry. Coloration: Skin is not jaundiced. Findings: No rash. Neurological:      General: No focal deficit present. Mental Status: She is alert. She is confused. GCS: GCS eye subscore is 4. GCS verbal subscore is 4. GCS motor subscore is 6. Cranial Nerves: No cranial nerve deficit. Motor: Weakness present. Comments: Easily reoriented, cooperative and calm   Psychiatric:         Mood and Affect: Mood is anxious.          Speech: Speech normal. Behavior: Behavior is cooperative. Medications:      lactated ringers 75 mL/hr at 11/11/21 0934      [START ON 11/12/2021] gabapentin  300 mg Oral TID    metroNIDAZOLE  500 mg Oral 3 times per day    amphetamine-dextroamphetamine  20 mg Oral BID    citalopram  40 mg Oral Daily    fluticasone  2 spray Each Nostril Daily    ipratropium-albuterol  1 vial Inhalation Q4H    lisinopril  5 mg Oral Daily    metoclopramide  5 mg Oral 4x Daily    montelukast  10 mg Oral Nightly    nicotine  1 patch TransDERmal Q24H    pantoprazole  40 mg Oral QAM AC    enoxaparin  40 mg SubCUTAneous Daily    budesonide  0.5 mg Nebulization BID    Arformoterol Tartrate  15 mcg Nebulization BID    cefepime  2,000 mg IntraVENous Q8H     ALPRAZolam, oxyCODONE-acetaminophen, [Held by provider] ALPRAZolam, guaiFENesin-dextromethorphan, albuterol, [Held by provider] ALPRAZolam, ondansetron **OR** ondansetron, acetaminophen **OR** acetaminophen, [Held by provider] oxyCODONE-acetaminophen  ADULT DIET; Regular; Low Fat/Low Chol/High Fiber/2 gm Na     Lab and other Data:     Recent Labs     11/09/21  0352 11/10/21  0357 11/11/21  0440   WBC 7.5 6.2 5.1   HGB 11.1* 10.3* 9.2*    190 144     Recent Labs     11/09/21  0353 11/10/21  0357 11/11/21  0440    138 142   K 3.9 3.5 3.8   CL 98 97* 103   CO2 28 30* 29   BUN 8 7* 8   CREATININE 0.5 0.4* 0.4*   GLUCOSE 77 87 102     Recent Labs     11/09/21  0353 11/11/21  0440   AST 15 12   ALT 9 7   BILITOT 0.4 <0.2   ALKPHOS 75 53     Troponin T:   No results for input(s): TROPONINI in the last 72 hours. Pro-BNP: No results for input(s): BNP in the last 72 hours. INR:   No results for input(s): INR in the last 72 hours.   UA:  Recent Labs     11/08/21  2238   COLORU DARK YELLOW*   PHUR 6.0   WBCUA 5   RBCUA 4   BACTERIA NEGATIVE*   CLARITYU Clear   SPECGRAV 1.033   LEUKOCYTESUR TRACE*   UROBILINOGEN 1.0   BILIRUBINUR Negative   BLOODU Negative   GLUCOSEU Negative     A1C: No results for input(s): LABA1C in the last 72 hours. ABG:  No results for input(s): PHART, LEK4XFP, PO2ART, DZE2SEO, BEART, HGBAE, L4FZUNWC, CARBOXHGBART in the last 72 hours. RAD:   CT ABDOMEN PELVIS WO CONTRAST Additional Contrast? None    Result Date: 11/8/2021  1. No mass, fluid collection, or inflammatory process is seen. Signed by Dr Jeovany Jacobs (MIN 3 VIEWS)    Result Date: 11/8/2021  1. Chronic lung changes. 2. No rib fracture is seen. Signed by Dr Tierra Garcia    Result Date: 11/9/2021  A limited diagnostic study in the absence of a ventilation scan. A low probability of pulmonary embolism. Signed by Dr Lenora Novak         Assessment/Plan      Principal Problem:    Gram-negative bacteremia  Active Problems:    Chronic pain    Anxiety and depression    GERD (gastroesophageal reflux disease)    Hypertension    COPD (chronic obstructive pulmonary disease) (HCC)    Alpha-1-antitrypsin deficiency carrier    Folate deficiency anemia    Malignant neoplasm of upper lobe of right lung (HCC)    Palliative care patient    Intractable nausea and vomiting    Hypomagnesemia    Tobacco abuse counseling    Cigarette nicotine dependence with nicotine-induced disorder    Clostridium perfringens infection  Resolved Problems:    * No resolved hospital problems.  *      Continue with current medications  Continue with IV antibiotics in the form of cefepime and metronidazole  Patient remains tired and fatigued with borderline hypotension and SBP around 100  Increased IV hydration to 100 cc/h  Cut down on Percocet to 5/325 mg p.o. every 6 hours as needed as needed  Cut down on Xanax to 0.5 mg p.o. every 8 hours as needed as needed  Cutdown on gabapentin to 300 mg p.o. every 8 hours  Patient to relax, calm down and take things 1 at a time  Monitor vitals closely  PT/OT evaluation ordered  Case management consult for DC planning    Chronic medical issues . .. Continue with home meds. Monitor patient closely while admitted. Advised very close f/u with patient's PCP as an outpatient to address chronic medical issues. Repeat labs in a.m. Electrolyte replacement as per protocol. Patient will be monitored very closely on the floor. Further recommendations as per the hospital course. DC planning : To be determined as per the hospital course    Patient  is on DVT prophylaxis  Current medications reviewed  Lab work reviewed  Radiology/Chest x-ray films reviewed  Treatment recommendations from suspecialities reviewed, appreciated and agreed with  Discussed with the nurse and addressed all questions/concerns  Discussed with Patient and/or Family at the bedside in detail . .. they understand and agree with the management plan.     DVT Prophylaxis: Lovenox         Qasim Forbes MD, 11/11/2021 9:35 PM

## 2021-11-12 NOTE — PROGRESS NOTES
Infectious Diseases Progress Note    Patient:  Ofelia Velasquez  YOB: 1950  MRN: [id number]   Admit date: 11/8/2021   Admitting Physician: Lucas Garcia MD  Primary Care Physician: TEN Barnett    Chief Complaint/Interval History: She looks a little bit better today. She was little more anxious and uncomfortable yesterday. She seems to have better pain and anxiety control overall. She still continues to have a sense of urinary urgency. She continues to have some discomfort in the suprapubic area. She has not had diarrhea. She is not having fever. She has no nausea or vomiting. In/Out    Intake/Output Summary (Last 24 hours) at 11/12/2021 0839  Last data filed at 11/11/2021 1622  Gross per 24 hour   Intake 100 ml   Output 250 ml   Net -150 ml     Allergies:    Allergies   Allergen Reactions    Aspirin Hives    Codeine     Demerol     Fentanyl Hives    Iv Dye [Iodides]     Neosporin [Bacitracin-Neomycin-Polymyxin]     Nsaids     Pcn [Penicillins]     Pentazocine Lactate     Sulfa Antibiotics     Talwin [Pentazocine] Hives    Influenza Vaccines Hives and Rash     Current Meds: ALPRAZolam (XANAX) tablet 0.5 mg, Q8H PRN  oxyCODONE-acetaminophen (PERCOCET) 5-325 MG per tablet 1 tablet, Q6H PRN  gabapentin (NEURONTIN) tablet 300 mg, TID  [Held by provider] ALPRAZolam (XANAX) tablet 1 mg, 4x Daily PRN  metroNIDAZOLE (FLAGYL) tablet 500 mg, 3 times per day  guaiFENesin-dextromethorphan (ROBITUSSIN DM) 100-10 MG/5ML syrup 5 mL, Q6H PRN  albuterol (PROVENTIL) nebulizer solution 2.5 mg, Q6H PRN  [Held by provider] ALPRAZolam (XANAX) tablet 1 mg, Nightly PRN  amphetamine-dextroamphetamine (ADDERALL) tablet 20 mg, BID  citalopram (CELEXA) tablet 40 mg, Daily  fluticasone (FLONASE) 50 MCG/ACT nasal spray 2 spray, Daily  ipratropium-albuterol (DUONEB) nebulizer solution 3 mL, Q4H  lisinopril (PRINIVIL;ZESTRIL) tablet 5 mg, Daily  metoclopramide (REGLAN) tablet 5 mg, 4x Daily  montelukast (SINGULAIR) tablet 10 mg, Nightly  nicotine (NICODERM CQ) 21 MG/24HR 1 patch, Q24H  pantoprazole (PROTONIX) tablet 40 mg, QAM AC  enoxaparin (LOVENOX) injection 40 mg, Daily  ondansetron (ZOFRAN-ODT) disintegrating tablet 4 mg, Q8H PRN   Or  ondansetron (ZOFRAN) injection 4 mg, Q6H PRN  acetaminophen (TYLENOL) tablet 650 mg, Q6H PRN   Or  acetaminophen (TYLENOL) suppository 650 mg, Q6H PRN  lactated ringers infusion, Continuous  budesonide (PULMICORT) nebulizer suspension 500 mcg, BID  Arformoterol Tartrate (BROVANA) nebulizer solution 15 mcg, BID  [Held by provider] oxyCODONE-acetaminophen (PERCOCET)  MG per tablet 1 tablet, Q4H PRN  ceFEPIme (MAXIPIME) 2,000 mg in sterile water 20 mL IV syringe, Q8H      Review of Systems cardiopulmonary symptoms at baseline. VitalSigns:  BP (!) 168/73   Pulse 88   Temp 99 °F (37.2 °C) (Temporal)   Resp 20   Ht 5' 5\" (1.651 m)   Wt 118 lb 6.4 oz (53.7 kg)   SpO2 90%   BMI 19.70 kg/m²      Physical Exam  Line/IV site: No erythema, warmth, induration, or tenderness. Abdomen with some mild lower abdominal tenderness. No mass. No guarding or rebound. Lungs clear without crackles    Lab Results:  CBC:   Recent Labs     11/10/21  0357 11/11/21 0440 11/12/21  0258   WBC 6.2 5.1 4.8   HGB 10.3* 9.2* 9.5*    144 142     BMP:  Recent Labs     11/10/21  0357 11/11/21 0440 11/12/21  0258    142 143   K 3.5 3.8 3.7   CL 97* 103 103   CO2 30* 29 30*   BUN 7* 8 10   CREATININE 0.4* 0.4* 0.4*   GLUCOSE 87 102 106     Radiology: None    Additional Studies Reviewed:  None    Impression:  1. Clostridium perfringens and blood culture -continue to suspect intra-abdominal source  2. Chronic obstructive form disease  3.   Some suprapubic/lower abdominal discomfort sense of urinary urgency    Recommendations:  Continue cefepime  Continue metronidazole  Going to repeat CT scan of abdomen and pelvis with contrast to look for evidence of diverticulitis or abscess  Continue to follow    Addendum-November 12, 2021 at 11:08 AM-went to order CT scan of the abdomen and pelvis with contrast.  She lists an iodine allergy. At this point feel risk-benefit in favor of holding on additional imaging rather than give premedication with steroids.   Will treat presumptively for diverticulitis although no definite evidence of intra-abdominal source on prior CT without contrast.    Connor Irene MD

## 2021-11-12 NOTE — PROGRESS NOTES
Occupational Therapy Initial Assessment  Date: 2021   Patient Name: Joe Hebert  MRN: [id number]     : 1950    Date of Service: 2021    Discharge Recommendations:  24 hour supervision or assist  OT Equipment Recommendations  Equipment Needed: No    Assessment   Assessment: OT evaluation and single-tx completed. Pt could benefit from continued skilled services to increase activity tolerance deficits seen in prolonged ADLs/IADLs. However, pt is currently declining additional services, stating that her needs can be met in current state. OT has instructed pt that she can obtain therapy order from physician if her decision/status changes. Pt is safe to D/C home with / supervision/assist with daughter (per tentative plan). OT Education: Precautions; Equipment; IADL Safety; Energy Conservation; Transfer Training; ADL Adaptive Strategies  Patient Education: Pt verbalized understanding. REQUIRES OT FOLLOW UP: No (Initial eval/tx only)  Activity Tolerance  Activity Tolerance: Patient Tolerated treatment well  Safety Devices  Safety Devices in place: Yes  Type of devices: Bed alarm in place; Call light within reach; Left in bed           Patient Diagnosis(es): The primary encounter diagnosis was Nausea and vomiting, intractability of vomiting not specified, unspecified vomiting type. Diagnoses of Dyspnea, unspecified type and Benzodiazepine withdrawal without complication ([address] were also pertinent to this visit. has a past medical history of ADHD (attention deficit hyperactivity disorder), Anxiety, COPD (chronic obstructive pulmonary disease) ([address] Depression, Fibromyalgia, GERD (gastroesophageal reflux disease), Hypertension, Malignant neoplasm of upper lobe of right lung ([address] Palliative care patient, and RA (rheumatoid arthritis) ([address] has a past surgical history that includes Hysterectomy; hernia repair; Leg Surgery; and Cholecystectomy. Restrictions  Restrictions/Precautions  Restrictions/Precautions: Fall Risk (Initially C-diff (contact/isolation upon admit); Ruled-out and determined Clostridium perfringens infection)  Required Braces or Orthoses?: No    Subjective   General  Chart Reviewed: Yes  Patient assessed for rehabilitation services?: Yes  Additional Pertinent Hx: GERD; lung CA; RA; depression and anxiety; ADHD; HTN; (L) leg \"steel garth placement\" (not specified); polypharmacy  Family / Caregiver Present: No  Diagnosis: Gram-negative bacteremia;  Clostridium perfringens infection; SOB; N/V/D  Patient Currently in Pain: Yes (nsg made aware)  Pain Assessment  Pain Assessment: 0-10  Pain Level: 7  Patient's Stated Pain Goal: No pain  Pain Type: Acute pain  Pain Location: Abdomen  Pain Orientation: Left  Pain Descriptors: Aching  Pain Frequency: Continuous  Pain Onset: On-going  Clinical Progression: Not changed  Functional Pain Assessment: Prevents or interferes some active activities and ADLs  Non-Pharmaceutical Pain Intervention(s): Repositioned  Response to Pain Intervention: Patient Satisfied  Vital Signs  Temp: 99.1 °F (37.3 °C)  Temp Source: Temporal  Pulse: 82  Heart Rate Source: Monitor  BP: (!) 110/40  BP Location: Left upper arm  MAP (mmHg): (!) 60  Patient Position: Lying right side  Level of Consciousness: Alert (0)  Patient Currently in Pain: Yes (nsg made aware)  Oxygen Therapy  SpO2: 91 %  O2 Device: Nasal cannula  O2 Flow Rate (L/min): 3 L/min    Social/Functional History  Social/Functional History  Lives With: Daughter  Type of Home: House  Home Layout: One level  Home Access: Stairs to enter with rails  Entrance Stairs - Number of Steps: 2  Bathroom Shower/Tub: Walk-in shower  Bathroom Equipment: Shower chair  Home Equipment: Oxygen, Wheelchair-manual (3 L O2 at home)  Receives Help From: Family  ADL Assistance: Independent (DTR provides supervision during bathing)  Homemaking Assistance: Needs assistance  Ambulation Assistance: Independent (for short, functional distances; uses w/c out in public)  Transfer Assistance: Independent       Objective   Vision: Impaired (States that her R eye has been cloudy and she plans on going to OTD)  Hearing: Within functional limits    Orientation  Overall Orientation Status: Within Functional Limits     Balance  Sitting Balance: Supervision  Standing Balance: Contact guard assistance  Functional Mobility  Functional - Mobility Device: No device  Activity: To/from bathroom  Assist Level: Contact guard assistance  Toilet Transfers  Toilet - Technique: Stand step; Ambulating  Equipment Used: Raised toilet seat with rails  Toilet Transfer: Contact guard assistance  ADL  Feeding: Independent; Setup  Grooming: Independent; Setup  UE Bathing: Supervision  LE Bathing: Contact guard assistance  UE Dressing: Supervision  LE Dressing: Contact guard assistance  Toileting: Contact guard assistance  Coordination  Movements Are Fluid And Coordinated: Yes     Bed mobility  Supine to Sit: Independent  Sit to Supine: Independent  Transfers  Stand Step Transfers: Contact guard assistance  Sit to stand: Contact guard assistance  Stand to sit: Contact guard assistance     Cognition  Overall Cognitive Status: WFL  Cognition Comment: Pt appeared to have some anxiety during evaluation, but not to any extent that prevented function. Sensation  Overall Sensation Status: WFL (For BUE/BLE)        LUE AROM (degrees)  LUE AROM : WFL  RUE AROM (degrees)  RUE AROM : WFL  LUE Strength  Gross LUE Strength: WFL  L Hand General: 4+/5  RUE Strength  Gross RUE Strength: WFL  R Hand General: 4+/5              Included Treatment  Tx consisted of: bed mobility; pt education; transfer training; DME/AE discussion; positioning for SOB during ADLs; activity tolerance/balance challenges; functional ambulation; and toileting skills.    (Treatment time: 30 min)        Plan   Plan  Plan Comment: N/A    Goals  Short term goals  Short

## 2021-11-12 NOTE — PROGRESS NOTES
Physician Progress Note      Aleyda Mendes  CSN #:                  [id number]  :                       1950  ADMIT DATE:       2021 8:09 PM  DISCH DATE:  RESPONDING  PROVIDER #:        ISAIAH CLEMENT MD          QUERY TEXT:    Pt admitted with N/V/D and found to have Clostridium perfringens bacteremia. Noted documentation of \"suspect intra-abdominal source. ..will treat   presumptively for diverticulitis. Arabella Marlborough Arabella Marlborough \" noted by Dr. Gennie Runner, Infectious Disease   consultant in his progress note on 21  If possible, please document in   progress notes and discharge summary:    The medical record reflects the following:  Risk Factors: GERD  Clinical Indicators: presented with N/V/D and inability to keep anything down;   CT abd/pelvis-no mass, fluid collection, or inflammatory process seen. ID   consulted and noted Clostridium perfringens and blood cultures-cont to suspect   intra-abdominal source, + blood cultures  Treatment: CT abd/pelvis, blood cultures, Zofran, Phenergan, Reglan, Protonix,   Vancomycin IV, Infectious Disease consult    Thank you,  Krishna Grey, CCDS  [phone]  Options provided:  -- Diverticulitis confirmed present on admission  -- Diverticulitis ruled out  -- Defer to Dr. Gennie Runner, Infectious Disease consultant's documentation   regarding diverticulitis  -- Other - I will add my own diagnosis  -- Disagree - Not applicable / Not valid  -- Disagree - Clinically unable to determine / Unknown  -- Refer to Clinical Documentation Reviewer    PROVIDER RESPONSE TEXT:    I defer to Dr. Gennie Runner, Infectious Disease consultant regarding documentation   of diverticulitis.     Query created by: Dipika Agosto on 2021 2:00 PM      Electronically signed by:  Kalen Randhawa MD 2021 3:52 PM

## 2021-11-12 NOTE — PROGRESS NOTES
Comprehensive Nutrition Assessment    Type and Reason for Visit:  Reassess    Nutrition Assessment:  PO intake is good at this time. Continue current POC. Malnutrition Assessment:  Malnutrition Status: At risk for malnutrition (Comment)      Current Nutrition Therapies:    ADULT DIET;  Regular; Low Fat/Low Chol/High Fiber/2 gm Na    Anthropometric Measures:  · Height: 5' 5\" (165.1 cm)  · Current Body Weight: 118 lb (53.5 kg)    · Ideal Body Weight: 125 lbs  · BMI: 19.6  · BMI Categories: Underweight (BMI less than 22) age over 72       Nutrition Diagnosis:   · Inadequate oral intake related to acute injury/trauma as evidenced by intake 51-75%    Nutrition Interventions:   Food and/or Nutrient Delivery:  Continue Current Diet  Coordination of Nutrition Care:  Continue to monitor while inpatient    Goals:  Pt will consume 75% or more of meals with no s/s intolerance       Nutrition Monitoring and Evaluation:   Food/Nutrient Intake Outcomes:  Food and Nutrient Intake  Physical Signs/Symptoms Outcomes:  Biochemical Data, Nausea or Vomiting, Nutrition Focused Physical Findings, Weight     Electronically signed by Jazlyn Moon, MS, RD, LD on 11/12/21 at 3:42 PM CST    Contact: [phone]

## 2021-11-12 NOTE — PROGRESS NOTES
[id number] [facility]      Patient:  Gerson Field  YOB: 1950  Date of Service: 11/12/2021  MRN: [id number]   Acct: [de-identified]   Primary Care Physician: TEN Kelley  Advance Directive: Full Code  Admit Date: 11/8/2021       Hospital Day: 3    CHIEF COMPLAINT intractable n/v/d    SUBJECTIVE:   Patient appears much more relaxed today. Her anxiety and pain are under control. Cumulative Hospital course    11/9/2021  History of Present Illness:   Patient presents to [facility] ER due to nausea vomiting diarrhea. She states that this has been going on for the past week. She was previously hospitalized at this facility for community-acquired pneumonia and COPD exacerbation. Ongoing polypharmacy. She is prescribed Xanax however unable to keep down. Currently patient denies chest pain, shortness of breath, and fever. However she is anxious. Work-up in ER no mass, fluid collection, or inflammatory process. XR left ribs no rib fractures seen, chronic lung change. NM lung scan perfusion low probability of PE. Blood cultures obtained, no growth to date. 11/10/2021  Patient has been intermittently confused. Currently she is alert to person place and time. Needs reorientation to current situation. She is calm and cooperative with care. Does endorse dull left-sided abdominal pain. Patient has been hypotensive, manual bp 94/78.     11/11/2021  Patient is feeling tired and fatigued. Her systolic blood pressure is 100. She is on high doses of pain/anxiety medication. I cut down on her gabapentin, Xanax and requested by 50%. Increased IV fluids to 100 cc/h. She is receiving IV antibiotics for Clostridium perfringens bacteremia. 11/12/2021  Patient is feeling much better today. Her anxiety and pain is under control. I ordered vitamin Q63 and folic acid level for megaloblastic anemia, both of which need replacement. IM vitamin W59 and p.o. folic acid replacement ordered.   Continue with IV antibiotics, ID work-up and recommendations. Review of Systems:   Review of Systems   HENT: Negative. Negative for hearing loss. Gastrointestinal: Positive for abdominal pain. Skin: Negative. Psychiatric/Behavioral: Positive for confusion. 14 point review of systems is negative except as specifically addressed above. Objective:   VITALS:  BP (!) 104/40   Pulse 75   Temp 99.3 °F (37.4 °C) (Temporal)   Resp 16   Ht 5' 5\" (1.651 m)   Wt 118 lb 6.4 oz (53.7 kg)   SpO2 95%   BMI 19.70 kg/m²   24HR INTAKE/OUTPUT:      Intake/Output Summary (Last 24 hours) at 11/12/2021 1751  Last data filed at 11/12/2021 1415  Gross per 24 hour   Intake 1480 ml   Output 1050 ml   Net 430 ml       Physical Exam  Constitutional:       Appearance: Normal appearance. She is not ill-appearing. Eyes:      Extraocular Movements: Extraocular movements intact. Conjunctiva/sclera: Conjunctivae normal.      Pupils: Pupils are equal, round, and reactive to light. Cardiovascular:      Rate and Rhythm: Normal rate and regular rhythm. Pulses: Normal pulses. Heart sounds: Normal heart sounds. No murmur heard. Pulmonary:      Breath sounds: Examination of the right-upper field reveals wheezing. Examination of the left-upper field reveals wheezing. Wheezing present. Abdominal:      General: Bowel sounds are normal.      Palpations: Abdomen is soft. Tenderness: There is abdominal tenderness in the left upper quadrant. There is no right CVA tenderness, left CVA tenderness, guarding or rebound. Musculoskeletal:         General: No swelling. Normal range of motion. Cervical back: Normal range of motion and neck supple. No rigidity. Right lower leg: No edema. Left lower leg: No edema. Skin:     General: Skin is warm and dry. Coloration: Skin is not jaundiced. Findings: No rash. Neurological:      General: No focal deficit present. Mental Status: She is alert.  She is confused. GCS: GCS eye subscore is 4. GCS verbal subscore is 4. GCS motor subscore is 6. Cranial Nerves: No cranial nerve deficit. Motor: Weakness present. Comments: Easily reoriented, cooperative and calm   Psychiatric:         Mood and Affect: Mood is anxious. Speech: Speech normal.         Behavior: Behavior is cooperative. Medications:      lactated ringers 100 mL/hr at 11/12/21 0016      gabapentin  300 mg Oral TID    metroNIDAZOLE  500 mg Oral 3 times per day    amphetamine-dextroamphetamine  20 mg Oral BID    citalopram  40 mg Oral Daily    fluticasone  2 spray Each Nostril Daily    ipratropium-albuterol  1 vial Inhalation Q4H    lisinopril  5 mg Oral Daily    metoclopramide  5 mg Oral 4x Daily    montelukast  10 mg Oral Nightly    nicotine  1 patch TransDERmal Q24H    pantoprazole  40 mg Oral QAM AC    enoxaparin  40 mg SubCUTAneous Daily    budesonide  0.5 mg Nebulization BID    Arformoterol Tartrate  15 mcg Nebulization BID    cefepime  2,000 mg IntraVENous Q8H     ALPRAZolam, oxyCODONE-acetaminophen, [Held by provider] ALPRAZolam, guaiFENesin-dextromethorphan, albuterol, [Held by provider] ALPRAZolam, ondansetron **OR** ondansetron, acetaminophen **OR** acetaminophen, [Held by provider] oxyCODONE-acetaminophen  ADULT DIET; Regular; Low Fat/Low Chol/High Fiber/2 gm Na     Lab and other Data:     Recent Labs     11/10/21  0357 11/11/21 0440 11/12/21  0258   WBC 6.2 5.1 4.8   HGB 10.3* 9.2* 9.5*    144 142     Recent Labs     11/10/21  0357 11/11/21  0440 11/12/21  0258    142 143   K 3.5 3.8 3.7   CL 97* 103 103   CO2 30* 29 30*   BUN 7* 8 10   CREATININE 0.4* 0.4* 0.4*   GLUCOSE 87 102 106     Recent Labs     11/11/21  0440   AST 12   ALT 7   BILITOT <0.2   ALKPHOS 53     Troponin T:   No results for input(s): TROPONINI in the last 72 hours. Pro-BNP: No results for input(s): BNP in the last 72 hours.   INR:   No results for input(s): INR in the last 72 hours. UA:  No results for input(s): NITRITE, COLORU, PHUR, LABCAST, WBCUA, RBCUA, MUCUS, TRICHOMONAS, YEAST, BACTERIA, CLARITYU, SPECGRAV, LEUKOCYTESUR, UROBILINOGEN, BILIRUBINUR, BLOODU, GLUCOSEU, AMORPHOUS in the last 72 hours. Invalid input(s): Eula Marino  A1C: No results for input(s): LABA1C in the last 72 hours. ABG:  No results for input(s): PHART, FNQ4WJZ, PO2ART, TJP7VJW, BEART, HGBAE, T5GNVLCR, CARBOXHGBART in the last 72 hours. RAD:   CT ABDOMEN PELVIS WO CONTRAST Additional Contrast? None    Result Date: 11/8/2021  1. No mass, fluid collection, or inflammatory process is seen. Signed by Dr Edie Lesch (MIN 3 VIEWS)    Result Date: 11/8/2021  1. Chronic lung changes. 2. No rib fracture is seen. Signed by Dr Luis Antonio Wiseman    Result Date: 11/9/2021  A limited diagnostic study in the absence of a ventilation scan. A low probability of pulmonary embolism. Signed by Dr Sherrill Becerra         Assessment/Plan      Principal Problem:    Gram-negative bacteremia  Active Problems:    Chronic pain    Anxiety and depression    GERD (gastroesophageal reflux disease)    Hypertension    COPD (chronic obstructive pulmonary disease) ([facility])    Alpha-1-antitrypsin deficiency carrier    Folate deficiency anemia    Malignant neoplasm of upper lobe of right lung (HCC)    Palliative care patient    Intractable nausea and vomiting    Hypomagnesemia    Tobacco abuse counseling    Cigarette nicotine dependence with nicotine-induced disorder    Clostridium perfringens infection  Resolved Problems:    * No resolved hospital problems.  *      Continue with current medications  Continue with IV antibiotics in the form of cefepime and metronidazole  Patient remains tired and fatigued with borderline hypotension and SBP around 100  Increased IV hydration to 100 cc/h  Started blood pressure is now stable above 100  Cut down on Percocet to 5/325 mg p.o. every 6 hours as needed as needed  Cut down on Xanax to 0.5 mg p.o. every 8 hours as needed as needed  Cutdown on gabapentin to 300 mg p.o. every 8 hours  Advised patient to relax, calm down and take things 1 at a time  Patient has low folic acid level at 2.9  Folic acid 1 mg p.o. daily ordered  Patient has borderline low vitamin B12 of 261  Patient given vitamin B12 1000 mcg IM x1 dose   Patient may require IM/p.o. B12 supplementation upon discharge   ID currently order further work-up for patient to rule out etiology for Clostridium perfringens  Patient will be managed closely as per ID recommendations  Monitor vitals closely  PT/OT evaluation ordered  Case management consult for DC planning    Chronic medical issues . .. Continue with home meds. Monitor patient closely while admitted. Advised very close f/u with patient's PCP as an outpatient to address chronic medical issues. Repeat labs in a.m. Electrolyte replacement as per protocol. Patient will be monitored very closely on the floor. Further recommendations as per the hospital course. The patient's management will be taken over by our covering Hospitalist Physician in a.m . .. I am signing off! DC planning : To be determined as per the hospital course    Patient  is on DVT prophylaxis  Current medications reviewed  Lab work reviewed  Radiology/Chest x-ray films reviewed  Treatment recommendations from suspecialities reviewed, appreciated and agreed with  Discussed with the nurse and addressed all questions/concerns  Discussed with Patient and/or Family at the bedside in detail . .. they understand and agree with the management plan.     DVT Prophylaxis: Lovenox         Qasim Forbes MD, 11/12/2021 5:51 PM

## 2021-11-13 LAB
ANION GAP SERPL CALCULATED.3IONS-SCNC: 9 MMOL/L (ref 7–19)
BASOPHILS ABSOLUTE: 0 K/UL (ref 0–0.2)
BASOPHILS RELATIVE PERCENT: 0.6 % (ref 0–1)
BUN BLDV-MCNC: 8 MG/DL (ref 8–23)
CALCIUM SERPL-MCNC: 8.4 MG/DL (ref 8.8–10.2)
CHLORIDE BLD-SCNC: 99 MMOL/L (ref 98–111)
CO2: 31 MMOL/L (ref 22–29)
CREAT SERPL-MCNC: 0.4 MG/DL (ref 0.5–0.9)
EOSINOPHILS ABSOLUTE: 0.1 K/UL (ref 0–0.6)
EOSINOPHILS RELATIVE PERCENT: 3 % (ref 0–5)
GFR AFRICAN AMERICAN: >59
GFR NON-AFRICAN AMERICAN: >60
GLUCOSE BLD-MCNC: 114 MG/DL (ref 74–109)
HCT VFR BLD CALC: 30.9 % (ref 37–47)
HEMOGLOBIN: 9.7 G/DL (ref 12–16)
IMMATURE GRANULOCYTES #: 0 K/UL
LYMPHOCYTES ABSOLUTE: 1.2 K/UL (ref 1.1–4.5)
LYMPHOCYTES RELATIVE PERCENT: 26.7 % (ref 20–40)
MCH RBC QN AUTO: 32.9 PG (ref 27–31)
MCHC RBC AUTO-ENTMCNC: 31.4 G/DL (ref 33–37)
MCV RBC AUTO: 104.7 FL (ref 81–99)
MONOCYTES ABSOLUTE: 0.6 K/UL (ref 0–0.9)
MONOCYTES RELATIVE PERCENT: 11.9 % (ref 0–10)
NEUTROPHILS ABSOLUTE: 2.7 K/UL (ref 1.5–7.5)
NEUTROPHILS RELATIVE PERCENT: 57.6 % (ref 50–65)
PDW BLD-RTO: 13.6 % (ref 11.5–14.5)
PLATELET # BLD: 143 K/UL (ref 130–400)
PMV BLD AUTO: 11.7 FL (ref 9.4–12.3)
POTASSIUM SERPL-SCNC: 4.1 MMOL/L (ref 3.5–5)
RBC # BLD: 2.95 M/UL (ref 4.2–5.4)
SODIUM BLD-SCNC: 139 MMOL/L (ref 136–145)
WBC # BLD: 4.6 K/UL (ref 4.8–10.8)

## 2021-11-13 PROCEDURE — 6370000000 HC RX 637 (ALT 250 FOR IP): Performed by: STUDENT IN AN ORGANIZED HEALTH CARE EDUCATION/TRAINING PROGRAM

## 2021-11-13 PROCEDURE — 6370000000 HC RX 637 (ALT 250 FOR IP): Performed by: INTERNAL MEDICINE

## 2021-11-13 PROCEDURE — 2580000003 HC RX 258

## 2021-11-13 PROCEDURE — 80048 BASIC METABOLIC PNL TOTAL CA: CPT

## 2021-11-13 PROCEDURE — 2700000000 HC OXYGEN THERAPY PER DAY

## 2021-11-13 PROCEDURE — 2580000003 HC RX 258: Performed by: STUDENT IN AN ORGANIZED HEALTH CARE EDUCATION/TRAINING PROGRAM

## 2021-11-13 PROCEDURE — 6360000002 HC RX W HCPCS: Performed by: HOSPITALIST

## 2021-11-13 PROCEDURE — 6360000002 HC RX W HCPCS: Performed by: INTERNAL MEDICINE

## 2021-11-13 PROCEDURE — 85025 COMPLETE CBC W/AUTO DIFF WBC: CPT

## 2021-11-13 PROCEDURE — 94640 AIRWAY INHALATION TREATMENT: CPT

## 2021-11-13 PROCEDURE — 36415 COLL VENOUS BLD VENIPUNCTURE: CPT

## 2021-11-13 PROCEDURE — 2580000003 HC RX 258: Performed by: HOSPITALIST

## 2021-11-13 PROCEDURE — 6370000000 HC RX 637 (ALT 250 FOR IP): Performed by: HOSPITALIST

## 2021-11-13 PROCEDURE — 1210000000 HC MED SURG R&B

## 2021-11-13 RX ORDER — SODIUM CHLORIDE 9 MG/ML
INJECTION, SOLUTION INTRAVENOUS CONTINUOUS
Status: DISCONTINUED | OUTPATIENT
Start: 2021-11-13 | End: 2021-11-14 | Stop reason: HOSPADM

## 2021-11-13 RX ORDER — OXYCODONE AND ACETAMINOPHEN 10; 325 MG/1; MG/1
1 TABLET ORAL EVERY 4 HOURS PRN
Status: DISCONTINUED | OUTPATIENT
Start: 2021-11-13 | End: 2021-11-14 | Stop reason: HOSPADM

## 2021-11-13 RX ORDER — ALPRAZOLAM 1 MG/1
1 TABLET ORAL EVERY 6 HOURS PRN
Status: DISCONTINUED | OUTPATIENT
Start: 2021-11-13 | End: 2021-11-14 | Stop reason: HOSPADM

## 2021-11-13 RX ORDER — OXYCODONE HYDROCHLORIDE AND ACETAMINOPHEN 5; 325 MG/1; MG/1
2 TABLET ORAL EVERY 6 HOURS PRN
Status: DISCONTINUED | OUTPATIENT
Start: 2021-11-13 | End: 2021-11-13

## 2021-11-13 RX ADMIN — WATER 2000 MG: 1 INJECTION INTRAMUSCULAR; INTRAVENOUS; SUBCUTANEOUS at 16:19

## 2021-11-13 RX ADMIN — ALPRAZOLAM 0.5 MG: 0.5 TABLET ORAL at 08:13

## 2021-11-13 RX ADMIN — BUDESONIDE 500 MCG: 0.5 SUSPENSION RESPIRATORY (INHALATION) at 06:16

## 2021-11-13 RX ADMIN — SODIUM CHLORIDE: 9 INJECTION, SOLUTION INTRAVENOUS at 15:25

## 2021-11-13 RX ADMIN — METRONIDAZOLE 500 MG: 500 TABLET ORAL at 04:24

## 2021-11-13 RX ADMIN — METOCLOPRAMIDE 5 MG: 10 TABLET ORAL at 12:19

## 2021-11-13 RX ADMIN — WATER 2000 MG: 1 INJECTION INTRAMUSCULAR; INTRAVENOUS; SUBCUTANEOUS at 23:48

## 2021-11-13 RX ADMIN — ALPRAZOLAM 0.5 MG: 0.5 TABLET ORAL at 00:13

## 2021-11-13 RX ADMIN — ALPRAZOLAM 1 MG: 1 TABLET ORAL at 20:15

## 2021-11-13 RX ADMIN — IPRATROPIUM BROMIDE AND ALBUTEROL SULFATE 3 ML: .5; 2.5 SOLUTION RESPIRATORY (INHALATION) at 03:10

## 2021-11-13 RX ADMIN — GABAPENTIN 300 MG: 600 TABLET, FILM COATED ORAL at 08:14

## 2021-11-13 RX ADMIN — DEXTROAMPHETAMINE SACCHARATE, AMPHETAMINE ASPARTATE, DEXTROAMPHETAMINE SULFATE, AMPHETAMINE SULFATE TABLETS, 10 MG,CLL 20 MG: 2.5; 2.5; 2.5; 2.5 TABLET ORAL at 08:15

## 2021-11-13 RX ADMIN — OXYCODONE AND ACETAMINOPHEN 1 TABLET: 10; 325 TABLET ORAL at 16:22

## 2021-11-13 RX ADMIN — FLUTICASONE PROPIONATE 2 SPRAY: 50 SPRAY, METERED NASAL at 08:19

## 2021-11-13 RX ADMIN — IPRATROPIUM BROMIDE AND ALBUTEROL SULFATE 3 ML: .5; 2.5 SOLUTION RESPIRATORY (INHALATION) at 11:13

## 2021-11-13 RX ADMIN — WATER: 1 INJECTION INTRAMUSCULAR; INTRAVENOUS; SUBCUTANEOUS at 23:48

## 2021-11-13 RX ADMIN — METOCLOPRAMIDE 5 MG: 10 TABLET ORAL at 08:16

## 2021-11-13 RX ADMIN — OXYCODONE HYDROCHLORIDE AND ACETAMINOPHEN 1 TABLET: 5; 325 TABLET ORAL at 06:39

## 2021-11-13 RX ADMIN — ENOXAPARIN SODIUM 40 MG: 100 INJECTION SUBCUTANEOUS at 08:17

## 2021-11-13 RX ADMIN — BUDESONIDE 500 MCG: 0.5 SUSPENSION RESPIRATORY (INHALATION) at 18:56

## 2021-11-13 RX ADMIN — GABAPENTIN 300 MG: 600 TABLET, FILM COATED ORAL at 20:15

## 2021-11-13 RX ADMIN — DEXTROAMPHETAMINE SACCHARATE, AMPHETAMINE ASPARTATE, DEXTROAMPHETAMINE SULFATE, AMPHETAMINE SULFATE TABLETS, 10 MG,CLL 20 MG: 2.5; 2.5; 2.5; 2.5 TABLET ORAL at 14:58

## 2021-11-13 RX ADMIN — METRONIDAZOLE 500 MG: 500 TABLET ORAL at 20:21

## 2021-11-13 RX ADMIN — IPRATROPIUM BROMIDE AND ALBUTEROL SULFATE 3 ML: .5; 2.5 SOLUTION RESPIRATORY (INHALATION) at 06:16

## 2021-11-13 RX ADMIN — METOCLOPRAMIDE 5 MG: 10 TABLET ORAL at 16:19

## 2021-11-13 RX ADMIN — OXYCODONE AND ACETAMINOPHEN 1 TABLET: 10; 325 TABLET ORAL at 20:14

## 2021-11-13 RX ADMIN — ARFORMOTEROL TARTRATE 15 MCG: 15 SOLUTION RESPIRATORY (INHALATION) at 06:16

## 2021-11-13 RX ADMIN — ACETAMINOPHEN 650 MG: 325 TABLET ORAL at 20:15

## 2021-11-13 RX ADMIN — PANTOPRAZOLE SODIUM 40 MG: 40 TABLET, DELAYED RELEASE ORAL at 05:46

## 2021-11-13 RX ADMIN — ALPRAZOLAM 1 MG: 1 TABLET ORAL at 13:32

## 2021-11-13 RX ADMIN — CITALOPRAM HYDROBROMIDE 40 MG: 20 TABLET ORAL at 08:13

## 2021-11-13 RX ADMIN — LISINOPRIL 5 MG: 5 TABLET ORAL at 08:16

## 2021-11-13 RX ADMIN — METOCLOPRAMIDE 5 MG: 10 TABLET ORAL at 20:14

## 2021-11-13 RX ADMIN — IPRATROPIUM BROMIDE AND ALBUTEROL SULFATE 3 ML: .5; 2.5 SOLUTION RESPIRATORY (INHALATION) at 15:19

## 2021-11-13 RX ADMIN — ONDANSETRON 4 MG: 4 TABLET, ORALLY DISINTEGRATING ORAL at 20:14

## 2021-11-13 RX ADMIN — ARFORMOTEROL TARTRATE 15 MCG: 15 SOLUTION RESPIRATORY (INHALATION) at 18:56

## 2021-11-13 RX ADMIN — OXYCODONE AND ACETAMINOPHEN 1 TABLET: 10; 325 TABLET ORAL at 12:19

## 2021-11-13 RX ADMIN — WATER 2000 MG: 1 INJECTION INTRAMUSCULAR; INTRAVENOUS; SUBCUTANEOUS at 00:12

## 2021-11-13 RX ADMIN — OXYCODONE HYDROCHLORIDE AND ACETAMINOPHEN 1 TABLET: 5; 325 TABLET ORAL at 00:13

## 2021-11-13 RX ADMIN — IPRATROPIUM BROMIDE AND ALBUTEROL SULFATE 3 ML: .5; 2.5 SOLUTION RESPIRATORY (INHALATION) at 18:45

## 2021-11-13 RX ADMIN — WATER 2000 MG: 1 INJECTION INTRAMUSCULAR; INTRAVENOUS; SUBCUTANEOUS at 08:17

## 2021-11-13 RX ADMIN — GABAPENTIN 300 MG: 600 TABLET, FILM COATED ORAL at 12:18

## 2021-11-13 RX ADMIN — METRONIDAZOLE 500 MG: 500 TABLET ORAL at 12:18

## 2021-11-13 RX ADMIN — FOLIC ACID 1 MG: 1 TABLET ORAL at 08:14

## 2021-11-13 RX ADMIN — MONTELUKAST SODIUM 10 MG: 10 TABLET, FILM COATED ORAL at 20:14

## 2021-11-13 ASSESSMENT — PAIN SCALES - GENERAL
PAINLEVEL_OUTOF10: 0
PAINLEVEL_OUTOF10: 8
PAINLEVEL_OUTOF10: 6
PAINLEVEL_OUTOF10: 7
PAINLEVEL_OUTOF10: 8
PAINLEVEL_OUTOF10: 7
PAINLEVEL_OUTOF10: 7

## 2021-11-13 ASSESSMENT — PAIN DESCRIPTION - PAIN TYPE
TYPE: CHRONIC PAIN
TYPE: CHRONIC PAIN
TYPE: ACUTE PAIN
TYPE: CHRONIC PAIN

## 2021-11-13 ASSESSMENT — PAIN DESCRIPTION - PROGRESSION
CLINICAL_PROGRESSION: NOT CHANGED

## 2021-11-13 ASSESSMENT — PAIN DESCRIPTION - DESCRIPTORS
DESCRIPTORS: ACHING;DISCOMFORT;SORE
DESCRIPTORS: ACHING;DISCOMFORT;SORE
DESCRIPTORS: ACHING

## 2021-11-13 ASSESSMENT — PAIN DESCRIPTION - ORIENTATION
ORIENTATION: LEFT

## 2021-11-13 ASSESSMENT — PAIN DESCRIPTION - FREQUENCY
FREQUENCY: CONTINUOUS

## 2021-11-13 ASSESSMENT — PAIN DESCRIPTION - LOCATION
LOCATION: ABDOMEN
LOCATION: HIP
LOCATION: LEG;HIP
LOCATION: HIP;LEG

## 2021-11-13 ASSESSMENT — PAIN DESCRIPTION - ONSET
ONSET: ON-GOING

## 2021-11-13 ASSESSMENT — PAIN DESCRIPTION - DIRECTION
RADIATING_TOWARDS: HIP TO KNEE
RADIATING_TOWARDS: NO
RADIATING_TOWARDS: HIP TO KNEE

## 2021-11-13 ASSESSMENT — PAIN - FUNCTIONAL ASSESSMENT
PAIN_FUNCTIONAL_ASSESSMENT: PREVENTS OR INTERFERES WITH MANY ACTIVE NOT PASSIVE ACTIVITIES

## 2021-11-13 ASSESSMENT — PAIN SCALES - WONG BAKER: WONGBAKER_NUMERICALRESPONSE: 0

## 2021-11-13 NOTE — PROGRESS NOTES
Physical Therapy    Marin attempted. Pt declines any mobility at this time stating she gets up without difficulty with nsg supervision. Denies need for continued skilled services.     Electronically signed by Mary Mabry PT on 11/13/2021 at 12:42 PM

## 2021-11-13 NOTE — PROGRESS NOTES
Infectious Diseases Progress Note    Patient:  Sherif Díaz  YOB: 1950  MRN: [id number]   Admit date: 11/8/2021   Admitting Physician: Gracia Page MD  Primary Care Physician: TEN Bravo    Chief Complaint/Interval History: She seems to be doing very well. She is improved. Abdominal pain improved. No fever. She is on chronic oxygen treatment at home. Respiratory symptoms seem to be at baseline. Talked with her about presumptive diagnosis of diverticulitis which has responded to antibiotic treatment. She would very much like to go home on November 14. Explained the best oral regimen to complete a few additional days of treatment would be Cipro and metronidazole based on her allergy history. Reviewed the specific risks of fluoroquinolone treatment including tendon soreness, tendon weakening and in rare circumstances tendon rupture. She is accepting of those risks. Explained it would be nice to get a CT with contrast to confirm diagnosis, but do not feel risk-benefit is in favor of giving her steroids so that she could undergo CT scanning. She has had previous colonoscopies per her report that have demonstrated diverticulosis. She indicates she has an appointment with Raine Calloway at [facility]) on November 17. In/Out    Intake/Output Summary (Last 24 hours) at 11/13/2021 1711  Last data filed at 11/13/2021 1512  Gross per 24 hour   Intake 605 ml   Output 2600 ml   Net -1995 ml     Allergies:    Allergies   Allergen Reactions    Aspirin Hives    Codeine     Demerol     Fentanyl Hives    Iv Dye [Iodides]     Neosporin [Bacitracin-Neomycin-Polymyxin]     Nsaids     Pcn [Penicillins]     Pentazocine Lactate     Sulfa Antibiotics     Talwin [Pentazocine] Hives    Influenza Vaccines Hives and Rash     Current Meds: ALPRAZolam (XANAX) tablet 1 mg, Q6H PRN  oxyCODONE-acetaminophen (PERCOCET)  MG per tablet 1 tablet, Q4H PRN  0.9 % sodium chloride infusion, Continuous  folic acid (FOLVITE) tablet 1 mg, Daily  gabapentin (NEURONTIN) tablet 300 mg, TID  metroNIDAZOLE (FLAGYL) tablet 500 mg, 3 times per day  guaiFENesin-dextromethorphan (ROBITUSSIN DM) 100-10 MG/5ML syrup 5 mL, Q6H PRN  albuterol (PROVENTIL) nebulizer solution 2.5 mg, Q6H PRN  amphetamine-dextroamphetamine (ADDERALL) tablet 20 mg, BID  citalopram (CELEXA) tablet 40 mg, Daily  fluticasone (FLONASE) 50 MCG/ACT nasal spray 2 spray, Daily  ipratropium-albuterol (DUONEB) nebulizer solution 3 mL, Q4H  lisinopril (PRINIVIL;ZESTRIL) tablet 5 mg, Daily  metoclopramide (REGLAN) tablet 5 mg, 4x Daily  montelukast (SINGULAIR) tablet 10 mg, Nightly  nicotine (NICODERM CQ) 21 MG/24HR 1 patch, Q24H  pantoprazole (PROTONIX) tablet 40 mg, QAM AC  enoxaparin (LOVENOX) injection 40 mg, Daily  ondansetron (ZOFRAN-ODT) disintegrating tablet 4 mg, Q8H PRN   Or  ondansetron (ZOFRAN) injection 4 mg, Q6H PRN  acetaminophen (TYLENOL) tablet 650 mg, Q6H PRN   Or  acetaminophen (TYLENOL) suppository 650 mg, Q6H PRN  budesonide (PULMICORT) nebulizer suspension 500 mcg, BID  Arformoterol Tartrate (BROVANA) nebulizer solution 15 mcg, BID  ceFEPIme (MAXIPIME) 2,000 mg in sterile water 20 mL IV syringe, Q8H      Review of Systems see HPI. No urinary symptoms. VitalSigns:  BP (!) 113/58   Pulse 99   Temp 96.3 °F (35.7 °C) (Temporal)   Resp 17   Ht 5' 5\" (1.651 m)   Wt 118 lb 6.4 oz (53.7 kg)   SpO2 93%   BMI 19.70 kg/m²      Physical Exam  Line/IV site: No erythema, warmth, induration, or tenderness. Bowel sounds normal.  Abdomen is soft. No abdominal tenderness. No guarding or rebound. No mass.   Lungs clear without crackles    Lab Results:  CBC:   Recent Labs     11/11/21  0440 11/12/21  0258 11/13/21  0259   WBC 5.1 4.8 4.6*   HGB 9.2* 9.5* 9.7*    142 143     BMP:  Recent Labs     11/11/21  0440 11/12/21  0258 11/13/21  0259    143 139   K 3.8 3.7 4.1    103 99   CO2 29 30* 31*   BUN 8 10 8 CREATININE 0.4* 0.4* 0.4*   GLUCOSE 102 106 114*     CultureResults:  Blood cultures November 9 no growth  Blood cultures November 8 Clostridium perfringens    Radiology:   CT abdomen and pelvis on November 8, 2021:  [id number]    11/8/2021 10:05 PM   History: Vomiting. Left side abdominal pain. Noncontrast abdomen/pelvis CT. In order to have a CT radiation dose as low as reasonably achievable   Automated Exposure Control was utilized for adjustment of the mA   and/or KV according to patient size. DLP in mGycm= 261. Normal heart size. Hyperexpanded lung bases with chronic parenchymal changes and focal   right middle lobe scarring. Mildly distended fluid-filled stomach. Surgical clips within the upper abdomen producing streak artifact   which obscures portions of some of the upper abdominal images. Cholecystectomy clips. Normal appearance of liver, pancreas, and spleen. Aortic calcification with no aneurysm. Normal and symmetric kidneys. No hydronephrosis. No bowel obstruction. No pelvic mass or fluid. Small fat-containing supraumbilical ventral hernia.       Impression   1. No mass, fluid collection, or inflammatory process is seen. Signed by Dr Mahala Kanner     Additional Studies Reviewed:  None    Impression:  1. Clostridium perfringens in blood culture. I suspect intra-abdominal source. Feel diverticulitis would be the most likely diagnosis. Although CT without contrast did not show any inflammatory process, she had had some lower abdominal discomfort. She has responded to antibiotic treatment. Biliary source seems unlikely. Diverticulitis responding to treatment seems to be the most likely diagnosis. Do not feel the risk-benefit is in favor of giving her steroids to overcome her contrast allergy for the purpose of getting a CT with contrast.  2.  Chronic obstructive pulmonary disease  3.   She had some suprapubic and lower abdominal discomfort with some urinary urgency. Those symptoms have resolved. Recommendations:  Okay with me for discharge home November 14, 2021  Would recommend the following antibiotic treatment course at discharge:  Ciprofloxacin 500 mg orally every 12 hours for 3 days  Flagyl 500 mg orally every 8 hours for 3 days  She indicates she has a previously scheduled appointment with Tasha Salcedo at [facility]) on November 17, 2021. Recommend she keep that appointment. Will be happy to reassess if new or recurrent symptoms. She can otherwise follow-up with infectious diseases as needed.   Discussed with Dr. She Gu MD

## 2021-11-14 VITALS
HEART RATE: 87 BPM | SYSTOLIC BLOOD PRESSURE: 131 MMHG | HEIGHT: 65 IN | DIASTOLIC BLOOD PRESSURE: 55 MMHG | WEIGHT: 118.4 LBS | TEMPERATURE: 98 F | BODY MASS INDEX: 19.73 KG/M2 | RESPIRATION RATE: 20 BRPM | OXYGEN SATURATION: 97 %

## 2021-11-14 LAB
ANION GAP SERPL CALCULATED.3IONS-SCNC: 10 MMOL/L (ref 7–19)
BASOPHILS ABSOLUTE: 0 K/UL (ref 0–0.2)
BASOPHILS RELATIVE PERCENT: 1 % (ref 0–1)
BUN BLDV-MCNC: 8 MG/DL (ref 8–23)
CALCIUM SERPL-MCNC: 8.8 MG/DL (ref 8.8–10.2)
CHLORIDE BLD-SCNC: 100 MMOL/L (ref 98–111)
CO2: 32 MMOL/L (ref 22–29)
CREAT SERPL-MCNC: 0.4 MG/DL (ref 0.5–0.9)
CULTURE, BLOOD 2: NORMAL
EOSINOPHILS ABSOLUTE: 0.2 K/UL (ref 0–0.6)
EOSINOPHILS RELATIVE PERCENT: 3.6 % (ref 0–5)
GFR AFRICAN AMERICAN: >59
GFR NON-AFRICAN AMERICAN: >60
GLUCOSE BLD-MCNC: 82 MG/DL (ref 74–109)
HCT VFR BLD CALC: 33.1 % (ref 37–47)
HEMOGLOBIN: 10.4 G/DL (ref 12–16)
IMMATURE GRANULOCYTES #: 0 K/UL
LYMPHOCYTES ABSOLUTE: 1.1 K/UL (ref 1.1–4.5)
LYMPHOCYTES RELATIVE PERCENT: 27.4 % (ref 20–40)
MCH RBC QN AUTO: 32.9 PG (ref 27–31)
MCHC RBC AUTO-ENTMCNC: 31.4 G/DL (ref 33–37)
MCV RBC AUTO: 104.7 FL (ref 81–99)
MONOCYTES ABSOLUTE: 0.5 K/UL (ref 0–0.9)
MONOCYTES RELATIVE PERCENT: 12.1 % (ref 0–10)
NEUTROPHILS ABSOLUTE: 2.3 K/UL (ref 1.5–7.5)
NEUTROPHILS RELATIVE PERCENT: 55.7 % (ref 50–65)
PDW BLD-RTO: 13.4 % (ref 11.5–14.5)
PLATELET # BLD: 146 K/UL (ref 130–400)
PMV BLD AUTO: 11.6 FL (ref 9.4–12.3)
POTASSIUM SERPL-SCNC: 3.8 MMOL/L (ref 3.5–5)
RBC # BLD: 3.16 M/UL (ref 4.2–5.4)
SODIUM BLD-SCNC: 142 MMOL/L (ref 136–145)
WBC # BLD: 4.1 K/UL (ref 4.8–10.8)

## 2021-11-14 PROCEDURE — 36415 COLL VENOUS BLD VENIPUNCTURE: CPT

## 2021-11-14 PROCEDURE — 80048 BASIC METABOLIC PNL TOTAL CA: CPT

## 2021-11-14 PROCEDURE — 94640 AIRWAY INHALATION TREATMENT: CPT

## 2021-11-14 PROCEDURE — 85025 COMPLETE CBC W/AUTO DIFF WBC: CPT

## 2021-11-14 PROCEDURE — 6370000000 HC RX 637 (ALT 250 FOR IP): Performed by: STUDENT IN AN ORGANIZED HEALTH CARE EDUCATION/TRAINING PROGRAM

## 2021-11-14 PROCEDURE — 6370000000 HC RX 637 (ALT 250 FOR IP): Performed by: INTERNAL MEDICINE

## 2021-11-14 PROCEDURE — 6360000002 HC RX W HCPCS: Performed by: INTERNAL MEDICINE

## 2021-11-14 PROCEDURE — 2700000000 HC OXYGEN THERAPY PER DAY

## 2021-11-14 PROCEDURE — 6370000000 HC RX 637 (ALT 250 FOR IP): Performed by: HOSPITALIST

## 2021-11-14 PROCEDURE — 2580000003 HC RX 258: Performed by: HOSPITALIST

## 2021-11-14 PROCEDURE — 6360000002 HC RX W HCPCS: Performed by: HOSPITALIST

## 2021-11-14 RX ORDER — METRONIDAZOLE 500 MG/1
500 TABLET ORAL EVERY 8 HOURS SCHEDULED
Qty: 9 TABLET | Refills: 0 | Status: SHIPPED | OUTPATIENT
Start: 2021-11-14 | End: 2021-11-17

## 2021-11-14 RX ORDER — CIPROFLOXACIN 500 MG/1
500 TABLET, FILM COATED ORAL 2 TIMES DAILY
Qty: 6 TABLET | Refills: 0 | Status: SHIPPED | OUTPATIENT
Start: 2021-11-14 | End: 2021-11-17

## 2021-11-14 RX ORDER — PHENAZOPYRIDINE HYDROCHLORIDE 200 MG/1
200 TABLET, FILM COATED ORAL 3 TIMES DAILY PRN
Qty: 30 TABLET | Refills: 0 | Status: SHIPPED | OUTPATIENT
Start: 2021-11-14

## 2021-11-14 RX ADMIN — METRONIDAZOLE 500 MG: 500 TABLET ORAL at 06:06

## 2021-11-14 RX ADMIN — IPRATROPIUM BROMIDE AND ALBUTEROL SULFATE 3 ML: .5; 2.5 SOLUTION RESPIRATORY (INHALATION) at 11:00

## 2021-11-14 RX ADMIN — WATER 2000 MG: 1 INJECTION INTRAMUSCULAR; INTRAVENOUS; SUBCUTANEOUS at 07:51

## 2021-11-14 RX ADMIN — OXYCODONE AND ACETAMINOPHEN 1 TABLET: 10; 325 TABLET ORAL at 06:07

## 2021-11-14 RX ADMIN — GABAPENTIN 300 MG: 600 TABLET, FILM COATED ORAL at 09:35

## 2021-11-14 RX ADMIN — METRONIDAZOLE 500 MG: 500 TABLET ORAL at 14:24

## 2021-11-14 RX ADMIN — LISINOPRIL 5 MG: 5 TABLET ORAL at 09:33

## 2021-11-14 RX ADMIN — FOLIC ACID 1 MG: 1 TABLET ORAL at 09:33

## 2021-11-14 RX ADMIN — DEXTROAMPHETAMINE SACCHARATE, AMPHETAMINE ASPARTATE, DEXTROAMPHETAMINE SULFATE, AMPHETAMINE SULFATE TABLETS, 10 MG,CLL 10 MG: 2.5; 2.5; 2.5; 2.5 TABLET ORAL at 07:51

## 2021-11-14 RX ADMIN — CITALOPRAM HYDROBROMIDE 40 MG: 20 TABLET ORAL at 09:35

## 2021-11-14 RX ADMIN — ARFORMOTEROL TARTRATE 15 MCG: 15 SOLUTION RESPIRATORY (INHALATION) at 06:10

## 2021-11-14 RX ADMIN — ALPRAZOLAM 1 MG: 1 TABLET ORAL at 11:25

## 2021-11-14 RX ADMIN — BUDESONIDE 500 MCG: 0.5 SUSPENSION RESPIRATORY (INHALATION) at 06:10

## 2021-11-14 RX ADMIN — ALPRAZOLAM 1 MG: 1 TABLET ORAL at 03:52

## 2021-11-14 RX ADMIN — IPRATROPIUM BROMIDE AND ALBUTEROL SULFATE 3 ML: .5; 2.5 SOLUTION RESPIRATORY (INHALATION) at 06:10

## 2021-11-14 RX ADMIN — OXYCODONE AND ACETAMINOPHEN 1 TABLET: 10; 325 TABLET ORAL at 01:46

## 2021-11-14 RX ADMIN — OXYCODONE AND ACETAMINOPHEN 1 TABLET: 10; 325 TABLET ORAL at 14:24

## 2021-11-14 RX ADMIN — PANTOPRAZOLE SODIUM 40 MG: 40 TABLET, DELAYED RELEASE ORAL at 06:07

## 2021-11-14 RX ADMIN — METOCLOPRAMIDE 5 MG: 10 TABLET ORAL at 09:33

## 2021-11-14 ASSESSMENT — PAIN DESCRIPTION - ORIENTATION
ORIENTATION: LEFT
ORIENTATION: LEFT

## 2021-11-14 ASSESSMENT — PAIN DESCRIPTION - PAIN TYPE
TYPE: SURGICAL PAIN
TYPE: SURGICAL PAIN

## 2021-11-14 ASSESSMENT — PAIN DESCRIPTION - LOCATION
LOCATION: HIP
LOCATION: LEG;HIP

## 2021-11-14 ASSESSMENT — PAIN DESCRIPTION - DIRECTION
RADIATING_TOWARDS: KNEE
RADIATING_TOWARDS: HIP TO KNEE

## 2021-11-14 ASSESSMENT — PAIN DESCRIPTION - PROGRESSION
CLINICAL_PROGRESSION: NOT CHANGED
CLINICAL_PROGRESSION: NOT CHANGED

## 2021-11-14 ASSESSMENT — PAIN SCALES - WONG BAKER
WONGBAKER_NUMERICALRESPONSE: 0
WONGBAKER_NUMERICALRESPONSE: 0

## 2021-11-14 ASSESSMENT — PAIN SCALES - GENERAL
PAINLEVEL_OUTOF10: 7
PAINLEVEL_OUTOF10: 7
PAINLEVEL_OUTOF10: 4

## 2021-11-14 ASSESSMENT — PAIN DESCRIPTION - FREQUENCY
FREQUENCY: CONTINUOUS
FREQUENCY: CONTINUOUS

## 2021-11-14 ASSESSMENT — PAIN DESCRIPTION - DESCRIPTORS
DESCRIPTORS: ACHING;CONSTANT;CRAMPING;DISCOMFORT
DESCRIPTORS: ACHING;CONSTANT;DISCOMFORT

## 2021-11-14 ASSESSMENT — PAIN - FUNCTIONAL ASSESSMENT
PAIN_FUNCTIONAL_ASSESSMENT: PREVENTS OR INTERFERES WITH MANY ACTIVE NOT PASSIVE ACTIVITIES
PAIN_FUNCTIONAL_ASSESSMENT: PREVENTS OR INTERFERES WITH MANY ACTIVE NOT PASSIVE ACTIVITIES

## 2021-11-14 ASSESSMENT — PAIN DESCRIPTION - ONSET
ONSET: ON-GOING
ONSET: ON-GOING

## 2021-11-14 NOTE — DISCHARGE SUMMARY
Discharge Summary    NAME: Holly Ji  :  1950  MRN:  [id number]    Admit date:  2021  Discharge date:  2021    Advance Directive: Full Code    Consults: ID    Primary Care Physician:  TEN Burgess    Discharge Diagnoses:      Clostridium perfringens infection    Blood Culture positive for Clostridium perfringens    Intra-abdominal infection    Intractable nausea and vomiting    Chronic pain    Anxiety and depression    GERD (gastroesophageal reflux disease)    Hypertension    COPD (chronic obstructive pulmonary disease) ([facility]    Alpha-1-antitrypsin deficiency carrier    Folate deficiency anemia    Malignant neoplasm of upper lobe of right lung ([facility] [address]    Hypomagnesemia        Significant Diagnostic Studies:   CT ABDOMEN PELVIS WO CONTRAST Additional Contrast? None    Result Date: 2021  CT ABDOMEN PELVIS WO CONTRAST 2021 10:05 PM History: Vomiting. Left side abdominal pain. Noncontrast abdomen/pelvis CT. In order to have a CT radiation dose as low as reasonably achievable Automated Exposure Control was utilized for adjustment of the mA and/or KV according to patient size. DLP in mGycm= 261. Normal heart size. Hyperexpanded lung bases with chronic parenchymal changes and focal right middle lobe scarring. Mildly distended fluid-filled stomach. Surgical clips within the upper abdomen producing streak artifact which obscures portions of some of the upper abdominal images. Cholecystectomy clips. Normal appearance of liver, pancreas, and spleen. Aortic calcification with no aneurysm. Normal and symmetric kidneys. No hydronephrosis. No bowel obstruction. No pelvic mass or fluid. Small fat-containing supraumbilical ventral hernia. 1. No mass, fluid collection, or inflammatory process is seen. Signed by Dr Gary Young (MIN 3 VIEWS)    Result Date: 2021  XR RIBS LEFT INCLUDE CHEST (MIN 3 VIEWS) 2021 10:44 PM History: Short of breath.  Rib pain. Chest x-ray and left rib films, 5 views. Comparison is made with a chest x-ray from October 2, 2021. Normal heart size. Hyperexpanded lungs with chronic interstitial disease. No focal infiltrate. No pneumothorax. Osteopenic bones. No left-sided rib fracture is seen. 1. Chronic lung changes. 2. No rib fracture is seen. Signed by Dr Teena Cherry    Result Date: 11/9/2021  Examination. NM LUNG SCAN PERFUSION ONLY 11/8/2021 10:52 PM History: Shortness of breath and tachycardia. Left-sided chest pain. History of for lung malignancy. After the intravenous injection of 4.9 mCi of technetium 99m macroaggregated albumin, images of the lungs bilaterally is obtained in anterior, posterior, both-like and lateral projections with help of scintillation camera. There is no previous similar study for comparison. The correlation is made with chest radiograph dated 10/2/2021. No ventilation scans were obtained. There are no segmental or subsegmental perfusion defects noted. A limited diagnostic study in the absence of a ventilation scan. A low probability of pulmonary embolism. Signed by Dr Cal Sifuentes:   CBC:   Recent Labs     11/12/21 0258 11/13/21 0259 11/14/21  0545   WBC 4.8 4.6* 4.1*   HGB 9.5* 9.7* 10.4*    143 146     BMP:    Recent Labs     11/12/21 0258 11/13/21  0259 11/14/21  0545    139 142   K 3.7 4.1 3.8    99 100   CO2 30* 31* 32*   BUN 10 8 8   CREATININE 0.4* 0.4* 0.4*   GLUCOSE 106 114* 80           Hospital Course: 70-year-old female with COPD, lung cancer, chronic pain, anxiety, recently hospitalized with pneumonia and COPD exacerbation, presented due to persistent nausea, vomiting, diarrhea with associated lower abdominal discomfort for approximately 1 week. CT abdomen/pelvis without contrast obtained did not show any mass, fluid collection or other inflammatory or infectious process.  1 of 2 blood cultures speciated Clostridium perfringens and infectious diseases service was consulted. Suspected intra-abdominal source, likely diverticulitis/colitis. Biliary source seemed unlikely. Patient treated empirically on cefepime and Flagyl. Consider to repeat CT abdomen pelvis with contrast to better evaluate for intra-abdominal source, however this was deferred his risk likely outweighed any benefit with patient having reported allergy to IV dye. Patient improved clinically on antibiotic therapy. Abdominal pain resolved and diet advanced with low residue diet. Patient stable for discharge home with 3 additional days of antibiotics on Cipro and Flagyl per ID recommendation to complete adequate antibiotic course with PCP follow-up on 11/17. Physical Exam:  Vital Signs: BP (!) 131/55   Pulse 87   Temp 98 °F (36.7 °C) (Temporal)   Resp 20   Ht 5' 5\" (1.651 m)   Wt 118 lb 6.4 oz (53.7 kg)   SpO2 97%   BMI 19.70 kg/m²   General appearance:. Alert and Cooperative   HEENT: Normocephalic. Atraumatic  Chest: clear to auscultation bilaterally without wheezes or rhonchi. Cardiac: Normal heart tones with regular rate and rhythm, S1, S2 normal.   Abdomen:soft, non-tender; normal bowel sounds  Extremities: No clubbing or cyanosis. No peripheral edema. Peripheral pulses palpable. Neurologic: Grossly intact.     Discharge Medications:        Current Discharge Medication List           Details   metroNIDAZOLE (FLAGYL) 500 MG tablet Take 1 tablet by mouth every 8 hours for 3 days  Qty: 9 tablet, Refills: 0      ciprofloxacin (CIPRO) 500 MG tablet Take 1 tablet by mouth 2 times daily for 3 days  Qty: 6 tablet, Refills: 0      phenazopyridine (PYRIDIUM) 200 MG tablet Take 1 tablet by mouth 3 times daily as needed for Pain (bladder discomfort)  Qty: 30 tablet, Refills: 0              Details   montelukast (SINGULAIR) 10 MG tablet Take 10 mg by mouth nightly      lisinopril (PRINIVIL;ZESTRIL) 5 MG tablet TAKE 1 TABLET BY MOUTH DAILY  Qty: 30 tablet, Refills: 5    Associated Diagnoses: Essential hypertension      oxyCODONE-acetaminophen (PERCOCET)  MG per tablet TAKE 1 TABLET Q 4 TO 6 HRS PRN. (MAX 5/DAY). Earliest Fill Date: 7/19/18  Qty: 150 tablet, Refills: 0    Associated Diagnoses: Cervicalgia      ondansetron (ZOFRAN ODT) 4 MG disintegrating tablet Take 1 tablet by mouth every 8 hours as needed for Nausea or Vomiting  Qty: 15 tablet, Refills: 0      lidocaine (LIDODERM) 5 % Place 1 patch onto the skin daily 12 hours on, 12 hours off.   Qty: 30 patch, Refills: 0      nicotine (NICODERM CQ) 7 MG/24HR Place 1 patch onto the skin every 24 hours  Qty: 30 patch, Refills: 0      nicotine (NICODERM CQ) 21 MG/24HR Place 1 patch onto the skin every 24 hours  Qty: 30 patch, Refills: 2      tiZANidine (ZANAFLEX) 4 MG tablet Take 1 tablet by mouth 3 times daily as needed (.)  Qty: 90 tablet, Refills: 2      albuterol-ipratropium (COMBIVENT RESPIMAT)  MCG/ACT AERS inhaler Inhale 1 puff into the lungs every 6 hours  Qty: 1 Inhaler, Refills: 11    Associated Diagnoses: Community acquired pneumonia, unspecified laterality      albuterol sulfate HFA (VENTOLIN HFA) 108 (90 Base) MCG/ACT inhaler Inhale 2 puffs into the lungs every 6 hours as needed for Wheezing  Qty: 3 Inhaler, Refills: 11      budesonide-formoterol (SYMBICORT) 160-4.5 MCG/ACT AERO Inhale 2 puffs into the lungs 2 times daily  Qty: 1 Inhaler, Refills: 11    Associated Diagnoses: Community acquired pneumonia, unspecified laterality      pantoprazole (PROTONIX) 20 MG tablet TAKE 1 TABLET BY MOUTH 2 TIMES DAILY  Qty: 60 tablet, Refills: 3      fluticasone (FLONASE) 50 MCG/ACT nasal spray INSTILL 1 SPRAY IN EACH NOSTRIL DAILY  Qty: 1 Bottle, Refills: 3      OXYGEN Inhale into the lungs      ipratropium-albuterol (DUONEB) 0.5-2.5 (3) MG/3ML SOLN nebulizer solution Inhale 3 mLs into the lungs every 4 hours  Qty: 360 mL, Refills: 2    Associated Diagnoses: Chronic bronchitis with acute exacerbation ([facility] Chronic obstructive pulmonary disease, unspecified COPD type ([facility])      metoclopramide (REGLAN) 5 MG tablet Take 1 tablet by mouth 4 times daily  Qty: 120 tablet, Refills: 3    Associated Diagnoses: Gastroesophageal reflux disease without esophagitis      albuterol (PROVENTIL) (2.5 MG/3ML) 0.083% nebulizer solution Take 3 mLs by nebulization every 6 hours as needed for Wheezing  Qty: 120 each, Refills: 0      ondansetron (ZOFRAN) 4 MG tablet Take 1 tablet by mouth every 8 hours as needed for Nausea or Vomiting  Qty: 30 tablet, Refills: 0      gabapentin (NEURONTIN) 600 MG tablet Take 600 mg by mouth 3 times daily      ALPRAZolam (XANAX) 1 MG tablet Take 1 mg by mouth nightly as needed Takes 4 times a day  Refills: 1      amphetamine-dextroamphetamine (ADDERALL) 20 MG tablet Take 20 mg by mouth 2 times daily. Refills: 0      citalopram (CELEXA) 40 MG tablet Take 40 mg by mouth daily. Discharge Instructions: Follow up with TEN Marks on 11/17/2021. Take medications as directed. Resume activity as tolerated. Diet: ADULT DIET; Regular; Low Fat (less than or equal to 50 gm/day); Low Fiber     Disposition: Patient is medically stable and will be discharged home. Time spent on discharge 35 minutes.      Signed:  Romelia Mack MD  11/14/2021 9:07 AM

## 2021-11-15 ENCOUNTER — TELEPHONE (OUTPATIENT)
Dept: PRIMARY CARE CLINIC | Age: 71
End: 2021-11-15

## 2021-11-15 NOTE — TELEPHONE ENCOUNTER
[facility] Initial Follow Up Call    Outreach made within 2 business days of discharge: Yes    Patient: Alonzo Duncan   Patient : 1950   MRN: [id number]    Reason for Admission: Clostridium perfringens infection  Discharge Date: 21       Spoke with: Patient    Discharge department/facility: [facility] Patient Contact:  Was patient able to fill all prescriptions: Yes  Was patient instructed to bring all medications to the follow-up visit: Yes  Is patient taking all medications as directed in the discharge summary? Yes  Does patient understand their discharge instructions: Yes  Does patient have questions or concerns that need addressed prior to 7-14 day follow up office visit: no    Spoke with patient. She states she is feeling better today. She does not have much of an appetite. She had a BM before she left the hospital. She had issues with bladder spasms in the hospital. She was discharged on Pyridium. She denies any fever, chills or signs of infection.      Scheduled appointment with PCP within 7-14 days    Follow Up  Future Appointments   Date Time Provider Lynda Acevedo     9:82 PM TEN Marks [address]

## 2021-11-22 ENCOUNTER — TELEPHONE (OUTPATIENT)
Dept: PRIMARY CARE CLINIC | Age: 71
End: 2021-11-22

## 2021-11-22 DIAGNOSIS — J44.9 STAGE 4 VERY SEVERE COPD BY GOLD CLASSIFICATION (HCC): ICD-10-CM

## 2021-11-22 RX ORDER — BUDESONIDE AND FORMOTEROL FUMARATE DIHYDRATE 160; 4.5 UG/1; UG/1
2 AEROSOL RESPIRATORY (INHALATION)
Qty: 10.2 G | Refills: 12 | Status: SHIPPED | OUTPATIENT
Start: 2021-11-22 | End: 2022-11-11

## 2021-11-22 NOTE — TELEPHONE ENCOUNTER
Pt called req. Refill on symbicort. Was prescribed by PCP but sees [facility] and [facility]. Next appt is with you, would you want to refill this?

## 2021-11-22 NOTE — TELEPHONE ENCOUNTER
Nav Grace needs to be discharged she has 2 no shows 10/2/21 and 11/22. She has 3 canceled 10/21/21,11/2/21 and 11/18/21.

## 2021-12-22 ENCOUNTER — TELEPHONE (OUTPATIENT)
Dept: PULMONOLOGY | Facility: CLINIC | Age: 71
End: 2021-12-22

## 2021-12-22 RX ORDER — AZITHROMYCIN 250 MG/1
TABLET, FILM COATED ORAL
Qty: 6 TABLET | Refills: 0 | Status: SHIPPED | OUTPATIENT
Start: 2021-12-22 | End: 2022-05-17

## 2021-12-22 NOTE — TELEPHONE ENCOUNTER
She called stating she is coughing up lots of off white sputum, sinuses are drainging.   She has bought some delsym cough syrup.  No fever.   Her grandson has tested positive for covid and she has taken a at home test and it was negative.    Can she take the cough medicine.    Keisha Simon is off today.    Please advise.

## 2022-01-03 ENCOUNTER — TELEPHONE (OUTPATIENT)
Dept: INTERNAL MEDICINE | Facility: CLINIC | Age: 72
End: 2022-01-03

## 2022-01-03 NOTE — TELEPHONE ENCOUNTER
PATIENT CALLED STATING THAT  SHE WAS NOT FEELING WELL, SHE HAS A COUGH , COPD AND IS NEG FOR COVID AND IS WANTING TO KNOW IF SHE NEEDS TO RESCHEDULE HER APPOINTMENT FOR 01/03/2022?    SHE WAS AN ESTABLISH CARE AND IS RESCHEDULED FOR 01/11/2022

## 2022-01-07 ENCOUNTER — APPOINTMENT (OUTPATIENT)
Dept: CT IMAGING | Facility: HOSPITAL | Age: 72
End: 2022-01-07

## 2022-01-13 ENCOUNTER — HOSPITAL ENCOUNTER (OUTPATIENT)
Dept: CT IMAGING | Facility: HOSPITAL | Age: 72
End: 2022-01-13

## 2022-01-14 ENCOUNTER — APPOINTMENT (OUTPATIENT)
Dept: LAB | Facility: HOSPITAL | Age: 72
End: 2022-01-14

## 2022-01-25 ENCOUNTER — TELEPHONE (OUTPATIENT)
Dept: PULMONOLOGY | Facility: CLINIC | Age: 72
End: 2022-01-25

## 2022-01-25 RX ORDER — PREDNISONE 10 MG/1
TABLET ORAL
Qty: 31 TABLET | Refills: 0 | Status: SHIPPED | OUTPATIENT
Start: 2022-01-25 | End: 2022-05-17

## 2022-01-25 RX ORDER — AZITHROMYCIN 250 MG/1
TABLET, FILM COATED ORAL
Qty: 6 TABLET | Refills: 0 | Status: SHIPPED | OUTPATIENT
Start: 2022-01-25 | End: 2022-05-17

## 2022-01-25 NOTE — TELEPHONE ENCOUNTER
Patient states she has had chest congestion, productive cough with clear sputum, and sore throat since before the 16th. She states she did have a temp on the 18th and 19 of around . She said she was definitely exposed to COVID on 1/16/22.  She states she did a home COVID test on the 22nd and it was negative.   She states she does not have transportation.

## 2022-01-26 NOTE — TELEPHONE ENCOUNTER
Message relayed to the patient and she voiced understanding. She did say she could not take the prednisone because of her stomach.

## 2022-01-27 ENCOUNTER — HOSPITAL ENCOUNTER (OUTPATIENT)
Dept: CT IMAGING | Facility: HOSPITAL | Age: 72
End: 2022-01-27

## 2022-02-04 ENCOUNTER — APPOINTMENT (OUTPATIENT)
Dept: CT IMAGING | Facility: HOSPITAL | Age: 72
End: 2022-02-04

## 2022-02-17 ENCOUNTER — APPOINTMENT (OUTPATIENT)
Dept: CT IMAGING | Facility: HOSPITAL | Age: 72
End: 2022-02-17

## 2022-02-21 ENCOUNTER — APPOINTMENT (OUTPATIENT)
Dept: CT IMAGING | Facility: HOSPITAL | Age: 72
End: 2022-02-21

## 2022-02-21 DIAGNOSIS — J44.9 STAGE 4 VERY SEVERE COPD BY GOLD CLASSIFICATION: ICD-10-CM

## 2022-02-21 RX ORDER — ALBUTEROL SULFATE 2.5 MG/3ML
2.5 SOLUTION RESPIRATORY (INHALATION) EVERY 4 HOURS PRN
Qty: 360 ML | Refills: 11 | Status: SHIPPED | OUTPATIENT
Start: 2022-02-21

## 2022-02-21 RX ORDER — ALBUTEROL SULFATE 90 UG/1
2 AEROSOL, METERED RESPIRATORY (INHALATION) EVERY 4 HOURS PRN
Qty: 18 G | Refills: 11 | Status: SHIPPED | OUTPATIENT
Start: 2022-02-21

## 2022-02-21 NOTE — TELEPHONE ENCOUNTER
Patient called to request refills on Albuterol HFA and Albuterol for the nebulizer.   Rx Refill Note  Requested Prescriptions     Pending Prescriptions Disp Refills   • albuterol (PROVENTIL) (2.5 MG/3ML) 0.083% nebulizer solution 360 mL 11     Sig: Take 2.5 mg by nebulization Every 4 (Four) Hours As Needed for Wheezing.   • Ventolin  (90 Base) MCG/ACT inhaler 18 g 11     Sig: Inhale 2 puffs Every 4 (Four) Hours As Needed for Wheezing or Shortness of Air.      Last office visit with prescribing clinician: 8/2/2021      Next office visit with prescribing clinician: 3/23/2022            Benny Patel CMA  02/21/22, 12:15 CST

## 2022-02-28 ENCOUNTER — APPOINTMENT (OUTPATIENT)
Dept: CT IMAGING | Facility: HOSPITAL | Age: 72
End: 2022-02-28

## 2022-03-01 ENCOUNTER — APPOINTMENT (OUTPATIENT)
Dept: CT IMAGING | Facility: HOSPITAL | Age: 72
End: 2022-03-01

## 2022-03-02 ENCOUNTER — HOSPITAL ENCOUNTER (OUTPATIENT)
Dept: RADIATION ONCOLOGY | Facility: HOSPITAL | Age: 72
Setting detail: RADIATION/ONCOLOGY SERIES
End: 2022-03-02

## 2022-03-02 ENCOUNTER — HOSPITAL ENCOUNTER (OUTPATIENT)
Dept: CT IMAGING | Facility: HOSPITAL | Age: 72
Discharge: HOME | End: 2022-03-02

## 2022-03-02 DIAGNOSIS — Z92.3 STATUS POST STEREOTACTIC RADIOSURGERY: ICD-10-CM

## 2022-03-02 DIAGNOSIS — C34.11 MALIGNANT NEOPLASM OF UPPER LOBE OF RIGHT LUNG: ICD-10-CM

## 2022-03-02 DIAGNOSIS — J44.9 STAGE 4 VERY SEVERE COPD BY GOLD CLASSIFICATION: ICD-10-CM

## 2022-03-02 DIAGNOSIS — F17.200 CURRENT EVERY DAY SMOKER: ICD-10-CM

## 2022-03-02 PROCEDURE — 71250 CT THORAX DX C-: CPT

## 2022-04-01 ENCOUNTER — HOSPITAL ENCOUNTER (OUTPATIENT)
Dept: RADIATION ONCOLOGY | Facility: HOSPITAL | Age: 72
Setting detail: RADIATION/ONCOLOGY SERIES
End: 2022-04-01

## 2022-04-18 ENCOUNTER — HOSPITAL ENCOUNTER (EMERGENCY)
Facility: HOSPITAL | Age: 72
Discharge: HOME | End: 2022-04-18
Attending: EMERGENCY MEDICINE | Admitting: EMERGENCY MEDICINE

## 2022-04-18 ENCOUNTER — APPOINTMENT (OUTPATIENT)
Dept: GENERAL RADIOLOGY | Facility: HOSPITAL | Age: 72
End: 2022-04-18

## 2022-04-18 VITALS
SYSTOLIC BLOOD PRESSURE: 117 MMHG | WEIGHT: 115 LBS | BODY MASS INDEX: 18.48 KG/M2 | DIASTOLIC BLOOD PRESSURE: 55 MMHG | RESPIRATION RATE: 27 BRPM | OXYGEN SATURATION: 98 % | TEMPERATURE: 98.6 F | HEIGHT: 66 IN | HEART RATE: 77 BPM

## 2022-04-18 DIAGNOSIS — I47.1 SVT (SUPRAVENTRICULAR TACHYCARDIA): Primary | ICD-10-CM

## 2022-04-18 LAB
ALBUMIN SERPL-MCNC: 4.1 G/DL (ref 3.5–5.2)
ALBUMIN/GLOB SERPL: 1.2 G/DL
ALP SERPL-CCNC: 64 U/L (ref 39–117)
ALT SERPL W P-5'-P-CCNC: 7 U/L (ref 1–33)
ANION GAP SERPL CALCULATED.3IONS-SCNC: 8 MMOL/L (ref 5–15)
AST SERPL-CCNC: 12 U/L (ref 1–32)
BASOPHILS # BLD AUTO: 0.03 10*3/MM3 (ref 0–0.2)
BASOPHILS NFR BLD AUTO: 0.5 % (ref 0–1.5)
BILIRUB SERPL-MCNC: <0.2 MG/DL (ref 0–1.2)
BUN SERPL-MCNC: 13 MG/DL (ref 8–23)
BUN/CREAT SERPL: 28.9 (ref 7–25)
CALCIUM SPEC-SCNC: 9.3 MG/DL (ref 8.6–10.5)
CHLORIDE SERPL-SCNC: 96 MMOL/L (ref 98–107)
CO2 SERPL-SCNC: 35 MMOL/L (ref 22–29)
CREAT SERPL-MCNC: 0.45 MG/DL (ref 0.57–1)
DEPRECATED RDW RBC AUTO: 47 FL (ref 37–54)
EGFRCR SERPLBLD CKD-EPI 2021: 101.1 ML/MIN/1.73
EOSINOPHIL # BLD AUTO: 0.04 10*3/MM3 (ref 0–0.4)
EOSINOPHIL NFR BLD AUTO: 0.7 % (ref 0.3–6.2)
ERYTHROCYTE [DISTWIDTH] IN BLOOD BY AUTOMATED COUNT: 12.9 % (ref 12.3–15.4)
GLOBULIN UR ELPH-MCNC: 3.3 GM/DL
GLUCOSE SERPL-MCNC: 131 MG/DL (ref 65–99)
HCT VFR BLD AUTO: 35.7 % (ref 34–46.6)
HGB BLD-MCNC: 11.5 G/DL (ref 12–15.9)
HOLD SPECIMEN: NORMAL
HOLD SPECIMEN: NORMAL
IMM GRANULOCYTES # BLD AUTO: 0.02 10*3/MM3 (ref 0–0.05)
IMM GRANULOCYTES NFR BLD AUTO: 0.4 % (ref 0–0.5)
LYMPHOCYTES # BLD AUTO: 1.16 10*3/MM3 (ref 0.7–3.1)
LYMPHOCYTES NFR BLD AUTO: 20.8 % (ref 19.6–45.3)
MAGNESIUM SERPL-MCNC: 1.5 MG/DL (ref 1.6–2.4)
MCH RBC QN AUTO: 32 PG (ref 26.6–33)
MCHC RBC AUTO-ENTMCNC: 32.2 G/DL (ref 31.5–35.7)
MCV RBC AUTO: 99.4 FL (ref 79–97)
MONOCYTES # BLD AUTO: 0.76 10*3/MM3 (ref 0.1–0.9)
MONOCYTES NFR BLD AUTO: 13.6 % (ref 5–12)
NEUTROPHILS NFR BLD AUTO: 3.57 10*3/MM3 (ref 1.7–7)
NEUTROPHILS NFR BLD AUTO: 64 % (ref 42.7–76)
NRBC BLD AUTO-RTO: 0 /100 WBC (ref 0–0.2)
NT-PROBNP SERPL-MCNC: 72.6 PG/ML (ref 0–900)
PLATELET # BLD AUTO: 171 10*3/MM3 (ref 140–450)
PMV BLD AUTO: 11.5 FL (ref 6–12)
POTASSIUM SERPL-SCNC: 3.7 MMOL/L (ref 3.5–5.2)
PROT SERPL-MCNC: 7.4 G/DL (ref 6–8.5)
QT INTERVAL: 288 MS
QTC INTERVAL: 468 MS
RBC # BLD AUTO: 3.59 10*6/MM3 (ref 3.77–5.28)
SODIUM SERPL-SCNC: 139 MMOL/L (ref 136–145)
TROPONIN T SERPL-MCNC: <0.01 NG/ML (ref 0–0.03)
TSH SERPL DL<=0.05 MIU/L-ACNC: 2.23 UIU/ML (ref 0.27–4.2)
WBC NRBC COR # BLD: 5.58 10*3/MM3 (ref 3.4–10.8)
WHOLE BLOOD HOLD SPECIMEN: NORMAL
WHOLE BLOOD HOLD SPECIMEN: NORMAL

## 2022-04-18 PROCEDURE — 96365 THER/PROPH/DIAG IV INF INIT: CPT

## 2022-04-18 PROCEDURE — 84443 ASSAY THYROID STIM HORMONE: CPT | Performed by: EMERGENCY MEDICINE

## 2022-04-18 PROCEDURE — 25010000002 MAGNESIUM SULFATE 2 GM/50ML SOLUTION: Performed by: EMERGENCY MEDICINE

## 2022-04-18 PROCEDURE — 83735 ASSAY OF MAGNESIUM: CPT | Performed by: EMERGENCY MEDICINE

## 2022-04-18 PROCEDURE — 93010 ELECTROCARDIOGRAM REPORT: CPT | Performed by: INTERNAL MEDICINE

## 2022-04-18 PROCEDURE — 36415 COLL VENOUS BLD VENIPUNCTURE: CPT

## 2022-04-18 PROCEDURE — 93005 ELECTROCARDIOGRAM TRACING: CPT | Performed by: EMERGENCY MEDICINE

## 2022-04-18 PROCEDURE — 99283 EMERGENCY DEPT VISIT LOW MDM: CPT

## 2022-04-18 PROCEDURE — 96366 THER/PROPH/DIAG IV INF ADDON: CPT

## 2022-04-18 PROCEDURE — 84484 ASSAY OF TROPONIN QUANT: CPT | Performed by: EMERGENCY MEDICINE

## 2022-04-18 PROCEDURE — 85025 COMPLETE CBC W/AUTO DIFF WBC: CPT | Performed by: EMERGENCY MEDICINE

## 2022-04-18 PROCEDURE — 71045 X-RAY EXAM CHEST 1 VIEW: CPT

## 2022-04-18 PROCEDURE — 80053 COMPREHEN METABOLIC PANEL: CPT | Performed by: EMERGENCY MEDICINE

## 2022-04-18 PROCEDURE — 99284 EMERGENCY DEPT VISIT MOD MDM: CPT

## 2022-04-18 PROCEDURE — 0 HYDROMORPHONE 1 MG/ML SOLUTION: Performed by: EMERGENCY MEDICINE

## 2022-04-18 PROCEDURE — 96375 TX/PRO/DX INJ NEW DRUG ADDON: CPT

## 2022-04-18 PROCEDURE — 83880 ASSAY OF NATRIURETIC PEPTIDE: CPT | Performed by: EMERGENCY MEDICINE

## 2022-04-18 RX ORDER — SODIUM CHLORIDE 0.9 % (FLUSH) 0.9 %
10 SYRINGE (ML) INJECTION AS NEEDED
Status: DISCONTINUED | OUTPATIENT
Start: 2022-04-18 | End: 2022-04-18 | Stop reason: HOSPADM

## 2022-04-18 RX ORDER — MAGNESIUM SULFATE HEPTAHYDRATE 40 MG/ML
2 INJECTION, SOLUTION INTRAVENOUS ONCE
Status: COMPLETED | OUTPATIENT
Start: 2022-04-18 | End: 2022-04-18

## 2022-04-18 RX ORDER — ADENOSINE 3 MG/ML
6 INJECTION, SOLUTION INTRAVENOUS ONCE
Status: DISCONTINUED | OUTPATIENT
Start: 2022-04-18 | End: 2022-04-18

## 2022-04-18 RX ADMIN — MAGNESIUM SULFATE HEPTAHYDRATE 2 G: 2 INJECTION, SOLUTION INTRAVENOUS at 03:13

## 2022-04-18 RX ADMIN — SODIUM CHLORIDE, POTASSIUM CHLORIDE, SODIUM LACTATE AND CALCIUM CHLORIDE 1000 ML: 600; 310; 30; 20 INJECTION, SOLUTION INTRAVENOUS at 01:55

## 2022-04-18 RX ADMIN — HYDROMORPHONE HYDROCHLORIDE 0.5 MG: 1 INJECTION, SOLUTION INTRAMUSCULAR; INTRAVENOUS; SUBCUTANEOUS at 03:10

## 2022-04-18 NOTE — ED PROVIDER NOTES
Subjective   72-year-old female presents to the ER with complaint of cough, shortness of breath, chest pain and palpitations.  She reports previous history of palpitations of uncertain etiology, states she never had an outpatient valuation.  Tonight she developed palpitations and was associated shortness of breath and chest pain.  Shortness of breath became so significant she called 9 1.  When paramedics arrived on scene patient was in SVT.  When she got to the hospital patient remained in SVT with a heart rate of 160s, blood pressure was 90s over 60s.        History provided by:  Patient      Review of Systems   All other systems reviewed and are negative.      Past Medical History:   Diagnosis Date   • Anxiety    • Anxiety     pain clinic patient Dr Sunshine x 5yrs   • Arthritis    • Cancer of left lung (HCC)    • Chronic pain due to injury     jarad LE & back   • COPD (chronic obstructive pulmonary disease) (HCC)    • Depression    • Diverticulitis    • GERD (gastroesophageal reflux disease)    • Hypertension    • Neuropathy    • Polypharmacy    • Smoker        Allergies   Allergen Reactions   • Aspirin Anaphylaxis   • Demerol [Meperidine] Anaphylaxis   • Ibuprofen Anaphylaxis   • Neosporin [Neomycin-Bacitracin Zn-Polymyx] Anaphylaxis   • Nsaids Hives   • Penicillins Anaphylaxis   • Sulfa Antibiotics Anaphylaxis   • Codeine Hives     Pt can take codeine as long is it is not alot   • Talwin [Pentazocine] Hives   • Tetracyclines & Related Hives   • Influenza Vaccines Hives and Rash       Past Surgical History:   Procedure Laterality Date   • ABDOMINAL SURGERY     • APPENDECTOMY     • CHOLECYSTECTOMY     • FEMUR FRACTURE SURGERY     • HERNIA REPAIR     • HYSTERECTOMY         Family History   Problem Relation Age of Onset   • Suicidality Mother    • Alcohol abuse Mother    • Cancer Father    • Heart disease Father    • Heart disease Maternal Grandmother        Social History     Socioeconomic History   • Marital status:     Tobacco Use   • Smoking status: Current Some Day Smoker     Packs/day: 25.00     Years: 45.00     Pack years: 1125.00     Types: Cigarettes   • Smokeless tobacco: Never Used   • Tobacco comment: Smokes 2-3 ciragettes per day. Former 2 pack a day smoker for 50 years.   Substance and Sexual Activity   • Alcohol use: No   • Drug use: No   • Sexual activity: Defer           Objective   Physical Exam  Vitals and nursing note reviewed.   Constitutional:       General: She is not in acute distress.     Appearance: Normal appearance. She is normal weight.   HENT:      Head: Normocephalic and atraumatic.      Nose: Nose normal.      Mouth/Throat:      Mouth: Mucous membranes are moist.      Pharynx: Oropharynx is clear. No oropharyngeal exudate or posterior oropharyngeal erythema.   Eyes:      Extraocular Movements: Extraocular movements intact.      Conjunctiva/sclera: Conjunctivae normal.      Pupils: Pupils are equal, round, and reactive to light.   Cardiovascular:      Rate and Rhythm: Regular rhythm. Tachycardia present.      Pulses: Normal pulses.      Heart sounds: Normal heart sounds.   Pulmonary:      Effort: Pulmonary effort is normal.      Breath sounds: Normal breath sounds. No wheezing, rhonchi or rales.   Abdominal:      General: Abdomen is flat. Bowel sounds are normal. There is no distension.      Palpations: Abdomen is soft.      Tenderness: There is no abdominal tenderness.   Musculoskeletal:         General: No tenderness. Normal range of motion.      Cervical back: Normal range of motion and neck supple. No rigidity. No muscular tenderness.      Right lower leg: No edema.      Left lower leg: No edema.   Skin:     General: Skin is warm and dry.      Capillary Refill: Capillary refill takes less than 2 seconds.      Findings: No rash.   Neurological:      General: No focal deficit present.      Mental Status: She is alert and oriented to person, place, and time. Mental status is at baseline.       Cranial Nerves: No cranial nerve deficit.      Sensory: No sensory deficit.      Motor: No weakness.   Psychiatric:         Mood and Affect: Mood normal.         Behavior: Behavior normal.         Thought Content: Thought content normal.         ECG 12 Lead      Date/Time: 4/18/2022 1:55 AM  Performed by: Carlos Olson MD  Authorized by: Carlos Olson MD   Interpreted by physician  Comments: SVT, rate 159, lateral ST depression, no acute ST elevation, abnormal EKG             Lab Results (last 24 hours)     Procedure Component Value Units Date/Time    CBC & Differential [[id number]]  (Abnormal) Collected: 04/18/22 0214    Specimen: Blood Updated: 04/18/22 0221    Narrative:      The following orders were created for panel order CBC & Differential.  Procedure                               Abnormality         Status                     ---------                               -----------         ------                     CBC Auto Differential[[id number]]        Abnormal            Final result                 Please view results for these tests on the individual orders.    Comprehensive Metabolic Panel [[id number]]  (Abnormal) Collected: 04/18/22 0214    Specimen: Blood Updated: 04/18/22 0243     Glucose 131 mg/dL      BUN 13 mg/dL      Creatinine 0.45 mg/dL      Sodium 139 mmol/L      Potassium 3.7 mmol/L      Chloride 96 mmol/L      CO2 35.0 mmol/L      Calcium 9.3 mg/dL      Total Protein 7.4 g/dL      Albumin 4.10 g/dL      ALT (SGPT) 7 U/L      AST (SGOT) 12 U/L      Alkaline Phosphatase 64 U/L      Total Bilirubin <0.2 mg/dL      Globulin 3.3 gm/dL      A/G Ratio 1.2 g/dL      BUN/Creatinine Ratio 28.9     Anion Gap 8.0 mmol/L      eGFR 101.1 mL/min/1.73      Comment: National Kidney Foundation and American Society of Nephrology (ASN) Task Force recommended calculation based on the Chronic Kidney Disease Epidemiology Collaboration (CKD-EPI) equation refit without adjustment for race.        Narrative:      GFR Normal >60  Chronic Kidney Disease <60  Kidney Failure <15      BNP [[id number]]  (Normal) Collected: 04/18/22 0214    Specimen: Blood Updated: 04/18/22 0240     proBNP 72.6 pg/mL     Narrative:      Among patients with dyspnea, NT-proBNP is highly sensitive for the detection of acute congestive heart failure. In addition NT-proBNP of <300 pg/ml effectively rules out acute congestive heart failure with 99% negative predictive value.    Results may be falsely decreased if patient taking Biotin.      Troponin [[id number]]  (Normal) Collected: 04/18/22 0214    Specimen: Blood Updated: 04/18/22 0240     Troponin T <0.010 ng/mL     Narrative:      Troponin T Reference Range:  <= 0.03 ng/mL-   Negative for AMI  >0.03 ng/mL-     Abnormal for myocardial necrosis.  Clinicians would have to utilize clinical acumen, EKG, Troponin and serial changes to determine if it is an Acute Myocardial Infarction or myocardial injury due to an underlying chronic condition.       Results may be falsely decreased if patient taking Biotin.      CBC Auto Differential [[id number]]  (Abnormal) Collected: 04/18/22 0214    Specimen: Blood Updated: 04/18/22 0221     WBC 5.58 10*3/mm3      RBC 3.59 10*6/mm3      Hemoglobin 11.5 g/dL      Hematocrit 35.7 %      MCV 99.4 fL      MCH 32.0 pg      MCHC 32.2 g/dL      RDW 12.9 %      RDW-SD 47.0 fl      MPV 11.5 fL      Platelets 171 10*3/mm3      Neutrophil % 64.0 %      Lymphocyte % 20.8 %      Monocyte % 13.6 %      Eosinophil % 0.7 %      Basophil % 0.5 %      Immature Grans % 0.4 %      Neutrophils, Absolute 3.57 10*3/mm3      Lymphocytes, Absolute 1.16 10*3/mm3      Monocytes, Absolute 0.76 10*3/mm3      Eosinophils, Absolute 0.04 10*3/mm3      Basophils, Absolute 0.03 10*3/mm3      Immature Grans, Absolute 0.02 10*3/mm3      nRBC 0.0 /100 WBC     TSH [[id number]]  (Normal) Collected: 04/18/22 0214    Specimen: Blood Updated: 04/18/22 0247     TSH 2.230 uIU/mL     Magnesium  [[id number]]  (Abnormal) Collected: 04/18/22 0214    Specimen: Blood Updated: 04/18/22 0237     Magnesium 1.5 mg/dL       No Radiology Exams Resulted Within Past 24 Hours    ED Course  ED Course as of 04/18/22 0535 Mon Apr 18, 2022   0207 Patient arrived in SVT, carotid massage and vagal nerve performed, patient converted spontaneously to sinus rhythm.  She went in and out of SVT 2 or 3 times, but each time SVT responded with keratinocytes.  Magnesium is a low, will replete.  Labs otherwise okay including BNP and troponin. [AW]   0531 Patient has not had any recurrent episodes of SVT since magnesium was initiated.  Magnesium is now complete, patient's been here for about 3 hours without any additional runs of SVT.  Her TSH is normal.  BNP and troponin are normal.  She states has been having episodes intermittently for the past 6 months.  Will order a Zio patch to be obtained on outpatient basis. [AW]      ED Course User Index  [AW] Carlos Olson MD                                                 [facility]    Final diagnoses:   SVT (supraventricular tachycardia) ([facility])       ED Disposition  ED Disposition     ED Disposition   Discharge    Condition   Stable    Comment   --             JASSI Astorga MD  [address] [facility] [address]  [facility] [id number] [phone]    Schedule an appointment as soon as possible for a visit in 3 days           Medication List      No changes were made to your prescriptions during this visit.          Carlos Olson MD  04/18/22 0558

## 2022-05-02 ENCOUNTER — HOSPITAL ENCOUNTER (OUTPATIENT)
Dept: RADIATION ONCOLOGY | Facility: HOSPITAL | Age: 72
Setting detail: RADIATION/ONCOLOGY SERIES
End: 2022-05-02

## 2022-05-03 ENCOUNTER — APPOINTMENT (OUTPATIENT)
Dept: GENERAL RADIOLOGY | Facility: HOSPITAL | Age: 72
End: 2022-05-03

## 2022-05-03 ENCOUNTER — HOSPITAL ENCOUNTER (EMERGENCY)
Facility: HOSPITAL | Age: 72
Discharge: HOME | End: 2022-05-04
Attending: STUDENT IN AN ORGANIZED HEALTH CARE EDUCATION/TRAINING PROGRAM | Admitting: STUDENT IN AN ORGANIZED HEALTH CARE EDUCATION/TRAINING PROGRAM

## 2022-05-03 ENCOUNTER — APPOINTMENT (OUTPATIENT)
Dept: CT IMAGING | Facility: HOSPITAL | Age: 72
End: 2022-05-03

## 2022-05-03 DIAGNOSIS — E83.42 HYPOMAGNESEMIA: ICD-10-CM

## 2022-05-03 DIAGNOSIS — R11.2 NAUSEA AND VOMITING, UNSPECIFIED VOMITING TYPE: Primary | ICD-10-CM

## 2022-05-03 DIAGNOSIS — J44.9 CHRONIC OBSTRUCTIVE PULMONARY DISEASE, UNSPECIFIED COPD TYPE: ICD-10-CM

## 2022-05-03 DIAGNOSIS — K57.90 DIVERTICULOSIS: ICD-10-CM

## 2022-05-03 LAB
A-A DO2: ABNORMAL
ALBUMIN SERPL-MCNC: 4.5 G/DL (ref 3.5–5.2)
ALBUMIN/GLOB SERPL: 1.4 G/DL
ALP SERPL-CCNC: 80 U/L (ref 39–117)
ALT SERPL W P-5'-P-CCNC: 8 U/L (ref 1–33)
ANION GAP SERPL CALCULATED.3IONS-SCNC: 8 MMOL/L (ref 5–15)
APTT PPP: 32.8 SECONDS (ref 24.1–35)
ARTERIAL PATENCY WRIST A: POSITIVE
AST SERPL-CCNC: 20 U/L (ref 1–32)
ATMOSPHERIC PRESS: 751 MMHG
BASE EXCESS BLDA CALC-SCNC: 8 MMOL/L (ref 0–2)
BASOPHILS # BLD AUTO: 0.03 10*3/MM3 (ref 0–0.2)
BASOPHILS NFR BLD AUTO: 0.5 % (ref 0–1.5)
BDY SITE: ABNORMAL
BILIRUB SERPL-MCNC: 0.4 MG/DL (ref 0–1.2)
BODY TEMPERATURE: 37 C
BUN SERPL-MCNC: 7 MG/DL (ref 8–23)
BUN/CREAT SERPL: 16.3 (ref 7–25)
CALCIUM SPEC-SCNC: 9.6 MG/DL (ref 8.6–10.5)
CHLORIDE SERPL-SCNC: 92 MMOL/L (ref 98–107)
CO2 SERPL-SCNC: 34 MMOL/L (ref 22–29)
COHGB MFR BLD: 2.2 % (ref 0–5)
CREAT SERPL-MCNC: 0.43 MG/DL (ref 0.57–1)
D-LACTATE SERPL-SCNC: 0.7 MMOL/L (ref 0.5–2)
DEPRECATED RDW RBC AUTO: 47.3 FL (ref 37–54)
EGFRCR SERPLBLD CKD-EPI 2021: 102.2 ML/MIN/1.73
EOSINOPHIL # BLD AUTO: 0.03 10*3/MM3 (ref 0–0.4)
EOSINOPHIL NFR BLD AUTO: 0.5 % (ref 0.3–6.2)
ERYTHROCYTE [DISTWIDTH] IN BLOOD BY AUTOMATED COUNT: 13 % (ref 12.3–15.4)
GAS FLOW AIRWAY: 3.5 LPM
GLOBULIN UR ELPH-MCNC: 3.2 GM/DL
GLUCOSE SERPL-MCNC: 111 MG/DL (ref 65–99)
HCO3 BLDA-SCNC: 35.9 MMOL/L (ref 20–26)
HCT VFR BLD AUTO: 39.6 % (ref 34–46.6)
HCT VFR BLD CALC: 35.6 % (ref 38–51)
HGB BLD-MCNC: 12.5 G/DL (ref 12–15.9)
HGB BLDA-MCNC: 11.6 G/DL (ref 12–16)
IMM GRANULOCYTES # BLD AUTO: 0.01 10*3/MM3 (ref 0–0.05)
IMM GRANULOCYTES NFR BLD AUTO: 0.2 % (ref 0–0.5)
INR PPP: 0.88 (ref 0.91–1.09)
LIPASE SERPL-CCNC: 19 U/L (ref 13–60)
LYMPHOCYTES # BLD AUTO: 0.92 10*3/MM3 (ref 0.7–3.1)
LYMPHOCYTES NFR BLD AUTO: 15.6 % (ref 19.6–45.3)
Lab: ABNORMAL
Lab: ABNORMAL
MAGNESIUM SERPL-MCNC: 1.4 MG/DL (ref 1.6–2.4)
MCH RBC QN AUTO: 31.3 PG (ref 26.6–33)
MCHC RBC AUTO-ENTMCNC: 31.6 G/DL (ref 31.5–35.7)
MCV RBC AUTO: 99 FL (ref 79–97)
METHGB BLD QL: 0.9 % (ref 0–3)
MODALITY: ABNORMAL
MONOCYTES # BLD AUTO: 0.54 10*3/MM3 (ref 0.1–0.9)
MONOCYTES NFR BLD AUTO: 9.2 % (ref 5–12)
NEUTROPHILS NFR BLD AUTO: 4.37 10*3/MM3 (ref 1.7–7)
NEUTROPHILS NFR BLD AUTO: 74 % (ref 42.7–76)
NOTE: ABNORMAL
NOTIFIED BY: ABNORMAL
NOTIFIED WHO: ABNORMAL
NRBC BLD AUTO-RTO: 0 /100 WBC (ref 0–0.2)
OXYHGB MFR BLDV: 95.2 % (ref 94–99)
PCO2 BLDA: 67.2 MM HG (ref 35–45)
PCO2 TEMP ADJ BLD: 67.2 MM HG (ref 35–45)
PH BLDA: 7.34 PH UNITS (ref 7.35–7.45)
PH, TEMP CORRECTED: 7.34 PH UNITS (ref 7.35–7.45)
PLATELET # BLD AUTO: 251 10*3/MM3 (ref 140–450)
PMV BLD AUTO: 10.8 FL (ref 6–12)
PO2 BLDA: 102 MM HG (ref 83–108)
PO2 TEMP ADJ BLD: 102 MM HG (ref 83–108)
POTASSIUM BLDA-SCNC: 4.1 MMOL/L (ref 3.5–5.2)
POTASSIUM SERPL-SCNC: 4.3 MMOL/L (ref 3.5–5.2)
PROT SERPL-MCNC: 7.7 G/DL (ref 6–8.5)
PROTHROMBIN TIME: 11.6 SECONDS (ref 11.9–14.6)
RBC # BLD AUTO: 4 10*6/MM3 (ref 3.77–5.28)
SAO2 % BLDCOA: 98.3 % (ref 94–99)
SODIUM BLDA-SCNC: 136 MMOL/L (ref 136–145)
SODIUM SERPL-SCNC: 134 MMOL/L (ref 136–145)
TROPONIN T SERPL-MCNC: <0.01 NG/ML (ref 0–0.03)
VENTILATOR MODE: ABNORMAL
WBC NRBC COR # BLD: 5.9 10*3/MM3 (ref 3.4–10.8)

## 2022-05-03 PROCEDURE — 96376 TX/PRO/DX INJ SAME DRUG ADON: CPT

## 2022-05-03 PROCEDURE — 94799 UNLISTED PULMONARY SVC/PX: CPT

## 2022-05-03 PROCEDURE — 25010000002 METOCLOPRAMIDE PER 10 MG: Performed by: NURSE PRACTITIONER

## 2022-05-03 PROCEDURE — 83050 HGB METHEMOGLOBIN QUAN: CPT

## 2022-05-03 PROCEDURE — 96375 TX/PRO/DX INJ NEW DRUG ADDON: CPT

## 2022-05-03 PROCEDURE — 85025 COMPLETE CBC W/AUTO DIFF WBC: CPT | Performed by: NURSE PRACTITIONER

## 2022-05-03 PROCEDURE — 82805 BLOOD GASES W/O2 SATURATION: CPT

## 2022-05-03 PROCEDURE — 85610 PROTHROMBIN TIME: CPT | Performed by: NURSE PRACTITIONER

## 2022-05-03 PROCEDURE — 84484 ASSAY OF TROPONIN QUANT: CPT | Performed by: NURSE PRACTITIONER

## 2022-05-03 PROCEDURE — 80053 COMPREHEN METABOLIC PANEL: CPT | Performed by: NURSE PRACTITIONER

## 2022-05-03 PROCEDURE — 93005 ELECTROCARDIOGRAM TRACING: CPT | Performed by: NURSE PRACTITIONER

## 2022-05-03 PROCEDURE — 93010 ELECTROCARDIOGRAM REPORT: CPT | Performed by: INTERNAL MEDICINE

## 2022-05-03 PROCEDURE — 36600 WITHDRAWAL OF ARTERIAL BLOOD: CPT

## 2022-05-03 PROCEDURE — 83735 ASSAY OF MAGNESIUM: CPT | Performed by: NURSE PRACTITIONER

## 2022-05-03 PROCEDURE — 25010000002 MAGNESIUM SULFATE 2 GM/50ML SOLUTION: Performed by: NURSE PRACTITIONER

## 2022-05-03 PROCEDURE — 71045 X-RAY EXAM CHEST 1 VIEW: CPT

## 2022-05-03 PROCEDURE — 99284 EMERGENCY DEPT VISIT MOD MDM: CPT

## 2022-05-03 PROCEDURE — 74176 CT ABD & PELVIS W/O CONTRAST: CPT

## 2022-05-03 PROCEDURE — 85730 THROMBOPLASTIN TIME PARTIAL: CPT | Performed by: NURSE PRACTITIONER

## 2022-05-03 PROCEDURE — 94640 AIRWAY INHALATION TREATMENT: CPT

## 2022-05-03 PROCEDURE — 25010000002 ONDANSETRON PER 1 MG: Performed by: NURSE PRACTITIONER

## 2022-05-03 PROCEDURE — 83690 ASSAY OF LIPASE: CPT | Performed by: NURSE PRACTITIONER

## 2022-05-03 PROCEDURE — 82375 ASSAY CARBOXYHB QUANT: CPT

## 2022-05-03 PROCEDURE — 83605 ASSAY OF LACTIC ACID: CPT | Performed by: NURSE PRACTITIONER

## 2022-05-03 PROCEDURE — 25010000002 LORAZEPAM PER 2 MG: Performed by: NURSE PRACTITIONER

## 2022-05-03 PROCEDURE — 96365 THER/PROPH/DIAG IV INF INIT: CPT

## 2022-05-03 RX ORDER — SODIUM CHLORIDE 0.9 % (FLUSH) 0.9 %
10 SYRINGE (ML) INJECTION AS NEEDED
Status: DISCONTINUED | OUTPATIENT
Start: 2022-05-03 | End: 2022-05-04 | Stop reason: HOSPADM

## 2022-05-03 RX ORDER — LORAZEPAM 2 MG/ML
0.5 INJECTION INTRAMUSCULAR ONCE
Status: COMPLETED | OUTPATIENT
Start: 2022-05-03 | End: 2022-05-03

## 2022-05-03 RX ORDER — MAGNESIUM SULFATE HEPTAHYDRATE 40 MG/ML
2 INJECTION, SOLUTION INTRAVENOUS ONCE
Status: COMPLETED | OUTPATIENT
Start: 2022-05-03 | End: 2022-05-04

## 2022-05-03 RX ORDER — METOCLOPRAMIDE HYDROCHLORIDE 5 MG/ML
10 INJECTION INTRAMUSCULAR; INTRAVENOUS ONCE
Status: COMPLETED | OUTPATIENT
Start: 2022-05-03 | End: 2022-05-03

## 2022-05-03 RX ORDER — IPRATROPIUM BROMIDE AND ALBUTEROL SULFATE 2.5; .5 MG/3ML; MG/3ML
3 SOLUTION RESPIRATORY (INHALATION) ONCE
Status: COMPLETED | OUTPATIENT
Start: 2022-05-03 | End: 2022-05-03

## 2022-05-03 RX ORDER — ONDANSETRON 2 MG/ML
4 INJECTION INTRAMUSCULAR; INTRAVENOUS ONCE
Status: COMPLETED | OUTPATIENT
Start: 2022-05-03 | End: 2022-05-03

## 2022-05-03 RX ADMIN — HYDROMORPHONE HYDROCHLORIDE 0.5 MG: 1 INJECTION, SOLUTION INTRAMUSCULAR; INTRAVENOUS; SUBCUTANEOUS at 22:25

## 2022-05-03 RX ADMIN — METOCLOPRAMIDE 10 MG: 5 INJECTION, SOLUTION INTRAMUSCULAR; INTRAVENOUS at 20:27

## 2022-05-03 RX ADMIN — MAGNESIUM SULFATE HEPTAHYDRATE 2 G: 2 INJECTION, SOLUTION INTRAVENOUS at 23:03

## 2022-05-03 RX ADMIN — ONDANSETRON 4 MG: 2 INJECTION INTRAMUSCULAR; INTRAVENOUS at 22:25

## 2022-05-03 RX ADMIN — ONDANSETRON 4 MG: 2 INJECTION INTRAMUSCULAR; INTRAVENOUS at 23:24

## 2022-05-03 RX ADMIN — LORAZEPAM 0.5 MG: 2 INJECTION INTRAMUSCULAR; INTRAVENOUS at 20:47

## 2022-05-03 RX ADMIN — IPRATROPIUM BROMIDE AND ALBUTEROL SULFATE 3 ML: .5; 3 SOLUTION RESPIRATORY (INHALATION) at 23:10

## 2022-05-03 RX ADMIN — SODIUM CHLORIDE 500 ML: 9 INJECTION, SOLUTION INTRAVENOUS at 20:25

## 2022-05-04 VITALS
SYSTOLIC BLOOD PRESSURE: 153 MMHG | TEMPERATURE: 98.6 F | DIASTOLIC BLOOD PRESSURE: 77 MMHG | WEIGHT: 118 LBS | RESPIRATION RATE: 23 BRPM | BODY MASS INDEX: 18.96 KG/M2 | HEIGHT: 66 IN | OXYGEN SATURATION: 91 % | HEART RATE: 95 BPM

## 2022-05-04 PROCEDURE — 94799 UNLISTED PULMONARY SVC/PX: CPT

## 2022-05-04 PROCEDURE — 63710000001 DIPHENHYDRAMINE PER 50 MG: Performed by: STUDENT IN AN ORGANIZED HEALTH CARE EDUCATION/TRAINING PROGRAM

## 2022-05-04 PROCEDURE — 94664 DEMO&/EVAL PT USE INHALER: CPT

## 2022-05-04 PROCEDURE — 94762 N-INVAS EAR/PLS OXIMTRY CONT: CPT

## 2022-05-04 PROCEDURE — 96375 TX/PRO/DX INJ NEW DRUG ADDON: CPT

## 2022-05-04 PROCEDURE — 25010000002 DROPERIDOL PER 5 MG: Performed by: STUDENT IN AN ORGANIZED HEALTH CARE EDUCATION/TRAINING PROGRAM

## 2022-05-04 RX ORDER — DIPHENHYDRAMINE HCL 25 MG
25 CAPSULE ORAL ONCE
Status: COMPLETED | OUTPATIENT
Start: 2022-05-04 | End: 2022-05-04

## 2022-05-04 RX ORDER — DROPERIDOL 2.5 MG/ML
2.5 INJECTION, SOLUTION INTRAMUSCULAR; INTRAVENOUS ONCE
Status: COMPLETED | OUTPATIENT
Start: 2022-05-04 | End: 2022-05-04

## 2022-05-04 RX ORDER — IPRATROPIUM BROMIDE AND ALBUTEROL SULFATE 2.5; .5 MG/3ML; MG/3ML
3 SOLUTION RESPIRATORY (INHALATION) ONCE
Status: COMPLETED | OUTPATIENT
Start: 2022-05-04 | End: 2022-05-04

## 2022-05-04 RX ORDER — MAGNESIUM OXIDE 400 MG/1
400 TABLET ORAL 2 TIMES DAILY
Qty: 6 TABLET | Refills: 0 | Status: SHIPPED | OUTPATIENT
Start: 2022-05-04 | End: 2022-05-07

## 2022-05-04 RX ADMIN — DIPHENHYDRAMINE HYDROCHLORIDE 25 MG: 25 CAPSULE ORAL at 03:49

## 2022-05-04 RX ADMIN — IPRATROPIUM BROMIDE AND ALBUTEROL SULFATE 3 ML: .5; 3 SOLUTION RESPIRATORY (INHALATION) at 07:52

## 2022-05-04 RX ADMIN — DROPERIDOL 2.5 MG: 2.5 INJECTION, SOLUTION INTRAMUSCULAR; INTRAVENOUS at 01:59

## 2022-05-04 NOTE — ED PROVIDER NOTES
Subjective   History of Present Illness    Review of Systems    Past Medical History:   Diagnosis Date   • Anxiety    • Anxiety     pain clinic patient Dr Sunshine x 5yrs   • Arthritis    • Cancer of left lung (HCC)    • Chronic pain due to injury     jarad LE & back   • COPD (chronic obstructive pulmonary disease) (HCC)    • Depression    • Diverticulitis    • GERD (gastroesophageal reflux disease)    • Hypertension    • Neuropathy    • Polypharmacy    • Smoker        Allergies   Allergen Reactions   • Aspirin Anaphylaxis   • Contrast Dye Anaphylaxis   • Demerol [Meperidine] Anaphylaxis   • Ibuprofen Anaphylaxis   • Neosporin [Neomycin-Bacitracin Zn-Polymyx] Anaphylaxis   • Nsaids Hives   • Penicillins Anaphylaxis   • Sulfa Antibiotics Anaphylaxis   • Codeine Hives     Pt can take codeine as long is it is not alot   • Talwin [Pentazocine] Hives   • Tetracyclines & Related Hives   • Influenza Vaccines Hives and Rash       Past Surgical History:   Procedure Laterality Date   • ABDOMINAL SURGERY     • APPENDECTOMY     • CHOLECYSTECTOMY     • FEMUR FRACTURE SURGERY     • HERNIA REPAIR     • HYSTERECTOMY         Family History   Problem Relation Age of Onset   • Suicidality Mother    • Alcohol abuse Mother    • Cancer Father    • Heart disease Father    • Heart disease Maternal Grandmother        Social History     Socioeconomic History   • Marital status:    Tobacco Use   • Smoking status: Current Some Day Smoker     Packs/day: 25.00     Years: 45.00     Pack years: 1125.00     Types: Cigarettes   • Smokeless tobacco: Never Used   • Tobacco comment: Smokes 2-3 ciragettes per day. Former 2 pack a day smoker for 50 years.   Substance and Sexual Activity   • Alcohol use: No   • Drug use: No   • Sexual activity: Defer           Objective   Physical Exam    Procedures           ED Course  ED Course as of 05/04/22 0751   Tue May 03, 2022   6875 Upon re-exam patient is more calm, we had ordered ativan and pain medication  earlier. She told the nursing staff she hadn't taken her xanax today. She does not appear in distress and resting on re-exam. Went to discuss her labs and abdominal ct results. Felt the patient could likely go home after we gave magnesium and she finished her iv fluids. She has had no vomiting or diarrhea while in the ER. She tells me she is very sick and can't go home because she won't have a way to get there. She states she lives with her daughter however they do not have a vehicle and she won't be able to come get her. Explained that I had reviewed previous xrays and it appeared no worse. She is on her usual oxygen level, not hypoxic. Patient apparently had told the nursing staff she would need to stay because she was very ill from this stomach bug. Patient was anxious about the idea of being discharged. Explained we would be giving medication in the ER and would re-assess. Ordered abg's and breathing treatment because now the patient tells me her copd is worse than her usual.  [TW]   Wed May 04, 2022   0025 Patient is sleeping on re-exam. Abg's are better than previous, she is on her baseline oxygen. We will keep patient in the ER until the morning when her daughter can get the neighbor to come pick her up.  [TW]   0149 This will be a turn over for Dr. Turner. He will be monitoring patient until she can be picked up by her daughter in the morning. [TW]   0564 Patient has been monitored overnight. She has been sleeping most of the time. Had some mild restless legs after droperidol but benadryl was given and she had improvement. Her labs appears to be chronic and her ABG is consistent with her history and is chronic. [NP]   0621 Pending discharge while awaiting someone to  patient, she was signed out to Dr. Angulo.   [NP]   8693 . Diverticulosis of the descending and sigmoid colon. No evidence of  diverticulitis. No evidence of mechanical bowel obstruction. The  appendix is surgically absent.  2. Status post  cholecystectomy. Moderate extrahepatic biliary dilatation  likely is related to a reservoir effect and is stable from the previous  exam.  3. No acute intra-abdominal process. No localized inflammation or free  fluid is present. The patient's undergone prior hysterectomy.  4. Chronic stable wedge compression deformity at T12..  CT findings were discussed the patient [TS]   0745 In the morning I was asked to discharge the patient and she was just waiting for a ride overnight in the ED went back to check on the patient the patient is awake alert oriented at this time heart rate is at 85 to 90 bpm but she is receiving a neb treatment.  Hemodynamically stable respiratory rate is 18-20 which has been normal for her with a MAP of 94 and satting 99% on her home oxygen.  Patient has got chronic COPD with chronic CO2 retention on multiple ABGs in the past is awake and alert and does not appear to have CO2 narcosis.  She states that she will be going home today does not have any obvious complaints.  Denies any abdominal pain has not vomited or had diarrhea since she has been here.  On examination she is got expiratory wheezes.  But bilateral equal breath sounds heart rate regular abdomen is soft bowel sounds are positive CNS nonfocal cap refill is good. [TS]   0747 This patient presents with vomiting arrived hemodynamically stable.  Presentation not consistent with other acute, emergent causes of abdominal pain at this time.  Low suspicion for dissection there is no widened mediastinum, hypotension, pulses deficits, and no pain tearing through to the back.  Low suspicion for nephrolithiasis without flank pain radiating to the groin, hematuria, or any history of kidney stones.  Low suspicion for viscus perforation without evidence of guarding, rebound, or rigidity that would be concerning for an acute abdomen.  Low concern for appendicitis as pain did not radiate from the umbilicus to the right lower quadrant, negative  Rovsing's sign, no severe constipation on exam.  Low concern for cholecystitis with negative Medina sign, no history of gallstones, and no recent pale stools or pain after eating.  Low concern for ulcerative disease as pain is not consistent with eating  foods and is not relieved with patient refraining from eatingand there is no hematemesis or melena. Low suspicion for GI bleeding as there is no melena hematemesis or hematochezia and patient's hemodynamics are stable and hemoglobin is at its baseline.  Low suspicion for gastroenteritis without evidence of diarrhea, fevers, or recent uncooked food exposure.  There is low concern for ischemic bowel with no significant abdominal pain normal vitals and no acidosis no history of peripheral vascular disease or cardiac dysrhythmias.          [TS]   5042 Risks and benefits of treatments given and alternative treatment options discussed with patient/family. I answered all the questions in simple, plain language, and there was voiced understanding and agreement with plan of care. There were no further questions. Differential diagnosis discussed. Patient/family was advised that the practice of medicine is not always an exact science, and sometimes tests, physical exam, or history may not show the underlying conditions with certainty. Additionally, the condition may change or show itself later after initial presentation. There was also expressed understanding and agreement with this limitation of emergency medicine practice. Patient/family was asked to return to ED if any problem or issues or if condition worsens or does not improved. Patient/family agreed to follow up with PCP/specialist as advised, or return to ED if unable to see a provider in a timely fashion for continued symptoms.  [TS]      ED Course User Index  [NP] Everett Turner MD  [TS] Shamar Angulo MD  [TW] Brigid Keller APRN                                                 [facility]    Final diagnoses:   Nausea  and vomiting, unspecified vomiting type   Chronic obstructive pulmonary disease, unspecified COPD type (HCC)   Hypomagnesemia   Diverticulosis       ED Disposition  ED Disposition     ED Disposition   Discharge    Condition   Stable    Comment   --             JASSI Astorga MD  [address] [facility] [address]  [facility] [address] [phone]    In 3 days           Medication List      New Prescriptions    magnesium oxide 400 MG tablet  Commonly known as: MAG-OX  Take 1 tablet by mouth 2 (Two) Times a Day for 3 days.           Where to Get Your Medications      These medications were sent to [facility]/pharmacy #[id number] - [place] - [address] - [phone]  - [phone] FX  [address] [facility] [address]    Phone: [phone]   · magnesium oxide 400 MG tablet          Shamar Angulo MD  05/04/22 0755

## 2022-05-04 NOTE — ED NOTES
Pt to be allowed to stay in ER overnight in order to have a ride home in the morning. Pt currently sleeping.

## 2022-05-04 NOTE — ED NOTES
"Pt states she wants to stay in hospital to \"be watched.\" She has not had any episodes of diarrhea or vomiting here.   "

## 2022-05-04 NOTE — ED PROVIDER NOTES
Subjective   Patient is a 72-year-old female who presents to the ER via EMS due to abdominal pain.  Past medical history significant for anxiety, arthritis, lung cancer, COPD, depression, diverticulitis, reflux, hypertension, neuropathy, polypharmacy, smoker.  Patient tells me she has had nausea with vomiting and diarrhea x 1 week. She states she has been drinking ensure and realized that the ensure she was drinking was . She was concerned about possible food poisoning. She has diffuse abdominal discomfort and also mentions hx of diverticulitis. Patient is noted to be short of breath on exam. She reports longstanding history of lung disease.  She is on 3.5 liters of oxygen at all times.  She is chronically short of breath.  She is on her usual oxygen level. 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  Patient reports finishing radiation treatment for lung cancer approximately a year ago.  She has follow-up with Dr. Mejia for her 1 year follow-up soon.  She is currently in remission and not receiving any treatment.  Patient states she has had nausea with vomiting x1 week.  She has had diarrhea as well.  She has had no recorded fevers.  Due to abdominal pain she came to the ER for evaluation and treatment.          Review of Systems   Constitutional: Negative.  Negative for fever.   HENT: Negative.  Negative for congestion.    Eyes: Negative.    Respiratory: Positive for shortness of breath. Negative for cough.    Cardiovascular: Negative.  Negative for chest pain.   Gastrointestinal: Positive for abdominal pain, diarrhea, nausea and vomiting.   Genitourinary: Negative.    Musculoskeletal: Negative.    Skin: Negative.    All other systems reviewed and are negative.      Past Medical History:   Diagnosis Date   • Anxiety    • Anxiety     pain clinic patient Dr Sunshine x 5yrs   • Arthritis    • Cancer of left lung (HCC)    • Chronic pain due to injury     jarad LE & back   • COPD (chronic  obstructive pulmonary disease) (HCC)    • Depression    • Diverticulitis    • GERD (gastroesophageal reflux disease)    • Hypertension    • Neuropathy    • Polypharmacy    • Smoker        Allergies   Allergen Reactions   • Aspirin Anaphylaxis   • Contrast Dye Anaphylaxis   • Demerol [Meperidine] Anaphylaxis   • Ibuprofen Anaphylaxis   • Neosporin [Neomycin-Bacitracin Zn-Polymyx] Anaphylaxis   • Nsaids Hives   • Penicillins Anaphylaxis   • Sulfa Antibiotics Anaphylaxis   • Codeine Hives     Pt can take codeine as long is it is not alot   • Talwin [Pentazocine] Hives   • Tetracyclines & Related Hives   • Influenza Vaccines Hives and Rash       Past Surgical History:   Procedure Laterality Date   • ABDOMINAL SURGERY     • APPENDECTOMY     • CHOLECYSTECTOMY     • FEMUR FRACTURE SURGERY     • HERNIA REPAIR     • HYSTERECTOMY         Family History   Problem Relation Age of Onset   • Suicidality Mother    • Alcohol abuse Mother    • Cancer Father    • Heart disease Father    • Heart disease Maternal Grandmother        Social History     Socioeconomic History   • Marital status:    Tobacco Use   • Smoking status: Current Some Day Smoker     Packs/day: 25.00     Years: 45.00     Pack years: 1125.00     Types: Cigarettes   • Smokeless tobacco: Never Used   • Tobacco comment: Smokes 2-3 ciragettes per day. Former 2 pack a day smoker for 50 years.   Substance and Sexual Activity   • Alcohol use: No   • Drug use: No   • Sexual activity: Defer           Objective   Physical Exam  Vitals and nursing note reviewed.   Constitutional:       Appearance: She is well-developed. She is ill-appearing.      Comments: Chronically ill-appearing   HENT:      Head: Normocephalic and atraumatic.      Nose: Nose normal.      Mouth/Throat:      Mouth: Mucous membranes are moist.      Pharynx: Oropharynx is clear.   Eyes:      Conjunctiva/sclera: Conjunctivae normal.      Pupils: Pupils are equal, round, and reactive to light.    Cardiovascular:      Rate and Rhythm: Normal rate and regular rhythm.      Heart sounds: Normal heart sounds.   Pulmonary:      Effort: Pulmonary effort is normal.      Breath sounds: Normal breath sounds.   Abdominal:      General: Bowel sounds are normal.      Palpations: Abdomen is soft.      Tenderness: There is abdominal tenderness.   Musculoskeletal:         General: Normal range of motion.      Cervical back: Normal range of motion and neck supple.   Skin:     General: Skin is warm and dry.      Capillary Refill: Capillary refill takes less than 2 seconds.   Neurological:      General: No focal deficit present.      Mental Status: She is alert and oriented to person, place, and time.      Comments: Anxious on exam, this is not unusual for this patient   Psychiatric:         Thought Content: Thought content normal.         Judgment: Judgment normal.         Procedures           ED Course  ED Course as of 05/04/22 1300   Tue May 03, 2022   7077 Upon re-exam patient is more calm, we had ordered ativan and pain medication earlier. She told the nursing staff she hadn't taken her xanax today. She does not appear in distress and resting on re-exam. Went to discuss her labs and abdominal ct results. Felt the patient could likely go home after we gave magnesium and she finished her iv fluids. She has had no vomiting or diarrhea while in the ER. She tells me she is very sick and can't go home because she won't have a way to get there. She states she lives with her daughter however they do not have a vehicle and she won't be able to come get her. Explained that I had reviewed previous xrays and it appeared no worse. She is on her usual oxygen level, not hypoxic. Patient apparently had told the nursing staff she would need to stay because she was very ill from this stomach bug. Patient was anxious about the idea of being discharged. Explained we would be giving medication in the ER and would re-assess. Ordered abg's  and breathing treatment because now the patient tells me her copd is worse than her usual.  [TW]   Wed May 04, 2022   0025 Patient is sleeping on re-exam. Abg's are better than previous, she is on her baseline oxygen. We will keep patient in the ER until the morning when her daughter can get the neighbor to come pick her up.  [TW]   0149 This will be a turn over for Dr. Turner. He will be monitoring patient until she can be picked up by her daughter in the morning. [TW]   0556 Patient has been monitored overnight. She has been sleeping most of the time. Had some mild restless legs after droperidol but benadryl was given and she had improvement. Her labs appears to be chronic and her ABG is consistent with her history and is chronic. [NP]   0621 Pending discharge while awaiting someone to  patient, she was signed out to Dr. Angulo.   [NP]   0745 . Diverticulosis of the descending and sigmoid colon. No evidence of  diverticulitis. No evidence of mechanical bowel obstruction. The  appendix is surgically absent.  2. Status post cholecystectomy. Moderate extrahepatic biliary dilatation  likely is related to a reservoir effect and is stable from the previous  exam.  3. No acute intra-abdominal process. No localized inflammation or free  fluid is present. The patient's undergone prior hysterectomy.  4. Chronic stable wedge compression deformity at T12..  CT findings were discussed the patient [TS]   0745 In the morning I was asked to discharge the patient and she was just waiting for a ride overnight in the ED went back to check on the patient the patient is awake alert oriented at this time heart rate is at 85 to 90 bpm but she is receiving a neb treatment.  Hemodynamically stable respiratory rate is 18-20 which has been normal for her with a MAP of 94 and satting 99% on her home oxygen.  Patient has got chronic COPD with chronic CO2 retention on multiple ABGs in the past is awake and alert and does not appear to  have CO2 narcosis.  She states that she will be going home today does not have any obvious complaints.  Denies any abdominal pain has not vomited or had diarrhea since she has been here.  On examination she is got expiratory wheezes.  But bilateral equal breath sounds heart rate regular abdomen is soft bowel sounds are positive CNS nonfocal cap refill is good. [TS]   0747 This patient presents with vomiting arrived hemodynamically stable.  Presentation not consistent with other acute, emergent causes of abdominal pain at this time.  Low suspicion for dissection there is no widened mediastinum, hypotension, pulses deficits, and no pain tearing through to the back.  Low suspicion for nephrolithiasis without flank pain radiating to the groin, hematuria, or any history of kidney stones.  Low suspicion for viscus perforation without evidence of guarding, rebound, or rigidity that would be concerning for an acute abdomen.  Low concern for appendicitis as pain did not radiate from the umbilicus to the right lower quadrant, negative Rovsing's sign, no severe constipation on exam.  Low concern for cholecystitis with negative Medina sign, no history of gallstones, and no recent pale stools or pain after eating.  Low concern for ulcerative disease as pain is not consistent with eating  foods and is not relieved with patient refraining from eatingand there is no hematemesis or melena. Low suspicion for GI bleeding as there is no melena hematemesis or hematochezia and patient's hemodynamics are stable and hemoglobin is at its baseline.  Low suspicion for gastroenteritis without evidence of diarrhea, fevers, or recent uncooked food exposure.  There is low concern for ischemic bowel with no significant abdominal pain normal vitals and no acidosis no history of peripheral vascular disease or cardiac dysrhythmias.          [TS]   0748 Risks and benefits of treatments given and alternative treatment options discussed with  patient/family. I answered all the questions in simple, plain language, and there was voiced understanding and agreement with plan of care. There were no further questions. Differential diagnosis discussed. Patient/family was advised that the practice of medicine is not always an exact science, and sometimes tests, physical exam, or history may not show the underlying conditions with certainty. Additionally, the condition may change or show itself later after initial presentation. There was also expressed understanding and agreement with this limitation of emergency medicine practice. Patient/family was asked to return to ED if any problem or issues or if condition worsens or does not improved. Patient/family agreed to follow up with PCP/specialist as advised, or return to ED if unable to see a provider in a timely fashion for continued symptoms.  [TS]      ED Course User Index  [NP] Everett Turner MD  [TS] Shamar Angulo MD  [TW] Brigid Keller, THIERNO                                                 MDM  Number of Diagnoses or Management Options  Chronic obstructive pulmonary disease, unspecified COPD type (HCC): new and requires workup  Diverticulosis: new and requires workup  Hypomagnesemia: new and requires workup  Nausea and vomiting, unspecified vomiting type: new and requires workup     Amount and/or Complexity of Data Reviewed  Clinical lab tests: reviewed  Tests in the radiology section of CPT®: reviewed  Tests in the medicine section of CPT®: reviewed  Decide to obtain previous medical records or to obtain history from someone other than the patient: yes    Risk of Complications, Morbidity, and/or Mortality  Presenting problems: moderate  Diagnostic procedures: moderate  Management options: moderate    Patient Progress  Patient progress: improved      Final diagnoses:   Nausea and vomiting, unspecified vomiting type   Chronic obstructive pulmonary disease, unspecified COPD type (HCC)   Hypomagnesemia    Diverticulosis       ED Disposition  ED Disposition     ED Disposition   Discharge    Condition   Stable    Comment   --             JASSI Astorga MD  [address] [facility] [address]  [facility] [id number] [phone]    In 3 days           Medication List      New Prescriptions    magnesium oxide 400 MG tablet  Commonly known as: MAG-OX  Take 1 tablet by mouth 2 (Two) Times a Day for 3 days.           Where to Get Your Medications      These medications were sent to [facility] #[id number] - [place] - [address] - [phone]  - [phone]   [address] [facility] [address]    Phone: [phone]   · magnesium oxide 400 MG tablet          Brigid Keller, APRN  05/04/22 1300

## 2022-05-04 NOTE — ED NOTES
Pt up to bedside commode to urinate. She states she feels unsteady but she was able to get to bedside commode and back to bed without assistance. This nurse called pt's daughter to let her know pt is staying in the ER tonight until transportation can be made in the morning. She states she will get someone to pick pt up between 0600 and 0700. Instructed her to make sure pt's O2 tank and key comes with whomever picks her up.

## 2022-05-04 NOTE — ED NOTES
Spoke to Shama, pt's daughter, who states her sister will be here to pick pt up at around 0700. Informed Dr. Turner and pt.

## 2022-05-04 NOTE — ED NOTES
Spoke to Shama again because pt wants her inhaler brought to the ER. Pt's other daughter, Bertha, is coming from [place] to get pt. This nurse reminded Shama that pt needs O2 tank brought to the ER. She hung up to call Bertha to come get the O2 tank before heading to the ER. Tabitha and Shama RN informed of the delay in pt being d/c home.

## 2022-05-05 ENCOUNTER — HOSPITAL ENCOUNTER (EMERGENCY)
Facility: HOSPITAL | Age: 72
Discharge: HOME | End: 2022-05-05
Attending: EMERGENCY MEDICINE | Admitting: EMERGENCY MEDICINE

## 2022-05-05 VITALS
DIASTOLIC BLOOD PRESSURE: 63 MMHG | SYSTOLIC BLOOD PRESSURE: 116 MMHG | HEIGHT: 66 IN | HEART RATE: 91 BPM | BODY MASS INDEX: 18.96 KG/M2 | WEIGHT: 118 LBS | OXYGEN SATURATION: 97 % | TEMPERATURE: 99 F | RESPIRATION RATE: 18 BRPM

## 2022-05-05 DIAGNOSIS — K52.9 GASTROENTERITIS: Primary | ICD-10-CM

## 2022-05-05 LAB
ALBUMIN SERPL-MCNC: 4.5 G/DL (ref 3.5–5.2)
ALBUMIN/GLOB SERPL: 1.6 G/DL
ALP SERPL-CCNC: 78 U/L (ref 39–117)
ALT SERPL W P-5'-P-CCNC: 10 U/L (ref 1–33)
ANION GAP SERPL CALCULATED.3IONS-SCNC: 11 MMOL/L (ref 5–15)
AST SERPL-CCNC: 22 U/L (ref 1–32)
BASOPHILS # BLD AUTO: 0.03 10*3/MM3 (ref 0–0.2)
BASOPHILS NFR BLD AUTO: 0.5 % (ref 0–1.5)
BILIRUB SERPL-MCNC: 0.3 MG/DL (ref 0–1.2)
BUN SERPL-MCNC: 6 MG/DL (ref 8–23)
BUN/CREAT SERPL: 13 (ref 7–25)
CALCIUM SPEC-SCNC: 9.3 MG/DL (ref 8.6–10.5)
CHLORIDE SERPL-SCNC: 94 MMOL/L (ref 98–107)
CO2 SERPL-SCNC: 31 MMOL/L (ref 22–29)
CREAT SERPL-MCNC: 0.46 MG/DL (ref 0.57–1)
DEPRECATED RDW RBC AUTO: 46.3 FL (ref 37–54)
EGFRCR SERPLBLD CKD-EPI 2021: 100.6 ML/MIN/1.73
EOSINOPHIL # BLD AUTO: 0.03 10*3/MM3 (ref 0–0.4)
EOSINOPHIL NFR BLD AUTO: 0.5 % (ref 0.3–6.2)
ERYTHROCYTE [DISTWIDTH] IN BLOOD BY AUTOMATED COUNT: 12.9 % (ref 12.3–15.4)
GLOBULIN UR ELPH-MCNC: 2.9 GM/DL
GLUCOSE SERPL-MCNC: 108 MG/DL (ref 65–99)
HCT VFR BLD AUTO: 38 % (ref 34–46.6)
HGB BLD-MCNC: 12.2 G/DL (ref 12–15.9)
IMM GRANULOCYTES # BLD AUTO: 0.02 10*3/MM3 (ref 0–0.05)
IMM GRANULOCYTES NFR BLD AUTO: 0.3 % (ref 0–0.5)
INR PPP: 0.97 (ref 0.91–1.09)
LYMPHOCYTES # BLD AUTO: 0.9 10*3/MM3 (ref 0.7–3.1)
LYMPHOCYTES NFR BLD AUTO: 14.9 % (ref 19.6–45.3)
MAGNESIUM SERPL-MCNC: 1.7 MG/DL (ref 1.6–2.4)
MCH RBC QN AUTO: 31.6 PG (ref 26.6–33)
MCHC RBC AUTO-ENTMCNC: 32.1 G/DL (ref 31.5–35.7)
MCV RBC AUTO: 98.4 FL (ref 79–97)
MONOCYTES # BLD AUTO: 0.7 10*3/MM3 (ref 0.1–0.9)
MONOCYTES NFR BLD AUTO: 11.6 % (ref 5–12)
NEUTROPHILS NFR BLD AUTO: 4.36 10*3/MM3 (ref 1.7–7)
NEUTROPHILS NFR BLD AUTO: 72.2 % (ref 42.7–76)
NRBC BLD AUTO-RTO: 0 /100 WBC (ref 0–0.2)
PLATELET # BLD AUTO: 224 10*3/MM3 (ref 140–450)
PMV BLD AUTO: 11.3 FL (ref 6–12)
POTASSIUM SERPL-SCNC: 4 MMOL/L (ref 3.5–5.2)
PROT SERPL-MCNC: 7.4 G/DL (ref 6–8.5)
PROTHROMBIN TIME: 12.5 SECONDS (ref 11.9–14.6)
QT INTERVAL: 370 MS
QTC INTERVAL: 445 MS
RBC # BLD AUTO: 3.86 10*6/MM3 (ref 3.77–5.28)
SARS-COV-2 RNA PNL SPEC NAA+PROBE: NOT DETECTED
SODIUM SERPL-SCNC: 136 MMOL/L (ref 136–145)
WBC NRBC COR # BLD: 6.04 10*3/MM3 (ref 3.4–10.8)

## 2022-05-05 PROCEDURE — 94640 AIRWAY INHALATION TREATMENT: CPT

## 2022-05-05 PROCEDURE — 83735 ASSAY OF MAGNESIUM: CPT | Performed by: EMERGENCY MEDICINE

## 2022-05-05 PROCEDURE — 85610 PROTHROMBIN TIME: CPT | Performed by: EMERGENCY MEDICINE

## 2022-05-05 PROCEDURE — 96374 THER/PROPH/DIAG INJ IV PUSH: CPT

## 2022-05-05 PROCEDURE — 94799 UNLISTED PULMONARY SVC/PX: CPT

## 2022-05-05 PROCEDURE — 80053 COMPREHEN METABOLIC PANEL: CPT | Performed by: EMERGENCY MEDICINE

## 2022-05-05 PROCEDURE — 25010000002 LORAZEPAM PER 2 MG: Performed by: EMERGENCY MEDICINE

## 2022-05-05 PROCEDURE — 99284 EMERGENCY DEPT VISIT MOD MDM: CPT

## 2022-05-05 PROCEDURE — 85025 COMPLETE CBC W/AUTO DIFF WBC: CPT | Performed by: EMERGENCY MEDICINE

## 2022-05-05 PROCEDURE — 87635 SARS-COV-2 COVID-19 AMP PRB: CPT | Performed by: EMERGENCY MEDICINE

## 2022-05-05 RX ORDER — IPRATROPIUM BROMIDE AND ALBUTEROL SULFATE 2.5; .5 MG/3ML; MG/3ML
3 SOLUTION RESPIRATORY (INHALATION) ONCE
Status: COMPLETED | OUTPATIENT
Start: 2022-05-05 | End: 2022-05-05

## 2022-05-05 RX ORDER — LORAZEPAM 2 MG/ML
0.5 INJECTION INTRAMUSCULAR ONCE
Status: COMPLETED | OUTPATIENT
Start: 2022-05-05 | End: 2022-05-05

## 2022-05-05 RX ORDER — SODIUM CHLORIDE 0.9 % (FLUSH) 0.9 %
10 SYRINGE (ML) INJECTION AS NEEDED
Status: DISCONTINUED | OUTPATIENT
Start: 2022-05-05 | End: 2022-05-05 | Stop reason: HOSPADM

## 2022-05-05 RX ADMIN — SODIUM CHLORIDE, POTASSIUM CHLORIDE, SODIUM LACTATE AND CALCIUM CHLORIDE 500 ML: 600; 310; 30; 20 INJECTION, SOLUTION INTRAVENOUS at 04:19

## 2022-05-05 RX ADMIN — LORAZEPAM 0.5 MG: 2 INJECTION INTRAMUSCULAR; INTRAVENOUS at 04:17

## 2022-05-05 RX ADMIN — IPRATROPIUM BROMIDE AND ALBUTEROL SULFATE 3 ML: .5; 3 SOLUTION RESPIRATORY (INHALATION) at 04:15

## 2022-05-05 NOTE — ED PROVIDER NOTES
"Subjective   72-year-old female presents to the ER with complaint of nausea, vomiting, diarrhea.  Second visit within the past 48 hours for similar symptoms.  She has a history of COPD on 2 L home oxygen, hypertension, GERD, lung cancer in remission, anxiety.  Patient reports onset of symptoms a couple days ago.  She has had nausea and decreased appetite, has had numerous episodes of nonbloody nonmelanic diarrhea.  Has had some small amount of watery emesis but states she cannot \"vomit because of my hiatal hernia\".  Patient became very nervous tonight so she came back to the emergency department for evaluation.  The patient was seen in the emergency department a little over 24 hours ago, had a thorough work-up to include CT of the abdomen pelvis that showed diverticulosis but no evidence of diverticulitis.  No was no acute intra-abdominal abnormality.        History provided by:  Patient      Review of Systems   All other systems reviewed and are negative.      Past Medical History:   Diagnosis Date   • Anxiety    • Anxiety     pain clinic patient Dr Sunshine x 5yrs   • Arthritis    • Cancer of left lung (HCC)    • Chronic pain due to injury     jarad LE & back   • COPD (chronic obstructive pulmonary disease) (HCC)    • Depression    • Diverticulitis    • GERD (gastroesophageal reflux disease)    • Hypertension    • Neuropathy    • Polypharmacy    • Smoker        Allergies   Allergen Reactions   • Aspirin Anaphylaxis   • Contrast Dye Anaphylaxis   • Demerol [Meperidine] Anaphylaxis   • Ibuprofen Anaphylaxis   • Neosporin [Neomycin-Bacitracin Zn-Polymyx] Anaphylaxis   • Nsaids Hives   • Penicillins Anaphylaxis   • Sulfa Antibiotics Anaphylaxis   • Codeine Hives     Pt can take codeine as long is it is not alot   • Talwin [Pentazocine] Hives   • Tetracyclines & Related Hives   • Influenza Vaccines Hives and Rash       Past Surgical History:   Procedure Laterality Date   • ABDOMINAL SURGERY     • APPENDECTOMY     • " CHOLECYSTECTOMY     • FEMUR FRACTURE SURGERY     • HERNIA REPAIR     • HYSTERECTOMY         Family History   Problem Relation Age of Onset   • Suicidality Mother    • Alcohol abuse Mother    • Cancer Father    • Heart disease Father    • Heart disease Maternal Grandmother        Social History     Socioeconomic History   • Marital status:    Tobacco Use   • Smoking status: Current Some Day Smoker     Packs/day: 25.00     Years: 45.00     Pack years: 1125.00     Types: Cigarettes   • Smokeless tobacco: Never Used   • Tobacco comment: Smokes 2-3 ciragettes per day. Former 2 pack a day smoker for 50 years.   Substance and Sexual Activity   • Alcohol use: No   • Drug use: No   • Sexual activity: Defer           Objective   Physical Exam  Vitals and nursing note reviewed.   Constitutional:       General: She is not in acute distress.     Appearance: Normal appearance. She is normal weight.      Comments: Anxious appearing elderly female who does not appear to be in any distress   HENT:      Head: Normocephalic and atraumatic.      Nose: Nose normal.      Mouth/Throat:      Mouth: Mucous membranes are moist.      Pharynx: Oropharynx is clear. No oropharyngeal exudate or posterior oropharyngeal erythema.   Eyes:      Extraocular Movements: Extraocular movements intact.      Conjunctiva/sclera: Conjunctivae normal.      Pupils: Pupils are equal, round, and reactive to light.   Cardiovascular:      Rate and Rhythm: Regular rhythm. Tachycardia present.      Pulses: Normal pulses.      Heart sounds: Normal heart sounds.   Pulmonary:      Effort: Pulmonary effort is normal.      Breath sounds: Wheezing present. No rhonchi or rales.      Comments: Tachypneic  Abdominal:      General: Abdomen is flat. Bowel sounds are normal. There is no distension.      Palpations: Abdomen is soft.      Tenderness: There is no abdominal tenderness.   Musculoskeletal:         General: No tenderness. Normal range of motion.      Cervical  back: Normal range of motion and neck supple. No rigidity. No muscular tenderness.      Right lower leg: No edema.      Left lower leg: No edema.   Skin:     General: Skin is warm and dry.      Capillary Refill: Capillary refill takes less than 2 seconds.      Findings: No rash.   Neurological:      General: No focal deficit present.      Mental Status: She is alert and oriented to person, place, and time. Mental status is at baseline.      Cranial Nerves: No cranial nerve deficit.      Sensory: No sensory deficit.      Motor: No weakness.   Psychiatric:         Mood and Affect: Mood normal.         Behavior: Behavior normal.         Thought Content: Thought content normal.         Procedures         Lab Results (last 24 hours)     Procedure Component Value Units Date/Time    CBC & Differential [[id number]]  (Abnormal) Collected: 05/05/22 0417    Specimen: Blood Updated: 05/05/22 0430    Narrative:      The following orders were created for panel order CBC & Differential.  Procedure                               Abnormality         Status                     ---------                               -----------         ------                     CBC Auto Differential[[id number]]        Abnormal            Final result                 Please view results for these tests on the individual orders.    Comprehensive Metabolic Panel [[id number]]  (Abnormal) Collected: 05/05/22 0417    Specimen: Blood Updated: 05/05/22 0452     Glucose 108 mg/dL      BUN 6 mg/dL      Creatinine 0.46 mg/dL      Sodium 136 mmol/L      Potassium 4.0 mmol/L      Comment: Slight hemolysis detected by analyzer. Results may be affected.        Chloride 94 mmol/L      CO2 31.0 mmol/L      Calcium 9.3 mg/dL      Total Protein 7.4 g/dL      Albumin 4.50 g/dL      ALT (SGPT) 10 U/L      AST (SGOT) 22 U/L      Comment: Slight hemolysis detected by analyzer. Results may be affected.        Alkaline Phosphatase 78 U/L      Total Bilirubin 0.3 mg/dL       Globulin 2.9 gm/dL      A/G Ratio 1.6 g/dL      BUN/Creatinine Ratio 13.0     Anion Gap 11.0 mmol/L      eGFR 100.6 mL/min/1.73      Comment: National Kidney Foundation and American Society of Nephrology (ASN) Task Force recommended calculation based on the Chronic Kidney Disease Epidemiology Collaboration (CKD-EPI) equation refit without adjustment for race.       Narrative:      GFR Normal >60  Chronic Kidney Disease <60  Kidney Failure <15      Protime-INR [[id number]]  (Normal) Collected: 05/05/22 0417    Specimen: Blood Updated: 05/05/22 0439     Protime 12.5 Seconds      INR 0.97    Magnesium [[id number]]  (Normal) Collected: 05/05/22 0417    Specimen: Blood Updated: 05/05/22 0444     Magnesium 1.7 mg/dL     CBC Auto Differential [[id number]]  (Abnormal) Collected: 05/05/22 0417    Specimen: Blood Updated: 05/05/22 0430     WBC 6.04 10*3/mm3      RBC 3.86 10*6/mm3      Hemoglobin 12.2 g/dL      Hematocrit 38.0 %      MCV 98.4 fL      MCH 31.6 pg      MCHC 32.1 g/dL      RDW 12.9 %      RDW-SD 46.3 fl      MPV 11.3 fL      Platelets 224 10*3/mm3      Neutrophil % 72.2 %      Lymphocyte % 14.9 %      Monocyte % 11.6 %      Eosinophil % 0.5 %      Basophil % 0.5 %      Immature Grans % 0.3 %      Neutrophils, Absolute 4.36 10*3/mm3      Lymphocytes, Absolute 0.90 10*3/mm3      Monocytes, Absolute 0.70 10*3/mm3      Eosinophils, Absolute 0.03 10*3/mm3      Basophils, Absolute 0.03 10*3/mm3      Immature Grans, Absolute 0.02 10*3/mm3      nRBC 0.0 /100 WBC     COVID-19,[facility] IN-HOUSE,Nasal Swab No Transport Media 3-4 HR TAT - Swab, Nasal Cavity [[id number]]  (Normal) Collected: 05/05/22 0421    Specimen: Swab from Nasal Cavity Updated: 05/05/22 0504     COVID19 Not Detected    Narrative:      Fact sheet for providers: https://www.fda.gov/media/827311/download     Fact sheet for patients: https://www.fda.gov/media/717247/download    Test performed by PCR.    Consider negative results in combination with clinical  observations, patient history, and epidemiological information.      CT Abdomen Pelvis Without Contrast    Result Date: 5/3/2022  Narrative: CT ABDOMEN PELVIS WO CONTRAST- 5/3/2022 9:27 PM CDT  HISTORY: abdominal pain  COMPARISON: 4/18/2021  DLP: 167 mGy cm. All CT scans are performed using dose optimization techniques as appropriate to the performed exam and including at least one of the following: Automated exposure control, adjustment of the mA and/or kV according to size, and the use of the iterative reconstruction technique.  TECHNIQUE: Noncontrast enhanced images of the abdomen and pelvis obtained without oral contrast.  FINDINGS: There is mild scarring in the lung bases. No evidence of consolidative pneumonia or effusion. There is mild cardiomegaly. No pericardial effusion. A small hiatal hernia is present..  LIVER: No focal liver lesion.  BILIARY SYSTEM: The gallbladder is surgically absent. Moderate extrahepatic biliary dilatation is present stable from the previous exam and likely representing a reservoir effect..  PANCREAS: No focal pancreatic lesion.  SPLEEN: Unremarkable.  KIDNEYS AND ADRENALS: Bilateral kidneys and adrenal glands are unremarkable.  The ureters are decompressed and normal in appearance.  RETROPERITONEUM: No mass, lymphadenopathy or hemorrhage.  GI TRACT: There is no evidence of mechanical obstruction or focal bowel wall thickening. There is diverticulosis the descending and sigmoid colon with no evidence of acute diverticulitis. No evidence of gastric outlet obstruction.. The appendix is surgically absent..  OTHER: There is no mesenteric mass, lymphadenopathy or fluid collection. The osseous structures and soft tissues demonstrate no worrisome lesions. No free fluid is present.  PELVIS: The patient's undergone prior hysterectomy. There is no free fluid in the pelvis.. The urinary bladder is normal in appearance.      Impression: 1. Diverticulosis of the descending and sigmoid colon. No  evidence of diverticulitis. No evidence of mechanical bowel obstruction. The appendix is surgically absent. 2. Status post cholecystectomy. Moderate extrahepatic biliary dilatation likely is related to a reservoir effect and is stable from the previous exam. 3. No acute intra-abdominal process. No localized inflammation or free fluid is present. The patient's undergone prior hysterectomy. 4. Chronic stable wedge compression deformity at T12..   This report was finalized on 05/03/2022 21:59 by Dr. Isaac Saucedo MD.    XR Chest 1 View    Result Date: 5/3/2022  Narrative: EXAMINATION: Chest 1 view 5/3/2022  HISTORY: Shortness of breath.  FINDINGS: Today's exam is compared to previous study of 4/18/2022. There are emphysematous changes of the lungs with mild hyperinflation and increased interstitial markings. No evidence of acute consolidative pneumonia or effusion. There is no free air beneath the hemidiaphragms.      Impression: 1. Emphysematous changes of the lungs. No evidence of lobar pneumonia or effusion. This report was finalized on 05/03/2022 20:42 by Dr. Isaac Saucedo MD.    XR Chest 1 View    Result Date: 4/18/2022  Narrative: EXAMINATION: Chest 1 view 4/18/2022  HISTORY: Shortness of air.  FINDINGS: Today's exam is compared to a previous study of one year earlier. There are emphysematous changes of the lungs with hyperinflation of the lung parenchyma and increased interstitial markings. No evidence of acute consolidative pneumonia or effusion. There is atheromatous calcification of the aortic arch.      Impression: 1.. Chronic changes of the lungs including scarring within the apices and emphysematous changes. No evidence of lobar pneumonia or effusion. This report was finalized on 04/18/2022 07:21 by Dr. Isaac Saucedo MD.    ED Course  ED Course as of 05/05/22 0614   Thu May 05, 2022   0612 Labs reassuring, COVID-19 not detected.  Reviewed images from yesterday, no evidence of acute intra-abdominal  abnormality.  Recommend patient continue her antiemetics at home and can return if she develops worsening symptoms. [AW]      ED Course User Index  [AW] Carlos Olson MD                                                 [facility]    Final diagnoses:   Gastroenteritis       ED Disposition  ED Disposition     ED Disposition   Discharge    Condition   Stable    Comment   --             Regis Ortiz MD  [address]  [place] [address]  [phone]    Schedule an appointment as soon as possible for a visit   As needed         Medication List      No changes were made to your prescriptions during this visit.          Carlos Olson MD  05/05/22 0614

## 2022-05-17 ENCOUNTER — TELEPHONE (OUTPATIENT)
Dept: PULMONOLOGY | Facility: CLINIC | Age: 72
End: 2022-05-17

## 2022-05-17 RX ORDER — AZITHROMYCIN 250 MG/1
TABLET, FILM COATED ORAL
Qty: 6 TABLET | Refills: 0 | Status: SHIPPED | OUTPATIENT
Start: 2022-05-17

## 2022-05-17 RX ORDER — PREDNISONE 10 MG/1
TABLET ORAL
Qty: 31 TABLET | Refills: 0 | Status: SHIPPED | OUTPATIENT
Start: 2022-05-17

## 2022-05-17 NOTE — TELEPHONE ENCOUNTER
This is Dr. Flores's message:    I talked to the patient and her daughter.  I advised them to come to my office for visit on Thursday, 5/19/2022 in the afternoon.  She is actually going to see Dr. Mejia that day at 130 and after that visit she will come to my office.  I called in a prescription for azithromycin and prednisone for her.  Unfortunately she still continues to smoke and I told her to stop smoking and continues on oxygen as before.  I will see her back in the office.  Please let Bambi know to schedule the appointment for Thursday afternoon.  Thank you.

## 2022-05-17 NOTE — TELEPHONE ENCOUNTER
"Patient states she has been sick for a \"couple of months\". She has been in the hospital 3 times in the last month. She states she has had chest congestion with a productive cough. She states her mucus in clear to white. When she went to the ER on the 5th she states she had been running a temperature . She was admitted with nausea, vomiting and diarrhea.  Patient is asking if you would prescribe antibiotics? Her last office visit was last August.  Patient is aware the Provider is not in the office at this time. Patient informed this problem will be addressed as soon as possible but it may be the next day. Patient is aware to go to the ER for severe or urgent complaints. Patient voiced understanding and is agreeable.   Please advise.  "

## 2022-05-18 NOTE — TELEPHONE ENCOUNTER
I called the patient to schedule this appointment and talk to her about her insurance. [facility] from the PCP on her card which is Corbin Nunez. She says that she does not see him but is making an appointment with KIM Ortiz. I let her know that she will have to call Abdi and have her PCP changed to Dr Ortiz and then get a referral from her when she sees her so that her insurance will pay to see us. The patient voiced understanding and was told to call me back once she gets her referral so I can get her scheduled.

## 2022-06-10 ENCOUNTER — HOSPITAL ENCOUNTER (INPATIENT)
Age: 72
LOS: 15 days | Discharge: HOME HEALTH | DRG: 193 | End: 2022-06-25
Attending: EMERGENCY MEDICINE | Admitting: FAMILY MEDICINE
Payer: MEDICARE

## 2022-06-10 ENCOUNTER — APPOINTMENT (OUTPATIENT)
Dept: GENERAL RADIOLOGY | Age: 72
DRG: 193 | End: 2022-06-10
Payer: MEDICARE

## 2022-06-10 DIAGNOSIS — I47.1 PAROXYSMAL SUPRAVENTRICULAR TACHYCARDIA (HCC): ICD-10-CM

## 2022-06-10 DIAGNOSIS — B34.8 RHINOVIRUS INFECTION: ICD-10-CM

## 2022-06-10 DIAGNOSIS — Z78.9 FULL CODE STATUS: ICD-10-CM

## 2022-06-10 DIAGNOSIS — J44.1 COPD EXACERBATION (HCC): ICD-10-CM

## 2022-06-10 DIAGNOSIS — F41.9 ANXIETY: ICD-10-CM

## 2022-06-10 DIAGNOSIS — Z51.5 PALLIATIVE CARE PATIENT: ICD-10-CM

## 2022-06-10 DIAGNOSIS — J44.9 CHRONIC OBSTRUCTIVE PULMONARY DISEASE, UNSPECIFIED COPD TYPE (HCC): ICD-10-CM

## 2022-06-10 DIAGNOSIS — J96.22 ACUTE ON CHRONIC RESPIRATORY FAILURE WITH HYPERCAPNIA (HCC): Primary | ICD-10-CM

## 2022-06-10 PROBLEM — J96.20 ACUTE ON CHRONIC RESPIRATORY FAILURE (HCC): Status: ACTIVE | Noted: 2022-06-10

## 2022-06-10 LAB
ADENOVIRUS BY PCR: NOT DETECTED
ALBUMIN SERPL-MCNC: 3.9 G/DL (ref 3.5–5.2)
ALLENS TEST: ABNORMAL
ALP BLD-CCNC: 77 U/L (ref 35–104)
ALT SERPL-CCNC: 9 U/L (ref 5–33)
ANION GAP SERPL CALCULATED.3IONS-SCNC: 6 MMOL/L (ref 7–19)
AST SERPL-CCNC: 12 U/L (ref 5–32)
BASE EXCESS ARTERIAL: 14.8 MMOL/L (ref -2–2)
BASE EXCESS ARTERIAL: 16.2 MMOL/L (ref -2–2)
BASOPHILS ABSOLUTE: 0 K/UL (ref 0–0.2)
BASOPHILS RELATIVE PERCENT: 0.4 % (ref 0–1)
BI-LEVEL POS AIRWAY PRESSURE(EXPIRATORY): 6
BI-LEVEL POS AIRWAY PRESSURE(INSPIRATORY): 12
BILIRUB SERPL-MCNC: <0.2 MG/DL (ref 0.2–1.2)
BORDETELLA PARAPERTUSSIS BY PCR: NOT DETECTED
BORDETELLA PERTUSSIS BY PCR: NOT DETECTED
BUN BLDV-MCNC: 5 MG/DL (ref 8–23)
CALCIUM SERPL-MCNC: 8.9 MG/DL (ref 8.8–10.2)
CARBOXYHEMOGLOBIN ARTERIAL: 4.4 % (ref 0–5)
CARBOXYHEMOGLOBIN ARTERIAL: 4.9 % (ref 0–5)
CHLAMYDOPHILIA PNEUMONIAE BY PCR: NOT DETECTED
CHLORIDE BLD-SCNC: 94 MMOL/L (ref 98–111)
CO2: 39 MMOL/L (ref 22–29)
CORONAVIRUS 229E BY PCR: NOT DETECTED
CORONAVIRUS HKU1 BY PCR: NOT DETECTED
CORONAVIRUS NL63 BY PCR: NOT DETECTED
CORONAVIRUS OC43 BY PCR: NOT DETECTED
CREAT SERPL-MCNC: 0.4 MG/DL (ref 0.5–0.9)
EOSINOPHILS ABSOLUTE: 0.1 K/UL (ref 0–0.6)
EOSINOPHILS RELATIVE PERCENT: 1 % (ref 0–5)
EXPIRATORY POSITIVE AIRWAY PRESSURE: 6
GFR AFRICAN AMERICAN: >59
GFR NON-AFRICAN AMERICAN: >60
GLUCOSE BLD-MCNC: 113 MG/DL (ref 74–109)
HCO3 ARTERIAL: 44.7 MMOL/L (ref 22–26)
HCO3 ARTERIAL: 45.3 MMOL/L (ref 22–26)
HCT VFR BLD CALC: 36.9 % (ref 37–47)
HEMOGLOBIN, ART, EXTENDED: 10.7 G/DL (ref 12–16)
HEMOGLOBIN, ART, EXTENDED: 10.9 G/DL (ref 12–16)
HEMOGLOBIN: 11 G/DL (ref 12–16)
HUMAN METAPNEUMOVIRUS BY PCR: NOT DETECTED
HUMAN RHINOVIRUS/ENTEROVIRUS BY PCR: DETECTED
IMMATURE GRANULOCYTES #: 0 K/UL
INFLUENZA A BY PCR: NOT DETECTED
INFLUENZA B BY PCR: NOT DETECTED
INSPIRATORY POSITIVE AIRWAY PRESSURE: 12
LACTIC ACID: 0.4 MMOL/L (ref 0.5–1.9)
LYMPHOCYTES ABSOLUTE: 1.2 K/UL (ref 1.1–4.5)
LYMPHOCYTES RELATIVE PERCENT: 16.9 % (ref 20–40)
MCH RBC QN AUTO: 31.4 PG (ref 27–31)
MCHC RBC AUTO-ENTMCNC: 29.8 G/DL (ref 33–37)
MCV RBC AUTO: 105.4 FL (ref 81–99)
METHEMOGLOBIN ARTERIAL: 0.9 %
METHEMOGLOBIN ARTERIAL: 1 %
MONOCYTES ABSOLUTE: 0.7 K/UL (ref 0–0.9)
MONOCYTES RELATIVE PERCENT: 10 % (ref 0–10)
MYCOPLASMA PNEUMONIAE BY PCR: NOT DETECTED
NEUTROPHILS ABSOLUTE: 5.1 K/UL (ref 1.5–7.5)
NEUTROPHILS RELATIVE PERCENT: 71.4 % (ref 50–65)
O2 CONTENT ARTERIAL: 14 ML/DL
O2 CONTENT ARTERIAL: 14.5 ML/DL
O2 DELIVERY DEVICE: ABNORMAL
O2 SAT, ARTERIAL: 90.9 %
O2 SAT, ARTERIAL: 93.7 %
O2 THERAPY: ABNORMAL
O2 THERAPY: ABNORMAL
OXYGEN FLOW: 3.5
PARAINFLUENZA VIRUS 1 BY PCR: NOT DETECTED
PARAINFLUENZA VIRUS 2 BY PCR: NOT DETECTED
PARAINFLUENZA VIRUS 3 BY PCR: NOT DETECTED
PARAINFLUENZA VIRUS 4 BY PCR: NOT DETECTED
PCO2 ARTERIAL: 84 MMHG (ref 35–45)
PCO2 ARTERIAL: 93 MMHG (ref 35–45)
PDW BLD-RTO: 12.6 % (ref 11.5–14.5)
PH ARTERIAL: 7.29 (ref 7.35–7.45)
PH ARTERIAL: 7.34 (ref 7.35–7.45)
PLATELET # BLD: 188 K/UL (ref 130–400)
PMV BLD AUTO: 10.7 FL (ref 9.4–12.3)
PO2 ARTERIAL: 169 MMHG (ref 80–100)
PO2 ARTERIAL: 69 MMHG (ref 80–100)
POTASSIUM REFLEX MAGNESIUM: 3.7 MMOL/L (ref 3.5–5)
POTASSIUM, WHOLE BLOOD: 3.6
POTASSIUM, WHOLE BLOOD: 3.8
RBC # BLD: 3.5 M/UL (ref 4.2–5.4)
RESPIRATORY SYNCYTIAL VIRUS BY PCR: NOT DETECTED
SAMPLE SOURCE: ABNORMAL
SARS-COV-2, PCR: NOT DETECTED
SODIUM BLD-SCNC: 139 MMOL/L (ref 136–145)
TOTAL PROTEIN: 7.1 G/DL (ref 6.6–8.7)
TROPONIN: <0.01 NG/ML (ref 0–0.03)
WBC # BLD: 7.1 K/UL (ref 4.8–10.8)

## 2022-06-10 PROCEDURE — 83605 ASSAY OF LACTIC ACID: CPT

## 2022-06-10 PROCEDURE — 6360000002 HC RX W HCPCS: Performed by: EMERGENCY MEDICINE

## 2022-06-10 PROCEDURE — 71045 X-RAY EXAM CHEST 1 VIEW: CPT

## 2022-06-10 PROCEDURE — 0202U NFCT DS 22 TRGT SARS-COV-2: CPT

## 2022-06-10 PROCEDURE — 6360000002 HC RX W HCPCS: Performed by: FAMILY MEDICINE

## 2022-06-10 PROCEDURE — 2000000000 HC ICU R&B

## 2022-06-10 PROCEDURE — 94640 AIRWAY INHALATION TREATMENT: CPT

## 2022-06-10 PROCEDURE — 6370000000 HC RX 637 (ALT 250 FOR IP): Performed by: EMERGENCY MEDICINE

## 2022-06-10 PROCEDURE — 36600 WITHDRAWAL OF ARTERIAL BLOOD: CPT

## 2022-06-10 PROCEDURE — 94660 CPAP INITIATION&MGMT: CPT

## 2022-06-10 PROCEDURE — 96374 THER/PROPH/DIAG INJ IV PUSH: CPT

## 2022-06-10 PROCEDURE — 82803 BLOOD GASES ANY COMBINATION: CPT

## 2022-06-10 PROCEDURE — 2500000003 HC RX 250 WO HCPCS: Performed by: EMERGENCY MEDICINE

## 2022-06-10 PROCEDURE — 99285 EMERGENCY DEPT VISIT HI MDM: CPT

## 2022-06-10 PROCEDURE — 2580000003 HC RX 258: Performed by: FAMILY MEDICINE

## 2022-06-10 PROCEDURE — 80053 COMPREHEN METABOLIC PANEL: CPT

## 2022-06-10 PROCEDURE — 36415 COLL VENOUS BLD VENIPUNCTURE: CPT

## 2022-06-10 PROCEDURE — 85025 COMPLETE CBC W/AUTO DIFF WBC: CPT

## 2022-06-10 PROCEDURE — 2580000003 HC RX 258: Performed by: EMERGENCY MEDICINE

## 2022-06-10 PROCEDURE — 84484 ASSAY OF TROPONIN QUANT: CPT

## 2022-06-10 PROCEDURE — 71045 X-RAY EXAM CHEST 1 VIEW: CPT | Performed by: RADIOLOGY

## 2022-06-10 PROCEDURE — 2700000000 HC OXYGEN THERAPY PER DAY

## 2022-06-10 PROCEDURE — 96375 TX/PRO/DX INJ NEW DRUG ADDON: CPT

## 2022-06-10 PROCEDURE — 93005 ELECTROCARDIOGRAM TRACING: CPT | Performed by: EMERGENCY MEDICINE

## 2022-06-10 PROCEDURE — 2500000003 HC RX 250 WO HCPCS: Performed by: FAMILY MEDICINE

## 2022-06-10 RX ORDER — METHYLPREDNISOLONE SODIUM SUCCINATE 40 MG/ML
40 INJECTION, POWDER, LYOPHILIZED, FOR SOLUTION INTRAMUSCULAR; INTRAVENOUS EVERY 8 HOURS
Status: DISCONTINUED | OUTPATIENT
Start: 2022-06-10 | End: 2022-06-12

## 2022-06-10 RX ORDER — 0.9 % SODIUM CHLORIDE 0.9 %
1000 INTRAVENOUS SOLUTION INTRAVENOUS ONCE
Status: COMPLETED | OUTPATIENT
Start: 2022-06-10 | End: 2022-06-10

## 2022-06-10 RX ORDER — SODIUM CHLORIDE 9 MG/ML
INJECTION, SOLUTION INTRAVENOUS PRN
Status: DISCONTINUED | OUTPATIENT
Start: 2022-06-10 | End: 2022-06-25 | Stop reason: HOSPADM

## 2022-06-10 RX ORDER — IPRATROPIUM BROMIDE AND ALBUTEROL SULFATE 2.5; .5 MG/3ML; MG/3ML
1 SOLUTION RESPIRATORY (INHALATION) ONCE
Status: COMPLETED | OUTPATIENT
Start: 2022-06-10 | End: 2022-06-10

## 2022-06-10 RX ORDER — ACETAMINOPHEN 325 MG/1
650 TABLET ORAL EVERY 4 HOURS PRN
Status: DISCONTINUED | OUTPATIENT
Start: 2022-06-10 | End: 2022-06-25 | Stop reason: HOSPADM

## 2022-06-10 RX ORDER — ENOXAPARIN SODIUM 100 MG/ML
40 INJECTION SUBCUTANEOUS DAILY
Status: DISCONTINUED | OUTPATIENT
Start: 2022-06-10 | End: 2022-06-25 | Stop reason: HOSPADM

## 2022-06-10 RX ORDER — ONDANSETRON 2 MG/ML
4 INJECTION INTRAMUSCULAR; INTRAVENOUS EVERY 30 MIN PRN
Status: DISCONTINUED | OUTPATIENT
Start: 2022-06-10 | End: 2022-06-10 | Stop reason: SDUPTHER

## 2022-06-10 RX ORDER — LORAZEPAM 2 MG/ML
0.5 INJECTION INTRAMUSCULAR ONCE
Status: COMPLETED | OUTPATIENT
Start: 2022-06-10 | End: 2022-06-10

## 2022-06-10 RX ORDER — IPRATROPIUM BROMIDE AND ALBUTEROL SULFATE 2.5; .5 MG/3ML; MG/3ML
1 SOLUTION RESPIRATORY (INHALATION) EVERY 4 HOURS
Status: DISCONTINUED | OUTPATIENT
Start: 2022-06-10 | End: 2022-06-16

## 2022-06-10 RX ORDER — SODIUM CHLORIDE 9 MG/ML
INJECTION, SOLUTION INTRAVENOUS CONTINUOUS
Status: DISCONTINUED | OUTPATIENT
Start: 2022-06-10 | End: 2022-06-12

## 2022-06-10 RX ORDER — SODIUM CHLORIDE 0.9 % (FLUSH) 0.9 %
5-40 SYRINGE (ML) INJECTION PRN
Status: DISCONTINUED | OUTPATIENT
Start: 2022-06-10 | End: 2022-06-25 | Stop reason: HOSPADM

## 2022-06-10 RX ORDER — ONDANSETRON 2 MG/ML
4 INJECTION INTRAMUSCULAR; INTRAVENOUS EVERY 6 HOURS PRN
Status: DISCONTINUED | OUTPATIENT
Start: 2022-06-10 | End: 2022-06-25 | Stop reason: HOSPADM

## 2022-06-10 RX ORDER — METHYLPREDNISOLONE SODIUM SUCCINATE 125 MG/2ML
125 INJECTION, POWDER, LYOPHILIZED, FOR SOLUTION INTRAMUSCULAR; INTRAVENOUS ONCE
Status: COMPLETED | OUTPATIENT
Start: 2022-06-10 | End: 2022-06-10

## 2022-06-10 RX ORDER — SODIUM CHLORIDE 0.9 % (FLUSH) 0.9 %
5-40 SYRINGE (ML) INJECTION EVERY 12 HOURS SCHEDULED
Status: DISCONTINUED | OUTPATIENT
Start: 2022-06-10 | End: 2022-06-25 | Stop reason: HOSPADM

## 2022-06-10 RX ADMIN — IPRATROPIUM BROMIDE AND ALBUTEROL SULFATE 1 AMPULE: 2.5; .5 SOLUTION RESPIRATORY (INHALATION) at 14:33

## 2022-06-10 RX ADMIN — SODIUM CHLORIDE: 9 INJECTION, SOLUTION INTRAVENOUS at 18:10

## 2022-06-10 RX ADMIN — SODIUM CHLORIDE, PRESERVATIVE FREE 20 MG: 5 INJECTION INTRAVENOUS at 12:02

## 2022-06-10 RX ADMIN — ONDANSETRON HYDROCHLORIDE 4 MG: 2 SOLUTION INTRAMUSCULAR; INTRAVENOUS at 12:06

## 2022-06-10 RX ADMIN — SODIUM CHLORIDE 1000 ML: 9 INJECTION, SOLUTION INTRAVENOUS at 16:07

## 2022-06-10 RX ADMIN — LORAZEPAM 0.5 MG: 2 INJECTION INTRAMUSCULAR; INTRAVENOUS at 12:04

## 2022-06-10 RX ADMIN — DOXYCYCLINE 100 MG: 100 INJECTION, POWDER, LYOPHILIZED, FOR SOLUTION INTRAVENOUS at 19:56

## 2022-06-10 RX ADMIN — SODIUM CHLORIDE, PRESERVATIVE FREE 10 ML: 5 INJECTION INTRAVENOUS at 19:53

## 2022-06-10 RX ADMIN — METHYLPREDNISOLONE SODIUM SUCCINATE 125 MG: 125 INJECTION, POWDER, FOR SOLUTION INTRAMUSCULAR; INTRAVENOUS at 12:05

## 2022-06-10 RX ADMIN — SODIUM CHLORIDE, PRESERVATIVE FREE 1000 MG: 5 INJECTION INTRAVENOUS at 18:15

## 2022-06-10 RX ADMIN — ENOXAPARIN SODIUM 40 MG: 100 INJECTION SUBCUTANEOUS at 18:16

## 2022-06-10 RX ADMIN — ONDANSETRON HYDROCHLORIDE 4 MG: 2 SOLUTION INTRAMUSCULAR; INTRAVENOUS at 23:37

## 2022-06-10 RX ADMIN — METHYLPREDNISOLONE SODIUM SUCCINATE 40 MG: 40 INJECTION, POWDER, FOR SOLUTION INTRAMUSCULAR; INTRAVENOUS at 19:53

## 2022-06-10 ASSESSMENT — ENCOUNTER SYMPTOMS
DIARRHEA: 0
NAUSEA: 1
APNEA: 0
VOMITING: 1
SINUS PRESSURE: 0
FACIAL SWELLING: 0
ABDOMINAL PAIN: 0
WHEEZING: 1
BLOOD IN STOOL: 0
SORE THROAT: 0
COUGH: 1
CHOKING: 0
SHORTNESS OF BREATH: 1
EYE DISCHARGE: 0
CONSTIPATION: 0
VOICE CHANGE: 0

## 2022-06-10 ASSESSMENT — PAIN SCALES - GENERAL: PAINLEVEL_OUTOF10: 7

## 2022-06-10 ASSESSMENT — PAIN DESCRIPTION - ORIENTATION: ORIENTATION: RIGHT;LEFT

## 2022-06-10 ASSESSMENT — PAIN DESCRIPTION - LOCATION: LOCATION: LEG

## 2022-06-10 NOTE — PROGRESS NOTES
Procedure Component Value Units Date/Time     Blood Gas, Arterial [[id number]] (Abnormal) Collected: 06/10/22 1651     Specimen: Blood gases Updated: 06/10/22 1652      pH, Arterial 7.340      pCO2, Arterial 84.0 mmHg       pO2, Arterial 69.0 mmHg       HCO3, Arterial 45.3 mmol/L       Base Excess, Arterial 16.2 mmol/L       Hemoglobin, Art, Extended 10.9 g/dL       O2 Sat, Arterial 90.9 %       Carboxyhgb, Arterial 4.4 %       Methemoglobin, Arterial 0.9 %       O2 Content, Arterial 14.0 mL/dL       O2 Therapy Unknown      Bi-Level Pos Airway Pressure(Inspiratory) 12      Bi-Level Pos Airway Pressure(Expiratory) 6      Expiratory Positive Airway Pressure 6      Inspiratory Positive Airway Pressure 12      Sample Source RR      Potassium, Whole Blood 3.8     Narrative:       CALL  Clifford Brooks RN, 06/10/2022 16:52, by MAURICE MAHARAJ 12/6 5L/M RR POS MARIELY

## 2022-06-10 NOTE — ED PROVIDER NOTES
[facility]  eMERGENCY dEPARTMENT eNCOUnter      Pt Name: Ena Primrose  MRN: [id number]  [place] 1950  Date of evaluation: 6/10/2022  Provider: Deana Kelsey MD    [address] [place] [address]       Chief Complaint   Patient presents with    Shortness of Breath    Fatigue         HISTORY OF PRESENT ILLNESS   (Location/Symptom, Timing/Onset,Context/Setting, Quality, Duration, Modifying Factors, Severity)  Note limiting factors. Ena Primrose is a 74 y.o. female who presents to the emergency department valuation of shortness of breath and fatigue. 78-year-old female with COPD. On 3 L of oxygen at home continuously. Periodically smokes. For the past week his nose increased congestion increased shortness of breath increased sputum production its whitish and thick. No documented fevers. No current antibiotics. She cannot be vaccinated for COVID. She reports she has had to turn her oxygen up to 3-1/2 because her sats were starting to run low. She is also complaining of nausea vomiting for the past 2 days unable to keep her medications down including nerve medicine. No diarrhea. She uses nebulizers and pocket inhalers. She sees a pulmonologist but wants to change. The history is provided by the patient and medical records. NursingNotes were reviewed. REVIEW OF SYSTEMS    (2-9 systems for level 4, 10 or more for level 5)     Review of Systems   Constitutional: Negative for chills and fever. HENT: Positive for congestion. Negative for drooling, facial swelling, nosebleeds, sinus pressure, sore throat and voice change. Eyes: Negative for discharge. Respiratory: Positive for cough, shortness of breath and wheezing. Negative for apnea and choking. Cardiovascular: Negative for chest pain and leg swelling. Gastrointestinal: Positive for nausea and vomiting. Negative for abdominal pain, blood in stool, constipation and diarrhea. Genitourinary: Negative for dysuria and enuresis. Musculoskeletal: Negative for joint swelling. Skin: Negative for rash and wound. Neurological: Negative for seizures and syncope. Psychiatric/Behavioral: Negative for behavioral problems, hallucinations and suicidal ideas. All other systems reviewed and are negative. A complete review of systems was performed and is negative except as noted above in the HPI. PAST MEDICAL HISTORY     Past Medical History:   Diagnosis Date    ADHD (attention deficit hyperactivity disorder)     Anxiety     COPD (chronic obstructive pulmonary disease) (HCC)     Depression     Fibromyalgia     GERD (gastroesophageal reflux disease)     Hypertension     [facility]    Palliative care patient 10/06/2021    RA (rheumatoid arthritis) ([facility]          SURGICAL HISTORY       Past Surgical History:   Procedure Laterality Date    CHOLECYSTECTOMY      HERNIA REPAIR      HYSTERECTOMY (CERVIX STATUS UNKNOWN)      LEG SURGERY      steel garth placement. 2009         CURRENT MEDICATIONS       Previous Medications    ALBUTEROL (PROVENTIL) (2.5 MG/3ML) 0.083% NEBULIZER SOLUTION    Take 3 mLs by nebulization every 6 hours as needed for Wheezing    ALBUTEROL SULFATE HFA (VENTOLIN HFA) 108 (90 BASE) MCG/ACT INHALER    Inhale 2 puffs into the lungs every 6 hours as needed for Wheezing    ALBUTEROL-IPRATROPIUM (COMBIVENT RESPIMAT)  MCG/ACT AERS INHALER    Inhale 1 puff into the lungs every 6 hours    ALPRAZOLAM (XANAX) 1 MG TABLET    Take 1 mg by mouth nightly as needed Takes 4 times a day    AMPHETAMINE-DEXTROAMPHETAMINE (ADDERALL) 20 MG TABLET    Take 20 mg by mouth 2 times daily. BUDESONIDE-FORMOTEROL (SYMBICORT) 160-4.5 MCG/ACT AERO    Inhale 2 puffs into the lungs 2 times daily    CITALOPRAM (CELEXA) 40 MG TABLET    Take 40 mg by mouth daily.     FLUTICASONE (FLONASE) 50 MCG/ACT NASAL SPRAY    INSTILL 1 SPRAY IN EACH NOSTRIL DAILY    GABAPENTIN (NEURONTIN) 600 MG TABLET    Take 600 mg by mouth 3 times daily    IPRATROPIUM-ALBUTEROL (DUONEB) 0.5-2.5 (3) MG/3ML SOLN NEBULIZER SOLUTION    Inhale 3 mLs into the lungs every 4 hours    LIDOCAINE (LIDODERM) 5 %    Place 1 patch onto the skin daily 12 hours on, 12 hours off. LISINOPRIL (PRINIVIL;ZESTRIL) 5 MG TABLET    TAKE 1 TABLET BY MOUTH DAILY    METOCLOPRAMIDE (REGLAN) 5 MG TABLET    Take 1 tablet by mouth 4 times daily    MONTELUKAST (SINGULAIR) 10 MG TABLET    Take 10 mg by mouth nightly    NICOTINE (NICODERM CQ) 21 MG/24HR    Place 1 patch onto the skin every 24 hours    NICOTINE (NICODERM CQ) 7 MG/24HR    Place 1 patch onto the skin every 24 hours    ONDANSETRON (ZOFRAN ODT) 4 MG DISINTEGRATING TABLET    Take 1 tablet by mouth every 8 hours as needed for Nausea or Vomiting    ONDANSETRON (ZOFRAN) 4 MG TABLET    Take 1 tablet by mouth every 8 hours as needed for Nausea or Vomiting    OXYCODONE-ACETAMINOPHEN (PERCOCET)  MG PER TABLET    TAKE 1 TABLET Q 4 TO 6 HRS PRN. (MAX 5/DAY).  Earliest Fill Date: 7/19/18    OXYGEN    Inhale into the lungs    PANTOPRAZOLE (PROTONIX) 20 MG TABLET    TAKE 1 TABLET BY MOUTH 2 TIMES DAILY    PHENAZOPYRIDINE (PYRIDIUM) 200 MG TABLET    Take 1 tablet by mouth 3 times daily as needed for Pain (bladder discomfort)    TIZANIDINE (ZANAFLEX) 4 MG TABLET    Take 1 tablet by mouth 3 times daily as needed (.)       ALLERGIES     Aspirin, Codeine, Demerol, Fentanyl, Iv dye [iodides], Neosporin [bacitracin-neomycin-polymyxin], Nsaids, Pcn [penicillins], Pentazocine lactate, Sulfa antibiotics, Talwin [pentazocine], and Influenza vaccines    FAMILY HISTORY       Family History   Problem Relation Age of Onset    Cancer Father           SOCIAL HISTORY       Social History     Socioeconomic History    Marital status:      Spouse name: None    Number of children: None    Years of education: None    Highest education level: None   Occupational History    None   Tobacco Use    Smoking status: Current Some Day Smoker     Packs/day: 0.25     Years: 30.00     Pack years: 7.50     Types: Cigarettes    Smokeless tobacco: Never Used   Vaping Use    Vaping Use: Never used   Substance and Sexual Activity    Alcohol use: No    Drug use: No    Sexual activity: None   Other Topics Concern    None   Social History Narrative    None     Social Determinants of Health     Financial Resource Strain:     Difficulty of Paying Living Expenses: Not on file   Food Insecurity:     Worried About Running Out of Food in the Last Year: Not on file    David of Food in the Last Year: Not on file   Transportation Needs:     Lack of Transportation (Medical): Not on file    Lack of Transportation (Non-Medical):  Not on file   Physical Activity:     Days of Exercise per Week: Not on file    Minutes of Exercise per Session: Not on file   Stress:     Feeling of Stress : Not on file   Social Connections:     Frequency of Communication with Friends and Family: Not on file    Frequency of Social Gatherings with Friends and Family: Not on file    Attends Qu[employer] Services: Not on file    Active Member of [address] [place] [address] or Organizations: Not on file    Attends Club or Organization Meetings: Not on file    Marital Status: Not on file   Intimate Partner Violence:     Fear of Current or Ex-Partner: Not on file    Emotionally Abused: Not on file    Physically Abused: Not on file    Sexually Abused: Not on file   Housing Stability:     Unable to Pay for Housing in the Last Year: Not on file    Number of [place] in the Last Year: Not on file    Unstable Housing in the Last Year: Not on file       SCREENINGS    Fidel Coma Scale  Eye Opening: Spontaneous  Best Verbal Response: Oriented  Best Motor Response: Obeys commands  Fidel Coma Scale Score: 15        PHYSICAL EXAM    (up to 7 for level 4, 8 or more for level 5)     ED Triage Vitals [06/10/22 1111]   BP Temp Temp Source Heart Rate Resp SpO2 Height Weight (!) 160/90 98.3 °F (36.8 °C) Oral 98 16 97 % -- --       Physical Exam  Vitals and nursing note reviewed. Constitutional:       Appearance: She is well-developed. Comments: She has a chronic appearance she appears her stated age. HENT:      Head: Normocephalic and atraumatic. Right Ear: External ear normal.      Left Ear: External ear normal.      Mouth/Throat:      Pharynx: Oropharynx is clear. Eyes:      General: No scleral icterus. Conjunctiva/sclera: Conjunctivae normal.      Pupils: Pupils are equal, round, and reactive to light. Cardiovascular:      Rate and Rhythm: Normal rate and regular rhythm. Pulses: Normal pulses. Heart sounds: Normal heart sounds. Pulmonary:      Effort: Pulmonary effort is normal.      Breath sounds: Wheezing (Inspiratory and expiratory.) present. Comments: Air trapping. Barrel chested. Abdominal:      General: Bowel sounds are normal.      Palpations: Abdomen is soft. Tenderness: There is no abdominal tenderness. Musculoskeletal:         General: Normal range of motion. Cervical back: Normal range of motion and neck supple. Right lower leg: No edema. Left lower leg: No edema. Skin:     General: Skin is warm and dry. Coloration: Skin is not jaundiced or pale. Neurological:      General: No focal deficit present. Mental Status: She is alert and oriented to person, place, and time. Psychiatric:         Mood and Affect: Mood normal.         Behavior: Behavior normal.         DIAGNOSTIC RESULTS     EKG: All EKG's are interpreted by the Emergency Department Physician who either signs or Co-signs this chart in the absence of a cardiologist.    Sinus rhythm rate 93. WA interval 148. QTc 427.   No ST abnormality to indicate ischemia or MI.    RADIOLOGY:   Non-plain film images such as CT, Ultrasound and MRI are read by the radiologist. Plainradiographic images are visualized and preliminarily interpreted by the emergency physician with the below findings:    I have reviewed the images and results. Interpretation per the Radiologist below, if available at the time of this note:    XR CHEST PORTABLE   Final Result   Modest bilateral upper lobe hyperinflation and mild bibasilar fibrosis. Surgical clips in the epigastrium and left upper quadrant. Recommendation: Follow up as clinically indicated. Electronically Signed by Daniel Holt MD at 10-Rai-2022 01:10:14 PM                     ED BEDSIDE ULTRASOUND:   Performed by ED Physician - none    LABS:  Labs Reviewed   RESPIRATORY PANEL, MOLECULAR, WITH COVID-19 - Abnormal; Notable for the following components:       Result Value    Human Rhinovirus/Enterovirus by PCR DETECTED (*)     All other components within normal limits   CBC WITH AUTO DIFFERENTIAL - Abnormal; Notable for the following components:    RBC 3.50 (*)     Hemoglobin 11.0 (*)     Hematocrit 36.9 (*)     .4 (*)     MCH 31.4 (*)     MCHC 29.8 (*)     Neutrophils % 71.4 (*)     Lymphocytes % 16.9 (*)     All other components within normal limits   COMPREHENSIVE METABOLIC PANEL W/ REFLEX TO MG FOR LOW K - Abnormal; Notable for the following components:    Chloride 94 (*)     CO2 39 (*)     Anion Gap 6 (*)     Glucose 113 (*)     BUN 5 (*)     CREATININE 0.4 (*)     All other components within normal limits   BLOOD GAS, ARTERIAL - Abnormal; Notable for the following components:    pH, Arterial 7.290 (*)     pCO2, Arterial 93.0 (*)     pO2, Arterial 169.0 (*)     HCO3, Arterial 44.7 (*)     Base Excess, Arterial 14.8 (*)     Hemoglobin, Art, Extended 10.7 (*)     All other components within normal limits    Narrative:     CALL  [facility] tel. ,  ronnell gomez rn er, 06/10/2022 12:11, by [facility]   TROPONIN   URINALYSIS WITH REFLEX TO CULTURE       All other labs were within normal range or not returned as of this dictation.     EMERGENCY DEPARTMENT COURSE and DIFFERENTIALDIAGNOSIS/MDM:   Vitals: Vitals:    06/10/22 1111   BP: (!) 160/90   Pulse: 98   Resp: 16   Temp: 98.3 °F (36.8 °C)   TempSrc: Oral   SpO2: 97%       MDM  Number of Diagnoses or Management Options  Acute on chronic respiratory failure with hypercapnia (HCC)  COPD exacerbation (HCC)  Full code status  Paroxysmal supraventricular tachycardia (HCC)  Rhinovirus infection  Diagnosis management comments: Patient is positive for rhinovirus. No definite infiltrates on x-ray. She is retaining CO2 may have had her oxygen level set too high. Going to give her some BiPAP to try and blow her CO2 off. She is oxygenating well. Deirdre Koo go ahead and cover her with antibiotics for community-acquired infection though its viral she is being admitted with respiratory failure exacerbation of her COPD. She has an appointment Monday to see Dr. Chiquita Lai to use to be seen by her she has not been back. I have discussed the case Dr. Augustin Hidalgo office they are willing to admit the patient. They would like pulmonology consulted. Staff witnessed episode of SVT heart rate 150 on EKG during her neb treatment. Now she is back to sinus at a rate of 93. Patient blood pressure dropped down into the 80s but snouts 120/44 after some IV fluids. Her mentation seem to be a little worse. I repeated an ABG and her CO2 levels actually coming down. She states she is fine. But we are going to move her to ICU now. And pulmonology is not on-call Dr. Augustin Hidalgo group asked me to consult with Dr. Briones Quant the ICU attending for airway issues if as needed as arise. CONSULTS:  IP CONSULT TO PULMONOLOGY    PROCEDURES:  Unless otherwise notedbelow, none     Procedures    FINAL IMPRESSION     1. Acute on chronic respiratory failure with hypercapnia (HCC)    2. Rhinovirus infection    3. COPD exacerbation ([facility]    4. Full code status    5.  Paroxysmal supraventricular tachycardia [facility])          DISPOSITION/PLAN   DISPOSITION Decision To Admit 06/10/2022 02:50:54

## 2022-06-10 NOTE — PROGRESS NOTES
Pt arrived to  at 17:56 with hypercapnic respiratory failure. Report from [facility]. Pt placed on BiPAP 12/6 c 5 Lpm O2. She is alert and oriented x 4 but drowsy. ICU monitoring initiated. Orders received from Dr. Tim Reyes. VSS at this time. Will continue to monitor.

## 2022-06-10 NOTE — CONSULTS
Sexual activity: None   Other Topics Concern    None   Social History Narrative    None     Social Determinants of Health     Financial Resource Strain:     Difficulty of Paying Living Expenses: Not on file   Food Insecurity:     Worried About Running Out of Food in the Last Year: Not on file    David of Food in the Last Year: Not on file   Transportation Needs:     Lack of Transportation (Medical): Not on file    Lack of Transportation (Non-Medical): Not on file   Physical Activity:     Days of Exercise per Week: Not on file    Minutes of Exercise per Session: Not on file   Stress:     Feeling of Stress : Not on file   Social Connections:     Frequency of Communication with Friends and Family: Not on file    Frequency of Social Gatherings with Friends and Family: Not on file    Attends Tenri[employer] Services: Not on file    Active Member of [address] [place] [address] or Organizations: Not on file    Attends Club or Organization Meetings: Not on file    Marital Status: Not on file   Intimate Partner Violence:     Fear of Current or Ex-Partner: Not on file    Emotionally Abused: Not on file    Physically Abused: Not on file    Sexually Abused: Not on file   Housing Stability:     Unable to Pay for Housing in the Last Year: Not on file    Number of [place] in the Last Year: Not on file    Unstable Housing in the Last Year: Not on file       Prior to Admission Medications:   Medications Prior to Admission: phenazopyridine (PYRIDIUM) 200 MG tablet, Take 1 tablet by mouth 3 times daily as needed for Pain (bladder discomfort)  montelukast (SINGULAIR) 10 MG tablet, Take 10 mg by mouth nightly  lisinopril (PRINIVIL;ZESTRIL) 5 MG tablet, TAKE 1 TABLET BY MOUTH DAILY  oxyCODONE-acetaminophen (PERCOCET)  MG per tablet, TAKE 1 TABLET Q 4 TO 6 HRS PRN. (MAX 5/DAY).  Earliest Fill Date: 7/19/18  ondansetron (ZOFRAN ODT) 4 MG disintegrating tablet, Take 1 tablet by mouth every 8 hours as needed for Nausea or Vomiting  lidocaine (LIDODERM) 5 %, Place 1 patch onto the skin daily 12 hours on, 12 hours off.  nicotine (NICODERM CQ) 7 MG/24HR, Place 1 patch onto the skin every 24 hours  nicotine (NICODERM CQ) 21 MG/24HR, Place 1 patch onto the skin every 24 hours  tiZANidine (ZANAFLEX) 4 MG tablet, Take 1 tablet by mouth 3 times daily as needed (.)  albuterol-ipratropium (COMBIVENT RESPIMAT)  MCG/ACT AERS inhaler, Inhale 1 puff into the lungs every 6 hours  albuterol sulfate HFA (VENTOLIN HFA) 108 (90 Base) MCG/ACT inhaler, Inhale 2 puffs into the lungs every 6 hours as needed for Wheezing  budesonide-formoterol (SYMBICORT) 160-4.5 MCG/ACT AERO, Inhale 2 puffs into the lungs 2 times daily  pantoprazole (PROTONIX) 20 MG tablet, TAKE 1 TABLET BY MOUTH 2 TIMES DAILY  fluticasone (FLONASE) 50 MCG/ACT nasal spray, INSTILL 1 SPRAY IN EACH NOSTRIL DAILY  OXYGEN, Inhale into the lungs  ipratropium-albuterol (DUONEB) 0.5-2.5 (3) MG/3ML SOLN nebulizer solution, Inhale 3 mLs into the lungs every 4 hours  metoclopramide (REGLAN) 5 MG tablet, Take 1 tablet by mouth 4 times daily  albuterol (PROVENTIL) (2.5 MG/3ML) 0.083% nebulizer solution, Take 3 mLs by nebulization every 6 hours as needed for Wheezing  ondansetron (ZOFRAN) 4 MG tablet, Take 1 tablet by mouth every 8 hours as needed for Nausea or Vomiting  gabapentin (NEURONTIN) 600 MG tablet, Take 600 mg by mouth 3 times daily  ALPRAZolam (XANAX) 1 MG tablet, Take 1 mg by mouth nightly as needed Takes 4 times a day  amphetamine-dextroamphetamine (ADDERALL) 20 MG tablet, Take 20 mg by mouth 2 times daily. citalopram (CELEXA) 40 MG tablet, Take 40 mg by mouth daily. Allergies:      Allergies   Allergen Reactions    Aspirin Hives    Codeine     Demerol     Fentanyl Hives    Iv Dye [Iodides]     Neosporin [Bacitracin-Neomycin-Polymyxin]     Nsaids     Pcn [Penicillins]     Pentazocine Lactate     Sulfa Antibiotics     Talwin [Pentazocine] Hives    Influenza Vaccines Hives and Rash         Physical Exam:   BP (!) 114/45   Pulse (!) 102   Temp 98.3 °F (36.8 °C) (Oral)   Resp 24   SpO2 95%     General: no acute distress  HEENT: normocephalic, atraumatic  Neck: supple, symmetrical, trachea midline   Lungs: bilateral expiratory wheezing  Cardiovascular: s1 and s2 normal  Abdomen: soft, positive bowel sounds  Extremities: no edema or cyanosis   Neuro: aware to verbal stimuli, no obvious focal motor deficits    Recent Results (from the past 72 hour(s))   Blood Gas, Arterial    Collection Time: 06/10/22 11:57 AM   Result Value Ref Range    pH, Arterial 7.290 (LL) 7.350 - 7.450    pCO2, Arterial 93.0 (HH) 35.0 - 45.0 mmHg    pO2, Arterial 169.0 (H) 80.0 - 100.0 mmHg    HCO3, Arterial 44.7 (H) 22.0 - 26.0 mmol/L    Base Excess, Arterial 14.8 (H) -2.0 - 2.0 mmol/L    Hemoglobin, Art, Extended 10.7 (L) 12.0 - 16.0 g/dL    O2 Sat, Arterial 93.7 >92 %    Carboxyhgb, Arterial 4.9 0.0 - 5.0 %    Methemoglobin, Arterial 1.0 <1.5 %    O2 Content, Arterial 14.5 Not Established mL/dL    O2 Therapy Unknown     Oxygen Flow 3.5     O2 Delivery Device Cannula     ALLENS TEST POS     Potassium, Whole Blood 3.6    CBC with Auto Differential    Collection Time: 06/10/22 12:01 PM   Result Value Ref Range    WBC 7.1 4.8 - 10.8 K/uL    RBC 3.50 (L) 4.20 - 5.40 M/uL    Hemoglobin 11.0 (L) 12.0 - 16.0 g/dL    Hematocrit 36.9 (L) 37.0 - 47.0 %    .4 (H) 81.0 - 99.0 fL    MCH 31.4 (H) 27.0 - 31.0 pg    MCHC 29.8 (L) 33.0 - 37.0 g/dL    RDW 12.6 11.5 - 14.5 %    Platelets 286 640 - 537 K/uL    MPV 10.7 9.4 - 12.3 fL    Neutrophils % 71.4 (H) 50.0 - 65.0 %    Lymphocytes % 16.9 (L) 20.0 - 40.0 %    Monocytes % 10.0 0.0 - 10.0 %    Eosinophils % 1.0 0.0 - 5.0 %    Basophils % 0.4 0.0 - 1.0 %    Neutrophils Absolute 5.1 1.5 - 7.5 K/uL    Immature Granulocytes # 0.0 K/uL    Lymphocytes Absolute 1.2 1.1 - 4.5 K/uL    Monocytes Absolute 0.70 0.00 - 0.90 K/uL    Eosinophils Absolute 0.10 0.00 - 0.60 K/uL Basophils Absolute 0.00 0.00 - 0.20 K/uL   Comprehensive Metabolic Panel w/ Reflex to MG    Collection Time: 06/10/22 12:01 PM   Result Value Ref Range    Sodium 139 136 - 145 mmol/L    Potassium reflex Magnesium 3.7 3.5 - 5.0 mmol/L    Chloride 94 (L) 98 - 111 mmol/L    CO2 39 (H) 22 - 29 mmol/L    Anion Gap 6 (L) 7 - 19 mmol/L    Glucose 113 (H) 74 - 109 mg/dL    BUN 5 (L) 8 - 23 mg/dL    CREATININE 0.4 (L) 0.5 - 0.9 mg/dL    GFR Non-African American >60 >60    GFR African American >59 >59    Calcium 8.9 8.8 - 10.2 mg/dL    Total Protein 7.1 6.6 - 8.7 g/dL    Albumin 3.9 3.5 - 5.2 g/dL    Total Bilirubin <0.2 0.2 - 1.2 mg/dL    Alkaline Phosphatase 77 35 - 104 U/L    ALT 9 5 - 33 U/L    AST 12 5 - 32 U/L   Troponin    Collection Time: 06/10/22 12:01 PM   Result Value Ref Range    Troponin <0.01 0.00 - 0.03 ng/mL   Respiratory Panel, Molecular, with COVID-19 (Restricted: peds pts or suitable admitted adults)    Collection Time: 06/10/22 12:01 PM    Specimen: Nasopharyngeal   Result Value Ref Range    Adenovirus by PCR Not Detected Not Detected    Bordetella parapertussis by PCR Not Detected Not Detected    Bordetella pertussis by PCR Not Detected Not Detected    Chlamydophilia pneumoniae by PCR Not Detected Not Detected    Coronavirus 229E by PCR Not Detected Not Detected    Coronavirus HKU1 by PCR Not Detected Not Detected    Coronavirus NL63 by PCR Not Detected Not Detected    Coronavirus OC43 by PCR Not Detected Not Detected    SARS-CoV-2, PCR Not Detected Not Detected    Human Metapneumovirus by PCR Not Detected Not Detected    Human Rhinovirus/Enterovirus by PCR DETECTED (A) Not Detected    Influenza A by PCR Not Detected Not Detected    Influenza B by PCR Not Detected Not Detected    Mycoplasma pneumoniae by PCR Not Detected Not Detected    Parainfluenza Virus 1 by PCR Not Detected Not Detected    Parainfluenza Virus 2 by PCR Not Detected Not Detected    Parainfluenza Virus 3 by PCR Not Detected Not Detected Parainfluenza Virus 4 by PCR Not Detected Not Detected    Respiratory Syncytial Virus by PCR Not Detected Not Detected   Lactic Acid    Collection Time: 06/10/22 12:01 PM   Result Value Ref Range    Lactic Acid 0.4 (L) 0.5 - 1.9 mmol/L   EKG 12 Lead    Collection Time: 06/10/22  3:27 PM   Result Value Ref Range    P-R Interval 142 ms    QRS Duration 72 ms    Q-T Interval 334 ms    QTc Calculation (Bazett) 400 ms    P Axis 76 degrees    T Axis 75 degrees   Blood Gas, Arterial    Collection Time: 06/10/22  4:51 PM   Result Value Ref Range    pH, Arterial 7.340 (L) 7.350 - 7.450    pCO2, Arterial 84.0 (HH) 35.0 - 45.0 mmHg    pO2, Arterial 69.0 (L) 80.0 - 100.0 mmHg    HCO3, Arterial 45.3 (H) 22.0 - 26.0 mmol/L    Base Excess, Arterial 16.2 (H) -2.0 - 2.0 mmol/L    Hemoglobin, Art, Extended 10.9 (L) 12.0 - 16.0 g/dL    O2 Sat, Arterial 90.9 >92 %    Carboxyhgb, Arterial 4.4 0.0 - 5.0 %    Methemoglobin, Arterial 0.9 <1.5 %    O2 Content, Arterial 14.0 Not Established mL/dL    O2 Therapy Unknown     Bi-Level Pos Airway Pressure(Inspiratory) 12     Bi-Level Pos Airway Pressure(Expiratory) 6     Expiratory Positive Airway Pressure 6     Inspiratory Positive Airway Pressure 12     Sample Source RR     Potassium, Whole Blood 3.8        No intake/output data recorded. XR CHEST PORTABLE    Result Date: 6/10/2022  Modest bilateral upper lobe hyperinflation and mild bibasilar fibrosis. Surgical clips in the epigastrium and left upper quadrant. Recommendation: Follow up as clinically indicated.  Electronically Signed by Frank Giles MD at 10-Rai-2022 01:10:14 PM             Assessment and Plan:   Acute on chronic hypercapnic respiratory failure with chronic hypoxia  Suspect secondary to below  Currently requiring BiPAP 12/6 with 4 L  Requires 3.5 L at baseline  Follow ABG as warranted  Multiple similar prior hospitalizations    COPD exacerbation  Steroids  Nebs  Antibiotics  Goal sats 88-92%    Rhinovirus positivity  Supportive care     History of lung cancer  Routine outpatient follow-up     Continued tobacco dependence       DVT prophylaxis  Per primary    Total critical care time: 70 minutes  Total time spent managing the care of this patient: 70 minutes    Austin Landa MD  6/10/2022 5:57 PM

## 2022-06-10 NOTE — H&P
Take 1 tablet by mouth 3 times daily as needed (.)  albuterol-ipratropium (COMBIVENT RESPIMAT)  MCG/ACT AERS inhaler, Inhale 1 puff into the lungs every 6 hours  albuterol sulfate HFA (VENTOLIN HFA) 108 (90 Base) MCG/ACT inhaler, Inhale 2 puffs into the lungs every 6 hours as needed for Wheezing  budesonide-formoterol (SYMBICORT) 160-4.5 MCG/ACT AERO, Inhale 2 puffs into the lungs 2 times daily  pantoprazole (PROTONIX) 20 MG tablet, TAKE 1 TABLET BY MOUTH 2 TIMES DAILY  fluticasone (FLONASE) 50 MCG/ACT nasal spray, INSTILL 1 SPRAY IN EACH NOSTRIL DAILY  OXYGEN, Inhale into the lungs  ipratropium-albuterol (DUONEB) 0.5-2.5 (3) MG/3ML SOLN nebulizer solution, Inhale 3 mLs into the lungs every 4 hours  metoclopramide (REGLAN) 5 MG tablet, Take 1 tablet by mouth 4 times daily  albuterol (PROVENTIL) (2.5 MG/3ML) 0.083% nebulizer solution, Take 3 mLs by nebulization every 6 hours as needed for Wheezing  ondansetron (ZOFRAN) 4 MG tablet, Take 1 tablet by mouth every 8 hours as needed for Nausea or Vomiting  gabapentin (NEURONTIN) 600 MG tablet, Take 600 mg by mouth 3 times daily  ALPRAZolam (XANAX) 1 MG tablet, Take 1 mg by mouth nightly as needed Takes 4 times a day  amphetamine-dextroamphetamine (ADDERALL) 20 MG tablet, Take 20 mg by mouth 2 times daily. citalopram (CELEXA) 40 MG tablet, Take 40 mg by mouth daily. Allergies:  Aspirin, Codeine, Demerol, Fentanyl, Iv dye [iodides], Neosporin [bacitracin-neomycin-polymyxin], Nsaids, Pcn [penicillins], Pentazocine lactate, Sulfa antibiotics, Talwin [pentazocine], and Influenza vaccines    Social History:   TOBACCO:   reports that she has been smoking cigarettes. She has a 7.50 pack-year smoking history. She has never used smokeless tobacco.  ETOH:   reports no history of alcohol use. DRUGS:   reports no history of drug use.         Family History:       Problem Relation Age of Onset    Cancer Father      REVIEW OF SYSTEMS:  Constitutional: weakness  CV: neg  Pulmonary: SOA, cough  GI: N.V, anorexia  : neg  Psych: neg  Neuro: neg  Skin: neg  MusculoSkeletal: neg  HEENT: neg  Joints: neg  Vitals:  BP (!) 105/45   Pulse 92   Temp 98.1 °F (36.7 °C) (Temporal)   Resp 23   Ht 5' 6\" (1.676 m)   Wt 112 lb 1.6 oz (50.8 kg)   SpO2 91%   BMI 18.09 kg/m²     PHYSICAL EXAM:  Gen: NAD, resting, on BIPAP  HEENT: WNL  Lymph: no LAD  Neck: no JVD or masses  Chest: coarse  CV: RRR  Abdomen: NT/ND  Extrem: no C/C/E  Neuro: non focal  Skin: no rashes  Joints: no redness  DATA:  I have reviewed the admission labs and imaging tests.     ASSESSMENT AND PLAN:      Principal Problem:    Acute on chronic respiratory failure with hypoxia and hypercapnia---follow with supportive care, antibiotics, steroids, neb treatments, O2 and BIPAP, appreciate Intensivists input    Rhinovirus    COPD Exacerbation     H/O HTN---follow BP     RA, chronic pain    H/O Lung CA    Anxiety    Hypotension----continue IVF, follow BP, hold PO BP meds      Angleika Matias MD  6:32 PM 6/10/2022

## 2022-06-11 LAB
ALBUMIN SERPL-MCNC: 3.3 G/DL (ref 3.5–5.2)
ALP BLD-CCNC: 71 U/L (ref 35–104)
ALT SERPL-CCNC: 9 U/L (ref 5–33)
ANION GAP SERPL CALCULATED.3IONS-SCNC: 12 MMOL/L (ref 7–19)
AST SERPL-CCNC: 14 U/L (ref 5–32)
BASOPHILS ABSOLUTE: 0 K/UL (ref 0–0.2)
BASOPHILS RELATIVE PERCENT: 0 % (ref 0–1)
BILIRUB SERPL-MCNC: <0.2 MG/DL (ref 0.2–1.2)
BILIRUBIN URINE: NEGATIVE
BLOOD, URINE: NEGATIVE
BUN BLDV-MCNC: 5 MG/DL (ref 8–23)
CALCIUM SERPL-MCNC: 8.7 MG/DL (ref 8.8–10.2)
CHLORIDE BLD-SCNC: 99 MMOL/L (ref 98–111)
CLARITY: CLEAR
CO2: 33 MMOL/L (ref 22–29)
COLOR: YELLOW
CREAT SERPL-MCNC: 0.3 MG/DL (ref 0.5–0.9)
D DIMER: 0.59 UG/ML FEU (ref 0–0.48)
EKG P AXIS: 76 DEGREES
EKG P AXIS: 81 DEGREES
EKG P-R INTERVAL: 142 MS
EKG P-R INTERVAL: 148 MS
EKG Q-T INTERVAL: 334 MS
EKG Q-T INTERVAL: 340 MS
EKG QRS DURATION: 72 MS
EKG QRS DURATION: 72 MS
EKG QTC CALCULATION (BAZETT): 397 MS
EKG QTC CALCULATION (BAZETT): 400 MS
EKG T AXIS: 75 DEGREES
EKG T AXIS: 77 DEGREES
EOSINOPHILS ABSOLUTE: 0 K/UL (ref 0–0.6)
EOSINOPHILS RELATIVE PERCENT: 0 % (ref 0–5)
GFR AFRICAN AMERICAN: >59
GFR NON-AFRICAN AMERICAN: >60
GLUCOSE BLD-MCNC: 115 MG/DL (ref 74–109)
GLUCOSE URINE: NEGATIVE MG/DL
HCT VFR BLD CALC: 38.7 % (ref 37–47)
HEMOGLOBIN: 11.2 G/DL (ref 12–16)
IMMATURE GRANULOCYTES #: 0 K/UL
KETONES, URINE: 15 MG/DL
LEUKOCYTE ESTERASE, URINE: NEGATIVE
LV EF: 58 %
LVEF MODALITY: NORMAL
LYMPHOCYTES ABSOLUTE: 0.4 K/UL (ref 1.1–4.5)
LYMPHOCYTES RELATIVE PERCENT: 15.2 % (ref 20–40)
MACROCYTES: ABNORMAL
MAGNESIUM: 1.4 MG/DL (ref 1.6–2.4)
MCH RBC QN AUTO: 31.5 PG (ref 27–31)
MCHC RBC AUTO-ENTMCNC: 28.9 G/DL (ref 33–37)
MCV RBC AUTO: 109 FL (ref 81–99)
MONOCYTES ABSOLUTE: 0 K/UL (ref 0–0.9)
MONOCYTES RELATIVE PERCENT: 1.4 % (ref 0–10)
NEUTROPHILS ABSOLUTE: 2.3 K/UL (ref 1.5–7.5)
NEUTROPHILS RELATIVE PERCENT: 83 % (ref 50–65)
NITRITE, URINE: NEGATIVE
PDW BLD-RTO: 12.4 % (ref 11.5–14.5)
PH UA: 7 (ref 5–8)
PLATELET # BLD: 179 K/UL (ref 130–400)
PLATELET SLIDE REVIEW: ADEQUATE
PMV BLD AUTO: 11.2 FL (ref 9.4–12.3)
POTASSIUM SERPL-SCNC: 4.4 MMOL/L (ref 3.5–5)
PRO-BNP: 534 PG/ML (ref 0–900)
PROTEIN UA: ABNORMAL MG/DL
RBC # BLD: 3.55 M/UL (ref 4.2–5.4)
REASON FOR REJECTION: NORMAL
REJECTED TEST: NORMAL
SODIUM BLD-SCNC: 144 MMOL/L (ref 136–145)
SPECIFIC GRAVITY UA: 1.01 (ref 1–1.03)
TOTAL PROTEIN: 6.4 G/DL (ref 6.6–8.7)
TROPONIN: <0.01 NG/ML (ref 0–0.03)
UROBILINOGEN, URINE: 1 E.U./DL
WBC # BLD: 2.8 K/UL (ref 4.8–10.8)

## 2022-06-11 PROCEDURE — 6360000002 HC RX W HCPCS: Performed by: FAMILY MEDICINE

## 2022-06-11 PROCEDURE — 94640 AIRWAY INHALATION TREATMENT: CPT

## 2022-06-11 PROCEDURE — 2700000000 HC OXYGEN THERAPY PER DAY

## 2022-06-11 PROCEDURE — 84484 ASSAY OF TROPONIN QUANT: CPT

## 2022-06-11 PROCEDURE — 2580000003 HC RX 258: Performed by: FAMILY MEDICINE

## 2022-06-11 PROCEDURE — 2500000003 HC RX 250 WO HCPCS: Performed by: FAMILY MEDICINE

## 2022-06-11 PROCEDURE — 6360000004 HC RX CONTRAST MEDICATION: Performed by: FAMILY MEDICINE

## 2022-06-11 PROCEDURE — 83880 ASSAY OF NATRIURETIC PEPTIDE: CPT

## 2022-06-11 PROCEDURE — 2000000000 HC ICU R&B

## 2022-06-11 PROCEDURE — 85379 FIBRIN DEGRADATION QUANT: CPT

## 2022-06-11 PROCEDURE — 6360000002 HC RX W HCPCS: Performed by: EMERGENCY MEDICINE

## 2022-06-11 PROCEDURE — 6370000000 HC RX 637 (ALT 250 FOR IP): Performed by: NURSE PRACTITIONER

## 2022-06-11 PROCEDURE — 93010 ELECTROCARDIOGRAM REPORT: CPT | Performed by: INTERNAL MEDICINE

## 2022-06-11 PROCEDURE — 6360000002 HC RX W HCPCS: Performed by: INTERNAL MEDICINE

## 2022-06-11 PROCEDURE — 81003 URINALYSIS AUTO W/O SCOPE: CPT

## 2022-06-11 PROCEDURE — 6370000000 HC RX 637 (ALT 250 FOR IP): Performed by: INTERNAL MEDICINE

## 2022-06-11 PROCEDURE — 80053 COMPREHEN METABOLIC PANEL: CPT

## 2022-06-11 PROCEDURE — 83735 ASSAY OF MAGNESIUM: CPT

## 2022-06-11 PROCEDURE — 94660 CPAP INITIATION&MGMT: CPT

## 2022-06-11 PROCEDURE — 36415 COLL VENOUS BLD VENIPUNCTURE: CPT

## 2022-06-11 PROCEDURE — 99223 1ST HOSP IP/OBS HIGH 75: CPT | Performed by: INTERNAL MEDICINE

## 2022-06-11 PROCEDURE — 85025 COMPLETE CBC W/AUTO DIFF WBC: CPT

## 2022-06-11 PROCEDURE — C8929 TTE W OR WO FOL WCON,DOPPLER: HCPCS

## 2022-06-11 PROCEDURE — 6370000000 HC RX 637 (ALT 250 FOR IP): Performed by: FAMILY MEDICINE

## 2022-06-11 RX ORDER — TRAMADOL HYDROCHLORIDE 50 MG/1
50 TABLET ORAL EVERY 4 HOURS PRN
Status: DISCONTINUED | OUTPATIENT
Start: 2022-06-11 | End: 2022-06-25 | Stop reason: HOSPADM

## 2022-06-11 RX ORDER — ALPRAZOLAM 0.5 MG/1
0.5 TABLET ORAL 3 TIMES DAILY PRN
Status: DISCONTINUED | OUTPATIENT
Start: 2022-06-11 | End: 2022-06-13

## 2022-06-11 RX ORDER — OXYCODONE HYDROCHLORIDE AND ACETAMINOPHEN 5; 325 MG/1; MG/1
1 TABLET ORAL EVERY 4 HOURS PRN
Status: DISCONTINUED | OUTPATIENT
Start: 2022-06-11 | End: 2022-06-25 | Stop reason: HOSPADM

## 2022-06-11 RX ORDER — HYDROXYZINE HYDROCHLORIDE 10 MG/1
10 TABLET, FILM COATED ORAL 3 TIMES DAILY PRN
Status: DISCONTINUED | OUTPATIENT
Start: 2022-06-11 | End: 2022-06-25 | Stop reason: HOSPADM

## 2022-06-11 RX ORDER — MAGNESIUM SULFATE IN WATER 40 MG/ML
4000 INJECTION, SOLUTION INTRAVENOUS ONCE
Status: COMPLETED | OUTPATIENT
Start: 2022-06-11 | End: 2022-06-11

## 2022-06-11 RX ORDER — ALPRAZOLAM 0.5 MG/1
0.5 TABLET ORAL 3 TIMES DAILY PRN
Status: DISCONTINUED | OUTPATIENT
Start: 2022-06-11 | End: 2022-06-11

## 2022-06-11 RX ORDER — MORPHINE SULFATE 2 MG/ML
1 INJECTION, SOLUTION INTRAMUSCULAR; INTRAVENOUS
Status: DISCONTINUED | OUTPATIENT
Start: 2022-06-10 | End: 2022-06-25 | Stop reason: HOSPADM

## 2022-06-11 RX ADMIN — DOXYCYCLINE 100 MG: 100 INJECTION, POWDER, LYOPHILIZED, FOR SOLUTION INTRAVENOUS at 07:59

## 2022-06-11 RX ADMIN — SODIUM CHLORIDE: 9 INJECTION, SOLUTION INTRAVENOUS at 07:59

## 2022-06-11 RX ADMIN — MAGNESIUM SULFATE HEPTAHYDRATE 4000 MG: 40 INJECTION, SOLUTION INTRAVENOUS at 13:18

## 2022-06-11 RX ADMIN — ALPRAZOLAM 0.5 MG: 0.5 TABLET ORAL at 14:29

## 2022-06-11 RX ADMIN — METHYLPREDNISOLONE SODIUM SUCCINATE 40 MG: 40 INJECTION, POWDER, FOR SOLUTION INTRAMUSCULAR; INTRAVENOUS at 19:59

## 2022-06-11 RX ADMIN — IPRATROPIUM BROMIDE AND ALBUTEROL SULFATE 1 AMPULE: 2.5; .5 SOLUTION RESPIRATORY (INHALATION) at 18:18

## 2022-06-11 RX ADMIN — METHYLPREDNISOLONE SODIUM SUCCINATE 40 MG: 40 INJECTION, POWDER, FOR SOLUTION INTRAMUSCULAR; INTRAVENOUS at 03:49

## 2022-06-11 RX ADMIN — IPRATROPIUM BROMIDE AND ALBUTEROL SULFATE 1 AMPULE: 2.5; .5 SOLUTION RESPIRATORY (INHALATION) at 22:07

## 2022-06-11 RX ADMIN — SODIUM CHLORIDE, PRESERVATIVE FREE 10 ML: 5 INJECTION INTRAVENOUS at 08:03

## 2022-06-11 RX ADMIN — ALPRAZOLAM 0.5 MG: 0.5 TABLET ORAL at 06:35

## 2022-06-11 RX ADMIN — ONDANSETRON HYDROCHLORIDE 4 MG: 2 SOLUTION INTRAMUSCULAR; INTRAVENOUS at 13:03

## 2022-06-11 RX ADMIN — MORPHINE SULFATE 1 MG: 2 INJECTION, SOLUTION INTRAMUSCULAR; INTRAVENOUS at 03:49

## 2022-06-11 RX ADMIN — ENOXAPARIN SODIUM 40 MG: 100 INJECTION SUBCUTANEOUS at 19:59

## 2022-06-11 RX ADMIN — METHYLPREDNISOLONE SODIUM SUCCINATE 40 MG: 40 INJECTION, POWDER, FOR SOLUTION INTRAMUSCULAR; INTRAVENOUS at 13:14

## 2022-06-11 RX ADMIN — TRAMADOL HYDROCHLORIDE 50 MG: 50 TABLET, COATED ORAL at 10:38

## 2022-06-11 RX ADMIN — PERFLUTREN 1.5 ML: 6.52 INJECTION, SUSPENSION INTRAVENOUS at 14:16

## 2022-06-11 RX ADMIN — ALPRAZOLAM 0.5 MG: 0.5 TABLET ORAL at 21:57

## 2022-06-11 RX ADMIN — IPRATROPIUM BROMIDE AND ALBUTEROL SULFATE 1 AMPULE: 2.5; .5 SOLUTION RESPIRATORY (INHALATION) at 14:29

## 2022-06-11 RX ADMIN — IPRATROPIUM BROMIDE AND ALBUTEROL SULFATE 1 AMPULE: 2.5; .5 SOLUTION RESPIRATORY (INHALATION) at 10:16

## 2022-06-11 RX ADMIN — SODIUM CHLORIDE, PRESERVATIVE FREE 1000 MG: 5 INJECTION INTRAVENOUS at 19:59

## 2022-06-11 RX ADMIN — DOXYCYCLINE 100 MG: 100 INJECTION, POWDER, LYOPHILIZED, FOR SOLUTION INTRAVENOUS at 20:01

## 2022-06-11 RX ADMIN — MORPHINE SULFATE 1 MG: 2 INJECTION, SOLUTION INTRAMUSCULAR; INTRAVENOUS at 00:07

## 2022-06-11 RX ADMIN — IPRATROPIUM BROMIDE AND ALBUTEROL SULFATE 1 AMPULE: 2.5; .5 SOLUTION RESPIRATORY (INHALATION) at 06:10

## 2022-06-11 ASSESSMENT — PAIN SCALES - GENERAL
PAINLEVEL_OUTOF10: 0
PAINLEVEL_OUTOF10: 8
PAINLEVEL_OUTOF10: 0
PAINLEVEL_OUTOF10: 3
PAINLEVEL_OUTOF10: 0

## 2022-06-11 ASSESSMENT — PAIN DESCRIPTION - DESCRIPTORS: DESCRIPTORS: ACHING

## 2022-06-11 ASSESSMENT — ENCOUNTER SYMPTOMS
RESPIRATORY NEGATIVE: 1
GASTROINTESTINAL NEGATIVE: 1
SHORTNESS OF BREATH: 1
DIARRHEA: 0
SHORTNESS OF BREATH: 0
VOMITING: 0
NAUSEA: 0
EYES NEGATIVE: 1

## 2022-06-11 ASSESSMENT — PAIN - FUNCTIONAL ASSESSMENT: PAIN_FUNCTIONAL_ASSESSMENT: ACTIVITIES ARE NOT PREVENTED

## 2022-06-11 ASSESSMENT — PAIN DESCRIPTION - LOCATION: LOCATION: HAND;HEAD

## 2022-06-11 ASSESSMENT — PAIN DESCRIPTION - ORIENTATION: ORIENTATION: DISTAL

## 2022-06-11 NOTE — PLAN OF CARE
Nutrition Problem #1: Inadequate oral intake  Intervention: Food and/or Nutrient Delivery: Modify Current Diet,Continue Oral Nutrition Supplement    Problem: Nutrition Deficit:  Goal: Optimize nutritional status  Outcome: Progressing

## 2022-06-11 NOTE — PROGRESS NOTES
Progress Note  Date:2022       Room:0148/148-01  Patient Name:Iman Horton     Date of Birth:     Age:72 y.o. Patient anxious and tearful at bedside. Extended conversation about the ill effects of benzodiazepines and opioid analgesics with a stimulant sprinkled in of Adderall. Attempted to explain respiratory depression associated with these medications. Patient became more anxious. Patient has been followed by Dr. Marlee Pierce from the pulmonary group in the past, she states nobody has talked to her about CODE STATUS in the past?    Patient with significant end-stage COPD/emphysema due to extensive smoking history. Subjective    Subjective:  Symptoms:  Stable. She reports shortness of breath, weakness and anxiety. Diet:  Poor intake. Activity level: Impaired due to weakness. Pain:  She complains of pain that is mild. She reports pain is unchanged. Pain is partially controlled. Review of Systems   Respiratory: Positive for shortness of breath. Neurological: Positive for weakness. Objective         Vitals Last 24 Hours:  TEMPERATURE:  Temp  Av °F (36.7 °C)  Min: 97.8 °F (36.6 °C)  Max: 98.3 °F (36.8 °C)  RESPIRATIONS RANGE: Resp  Av.7  Min: 16  Max: 28  PULSE OXIMETRY RANGE: SpO2  Av.3 %  Min: 87 %  Max: 100 %  PULSE RANGE: Pulse  Av.3  Min: 83  Max: 106  BLOOD PRESSURE RANGE: Systolic (55KHG), GGP:698 , Min:83 , IJJ:336   ; Diastolic (91RGI), EMA:84, Min:30, Max:96    I/O (24Hr): Intake/Output Summary (Last 24 hours) at 2022 0823  Last data filed at 2022 0600  Gross per 24 hour   Intake 909.62 ml   Output 2 ml   Net 907.62 ml     Objective:  General Appearance:  Comfortable, ill-appearing and in no acute distress. Vital signs: (most recent): Blood pressure (!) 105/55, pulse 94, temperature 97.8 °F (36.6 °C), temperature source Temporal, resp.  rate 24, height 5' 6\" (1.676 m), weight 112 lb 1.6 oz (50.8 kg), SpO2 95 %, not currently breastfeeding. Vital signs are normal.    Output: Producing urine and minimal stool output. HEENT: Normal HEENT exam.    Lungs: Tachypnea and increased effort. There are decreased breath sounds. Heart: Normal rate. Regular rhythm. Abdomen: Abdomen is soft. Bowel sounds are normal.   There is no abdominal tenderness. Extremities: Decreased range of motion. Neurological: Patient is alert and oriented to person, place and time. Skin:  Warm and dry. Labs/Imaging/Diagnostics    Labs:  CBC:  Recent Labs     06/10/22  1201 06/11/22  0152   WBC 7.1 2.8*   RBC 3.50* 3.55*   HGB 11.0* 11.2*   HCT 36.9* 38.7   .4* 109.0*   RDW 12.6 12.4    179     CHEMISTRIES:  Recent Labs     06/10/22  1201 06/10/22  1651 06/11/22  0346     --  144   K 3.7 3.8 4.4   CL 94*  --  99   CO2 39*  --  33*   BUN 5*  --  5*   CREATININE 0.4*  --  0.3*   GLUCOSE 113*  --  115*   MG  --   --  1.4*     PT/INR:No results for input(s): PROTIME, INR in the last 72 hours. APTT:No results for input(s): APTT in the last 72 hours. LIVER PROFILE:  Recent Labs     06/10/22  1201 06/11/22  0346   AST 12 14   ALT 9 9   BILITOT <0.2 <0.2   ALKPHOS 77 71       Imaging Last 24 Hours:  XR CHEST PORTABLE    Result Date: 6/10/2022  NO PRIOR REPORT AVAILABLE Exam: X-RAY OF Critical access hospital Clinical data:Shortness of breath. Technique:Single portable upright view of the chest. Prior studies: Prior studies of the Chest Xray dated 10/2/2021 (image only) and NM Lung Scan dated 11/8/2021 (images only)  submitted. Findings: The lungs are grossly clear; noevidence of acute infiltrate or pleural effusion. Cardiac silhouette is within normal limits. No acute osseous abnormality is detected. There is modest hyperinflation and accentuated in the upper lobes and mild bibasilar fibrotic changes. Surgical clips noted in the epigastrium and left upper quadrant. Modest bilateral upper lobe hyperinflation and mild bibasilar fibrosis. Surgical clips in the epigastrium and left upper quadrant. Recommendation: Follow up as clinically indicated. Electronically Signed by Luna Hanna MD at 10-Rai-2022 01:10:14 PM             Assessment//Plan           Hospital Problems           Last Modified POA    * (Principal) Acute on chronic respiratory failure ([facility] [address] 6/10/2022 Yes        Assessment:    Condition: In serious condition. Improving. Plan:   Out of bed and up to chair. Add bronchodilators and start/continue incentive spirometry (She has been weaned off BiPAP). Regular diet. (    Acute on chronic respiratory distress- PCO2 was in the mid 90s and has decreased into the 80s with BiPAP. Patient has weaned off BiPAP and is on nasal cannula oxygen maintaining sats in the low 90s. Patient is on nebulizers at home along with Symbicort. She is concerned the Symbicort raises her heart rate. She becomes anxious and tachypneic with shallow breathing. Reassurance given and extended conference with patient about proper breathing techniques including pursed lip breathing. CODE STATUS- extended discussion with patient about the extent of her end-stage pulmonary disease related to extensive tobacco use. Explained to her she would probably not ever be able to wean off ventilator if ever placed on ventilator, she stated she did not want to be put on the ventilator x2. Nurse in the room. We will change her to a DNI, palliative care consult    Chronic pain syndrome- patient states she has been on Percocet 10 mg 5 times a day along with Xanax 1 mg 5 times a day with Adderall as well. Obviously will discontinue the stimulant. Decrease the dose of the benzodiazepine with ultimate goal of weaning off of benzodiazepine. As needed Atarax ordered. With blackbox warning of opioid analgesics and benzodiazepines she will need a definite cleansing of her medical regiment.   Defer to her primary care but over the weekend will significantly decrease her doses of both medications. Copious amounts of time was spent at bedside and conference with patient and nursing staff concerning our long-term goals and treatment plans. ).        Electronically signed by Marcellina Hatchet, MD on 6/11/22 at 8:23 AM CDT

## 2022-06-11 NOTE — PROGRESS NOTES
Hospitalist Progress Note  [facility]     Patient: Louie Campbell  : 1950  MRN: [id number]  Code Status: Partial Code    Hospital Day: 1   Date of Service: 2022    Subjective:   Patient seen and examined. Clinically improving. Anxious. Sitting in chair. Past Medical History:   Diagnosis Date    ADHD (attention deficit hyperactivity disorder)     Anxiety     COPD (chronic obstructive pulmonary disease) (HCC)     Depression     Fibromyalgia     GERD (gastroesophageal reflux disease)     Hypertension     Malignant neoplasm of upper lobe of right lung ([address] 2018    Palliative care patient 10/06/2021    RA (rheumatoid arthritis) (HCC)        Past Surgical History:   Procedure Laterality Date    CHOLECYSTECTOMY      HERNIA REPAIR      HYSTERECTOMY (CERVIX STATUS UNKNOWN)      LEG SURGERY      steel garth placement.        Family History   Problem Relation Age of Onset    Cancer Father        Social History     Socioeconomic History    Marital status:      Spouse name: Not on file    Number of children: Not on file    Years of education: Not on file    Highest education level: Not on file   Occupational History    Not on file   Tobacco Use    Smoking status: Current Some Day Smoker     Packs/day: 0.25     Years: 30.00     Pack years: 7.50     Types: Cigarettes    Smokeless tobacco: Never Used   Vaping Use    Vaping Use: Never used   Substance and Sexual Activity    Alcohol use: No    Drug use: No    Sexual activity: Not on file   Other Topics Concern    Not on file   Social History Narrative    Not on file     Social Determinants of Health     Financial Resource Strain:     Difficulty of Paying Living Expenses: Not on file   Food Insecurity:     Worried About [address] in the Last Year: Not on file    David of Food in the Last Year: Not on file   Transportation Needs:     Lack of Transportation (Medical):  Not on file    Lack of Transportation (Non-Medical):  Not on file   Physical Activity:     Days of Exercise per Week: Not on file    Minutes of Exercise per Session: Not on file   Stress:     Feeling of Stress : Not on file   Social Connections:     Frequency of Communication with Friends and Family: Not on file    Frequency of Social Gatherings with Friends and Family: Not on file    Attends [employer] Services: Not on file    Active Member of Clubs or Organizations: Not on file    Attends Club or Organization Meetings: Not on file    Marital Status: Not on file   Intimate Partner Violence:     Fear of Current or Ex-Partner: Not on file    Emotionally Abused: Not on file    Physically Abused: Not on file    Sexually Abused: Not on file   Housing Stability:     Unable to Pay for Housing in the Last Year: Not on file    Number of [place] in the Last Year: Not on file    Unstable Housing in the Last Year: Not on file       Current Facility-Administered Medications   Medication Dose Route Frequency Provider Last Rate Last Admin    morphine (PF) injection 1 mg  1 mg IntraVENous Q1H PRN Garima Whitley DO   1 mg at 06/11/22 0349    ALPRAZolam (XANAX) tablet 0.5 mg  0.5 mg Oral TID PRN Keanu Mcarthur MD        oxyCODONE-acetaminophen (PERCOCET) 5-325 MG per tablet 1 tablet  1 tablet Oral Q4H PRN Keanu Mcarthur MD        traMADol [place] [facility]) tablet 50 mg  50 mg Oral Q4H PRN Keanu Mcarthur MD   50 mg at 06/11/22 1038    hydrOXYzine HCl (ATARAX) tablet 10 mg  10 mg Oral TID PRN Keanu Mcarthur MD        sodium chloride flush 0.9 % injection 5-40 mL  5-40 mL IntraVENous 2 times per day Eron Tinoco MD   10 mL at 06/11/22 0803    sodium chloride flush 0.9 % injection 5-40 mL  5-40 mL IntraVENous PRN Eron Tinoco MD        0.9 % sodium chloride infusion   IntraVENous PRN Eron Tinoco MD        enoxaparin (LOVENOX) injection 40 mg  40 mg SubCUTAneous Daily Eron Tinoco MD   40 mg at 06/10/22 1816    acetaminophen (TYLENOL) tablet 650 mg  650 mg Oral Q4H PRN Angelika Matias MD        [facility]ansetron [facility] injection 4 mg  4 mg IntraVENous Q6H PRN Angelika Matias MD   4 mg at 06/10/22 2337    0.9 % sodium chloride infusion   IntraVENous Continuous Angelika Matias MD 75 mL/hr at 06/11/22 0759 New Bag at 06/11/22 0759    methylPREDNISolone sodium (SOLU-MEDROL) injection 40 mg  40 mg IntraVENous Q8H Gissel Middleton MD   40 mg at 06/11/22 0349    ipratropium-albuterol (DUONEB) nebulizer solution 1 ampule  1 ampule Inhalation Q4H Ethel Hoang MD   1 ampule at 06/11/22 1016    cefTRIAXone (ROCEPHIN) 1,000 mg in sodium chloride (PF) 10 mL IV syringe  1,000 mg IntraVENous Q24H Angelika Matias MD        doxycycline (VIBRAMYCIN) 100 mg in dextrose 5 % 100 mL IVPB  100 mg IntraVENous Q12H Angelika Matias MD   Stopped at 06/11/22 0859         sodium chloride      sodium chloride 75 mL/hr at 06/11/22 0759        Objective:   BP (!) 136/58   Pulse 92   Temp 97.8 °F (36.6 °C) (Temporal)   Resp 24   Ht 5' 6\" (1.676 m)   Wt 112 lb 1.6 oz (50.8 kg)   SpO2 95%   BMI 18.09 kg/m²     General: no acute distress  HEENT: normocephalic, atraumatic  Neck: supple, symmetrical, trachea midline   Lungs: improving bilateral expiratory wheezing  Cardiovascular: s1 and s2 normal  Abdomen: soft, positive bowel sounds  Extremities: no edema or cyanosis   Neuro: aaox3, no acute focal deficits     Recent Labs     06/10/22  1201 06/11/22  0152   WBC 7.1 2.8*   RBC 3.50* 3.55*   HGB 11.0* 11.2*   HCT 36.9* 38.7   .4* 109.0*   MCH 31.4* 31.5*   MCHC 29.8* 28.9*    179     Recent Labs     06/10/22  1201 06/10/22  1651 06/11/22  0346     --  144   K 3.7 3.8 4.4   ANIONGAP 6*  --  12   CL 94*  --  99   CO2 39*  --  33*   BUN 5*  --  5*   CREATININE 0.4*  --  0.3*   GLUCOSE 113*  --  115*   CALCIUM 8.9  --  8.7*     Recent Labs     06/11/22  0346   MG 1.4*     Recent Labs     06/10/22  1201 06/11/22  0346   AST 12 14   ALT 9 9   BILITOT <0.2 <0.2   ALKPHOS 77 71     No results for input(s): PH, PO2, PCO2, HCO3, BE, O2SAT in the last 72 hours. Recent Labs     06/10/22  1201   TROPONINI <0.01     No results for input(s): INR in the last 72 hours. Recent Labs     06/10/22  1201   LACTA 0.4*         Intake/Output Summary (Last 24 hours) at 6/11/2022 1115  Last data filed at 6/11/2022 0800  Gross per 24 hour   Intake 1046.93 ml   Output 352 ml   Net 694.93 ml       XR CHEST PORTABLE    Result Date: 6/10/2022  NO PRIOR REPORT AVAILABLE Exam: X-RAY OF THECHEST Clinical data:Shortness of breath. Technique:Single portable upright view of the chest. Prior studies: Prior studies of the Chest Xray dated 10/2/2021 (image only) and NM Lung Scan dated 11/8/2021 (images only)  submitted. Findings: The lungs are grossly clear; noevidence of acute infiltrate or pleural effusion. Cardiac silhouette is within normal limits. No acute osseous abnormality is detected. There is modest hyperinflation and accentuated in the upper lobes and mild bibasilar fibrotic changes. Surgical clips noted in the epigastrium and left upper quadrant. Modest bilateral upper lobe hyperinflation and mild bibasilar fibrosis. Surgical clips in the epigastrium and left upper quadrant. Recommendation: Follow up as clinically indicated.  Electronically Signed by Wendie Tolentino MD at 10-Rai-2022 01:10:14 PM              Assessment and Plan:   Acute on chronic hypercapnic respiratory failure with chronic hypoxia  Improving  Suspect secondary to below  Since weaned off BiPAP currently on 3.5 L which is her baseline  Multiple similar prior hospitalizations     COPD exacerbation  Steroids  Nebs  Antibiotics  Goal sats 88-92%     Rhinovirus positivity  Supportive care     History of lung cancer  Routine outpatient follow-up     Continued tobacco dependence  Counseled     DVT prophylaxis  Per primary    Total critical care time: 37 leoncio Chan MD   6/11/2022 11:15 AM

## 2022-06-11 NOTE — CONSULTS
[facility]  Cardiology Consult      Requesting MD:  Betsy Gonzales MD   Admit Status:         History obtained from:   [] Patient  [] Other (specify):     Patient:  Aquilino Mcneill  [id number]     Chief Complaint:   Chief Complaint   Patient presents with    Shortness of Breath    Fatigue         HPI: Ms. Juan A Prasad is a 74 y.o. female with a history of severe COPD on home oxygen therapy 3 L nasal cannula continues to smoke history of lung carcinoma admitted yesterday worsening shortness of breath for several days as typical angina does have occasional intermittent chest pain not related to physical activities. Since admission had some supraventricular tachycardia last evening. The home medication list has not yet been reconciled await confirmation of that.  1 troponin obtained has been negative. No other complaints or issues reported no previous cardiac history or work-up. Review of Systems:  Review of Systems   Constitutional: Negative. Negative for chills, fever and unexpected weight change. HENT: Negative. Eyes: Negative. Respiratory: Negative. Negative for shortness of breath. Cardiovascular: Negative. Negative for chest pain. Gastrointestinal: Negative. Negative for diarrhea, nausea and vomiting. Endocrine: Negative. Genitourinary: Negative. Musculoskeletal: Negative. Skin: Negative. Neurological: Negative. All other systems reviewed and are negative.       Cardiac Specific Data:  Specialty Problems        Cardiology Problems    Hypertension              Past Medical History:  Past Medical History:   Diagnosis Date    ADHD (attention deficit hyperactivity disorder)     Anxiety     COPD (chronic obstructive pulmonary disease) (HCC)     Depression     Fibromyalgia     GERD (gastroesophageal reflux disease)     Hypertension     Malignant neoplasm of upper lobe of right lung ([address] 12/04/2018    Palliative care patient 10/06/2021    [address]ID[address] (rheumatoid arthritis) [address] [address]         Past Surgical History:  Past Surgical History:   Procedure Laterality Date    CHOLECYSTECTOMY      HERNIA REPAIR      HYSTERECTOMY (CERVIX STATUS UNKNOWN)      LEG SURGERY      steel garth placement. 2009       Past Family History:  Family History   Problem Relation Age of Onset    Cancer Father        Past Social History:  Social History     Socioeconomic History    Marital status:      Spouse name: Not on file    Number of children: Not on file    Years of education: Not on file    Highest education level: Not on file   Occupational History    Not on file   Tobacco Use    Smoking status: Current Some Day Smoker     Packs/day: 0.25     Years: 30.00     Pack years: 7.50     Types: Cigarettes    Smokeless tobacco: Never Used   Vaping Use    Vaping Use: Never used   Substance and Sexual Activity    Alcohol use: No    Drug use: No    Sexual activity: Not on file   Other Topics Concern    Not on file   Social History Narrative    Not on file     Social Determinants of Health     Financial Resource Strain:     Difficulty of Paying Living Expenses: Not on file   Food Insecurity:     Worried About [address] in the Last Year: Not on file    David of Food in the Last Year: Not on file   Transportation Needs:     Lack of Transportation (Medical): Not on file    Lack of Transportation (Non-Medical):  Not on file   Physical Activity:     Days of Exercise per Week: Not on file    Minutes of Exercise per Session: Not on file   Stress:     Feeling of Stress : Not on file   Social Connections:     Frequency of Communication with Friends and Family: Not on file    Frequency of Social Gatherings with Friends and Family: Not on file    Attends Church Services: Not on file    Active Member of Clubs or Organizations: Not on file    Attends Club or Organization Meetings: Not on file    Marital Status: Not on file   Intimate Partner Violence:     Fear of Current or Ex-Partner: Not on file    Emotionally Abused: Not on file    Physically Abused: Not on file    Sexually Abused: Not on file   Housing Stability:     Unable to Pay for Housing in the Last Year: Not on file    Number of Places Lived in the Last Year: Not on file    Unstable Housing in the Last Year: Not on file       Allergies: Allergies   Allergen Reactions    Aspirin Hives    Codeine     Demerol     Fentanyl Hives    Iv Dye [Iodides]     Neosporin [Bacitracin-Neomycin-Polymyxin]     Nsaids     Pcn [Penicillins]     Pentazocine Lactate     Sulfa Antibiotics     Talwin [Pentazocine] Hives    Influenza Vaccines Hives and Rash       Home Meds:  Prior to Admission medications    Medication Sig Start Date End Date Taking? Authorizing Provider   phenazopyridine (PYRIDIUM) 200 MG tablet Take 1 tablet by mouth 3 times daily as needed for Pain (bladder discomfort) 11/14/21   Russ Walker MD   montelukast (SINGULAIR) 10 MG tablet Take 10 mg by mouth nightly    Historical Provider, MD   lisinopril (PRINIVIL;ZESTRIL) 5 MG tablet TAKE 1 TABLET BY MOUTH DAILY 10/4/18   TEN Meraz   oxyCODONE-acetaminophen (PERCOCET)  MG per tablet TAKE 1 TABLET Q 4 TO 6 HRS PRN. (MAX 5/DAY).  Earliest Fill Date: 7/19/18 7/19/18 8/18/18  Bob Haddad MD   ondansetron (ZOFRAN ODT) 4 MG disintegrating tablet Take 1 tablet by mouth every 8 hours as needed for Nausea or Vomiting 5/1/18   TEN Garcia Aas   lidocaine (LIDODERM) 5 % Place 1 patch onto the skin daily 12 hours on, 12 hours off. 5/1/18   TEN Garcia Aas   nicotine (NICODERM CQ) 7 MG/24HR Place 1 patch onto the skin every 24 hours 5/1/18   TEN Garcia Aas   nicotine (NICODERM CQ) 21 MG/24HR Place 1 patch onto the skin every 24 hours 5/1/18   TEN Garcia Aas   tiZANidine (ZANAFLEX) 4 MG tablet Take 1 tablet by mouth 3 times daily as needed (.) 4/30/18   Bob Haddad MD   albuterol-ipratropium (COMBIVENT RESPIMAT)  MCG/ACT AERS inhaler Inhale 1 puff into the lungs every 6 hours 4/27/18   Carli Karimi MD   albuterol sulfate HFA (VENTOLIN HFA) 108 (90 Base) MCG/ACT inhaler Inhale 2 puffs into the lungs every 6 hours as needed for Wheezing 4/27/18   Carli Karimi MD   budesonide-formoterol [facility]) 160-4.5 MCG/ACT AERO Inhale 2 puffs into the lungs 2 times daily 4/27/18   Carli Karimi MD   pantoprazole (PROTONIX) 20 MG tablet TAKE 1 TABLET BY MOUTH 2 TIMES DAILY 12/18/17   Carli Karimi MD   fluticasone [facility]) 50 MCG/ACT nasal spray INSTILL 1 SPRAY IN EACH NOSTRIL DAILY 10/4/17   Carli Karimi MD   OXYGEN Inhale into the lungs    Historical Provider, MD   ipratropium-albuterol (DUONEB) 0.5-2.5 (3) MG/3ML SOLN nebulizer solution Inhale 3 mLs into the lungs every 4 hours 4/6/17   Carli Karimi MD   metoclopramide (REGLAN) 5 MG tablet Take 1 tablet by mouth 4 times daily 3/30/17   Carli Karimi MD   albuterol (PROVENTIL) (2.5 MG/3ML) 0.083% nebulizer solution Take 3 mLs by nebulization every 6 hours as needed for Wheezing 3/17/17   Carli Karimi MD   ondansetron (ZOFRAN) 4 MG tablet Take 1 tablet by mouth every 8 hours as needed for Nausea or Vomiting 2/23/17   Carli Karimi MD   gabapentin (NEURONTIN) 600 MG tablet Take 600 mg by mouth 3 times daily    Historical Provider, MD   ALPRAZolam Jackqueline Meliton) 1 MG tablet Take 1 mg by mouth nightly as needed Takes 4 times a day 1/15/15   Historical Provider, MD   amphetamine-dextroamphetamine (ADDERALL) 20 MG tablet Take 20 mg by mouth 2 times daily. 1/21/15   Historical Provider, MD   citalopram (CELEXA) 40 MG tablet Take 40 mg by mouth daily.     Historical Provider, MD       Current Meds:   sodium chloride flush  5-40 mL IntraVENous 2 times per day    enoxaparin  40 mg SubCUTAneous Daily    methylPREDNISolone  40 mg IntraVENous Q8H    ipratropium-albuterol  1 ampule Inhalation Q4H    cefTRIAXone (ROCEPHIN) IV 1,000 mg IntraVENous Q24H    doxycycline (VIBRAMYCIN) IV  100 mg IntraVENous Q12H       Current Infused Meds:   sodium chloride      sodium chloride 75 mL/hr at 06/11/22 0759       Physical Exam:  Vitals:    06/11/22 1016   BP:    Pulse: 92   Resp: 24   Temp:    SpO2: 95%       Intake/Output Summary (Last 24 hours) at 6/11/2022 1109  Last data filed at 6/11/2022 0800  Gross per 24 hour   Intake 1046.93 ml   Output 352 ml   Net 694.93 ml     Estimated body mass index is 18.09 kg/m² as calculated from the following:    Height as of this encounter: 5' 6\" (1.676 m). Weight as of this encounter: 112 lb 1.6 oz (50.8 kg). Physical Exam  Vitals reviewed. Constitutional:       General: She is not in acute distress. Appearance: She is well-developed and normal weight. She is ill-appearing. She is not toxic-appearing or diaphoretic. Comments: Appears severely malnourished   HENT:      Head: Normocephalic and atraumatic. Nose: Nose normal.      Mouth/Throat:      Mouth: Mucous membranes are moist.      Pharynx: Oropharynx is clear. Eyes:      General: No scleral icterus. Extraocular Movements: Extraocular movements intact. Pupils: Pupils are equal, round, and reactive to light. Neck:      Vascular: No carotid bruit or JVD. Cardiovascular:      Rate and Rhythm: Normal rate and regular rhythm. Heart sounds: Normal heart sounds. No murmur heard. No friction rub. No gallop. Pulmonary:      Effort: Pulmonary effort is normal. No respiratory distress. Breath sounds: Normal breath sounds. No stridor. No wheezing, rhonchi or rales. Chest:      Chest wall: No tenderness. Abdominal:      General: Abdomen is flat. Bowel sounds are normal. There is no distension. Palpations: Abdomen is soft. There is no mass. Tenderness: There is no abdominal tenderness. There is no right CVA tenderness, left CVA tenderness, guarding or rebound. Hernia: No hernia is present. Musculoskeletal:         General: No swelling, tenderness, deformity or signs of injury. Cervical back: Normal range of motion and neck supple. No rigidity or tenderness. Right lower leg: No edema. Left lower leg: No edema. Lymphadenopathy:      Cervical: No cervical adenopathy. Skin:     General: Skin is warm and dry. Neurological:      General: No focal deficit present. Mental Status: She is alert and oriented to person, place, and time. Mental status is at baseline. Cranial Nerves: No cranial nerve deficit. Sensory: No sensory deficit. Motor: No weakness. Coordination: Coordination normal.   Psychiatric:         Mood and Affect: Mood normal.         Behavior: Behavior normal.         Thought Content: Thought content normal.         Judgment: Judgment normal.         Labs:  Recent Labs     06/10/22  1201 06/11/22  0152   WBC 7.1 2.8*   HGB 11.0* 11.2*    179       Recent Labs     06/10/22  1201 06/10/22  1651 06/11/22  0346     --  144   K 3.7 3.8 4.4   CL 94*  --  99   CO2 39*  --  33*   BUN 5*  --  5*   CREATININE 0.4*  --  0.3*   LABGLOM >60  --  >60   MG  --   --  1.4*   CALCIUM 8.9  --  8.7*       CK, CKMB, Troponin: @LABRCNT (CKTOTAL:3, CKMB:3, TROPONINI:3)@    Last 3 BNP:  No results for input(s): BNP in the last 72 hours. IMAGING:  XR CHEST PORTABLE    Result Date: 6/10/2022  NO PRIOR REPORT AVAILABLE Exam: X-RAY OF [facility] Clinical data:Shortness of breath. Technique:Single portable upright view of the chest. Prior studies: Prior studies of the Chest Xray dated 10/2/2021 (image only) and NM Lung Scan dated 11/8/2021 (images only)  submitted. Findings: The lungs are grossly clear; noevidence of acute infiltrate or pleural effusion. Cardiac silhouette is within normal limits. No acute osseous abnormality is detected. There is modest hyperinflation and accentuated in the upper lobes and mild bibasilar fibrotic changes.  Surgical clips noted in the epigastrium and left upper quadrant. Modest bilateral upper lobe hyperinflation and mild bibasilar fibrosis. Surgical clips in the epigastrium and left upper quadrant. Recommendation: Follow up as clinically indicated. Electronically Signed by Norbert Siegel MD at 10-Rai-2022 01:10:14 PM               Assessment:  1. Worsening shortness of breath  2. Severe COPD on home oxygen therapy 3 L  3. Marked anxiety  4. Supraventricular tachycardia  5. Acute/chronic respiratory failure hypercapnic  6. Gastroesophageal reflux disease  7. Hypertension  8. Alpha-1 antitrypsin deficiency carrier  9. Tobacco abuse ongoing  10. History of lung carcinoma right upper lobe  11. Severe malnutrition  12. ADHD  13. Fibromyalgia  14. Rheumatoid arthritis  15. Lung scan 11/8/2021 Limited diagnostic study low probability pulmonary embolism  16. CT abdomen pelvis 11/8/2021 evidence previous cholecystectomy otherwise unremarkable      Recommendations:  1. Continue to monitor  2. Additional laboratory studies as requested  3. Echocardiogram  4.  Further comments to follow

## 2022-06-11 NOTE — PROGRESS NOTES
Comprehensive Nutrition Assessment    Type and Reason for Visit:  Initial,Positive Nutrition Screen    Nutrition Recommendations/Plan:   1. No dx of DM2, change diet to regular to promote po intake. 2. Continue current ONS, drinking through a straw may help. 3. Continue to monitor po intake. Malnutrition Assessment:  Malnutrition Status: At risk for malnutrition (Comment) (06/11/22 1034)    Context:  Acute Illness     Findings of the 6 clinical characteristics of malnutrition:  Energy Intake:  Mild decrease in energy intake (Comment)  Weight Loss:  No significant weight loss     Body Fat Loss:  No significant body fat loss     Muscle Mass Loss:  No significant muscle mass loss    Fluid Accumulation:  No significant fluid accumulation     Strength:  Not Performed    Nutrition Assessment:    Following pt for positive nutrition screen r/t wt loss and poor appetite/po inake. No significant weight loss noted, however, BMI 18. No po intake documented. Upon admission pt c/o N/V and constipation. Current labs: BUN 5, cr 0.3, BUN:cr 16.7, mag 1.4, Ca 8.7, alb 3.3, glu 113-115. Pt is receiving Solu-Medrol. No dx of DM noted, will change to regular diet to promote po intake. Continue Ensure Enlive ONS TID, drinking with a straw may be easier for the patient with a bipap machine. Will follow to monitor po intake and nutrition status. Nutrition Related Findings: BiPAP     Current Nutrition Intake & Therapies:    Average Meal Intake: Unable to assess  Average Supplements Intake: Unable to assess  ADULT DIET; Regular; 3 carb choices (45 gm/meal)  ADULT ORAL NUTRITION SUPPLEMENT; Lunch, Breakfast, Dinner; Standard High Calorie/High Protein Oral Supplement    Anthropometric Measures:  Height: 5' 6\" (167.6 cm)  Ideal Body Weight (IBW): 130 lbs (59 kg)    Admission Body Weight: 112 lb (50.8 kg)  Current Body Weight: 112 lb (50.8 kg), 86.2 % IBW.  Weight Source: Bed Scale  Current BMI (kg/m2): 18.1  Usual Body Weight: 118 lb (53.5 kg) (11/8/2021)  % Weight Change (Calculated): -5.1  BMI Categories: Underweight (BMI less than 22) age over 72    Estimated Daily Nutrient Needs:  Energy Requirements Based On: Kcal/kg  Weight Used for Energy Requirements: Current  Energy (kcal/day): 1341-9343 kcal/d (30-35 kcal/kg)  Weight Used for Protein Requirements: Current  Protein (g/day): 76 g/d (1.5 g/kg)  Method Used for Fluid Requirements: 1 ml/kcal  Fluid (ml/day): 3199-3744 ml/d    Nutrition Diagnosis:   · Inadequate oral intake related to impaired respiratory function as evidenced by BMI,nausea,vomiting (BiPAP)    Nutrition Interventions:   Food and/or Nutrient Delivery: Modify Current Diet,Continue Oral Nutrition Supplement  Nutrition Education/Counseling: No recommendation at this time  Coordination of Nutrition Care: Continue to monitor while inpatient    Goals:  Goals: PO intake 50% or greater,by next RD assessment    Nutrition Monitoring and Evaluation:   Food/Nutrient Intake Outcomes: Food and Nutrient Intake,Supplement Intake  Physical Signs/Symptoms Outcomes: Biochemical Data,Constipation,Nausea or Vomiting,Hemodynamic Status,Nutrition Focused Physical Findings,Weight    Shelda Stager  Contact: [phone]

## 2022-06-12 LAB
ALBUMIN SERPL-MCNC: 3.3 G/DL (ref 3.5–5.2)
ALP BLD-CCNC: 64 U/L (ref 35–104)
ALT SERPL-CCNC: 8 U/L (ref 5–33)
ANION GAP SERPL CALCULATED.3IONS-SCNC: 8 MMOL/L (ref 7–19)
AST SERPL-CCNC: 13 U/L (ref 5–32)
BASOPHILS ABSOLUTE: 0 K/UL (ref 0–0.2)
BASOPHILS RELATIVE PERCENT: 0 % (ref 0–1)
BILIRUB SERPL-MCNC: <0.2 MG/DL (ref 0.2–1.2)
BUN BLDV-MCNC: 10 MG/DL (ref 8–23)
CALCIUM SERPL-MCNC: 8.7 MG/DL (ref 8.8–10.2)
CHLORIDE BLD-SCNC: 98 MMOL/L (ref 98–111)
CO2: 34 MMOL/L (ref 22–29)
CREAT SERPL-MCNC: 0.3 MG/DL (ref 0.5–0.9)
EOSINOPHILS ABSOLUTE: 0 K/UL (ref 0–0.6)
EOSINOPHILS RELATIVE PERCENT: 0 % (ref 0–5)
GFR AFRICAN AMERICAN: >59
GFR NON-AFRICAN AMERICAN: >60
GLUCOSE BLD-MCNC: 119 MG/DL (ref 74–109)
HCT VFR BLD CALC: 33.1 % (ref 37–47)
HEMOGLOBIN: 10.5 G/DL (ref 12–16)
IMMATURE GRANULOCYTES #: 0 K/UL
LYMPHOCYTES ABSOLUTE: 0.3 K/UL (ref 1.1–4.5)
LYMPHOCYTES RELATIVE PERCENT: 7 % (ref 20–40)
MAGNESIUM: 2 MG/DL (ref 1.6–2.4)
MCH RBC QN AUTO: 31.8 PG (ref 27–31)
MCHC RBC AUTO-ENTMCNC: 31.7 G/DL (ref 33–37)
MCV RBC AUTO: 100.3 FL (ref 81–99)
MONOCYTES ABSOLUTE: 0.2 K/UL (ref 0–0.9)
MONOCYTES RELATIVE PERCENT: 3.3 % (ref 0–10)
NEUTROPHILS ABSOLUTE: 4.1 K/UL (ref 1.5–7.5)
NEUTROPHILS RELATIVE PERCENT: 89.5 % (ref 50–65)
PDW BLD-RTO: 12.8 % (ref 11.5–14.5)
PLATELET # BLD: 197 K/UL (ref 130–400)
PMV BLD AUTO: 11.4 FL (ref 9.4–12.3)
POTASSIUM SERPL-SCNC: 3.8 MMOL/L (ref 3.5–5)
RBC # BLD: 3.3 M/UL (ref 4.2–5.4)
SODIUM BLD-SCNC: 140 MMOL/L (ref 136–145)
TOTAL PROTEIN: 6.2 G/DL (ref 6.6–8.7)
WBC # BLD: 4.6 K/UL (ref 4.8–10.8)

## 2022-06-12 PROCEDURE — 2500000003 HC RX 250 WO HCPCS: Performed by: FAMILY MEDICINE

## 2022-06-12 PROCEDURE — 6360000002 HC RX W HCPCS: Performed by: EMERGENCY MEDICINE

## 2022-06-12 PROCEDURE — 94640 AIRWAY INHALATION TREATMENT: CPT

## 2022-06-12 PROCEDURE — 2580000003 HC RX 258: Performed by: FAMILY MEDICINE

## 2022-06-12 PROCEDURE — 85025 COMPLETE CBC W/AUTO DIFF WBC: CPT

## 2022-06-12 PROCEDURE — 6370000000 HC RX 637 (ALT 250 FOR IP): Performed by: FAMILY MEDICINE

## 2022-06-12 PROCEDURE — 36415 COLL VENOUS BLD VENIPUNCTURE: CPT

## 2022-06-12 PROCEDURE — 1210000000 HC MED SURG R&B

## 2022-06-12 PROCEDURE — 80053 COMPREHEN METABOLIC PANEL: CPT

## 2022-06-12 PROCEDURE — 2700000000 HC OXYGEN THERAPY PER DAY

## 2022-06-12 PROCEDURE — 94660 CPAP INITIATION&MGMT: CPT

## 2022-06-12 PROCEDURE — 6370000000 HC RX 637 (ALT 250 FOR IP): Performed by: INTERNAL MEDICINE

## 2022-06-12 PROCEDURE — 83735 ASSAY OF MAGNESIUM: CPT

## 2022-06-12 RX ORDER — LISINOPRIL 5 MG/1
5 TABLET ORAL DAILY
Status: DISCONTINUED | OUTPATIENT
Start: 2022-06-12 | End: 2022-06-13

## 2022-06-12 RX ORDER — GUAIFENESIN 600 MG/1
1200 TABLET, EXTENDED RELEASE ORAL 2 TIMES DAILY
Status: DISCONTINUED | OUTPATIENT
Start: 2022-06-12 | End: 2022-06-13

## 2022-06-12 RX ORDER — GABAPENTIN 600 MG/1
600 TABLET ORAL 3 TIMES DAILY
Status: DISCONTINUED | OUTPATIENT
Start: 2022-06-12 | End: 2022-06-25 | Stop reason: HOSPADM

## 2022-06-12 RX ORDER — CITALOPRAM 20 MG/1
40 TABLET ORAL DAILY
Status: DISCONTINUED | OUTPATIENT
Start: 2022-06-12 | End: 2022-06-25 | Stop reason: HOSPADM

## 2022-06-12 RX ORDER — ONDANSETRON 4 MG/1
4 TABLET, ORALLY DISINTEGRATING ORAL EVERY 8 HOURS PRN
Status: DISCONTINUED | OUTPATIENT
Start: 2022-06-12 | End: 2022-06-25 | Stop reason: HOSPADM

## 2022-06-12 RX ORDER — METHYLPREDNISOLONE SODIUM SUCCINATE 40 MG/ML
40 INJECTION, POWDER, LYOPHILIZED, FOR SOLUTION INTRAMUSCULAR; INTRAVENOUS EVERY 12 HOURS
Status: DISCONTINUED | OUTPATIENT
Start: 2022-06-13 | End: 2022-06-13

## 2022-06-12 RX ORDER — BENZONATATE 100 MG/1
200 CAPSULE ORAL 3 TIMES DAILY PRN
Status: DISCONTINUED | OUTPATIENT
Start: 2022-06-12 | End: 2022-06-25 | Stop reason: HOSPADM

## 2022-06-12 RX ORDER — NICOTINE 21 MG/24HR
1 PATCH, TRANSDERMAL 24 HOURS TRANSDERMAL EVERY 24 HOURS
Status: DISCONTINUED | OUTPATIENT
Start: 2022-06-12 | End: 2022-06-25 | Stop reason: HOSPADM

## 2022-06-12 RX ORDER — MONTELUKAST SODIUM 10 MG/1
10 TABLET ORAL NIGHTLY
Status: DISCONTINUED | OUTPATIENT
Start: 2022-06-12 | End: 2022-06-25 | Stop reason: HOSPADM

## 2022-06-12 RX ORDER — PANTOPRAZOLE SODIUM 40 MG/1
40 TABLET, DELAYED RELEASE ORAL
Status: DISCONTINUED | OUTPATIENT
Start: 2022-06-12 | End: 2022-06-21

## 2022-06-12 RX ADMIN — PANTOPRAZOLE SODIUM 40 MG: 40 TABLET, DELAYED RELEASE ORAL at 09:07

## 2022-06-12 RX ADMIN — ONDANSETRON 4 MG: 4 TABLET, ORALLY DISINTEGRATING ORAL at 09:07

## 2022-06-12 RX ADMIN — TRAMADOL HYDROCHLORIDE 50 MG: 50 TABLET, COATED ORAL at 19:59

## 2022-06-12 RX ADMIN — IPRATROPIUM BROMIDE AND ALBUTEROL SULFATE 1 AMPULE: 2.5; .5 SOLUTION RESPIRATORY (INHALATION) at 15:34

## 2022-06-12 RX ADMIN — TRAMADOL HYDROCHLORIDE 50 MG: 50 TABLET, COATED ORAL at 09:07

## 2022-06-12 RX ADMIN — GABAPENTIN 600 MG: 600 TABLET, FILM COATED ORAL at 13:03

## 2022-06-12 RX ADMIN — ALPRAZOLAM 0.5 MG: 0.5 TABLET ORAL at 21:09

## 2022-06-12 RX ADMIN — GABAPENTIN 600 MG: 600 TABLET, FILM COATED ORAL at 19:59

## 2022-06-12 RX ADMIN — SODIUM CHLORIDE: 9 INJECTION, SOLUTION INTRAVENOUS at 00:16

## 2022-06-12 RX ADMIN — GABAPENTIN 600 MG: 600 TABLET, FILM COATED ORAL at 09:07

## 2022-06-12 RX ADMIN — BENZONATATE 200 MG: 100 CAPSULE ORAL at 19:59

## 2022-06-12 RX ADMIN — OXYCODONE HYDROCHLORIDE AND ACETAMINOPHEN 1 TABLET: 5; 325 TABLET ORAL at 04:06

## 2022-06-12 RX ADMIN — CITALOPRAM HYDROBROMIDE 40 MG: 20 TABLET ORAL at 09:07

## 2022-06-12 RX ADMIN — SODIUM CHLORIDE, PRESERVATIVE FREE 10 ML: 5 INJECTION INTRAVENOUS at 08:03

## 2022-06-12 RX ADMIN — METHYLPREDNISOLONE SODIUM SUCCINATE 40 MG: 40 INJECTION, POWDER, FOR SOLUTION INTRAMUSCULAR; INTRAVENOUS at 04:06

## 2022-06-12 RX ADMIN — ALPRAZOLAM 0.5 MG: 0.5 TABLET ORAL at 05:46

## 2022-06-12 RX ADMIN — IPRATROPIUM BROMIDE AND ALBUTEROL SULFATE 1 AMPULE: 2.5; .5 SOLUTION RESPIRATORY (INHALATION) at 06:12

## 2022-06-12 RX ADMIN — OXYCODONE HYDROCHLORIDE AND ACETAMINOPHEN 1 TABLET: 5; 325 TABLET ORAL at 00:15

## 2022-06-12 RX ADMIN — IPRATROPIUM BROMIDE AND ALBUTEROL SULFATE 1 AMPULE: 2.5; .5 SOLUTION RESPIRATORY (INHALATION) at 01:55

## 2022-06-12 RX ADMIN — IPRATROPIUM BROMIDE AND ALBUTEROL SULFATE 1 AMPULE: 2.5; .5 SOLUTION RESPIRATORY (INHALATION) at 22:01

## 2022-06-12 RX ADMIN — OXYCODONE HYDROCHLORIDE AND ACETAMINOPHEN 1 TABLET: 5; 325 TABLET ORAL at 13:02

## 2022-06-12 RX ADMIN — ALPRAZOLAM 0.5 MG: 0.5 TABLET ORAL at 15:28

## 2022-06-12 RX ADMIN — HYDROXYZINE HYDROCHLORIDE 10 MG: 10 TABLET ORAL at 04:13

## 2022-06-12 RX ADMIN — DOXYCYCLINE 100 MG: 100 INJECTION, POWDER, LYOPHILIZED, FOR SOLUTION INTRAVENOUS at 07:55

## 2022-06-12 RX ADMIN — IPRATROPIUM BROMIDE AND ALBUTEROL SULFATE 1 AMPULE: 2.5; .5 SOLUTION RESPIRATORY (INHALATION) at 18:18

## 2022-06-12 RX ADMIN — DOXYCYCLINE 100 MG: 100 INJECTION, POWDER, LYOPHILIZED, FOR SOLUTION INTRAVENOUS at 20:49

## 2022-06-12 RX ADMIN — MONTELUKAST SODIUM 10 MG: 10 TABLET, FILM COATED ORAL at 20:13

## 2022-06-12 ASSESSMENT — PAIN DESCRIPTION - ORIENTATION
ORIENTATION: ANTERIOR;POSTERIOR
ORIENTATION: ANTERIOR;POSTERIOR

## 2022-06-12 ASSESSMENT — PAIN SCALES - GENERAL
PAINLEVEL_OUTOF10: 0
PAINLEVEL_OUTOF10: 8
PAINLEVEL_OUTOF10: 8
PAINLEVEL_OUTOF10: 4
PAINLEVEL_OUTOF10: 9

## 2022-06-12 ASSESSMENT — PAIN DESCRIPTION - DESCRIPTORS
DESCRIPTORS: ACHING
DESCRIPTORS: ACHING;DISCOMFORT
DESCRIPTORS: THROBBING

## 2022-06-12 ASSESSMENT — PAIN DESCRIPTION - LOCATION
LOCATION: HEAD

## 2022-06-12 ASSESSMENT — ENCOUNTER SYMPTOMS: SHORTNESS OF BREATH: 1

## 2022-06-12 NOTE — PROGRESS NOTES
Alonzo Duncan transferred to 317 from [facility] via wheelchair. Reason for transfer: Lower Level of Care   Explained reason for transfer to Patient. Belongings: Purse, bag of clothes with patient at bedside . Soft chart transferred with patient: Yes. Telemetry box number N/A transferred with patient: N/A. Report given to: Caitlyn TURNER, via telephone.       Electronically signed by Kayla Reyes RN on 6/12/2022 at 11:39 AM

## 2022-06-12 NOTE — PROGRESS NOTES
Hospitalist Progress Note  [facility]     Patient: Ofelia Velasquez  : 1950  MRN: [id number]  Code Status: Partial Code    Hospital Day: 2   Date of Service: 2022    Subjective:   Patient seen and examined. Continued clinical improvement. Patient adamant to receive home meds at the original doses. Extensively counseled. Past Medical History:   Diagnosis Date    ADHD (attention deficit hyperactivity disorder)     Anxiety     COPD (chronic obstructive pulmonary disease) (HCC)     Depression     Fibromyalgia     GERD (gastroesophageal reflux disease)     Hypertension     Malignant neoplasm of upper lobe of right lung ([address] 2018    Palliative care patient 10/06/2021    RA (rheumatoid arthritis) (HCC)        Past Surgical History:   Procedure Laterality Date    CHOLECYSTECTOMY      HERNIA REPAIR      HYSTERECTOMY (CERVIX STATUS UNKNOWN)      LEG SURGERY      steel garth placement.        Family History   Problem Relation Age of Onset    Cancer Father        Social History     Socioeconomic History    Marital status:      Spouse name: Not on file    Number of children: Not on file    Years of education: Not on file    Highest education level: Not on file   Occupational History    Not on file   Tobacco Use    Smoking status: Current Some Day Smoker     Packs/day: 0.25     Years: 30.00     Pack years: 7.50     Types: Cigarettes    Smokeless tobacco: Never Used   Vaping Use    Vaping Use: Never used   Substance and Sexual Activity    Alcohol use: No    Drug use: No    Sexual activity: Not on file   Other Topics Concern    Not on file   Social History Narrative    Not on file     Social Determinants of Health     Financial Resource Strain:     Difficulty of Paying Living Expenses: Not on file   Food Insecurity:     Worried About Running Out of Food in the Last Year: Not on file    David of Food in the Last Year: Not on JONO Garza Needs:     Lack of Transportation (Medical): Not on file    Lack of Transportation (Non-Medical):  Not on file   Physical Activity:     Days of Exercise per Week: Not on file    Minutes of Exercise per Session: Not on file   Stress:     Feeling of Stress : Not on file   Social Connections:     Frequency of Communication with Friends and Family: Not on file    Frequency of Social Gatherings with Friends and Family: Not on file    Attends Anabaptist Services: Not on file    Active Member of Clubs or Organizations: Not on file    Attends Club or Organization Meetings: Not on file    Marital Status: Not on file   Intimate Partner Violence:     Fear of Current or Ex-Partner: Not on file    Emotionally Abused: Not on file    Physically Abused: Not on file    Sexually Abused: Not on file   Housing Stability:     Unable to Pay for Housing in the Last Year: Not on file    Number of [place] in the Last Year: Not on file    Unstable Housing in the Last Year: Not on file       Current Facility-Administered Medications   Medication Dose Route Frequency Provider Last Rate Last Admin    citalopram (CELEXA) tablet 40 mg  40 mg Oral Daily Marc Bowen MD   40 mg at 06/12/22 4650    gabapentin (NEURONTIN) tablet 600 mg  600 mg Oral TID Marc Bowen MD   600 mg at 06/12/22 0907    lisinopril (PRINIVIL;ZESTRIL) tablet 5 mg  5 mg Oral Daily Marc Bowen MD        montelukast (SINGULAIR) tablet 10 mg  10 mg Oral Nightly Marc Bowen MD        nicotine (NICODERM CQ) 21 MG/24HR 1 patch  1 patch TransDERmal Q24H Marc Bowen MD   1 patch at 06/12/22 0906    ondansetron (ZOFRAN-ODT) disintegrating tablet 4 mg  4 mg Oral Q8H PRN Marc Bowen MD   4 mg at 06/12/22 0907    pantoprazole (PROTONIX) tablet 40 mg  40 mg Oral QAM AC Marc Bowen MD   40 mg at 06/12/22 0907    morphine (PF) injection 1 mg  1 mg IntraVENous Q1H PRN Valarie Edward DO   1 mg at 06/11/22 0342    ALPRAZolam Paresh Roller) tablet 0.5 mg  0.5 mg Oral TID PRN Nick Denise MD   0.5 mg at 06/12/22 0546    oxyCODONE-acetaminophen (PERCOCET) 5-325 MG per tablet 1 tablet  1 tablet Oral Q4H PRN Nick Denise MD   1 tablet at 06/12/22 0406    traMADol (ULTRAM) tablet 50 mg  50 mg Oral Q4H PRN Nick Denise MD   50 mg at 06/12/22 3044    hydrOXYzine HCl (ATARAX) tablet 10 mg  10 mg Oral TID PRN Nick Denise MD   10 mg at 06/12/22 0413    perflutren lipid microspheres (DEFINITY) injection 1.65 mg  1.5 mL IntraVENous ONCE PRN Tej Mcghee MD   1.5 mL at 06/11/22 1416    sodium chloride flush 0.9 % injection 5-40 mL  5-40 mL IntraVENous 2 times per day Tej Mcghee MD   10 mL at 06/12/22 0803    sodium chloride flush 0.9 % injection 5-40 mL  5-40 mL IntraVENous PRN Tej Mcghee MD        0.9 % sodium chloride infusion   IntraVENous PRN Tej Mcghee MD        enoxaparin (LOVENOX) injection 40 mg  40 mg SubCUTAneous Daily Tej Mcghee MD   40 mg at 06/11/22 1959    acetaminophen (TYLENOL) tablet 650 mg  650 mg Oral Q4H PRN Tej Mcghee MD        ondansetron [facility]) injection 4 mg  4 mg IntraVENous Q6H PRN Tej Mcghee MD   4 mg at 06/11/22 1303    methylPREDNISolone sodium (SOLU-MEDROL) injection 40 mg  40 mg IntraVENous Q8H Chinmay Gotti MD   40 mg at 06/12/22 0406    ipratropium-albuterol (DUONEB) nebulizer solution 1 ampule  1 ampule Inhalation Q4H Carson Shaver MD   1 ampule at 06/12/22 0612    cefTRIAXone (ROCEPHIN) 1,000 mg in sodium chloride (PF) 10 mL IV syringe  1,000 mg IntraVENous Q24H Tej Mcghee MD   1,000 mg at 06/11/22 1959    doxycycline (VIBRAMYCIN) 100 mg in dextrose 5 % 100 mL IVPB  100 mg IntraVENous Q12H Tej Mcghee MD   Stopped at 06/12/22 0855         sodium chloride          Objective:   BP (!) 111/55   Pulse 89   Temp 97.7 °F (36.5 °C) (Temporal)   Resp (!) 35   Ht 5' 6\" (1.676 m)   Wt 112 lb 1.6 oz (50.8 kg)   SpO2 96% BMI 18.09 kg/m²     General: no acute distress  HEENT: normocephalic, atraumatic  Neck: supple, symmetrical, trachea midline   Lungs: improving bilateral expiratory wheezing  Cardiovascular: s1 and s2 normal  Abdomen: soft, positive bowel sounds  Extremities: no edema or cyanosis   Neuro: aaox3, no acute focal deficits     Recent Labs     06/10/22  1201 06/11/22  0152 06/12/22  0144   WBC 7.1 2.8* 4.6*   RBC 3.50* 3.55* 3.30*   HGB 11.0* 11.2* 10.5*   HCT 36.9* 38.7 33.1*   .4* 109.0* 100.3*   MCH 31.4* 31.5* 31.8*   MCHC 29.8* 28.9* 31.7*    179 197     Recent Labs     06/10/22  1157 06/10/22  1201 06/10/22  1651 06/11/22  0346 06/12/22  0144   NA  --  139  --  144 140   K   < > 3.7 3.8 4.4 3.8   ANIONGAP  --  6*  --  12 8   CL  --  94*  --  99 98   CO2  --  39*  --  33* 34*   BUN  --  5*  --  5* 10   CREATININE  --  0.4*  --  0.3* 0.3*   GLUCOSE  --  113*  --  115* 119*   CALCIUM  --  8.9  --  8.7* 8.7*    < > = values in this interval not displayed. Recent Labs     06/11/22  0346 06/12/22  0144   MG 1.4* 2.0     Recent Labs     06/10/22  1201 06/11/22  0346 06/12/22  0144   AST 12 14 13   ALT 9 9 8   BILITOT <0.2 <0.2 <0.2   ALKPHOS 77 71 64     No results for input(s): PH, PO2, PCO2, HCO3, BE, O2SAT in the last 72 hours. Recent Labs     06/10/22  1201 06/11/22  1157   TROPONINI <0.01 <0.01     No results for input(s): INR in the last 72 hours.   Recent Labs     06/10/22  1201   LACTA 0.4*         Intake/Output Summary (Last 24 hours) at 6/12/2022 1159  Last data filed at 6/12/2022 1000  Gross per 24 hour   Intake 2557.55 ml   Output 1800 ml   Net 757.55 ml       ECHO Complete 2D W Doppler W Color    Result Date: 6/12/2022  Transthoracic Echocardiography Report (TTE)  Demographics   Patient Name  [address] [employer] [address] Date of Study          06/11/2022   MRN           [id number]          Gender                 Female   Date of Birth 1950      Room Number            MHL-0148   Age           74 year(s) Height:       66 inches       Referring Physician    Lesa Garner MD   Weight:       112 pounds      Sonographer            Calvin He, SUKHJINDER Dyer   BSA:          1.56 m^2        Interpreting Physician Lesa Garner MD   BMI:          18.08 kg/m^2  Procedure Type of Study   TTE procedure:ECHO 2D W/DOPPLER/COLOR/CONTRAST. Study Location: Portable Technical Quality: Adequate visualization Patient Status: Inpatient Contrast Medium: Definity. Amount - 2 ml Rhythm: Within normal limits BP: 98/47 mmHg Indications:Abnormal Heart Rhythm. Conclusions   Summary  Mitral valve leaflets are mildly thickened with preserved leaflet  mobility. Mild mitral regurgitation is present. Mildly thickened aortic valve leaflets with preserved leaflet mobility. Tricuspid valve is structurally normal.  Mild tricuspid regurgitation. Normal left ventricular size with preserved LV function and an estimated  ejection fraction of approximately 55-60%. Normal left ventricular wall thickness. No regional wall motion abnormalities. Signature   ----------------------------------------------------------------  Electronically signed by Lesa Garner MD(Interpreting  physician) on 06/12/2022 08:35 AM  ----------------------------------------------------------------   Findings   Mitral Valve  Mitral valve leaflets are mildly thickened with preserved leaflet  mobility. Mild mitral regurgitation is present. Aortic Valve  Mildly thickened aortic valve leaflets with preserved leaflet mobility. Tricuspid Valve  Tricuspid valve is structurally normal.  Mild tricuspid regurgitation. Pulmonic Valve  The pulmonic valve was not well visualized . Trace pulmonic regurgitation present. Left Atrium  Normal size left atrium. Left Ventricle  Normal left ventricular size with preserved LV function and an estimated  ejection fraction of approximately 55-60%. Normal left ventricular wall thickness. No regional wall motion abnormalities.    Right Atrium  Normal right atrial dimension with no evidence of thrombus or mass noted. Right Ventricle  Normal right ventricular size with preserved RV function. Pericardial Effusion  No evidence of significant pericardial effusion is noted. Pleural Effusion  No evidence of pleural effusion. Miscellaneous  Definity utilized  2D Measurements and Calculations(cm)   LVIDd: 4.04 cm                      LVIDs: 2.78 cm  IVSd: 0.82 cm  LVPWd: 0.74 cm                      AO Root Dimension: 1.7 cm  Rt. Vent. Dimension: 2.37 cm        LA Dimension: 2.4 cm  % Ejection Fraction: 57 %           LA Area: 12.5 cm^2  LA Volume: 26.9 ml                  LV Systolic dimension: 3.46HJ  LA Volume Index: 17 ml/m^2          LV PW diastolic: 4.99XT  LV dimension: 4.04 cm               LVOT diameter: 2 cm                                      RV Diastolic dimension: 6.64LJ  LVEDV: 86.4 ml                      LVEDVI: 55 ml/m^2  LVESV:37.2 ml                       LVESVI: 24 ml/m^2  Ascending Aorta: 2.9 cm  Doppler Measurements and Calculations   AV Peak Velocity:181 cm/s              MV Peak E-Wave: 105 cm/s  AV Mean Velocity:123 cm/s              MV Peak A-Wave: 113 cm/s  AV Peak Gradient: 13.1 mmHg            MV E/A Ratio: 0.93 %  AV Mean Gradient: 7 mmHg               MV Mean velocity: 91.1cm/s  AV Area (Continuity):2.26 cm^2         MV Peak Gradient: 4.41 mmHg  AV VTI:42.3 cm/s                       MV Mean gradient: 3 mmHg  TR Velocity:282 cm/s  TR Gradient:31.81 mmHg  Estimated RAP:3 mmHg  RVSP:35 mmHg   MV E' septal velocity: 9.57cm/s  MV E' lateral velocity:8.59 cm/s      XR CHEST PORTABLE    Result Date: 6/10/2022  NO PRIOR REPORT AVAILABLE Exam: X-RAY OF [facility] Clinical data:Shortness of breath. Technique:Single portable upright view of the chest. Prior studies: Prior studies of the Chest Xray dated 10/2/2021 (image only) and NM Lung Scan dated 11/8/2021 (images only)  submitted. Findings: The lungs are grossly clear; noevidence of acute infiltrate or pleural effusion. Cardiac silhouette is within normal limits. No acute osseous abnormality is detected. There is modest hyperinflation and accentuated in the upper lobes and mild bibasilar fibrotic changes. Surgical clips noted in the epigastrium and left upper quadrant. Modest bilateral upper lobe hyperinflation and mild bibasilar fibrosis. Surgical clips in the epigastrium and left upper quadrant. Recommendation: Follow up as clinically indicated.  Electronically Signed by Nancy Villagran MD at 10-Rai-2022 01:10:14 PM              Assessment and Plan:   Acute on chronic hypercapnic respiratory failure with chronic hypoxia  Improved  Suspect secondary to below  Since weaned off BiPAP currently on 3.5 L which is her baseline  Multiple similar prior hospitalizations     COPD exacerbation  Improving  Steroids, tapering  Nebs  Antibiotics  Goal sats 88-92%     Rhinovirus positivity  Supportive care     History of lung cancer  Routine outpatient follow-up     Continued tobacco dependence  Counseled     DVT prophylaxis  Per primary    Valentine Holt MD   6/12/2022 11:59 AM

## 2022-06-12 NOTE — PROGRESS NOTES
Progress Note  Date:2022       Room:0148/148-01  Patient Name:Iman Horton     Date of Birth:     Age:72 y.o. Subjective    Subjective:  Symptoms:  Stable. She reports shortness of breath, weakness and anxiety. Diet:  Adequate intake. Activity level: Impaired due to weakness. Pain:  She complains of pain that is mild. She reports pain is unchanged. Pain is well controlled. Review of Systems   Respiratory: Positive for shortness of breath. Neurological: Positive for weakness. Objective         Vitals Last 24 Hours:  TEMPERATURE:  Temp  Av.9 °F (36.6 °C)  Min: 97.4 °F (36.3 °C)  Max: 98.3 °F (36.8 °C)  RESPIRATIONS RANGE: Resp  Av.7  Min: 18  Max: 31  PULSE OXIMETRY RANGE: SpO2  Av.5 %  Min: 90 %  Max: 98 %  PULSE RANGE: Pulse  Av  Min: 82  Max: 110  BLOOD PRESSURE RANGE: Systolic (45VUF), ZED:675 , Min:97 , DAA:124   ; Diastolic (40TXX), CKH:56, Min:41, Max:78    I/O (24Hr): Intake/Output Summary (Last 24 hours) at 2022 4996  Last data filed at 2022 0600  Gross per 24 hour   Intake 2137.55 ml   Output 1200 ml   Net 937.55 ml     Objective:  General Appearance:  Comfortable and well-appearing (Very anxious). Vital signs: (most recent): Blood pressure 110/78, pulse (!) 110, temperature 97.7 °F (36.5 °C), temperature source Temporal, resp. rate 25, height 5' 6\" (1.676 m), weight 112 lb 1.6 oz (50.8 kg), SpO2 90 %, not currently breastfeeding. Vital signs are normal.    Output: Producing urine and minimal stool output. HEENT: Normal HEENT exam.    Lungs:  Normal respiratory rate and increased effort. There are wheezes. Heart: Normal rate. Regular rhythm. Abdomen: Abdomen is soft. There is no abdominal tenderness. Extremities: Normal range of motion. Neurological: Patient is alert and oriented to person, place and time. (Very anxious). Skin:  Warm and dry.       Labs/Imaging/Diagnostics    Labs:  CBC:  Recent Labs 06/10/22  1201 06/11/22  0152 06/12/22  0144   WBC 7.1 2.8* 4.6*   RBC 3.50* 3.55* 3.30*   HGB 11.0* 11.2* 10.5*   HCT 36.9* 38.7 33.1*   .4* 109.0* 100.3*   RDW 12.6 12.4 12.8    179 197     CHEMISTRIES:  Recent Labs     06/10/22  1157 06/10/22  1201 06/10/22  1651 06/11/22  0346 06/12/22  0144   NA  --  139  --  144 140   K   < > 3.7 3.8 4.4 3.8   CL  --  94*  --  99 98   CO2  --  39*  --  33* 34*   BUN  --  5*  --  5* 10   CREATININE  --  0.4*  --  0.3* 0.3*   GLUCOSE  --  113*  --  115* 119*   MG  --   --   --  1.4* 2.0    < > = values in this interval not displayed. PT/INR:No results for input(s): PROTIME, INR in the last 72 hours. APTT:No results for input(s): APTT in the last 72 hours. LIVER PROFILE:  Recent Labs     06/10/22  1201 06/11/22  0346 06/12/22  0144   AST 12 14 13   ALT 9 9 8   BILITOT <0.2 <0.2 <0.2   ALKPHOS 77 71 64       Imaging Last 24 Hours:  XR CHEST PORTABLE    Result Date: 6/10/2022  NO PRIOR REPORT AVAILABLE [facility] Clinical data:Shortness of breath. Technique:Single portable upright view of the chest. Prior studies: Prior studies of the Chest Xray dated 10/2/2021 (image only) and NM Lung Scan dated 11/8/2021 (images only)  submitted. Findings: The lungs are grossly clear; noevidence of acute infiltrate or pleural effusion. Cardiac silhouette is within normal limits. No acute osseous abnormality is detected. There is modest hyperinflation and accentuated in the upper lobes and mild bibasilar fibrotic changes. Surgical clips noted in the epigastrium and left upper quadrant. Modest bilateral upper lobe hyperinflation and mild bibasilar fibrosis. Surgical clips in the epigastrium and left upper quadrant. Recommendation: Follow up as clinically indicated.  Electronically Signed by Wyona Eisenmenger MD at 10-Rai-2022 01:10:14 PM             Assessment//Plan           Hospital Problems           Last Modified POA    * (Principal) Acute on chronic respiratory failure ([facility] [address] 6/10/2022 Yes        Assessment:    Condition: In stable condition. Improving. Plan:   Transfer to floor. Encourage ambulation and out of bed and up to chair. Continue respiratory treatments and start/continue incentive spirometry. Consults: pulmonology. Regular diet. Administer medications as ordered. (    Acute on chronic respiratory failure- patient's PCO2 was remarkably elevated on admission. She has been followed by the pulmonary group at [facility].  Extended discussion yesterday concerning long-term prognosis and chronic end-stage pulmonary disease. Patient would not be able to be weaned off ventilator if ever placed on the ventilator thus she stated she did not want to be intubated and has been documented DNI. Palliative care to see patient on Monday to further delineate her long-term care wishes. Generalized anxiety- another extended discussion with patient today concerning her use of Xanax 1 mg 4 times a day. Attempted to explain to her that benzodiazepines suppress respiratory drive and not recommended chronically in a 68-year-old lady. Due to risk of withdrawal since she has been on it for several years we tried 0.5 mg 3 times daily. Also ordered Atarax to use for anxiety. Chronic pain syndrome- another extended discussion with patient today about the contraindications of benzodiazepines and opioids used concurrently. She states that she would rather increase her Xanax and do without her Percocet. Attempted to explain to her neither are recommended at her age and  medical condition, poor disease insight. Patient stable to be moved to the floor and recommend up in chair for all meals and ambulate in the halls. ).        Electronically signed by Marcellina Hatchet, MD on 6/12/22 at 8:08 AM CDT

## 2022-06-13 LAB
ALBUMIN SERPL-MCNC: 3.4 G/DL (ref 3.5–5.2)
ALP BLD-CCNC: 62 U/L (ref 35–104)
ALT SERPL-CCNC: 8 U/L (ref 5–33)
ANION GAP SERPL CALCULATED.3IONS-SCNC: 7 MMOL/L (ref 7–19)
AST SERPL-CCNC: 14 U/L (ref 5–32)
BASOPHILS ABSOLUTE: 0 K/UL (ref 0–0.2)
BASOPHILS RELATIVE PERCENT: 0.2 % (ref 0–1)
BILIRUB SERPL-MCNC: <0.2 MG/DL (ref 0.2–1.2)
BUN BLDV-MCNC: 14 MG/DL (ref 8–23)
CALCIUM SERPL-MCNC: 8.8 MG/DL (ref 8.8–10.2)
CHLORIDE BLD-SCNC: 96 MMOL/L (ref 98–111)
CO2: 36 MMOL/L (ref 22–29)
CREAT SERPL-MCNC: 0.4 MG/DL (ref 0.5–0.9)
EOSINOPHILS ABSOLUTE: 0 K/UL (ref 0–0.6)
EOSINOPHILS RELATIVE PERCENT: 0.2 % (ref 0–5)
FOLATE: 5.8 NG/ML (ref 4.8–37.3)
GFR AFRICAN AMERICAN: >59
GFR NON-AFRICAN AMERICAN: >60
GLUCOSE BLD-MCNC: 179 MG/DL (ref 74–109)
HCT VFR BLD CALC: 34.7 % (ref 37–47)
HEMOGLOBIN: 10.8 G/DL (ref 12–16)
IMMATURE GRANULOCYTES #: 0 K/UL
LYMPHOCYTES ABSOLUTE: 1.1 K/UL (ref 1.1–4.5)
LYMPHOCYTES RELATIVE PERCENT: 16.5 % (ref 20–40)
MAGNESIUM: 1.6 MG/DL (ref 1.6–2.4)
MCH RBC QN AUTO: 31.8 PG (ref 27–31)
MCHC RBC AUTO-ENTMCNC: 31.1 G/DL (ref 33–37)
MCV RBC AUTO: 102.1 FL (ref 81–99)
MONOCYTES ABSOLUTE: 0.8 K/UL (ref 0–0.9)
MONOCYTES RELATIVE PERCENT: 12.4 % (ref 0–10)
NEUTROPHILS ABSOLUTE: 4.6 K/UL (ref 1.5–7.5)
NEUTROPHILS RELATIVE PERCENT: 70.2 % (ref 50–65)
PDW BLD-RTO: 13.3 % (ref 11.5–14.5)
PLATELET # BLD: 219 K/UL (ref 130–400)
PMV BLD AUTO: 11.7 FL (ref 9.4–12.3)
POTASSIUM SERPL-SCNC: 3.7 MMOL/L (ref 3.5–5)
RBC # BLD: 3.4 M/UL (ref 4.2–5.4)
SODIUM BLD-SCNC: 139 MMOL/L (ref 136–145)
TOTAL PROTEIN: 5.8 G/DL (ref 6.6–8.7)
VITAMIN B-12: 384 PG/ML (ref 211–946)
WBC # BLD: 6.5 K/UL (ref 4.8–10.8)

## 2022-06-13 PROCEDURE — 82746 ASSAY OF FOLIC ACID SERUM: CPT

## 2022-06-13 PROCEDURE — 99222 1ST HOSP IP/OBS MODERATE 55: CPT | Performed by: PHYSICIAN ASSISTANT

## 2022-06-13 PROCEDURE — 94640 AIRWAY INHALATION TREATMENT: CPT

## 2022-06-13 PROCEDURE — 2500000003 HC RX 250 WO HCPCS: Performed by: INTERNAL MEDICINE

## 2022-06-13 PROCEDURE — 99223 1ST HOSP IP/OBS HIGH 75: CPT | Performed by: INTERNAL MEDICINE

## 2022-06-13 PROCEDURE — 36415 COLL VENOUS BLD VENIPUNCTURE: CPT

## 2022-06-13 PROCEDURE — 82607 VITAMIN B-12: CPT

## 2022-06-13 PROCEDURE — 6360000002 HC RX W HCPCS: Performed by: INTERNAL MEDICINE

## 2022-06-13 PROCEDURE — 2580000003 HC RX 258: Performed by: FAMILY MEDICINE

## 2022-06-13 PROCEDURE — 99232 SBSQ HOSP IP/OBS MODERATE 35: CPT | Performed by: INTERNAL MEDICINE

## 2022-06-13 PROCEDURE — 6370000000 HC RX 637 (ALT 250 FOR IP): Performed by: INTERNAL MEDICINE

## 2022-06-13 PROCEDURE — 6370000000 HC RX 637 (ALT 250 FOR IP): Performed by: FAMILY MEDICINE

## 2022-06-13 PROCEDURE — 2700000000 HC OXYGEN THERAPY PER DAY

## 2022-06-13 PROCEDURE — 83735 ASSAY OF MAGNESIUM: CPT

## 2022-06-13 PROCEDURE — 85025 COMPLETE CBC W/AUTO DIFF WBC: CPT

## 2022-06-13 PROCEDURE — 80053 COMPREHEN METABOLIC PANEL: CPT

## 2022-06-13 PROCEDURE — 6360000002 HC RX W HCPCS: Performed by: FAMILY MEDICINE

## 2022-06-13 PROCEDURE — 1210000000 HC MED SURG R&B

## 2022-06-13 PROCEDURE — 2500000003 HC RX 250 WO HCPCS: Performed by: FAMILY MEDICINE

## 2022-06-13 RX ORDER — CHOLECALCIFEROL (VITAMIN D3) 125 MCG
500 CAPSULE ORAL DAILY
Status: DISCONTINUED | OUTPATIENT
Start: 2022-06-13 | End: 2022-06-25 | Stop reason: HOSPADM

## 2022-06-13 RX ORDER — PREDNISONE 20 MG/1
40 TABLET ORAL DAILY
Status: DISCONTINUED | OUTPATIENT
Start: 2022-06-13 | End: 2022-06-14

## 2022-06-13 RX ORDER — ALPRAZOLAM 0.5 MG/1
0.5 TABLET ORAL EVERY 6 HOURS PRN
Status: DISCONTINUED | OUTPATIENT
Start: 2022-06-13 | End: 2022-06-16

## 2022-06-13 RX ADMIN — IPRATROPIUM BROMIDE AND ALBUTEROL SULFATE 1 AMPULE: 2.5; .5 SOLUTION RESPIRATORY (INHALATION) at 02:29

## 2022-06-13 RX ADMIN — MORPHINE SULFATE 1 MG: 2 INJECTION, SOLUTION INTRAMUSCULAR; INTRAVENOUS at 02:43

## 2022-06-13 RX ADMIN — PREDNISONE 40 MG: 20 TABLET ORAL at 18:37

## 2022-06-13 RX ADMIN — GABAPENTIN 600 MG: 600 TABLET, FILM COATED ORAL at 10:07

## 2022-06-13 RX ADMIN — IPRATROPIUM BROMIDE AND ALBUTEROL SULFATE 1 AMPULE: 2.5; .5 SOLUTION RESPIRATORY (INHALATION) at 07:50

## 2022-06-13 RX ADMIN — ACETAMINOPHEN 650 MG: 325 TABLET ORAL at 05:48

## 2022-06-13 RX ADMIN — PANTOPRAZOLE SODIUM 40 MG: 40 TABLET, DELAYED RELEASE ORAL at 05:48

## 2022-06-13 RX ADMIN — IPRATROPIUM BROMIDE AND ALBUTEROL SULFATE 1 AMPULE: 2.5; .5 SOLUTION RESPIRATORY (INHALATION) at 22:25

## 2022-06-13 RX ADMIN — DOXYCYCLINE 100 MG: 100 INJECTION, POWDER, LYOPHILIZED, FOR SOLUTION INTRAVENOUS at 19:40

## 2022-06-13 RX ADMIN — GABAPENTIN 600 MG: 600 TABLET, FILM COATED ORAL at 19:38

## 2022-06-13 RX ADMIN — SODIUM CHLORIDE, PRESERVATIVE FREE 10 ML: 5 INJECTION INTRAVENOUS at 10:07

## 2022-06-13 RX ADMIN — ALPRAZOLAM 0.5 MG: 0.5 TABLET ORAL at 14:13

## 2022-06-13 RX ADMIN — DOXYCYCLINE 100 MG: 100 INJECTION, POWDER, LYOPHILIZED, FOR SOLUTION INTRAVENOUS at 10:35

## 2022-06-13 RX ADMIN — IPRATROPIUM BROMIDE AND ALBUTEROL SULFATE 1 AMPULE: 2.5; .5 SOLUTION RESPIRATORY (INHALATION) at 10:48

## 2022-06-13 RX ADMIN — MONTELUKAST SODIUM 10 MG: 10 TABLET, FILM COATED ORAL at 19:38

## 2022-06-13 RX ADMIN — GUAIFENESIN 400 MG: 100 SOLUTION ORAL at 14:22

## 2022-06-13 RX ADMIN — OXYCODONE HYDROCHLORIDE AND ACETAMINOPHEN 1 TABLET: 5; 325 TABLET ORAL at 18:46

## 2022-06-13 RX ADMIN — ALPRAZOLAM 0.5 MG: 0.5 TABLET ORAL at 20:24

## 2022-06-13 RX ADMIN — ENOXAPARIN SODIUM 40 MG: 100 INJECTION SUBCUTANEOUS at 10:06

## 2022-06-13 RX ADMIN — SODIUM CHLORIDE, PRESERVATIVE FREE 1000 MG: 5 INJECTION INTRAVENOUS at 18:36

## 2022-06-13 RX ADMIN — GABAPENTIN 600 MG: 600 TABLET, FILM COATED ORAL at 14:14

## 2022-06-13 RX ADMIN — IPRATROPIUM BROMIDE AND ALBUTEROL SULFATE 1 AMPULE: 2.5; .5 SOLUTION RESPIRATORY (INHALATION) at 14:31

## 2022-06-13 RX ADMIN — ALPRAZOLAM 0.5 MG: 0.5 TABLET ORAL at 05:48

## 2022-06-13 RX ADMIN — SODIUM CHLORIDE, PRESERVATIVE FREE 10 ML: 5 INJECTION INTRAVENOUS at 19:45

## 2022-06-13 RX ADMIN — ONDANSETRON HYDROCHLORIDE 4 MG: 2 SOLUTION INTRAMUSCULAR; INTRAVENOUS at 10:07

## 2022-06-13 RX ADMIN — CITALOPRAM HYDROBROMIDE 40 MG: 20 TABLET ORAL at 10:07

## 2022-06-13 RX ADMIN — GUAIFENESIN 400 MG: 100 SOLUTION ORAL at 20:24

## 2022-06-13 RX ADMIN — TRAMADOL HYDROCHLORIDE 50 MG: 50 TABLET, COATED ORAL at 10:36

## 2022-06-13 RX ADMIN — IPRATROPIUM BROMIDE AND ALBUTEROL SULFATE 1 AMPULE: 2.5; .5 SOLUTION RESPIRATORY (INHALATION) at 19:14

## 2022-06-13 RX ADMIN — ACETAMINOPHEN 650 MG: 325 TABLET ORAL at 00:48

## 2022-06-13 RX ADMIN — HYDROXYZINE HYDROCHLORIDE 10 MG: 10 TABLET ORAL at 12:19

## 2022-06-13 RX ADMIN — GUAIFENESIN 400 MG: 100 SOLUTION ORAL at 18:36

## 2022-06-13 ASSESSMENT — PAIN DESCRIPTION - LOCATION
LOCATION: HEAD
LOCATION: BACK

## 2022-06-13 ASSESSMENT — PAIN DESCRIPTION - DESCRIPTORS: DESCRIPTORS: ACHING

## 2022-06-13 ASSESSMENT — PAIN SCALES - GENERAL
PAINLEVEL_OUTOF10: 7
PAINLEVEL_OUTOF10: 7
PAINLEVEL_OUTOF10: 3
PAINLEVEL_OUTOF10: 8

## 2022-06-13 ASSESSMENT — PAIN DESCRIPTION - ORIENTATION: ORIENTATION: LOWER

## 2022-06-13 ASSESSMENT — PAIN - FUNCTIONAL ASSESSMENT: PAIN_FUNCTIONAL_ASSESSMENT: PREVENTS OR INTERFERES SOME ACTIVE ACTIVITIES AND ADLS

## 2022-06-13 NOTE — CONSULTS
Palliative Care Consult Note    6/13/2022 10:35 AM  Subjective:  Admit Date: 6/10/2022  PCP: No primary care provider on file. Date of Service: 6/13/2022    Reason for Consultation:  Goals of Care, Code Status, Family Support     History Obtained From: EMR/Patient and their Family    History Of Present Illness: The patient is a 74 y.o. female with PMH COPD dependent 3L NC O2, lung cancer, alpha-1 antitrypsin deficiency, HTN, RA, fibromyalgia, depression/anxiety, continued tobacco use who presented to [facility] ED on 06/10/2022 complaining of worsening shortness of breath and fatigue with need to increase her oxygen at home for hypoxia. She additionally complained of increase in sputum production, nausea, vomiting and inability to keep her oral medications down. Patient was admitted to her PCP, placed on systemic steroids, BiPAP which has now been weaned off, empiric antibiotics. She was noted to have SVT which has resolved. Cardiology is following. Echocardiogram unremarkable. Palliative care was consulted for goals of care. Past Medical History:        Diagnosis Date    ADHD (attention deficit hyperactivity disorder)     Anxiety     COPD (chronic obstructive pulmonary disease) (HCC)     Depression     Fibromyalgia     GERD (gastroesophageal reflux disease)     Hypertension     Malignant neoplasm of upper lobe of right lung ([facility] [address] 12/04/2018    Palliative care patient 10/06/2021    RA (rheumatoid arthritis) (HCC)      Past Surgical History:        Procedure Laterality Date    CHOLECYSTECTOMY      HERNIA REPAIR      HYSTERECTOMY (CERVIX STATUS UNKNOWN)      LEG SURGERY      steel garth placement. 2009     Home Medications:  Prior to Admission medications    Medication Sig Start Date End Date Taking?  Authorizing Provider   phenazopyridine (PYRIDIUM) 200 MG tablet Take 1 tablet by mouth 3 times daily as needed for Pain (bladder discomfort) 11/14/21   Ricky Castillo MD   montelukast (SINGULAIR) 10 MG tablet Take 10 mg by mouth nightly    Historical Provider, MD   lisinopril (PRINIVIL;ZESTRIL) 5 MG tablet TAKE 1 TABLET BY MOUTH DAILY  Patient not taking: Reported on 6/12/2022 10/4/18   TEN Machado   oxyCODONE-acetaminophen (PERCOCET)  MG per tablet TAKE 1 TABLET Q 4 TO 6 HRS PRN. (MAX 5/DAY).  Earliest Fill Date: 7/19/18 7/19/18 8/18/18  Nate Curtis MD   ondansetron (ZOFRAN ODT) 4 MG disintegrating tablet Take 1 tablet by mouth every 8 hours as needed for Nausea or Vomiting 5/1/18   TEN Hyman   lidocaine (LIDODERM) 5 % Place 1 patch onto the skin daily 12 hours on, 12 hours off. 5/1/18   TEN Hyman   nicotine (NICODERM CQ) 7 MG/24HR Place 1 patch onto the skin every 24 hours 5/1/18   TEN Hyman   nicotine (NICODERM CQ) 21 MG/24HR Place 1 patch onto the skin every 24 hours 5/1/18   TEN Hyman   tiZANidine (ZANAFLEX) 4 MG tablet Take 1 tablet by mouth 3 times daily as needed (.) 4/30/18   Nate Curtis MD   albuterol-ipratropium (COMBIVENT RESPIMAT)  MCG/ACT AERS inhaler Inhale 1 puff into the lungs every 6 hours 4/27/18   Carli Karimi MD   albuterol sulfate HFA (VENTOLIN HFA) 108 (90 Base) MCG/ACT inhaler Inhale 2 puffs into the lungs every 6 hours as needed for Wheezing 4/27/18   Carli Karimi MD   budesonide-formoterol [facility]) 160-4.5 MCG/ACT AERO Inhale 2 puffs into the lungs 2 times daily 4/27/18   Carli Karimi MD   pantoprazole (PROTONIX) 20 MG tablet TAKE 1 TABLET BY MOUTH 2 TIMES DAILY 12/18/17   Carli Karimi MD   fluticasone Tomasz Ospina) 50 MCG/ACT nasal spray INSTILL 1 SPRAY IN EACH NOSTRIL DAILY 10/4/17   Carli Karimi MD   OXYGEN Inhale into the lungs    Historical Provider, MD   ipratropium-albuterol (DUONEB) 0.5-2.5 (3) MG/3ML SOLN nebulizer solution Inhale 3 mLs into the lungs every 4 hours 4/6/17   Carli Karimi MD   metoclopramide (REGLAN) 5 MG tablet Take 1 tablet by mouth 4 times daily 3/30/17   Anshu Sagastume MD   albuterol (PROVENTIL) (2.5 MG/3ML) 0.083% nebulizer solution Take 3 mLs by nebulization every 6 hours as needed for Wheezing 3/17/17   Anshu Sagastume MD   ondansetron [facility]) 4 MG tablet Take 1 tablet by mouth every 8 hours as needed for Nausea or Vomiting 2/23/17   Anshu Sagastume MD   gabapentin (NEURONTIN) 600 MG tablet Take 600 mg by mouth 3 times daily    Historical Provider, MD   ALPRAZolam Cindia Muss) 1 MG tablet Take 1 mg by mouth nightly as needed Takes 4 times a day 1/15/15   Historical Provider, MD   amphetamine-dextroamphetamine (ADDERALL) 20 MG tablet Take 20 mg by mouth 2 times daily. 1/21/15   Historical Provider, MD   citalopram (CELEXA) 40 MG tablet Take 40 mg by mouth daily. Historical Provider, MD     Allergies:    Aspirin, Codeine, Demerol, Fentanyl, Iv dye [iodides], Neosporin [bacitracin-neomycin-polymyxin], Nsaids, Pcn [penicillins], Pentazocine lactate, Sulfa antibiotics, Talwin [pentazocine], and Influenza vaccines    Social History:    The patient currently lives at home w/ her daughter. Tobacco:   reports that she has been smoking cigarettes. She has a 7.50 pack-year smoking history. She has never used smokeless tobacco.  Alcohol:   reports no history of alcohol use.   Illicit Drugs: denies    Family History:      Problem Relation Age of Onset    Cancer Father      Review of Systems:   Constitutional / general:  Denies fever / chills / sweats +fatigue  Head:  Denies headache / neck stiffness / trauma / visual change  Eyes:  Denies blurry vision / acute visual change or loss / itching / redness  ENT: Denies sore throat / hoarseness / nasal drainage / ear pain  CV:  Denies chest pain / palpitations/ orthopnea   Respiratory:  + cough / +shortness of breath / +sputum / hemoptysis  GI: + nausea / +vomiting / abdominal pain / diarrhea / constipation  :  Denies dysuria / hesitancy / urgency / hematuria   Neuro: Denies paralysis / syncope / seizure / dysphagia / headache / paresthesias  Musculoskeletal:  Denies muscle weakness /joint stiffness / pain  Vascular: Denies edema / claudication / varicosities  Heme / endocrine: Denies easy bruising / bleeding / excessive sweating / heat or cold intolerance  Psychiatric:  Denies depression / +anxiety / insomnia / mood changes  Skin:  Denies new rashes / lesions / skin hair or nail changes    14 point review of systems is negative except as specifically addressed above. Physical Examination:  BP (!) 142/72   Pulse 99   Temp 97.7 °F (36.5 °C) (Temporal)   Resp 20   Ht 5' 6\" (1.676 m)   Wt 112 lb 1.6 oz (50.8 kg)   SpO2 91%   BMI 18.09 kg/m²   General appearance: 73 yo female, ill appearing, no acute distress, NC O2 in place  Head: Normocephalic, without obvious abnormality, atraumatic  Eyes: conjunctivae/corneas clear. PERRL, EOM's intact.    Ears: normal external ears and nose, throat without exudate  Neck: no JVD, supple, symmetrical, trachea midline   Lungs: diminished throughout, end expiratory wheezing noted  Heart: tachycardic, regular rhythm, S1, S2 normal, no murmur  Abdomen:soft, non-tender; non-distended, normal bowel sounds   Extremities:No lower extremity edema,  No erythema, no tenderness to palpation  Skin: Warm, dry  Neurologic: Alert and oriented X 3, generalized weakness, danial tone, speech fluent, follows commands, no focal deficits   Psychiatric: Anxious    Diagnostic Data:  CBC:  Recent Labs     06/11/22  0152 06/12/22  0144 06/13/22  0234   WBC 2.8* 4.6* 6.5   HGB 11.2* 10.5* 10.8*   HCT 38.7 33.1* 34.7*    197 219     BMP:  Recent Labs     06/11/22  0346 06/12/22  0144 06/13/22  0234    140 139   K 4.4 3.8 3.7   CL 99 98 96*   CO2 33* 34* 36*   BUN 5* 10 14   CREATININE 0.3* 0.3* 0.4*   CALCIUM 8.7* 8.7* 8.8     Recent Labs     06/11/22  0346 06/12/22  0144 06/13/22  0234   AST 14 13 14   ALT 9 8 8   BILITOT <0.2 <0.2 <0.2   ALKPHOS 71 64 62       ECHO Complete 2D W Doppler W Color  Conclusions   Summary  Mitral valve leaflets are mildly thickened with preserved leaflet  mobility. Mild mitral regurgitation is present. Mildly thickened aortic valve leaflets with preserved leaflet mobility. Tricuspid valve is structurally normal.  Mild tricuspid regurgitation. Normal left ventricular size with preserved LV function and an estimated  ejection fraction of approximately 55-60%. Normal left ventricular wall thickness. No regional wall motion abnormalities. Signature    Electronically signed by Forest Viramontes MD(Interpreting  physician) on 06/12/2022 08:35 AM      XR CHEST PORTABLE  Modest bilateral upper lobe hyperinflation and mild bibasilar fibrosis. Surgical clips in the epigastrium and left upper quadrant. Recommendation: Follow up as clinically indicated. Electronically Signed by Yonny Mendiola MD at 10-Rai-2022 01:10:14 PM             Palliative Performance Scale:  [x] 60% Reduced ambulation  Significant disease: Can't do hobbies/housework  Occasional assistance  Normal/reduced intake  LOC full/confusion    Palliative Review of Advance Directives:     Surrogate Decision Zaki Ethan    Durable Power of :No    Advanced Directives/Living [place]: Yes    Out of hospital medical orders in place to reflect resuscitation status (MOLST/POLST): No    Information Sharing:  Patient's awareness of illness:  [] Terminal [] Life-Threatening [x] Serious [] Non life-threatening [] Not serious   [] Not discussed    Family awareness of illness:   [] Terminal [] Life-Threatening [x] Serious [] Nonlife-threatening [] Not serious   [] Not discussed    Assessment/Plan:  Principal Problem:    Acute on chronic respiratory failure ([address]  Active Problems:    Palliative care patient  Resolved Problems:    * No resolved hospital problems. *     Visit Summary:  Chart reviewed, patient discussed with consulting service and nursing staff.  Reviewed health issues, work up and treatment plan as well as factors that lead to hospitalization. Ms. Delfina Asher was seen at her bedside. No family present in the room. I introduced myself, the role of Palliative care and reason for consultation. Vijaya Capps is anxious this morning as well as nauseated. She states that overall nausea and vomiting have improved. She denies abdominal pain, diarrhea or constipation at this time. Dyspnea is unchanged. She tells me she lives at home with her daughter who is listed on her living will as her healthcare surrogate. We then discussed decision over the weekend to be \"do not intubated\". Vijaya Capps states that she doesn't recall making that decision and would want to be on a ventilator. We discussed meaning of \"full code\" as well as \"do not intubate\" and \"do not resuscitate\". She confirms she wants her code status to be Full code. Her daughter confirms this as well. Patient feels she has been doing well at home and has not required Hospitalization since 11/2021. Her goals are to continue her current treatment plan in hopes of discharging home once medically stable. She voices her anxiety increases when we initiate a conversation regarding future goals if COPD were to become uncontrolled. She requests not to have any further conversation about that at this time. Emotional support provided as well as ways to reduce anxiety without medication. She expresses thanks for clarifying her code status at this time. D/w her PCP. Code status changed to Full code. Recommendations:     1. Palliative Care-[facility] home when medically stable. Code status: Full code ACP completed   2. Acute on chronic respiratory failure w/ hypoxia/hypercapnia 2/2 COPD exacerbation/+Rhinovirus-Rocephin/Doxycycline, Solumedrol, bronchodilators  3. N/V-improving, Zofran continued  4.  Anxiety-suspect steroids contributing, continue Atarax/Xanax     Thank you for consulting palliative care and allowing us to participate in the care of the patient.     CounselingTopics: Goals of care, Code Status, Disease process education, pt/family support    Time Spent Counseling > 50%:  YES                                   Total Time Spent with patient/family counseling, workup/treatment review, counseling and placement of orders/preparation of this note: 60 minutes    Electronically signed by Charmayne Chamorro, PA-C on 6/13/2022 at 10:35 AM    (Please note that portions of this note were completed with a voice recognition program.  Efforts were made to edit the dictations but occasionally words are mis-transcribed.)

## 2022-06-13 NOTE — PROGRESS NOTES
Patient sitting up in bed and barrel chested. She is having severe air hunger even though her 91% oxygen and just had a breathing treatment. Morphine 1mg given for air hunger. Patient doesn't understand why she can't have her oxygen higher than 3 liters at home.

## 2022-06-13 NOTE — PROGRESS NOTES
Progress Note  Sen Obrien Gillette  6/13/2022 4:55 PM  Subjective:   Admit Date:   6/10/2022      CC/ADMIT DX:       Interval History:   Reviewed overnight events and nursing notes. No new complaints. She has SOA, that is unchanged. No CP. She has c/o anxiety, pain. I have reviewed all labs/diagnostics from the last 24hrs. ROS:   I have done a 10 point ROS and all are negative, except what is mentioned in the HPI. ADULT ORAL NUTRITION SUPPLEMENT; Lunch, Breakfast, Dinner; Standard High Calorie/High Protein Oral Supplement  ADULT DIET; Regular    Medications:      sodium chloride        guaiFENesin  400 mg Oral Q4H    predniSONE  40 mg Oral Daily    citalopram  40 mg Oral Daily    gabapentin  600 mg Oral TID    montelukast  10 mg Oral Nightly    nicotine  1 patch TransDERmal Q24H    pantoprazole  40 mg Oral QAM AC    sodium chloride flush  5-40 mL IntraVENous 2 times per day    enoxaparin  40 mg SubCUTAneous Daily    ipratropium-albuterol  1 ampule Inhalation Q4H    cefTRIAXone (ROCEPHIN) IV  1,000 mg IntraVENous Q24H    doxycycline (VIBRAMYCIN) IV  100 mg IntraVENous Q12H           Objective:   Vitals: /63   Pulse 91   Temp 98.2 °F (36.8 °C) (Temporal)   Resp 18   Ht 5' 6\" (1.676 m)   Wt 112 lb 1.6 oz (50.8 kg)   SpO2 92%   BMI 18.09 kg/m²      Intake/Output Summary (Last 24 hours) at 6/13/2022 1655  Last data filed at 6/13/2022 1537  Gross per 24 hour   Intake 550 ml   Output 250 ml   Net 300 ml     General appearance: alert and cooperative with exam  Lungs: coarse  Heart: RRR  Abdomen: soft, non-tender; bowel sounds normal; no masses,  no organomegaly  Extremities: extremities normal, atraumatic, no cyanosis or edema  Neurologic:  No obvious focal neurologic deficits.     Assessment and Plan:   Principal Problem:    Acute on chronic respiratory failure (HCC)  Active Problems:    Anxiety    COPD exacerbation ([facility] [address]    Palliative care patient  Resolved Problems:    * No resolved hospital problems. *    Anxiety    Chronic Pain    Plan:  1. Continue present medication(s)   2. Follow with care  3. Pulm consult ordered  4. Continue steroids, antibiotics, nayla treatments  5. Increase activity      Discharge planning:   her home     Reviewed treatment plans with the patient and/or family.              Electronically signed by Kar Hamilton MD on 6/13/2022 at 4:55 PM

## 2022-06-13 NOTE — PROGRESS NOTES
Hospitalist Progress Note  [facility]     Patient: Gerson Field  : 1950  MRN: [id number]  Code Status: Full Code    Hospital Day: 3   Date of Service: 2022    Subjective:   Patient seen and examined. No current complaints. Past Medical History:   Diagnosis Date    ADHD (attention deficit hyperactivity disorder)     Anxiety     COPD (chronic obstructive pulmonary disease) (HCC)     Depression     Fibromyalgia     GERD (gastroesophageal reflux disease)     Hypertension     Malignant neoplasm of upper lobe of right lung ([address] 2018    Palliative care patient 10/06/2021    RA (rheumatoid arthritis) ([facility])        Past Surgical History:   Procedure Laterality Date    CHOLECYSTECTOMY      HERNIA REPAIR      HYSTERECTOMY (CERVIX STATUS UNKNOWN)      LEG SURGERY      steel garth placement.        Family History   Problem Relation Age of Onset    Cancer Father        Social History     Socioeconomic History    Marital status:      Spouse name: Not on file    Number of children: Not on file    Years of education: Not on file    Highest education level: Not on file   Occupational History    Not on file   Tobacco Use    Smoking status: Current Some Day Smoker     Packs/day: 0.25     Years: 30.00     Pack years: 7.50     Types: Cigarettes    Smokeless tobacco: Never Used   Vaping Use    Vaping Use: Never used   Substance and Sexual Activity    Alcohol use: No    Drug use: No    Sexual activity: Not on file   Other Topics Concern    Not on file   Social History Narrative    Not on file     Social Determinants of Health     Financial Resource Strain:     Difficulty of Paying Living Expenses: Not on file   Food Insecurity:     Worried About [address] in the Last Year: Not on file    David of Food in the Last Year: Not on file   Transportation Needs:     Lack of Transportation (Medical):  Not on file    Lack of Transportation (Non-Medical): Not on file   Physical Activity:     Days of Exercise per Week: Not on file    Minutes of Exercise per Session: Not on file   Stress:     Feeling of Stress : Not on file   Social Connections:     Frequency of Communication with Friends and Family: Not on file    Frequency of Social Gatherings with Friends and Family: Not on file    Attends Sabianist Services: Not on file    Active Member of Clubs or Organizations: Not on file    Attends Club or Organization Meetings: Not on file    Marital Status: Not on file   Intimate Partner Violence:     Fear of Current or Ex-Partner: Not on file    Emotionally Abused: Not on file    Physically Abused: Not on file    Sexually Abused: Not on file   Housing Stability:     Unable to Pay for Housing in the Last Year: Not on file    Number of [place] in the Last Year: Not on file    Unstable Housing in the Last Year: Not on file       Current Facility-Administered Medications   Medication Dose Route Frequency Provider Last Rate Last Admin    citalopram (CELEXA) tablet 40 mg  40 mg Oral Daily Aneesh Montenegro MD   40 mg at 06/13/22 1007    gabapentin (NEURONTIN) tablet 600 mg  600 mg Oral TID Aneesh Montenegro MD   600 mg at 06/13/22 1007    lisinopril (PRINIVIL;ZESTRIL) tablet 5 mg  5 mg Oral Daily Aneesh Montenegro MD        montelukast (SINGULAIR) tablet 10 mg  10 mg Oral Nightly Aneesh Montenegro MD   10 mg at 06/12/22 2013    nicotine (NICODERM CQ) 21 MG/24HR 1 patch  1 patch TransDERmal Q24H Aneesh Montenegro MD   1 patch at 06/12/22 0906    ondansetron (ZOFRAN-ODT) disintegrating tablet 4 mg  4 mg Oral Q8H PRN Aneesh Montenegro MD   4 mg at 06/12/22 0907    pantoprazole (PROTONIX) tablet 40 mg  40 mg Oral QAM AC Aneesh Montenegro MD   40 mg at 06/13/22 0548    methylPREDNISolone sodium (SOLU-MEDROL) injection 40 mg  40 mg IntraVENous Q12H Bianca Neff MD        guaiFENesin [facility]) extended release tablet 1,200 mg  1,200 mg Oral BID Areta Stai Jc Joshi MD        benzonatate (TESSALON) capsule 200 mg  200 mg Oral TID PRN Lora Zavaleta MD   200 mg at 06/12/22 1959    morphine (PF) injection 1 mg  1 mg IntraVENous Q1H PRN Lupis Jasso DO   1 mg at 06/13/22 0243    ALPRAZolam (XANAX) tablet 0.5 mg  0.5 mg Oral TID PRN Lora Zavaleta MD   0.5 mg at 06/13/22 0548    oxyCODONE-acetaminophen (PERCOCET) 5-325 MG per tablet 1 tablet  1 tablet Oral Q4H PRN Lora Zavaleta MD   1 tablet at 06/12/22 1302    traMADol (ULTRAM) tablet 50 mg  50 mg Oral Q4H PRN Lora Zavaleta MD   50 mg at 06/13/22 1036    hydrOXYzine HCl (ATARAX) tablet 10 mg  10 mg Oral TID PRN Lora Zavaleta MD   10 mg at 06/12/22 0413    sodium chloride flush 0.9 % injection 5-40 mL  5-40 mL IntraVENous 2 times per day Marisabel Womack MD   10 mL at 06/13/22 1007    sodium chloride flush 0.9 % injection 5-40 mL  5-40 mL IntraVENous PRN Marisabel Womack MD        0.9 % sodium chloride infusion   IntraVENous PRN Marisabel Womack MD        enoxaparin (LOVENOX) injection 40 mg  40 mg SubCUTAneous Daily Marisabel Womack MD   40 mg at 06/13/22 1006    acetaminophen (TYLENOL) tablet 650 mg  650 mg Oral Q4H PRN Marisabel Womack MD   650 mg at 06/13/22 0548    ondansetron (ZOFRAN) injection 4 mg  4 mg IntraVENous Q6H PRN Marisabel Womack MD   4 mg at 06/13/22 1007    ipratropium-albuterol (DUONEB) nebulizer solution 1 ampule  1 ampule Inhalation Q4H Ailyn Bradford MD   1 ampule at 06/13/22 1048    cefTRIAXone (ROCEPHIN) 1,000 mg in sodium chloride (PF) 10 mL IV syringe  1,000 mg IntraVENous Q24H Marisabel Womack MD   1,000 mg at 06/11/22 1959    doxycycline (VIBRAMYCIN) 100 mg in dextrose 5 % 100 mL IVPB  100 mg IntraVENous Q12H Marisabel Womack  mL/hr at 06/13/22 1035 100 mg at 06/13/22 1035         sodium chloride          Objective:   BP (!) 142/72   Pulse 99   Temp 97.7 °F (36.5 °C) (Temporal)   Resp 20   Ht 5' 6\" (1.676 m)   Wt 112 lb 1.6 oz (50.8 kg) SpO2 91%   BMI 18.09 kg/m²     General: no acute distress  HEENT: normocephalic, atraumatic  Neck: supple, symmetrical, trachea midline   Lungs: improving bilateral expiratory wheezing  Cardiovascular: s1 and s2 normal  Abdomen: soft, positive bowel sounds  Extremities: no edema or cyanosis   Neuro: aaox3, no acute focal deficits     Recent Labs     06/11/22  0152 06/12/22  0144 06/13/22  0234   WBC 2.8* 4.6* 6.5   RBC 3.55* 3.30* 3.40*   HGB 11.2* 10.5* 10.8*   HCT 38.7 33.1* 34.7*   .0* 100.3* 102.1*   MCH 31.5* 31.8* 31.8*   MCHC 28.9* 31.7* 31.1*    197 219     Recent Labs     06/11/22  0346 06/12/22  0144 06/13/22  0234    140 139   K 4.4 3.8 3.7   ANIONGAP 12 8 7   CL 99 98 96*   CO2 33* 34* 36*   BUN 5* 10 14   CREATININE 0.3* 0.3* 0.4*   GLUCOSE 115* 119* 179*   CALCIUM 8.7* 8.7* 8.8     Recent Labs     06/11/22  0346 06/12/22  0144 06/13/22  0234   MG 1.4* 2.0 1.6     Recent Labs     06/11/22  0346 06/12/22  0144 06/13/22  0234   AST 14 13 14   ALT 9 8 8   BILITOT <0.2 <0.2 <0.2   ALKPHOS 71 64 62     No results for input(s): PH, PO2, PCO2, HCO3, BE, O2SAT in the last 72 hours. Recent Labs     06/10/22  1201 06/11/22  1157   TROPONINI <0.01 <0.01     No results for input(s): INR in the last 72 hours.   Recent Labs     06/10/22  1201   LACTA 0.4*         Intake/Output Summary (Last 24 hours) at 6/13/2022 1151  Last data filed at 6/13/2022 0933  Gross per 24 hour   Intake 550 ml   Output 250 ml   Net 300 ml       ECHO Complete 2D W Doppler W Color    Result Date: 6/12/2022  Transthoracic Echocardiography Report (TTE)  Demographics   Patient Name  Benigno Alexander S Date of Study          06/11/2022   MRN           [id number]          Gender                 Female   Date of Birth 1950      Room Number            BLE-2492   Age           74 year(s)   Height:       66 inches       Referring Physician    Lesa Garner MD   Weight:       112 pounds      Dmitri Garcia, MACIECS Manisha   BSA:          1.56 m^2        Interpreting Physician Dione Denney MD   BMI:          18.08 kg/m^2  Procedure Type of Study   TTE procedure:ECHO 2D W/DOPPLER/COLOR/CONTRAST. Study Location: Portable Technical Quality: Adequate visualization Patient Status: Inpatient Contrast Medium: Definity. Amount - 2 ml Rhythm: Within normal limits BP: 98/47 mmHg Indications:Abnormal Heart Rhythm. Conclusions   Summary  Mitral valve leaflets are mildly thickened with preserved leaflet  mobility. Mild mitral regurgitation is present. Mildly thickened aortic valve leaflets with preserved leaflet mobility. Tricuspid valve is structurally normal.  Mild tricuspid regurgitation. Normal left ventricular size with preserved LV function and an estimated  ejection fraction of approximately 55-60%. Normal left ventricular wall thickness. No regional wall motion abnormalities. Signature   ----------------------------------------------------------------  Electronically signed by Dione Denney MD(Interpreting  physician) on 06/12/2022 08:35 AM  ----------------------------------------------------------------   Findings   Mitral Valve  Mitral valve leaflets are mildly thickened with preserved leaflet  mobility. Mild mitral regurgitation is present. Aortic Valve  Mildly thickened aortic valve leaflets with preserved leaflet mobility. Tricuspid Valve  Tricuspid valve is structurally normal.  Mild tricuspid regurgitation. Pulmonic Valve  The pulmonic valve was not well visualized . Trace pulmonic regurgitation present. Left Atrium  Normal size left atrium. Left Ventricle  Normal left ventricular size with preserved LV function and an estimated  ejection fraction of approximately 55-60%. Normal left ventricular wall thickness. No regional wall motion abnormalities. Right Atrium  Normal right atrial dimension with no evidence of thrombus or mass noted.    Right Ventricle  Normal right ventricular size with preserved primary    Carson Shaver MD   6/13/2022 11:51 AM

## 2022-06-13 NOTE — CONSULTS
Pulmonary and Critical Care Consult Note    [address] Shauna Saleh    MRN# [id number]    Acct# [de-identified]  6/13/2022   12:55 PM CDT    Referring Suma Trevizo MD      Chief Complaint: Shortness of breath    Requesting physician: Dr. Jonathon Hdez    Reason for consult: Respiratory failure, COPD      HPI: We have been consulted to see this 74y.o. year old female born on 1950. The patient presented to the hospital on the 10th and was admitted due to shortness of breath. The patient is known to have severe COPD. She had pulmonary function study in 2019 at [facility] and that showed an FEV1 of 25%. She continues to smoke. She is on oxygen at home at 3.5 L/min. The patient denies any fevers or chills or sweats. She was treated for COPD exacerbation on admission. Currently she is feeling improved and closer to her baseline. She complains of abdominal symptoms. She also complains that her Xanax has been curtailed to about half the dose that she has taken for the past 20 years at home which making her very anxious. Past Medical History      Past Medical History:   Diagnosis Date    ADHD (attention deficit hyperactivity disorder)     Anxiety     COPD (chronic obstructive pulmonary disease) (HCC)     Depression     Fibromyalgia     GERD (gastroesophageal reflux disease)     Hypertension     Malignant neoplasm of upper lobe of right lung ([address] 12/04/2018    Palliative care patient 10/06/2021    RA [facility]heumatoid [facility]) [facility]      SurgicalHistory  Past Surgical History:   Procedure Laterality Date    CHOLECYSTECTOMY      HERNIA REPAIR      HYSTERECTOMY (CERVIX STATUS UNKNOWN)      LEG SURGERY      steel garth placement. 2009     Allergies  Allergies   Allergen Reactions    Aspirin Hives    Codeine     Demerol     Fentanyl Hives    Iv Dye [Iodides]     Neosporin [Bacitracin-Neomycin-Polymyxin]     Nsaids     Pcn [Penicillins]     Pentazocine Lactate     Sulfa Antibiotics     Talwin [Pentazocine] Hives    Influenza Vaccines Hives and Rash     Medications    guaiFENesin, 400 mg, Oral, Q4H    predniSONE, 40 mg, Oral, Daily    citalopram, 40 mg, Oral, Daily    gabapentin, 600 mg, Oral, TID    lisinopril, 5 mg, Oral, Daily    montelukast, 10 mg, Oral, Nightly    nicotine, 1 patch, TransDERmal, Q24H    pantoprazole, 40 mg, Oral, QAM AC    sodium chloride flush, 5-40 mL, IntraVENous, 2 times per day    enoxaparin, 40 mg, SubCUTAneous, Daily    ipratropium-albuterol, 1 ampule, Inhalation, Q4H    cefTRIAXone (ROCEPHIN) IV, 1,000 mg, IntraVENous, Q24H    doxycycline (VIBRAMYCIN) IV, 100 mg, IntraVENous, Q12H   Social History   reports that she has been smoking cigarettes. She has a 7.50 pack-year smoking history. She has never used smokeless tobacco. She reports that she does not drink alcohol and does not use drugs. Family History  family history includes Cancer in her father.     Review of Systems:  12 point review of systems is negative except as below:  CONSTITUTIONAL:  positive for  malaise  RESPIRATORY:  positive for  dyspnea  CARDIOVASCULAR:  positive for  dyspnea    Physical Exam:  /63   Pulse 91   Temp 98.2 °F (36.8 °C) (Temporal)   Resp 18   Ht 5' 6\" (1.676 m)   Wt 112 lb 1.6 oz (50.8 kg)   SpO2 91%   BMI 18.09 kg/m²     Intake/Output Summary (Last 24 hours) at 6/13/2022 1255  Last data filed at 6/13/2022 0933  Gross per 24 hour   Intake 550 ml   Output 250 ml   Net 300 ml       General appearance: Elderly frail cachectic white female who appears to be in no distress   HEENT: Normocephalic atraumatic  Heart: S1 S 2 distant sounds no murmurs  Lungs: Diminished bilaterally no rubs or tenderness or dullness to percussion  Abdomen: Soft nontender no organomegalies normal bowel sounds  Extremities: No clubbing cyanosis or edema  Neuro: No focal findings  Skin: Intact        Labs:  Recent Labs     06/11/22  0152 06/12/22  0144 06/13/22  0234   WBC 2.8* 4.6* 6.5   RBC 3.55* 3.30* 3.40*   HGB 11.2* 10.5* 10.8*   HCT 38.7 33.1* 34.7*    197 219   .0* 100.3* 102.1*   MCH 31.5* 31.8* 31.8*   MCHC 28.9* 31.7* 31.1*   RDW 12.4 12.8 13.3      Recent Labs     06/10/22  1651 06/11/22  0346 06/12/22  0144 06/13/22  0234   NA  --  144 140 139   K 3.8 4.4 3.8 3.7   CL  --  99 98 96*   CO2  --  33* 34* 36*   BUN  --  5* 10 14   CREATININE  --  0.3* 0.3* 0.4*   CALCIUM  --  8.7* 8.7* 8.8   GLUCOSE  --  115* 119* 179*      Recent Labs     06/10/22  1651   PHART 7.340*   MCG3CHP 84.0*   PO2ART 69.0*   QJS6IDX 45.3*   M4TBWBXK 90.9   BEART 16.2*     Recent Labs     06/11/22  1157 06/12/22  0144 06/13/22  0234   AST  --    < > 14   ALT  --    < > 8   ALKPHOS  --    < > 62   BILITOT  --    < > <0.2   MG  --    < > 1.6   CALCIUM  --    < > 8.8   PROBNP 534  --   --    TROPONINI <0.01  --   --    DDIMER 0.59*  --   --     < > = values in this interval not displayed. No results for input(s): BC, LABGRAM, CULTRESP, BFCX in the last 72 hours. Radiograph: XR CHEST PORTABLE    Result Date: 6/10/2022  Modest bilateral upper lobe hyperinflation and mild bibasilar fibrosis. Surgical clips in the epigastrium and left upper quadrant. Recommendation: Follow up as clinically indicated. Electronically Signed by Wendy Hagan MD at 10-Rai-2022 01:10:14 PM                My radiograph interpretation/independent review of imaging: Reviewed    Problem list generated by [facility] Problems           Last Modified POA    * (Principal) Acute on chronic respiratory failure ([facility] 6/10/2022 Yes    Anxiety 6/13/2022 Yes    COPD exacerbation ([facility] 6/13/2022 Yes    Palliative care patient 6/13/2022 Yes             Pulmonary Assessment/plan:    1. COPD severe with acute exacerbation. Continue bronchodilators continue pulmonary toilet.   Agree with empirical antibiotic therapy. Consider switching to oral medications. 2. Tobacco abuse she needs to quit smoking. 3. Chronic hypoxic hypercapnic respiratory failure. Discussed with the patient her oxygen use. She uses 3.5 L/min at rest at home. She might benefit from increasing her oxygen flow to 4 or 5 L/min with activity. She also might benefit from noninvasive ventilation at home due to her hypercapnia. Would consider a trilogy machine. 4. Dyspnea due to the above, supportive care. 5. DVT prophylaxis. 6. Discharge planning per the primary care physician. Brenda Poon MD, [facility], [facility] [place]    The above note was generated using voice recognition software. Inadvertent typographical errors in transcription may have occurred.     Electronically signed by Brenda Poon MD on 06/13/22 at 12:55 PM

## 2022-06-13 NOTE — PROGRESS NOTES
Comprehensive Nutrition Assessment    Type and Reason for Visit:  Reassess    Nutrition Recommendations/Plan:   1. Continue current POC. Etiology of decreased po intake r/t respiratory failure. Malnutrition Assessment:  Malnutrition Status: At risk for malnutrition (Comment) (06/13/22 1323)    Context:  Acute Illness     Findings of the 6 clinical characteristics of malnutrition:  Energy Intake:  50% or less of estimated energy requirements for 5 or more days  Weight Loss:  No significant weight loss     Body Fat Loss:  No significant body fat loss     Muscle Mass Loss:  No significant muscle mass loss    Fluid Accumulation:  No significant fluid accumulation     Strength:  Not Performed    Nutrition Assessment:    Pt remains at risk for nutrition compromise AEB <25% po intake and BMI 18. No significant weight history noted. Pt drinking >75% ONS. Glucose 113-179, pt receiving Solu-Medrol. Continue current diet and ONS at this time. Will continue to monitor po intake and nutrition status while inpatient. Nutrition Related Findings: BiPAP     Current Nutrition Intake & Therapies:    Average Meal Intake: Unable to assess  Average Supplements Intake: Unable to assess  ADULT ORAL NUTRITION SUPPLEMENT; Lunch, Breakfast, Dinner; Standard High Calorie/High Protein Oral Supplement  ADULT DIET; Regular    Anthropometric Measures:  Height: 5' 6\" (167.6 cm)  Ideal Body Weight (IBW): 130 lbs (59 kg)    Admission Body Weight: 112 lb (50.8 kg)  Current Body Weight: 112 lb (50.8 kg), 86.2 % IBW.  Weight Source: Bed Scale  Current BMI (kg/m2): 18.1  Usual Body Weight: 118 lb (53.5 kg) (11/8/2021)  % Weight Change (Calculated): -5.1  BMI Categories: Underweight (BMI less than 22) age over 72    Estimated Daily Nutrient Needs:  Energy Requirements Based On: Kcal/kg  Weight Used for Energy Requirements: Current  Energy (kcal/day): 7262-6268 kcal/d (30-35 kcal/kg)  Weight Used for Protein Requirements: Current  Protein (g/day): 76 g/d (1.5 g/kg)  Method Used for Fluid Requirements: 1 ml/kcal  Fluid (ml/day): 8496-7979 ml/d    Nutrition Diagnosis:   · Inadequate oral intake related to impaired respiratory function,catabolic illness as evidenced by BMI,nausea,vomiting,intake 0-25%,poor intake prior to admission (BiPAP)    Nutrition Interventions:   Food and/or Nutrient Delivery: Continue Current Diet,Continue Oral Nutrition Supplement  Nutrition Education/Counseling: No recommendation at this time  Coordination of Nutrition Care: Continue to monitor while inpatient    Goals:  Previous Goal Met: No Progress toward Goal(s)  Goals: PO intake 50% or greater,by next RD assessment    Nutrition Monitoring and Evaluation:   Food/Nutrient Intake Outcomes: Food and Nutrient Intake,Supplement Intake  Physical Signs/Symptoms Outcomes: Biochemical Data,Constipation,Nausea or Vomiting,Fluid Status or Edema,Hemodynamic Status,Nutrition Focused Physical Findings,Weight    Harmony Zuly  Contact: [phone]

## 2022-06-13 NOTE — CARE COORDINATION
06/13/22 1602   Service Assessment   Patient Orientation Alert and Oriented   Cognition Alert   History Provided By Patient   Primary Caregiver Family   Accompanied By/Relationship alone in hospital room   [facility]   PCP Verified by CM Yes  (sees DR. Anita Starr)   Prior Functional Level Assistance with the following:;Bathing;Dressing; Toileting;Cooking;Housework; Shopping;Mobility   Current Functional Level Assistance with the following:;Bathing;Dressing; Toileting;Housework; Shopping;Mobility   Can patient return to prior living arrangement Yes   Ability to make needs known: Good   Family able to assist with home care needs: Yes   Would you like for me to discuss the discharge plan with any other family members/significant others, and if so, who? Yes  (family)   Financial Resources Medicaid; Medicare   Community Resources   (may need HH at d/c)   CM/SW Referral ADLs/IADLs   Social/Functional History   Lives With Daughter   Type of [address] One level   Home Access Level entry   [address] chair with back   Bathroom Toilet Bedside commode   Bathroom Equipment 3-in-1 commode; [address]; Walker, [address] Help From Family   ADL Assistance Needs assistance   Bath Stand by [employer] Stand by assistance   Grooming Modified independent    Feeding Independent   Toileting Needs assistance   Homemaking Assistance Needs assistance   Meal Prep Unable to assess (comment)   Laundry Unable to assess (comment)   Vacuuming Unable to assess (comment)   Cleaning Unable to assess (comment)   Gardening Unable to assess (comment)   Yard Work Unable to assess (comment)   Driving Unable to assess (comment)   Shopping Unable to assess (comment)    Unable to assess (comment)   Homemaking Responsibilities No   Ambulation Assistance Needs assistance  (stated she uses walker; but also has w/c and that she may need to use that more.)   Transfer Assistance Independent   Active  No   Mode of Transportation Car;Family   Occupation Retired   Discharge Planning   Type of [address] [place] [address] Prior To Admission [employer]; Oxygen Therapy   Potential Assistance Needed Home Care   DME Bedside Commode;Oxygen Therapy (Comment); Shower Chair;Walker; Wheelchair   DME Ordered? No   Potential Assistance Purchasing Medications No   Type of Home Care Services OT;PT;Skilled Therapy;Nursing Services; Aide Services   Patient expects to be discharged to: House   Follow Up Appointment: Best Day/Time  Thursday AM   One/Two Story Residence One story   History of falls? 0   Services At/After Discharge   Transition of Care Consult (CM Consult) [facility] Community Resources; [address] [facility] Resource Information Provided? No   Mode of Transport at Discharge Other (see comment)  (family will transport home)   Confirm Follow Up Transport Family   Condition of Participation: Discharge Planning   The Plan for Transition of Care is related to the following treatment goals: Pt is hopeful to return home with dtr; and [facility]; agreeable to W program   The Patient and/or Patient Representative was provided with a Choice of Provider? Patient   The Patient and/Or Patient Representative agree with the Discharge Plan? Yes   Freedom of Choice list was provided with basic dialogue that supports the patient's individualized plan of care/goals, treatment preferences, and shares the quality data associated with the providers?   Yes   Electronically signed by HEATH Cunha on 6/13/2022 at 4:12 PM

## 2022-06-13 NOTE — PROGRESS NOTES
Prior to Admission medications    Medication Sig Start Date End Date Taking? Authorizing Provider   phenazopyridine (PYRIDIUM) 200 MG tablet Take 1 tablet by mouth 3 times daily as needed for Pain (bladder discomfort) 11/14/21   Jeff Rey MD   montelukast (SINGULAIR) 10 MG tablet Take 10 mg by mouth nightly    Historical Provider, MD   lisinopril (PRINIVIL;ZESTRIL) 5 MG tablet TAKE 1 TABLET BY MOUTH DAILY  Patient not taking: Reported on 6/12/2022 10/4/18   TEN Caal   oxyCODONE-acetaminophen (PERCOCET)  MG per tablet TAKE 1 TABLET Q 4 TO 6 HRS PRN. (MAX 5/DAY).  Earliest Fill Date: 7/19/18 7/19/18 8/18/18  Donte Schwartz MD   ondansetron (ZOFRAN ODT) 4 MG disintegrating tablet Take 1 tablet by mouth every 8 hours as needed for Nausea or Vomiting 5/1/18   TEN Jauregui   lidocaine (LIDODERM) 5 % Place 1 patch onto the skin daily 12 hours on, 12 hours off. 5/1/18   TEN Jauregui   nicotine (NICODERM CQ) 7 MG/24HR Place 1 patch onto the skin every 24 hours 5/1/18   TEN Jauregui   nicotine (NICODERM CQ) 21 MG/24HR Place 1 patch onto the skin every 24 hours 5/1/18   TEN Jauregui   tiZANidine (ZANAFLEX) 4 MG tablet Take 1 tablet by mouth 3 times daily as needed (.) 4/30/18   Donte Schwartz MD   albuterol-ipratropium (COMBIVENT RESPIMAT)  MCG/ACT AERS inhaler Inhale 1 puff into the lungs every 6 hours 4/27/18   Al Pickett MD   albuterol sulfate HFA (VENTOLIN HFA) 108 (90 Base) MCG/ACT inhaler Inhale 2 puffs into the lungs every 6 hours as needed for Wheezing 4/27/18   Al Pickett MD   budesonide-formoterol [facility]) 160-4.5 MCG/ACT AERO Inhale 2 puffs into the lungs 2 times daily 4/27/18   Al Pickett MD   pantoprazole (PROTONIX) 20 MG tablet TAKE 1 TABLET BY MOUTH 2 TIMES DAILY 12/18/17   Al Pickett MD   fluticasone [facility]) 50 MCG/ACT nasal spray INSTILL 1 SPRAY IN EACH NOSTRIL DAILY 10/4/17   Al Pickett MD OXYGEN Inhale into the lungs    Historical Provider, MD   ipratropium-albuterol (DUONEB) 0.5-2.5 (3) MG/3ML SOLN nebulizer solution Inhale 3 mLs into the lungs every 4 hours 4/6/17   Carli Karimi MD   metoclopramide (REGLAN) 5 MG tablet Take 1 tablet by mouth 4 times daily 3/30/17   Carli Karimi MD   albuterol (PROVENTIL) (2.5 MG/3ML) 0.083% nebulizer solution Take 3 mLs by nebulization every 6 hours as needed for Wheezing 3/17/17   Carli Karimi MD   ondansetron [facility]) 4 MG tablet Take 1 tablet by mouth every 8 hours as needed for Nausea or Vomiting 2/23/17   Carli Karimi MD   gabapentin (NEURONTIN) 600 MG tablet Take 600 mg by mouth 3 times daily    Historical Provider, MD   ALPRAZolam Jackqueline Meliton) 1 MG tablet Take 1 mg by mouth nightly as needed Takes 4 times a day 1/15/15   Historical Provider, MD   amphetamine-dextroamphetamine (ADDERALL) 20 MG tablet Take 20 mg by mouth 2 times daily. 1/21/15   Historical Provider, MD   citalopram (CELEXA) 40 MG tablet Take 40 mg by mouth daily.     Historical Provider, MD        citalopram  40 mg Oral Daily    gabapentin  600 mg Oral TID    lisinopril  5 mg Oral Daily    montelukast  10 mg Oral Nightly    nicotine  1 patch TransDERmal Q24H    pantoprazole  40 mg Oral QAM AC    methylPREDNISolone  40 mg IntraVENous Q12H    guaiFENesin  1,200 mg Oral BID    sodium chloride flush  5-40 mL IntraVENous 2 times per day    enoxaparin  40 mg SubCUTAneous Daily    ipratropium-albuterol  1 ampule Inhalation Q4H    cefTRIAXone (ROCEPHIN) IV  1,000 mg IntraVENous Q24H    doxycycline (VIBRAMYCIN) IV  100 mg IntraVENous Q12H       TELEMETRY: Sinus     Physical Exam:      Physical Exam      General:  Awake, alert, NAD  Skin:  Warm and dry  Neck:  no jvd , no carotid bruits  Chest:  Clear to auscultation, no wheezing or rales  Cardiovascular:  RRR F2J1 no murmurs, clicks, gallups, or rubs  Abdomen:  Soft nontender, nondistended, bowel sounds present  Extremities:  Edema: none      Lab Data:  CBC:   Recent Labs     06/11/22  0152 06/12/22  0144 06/13/22  0234   WBC 2.8* 4.6* 6.5   HGB 11.2* 10.5* 10.8*   HCT 38.7 33.1* 34.7*   .0* 100.3* 102.1*    197 219     BMP:   Recent Labs     06/11/22  0346 06/12/22 0144 06/13/22  0234    140 139   K 4.4 3.8 3.7   CL 99 98 96*   CO2 33* 34* 36*   BUN 5* 10 14   CREATININE 0.3* 0.3* 0.4*     LIVER PROFILE:   Recent Labs     06/11/22  0346 06/12/22 0144 06/13/22  0234   AST 14 13 14   ALT 9 8 8   BILITOT <0.2 <0.2 <0.2   ALKPHOS 71 64 62     PT/INR: No results for input(s): PROTIME, INR in the last 72 hours. APTT: No results for input(s): APTT in the last 72 hours. BNP:  No results for input(s): BNP in the last 72 hours. CK, CKMB, Troponin: @LABRCNT (CKTOTAL:3, CKMB:3, TROPONINI:3)@    IMAGING:  ECHO Complete 2D W Doppler W Color    Result Date: 6/12/2022  Transthoracic Echocardiography Report (TTE)  Demographics   Patient Name  [address] Date of Study          06/11/2022   MRN           [id number]          Gender                 Female   Date of Birth 1950      Room Number            MHL-0148   Age           74 year(s)   Height:       66 inches       Referring Physician    Juliano Casper MD   Weight:       112 pounds      Sonographer            SUKHJINDER Neville   BSA:          1.56 m^2        Interpreting Physician Juliano Casper MD   BMI:          18.08 kg/m^2  Procedure Type of Study   TTE procedure:ECHO 2D W/DOPPLER/COLOR/CONTRAST. Study Location: Portable Technical Quality: Adequate visualization Patient Status: Inpatient Contrast Medium: Definity. Amount - 2 ml Rhythm: Within normal limits BP: 98/47 mmHg Indications:Abnormal Heart Rhythm. Conclusions   Summary  Mitral valve leaflets are mildly thickened with preserved leaflet  mobility. Mild mitral regurgitation is present. Mildly thickened aortic valve leaflets with preserved leaflet mobility.   Tricuspid valve is structurally normal.  Mild tricuspid regurgitation. Normal left ventricular size with preserved LV function and an estimated  ejection fraction of approximately 55-60%. Normal left ventricular wall thickness. No regional wall motion abnormalities. Signature   ----------------------------------------------------------------  Electronically signed by Sanjeev Corona MD(Interpreting  physician) on 06/12/2022 08:35 AM  ----------------------------------------------------------------   Findings   Mitral Valve  Mitral valve leaflets are mildly thickened with preserved leaflet  mobility. Mild mitral regurgitation is present. Aortic Valve  Mildly thickened aortic valve leaflets with preserved leaflet mobility. Tricuspid Valve  Tricuspid valve is structurally normal.  Mild tricuspid regurgitation. Pulmonic Valve  The pulmonic valve was not well visualized . Trace pulmonic regurgitation present. Left Atrium  Normal size left atrium. Left Ventricle  Normal left ventricular size with preserved LV function and an estimated  ejection fraction of approximately 55-60%. Normal left ventricular wall thickness. No regional wall motion abnormalities. Right Atrium  Normal right atrial dimension with no evidence of thrombus or mass noted. Right Ventricle  Normal right ventricular size with preserved RV function. Pericardial Effusion  No evidence of significant pericardial effusion is noted. Pleural Effusion  No evidence of pleural effusion. Miscellaneous  Definity utilized  2D Measurements and Calculations(cm)   LVIDd: 4.04 cm                      LVIDs: 2.78 cm  IVSd: 0.82 cm  LVPWd: 0.74 cm                      AO Root Dimension: 1.7 cm  Rt. Vent.  Dimension: 2.37 cm        LA Dimension: 2.4 cm  % Ejection Fraction: 57 %           LA Area: 12.5 cm^2  LA Volume: 26.9 ml                  LV Systolic dimension: 8.94PR  LA Volume Index: 17 ml/m^2          LV PW diastolic: 5.77AE  LV dimension: 4.04 cm LVOT diameter: 2 cm                                      RV Diastolic dimension: 1.11DQ  LVEDV: 86.4 ml                      LVEDVI: 55 ml/m^2  LVESV:37.2 ml                       LVESVI: 24 ml/m^2  Ascending Aorta: 2.9 cm  Doppler Measurements and Calculations   AV Peak Velocity:181 cm/s              MV Peak E-Wave: 105 cm/s  AV Mean Velocity:123 cm/s              MV Peak A-Wave: 113 cm/s  AV Peak Gradient: 13.1 mmHg            MV E/A Ratio: 0.93 %  AV Mean Gradient: 7 mmHg               MV Mean velocity: 91.1cm/s  AV Area (Continuity):2.26 cm^2         MV Peak Gradient: 4.41 mmHg  AV VTI:42.3 cm/s                       MV Mean gradient: 3 mmHg  TR Velocity:282 cm/s  TR Gradient:31.81 mmHg  Estimated RAP:3 mmHg  RVSP:35 mmHg   MV E' septal velocity: 9.57cm/s  MV E' lateral velocity:8.59 cm/s      XR CHEST PORTABLE    Result Date: 6/10/2022  NO PRIOR REPORT AVAILABLE Exam: X-RAY OF [facility] Clinical data:Shortness of breath. Technique:Single portable upright view of the chest. Prior studies: Prior studies of the Chest Xray dated 10/2/2021 (image only) and NM Lung Scan dated 11/8/2021 (images only)  submitted. Findings: The lungs are grossly clear; noevidence of acute infiltrate or pleural effusion. Cardiac silhouette is within normal limits. No acute osseous abnormality is detected. There is modest hyperinflation and accentuated in the upper lobes and mild bibasilar fibrotic changes. Surgical clips noted in the epigastrium and left upper quadrant. Modest bilateral upper lobe hyperinflation and mild bibasilar fibrosis. Surgical clips in the epigastrium and left upper quadrant. Recommendation: Follow up as clinically indicated. Electronically Signed by Gio Gonzalez MD at 10-Rai-2022 01:10:14 PM                 Assessment:  1. Worsening shortness of breath on admission now improved  2. Severe COPD on home oxygen therapy 3 L  3. Marked anxiety  4. Supraventricular tachycardia  5.  Acute/chronic respiratory failure hypercapnic  6. Gastroesophageal reflux disease  7. Hypertension  8. Alpha-1 antitrypsin deficiency carrier  9. Tobacco abuse ongoing  10. History of lung carcinoma right upper lobe  11. Severe malnutrition  12. ADHD  13. Fibromyalgia  14. Rheumatoid arthritis  15. Lung scan 11/8/2021 Limited diagnostic study low probability pulmonary embolism  16. CT abdomen pelvis 11/8/2021 evidence previous cholecystectomy otherwise unremarkable  17. Elevated D-dimer 0.59 no previous D-dimer 0.62 on 11/8/2021 with subsequent VQ scan that day low probability for pulmonary embolism      Plan:  1.  Continue current treatment cardiovascular status stable we will see as needed at this point    Kaykay Terrazas MD, MD 6/13/2022 10:59 AM

## 2022-06-13 NOTE — CARE COORDINATION
Patient Contact Information:    [address] [phone] (home)   No relevant phone numbers on file. Above information verified? [x]   Yes  []   No      Emergency Contacts:    Extended Emergency Contact Information  Primary Emergency Contact: Lakisha Goodson  Address: [address]USB[address] Phone: [phone]  Mobile Phone: [phone]  Relation: Child  Secondary Emergency Contact: Bridgett Pate  Address: [address] Phone: [phone]  Relation: Child      Have you been vaccinated for COVID-19 (SARS-CoV-2)? []   Yes  [x]   No                   If so, when? Which :         []   Pfizer-BioNTech  []   Moderna  []   Simone Byrne  []   Other:         Pharmacy:    [facility], [address] [place] [address] [address] [place] [address]  Phone: [phone] Fax: [phone] [facility] [address] Javier [address] [facility] [phone] [facility] J[address] [address]  Phone: [phone] Fax: [phone] [place] [address] [phone] Katheryn Patience [phone]  [facility]ly [facility], [facility] [address] [address]  Phone: [phone] Fax: [phone]          Patient Deficits:    []   Yes   [x]   No    If yes:    []   Confusion/Memory  []   Visual  []   Motor/Sensory         []   Right arm         []   Right leg         []   Left arm         []   Left leg  []   Language/Speech         []   Aphasia         []   Dysarthria         []   Swallow         Fidel Coma Scale  Eye Opening: Spontaneous  Best Verbal Response: Oriented  Best Motor Response: Obeys commands  Fidel Coma Scale Score: 15    Patient Deficit Notes:     Electronically signed by HEATH Holloway on 6/13/2022 at 5:36 PM General

## 2022-06-13 NOTE — ACP (ADVANCE CARE PLANNING)
Advance Care Planning     Advance Care Planning (ACP) Physician/NP/PA (Provider) Conversation      Date of ACP Conversation: 6/10/2022    Conversation Conducted with:   Patient with Decision Making Capacity   Healthcare Decision Maker: Named in Advance Directive or Healthcare Power of  (name) [address]:    Current Designated Health Care Decision Maker:    Primary Decision Maker: Negrita Quintana - [phone]  Care Preferences:    Hospitalization: \"If your health worsens and it becomes clear that your chance of recovery is unlikely, what would your preference be regarding hospitalization? \"  Yes      Ventilation: \"If you were in your present state of health and suddenly became very ill and were unable to breathe on your own, what would your preference be about the use of a ventilator (breathing machine) if it was available to you? \"    Yes    \"If your health worsens and it becomes clear that your chance of recovery is unlikely, what would your preference be about the use of a ventilator (breathing machine) if it was available to you? \"   Yes    Resuscitation:  \"CPR works best to restart the heart when there is a sudden event, like a heart attack, in someone who is otherwise healthy. Unfortunately, CPR does not typically restart the heart for people who have serious health conditions or who are very sick. \"    \"In the event your heart stopped as a result of an underlying serious health condition, would you want attempts to be made to restart your heart (answer \"yes\" for attempt to resuscitate) or would you prefer a natural death (answer \"no\" for do not attempt to resuscitate)? \"   Yes    Length of Voluntary ACP Conversation in minutes:  25 minutes included w/ initial consult time    Marika Hernández PA-C

## 2022-06-14 LAB
ALBUMIN SERPL-MCNC: 3.7 G/DL (ref 3.5–5.2)
ALP BLD-CCNC: 63 U/L (ref 35–104)
ALT SERPL-CCNC: 9 U/L (ref 5–33)
ANION GAP SERPL CALCULATED.3IONS-SCNC: 8 MMOL/L (ref 7–19)
AST SERPL-CCNC: 16 U/L (ref 5–32)
BASOPHILS ABSOLUTE: 0 K/UL (ref 0–0.2)
BASOPHILS RELATIVE PERCENT: 0.2 % (ref 0–1)
BILIRUB SERPL-MCNC: <0.2 MG/DL (ref 0.2–1.2)
BUN BLDV-MCNC: 10 MG/DL (ref 8–23)
CALCIUM SERPL-MCNC: 9.1 MG/DL (ref 8.8–10.2)
CHLORIDE BLD-SCNC: 96 MMOL/L (ref 98–111)
CO2: 36 MMOL/L (ref 22–29)
CREAT SERPL-MCNC: 0.5 MG/DL (ref 0.5–0.9)
EOSINOPHILS ABSOLUTE: 0 K/UL (ref 0–0.6)
EOSINOPHILS RELATIVE PERCENT: 0 % (ref 0–5)
GFR AFRICAN AMERICAN: >59
GFR NON-AFRICAN AMERICAN: >60
GLUCOSE BLD-MCNC: 146 MG/DL (ref 74–109)
HCT VFR BLD CALC: 36.9 % (ref 37–47)
HEMOGLOBIN: 11.4 G/DL (ref 12–16)
IMMATURE GRANULOCYTES #: 0 K/UL
LYMPHOCYTES ABSOLUTE: 0.6 K/UL (ref 1.1–4.5)
LYMPHOCYTES RELATIVE PERCENT: 9.9 % (ref 20–40)
MAGNESIUM: 1.5 MG/DL (ref 1.6–2.4)
MCH RBC QN AUTO: 31.8 PG (ref 27–31)
MCHC RBC AUTO-ENTMCNC: 30.9 G/DL (ref 33–37)
MCV RBC AUTO: 102.8 FL (ref 81–99)
MONOCYTES ABSOLUTE: 0.4 K/UL (ref 0–0.9)
MONOCYTES RELATIVE PERCENT: 7.7 % (ref 0–10)
NEUTROPHILS ABSOLUTE: 4.7 K/UL (ref 1.5–7.5)
NEUTROPHILS RELATIVE PERCENT: 81.9 % (ref 50–65)
PDW BLD-RTO: 13.2 % (ref 11.5–14.5)
PLATELET # BLD: 221 K/UL (ref 130–400)
PMV BLD AUTO: 11.6 FL (ref 9.4–12.3)
POTASSIUM SERPL-SCNC: 5.1 MMOL/L (ref 3.5–5)
RBC # BLD: 3.59 M/UL (ref 4.2–5.4)
SODIUM BLD-SCNC: 140 MMOL/L (ref 136–145)
TOTAL PROTEIN: 6.2 G/DL (ref 6.6–8.7)
WBC # BLD: 5.7 K/UL (ref 4.8–10.8)

## 2022-06-14 PROCEDURE — 2700000000 HC OXYGEN THERAPY PER DAY

## 2022-06-14 PROCEDURE — 94640 AIRWAY INHALATION TREATMENT: CPT

## 2022-06-14 PROCEDURE — 99233 SBSQ HOSP IP/OBS HIGH 50: CPT | Performed by: INTERNAL MEDICINE

## 2022-06-14 PROCEDURE — 99232 SBSQ HOSP IP/OBS MODERATE 35: CPT | Performed by: PHYSICIAN ASSISTANT

## 2022-06-14 PROCEDURE — 2500000003 HC RX 250 WO HCPCS: Performed by: INTERNAL MEDICINE

## 2022-06-14 PROCEDURE — 80053 COMPREHEN METABOLIC PANEL: CPT

## 2022-06-14 PROCEDURE — 6360000002 HC RX W HCPCS: Performed by: FAMILY MEDICINE

## 2022-06-14 PROCEDURE — 1210000000 HC MED SURG R&B

## 2022-06-14 PROCEDURE — 2500000003 HC RX 250 WO HCPCS: Performed by: FAMILY MEDICINE

## 2022-06-14 PROCEDURE — 6370000000 HC RX 637 (ALT 250 FOR IP): Performed by: FAMILY MEDICINE

## 2022-06-14 PROCEDURE — 85025 COMPLETE CBC W/AUTO DIFF WBC: CPT

## 2022-06-14 PROCEDURE — 83735 ASSAY OF MAGNESIUM: CPT

## 2022-06-14 PROCEDURE — 36415 COLL VENOUS BLD VENIPUNCTURE: CPT

## 2022-06-14 PROCEDURE — 6360000002 HC RX W HCPCS: Performed by: INTERNAL MEDICINE

## 2022-06-14 PROCEDURE — 2580000003 HC RX 258: Performed by: FAMILY MEDICINE

## 2022-06-14 PROCEDURE — 6370000000 HC RX 637 (ALT 250 FOR IP): Performed by: INTERNAL MEDICINE

## 2022-06-14 RX ORDER — LANOLIN ALCOHOL/MO/W.PET/CERES
400 CREAM (GRAM) TOPICAL DAILY
Status: DISCONTINUED | OUTPATIENT
Start: 2022-06-14 | End: 2022-06-16

## 2022-06-14 RX ORDER — PREDNISONE 20 MG/1
20 TABLET ORAL DAILY
Status: DISCONTINUED | OUTPATIENT
Start: 2022-06-15 | End: 2022-06-21

## 2022-06-14 RX ORDER — CEFDINIR 300 MG/1
300 CAPSULE ORAL EVERY 12 HOURS SCHEDULED
Status: COMPLETED | OUTPATIENT
Start: 2022-06-14 | End: 2022-06-15

## 2022-06-14 RX ADMIN — DOXYCYCLINE 100 MG: 100 INJECTION, POWDER, LYOPHILIZED, FOR SOLUTION INTRAVENOUS at 09:46

## 2022-06-14 RX ADMIN — SODIUM CHLORIDE: 9 INJECTION, SOLUTION INTRAVENOUS at 23:07

## 2022-06-14 RX ADMIN — IPRATROPIUM BROMIDE AND ALBUTEROL SULFATE 1 AMPULE: 2.5; .5 SOLUTION RESPIRATORY (INHALATION) at 06:48

## 2022-06-14 RX ADMIN — OXYCODONE HYDROCHLORIDE AND ACETAMINOPHEN 1 TABLET: 5; 325 TABLET ORAL at 22:58

## 2022-06-14 RX ADMIN — HYDROXYZINE HYDROCHLORIDE 10 MG: 10 TABLET ORAL at 02:59

## 2022-06-14 RX ADMIN — GABAPENTIN 600 MG: 600 TABLET, FILM COATED ORAL at 10:47

## 2022-06-14 RX ADMIN — PREDNISONE 40 MG: 20 TABLET ORAL at 09:00

## 2022-06-14 RX ADMIN — MORPHINE SULFATE 1 MG: 2 INJECTION, SOLUTION INTRAMUSCULAR; INTRAVENOUS at 03:03

## 2022-06-14 RX ADMIN — CYANOCOBALAMIN TAB 500 MCG 500 MCG: 500 TAB at 09:00

## 2022-06-14 RX ADMIN — ONDANSETRON HYDROCHLORIDE 4 MG: 2 SOLUTION INTRAMUSCULAR; INTRAVENOUS at 20:15

## 2022-06-14 RX ADMIN — CEFDINIR 300 MG: 300 CAPSULE ORAL at 17:41

## 2022-06-14 RX ADMIN — IPRATROPIUM BROMIDE AND ALBUTEROL SULFATE 1 AMPULE: 2.5; .5 SOLUTION RESPIRATORY (INHALATION) at 14:16

## 2022-06-14 RX ADMIN — IPRATROPIUM BROMIDE AND ALBUTEROL SULFATE 1 AMPULE: 2.5; .5 SOLUTION RESPIRATORY (INHALATION) at 10:17

## 2022-06-14 RX ADMIN — GABAPENTIN 600 MG: 600 TABLET, FILM COATED ORAL at 22:56

## 2022-06-14 RX ADMIN — ALPRAZOLAM 0.5 MG: 0.5 TABLET ORAL at 10:47

## 2022-06-14 RX ADMIN — OXYCODONE HYDROCHLORIDE AND ACETAMINOPHEN 1 TABLET: 5; 325 TABLET ORAL at 15:30

## 2022-06-14 RX ADMIN — ENOXAPARIN SODIUM 40 MG: 100 INJECTION SUBCUTANEOUS at 17:41

## 2022-06-14 RX ADMIN — DOXYCYCLINE 100 MG: 100 INJECTION, POWDER, LYOPHILIZED, FOR SOLUTION INTRAVENOUS at 23:08

## 2022-06-14 RX ADMIN — SODIUM CHLORIDE, PRESERVATIVE FREE 10 ML: 5 INJECTION INTRAVENOUS at 09:09

## 2022-06-14 RX ADMIN — GUAIFENESIN 400 MG: 100 SOLUTION ORAL at 09:00

## 2022-06-14 RX ADMIN — PANTOPRAZOLE SODIUM 40 MG: 40 TABLET, DELAYED RELEASE ORAL at 09:00

## 2022-06-14 RX ADMIN — OXYCODONE HYDROCHLORIDE AND ACETAMINOPHEN 1 TABLET: 5; 325 TABLET ORAL at 09:00

## 2022-06-14 RX ADMIN — Medication 400 MG: at 15:25

## 2022-06-14 RX ADMIN — HYDROXYZINE HYDROCHLORIDE 10 MG: 10 TABLET ORAL at 22:55

## 2022-06-14 RX ADMIN — IPRATROPIUM BROMIDE AND ALBUTEROL SULFATE 1 AMPULE: 2.5; .5 SOLUTION RESPIRATORY (INHALATION) at 22:03

## 2022-06-14 RX ADMIN — ALPRAZOLAM 0.5 MG: 0.5 TABLET ORAL at 17:51

## 2022-06-14 RX ADMIN — IPRATROPIUM BROMIDE AND ALBUTEROL SULFATE 1 AMPULE: 2.5; .5 SOLUTION RESPIRATORY (INHALATION) at 18:46

## 2022-06-14 RX ADMIN — SODIUM CHLORIDE, PRESERVATIVE FREE 10 ML: 5 INJECTION INTRAVENOUS at 20:15

## 2022-06-14 RX ADMIN — ALPRAZOLAM 0.5 MG: 0.5 TABLET ORAL at 03:53

## 2022-06-14 RX ADMIN — GABAPENTIN 600 MG: 600 TABLET, FILM COATED ORAL at 13:48

## 2022-06-14 RX ADMIN — GUAIFENESIN 400 MG: 100 SOLUTION ORAL at 17:41

## 2022-06-14 RX ADMIN — GUAIFENESIN 400 MG: 100 SOLUTION ORAL at 13:49

## 2022-06-14 RX ADMIN — GUAIFENESIN 400 MG: 100 SOLUTION ORAL at 22:59

## 2022-06-14 RX ADMIN — IPRATROPIUM BROMIDE AND ALBUTEROL SULFATE 1 AMPULE: 2.5; .5 SOLUTION RESPIRATORY (INHALATION) at 02:13

## 2022-06-14 RX ADMIN — GUAIFENESIN 400 MG: 100 SOLUTION ORAL at 03:54

## 2022-06-14 RX ADMIN — CITALOPRAM HYDROBROMIDE 40 MG: 20 TABLET ORAL at 09:00

## 2022-06-14 RX ADMIN — BENZONATATE 200 MG: 100 CAPSULE ORAL at 22:55

## 2022-06-14 ASSESSMENT — PAIN DESCRIPTION - DESCRIPTORS
DESCRIPTORS: ACHING
DESCRIPTORS: ACHING

## 2022-06-14 ASSESSMENT — PAIN DESCRIPTION - LOCATION
LOCATION: HEAD
LOCATION: BACK
LOCATION: BACK

## 2022-06-14 ASSESSMENT — PAIN SCALES - GENERAL
PAINLEVEL_OUTOF10: 7
PAINLEVEL_OUTOF10: 1
PAINLEVEL_OUTOF10: 8
PAINLEVEL_OUTOF10: 9

## 2022-06-14 ASSESSMENT — PAIN DESCRIPTION - ORIENTATION: ORIENTATION: LOWER

## 2022-06-14 NOTE — PROGRESS NOTES
Progress Note  Vee Castelan [place]  6/14/2022 2:45 PM  Subjective:   Admit Date:   6/10/2022      CC/ADMIT DX:       Interval History:   Reviewed overnight events and nursing notes. She has chronic SOA. No CP. I have reviewed all labs/diagnostics from the last 24hrs. ROS:   I have done a 10 point ROS and all are negative, except what is mentioned in the HPI. ADULT ORAL NUTRITION SUPPLEMENT; Lunch, Breakfast, Dinner; Standard High Calorie/High Protein Oral Supplement  ADULT DIET; Regular; Low Potassium (Less than 3000 mg/day)    Medications:      sodium chloride        cefdinir  300 mg Oral 2 times per day    [START ON 6/15/2022] predniSONE  20 mg Oral Daily    magnesium oxide  400 mg Oral Daily    guaiFENesin  400 mg Oral Q4H    vitamin B-12  500 mcg Oral Daily    citalopram  40 mg Oral Daily    gabapentin  600 mg Oral TID    montelukast  10 mg Oral Nightly    nicotine  1 patch TransDERmal Q24H    pantoprazole  40 mg Oral QAM AC    sodium chloride flush  5-40 mL IntraVENous 2 times per day    enoxaparin  40 mg SubCUTAneous Daily    ipratropium-albuterol  1 ampule Inhalation Q4H    doxycycline (VIBRAMYCIN) IV  100 mg IntraVENous Q12H           Objective:   Vitals: BP (!) 151/72   Pulse 100   Temp 97.5 °F (36.4 °C) (Temporal)   Resp 18   Ht 5' 6\" (1.676 m)   Wt 112 lb 1.6 oz (50.8 kg)   SpO2 92%   BMI 18.09 kg/m²      Intake/Output Summary (Last 24 hours) at 6/14/2022 1445  Last data filed at 6/14/2022 1423  Gross per 24 hour   Intake 790 ml   Output --   Net 790 ml     General appearance: alert and cooperative with exam  Lungs: coarse  Heart: RRR  Abdomen: soft, non-tender; bowel sounds normal; no masses,  no organomegaly  Extremities: extremities normal, atraumatic, no cyanosis or edema  Neurologic:  No obvious focal neurologic deficits.     Assessment and Plan:   Principal Problem:    Acute on chronic respiratory failure (HCC)  Active Problems:    Anxiety    COPD exacerbation ([facility] [address] Palliative care patient  Resolved Problems:    * No resolved hospital problems. *    Anxiety    Chronic Pain    Plan:  1. Continue present medication(s)   2. Follow with care  3. Follow with Pulm  4. Replace Magnesium  5. Wean  steroids, continue antibiotics, resp treatments  6. Increase activity      Discharge planning:   her home     Reviewed treatment plans with the patient and/or family.              Electronically signed by Donald Patterson MD on 6/14/2022 at 2:45 PM

## 2022-06-14 NOTE — PROGRESS NOTES
Patient continues to be very shaky/anxious. At rest 02 SAT low 90's with excersion up to [facility] patient will drop to low 80's (82-84). Remains on 3.5 L 02 NC COPD retainer.

## 2022-06-14 NOTE — PROGRESS NOTES
Hospitalist Progress Note  [facility]     Patient: Malena Roberts  : 1950  MRN: [id number]  Code Status: Full Code    Hospital Day: 4   Date of Service: 2022    Subjective:   Patient seen and examined. No current complaints. Past Medical History:   Diagnosis Date    ADHD (attention deficit hyperactivity disorder)     Anxiety     COPD (chronic obstructive pulmonary disease) (HCC)     Depression     Fibromyalgia     GERD (gastroesophageal reflux disease)     Hypertension     Malignant neoplasm of upper lobe of right lung ([address] 2018    Palliative care patient 10/06/2021    RA (rheumatoid arthritis) ([facility])        Past Surgical History:   Procedure Laterality Date    CHOLECYSTECTOMY      HERNIA REPAIR      HYSTERECTOMY (CERVIX STATUS UNKNOWN)      LEG SURGERY      steel garth placement.        Family History   Problem Relation Age of Onset    Cancer Father        Social History     Socioeconomic History    Marital status:      Spouse name: Not on file    Number of children: Not on file    Years of education: Not on file    Highest education level: Not on file   Occupational History    Not on file   Tobacco Use    Smoking status: Current Some Day Smoker     Packs/day: 0.25     Years: 30.00     Pack years: 7.50     Types: Cigarettes    Smokeless tobacco: Never Used   Vaping Use    Vaping Use: Never used   Substance and Sexual Activity    Alcohol use: No    Drug use: No    Sexual activity: Not on file   Other Topics Concern    Not on file   Social History Narrative    Not on file     Social Determinants of Health     Financial Resource Strain:     Difficulty of Paying Living Expenses: Not on file   Food Insecurity:     Worried About [address] in the Last Year: Not on file    David of Food in the Last Year: Not on file   Transportation Needs:     Lack of Transportation (Medical):  Not on file    Lack of Transportation (Non-Medical): Not on file   Physical Activity:     Days of Exercise per Week: Not on file    Minutes of Exercise per Session: Not on file   Stress:     Feeling of Stress : Not on file   Social Connections:     Frequency of Communication with Friends and Family: Not on file    Frequency of Social Gatherings with Friends and Family: Not on file    Attends Restoration Services: Not on file    Active Member of [address] [place] [address] or Organizations: Not on file    Attends Club or Organization Meetings: Not on file    Marital Status: Not on file   Intimate Partner Violence:     Fear of Current or Ex-Partner: Not on file    Emotionally Abused: Not on file    Physically Abused: Not on file    Sexually Abused: Not on file   Housing Stability:     Unable to Pay for Housing in the Last Year: Not on file    Number of [place] in the Last Year: Not on file    Unstable Housing in the Last Year: Not on file       Current Facility-Administered Medications   Medication Dose Route Frequency Provider Last Rate Last Admin    cefdinir (OMNICEF) capsule 300 mg  300 mg Oral 2 times per day Bruce Wyatt MD        [START ON 6/15/2022] predniSONE (DELTASONE) tablet 20 mg  20 mg Oral Daily Bruce Wyatt MD        magnesium oxide (MAG-OX) tablet 400 mg  400 mg Oral Daily Bruce Wyatt MD   400 mg at 06/14/22 1525    guaiFENesin (ROBITUSSIN) 100 MG/5ML liquid 400 mg  400 mg Oral Q4H Angela Mendes MD   400 mg at 06/14/22 1349    sodium chloride (OCEAN, BABY AYR) 0.65 % nasal spray 1 spray  1 spray Each Nostril Q2H PRN Angela Mendes MD        ALPRAZolam (XANAX) tablet 0.5 mg  0.5 mg Oral Q6H PRN Bruce Wyatt MD   0.5 mg at 06/14/22 1047    vitamin B-12 (CYANOCOBALAMIN) tablet 500 mcg  500 mcg Oral Daily Bruce Wyatt MD   500 mcg at 06/14/22 0900    citalopram (CELEXA) tablet 40 mg  40 mg Oral Daily Jet Ríos MD   40 mg at 06/14/22 0900    gabapentin (NEURONTIN) tablet 600 mg  600 mg Oral TID Hollie Alexandre Neftaly Martínez MD   600 mg at 06/14/22 1348    montelukast (SINGULAIR) tablet 10 mg  10 mg Oral Nightly Selena Felix MD   10 mg at 06/13/22 1938    nicotine (NICODERM CQ) 21 MG/24HR 1 patch  1 patch TransDERmal Q24H Selena Felix MD   1 patch at 06/14/22 0905    ondansetron (ZOFRAN-ODT) disintegrating tablet 4 mg  4 mg Oral Q8H PRN Selena Felix MD   4 mg at 06/12/22 0907    pantoprazole (PROTONIX) tablet 40 mg  40 mg Oral QAM AC Selena Felix MD   40 mg at 06/14/22 0900    benzonatate (TESSALON) capsule 200 mg  200 mg Oral TID PRN Selena Felix MD   200 mg at 06/12/22 1959    morphine (PF) injection 1 mg  1 mg IntraVENous Q1H PRN Jesus Drake DO   1 mg at 06/14/22 0303    oxyCODONE-acetaminophen (PERCOCET) 5-325 MG per tablet 1 tablet  1 tablet Oral Q4H PRN Selena Felix MD   1 tablet at 06/14/22 1530    traMADol (ULTRAM) tablet 50 mg  50 mg Oral Q4H PRN Selena Felix MD   50 mg at 06/13/22 1036    hydrOXYzine HCl (ATARAX) tablet 10 mg  10 mg Oral TID PRN Selena Felix MD   10 mg at 06/14/22 0259    sodium chloride flush 0.9 % injection 5-40 mL  5-40 mL IntraVENous 2 times per day Tami Aguila MD   10 mL at 06/14/22 0909    sodium chloride flush 0.9 % injection 5-40 mL  5-40 mL IntraVENous PRN Tami Aguila MD        0.9 % sodium chloride infusion   IntraVENous PRN Tami Aguila MD        enoxaparin (LOVENOX) injection 40 mg  40 mg SubCUTAneous Daily Tami Aguila MD   40 mg at 06/13/22 1006    acetaminophen (TYLENOL) tablet 650 mg  650 mg Oral Q4H PRN Tami Aguila MD   650 mg at 06/13/22 0548    ondansetron (ZOFRAN) injection 4 mg  4 mg IntraVENous Q6H PRN Tami Aguila MD   4 mg at 06/13/22 1007    ipratropium-albuterol (DUONEB) nebulizer solution 1 ampule  1 ampule Inhalation Q4H Manny Paz MD   1 ampule at 06/14/22 1416    doxycycline (VIBRAMYCIN) 100 mg in dextrose 5 % 100 mL IVPB  100 mg IntraVENous Q12H Tami Aguila MD   Stopped at 06/14/22 1046         sodium chloride          Objective:   BP (!) 151/72   Pulse 100   Temp 97.5 °F (36.4 °C) (Temporal)   Resp 18   Ht 5' 6\" (1.676 m)   Wt 112 lb 1.6 oz (50.8 kg)   SpO2 92%   BMI 18.09 kg/m²     General: no acute distress  HEENT: normocephalic, atraumatic  Neck: supple, symmetrical, trachea midline   Lungs: improving bilateral expiratory wheezing  Cardiovascular: s1 and s2 normal  Abdomen: soft, positive bowel sounds  Extremities: no edema or cyanosis   Neuro: aaox3, no acute focal deficits     Recent Labs     06/12/22 0144 06/13/22 0234 06/14/22  0352   WBC 4.6* 6.5 5.7   RBC 3.30* 3.40* 3.59*   HGB 10.5* 10.8* 11.4*   HCT 33.1* 34.7* 36.9*   .3* 102.1* 102.8*   MCH 31.8* 31.8* 31.8*   MCHC 31.7* 31.1* 30.9*    219 221     Recent Labs     06/12/22 0144 06/13/22 0234 06/14/22  0352    139 140   K 3.8 3.7 5.1*   ANIONGAP 8 7 8   CL 98 96* 96*   CO2 34* 36* 36*   BUN 10 14 10   CREATININE 0.3* 0.4* 0.5   GLUCOSE 119* 179* 146*   CALCIUM 8.7* 8.8 9.1     Recent Labs     06/12/22 0144 06/13/22 0234 06/14/22  0352   MG 2.0 1.6 1.5*     Recent Labs     06/12/22 0144 06/13/22  0234 06/14/22  0352   AST 13 14 16   ALT 8 8 9   BILITOT <0.2 <0.2 <0.2   ALKPHOS 64 62 63     No results for input(s): PH, PO2, PCO2, HCO3, BE, O2SAT in the last 72 hours. No results for input(s): TROPONINI in the last 72 hours. No results for input(s): INR in the last 72 hours. No results for input(s): LACTA in the last 72 hours. Intake/Output Summary (Last 24 hours) at 6/14/2022 1656  Last data filed at 6/14/2022 1423  Gross per 24 hour   Intake 790 ml   Output --   Net 790 ml       No results found.      Assessment and Plan:   Acute on chronic hypercapnic respiratory failure with chronic hypoxia  Improved  Suspect secondary to below  Since weaned off BiPAP currently on 3.5 L which is her baseline  Multiple similar prior hospitalizations     COPD exacerbation  Improving  Steroids, tapering  Nebs  Antibiotics  Goal sats 88-92%     Rhinovirus positivity  Supportive care     History of lung cancer  Routine outpatient follow-up     Continued tobacco dependence  Counseled     DVT prophylaxis  Per primary    Medicine service signing off. Please feel free to contact us if the need arises.     Zari Nassar MD   6/14/2022 4:56 PM

## 2022-06-14 NOTE — PROGRESS NOTES
Pulmonary and Critical Care Progress note. Susanne Caal    MRN# [id number]    Acct# [de-identified]  6/14/2022   6:11 PM CDT    Referring Mio Wadsworth MD      Chief Complaint: Shortness of breath    HPI: Over the past 24 hours the patient continues to be short of breath. She is anxious.     Medications    cefdinir, 300 mg, Oral, 2 times per day    [START ON 6/15/2022] predniSONE, 20 mg, Oral, Daily    magnesium oxide, 400 mg, Oral, Daily    guaiFENesin, 400 mg, Oral, Q4H    vitamin B-12, 500 mcg, Oral, Daily    citalopram, 40 mg, Oral, Daily    gabapentin, 600 mg, Oral, TID    montelukast, 10 mg, Oral, Nightly    nicotine, 1 patch, TransDERmal, Q24H    pantoprazole, 40 mg, Oral, QAM AC    sodium chloride flush, 5-40 mL, IntraVENous, 2 times per day    enoxaparin, 40 mg, SubCUTAneous, Daily    ipratropium-albuterol, 1 ampule, Inhalation, Q4H    doxycycline (VIBRAMYCIN) IV, 100 mg, IntraVENous, Q12H     Review of Systems:  RESPIRATORY:  positive for  dyspnea  CARDIOVASCULAR:  positive for  dyspnea      Physical Exam:  BP (!) 151/72   Pulse 100   Temp 97.5 °F (36.4 °C) (Temporal)   Resp 18   Ht 5' 6\" (1.676 m)   Wt 112 lb 1.6 oz (50.8 kg)   SpO2 92%   BMI 18.09 kg/m²   Intake/Output Summary (Last 24 hours) at 6/14/2022 1811  Last data filed at 6/14/2022 1758  Gross per 24 hour   Intake 790 ml   Output 250 ml   Net 540 ml       General appearance: Elderly white female who appears to be in no distress   HEENT: Normocephalic atraumatic  Heart: S1-S2 distant sounds no murmurs  Lungs: Diminished bilaterally no rubs or tenderness on dullness to percussion  Abdomen: Soft nontender no organomegalies normal bowel sounds  Extremities: No clubbing cyanosis or edema  Neuro: No focal findings  Skin: Intact    Recent Labs     06/12/22  0144 06/13/22  0234 06/14/22  0352   WBC 4.6* 6.5 5.7   RBC 3.30* 3.40* 3.59*   HGB 10.5* 10.8* 11.4*   HCT 33.1* 34.7* 36.9*    219 221   .3* 102.1* 102.8*   MCH 31.8* 31.8* 31.8*   MCHC 31.7* 31.1* 30.9*   RDW 12.8 13.3 13.2      Recent Labs     06/12/22  0144 06/13/22  0234 06/14/22  0352    139 140   K 3.8 3.7 5.1*   CL 98 96* 96*   CO2 34* 36* 36*   BUN 10 14 10   CREATININE 0.3* 0.4* 0.5   CALCIUM 8.7* 8.8 9.1   GLUCOSE 119* 179* 146*      No results for input(s): PHART, LUT5UMQ, PO2ART, WRX9FKL, Y1QBWTPT, BEART in the last 72 hours. Recent Labs     06/14/22 0352   AST 16   ALT 9   ALKPHOS 63   BILITOT <0.2   MG 1.5*   CALCIUM 9.1     No results for input(s): BC, LABGRAM, CULTRESP, BFCX in the last 72 hours. Radiograph: XR CHEST PORTABLE    Result Date: 6/10/2022  Modest bilateral upper lobe hyperinflation and mild bibasilar fibrosis. Surgical clips in the epigastrium and left upper quadrant. Recommendation: Follow up as clinically indicated. Electronically Signed by Zoe Malcolm MD at 10-Rai-2022 01:10:14 PM                My radiograph interpretation/independent review of imaging: Reviewed    Problem list generated by [facility] Problems           Last Modified POA    * (Principal) Acute on chronic respiratory failure ([facility] 6/10/2022 Yes    Anxiety 6/13/2022 Yes    COPD exacerbation ([facility] 6/13/2022 Yes    Palliative care patient 6/13/2022 Yes           Pulmonary Assessment/Plan:     1. Acute on chronic hypoxic respiratory failure due to COPD exacerbation. Continue oxygen supplementation as necessary to maintain adequate oxygenation. She will benefit from noninvasive ventilation due to her hypercapnia. 2. Very severe underlying COPD. Continue pulmonary toilet. Continue bronchodilators. 3. Tobacco abuse she needs to quit smoking. 4. DVT prophylaxis. 5. Dyspnea due to the above supportive care. Monica Cohen MD, [facility], [facility]    The above note was generated using voice recognition software. Inadvertent typographical errors in transcription may have occurred.       Electronically signed by Monica Cohen MD on 06/14/22 at 6:11 PM

## 2022-06-14 NOTE — CARE COORDINATION
6/14/22: In response to Dr. Anais Melgoza note re: trilogy option: Per [facility], to see if Pt would qualify for a trilogy-with her particular insurance; she would need ABG's off of oxygen and everything and also an ABG with bipap on.   Electronically signed by HEATH Elliott on 6/14/2022 at 11:35 AM

## 2022-06-15 LAB
ALBUMIN SERPL-MCNC: 3.6 G/DL (ref 3.5–5.2)
ALP BLD-CCNC: 54 U/L (ref 35–104)
ALT SERPL-CCNC: 9 U/L (ref 5–33)
ANION GAP SERPL CALCULATED.3IONS-SCNC: 10 MMOL/L (ref 7–19)
AST SERPL-CCNC: 16 U/L (ref 5–32)
BASOPHILS ABSOLUTE: 0 K/UL (ref 0–0.2)
BASOPHILS RELATIVE PERCENT: 0.1 % (ref 0–1)
BILIRUB SERPL-MCNC: <0.2 MG/DL (ref 0.2–1.2)
BUN BLDV-MCNC: 12 MG/DL (ref 8–23)
CALCIUM SERPL-MCNC: 9.1 MG/DL (ref 8.8–10.2)
CHLORIDE BLD-SCNC: 97 MMOL/L (ref 98–111)
CO2: 34 MMOL/L (ref 22–29)
CREAT SERPL-MCNC: 0.4 MG/DL (ref 0.5–0.9)
EOSINOPHILS ABSOLUTE: 0 K/UL (ref 0–0.6)
EOSINOPHILS RELATIVE PERCENT: 0.6 % (ref 0–5)
GFR AFRICAN AMERICAN: >59
GFR NON-AFRICAN AMERICAN: >60
GLUCOSE BLD-MCNC: 98 MG/DL (ref 74–109)
HCT VFR BLD CALC: 34.3 % (ref 37–47)
HEMOGLOBIN: 10.9 G/DL (ref 12–16)
IMMATURE GRANULOCYTES #: 0 K/UL
LYMPHOCYTES ABSOLUTE: 1.4 K/UL (ref 1.1–4.5)
LYMPHOCYTES RELATIVE PERCENT: 19.6 % (ref 20–40)
MAGNESIUM: 1.4 MG/DL (ref 1.6–2.4)
MCH RBC QN AUTO: 31.7 PG (ref 27–31)
MCHC RBC AUTO-ENTMCNC: 31.8 G/DL (ref 33–37)
MCV RBC AUTO: 99.7 FL (ref 81–99)
MONOCYTES ABSOLUTE: 1 K/UL (ref 0–0.9)
MONOCYTES RELATIVE PERCENT: 13.1 % (ref 0–10)
NEUTROPHILS ABSOLUTE: 4.8 K/UL (ref 1.5–7.5)
NEUTROPHILS RELATIVE PERCENT: 66 % (ref 50–65)
PDW BLD-RTO: 13.2 % (ref 11.5–14.5)
PLATELET # BLD: 206 K/UL (ref 130–400)
PMV BLD AUTO: 11.3 FL (ref 9.4–12.3)
POTASSIUM SERPL-SCNC: 4 MMOL/L (ref 3.5–5)
RBC # BLD: 3.44 M/UL (ref 4.2–5.4)
SODIUM BLD-SCNC: 141 MMOL/L (ref 136–145)
TOTAL PROTEIN: 5.7 G/DL (ref 6.6–8.7)
WBC # BLD: 7.3 K/UL (ref 4.8–10.8)

## 2022-06-15 PROCEDURE — 83735 ASSAY OF MAGNESIUM: CPT

## 2022-06-15 PROCEDURE — 1210000000 HC MED SURG R&B

## 2022-06-15 PROCEDURE — 6370000000 HC RX 637 (ALT 250 FOR IP): Performed by: FAMILY MEDICINE

## 2022-06-15 PROCEDURE — 2500000003 HC RX 250 WO HCPCS: Performed by: INTERNAL MEDICINE

## 2022-06-15 PROCEDURE — 2700000000 HC OXYGEN THERAPY PER DAY

## 2022-06-15 PROCEDURE — 94640 AIRWAY INHALATION TREATMENT: CPT

## 2022-06-15 PROCEDURE — 36415 COLL VENOUS BLD VENIPUNCTURE: CPT

## 2022-06-15 PROCEDURE — 85025 COMPLETE CBC W/AUTO DIFF WBC: CPT

## 2022-06-15 PROCEDURE — 2580000003 HC RX 258: Performed by: FAMILY MEDICINE

## 2022-06-15 PROCEDURE — 80053 COMPREHEN METABOLIC PANEL: CPT

## 2022-06-15 PROCEDURE — 6360000002 HC RX W HCPCS: Performed by: FAMILY MEDICINE

## 2022-06-15 PROCEDURE — 6370000000 HC RX 637 (ALT 250 FOR IP): Performed by: INTERNAL MEDICINE

## 2022-06-15 PROCEDURE — 2500000003 HC RX 250 WO HCPCS: Performed by: FAMILY MEDICINE

## 2022-06-15 PROCEDURE — 99233 SBSQ HOSP IP/OBS HIGH 50: CPT | Performed by: INTERNAL MEDICINE

## 2022-06-15 RX ADMIN — ALPRAZOLAM 0.5 MG: 0.5 TABLET ORAL at 19:43

## 2022-06-15 RX ADMIN — CEFDINIR 300 MG: 300 CAPSULE ORAL at 19:43

## 2022-06-15 RX ADMIN — Medication 400 MG: at 08:51

## 2022-06-15 RX ADMIN — TRAMADOL HYDROCHLORIDE 50 MG: 50 TABLET, COATED ORAL at 13:18

## 2022-06-15 RX ADMIN — IPRATROPIUM BROMIDE AND ALBUTEROL SULFATE 1 AMPULE: 2.5; .5 SOLUTION RESPIRATORY (INHALATION) at 02:07

## 2022-06-15 RX ADMIN — PANTOPRAZOLE SODIUM 40 MG: 40 TABLET, DELAYED RELEASE ORAL at 06:03

## 2022-06-15 RX ADMIN — MONTELUKAST SODIUM 10 MG: 10 TABLET, FILM COATED ORAL at 19:43

## 2022-06-15 RX ADMIN — CEFDINIR 300 MG: 300 CAPSULE ORAL at 08:51

## 2022-06-15 RX ADMIN — SODIUM CHLORIDE, PRESERVATIVE FREE 10 ML: 5 INJECTION INTRAVENOUS at 08:51

## 2022-06-15 RX ADMIN — TRAMADOL HYDROCHLORIDE 50 MG: 50 TABLET, COATED ORAL at 06:03

## 2022-06-15 RX ADMIN — ALPRAZOLAM 0.5 MG: 0.5 TABLET ORAL at 06:03

## 2022-06-15 RX ADMIN — IPRATROPIUM BROMIDE AND ALBUTEROL SULFATE 1 AMPULE: 2.5; .5 SOLUTION RESPIRATORY (INHALATION) at 10:19

## 2022-06-15 RX ADMIN — PREDNISONE 20 MG: 20 TABLET ORAL at 08:51

## 2022-06-15 RX ADMIN — TRAMADOL HYDROCHLORIDE 50 MG: 50 TABLET, COATED ORAL at 22:11

## 2022-06-15 RX ADMIN — OXYCODONE HYDROCHLORIDE AND ACETAMINOPHEN 1 TABLET: 5; 325 TABLET ORAL at 08:51

## 2022-06-15 RX ADMIN — IPRATROPIUM BROMIDE AND ALBUTEROL SULFATE 1 AMPULE: 2.5; .5 SOLUTION RESPIRATORY (INHALATION) at 22:08

## 2022-06-15 RX ADMIN — GABAPENTIN 600 MG: 600 TABLET, FILM COATED ORAL at 13:18

## 2022-06-15 RX ADMIN — IPRATROPIUM BROMIDE AND ALBUTEROL SULFATE 1 AMPULE: 2.5; .5 SOLUTION RESPIRATORY (INHALATION) at 14:24

## 2022-06-15 RX ADMIN — ENOXAPARIN SODIUM 40 MG: 100 INJECTION SUBCUTANEOUS at 18:26

## 2022-06-15 RX ADMIN — ACETAMINOPHEN 650 MG: 325 TABLET ORAL at 19:43

## 2022-06-15 RX ADMIN — ALPRAZOLAM 0.5 MG: 0.5 TABLET ORAL at 13:18

## 2022-06-15 RX ADMIN — GUAIFENESIN 400 MG: 100 SOLUTION ORAL at 06:03

## 2022-06-15 RX ADMIN — SODIUM CHLORIDE, PRESERVATIVE FREE 10 ML: 5 INJECTION INTRAVENOUS at 19:47

## 2022-06-15 RX ADMIN — ALPRAZOLAM 0.5 MG: 0.5 TABLET ORAL at 00:11

## 2022-06-15 RX ADMIN — GABAPENTIN 600 MG: 600 TABLET, FILM COATED ORAL at 08:50

## 2022-06-15 RX ADMIN — DOXYCYCLINE 100 MG: 100 INJECTION, POWDER, LYOPHILIZED, FOR SOLUTION INTRAVENOUS at 08:52

## 2022-06-15 RX ADMIN — GABAPENTIN 600 MG: 600 TABLET, FILM COATED ORAL at 19:43

## 2022-06-15 RX ADMIN — GUAIFENESIN 400 MG: 100 SOLUTION ORAL at 08:51

## 2022-06-15 RX ADMIN — GUAIFENESIN 400 MG: 100 SOLUTION ORAL at 13:18

## 2022-06-15 RX ADMIN — CITALOPRAM HYDROBROMIDE 40 MG: 20 TABLET ORAL at 08:51

## 2022-06-15 RX ADMIN — CYANOCOBALAMIN TAB 500 MCG 500 MCG: 500 TAB at 08:51

## 2022-06-15 RX ADMIN — IPRATROPIUM BROMIDE AND ALBUTEROL SULFATE 1 AMPULE: 2.5; .5 SOLUTION RESPIRATORY (INHALATION) at 18:24

## 2022-06-15 RX ADMIN — IPRATROPIUM BROMIDE AND ALBUTEROL SULFATE 1 AMPULE: 2.5; .5 SOLUTION RESPIRATORY (INHALATION) at 06:31

## 2022-06-15 RX ADMIN — GUAIFENESIN 400 MG: 100 SOLUTION ORAL at 18:26

## 2022-06-15 ASSESSMENT — PAIN SCALES - GENERAL
PAINLEVEL_OUTOF10: 6
PAINLEVEL_OUTOF10: 5
PAINLEVEL_OUTOF10: 5

## 2022-06-15 ASSESSMENT — PAIN DESCRIPTION - DESCRIPTORS
DESCRIPTORS: ACHING

## 2022-06-15 ASSESSMENT — PAIN DESCRIPTION - LOCATION
LOCATION: BACK

## 2022-06-15 ASSESSMENT — PAIN DESCRIPTION - ORIENTATION
ORIENTATION: MID
ORIENTATION: MID

## 2022-06-15 NOTE — PROGRESS NOTES
Pulmonary and Critical Care Progress note. Susanne Caal    MRN# [id number]    Acct# [de-identified]  6/15/2022   6:11 PM CDT    Referring Terrie Lassiter MD      Chief Complaint: Shortness of breath    HPI: Over the past 24 hours the patient feels improved. Wants to go home tomorrow. Was sleeping when I went in the room.      Medications    cefdinir, 300 mg, Oral, 2 times per day    predniSONE, 20 mg, Oral, Daily    magnesium oxide, 400 mg, Oral, Daily    guaiFENesin, 400 mg, Oral, Q4H    vitamin B-12, 500 mcg, Oral, Daily    citalopram, 40 mg, Oral, Daily    gabapentin, 600 mg, Oral, TID    montelukast, 10 mg, Oral, Nightly    nicotine, 1 patch, TransDERmal, Q24H    pantoprazole, 40 mg, Oral, QAM AC    sodium chloride flush, 5-40 mL, IntraVENous, 2 times per day    enoxaparin, 40 mg, SubCUTAneous, Daily    ipratropium-albuterol, 1 ampule, Inhalation, Q4H     Review of Systems:  RESPIRATORY:  positive for  dyspnea  CARDIOVASCULAR:  positive for  dyspnea      Physical Exam:  BP (!) 119/52   Pulse 78   Temp 97 °F (36.1 °C) (Temporal)   Resp 18   Ht 5' 6\" (1.676 m)   Wt 112 lb 1.6 oz (50.8 kg)   SpO2 90%   BMI 18.09 kg/m²     Intake/Output Summary (Last 24 hours) at 6/15/2022 1850  Last data filed at 6/15/2022 1835  Gross per 24 hour   Intake 870.65 ml   Output 1300 ml   Net -429.35 ml       General appearance: Elderly white female who appears to be in no distress   HEENT: Normocephalic atraumatic  Heart: S1-S2 distant sounds no murmurs  Lungs: Diminished bilaterally no rubs or tenderness on dullness to percussion  Abdomen: Soft nontender no organomegalies normal bowel sounds  Extremities: No clubbing cyanosis or edema  Neuro: No focal findings  Skin: Intact    Recent Labs     06/13/22  0234 06/14/22  0352 06/15/22  0257   WBC 6.5 5.7 7.3   RBC 3.40* 3.59* 3.44*   HGB 10.8* 11.4* 10.9*   HCT 34.7* 36.9* 34.3*    221 206   .1* 102.8* 99.7*   MCH 31.8* 31.8* 31.7*   MCHC 31.1* 30.9* 31.8*   RDW 13.3 13.2 13.2      Recent Labs     06/13/22  0234 06/14/22  0352 06/15/22  0257    140 141   K 3.7 5.1* 4.0   CL 96* 96* 97*   CO2 36* 36* 34*   BUN 14 10 12   CREATININE 0.4* 0.5 0.4*   CALCIUM 8.8 9.1 9.1   GLUCOSE 179* 146* 98      No results for input(s): PHART, BYU7ISG, PO2ART, VVR2OYL, J2VEORFA, BEART in the last 72 hours. Recent Labs     06/15/22  0257   AST 16   ALT 9   ALKPHOS 54   BILITOT <0.2   MG 1.4*   CALCIUM 9.1     No results for input(s): BC, LABGRAM, CULTRESP, BFCX in the last 72 hours. Radiograph: XR CHEST PORTABLE    Result Date: 6/10/2022  Modest bilateral upper lobe hyperinflation and mild bibasilar fibrosis. Surgical clips in the epigastrium and left upper quadrant. Recommendation: Follow up as clinically indicated. Electronically Signed by Vimal Cervantes MD at 10-Rai-2022 01:10:14 PM                My radiograph interpretation/independent review of imaging: Reviewed    Problem list generated by [facility] Problems           Last Modified POA    * (Principal) Acute on chronic respiratory failure ([facility] 6/10/2022 Yes    Anxiety 6/13/2022 Yes    COPD exacerbation ([facility] 6/13/2022 Yes    Palliative care patient 6/13/2022 Yes           Pulmonary Assessment/Plan:     1. Acute on chronic hypoxic respiratory failure due to COPD exacerbation. Continue oxygen supplementation as necessary to maintain adequate oxygenation. She is back to her baseline oxygen requirement. May benefit from NIV vent at home such as with Trilogy. 2. Very severe underlying COPD. Continue pulmonary toilet. Continue bronchodilators. 3. Tobacco abuse she needs to quit smoking. 4. DVT prophylaxis. 5. Dyspnea due to the above supportive care. Filiberto Reis MD, [facility], [facility]    The above note was generated using voice recognition software. Inadvertent typographical errors in transcription may have occurred.       Electronically signed by

## 2022-06-15 NOTE — PROGRESS NOTES
Progress Note  Citlali Rivero Raeford  6/15/2022 6:01 PM  Subjective:   Admit Date:   6/10/2022      CC/ADMIT DX:       Interval History:   Reviewed overnight events and nursing notes. She has chronic SOA with no changes. She c/o being shaky and is asking for more Xanax. I have reviewed all labs/diagnostics from the last 24hrs. ROS:   I have done a 10 point ROS and all are negative, except what is mentioned in the HPI. ADULT ORAL NUTRITION SUPPLEMENT; Lunch, Breakfast, Dinner; Standard High Calorie/High Protein Oral Supplement  ADULT DIET; Regular; Low Potassium (Less than 3000 mg/day)    Medications:      sodium chloride Stopped (06/15/22 0011)      cefdinir  300 mg Oral 2 times per day    predniSONE  20 mg Oral Daily    magnesium oxide  400 mg Oral Daily    guaiFENesin  400 mg Oral Q4H    vitamin B-12  500 mcg Oral Daily    citalopram  40 mg Oral Daily    gabapentin  600 mg Oral TID    montelukast  10 mg Oral Nightly    nicotine  1 patch TransDERmal Q24H    pantoprazole  40 mg Oral QAM AC    sodium chloride flush  5-40 mL IntraVENous 2 times per day    enoxaparin  40 mg SubCUTAneous Daily    ipratropium-albuterol  1 ampule Inhalation Q4H           Objective:   Vitals: BP (!) 119/52   Pulse 78   Temp 97 °F (36.1 °C) (Temporal)   Resp 18   Ht 5' 6\" (1.676 m)   Wt 112 lb 1.6 oz (50.8 kg)   SpO2 90%   BMI 18.09 kg/m²      Intake/Output Summary (Last 24 hours) at 6/15/2022 1801  Last data filed at 6/15/2022 1515  Gross per 24 hour   Intake 630.65 ml   Output 1300 ml   Net -669.35 ml     General appearance: alert and cooperative with exam  Lungs: coarse  Heart: RRR  Abdomen: soft, non-tender; bowel sounds normal; no masses,  no organomegaly  Extremities: extremities normal, atraumatic, no cyanosis or edema  Neurologic:  No obvious focal neurologic deficits.     Assessment and Plan:   Principal Problem:    Acute on chronic respiratory failure (HCC)  Active Problems:    Anxiety    COPD exacerbation [facility])    Palliative care patient  Resolved Problems:    * No resolved hospital problems. *    Anxiety    Chronic Pain    Plan:  1. Continue present medication(s)   2. Follow with care  3. Follow with Pulm. Will defer increasing Xanax to Dr. Jung Kelley, since I have some concern that it could worsen her CO2 retention. I have told patient I would leave this up to Pulm. 4.  Wean  steroids, continue antibiotics, resp treatments  5. Increase activity      Discharge planning:   her home     Reviewed treatment plans with the patient and/or family.              Electronically signed by Timmy Azar MD on 6/15/2022 at 6:01 PM

## 2022-06-16 ENCOUNTER — APPOINTMENT (OUTPATIENT)
Dept: GENERAL RADIOLOGY | Age: 72
DRG: 193 | End: 2022-06-16
Payer: MEDICARE

## 2022-06-16 ENCOUNTER — APPOINTMENT (OUTPATIENT)
Dept: CT IMAGING | Age: 72
DRG: 193 | End: 2022-06-16
Payer: MEDICARE

## 2022-06-16 LAB
ALBUMIN SERPL-MCNC: 3.6 G/DL (ref 3.5–5.2)
ALP BLD-CCNC: 57 U/L (ref 35–104)
ALT SERPL-CCNC: 9 U/L (ref 5–33)
ANION GAP SERPL CALCULATED.3IONS-SCNC: 11 MMOL/L (ref 7–19)
AST SERPL-CCNC: 13 U/L (ref 5–32)
BASE EXCESS ARTERIAL: 11.3 MMOL/L (ref -2–2)
BASE EXCESS ARTERIAL: 13 MMOL/L (ref -2–2)
BASOPHILS ABSOLUTE: 0 K/UL (ref 0–0.2)
BASOPHILS RELATIVE PERCENT: 0.1 % (ref 0–1)
BILIRUB SERPL-MCNC: 0.3 MG/DL (ref 0.2–1.2)
BUN BLDV-MCNC: 12 MG/DL (ref 8–23)
CALCIUM SERPL-MCNC: 9.3 MG/DL (ref 8.8–10.2)
CARBOXYHEMOGLOBIN ARTERIAL: 1.5 % (ref 0–5)
CARBOXYHEMOGLOBIN ARTERIAL: 1.8 % (ref 0–5)
CHLORIDE BLD-SCNC: 94 MMOL/L (ref 98–111)
CO2: 33 MMOL/L (ref 22–29)
CREAT SERPL-MCNC: 0.4 MG/DL (ref 0.5–0.9)
EKG P AXIS: 89 DEGREES
EKG P-R INTERVAL: 120 MS
EKG Q-T INTERVAL: 352 MS
EKG QRS DURATION: 76 MS
EKG QTC CALCULATION (BAZETT): 399 MS
EKG T AXIS: 90 DEGREES
EOSINOPHILS ABSOLUTE: 0.1 K/UL (ref 0–0.6)
EOSINOPHILS RELATIVE PERCENT: 1.2 % (ref 0–5)
GFR AFRICAN AMERICAN: >59
GFR NON-AFRICAN AMERICAN: >60
GLUCOSE BLD-MCNC: 79 MG/DL (ref 74–109)
HCO3 ARTERIAL: 37.8 MMOL/L (ref 22–26)
HCO3 ARTERIAL: 38.6 MMOL/L (ref 22–26)
HCT VFR BLD CALC: 36.1 % (ref 37–47)
HEMOGLOBIN, ART, EXTENDED: 11.6 G/DL (ref 12–16)
HEMOGLOBIN, ART, EXTENDED: 12.6 G/DL (ref 12–16)
HEMOGLOBIN: 11.3 G/DL (ref 12–16)
IMMATURE GRANULOCYTES #: 0.1 K/UL
LYMPHOCYTES ABSOLUTE: 1.5 K/UL (ref 1.1–4.5)
LYMPHOCYTES RELATIVE PERCENT: 14.3 % (ref 20–40)
MAGNESIUM: 1.5 MG/DL (ref 1.6–2.4)
MCH RBC QN AUTO: 31.8 PG (ref 27–31)
MCHC RBC AUTO-ENTMCNC: 31.3 G/DL (ref 33–37)
MCV RBC AUTO: 101.7 FL (ref 81–99)
METHEMOGLOBIN ARTERIAL: 1.2 %
METHEMOGLOBIN ARTERIAL: 1.4 %
MONOCYTES ABSOLUTE: 1.1 K/UL (ref 0–0.9)
MONOCYTES RELATIVE PERCENT: 10.4 % (ref 0–10)
NEUTROPHILS ABSOLUTE: 7.4 K/UL (ref 1.5–7.5)
NEUTROPHILS RELATIVE PERCENT: 73.5 % (ref 50–65)
O2 CONTENT ARTERIAL: 10.9 ML/DL
O2 CONTENT ARTERIAL: 15.4 ML/DL
O2 DELIVERY DEVICE: ABNORMAL
O2 SAT, ARTERIAL: 67.2 %
O2 SAT, ARTERIAL: 86.7 %
O2 THERAPY: ABNORMAL
O2 THERAPY: ABNORMAL
OXYGEN FLOW: 12
PCO2 ARTERIAL: 53 MMHG (ref 35–45)
PCO2 ARTERIAL: 57 MMHG (ref 35–45)
PDW BLD-RTO: 13.3 % (ref 11.5–14.5)
PH ARTERIAL: 7.43 (ref 7.35–7.45)
PH ARTERIAL: 7.47 (ref 7.35–7.45)
PLATELET # BLD: 214 K/UL (ref 130–400)
PMV BLD AUTO: 11.7 FL (ref 9.4–12.3)
PO2 ARTERIAL: 33 MMHG (ref 80–100)
PO2 ARTERIAL: 53 MMHG (ref 80–100)
POTASSIUM SERPL-SCNC: 4.4 MMOL/L (ref 3.5–5)
POTASSIUM, WHOLE BLOOD: 3.5
POTASSIUM, WHOLE BLOOD: 4.1
RBC # BLD: 3.55 M/UL (ref 4.2–5.4)
SODIUM BLD-SCNC: 138 MMOL/L (ref 136–145)
SPONT RATE(BPM): 40
TOTAL CK: 37 U/L (ref 26–192)
TOTAL PROTEIN: 5.8 G/DL (ref 6.6–8.7)
TROPONIN: <0.01 NG/ML (ref 0–0.03)
WBC # BLD: 10.1 K/UL (ref 4.8–10.8)

## 2022-06-16 PROCEDURE — 99232 SBSQ HOSP IP/OBS MODERATE 35: CPT

## 2022-06-16 PROCEDURE — 71275 CT ANGIOGRAPHY CHEST: CPT

## 2022-06-16 PROCEDURE — 87205 SMEAR GRAM STAIN: CPT

## 2022-06-16 PROCEDURE — 94640 AIRWAY INHALATION TREATMENT: CPT

## 2022-06-16 PROCEDURE — 80053 COMPREHEN METABOLIC PANEL: CPT

## 2022-06-16 PROCEDURE — 6360000002 HC RX W HCPCS: Performed by: INTERNAL MEDICINE

## 2022-06-16 PROCEDURE — 2700000000 HC OXYGEN THERAPY PER DAY

## 2022-06-16 PROCEDURE — 99291 CRITICAL CARE FIRST HOUR: CPT | Performed by: INTERNAL MEDICINE

## 2022-06-16 PROCEDURE — 2580000003 HC RX 258: Performed by: FAMILY MEDICINE

## 2022-06-16 PROCEDURE — 6370000000 HC RX 637 (ALT 250 FOR IP): Performed by: FAMILY MEDICINE

## 2022-06-16 PROCEDURE — 87641 MR-STAPH DNA AMP PROBE: CPT

## 2022-06-16 PROCEDURE — 71045 X-RAY EXAM CHEST 1 VIEW: CPT

## 2022-06-16 PROCEDURE — 87070 CULTURE OTHR SPECIMN AEROBIC: CPT

## 2022-06-16 PROCEDURE — 6360000002 HC RX W HCPCS: Performed by: FAMILY MEDICINE

## 2022-06-16 PROCEDURE — 36600 WITHDRAWAL OF ARTERIAL BLOOD: CPT

## 2022-06-16 PROCEDURE — 2500000003 HC RX 250 WO HCPCS: Performed by: INTERNAL MEDICINE

## 2022-06-16 PROCEDURE — 85025 COMPLETE CBC W/AUTO DIFF WBC: CPT

## 2022-06-16 PROCEDURE — 83735 ASSAY OF MAGNESIUM: CPT

## 2022-06-16 PROCEDURE — 93005 ELECTROCARDIOGRAM TRACING: CPT | Performed by: INTERNAL MEDICINE

## 2022-06-16 PROCEDURE — 82550 ASSAY OF CK (CPK): CPT

## 2022-06-16 PROCEDURE — 71275 CT ANGIOGRAPHY CHEST: CPT | Performed by: RADIOLOGY

## 2022-06-16 PROCEDURE — 6370000000 HC RX 637 (ALT 250 FOR IP): Performed by: INTERNAL MEDICINE

## 2022-06-16 PROCEDURE — 71045 X-RAY EXAM CHEST 1 VIEW: CPT | Performed by: RADIOLOGY

## 2022-06-16 PROCEDURE — 82803 BLOOD GASES ANY COMBINATION: CPT

## 2022-06-16 PROCEDURE — 36415 COLL VENOUS BLD VENIPUNCTURE: CPT

## 2022-06-16 PROCEDURE — 2000000000 HC ICU R&B

## 2022-06-16 PROCEDURE — 84484 ASSAY OF TROPONIN QUANT: CPT

## 2022-06-16 PROCEDURE — 2580000003 HC RX 258: Performed by: INTERNAL MEDICINE

## 2022-06-16 RX ORDER — DIPHENHYDRAMINE HYDROCHLORIDE 50 MG/ML
50 INJECTION INTRAMUSCULAR; INTRAVENOUS ONCE
Status: COMPLETED | OUTPATIENT
Start: 2022-06-16 | End: 2022-06-16

## 2022-06-16 RX ORDER — LANOLIN ALCOHOL/MO/W.PET/CERES
400 CREAM (GRAM) TOPICAL 2 TIMES DAILY
Status: DISCONTINUED | OUTPATIENT
Start: 2022-06-16 | End: 2022-06-17

## 2022-06-16 RX ORDER — LEVALBUTEROL INHALATION SOLUTION 1.25 MG/3ML
1.25 SOLUTION RESPIRATORY (INHALATION) EVERY 8 HOURS PRN
Status: DISCONTINUED | OUTPATIENT
Start: 2022-06-16 | End: 2022-06-25 | Stop reason: HOSPADM

## 2022-06-16 RX ORDER — METHYLPREDNISOLONE SODIUM SUCCINATE 125 MG/2ML
125 INJECTION, POWDER, LYOPHILIZED, FOR SOLUTION INTRAMUSCULAR; INTRAVENOUS ONCE
Status: COMPLETED | OUTPATIENT
Start: 2022-06-16 | End: 2022-06-16

## 2022-06-16 RX ORDER — ALPRAZOLAM 0.5 MG/1
1 TABLET ORAL 4 TIMES DAILY PRN
Status: DISCONTINUED | OUTPATIENT
Start: 2022-06-16 | End: 2022-06-25 | Stop reason: HOSPADM

## 2022-06-16 RX ADMIN — ALPRAZOLAM 0.5 MG: 0.5 TABLET ORAL at 02:48

## 2022-06-16 RX ADMIN — ONDANSETRON 4 MG: 4 TABLET, ORALLY DISINTEGRATING ORAL at 00:02

## 2022-06-16 RX ADMIN — IPRATROPIUM BROMIDE AND ALBUTEROL SULFATE 1 AMPULE: 2.5; .5 SOLUTION RESPIRATORY (INHALATION) at 02:32

## 2022-06-16 RX ADMIN — CITALOPRAM HYDROBROMIDE 40 MG: 20 TABLET ORAL at 08:09

## 2022-06-16 RX ADMIN — IPRATROPIUM BROMIDE AND ALBUTEROL SULFATE 1 AMPULE: 2.5; .5 SOLUTION RESPIRATORY (INHALATION) at 06:25

## 2022-06-16 RX ADMIN — Medication 400 MG: at 08:09

## 2022-06-16 RX ADMIN — LEVALBUTEROL HYDROCHLORIDE 1.25 MG: 1.25 SOLUTION RESPIRATORY (INHALATION) at 18:48

## 2022-06-16 RX ADMIN — HYDROXYZINE HYDROCHLORIDE 10 MG: 10 TABLET ORAL at 11:59

## 2022-06-16 RX ADMIN — ONDANSETRON HYDROCHLORIDE 4 MG: 2 SOLUTION INTRAMUSCULAR; INTRAVENOUS at 22:12

## 2022-06-16 RX ADMIN — ALPRAZOLAM 1 MG: 0.5 TABLET ORAL at 22:24

## 2022-06-16 RX ADMIN — MORPHINE SULFATE 1 MG: 2 INJECTION, SOLUTION INTRAMUSCULAR; INTRAVENOUS at 08:18

## 2022-06-16 RX ADMIN — ALPRAZOLAM 0.5 MG: 0.5 TABLET ORAL at 08:09

## 2022-06-16 RX ADMIN — CYANOCOBALAMIN TAB 500 MCG 500 MCG: 500 TAB at 08:09

## 2022-06-16 RX ADMIN — GUAIFENESIN 400 MG: 100 SOLUTION ORAL at 20:13

## 2022-06-16 RX ADMIN — CEFEPIME HYDROCHLORIDE 2000 MG: 2 INJECTION, POWDER, FOR SOLUTION INTRAVENOUS at 16:15

## 2022-06-16 RX ADMIN — DIPHENHYDRAMINE HYDROCHLORIDE 50 MG: 50 INJECTION, SOLUTION INTRAMUSCULAR; INTRAVENOUS at 13:09

## 2022-06-16 RX ADMIN — VANCOMYCIN HYDROCHLORIDE 1250 MG: 500 INJECTION, POWDER, LYOPHILIZED, FOR SOLUTION INTRAVENOUS at 17:41

## 2022-06-16 RX ADMIN — ALPRAZOLAM 1 MG: 0.5 TABLET ORAL at 15:07

## 2022-06-16 RX ADMIN — ENOXAPARIN SODIUM 40 MG: 100 INJECTION SUBCUTANEOUS at 17:43

## 2022-06-16 RX ADMIN — GABAPENTIN 600 MG: 600 TABLET, FILM COATED ORAL at 14:00

## 2022-06-16 RX ADMIN — HYDROXYZINE HYDROCHLORIDE 10 MG: 10 TABLET ORAL at 00:02

## 2022-06-16 RX ADMIN — LEVALBUTEROL HYDROCHLORIDE 1.25 MG: 1.25 SOLUTION RESPIRATORY (INHALATION) at 10:42

## 2022-06-16 RX ADMIN — BENZONATATE 200 MG: 100 CAPSULE ORAL at 02:48

## 2022-06-16 RX ADMIN — OXYCODONE HYDROCHLORIDE AND ACETAMINOPHEN 1 TABLET: 5; 325 TABLET ORAL at 19:22

## 2022-06-16 RX ADMIN — GUAIFENESIN 400 MG: 100 SOLUTION ORAL at 16:15

## 2022-06-16 RX ADMIN — SODIUM CHLORIDE, PRESERVATIVE FREE 10 ML: 5 INJECTION INTRAVENOUS at 20:14

## 2022-06-16 RX ADMIN — OXYCODONE HYDROCHLORIDE AND ACETAMINOPHEN 1 TABLET: 5; 325 TABLET ORAL at 16:15

## 2022-06-16 RX ADMIN — ACETAMINOPHEN 650 MG: 325 TABLET ORAL at 06:02

## 2022-06-16 RX ADMIN — METHYLPREDNISOLONE SODIUM SUCCINATE 125 MG: 125 INJECTION, POWDER, FOR SOLUTION INTRAMUSCULAR; INTRAVENOUS at 13:09

## 2022-06-16 RX ADMIN — GABAPENTIN 600 MG: 600 TABLET, FILM COATED ORAL at 08:09

## 2022-06-16 RX ADMIN — MONTELUKAST SODIUM 10 MG: 10 TABLET, FILM COATED ORAL at 20:13

## 2022-06-16 RX ADMIN — GABAPENTIN 600 MG: 600 TABLET, FILM COATED ORAL at 20:30

## 2022-06-16 RX ADMIN — GUAIFENESIN 400 MG: 100 SOLUTION ORAL at 06:03

## 2022-06-16 RX ADMIN — Medication 400 MG: at 20:13

## 2022-06-16 RX ADMIN — PANTOPRAZOLE SODIUM 40 MG: 40 TABLET, DELAYED RELEASE ORAL at 06:02

## 2022-06-16 RX ADMIN — GUAIFENESIN 400 MG: 100 SOLUTION ORAL at 11:59

## 2022-06-16 RX ADMIN — GUAIFENESIN 400 MG: 100 SOLUTION ORAL at 08:09

## 2022-06-16 RX ADMIN — ONDANSETRON HYDROCHLORIDE 4 MG: 2 SOLUTION INTRAMUSCULAR; INTRAVENOUS at 08:18

## 2022-06-16 RX ADMIN — PREDNISONE 20 MG: 20 TABLET ORAL at 08:09

## 2022-06-16 ASSESSMENT — PAIN DESCRIPTION - DESCRIPTORS: DESCRIPTORS: DISCOMFORT;SORE;ACHING

## 2022-06-16 ASSESSMENT — PAIN DESCRIPTION - LOCATION
LOCATION: BACK
LOCATION: ABDOMEN;CHEST

## 2022-06-16 ASSESSMENT — PAIN DESCRIPTION - ORIENTATION: ORIENTATION: MID

## 2022-06-16 ASSESSMENT — PAIN SCALES - GENERAL
PAINLEVEL_OUTOF10: 4
PAINLEVEL_OUTOF10: 7
PAINLEVEL_OUTOF10: 7
PAINLEVEL_OUTOF10: 4

## 2022-06-16 ASSESSMENT — PAIN - FUNCTIONAL ASSESSMENT: PAIN_FUNCTIONAL_ASSESSMENT: ACTIVITIES ARE NOT PREVENTED

## 2022-06-16 NOTE — PROGRESS NOTES
[address] [facility] Pharmacokinetic Monitoring Service - Vancomycin     Mine Barron is a 74 y.o. female starting on vancomycin therapy for pneumonia. Target Concentration: Goal AUC/LETTY 400-600 mg*hr/L    Additional Antimicrobials: cefepime    Pertinent Laboratory Values: Wt Readings from Last 1 Encounters:   06/10/22 112 lb 1.6 oz (50.8 kg)     Temp Readings from Last 1 Encounters:   06/16/22 98.2 °F (36.8 °C) (Temporal)     Estimated Creatinine Clearance: 99 mL/min (A) (based on SCr of 0.4 mg/dL (L)). Recent Labs     06/15/22  0257 06/16/22  0246   CREATININE 0.4* 0.4*   WBC 7.3 10.1     Procalcitonin: no level    Pertinent Cultures:  Culture Date Source Results   none     MRSA Nasal Swab: not ordered. Order placed by pharmacy.     Plan:  Dosing recommendations based on Bayesian software  Start vancomycin 1250mg IV x 1 dose followed by 1000mg IV q 12 hours  Anticipated AUC of 516 and trough concentration of 14.6 at steady state  Renal labs as indicated   Vancomycin concentration ordered for 06/18/22 @ 0400   Pharmacy will continue to monitor patient and adjust therapy as indicated    Thank you for the consult,    MAREK EVANS, PHARM D, 6/16/2022, 3:31 PM

## 2022-06-16 NOTE — PROGRESS NOTES
Blood Gas, Arterial [[id number]] (Abnormal) Collected: 06/16/22 0909     Specimen: Blood gases Updated: 06/16/22 0913      pH, Arterial 7.470      pCO2, Arterial 53.0 mmHg       pO2, Arterial 33.0 mmHg       HCO3, Arterial 38.6 mmol/L       Base Excess, Arterial 13.0 mmol/L       Hemoglobin, Art, Extended 11.6 g/dL       O2 Sat, Arterial 67.2 %       Carboxyhgb, Arterial 1.5 %       Methemoglobin, Arterial 1.2 %       O2 Content, Arterial 10.9 mL/dL       O2 Therapy Unknown      Potassium, Whole Blood 3.5     Narrative:       Berto Aggarwal [phone], Linda Hill, RN   Linda Hill, RN, 06/16/2022 09:13, by LUCINDA MCCLAIN/KIRBY, R rad, RR = 20, AT +

## 2022-06-16 NOTE — PROGRESS NOTES
Palliative Care Progress Note  6/16/2022 12:54 PM    Patient:  Alonzo Duncan  YOB: 1950  Primary Care Physician: No primary care provider on file. Advance Directive: Full Code  Admit Date: 6/10/2022       Hospital Day: 6  Portions of this note have been copied forward, however, changed to reflect the most current clinical status of this patient. CHIEF COMPLAINT/REASON FOR CONSULTATION Goals of care, code status, family support    SUBJECTIVE: Ms. Sudheer Llanes is seen in ICU following change in condition this afternoon. She endorses worsening SOB with sudden onset of CP but is somewhat improved now. NRB in place at this time. Review of Systems:   14 point review of systems is negative except as specifically addressed above. Objective:   VITALS:  BP (!) 153/54   Pulse 83   Temp 98.2 °F (36.8 °C) (Temporal)   Resp 25   Ht 5' 6\" (1.676 m)   Wt 112 lb 1.6 oz (50.8 kg)   SpO2 92%   BMI 18.09 kg/m²   24HR INTAKE/OUTPUT:      Intake/Output Summary (Last 24 hours) at 6/16/2022 1254  Last data filed at 6/16/2022 1133  Gross per 24 hour   Intake 240 ml   Output 1650 ml   Net -1410 ml     General appearance: 73 yo female, chronically ill appearing, mild distress d/t dyspnea  Head: Normocephalic, without obvious abnormality, atraumatic  Eyes: conjunctivae/corneas clear. PERRL, EOM's intact. Ears: normal external ears and nose  Neck: no JVD, supple, symmetrical, trachea midline   Lungs: diminished throughout, expiratory wheezing noted, mild accessory muscle use, tachypneic, weak cough  Heart: RRR, S1, S2 normal, no murmur  Abdomen: soft, non-tender; non-distended, bowel sounds present    Extremities: No lower extremity edema,  No erythema, no tenderness to palpation  Skin: Warm, dry, pale  Neurologic: Alert and oriented X 3, generalized weakness, normal tone. Tremor in hands.  No focal deficits  Psychiatric: Anxious     Medications:      sodium chloride Stopped (06/15/22 0011)      magnesium oxide 400 mg Oral BID    methylPREDNISolone  125 mg IntraVENous Once    diphenhydrAMINE  50 mg IntraVENous Once    predniSONE  20 mg Oral Daily    guaiFENesin  400 mg Oral Q4H    vitamin B-12  500 mcg Oral Daily    citalopram  40 mg Oral Daily    gabapentin  600 mg Oral TID    montelukast  10 mg Oral Nightly    nicotine  1 patch TransDERmal Q24H    pantoprazole  40 mg Oral QAM AC    sodium chloride flush  5-40 mL IntraVENous 2 times per day    enoxaparin  40 mg SubCUTAneous Daily     levalbuterol, sodium chloride, ALPRAZolam, ondansetron, benzonatate, morphine, oxyCODONE-acetaminophen, traMADol, hydrOXYzine HCl, sodium chloride flush, sodium chloride, acetaminophen, ondansetron  ADULT ORAL NUTRITION SUPPLEMENT; Lunch, Breakfast, Dinner; Standard High Calorie/High Protein Oral Supplement  ADULT DIET; Regular; Low Potassium (Less than 3000 mg/day)     Lab and other Data:     Recent Labs     06/14/22  0352 06/15/22  0257 06/16/22  0246   WBC 5.7 7.3 10.1   HGB 11.4* 10.9* 11.3*    206 214     Recent Labs     06/14/22  0352 06/14/22  0352 06/15/22  0257 06/16/22  0246 06/16/22  0909     --  141 138  --    K 5.1*   < > 4.0 4.4 3.5   CL 96*  --  97* 94*  --    CO2 36*  --  34* 33*  --    BUN 10  --  12 12  --    CREATININE 0.5  --  0.4* 0.4*  --    GLUCOSE 146*  --  98 79  --     < > = values in this interval not displayed. Recent Labs     06/14/22  0352 06/15/22  0257 06/16/22  0246   AST 16 16 13   ALT 9 9 9   BILITOT <0.2 <0.2 0.3   ALKPHOS 63 54 57     RAD:   ECHO Complete 2D W Doppler W Color  Conclusions   Summary  Mitral valve leaflets are mildly thickened with preserved leaflet  mobility. Mild mitral regurgitation is present. Mildly thickened aortic valve leaflets with preserved leaflet mobility. Tricuspid valve is structurally normal.  Mild tricuspid regurgitation. Normal left ventricular size with preserved LV function and an estimated  ejection fraction of approximately 55-60%. Normal left ventricular wall thickness. No regional wall motion abnormalities. Signature    Electronically signed by Rui Yeh MD(Interpreting  physician) on 06/12/2022 08:35 AM     XR CHEST PORTABLE  Modest bilateral upper lobe hyperinflation and mild bibasilar fibrosis. Surgical clips in the epigastrium and left upper quadrant. Recommendation: Follow up as clinically indicated. Electronically Signed by Sejal Ellison MD at 10-Rai-2022 01:10:14 PM             Assessment/Plan   Principal Problem:    Acute on chronic respiratory failure [facility])  Active Problems:    Anxiety    COPD exacerbation ([facility] [address]    Palliative care patient  Resolved Problems:    * No resolved hospital problems. *    Visit Summary:  Chart reviewed. Ms. Allyn Rudolph is seen at bedside in ICU following transfer d/t worsening hypoxia. She reports feeling well this morning and ready to go home. She attempted to eat but is having difficulty feeding herself due to tremors and fell asleep. When she woke up she had sudden CP and worsening dyspnea. Cardiac enzymes are negative and she remains on NRB mask at 12 L with O2 sat 90%. Pulmonology has evaluated with plans for CTA today however she is allergic to iodine so will need to be pretreated prior to scan. She remains anxious and begins discussing her code status which makes her tearful. She confirms that she wishes to remain a full code at this time and would want to pursue intubation if needed but is hopeful to improve without. Discussed with RN. I called her daughter, Jomar Barton, to update on patient's status and plan of care. We discussed patient's sudden change in condition and possible mucous plug and plans for CT scan. She is agreeable to plan and will be at patient's bedside. Opportunity for questions and emotional support provided. Will continue to follow. Recommendations:   1. Palliative care: continue all medical treatments and monitor for improvement.  Is agreeable to intubation if needed. Code status- FULL CODE    2. Acute on chornic respiratory failure w/ hypoxia/hypercapnia 2/2 COPD exacerbation/+Rhinovirus-Rocephin/Doxycycline- worsening hypoxia today. Pulmonology following. CTA ordered. Solumedrol, bronchodilators, pulmonary toilet continued    3. N/V-resolved    4. Anxiety-Steroids/breathing treatments contributing, Atarax/Xanax continued. Pulmonology okay with increase xanax back to home dose. Thank you for consulting Palliative Care and allowing us to participate in the care of this patient.    Time Spent Counseling > 50%:  YES                                   Total Time Spent with patient/family counseling, workup/treatment review, counseling and placement of orders/preparation of this note: 31 minutes    Electronically signed by TEN Swanson CNP on 6/16/2022 at 12:54 PM    (Please note that portions of this note were completed with a voice recognition program.  Kerrie Orantes made to edit the dictations but occasionally words are mis-transcribed.)

## 2022-06-16 NOTE — PROGRESS NOTES
Pulmonary and Critical Care Progress note. Susanne Caal    MRN# [id number]    Acct# [de-identified]  6/16/2022   6:11 PM CDT    Referring Leah Hollins MD      Chief Complaint: Shortness of breath    HPI: The patient complained of sudden onset of chest pain earlier today with shortness of breath. She says she feels as if she tore something in her chest.  She was hypoxic she required escalation of her oxygen supplementation. Arterial blood gases were done and noted. She had troponin that was negative. Chest x-ray does showed atelectasis of the left lower lobe. Currently she is in the intensive care unit for closer observation. She feels at her baseline but she is requiring nonrebreather mask. Saturations in the mid 90% range.     Medications    magnesium oxide, 400 mg, Oral, BID    methylPREDNISolone, 125 mg, IntraVENous, Once    diphenhydrAMINE, 50 mg, IntraVENous, Once    predniSONE, 20 mg, Oral, Daily    guaiFENesin, 400 mg, Oral, Q4H    vitamin B-12, 500 mcg, Oral, Daily    citalopram, 40 mg, Oral, Daily    gabapentin, 600 mg, Oral, TID    montelukast, 10 mg, Oral, Nightly    nicotine, 1 patch, TransDERmal, Q24H    pantoprazole, 40 mg, Oral, QAM AC    sodium chloride flush, 5-40 mL, IntraVENous, 2 times per day    enoxaparin, 40 mg, SubCUTAneous, Daily     Review of Systems:  RESPIRATORY:  positive for  dyspnea  CARDIOVASCULAR:  positive for  dyspnea      Physical Exam:  BP (!) 153/54   Pulse 83   Temp 98.2 °F (36.8 °C) (Temporal)   Resp 25   Ht 5' 6\" (1.676 m)   Wt 112 lb 1.6 oz (50.8 kg)   SpO2 92%   BMI 18.09 kg/m²     Intake/Output Summary (Last 24 hours) at 6/16/2022 1301  Last data filed at 6/16/2022 1133  Gross per 24 hour   Intake 240 ml   Output 1650 ml   Net -1410 ml       General appearance: Elderly white female who appears to be in no distress   HEENT: Normocephalic atraumatic  Heart: S1-S2 distant sounds no murmurs  Lungs: Diminished bilaterally no rubs or tenderness on dullness to percussion  Abdomen: Soft nontender no organomegalies normal bowel sounds  Extremities: No clubbing cyanosis or edema  Neuro: No focal findings  Skin: Intact    Recent Labs     06/14/22  0352 06/15/22  0257 06/16/22  0246   WBC 5.7 7.3 10.1   RBC 3.59* 3.44* 3.55*   HGB 11.4* 10.9* 11.3*   HCT 36.9* 34.3* 36.1*    206 214   .8* 99.7* 101.7*   MCH 31.8* 31.7* 31.8*   MCHC 30.9* 31.8* 31.3*   RDW 13.2 13.2 13.3      Recent Labs     06/14/22  0352 06/15/22  0257 06/16/22  0246 06/16/22  0909    141 138  --    K 5.1* 4.0 4.4 3.5   CL 96* 97* 94*  --    CO2 36* 34* 33*  --    BUN 10 12 12  --    CREATININE 0.5 0.4* 0.4*  --    CALCIUM 9.1 9.1 9.3  --    GLUCOSE 146* 98 79  --       Recent Labs     06/16/22  0909   PHART 7.470*   UBA0SKR 53.0*   PO2ART 33.0*   EWM8JBY 38.6*   Y3TTEGOI 67.2*   BEART 13.0*     Recent Labs     06/16/22  0246 06/16/22  0942   AST 13  --    ALT 9  --    ALKPHOS 57  --    BILITOT 0.3  --    MG 1.5*  --    CALCIUM 9.3  --    TROPONINI  --  <0.01     No results for input(s): BC, LABGRAM, CULTRESP, BFCX in the last 72 hours. Radiograph: XR CHEST PORTABLE    Result Date: 6/10/2022  Modest bilateral upper lobe hyperinflation and mild bibasilar fibrosis. Surgical clips in the epigastrium and left upper quadrant. Recommendation: Follow up as clinically indicated. Electronically Signed by Zoe Malcolm MD at 10-Rai-2022 01:10:14 PM                My radiograph interpretation/independent review of imaging: Reviewed    Problem list generated by [facility] Problems           Last Modified POA    * (Principal) Acute on chronic respiratory failure ([facility] [address] 6/10/2022 Yes    Anxiety 6/13/2022 Yes    COPD exacerbation ([facility] [address] 6/13/2022 Yes    Palliative care patient 6/13/2022 Yes           Pulmonary Assessment/Plan:     1.  Acute on chronic hypoxic respiratory failure due to COPD exacerbation with acute worsening of her oxygen requirement. Her symptoms were associated with chest pain. Will obtain CT pulmonary angiogram to rule out a PE. The patient says she is allergic to iodine however she has been on steroids since admission we will bolus with Solu-Medrol 125 mg IV and with Benadryl prior to the scan. Continue ICU monitoring. 2. Very severe underlying COPD. Continue pulmonary toilet. Continue bronchodilators. 3. Tobacco abuse she needs to quit smoking. 4. DVT prophylaxis. 5. Dyspnea due to the above supportive care. Cc time 36 min. Misty Elkins MD, [facility], [facility]    The above note was generated using voice recognition software. Inadvertent typographical errors in transcription may have occurred.       Electronically signed by Misty Elkins MD on 06/16/22 at 1:01 PM

## 2022-06-16 NOTE — PROGRESS NOTES
Progress Note  Dorian Asher [place]  6/16/2022 5:20 PM  Subjective:   Admit Date:   6/10/2022      CC/ADMIT DX:       Interval History:   Reviewed overnight events and nursing notes. She was taken off of O2 this morning for RA ABG. She had a low sat and trouble getting the sat up. She was placed on BIPAP per Pulm recommendation and is now on NC. Labs and an Savi Garfield have been ordered by Pulm. She says she feels better now, but still SOA. . I have reviewed all labs/diagnostics from the last 24hrs. ROS:   I have done a 10 point ROS and all are negative, except what is mentioned in the HPI. ADULT ORAL NUTRITION SUPPLEMENT; Lunch, Breakfast, Dinner; Standard High Calorie/High Protein Oral Supplement  ADULT DIET;  Regular; Low Potassium (Less than 3000 mg/day)    Medications:      sodium chloride Stopped (06/15/22 0011)      magnesium oxide  400 mg Oral BID    [START ON 6/17/2022] vancomycin  1,000 mg IntraVENous Q12H    [START ON 6/17/2022] cefepime  2,000 mg IntraVENous Q8H    vancomycin  1,250 mg IntraVENous Once    vancomycin (VANCOCIN) intermittent dosing (placeholder)   Other RX Placeholder    predniSONE  20 mg Oral Daily    guaiFENesin  400 mg Oral Q4H    vitamin B-12  500 mcg Oral Daily    citalopram  40 mg Oral Daily    gabapentin  600 mg Oral TID    montelukast  10 mg Oral Nightly    nicotine  1 patch TransDERmal Q24H    pantoprazole  40 mg Oral QAM AC    sodium chloride flush  5-40 mL IntraVENous 2 times per day    enoxaparin  40 mg SubCUTAneous Daily           Objective:   Vitals: BP 99/63   Pulse 85   Temp 98.2 °F (36.8 °C) (Temporal)   Resp (!) 33   Ht 5' 6\" (1.676 m)   Wt 112 lb 1.6 oz (50.8 kg)   SpO2 (!) 86%   BMI 18.09 kg/m²      Intake/Output Summary (Last 24 hours) at 6/16/2022 1720  Last data filed at 6/16/2022 1133  Gross per 24 hour   Intake 240 ml   Output 1250 ml   Net -1010 ml     General appearance: alert and cooperative with exam  Lungs: decreased BS  Heart: RRR  Abdomen: soft, non-tender; bowel sounds normal; no masses,  no organomegaly  Extremities: extremities normal, atraumatic, no cyanosis or edema  Neurologic:  No obvious focal neurologic deficits. Assessment and Plan:   Principal Problem:    Acute on chronic respiratory failure (HCC)  Active Problems:    Anxiety    COPD exacerbation (HCC)    Palliative care patient    Severe malnutrition ([facility] [address]  Resolved Problems:    * No resolved hospital problems. *    Anxiety    Chronic Pain    Plan:  1. Continue present medication(s)   2. Follow with Pulm. They have ordered add testing, labs  3. On  steroids, antibiotics, resp treatments  4. PRN medicine for anxiety, pain      Discharge planning:   her home     Reviewed treatment plans with the patient and/or family.              Electronically signed by Adele Mendoza MD on 6/16/2022 at 5:20 PM

## 2022-06-16 NOTE — PROGRESS NOTES
09:20a - Patient placed on BIPAP 14/7@ 4 lpm. SaO2 = 82%, RR = 36. 09:30a - Patient placed on 50% VM @ 12 lpm -  SaO2 = 84%, 09: 35 - Patient placed on HFNC at 5 lpm, SaO2 = 83%. O2 increased to 8 lpm, SaO2 = 84%, O2 increased to 10 lpm, SaO2 = 86%, O2 increased to 12 lpm, SaO2 = 91%.  RR = 22. TS RRT

## 2022-06-16 NOTE — PROGRESS NOTES
Comprehensive Nutrition Assessment    Type and Reason for Visit:  Reassess    Nutrition Recommendations/Plan:   1. Continue current intervantion--adjusting for changing SOA     Malnutrition Assessment:  Malnutrition Status:  Severe malnutrition (06/16/22 1325)    Context:  Chronic Illness     Findings of the 6 clinical characteristics of malnutrition:  Energy Intake:  No significant decrease in energy intake  Weight Loss:  No significant weight loss     Body Fat Loss:  Severe body fat loss Orbital,Buccal region   Muscle Mass Loss:  Severe muscle mass loss Temples (temporalis)  Fluid Accumulation:  No significant fluid accumulation     Strength:  Not Performed    Nutrition Assessment:    At time of visit pt was having difficutly keeping O2 sat up. Pt is now transfered to ICU and is on nonrebreather mask. PO intake has been decreased, but able to take ONS. Nutrition Related Findings:    non rebreather Wound Type: None       Current Nutrition Intake & Therapies:    Average Meal Intake: 0%,1-25%,26-50%  Average Supplements Intake: %  ADULT ORAL NUTRITION SUPPLEMENT; Lunch, Breakfast, Dinner; Standard High Calorie/High Protein Oral Supplement  ADULT DIET; Regular; Low Potassium (Less than 3000 mg/day)    Anthropometric Measures:  Height: 5' 6\" (167.6 cm)  Ideal Body Weight (IBW): 130 lbs (59 kg)    Admission Body Weight: 112 lb (50.8 kg)  Current Body Weight: 112 lb 1.6 oz (50.8 kg), 86.2 % IBW.  Weight Source: Bed Scale  Current BMI (kg/m2): 18.1  Usual Body Weight: 118 lb (53.5 kg) (11/8/2021)  % Weight Change (Calculated): -5.1  BMI Categories: Underweight (BMI less than 22) age over 72    Estimated Daily Nutrient Needs:  Energy Requirements Based On: Kcal/kg  Weight Used for Energy Requirements: Current  Energy (kcal/day): 5796-4371 kcal/d (30-35 kcal/kg)  Weight Used for Protein Requirements: Current  Protein (g/day): 76 g/d (1.5 g/kg)  Method Used for Fluid Requirements: 1 ml/kcal  Fluid (ml/day): 4730-7454 ml/d    Nutrition Diagnosis:   · Inadequate oral intake related to acute injury/trauma,impaired respiratory function,catabolic illness as evidenced by BMI,poor intake prior to admission,severe loss of subcutaneous fat      Nutrition Interventions:   Food and/or Nutrient Delivery: Continue Current Diet,Continue Oral Nutrition Supplement  Nutrition Education/Counseling: No recommendation at this time  Coordination of Nutrition Care: Continue to monitor while inpatient       Goals:  Previous Goal Met: No Progress toward Goal(s)  Goals: PO intake 50% or greater,by next RD assessment       Nutrition Monitoring and Evaluation:   Behavioral-Environmental Outcomes: None Identified  Food/Nutrient Intake Outcomes: Food and Nutrient Intake,Supplement Intake  Physical Signs/Symptoms Outcomes: Biochemical Data,Fluid Status or Edema,Weight,Skin    Discharge Planning:     Too soon to determine     Luz Sampson MS, RD, LD  Contact: [phone]

## 2022-06-16 NOTE — PROGRESS NOTES
4 Eyes Skin Assessment    Diane Martin is being assessed upon: Transfer to New Unit    I agree that I, Vimal Miller RN, along with April Rn (either 2 RN's or 1 LPN and 1 RN) have performed a thorough Head to Toe Skin Assessment on the patient. ALL assessment sites listed below have been assessed. Areas assessed by both nurses:     [x]   Head, Face, and Ears   [x]   Shoulders, Back, and Chest  [x]   Arms, Elbows, and Hands   [x]   Coccyx, Sacrum, and Ischium  [x]   Legs, Feet, and Heels    Does the Patient have Skin Breakdown?  No    Burt Prevention initiated: No  Wound Care Orders initiated: No    WOC nurse consulted for Pressure Injury (Stage 3,4, Unstageable, DTI, NWPT, and Complex wounds) and New or Established Ostomies: No        Primary Nurse eSignature: Vimal Miller RN on 6/16/2022 at 3:25 PM      Co-Signer eSignature: Electronically signed by Naz Wilkinson RN on 6/16/22 at 3:31 PM CDT

## 2022-06-16 NOTE — PLAN OF CARE
Problem: Discharge Planning  Goal: Discharge to home or other facility with appropriate resources  Outcome: Progressing  Flowsheets  Taken 6/16/2022 0211  Discharge to home or other facility with appropriate resources:   Identify barriers to discharge with patient and caregiver   Arrange for needed discharge resources and transportation as appropriate   Identify discharge learning needs (meds, wound care, etc)   Refer to discharge planning if patient needs post-hospital services based on physician order or complex needs related to functional status, cognitive ability or social support system  Taken 6/15/2022 1943  Discharge to home or other facility with appropriate resources:   Identify barriers to discharge with patient and caregiver   Arrange for needed discharge resources and transportation as appropriate   Refer to discharge planning if patient needs post-hospital services based on physician order or complex needs related to functional status, cognitive ability or social support system  Note: Orders placed for patient to be on a trilogy machine at home when sleeping. Social work and respiratory to help with that process today. ; Pt remains extremely anxious and eager to leave but afraid of being so short of breath. Problem: Pain  Goal: Verbalizes/displays adequate comfort level or baseline comfort level  Outcome: Progressing     Problem: Skin/Tissue Integrity  Goal: Absence of new skin breakdown  Description: 1. Monitor for areas of redness and/or skin breakdown  2. Assess vascular access sites hourly  3. Every 4-6 hours minimum:  Change oxygen saturation probe site  4. Every 4-6 hours:  If on nasal continuous positive airway pressure, respiratory therapy assess nares and determine need for appliance change or resting period.   Outcome: Progressing     Problem: Safety - Adult  Goal: Free from fall injury  Outcome: Progressing  Flowsheets (Taken 6/15/2022 1943)  Free From Fall Injury: Instruct family/caregiver on patient safety     Problem: Confusion  Goal: Confusion, delirium, dementia, or psychosis is improved or at baseline  Description: INTERVENTIONS:  1. Assess for possible contributors to thought disturbance, including medications, impaired vision or hearing, underlying metabolic abnormalities, dehydration, psychiatric diagnoses, and notify attending LIP  2. D Lo high risk fall precautions, as indicated  3. Provide frequent short contacts to provide reality reorientation, refocusing and direction  4. Decrease environmental stimuli, including noise as appropriate  5. Monitor and intervene to maintain adequate nutrition, hydration, elimination, sleep and activity  6. If unable to ensure safety without constant attention obtain sitter and review sitter guidelines with assigned personnel  7.  Initiate Psychosocial CNS and Spiritual Care consult, as indicated  Outcome: Progressing  Flowsheets (Taken 6/15/2022 1943)  Effect of thought disturbance (confusion, delirium, dementia, or psychosis) are managed with adequate functional status:   Assess for contributors to thought disturbance, including medications, impaired vision or hearing, underlying metabolic abnormalities, dehydration, psychiatric diagnoses, notify LIP   Monitor and intervene to maintain adequate nutrition, hydration, elimination, sleep and activity

## 2022-06-16 NOTE — PROGRESS NOTES
Pharmacy Adjustment per [facility] protocol    Malena Roberts is a 74 y.o. female. Pharmacy has adjusted medications per [facility] protocol. Recent Labs     06/15/22  0257 06/16/22  0246   BUN 12 12       Recent Labs     06/15/22  0257 06/16/22  0246   CREATININE 0.4* 0.4*       Estimated Creatinine Clearance: 99 mL/min (A) (based on SCr of 0.4 mg/dL (L)). Height:   Ht Readings from Last 1 Encounters:   06/16/22 5' 6\" (1.676 m)     Weight:  Wt Readings from Last 1 Encounters:   06/10/22 112 lb 1.6 oz (50.8 kg)         Plan: Adjust the following medications based on [facility] protocol:  Change Cefepime to 2gm IV x 1 dose over 30 min followed by Cefepime 2gm IV q 8 hours extended infusion.     MAREK EVANS, PHARM D, 6/16/2022, 3:16 PM

## 2022-06-16 NOTE — CONSULTS
INFECTIOUS DISEASES CONSULT NOTE    Patient:  Ammon Degroot 74 y.o. female  ROOM # [unfilled]  YOB: 1950  MRN: [id number]  [facility]:  [id number]  Admit date: 6/10/2022   Admitting Physician: Kaleb Suazo MD  Primary Care Physician: No primary care provider on file. REFERRING PROVIDER: No ref. provider found    Reason for Consultation: Left lower lobe pneumonia    History of Present Illness/Chief Complaint: Does not 40-year-old woman. Currently in ICU. She was admitted on Alayna 10, 2022. She had had some increasing dyspnea for about 3 to 4 days prior to admission. She indicates she had had some congestion. She does not describe high fever. She is chronically on 3 L of oxygen at home continuously. She presented to the hospital.  She was admitted for further management. She has been under treatment for COPD exacerbation. She had some decline in oxygenation today. She was transferred to ICU. She had CT of the chest.  Infectious disease asked to evaluate and make antibiotic recommendations.     Current Scheduled Medications:    magnesium oxide  400 mg Oral BID    [START ON 6/17/2022] vancomycin  1,000 mg IntraVENous Q12H    [START ON 6/17/2022] cefepime  2,000 mg IntraVENous Q8H    vancomycin  1,250 mg IntraVENous Once    vancomycin (VANCOCIN) intermittent dosing (placeholder)   Other RX Placeholder    predniSONE  20 mg Oral Daily    guaiFENesin  400 mg Oral Q4H    vitamin B-12  500 mcg Oral Daily    citalopram  40 mg Oral Daily    gabapentin  600 mg Oral TID    montelukast  10 mg Oral Nightly    nicotine  1 patch TransDERmal Q24H    pantoprazole  40 mg Oral QAM AC    sodium chloride flush  5-40 mL IntraVENous 2 times per day    enoxaparin  40 mg SubCUTAneous Daily     Current PRN Medications:  levalbuterol, ALPRAZolam, iopamidol, sodium chloride, ondansetron, benzonatate, morphine, oxyCODONE-acetaminophen, traMADol, hydrOXYzine HCl, sodium chloride flush, sodium chloride, acetaminophen, ondansetron    Allergies: Allergies   Allergen Reactions    Aspirin Hives    Codeine     Demerol     Fentanyl Hives    Iv Dye [Iodides]     Neosporin [Bacitracin-Neomycin-Polymyxin]     Nsaids     Pcn [Penicillins]     Pentazocine Lactate     Sulfa Antibiotics     Talwin [Pentazocine] Hives    Influenza Vaccines Hives and Rash       Past Medical History: Chronic obstructive pulmonary disease. Depression. Fibromyalgia. Gastroesophageal reflux disease. Hypertension. Lung cancer. Rheumatoid arthritis. Past Surgical History: Cholecystectomy. Hernia repair. Hysterectomy. Leg surgery. Social History: Previous history of tobacco use. No alcohol use. No illicit drug use. Family History: Cancer    Exposure History: Unknown    Review of Systems: No current nausea or vomiting. No abdominal pain. No neurological complaints. Vital Signs:  BP 99/63   Pulse 85   Temp 98.2 °F (36.8 °C) (Temporal)   Resp (!) 33   Ht 5' 6\" (1.676 m)   Wt 112 lb 1.6 oz (50.8 kg)   SpO2 (!) 86%   BMI 18.09 kg/m²  Temp (24hrs), Av.7 °F (36.5 °C), Min:97.3 °F (36.3 °C), Max:98.2 °F (36.8 °C)    Physical Exam:   Vital signs reviewed. Sclera nonicteric  Lungs with prolonged expiratory phase. No significant crackles.   No wheezing  Abdomen soft and nontender  Extremities without significant edema    Lab Results:  CBC:   Recent Labs     22  0352 06/15/22  0257 22  0246   WBC 5.7 7.3 10.1   HGB 11.4* 10.9* 11.3*   HCT 36.9* 34.3* 36.1*    206 214   LYMPHOPCT 9.9* 19.6* 14.3*   MONOPCT 7.7 13.1* 10.4*     CMP:   Recent Labs     22  0352 22  0352 06/15/22  0257 06/15/22  0257 22  0246 22  0909 22  1454     --  141  --  138  --   --    K 5.1*   < > 4.0   < > 4.4 3.5 4.1   CL 96*  --  97*  --  94*  --   --    CO2 36*  --  34*  --  33*  --   --    BUN 10  --  12  --  12  --   --    CREATININE 0.5  --  0.4*  --  0.4*  --   --    CALCIUM 9.1  -- antibiotic therapy  Continue COPD exacerbation management per pulmonary and primary    Wendy Gregg MD  06/16/22  5:34 PM

## 2022-06-17 LAB
ALBUMIN SERPL-MCNC: 3.2 G/DL (ref 3.5–5.2)
ALP BLD-CCNC: 49 U/L (ref 35–104)
ALT SERPL-CCNC: 9 U/L (ref 5–33)
ANION GAP SERPL CALCULATED.3IONS-SCNC: 7 MMOL/L (ref 7–19)
AST SERPL-CCNC: 10 U/L (ref 5–32)
BASOPHILS ABSOLUTE: 0 K/UL (ref 0–0.2)
BASOPHILS RELATIVE PERCENT: 0 % (ref 0–1)
BILIRUB SERPL-MCNC: 0.3 MG/DL (ref 0.2–1.2)
BUN BLDV-MCNC: 9 MG/DL (ref 8–23)
CALCIUM SERPL-MCNC: 8.7 MG/DL (ref 8.8–10.2)
CHLORIDE BLD-SCNC: 91 MMOL/L (ref 98–111)
CO2: 33 MMOL/L (ref 22–29)
CREAT SERPL-MCNC: 0.4 MG/DL (ref 0.5–0.9)
EOSINOPHILS ABSOLUTE: 0 K/UL (ref 0–0.6)
EOSINOPHILS RELATIVE PERCENT: 0 % (ref 0–5)
GFR AFRICAN AMERICAN: >59
GFR NON-AFRICAN AMERICAN: >60
GLUCOSE BLD-MCNC: 119 MG/DL (ref 74–109)
HCT VFR BLD CALC: 33.6 % (ref 37–47)
HEMOGLOBIN: 10.7 G/DL (ref 12–16)
IMMATURE GRANULOCYTES #: 0.1 K/UL
LYMPHOCYTES ABSOLUTE: 0.7 K/UL (ref 1.1–4.5)
LYMPHOCYTES RELATIVE PERCENT: 6.9 % (ref 20–40)
MAGNESIUM: 1.5 MG/DL (ref 1.6–2.4)
MCH RBC QN AUTO: 31.8 PG (ref 27–31)
MCHC RBC AUTO-ENTMCNC: 31.8 G/DL (ref 33–37)
MCV RBC AUTO: 99.7 FL (ref 81–99)
MONOCYTES ABSOLUTE: 0.6 K/UL (ref 0–0.9)
MONOCYTES RELATIVE PERCENT: 6.3 % (ref 0–10)
MRSA SCREEN RT-PCR: DETECTED
NEUTROPHILS ABSOLUTE: 8.1 K/UL (ref 1.5–7.5)
NEUTROPHILS RELATIVE PERCENT: 86.3 % (ref 50–65)
PDW BLD-RTO: 13.3 % (ref 11.5–14.5)
PLATELET # BLD: 213 K/UL (ref 130–400)
PMV BLD AUTO: 11.3 FL (ref 9.4–12.3)
POTASSIUM SERPL-SCNC: 4.5 MMOL/L (ref 3.5–5)
PRO-BNP: 192 PG/ML (ref 0–900)
RBC # BLD: 3.37 M/UL (ref 4.2–5.4)
SODIUM BLD-SCNC: 131 MMOL/L (ref 136–145)
TOTAL PROTEIN: 5.8 G/DL (ref 6.6–8.7)
WBC # BLD: 9.4 K/UL (ref 4.8–10.8)

## 2022-06-17 PROCEDURE — 6370000000 HC RX 637 (ALT 250 FOR IP): Performed by: INTERNAL MEDICINE

## 2022-06-17 PROCEDURE — 6360000002 HC RX W HCPCS: Performed by: FAMILY MEDICINE

## 2022-06-17 PROCEDURE — 2580000003 HC RX 258: Performed by: FAMILY MEDICINE

## 2022-06-17 PROCEDURE — 83880 ASSAY OF NATRIURETIC PEPTIDE: CPT

## 2022-06-17 PROCEDURE — 36415 COLL VENOUS BLD VENIPUNCTURE: CPT

## 2022-06-17 PROCEDURE — 94640 AIRWAY INHALATION TREATMENT: CPT

## 2022-06-17 PROCEDURE — 94669 MECHANICAL CHEST WALL OSCILL: CPT

## 2022-06-17 PROCEDURE — 94660 CPAP INITIATION&MGMT: CPT

## 2022-06-17 PROCEDURE — 85025 COMPLETE CBC W/AUTO DIFF WBC: CPT

## 2022-06-17 PROCEDURE — 2500000003 HC RX 250 WO HCPCS: Performed by: INTERNAL MEDICINE

## 2022-06-17 PROCEDURE — 99233 SBSQ HOSP IP/OBS HIGH 50: CPT | Performed by: INTERNAL MEDICINE

## 2022-06-17 PROCEDURE — 2700000000 HC OXYGEN THERAPY PER DAY

## 2022-06-17 PROCEDURE — 2000000000 HC ICU R&B

## 2022-06-17 PROCEDURE — 99214 OFFICE O/P EST MOD 30 MIN: CPT | Performed by: INTERNAL MEDICINE

## 2022-06-17 PROCEDURE — 6370000000 HC RX 637 (ALT 250 FOR IP): Performed by: FAMILY MEDICINE

## 2022-06-17 PROCEDURE — 2580000003 HC RX 258: Performed by: INTERNAL MEDICINE

## 2022-06-17 PROCEDURE — 80053 COMPREHEN METABOLIC PANEL: CPT

## 2022-06-17 PROCEDURE — 6360000002 HC RX W HCPCS: Performed by: INTERNAL MEDICINE

## 2022-06-17 PROCEDURE — 83735 ASSAY OF MAGNESIUM: CPT

## 2022-06-17 PROCEDURE — 99231 SBSQ HOSP IP/OBS SF/LOW 25: CPT

## 2022-06-17 RX ORDER — LANOLIN ALCOHOL/MO/W.PET/CERES
400 CREAM (GRAM) TOPICAL 3 TIMES DAILY
Status: DISCONTINUED | OUTPATIENT
Start: 2022-06-17 | End: 2022-06-25 | Stop reason: HOSPADM

## 2022-06-17 RX ADMIN — Medication 400 MG: at 07:51

## 2022-06-17 RX ADMIN — Medication 1000 MG: at 04:46

## 2022-06-17 RX ADMIN — ONDANSETRON 4 MG: 4 TABLET, ORALLY DISINTEGRATING ORAL at 10:03

## 2022-06-17 RX ADMIN — MONTELUKAST SODIUM 10 MG: 10 TABLET, FILM COATED ORAL at 20:17

## 2022-06-17 RX ADMIN — GABAPENTIN 600 MG: 600 TABLET, FILM COATED ORAL at 14:07

## 2022-06-17 RX ADMIN — GUAIFENESIN 400 MG: 100 SOLUTION ORAL at 07:51

## 2022-06-17 RX ADMIN — PREDNISONE 20 MG: 20 TABLET ORAL at 07:51

## 2022-06-17 RX ADMIN — GABAPENTIN 600 MG: 600 TABLET, FILM COATED ORAL at 07:51

## 2022-06-17 RX ADMIN — GUAIFENESIN 400 MG: 100 SOLUTION ORAL at 20:18

## 2022-06-17 RX ADMIN — OXYCODONE HYDROCHLORIDE AND ACETAMINOPHEN 1 TABLET: 5; 325 TABLET ORAL at 20:17

## 2022-06-17 RX ADMIN — LEVALBUTEROL HYDROCHLORIDE 1.25 MG: 1.25 SOLUTION RESPIRATORY (INHALATION) at 02:34

## 2022-06-17 RX ADMIN — TRAMADOL HYDROCHLORIDE 50 MG: 50 TABLET, COATED ORAL at 01:54

## 2022-06-17 RX ADMIN — SODIUM CHLORIDE, PRESERVATIVE FREE 10 ML: 5 INJECTION INTRAVENOUS at 20:18

## 2022-06-17 RX ADMIN — CEFEPIME 2000 MG: 2 INJECTION, POWDER, FOR SOLUTION INTRAMUSCULAR; INTRAVENOUS at 08:48

## 2022-06-17 RX ADMIN — GUAIFENESIN 400 MG: 100 SOLUTION ORAL at 17:30

## 2022-06-17 RX ADMIN — GUAIFENESIN 400 MG: 100 SOLUTION ORAL at 04:45

## 2022-06-17 RX ADMIN — OXYCODONE HYDROCHLORIDE AND ACETAMINOPHEN 1 TABLET: 5; 325 TABLET ORAL at 14:07

## 2022-06-17 RX ADMIN — ENOXAPARIN SODIUM 40 MG: 100 INJECTION SUBCUTANEOUS at 18:00

## 2022-06-17 RX ADMIN — Medication 1000 MG: at 16:06

## 2022-06-17 RX ADMIN — ALPRAZOLAM 1 MG: 0.5 TABLET ORAL at 22:10

## 2022-06-17 RX ADMIN — CEFEPIME 2000 MG: 2 INJECTION, POWDER, FOR SOLUTION INTRAMUSCULAR; INTRAVENOUS at 01:15

## 2022-06-17 RX ADMIN — CITALOPRAM HYDROBROMIDE 40 MG: 20 TABLET ORAL at 07:51

## 2022-06-17 RX ADMIN — ALPRAZOLAM 1 MG: 0.5 TABLET ORAL at 16:09

## 2022-06-17 RX ADMIN — TRAMADOL HYDROCHLORIDE 50 MG: 50 TABLET, COATED ORAL at 11:12

## 2022-06-17 RX ADMIN — PANTOPRAZOLE SODIUM 40 MG: 40 TABLET, DELAYED RELEASE ORAL at 05:21

## 2022-06-17 RX ADMIN — ALPRAZOLAM 1 MG: 0.5 TABLET ORAL at 04:48

## 2022-06-17 RX ADMIN — Medication 400 MG: at 20:18

## 2022-06-17 RX ADMIN — GABAPENTIN 600 MG: 600 TABLET, FILM COATED ORAL at 20:17

## 2022-06-17 RX ADMIN — CYANOCOBALAMIN TAB 500 MCG 500 MCG: 500 TAB at 07:51

## 2022-06-17 RX ADMIN — LEVALBUTEROL HYDROCHLORIDE 1.25 MG: 1.25 SOLUTION RESPIRATORY (INHALATION) at 15:38

## 2022-06-17 RX ADMIN — GUAIFENESIN 400 MG: 100 SOLUTION ORAL at 00:54

## 2022-06-17 RX ADMIN — LEVALBUTEROL HYDROCHLORIDE 1.25 MG: 1.25 SOLUTION RESPIRATORY (INHALATION) at 10:03

## 2022-06-17 RX ADMIN — GUAIFENESIN 400 MG: 100 SOLUTION ORAL at 12:19

## 2022-06-17 RX ADMIN — LEVALBUTEROL HYDROCHLORIDE 1.25 MG: 1.25 SOLUTION RESPIRATORY (INHALATION) at 22:12

## 2022-06-17 RX ADMIN — SODIUM CHLORIDE, PRESERVATIVE FREE 10 ML: 5 INJECTION INTRAVENOUS at 07:52

## 2022-06-17 RX ADMIN — OXYCODONE HYDROCHLORIDE AND ACETAMINOPHEN 1 TABLET: 5; 325 TABLET ORAL at 03:15

## 2022-06-17 RX ADMIN — CEFEPIME 2000 MG: 2 INJECTION, POWDER, FOR SOLUTION INTRAMUSCULAR; INTRAVENOUS at 17:34

## 2022-06-17 ASSESSMENT — PAIN DESCRIPTION - LOCATION
LOCATION: HEAD;BACK
LOCATION: HEAD
LOCATION: HEAD
LOCATION: ABDOMEN
LOCATION: HEAD
LOCATION: HEAD

## 2022-06-17 ASSESSMENT — PAIN DESCRIPTION - DESCRIPTORS
DESCRIPTORS: ACHING
DESCRIPTORS: DISCOMFORT
DESCRIPTORS: ACHING
DESCRIPTORS: ACHING

## 2022-06-17 ASSESSMENT — PAIN SCALES - GENERAL
PAINLEVEL_OUTOF10: 7
PAINLEVEL_OUTOF10: 4
PAINLEVEL_OUTOF10: 8
PAINLEVEL_OUTOF10: 8
PAINLEVEL_OUTOF10: 2
PAINLEVEL_OUTOF10: 6
PAINLEVEL_OUTOF10: 2
PAINLEVEL_OUTOF10: 7
PAINLEVEL_OUTOF10: 0
PAINLEVEL_OUTOF10: 1
PAINLEVEL_OUTOF10: 5
PAINLEVEL_OUTOF10: 1
PAINLEVEL_OUTOF10: 5
PAINLEVEL_OUTOF10: 7

## 2022-06-17 ASSESSMENT — PAIN - FUNCTIONAL ASSESSMENT
PAIN_FUNCTIONAL_ASSESSMENT: ACTIVITIES ARE NOT PREVENTED
PAIN_FUNCTIONAL_ASSESSMENT: ACTIVITIES ARE NOT PREVENTED
PAIN_FUNCTIONAL_ASSESSMENT: PREVENTS OR INTERFERES SOME ACTIVE ACTIVITIES AND ADLS
PAIN_FUNCTIONAL_ASSESSMENT: ACTIVITIES ARE NOT PREVENTED
PAIN_FUNCTIONAL_ASSESSMENT: ACTIVITIES ARE NOT PREVENTED
PAIN_FUNCTIONAL_ASSESSMENT: PREVENTS OR INTERFERES SOME ACTIVE ACTIVITIES AND ADLS

## 2022-06-17 ASSESSMENT — PAIN DESCRIPTION - ORIENTATION
ORIENTATION: RIGHT;LEFT
ORIENTATION: RIGHT;LEFT

## 2022-06-17 ASSESSMENT — PAIN DESCRIPTION - PAIN TYPE: TYPE: ACUTE PAIN

## 2022-06-17 NOTE — CARE COORDINATION
Have spoke with Rupali Arevalo at [place] multiple times regarding [facility]. All information has been faxed , Rupali Arevalo will follow up with nurse and MD for orders. Pt now in icu and she will be following closely.    Electronically signed by Lizy Pinto RN on 6/17/2022 at 9:13 AM

## 2022-06-17 NOTE — PROGRESS NOTES
Infectious Diseases Progress Note    Patient:  Primus Goltz  YOB: 1950  MRN: [id number]   Admit date: 6/10/2022   Admitting Physician: Richard Wang MD  Primary Care Physician: No primary care provider on file. Chief Complaint/Interval History: She looks a little more comfortable today. Mild productive cough. She is awake and communicative. FiO2 and oxygen flow rates improved today relative to their maximum levels yesterday. In/Out    Intake/Output Summary (Last 24 hours) at 6/17/2022 0800  Last data filed at 6/17/2022 0600  Gross per 24 hour   Intake 1250 ml   Output 2490 ml   Net -1240 ml     Allergies:    Allergies   Allergen Reactions    Aspirin Hives    Codeine     Demerol     Fentanyl Hives    Iv Dye [Iodides]     Neosporin [Bacitracin-Neomycin-Polymyxin]     Nsaids     Pcn [Penicillins]     Pentazocine Lactate     Sulfa Antibiotics     Talwin [Pentazocine] Hives    Influenza Vaccines Hives and Rash     Current Meds: magnesium oxide (MAG-OX) tablet 400 mg, BID  levalbuterol (XOPENEX) nebulizer solution 1.25 mg, Q8H PRN  ALPRAZolam (XANAX) tablet 1 mg, 4x Daily PRN  iopamidol (ISOVUE-370) 76 % injection 90 mL, ONCE PRN  vancomycin (VANCOCIN) 1000 mg in dextrose 5% 250 mL IVPB, Q12H  cefepime (MAXIPIME) 2000 mg IVPB minibag in NS, Q8H  vancomycin (VANCOCIN) intermittent dosing (placeholder), RX Placeholder  predniSONE (DELTASONE) tablet 20 mg, Daily  guaiFENesin (ROBITUSSIN) 100 MG/5ML liquid 400 mg, Q4H  sodium chloride (OCEAN, BABY AYR) 0.65 % nasal spray 1 spray, Q2H PRN  vitamin B-12 (CYANOCOBALAMIN) tablet 500 mcg, Daily  citalopram (CELEXA) tablet 40 mg, Daily  gabapentin (NEURONTIN) tablet 600 mg, TID  montelukast (SINGULAIR) tablet 10 mg, Nightly  nicotine (NICODERM CQ) 21 MG/24HR 1 patch, Q24H  ondansetron (ZOFRAN-ODT) disintegrating tablet 4 mg, Q8H PRN  pantoprazole (PROTONIX) tablet 40 mg, QAM AC  benzonatate (TESSALON) capsule 200 mg, TID PRN  morphine (PF) injection 1 mg, Q1H PRN  oxyCODONE-acetaminophen (PERCOCET) 5-325 MG per tablet 1 tablet, Q4H PRN  traMADol (ULTRAM) tablet 50 mg, Q4H PRN  hydrOXYzine HCl (ATARAX) tablet 10 mg, TID PRN  sodium chloride flush 0.9 % injection 5-40 mL, 2 times per day  sodium chloride flush 0.9 % injection 5-40 mL, PRN  0.9 % sodium chloride infusion, PRN  enoxaparin (LOVENOX) injection 40 mg, Daily  acetaminophen (TYLENOL) tablet 650 mg, Q4H PRN  ondansetron (ZOFRAN) injection 4 mg, Q6H PRN      Review of Systems see HPI    VitalSigns:  BP (!) 100/44   Pulse 66   Temp 97.8 °F (36.6 °C) (Temporal)   Resp 21   Ht 5' 6\" (1.676 m)   Wt 112 lb 1.6 oz (50.8 kg)   SpO2 93%   BMI 18.09 kg/m²      Physical Exam  Line/IV site: No erythema, warmth, induration, or tenderness. Lungs without wheezing  No significant crackles  Abdomen soft and nontender    Lab Results:  CBC:   Recent Labs     06/15/22  0257 06/16/22  0246 06/17/22  0131   WBC 7.3 10.1 9.4   HGB 10.9* 11.3* 10.7*    214 213     BMP:  Recent Labs     06/15/22  0257 06/15/22  0257 06/16/22  0246 06/16/22  0909 06/16/22  1454 06/17/22  0131     --  138  --   --  131*   K 4.0   < > 4.4 3.5 4.1 4.5   CL 97*  --  94*  --   --  91*   CO2 34*  --  33*  --   --  33*   BUN 12  --  12  --   --  9   CREATININE 0.4*  --  0.4*  --   --  0.4*   GLUCOSE 98  --  79  --   --  119*    < > = values in this interval not displayed. CultureResults:  Straight culture obtained yesterday pending    Radiology: None    Additional Studies Reviewed:  None    Impression:  1. Acute on chronic respiratory failure-suspect COPD exacerbation following viral upper respiratory infection. I did not appreciate significant infiltrate/pneumonia on CT on my personal review of the CT. 2.  Chronic obstructive pulm disease  3.   History of tobacco use    Recommendations:  Continue antibiotic treatment to cover the possibility of postviral bacterial bronchitis/COPD exacerbation  Await respiratory culture results  If improving could transition to oral antibiotic treatment in the next few days  Not anticipating a long course of IV antibiotic therapy  Continue COPD exacerbation management per pulmonary and primary team  I will be out the next 2 days-we will see again Sunday or Monday    Giuseppe Gee MD

## 2022-06-17 NOTE — PLAN OF CARE
for contributors to thought disturbance, including medications, impaired vision or hearing, underlying metabolic abnormalities, dehydration, psychiatric diagnoses, notify [facility]

## 2022-06-17 NOTE — PROGRESS NOTES
Palliative Care Progress Note  6/17/2022 7:38 AM    Patient:  Jessika Beard  YOB: 1950  Primary Care Physician: No primary care provider on file. Advance Directive: Full Code  Admit Date: 6/10/2022       Hospital Day: 7  Portions of this note have been copied forward, however, changed to reflect the most current clinical status of this patient. CHIEF COMPLAINT/REASON FOR CONSULTATION Goals of care, code status, family support    SUBJECTIVE: Ms. Galileo Turk remains in ICU with HHF O2. She endorses some improvement in dyspnea with continued anxiety/worry around medical status. Review of Systems:   14 point review of systems is negative except as specifically addressed above. Objective:   VITALS:  BP (!) 100/44   Pulse 66   Temp 97.8 °F (36.6 °C) (Temporal)   Resp 21   Ht 5' 6\" (1.676 m)   Wt 112 lb 1.6 oz (50.8 kg)   SpO2 93%   BMI 18.09 kg/m²   24HR INTAKE/OUTPUT:      Intake/Output Summary (Last 24 hours) at 6/17/2022 0738  Last data filed at 6/17/2022 0600  Gross per 24 hour   Intake 1250 ml   Output 2490 ml   Net -1240 ml     General appearance: 73 yo female, chronically ill appearing, mild distress d/t dyspnea  Head: Normocephalic, without obvious abnormality, atraumatic  Eyes: conjunctivae/corneas clear. PERRL, EOM's intact. Ears: normal external ears and nose  Neck: no JVD, supple, symmetrical, trachea midline   Lungs: diminished throughout, expiratory wheezing noted, mild accessory muscle use, tachypneic, weak cough  Heart: RRR, S1, S2 normal, no murmur  Abdomen: soft, non-tender; non-distended, bowel sounds present    Extremities: No lower extremity edema,  No erythema, no tenderness to palpation  Skin: Warm, dry, pale  Neurologic: Alert and oriented X 3, generalized weakness, normal tone. Tremor in hands.  No focal deficits  Psychiatric: Anxious     Medications:      sodium chloride Stopped (06/15/22 0011)      magnesium oxide  400 mg Oral BID    vancomycin  1,000 mg IntraVENous Q12H    cefepime  2,000 mg IntraVENous Q8H    vancomycin (VANCOCIN) intermittent dosing (placeholder)   Other RX Placeholder    predniSONE  20 mg Oral Daily    guaiFENesin  400 mg Oral Q4H    vitamin B-12  500 mcg Oral Daily    citalopram  40 mg Oral Daily    gabapentin  600 mg Oral TID    montelukast  10 mg Oral Nightly    nicotine  1 patch TransDERmal Q24H    pantoprazole  40 mg Oral QAM AC    sodium chloride flush  5-40 mL IntraVENous 2 times per day    enoxaparin  40 mg SubCUTAneous Daily     levalbuterol, ALPRAZolam, iopamidol, sodium chloride, ondansetron, benzonatate, morphine, oxyCODONE-acetaminophen, traMADol, hydrOXYzine HCl, sodium chloride flush, sodium chloride, acetaminophen, ondansetron  ADULT ORAL NUTRITION SUPPLEMENT; Lunch, Breakfast, Dinner; Standard High Calorie/High Protein Oral Supplement  ADULT DIET; Regular; Low Potassium (Less than 3000 mg/day)     Lab and other Data:     Recent Labs     06/15/22  0257 06/16/22  0246 06/17/22  0131   WBC 7.3 10.1 9.4   HGB 10.9* 11.3* 10.7*    214 213     Recent Labs     06/15/22  0257 06/15/22  0257 06/16/22  0246 06/16/22  0909 06/16/22  1454 06/17/22  0131     --  138  --   --  131*   K 4.0   < > 4.4 3.5 4.1 4.5   CL 97*  --  94*  --   --  91*   CO2 34*  --  33*  --   --  33*   BUN 12  --  12  --   --  9   CREATININE 0.4*  --  0.4*  --   --  0.4*   GLUCOSE 98  --  79  --   --  119*    < > = values in this interval not displayed. Recent Labs     06/15/22  0257 06/16/22  0246 06/17/22  0131   AST 16 13 10   ALT 9 9 9   BILITOT <0.2 0.3 0.3   ALKPHOS 54 57 49     RAD:   ECHO Complete 2D W Doppler W Color  Conclusions   Summary  Mitral valve leaflets are mildly thickened with preserved leaflet  mobility. Mild mitral regurgitation is present. Mildly thickened aortic valve leaflets with preserved leaflet mobility. Tricuspid valve is structurally normal.  Mild tricuspid regurgitation.   Normal left ventricular size with preserved LV function and an estimated  ejection fraction of approximately 55-60%. Normal left ventricular wall thickness. No regional wall motion abnormalities. Signature    Electronically signed by Rambo Mauro MD(Interpreting  physician) on 06/12/2022 08:35 AM     XR CHEST PORTABLE  Modest bilateral upper lobe hyperinflation and mild bibasilar fibrosis. Surgical clips in the epigastrium and left upper quadrant. Recommendation: Follow up as clinically indicated. Electronically Signed by Merlinda Sabal MD at 10-Rai-2022 01:10:14 PM     CTA 6/16/2022  Impression   1. [facility] decreased volume of the left lower lobe with atelectasis/collapse/infiltrates with crowded bronchovascular structures   2. Hyperinflated lungs with advanced bilateral centrilobular emphysematous changes and interstitial thickening.     3. Biapical pleuroparenchymal thickening/scarring; left greater than right.     4.  Other benign findings as above. Recommendation: Follow up as clinically indicated. All CT scans at this facility utilize dose modulation, iterative reconstruction, and/or weight based dosing when appropriate to reduce radiation dose to as low as reasonably achievable. Electronically Signed by Tabitha Gomez MD at 16-Jun-2022 04:38:50 PM                  Assessment/Plan   Principal Problem:    Acute on chronic respiratory failure [facility])  Active Problems:    Anxiety    COPD exacerbation ([facility] [address]    Palliative care patient    Severe malnutrition ([facility] [address]  Resolved Problems:    * No resolved hospital problems. *    Visit Summary:  Chart reviewed. Report obtained from RN with no acute events overnight. She remains on high amounts of O2 but has been weaned to HHF 20 L at 50%. CT chest negative for PE and empiric antibiotics continued for possible superimposed pneumonia of LLL. Denies chest pain at the time of visit with all troponins negative. She continues to have issues with anxiety and is easily tearful.   Emotional

## 2022-06-17 NOTE — PROGRESS NOTES
Progress Note  Simona Cushing Edwards  6/17/2022 2:19 PM  Subjective:   Admit Date:   6/10/2022      CC/ADMIT DX:       Interval History:   Reviewed overnight events and nursing notes. She was moved to ICU by Yusuf Bright yesterday for closer observation. She has c/o anxiety, SOA. No c/o CP. . I have reviewed all labs/diagnostics from the last 24hrs. ROS:   I have done a 10 point ROS and all are negative, except what is mentioned in the HPI. ADULT ORAL NUTRITION SUPPLEMENT; Lunch, Breakfast, Dinner; Standard High Calorie/High Protein Oral Supplement  ADULT DIET; Regular; Low Potassium (Less than 3000 mg/day)    Medications:      sodium chloride Stopped (06/15/22 0011)      magnesium oxide  400 mg Oral TID    vancomycin  1,000 mg IntraVENous Q12H    cefepime  2,000 mg IntraVENous Q8H    vancomycin (VANCOCIN) intermittent dosing (placeholder)   Other RX Placeholder    predniSONE  20 mg Oral Daily    guaiFENesin  400 mg Oral Q4H    vitamin B-12  500 mcg Oral Daily    citalopram  40 mg Oral Daily    gabapentin  600 mg Oral TID    montelukast  10 mg Oral Nightly    nicotine  1 patch TransDERmal Q24H    pantoprazole  40 mg Oral QAM AC    sodium chloride flush  5-40 mL IntraVENous 2 times per day    enoxaparin  40 mg SubCUTAneous Daily           Objective:   Vitals: BP (!) 121/44   Pulse 98   Temp 97.8 °F (36.6 °C) (Temporal)   Resp 29   Ht 5' 6\" (1.676 m)   Wt 112 lb 1.6 oz (50.8 kg)   SpO2 90%   BMI 18.09 kg/m²      Intake/Output Summary (Last 24 hours) at 6/17/2022 1419  Last data filed at 6/17/2022 1400  Gross per 24 hour   Intake 1740 ml   Output 2965 ml   Net -1225 ml     General appearance: alert and cooperative with exam  Lungs: decreased BS  Heart: RRR  Abdomen: soft, non-tender; bowel sounds normal; no masses,  no organomegaly  Extremities: extremities normal, atraumatic, no cyanosis or edema  Neurologic:  No obvious focal neurologic deficits.     Assessment and Plan:   Principal Problem: Acute on chronic respiratory failure (HCC)  Active Problems:    Anxiety    COPD exacerbation (HCC)    Palliative care patient    Severe malnutrition ([facility] [address]  Resolved Problems:    * No resolved hospital problems. *    Anxiety    Chronic Pain    Rhinovirus    Plan:  1. Continue present medication(s)   2. Follow with Pulmonary and ID  3. Continue steroids, O2  4. Antibiotics per ID  5. Follow cultures  6. PRN medicine for anxiety, pain      Discharge planning:   her home     Reviewed treatment plans with the patient and/or family.              Electronically signed by Charity Portillo MD on 6/17/2022 at 2:19 PM

## 2022-06-17 NOTE — PROGRESS NOTES
33.6*    214 213   MCV 99.7* 101.7* 99.7*   MCH 31.7* 31.8* 31.8*   MCHC 31.8* 31.3* 31.8*   RDW 13.2 13.3 13.3      Recent Labs     06/15/22  0257 06/16/22  0246 06/16/22  0909 06/16/22  1454 06/17/22  0131    138  --   --  131*   K 4.0 4.4 3.5 4.1 4.5   CL 97* 94*  --   --  91*   CO2 34* 33*  --   --  33*   BUN 12 12  --   --  9   CREATININE 0.4* 0.4*  --   --  0.4*   CALCIUM 9.1 9.3  --   --  8.7*   GLUCOSE 98 79  --   --  119*      Recent Labs     06/16/22  0909 06/16/22  1454   PHART 7.470* 7.430   NKS6SBB 53.0* 57.0*   PO2ART 33.0* 53.0*   IDY7IJI 38.6* 37.8*   Y1AZODTZ 67.2* 86.7*   BEART 13.0* 11.3*     Recent Labs     06/16/22  0246 06/16/22  0942 06/17/22  0131   AST   < >  --  10   ALT   < >  --  9   ALKPHOS   < >  --  49   BILITOT   < >  --  0.3   MG   < >  --  1.5*   CALCIUM   < >  --  8.7*   TROPONINI  --  <0.01  --     < > = values in this interval not displayed. Recent Labs     06/16/22  1634   LABGRAM Rare Mixed bacterial morphotypes suggestive of  Normal respiratory florapresent Many Gram positive rods present  Rare WBC's (Polymorphonuclear) present  No Epithelial Cells seen           Radiograph: XR CHEST PORTABLE    Result Date: 6/10/2022  Modest bilateral upper lobe hyperinflation and mild bibasilar fibrosis. Surgical clips in the epigastrium and left upper quadrant. Recommendation: Follow up as clinically indicated. Electronically Signed by Cr Ray MD at 10-Rai-2022 01:10:14 PM                My radiograph interpretation/independent review of imaging: Reviewed    Problem list generated by [employer]:  Hospital Problems           Last Modified POA    * (Principal) Acute on chronic respiratory failure ([address] 6/10/2022 Yes    Anxiety 6/13/2022 Yes    COPD exacerbation ([facility] [address] 6/13/2022 Yes    Palliative care patient 6/13/2022 Yes    Severe malnutrition ([facility] 6/16/2022 Yes           Pulmonary Assessment/Plan:     1.  Acute on chronic hypoxic respiratory failure due to COPD exacerbation with acute worsening of her oxygen requirement. CTA no PE. She does have a LLL atelectasis/consolidation. Possible superimposed pneumonia on empirical antibiotics. 2. Very severe underlying COPD. Continue pulmonary toilet. Continue bronchodilators. 3. Tobacco abuse she needs to quit smoking. 4. DVT prophylaxis. 5. Dyspnea due to the above supportive care. Cc time 36 min. Landy West MD, [facility], [facility]    The above note was generated using voice recognition software. Inadvertent typographical errors in transcription may have occurred.       Electronically signed by Landy West MD on 06/17/22 at 1:42 PM

## 2022-06-17 NOTE — PROGRESS NOTES
Cardiology Daily Note Slick Bear MD      Patient:  Nelsy Villagran  [id number]    Patient Active Problem List    Diagnosis Date Noted    Echocardiogram abnormal 10/16/2014    Anxiety     Acute on chronic respiratory failure ([address] 06/10/2022    Clostridium perfringens infection 11/11/2021    Intractable nausea and vomiting 11/09/2021    Gram-negative bacteremia 11/09/2021    Hypomagnesemia 11/09/2021    Tobacco abuse counseling 11/09/2021    Cigarette nicotine dependence with nicotine-induced disorder 11/09/2021    Severe malnutrition ([address] 10/08/2021    Palliative care patient 10/06/2021    Acute hypercapnic respiratory failure ([address] 10/02/2021    Current every day smoker 05/17/2020    Alpha-1-antitrypsin deficiency carrier 12/04/2018    Malignant neoplasm of upper lobe of right lung ([address] 12/04/2018    Cervicalgia 12/05/2017    Chronic low back pain 12/05/2017    Folate deficiency anemia 03/06/2017    COPD exacerbation (HCC)     Chronic pain 05/14/2014    Anxiety and depression 05/14/2014    GERD (gastroesophageal reflux disease) 05/14/2014    Chronic bronchitis with acute exacerbation ([address] 05/14/2014    Hypertension 05/14/2014       Admit Date:  6/10/2022    Admission Problem List: Present on Admission:   Acute on chronic respiratory failure ([address]   Palliative care patient   COPD exacerbation ([address]   Anxiety   Severe malnutrition ([address]      Cardiac Specific Data:  Specialty Problems        Cardiology Problems    Hypertension              Subjective:  Ms. Rebeca Flores seen today moved down to the intensive care unit worsening shortness of breath denies chest pain. Quite anxious. Blood pressure 137/94 heart 89. Sinus rhythm on the monitor. Feels better today.     Objective:   BP (!) 137/94   Pulse 89   Temp 97.7 °F (36.5 °C) (Temporal)   Resp (!) 33   Ht 5' 6\" (1.676 m)   Wt 112 lb 1.6 oz (50.8 kg)   SpO2 (!) 88%   BMI 18.09 kg/m²       Intake/Output Summary (Last 24 hours) at 6/17/2022 1108  Last data filed at 6/17/2022 1000  Gross per 24 hour   Intake 1450 ml   Output 3115 ml   Net -1665 ml       Prior to Admission medications    Medication Sig Start Date End Date Taking? Authorizing Provider   phenazopyridine (PYRIDIUM) 200 MG tablet Take 1 tablet by mouth 3 times daily as needed for Pain (bladder discomfort) 11/14/21   Wood Frye MD   montelukast (SINGULAIR) 10 MG tablet Take 10 mg by mouth nightly    Historical Provider, MD   oxyCODONE-acetaminophen (PERCOCET)  MG per tablet TAKE 1 TABLET Q 4 TO 6 HRS PRN. (MAX 5/DAY).  Earliest Fill Date: 7/19/18 7/19/18 8/18/18  Franky Wooten MD   ondansetron (ZOFRAN ODT) 4 MG disintegrating tablet Take 1 tablet by mouth every 8 hours as needed for Nausea or Vomiting 5/1/18   TEN Riley   lidocaine (LIDODERM) 5 % Place 1 patch onto the skin daily 12 hours on, 12 hours off. 5/1/18   Kalyane TEN Mandujano   nicotine (NICODERM CQ) 7 MG/24HR Place 1 patch onto the skin every 24 hours 5/1/18   Neema Mandujano APRSOPHY   nicotine (NICODERM CQ) 21 MG/24HR Place 1 patch onto the skin every 24 hours 5/1/18   TEN Riley   tiZANidine (ZANAFLEX) 4 MG tablet Take 1 tablet by mouth 3 times daily as needed (.) 4/30/18   Franky Wooten MD   albuterol-ipratropium (COMBIVENT RESPIMAT)  MCG/ACT AERS inhaler Inhale 1 puff into the lungs every 6 hours 4/27/18   Herber Trevizo MD   albuterol sulfate HFA (VENTOLIN HFA) 108 (90 Base) MCG/ACT inhaler Inhale 2 puffs into the lungs every 6 hours as needed for Wheezing 4/27/18   Herber Trevizo MD   budesonide-formoterol [facility]) 160-4.5 MCG/ACT AERO Inhale 2 puffs into the lungs 2 times daily 4/27/18   Herber Trevizo MD   pantoprazole (PROTONIX) 20 MG tablet TAKE 1 TABLET BY MOUTH 2 TIMES DAILY 12/18/17   Herber Trevizo MD   fluticasone [facility]) 50 MCG/ACT nasal spray INSTILL 1 SPRAY IN EACH NOSTRIL DAILY 10/4/17   Herber Trevizo MD   OXYGEN Inhale into the lungs    Historical Provider, MD   ipratropium-albuterol (DUONEB) 0.5-2.5 (3) MG/3ML SOLN nebulizer solution Inhale 3 mLs into the lungs every 4 hours 4/6/17   Wagner Cough, MD   metoclopramide (REGLAN) 5 MG tablet Take 1 tablet by mouth 4 times daily 3/30/17   Wagner Cough, MD   albuterol (PROVENTIL) (2.5 MG/3ML) 0.083% nebulizer solution Take 3 mLs by nebulization every 6 hours as needed for Wheezing 3/17/17   Wagner Cough, MD   ondansetron [facility]) 4 MG tablet Take 1 tablet by mouth every 8 hours as needed for Nausea or Vomiting 2/23/17   Wagner Cough, MD   gabapentin (NEURONTIN) 600 MG tablet Take 600 mg by mouth 3 times daily    Historical Provider, MD   ALPRAZolam Monet Pillow) 1 MG tablet Take 1 mg by mouth nightly as needed Takes 4 times a day 1/15/15   Historical Provider, MD   amphetamine-dextroamphetamine (ADDERALL) 20 MG tablet Take 20 mg by mouth 2 times daily. 1/21/15   Historical Provider, MD   citalopram (CELEXA) 40 MG tablet Take 40 mg by mouth daily.     Historical Provider, MD        magnesium oxide  400 mg Oral BID    vancomycin  1,000 mg IntraVENous Q12H    cefepime  2,000 mg IntraVENous Q8H    vancomycin (VANCOCIN) intermittent dosing (placeholder)   Other RX Placeholder    predniSONE  20 mg Oral Daily    guaiFENesin  400 mg Oral Q4H    vitamin B-12  500 mcg Oral Daily    citalopram  40 mg Oral Daily    gabapentin  600 mg Oral TID    montelukast  10 mg Oral Nightly    nicotine  1 patch TransDERmal Q24H    pantoprazole  40 mg Oral QAM AC    sodium chloride flush  5-40 mL IntraVENous 2 times per day    enoxaparin  40 mg SubCUTAneous Daily       TELEMETRY: Sinus     Physical Exam:      Physical Exam      General:  Awake, alert, NAD  Skin:  Warm and dry  Neck:  no jvd , no carotid bruits  Chest:  Clear to auscultation, no wheezing or rales  Cardiovascular:  RRR R2R4 no murmurs, clicks, gallups, or rubs  Abdomen:  Soft nontender, nondistended, bowel sounds thickened with preserved leaflet  mobility. Mild mitral regurgitation is present. Mildly thickened aortic valve leaflets with preserved leaflet mobility. Tricuspid valve is structurally normal.  Mild tricuspid regurgitation. Normal left ventricular size with preserved LV function and an estimated  ejection fraction of approximately 55-60%. Normal left ventricular wall thickness. No regional wall motion abnormalities. Signature   ----------------------------------------------------------------  Electronically signed by Abeba Shaver MD(Interpreting  physician) on 06/12/2022 08:35 AM  ----------------------------------------------------------------   Findings   Mitral Valve  Mitral valve leaflets are mildly thickened with preserved leaflet  mobility. Mild mitral regurgitation is present. Aortic Valve  Mildly thickened aortic valve leaflets with preserved leaflet mobility. Tricuspid Valve  Tricuspid valve is structurally normal.  Mild tricuspid regurgitation. Pulmonic Valve  The pulmonic valve was not well visualized . Trace pulmonic regurgitation present. Left Atrium  Normal size left atrium. Left Ventricle  Normal left ventricular size with preserved LV function and an estimated  ejection fraction of approximately 55-60%. Normal left ventricular wall thickness. No regional wall motion abnormalities. Right Atrium  Normal right atrial dimension with no evidence of thrombus or mass noted. Right Ventricle  Normal right ventricular size with preserved RV function. Pericardial Effusion  No evidence of significant pericardial effusion is noted. Pleural Effusion  No evidence of pleural effusion. Miscellaneous  Definity utilized  2D Measurements and Calculations(cm)   LVIDd: 4.04 cm                      LVIDs: 2.78 cm  IVSd: 0.82 cm  LVPWd: 0.74 cm                      AO Root Dimension: 1.7 cm  Rt. Vent.  Dimension: 2.37 cm        LA Dimension: 2.4 cm  % Ejection Fraction: 57 %           LA Area: 12.5 cm^2  LA Volume: 26.9 ml                  LV Systolic dimension: 3.13GI  LA Volume Index: 17 ml/m^2          LV PW diastolic: 6.97DM  LV dimension: 4.04 cm               LVOT diameter: 2 cm                                      RV Diastolic dimension: 3.28KH  LVEDV: 86.4 ml                      LVEDVI: 55 ml/m^2  LVESV:37.2 ml                       LVESVI: 24 ml/m^2  Ascending Aorta: 2.9 cm  Doppler Measurements and Calculations   AV Peak Velocity:181 cm/s              MV Peak E-Wave: 105 cm/s  AV Mean Velocity:123 cm/s              MV Peak A-Wave: 113 cm/s  AV Peak Gradient: 13.1 mmHg            MV E/A Ratio: 0.93 %  AV Mean Gradient: 7 mmHg               MV Mean velocity: 91.1cm/s  AV Area (Continuity):2.26 cm^2         MV Peak Gradient: 4.41 mmHg  AV VTI:42.3 cm/s                       MV Mean gradient: 3 mmHg  TR Velocity:282 cm/s  TR Gradient:31.81 mmHg  Estimated RAP:3 mmHg  RVSP:35 mmHg   MV E' septal velocity: 9.57cm/s  MV E' lateral velocity:8.59 cm/s      XR CHEST PORTABLE    Result Date: 6/16/2022  Vimal Lb by Vimal Cervantes MD at 16-Jun-2022 12:52:41 PMExam: X-RAY OF [facility] Clinical data:Shortness of breath. Technique:Single view of the chest. Prior studies: Radiograph of chest dated 06/10/22. Findings: The lungs are grossly clear; noevidence of acute infiltrate or pleural effusion. There is pleural thickening in each cupola. Minimal blunting of the left costophrenic sulcus noted as are clips in the epigastrium. Mild hyperinflation is suspected. Very minimal calcific aortitis in the arch. Cardiac silhouette is within normal limits. No acute osseous abnormality is detected. Pleural thickening in each cupola Minimal blunting of the left costophrenic sulcus with clips in the epigastrium. Mild hyperinflation. Very minimal calcific aortitis in the arch Recommendation: Follow up as clinically indicated.  Electronically Signed by Vimal Cervantes MD at 16-Jun-2022 11:56:15 AM             XR CHEST PORTABLE    Result Date: 6/10/2022  NO PRIOR REPORT AVAILABLE Exam: X-RAY OF [facility] Clinical data:Shortness of breath. Technique:Single portable upright view of the chest. Prior studies: Prior studies of the Chest Xray dated 10/2/2021 (image only) and NM Lung Scan dated 11/8/2021 (images only)  submitted. Findings: The lungs are grossly clear; noevidence of acute infiltrate or pleural effusion. Cardiac silhouette is within normal limits. No acute osseous abnormality is detected. There is modest hyperinflation and accentuated in the upper lobes and mild bibasilar fibrotic changes. Surgical clips noted in the epigastrium and left upper quadrant. Modest bilateral upper lobe hyperinflation and mild bibasilar fibrosis. Surgical clips in the epigastrium and left upper quadrant. Recommendation: Follow up as clinically indicated. Electronically Signed by Coco Tapia MD at 10-Rai-2022 01:10:14 PM             CTA PULMONARY W CONTRAST    Result Date: 6/16/2022  NO PRIOR REPORT AVAILABLE Exam: CTA OF THE CHEST WITH CONTRAST Clinical data: Rule out lung collapse. Technique: Axial CT angiography images through the lungs were acquired with contrast and imaged using soft tissue and lung algorithms. Coronal, sagittal, and 3D volume reconstructions were performed. Reformatted/3D-MPR images were performed. Radiation dose: CTDIvol =23.34 mGy, DLP =391 mGy x cm. Prior studies: Radiograph of the chest done on the same date. Findings: Lungs: Hyperinflated lungs with advanced bilateral centrilobular emphysematous changes and interstitial thickening. Biapical pleuroparenchymal thickening/scarring; left greater than right. Marked decreased volume of the left lower lobe with atelectasis/collapse/infiltrates with crowded bronchovascular structures. No pulmonary mass identified. No pleural effusions identified. No pneumothorax. Rest of the airway is clear.  Soft Tissues: No mediastinal, axillary or supraclavicular adenopathy isidentified. Vascular: No filling defect within the pulmonary arteries to the segmental branch level. Scattered atherosclerotic plaques in the walls of a known aneurysmal aorta. Grossly unremarkable sized heart. No definite abnormality seen on 3D reformatted images. Bony structures: No acute or destructive abnormality. Degenerative changes of the thoracic spine. Upper Abdomen: Limited visualization of the solid upper abdominal organs demonstrates no acute change. Streaky artifacts from surgical clips in the upper abdomen obscuring the surrounding structures, cholecystectomy clips. 1.  Marked decreased volume of the left lower lobe with atelectasis/collapse/infiltrates with crowded bronchovascular structures 2. Hyperinflated lungs with advanced bilateral centrilobular emphysematous changes and interstitial thickening. 3. Biapical pleuroparenchymal thickening/scarring; left greater than right. 4.  Other benign findings as above. Recommendation: Follow up as clinically indicated. All CT scans at this facility utilize dose modulation, iterative reconstruction, and/or weight based dosing when appropriate to reduce radiation dose to as low as reasonably achievable. Electronically Signed by Andrew Nelson MD at 16-Jun-2022 04:38:50 PM                 Assessment:  1. Worsening shortness of breath denies chest pain  2. Severe COPD on home oxygen therapy 3 L  3. Marked anxiety  4. Supraventricular tachycardia  5. Acute/chronic respiratory failure hypercapnic  6. Gastroesophageal reflux disease  7. Hypertension  8. Alpha-1 antitrypsin deficiency carrier  9. Tobacco abuse ongoing  10. History of lung carcinoma right upper lobe  11. Severe malnutrition  12. ADHD  13. Fibromyalgia  14. Rheumatoid arthritis  15. Lung scan 11/8/2021 Limited diagnostic study low probability pulmonary embolism  16. CT abdomen pelvis 11/8/2021 evidence previous cholecystectomy otherwise unremarkable  17.  CTA pulmonary yesterday

## 2022-06-17 NOTE — PROGRESS NOTES
Comprehensive Nutrition Assessment    Type and Reason for Visit:  Reassess    Nutrition Recommendations/Plan:   1. Modify diet to Regular     Malnutrition Assessment:  Malnutrition Status:  Severe malnutrition (06/16/22 1325)    Context:  Chronic Illness     Findings of the 6 clinical characteristics of malnutrition:  Energy Intake:  No significant decrease in energy intake  Weight Loss:  No significant weight loss     Body Fat Loss:  Severe body fat loss Orbital,Buccal region   Muscle Mass Loss:  Severe muscle mass loss Temples (temporalis)  Fluid Accumulation:  No significant fluid accumulation     Strength:  Not Performed    Nutrition Assessment:    Pt continues with inadequate oral intake of meals. Pt currently consumes % of supplements. K+ level is WNL. Will liberalize diet to Regular to provide pt with additional meal choices. Current Nutrition Intake & Therapies:    Average Meal Intake: 1-25%  Average Supplements Intake: %  ADULT ORAL NUTRITION SUPPLEMENT; Lunch, Breakfast, Dinner; Standard High Calorie/High Protein Oral Supplement  ADULT DIET;  Regular; Low Potassium (Less than 3000 mg/day)    Anthropometric Measures:  Height: 5' 6\" (167.6 cm)  Ideal Body Weight (IBW): 130 lbs (59 kg)    Admission Body Weight: 112 lb (50.8 kg)  Current Body Weight: 112 lb 1.6 oz (50.8 kg)   Current BMI (kg/m2): 18.1  Usual Body Weight: 118 lb (53.5 kg) (11/8/2021)  % Weight Change (Calculated): -5.1                    BMI Categories: Underweight (BMI less than 22) age over 72    Estimated Daily Nutrient Needs:  Energy Requirements Based On: Kcal/kg  Weight Used for Energy Requirements: Current  Energy (kcal/day): 9414-9536 kcal/d (30-35 kcal/kg)  Weight Used for Protein Requirements: Current  Protein (g/day): 76 g/d (1.5 g/kg)  Method Used for Fluid Requirements: 1 ml/kcal  Fluid (ml/day): 6279-7917 ml/d    Nutrition Diagnosis:   · Severe malnutrition related to other (comment) (in context of chronic illness) as evidenced by severe loss of subcutaneous fat,severe muscle loss      Nutrition Interventions:   Food and/or Nutrient Delivery: Modify Current Diet,Continue Oral Nutrition Supplement  Coordination of Nutrition Care: Continue to monitor while inpatient       Goals:  Previous Goal Met: Progressing toward Goal(s)  Goals: PO intake 50% or greater,by next RD assessment       Nutrition Monitoring and Evaluation:   Food/Nutrient Intake Outcomes: Food and Nutrient Intake,Supplement Intake  Physical Signs/Symptoms Outcomes: Biochemical Data,Fluid Status or Edema,Weight,Skin        Steven Haley MS, RD, LD  Contact: [phone]

## 2022-06-18 LAB
ALBUMIN SERPL-MCNC: 3.2 G/DL (ref 3.5–5.2)
ALP BLD-CCNC: 48 U/L (ref 35–104)
ALT SERPL-CCNC: 9 U/L (ref 5–33)
ANION GAP SERPL CALCULATED.3IONS-SCNC: 7 MMOL/L (ref 7–19)
AST SERPL-CCNC: 10 U/L (ref 5–32)
BASOPHILS ABSOLUTE: 0 K/UL (ref 0–0.2)
BASOPHILS RELATIVE PERCENT: 0.1 % (ref 0–1)
BILIRUB SERPL-MCNC: 0.3 MG/DL (ref 0.2–1.2)
BUN BLDV-MCNC: 11 MG/DL (ref 8–23)
CALCIUM SERPL-MCNC: 8.7 MG/DL (ref 8.8–10.2)
CHLORIDE BLD-SCNC: 93 MMOL/L (ref 98–111)
CO2: 34 MMOL/L (ref 22–29)
CREAT SERPL-MCNC: 0.4 MG/DL (ref 0.5–0.9)
CULTURE, RESPIRATORY: ABNORMAL
CULTURE, RESPIRATORY: ABNORMAL
EOSINOPHILS ABSOLUTE: 0.1 K/UL (ref 0–0.6)
EOSINOPHILS RELATIVE PERCENT: 1.5 % (ref 0–5)
GFR AFRICAN AMERICAN: >59
GFR NON-AFRICAN AMERICAN: >60
GLUCOSE BLD-MCNC: 98 MG/DL (ref 74–109)
GRAM STAIN RESULT: ABNORMAL
HCT VFR BLD CALC: 34.7 % (ref 37–47)
HEMOGLOBIN: 10.8 G/DL (ref 12–16)
IMMATURE GRANULOCYTES #: 0.1 K/UL
LYMPHOCYTES ABSOLUTE: 1.7 K/UL (ref 1.1–4.5)
LYMPHOCYTES RELATIVE PERCENT: 20.5 % (ref 20–40)
MAGNESIUM: 1.7 MG/DL (ref 1.6–2.4)
MCH RBC QN AUTO: 31.5 PG (ref 27–31)
MCHC RBC AUTO-ENTMCNC: 31.1 G/DL (ref 33–37)
MCV RBC AUTO: 101.2 FL (ref 81–99)
MONOCYTES ABSOLUTE: 1.2 K/UL (ref 0–0.9)
MONOCYTES RELATIVE PERCENT: 14.5 % (ref 0–10)
NEUTROPHILS ABSOLUTE: 5.1 K/UL (ref 1.5–7.5)
NEUTROPHILS RELATIVE PERCENT: 62.7 % (ref 50–65)
ORGANISM: ABNORMAL
PDW BLD-RTO: 13.5 % (ref 11.5–14.5)
PLATELET # BLD: 240 K/UL (ref 130–400)
PMV BLD AUTO: 11 FL (ref 9.4–12.3)
POTASSIUM SERPL-SCNC: 4 MMOL/L (ref 3.5–5)
RBC # BLD: 3.43 M/UL (ref 4.2–5.4)
SODIUM BLD-SCNC: 134 MMOL/L (ref 136–145)
TOTAL PROTEIN: 5.8 G/DL (ref 6.6–8.7)
VANCOMYCIN TROUGH: 13.4 UG/ML (ref 10–20)
WBC # BLD: 8.2 K/UL (ref 4.8–10.8)

## 2022-06-18 PROCEDURE — 2500000003 HC RX 250 WO HCPCS: Performed by: INTERNAL MEDICINE

## 2022-06-18 PROCEDURE — 94640 AIRWAY INHALATION TREATMENT: CPT

## 2022-06-18 PROCEDURE — 6360000002 HC RX W HCPCS: Performed by: INTERNAL MEDICINE

## 2022-06-18 PROCEDURE — 6360000002 HC RX W HCPCS: Performed by: FAMILY MEDICINE

## 2022-06-18 PROCEDURE — 36415 COLL VENOUS BLD VENIPUNCTURE: CPT

## 2022-06-18 PROCEDURE — 6370000000 HC RX 637 (ALT 250 FOR IP): Performed by: FAMILY MEDICINE

## 2022-06-18 PROCEDURE — 80202 ASSAY OF VANCOMYCIN: CPT

## 2022-06-18 PROCEDURE — 2000000000 HC ICU R&B

## 2022-06-18 PROCEDURE — 2580000003 HC RX 258: Performed by: INTERNAL MEDICINE

## 2022-06-18 PROCEDURE — 94669 MECHANICAL CHEST WALL OSCILL: CPT

## 2022-06-18 PROCEDURE — 83735 ASSAY OF MAGNESIUM: CPT

## 2022-06-18 PROCEDURE — 2700000000 HC OXYGEN THERAPY PER DAY

## 2022-06-18 PROCEDURE — 94660 CPAP INITIATION&MGMT: CPT

## 2022-06-18 PROCEDURE — 2580000003 HC RX 258: Performed by: FAMILY MEDICINE

## 2022-06-18 PROCEDURE — 85025 COMPLETE CBC W/AUTO DIFF WBC: CPT

## 2022-06-18 PROCEDURE — 80053 COMPREHEN METABOLIC PANEL: CPT

## 2022-06-18 PROCEDURE — 6370000000 HC RX 637 (ALT 250 FOR IP): Performed by: INTERNAL MEDICINE

## 2022-06-18 RX ORDER — CALCIUM CARBONATE 200(500)MG
500 TABLET,CHEWABLE ORAL 3 TIMES DAILY PRN
Status: DISCONTINUED | OUTPATIENT
Start: 2022-06-18 | End: 2022-06-25 | Stop reason: HOSPADM

## 2022-06-18 RX ADMIN — Medication 1000 MG: at 16:06

## 2022-06-18 RX ADMIN — OXYCODONE HYDROCHLORIDE AND ACETAMINOPHEN 1 TABLET: 5; 325 TABLET ORAL at 03:55

## 2022-06-18 RX ADMIN — SODIUM CHLORIDE, PRESERVATIVE FREE 10 ML: 5 INJECTION INTRAVENOUS at 21:21

## 2022-06-18 RX ADMIN — GABAPENTIN 600 MG: 600 TABLET, FILM COATED ORAL at 13:47

## 2022-06-18 RX ADMIN — Medication 400 MG: at 21:21

## 2022-06-18 RX ADMIN — SODIUM CHLORIDE, PRESERVATIVE FREE 10 ML: 5 INJECTION INTRAVENOUS at 07:53

## 2022-06-18 RX ADMIN — Medication 400 MG: at 07:49

## 2022-06-18 RX ADMIN — LEVALBUTEROL HYDROCHLORIDE 1.25 MG: 1.25 SOLUTION RESPIRATORY (INHALATION) at 07:05

## 2022-06-18 RX ADMIN — GABAPENTIN 600 MG: 600 TABLET, FILM COATED ORAL at 07:49

## 2022-06-18 RX ADMIN — ALPRAZOLAM 1 MG: 0.5 TABLET ORAL at 13:47

## 2022-06-18 RX ADMIN — OXYCODONE HYDROCHLORIDE AND ACETAMINOPHEN 1 TABLET: 5; 325 TABLET ORAL at 10:22

## 2022-06-18 RX ADMIN — Medication 400 MG: at 12:44

## 2022-06-18 RX ADMIN — GUAIFENESIN 400 MG: 100 SOLUTION ORAL at 08:23

## 2022-06-18 RX ADMIN — CEFEPIME 2000 MG: 2 INJECTION, POWDER, FOR SOLUTION INTRAMUSCULAR; INTRAVENOUS at 17:09

## 2022-06-18 RX ADMIN — CITALOPRAM HYDROBROMIDE 40 MG: 20 TABLET ORAL at 07:48

## 2022-06-18 RX ADMIN — ONDANSETRON 4 MG: 4 TABLET, ORALLY DISINTEGRATING ORAL at 15:40

## 2022-06-18 RX ADMIN — PANTOPRAZOLE SODIUM 40 MG: 40 TABLET, DELAYED RELEASE ORAL at 06:47

## 2022-06-18 RX ADMIN — GABAPENTIN 600 MG: 600 TABLET, FILM COATED ORAL at 21:21

## 2022-06-18 RX ADMIN — Medication 1000 MG: at 05:24

## 2022-06-18 RX ADMIN — ANTACID TABLETS 500 MG: 500 TABLET, CHEWABLE ORAL at 15:26

## 2022-06-18 RX ADMIN — CEFEPIME 2000 MG: 2 INJECTION, POWDER, FOR SOLUTION INTRAMUSCULAR; INTRAVENOUS at 00:29

## 2022-06-18 RX ADMIN — GUAIFENESIN 400 MG: 100 SOLUTION ORAL at 17:08

## 2022-06-18 RX ADMIN — ENOXAPARIN SODIUM 40 MG: 100 INJECTION SUBCUTANEOUS at 18:10

## 2022-06-18 RX ADMIN — CEFEPIME 2000 MG: 2 INJECTION, POWDER, FOR SOLUTION INTRAMUSCULAR; INTRAVENOUS at 07:56

## 2022-06-18 RX ADMIN — LEVALBUTEROL HYDROCHLORIDE 1.25 MG: 1.25 SOLUTION RESPIRATORY (INHALATION) at 16:28

## 2022-06-18 RX ADMIN — PREDNISONE 20 MG: 20 TABLET ORAL at 07:49

## 2022-06-18 RX ADMIN — GUAIFENESIN 400 MG: 100 SOLUTION ORAL at 21:20

## 2022-06-18 RX ADMIN — CYANOCOBALAMIN TAB 500 MCG 500 MCG: 500 TAB at 07:48

## 2022-06-18 RX ADMIN — OXYCODONE HYDROCHLORIDE AND ACETAMINOPHEN 1 TABLET: 5; 325 TABLET ORAL at 23:00

## 2022-06-18 RX ADMIN — LEVALBUTEROL HYDROCHLORIDE 1.25 MG: 1.25 SOLUTION RESPIRATORY (INHALATION) at 22:22

## 2022-06-18 RX ADMIN — MONTELUKAST SODIUM 10 MG: 10 TABLET, FILM COATED ORAL at 21:21

## 2022-06-18 RX ADMIN — ALPRAZOLAM 1 MG: 0.5 TABLET ORAL at 06:47

## 2022-06-18 RX ADMIN — ALPRAZOLAM 1 MG: 0.5 TABLET ORAL at 21:21

## 2022-06-18 RX ADMIN — GUAIFENESIN 400 MG: 100 SOLUTION ORAL at 12:44

## 2022-06-18 RX ADMIN — GUAIFENESIN 400 MG: 100 SOLUTION ORAL at 04:45

## 2022-06-18 RX ADMIN — GUAIFENESIN 400 MG: 100 SOLUTION ORAL at 00:28

## 2022-06-18 ASSESSMENT — PAIN DESCRIPTION - LOCATION
LOCATION: HEAD
LOCATION: HEAD;FACE
LOCATION: BUTTOCKS;HEAD

## 2022-06-18 ASSESSMENT — PAIN - FUNCTIONAL ASSESSMENT
PAIN_FUNCTIONAL_ASSESSMENT: ACTIVITIES ARE NOT PREVENTED
PAIN_FUNCTIONAL_ASSESSMENT: ACTIVITIES ARE NOT PREVENTED
PAIN_FUNCTIONAL_ASSESSMENT: PREVENTS OR INTERFERES SOME ACTIVE ACTIVITIES AND ADLS

## 2022-06-18 ASSESSMENT — PAIN SCALES - GENERAL
PAINLEVEL_OUTOF10: 8
PAINLEVEL_OUTOF10: 4
PAINLEVEL_OUTOF10: 1
PAINLEVEL_OUTOF10: 0
PAINLEVEL_OUTOF10: 0
PAINLEVEL_OUTOF10: 2
PAINLEVEL_OUTOF10: 0
PAINLEVEL_OUTOF10: 2
PAINLEVEL_OUTOF10: 1
PAINLEVEL_OUTOF10: 1
PAINLEVEL_OUTOF10: 8

## 2022-06-18 ASSESSMENT — PAIN DESCRIPTION - ORIENTATION: ORIENTATION: RIGHT;LEFT

## 2022-06-18 ASSESSMENT — PAIN DESCRIPTION - DESCRIPTORS
DESCRIPTORS: ACHING

## 2022-06-18 NOTE — PROGRESS NOTES
[address] [facility] Pharmacokinetic Monitoring Service - Vancomycin    Consulting Provider: Dr Tiffani Lam   Indication: Pneumonia  Target Concentration: Goal AUC/LETTY 400-600 mg*hr/L  Day of Therapy: 3  Additional Antimicrobials: Maxipime    Pertinent Laboratory Values: Wt Readings from Last 1 Encounters:   06/10/22 112 lb 1.6 oz (50.8 kg)     Temp Readings from Last 1 Encounters:   06/18/22 97.4 °F (36.3 °C) (Temporal)     Estimated Creatinine Clearance: 99 mL/min (A) (based on SCr of 0.4 mg/dL (L)).   Recent Labs     06/17/22  0131 06/18/22  0414   CREATININE 0.4* 0.4*   WBC 9.4 8.2     Procalcitonin: N/A    Pertinent Cultures:  Culture Date Source Results   06/16/22  06/10/22 Resp Corynebacterium striatum  Rhinovirus/Enterovirus   MRSA Nasal Swab: showed MRSA positive result on 06/16/22    Recent vancomycin administrations                     vancomycin (VANCOCIN) 1000 mg in dextrose 5% 250 mL IVPB (mg) 1,000 mg New Bag 06/17/22 1606     1,000 mg New Bag  0446    vancomycin (VANCOCIN) 1,250 mg in dextrose 5 % 250 mL IVPB (mg) 1,250 mg New Bag 06/16/22 1741                    Assessment:  Date/Time Current Dose Concentration Timing of Concentration (h) AUC   06/18/22 1000mg Q12hr 13.5 (SS= 15.1) 12 hours 8 min 530   Note: Serum concentrations collected for AUC dosing may appear elevated if collected in close proximity to the dose administered, this is not necessarily an indication of toxicity    Plan:  Current dosing regimen is therapeutic  Continue current dose  Repeat vancomycin concentration in 5-7 days   Pharmacy will continue to monitor patient and adjust therapy as indicated    Thank you for the consult,  Kathy Neal [facility]  6/18/2022 5:02 AM

## 2022-06-18 NOTE — PLAN OF CARE
Problem: Discharge Planning  Goal: Discharge to home or other facility with appropriate resources  Outcome: Progressing  Flowsheets  Taken 6/17/2022 2000 by Sayra Goldberg RN  Discharge to home or other facility with appropriate resources: Identify barriers to discharge with patient and caregiver  Taken 6/17/2022 1600 by Linda Key RN  Discharge to home or other facility with appropriate resources: Identify barriers to discharge with patient and caregiver  Taken 6/17/2022 1200 by Linda Key RN  Discharge to home or other facility with appropriate resources: Identify barriers to discharge with patient and caregiver     Problem: Pain  Goal: Verbalizes/displays adequate comfort level or baseline comfort level  Outcome: Progressing  Flowsheets  Taken 6/17/2022 1600 by Linda Key RN  Verbalizes/displays adequate comfort level or baseline comfort level: Encourage patient to monitor pain and request assistance  Taken 6/17/2022 1200 by Linda Key RN  Verbalizes/displays adequate comfort level or baseline comfort level: Encourage patient to monitor pain and request assistance     Problem: Skin/Tissue Integrity  Goal: Absence of new skin breakdown  Description: 1. Monitor for areas of redness and/or skin breakdown  2. Assess vascular access sites hourly  3. Every 4-6 hours minimum:  Change oxygen saturation probe site  4. Every 4-6 hours:  If on nasal continuous positive airway pressure, respiratory therapy assess nares and determine need for appliance change or resting period.   Outcome: Progressing     Problem: Nutrition Deficit:  Goal: Optimize nutritional status  Outcome: Progressing     Problem: ABCDS Injury Assessment  Goal: Absence of physical injury  Outcome: Progressing

## 2022-06-18 NOTE — PROGRESS NOTES
Family Medicine Progress Note    Patient:  Holly Ji  YOB: 1950    MRN: [id number]     Acct: [de-identified]     Admit date: 6/10/2022    Patient Seen, Chart, Consults notes, Labs, Radiology studies reviewed. Subjective: Day 8 of stay with acute on chronic respiratory failure and most recent (in last 24 hours) has had some improvement overnight. Was on high flow oxygen yesterday but has now down to regular nasal cannula. Cough has been more productive. Benefiting from pulmonary toilet measures. Has intermittently felt tachycardia but no chest pain. Past, Family, Social History unchanged from admission. Diet:  ADULT ORAL NUTRITION SUPPLEMENT; Lunch, Breakfast, Dinner; Standard High Calorie/High Protein Oral Supplement  ADULT DIET;  Regular    Medications:  Scheduled Meds:   magnesium oxide  400 mg Oral TID    vancomycin  1,000 mg IntraVENous Q12H    cefepime  2,000 mg IntraVENous Q8H    vancomycin (VANCOCIN) intermittent dosing (placeholder)   Other RX Placeholder    predniSONE  20 mg Oral Daily    guaiFENesin  400 mg Oral Q4H    vitamin B-12  500 mcg Oral Daily    citalopram  40 mg Oral Daily    gabapentin  600 mg Oral TID    montelukast  10 mg Oral Nightly    nicotine  1 patch TransDERmal Q24H    pantoprazole  40 mg Oral QAM AC    sodium chloride flush  5-40 mL IntraVENous 2 times per day    enoxaparin  40 mg SubCUTAneous Daily     Continuous Infusions:   sodium chloride Stopped (06/15/22 0011)     PRN Meds:levalbuterol, ALPRAZolam, iopamidol, sodium chloride, ondansetron, benzonatate, morphine, oxyCODONE-acetaminophen, traMADol, hydrOXYzine HCl, sodium chloride flush, sodium chloride, acetaminophen, ondansetron    Objective:    Vitals: /82   Pulse 88   Temp 97.7 °F (36.5 °C) (Temporal)   Resp 30   Ht 5' 6\" (1.676 m)   Wt 112 lb 1.6 oz (50.8 kg)   SpO2 90%   BMI 18.09 kg/m²   24 hour intake/output:    Intake/Output Summary (Last 24 hours) at 6/18/2022 [address] [facility] filed at 6/18/2022 0800  Gross per 24 hour   Intake 1130 ml   Output 2695 ml   Net -1565 ml     Last 3 weights: Wt Readings from Last 3 Encounters:   06/10/22 112 lb 1.6 oz (50.8 kg)   11/10/21 118 lb 6.4 oz (53.7 kg)   10/08/21 122 lb 3.2 oz (55.4 kg)       Physical Exam:    General Appearance:  alert, cooperative, dyspneic and anxious affect  Skin:  negatives: mobility and turgor normal  Eyes:  No gross abnormalities. Neck:  neck- supple, no mass, non-tender  Lungs:  Breathing Pattern: use of accessory muscles, Breath sounds: wheezing- right base and left base  Heart:  Heart regular rate and rhythm  Abdomen: Auscultation: Normal bowel sounds. No bruits. Palpation: Tenderness: absent  Extremities: pulses present in all extremities  Musculoskeletal:  No joint swelling, deformity, or tenderness.   Neurologic:  negative    CBC with Differential:    Lab Results   Component Value Date    WBC 8.2 06/18/2022    RBC 3.43 06/18/2022    HGB 10.8 06/18/2022    HCT 34.7 06/18/2022     06/18/2022    .2 06/18/2022    MCH 31.5 06/18/2022    MCHC 31.1 06/18/2022    RDW 13.5 06/18/2022    LYMPHOPCT 20.5 06/18/2022    MONOPCT 14.5 06/18/2022    BASOPCT 0.1 06/18/2022    MONOSABS 1.20 06/18/2022    LYMPHSABS 1.7 06/18/2022    EOSABS 0.10 06/18/2022    BASOSABS 0.00 06/18/2022     CMP:    Lab Results   Component Value Date     06/18/2022    K 4.0 06/18/2022    K 3.7 06/10/2022    CL 93 06/18/2022    CO2 34 06/18/2022    BUN 11 06/18/2022    CREATININE 0.4 06/18/2022    GFRAA >59 06/18/2022    LABGLOM >60 06/18/2022    GLUCOSE 98 06/18/2022    PROT 5.8 06/18/2022    LABALBU 3.2 06/18/2022    CALCIUM 8.7 06/18/2022    BILITOT 0.3 06/18/2022    ALKPHOS 48 06/18/2022    AST 10 06/18/2022    ALT 9 06/18/2022     Last 3 Troponin:    Lab Results   Component Value Date    TROPONINI <0.01 06/16/2022    TROPONINI <0.01 06/11/2022    TROPONINI <0.01 06/10/2022     Urine Culture:  No components found for: GIAN  Blood Culture:  No components found for: CBLOOD, CFUNGUSBL  Stool Culture:  No components found for: CSTOOL    Assessment:    Principal Problem:    Acute on chronic respiratory failure (HCC)  Active Problems:    Anxiety    COPD exacerbation (HCC)    Palliative care patient    Severe malnutrition ([facility] [address]  Resolved Problems:    * No resolved hospital problems. *          Plan:  On aggressive pulmonary measures. Weaning oxygen as tolerated. Empiric antibiotics for possible lower lobe pneumonia. Systemic steroids. Appreciate pulmonology's assistance in her care. Hopefully with less oxygen requirement this is a sign that she is stabilizing. Possibly back to the floor soon. Greater than 25 minutes spent in coordination of patient care today in the ICU.       Electronically signed by Dimas Washington MD on 6/18/2022 at 11:13 AM

## 2022-06-19 LAB
ALBUMIN SERPL-MCNC: 3.4 G/DL (ref 3.5–5.2)
ALP BLD-CCNC: 54 U/L (ref 35–104)
ALT SERPL-CCNC: 9 U/L (ref 5–33)
ANION GAP SERPL CALCULATED.3IONS-SCNC: 6 MMOL/L (ref 7–19)
AST SERPL-CCNC: 11 U/L (ref 5–32)
BASOPHILS ABSOLUTE: 0 K/UL (ref 0–0.2)
BASOPHILS RELATIVE PERCENT: 0.3 % (ref 0–1)
BILIRUB SERPL-MCNC: <0.2 MG/DL (ref 0.2–1.2)
BUN BLDV-MCNC: 11 MG/DL (ref 8–23)
CALCIUM SERPL-MCNC: 8.9 MG/DL (ref 8.8–10.2)
CHLORIDE BLD-SCNC: 96 MMOL/L (ref 98–111)
CO2: 34 MMOL/L (ref 22–29)
CREAT SERPL-MCNC: 0.3 MG/DL (ref 0.5–0.9)
EOSINOPHILS ABSOLUTE: 0.1 K/UL (ref 0–0.6)
EOSINOPHILS RELATIVE PERCENT: 1.2 % (ref 0–5)
GFR AFRICAN AMERICAN: >59
GFR NON-AFRICAN AMERICAN: >60
GLUCOSE BLD-MCNC: 157 MG/DL (ref 74–109)
HCT VFR BLD CALC: 34.8 % (ref 37–47)
HEMOGLOBIN: 10.7 G/DL (ref 12–16)
IMMATURE GRANULOCYTES #: 0.1 K/UL
LYMPHOCYTES ABSOLUTE: 1.3 K/UL (ref 1.1–4.5)
LYMPHOCYTES RELATIVE PERCENT: 13.2 % (ref 20–40)
MAGNESIUM: 1.7 MG/DL (ref 1.6–2.4)
MCH RBC QN AUTO: 31.5 PG (ref 27–31)
MCHC RBC AUTO-ENTMCNC: 30.7 G/DL (ref 33–37)
MCV RBC AUTO: 102.4 FL (ref 81–99)
MONOCYTES ABSOLUTE: 1.4 K/UL (ref 0–0.9)
MONOCYTES RELATIVE PERCENT: 14 % (ref 0–10)
NEUTROPHILS ABSOLUTE: 6.8 K/UL (ref 1.5–7.5)
NEUTROPHILS RELATIVE PERCENT: 70.6 % (ref 50–65)
PDW BLD-RTO: 13.5 % (ref 11.5–14.5)
PLATELET # BLD: 284 K/UL (ref 130–400)
PMV BLD AUTO: 11.2 FL (ref 9.4–12.3)
POTASSIUM SERPL-SCNC: 4.3 MMOL/L (ref 3.5–5)
RBC # BLD: 3.4 M/UL (ref 4.2–5.4)
SODIUM BLD-SCNC: 136 MMOL/L (ref 136–145)
TOTAL PROTEIN: 5.5 G/DL (ref 6.6–8.7)
WBC # BLD: 9.7 K/UL (ref 4.8–10.8)

## 2022-06-19 PROCEDURE — 6370000000 HC RX 637 (ALT 250 FOR IP): Performed by: FAMILY MEDICINE

## 2022-06-19 PROCEDURE — 80053 COMPREHEN METABOLIC PANEL: CPT

## 2022-06-19 PROCEDURE — 2700000000 HC OXYGEN THERAPY PER DAY

## 2022-06-19 PROCEDURE — 6360000002 HC RX W HCPCS: Performed by: FAMILY MEDICINE

## 2022-06-19 PROCEDURE — 83735 ASSAY OF MAGNESIUM: CPT

## 2022-06-19 PROCEDURE — 2580000003 HC RX 258: Performed by: FAMILY MEDICINE

## 2022-06-19 PROCEDURE — 94640 AIRWAY INHALATION TREATMENT: CPT

## 2022-06-19 PROCEDURE — 6370000000 HC RX 637 (ALT 250 FOR IP): Performed by: INTERNAL MEDICINE

## 2022-06-19 PROCEDURE — 94660 CPAP INITIATION&MGMT: CPT

## 2022-06-19 PROCEDURE — 2500000003 HC RX 250 WO HCPCS: Performed by: INTERNAL MEDICINE

## 2022-06-19 PROCEDURE — 94669 MECHANICAL CHEST WALL OSCILL: CPT

## 2022-06-19 PROCEDURE — 85025 COMPLETE CBC W/AUTO DIFF WBC: CPT

## 2022-06-19 PROCEDURE — 2580000003 HC RX 258: Performed by: INTERNAL MEDICINE

## 2022-06-19 PROCEDURE — 36415 COLL VENOUS BLD VENIPUNCTURE: CPT

## 2022-06-19 PROCEDURE — 2000000000 HC ICU R&B

## 2022-06-19 PROCEDURE — 6360000002 HC RX W HCPCS: Performed by: INTERNAL MEDICINE

## 2022-06-19 RX ADMIN — OXYCODONE HYDROCHLORIDE AND ACETAMINOPHEN 1 TABLET: 5; 325 TABLET ORAL at 13:57

## 2022-06-19 RX ADMIN — TRAMADOL HYDROCHLORIDE 50 MG: 50 TABLET, COATED ORAL at 12:48

## 2022-06-19 RX ADMIN — Medication 400 MG: at 08:39

## 2022-06-19 RX ADMIN — Medication 1000 MG: at 05:02

## 2022-06-19 RX ADMIN — CYANOCOBALAMIN TAB 500 MCG 500 MCG: 500 TAB at 08:38

## 2022-06-19 RX ADMIN — LEVALBUTEROL HYDROCHLORIDE 1.25 MG: 1.25 SOLUTION RESPIRATORY (INHALATION) at 07:13

## 2022-06-19 RX ADMIN — CITALOPRAM HYDROBROMIDE 40 MG: 20 TABLET ORAL at 08:39

## 2022-06-19 RX ADMIN — SODIUM CHLORIDE, PRESERVATIVE FREE 10 ML: 5 INJECTION INTRAVENOUS at 08:45

## 2022-06-19 RX ADMIN — GUAIFENESIN 400 MG: 100 SOLUTION ORAL at 00:47

## 2022-06-19 RX ADMIN — OXYCODONE HYDROCHLORIDE AND ACETAMINOPHEN 1 TABLET: 5; 325 TABLET ORAL at 04:27

## 2022-06-19 RX ADMIN — PANTOPRAZOLE SODIUM 40 MG: 40 TABLET, DELAYED RELEASE ORAL at 06:12

## 2022-06-19 RX ADMIN — GABAPENTIN 600 MG: 600 TABLET, FILM COATED ORAL at 21:00

## 2022-06-19 RX ADMIN — ALPRAZOLAM 1 MG: 0.5 TABLET ORAL at 08:38

## 2022-06-19 RX ADMIN — GUAIFENESIN 400 MG: 100 SOLUTION ORAL at 14:04

## 2022-06-19 RX ADMIN — ALPRAZOLAM 1 MG: 0.5 TABLET ORAL at 22:39

## 2022-06-19 RX ADMIN — LEVALBUTEROL HYDROCHLORIDE 1.25 MG: 1.25 SOLUTION RESPIRATORY (INHALATION) at 22:06

## 2022-06-19 RX ADMIN — CEFEPIME 2000 MG: 2 INJECTION, POWDER, FOR SOLUTION INTRAMUSCULAR; INTRAVENOUS at 00:49

## 2022-06-19 RX ADMIN — SALINE NASAL SPRAY 1 SPRAY: 1.5 SOLUTION NASAL at 14:05

## 2022-06-19 RX ADMIN — Medication 400 MG: at 19:42

## 2022-06-19 RX ADMIN — ALPRAZOLAM 1 MG: 0.5 TABLET ORAL at 01:27

## 2022-06-19 RX ADMIN — CEFEPIME 2000 MG: 2 INJECTION, POWDER, FOR SOLUTION INTRAMUSCULAR; INTRAVENOUS at 08:38

## 2022-06-19 RX ADMIN — GABAPENTIN 600 MG: 600 TABLET, FILM COATED ORAL at 14:04

## 2022-06-19 RX ADMIN — GUAIFENESIN 400 MG: 100 SOLUTION ORAL at 08:38

## 2022-06-19 RX ADMIN — ANTACID TABLETS 500 MG: 500 TABLET, CHEWABLE ORAL at 08:38

## 2022-06-19 RX ADMIN — ENOXAPARIN SODIUM 40 MG: 100 INJECTION SUBCUTANEOUS at 18:21

## 2022-06-19 RX ADMIN — ALPRAZOLAM 1 MG: 0.5 TABLET ORAL at 16:30

## 2022-06-19 RX ADMIN — LEVALBUTEROL HYDROCHLORIDE 1.25 MG: 1.25 SOLUTION RESPIRATORY (INHALATION) at 14:59

## 2022-06-19 RX ADMIN — GUAIFENESIN 400 MG: 100 SOLUTION ORAL at 20:45

## 2022-06-19 RX ADMIN — SODIUM CHLORIDE, PRESERVATIVE FREE 10 ML: 5 INJECTION INTRAVENOUS at 19:42

## 2022-06-19 RX ADMIN — Medication 1000 MG: at 16:28

## 2022-06-19 RX ADMIN — Medication 400 MG: at 14:04

## 2022-06-19 RX ADMIN — GUAIFENESIN 400 MG: 100 SOLUTION ORAL at 04:27

## 2022-06-19 RX ADMIN — ONDANSETRON 4 MG: 4 TABLET, ORALLY DISINTEGRATING ORAL at 12:19

## 2022-06-19 RX ADMIN — CEFEPIME 2000 MG: 2 INJECTION, POWDER, FOR SOLUTION INTRAMUSCULAR; INTRAVENOUS at 17:59

## 2022-06-19 RX ADMIN — GABAPENTIN 600 MG: 600 TABLET, FILM COATED ORAL at 08:39

## 2022-06-19 RX ADMIN — PREDNISONE 20 MG: 20 TABLET ORAL at 08:39

## 2022-06-19 RX ADMIN — MONTELUKAST SODIUM 10 MG: 10 TABLET, FILM COATED ORAL at 19:42

## 2022-06-19 RX ADMIN — OXYCODONE HYDROCHLORIDE AND ACETAMINOPHEN 1 TABLET: 5; 325 TABLET ORAL at 19:42

## 2022-06-19 RX ADMIN — ONDANSETRON HYDROCHLORIDE 4 MG: 2 SOLUTION INTRAMUSCULAR; INTRAVENOUS at 04:19

## 2022-06-19 ASSESSMENT — PAIN DESCRIPTION - PAIN TYPE
TYPE: ACUTE PAIN

## 2022-06-19 ASSESSMENT — PAIN SCALES - GENERAL
PAINLEVEL_OUTOF10: 6
PAINLEVEL_OUTOF10: 7
PAINLEVEL_OUTOF10: 0
PAINLEVEL_OUTOF10: 2
PAINLEVEL_OUTOF10: 8
PAINLEVEL_OUTOF10: 7
PAINLEVEL_OUTOF10: 6
PAINLEVEL_OUTOF10: 0
PAINLEVEL_OUTOF10: 2
PAINLEVEL_OUTOF10: 0
PAINLEVEL_OUTOF10: 6

## 2022-06-19 ASSESSMENT — PAIN DESCRIPTION - FREQUENCY: FREQUENCY: INTERMITTENT

## 2022-06-19 ASSESSMENT — PAIN DESCRIPTION - ORIENTATION
ORIENTATION: ANTERIOR
ORIENTATION: POSTERIOR
ORIENTATION: POSTERIOR
ORIENTATION: RIGHT;LEFT

## 2022-06-19 ASSESSMENT — PAIN DESCRIPTION - DESCRIPTORS
DESCRIPTORS: ACHING
DESCRIPTORS: ACHING;SHARP;SORE
DESCRIPTORS: ACHING

## 2022-06-19 ASSESSMENT — PAIN - FUNCTIONAL ASSESSMENT
PAIN_FUNCTIONAL_ASSESSMENT: ACTIVITIES ARE NOT PREVENTED
PAIN_FUNCTIONAL_ASSESSMENT: ACTIVITIES ARE NOT PREVENTED

## 2022-06-19 ASSESSMENT — PAIN DESCRIPTION - LOCATION
LOCATION: HEAD
LOCATION: OTHER (COMMENT)
LOCATION: HEAD

## 2022-06-19 ASSESSMENT — PAIN DESCRIPTION - ONSET: ONSET: PROGRESSIVE

## 2022-06-19 NOTE — PROGRESS NOTES
Family Medicine Progress Note    Patient:  Jessika Beard  YOB: 1950    MRN: [id number]     Acct: [de-identified]     Admit date: 6/10/2022    Patient Seen, Chart, Consults notes, Labs, Radiology studies reviewed. Subjective: Day 9 of stay with acute on chronic respiratory failure due to COPD and most recent (in last 24 hours) has had not as much improvement as she did the day before. Oxygen dropping with minimal exertion. Has been on and off high flow currently doing okay on regular nasal cannula. Cough is about the same. Past, Family, Social History unchanged from admission. Diet:  ADULT ORAL NUTRITION SUPPLEMENT; Lunch, Breakfast, Dinner; Standard High Calorie/High Protein Oral Supplement  ADULT DIET;  Regular    Medications:  Scheduled Meds:   magnesium oxide  400 mg Oral TID    vancomycin  1,000 mg IntraVENous Q12H    cefepime  2,000 mg IntraVENous Q8H    vancomycin (VANCOCIN) intermittent dosing (placeholder)   Other RX Placeholder    predniSONE  20 mg Oral Daily    guaiFENesin  400 mg Oral Q4H    vitamin B-12  500 mcg Oral Daily    citalopram  40 mg Oral Daily    gabapentin  600 mg Oral TID    montelukast  10 mg Oral Nightly    nicotine  1 patch TransDERmal Q24H    pantoprazole  40 mg Oral QAM AC    sodium chloride flush  5-40 mL IntraVENous 2 times per day    enoxaparin  40 mg SubCUTAneous Daily     Continuous Infusions:   sodium chloride Stopped (06/15/22 0011)     PRN Meds:calcium carbonate, levalbuterol, ALPRAZolam, iopamidol, sodium chloride, ondansetron, benzonatate, morphine, oxyCODONE-acetaminophen, traMADol, hydrOXYzine HCl, sodium chloride flush, sodium chloride, acetaminophen, ondansetron    Objective:    Vitals: BP (!) 126/104   Pulse 79   Temp 98 °F (36.7 °C) (Temporal)   Resp 25   Ht 5' 6\" (1.676 m)   Wt 112 lb 1.6 oz (50.8 kg)   SpO2 95%   BMI 18.09 kg/m²   24 hour intake/output:    Intake/Output Summary (Last 24 hours) at 6/19/2022 0911  Last data filed at 6/19/2022 0800  Gross per 24 hour   Intake 1667 ml   Output 3275 ml   Net -1608 ml     Last 3 weights: Wt Readings from Last 3 Encounters:   06/19/22 112 lb 1.6 oz (50.8 kg)   11/10/21 118 lb 6.4 oz (53.7 kg)   10/08/21 122 lb 3.2 oz (55.4 kg)       Physical Exam:    General Appearance:  awake, alert, oriented, in no acute distress and in mild distress  Skin:  negatives: mobility and turgor normal  Eyes:  No gross abnormalities. Neck:  neck- supple, no mass, non-tender  Lungs:  Breathing Pattern: use of accessory muscles and audible wheezes, Breath sounds: diminished breath sounds- throughout and wheezing- scattered  Heart:  Heart regular rate and rhythm  Abdomen: Auscultation: Normal bowel sounds. No bruits. Palpation: No masses, tenderness or organomegally. Extremities: Extremities warm to touch, pink, with no edema. Musculoskeletal:  No joint swelling, deformity, or tenderness.   Neurologic:  negative    CBC with Differential:    Lab Results   Component Value Date    WBC 9.7 06/19/2022    RBC 3.40 06/19/2022    HGB 10.7 06/19/2022    HCT 34.8 06/19/2022     06/19/2022    .4 06/19/2022    MCH 31.5 06/19/2022    MCHC 30.7 06/19/2022    RDW 13.5 06/19/2022    LYMPHOPCT 13.2 06/19/2022    MONOPCT 14.0 06/19/2022    BASOPCT 0.3 06/19/2022    MONOSABS 1.40 06/19/2022    LYMPHSABS 1.3 06/19/2022    EOSABS 0.10 06/19/2022    BASOSABS 0.00 06/19/2022     CMP:    Lab Results   Component Value Date     06/19/2022    K 4.3 06/19/2022    K 3.7 06/10/2022    CL 96 06/19/2022    CO2 34 06/19/2022    BUN 11 06/19/2022    CREATININE 0.3 06/19/2022    GFRAA >59 06/19/2022    LABGLOM >60 06/19/2022    GLUCOSE 157 06/19/2022    PROT 5.5 06/19/2022    LABALBU 3.4 06/19/2022    CALCIUM 8.9 06/19/2022    BILITOT <0.2 06/19/2022    ALKPHOS 54 06/19/2022    AST 11 06/19/2022    ALT 9 06/19/2022     Last 3 Troponin:    Lab Results   Component Value Date    TROPONINI <0.01 06/16/2022    TROPONINI <0.01 06/11/2022    TROPONINI <0.01 06/10/2022     Urine Culture:  No components found for: CURINE  Blood Culture:  No components found for: CBLOOD, CFUNGUSBL  Stool Culture:  No components found for: CSTOOL    Assessment:    Principal Problem:    Acute on chronic respiratory failure (HCC)  Active Problems:    Anxiety    COPD exacerbation (HCC)    Palliative care patient    Severe malnutrition ([address]  Resolved Problems:    * No resolved hospital problems. *          Plan:  Minimal clinical change from yesterday. Remains tachypneic with use of  muscles. Off and on increase in oxygen demand. Continues to cough. Feels uneasy about transfer to the floor given her decompensation with minimal activity. We will keep her in the ICU today barring critical bed shortage. Aggressive pulmonary toilet measures and other interventions to continue. Anticipate for transfer to floor tomorrow. Dr. Renny Dee and pulmonology will resume care in the morning. Greater than 25 minutes spent in coordination of care today.       Electronically signed by Lucia Mcadams MD on 6/19/2022 at 9:11 AM

## 2022-06-19 NOTE — PLAN OF CARE
Problem: Discharge Planning  Goal: Discharge to home or other facility with appropriate resources  Outcome: Progressing  Flowsheets  Taken 6/18/2022 2000 by Torri Mccarty RN  Discharge to home or other facility with appropriate resources: Identify barriers to discharge with patient and caregiver  Taken 6/18/2022 1600 by Sara Kramer RN  Discharge to home or other facility with appropriate resources: Identify barriers to discharge with patient and caregiver  Taken 6/18/2022 1200 by Sara Kramer RN  Discharge to home or other facility with appropriate resources: Identify barriers to discharge with patient and caregiver     Problem: Pain  Goal: Verbalizes/displays adequate comfort level or baseline comfort level  Outcome: Progressing  Flowsheets  Taken 6/18/2022 2000 by Torri Mccarty RN  Verbalizes/displays adequate comfort level or baseline comfort level: Encourage patient to monitor pain and request assistance  Taken 6/18/2022 1800 by Sara Kramer RN  Verbalizes/displays adequate comfort level or baseline comfort level: Encourage patient to monitor pain and request assistance  Taken 6/18/2022 1602 by Sara Kramer RN  Verbalizes/displays adequate comfort level or baseline comfort level: Assess pain using appropriate pain scale  Taken 6/18/2022 1200 by Sara Kramer RN  Verbalizes/displays adequate comfort level or baseline comfort level:   Assess pain using appropriate pain scale   Encourage patient to monitor pain and request assistance     Problem: Skin/Tissue Integrity  Goal: Absence of new skin breakdown  Description: 1. Monitor for areas of redness and/or skin breakdown  2. Assess vascular access sites hourly  3. Every 4-6 hours minimum:  Change oxygen saturation probe site  4. Every 4-6 hours:  If on nasal continuous positive airway pressure, respiratory therapy assess nares and determine need for appliance change or resting period.   Outcome: Progressing     Problem: Safety - Adult  Goal: Free from fall injury  Outcome: Progressing     Problem: Confusion  Goal: Confusion, delirium, dementia, or psychosis is improved or at baseline  Description: INTERVENTIONS:  1. Assess for possible contributors to thought disturbance, including medications, impaired vision or hearing, underlying metabolic abnormalities, dehydration, psychiatric diagnoses, and notify attending LIP  2. [place] high risk fall precautions, as indicated  3. Provide frequent short contacts to provide reality reorientation, refocusing and direction  4. Decrease environmental stimuli, including noise as appropriate  5. Monitor and intervene to maintain adequate nutrition, hydration, elimination, sleep and activity  6. If unable to ensure safety without constant attention obtain sitter and review sitter guidelines with assigned personnel  7.  Initiate Psychosocial CNS and Spiritual Care consult, as indicated  Outcome: Progressing  Flowsheets  Taken 6/18/2022 2000 by Rajan Hensley RN  Effect of thought disturbance (confusion, delirium, dementia, or psychosis) are managed with adequate functional status: Assess for contributors to thought disturbance, including medications, impaired vision or hearing, underlying metabolic abnormalities, dehydration, psychiatric diagnoses, notify LIP  Taken 6/18/2022 1200 by Jesenia Escamilla RN  Effect of thought disturbance (confusion, delirium, dementia, or psychosis) are managed with adequate functional status: Assess for contributors to thought disturbance, including medications, impaired vision or hearing, underlying metabolic abnormalities, dehydration, psychiatric diagnoses, notify LIP     Problem: Nutrition Deficit:  Goal: Optimize nutritional status  Outcome: Progressing     Problem: ABCDS Injury Assessment  Goal: Absence of physical injury  Outcome: Progressing

## 2022-06-20 LAB
ALBUMIN SERPL-MCNC: 3.7 G/DL (ref 3.5–5.2)
ALP BLD-CCNC: 51 U/L (ref 35–104)
ALT SERPL-CCNC: 10 U/L (ref 5–33)
ANION GAP SERPL CALCULATED.3IONS-SCNC: 6 MMOL/L (ref 7–19)
AST SERPL-CCNC: 11 U/L (ref 5–32)
BASOPHILS ABSOLUTE: 0 K/UL (ref 0–0.2)
BASOPHILS RELATIVE PERCENT: 0.2 % (ref 0–1)
BILIRUB SERPL-MCNC: <0.2 MG/DL (ref 0.2–1.2)
BUN BLDV-MCNC: 9 MG/DL (ref 8–23)
CALCIUM SERPL-MCNC: 9.2 MG/DL (ref 8.8–10.2)
CHLORIDE BLD-SCNC: 93 MMOL/L (ref 98–111)
CO2: 36 MMOL/L (ref 22–29)
CREAT SERPL-MCNC: 0.4 MG/DL (ref 0.5–0.9)
EOSINOPHILS ABSOLUTE: 0.1 K/UL (ref 0–0.6)
EOSINOPHILS RELATIVE PERCENT: 1.3 % (ref 0–5)
GFR AFRICAN AMERICAN: >59
GFR NON-AFRICAN AMERICAN: >60
GLUCOSE BLD-MCNC: 104 MG/DL (ref 74–109)
HCT VFR BLD CALC: 36.9 % (ref 37–47)
HEMOGLOBIN: 11.2 G/DL (ref 12–16)
IMMATURE GRANULOCYTES #: 0.1 K/UL
LYMPHOCYTES ABSOLUTE: 1.7 K/UL (ref 1.1–4.5)
LYMPHOCYTES RELATIVE PERCENT: 19.6 % (ref 20–40)
MAGNESIUM: 1.9 MG/DL (ref 1.6–2.4)
MCH RBC QN AUTO: 31.5 PG (ref 27–31)
MCHC RBC AUTO-ENTMCNC: 30.4 G/DL (ref 33–37)
MCV RBC AUTO: 103.9 FL (ref 81–99)
MONOCYTES ABSOLUTE: 1.1 K/UL (ref 0–0.9)
MONOCYTES RELATIVE PERCENT: 12.2 % (ref 0–10)
NEUTROPHILS ABSOLUTE: 5.7 K/UL (ref 1.5–7.5)
NEUTROPHILS RELATIVE PERCENT: 66.1 % (ref 50–65)
PDW BLD-RTO: 13.1 % (ref 11.5–14.5)
PLATELET # BLD: 311 K/UL (ref 130–400)
PMV BLD AUTO: 11.6 FL (ref 9.4–12.3)
POTASSIUM SERPL-SCNC: 4.4 MMOL/L (ref 3.5–5)
RBC # BLD: 3.55 M/UL (ref 4.2–5.4)
SODIUM BLD-SCNC: 135 MMOL/L (ref 136–145)
TOTAL PROTEIN: 5.7 G/DL (ref 6.6–8.7)
WBC # BLD: 8.6 K/UL (ref 4.8–10.8)

## 2022-06-20 PROCEDURE — 6360000002 HC RX W HCPCS: Performed by: FAMILY MEDICINE

## 2022-06-20 PROCEDURE — 2580000003 HC RX 258: Performed by: INTERNAL MEDICINE

## 2022-06-20 PROCEDURE — 2700000000 HC OXYGEN THERAPY PER DAY

## 2022-06-20 PROCEDURE — 6370000000 HC RX 637 (ALT 250 FOR IP): Performed by: FAMILY MEDICINE

## 2022-06-20 PROCEDURE — 1210000000 HC MED SURG R&B

## 2022-06-20 PROCEDURE — 2500000003 HC RX 250 WO HCPCS: Performed by: INTERNAL MEDICINE

## 2022-06-20 PROCEDURE — 2580000003 HC RX 258: Performed by: FAMILY MEDICINE

## 2022-06-20 PROCEDURE — 6370000000 HC RX 637 (ALT 250 FOR IP): Performed by: INTERNAL MEDICINE

## 2022-06-20 PROCEDURE — 99233 SBSQ HOSP IP/OBS HIGH 50: CPT | Performed by: INTERNAL MEDICINE

## 2022-06-20 PROCEDURE — 94669 MECHANICAL CHEST WALL OSCILL: CPT

## 2022-06-20 PROCEDURE — 85025 COMPLETE CBC W/AUTO DIFF WBC: CPT

## 2022-06-20 PROCEDURE — 83735 ASSAY OF MAGNESIUM: CPT

## 2022-06-20 PROCEDURE — 6360000002 HC RX W HCPCS: Performed by: INTERNAL MEDICINE

## 2022-06-20 PROCEDURE — 80053 COMPREHEN METABOLIC PANEL: CPT

## 2022-06-20 PROCEDURE — 36415 COLL VENOUS BLD VENIPUNCTURE: CPT

## 2022-06-20 PROCEDURE — 94640 AIRWAY INHALATION TREATMENT: CPT

## 2022-06-20 PROCEDURE — 94660 CPAP INITIATION&MGMT: CPT

## 2022-06-20 RX ADMIN — PANTOPRAZOLE SODIUM 40 MG: 40 TABLET, DELAYED RELEASE ORAL at 05:23

## 2022-06-20 RX ADMIN — GUAIFENESIN 400 MG: 100 SOLUTION ORAL at 17:25

## 2022-06-20 RX ADMIN — Medication 1000 MG: at 04:24

## 2022-06-20 RX ADMIN — Medication 1000 MG: at 17:25

## 2022-06-20 RX ADMIN — GUAIFENESIN 400 MG: 100 SOLUTION ORAL at 11:28

## 2022-06-20 RX ADMIN — GUAIFENESIN 400 MG: 100 SOLUTION ORAL at 07:33

## 2022-06-20 RX ADMIN — CITALOPRAM HYDROBROMIDE 40 MG: 20 TABLET ORAL at 07:34

## 2022-06-20 RX ADMIN — GABAPENTIN 600 MG: 600 TABLET, FILM COATED ORAL at 13:29

## 2022-06-20 RX ADMIN — ALPRAZOLAM 1 MG: 0.5 TABLET ORAL at 11:42

## 2022-06-20 RX ADMIN — ALPRAZOLAM 1 MG: 0.5 TABLET ORAL at 19:23

## 2022-06-20 RX ADMIN — GABAPENTIN 600 MG: 600 TABLET, FILM COATED ORAL at 19:23

## 2022-06-20 RX ADMIN — ALPRAZOLAM 1 MG: 0.5 TABLET ORAL at 05:23

## 2022-06-20 RX ADMIN — PREDNISONE 20 MG: 20 TABLET ORAL at 07:34

## 2022-06-20 RX ADMIN — CEFEPIME 2000 MG: 2 INJECTION, POWDER, FOR SOLUTION INTRAMUSCULAR; INTRAVENOUS at 07:48

## 2022-06-20 RX ADMIN — ENOXAPARIN SODIUM 40 MG: 100 INJECTION SUBCUTANEOUS at 18:31

## 2022-06-20 RX ADMIN — SODIUM CHLORIDE, PRESERVATIVE FREE 10 ML: 5 INJECTION INTRAVENOUS at 07:45

## 2022-06-20 RX ADMIN — Medication 400 MG: at 19:23

## 2022-06-20 RX ADMIN — Medication 400 MG: at 13:29

## 2022-06-20 RX ADMIN — BENZONATATE 200 MG: 100 CAPSULE ORAL at 03:14

## 2022-06-20 RX ADMIN — CYANOCOBALAMIN TAB 500 MCG 500 MCG: 500 TAB at 07:34

## 2022-06-20 RX ADMIN — Medication 400 MG: at 07:34

## 2022-06-20 RX ADMIN — SODIUM CHLORIDE, PRESERVATIVE FREE 10 ML: 5 INJECTION INTRAVENOUS at 19:24

## 2022-06-20 RX ADMIN — LEVALBUTEROL HYDROCHLORIDE 1.25 MG: 1.25 SOLUTION RESPIRATORY (INHALATION) at 06:23

## 2022-06-20 RX ADMIN — LEVALBUTEROL HYDROCHLORIDE 1.25 MG: 1.25 SOLUTION RESPIRATORY (INHALATION) at 19:12

## 2022-06-20 RX ADMIN — GUAIFENESIN 400 MG: 100 SOLUTION ORAL at 04:40

## 2022-06-20 RX ADMIN — CEFEPIME 2000 MG: 2 INJECTION, POWDER, FOR SOLUTION INTRAMUSCULAR; INTRAVENOUS at 00:20

## 2022-06-20 RX ADMIN — CEFEPIME 2000 MG: 2 INJECTION, POWDER, FOR SOLUTION INTRAMUSCULAR; INTRAVENOUS at 18:29

## 2022-06-20 RX ADMIN — BENZONATATE 200 MG: 100 CAPSULE ORAL at 13:29

## 2022-06-20 RX ADMIN — OXYCODONE HYDROCHLORIDE AND ACETAMINOPHEN 1 TABLET: 5; 325 TABLET ORAL at 03:14

## 2022-06-20 RX ADMIN — GABAPENTIN 600 MG: 600 TABLET, FILM COATED ORAL at 07:34

## 2022-06-20 RX ADMIN — LEVALBUTEROL HYDROCHLORIDE 1.25 MG: 1.25 SOLUTION RESPIRATORY (INHALATION) at 14:43

## 2022-06-20 RX ADMIN — GUAIFENESIN 400 MG: 100 SOLUTION ORAL at 19:24

## 2022-06-20 RX ADMIN — GUAIFENESIN 400 MG: 100 SOLUTION ORAL at 00:23

## 2022-06-20 RX ADMIN — MONTELUKAST SODIUM 10 MG: 10 TABLET, FILM COATED ORAL at 19:23

## 2022-06-20 RX ADMIN — BENZONATATE 200 MG: 100 CAPSULE ORAL at 19:23

## 2022-06-20 RX ADMIN — OXYCODONE HYDROCHLORIDE AND ACETAMINOPHEN 1 TABLET: 5; 325 TABLET ORAL at 09:37

## 2022-06-20 ASSESSMENT — PAIN SCALES - GENERAL
PAINLEVEL_OUTOF10: 8
PAINLEVEL_OUTOF10: 0
PAINLEVEL_OUTOF10: 5
PAINLEVEL_OUTOF10: 3

## 2022-06-20 ASSESSMENT — PAIN DESCRIPTION - DESCRIPTORS
DESCRIPTORS: ACHING;SORE
DESCRIPTORS: ACHING

## 2022-06-20 ASSESSMENT — PAIN DESCRIPTION - LOCATION
LOCATION: ABDOMEN
LOCATION: HEAD

## 2022-06-20 ASSESSMENT — PAIN - FUNCTIONAL ASSESSMENT: PAIN_FUNCTIONAL_ASSESSMENT: ACTIVITIES ARE NOT PREVENTED

## 2022-06-20 ASSESSMENT — PAIN DESCRIPTION - ORIENTATION: ORIENTATION: INNER

## 2022-06-20 NOTE — PROGRESS NOTES
Claudio Rutherford transferred to [facility] from [facility] via wheelchair. Reason for transfer: lower level of care   Explained reason for transfer to Patient and Family. Belongings: purse, clothing, and personal belongings with patient at bedside . Soft chart transferred with patient: Yes. Telemetry box number N/A transferred with patient: N/A. Report given to: Laila TURNER, at bedside.       Electronically signed by Romeo Shah RN on 6/20/2022 at 1:30 PM

## 2022-06-20 NOTE — PLAN OF CARE
Problem: Discharge Planning  Goal: Discharge to home or other facility with appropriate resources  Outcome: Progressing  Flowsheets (Taken 6/19/2022 2000)  Discharge to home or other facility with appropriate resources: Identify barriers to discharge with patient and caregiver     Problem: Pain  Goal: Verbalizes/displays adequate comfort level or baseline comfort level  Outcome: Progressing  Flowsheets  Taken 6/19/2022 1600 by Antonieta Juan RN  Verbalizes/displays adequate comfort level or baseline comfort level: Encourage patient to monitor pain and request assistance  Taken 6/19/2022 1200 by Antonieta Juan RN  Verbalizes/displays adequate comfort level or baseline comfort level: Encourage patient to monitor pain and request assistance     Problem: Skin/Tissue Integrity  Goal: Absence of new skin breakdown  Description: 1. Monitor for areas of redness and/or skin breakdown  2. Assess vascular access sites hourly  3. Every 4-6 hours minimum:  Change oxygen saturation probe site  4. Every 4-6 hours:  If on nasal continuous positive airway pressure, respiratory therapy assess nares and determine need for appliance change or resting period.   Outcome: Progressing     Problem: Safety - Adult  Goal: Free from fall injury  Outcome: Progressing     Problem: Nutrition Deficit:  Goal: Optimize nutritional status  Outcome: Progressing     Problem: ABCDS Injury Assessment  Goal: Absence of physical injury  Outcome: Progressing

## 2022-06-20 NOTE — PROGRESS NOTES
Infectious Diseases Progress Note    Patient:  Aquilino Mcneill  YOB: 1950  MRN: [id number]   Admit date: 6/10/2022   Admitting Physician: Betsy Gonzales MD  Primary Care Physician: No primary care provider on file. Chief Complaint/Interval History: When I walked in the room this morning she was on room air. Her oxygen saturation was 90% on room air. She is chronically on oxygen at 3 L per nasal cannula at home. When she was placed back on oxygen her saturations increased to 93 to 95%. She looks stable and was comfortable on room air initially as I entered the room. She does not seem to have dyspnea at present. She feels chest physical therapy helps her. Reviewed interval notes. Also we reviewed her prior CT. I felt her CT had not shown significant lower lobe infiltrate. I believe when I reviewed her CT last week I was seeing the one in May but not the more recent. In reviewing the more recent CT I do see the presence of lower lobe infiltrate/atelectasis on the left. She appears to be tolerating vancomycin and cefepime without side effect. In/Out    Intake/Output Summary (Last 24 hours) at 6/20/2022 0721  Last data filed at 6/20/2022 0600  Gross per 24 hour   Intake 2230 ml   Output 2825 ml   Net -595 ml     Allergies:    Allergies   Allergen Reactions    Aspirin Hives    Codeine     Demerol     Fentanyl Hives    Iv Dye [Iodides]     Neosporin [Bacitracin-Neomycin-Polymyxin]     Nsaids     Pcn [Penicillins]     Pentazocine Lactate     Sulfa Antibiotics     Talwin [Pentazocine] Hives    Influenza Vaccines Hives and Rash     Current Meds: calcium carbonate (TUMS) chewable tablet 500 mg, TID PRN  magnesium oxide (MAG-OX) tablet 400 mg, TID  levalbuterol (XOPENEX) nebulizer solution 1.25 mg, Q8H PRN  ALPRAZolam (XANAX) tablet 1 mg, 4x Daily PRN  iopamidol (ISOVUE-370) 76 % injection 90 mL, ONCE PRN  vancomycin (VANCOCIN) 1000 mg in dextrose 5% 250 mL IVPB, Q12H  cefepime (MAXIPIME) 2000 mg IVPB minibag in NS, Q8H  vancomycin (VANCOCIN) intermittent dosing (placeholder), RX Placeholder  predniSONE (DELTASONE) tablet 20 mg, Daily  guaiFENesin (ROBITUSSIN) 100 MG/5ML liquid 400 mg, Q4H  sodium chloride (OCEAN, BABY AYR) 0.65 % nasal spray 1 spray, Q2H PRN  vitamin B-12 (CYANOCOBALAMIN) tablet 500 mcg, Daily  citalopram (CELEXA) tablet 40 mg, Daily  gabapentin (NEURONTIN) tablet 600 mg, TID  montelukast (SINGULAIR) tablet 10 mg, Nightly  nicotine (NICODERM CQ) 21 MG/24HR 1 patch, Q24H  ondansetron (ZOFRAN-ODT) disintegrating tablet 4 mg, Q8H PRN  pantoprazole (PROTONIX) tablet 40 mg, QAM AC  benzonatate (TESSALON) capsule 200 mg, TID PRN  morphine (PF) injection 1 mg, Q1H PRN  oxyCODONE-acetaminophen (PERCOCET) 5-325 MG per tablet 1 tablet, Q4H PRN  traMADol (ULTRAM) tablet 50 mg, Q4H PRN  hydrOXYzine HCl (ATARAX) tablet 10 mg, TID PRN  sodium chloride flush 0.9 % injection 5-40 mL, 2 times per day  sodium chloride flush 0.9 % injection 5-40 mL, PRN  0.9 % sodium chloride infusion, PRN  enoxaparin (LOVENOX) injection 40 mg, Daily  acetaminophen (TYLENOL) tablet 650 mg, Q4H PRN  ondansetron (ZOFRAN) injection 4 mg, Q6H PRN      Review of Systems no diarrhea, rash, or skin itching. VitalSigns:  BP (!) 105/42   Pulse 72   Temp 98.4 °F (36.9 °C) (Temporal)   Resp 21   Ht 5' 6\" (1.676 m)   Wt 115 lb (52.2 kg)   SpO2 (!) 88%   BMI 18.56 kg/m²      Physical Exam  Line/IV site: No erythema, warmth, induration, or tenderness. Lungs clear to auscultation without crackles wheezes. Slightly prolonged expiratory phase. Heart without murmur  Abdomen is soft and nontender. Bowel sounds present. No guarding or rebound.   Extremities without significant edema    Lab Results:  CBC:   Recent Labs     06/18/22  0414 06/19/22  0243 06/20/22  0109   WBC 8.2 9.7 8.6   HGB 10.8* 10.7* 11.2*    284 311     BMP:  Recent Labs     06/18/22  0414 06/19/22  0243 06/20/22  0109   * 136 135* K 4.0 4.3 4.4   CL 93* 96* 93*   CO2 34* 34* 36*   BUN 11 11 9   CREATININE 0.4* 0.3* 0.4*   GLUCOSE 98 157* 104     CultureResults:  Respiratory cultures June 16, 2022-corynebacterium striatum-likely colonizer  Personally reviewed again CT from June 16, 2022:    Radiology: None    Additional Studies Reviewed:  None    Impression:  1. Acute on chronic respiratory failure-suspect COPD exacerbation following upper respiratory infection. On review review of CT scan from June 16, 2022, there does appear to be left lower lobe atelectasis/infiltrate. I believe when I dictated the previous consultation and progress note the following day, that I had inadvertently clicked on a old CT and for that reason was not identifying the infiltrate described in the left lower lobe on the report. There does appear to be an element of secondary bacterial pneumonia/bronchitis. She seems to be responding to her steroid treatment, antibiotic treatment, and chest physical therapy. She is back to her baseline oxygen requirements of 3 L.  2.  Chronic obstructive pulm disease with exacerbation-improving  3.   History of tobacco use    Recommendations:  She seems to be doing well  Continue chest physical therapy  Continue COPD treatment exacerbation  Continue vancomycin and cefepime  Okay with me to floor  Plan 1-2 more days of intravenous antibiotic treatment and will then discontinue antimicrobial treatment  Continue to follow    Mookie West MD

## 2022-06-20 NOTE — CARE COORDINATION
Per Sujit Cuellar at [facility], Pt's trilogy has been approved; following for set-up needs.   [facility]  [phone] [id number]  [phone] [id number] F  Electronically signed by HEATH Torres on 6/20/2022 at 10:39 AM

## 2022-06-21 ENCOUNTER — APPOINTMENT (OUTPATIENT)
Dept: GENERAL RADIOLOGY | Age: 72
DRG: 193 | End: 2022-06-21
Payer: MEDICARE

## 2022-06-21 LAB
ALBUMIN SERPL-MCNC: 3.6 G/DL (ref 3.5–5.2)
ALP BLD-CCNC: 45 U/L (ref 35–104)
ALT SERPL-CCNC: 11 U/L (ref 5–33)
ANION GAP SERPL CALCULATED.3IONS-SCNC: 7 MMOL/L (ref 7–19)
AST SERPL-CCNC: 13 U/L (ref 5–32)
BASOPHILS ABSOLUTE: 0 K/UL (ref 0–0.2)
BASOPHILS RELATIVE PERCENT: 0.5 % (ref 0–1)
BILIRUB SERPL-MCNC: <0.2 MG/DL (ref 0.2–1.2)
BUN BLDV-MCNC: 8 MG/DL (ref 8–23)
CALCIUM SERPL-MCNC: 8.9 MG/DL (ref 8.8–10.2)
CHLORIDE BLD-SCNC: 94 MMOL/L (ref 98–111)
CO2: 35 MMOL/L (ref 22–29)
CREAT SERPL-MCNC: 0.3 MG/DL (ref 0.5–0.9)
EOSINOPHILS ABSOLUTE: 0.2 K/UL (ref 0–0.6)
EOSINOPHILS RELATIVE PERCENT: 2.5 % (ref 0–5)
GFR AFRICAN AMERICAN: >59
GFR NON-AFRICAN AMERICAN: >60
GLUCOSE BLD-MCNC: 97 MG/DL (ref 74–109)
HCT VFR BLD CALC: 34.5 % (ref 37–47)
HEMOGLOBIN: 10.5 G/DL (ref 12–16)
IMMATURE GRANULOCYTES #: 0.1 K/UL
LYMPHOCYTES ABSOLUTE: 1.4 K/UL (ref 1.1–4.5)
LYMPHOCYTES RELATIVE PERCENT: 21.3 % (ref 20–40)
MAGNESIUM: 1.9 MG/DL (ref 1.6–2.4)
MCH RBC QN AUTO: 31.3 PG (ref 27–31)
MCHC RBC AUTO-ENTMCNC: 30.4 G/DL (ref 33–37)
MCV RBC AUTO: 103 FL (ref 81–99)
MONOCYTES ABSOLUTE: 0.9 K/UL (ref 0–0.9)
MONOCYTES RELATIVE PERCENT: 14.2 % (ref 0–10)
NEUTROPHILS ABSOLUTE: 3.9 K/UL (ref 1.5–7.5)
NEUTROPHILS RELATIVE PERCENT: 60.1 % (ref 50–65)
PDW BLD-RTO: 13 % (ref 11.5–14.5)
PLATELET # BLD: 281 K/UL (ref 130–400)
PMV BLD AUTO: 10.7 FL (ref 9.4–12.3)
POTASSIUM SERPL-SCNC: 4.4 MMOL/L (ref 3.5–5)
RBC # BLD: 3.35 M/UL (ref 4.2–5.4)
SODIUM BLD-SCNC: 136 MMOL/L (ref 136–145)
TOTAL PROTEIN: 5.5 G/DL (ref 6.6–8.7)
WBC # BLD: 6.4 K/UL (ref 4.8–10.8)

## 2022-06-21 PROCEDURE — 94640 AIRWAY INHALATION TREATMENT: CPT

## 2022-06-21 PROCEDURE — 85025 COMPLETE CBC W/AUTO DIFF WBC: CPT

## 2022-06-21 PROCEDURE — 80053 COMPREHEN METABOLIC PANEL: CPT

## 2022-06-21 PROCEDURE — 6370000000 HC RX 637 (ALT 250 FOR IP): Performed by: INTERNAL MEDICINE

## 2022-06-21 PROCEDURE — 6370000000 HC RX 637 (ALT 250 FOR IP): Performed by: FAMILY MEDICINE

## 2022-06-21 PROCEDURE — 2580000003 HC RX 258: Performed by: FAMILY MEDICINE

## 2022-06-21 PROCEDURE — 2580000003 HC RX 258: Performed by: INTERNAL MEDICINE

## 2022-06-21 PROCEDURE — 71045 X-RAY EXAM CHEST 1 VIEW: CPT | Performed by: RADIOLOGY

## 2022-06-21 PROCEDURE — 6360000002 HC RX W HCPCS: Performed by: FAMILY MEDICINE

## 2022-06-21 PROCEDURE — 2700000000 HC OXYGEN THERAPY PER DAY

## 2022-06-21 PROCEDURE — 2500000003 HC RX 250 WO HCPCS: Performed by: INTERNAL MEDICINE

## 2022-06-21 PROCEDURE — 71045 X-RAY EXAM CHEST 1 VIEW: CPT

## 2022-06-21 PROCEDURE — 99233 SBSQ HOSP IP/OBS HIGH 50: CPT | Performed by: INTERNAL MEDICINE

## 2022-06-21 PROCEDURE — 99231 SBSQ HOSP IP/OBS SF/LOW 25: CPT | Performed by: PHYSICIAN ASSISTANT

## 2022-06-21 PROCEDURE — 1210000000 HC MED SURG R&B

## 2022-06-21 PROCEDURE — 6360000002 HC RX W HCPCS: Performed by: INTERNAL MEDICINE

## 2022-06-21 PROCEDURE — 83735 ASSAY OF MAGNESIUM: CPT

## 2022-06-21 PROCEDURE — 36415 COLL VENOUS BLD VENIPUNCTURE: CPT

## 2022-06-21 PROCEDURE — 94660 CPAP INITIATION&MGMT: CPT

## 2022-06-21 RX ORDER — MAGNESIUM HYDROXIDE/ALUMINUM HYDROXICE/SIMETHICONE 120; 1200; 1200 MG/30ML; MG/30ML; MG/30ML
15 SUSPENSION ORAL EVERY 6 HOURS PRN
Status: DISCONTINUED | OUTPATIENT
Start: 2022-06-21 | End: 2022-06-25 | Stop reason: HOSPADM

## 2022-06-21 RX ORDER — PREDNISONE 10 MG/1
10 TABLET ORAL DAILY
Status: DISCONTINUED | OUTPATIENT
Start: 2022-06-22 | End: 2022-06-23

## 2022-06-21 RX ORDER — PANTOPRAZOLE SODIUM 40 MG/1
40 TABLET, DELAYED RELEASE ORAL
Status: DISCONTINUED | OUTPATIENT
Start: 2022-06-21 | End: 2022-06-25 | Stop reason: HOSPADM

## 2022-06-21 RX ADMIN — PANTOPRAZOLE SODIUM 40 MG: 40 TABLET, DELAYED RELEASE ORAL at 16:57

## 2022-06-21 RX ADMIN — CITALOPRAM HYDROBROMIDE 40 MG: 20 TABLET ORAL at 09:00

## 2022-06-21 RX ADMIN — LEVALBUTEROL HYDROCHLORIDE 1.25 MG: 1.25 SOLUTION RESPIRATORY (INHALATION) at 15:20

## 2022-06-21 RX ADMIN — SODIUM CHLORIDE, PRESERVATIVE FREE 10 ML: 5 INJECTION INTRAVENOUS at 19:55

## 2022-06-21 RX ADMIN — GUAIFENESIN 400 MG: 100 SOLUTION ORAL at 00:06

## 2022-06-21 RX ADMIN — Medication 400 MG: at 09:00

## 2022-06-21 RX ADMIN — OXYCODONE HYDROCHLORIDE AND ACETAMINOPHEN 1 TABLET: 5; 325 TABLET ORAL at 17:10

## 2022-06-21 RX ADMIN — BENZONATATE 200 MG: 100 CAPSULE ORAL at 19:54

## 2022-06-21 RX ADMIN — Medication 400 MG: at 13:54

## 2022-06-21 RX ADMIN — CEFEPIME 2000 MG: 2 INJECTION, POWDER, FOR SOLUTION INTRAMUSCULAR; INTRAVENOUS at 00:07

## 2022-06-21 RX ADMIN — PREDNISONE 20 MG: 20 TABLET ORAL at 09:00

## 2022-06-21 RX ADMIN — PANTOPRAZOLE SODIUM 40 MG: 40 TABLET, DELAYED RELEASE ORAL at 06:03

## 2022-06-21 RX ADMIN — Medication 1000 MG: at 16:55

## 2022-06-21 RX ADMIN — GUAIFENESIN 400 MG: 100 SOLUTION ORAL at 05:02

## 2022-06-21 RX ADMIN — ALPRAZOLAM 1 MG: 0.5 TABLET ORAL at 19:54

## 2022-06-21 RX ADMIN — GABAPENTIN 600 MG: 600 TABLET, FILM COATED ORAL at 13:54

## 2022-06-21 RX ADMIN — ENOXAPARIN SODIUM 40 MG: 100 INJECTION SUBCUTANEOUS at 18:18

## 2022-06-21 RX ADMIN — OXYCODONE HYDROCHLORIDE AND ACETAMINOPHEN 1 TABLET: 5; 325 TABLET ORAL at 06:22

## 2022-06-21 RX ADMIN — LEVALBUTEROL HYDROCHLORIDE 1.25 MG: 1.25 SOLUTION RESPIRATORY (INHALATION) at 07:23

## 2022-06-21 RX ADMIN — LEVALBUTEROL HYDROCHLORIDE 1.25 MG: 1.25 SOLUTION RESPIRATORY (INHALATION) at 22:00

## 2022-06-21 RX ADMIN — CEFEPIME 2000 MG: 2 INJECTION, POWDER, FOR SOLUTION INTRAMUSCULAR; INTRAVENOUS at 09:03

## 2022-06-21 RX ADMIN — GUAIFENESIN 400 MG: 100 SOLUTION ORAL at 19:54

## 2022-06-21 RX ADMIN — GUAIFENESIN 400 MG: 100 SOLUTION ORAL at 12:03

## 2022-06-21 RX ADMIN — MONTELUKAST SODIUM 10 MG: 10 TABLET, FILM COATED ORAL at 19:54

## 2022-06-21 RX ADMIN — GUAIFENESIN 400 MG: 100 SOLUTION ORAL at 08:59

## 2022-06-21 RX ADMIN — GUAIFENESIN 400 MG: 100 SOLUTION ORAL at 16:57

## 2022-06-21 RX ADMIN — Medication 1000 MG: at 05:00

## 2022-06-21 RX ADMIN — CEFEPIME 2000 MG: 2 INJECTION, POWDER, FOR SOLUTION INTRAMUSCULAR; INTRAVENOUS at 18:20

## 2022-06-21 RX ADMIN — Medication 400 MG: at 19:54

## 2022-06-21 RX ADMIN — ACETAMINOPHEN 650 MG: 325 TABLET ORAL at 00:09

## 2022-06-21 RX ADMIN — GABAPENTIN 600 MG: 600 TABLET, FILM COATED ORAL at 09:00

## 2022-06-21 RX ADMIN — CYANOCOBALAMIN TAB 500 MCG 500 MCG: 500 TAB at 09:00

## 2022-06-21 RX ADMIN — GABAPENTIN 600 MG: 600 TABLET, FILM COATED ORAL at 19:54

## 2022-06-21 RX ADMIN — ALPRAZOLAM 1 MG: 0.5 TABLET ORAL at 02:25

## 2022-06-21 RX ADMIN — SODIUM CHLORIDE, PRESERVATIVE FREE 10 ML: 5 INJECTION INTRAVENOUS at 09:00

## 2022-06-21 RX ADMIN — ALPRAZOLAM 1 MG: 0.5 TABLET ORAL at 11:26

## 2022-06-21 ASSESSMENT — PAIN DESCRIPTION - LOCATION
LOCATION: BACK
LOCATION: BACK
LOCATION: HEAD;BACK
LOCATION: BACK

## 2022-06-21 ASSESSMENT — PAIN SCALES - GENERAL
PAINLEVEL_OUTOF10: 4
PAINLEVEL_OUTOF10: 2
PAINLEVEL_OUTOF10: 3
PAINLEVEL_OUTOF10: 7
PAINLEVEL_OUTOF10: 4
PAINLEVEL_OUTOF10: 8

## 2022-06-21 ASSESSMENT — PAIN - FUNCTIONAL ASSESSMENT: PAIN_FUNCTIONAL_ASSESSMENT: ACTIVITIES ARE NOT PREVENTED

## 2022-06-21 NOTE — PROGRESS NOTES
Pulmonary and Critical Care Progress note. Susanne Caal    MRN# [id number]    Acct# [de-identified]  6/21/2022   6:11 PM CDT    Referring Suma Trevizo MD      Chief Complaint: Shortness of breath    HPI: The patient feels significantly improved. She is on 3 L nasal cannula at this time.     Medications    pantoprazole, 40 mg, Oral, BID AC    [START ON 6/22/2022] predniSONE, 10 mg, Oral, Daily    magnesium oxide, 400 mg, Oral, TID    vancomycin, 1,000 mg, IntraVENous, Q12H    cefepime, 2,000 mg, IntraVENous, Q8H    vancomycin (VANCOCIN) intermittent dosing (placeholder), , Other, RX Placeholder    guaiFENesin, 400 mg, Oral, Q4H    vitamin B-12, 500 mcg, Oral, Daily    citalopram, 40 mg, Oral, Daily    gabapentin, 600 mg, Oral, TID    montelukast, 10 mg, Oral, Nightly    nicotine, 1 patch, TransDERmal, Q24H    sodium chloride flush, 5-40 mL, IntraVENous, 2 times per day    enoxaparin, 40 mg, SubCUTAneous, Daily     Review of Systems:  RESPIRATORY:  positive for  dyspnea  CARDIOVASCULAR:  positive for  dyspnea      Physical Exam:  BP (!) 123/59   Pulse 73   Temp (!) 96.7 °F (35.9 °C)   Resp 18   Ht 5' 6\" (1.676 m)   Wt 115 lb (52.2 kg)   SpO2 93%   BMI 18.56 kg/m²     Intake/Output Summary (Last 24 hours) at 6/21/2022 1625  Last data filed at 6/21/2022 1344  Gross per 24 hour   Intake 1640 ml   Output 1650 ml   Net -10 ml       General appearance: Elderly white female who appears to be in no distress   HEENT: Normocephalic atraumatic  Heart: S1-S2 distant sounds no murmurs  Lungs: Diminished bilaterally no rubs or tenderness on dullness to percussion  Abdomen: Soft nontender no organomegalies normal bowel sounds  Extremities: No clubbing cyanosis or edema  Neuro: No focal findings  Skin: Intact    Recent Labs     06/19/22  0243 06/20/22  0109 06/21/22  0418   WBC 9.7 8.6 6.4   RBC 3.40* 3.55* 3.35*   HGB 10.7* 11.2* 10.5*   HCT 34.8* 36.9* 34.5*    311 281   .4* 103.9* 103.0*   MCH 31.5* 31.5* 31.3*   MCHC 30.7* 30.4* 30.4*   RDW 13.5 13.1 13.0      Recent Labs     06/19/22  0243 06/20/22  0109 06/21/22 0418    135* 136   K 4.3 4.4 4.4   CL 96* 93* 94*   CO2 34* 36* 35*   BUN 11 9 8   CREATININE 0.3* 0.4* 0.3*   CALCIUM 8.9 9.2 8.9   GLUCOSE 157* 104 97      No results for input(s): PHART, VSC9RTV, PO2ART, LWR0CZP, P5RVWQGF, BEART in the last 72 hours. Recent Labs     06/21/22 0418   AST 13   ALT 11   ALKPHOS 45   BILITOT <0.2   MG 1.9   CALCIUM 8.9     No results for input(s): BC, LABGRAM, CULTRESP, BFCX in the last 72 hours. Radiograph: XR CHEST PORTABLE    Result Date: 6/10/2022  Modest bilateral upper lobe hyperinflation and mild bibasilar fibrosis. Surgical clips in the epigastrium and left upper quadrant. Recommendation: Follow up as clinically indicated. Electronically Signed by Eden Sánchez MD at 10-Rai-2022 01:10:14 PM                My radiograph interpretation/independent review of imaging: Reviewed    Problem list generated by [employer]:  Hospital Problems           Last Modified POA    * (Principal) Acute on chronic respiratory failure ([facility] [address] 6/10/2022 Yes    Anxiety 6/13/2022 Yes    COPD exacerbation ([address] 6/13/2022 Yes    Palliative care patient 6/13/2022 Yes    Severe malnutrition ([address] 6/16/2022 Yes           Pulmonary Assessment/Plan:     1. Acute on chronic hypoxic respiratory failure back to baseline. She does have hypercapnia and did not respond well to BiPAP on admission. She would benefit from NIV ventilation such as with Triology device. 2. Very severe underlying COPD. Continue pulmonary toilet. Continue bronchodilators. 3. Tobacco abuse she needs to quit smoking. 4. DVT prophylaxis. 5. Dyspnea due to the above supportive care. 6. Discharge planning. Rand Gongora MD, [facility]    The above note was generated using voice recognition software.   Inadvertent typographical errors in transcription may have occurred.       Electronically signed by Enrique Nichole MD on 06/21/22 at 4:25 PM

## 2022-06-21 NOTE — PROGRESS NOTES
Progress Note  Myron Barragan [place]  6/20/2022 7:13 PM  Subjective:   Admit Date:   6/10/2022      CC/ADMIT DX:       Interval History:      She has no c/o CP. Her SOA is improved. She has chest wall pain. She is coughing. . I have reviewed all labs/diagnostics from the last 24hrs. ROS:   I have done a 10 point ROS and all are negative, except what is mentioned in the HPI. ADULT ORAL NUTRITION SUPPLEMENT; Lunch, Breakfast, Dinner; Standard High Calorie/High Protein Oral Supplement  ADULT DIET; Regular    Medications:      sodium chloride Stopped (06/15/22 0011)      magnesium oxide  400 mg Oral TID    vancomycin  1,000 mg IntraVENous Q12H    cefepime  2,000 mg IntraVENous Q8H    vancomycin (VANCOCIN) intermittent dosing (placeholder)   Other RX Placeholder    predniSONE  20 mg Oral Daily    guaiFENesin  400 mg Oral Q4H    vitamin B-12  500 mcg Oral Daily    citalopram  40 mg Oral Daily    gabapentin  600 mg Oral TID    montelukast  10 mg Oral Nightly    nicotine  1 patch TransDERmal Q24H    pantoprazole  40 mg Oral QAM AC    sodium chloride flush  5-40 mL IntraVENous 2 times per day    enoxaparin  40 mg SubCUTAneous Daily           Objective:   Vitals: BP (!) 116/48   Pulse 79   Temp 97.3 °F (36.3 °C)   Resp 18   Ht 5' 6\" (1.676 m)   Wt 115 lb (52.2 kg)   SpO2 91%   BMI 18.56 kg/m²      Intake/Output Summary (Last 24 hours) at 6/20/2022 1913  Last data filed at 6/20/2022 1200  Gross per 24 hour   Intake 2150 ml   Output 1600 ml   Net 550 ml     General appearance: alert and cooperative with exam  Lungs: decreased BS  Heart: RRR  Abdomen: soft, non-tender; bowel sounds normal; no masses,  no organomegaly  Extremities: extremities normal, atraumatic, no cyanosis or edema  Neurologic:  No obvious focal neurologic deficits.     Assessment and Plan:   Principal Problem:    Acute on chronic respiratory failure [facility])  Active Problems:    Anxiety    COPD exacerbation ([facility] [address]    Palliative care patient    Severe malnutrition ([facility] [address]  Resolved Problems:    * No resolved hospital problems. *    Anxiety    Chronic Pain    Rhinovirus    Plan:  1. Continue present medication(s)   2. Follow with Pulmonary and ID  3. Move to floor if ok with Pulm  4. Continue steroids, O2  5. Antibiotics per ID  6. PRN medicine for anxiety, pain      Discharge planning:   her home     Reviewed treatment plans with the patient and/or family.              Electronically signed by Karol Grace MD on 6/20/2022 at 7:13 PM

## 2022-06-21 NOTE — PROGRESS NOTES
Palliative Care Progress Note  6/21/2022 1:33 PM    Patient:  Claudio Rutherford  YOB: 1950  Primary Care Physician: No primary care provider on file. Advance Directive: Full Code  Admit Date: 6/10/2022       Hospital Day: 11  Portions of this note have been copied forward, however, changed to reflect the most current clinical status of this patient. CHIEF COMPLAINT/REASON FOR CONSULTATION Goals of care, code status, family support    SUBJECTIVE: Ms. Ana Schmitz has no new complaints. She remains weak. Dyspnea and anxiety are improving. Review of Systems:   14 point review of systems is negative except as specifically addressed above. Objective:   VITALS:  BP (!) 123/59   Pulse 73   Temp (!) 96.7 °F (35.9 °C)   Resp 18   Ht 5' 6\" (1.676 m)   Wt 115 lb (52.2 kg)   SpO2 93%   BMI 18.56 kg/m²   24HR INTAKE/OUTPUT:      Intake/Output Summary (Last 24 hours) at 6/21/2022 1333  Last data filed at 6/21/2022 1148  Gross per 24 hour   Intake 1280 ml   Output 1650 ml   Net -370 ml     General appearance: 75 yo female, chronically ill appearing, no acute distress, resting comfortably in bed on NC O2    Head: Normocephalic, without obvious abnormality, atraumatic  Eyes: conjunctivae/corneas clear. PERRL, EOM's intact. Ears: normal external ears and nose  Neck: no JVD, supple, symmetrical, trachea midline   Lungs: Diminished throughout, no rhonchi/rales, mild accessory muscle use   Heart: RRR, S1, S2 normal, no murmur  Abdomen:soft, non-tender; non-distended, bowel sounds present    Extremities:No lower extremity edema,  No erythema, no tenderness to palpation  Skin: Skin color, texture, turgor normal. No rashes or lesions  Neurologic: Alert and oriented X 3, generalized weakness, normal tone.  No focal deficits  Psychiatric: appropriate mood/affect     Medications:      sodium chloride Stopped (06/15/22 0011)      pantoprazole  40 mg Oral BID AC    [START ON 6/22/2022] predniSONE  10 mg Oral Daily  magnesium oxide  400 mg Oral TID    vancomycin  1,000 mg IntraVENous Q12H    cefepime  2,000 mg IntraVENous Q8H    vancomycin (VANCOCIN) intermittent dosing (placeholder)   Other RX Placeholder    guaiFENesin  400 mg Oral Q4H    vitamin B-12  500 mcg Oral Daily    citalopram  40 mg Oral Daily    gabapentin  600 mg Oral TID    montelukast  10 mg Oral Nightly    nicotine  1 patch TransDERmal Q24H    sodium chloride flush  5-40 mL IntraVENous 2 times per day    enoxaparin  40 mg SubCUTAneous Daily     aluminum & magnesium hydroxide-simethicone, calcium carbonate, levalbuterol, ALPRAZolam, iopamidol, sodium chloride, ondansetron, benzonatate, morphine, oxyCODONE-acetaminophen, traMADol, hydrOXYzine HCl, sodium chloride flush, sodium chloride, acetaminophen, ondansetron  ADULT ORAL NUTRITION SUPPLEMENT; Lunch, Breakfast, Dinner; Standard High Calorie/High Protein Oral Supplement  ADULT DIET; Regular     Lab and other Data:     Recent Labs     06/19/22  0243 06/20/22  0109 06/21/22  0418   WBC 9.7 8.6 6.4   HGB 10.7* 11.2* 10.5*    311 281     Recent Labs     06/19/22  0243 06/20/22  0109 06/21/22  0418    135* 136   K 4.3 4.4 4.4   CL 96* 93* 94*   CO2 34* 36* 35*   BUN 11 9 8   CREATININE 0.3* 0.4* 0.3*   GLUCOSE 157* 104 97     Recent Labs     06/19/22  0243 06/20/22  0109 06/21/22  0418   AST 11 11 13   ALT 9 10 11   BILITOT <0.2 <0.2 <0.2   ALKPHOS 54 51 39           Assessment/Plan   Principal Problem:    Acute on chronic respiratory failure (HCC)  Active Problems:    Anxiety    COPD exacerbation (HCC)    Palliative care patient    Severe malnutrition ([facility] [address]  Resolved Problems:    * No resolved hospital problems. *    Visit Summary:  Chart reviewed. Required closer monitoring in ICU but now tolerating 3L NC O2 on medical floor. She is alert and has been ambulatory within her room to the bathroom. Anxiety is well controlled. She denies N/V, diarrhea or constipation.  Planning on discharge home once medically stable. Continues to have decreased appetite but is planning on trying to eat lunch/drink protein supplement. Recommendations:   1. Palliative care: [address] home when medically stable. Could consider referral for outpatient palliative care Code status: full code-ACP completed    2. Acute on chornic respiratory failure w/ hypoxia/hypercapnia 2/2 COPD exacerbation/+Rhinovirus-improving, ID/Pulmonology following, Cefepime/Vancomycin continued. Prednisone 10 mg daily     3. N/V-resolved    4. Anxiety- Atarax/Xanax continued     Thank you for consulting Palliative Care and allowing us to participate in the care of this patient.    Time Spent Counseling > 50%:  YES                                   Total Time Spent with patient/family counseling, workup/treatment review, counseling and placement of orders/preparation of this note: 15 minutes    Electronically signed by Liliya Wilson PA-C on 6/21/2022 at 1:33 PM    (Please note that portions of this note were completed with a voice recognition program.  Augustus Paige made to edit the dictations but occasionally words are mis-transcribed.)

## 2022-06-21 NOTE — PROGRESS NOTES
Progress Note  Vee Castelan [place]  6/21/2022 12:27 PM  Subjective:   Admit Date:   6/10/2022      CC/ADMIT DX:       Interval History:      She has no c/o CP. Her SOA is improved. She has c/o GERD, dyspepsia. . I have reviewed all labs/diagnostics from the last 24hrs. ROS:   I have done a 10 point ROS and all are negative, except what is mentioned in the HPI. ADULT ORAL NUTRITION SUPPLEMENT; Lunch, Breakfast, Dinner; Standard High Calorie/High Protein Oral Supplement  ADULT DIET; Regular    Medications:      sodium chloride Stopped (06/15/22 0011)      magnesium oxide  400 mg Oral TID    vancomycin  1,000 mg IntraVENous Q12H    cefepime  2,000 mg IntraVENous Q8H    vancomycin (VANCOCIN) intermittent dosing (placeholder)   Other RX Placeholder    predniSONE  20 mg Oral Daily    guaiFENesin  400 mg Oral Q4H    vitamin B-12  500 mcg Oral Daily    citalopram  40 mg Oral Daily    gabapentin  600 mg Oral TID    montelukast  10 mg Oral Nightly    nicotine  1 patch TransDERmal Q24H    pantoprazole  40 mg Oral QAM AC    sodium chloride flush  5-40 mL IntraVENous 2 times per day    enoxaparin  40 mg SubCUTAneous Daily           Objective:   Vitals: BP (!) 123/59   Pulse 73   Temp (!) 96.7 °F (35.9 °C)   Resp 18   Ht 5' 6\" (1.676 m)   Wt 115 lb (52.2 kg)   SpO2 93%   BMI 18.56 kg/m²      Intake/Output Summary (Last 24 hours) at 6/21/2022 1227  Last data filed at 6/21/2022 1148  Gross per 24 hour   Intake 1280 ml   Output 1650 ml   Net -370 ml     General appearance: alert and cooperative with exam  Lungs: decreased BS  Heart: RRR  Abdomen: soft, non-tender; bowel sounds normal; no masses,  no organomegaly  Extremities: extremities normal, atraumatic, no cyanosis or edema  Neurologic:  No obvious focal neurologic deficits.     Assessment and Plan:   Principal Problem:    Acute on chronic respiratory failure [facility])  Active Problems:    Anxiety    COPD exacerbation ([facility] [address]    Palliative care patient Severe malnutrition ([address]  Resolved Problems:    * No resolved hospital problems. *    Anxiety    Chronic Pain    Rhinovirus    GERD    Plan:  1. Continue present medication(s)   2. Follow with Pulmonary and ID  3. Continue steroids, O2  4. Antibiotics per ID  5. PRN medicine for anxiety, pain      Discharge planning:   her home     Reviewed treatment plans with the patient and/or family.              Electronically signed by Eron Tinoco MD on 6/21/2022 at 12:27 PM

## 2022-06-21 NOTE — PROGRESS NOTES
Comprehensive Nutrition Assessment    Type and Reason for Visit:  Reassess    Nutrition Recommendations/Plan:   1. Continue current interventions     Malnutrition Assessment:  Malnutrition Status:  Severe malnutrition (06/21/22 1343)    Context:  Chronic Illness     Findings of the 6 clinical characteristics of malnutrition:  Energy Intake:  Mild decrease in energy intake (Comment)  Weight Loss:  No significant weight loss     Body Fat Loss:  Severe body fat loss Orbital,Buccal region   Muscle Mass Loss:  Severe muscle mass loss Temples (temporalis)  Fluid Accumulation:  No significant fluid accumulation Extremities   Strength:  Not Performed    Nutrition Assessment:    Pt's po intake is slowly improving . Intake now is usually 50-75%. with % of supplements being consumed. Nutrition Related Findings:    NC Wound Type: None       Current Nutrition Intake & Therapies:    Average Meal Intake: 51-75%  Average Supplements Intake: 51-75%,%  ADULT ORAL NUTRITION SUPPLEMENT; Lunch, Breakfast, Dinner; Standard High Calorie/High Protein Oral Supplement  ADULT DIET; Regular    Anthropometric Measures:  Height: 5' 6\" (167.6 cm)  Ideal Body Weight (IBW): 130 lbs (59 kg)    Admission Body Weight: 112 lb (50.8 kg)  Current Body Weight: 115 lb (52.2 kg), 88.5 % IBW.  Weight Source: Bed Scale  Current BMI (kg/m2): 18.6  Usual Body Weight: 118 lb (53.5 kg) (11/8/2021)  % Weight Change (Calculated): -5.1  BMI Categories: Underweight (BMI less than 22) age over 72    Estimated Daily Nutrient Needs:  Energy Requirements Based On: Kcal/kg  Weight Used for Energy Requirements: Current  Energy (kcal/day): 6347-3849 kcal/d (30-35 kcal/kg)  Weight Used for Protein Requirements: Current  Protein (g/day): 76 g/d (1.5 g/kg)  Method Used for Fluid Requirements: 1 ml/kcal  Fluid (ml/day): 6095-7795 ml/d    Nutrition Diagnosis:   · Severe malnutrition related to  (in context of chronic illness) as evidenced by severe loss of subcutaneous fat,severe muscle loss      Nutrition Interventions:   Food and/or Nutrient Delivery: Continue Current Diet,Continue Oral Nutrition Supplement  Nutrition Education/Counseling: No recommendation at this time  Coordination of Nutrition Care: Continue to monitor while inpatient       Goals:  Previous Goal Met: Progressing toward Goal(s)  Goals: PO intake 50% or greater,by next RD assessment       Nutrition Monitoring and Evaluation:   Behavioral-Environmental Outcomes: None Identified  Food/Nutrient Intake Outcomes: Food and Nutrient Intake,Supplement Intake  Physical Signs/Symptoms Outcomes: Biochemical Data,Weight,Skin,Fluid Status or Edema    Discharge Planning:     Too soon to determine     Anthony Cook MS, RD, LD  Contact: [phone]

## 2022-06-21 NOTE — PROGRESS NOTES
Infectious Diseases Progress Note    Patient:  Mine Barron  YOB: 1950  MRN: [id number]   Admit date: 6/10/2022   Admitting Physician: Fabián Gracia MD  Primary Care Physician: No primary care provider on file. Chief Complaint/Interval History:   She has been moved out of ICU. She seems to be stable. She is on 3 L of oxygen via nasal cannula with saturation 94%. 3 L at home is her baseline. No fever. No worsening symptoms. In/Out    Intake/Output Summary (Last 24 hours) at 6/21/2022 0754  Last data filed at 6/21/2022 0527  Gross per 24 hour   Intake 1560 ml   Output 1200 ml   Net 360 ml     Allergies:    Allergies   Allergen Reactions    Aspirin Hives    Codeine     Demerol     Fentanyl Hives    Iv Dye [Iodides]     Neosporin [Bacitracin-Neomycin-Polymyxin]     Nsaids     Pcn [Penicillins]     Pentazocine Lactate     Sulfa Antibiotics     Talwin [Pentazocine] Hives    Influenza Vaccines Hives and Rash     Current Meds: calcium carbonate (TUMS) chewable tablet 500 mg, TID PRN  magnesium oxide (MAG-OX) tablet 400 mg, TID  levalbuterol (XOPENEX) nebulizer solution 1.25 mg, Q8H PRN  ALPRAZolam (XANAX) tablet 1 mg, 4x Daily PRN  iopamidol (ISOVUE-370) 76 % injection 90 mL, ONCE PRN  vancomycin (VANCOCIN) 1000 mg in dextrose 5% 250 mL IVPB, Q12H  cefepime (MAXIPIME) 2000 mg IVPB minibag in NS, Q8H  vancomycin (VANCOCIN) intermittent dosing (placeholder), RX Placeholder  predniSONE (DELTASONE) tablet 20 mg, Daily  guaiFENesin (ROBITUSSIN) 100 MG/5ML liquid 400 mg, Q4H  sodium chloride (OCEAN, BABY AYR) 0.65 % nasal spray 1 spray, Q2H PRN  vitamin B-12 (CYANOCOBALAMIN) tablet 500 mcg, Daily  citalopram (CELEXA) tablet 40 mg, Daily  gabapentin (NEURONTIN) tablet 600 mg, TID  montelukast (SINGULAIR) tablet 10 mg, Nightly  nicotine (NICODERM CQ) 21 MG/24HR 1 patch, Q24H  ondansetron (ZOFRAN-ODT) disintegrating tablet 4 mg, Q8H PRN  pantoprazole (PROTONIX) tablet 40 mg, QAM AC  benzonatate (TESSALON) capsule 200 mg, TID PRN  morphine (PF) injection 1 mg, Q1H PRN  oxyCODONE-acetaminophen (PERCOCET) 5-325 MG per tablet 1 tablet, Q4H PRN  traMADol (ULTRAM) tablet 50 mg, Q4H PRN  hydrOXYzine HCl (ATARAX) tablet 10 mg, TID PRN  sodium chloride flush 0.9 % injection 5-40 mL, 2 times per day  sodium chloride flush 0.9 % injection 5-40 mL, PRN  0.9 % sodium chloride infusion, PRN  enoxaparin (LOVENOX) injection 40 mg, Daily  acetaminophen (TYLENOL) tablet 650 mg, Q4H PRN  ondansetron (ZOFRAN) injection 4 mg, Q6H PRN      Review of Systems no diarrhea or rash    VitalSigns:  BP (!) 145/71   Pulse 75   Temp 97.2 °F (36.2 °C)   Resp 16   Ht 5' 6\" (1.676 m)   Wt 115 lb (52.2 kg)   SpO2 94%   BMI 18.56 kg/m²      Physical Exam  Line/IV site: No erythema, warmth, induration, or tenderness. Lungs without significant crackles or wheezes  Prolonged expiratory phase consistent with chronic obstructive pulmonary disease    Lab Results:  CBC:   Recent Labs     06/19/22  0243 06/20/22  0109 06/21/22  0418   WBC 9.7 8.6 6.4   HGB 10.7* 11.2* 10.5*    311 281     BMP:  Recent Labs     06/19/22  0243 06/20/22  0109 06/21/22  0418    135* 136   K 4.3 4.4 4.4   CL 96* 93* 94*   CO2 34* 36* 35*   BUN 11 9 8   CREATININE 0.3* 0.4* 0.3*   GLUCOSE 157* 104 97     CultureResults:  Respiratory cultures June 16, 2022-corynebacterium stratum    Radiology: None    Additional Studies Reviewed:  None    Impression:  1. Acute on chronic respiratory failure-improving  2. Left lower lobe pneumonia-improving  3. Chronic obstructive pulmonary disease with exacerbation-improving  4.   History of tobacco use    Recommendations:  He is to be continuing to slowly improve  She will have completed antibiotic treatment after her last doses on June 22, 2022  Orders in epic for her to discontinue antibiotic treatment after last doses on June 22, 2022  If continued improvement okay with me for discharge home on June 23, 2022 if okay for release per others  Will sign off for now  Would be happy to reassess if no ongoing improvement or new problems develop  Otherwise follow-up with infectious diseases as needed    Irena Palafox MD

## 2022-06-22 LAB
ALBUMIN SERPL-MCNC: 3.5 G/DL (ref 3.5–5.2)
ALP BLD-CCNC: 46 U/L (ref 35–104)
ALT SERPL-CCNC: 10 U/L (ref 5–33)
ANION GAP SERPL CALCULATED.3IONS-SCNC: 5 MMOL/L (ref 7–19)
AST SERPL-CCNC: 11 U/L (ref 5–32)
BASOPHILS ABSOLUTE: 0 K/UL (ref 0–0.2)
BASOPHILS RELATIVE PERCENT: 0.2 % (ref 0–1)
BILIRUB SERPL-MCNC: <0.2 MG/DL (ref 0.2–1.2)
BUN BLDV-MCNC: 11 MG/DL (ref 8–23)
CALCIUM SERPL-MCNC: 9.4 MG/DL (ref 8.8–10.2)
CHLORIDE BLD-SCNC: 94 MMOL/L (ref 98–111)
CO2: 34 MMOL/L (ref 22–29)
CREAT SERPL-MCNC: 0.4 MG/DL (ref 0.5–0.9)
EOSINOPHILS ABSOLUTE: 0.1 K/UL (ref 0–0.6)
EOSINOPHILS RELATIVE PERCENT: 1.5 % (ref 0–5)
GFR AFRICAN AMERICAN: >59
GFR NON-AFRICAN AMERICAN: >60
GLUCOSE BLD-MCNC: 98 MG/DL (ref 74–109)
HCT VFR BLD CALC: 33.8 % (ref 37–47)
HEMOGLOBIN: 10.4 G/DL (ref 12–16)
IMMATURE GRANULOCYTES #: 0.1 K/UL
LYMPHOCYTES ABSOLUTE: 2.1 K/UL (ref 1.1–4.5)
LYMPHOCYTES RELATIVE PERCENT: 23.8 % (ref 20–40)
MAGNESIUM: 1.8 MG/DL (ref 1.6–2.4)
MCH RBC QN AUTO: 31.3 PG (ref 27–31)
MCHC RBC AUTO-ENTMCNC: 30.8 G/DL (ref 33–37)
MCV RBC AUTO: 101.8 FL (ref 81–99)
MONOCYTES ABSOLUTE: 0.9 K/UL (ref 0–0.9)
MONOCYTES RELATIVE PERCENT: 10.6 % (ref 0–10)
NEUTROPHILS ABSOLUTE: 5.6 K/UL (ref 1.5–7.5)
NEUTROPHILS RELATIVE PERCENT: 62.9 % (ref 50–65)
PDW BLD-RTO: 13 % (ref 11.5–14.5)
PLATELET # BLD: 307 K/UL (ref 130–400)
PMV BLD AUTO: 10.8 FL (ref 9.4–12.3)
POTASSIUM SERPL-SCNC: 4.9 MMOL/L (ref 3.5–5)
RBC # BLD: 3.32 M/UL (ref 4.2–5.4)
SODIUM BLD-SCNC: 133 MMOL/L (ref 136–145)
TOTAL PROTEIN: 5.4 G/DL (ref 6.6–8.7)
WBC # BLD: 8.8 K/UL (ref 4.8–10.8)

## 2022-06-22 PROCEDURE — 6360000002 HC RX W HCPCS: Performed by: FAMILY MEDICINE

## 2022-06-22 PROCEDURE — 6370000000 HC RX 637 (ALT 250 FOR IP): Performed by: FAMILY MEDICINE

## 2022-06-22 PROCEDURE — 2580000003 HC RX 258: Performed by: FAMILY MEDICINE

## 2022-06-22 PROCEDURE — 2700000000 HC OXYGEN THERAPY PER DAY

## 2022-06-22 PROCEDURE — 85025 COMPLETE CBC W/AUTO DIFF WBC: CPT

## 2022-06-22 PROCEDURE — 36415 COLL VENOUS BLD VENIPUNCTURE: CPT

## 2022-06-22 PROCEDURE — 6370000000 HC RX 637 (ALT 250 FOR IP): Performed by: INTERNAL MEDICINE

## 2022-06-22 PROCEDURE — 1210000000 HC MED SURG R&B

## 2022-06-22 PROCEDURE — 80053 COMPREHEN METABOLIC PANEL: CPT

## 2022-06-22 PROCEDURE — 94669 MECHANICAL CHEST WALL OSCILL: CPT

## 2022-06-22 PROCEDURE — 94640 AIRWAY INHALATION TREATMENT: CPT

## 2022-06-22 PROCEDURE — 2580000003 HC RX 258: Performed by: INTERNAL MEDICINE

## 2022-06-22 PROCEDURE — 6360000002 HC RX W HCPCS: Performed by: INTERNAL MEDICINE

## 2022-06-22 PROCEDURE — 94660 CPAP INITIATION&MGMT: CPT

## 2022-06-22 PROCEDURE — 99232 SBSQ HOSP IP/OBS MODERATE 35: CPT | Performed by: PHYSICIAN ASSISTANT

## 2022-06-22 PROCEDURE — 83735 ASSAY OF MAGNESIUM: CPT

## 2022-06-22 PROCEDURE — 2500000003 HC RX 250 WO HCPCS: Performed by: INTERNAL MEDICINE

## 2022-06-22 RX ORDER — FLUTICASONE PROPIONATE 50 MCG
1 SPRAY, SUSPENSION (ML) NASAL DAILY
Status: DISCONTINUED | OUTPATIENT
Start: 2022-06-22 | End: 2022-06-25 | Stop reason: HOSPADM

## 2022-06-22 RX ADMIN — GUAIFENESIN 400 MG: 100 SOLUTION ORAL at 22:26

## 2022-06-22 RX ADMIN — ENOXAPARIN SODIUM 40 MG: 100 INJECTION SUBCUTANEOUS at 18:27

## 2022-06-22 RX ADMIN — TRAMADOL HYDROCHLORIDE 50 MG: 50 TABLET, COATED ORAL at 15:46

## 2022-06-22 RX ADMIN — ALPRAZOLAM 1 MG: 0.5 TABLET ORAL at 09:31

## 2022-06-22 RX ADMIN — ALPRAZOLAM 1 MG: 0.5 TABLET ORAL at 02:49

## 2022-06-22 RX ADMIN — CEFEPIME 2000 MG: 2 INJECTION, POWDER, FOR SOLUTION INTRAMUSCULAR; INTRAVENOUS at 00:12

## 2022-06-22 RX ADMIN — Medication 400 MG: at 15:47

## 2022-06-22 RX ADMIN — GUAIFENESIN 400 MG: 100 SOLUTION ORAL at 00:11

## 2022-06-22 RX ADMIN — BENZONATATE 200 MG: 100 CAPSULE ORAL at 22:26

## 2022-06-22 RX ADMIN — CEFEPIME 2000 MG: 2 INJECTION, POWDER, FOR SOLUTION INTRAMUSCULAR; INTRAVENOUS at 08:44

## 2022-06-22 RX ADMIN — GABAPENTIN 600 MG: 600 TABLET, FILM COATED ORAL at 09:17

## 2022-06-22 RX ADMIN — Medication 400 MG: at 09:17

## 2022-06-22 RX ADMIN — SODIUM CHLORIDE, PRESERVATIVE FREE 10 ML: 5 INJECTION INTRAVENOUS at 22:27

## 2022-06-22 RX ADMIN — OXYCODONE HYDROCHLORIDE AND ACETAMINOPHEN 1 TABLET: 5; 325 TABLET ORAL at 22:34

## 2022-06-22 RX ADMIN — CYANOCOBALAMIN TAB 500 MCG 500 MCG: 500 TAB at 09:17

## 2022-06-22 RX ADMIN — GABAPENTIN 600 MG: 600 TABLET, FILM COATED ORAL at 22:26

## 2022-06-22 RX ADMIN — LEVALBUTEROL HYDROCHLORIDE 1.25 MG: 1.25 SOLUTION RESPIRATORY (INHALATION) at 06:57

## 2022-06-22 RX ADMIN — CEFEPIME 2000 MG: 2 INJECTION, POWDER, FOR SOLUTION INTRAMUSCULAR; INTRAVENOUS at 15:48

## 2022-06-22 RX ADMIN — FLUTICASONE PROPIONATE 1 SPRAY: 50 SPRAY, METERED NASAL at 15:46

## 2022-06-22 RX ADMIN — PANTOPRAZOLE SODIUM 40 MG: 40 TABLET, DELAYED RELEASE ORAL at 05:45

## 2022-06-22 RX ADMIN — PANTOPRAZOLE SODIUM 40 MG: 40 TABLET, DELAYED RELEASE ORAL at 15:47

## 2022-06-22 RX ADMIN — Medication 1000 MG: at 05:44

## 2022-06-22 RX ADMIN — GUAIFENESIN 400 MG: 100 SOLUTION ORAL at 12:58

## 2022-06-22 RX ADMIN — LEVALBUTEROL HYDROCHLORIDE 1.25 MG: 1.25 SOLUTION RESPIRATORY (INHALATION) at 21:12

## 2022-06-22 RX ADMIN — GABAPENTIN 600 MG: 600 TABLET, FILM COATED ORAL at 15:46

## 2022-06-22 RX ADMIN — ONDANSETRON HYDROCHLORIDE 4 MG: 2 SOLUTION INTRAMUSCULAR; INTRAVENOUS at 13:06

## 2022-06-22 RX ADMIN — MONTELUKAST SODIUM 10 MG: 10 TABLET, FILM COATED ORAL at 22:40

## 2022-06-22 RX ADMIN — Medication 400 MG: at 22:40

## 2022-06-22 RX ADMIN — PREDNISONE 10 MG: 10 TABLET ORAL at 09:17

## 2022-06-22 RX ADMIN — ALPRAZOLAM 1 MG: 0.5 TABLET ORAL at 18:34

## 2022-06-22 RX ADMIN — ONDANSETRON HYDROCHLORIDE 4 MG: 2 SOLUTION INTRAMUSCULAR; INTRAVENOUS at 19:59

## 2022-06-22 RX ADMIN — GUAIFENESIN 400 MG: 100 SOLUTION ORAL at 05:45

## 2022-06-22 RX ADMIN — SODIUM CHLORIDE, PRESERVATIVE FREE 10 ML: 5 INJECTION INTRAVENOUS at 09:32

## 2022-06-22 RX ADMIN — Medication 1000 MG: at 20:00

## 2022-06-22 RX ADMIN — CITALOPRAM HYDROBROMIDE 40 MG: 20 TABLET ORAL at 09:17

## 2022-06-22 RX ADMIN — OXYCODONE HYDROCHLORIDE AND ACETAMINOPHEN 1 TABLET: 5; 325 TABLET ORAL at 07:20

## 2022-06-22 RX ADMIN — LEVALBUTEROL HYDROCHLORIDE 1.25 MG: 1.25 SOLUTION RESPIRATORY (INHALATION) at 14:07

## 2022-06-22 ASSESSMENT — PAIN SCALES - GENERAL
PAINLEVEL_OUTOF10: 7
PAINLEVEL_OUTOF10: 8
PAINLEVEL_OUTOF10: 0

## 2022-06-22 ASSESSMENT — PAIN DESCRIPTION - LOCATION: LOCATION: BACK

## 2022-06-22 ASSESSMENT — PAIN DESCRIPTION - ORIENTATION: ORIENTATION: LOWER

## 2022-06-22 NOTE — PLAN OF CARE
Problem: Discharge Planning  Goal: Discharge to home or other facility with appropriate resources  Outcome: Progressing     Problem: Pain  Goal: Verbalizes/displays adequate comfort level or baseline comfort level  Outcome: Progressing     Problem: Skin/Tissue Integrity  Goal: Absence of new skin breakdown  Description: 1. Monitor for areas of redness and/or skin breakdown  2. Assess vascular access sites hourly  3. Every 4-6 hours minimum:  Change oxygen saturation probe site  4. Every 4-6 hours:  If on nasal continuous positive airway pressure, respiratory therapy assess nares and determine need for appliance change or resting period. Outcome: Progressing     Problem: Safety - Adult  Goal: Free from fall injury  Outcome: Progressing     Problem: Confusion  Goal: Confusion, delirium, dementia, or psychosis is improved or at baseline  Description: INTERVENTIONS:  1. Assess for possible contributors to thought disturbance, including medications, impaired vision or hearing, underlying metabolic abnormalities, dehydration, psychiatric diagnoses, and notify attending LIP  2. [place] high risk fall precautions, as indicated  3. Provide frequent short contacts to provide reality reorientation, refocusing and direction  4. Decrease environmental stimuli, including noise as appropriate  5. Monitor and intervene to maintain adequate nutrition, hydration, elimination, sleep and activity  6. If unable to ensure safety without constant attention obtain sitter and review sitter guidelines with assigned personnel  7.  Initiate Psychosocial CNS and Spiritual Care consult, as indicated  Outcome: Progressing     Problem: Nutrition Deficit:  Goal: Optimize nutritional status  Outcome: Progressing     Problem: ABCDS Injury Assessment  Goal: Absence of physical injury  Outcome: Progressing

## 2022-06-22 NOTE — PROGRESS NOTES
Progress Note  Sharron Knowles [place]  6/22/2022 12:47 PM  Subjective:   Admit Date:   6/10/2022      CC/ADMIT DX:       Interval History:      She has no c/o CP. Her SOA is improved. She denies any CP. Her breathing is stable. She c/o PND. . I have reviewed all labs/diagnostics from the last 24hrs. ROS:   I have done a 10 point ROS and all are negative, except what is mentioned in the HPI. ADULT ORAL NUTRITION SUPPLEMENT; Lunch, Breakfast, Dinner; Standard High Calorie/High Protein Oral Supplement  ADULT DIET; Regular    Medications:      sodium chloride Stopped (06/15/22 0011)      fluticasone  1 spray Each Nostril Daily    pantoprazole  40 mg Oral BID AC    predniSONE  10 mg Oral Daily    magnesium oxide  400 mg Oral TID    vancomycin  1,000 mg IntraVENous Q12H    cefepime  2,000 mg IntraVENous Q8H    vancomycin (VANCOCIN) intermittent dosing (placeholder)   Other RX Placeholder    guaiFENesin  400 mg Oral Q4H    vitamin B-12  500 mcg Oral Daily    citalopram  40 mg Oral Daily    gabapentin  600 mg Oral TID    montelukast  10 mg Oral Nightly    nicotine  1 patch TransDERmal Q24H    sodium chloride flush  5-40 mL IntraVENous 2 times per day    enoxaparin  40 mg SubCUTAneous Daily           Objective:   Vitals: BP (!) 129/56   Pulse (!) 106   Temp 96.8 °F (36 °C)   Resp 20   Ht 5' 6\" (1.676 m)   Wt 115 lb (52.2 kg)   SpO2 92%   BMI 18.56 kg/m²      Intake/Output Summary (Last 24 hours) at 6/22/2022 1247  Last data filed at 6/22/2022 1211  Gross per 24 hour   Intake 855 ml   Output 1900 ml   Net -1045 ml     General appearance: alert and cooperative with exam  Lungs: decreased BS  Heart: RRR  Abdomen: soft, non-tender; bowel sounds normal; no masses,  no organomegaly  Extremities: extremities normal, atraumatic, no cyanosis or edema  Neurologic:  No obvious focal neurologic deficits.     Assessment and Plan:   Principal Problem:    Acute on chronic respiratory failure (HCC)  Active Problems:    Anxiety    COPD exacerbation ([facility] [address]    Palliative care patient    Severe malnutrition ([facility] [address]  Resolved Problems:    * No resolved hospital problems. *    Anxiety    Chronic Pain    Rhinovirus    GERD    Plan:  1. Continue present medication(s)   2. Follow with Pulmonary and ID  3. Continue steroids, O2  4. Resume Flonase  5. Antibiotics per ID  6. PRN medicine for anxiety, pain      Discharge planning:   her home     Reviewed treatment plans with the patient and/or family.              Electronically signed by Mohinder Kelsey MD on 6/22/2022 at 12:47 PM

## 2022-06-22 NOTE — CARE COORDINATION
KINGA faxed signed trilogy order from Dr. Agustin Fajardo to [facility]  Electronically signed by HEATH Cunha on 6/22/2022 at 10:39 AM

## 2022-06-22 NOTE — PROGRESS NOTES
Oral Daily    magnesium oxide  400 mg Oral TID    vancomycin  1,000 mg IntraVENous Q12H    cefepime  2,000 mg IntraVENous Q8H    vancomycin (VANCOCIN) intermittent dosing (placeholder)   Other RX Placeholder    guaiFENesin  400 mg Oral Q4H    vitamin B-12  500 mcg Oral Daily    citalopram  40 mg Oral Daily    gabapentin  600 mg Oral TID    montelukast  10 mg Oral Nightly    nicotine  1 patch TransDERmal Q24H    sodium chloride flush  5-40 mL IntraVENous 2 times per day    enoxaparin  40 mg SubCUTAneous Daily     aluminum & magnesium hydroxide-simethicone, calcium carbonate, levalbuterol, ALPRAZolam, iopamidol, sodium chloride, ondansetron, benzonatate, morphine, oxyCODONE-acetaminophen, traMADol, hydrOXYzine HCl, sodium chloride flush, sodium chloride, acetaminophen, ondansetron  ADULT ORAL NUTRITION SUPPLEMENT; Lunch, Breakfast, Dinner; Standard High Calorie/High Protein Oral Supplement  ADULT DIET; Regular     Lab and other Data:     Recent Labs     06/20/22  0109 06/21/22  0418 06/22/22  0141   WBC 8.6 6.4 8.8   HGB 11.2* 10.5* 10.4*    281 307     Recent Labs     06/20/22  0109 06/21/22  0418 06/22/22  0141   * 136 133*   K 4.4 4.4 4.9   CL 93* 94* 94*   CO2 36* 35* 34*   BUN 9 8 11   CREATININE 0.4* 0.3* 0.4*   GLUCOSE 104 97 98     Recent Labs     06/20/22  0109 06/21/22  0418 06/22/22  0141   AST 11 13 11   ALT 10 11 10   BILITOT <0.2 <0.2 <0.2   ALKPHOS 51 45 55           Assessment/Plan   Principal Problem:    Acute on chronic respiratory failure (HCC)  Active Problems:    Anxiety    COPD exacerbation (HCC)    Palliative care patient    Severe malnutrition ([facility] [address]  Resolved Problems:    * No resolved hospital problems. *    Visit Summary:  Chart reviewed. No acute overnight events. She reports a loose stool overnight. Has increase in anxiety this AM and is concerned that post-nasal gtt is causing her to cough more often. She would like Flonase resumed.  She remains hopeful for discharge home with her daughter. States it is difficult giving up control over her home life/pets and she is scared when she thinks about dying. We discussed non-medical treatments for anxiety as well as allowing her daughter to continue to help care for her/her home/pets. Emotional support provided. Recommendations:   1. Palliative care: [address] home when medically stable. Could consider referral for outpatient palliative care Code status: full code-ACP completed    2. Acute on chornic respiratory failure w/ hypoxia/hypercapnia 2/2 COPD exacerbation/+Rhinovirus-improving, ID/Pulmonology following, Cefepime/Vancomycin to be completed today. Prednisone 10 mg daily continued    3. N/V-resolved, loose stool overnight-monitor    4. Anxiety- Atarax/Xanax continued     Thank you for consulting Palliative Care and allowing us to participate in the care of this patient.    Time Spent Counseling > 50%:  YES                                   Total Time Spent with patient/family counseling, workup/treatment review, counseling and placement of orders/preparation of this note: 30 minutes    Electronically signed by Giovana Perez PA-C on 6/22/2022 at 11:22 AM    (Please note that portions of this note were completed with a voice recognition program.  Fabio Noonan made to edit the dictations but occasionally words are mis-transcribed.)

## 2022-06-23 LAB
ALBUMIN SERPL-MCNC: 3.6 G/DL (ref 3.5–5.2)
ALP BLD-CCNC: 45 U/L (ref 35–104)
ALT SERPL-CCNC: 14 U/L (ref 5–33)
ANION GAP SERPL CALCULATED.3IONS-SCNC: 6 MMOL/L (ref 7–19)
AST SERPL-CCNC: 16 U/L (ref 5–32)
BASOPHILS ABSOLUTE: 0 K/UL (ref 0–0.2)
BASOPHILS RELATIVE PERCENT: 0.4 % (ref 0–1)
BILIRUB SERPL-MCNC: <0.2 MG/DL (ref 0.2–1.2)
BUN BLDV-MCNC: 13 MG/DL (ref 8–23)
CALCIUM SERPL-MCNC: 9 MG/DL (ref 8.8–10.2)
CHLORIDE BLD-SCNC: 92 MMOL/L (ref 98–111)
CO2: 33 MMOL/L (ref 22–29)
CREAT SERPL-MCNC: 0.4 MG/DL (ref 0.5–0.9)
EOSINOPHILS ABSOLUTE: 0.1 K/UL (ref 0–0.6)
EOSINOPHILS RELATIVE PERCENT: 1.5 % (ref 0–5)
GFR AFRICAN AMERICAN: >59
GFR NON-AFRICAN AMERICAN: >60
GLUCOSE BLD-MCNC: 70 MG/DL (ref 74–109)
HCT VFR BLD CALC: 33.7 % (ref 37–47)
HEMOGLOBIN: 10.5 G/DL (ref 12–16)
IMMATURE GRANULOCYTES #: 0.1 K/UL
LYMPHOCYTES ABSOLUTE: 2 K/UL (ref 1.1–4.5)
LYMPHOCYTES RELATIVE PERCENT: 24.3 % (ref 20–40)
MAGNESIUM: 1.9 MG/DL (ref 1.6–2.4)
MCH RBC QN AUTO: 31.6 PG (ref 27–31)
MCHC RBC AUTO-ENTMCNC: 31.2 G/DL (ref 33–37)
MCV RBC AUTO: 101.5 FL (ref 81–99)
MONOCYTES ABSOLUTE: 1 K/UL (ref 0–0.9)
MONOCYTES RELATIVE PERCENT: 11.9 % (ref 0–10)
NEUTROPHILS ABSOLUTE: 5.1 K/UL (ref 1.5–7.5)
NEUTROPHILS RELATIVE PERCENT: 60.8 % (ref 50–65)
PDW BLD-RTO: 13.2 % (ref 11.5–14.5)
PLATELET # BLD: 288 K/UL (ref 130–400)
PMV BLD AUTO: 10.7 FL (ref 9.4–12.3)
POTASSIUM SERPL-SCNC: 4.5 MMOL/L (ref 3.5–5)
RBC # BLD: 3.32 M/UL (ref 4.2–5.4)
SODIUM BLD-SCNC: 131 MMOL/L (ref 136–145)
TOTAL PROTEIN: 5.5 G/DL (ref 6.6–8.7)
WBC # BLD: 8.4 K/UL (ref 4.8–10.8)

## 2022-06-23 PROCEDURE — 83735 ASSAY OF MAGNESIUM: CPT

## 2022-06-23 PROCEDURE — 6370000000 HC RX 637 (ALT 250 FOR IP): Performed by: INTERNAL MEDICINE

## 2022-06-23 PROCEDURE — 6370000000 HC RX 637 (ALT 250 FOR IP): Performed by: FAMILY MEDICINE

## 2022-06-23 PROCEDURE — 6360000002 HC RX W HCPCS: Performed by: FAMILY MEDICINE

## 2022-06-23 PROCEDURE — 1210000000 HC MED SURG R&B

## 2022-06-23 PROCEDURE — 94660 CPAP INITIATION&MGMT: CPT

## 2022-06-23 PROCEDURE — 2700000000 HC OXYGEN THERAPY PER DAY

## 2022-06-23 PROCEDURE — 80053 COMPREHEN METABOLIC PANEL: CPT

## 2022-06-23 PROCEDURE — 36415 COLL VENOUS BLD VENIPUNCTURE: CPT

## 2022-06-23 PROCEDURE — 94640 AIRWAY INHALATION TREATMENT: CPT

## 2022-06-23 PROCEDURE — 94669 MECHANICAL CHEST WALL OSCILL: CPT

## 2022-06-23 PROCEDURE — 85025 COMPLETE CBC W/AUTO DIFF WBC: CPT

## 2022-06-23 PROCEDURE — 99232 SBSQ HOSP IP/OBS MODERATE 35: CPT | Performed by: PHYSICIAN ASSISTANT

## 2022-06-23 PROCEDURE — 99221 1ST HOSP IP/OBS SF/LOW 40: CPT | Performed by: INTERNAL MEDICINE

## 2022-06-23 PROCEDURE — 2500000003 HC RX 250 WO HCPCS: Performed by: INTERNAL MEDICINE

## 2022-06-23 PROCEDURE — 2580000003 HC RX 258: Performed by: FAMILY MEDICINE

## 2022-06-23 RX ORDER — SUCRALFATE 1 G/1
1 TABLET ORAL EVERY 6 HOURS SCHEDULED
Status: DISCONTINUED | OUTPATIENT
Start: 2022-06-23 | End: 2022-06-25 | Stop reason: HOSPADM

## 2022-06-23 RX ORDER — PREDNISONE 1 MG/1
5 TABLET ORAL DAILY
Status: DISCONTINUED | OUTPATIENT
Start: 2022-06-24 | End: 2022-06-25 | Stop reason: HOSPADM

## 2022-06-23 RX ADMIN — GUAIFENESIN 400 MG: 100 SOLUTION ORAL at 00:41

## 2022-06-23 RX ADMIN — PREDNISONE 10 MG: 10 TABLET ORAL at 09:26

## 2022-06-23 RX ADMIN — ENOXAPARIN SODIUM 40 MG: 100 INJECTION SUBCUTANEOUS at 17:45

## 2022-06-23 RX ADMIN — LEVALBUTEROL HYDROCHLORIDE 1.25 MG: 1.25 SOLUTION RESPIRATORY (INHALATION) at 07:05

## 2022-06-23 RX ADMIN — SUCRALFATE 1 G: 1 TABLET ORAL at 17:46

## 2022-06-23 RX ADMIN — FLUTICASONE PROPIONATE 1 SPRAY: 50 SPRAY, METERED NASAL at 09:26

## 2022-06-23 RX ADMIN — CYANOCOBALAMIN TAB 500 MCG 500 MCG: 500 TAB at 09:26

## 2022-06-23 RX ADMIN — ALPRAZOLAM 1 MG: 0.5 TABLET ORAL at 09:31

## 2022-06-23 RX ADMIN — GABAPENTIN 600 MG: 600 TABLET, FILM COATED ORAL at 21:07

## 2022-06-23 RX ADMIN — GUAIFENESIN 400 MG: 100 SOLUTION ORAL at 19:57

## 2022-06-23 RX ADMIN — PANTOPRAZOLE SODIUM 40 MG: 40 TABLET, DELAYED RELEASE ORAL at 05:28

## 2022-06-23 RX ADMIN — LEVALBUTEROL HYDROCHLORIDE 1.25 MG: 1.25 SOLUTION RESPIRATORY (INHALATION) at 15:00

## 2022-06-23 RX ADMIN — ALPRAZOLAM 1 MG: 0.5 TABLET ORAL at 02:17

## 2022-06-23 RX ADMIN — Medication 400 MG: at 09:26

## 2022-06-23 RX ADMIN — ONDANSETRON HYDROCHLORIDE 4 MG: 2 SOLUTION INTRAMUSCULAR; INTRAVENOUS at 12:58

## 2022-06-23 RX ADMIN — SODIUM CHLORIDE, PRESERVATIVE FREE 10 ML: 5 INJECTION INTRAVENOUS at 09:28

## 2022-06-23 RX ADMIN — GUAIFENESIN 400 MG: 100 SOLUTION ORAL at 14:10

## 2022-06-23 RX ADMIN — OXYCODONE HYDROCHLORIDE AND ACETAMINOPHEN 1 TABLET: 5; 325 TABLET ORAL at 05:28

## 2022-06-23 RX ADMIN — Medication 400 MG: at 14:10

## 2022-06-23 RX ADMIN — SODIUM CHLORIDE, PRESERVATIVE FREE 10 ML: 5 INJECTION INTRAVENOUS at 21:07

## 2022-06-23 RX ADMIN — Medication 400 MG: at 21:08

## 2022-06-23 RX ADMIN — ALPRAZOLAM 1 MG: 0.5 TABLET ORAL at 15:08

## 2022-06-23 RX ADMIN — OXYCODONE HYDROCHLORIDE AND ACETAMINOPHEN 1 TABLET: 5; 325 TABLET ORAL at 11:23

## 2022-06-23 RX ADMIN — GUAIFENESIN 400 MG: 100 SOLUTION ORAL at 17:46

## 2022-06-23 RX ADMIN — GABAPENTIN 600 MG: 600 TABLET, FILM COATED ORAL at 09:26

## 2022-06-23 RX ADMIN — TRAMADOL HYDROCHLORIDE 50 MG: 50 TABLET, COATED ORAL at 19:56

## 2022-06-23 RX ADMIN — GABAPENTIN 600 MG: 600 TABLET, FILM COATED ORAL at 15:04

## 2022-06-23 RX ADMIN — CITALOPRAM HYDROBROMIDE 40 MG: 20 TABLET ORAL at 09:26

## 2022-06-23 RX ADMIN — LEVALBUTEROL HYDROCHLORIDE 1.25 MG: 1.25 SOLUTION RESPIRATORY (INHALATION) at 21:50

## 2022-06-23 RX ADMIN — PANTOPRAZOLE SODIUM 40 MG: 40 TABLET, DELAYED RELEASE ORAL at 17:46

## 2022-06-23 RX ADMIN — MONTELUKAST SODIUM 10 MG: 10 TABLET, FILM COATED ORAL at 21:07

## 2022-06-23 RX ADMIN — ALPRAZOLAM 1 MG: 0.5 TABLET ORAL at 21:07

## 2022-06-23 ASSESSMENT — PAIN DESCRIPTION - LOCATION
LOCATION: ABDOMEN;BACK
LOCATION: ABDOMEN;BACK

## 2022-06-23 ASSESSMENT — PAIN SCALES - GENERAL
PAINLEVEL_OUTOF10: 7
PAINLEVEL_OUTOF10: 8
PAINLEVEL_OUTOF10: 3
PAINLEVEL_OUTOF10: 8
PAINLEVEL_OUTOF10: 4

## 2022-06-23 ASSESSMENT — PAIN - FUNCTIONAL ASSESSMENT
PAIN_FUNCTIONAL_ASSESSMENT: PREVENTS OR INTERFERES SOME ACTIVE ACTIVITIES AND ADLS
PAIN_FUNCTIONAL_ASSESSMENT: ACTIVITIES ARE NOT PREVENTED

## 2022-06-23 ASSESSMENT — PAIN DESCRIPTION - ORIENTATION
ORIENTATION: LOWER
ORIENTATION: MID

## 2022-06-23 ASSESSMENT — PAIN DESCRIPTION - FREQUENCY: FREQUENCY: INTERMITTENT

## 2022-06-23 ASSESSMENT — PAIN DESCRIPTION - DESCRIPTORS
DESCRIPTORS: ACHING
DESCRIPTORS: ACHING
DESCRIPTORS: DISCOMFORT

## 2022-06-23 NOTE — CONSULTS
Pt Name: Meera Diaz  MRN: [id number]  [id number]  YOB: 1950  Admit Date: 6/10/2022 11:09 AM  Date of evaluation: 6/23/2022  Primary Care Physician: No primary care provider on file. [id number]       Requesting Provider: Dr. Elle Selby    Indication: Abdominal pain. History:  The patient is a 74 y.o. female admitted to the hospital with increasing shortness of breath. She is diagnosed with pneumonia. She does have significant underlying lung disease. She is feeling better as per her breathing is concerned. She has chronic history of heartburn regurgitation and abdominal symptoms. She claimed that several years back she underwent hiatal hernia repair but the hernia recurs. She is having intermittent problem with that. She is coming a significant burning sensation in abdomen area. She is nauseated since he is in the hospital.  She was on omeprazole at home with some help. In the hospital she was started on pantoprazole 40 mg twice a day without much benefit. She feels burning sensation in the epigastric area. She is able to finish her meal.  Her appetite has been decreased. She did lose some weight. She is persistently nauseated. No significant vomiting except on rare occasion. No dysphagia or odynophagia. No fever or chills. No chest pain or palpitation. She claims her last upper endoscopy examination was done about 12 years back. She was told that she is not a candidate for any procedure under anesthesia due to her significant underlying lung problems. She was supposed to be discharged home today but because of her abdominal symptoms a GI consult was called in.       Past Medical History:        Diagnosis Date    ADHD (attention deficit hyperactivity disorder)     Anxiety     COPD (chronic obstructive pulmonary disease) (HCC)     Depression     Fibromyalgia     GERD (gastroesophageal reflux disease)     Hypertension     Malignant neoplasm of upper lobe of right lung ([facility] [address] 12/04/2018    Palliative care patient 10/06/2021    RA (rheumatoid arthritis) [facility])      Past Surgical History:        Procedure Laterality Date    CHOLECYSTECTOMY      HERNIA REPAIR      HYSTERECTOMY (CERVIX STATUS UNKNOWN)      LEG SURGERY      steel garth placement. 2009     Allergies:  Aspirin, Codeine, Demerol, Fentanyl, Iv dye [iodides], Neosporin [bacitracin-neomycin-polymyxin], Nsaids, Pcn [penicillins], Pentazocine lactate, Sulfa antibiotics, Talwin [pentazocine], and Influenza vaccines  Home Meds:  Prior to Admission medications    Medication Sig Start Date End Date Taking? Authorizing Provider   phenazopyridine (PYRIDIUM) 200 MG tablet Take 1 tablet by mouth 3 times daily as needed for Pain (bladder discomfort) 11/14/21   Luciana Sifuentes MD   montelukast (SINGULAIR) 10 MG tablet Take 10 mg by mouth nightly    Historical Provider, MD   oxyCODONE-acetaminophen (PERCOCET)  MG per tablet TAKE 1 TABLET Q 4 TO 6 HRS PRN. (MAX 5/DAY).  Earliest Fill Date: 7/19/18 7/19/18 8/18/18  Nate Curtis MD   ondansetron (ZOFRAN ODT) 4 MG disintegrating tablet Take 1 tablet by mouth every 8 hours as needed for Nausea or Vomiting 5/1/18   TEN Hyman   lidocaine (LIDODERM) 5 % Place 1 patch onto the skin daily 12 hours on, 12 hours off. 5/1/18   TEN Hyman   nicotine (NICODERM CQ) 7 MG/24HR Place 1 patch onto the skin every 24 hours 5/1/18   TEN Hyman   nicotine (NICODERM CQ) 21 MG/24HR Place 1 patch onto the skin every 24 hours 5/1/18   TEN Hyman   tiZANidine (ZANAFLEX) 4 MG tablet Take 1 tablet by mouth 3 times daily as needed (.) 4/30/18   Nate Curtis MD   albuterol-ipratropium (COMBIVENT RESPIMAT)  MCG/ACT AERS inhaler Inhale 1 puff into the lungs every 6 hours 4/27/18   Carli Karimi MD   albuterol sulfate HFA (VENTOLIN HFA) 108 (90 Base) MCG/ACT inhaler Inhale 2 puffs into the lungs every 6 hours as needed for Wheezing 4/27/18 Robin Marie MD   budesonide-formoterol [facility]) 160-4.5 MCG/ACT AERO Inhale 2 puffs into the lungs 2 times daily 4/27/18   Robin Marie MD   pantoprazole (PROTONIX) 20 MG tablet TAKE 1 TABLET BY MOUTH 2 TIMES DAILY 12/18/17   Robin Marie MD   fluticasone Tatum Hurst) 50 MCG/ACT nasal spray INSTILL 1 SPRAY IN EACH NOSTRIL DAILY 10/4/17   Robin Marie MD   OXYGEN Inhale into the lungs    Historical Provider, MD   ipratropium-albuterol (DUONEB) 0.5-2.5 (3) MG/3ML SOLN nebulizer solution Inhale 3 mLs into the lungs every 4 hours 4/6/17   Robin Marie MD   metoclopramide (REGLAN) 5 MG tablet Take 1 tablet by mouth 4 times daily 3/30/17   Robin Marie MD   albuterol (PROVENTIL) (2.5 MG/3ML) 0.083% nebulizer solution Take 3 mLs by nebulization every 6 hours as needed for Wheezing 3/17/17   Robin Marie MD   ondansetron (ZOFRAN) 4 MG tablet Take 1 tablet by mouth every 8 hours as needed for Nausea or Vomiting 2/23/17   Robin Marie MD   gabapentin (NEURONTIN) 600 MG tablet Take 600 mg by mouth 3 times daily    Historical Provider, MD   ALPRAZolam Nguyễn Rouleau) 1 MG tablet Take 1 mg by mouth nightly as needed Takes 4 times a day 1/15/15   Historical Provider, MD   amphetamine-dextroamphetamine (ADDERALL) 20 MG tablet Take 20 mg by mouth 2 times daily. 1/21/15   Historical Provider, MD   citalopram (CELEXA) 40 MG tablet Take 40 mg by mouth daily.     Historical Provider, MD        Current Meds:       [START ON 6/24/2022] predniSONE  5 mg Oral Daily    sucralfate  1 g Oral 4 times per day    fluticasone  1 spray Each Nostril Daily    pantoprazole  40 mg Oral BID AC    magnesium oxide  400 mg Oral TID    guaiFENesin  400 mg Oral Q4H    vitamin B-12  500 mcg Oral Daily    citalopram  40 mg Oral Daily    gabapentin  600 mg Oral TID    montelukast  10 mg Oral Nightly    nicotine  1 patch TransDERmal Q24H    sodium chloride flush  5-40 mL IntraVENous 2 times per day    enoxaparin  40 mg SubCUTAneous Daily        sodium chloride Stopped (06/15/22 0011)         Social History:   Social History     Socioeconomic History    Marital status:      Spouse name: Not on file    Number of children: Not on file    Years of education: Not on file    Highest education level: Not on file   Occupational History    Not on file   Tobacco Use    Smoking status: Current Some Day Smoker     Packs/day: 0.25     Years: 30.00     Pack years: 7.50     Types: Cigarettes    Smokeless tobacco: Never Used   Vaping Use    Vaping Use: Never used   Substance and Sexual Activity    Alcohol use: No    Drug use: No    Sexual activity: Not on file   Other Topics Concern    Not on file   Social History Narrative    Not on file     Social Determinants of Health     Financial Resource Strain:     Difficulty of Paying Living Expenses: Not on file   Food Insecurity:     Worried About Running Out of Food in the Last Year: Not on file    David of Food in the Last Year: Not on file   Transportation Needs:     Lack of Transportation (Medical): Not on file    Lack of Transportation (Non-Medical):  Not on file   Physical Activity:     Days of Exercise per Week: Not on file    Minutes of Exercise per Session: Not on file   Stress:     Feeling of Stress : Not on file   Social Connections:     Frequency of Communication with Friends and Family: Not on file    Frequency of Social Gatherings with Friends and Family: Not on file    Attends Zoroastrian Services: Not on file    Active Member of Clubs or Organizations: Not on file    Attends Club or Organization Meetings: Not on file    Marital Status: Not on file   Intimate Partner Violence:     Fear of Current or Ex-Partner: Not on file    Emotionally Abused: Not on file    Physically Abused: Not on file    Sexually Abused: Not on file   Housing Stability:     Unable to Pay for Housing in the Last Year: Not on file    Number of Places Lived in the Last Year: Not on file    Unstable Housing in the Last Year: Not on file       Family History:   Family History   Problem Relation Age of Onset    Cancer Father          ROS:  Multiple somatic symptoms. Physical Exam:  BP (!) 140/49   Pulse 82   Temp 97.7 °F (36.5 °C) (Temporal)   Resp 20   Ht 5' 6\" (1.676 m)   Wt 115 lb (52.2 kg)   SpO2 91%   BMI 18.56 kg/m²     General appearance: alert and cooperative with exam, appears stated age, no acute distress   Head: normal cephalic, atraumatic. EOMI bilaterally, no neck lymphadenopathy appreciated, no carotid bruits noted  Lungs: Decreased breath sound bilaterally. Heart: S1 S2 are audible. No added sound. Abdomen: Soft, non distended and tender. No hepatosplenomegaly. Bowel sounds are audible. No masses palpable. Skin: warm, dry, no obvious rash, non-jaundice  Extremities: No cyanosis or clubbing or peripheral edema noted. Dorsalis pedis pulses were intact. Labs:     Recent Labs     06/21/22 0418 06/22/22 0141 06/23/22  0252   WBC 6.4 8.8 8.4   RBC 3.35* 3.32* 3.32*   HGB 10.5* 10.4* 10.5*   HCT 34.5* 33.8* 33.7*   .0* 101.8* 101.5*   MCH 31.3* 31.3* 31.6*   MCHC 30.4* 30.8* 31.2*    307 288     Recent Labs     06/21/22 0418 06/22/22 0141 06/23/22  0252    133* 131*   K 4.4 4.9 4.5   ANIONGAP 7 5* 6*   CL 94* 94* 92*   CO2 35* 34* 33*   BUN 8 11 13   CREATININE 0.3* 0.4* 0.4*   GLUCOSE 97 98 70*   CALCIUM 8.9 9.4 9.0     Recent Labs     06/21/22 0418 06/22/22 0141 06/23/22  0252   MG 1.9 1.8 1.9     Recent Labs     06/21/22 0418 06/22/22 0141 06/23/22  0252   AST 13 11 16   ALT 11 10 14   BILITOT <0.2 <0.2 <0.2   ALKPHOS 45 46 45     HgBA1c:  No components found for: HGBA1C  FLP:  No results found for: TRIG, HDL, LDLCALC, LDLDIRECT, LABVLDL  TSH:  No results found for: TSH  Troponin T: No results for input(s): TROPONINI in the last 72 hours.   INR: No results for input(s): INR in the last 72 hours. No results for input(s): LIPASE, AMYLASE in the last 72 hours. Radiology:    XR CHEST PORTABLE    Result Date: 6/21/2022  No acute cardiopulmonary disease Recommendation: Follow up as clinically indicated. Electronically Signed by Cheo Balderas DO at 21-Jun-2022 06:11:58 AM                 Assessment:  Patient admitted to the hospital with increasing shortness of breath and diagnosed with pneumonia. She has been treated for that. Most likely etiology of her worsening of symptoms is bile induced gastritis. Pain and discomfort and the nausea could be from her multiple medications including antibiotics. Hiatal hernia can also: Some vague abdominal discomfort. She still able to tolerate food. She denies any significant dysphagia or odynophagia to me. Impression:  1. Epigastric pain/burning. 2.  Gastroesophageal flux disease. 3.  History of hiatal hernia. 4.  Multiple comorbid conditions. Plan:  1. Differential diagnosis patient symptoms discussed at length with her. Gastritis, peptic ulcer disease, GERD was discussed at length. Bile induced gastritis was also discussed. 2.  I will continue Protonix 40 mg twice a day before meals and give her a trial of Carafate 1 g 4 times a day. 3.  If she continued to have symptoms then in my opinion upper GI series is indicated. If patient clinically stable on Carafate and pantoprazole then she can be discharged home.     [facility], MD  6/23/2022, 3:42 PM

## 2022-06-23 NOTE — PROGRESS NOTES
Progress Note  Crystal [place]  6/23/2022 2:13 PM  Subjective:   Admit Date:   6/10/2022      CC/ADMIT DX:       Interval History:      She has no c/o CP. Her SOA is improved. She c/o N/V. She has c/o burning pain in abdomen. She has had diarrhea yesterday. I have reviewed all labs/diagnostics from the last 24hrs. ROS:   I have done a 10 point ROS and all are negative, except what is mentioned in the HPI. ADULT ORAL NUTRITION SUPPLEMENT; Lunch, Breakfast, Dinner; Standard High Calorie/High Protein Oral Supplement  ADULT DIET; Regular    Medications:      sodium chloride Stopped (06/15/22 0011)      fluticasone  1 spray Each Nostril Daily    pantoprazole  40 mg Oral BID AC    predniSONE  10 mg Oral Daily    magnesium oxide  400 mg Oral TID    guaiFENesin  400 mg Oral Q4H    vitamin B-12  500 mcg Oral Daily    citalopram  40 mg Oral Daily    gabapentin  600 mg Oral TID    montelukast  10 mg Oral Nightly    nicotine  1 patch TransDERmal Q24H    sodium chloride flush  5-40 mL IntraVENous 2 times per day    enoxaparin  40 mg SubCUTAneous Daily           Objective:   Vitals: BP (!) 140/49   Pulse 81   Temp 97.7 °F (36.5 °C) (Temporal)   Resp 18   Ht 5' 6\" (1.676 m)   Wt 115 lb (52.2 kg)   SpO2 90%   BMI 18.56 kg/m²      Intake/Output Summary (Last 24 hours) at 6/23/2022 1413  Last data filed at 6/23/2022 8832  Gross per 24 hour   Intake 660 ml   Output 1500 ml   Net -840 ml     General appearance: alert and cooperative with exam  Lungs: decreased BS  Heart: RRR  Abdomen: soft, non-tender; bowel sounds normal; no masses,  no organomegaly  Extremities: extremities normal, atraumatic, no cyanosis or edema  Neurologic:  No obvious focal neurologic deficits.     Assessment and Plan:   Principal Problem:    Acute on chronic respiratory failure (HCC)  Active Problems:    Anxiety    COPD exacerbation (HCC)    Palliative care patient    Severe malnutrition ([facility] [address]  Resolved Problems:    * No resolved hospital problems. *    Anxiety    Chronic Pain    Rhinovirus    GERD    Plan:  1. Continue present medication(s)   2. Follow with Pulmonary and ID  3. Continue steroids, O2  4. Resume Flonase  5. Antibiotics per ID  6. PRN medicine for anxiety, pain  7. Consult GI  8. Prn meds for N/V  9.  Wean steroids      Discharge planning:   her home     Reviewed treatment plans with the patient and/or family.              Electronically signed by Marisabel Womack MD on 6/23/2022 at 2:13 PM

## 2022-06-23 NOTE — PROGRESS NOTES
Palliative Care Progress Note  6/23/2022 1:20 PM    Patient:  Louie Campbell  YOB: 1950  Primary Care Physician: No primary care provider on file. Advance Directive: Full Code  Admit Date: 6/10/2022       Hospital Day: 13  Portions of this note have been copied forward, however, changed to reflect the most current clinical status of this patient. CHIEF COMPLAINT/REASON FOR CONSULTATION Goals of care, code status, family support    SUBJECTIVE: Ms. Daniella Hidalgo state she has ambulated in her room this AM and is currently recovering from that. She continues to have anxiety regarding need to have others assist in her care. She is transitioning from taking care of other's needs to needing assistance from other's for hers. This has made her tearful at times. Encouraged her to allow her daughter to assist in her care. She feels she needs as much support as possible upon return home. Review of Systems:   14 point review of systems is negative except as specifically addressed above. Objective:   VITALS:  BP (!) 140/49   Pulse 81   Temp 97.7 °F (36.5 °C) (Temporal)   Resp 18   Ht 5' 6\" (1.676 m)   Wt 115 lb (52.2 kg)   SpO2 90%   BMI 18.56 kg/m²   24HR INTAKE/OUTPUT:      Intake/Output Summary (Last 24 hours) at 6/23/2022 1320  Last data filed at 6/23/2022 7691  Gross per 24 hour   Intake 660 ml   Output 1500 ml   Net -840 ml     General appearance:73 yo female, no acute distress, sitting up comfortably in bed, w/ NC O2 in place     Head: Normocephalic, without obvious abnormality, atraumatic  Eyes: conjunctivae/corneas clear. PERRL, EOM's intact.    Ears: normal external ears and nose  Neck: no JVD, supple, symmetrical, trachea midline   Lungs: diminished throughout, mild conversational dyspnea, no rhonchi/rales, mild accessory muscle use   Heart:RRR, S1, S2 normal, no murmur  Abdomen:soft, non-tender; non-distended, bowel sounds present    Extremities:No lower extremity edema,  No erythema, no tenderness to palpation  Skin: warm, dry  Neurologic: Alert and oriented X 3, generalized weakness, normal tone. No focal deficits  Psychiatric: anxious     Medications:      sodium chloride Stopped (06/15/22 0011)      fluticasone  1 spray Each Nostril Daily    pantoprazole  40 mg Oral BID AC    predniSONE  10 mg Oral Daily    magnesium oxide  400 mg Oral TID    guaiFENesin  400 mg Oral Q4H    vitamin B-12  500 mcg Oral Daily    citalopram  40 mg Oral Daily    gabapentin  600 mg Oral TID    montelukast  10 mg Oral Nightly    nicotine  1 patch TransDERmal Q24H    sodium chloride flush  5-40 mL IntraVENous 2 times per day    enoxaparin  40 mg SubCUTAneous Daily     aluminum & magnesium hydroxide-simethicone, calcium carbonate, levalbuterol, ALPRAZolam, iopamidol, sodium chloride, ondansetron, benzonatate, morphine, oxyCODONE-acetaminophen, traMADol, hydrOXYzine HCl, sodium chloride flush, sodium chloride, acetaminophen, ondansetron  ADULT ORAL NUTRITION SUPPLEMENT; Lunch, Breakfast, Dinner; Standard High Calorie/High Protein Oral Supplement  ADULT DIET; Regular     Lab and other Data:     Recent Labs     06/21/22  0418 06/22/22  0141 06/23/22  0252   WBC 6.4 8.8 8.4   HGB 10.5* 10.4* 10.5*    307 288     Recent Labs     06/21/22  0418 06/22/22  0141 06/23/22  0252    133* 131*   K 4.4 4.9 4.5   CL 94* 94* 92*   CO2 35* 34* 33*   BUN 8 11 13   CREATININE 0.3* 0.4* 0.4*   GLUCOSE 97 98 70*     Recent Labs     06/21/22  0418 06/22/22  0141 06/23/22  0252   AST 13 11 16   ALT 11 10 14   BILITOT <0.2 <0.2 <0.2   ALKPHOS 45 46 39           Assessment/Plan   Principal Problem:    Acute on chronic respiratory failure (HCC)  Active Problems:    Anxiety    COPD exacerbation (HCC)    Palliative care patient    Severe malnutrition ([facility] [address]  Resolved Problems:    * No resolved hospital problems. *    Visit Summary:  Chart reviewed. No acute overnight events. Nav Grace is hopeful for return home soon.  She feels she needs additional support if possible. She is a candidate for outpatient palliative care ([facility]) as well as home health. She is agreeable for a referral for both services if treatment team is in agreement. She was reflective this AM regarding her initial diagnosis of COPD and feels she would have done things differently if she'd known how severe her illness could be. Encouraged her to remain focused on her current needs/goals. Anxiety is easily calmed with conversation. Feel she will do well once home with her daughter and additional support. Recommendations:   1. Palliative care: [address] home when medically stable. Candidate for home health and outpatient palliative care ([facility]) Code status: Full code-ACP completed    2. Acute on chornic respiratory failure w/ hypoxia/hypercapnia 2/2 COPD exacerbation/+Rhinovirus-nearing her baseline, ID/Pulmonology following, Cefepime/Vancomycin completed. Prednisone 10 mg daily continued    3. N/V-resolved    4. Anxiety- Atarax/Xanax continued, stable    Thank you for consulting Palliative Care and allowing us to participate in the care of this patient.    Time Spent Counseling > 50%:  YES                                   Total Time Spent with patient/family counseling, workup/treatment review, counseling and placement of orders/preparation of this note: 30 minutes    Electronically signed by Dada Hines PA-C on 6/23/2022 at 1:20 PM    (Please note that portions of this note were completed with a voice recognition program.  Alphonza Miquel made to edit the dictations but occasionally words are mis-transcribed.)

## 2022-06-24 LAB
ALBUMIN SERPL-MCNC: 3.6 G/DL (ref 3.5–5.2)
ALP BLD-CCNC: 44 U/L (ref 35–104)
ALT SERPL-CCNC: 14 U/L (ref 5–33)
ANION GAP SERPL CALCULATED.3IONS-SCNC: 6 MMOL/L (ref 7–19)
AST SERPL-CCNC: 14 U/L (ref 5–32)
BASOPHILS ABSOLUTE: 0 K/UL (ref 0–0.2)
BASOPHILS RELATIVE PERCENT: 0.4 % (ref 0–1)
BILIRUB SERPL-MCNC: <0.2 MG/DL (ref 0.2–1.2)
BUN BLDV-MCNC: 12 MG/DL (ref 8–23)
CALCIUM SERPL-MCNC: 9.2 MG/DL (ref 8.8–10.2)
CHLORIDE BLD-SCNC: 96 MMOL/L (ref 98–111)
CO2: 36 MMOL/L (ref 22–29)
CREAT SERPL-MCNC: 0.4 MG/DL (ref 0.5–0.9)
EOSINOPHILS ABSOLUTE: 0.1 K/UL (ref 0–0.6)
EOSINOPHILS RELATIVE PERCENT: 1.2 % (ref 0–5)
GFR AFRICAN AMERICAN: >59
GFR NON-AFRICAN AMERICAN: >60
GLUCOSE BLD-MCNC: 85 MG/DL (ref 74–109)
HCT VFR BLD CALC: 35.1 % (ref 37–47)
HEMOGLOBIN: 10.5 G/DL (ref 12–16)
IMMATURE GRANULOCYTES #: 0.1 K/UL
LYMPHOCYTES ABSOLUTE: 1.9 K/UL (ref 1.1–4.5)
LYMPHOCYTES RELATIVE PERCENT: 19.6 % (ref 20–40)
MAGNESIUM: 1.9 MG/DL (ref 1.6–2.4)
MCH RBC QN AUTO: 31.3 PG (ref 27–31)
MCHC RBC AUTO-ENTMCNC: 29.9 G/DL (ref 33–37)
MCV RBC AUTO: 104.5 FL (ref 81–99)
MONOCYTES ABSOLUTE: 1 K/UL (ref 0–0.9)
MONOCYTES RELATIVE PERCENT: 10.3 % (ref 0–10)
NEUTROPHILS ABSOLUTE: 6.4 K/UL (ref 1.5–7.5)
NEUTROPHILS RELATIVE PERCENT: 67.3 % (ref 50–65)
PDW BLD-RTO: 13.2 % (ref 11.5–14.5)
PLATELET # BLD: 274 K/UL (ref 130–400)
PMV BLD AUTO: 10.7 FL (ref 9.4–12.3)
POTASSIUM SERPL-SCNC: 4.6 MMOL/L (ref 3.5–5)
RBC # BLD: 3.36 M/UL (ref 4.2–5.4)
SODIUM BLD-SCNC: 138 MMOL/L (ref 136–145)
TOTAL PROTEIN: 5.5 G/DL (ref 6.6–8.7)
WBC # BLD: 9.5 K/UL (ref 4.8–10.8)

## 2022-06-24 PROCEDURE — 2500000003 HC RX 250 WO HCPCS: Performed by: INTERNAL MEDICINE

## 2022-06-24 PROCEDURE — 6370000000 HC RX 637 (ALT 250 FOR IP): Performed by: FAMILY MEDICINE

## 2022-06-24 PROCEDURE — 80053 COMPREHEN METABOLIC PANEL: CPT

## 2022-06-24 PROCEDURE — 6370000000 HC RX 637 (ALT 250 FOR IP): Performed by: INTERNAL MEDICINE

## 2022-06-24 PROCEDURE — 2580000003 HC RX 258: Performed by: FAMILY MEDICINE

## 2022-06-24 PROCEDURE — 1210000000 HC MED SURG R&B

## 2022-06-24 PROCEDURE — 99232 SBSQ HOSP IP/OBS MODERATE 35: CPT | Performed by: PHYSICIAN ASSISTANT

## 2022-06-24 PROCEDURE — 6360000002 HC RX W HCPCS: Performed by: FAMILY MEDICINE

## 2022-06-24 PROCEDURE — 36415 COLL VENOUS BLD VENIPUNCTURE: CPT

## 2022-06-24 PROCEDURE — 85025 COMPLETE CBC W/AUTO DIFF WBC: CPT

## 2022-06-24 PROCEDURE — 94660 CPAP INITIATION&MGMT: CPT

## 2022-06-24 PROCEDURE — 94640 AIRWAY INHALATION TREATMENT: CPT

## 2022-06-24 PROCEDURE — 83735 ASSAY OF MAGNESIUM: CPT

## 2022-06-24 PROCEDURE — 2700000000 HC OXYGEN THERAPY PER DAY

## 2022-06-24 RX ADMIN — LEVALBUTEROL HYDROCHLORIDE 1.25 MG: 1.25 SOLUTION RESPIRATORY (INHALATION) at 14:35

## 2022-06-24 RX ADMIN — SUCRALFATE 1 G: 1 TABLET ORAL at 17:46

## 2022-06-24 RX ADMIN — TRAMADOL HYDROCHLORIDE 50 MG: 50 TABLET, COATED ORAL at 03:43

## 2022-06-24 RX ADMIN — PANTOPRAZOLE SODIUM 40 MG: 40 TABLET, DELAYED RELEASE ORAL at 17:46

## 2022-06-24 RX ADMIN — GUAIFENESIN 400 MG: 100 SOLUTION ORAL at 04:59

## 2022-06-24 RX ADMIN — GUAIFENESIN 400 MG: 100 SOLUTION ORAL at 13:58

## 2022-06-24 RX ADMIN — ENOXAPARIN SODIUM 40 MG: 100 INJECTION SUBCUTANEOUS at 17:46

## 2022-06-24 RX ADMIN — ONDANSETRON HYDROCHLORIDE 4 MG: 2 SOLUTION INTRAMUSCULAR; INTRAVENOUS at 10:51

## 2022-06-24 RX ADMIN — SODIUM CHLORIDE, PRESERVATIVE FREE 10 ML: 5 INJECTION INTRAVENOUS at 20:19

## 2022-06-24 RX ADMIN — FLUTICASONE PROPIONATE 1 SPRAY: 50 SPRAY, METERED NASAL at 09:17

## 2022-06-24 RX ADMIN — SODIUM CHLORIDE, PRESERVATIVE FREE 10 ML: 5 INJECTION INTRAVENOUS at 09:17

## 2022-06-24 RX ADMIN — GABAPENTIN 600 MG: 600 TABLET, FILM COATED ORAL at 20:18

## 2022-06-24 RX ADMIN — LEVALBUTEROL HYDROCHLORIDE 1.25 MG: 1.25 SOLUTION RESPIRATORY (INHALATION) at 06:56

## 2022-06-24 RX ADMIN — SUCRALFATE 1 G: 1 TABLET ORAL at 00:18

## 2022-06-24 RX ADMIN — SUCRALFATE 1 G: 1 TABLET ORAL at 04:59

## 2022-06-24 RX ADMIN — CYANOCOBALAMIN TAB 500 MCG 500 MCG: 500 TAB at 09:15

## 2022-06-24 RX ADMIN — ONDANSETRON HYDROCHLORIDE 4 MG: 2 SOLUTION INTRAMUSCULAR; INTRAVENOUS at 20:14

## 2022-06-24 RX ADMIN — GUAIFENESIN 400 MG: 100 SOLUTION ORAL at 09:15

## 2022-06-24 RX ADMIN — GUAIFENESIN 400 MG: 100 SOLUTION ORAL at 20:18

## 2022-06-24 RX ADMIN — GUAIFENESIN 400 MG: 100 SOLUTION ORAL at 00:18

## 2022-06-24 RX ADMIN — SUCRALFATE 1 G: 1 TABLET ORAL at 13:59

## 2022-06-24 RX ADMIN — PANTOPRAZOLE SODIUM 40 MG: 40 TABLET, DELAYED RELEASE ORAL at 05:04

## 2022-06-24 RX ADMIN — ALPRAZOLAM 1 MG: 0.5 TABLET ORAL at 04:59

## 2022-06-24 RX ADMIN — MONTELUKAST SODIUM 10 MG: 10 TABLET, FILM COATED ORAL at 20:18

## 2022-06-24 RX ADMIN — GABAPENTIN 600 MG: 600 TABLET, FILM COATED ORAL at 09:15

## 2022-06-24 RX ADMIN — LEVALBUTEROL HYDROCHLORIDE 1.25 MG: 1.25 SOLUTION RESPIRATORY (INHALATION) at 22:08

## 2022-06-24 RX ADMIN — CITALOPRAM HYDROBROMIDE 40 MG: 20 TABLET ORAL at 09:15

## 2022-06-24 RX ADMIN — OXYCODONE HYDROCHLORIDE AND ACETAMINOPHEN 1 TABLET: 5; 325 TABLET ORAL at 09:27

## 2022-06-24 RX ADMIN — Medication 400 MG: at 20:18

## 2022-06-24 RX ADMIN — PREDNISONE 5 MG: 5 TABLET ORAL at 09:16

## 2022-06-24 RX ADMIN — ALPRAZOLAM 1 MG: 0.5 TABLET ORAL at 20:18

## 2022-06-24 RX ADMIN — GUAIFENESIN 400 MG: 100 SOLUTION ORAL at 17:46

## 2022-06-24 RX ADMIN — OXYCODONE HYDROCHLORIDE AND ACETAMINOPHEN 1 TABLET: 5; 325 TABLET ORAL at 17:46

## 2022-06-24 RX ADMIN — Medication 400 MG: at 09:15

## 2022-06-24 RX ADMIN — ALPRAZOLAM 1 MG: 0.5 TABLET ORAL at 12:46

## 2022-06-24 RX ADMIN — Medication 400 MG: at 13:59

## 2022-06-24 RX ADMIN — GABAPENTIN 600 MG: 600 TABLET, FILM COATED ORAL at 13:59

## 2022-06-24 ASSESSMENT — PAIN SCALES - GENERAL
PAINLEVEL_OUTOF10: 8
PAINLEVEL_OUTOF10: 6
PAINLEVEL_OUTOF10: 4

## 2022-06-24 ASSESSMENT — PAIN DESCRIPTION - ORIENTATION
ORIENTATION: MID
ORIENTATION: MID;LOWER

## 2022-06-24 ASSESSMENT — PAIN - FUNCTIONAL ASSESSMENT: PAIN_FUNCTIONAL_ASSESSMENT: ACTIVITIES ARE NOT PREVENTED

## 2022-06-24 ASSESSMENT — PAIN DESCRIPTION - LOCATION
LOCATION: ABDOMEN;BACK
LOCATION: BACK

## 2022-06-24 ASSESSMENT — PAIN DESCRIPTION - DESCRIPTORS
DESCRIPTORS: ACHING
DESCRIPTORS: DISCOMFORT;ACHING

## 2022-06-24 NOTE — PROGRESS NOTES
Comprehensive Nutrition Assessment    Type and Reason for Visit:  Reassess    Nutrition Recommendations/Plan:   1. Continue current interventions     Malnutrition Assessment:  Malnutrition Status:  Severe malnutrition (06/24/22 1320)    Context:  Chronic Illness     Findings of the 6 clinical characteristics of malnutrition:  Energy Intake:  No significant decrease in energy intake  Weight Loss:  No significant weight loss     Body Fat Loss:  Severe body fat loss Orbital,Buccal region   Muscle Mass Loss:  Severe muscle mass loss Temples (temporalis)  Fluid Accumulation:  No significant fluid accumulation Extremities   Strength:  Not Performed    Nutrition Assessment:    PO intake continues to slowly improve. Intake now ranging %. Intake of ONS remains good also. New wt NA    Nutrition Related Findings:    NC Wound Type: None       Current Nutrition Intake & Therapies:    Average Meal Intake: 51-75%,%  Average Supplements Intake: %,51-75%  ADULT DIET; Regular  ADULT ORAL NUTRITION SUPPLEMENT; Lunch, Breakfast, Dinner; Standard High Calorie/High Protein Oral Supplement    Anthropometric Measures:  Height: 5' 6\" (167.6 cm)  Ideal Body Weight (IBW): 130 lbs (59 kg)    Admission Body Weight: 112 lb (50.8 kg)  Current Body Weight: 115 lb (52.2 kg), 88.5 % IBW.  Weight Source: Bed Scale  Current BMI (kg/m2): 18.6  Usual Body Weight: 118 lb (53.5 kg) (11/8/2021)  % Weight Change (Calculated): -5.1  BMI Categories: Underweight (BMI less than 22) age over 72    Estimated Daily Nutrient Needs:  Energy Requirements Based On: Kcal/kg  Weight Used for Energy Requirements: Current  Energy (kcal/day): 6342-4654 kcal/d (30-35 kcal/kg)  Weight Used for Protein Requirements: Current  Protein (g/day): 76 g/d (1.5 g/kg)  Method Used for Fluid Requirements: 1 ml/kcal  Fluid (ml/day): 2477-2829 ml/d    Nutrition Diagnosis:   · Severe malnutrition related to  (in context of chronic illness) as evidenced by severe

## 2022-06-24 NOTE — PROGRESS NOTES
Progress Note  [facility]  6/24/2022 6:18 PM  Subjective:   Admit Date:   6/10/2022      CC/ADMIT DX:       Interval History:      She has no c/o CP. Her SOA is improved. Her abdominal pain is improved. Still with some nausea. No CP. Her SOA is stable. I have reviewed all labs/diagnostics from the last 24hrs. ROS:   I have done a 10 point ROS and all are negative, except what is mentioned in the HPI. ADULT DIET; Regular  ADULT ORAL NUTRITION SUPPLEMENT; Lunch, Breakfast, Dinner; Standard High Calorie/High Protein Oral Supplement    Medications:      sodium chloride Stopped (06/15/22 0011)      predniSONE  5 mg Oral Daily    sucralfate  1 g Oral 4 times per day    fluticasone  1 spray Each Nostril Daily    pantoprazole  40 mg Oral BID AC    magnesium oxide  400 mg Oral TID    guaiFENesin  400 mg Oral Q4H    vitamin B-12  500 mcg Oral Daily    citalopram  40 mg Oral Daily    gabapentin  600 mg Oral TID    montelukast  10 mg Oral Nightly    nicotine  1 patch TransDERmal Q24H    sodium chloride flush  5-40 mL IntraVENous 2 times per day    enoxaparin  40 mg SubCUTAneous Daily           Objective:   Vitals: BP (!) 116/48   Pulse 74   Temp 97.3 °F (36.3 °C) (Temporal)   Resp 20   Ht 5' 6\" (1.676 m)   Wt 115 lb (52.2 kg)   SpO2 94%   BMI 18.56 kg/m²      Intake/Output Summary (Last 24 hours) at 6/24/2022 1818  Last data filed at 6/24/2022 1435  Gross per 24 hour   Intake 210 ml   Output 1000 ml   Net -790 ml     General appearance: alert and cooperative with exam  Lungs: decreased BS  Heart: RRR  Abdomen: soft, non-tender; bowel sounds normal; no masses,  no organomegaly  Extremities: extremities normal, atraumatic, no cyanosis or edema  Neurologic:  No obvious focal neurologic deficits.     Assessment and Plan:   Principal Problem:    Acute on chronic respiratory failure (HCC)  Active Problems:    Anxiety    Upper abdominal pain    Heart burn    Nausea    COPD exacerbation (HCC) Palliative care patient    Severe malnutrition ([facility] [address]  Resolved Problems:    * No resolved hospital problems. *    Anxiety    Chronic Pain    Rhinovirus    GERD    Plan:  1. Continue present medication(s)   2. Follow with Pulmonary, GI  3. Weaning steroids, continue O2  4. Continue PPI, Carafate  5. Antibiotics completed  6. PRN medicine for anxiety, pain  7. Prn meds for N/V  8. Hopefully home next 1-2 days      Discharge planning:   her home     Reviewed treatment plans with the patient and/or family.              Electronically signed by Katie Shah MD on 6/24/2022 at 6:18 PM

## 2022-06-24 NOTE — PROGRESS NOTES
Palliative Care Progress Note  6/24/2022 1:37 PM    Patient:  Fortino Hardwick  YOB: 1950  Primary Care Physician: No primary care provider on file. Advance Directive: Full Code  Admit Date: 6/10/2022       Hospital Day: 14  Portions of this note have been copied forward, however, changed to reflect the most current clinical status of this patient. CHIEF COMPLAINT/REASON FOR CONSULTATION Goals of care, code status, family support    SUBJECTIVE: Ms. Robin Donald states she feels better today. Abdominal/epigastric burning improved. She's happy that Prednisone dose has been decreased and remains hopeful for discharge home soon. Review of Systems:   14 point review of systems is negative except as specifically addressed above. Objective:   VITALS:  BP (!) 116/48   Pulse 80   Temp 97.3 °F (36.3 °C) (Temporal)   Resp 18   Ht 5' 6\" (1.676 m)   Wt 115 lb (52.2 kg)   SpO2 94%   BMI 18.56 kg/m²   24HR INTAKE/OUTPUT:      Intake/Output Summary (Last 24 hours) at 6/24/2022 1337  Last data filed at 6/24/2022 1033  Gross per 24 hour   Intake 210 ml   Output --   Net 210 ml     General appearance:73 yo female, no acute distress, sitting comfortably in bed, w/ NC O2 in place, frail   Head: Normocephalic, without obvious abnormality, atraumatic  Eyes: conjunctivae/corneas clear. PERRL, EOM's intact. Ears: normal external ears and nose  Neck: no JVD, supple, symmetrical, trachea midline   Lungs: diminished throughout, no conversational dyspnea today, no rhonchi noted  Heart:regular rate rhythm, S1, S2 normal, no murmur  Abdomen:soft, non-tender; non-distended, bowel sounds present    Extremities:no lower extremity edema,  No erythema, no tenderness to palpation  Skin: warm, dry  Neurologic: Alert and oriented X 3, generalized weakness, normal tone.  No focal deficits  Psychiatric: appropriate mood/affect    Medications:      sodium chloride Stopped (06/15/22 0011)      predniSONE  5 mg Oral Daily    sucralfate  1 g Oral 4 times per day    fluticasone  1 spray Each Nostril Daily    pantoprazole  40 mg Oral BID AC    magnesium oxide  400 mg Oral TID    guaiFENesin  400 mg Oral Q4H    vitamin B-12  500 mcg Oral Daily    citalopram  40 mg Oral Daily    gabapentin  600 mg Oral TID    montelukast  10 mg Oral Nightly    nicotine  1 patch TransDERmal Q24H    sodium chloride flush  5-40 mL IntraVENous 2 times per day    enoxaparin  40 mg SubCUTAneous Daily     aluminum & magnesium hydroxide-simethicone, calcium carbonate, levalbuterol, ALPRAZolam, iopamidol, sodium chloride, ondansetron, benzonatate, morphine, oxyCODONE-acetaminophen, traMADol, hydrOXYzine HCl, sodium chloride flush, sodium chloride, acetaminophen, ondansetron  ADULT DIET; Regular  ADULT ORAL NUTRITION SUPPLEMENT; Lunch, Breakfast, Dinner; Standard High Calorie/High Protein Oral Supplement     Lab and other Data:     Recent Labs     06/22/22  0141 06/23/22  0252 06/24/22  0318   WBC 8.8 8.4 9.5   HGB 10.4* 10.5* 10.5*    288 274     Recent Labs     06/22/22  0141 06/23/22  0252 06/24/22  0318   * 131* 138   K 4.9 4.5 4.6   CL 94* 92* 96*   CO2 34* 33* 36*   BUN 11 13 12   CREATININE 0.4* 0.4* 0.4*   GLUCOSE 98 70* 85     Recent Labs     06/22/22  0141 06/23/22  0252 06/24/22  0318   AST 11 16 14   ALT 10 14 14   BILITOT <0.2 <0.2 <0.2   ALKPHOS 46 45 44           Assessment/Plan   Principal Problem:    Acute on chronic respiratory failure (HCC)  Active Problems:    Anxiety    Upper abdominal pain    Heart burn    Nausea    COPD exacerbation (HCC)    Palliative care patient    Severe malnutrition ([facility] [address]  Resolved Problems:    * No resolved hospital problems. *    Visit Summary:  Chart reviewed. No acute overnight events. Ms. Tran July appears much less anxious today. Dyspnea has improved. Abdominal burning resolving with addition of carafate and protonix increase to twice daily. She remains hopeful to go home soon.  Has been ambulating in her room with less difficulty. Recommendations:   1. Palliative care: [address] home when medically stable. Candidate for home health and outpatient palliative care ([facility]) Code status: Full code-ACP completed    2. Acute on chornic respiratory failure w/ hypoxia/hypercapnia 2/2 COPD exacerbation/+Rhinovirus-nearing her baseline, ID/Pulmonology following, Cefepime/Vancomycin completed. Prednisone decreased to 5 mg    3. N/V-resolved    4. Anxiety- Atarax/Xanax continued, stable    5. Epigastric pain/burning-Protonix 40 mg BID, Carafate 1 g QID-improving     Thank you for consulting Palliative Care and allowing us to participate in the care of this patient.    Time Spent Counseling > 50%:  YES                                   Total Time Spent with patient/family counseling, workup/treatment review, counseling and placement of orders/preparation of this note: 25 minutes    Electronically signed by Nader Dupree PA-C on 6/24/2022 at 1:37 PM    (Please note that portions of this note were completed with a voice recognition program.  Benedict Zamora made to edit the dictations but occasionally words are mis-transcribed.)

## 2022-06-24 NOTE — PLAN OF CARE
Problem: Discharge Planning  Goal: Discharge to home or other facility with appropriate resources  Outcome: Progressing  Flowsheets (Taken 6/24/2022 0033)  Discharge to home or other facility with appropriate resources: Identify barriers to discharge with patient and caregiver     Problem: Skin/Tissue Integrity  Goal: Absence of new skin breakdown  Description: 1. Monitor for areas of redness and/or skin breakdown  2. Assess vascular access sites hourly  3. Every 4-6 hours minimum:  Change oxygen saturation probe site  4. Every 4-6 hours:  If on nasal continuous positive airway pressure, respiratory therapy assess nares and determine need for appliance change or resting period. Outcome: Progressing     Problem: Confusion  Goal: Confusion, delirium, dementia, or psychosis is improved or at baseline  Description: INTERVENTIONS:  1. Assess for possible contributors to thought disturbance, including medications, impaired vision or hearing, underlying metabolic abnormalities, dehydration, psychiatric diagnoses, and notify attending LIP  2. [place] high risk fall precautions, as indicated  3. Provide frequent short contacts to provide reality reorientation, refocusing and direction  4. Decrease environmental stimuli, including noise as appropriate  5. Monitor and intervene to maintain adequate nutrition, hydration, elimination, sleep and activity  6. If unable to ensure safety without constant attention obtain sitter and review sitter guidelines with assigned personnel  7.  Initiate Psychosocial CNS and Spiritual Care consult, as indicated  Outcome: Progressing  Flowsheets (Taken 6/24/2022 0033)  Effect of thought disturbance (confusion, delirium, dementia, or psychosis) are managed with adequate functional status: Decrease environmental stimuli, including noise as appropriate

## 2022-06-25 VITALS
WEIGHT: 115 LBS | SYSTOLIC BLOOD PRESSURE: 116 MMHG | HEIGHT: 66 IN | OXYGEN SATURATION: 96 % | RESPIRATION RATE: 18 BRPM | DIASTOLIC BLOOD PRESSURE: 52 MMHG | HEART RATE: 87 BPM | TEMPERATURE: 97.3 F | BODY MASS INDEX: 18.48 KG/M2

## 2022-06-25 PROBLEM — B34.8 RHINOVIRUS INFECTION: Status: ACTIVE | Noted: 2022-06-25

## 2022-06-25 LAB
ALBUMIN SERPL-MCNC: 3.8 G/DL (ref 3.5–5.2)
ALP BLD-CCNC: 49 U/L (ref 35–104)
ALT SERPL-CCNC: 15 U/L (ref 5–33)
ANION GAP SERPL CALCULATED.3IONS-SCNC: 7 MMOL/L (ref 7–19)
AST SERPL-CCNC: 16 U/L (ref 5–32)
BASOPHILS ABSOLUTE: 0 K/UL (ref 0–0.2)
BASOPHILS RELATIVE PERCENT: 0.4 % (ref 0–1)
BILIRUB SERPL-MCNC: <0.2 MG/DL (ref 0.2–1.2)
BUN BLDV-MCNC: 10 MG/DL (ref 8–23)
CALCIUM SERPL-MCNC: 9 MG/DL (ref 8.8–10.2)
CHLORIDE BLD-SCNC: 93 MMOL/L (ref 98–111)
CO2: 34 MMOL/L (ref 22–29)
CREAT SERPL-MCNC: 0.4 MG/DL (ref 0.5–0.9)
EOSINOPHILS ABSOLUTE: 0.1 K/UL (ref 0–0.6)
EOSINOPHILS RELATIVE PERCENT: 1.5 % (ref 0–5)
GFR AFRICAN AMERICAN: >59
GFR NON-AFRICAN AMERICAN: >60
GLUCOSE BLD-MCNC: 99 MG/DL (ref 74–109)
HCT VFR BLD CALC: 34.1 % (ref 37–47)
HEMOGLOBIN: 10.6 G/DL (ref 12–16)
IMMATURE GRANULOCYTES #: 0.1 K/UL
LYMPHOCYTES ABSOLUTE: 1.7 K/UL (ref 1.1–4.5)
LYMPHOCYTES RELATIVE PERCENT: 18 % (ref 20–40)
MAGNESIUM: 1.8 MG/DL (ref 1.6–2.4)
MCH RBC QN AUTO: 32.2 PG (ref 27–31)
MCHC RBC AUTO-ENTMCNC: 31.1 G/DL (ref 33–37)
MCV RBC AUTO: 103.6 FL (ref 81–99)
MONOCYTES ABSOLUTE: 1 K/UL (ref 0–0.9)
MONOCYTES RELATIVE PERCENT: 10.5 % (ref 0–10)
NEUTROPHILS ABSOLUTE: 6.6 K/UL (ref 1.5–7.5)
NEUTROPHILS RELATIVE PERCENT: 68.8 % (ref 50–65)
PDW BLD-RTO: 13.2 % (ref 11.5–14.5)
PLATELET # BLD: 278 K/UL (ref 130–400)
PMV BLD AUTO: 11 FL (ref 9.4–12.3)
POTASSIUM SERPL-SCNC: 4.7 MMOL/L (ref 3.5–5)
RBC # BLD: 3.29 M/UL (ref 4.2–5.4)
SODIUM BLD-SCNC: 134 MMOL/L (ref 136–145)
TOTAL PROTEIN: 5.8 G/DL (ref 6.6–8.7)
WBC # BLD: 9.6 K/UL (ref 4.8–10.8)

## 2022-06-25 PROCEDURE — 2700000000 HC OXYGEN THERAPY PER DAY

## 2022-06-25 PROCEDURE — 6370000000 HC RX 637 (ALT 250 FOR IP): Performed by: FAMILY MEDICINE

## 2022-06-25 PROCEDURE — 2500000003 HC RX 250 WO HCPCS: Performed by: INTERNAL MEDICINE

## 2022-06-25 PROCEDURE — 6370000000 HC RX 637 (ALT 250 FOR IP): Performed by: INTERNAL MEDICINE

## 2022-06-25 PROCEDURE — 6360000002 HC RX W HCPCS: Performed by: FAMILY MEDICINE

## 2022-06-25 PROCEDURE — 2580000003 HC RX 258: Performed by: FAMILY MEDICINE

## 2022-06-25 PROCEDURE — 36415 COLL VENOUS BLD VENIPUNCTURE: CPT

## 2022-06-25 PROCEDURE — 85025 COMPLETE CBC W/AUTO DIFF WBC: CPT

## 2022-06-25 PROCEDURE — 80053 COMPREHEN METABOLIC PANEL: CPT

## 2022-06-25 PROCEDURE — 94660 CPAP INITIATION&MGMT: CPT

## 2022-06-25 PROCEDURE — 83735 ASSAY OF MAGNESIUM: CPT

## 2022-06-25 PROCEDURE — 94640 AIRWAY INHALATION TREATMENT: CPT

## 2022-06-25 PROCEDURE — 99233 SBSQ HOSP IP/OBS HIGH 50: CPT | Performed by: INTERNAL MEDICINE

## 2022-06-25 RX ORDER — SUCRALFATE 1 G/1
1 TABLET ORAL 4 TIMES DAILY
Qty: 60 TABLET | Refills: 0 | Status: SHIPPED | OUTPATIENT
Start: 2022-06-25

## 2022-06-25 RX ADMIN — SODIUM CHLORIDE, PRESERVATIVE FREE 10 ML: 5 INJECTION INTRAVENOUS at 09:28

## 2022-06-25 RX ADMIN — CITALOPRAM HYDROBROMIDE 40 MG: 20 TABLET ORAL at 09:21

## 2022-06-25 RX ADMIN — FLUTICASONE PROPIONATE 1 SPRAY: 50 SPRAY, METERED NASAL at 09:21

## 2022-06-25 RX ADMIN — ONDANSETRON HYDROCHLORIDE 4 MG: 2 SOLUTION INTRAMUSCULAR; INTRAVENOUS at 12:39

## 2022-06-25 RX ADMIN — TRAMADOL HYDROCHLORIDE 50 MG: 50 TABLET, COATED ORAL at 00:48

## 2022-06-25 RX ADMIN — LEVALBUTEROL HYDROCHLORIDE 1.25 MG: 1.25 SOLUTION RESPIRATORY (INHALATION) at 06:21

## 2022-06-25 RX ADMIN — ALPRAZOLAM 1 MG: 0.5 TABLET ORAL at 10:49

## 2022-06-25 RX ADMIN — LEVALBUTEROL HYDROCHLORIDE 1.25 MG: 1.25 SOLUTION RESPIRATORY (INHALATION) at 13:47

## 2022-06-25 RX ADMIN — GUAIFENESIN 400 MG: 100 SOLUTION ORAL at 12:39

## 2022-06-25 RX ADMIN — GABAPENTIN 600 MG: 600 TABLET, FILM COATED ORAL at 09:21

## 2022-06-25 RX ADMIN — GUAIFENESIN 400 MG: 100 SOLUTION ORAL at 09:21

## 2022-06-25 RX ADMIN — PANTOPRAZOLE SODIUM 40 MG: 40 TABLET, DELAYED RELEASE ORAL at 15:13

## 2022-06-25 RX ADMIN — SUCRALFATE 1 G: 1 TABLET ORAL at 04:48

## 2022-06-25 RX ADMIN — GUAIFENESIN 400 MG: 100 SOLUTION ORAL at 04:47

## 2022-06-25 RX ADMIN — SUCRALFATE 1 G: 1 TABLET ORAL at 00:37

## 2022-06-25 RX ADMIN — ALPRAZOLAM 1 MG: 0.5 TABLET ORAL at 05:20

## 2022-06-25 RX ADMIN — CYANOCOBALAMIN TAB 500 MCG 500 MCG: 500 TAB at 09:21

## 2022-06-25 RX ADMIN — GUAIFENESIN 400 MG: 100 SOLUTION ORAL at 00:37

## 2022-06-25 RX ADMIN — Medication 400 MG: at 15:13

## 2022-06-25 RX ADMIN — GUAIFENESIN 400 MG: 100 SOLUTION ORAL at 16:11

## 2022-06-25 RX ADMIN — PANTOPRAZOLE SODIUM 40 MG: 40 TABLET, DELAYED RELEASE ORAL at 05:20

## 2022-06-25 RX ADMIN — ALPRAZOLAM 1 MG: 0.5 TABLET ORAL at 15:13

## 2022-06-25 RX ADMIN — OXYCODONE HYDROCHLORIDE AND ACETAMINOPHEN 1 TABLET: 5; 325 TABLET ORAL at 02:39

## 2022-06-25 RX ADMIN — Medication 400 MG: at 09:21

## 2022-06-25 RX ADMIN — GABAPENTIN 600 MG: 600 TABLET, FILM COATED ORAL at 15:13

## 2022-06-25 RX ADMIN — OXYCODONE HYDROCHLORIDE AND ACETAMINOPHEN 1 TABLET: 5; 325 TABLET ORAL at 09:33

## 2022-06-25 RX ADMIN — SUCRALFATE 1 G: 1 TABLET ORAL at 12:39

## 2022-06-25 ASSESSMENT — PAIN SCALES - GENERAL
PAINLEVEL_OUTOF10: 6
PAINLEVEL_OUTOF10: 8
PAINLEVEL_OUTOF10: 7

## 2022-06-25 ASSESSMENT — PAIN DESCRIPTION - LOCATION
LOCATION: BACK
LOCATION: BACK;HEAD;ABDOMEN

## 2022-06-25 ASSESSMENT — PAIN DESCRIPTION - DESCRIPTORS: DESCRIPTORS: ACHING

## 2022-06-25 ASSESSMENT — PAIN - FUNCTIONAL ASSESSMENT: PAIN_FUNCTIONAL_ASSESSMENT: PREVENTS OR INTERFERES SOME ACTIVE ACTIVITIES AND ADLS

## 2022-06-25 ASSESSMENT — PAIN DESCRIPTION - ORIENTATION: ORIENTATION: LOWER

## 2022-06-25 NOTE — PROGRESS NOTES
GI  - PROGRESS NOTE    Subjective:   Admit Date: 6/10/2022  PCP: No primary care provider on file. Patient being seen for: Gastroesophageal disease. Abdominal pain. HPI: Patient is feeling much better since she was started on Carafate 1 g 4 times a day. Her abdominal pain burning significantly improved. She is able to tolerate her diet. Her heartburns or regurgitations are also better. No other GI symptoms.       Medications:  Scheduled Meds:   predniSONE  5 mg Oral Daily    sucralfate  1 g Oral 4 times per day    fluticasone  1 spray Each Nostril Daily    pantoprazole  40 mg Oral BID AC    magnesium oxide  400 mg Oral TID    guaiFENesin  400 mg Oral Q4H    vitamin B-12  500 mcg Oral Daily    citalopram  40 mg Oral Daily    gabapentin  600 mg Oral TID    montelukast  10 mg Oral Nightly    nicotine  1 patch TransDERmal Q24H    sodium chloride flush  5-40 mL IntraVENous 2 times per day    enoxaparin  40 mg SubCUTAneous Daily       Continuous Infusions:   sodium chloride Stopped (06/15/22 0011)       PRN Meds:.aluminum & magnesium hydroxide-simethicone, calcium carbonate, levalbuterol, ALPRAZolam, iopamidol, sodium chloride, ondansetron, benzonatate, morphine, oxyCODONE-acetaminophen, traMADol, hydrOXYzine HCl, sodium chloride flush, sodium chloride, acetaminophen, ondansetron      Labs:     Recent Labs     06/23/22 0252 06/24/22 0318 06/25/22 0416   WBC 8.4 9.5 9.6   RBC 3.32* 3.36* 3.29*   HGB 10.5* 10.5* 10.6*   HCT 33.7* 35.1* 34.1*   .5* 104.5* 103.6*   MCH 31.6* 31.3* 32.2*   MCHC 31.2* 29.9* 31.1*    274 278     Recent Labs     06/23/22 0252 06/24/22 0318 06/25/22 0416   * 138 134*   K 4.5 4.6 4.7   ANIONGAP 6* 6* 7   CL 92* 96* 93*   CO2 33* 36* 34*   BUN 13 12 10   CREATININE 0.4* 0.4* 0.4*   GLUCOSE 70* 85 99   CALCIUM 9.0 9.2 9.0     Recent Labs     06/23/22  0252 06/24/22  0318 06/25/22  0416   MG 1.9 1.9 1.8 Recent Labs     06/23/22  0252 06/24/22  0318 06/25/22  0416   AST 16 14 16   ALT 14 14 15   BILITOT <0.2 <0.2 <0.2   ALKPHOS 45 44 49     HgBA1c:  No components found for: HGBA1C  FLP:  No results found for: TRIG, HDL, LDLCALC, LDLDIRECT, LABVLDL  TSH:  No results found for: TSH  Troponin T: No results for input(s): TROPONINI in the last 72 hours. INR: No results for input(s): INR in the last 72 hours. No results for input(s): LIPASE in the last 72 hours. -----------------------------------------------------------------  RAD:     No results found. Physical Exam:     BP (!) 120/54   Pulse 68   Temp 96.9 °F (36.1 °C) (Temporal)   Resp 18   Ht 5' 6\" (1.676 m)   Wt 115 lb (52.2 kg)   SpO2 100%   BMI 18.56 kg/m²     General appearance: alert and cooperative with exam, appears stated age, no acute distress   Head: normal cephalic, atraumatic. EOMI bilaterally, no neck lymphadenopathy appreciated, no carotid bruits noted  Lungs: Clear to percussion and auscultation. No wheezing or rales. Heart: S1 S2 are audible. No added sound. Abdomen: Soft, non distended and non tender. No hepatosplenomegaly. Bowel sounds are audible. No masses palpable. Asssessment:   Patient admitted the hospital increasing shortness of breath which is improved. Her symptoms of reflux disease and abdominal pain is also better on current medication. She definitely has reflux disease but most likely current symptoms could be from bile induced gastritis or reflux. She is feeling much better now. Impression:   1. Abdominal/epigastric pain, now improved. 2.  Gastroesophageal disease. 3.  History of hiatal hernia. 4.  Multiple comorbid conditions. Plan:   1. Gastroesophageal disease and bile induced gastritis were again discussed. 2.  Continue current treatment. 3.  I will sign off patient and will be seen on as-needed basis. 4.  Patient advised to follow-up with GI clinic in few weeks time.   In my opinion she will need an upper endoscopy examination at some point when her breathing is better.     Nuria Jeffers MD  6/25/2022, 9:59 AM

## 2022-06-25 NOTE — DISCHARGE SUMMARY
Hospital Discharge Summary    Tiarra Farrell  :  1950  MRN:  [id number]    Admit date:  6/10/2022  Discharge date:  2022  Admitting Physician:  Angelika Matias MD    Discharge Diagnoses:    Principal Diagnosis: Acute on chronic respiratory failure [facility])  Active Hospital Problems    Diagnosis Date Noted    Rhinovirus infection [B34.8] 2022     Priority: Medium    Upper abdominal pain [R10.10]      Priority: Medium    Heart burn [R12]      Priority: Medium    Nausea [R11.0]      Priority: Medium    Anxiety [F41.9]      Priority: Medium    Acute on chronic respiratory failure ([facility] [address] [address] [J96.20] 06/10/2022     Priority: Medium    Severe malnutrition ([facility] [address] [E43] 10/08/2021    Palliative care patient [Z51.5] 10/06/2021    COPD exacerbation ([facility] [address] [J44.1]     Hypertension [I10] 2014       Consults: Pulmonology, ID, GI, Palliative Care  Significant Diagnostic Studies:  CTA     Hospital Course: Tiarra Farrell is a 74 y.o. female admitted 6/10/2022 with respiratory distress. She was found to have COPD exacerbation due to rhinovirus and was admitted for further evaluation and management. She was started on steroids, bronchodilators, and eventually covered with IV ABx. The patient was monitored with serial cardiorespiratory exams. She was continued on her home medications for her stable chronic medical conditions. CTA  with marked decreased volume of the left lower lobe with atelectasis/collapse/infiltrates with crowded bronchovascular structures. Hospital course complicated by severe heartburn. GI was consulted and added carafate to her regimen. The patient improved with the treatment regimen during her hospitalization. On day of discharge, the patient was pleased with her progress and comfortable with continuing her care at home. EXAM:  GENERAL: Awake, alert, in no acute distress. CARDIAC: Regular rate and rhythm. No murmurs, rubs, or gallops.   RESPIRATORY: Chest is clear to auscultation bilaterally. No wheezes, rales, or rhonchi. ABDOMEN: Normoactive bowel sounds. Abdomen soft, nontender, and nondistended. EXTREMITIES: No cyanosis, clubbing, or edema. Discharge Medications:         Medication List      START taking these medications    sucralfate 1 GM tablet  Commonly known as: CARAFATE  Take 1 tablet by mouth 4 times daily        CHANGE how you take these medications    nicotine 21 MG/24HR  Commonly known as: [address] [facility] 1 patch onto the skin every 24 hours  What changed: Another medication with the same name was removed. Continue taking this medication, and follow the directions you see here.         CONTINUE taking these medications    * albuterol (2.5 MG/3ML) 0.083% nebulizer solution  Commonly known as: PROVENTIL  Take 3 mLs by nebulization every 6 hours as needed for Wheezing     * albuterol sulfate  (90 Base) MCG/ACT inhaler  Commonly known as: Ventolin HFA  Inhale 2 puffs into the lungs every 6 hours as needed for Wheezing     ALPRAZolam 1 MG tablet  Commonly known as: XANAX     amphetamine-dextroamphetamine 20 MG tablet  Commonly known as: ADDERALL     budesonide-formoterol 160-4.5 MCG/ACT Aero  Commonly known as: SYMBICORT  Inhale 2 puffs into the lungs 2 times daily     citalopram 40 MG tablet  Commonly known as: CELEXA     fluticasone 50 MCG/ACT nasal spray  Commonly known as: FLONASE  INSTILL 1 SPRAY IN EACH NOSTRIL DAILY     gabapentin 600 MG tablet  Commonly known as: NEURONTIN     * ipratropium-albuterol 0.5-2.5 (3) MG/3ML Soln nebulizer solution  Commonly known as: DUONEB  Inhale 3 mLs into the lungs every 4 hours     * albuterol-ipratropium  MCG/ACT Aers inhaler  Commonly known as: COMBIVENT RESPIMAT  Inhale 1 puff into the lungs every 6 hours     lidocaine 5 %  Commonly known as: Lidoderm  Place 1 patch onto the skin daily 12 hours on, 12 hours off.     metoclopramide 5 MG tablet  Commonly known as: REGLAN  Take 1 tablet by mouth 4 times daily     montelukast 10 MG tablet  Commonly known as: SINGULAIR     ondansetron 4 MG disintegrating tablet  Commonly known as: Zofran ODT  Take 1 tablet by mouth every 8 hours as needed for Nausea or Vomiting     oxyCODONE-acetaminophen  MG per tablet  Commonly known as: PERCOCET  TAKE 1 TABLET Q 4 TO 6 HRS PRN. (MAX 5/DAY). Earliest Fill Date: 7/19/18     OXYGEN     pantoprazole 20 MG tablet  Commonly known as: PROTONIX  TAKE 1 TABLET BY MOUTH 2 TIMES DAILY     phenazopyridine 200 MG tablet  Commonly known as: Pyridium  Take 1 tablet by mouth 3 times daily as needed for Pain (bladder discomfort)     tiZANidine 4 MG tablet  Commonly known as: ZANAFLEX  Take 1 tablet by mouth 3 times daily as needed (.)         * This list has 4 medication(s) that are the same as other medications prescribed for you. Read the directions carefully, and ask your doctor or other care provider to review them with you. STOP taking these medications    ondansetron 4 MG tablet  Commonly known as: Zofran           Where to Get Your Medications      These medications were sent to [address] [place] [address] , [address] [facility] [address]    Phone: [phone]   · sucralfate 1 GM tablet         Disposition: good condition to home  Discharge Diet: heart healthy  Discharge Activity: Gradually increase activity as tolerated to previous levels. Follow up with Dr. Jean Marshall within 1 week for hospital follow-up.    >30minutes was spent in the discharge planning and coordination process.     Signed:  Satinder Hightower MD MD  6/25/2022, 3:10 PM

## 2022-06-25 NOTE — CARE COORDINATION
Spoke with patient regarding MD orders for [facility] services. Patient agreeable and has chosen [facility]. Referral Faxed. [facility] [phone]. [phone]. Please notify [facility] when patient discharges and fax DC Summary,  DC med list and any new [facility] orders. The Patient and/or patient representative was provided with a choice of provider and agrees   with the discharge plan. [x] Yes [] No    Freedom of choice list was provided with basic dialogue that supports the patient's individualized plan of care/goals, treatment preferences and shares the quality data associated with the providers.  [x] Yes [] No  Electronically signed by Kevin Najera on 6/25/2022 at 4:05 PM

## 2022-06-25 NOTE — PLAN OF CARE
Problem: Discharge Planning  Goal: Discharge to home or other facility with appropriate resources  6/25/2022 1601 by Karel Proctor RN  Outcome: Completed  6/25/2022 1553 by Karel Proctor RN  Outcome: Progressing  Flowsheets (Taken 6/25/2022 3017)  Discharge to home or other facility with appropriate resources: Identify barriers to discharge with patient and caregiver     Problem: Pain  Goal: Verbalizes/displays adequate comfort level or baseline comfort level  6/25/2022 1601 by Karel Proctor RN  Outcome: Completed  6/25/2022 1553 by Karel Proctor RN  Outcome: Progressing     Problem: Skin/Tissue Integrity  Goal: Absence of new skin breakdown  Description: 1. Monitor for areas of redness and/or skin breakdown  2. Assess vascular access sites hourly  3. Every 4-6 hours minimum:  Change oxygen saturation probe site  4. Every 4-6 hours:  If on nasal continuous positive airway pressure, respiratory therapy assess nares and determine need for appliance change or resting period. 6/25/2022 1601 by Karel Proctor RN  Outcome: Completed  6/25/2022 1553 by Karel Proctor RN  Outcome: Progressing     Problem: Safety - Adult  Goal: Free from fall injury  6/25/2022 1601 by Karel Proctor RN  Outcome: Completed  6/25/2022 1553 by Karel Proctor RN  Outcome: Progressing  Flowsheets (Taken 6/25/2022 1552)  Free From Fall Injury: Instruct family/caregiver on patient safety     Problem: Confusion  Goal: Confusion, delirium, dementia, or psychosis is improved or at baseline  Description: INTERVENTIONS:  1. Assess for possible contributors to thought disturbance, including medications, impaired vision or hearing, underlying metabolic abnormalities, dehydration, psychiatric diagnoses, and notify attending LIP  2. [place] high risk fall precautions, as indicated  3. Provide frequent short contacts to provide reality reorientation, refocusing and direction  4.  Decrease environmental stimuli, including noise as appropriate  5. Monitor and intervene to maintain adequate nutrition, hydration, elimination, sleep and activity  6. If unable to ensure safety without constant attention obtain sitter and review sitter guidelines with assigned personnel  7.  Initiate Psychosocial CNS and Spiritual Care consult, as indicated  6/25/2022 1601 by Leland Barriga RN  Outcome: Completed  6/25/2022 1553 by Leland Barriga RN  Outcome: Progressing  Flowsheets (Taken 6/25/2022 5352)  Effect of thought disturbance (confusion, delirium, dementia, or psychosis) are managed with adequate functional status: Decrease environmental stimuli, including noise as appropriate     Problem: Nutrition Deficit:  Goal: Optimize nutritional status  6/25/2022 1601 by Leland Barriga RN  Outcome: Completed  6/25/2022 1553 by Leland Barriga RN  Outcome: Progressing     Problem: ABCDS Injury Assessment  Goal: Absence of physical injury  6/25/2022 1601 by Leland Barriga RN  Outcome: Completed  6/25/2022 1553 by Leland Barriga RN  Outcome: Progressing  Flowsheets (Taken 6/25/2022 1552)  Absence of Physical Injury: Implement safety measures based on patient assessment

## 2022-06-25 NOTE — PLAN OF CARE
Problem: Discharge Planning  Goal: Discharge to home or other facility with appropriate resources  Outcome: Progressing  Flowsheets (Taken 6/25/2022 0921)  Discharge to home or other facility with appropriate resources: Identify barriers to discharge with patient and caregiver     Problem: Pain  Goal: Verbalizes/displays adequate comfort level or baseline comfort level  Outcome: Progressing     Problem: Skin/Tissue Integrity  Goal: Absence of new skin breakdown  Description: 1. Monitor for areas of redness and/or skin breakdown  2. Assess vascular access sites hourly  3. Every 4-6 hours minimum:  Change oxygen saturation probe site  4. Every 4-6 hours:  If on nasal continuous positive airway pressure, respiratory therapy assess nares and determine need for appliance change or resting period. Outcome: Progressing     Problem: Safety - Adult  Goal: Free from fall injury  Outcome: Progressing  Flowsheets (Taken 6/25/2022 1552)  Free From Fall Injury: Instruct family/caregiver on patient safety     Problem: Confusion  Goal: Confusion, delirium, dementia, or psychosis is improved or at baseline  Description: INTERVENTIONS:  1. Assess for possible contributors to thought disturbance, including medications, impaired vision or hearing, underlying metabolic abnormalities, dehydration, psychiatric diagnoses, and notify attending LIP  2. [place] high risk fall precautions, as indicated  3. Provide frequent short contacts to provide reality reorientation, refocusing and direction  4. Decrease environmental stimuli, including noise as appropriate  5. Monitor and intervene to maintain adequate nutrition, hydration, elimination, sleep and activity  6. If unable to ensure safety without constant attention obtain sitter and review sitter guidelines with assigned personnel  7.  Initiate Psychosocial CNS and Spiritual Care consult, as indicated  Outcome: Progressing  Flowsheets (Taken 6/25/2022 0921)  Effect of thought disturbance (confusion, delirium, dementia, or psychosis) are managed with adequate functional status: Decrease environmental stimuli, including noise as appropriate     Problem: Nutrition Deficit:  Goal: Optimize nutritional status  Outcome: Progressing     Problem: ABCDS Injury Assessment  Goal: Absence of physical injury  Outcome: Progressing  Flowsheets (Taken 6/25/2022 3574)  Absence of Physical Injury: Implement safety measures based on patient assessment

## 2022-06-27 ENCOUNTER — TELEPHONE (OUTPATIENT)
Dept: PULMONOLOGY | Age: 72
End: 2022-06-27

## 2022-06-29 DIAGNOSIS — J44.1 COPD EXACERBATION (HCC): Primary | ICD-10-CM

## 2022-06-29 RX ORDER — LEVALBUTEROL TARTRATE 45 UG/1
1 AEROSOL, METERED ORAL EVERY 4 HOURS PRN
Qty: 1 EACH | Refills: 3 | Status: SHIPPED | OUTPATIENT
Start: 2022-06-29 | End: 2023-06-29

## 2022-06-30 ENCOUNTER — TELEPHONE (OUTPATIENT)
Dept: PALLATIVE CARE | Age: 72
End: 2022-06-30

## 2022-07-01 ENCOUNTER — TELEPHONE (OUTPATIENT)
Dept: PALLATIVE CARE | Age: 72
End: 2022-07-01

## 2022-07-01 NOTE — TELEPHONE ENCOUNTER
I spoke to the patient and she declined for Supportive Care to see her at this time. She said her daughter lives with her and is taking care of her. She is agreeable to receiving a brochure. She is aware she can call me if she changes her mind. I can be reached at [phone].

## 2022-07-25 ENCOUNTER — TELEPHONE (OUTPATIENT)
Dept: CARDIOLOGY CLINIC | Age: 72
End: 2022-07-25

## 2022-07-25 NOTE — PROGRESS NOTES
Physician Progress Note      Aleyda Mendes  CSN #:                  [id number]  :                       1950  ADMIT DATE:       6/10/2022 11:09 AM  DISCH DATE:        2022 4:35 PM  RESPONDING  PROVIDER #:        Arsh Thompson MD          QUERY TEXT:    Pt admitted with Acute Respiratory Failure and noted to have Pneumonia. If   possible, please document in progress notes and discharge summary the present   on admission status of pneumonia:    The medical record reflects the following:  Risk Factors: COPD  Clinical Indicators: ER provider notes \"cover her with antibiotics for   community-acquired infection though its viral she is being admitted with   respiratory failure exacerbation of her COPD. \", CXR: \"Modest bilateral upper   lobe hyperinflation and mild bibasilar fibrosis\"  Dr. Leigh Ann Partida notes on 22   \" I did not appreciate significant infiltrate/pneumonia on CT\"  Treatment: Rocephin,  Vancomycin    Thank you in advance,    Agatha Talamantes, RN-BSN, [facility]  Clinical   [facility], [facility] Dionte Magui [email or web address]  Options provided:  -- Yes, pneumonia was present at the time of the order to admit to the   hospital  -- No, pneumonia was not present on admission and developed during the   inpatient stay  -- Other - I will add my own diagnosis  -- Disagree - Not applicable / Not valid  -- Disagree - Clinically unable to determine / Unknown  -- Refer to Clinical Documentation Reviewer    PROVIDER RESPONSE TEXT:    Yes, pneumonia was present at the time of the order to admit to the hospital.    Query created by: Meredith Baker on 2022 12:08 PM      Electronically signed by:  Arsh Thompson MD 2022 7:05 PM

## 2022-07-31 ENCOUNTER — NURSE TRIAGE (OUTPATIENT)
Dept: CALL CENTER | Facility: HOSPITAL | Age: 72
End: 2022-07-31

## 2022-07-31 NOTE — TELEPHONE ENCOUNTER
"Reviewed guideline with caller, advises she go to ED for evaluation of severe back, side and abdominal pain. Caller agrees to follow care advice.     Reason for Disposition  • [1] SEVERE back pain (e.g., excruciating) AND [2] sudden onset AND [3] age > 60 years    Additional Information  • Negative: Passed out (i.e., lost consciousness, collapsed and was not responding)  • Negative: Shock suspected (e.g., cold/pale/clammy skin, too weak to stand, low BP, rapid pulse)  • Negative: Sounds like a life-threatening emergency to the triager  • Negative: Major injury to the back (e.g., MVA, fall > 10 feet or 3 meters, penetrating injury, etc.)  • Negative: Followed a tailbone injury  • Negative: [1] Pain in the upper back over the ribs (rib cage) AND [2] radiates (travels, goes) into chest  • Negative: [1] Pain in the upper back over the ribs (rib cage) AND [2] worsened by coughing (or clearly increases with breathing)  • Negative: Back pain during pregnancy  • Negative: Pain mainly in flank (i.e., in the side, over the lower ribs or just below the ribs)    Answer Assessment - Initial Assessment Questions  1. ONSET: \"When did the pain begin?\"       Friday   2. LOCATION: \"Where does it hurt?\" (upper, mid or lower back)      Side and back on right side   3. SEVERITY: \"How bad is the pain?\"  (e.g., Scale 1-10; mild, moderate, or severe)    - MILD (1-3): doesn't interfere with normal activities     - MODERATE (4-7): interferes with normal activities or awakens from sleep     - SEVERE (8-10): excruciating pain, unable to do any normal activities       10/10  4. PATTERN: \"Is the pain constant?\" (e.g., yes, no; constant, intermittent)       Intermittent, better if she lays down, worse when she gets up and walks  5. RADIATION: \"Does the pain shoot into your legs or elsewhere?\"      Around to abdomen  6. CAUSE:  \"What do you think is causing the back pain?\"       Possible kidney stone   7. BACK OVERUSE:  \"Any recent lifting of " "heavy objects, strenuous work or exercise?\"      no  8. MEDICATIONS: \"What have you taken so far for the pain?\" (e.g., nothing, acetaminophen, NSAIDS)      Tylenol helps a little  9. NEUROLOGIC SYMPTOMS: \"Do you have any weakness, numbness, or problems with bowel/bladder control?\"      no  10. OTHER SYMPTOMS: \"Do you have any other symptoms?\" (e.g., fever, abdominal pain, burning with urination, blood in urine)        Abdominal pain, frequency and trouble starting stream , sometimes it burns a little bit.   11. PREGNANCY: \"Is there any chance you are pregnant?\" (e.g., yes, no; LMP)        no    Protocols used: BACK PAIN-ADULT-AH      "

## 2022-08-03 ENCOUNTER — APPOINTMENT (OUTPATIENT)
Dept: CT IMAGING | Age: 72
DRG: 853 | End: 2022-08-03
Payer: MEDICARE

## 2022-08-03 ENCOUNTER — APPOINTMENT (OUTPATIENT)
Dept: GENERAL RADIOLOGY | Age: 72
DRG: 853 | End: 2022-08-03
Payer: MEDICARE

## 2022-08-03 ENCOUNTER — APPOINTMENT (OUTPATIENT)
Dept: NUCLEAR MEDICINE | Age: 72
DRG: 853 | End: 2022-08-03
Payer: MEDICARE

## 2022-08-03 ENCOUNTER — HOSPITAL ENCOUNTER (INPATIENT)
Age: 72
LOS: 20 days | Discharge: HOME | DRG: 853 | End: 2022-08-23
Attending: EMERGENCY MEDICINE | Admitting: FAMILY MEDICINE
Payer: MEDICARE

## 2022-08-03 DIAGNOSIS — J18.9 PNEUMONIA OF RIGHT LUNG DUE TO INFECTIOUS ORGANISM, UNSPECIFIED PART OF LUNG: Primary | ICD-10-CM

## 2022-08-03 DIAGNOSIS — J90 EXUDATIVE PLEURISY: ICD-10-CM

## 2022-08-03 LAB
ALBUMIN SERPL-MCNC: 3.3 G/DL (ref 3.5–5.2)
ALP BLD-CCNC: 95 U/L (ref 35–104)
ALT SERPL-CCNC: 21 U/L (ref 5–33)
ANION GAP SERPL CALCULATED.3IONS-SCNC: 7 MMOL/L (ref 7–19)
AST SERPL-CCNC: 24 U/L (ref 5–32)
BACTERIA: NEGATIVE /HPF
BASOPHILS ABSOLUTE: 0 K/UL (ref 0–0.2)
BASOPHILS RELATIVE PERCENT: 0.4 % (ref 0–1)
BILIRUB SERPL-MCNC: <0.2 MG/DL (ref 0.2–1.2)
BILIRUBIN URINE: NEGATIVE
BLOOD, URINE: NEGATIVE
BUN BLDV-MCNC: 7 MG/DL (ref 8–23)
CALCIUM SERPL-MCNC: 8.1 MG/DL (ref 8.8–10.2)
CHLORIDE BLD-SCNC: 104 MMOL/L (ref 98–111)
CLARITY: CLEAR
CO2: 29 MMOL/L (ref 22–29)
COLOR: YELLOW
CREAT SERPL-MCNC: 0.5 MG/DL (ref 0.5–0.9)
CRYSTALS, UA: ABNORMAL /HPF
D DIMER: 2.44 UG/ML FEU (ref 0–0.48)
EOSINOPHILS ABSOLUTE: 0.1 K/UL (ref 0–0.6)
EOSINOPHILS RELATIVE PERCENT: 0.6 % (ref 0–5)
EPITHELIAL CELLS, UA: 0 /HPF (ref 0–5)
GFR AFRICAN AMERICAN: >59
GFR NON-AFRICAN AMERICAN: >60
GLUCOSE BLD-MCNC: 109 MG/DL (ref 74–109)
GLUCOSE URINE: NEGATIVE MG/DL
HCT VFR BLD CALC: 36.4 % (ref 37–47)
HEMOGLOBIN: 11 G/DL (ref 12–16)
HYALINE CASTS: 0 /HPF (ref 0–8)
IMMATURE GRANULOCYTES #: 0.2 K/UL
KETONES, URINE: NEGATIVE MG/DL
LACTIC ACID: 0.8 MMOL/L (ref 0.5–1.9)
LEUKOCYTE ESTERASE, URINE: ABNORMAL
LIPASE: 20 U/L (ref 13–60)
LYMPHOCYTES ABSOLUTE: 0.7 K/UL (ref 1.1–4.5)
LYMPHOCYTES RELATIVE PERCENT: 6.2 % (ref 20–40)
MCH RBC QN AUTO: 31.1 PG (ref 27–31)
MCHC RBC AUTO-ENTMCNC: 30.2 G/DL (ref 33–37)
MCV RBC AUTO: 102.8 FL (ref 81–99)
MONOCYTES ABSOLUTE: 1 K/UL (ref 0–0.9)
MONOCYTES RELATIVE PERCENT: 8.9 % (ref 0–10)
NEUTROPHILS ABSOLUTE: 8.8 K/UL (ref 1.5–7.5)
NEUTROPHILS RELATIVE PERCENT: 82.5 % (ref 50–65)
NITRITE, URINE: NEGATIVE
PDW BLD-RTO: 12.4 % (ref 11.5–14.5)
PH UA: 6.5 (ref 5–8)
PLATELET # BLD: 246 K/UL (ref 130–400)
PMV BLD AUTO: 10.6 FL (ref 9.4–12.3)
POTASSIUM SERPL-SCNC: 4.1 MMOL/L (ref 3.5–5)
PROTEIN UA: NEGATIVE MG/DL
RBC # BLD: 3.54 M/UL (ref 4.2–5.4)
RBC UA: 0 /HPF (ref 0–4)
SARS-COV-2, NAAT: NOT DETECTED
SODIUM BLD-SCNC: 140 MMOL/L (ref 136–145)
SPECIFIC GRAVITY UA: 1.01 (ref 1–1.03)
TOTAL PROTEIN: 5.7 G/DL (ref 6.6–8.7)
TROPONIN: <0.01 NG/ML (ref 0–0.03)
UROBILINOGEN, URINE: 0.2 E.U./DL
WBC # BLD: 10.6 K/UL (ref 4.8–10.8)
WBC UA: 1 /HPF (ref 0–5)

## 2022-08-03 PROCEDURE — 87040 BLOOD CULTURE FOR BACTERIA: CPT

## 2022-08-03 PROCEDURE — 3430000000 HC RX DIAGNOSTIC RADIOPHARMACEUTICAL: Performed by: FAMILY MEDICINE

## 2022-08-03 PROCEDURE — 78580 LUNG PERFUSION IMAGING: CPT

## 2022-08-03 PROCEDURE — 99285 EMERGENCY DEPT VISIT HI MDM: CPT

## 2022-08-03 PROCEDURE — 85025 COMPLETE CBC W/AUTO DIFF WBC: CPT

## 2022-08-03 PROCEDURE — 85379 FIBRIN DEGRADATION QUANT: CPT

## 2022-08-03 PROCEDURE — 6370000000 HC RX 637 (ALT 250 FOR IP): Performed by: FAMILY MEDICINE

## 2022-08-03 PROCEDURE — 96361 HYDRATE IV INFUSION ADD-ON: CPT

## 2022-08-03 PROCEDURE — 74176 CT ABD & PELVIS W/O CONTRAST: CPT

## 2022-08-03 PROCEDURE — 99223 1ST HOSP IP/OBS HIGH 75: CPT | Performed by: INTERNAL MEDICINE

## 2022-08-03 PROCEDURE — 2580000003 HC RX 258: Performed by: FAMILY MEDICINE

## 2022-08-03 PROCEDURE — 96374 THER/PROPH/DIAG INJ IV PUSH: CPT

## 2022-08-03 PROCEDURE — 6360000002 HC RX W HCPCS: Performed by: EMERGENCY MEDICINE

## 2022-08-03 PROCEDURE — 93970 EXTREMITY STUDY: CPT

## 2022-08-03 PROCEDURE — 2580000003 HC RX 258: Performed by: EMERGENCY MEDICINE

## 2022-08-03 PROCEDURE — A9540 TC99M MAA: HCPCS | Performed by: FAMILY MEDICINE

## 2022-08-03 PROCEDURE — 6360000002 HC RX W HCPCS: Performed by: FAMILY MEDICINE

## 2022-08-03 PROCEDURE — 6370000000 HC RX 637 (ALT 250 FOR IP): Performed by: EMERGENCY MEDICINE

## 2022-08-03 PROCEDURE — 1210000000 HC MED SURG R&B

## 2022-08-03 PROCEDURE — 87635 SARS-COV-2 COVID-19 AMP PRB: CPT

## 2022-08-03 PROCEDURE — 81001 URINALYSIS AUTO W/SCOPE: CPT

## 2022-08-03 PROCEDURE — 36415 COLL VENOUS BLD VENIPUNCTURE: CPT

## 2022-08-03 PROCEDURE — 84484 ASSAY OF TROPONIN QUANT: CPT

## 2022-08-03 PROCEDURE — 83690 ASSAY OF LIPASE: CPT

## 2022-08-03 PROCEDURE — 93005 ELECTROCARDIOGRAM TRACING: CPT | Performed by: EMERGENCY MEDICINE

## 2022-08-03 PROCEDURE — 83605 ASSAY OF LACTIC ACID: CPT

## 2022-08-03 PROCEDURE — 94640 AIRWAY INHALATION TREATMENT: CPT

## 2022-08-03 PROCEDURE — 71045 X-RAY EXAM CHEST 1 VIEW: CPT

## 2022-08-03 PROCEDURE — 71045 X-RAY EXAM CHEST 1 VIEW: CPT | Performed by: RADIOLOGY

## 2022-08-03 PROCEDURE — 2700000000 HC OXYGEN THERAPY PER DAY

## 2022-08-03 PROCEDURE — 80053 COMPREHEN METABOLIC PANEL: CPT

## 2022-08-03 PROCEDURE — 96375 TX/PRO/DX INJ NEW DRUG ADDON: CPT

## 2022-08-03 RX ORDER — 0.9 % SODIUM CHLORIDE 0.9 %
1000 INTRAVENOUS SOLUTION INTRAVENOUS ONCE
Status: COMPLETED | OUTPATIENT
Start: 2022-08-03 | End: 2022-08-03

## 2022-08-03 RX ORDER — SODIUM CHLORIDE 0.9 % (FLUSH) 0.9 %
5-40 SYRINGE (ML) INJECTION EVERY 12 HOURS SCHEDULED
Status: DISCONTINUED | OUTPATIENT
Start: 2022-08-03 | End: 2022-08-12 | Stop reason: SDUPTHER

## 2022-08-03 RX ORDER — DIPHENHYDRAMINE HYDROCHLORIDE 50 MG/ML
50 INJECTION INTRAMUSCULAR; INTRAVENOUS ONCE
Status: COMPLETED | OUTPATIENT
Start: 2022-08-03 | End: 2022-08-03

## 2022-08-03 RX ORDER — SODIUM CHLORIDE 9 MG/ML
INJECTION, SOLUTION INTRAVENOUS PRN
Status: DISCONTINUED | OUTPATIENT
Start: 2022-08-03 | End: 2022-08-12 | Stop reason: SDUPTHER

## 2022-08-03 RX ORDER — MORPHINE SULFATE 4 MG/ML
4 INJECTION, SOLUTION INTRAMUSCULAR; INTRAVENOUS ONCE
Status: COMPLETED | OUTPATIENT
Start: 2022-08-03 | End: 2022-08-03

## 2022-08-03 RX ORDER — SODIUM CHLORIDE 0.9 % (FLUSH) 0.9 %
5-40 SYRINGE (ML) INJECTION PRN
Status: DISCONTINUED | OUTPATIENT
Start: 2022-08-03 | End: 2022-08-12 | Stop reason: SDUPTHER

## 2022-08-03 RX ORDER — OXYCODONE AND ACETAMINOPHEN 10; 325 MG/1; MG/1
1 TABLET ORAL ONCE
Status: COMPLETED | OUTPATIENT
Start: 2022-08-03 | End: 2022-08-03

## 2022-08-03 RX ORDER — ONDANSETRON 2 MG/ML
4 INJECTION INTRAMUSCULAR; INTRAVENOUS ONCE
Status: COMPLETED | OUTPATIENT
Start: 2022-08-03 | End: 2022-08-03

## 2022-08-03 RX ORDER — IPRATROPIUM BROMIDE AND ALBUTEROL SULFATE 2.5; .5 MG/3ML; MG/3ML
1 SOLUTION RESPIRATORY (INHALATION) EVERY 4 HOURS
Status: DISCONTINUED | OUTPATIENT
Start: 2022-08-03 | End: 2022-08-15

## 2022-08-03 RX ORDER — OXYCODONE HYDROCHLORIDE AND ACETAMINOPHEN 5; 325 MG/1; MG/1
1 TABLET ORAL EVERY 8 HOURS PRN
Status: DISCONTINUED | OUTPATIENT
Start: 2022-08-03 | End: 2022-08-04

## 2022-08-03 RX ORDER — ACETAMINOPHEN 325 MG/1
650 TABLET ORAL EVERY 4 HOURS PRN
Status: DISCONTINUED | OUTPATIENT
Start: 2022-08-03 | End: 2022-08-23 | Stop reason: HOSPADM

## 2022-08-03 RX ORDER — ALPRAZOLAM 0.5 MG/1
1 TABLET ORAL NIGHTLY PRN
Status: DISCONTINUED | OUTPATIENT
Start: 2022-08-03 | End: 2022-08-04

## 2022-08-03 RX ORDER — SODIUM CHLORIDE 9 MG/ML
INJECTION, SOLUTION INTRAVENOUS CONTINUOUS
Status: DISCONTINUED | OUTPATIENT
Start: 2022-08-03 | End: 2022-08-13

## 2022-08-03 RX ORDER — IPRATROPIUM BROMIDE AND ALBUTEROL SULFATE 2.5; .5 MG/3ML; MG/3ML
1 SOLUTION RESPIRATORY (INHALATION) ONCE
Status: COMPLETED | OUTPATIENT
Start: 2022-08-03 | End: 2022-08-03

## 2022-08-03 RX ORDER — PANTOPRAZOLE SODIUM 40 MG/1
40 TABLET, DELAYED RELEASE ORAL
Status: DISCONTINUED | OUTPATIENT
Start: 2022-08-04 | End: 2022-08-23 | Stop reason: HOSPADM

## 2022-08-03 RX ORDER — TIZANIDINE 4 MG/1
4 TABLET ORAL 3 TIMES DAILY PRN
Status: DISCONTINUED | OUTPATIENT
Start: 2022-08-03 | End: 2022-08-09

## 2022-08-03 RX ORDER — CITALOPRAM 20 MG/1
40 TABLET ORAL DAILY
Status: DISCONTINUED | OUTPATIENT
Start: 2022-08-03 | End: 2022-08-23 | Stop reason: HOSPADM

## 2022-08-03 RX ORDER — ENOXAPARIN SODIUM 100 MG/ML
40 INJECTION SUBCUTANEOUS DAILY
Status: DISCONTINUED | OUTPATIENT
Start: 2022-08-03 | End: 2022-08-04

## 2022-08-03 RX ORDER — METHYLPREDNISOLONE SODIUM SUCCINATE 125 MG/2ML
125 INJECTION, POWDER, LYOPHILIZED, FOR SOLUTION INTRAMUSCULAR; INTRAVENOUS ONCE
Status: COMPLETED | OUTPATIENT
Start: 2022-08-03 | End: 2022-08-03

## 2022-08-03 RX ORDER — SUCRALFATE 1 G/1
1 TABLET ORAL 4 TIMES DAILY
Status: DISCONTINUED | OUTPATIENT
Start: 2022-08-03 | End: 2022-08-23 | Stop reason: HOSPADM

## 2022-08-03 RX ORDER — ONDANSETRON 2 MG/ML
4 INJECTION INTRAMUSCULAR; INTRAVENOUS EVERY 6 HOURS PRN
Status: DISCONTINUED | OUTPATIENT
Start: 2022-08-03 | End: 2022-08-23 | Stop reason: HOSPADM

## 2022-08-03 RX ORDER — GABAPENTIN 600 MG/1
600 TABLET ORAL 3 TIMES DAILY
Status: DISCONTINUED | OUTPATIENT
Start: 2022-08-03 | End: 2022-08-23 | Stop reason: HOSPADM

## 2022-08-03 RX ORDER — ONDANSETRON 4 MG/1
4 TABLET, ORALLY DISINTEGRATING ORAL EVERY 8 HOURS PRN
Status: DISCONTINUED | OUTPATIENT
Start: 2022-08-03 | End: 2022-08-15

## 2022-08-03 RX ADMIN — IPRATROPIUM BROMIDE AND ALBUTEROL SULFATE 1 AMPULE: 2.5; .5 SOLUTION RESPIRATORY (INHALATION) at 07:00

## 2022-08-03 RX ADMIN — SODIUM CHLORIDE, PRESERVATIVE FREE 10 ML: 5 INJECTION INTRAVENOUS at 10:54

## 2022-08-03 RX ADMIN — OXYCODONE HYDROCHLORIDE AND ACETAMINOPHEN 1 TABLET: 10; 325 TABLET ORAL at 07:59

## 2022-08-03 RX ADMIN — DIPHENHYDRAMINE HYDROCHLORIDE 50 MG: 50 INJECTION, SOLUTION INTRAMUSCULAR; INTRAVENOUS at 07:28

## 2022-08-03 RX ADMIN — MORPHINE SULFATE 4 MG: 4 INJECTION, SOLUTION INTRAMUSCULAR; INTRAVENOUS at 06:26

## 2022-08-03 RX ADMIN — ALPRAZOLAM 1 MG: 0.5 TABLET ORAL at 21:07

## 2022-08-03 RX ADMIN — SODIUM CHLORIDE: 9 INJECTION, SOLUTION INTRAVENOUS at 10:53

## 2022-08-03 RX ADMIN — OXYCODONE HYDROCHLORIDE AND ACETAMINOPHEN 1 TABLET: 5; 325 TABLET ORAL at 16:41

## 2022-08-03 RX ADMIN — METHYLPREDNISOLONE SODIUM SUCCINATE 125 MG: 125 INJECTION, POWDER, FOR SOLUTION INTRAMUSCULAR; INTRAVENOUS at 07:28

## 2022-08-03 RX ADMIN — ENOXAPARIN SODIUM 40 MG: 100 INJECTION SUBCUTANEOUS at 10:53

## 2022-08-03 RX ADMIN — IPRATROPIUM BROMIDE AND ALBUTEROL SULFATE 3 ML: 2.5; .5 SOLUTION RESPIRATORY (INHALATION) at 10:58

## 2022-08-03 RX ADMIN — CEFEPIME HYDROCHLORIDE 2000 MG: 2 INJECTION, POWDER, FOR SOLUTION INTRAVENOUS at 07:27

## 2022-08-03 RX ADMIN — ONDANSETRON 4 MG: 2 INJECTION INTRAMUSCULAR; INTRAVENOUS at 06:25

## 2022-08-03 RX ADMIN — Medication 5 MILLICURIE: at 17:18

## 2022-08-03 RX ADMIN — SODIUM CHLORIDE 1000 ML: 9 INJECTION, SOLUTION INTRAVENOUS at 06:25

## 2022-08-03 RX ADMIN — IPRATROPIUM BROMIDE AND ALBUTEROL SULFATE 3 ML: 2.5; .5 SOLUTION RESPIRATORY (INHALATION) at 22:59

## 2022-08-03 RX ADMIN — VANCOMYCIN HYDROCHLORIDE 1250 MG: 10 INJECTION, POWDER, LYOPHILIZED, FOR SOLUTION INTRAVENOUS at 15:56

## 2022-08-03 RX ADMIN — CEFEPIME 2000 MG: 2 INJECTION, POWDER, FOR SOLUTION INTRAVENOUS at 22:58

## 2022-08-03 RX ADMIN — IPRATROPIUM BROMIDE AND ALBUTEROL SULFATE 3 ML: 2.5; .5 SOLUTION RESPIRATORY (INHALATION) at 19:42

## 2022-08-03 RX ADMIN — GABAPENTIN 600 MG: 600 TABLET, FILM COATED ORAL at 21:08

## 2022-08-03 RX ADMIN — IPRATROPIUM BROMIDE AND ALBUTEROL SULFATE 3 ML: 2.5; .5 SOLUTION RESPIRATORY (INHALATION) at 14:59

## 2022-08-03 ASSESSMENT — ENCOUNTER SYMPTOMS
BACK PAIN: 0
SORE THROAT: 0
DIARRHEA: 0
ABDOMINAL PAIN: 1
COUGH: 1
RHINORRHEA: 0
NAUSEA: 1
VOMITING: 1
SHORTNESS OF BREATH: 1

## 2022-08-03 ASSESSMENT — PAIN SCALES - GENERAL
PAINLEVEL_OUTOF10: 6
PAINLEVEL_OUTOF10: 10

## 2022-08-03 NOTE — PROGRESS NOTES
Palliative Care/Spiritual Care: Met with pt to initiate palliative care. Pt is hospitalized with pneumonia. Pt also says her chest hurts. She pointed to her right chest where the the pneumonia is located. Pt also has COPD. Pt has other diagnoses in her past medical history. Pt is known to palliative care. Advance Directives: Pt has an AD/LW. Pt has her daughter Boby Gallardo named as primary decision maker. She has Camryn Ahuja and [employer] listed as alternate decision makers. Pt wants CPR and Ventilator but says she does not want to left on a ventilator in a comma or indefinitely. SEE ACP NOTE. Pain/other symptoms: Pt says she is having pain and this  notified her nurse Kindra Ramos. Social/Spiritual: Pt says she is [employer].         Pt/family discussion r/t goals: Pt says she lives with her daughter Boby Gallardo. She says she ambulated without assistance and performed daily living skills to care for herself at home. Pt did inform this  she needs a walker at home and this  inform Saulo Leal, the . Pt's goal is to return home with previous quality of life. Provided spiritual care with sustaining presence, nurtured hope, and prayer. Pt expressed gratitude for spiritual care.      Electronically signed by Pauline Gao on 8/3/2022 at 2:01 PM

## 2022-08-03 NOTE — PROGRESS NOTES
Pharmacy Consult      Lorin Lilly is a 74 y.o. female for whom pharmacy has been consulted to dose cefepime. Patient Active Problem List   Diagnosis    Chronic pain    Anxiety and depression    GERD (gastroesophageal reflux disease)    Chronic bronchitis with acute exacerbation (HCC)    Hypertension    Echocardiogram abnormal    COPD exacerbation (HCC)    Cervicalgia    Chronic low back pain    Acute hypercapnic respiratory failure (HCC)    Alpha-1-antitrypsin deficiency carrier    Current every day smoker    Folate deficiency anemia    Malignant neoplasm of upper lobe of right lung (HCC)    Palliative care patient    Severe malnutrition (HCC)    Intractable nausea and vomiting    Gram-negative bacteremia    Hypomagnesemia    Tobacco abuse counseling    Cigarette nicotine dependence with nicotine-induced disorder    Clostridium perfringens infection    Acute on chronic respiratory failure (HCC)    Anxiety    Upper abdominal pain    Heart burn    Nausea    Rhinovirus infection    Pneumonia       Allergies: Iv dye [iodides], Aspirin, Codeine, Demerol, Fentanyl, Neosporin [bacitracin-neomycin-polymyxin], Nsaids, Pcn [penicillins], Pentazocine lactate, Sulfa antibiotics, Talwin [pentazocine], and Influenza vaccines     Recent Labs     08/03/22  0525   CREATININE 0.5       Ht/Wt:   Ht Readings from Last 1 Encounters:   08/03/22 5' 6\" (1.676 m)        Wt Readings from Last 1 Encounters:   08/03/22 117 lb 8 oz (53.3 kg)         Estimated Creatinine Clearance: 83 mL/min (based on SCr of 0.5 mg/dL). Assessment/Plan:    Due to formulary change will change cefepime to 2 gm iv every 8 hours    Thank you for the consult. Will continue to follow.     Electronically signed by Aura Spangler [facility] on 8/3/2022 at 10:10 AM

## 2022-08-03 NOTE — LETTER
Delroy Bosworth,  at [facility]  [phone] phone  [phone] fax  [phone] CELL ([address])        Delroy Bosworth,  at [facility]  [phone] phone  [phone] fax  [phone] CELL ([address])    Pt is in room

## 2022-08-03 NOTE — CONSULTS
Pulmonary and Critical Care Consult Note    [address] Steph Macario    MRN# [id number]    Acct# [de-identified]  8/3/2022   4:35 PM CDT    Referring Angi Smith MD      Chief Complaint: Abdominal pain    Requesting physician: Dr. Lina Jenkins    Reason for consult: Shortness of breath      HPI: We have been consulted to see this 74y.o. year old female born on 1950. The patient denies any shortness of breath that is worse than her baseline. The patient says that she presented to the hospital due to abdominal pain. She says she had been having diarrhea and was recovering from that. She got up to go to the bathroom and experienced sudden onset of severe right flank pain radiating to her back. She says she could not rest due to the pain. The pain changes in intensity and character with changing her position. She had been taking Percocet for pain but that started making her feel nauseous so she stopped taking Percocet. She presented to the hospital due to the above. She says that breathing makes her pain worse and therefore she is trying not to breathe deeply. However she denies any shortness of breath per se. I was asked to see her regarding the above. CT of the abdomen and chest were ordered. The patient is allergic to contrast.  D-dimer was elevated on admission which is nonspecific finding. The patient has been using her inhalers at home and says that they help her symptoms.       Past Medical History      Past Medical History:   Diagnosis Date    ADHD (attention deficit hyperactivity disorder)     Anxiety     COPD (chronic obstructive pulmonary disease) (HCC)     Depression     Fibromyalgia     GERD (gastroesophageal reflux disease)     Hypertension     [facility]align[facility] 12/04/2018    Palliative care patient 10/06/2021    [facility]rheumatoid [facility]) [facility]      SurgicalHistory  Past Surgical History:   Procedure Laterality Date    CHOLECYSTECTOMY      HERNIA REPAIR      HYSTERECTOMY (CERVIX STATUS UNKNOWN)      LEG SURGERY      steel garth placement. 2009     Allergies  Allergies   Allergen Reactions    Iv Dye [Iodides] Swelling     PT states throat swells    Aspirin Hives    Codeine     Demerol     Fentanyl Hives    Neosporin [Bacitracin-Neomycin-Polymyxin]     Nsaids     Pcn [Penicillins]     Pentazocine Lactate     Sulfa Antibiotics     Talwin [Pentazocine] Hives    Influenza Vaccines Hives and Rash     Medications    vancomycin (VANCOCIN) intermittent dosing (placeholder), , Other, RX Placeholder    vancomycin, 1,250 mg, IntraVENous, Once    sodium chloride flush, 5-40 mL, IntraVENous, 2 times per day    enoxaparin, 40 mg, SubCUTAneous, Daily    ipratropium-albuterol, 1 vial, Inhalation, Q4H    cefepime, 2,000 mg, IntraVENous, Q8H   Social History   reports that she has been smoking cigarettes. She has a 7.50 pack-year smoking history. She has never used smokeless tobacco. She reports that she does not drink alcohol and does not use drugs. Family History  family history includes Cancer in her father.     Review of Systems:  12 point review of systems is negative except as below:  CONSTITUTIONAL:  positive for  malaise  RESPIRATORY:  positive for  dyspnea  GASTROINTESTINAL:  positive for diarrhea and abdominal pain    Physical Exam:  BP (!) 110/42   Pulse 91   Temp 98.4 °F (36.9 °C) (Temporal)   Resp 20   Ht 5' 6\" (1.676 m)   Wt 117 lb 8 oz (53.3 kg)   SpO2 91%   BMI 18.96 kg/m² No intake or output data in the 24 hours ending 08/03/22 1635    General appearance: Elderly female who appears to be in no distress emaciated   HEENT: Normocephalic atraumatic  Heart: S1-S2 distant sounds no murmurs  Lungs: Diminished bilaterally no rubs or wheezing dullness to percussion  Abdomen: Soft mild tenderness over the right upper quadrant  Extremities: No clubbing cyanosis or edema  Neuro: No focal findings  Skin: Intact        Labs:  Recent Labs     08/03/22  0525   WBC 10.6   RBC 3.54*   HGB 11.0*   HCT 36.4*      .8*   MCH 31.1*   MCHC 30.2*   RDW 12.4      Recent Labs     08/03/22  0525      K 4.1      CO2 29   BUN 7*   CREATININE 0.5   CALCIUM 8.1*   GLUCOSE 109      No results for input(s): PHART, AEO4YKM, PO2ART, SAA5SRZ, J6ZSYLRF, BEART in the last 72 hours. Recent Labs     08/03/22  0525   AST 24   ALT 21   ALKPHOS 95   BILITOT <0.2   CALCIUM 8.1*   TROPONINI <0.01   LACTA 0.8   DDIMER 2.44*     No results for input(s): BC, LABGRAM, CULTRESP, BFCX in the last 72 hours. Radiograph: XR CHEST PORTABLE    Result Date: 8/3/2022  Right mid and lower lung infiltrates. Small right pleural effusion not excluded. Recommendation: Follow up as clinically indicated. Electronically Signed by Thea Crockett MD at 03-Aug-2022 08:41:33 AM                My radiograph interpretation/independent review of imaging: Reviewed    Problem list generated by [facility] Problems             Last Modified POA    * (Principal) Pneumonia 8/3/2022 Yes          Pulmonary Assessment/plan:    Underlying severe COPD and chronic hypoxic hypercapnic respiratory failure she is at her baseline. She says her breathing is affected by the abdominal pain that she is experiencing however she is not more short of breath than her usual.  Consider stopping steroids. No indication for treatment of COPD exacerbation at this time. Continue bronchodilators. Right upper quadrant pain of sudden onset etiology not clear. CT of the abdomen was ordered with contrast however the patient is highly allergic to contrast.  Radiology apparently declined giving contrast despite using a prep. We will do CT without contrast.  Consider surgical consult if CT is noncontributory. The patient does have history of cholecystectomy. Dyspnea at her baseline. DVT prophylaxis. Elevated D dimeer non specific. Doubt PE. VQ scan ordered. Unable to use contrast due to allergy. Brenda Poon MD, FCCP, DAB    The above note was generated using voice recognition software. Inadvertent typographical errors in transcription may have occurred.     Electronically signed by Brenda Poon MD on 08/03/22 at 4:35 PM

## 2022-08-03 NOTE — ED PROVIDER NOTES
Addendum. It is the end of Dr. Benoit Duffy if she is waiting for Dr. Suzan Kim call back. I dismissed Dr. Arturo Galloway and spoke with Laura Carlin at Dr. Suzan Kim office at 8:50 AM.  I have written bridging orders for admission.      Richard Maldonado MD  08/03/22 4000 Baudilio Rosado MD  08/03/22 5279

## 2022-08-03 NOTE — CARE COORDINATION
08/03/22 0752   Service Assessment   Patient Orientation Alert and Oriented   Cognition Alert   History Provided By Patient   Primary Caregiver Self  (with family support)   Accompanied By/Relationship no one at bedside   Support Systems Children;Family Members   Prior Functional Level Independent in ADLs/IADLs; Bathing;Dressing; Toileting;Feeding;Cooking;Housework; Shopping;Mobility   Current Functional Level Independent in ADLs/IADLs; Bathing;Dressing; Toileting;Feeding;Cooking;Housework; Shopping;Mobility   Can patient return to prior living arrangement Yes   Ability to make needs known: Good   Family able to assist with home care needs: Yes   CM/SW Referral   (Denied needs)   Social/Functional History   Lives With Daughter  (two daughters in the home)   Type of Home House   Bathroom Shower/Tub Walk-in shower   [address] [place] [address] Dr price   [address] [facility] Help From Family   ADL Assistance Independent   Homemaking Assistance Independent   Ambulation Assistance Independent   Transfer Assistance Independent   Active  No   Patient's  Info Daughters   Occupation Retired   Discharge Planning   Type of [facility] Family Members   Current Services Prior To Admission Other (Comment)  (trilogy)   Potential Assistance Needed   (states she has not been able to use trilogy due to dry mouth and throat caused by machine)   DME Other (Comment)  (denied needs)   DME   (denied needs)   DME Ordered?  No   Potential Assistance Purchasing Medications No   Type of Home Care Services None   Patient expects to be discharged to: [facility]

## 2022-08-03 NOTE — H&P
History and Physical      CHIEF COMPLAINT:  SOA, Abdominal Pain, Chest Wall Pain    Reason for Admission:  Acute on Chronic Resp Failure with Hypoxia, Abdominal Pain    History Obtained From:  Chart, staff    HISTORY OF PRESENT ILLNESS:      The patient is a 74 y.o. female who came to ER with 4-5 day h/o cough, fever and right sided chest pain. She has no increased sputum production. She is on O2 chronically. She has had N/V, diarrhea and what she thought was a viral gastroenteritis. These symptoms are improved. She does c/o right sided abdominal pain, flank pain, that is worse with breathing. Past Medical History:        Diagnosis Date    ADHD (attention deficit hyperactivity disorder)     Anxiety     COPD (chronic obstructive pulmonary disease) (HCC)     Depression     Fibromyalgia     GERD (gastroesophageal reflux disease)     Hypertension     Malignant neoplasm of upper lobe of right lung ([facility] 12/04/2018    Palliative care patient 10/06/2021    RA (rheumatoid arthritis) ([facility]      Past Surgical History:        Procedure Laterality Date    CHOLECYSTECTOMY      HERNIA REPAIR      HYSTERECTOMY (CERVIX STATUS UNKNOWN)      LEG SURGERY      steel garth placement. 2009         Medications Prior to Admission:    Medications Prior to Admission: levalbuterol [facility]) 45 MCG/ACT inhaler, Inhale 1 puff into the lungs every 4 hours as needed for Wheezing (Patient not taking: Reported on 8/3/2022)  sucralfate (CARAFATE) 1 GM tablet, Take 1 tablet by mouth 4 times daily  phenazopyridine (PYRIDIUM) 200 MG tablet, Take 1 tablet by mouth 3 times daily as needed for Pain (bladder discomfort)  montelukast (SINGULAIR) 10 MG tablet, Take 10 mg by mouth nightly  oxyCODONE-acetaminophen (PERCOCET)  MG per tablet, TAKE 1 TABLET Q 4 TO 6 HRS PRN. (MAX 5/DAY).  Earliest Fill Date: 7/19/18  ondansetron (ZOFRAN ODT) 4 MG disintegrating tablet, Take 1 tablet by mouth every 8 hours as needed for Nausea or Vomiting  lidocaine (LIDODERM) 5 %, Place 1 patch onto the skin daily 12 hours on, 12 hours off.  nicotine (NICODERM CQ) 21 MG/24HR, Place 1 patch onto the skin every 24 hours  tiZANidine (ZANAFLEX) 4 MG tablet, Take 1 tablet by mouth 3 times daily as needed (.)  albuterol-ipratropium (COMBIVENT RESPIMAT)  MCG/ACT AERS inhaler, Inhale 1 puff into the lungs every 6 hours  albuterol sulfate HFA (VENTOLIN HFA) 108 (90 Base) MCG/ACT inhaler, Inhale 2 puffs into the lungs every 6 hours as needed for Wheezing  budesonide-formoterol (SYMBICORT) 160-4.5 MCG/ACT AERO, Inhale 2 puffs into the lungs 2 times daily  pantoprazole (PROTONIX) 20 MG tablet, TAKE 1 TABLET BY MOUTH 2 TIMES DAILY  fluticasone (FLONASE) 50 MCG/ACT nasal spray, INSTILL 1 SPRAY IN EACH NOSTRIL DAILY  OXYGEN, Inhale into the lungs  ipratropium-albuterol (DUONEB) 0.5-2.5 (3) MG/3ML SOLN nebulizer solution, Inhale 3 mLs into the lungs every 4 hours  metoclopramide (REGLAN) 5 MG tablet, Take 1 tablet by mouth 4 times daily  albuterol (PROVENTIL) (2.5 MG/3ML) 0.083% nebulizer solution, Take 3 mLs by nebulization every 6 hours as needed for Wheezing (Patient not taking: Reported on 8/3/2022)  gabapentin (NEURONTIN) 600 MG tablet, Take 600 mg by mouth 3 times daily  ALPRAZolam (XANAX) 1 MG tablet, Take 1 mg by mouth nightly as needed Takes 4 times a day  amphetamine-dextroamphetamine (ADDERALL) 20 MG tablet, Take 20 mg by mouth 2 times daily. citalopram (CELEXA) 40 MG tablet, Take 40 mg by mouth daily. Allergies: Iv dye [iodides], Aspirin, Codeine, Demerol, Fentanyl, Neosporin [bacitracin-neomycin-polymyxin], Nsaids, Pcn [penicillins], Pentazocine lactate, Sulfa antibiotics, Talwin [pentazocine], and Influenza vaccines    Social History:   TOBACCO:   reports that she has been smoking cigarettes. She has a 7.50 pack-year smoking history. She has never used smokeless tobacco.  ETOH:   reports no history of alcohol use.   DRUGS:   reports no history of drug use.        Family History:       Problem Relation Age of Onset    Cancer Father      REVIEW OF SYSTEMS:  Constitutional: weakness, fever  CV: neg  Pulmonary: SOA, cough  GI: N/V, diarrhea---resolved, pain  : neg  Psych: neg  Neuro: neg  Skin: neg  MusculoSkeletal: neg  HEENT: neg  Joints: neg  Vitals:  BP (!) 110/42   Pulse 91   Temp 98.4 °F (36.9 °C) (Temporal)   Resp 20   Ht 5' 6\" (1.676 m)   Wt 117 lb 8 oz (53.3 kg)   SpO2 91%   BMI 18.96 kg/m²     PHYSICAL EXAM:  Gen: NAD, resting, on O2  HEENT: WNL  Lymph: no LAD  Neck: no JVD or masses  Chest: coarse  CV: RRR  Abdomen: ND, soft, no rebound or guarding  Extrem: no C/C/E  Neuro: non focal  Skin: no rashes  Joints: no redness  DATA:  I have reviewed the admission labs and imaging tests.     ASSESSMENT AND PLAN:      Principal Problem:    Acute on chronic respiratory failure with hypoxia---follow with supportive care, antibiotics, neb treatments, O2, consult Pulm    Abdominal Pain----CT abdomen ordered    COPD, severe     H/O HTN---follow BP     RA, chronic pain---supportive care    H/O Ryan Angeles MD  5:57 PM 8/3/2022

## 2022-08-03 NOTE — PROGRESS NOTES
4 Eyes Skin Assessment    Claudio Rutherford is being assessed upon: Admission    I agree that I, Fatmata Ray RN, along with Kindra Ramos RN have performed a thorough Head to Toe Skin Assessment on the patient. ALL assessment sites listed below have been assessed. Areas assessed by both nurses:     [x]   Head, Face, and Ears   [x]   Shoulders, Back, and Chest  [x]   Arms, Elbows, and Hands   [x]   Coccyx, Sacrum, and Ischium  [x]   Legs, Feet, and Heels    Does the Patient have Skin Breakdown?  No    Burt Prevention initiated: Yes  Wound Care Orders initiated: No    [facility] nurse consulted for Pressure Injury (Stage 3,4, Unstageable, DTI, NWPT, and Complex wounds) and New or Established Ostomies: No        Primary Nurse eSignature: Fatmata Ray RN on 8/3/2022 at 10:39 AM      Co-Signer eSignature: {Esignature:[id number]}

## 2022-08-03 NOTE — PROGRESS NOTES
[facility] Pharmacokinetic Monitoring Service - Vancomycin     Fermin Butler is a 74 y.o. female starting on vancomycin therapy for HAP. Pharmacy consulted by April Rangel for monitoring and adjustment. Target Concentration: Goal AUC/LETTY 400-600 mg*hr/L    Additional Antimicrobials: Cefepime    Pertinent Laboratory Values: Wt Readings from Last 1 Encounters:   08/03/22 114 lb (51.7 kg)     Temp Readings from Last 1 Encounters:   08/03/22 99 °F (37.2 °C) (Axillary)     Estimated Creatinine Clearance: 81 mL/min (based on SCr of 0.5 mg/dL). Recent Labs     08/03/22  0525   CREATININE 0.5   WBC 10.6     Procalcitonin:     Pertinent Cultures:  Culture Date Source Results   08/03/22 blood sent   MRSA Nasal Swab: not ordered. Order placed by pharmacy.     Plan:  Dosing recommendations based on Bayesian software  Start vancomycin 1250 mg x 1 loading dose, followed by 750 mg every 12 hours  Anticipated AUC of 455 and trough concentration of 13.7 at steady state  Renal labs as indicated   Vancomycin concentration ordered for 08/04/22  Pharmacy will continue to monitor patient and adjust therapy as indicated    Thank you for the consult,  Vi Amin, [address] [place] [address]  8/3/2022 7:56 AM

## 2022-08-03 NOTE — CARE COORDINATION
Patient Contact Information:    [address] [phone] (home)   No relevant phone numbers on file. Above information verified? [x]   Yes  []   No      Emergency Contacts:    Extended Emergency Contact Information  Primary Emergency Contact: Mariia Joseph  Address: [address]USBAND[address] [facility], [place] [address] [facility] [address] [facility] Phone: [phone]  Mobile Phone: [phone]  Relation: Child  Secondary Emergency Contact: Bridgett Pate  Address: [address] [facility], [facility] [address] [facility] [address] [facility] Phone: [phone]  Relation: Child      Have you been vaccinated for COVID-19 (SARS-CoV-2)? []   Yes  [x]   No                   If so, when? Which :         []   Pfizer-BioNTech  []   Moderna  []   Kristin Jabs  []   Other:         Pharmacy:    [facility], [address] [place] [address] [address]  [address] [place] [address]  Phone: [phone] Fax: [phone] [facility] [address] S[address] Pollock [address] [phone]  [place] [address] [address]  Phone: [phone] Fax: [phone]  [facility]. [address] [place] [address] [phone] [facility] [phone]  [facility] [address] [address]  Phone: [phone] Fax: [phone]          Patient Deficits:    []   Yes   [x]   No    If yes:    []   Confusion/Memory  []   Visual  []   Motor/Sensory         []   Right arm         []   Right leg         []   Left arm         []   Left leg  []   Language/Speech         []   Aphasia         []   Dysarthria         []   Swallow         Fidel Coma Scale  Eye Opening: Spontaneous  Best Verbal Response: Oriented  Best Motor Response: Obeys commands  [place] Coma Scale Score: 15    Patient Deficit Notes:  SW met with pt at bedside.  Pt states her two daughters live with her - they are in the home 24/7. O2 supplied by [employer]. States she had Meals on Wheels prior to admission. Denied any needs.

## 2022-08-03 NOTE — ED PROVIDER NOTES
[address] [facility] EMERGENCY DEPT  eMERGENCY dEPARTMENT eNCOUnter      Pt Name: Nelsy Villagran  MRN: [id number]  [place] 1950  Date of evaluation: 8/3/2022  Provider: Zainab Cantu MD    [address]       Chief Complaint   Patient presents with    Fatigue     Pt c/o fatigue with pain in her rib cage and into right side         HISTORY OF PRESENT ILLNESS   (Location/Symptom, Timing/Onset,Context/Setting, Quality, Duration, Modifying Factors, Severity)  Note limiting factors. Nelsy Villagran is a 74 y.o. female who presents to the emergency department with right-sided chest and abdominal pain. The patient states that it started on Friday, July 29 and has continued since that time. She had been vomiting. She has a history of COPD and she wears 2 L of oxygen at all times. She was recently hospitalized with pneumonia. Reports a fever to 102. Has a chronic cough. HPI    NursingNotes were reviewed. REVIEW OF SYSTEMS    (2-9 systems for level 4, 10 or more for level 5)     Review of Systems   Constitutional:  Positive for activity change, appetite change, chills, diaphoresis, fatigue and fever. HENT:  Negative for rhinorrhea and sore throat. Respiratory:  Positive for cough and shortness of breath. Cardiovascular:  Positive for chest pain. Negative for leg swelling. Gastrointestinal:  Positive for abdominal pain, nausea and vomiting. Negative for diarrhea. Genitourinary:  Negative for difficulty urinating. Musculoskeletal:  Negative for back pain and neck pain. Skin:  Negative for rash. Neurological:  Negative for weakness and headaches. Psychiatric/Behavioral:  Negative for confusion. A complete review of systems was performed and is negative except as noted above in the HPI.        PAST MEDICAL HISTORY     Past Medical History:   Diagnosis Date    ADHD (attention deficit hyperactivity disorder)     Anxiety     COPD (chronic obstructive pulmonary disease) ([facility] [address]     Depression Fibromyalgia     GERD (gastroesophageal reflux disease)     Hypertension     Malignant neoplasm of upper lobe of right lung ([facility] [address] 12/04/2018    Palliative care patient 10/06/2021    RA (rheumatoid arthritis) ([facility] [address]          SURGICAL HISTORY       Past Surgical History:   Procedure Laterality Date    CHOLECYSTECTOMY      HERNIA REPAIR      HYSTERECTOMY (CERVIX STATUS UNKNOWN)      LEG SURGERY      steel garth placement. 2009         CURRENT MEDICATIONS       Previous Medications    ALBUTEROL (PROVENTIL) (2.5 MG/3ML) 0.083% NEBULIZER SOLUTION    Take 3 mLs by nebulization every 6 hours as needed for Wheezing    ALBUTEROL SULFATE HFA (VENTOLIN HFA) 108 (90 BASE) MCG/ACT INHALER    Inhale 2 puffs into the lungs every 6 hours as needed for Wheezing    ALBUTEROL-IPRATROPIUM (COMBIVENT RESPIMAT)  MCG/ACT AERS INHALER    Inhale 1 puff into the lungs every 6 hours    ALPRAZOLAM (XANAX) 1 MG TABLET    Take 1 mg by mouth nightly as needed Takes 4 times a day    AMPHETAMINE-DEXTROAMPHETAMINE (ADDERALL) 20 MG TABLET    Take 20 mg by mouth 2 times daily. BUDESONIDE-FORMOTEROL (SYMBICORT) 160-4.5 MCG/ACT AERO    Inhale 2 puffs into the lungs 2 times daily    CITALOPRAM (CELEXA) 40 MG TABLET    Take 40 mg by mouth daily. FLUTICASONE (FLONASE) 50 MCG/ACT NASAL SPRAY    INSTILL 1 SPRAY IN EACH NOSTRIL DAILY    GABAPENTIN (NEURONTIN) 600 MG TABLET    Take 600 mg by mouth 3 times daily    IPRATROPIUM-ALBUTEROL (DUONEB) 0.5-2.5 (3) MG/3ML SOLN NEBULIZER SOLUTION    Inhale 3 mLs into the lungs every 4 hours    LEVALBUTEROL (XOPENEX HFA) 45 MCG/ACT INHALER    Inhale 1 puff into the lungs every 4 hours as needed for Wheezing    LIDOCAINE (LIDODERM) 5 %    Place 1 patch onto the skin daily 12 hours on, 12 hours off.     METOCLOPRAMIDE (REGLAN) 5 MG TABLET    Take 1 tablet by mouth 4 times daily    MONTELUKAST (SINGULAIR) 10 MG TABLET    Take 10 mg by mouth nightly    NICOTINE (NICODERM CQ) 21 MG/24HR    Place 1 patch onto the skin every 24 hours    ONDANSETRON (ZOFRAN ODT) 4 MG DISINTEGRATING TABLET    Take 1 tablet by mouth every 8 hours as needed for Nausea or Vomiting    OXYCODONE-ACETAMINOPHEN (PERCOCET)  MG PER TABLET    TAKE 1 TABLET Q 4 TO 6 HRS PRN. (MAX 5/DAY). Earliest Fill Date: 7/19/18    OXYGEN    Inhale into the lungs    PANTOPRAZOLE (PROTONIX) 20 MG TABLET    TAKE 1 TABLET BY MOUTH 2 TIMES DAILY    PHENAZOPYRIDINE (PYRIDIUM) 200 MG TABLET    Take 1 tablet by mouth 3 times daily as needed for Pain (bladder discomfort)    SUCRALFATE (CARAFATE) 1 GM TABLET    Take 1 tablet by mouth 4 times daily    TIZANIDINE (ZANAFLEX) 4 MG TABLET    Take 1 tablet by mouth 3 times daily as needed (.)       ALLERGIES     Aspirin, Codeine, Demerol, Fentanyl, Iv dye [iodides], Neosporin [bacitracin-neomycin-polymyxin], Nsaids, Pcn [penicillins], Pentazocine lactate, Sulfa antibiotics, Talwin [pentazocine], and Influenza vaccines    FAMILY HISTORY       Family History   Problem Relation Age of Onset    Cancer Father           SOCIAL HISTORY       Social History     Socioeconomic History    Marital status:    Tobacco Use    Smoking status: Some Days     Packs/day: 0.25     Years: 30.00     Pack years: 7.50     Types: Cigarettes    Smokeless tobacco: Never   Vaping Use    Vaping Use: Never used   Substance and Sexual Activity    Alcohol use: No    Drug use: No       SCREENINGS    Fidel Coma Scale  Eye Opening: Spontaneous  Best Verbal Response: Oriented  Best Motor Response: Obeys commands  [place] Coma Scale Score: 15        PHYSICAL EXAM    (up to 7 for level 4, 8 or more for level 5)     ED Triage Vitals [08/03/22 0519]   BP Temp Temp Source Heart Rate Resp SpO2 Height Weight   (!) 133/58 99 °F (37.2 °C) Axillary (!) 102 25 97 % -- 114 lb (51.7 kg)       Physical Exam  Vitals and nursing note reviewed. Constitutional:       General: She is not in acute distress. Appearance: She is well-developed. She is ill-appearing.  She is not diaphoretic. HENT:      Head: Normocephalic and atraumatic. Eyes:      Pupils: Pupils are equal, round, and reactive to light. Cardiovascular:      Rate and Rhythm: Regular rhythm. Tachycardia present. Heart sounds: Normal heart sounds. Pulmonary:      Effort: Pulmonary effort is normal. No respiratory distress. Breath sounds: Wheezing present. Abdominal:      General: Bowel sounds are normal. There is no distension. Palpations: Abdomen is soft. Tenderness: There is abdominal tenderness. Musculoskeletal:         General: Normal range of motion. Cervical back: Normal range of motion and neck supple. Skin:     General: Skin is warm and dry. Findings: No rash. Neurological:      Mental Status: She is alert and oriented to person, place, and time. Cranial Nerves: No cranial nerve deficit. Motor: No abnormal muscle tone.       Coordination: Coordination normal.   Psychiatric:         Behavior: Behavior normal.       DIAGNOSTIC RESULTS     EKG: All EKG's are interpreted by the Emergency Department Physician who either signs or Co-signs this chart in the absence of a cardiologist.    Svt rate 167st depression v4-v6, changed from prior  Sinus tachycardia rate 106 similar st depression    RADIOLOGY:   Non-plain film images such as CT, Ultrasound and MRI are read by the radiologist. Plainradiographic images are visualized and preliminarily interpreted by the emergency physician with the below findings:      Interpretation per the Radiologist below, if available at the time of this note:    XR CHEST PORTABLE    (Results Pending)   CTA PULMONARY W CONTRAST    (Results Pending)   CT ABDOMEN PELVIS W IV CONTRAST Additional Contrast? None    (Results Pending)         ED BEDSIDE ULTRASOUND:   Performed by ED Physician - none    LABS:  Labs Reviewed   CBC WITH AUTO DIFFERENTIAL - Abnormal; Notable for the following components:       Result Value    RBC 3.54 (*) Hemoglobin 11.0 (*)     Hematocrit 36.4 (*)     .8 (*)     MCH 31.1 (*)     MCHC 30.2 (*)     Neutrophils % 82.5 (*)     Lymphocytes % 6.2 (*)     Neutrophils Absolute 8.8 (*)     Lymphocytes Absolute 0.7 (*)     Monocytes Absolute 1.00 (*)     All other components within normal limits   COMPREHENSIVE METABOLIC PANEL - Abnormal; Notable for the following components:    BUN 7 (*)     Calcium 8.1 (*)     Total Protein 5.7 (*)     Albumin 3.3 (*)     All other components within normal limits   URINALYSIS WITH REFLEX TO CULTURE - Abnormal; Notable for the following components:    Leukocyte Esterase, Urine TRACE (*)     All other components within normal limits   D-DIMER, QUANTITATIVE - Abnormal; Notable for the following components:    D-Dimer, Quant 2.44 (*)     All other components within normal limits   MICROSCOPIC URINALYSIS - Abnormal; Notable for the following components:    Bacteria, UA NEGATIVE (*)     Crystals, UA NEG (*)     All other components within normal limits   COVID-19, RAPID   CULTURE, BLOOD 1   CULTURE, BLOOD 2   LACTIC ACID   TROPONIN   LIPASE       All other labs were within normal range or not returned as of this dictation. EMERGENCY DEPARTMENT COURSE and DIFFERENTIALDIAGNOSIS/MDM:   Vitals:    Vitals:    08/03/22 0519 08/03/22 0522 08/03/22 0531 08/03/22 0700   BP: (!) 133/58  (!) 115/55    Pulse: (!) 102 (!) 102 (!) 102 (!) 104   Resp: 25  29 (!) 38   Temp: 99 °F (37.2 °C)      TempSrc: Axillary      SpO2: 97%  96% 94%   Weight: 114 lb (51.7 kg)          MDM  Pt requires pretreatment for ct with contrast. She has right sided pna that is likely the cause of her pain. Ct can be done in 4 hours after pretreatment. Given meds and abx. Dr [place] will speak with Dr Monica Alcocer for [address]      CONSULTS:  Dee 66:  Unless otherwise notedbelow, none     Procedures    FINAL IMPRESSION     1.  Pneumonia of right lung due to infectious organism, unspecified part of lung          DISPOSITION/PLAN   DISPOSITION Decision To Admit 08/03/2022 06:44:28 AM      PATIENT REFERRED TO:  @FUP@    DISCHARGE MEDICATIONS:  New Prescriptions    No medications on file          (Please note that portions of this note were completed with a voice recognition program.  Efforts were made to edit the dictations butoccasionally words are mis-transcribed.)    Kamila Vizcaino MD (electronically signed)  AttendingEmergency Physician         Kamila Vizcaino MD  08/03/22 9929

## 2022-08-03 NOTE — CARE COORDINATION
Pt requested RW to go home with from [facility] [address] [place] [address]. Order and demographics were sent but H&P was not available yet. SW will send the document once it is available in the system.    [employer]   P  [phone] [id number]  Electronically signed by Galindo Walsh on 8/3/2022 at 2:55 PM

## 2022-08-03 NOTE — ACP (ADVANCE CARE PLANNING)
Advance Care Planning     Advance Care Planning Activator (Inpatient)  Conversation Note      Date of ACP Conversation: 8/3/2022     Conversation Conducted with: Patient    ACP Activator: Xiao Altman      Current Designated Health Care Decision Maker:     Primary Decision Maker: Selina Hernández - Child - [phone]    Secondary Decision Maker: Sujit Rojas - Child - [phone]    Secondary Decision Maker: [id number] [facility] [address] [employer] - [phone]        Care Preferences    Ventilation: \"If you were in your present state of health and suddenly became very ill and were unable to breathe on your own, what would your preference be about the use of a ventilator (breathing machine) if it were available to you? \"      Would the patient desire the use of ventilator (breathing machine)?: Yes    \"If your health worsens and it becomes clear that your chance of recovery is unlikely, what would your preference be about the use of a ventilator (breathing machine) if it were available to you? \"     Would the patient desire the use of ventilator (breathing machine)?: Yes      Resuscitation  \"CPR works best to restart the heart when there is a sudden event, like a heart attack, in someone who is otherwise healthy. Unfortunately, CPR does not typically restart the heart for people who have serious health conditions or who are very sick. \"    \"In the event your heart stopped as a result of an underlying serious health condition, would you want attempts to be made to restart your heart (answer \"yes\" for attempt to resuscitate) or would you prefer a natural death (answer \"no\" for do not attempt to resuscitate)? \" Yes       [] Yes   [x] No   Educated Patient / Dani Carr regarding differences between Advance Directives and portable DNR orders.       Conversation Outcomes:  [x] ACP discussion completed  [x] Existing advance directive reviewed with patient; no changes to patient's previously recorded wishes      Electronically signed by Joe Lion on 8/3/2022 at 2:03 PM

## 2022-08-04 ENCOUNTER — APPOINTMENT (OUTPATIENT)
Dept: CT IMAGING | Age: 72
DRG: 853 | End: 2022-08-04
Payer: MEDICARE

## 2022-08-04 LAB
EKG P AXIS: 74 DEGREES
EKG P AXIS: NORMAL DEGREES
EKG P-R INTERVAL: 122 MS
EKG P-R INTERVAL: NORMAL MS
EKG Q-T INTERVAL: 278 MS
EKG Q-T INTERVAL: 320 MS
EKG QRS DURATION: 72 MS
EKG QRS DURATION: 74 MS
EKG QTC CALCULATION (BAZETT): 398 MS
EKG QTC CALCULATION (BAZETT): 465 MS
EKG T AXIS: 69 DEGREES
EKG T AXIS: 85 DEGREES
MRSA SCREEN RT-PCR: DETECTED

## 2022-08-04 PROCEDURE — 6360000002 HC RX W HCPCS: Performed by: FAMILY MEDICINE

## 2022-08-04 PROCEDURE — 97162 PT EVAL MOD COMPLEX 30 MIN: CPT

## 2022-08-04 PROCEDURE — 99233 SBSQ HOSP IP/OBS HIGH 50: CPT | Performed by: INTERNAL MEDICINE

## 2022-08-04 PROCEDURE — 97530 THERAPEUTIC ACTIVITIES: CPT

## 2022-08-04 PROCEDURE — 97165 OT EVAL LOW COMPLEX 30 MIN: CPT

## 2022-08-04 PROCEDURE — 71250 CT THORAX DX C-: CPT

## 2022-08-04 PROCEDURE — 2580000003 HC RX 258: Performed by: EMERGENCY MEDICINE

## 2022-08-04 PROCEDURE — 2700000000 HC OXYGEN THERAPY PER DAY

## 2022-08-04 PROCEDURE — 87641 MR-STAPH DNA AMP PROBE: CPT

## 2022-08-04 PROCEDURE — 2580000003 HC RX 258: Performed by: FAMILY MEDICINE

## 2022-08-04 PROCEDURE — 6370000000 HC RX 637 (ALT 250 FOR IP): Performed by: FAMILY MEDICINE

## 2022-08-04 PROCEDURE — 6360000002 HC RX W HCPCS: Performed by: EMERGENCY MEDICINE

## 2022-08-04 PROCEDURE — 94640 AIRWAY INHALATION TREATMENT: CPT

## 2022-08-04 PROCEDURE — 97110 THERAPEUTIC EXERCISES: CPT

## 2022-08-04 PROCEDURE — 93010 ELECTROCARDIOGRAM REPORT: CPT | Performed by: INTERNAL MEDICINE

## 2022-08-04 PROCEDURE — 1210000000 HC MED SURG R&B

## 2022-08-04 RX ORDER — ALPRAZOLAM 0.5 MG/1
1 TABLET ORAL 4 TIMES DAILY
Status: DISCONTINUED | OUTPATIENT
Start: 2022-08-04 | End: 2022-08-14

## 2022-08-04 RX ORDER — OXYCODONE AND ACETAMINOPHEN 10; 325 MG/1; MG/1
1 TABLET ORAL EVERY 4 HOURS PRN
Status: DISCONTINUED | OUTPATIENT
Start: 2022-08-04 | End: 2022-08-23 | Stop reason: HOSPADM

## 2022-08-04 RX ORDER — ENOXAPARIN SODIUM 100 MG/ML
1 INJECTION SUBCUTANEOUS 2 TIMES DAILY
Status: DISCONTINUED | OUTPATIENT
Start: 2022-08-04 | End: 2022-08-08

## 2022-08-04 RX ADMIN — IPRATROPIUM BROMIDE AND ALBUTEROL SULFATE 3 ML: 2.5; .5 SOLUTION RESPIRATORY (INHALATION) at 18:18

## 2022-08-04 RX ADMIN — PANTOPRAZOLE SODIUM 40 MG: 40 TABLET, DELAYED RELEASE ORAL at 05:51

## 2022-08-04 RX ADMIN — IPRATROPIUM BROMIDE AND ALBUTEROL SULFATE 3 ML: 2.5; .5 SOLUTION RESPIRATORY (INHALATION) at 22:17

## 2022-08-04 RX ADMIN — SUCRALFATE 1 G: 1 TABLET ORAL at 16:21

## 2022-08-04 RX ADMIN — SUCRALFATE 1 G: 1 TABLET ORAL at 21:23

## 2022-08-04 RX ADMIN — OXYCODONE HYDROCHLORIDE AND ACETAMINOPHEN 1 TABLET: 5; 325 TABLET ORAL at 00:04

## 2022-08-04 RX ADMIN — GABAPENTIN 600 MG: 600 TABLET, FILM COATED ORAL at 21:23

## 2022-08-04 RX ADMIN — IPRATROPIUM BROMIDE AND ALBUTEROL SULFATE 3 ML: 2.5; .5 SOLUTION RESPIRATORY (INHALATION) at 15:10

## 2022-08-04 RX ADMIN — OXYCODONE AND ACETAMINOPHEN 1 TABLET: 10; 325 TABLET ORAL at 13:13

## 2022-08-04 RX ADMIN — GABAPENTIN 600 MG: 600 TABLET, FILM COATED ORAL at 13:13

## 2022-08-04 RX ADMIN — PANTOPRAZOLE SODIUM 40 MG: 40 TABLET, DELAYED RELEASE ORAL at 15:04

## 2022-08-04 RX ADMIN — CEFEPIME 2000 MG: 2 INJECTION, POWDER, FOR SOLUTION INTRAVENOUS at 07:33

## 2022-08-04 RX ADMIN — TIZANIDINE 4 MG: 4 TABLET ORAL at 00:09

## 2022-08-04 RX ADMIN — OXYCODONE HYDROCHLORIDE AND ACETAMINOPHEN 1 TABLET: 5; 325 TABLET ORAL at 07:36

## 2022-08-04 RX ADMIN — SUCRALFATE 1 G: 1 TABLET ORAL at 07:33

## 2022-08-04 RX ADMIN — IPRATROPIUM BROMIDE AND ALBUTEROL SULFATE 3 ML: 2.5; .5 SOLUTION RESPIRATORY (INHALATION) at 11:15

## 2022-08-04 RX ADMIN — SODIUM CHLORIDE, PRESERVATIVE FREE 10 ML: 5 INJECTION INTRAVENOUS at 07:34

## 2022-08-04 RX ADMIN — CEFEPIME 2000 MG: 2 INJECTION, POWDER, FOR SOLUTION INTRAVENOUS at 16:23

## 2022-08-04 RX ADMIN — VANCOMYCIN HYDROCHLORIDE 750 MG: 750 INJECTION, POWDER, LYOPHILIZED, FOR SOLUTION INTRAVENOUS at 15:05

## 2022-08-04 RX ADMIN — ENOXAPARIN SODIUM 40 MG: 100 INJECTION SUBCUTANEOUS at 07:33

## 2022-08-04 RX ADMIN — ALPRAZOLAM 1 MG: 0.5 TABLET ORAL at 15:04

## 2022-08-04 RX ADMIN — ENOXAPARIN SODIUM 50 MG: 100 INJECTION SUBCUTANEOUS at 21:23

## 2022-08-04 RX ADMIN — CITALOPRAM HYDROBROMIDE 40 MG: 20 TABLET ORAL at 07:33

## 2022-08-04 RX ADMIN — IPRATROPIUM BROMIDE AND ALBUTEROL SULFATE 3 ML: 2.5; .5 SOLUTION RESPIRATORY (INHALATION) at 07:07

## 2022-08-04 RX ADMIN — SUCRALFATE 1 G: 1 TABLET ORAL at 13:13

## 2022-08-04 RX ADMIN — GABAPENTIN 600 MG: 600 TABLET, FILM COATED ORAL at 07:33

## 2022-08-04 RX ADMIN — IPRATROPIUM BROMIDE AND ALBUTEROL SULFATE 3 ML: 2.5; .5 SOLUTION RESPIRATORY (INHALATION) at 02:44

## 2022-08-04 RX ADMIN — OXYCODONE AND ACETAMINOPHEN 1 TABLET: 10; 325 TABLET ORAL at 19:58

## 2022-08-04 RX ADMIN — ACETAMINOPHEN 650 MG: 325 TABLET, FILM COATED ORAL at 05:51

## 2022-08-04 RX ADMIN — CEFEPIME 2000 MG: 2 INJECTION, POWDER, FOR SOLUTION INTRAVENOUS at 23:57

## 2022-08-04 RX ADMIN — ALPRAZOLAM 1 MG: 0.5 TABLET ORAL at 21:23

## 2022-08-04 RX ADMIN — VANCOMYCIN HYDROCHLORIDE 750 MG: 750 INJECTION, POWDER, LYOPHILIZED, FOR SOLUTION INTRAVENOUS at 03:22

## 2022-08-04 ASSESSMENT — PAIN DESCRIPTION - DESCRIPTORS
DESCRIPTORS: ACHING

## 2022-08-04 ASSESSMENT — PAIN DESCRIPTION - LOCATION
LOCATION: ABDOMEN
LOCATION: ABDOMEN
LOCATION: BACK
LOCATION: CHEST
LOCATION: CHEST

## 2022-08-04 ASSESSMENT — PAIN SCALES - GENERAL
PAINLEVEL_OUTOF10: 8
PAINLEVEL_OUTOF10: 9
PAINLEVEL_OUTOF10: 9
PAINLEVEL_OUTOF10: 0
PAINLEVEL_OUTOF10: 1
PAINLEVEL_OUTOF10: 10

## 2022-08-04 ASSESSMENT — PAIN DESCRIPTION - ORIENTATION
ORIENTATION: RIGHT
ORIENTATION: LEFT

## 2022-08-04 ASSESSMENT — PAIN - FUNCTIONAL ASSESSMENT
PAIN_FUNCTIONAL_ASSESSMENT: PREVENTS OR INTERFERES SOME ACTIVE ACTIVITIES AND ADLS

## 2022-08-04 ASSESSMENT — PAIN SCALES - WONG BAKER: WONGBAKER_NUMERICALRESPONSE: 0

## 2022-08-04 NOTE — PROGRESS NOTES
Progress Note  Jacqualine Primrose  8/4/2022 5:41 PM  Subjective:   Admit Date:   8/3/2022      CC/ADMIT DX:       Interval History:   Reviewed overnight events and nursing notes. She has c/o SOA. She has c/o right upper abdominal pain, and pain under rib area. I have reviewed all labs/diagnostics from the last 24hrs. ROS:   I have done a 10 point ROS and all are negative, except what is mentioned in the HPI. ADULT DIET; Regular    Medications:      sodium chloride      sodium chloride 100 mL/hr at 08/03/22 2118      enoxaparin  1 mg/kg SubCUTAneous BID    ALPRAZolam  1 mg Oral 4x Daily    vancomycin (VANCOCIN) intermittent dosing (placeholder)   Other RX Placeholder    sodium chloride flush  5-40 mL IntraVENous 2 times per day    ipratropium-albuterol  1 vial Inhalation Q4H    cefepime  2,000 mg IntraVENous Q8H    vancomycin  750 mg IntraVENous Q12H    citalopram  40 mg Oral Daily    gabapentin  600 mg Oral TID    pantoprazole  40 mg Oral BID AC    sucralfate  1 g Oral 4x Daily           Objective:   Vitals: BP 93/78   Pulse 89   Temp 97.5 °F (36.4 °C) (Temporal)   Resp 18   Ht 5' 6\" (1.676 m)   Wt 117 lb 8 oz (53.3 kg)   SpO2 91%   BMI 18.96 kg/m²    Intake/Output Summary (Last 24 hours) at 8/4/2022 1741  Last data filed at 8/4/2022 1500  Gross per 24 hour   Intake 1696 ml   Output 1150 ml   Net 546 ml     General appearance: alert and cooperative with exam  Lungs: coarse  Heart: RRR  Abdomen: soft, non-tender; bowel sounds normal; no masses,  no organomegaly  Extremities: extremities normal, atraumatic, no cyanosis or edema  Neurologic:  negative, No obvious focal neurologic deficits. Assessment and Plan:   Principal Problem:    Pneumonia  Resolved Problems:    * No resolved hospital problems.  *      Plan:   Continue present medication(s)    Follow with Pulm   I did start Lovenox at 1 mg/kg SQ q 12 hours with results of V/Q scan   Continue antibiotics   Supportive care      Discharge planning: her home     Reviewed treatment plans with the patient and/or family.              Electronically signed by Yahaira Hopkins MD on 8/4/2022 at 5:41 PM

## 2022-08-04 NOTE — PROGRESS NOTES
Physician Progress Note      Andrea Pinto  [facility] #:                  [id number]  :                       1950  ADMIT DATE:       8/3/2022 5:15 AM  DISCH DATE:  RESPONDING  PROVIDER #:        ANTONINA RICH          QUERY TEXT:    Patient admitted with right side abd. and flank pain. Noted documentation of   acute on chronic respiratory failure in H&P. Pulmonology consults notes   reflect, \"Underlying severe COPD and chronic hypoxic hypercapnic respiratory   failure she is at her baseline. She says her breathing is affected by the   abdominal pain that she is experiencing however she is not more short of   breath than her usual.\" If possible, please document in progress notes and   discharge summary if you are evaluating and /or treating any of the following: The medical record reflects the following:  Risk Factors: COPD, history of lung cancer  Clinical Indicators: Pulmonology documents in consult note, pt breathing   affected by abdominal pain but SOB is at baseline. No ABGs performed. Treatment: Oxygen 3L NC, Pulmonology consult. Options provided:  -- Chronic hypoxic and hypercapnic respiratory failure confirmed and acute   ruled out  -- Acute on chronic hypoxic and hypercapnic respiratory failure confirmed  -- Other - I will add my own diagnosis  -- Disagree - Not applicable / Not valid  -- Disagree - Clinically unable to determine / Unknown  -- Refer to Clinical Documentation Reviewer    PROVIDER RESPONSE TEXT:    After study, chronic hypoxic and hypercapnic respiratory failure confirmed and   acute ruled out. Query created by: Merry Haney on 2022 11:12 AM      QUERY TEXT:    Patient admitted with right side abdominal and flank pain. Documentation in ER   Provider notes reflects pneumonia. If possible, please document in the   progress notes and discharge summary if pneumonia was:     The medical record reflects the following:  Risk Factors: COPD, history of lung cancer  Clinical Indicators: CXR, \"Right mid and lower lung infiltrates. Small right   pleural effusion not excluded. \"  Treatment: Maxipime 2g IV q 8 hr., Vanco with pharmacy placeholder for   internittent dosing. CXR. Pulmonology consult  Options provided:  -- Pnreumonia confirmed after study  -- Pneumonia ruled out after study  -- Other - I will add my own diagnosis  -- Disagree - Not applicable / Not valid  -- Disagree - Clinically unable to determine / Unknown  -- Refer to Clinical Documentation Reviewer    PROVIDER RESPONSE TEXT:    Pneumonia confirmed after study.     Query created by: Carter Thomas on 8/4/2022 11:17 AM      Electronically signed by:  Indu Vicente 8/4/2022 12:31 PM

## 2022-08-04 NOTE — PROGRESS NOTES
hours. Recent Labs     08/03/22  0525   AST 24   ALT 21   ALKPHOS 95   BILITOT <0.2   CALCIUM 8.1*   TROPONINI <0.01   LACTA 0.8   DDIMER 2.44*     Recent Labs     08/03/22  0618   BC No Growth to date. Any change in status will be called. Radiograph: CT ABDOMEN PELVIS WO CONTRAST Additional Contrast? None    Result Date: 8/4/2022  1. No evidence for acute abdominal or pelvic process. 2.Right lower lobe consolidation with small pleural effusion. Superimposed infection is not excluded. 3.Colonic diverticulosis without diverticulitis. 4.Calcific atherosclerosis of the abdominal aorta. 5.Degenerative spondylosis. NM LUNG SCAN PERFUSION ONLY    Result Date: 8/4/2022  1. Small subsegmental defect in the posterior left lower lobe, new since prior, compatible with and intermediate probability for pulmonary embolism. 2. Pulmonary hyperaeration, with decreased perfusion in the lung apices, likely related to COPD. 3. Patchy airspace abnormality in the right mid and lower lung field with small right pleural fluid, likely pneumonia. Please note according to the Piopped criteria a low probability reflects a 0 to 19%chance of a pulmonary embolism. Please correlate clinically. If symptoms persist, consider CT PA gram. RECOMMENDATION: Follow-up as clinically warranted. Electronically Signed by Reba Dorantes MD at 04-Aug-2022 11:14:59 AM             XR CHEST PORTABLE    Result Date: 8/3/2022  Right mid and lower lung infiltrates. Small right pleural effusion not excluded. Recommendation: Follow up as clinically indicated. Electronically Signed by Fay Cogan MD at 03-Aug-2022 08:41:33 AM                My radiograph interpretation/independent review of imaging: reviewed.      Problem list generated by [facility] Problems             Last Modified POA    * (Principal) Pneumonia 8/3/2022 Yes        Pulmonary Assessment/Plan:     Chronic hypoxic hypercapnic respiratory failure continue oxygen supplementation as necessary to maintain adequate oxygenation. Underlying severe COPD. Continue bronchodilators continue pulmonary toilet. Right lower lobe pneumonia. Continue empirical antibiotic therapy. Full CT of the chest to better define the underlying anatomy. Low probability for PE per PIOPED criteria. The patient's pretest probability for PE was low. The patient's lower extremity scan is negative. Her elevated D-dimer is likely secondary to her pneumonia. We will continue anticoagulation for 3 months. Dyspnea due to the above supportive care. Jose Alejandro Ya MD, [facility], [facility]    The above note was generated using voice recognition software. Inadvertent typographical errors in transcription may have occurred.       Electronically signed by Jose Alejandro Ya MD on 08/04/22 at 11:32 AM

## 2022-08-04 NOTE — PROGRESS NOTES
endurance; Patient limited by fatigue; Other (comment)  Activity Tolerance Comments: pt becomes anxious with fatigue and SOA     Plan   Plan  Plan: 5-7 times per week  Plan weeks: 2  Current Treatment Recommendations: Strengthening, Balance training, Functional mobility training, Transfer training, Gait training, Endurance training, Safety education & training, Patient/Caregiver education & training, Equipment evaluation, education, & procurement, Positioning, Therapeutic activities  Plan Comment: cont PT per POC. Safety Devices  Type of Devices: Call light within reach, Chair alarm in place, Gait belt, Patient at risk for falls, Nurse notified, Left in chair (reclined)     Restrictions  Restrictions/Precautions  Restrictions/Precautions: Fall Risk, Contact Precautions  Position Activity Restriction  Other position/activity restrictions: c diff rule out, MRSA, rhinovirus     Subjective   Pain: 6/10 abdomen  General  Chart Reviewed: Yes  Patient assessed for rehabilitation services?: Yes  Response To Previous Treatment: Not applicable  Family / Caregiver Present: No  Referring Practitioner: Susie Caldwell MD  Referral Date : 08/03/22  Diagnosis: PNA R lung  Follows Commands: Within Functional Limits  General Comment  Comments: RNNaman PT. Subjective  Subjective: Doesn't know how much she can do. States she has a nervous tremor.          Social/Functional History  Social/Functional History  Lives With: Daughter  Type of Home: House  Home Layout: One level  Home Access: Stairs to enter without rails  Entrance Stairs - Number of Steps: 2 ENRIQUE  Bathroom Shower/Tub: Walk-in shower  Bathroom Toilet: Standard  Bathroom Equipment: Shower chair  Bathroom Accessibility: Accessible  Home Equipment: Oxygen  Receives Help From: Family  ADL Assistance: Independent  Homemaking Assistance: Independent  Ambulation Assistance: Independent  Transfer Assistance: Independent  Active : No  Patient's  Info: Daughters  Occupation: Retired  Leisure & Hobbies: has 2 dogs of her own, several other dogs in the house  Additional Comments: wants to get a RW, reports recent 2 wk hospitalization  Vision/Hearing  Hearing  Hearing: Within functional limits    Cognition   Orientation  Overall Orientation Status: Within Normal Limits  Cognition  Overall Cognitive Status: WNL     Objective   Heart Rate: 73  Heart Rate Source: Monitor  BP: (!) 105/44  BP Location: Right upper arm  BP Method: Manual  MAP (Calculated): 64.33  Resp: 18  SpO2: 91 %  O2 Device: Nasal cannula  Comment: 3LO2     Observation/Palpation  Posture: Good  Observation: IV, O2, purewick, depends, telemetry  Gross Assessment  Sensation: Impaired (neuropathy B feet)     AROM RLE (degrees)  RLE AROM: WFL  AROM LLE (degrees)  LLE AROM : WFL  LLE General AROM: knee does not fully extend, but pt states she had surgery on it before  Strength RLE  Strength RLE: Exception  Comment: at least 3+/5  Strength LLE  Strength LLE: Exception  Comment: at least 3+/5           Bed mobility  Supine to Sit: Independent  Sit to Supine: Unable to assess  Transfers  Sit to Stand: Contact guard assistance  Stand to sit: Contact guard assistance  Bed to Chair: Minimal assistance (standing (after sponge bath) to chair)  Comment: pt stood x 5 mins for sponge bath with set up, CGA  Ambulation  Surface: level tile  Device: Rolling Walker  Assistance: Minimal assistance;2 Person assistance  Quality of Gait: unsteady  Gait Deviations: Slow Rosa;Decreased step length;Decreased step height  Distance: 6' x 2  Comments: stood to sponge bathe, used RW to the bathroom and then HHA on the way back due to becoming fatigued and a little anxious, O2 sat 88% initially after activity, then cued to do pursed lip breathing, sats prashant to 90%.      Balance  Posture: Good  Sitting - Static: +;Fair  Sitting - Dynamic: -;Fair  Standing - Static: Fair;-  Standing - Dynamic: Poor;+           OutComes Score AM-PAC Score             Tinneti Score       Goals  Short Term Goals  Time Frame for Short term goals: 2 wks  Short term goal 1: amb. 48' with or without RW SBA  Short term goal 2: sit to stand indep  Patient Goals   Patient goals : go home       Education  Patient Education  Education Given To: Patient  Education Provided: Role of Therapy;Plan of Care  Education Provided Comments: encouraged to do ROM while up in chair, out of bed as much as possible  Education Method: Verbal  Barriers to Learning: Cognition; Other (Comment) (SOA, anxiety)  Education Outcome: Continued education needed      Therapy Time   Individual Concurrent Group Co-treatment   Time In           Time Out           Minutes                   Anmol Rodriguez PT     Electronically signed by Anmol Rodriguez PT on 8/4/2022 at 11:02 AM

## 2022-08-04 NOTE — PROGRESS NOTES
Occupational Therapy Initial Assessment  Date: 2022   Patient Name: Mine Barron  MRN: [id number]     : 1950    Date of Service: 2022    Discharge Recommendations:  Home with assist PRN       Assessment   Assessment: Evaluation completed and tx initiated. No overt barriers for discharge to home with family when medically ready. Will follow to maximize safety and functional independence. Treatment Diagnosis: Pneumonia, abdominal pain    REQUIRES OT FOLLOW-UP: Yes  Activity Tolerance  Activity Tolerance: Patient limited by pain  Activity Tolerance Comments: but fully participated           Patient Diagnosis(es): The encounter diagnosis was Pneumonia of right lung due to infectious organism, unspecified part of lung.   has a past medical history of ADHD (attention deficit hyperactivity disorder), Anxiety, COPD (chronic obstructive pulmonary disease) ([facility] [address] Depression, Fibromyalgia, GERD (gastroesophageal reflux disease), Hypertension, Malignant neoplasm of upper lobe of right lung ([facility] [address] Palliative care patient, and RA (rheumatoid arthritis) ([facility] [address] has a past surgical history that includes Hysterectomy; hernia repair; Leg Surgery; and Cholecystectomy.     Treatment Diagnosis: Pneumonia, abdominal pain      Restrictions  Restrictions/Precautions  Restrictions/Precautions: Fall Risk, Contact Precautions    Subjective   General  Chart Reviewed: Yes  Patient assessed for rehabilitation services?: Yes  Family / Caregiver Present: No  Pre Treatment Pain Screening  Pain at present: 6  Scale Used: Numeric Score  Intervention List: Patient able to continue with treatment  Comments / Details: no additional pain with activity    Social/Functional History  Social/Functional History  Lives With: Daughter  Type of Home: House  Home Layout: One level  Home Access: Stairs to enter without rails  Entrance Stairs - Number of Steps: 2 ENRIQUE  Bathroom Shower/Tub: Walk-in shower  Bathroom Toilet: Standard  Bathroom

## 2022-08-05 LAB — VANCOMYCIN TROUGH: 10.4 UG/ML (ref 10–20)

## 2022-08-05 PROCEDURE — 36415 COLL VENOUS BLD VENIPUNCTURE: CPT

## 2022-08-05 PROCEDURE — 94640 AIRWAY INHALATION TREATMENT: CPT

## 2022-08-05 PROCEDURE — 6360000002 HC RX W HCPCS: Performed by: EMERGENCY MEDICINE

## 2022-08-05 PROCEDURE — 6360000002 HC RX W HCPCS: Performed by: FAMILY MEDICINE

## 2022-08-05 PROCEDURE — 6370000000 HC RX 637 (ALT 250 FOR IP): Performed by: FAMILY MEDICINE

## 2022-08-05 PROCEDURE — 1210000000 HC MED SURG R&B

## 2022-08-05 PROCEDURE — 2700000000 HC OXYGEN THERAPY PER DAY

## 2022-08-05 PROCEDURE — 80202 ASSAY OF VANCOMYCIN: CPT

## 2022-08-05 PROCEDURE — 2580000003 HC RX 258: Performed by: EMERGENCY MEDICINE

## 2022-08-05 PROCEDURE — 2580000003 HC RX 258: Performed by: FAMILY MEDICINE

## 2022-08-05 RX ORDER — SODIUM CHLORIDE 9 MG/ML
INJECTION, SOLUTION INTRAVENOUS CONTINUOUS
Status: CANCELLED | OUTPATIENT
Start: 2022-08-05

## 2022-08-05 RX ORDER — GUAIFENESIN 100 MG/5ML
100 SOLUTION ORAL EVERY 4 HOURS PRN
Status: DISCONTINUED | OUTPATIENT
Start: 2022-08-05 | End: 2022-08-17

## 2022-08-05 RX ORDER — 0.9 % SODIUM CHLORIDE 0.9 %
1000 INTRAVENOUS SOLUTION INTRAVENOUS ONCE
Status: COMPLETED | OUTPATIENT
Start: 2022-08-05 | End: 2022-08-05

## 2022-08-05 RX ADMIN — CITALOPRAM HYDROBROMIDE 40 MG: 20 TABLET ORAL at 08:52

## 2022-08-05 RX ADMIN — IPRATROPIUM BROMIDE AND ALBUTEROL SULFATE 3 ML: 2.5; .5 SOLUTION RESPIRATORY (INHALATION) at 18:16

## 2022-08-05 RX ADMIN — SUCRALFATE 1 G: 1 TABLET ORAL at 22:15

## 2022-08-05 RX ADMIN — ACETAMINOPHEN 650 MG: 325 TABLET, FILM COATED ORAL at 14:01

## 2022-08-05 RX ADMIN — SUCRALFATE 1 G: 1 TABLET ORAL at 18:17

## 2022-08-05 RX ADMIN — SUCRALFATE 1 G: 1 TABLET ORAL at 14:02

## 2022-08-05 RX ADMIN — ALPRAZOLAM 1 MG: 0.5 TABLET ORAL at 08:53

## 2022-08-05 RX ADMIN — TIZANIDINE 4 MG: 4 TABLET ORAL at 04:47

## 2022-08-05 RX ADMIN — IPRATROPIUM BROMIDE AND ALBUTEROL SULFATE 3 ML: 2.5; .5 SOLUTION RESPIRATORY (INHALATION) at 14:39

## 2022-08-05 RX ADMIN — OXYCODONE AND ACETAMINOPHEN 1 TABLET: 10; 325 TABLET ORAL at 01:33

## 2022-08-05 RX ADMIN — Medication 1000 MG: at 04:42

## 2022-08-05 RX ADMIN — IPRATROPIUM BROMIDE AND ALBUTEROL SULFATE 3 ML: 2.5; .5 SOLUTION RESPIRATORY (INHALATION) at 10:51

## 2022-08-05 RX ADMIN — CEFEPIME 2000 MG: 2 INJECTION, POWDER, FOR SOLUTION INTRAVENOUS at 06:33

## 2022-08-05 RX ADMIN — OXYCODONE AND ACETAMINOPHEN 1 TABLET: 10; 325 TABLET ORAL at 16:58

## 2022-08-05 RX ADMIN — ALPRAZOLAM 1 MG: 0.5 TABLET ORAL at 22:15

## 2022-08-05 RX ADMIN — PANTOPRAZOLE SODIUM 40 MG: 40 TABLET, DELAYED RELEASE ORAL at 16:04

## 2022-08-05 RX ADMIN — ENOXAPARIN SODIUM 50 MG: 100 INJECTION SUBCUTANEOUS at 22:15

## 2022-08-05 RX ADMIN — OXYCODONE AND ACETAMINOPHEN 1 TABLET: 10; 325 TABLET ORAL at 05:35

## 2022-08-05 RX ADMIN — SODIUM CHLORIDE, PRESERVATIVE FREE 10 ML: 5 INJECTION INTRAVENOUS at 09:03

## 2022-08-05 RX ADMIN — GABAPENTIN 600 MG: 600 TABLET, FILM COATED ORAL at 08:52

## 2022-08-05 RX ADMIN — SUCRALFATE 1 G: 1 TABLET ORAL at 08:52

## 2022-08-05 RX ADMIN — ENOXAPARIN SODIUM 50 MG: 100 INJECTION SUBCUTANEOUS at 08:52

## 2022-08-05 RX ADMIN — SODIUM CHLORIDE 1000 ML: 9 INJECTION, SOLUTION INTRAVENOUS at 16:00

## 2022-08-05 RX ADMIN — PANTOPRAZOLE SODIUM 40 MG: 40 TABLET, DELAYED RELEASE ORAL at 06:29

## 2022-08-05 RX ADMIN — GUAIFENESIN 100 MG: 100 SOLUTION ORAL at 18:48

## 2022-08-05 RX ADMIN — IPRATROPIUM BROMIDE AND ALBUTEROL SULFATE 3 ML: 2.5; .5 SOLUTION RESPIRATORY (INHALATION) at 22:46

## 2022-08-05 RX ADMIN — CEFEPIME 2000 MG: 2 INJECTION, POWDER, FOR SOLUTION INTRAVENOUS at 16:03

## 2022-08-05 RX ADMIN — IPRATROPIUM BROMIDE AND ALBUTEROL SULFATE 3 ML: 2.5; .5 SOLUTION RESPIRATORY (INHALATION) at 06:51

## 2022-08-05 RX ADMIN — CEFEPIME 2000 MG: 2 INJECTION, POWDER, FOR SOLUTION INTRAVENOUS at 22:16

## 2022-08-05 RX ADMIN — GABAPENTIN 600 MG: 600 TABLET, FILM COATED ORAL at 22:15

## 2022-08-05 RX ADMIN — IPRATROPIUM BROMIDE AND ALBUTEROL SULFATE 3 ML: 2.5; .5 SOLUTION RESPIRATORY (INHALATION) at 02:45

## 2022-08-05 ASSESSMENT — PAIN SCALES - GENERAL
PAINLEVEL_OUTOF10: 0
PAINLEVEL_OUTOF10: 10
PAINLEVEL_OUTOF10: 9
PAINLEVEL_OUTOF10: 10
PAINLEVEL_OUTOF10: 9
PAINLEVEL_OUTOF10: 8

## 2022-08-05 ASSESSMENT — PAIN DESCRIPTION - LOCATION
LOCATION: ABDOMEN
LOCATION: ABDOMEN
LOCATION: RIB CAGE

## 2022-08-05 ASSESSMENT — PAIN DESCRIPTION - DESCRIPTORS
DESCRIPTORS: ACHING
DESCRIPTORS: ACHING;THROBBING
DESCRIPTORS: ACHING;STABBING

## 2022-08-05 ASSESSMENT — PAIN - FUNCTIONAL ASSESSMENT: PAIN_FUNCTIONAL_ASSESSMENT: ACTIVITIES ARE NOT PREVENTED

## 2022-08-05 ASSESSMENT — PAIN DESCRIPTION - ORIENTATION
ORIENTATION: RIGHT;OUTER
ORIENTATION: RIGHT;LEFT;UPPER;LOWER
ORIENTATION: RIGHT

## 2022-08-05 NOTE — PROGRESS NOTES
[address] [facility] Pharmacokinetic Monitoring Service - Vancomycin    Consulting Provider: Dr. Marcia Murcia   Indication: Pneumonia  Target Concentration: Goal AUC/LETTY 400-600 mg*hr/L  Day of Therapy: 3  Additional Antimicrobials: Cefepime    Pertinent Laboratory Values: Wt Readings from Last 1 Encounters:   08/03/22 117 lb 8 oz (53.3 kg)     Temp Readings from Last 1 Encounters:   08/05/22 97.7 °F (36.5 °C) (Temporal)     Estimated Creatinine Clearance: 83 mL/min (based on SCr of 0.5 mg/dL). Recent Labs     08/03/22  0525   CREATININE 0.5   WBC 10.6     Procalcitonin: not ordered    Pertinent Cultures:  Culture Date Source Results   08/03/22 Blood x 2 No growth to date     MRSA Nasal Swab: showed MRSA positive result on 08/04    Recent vancomycin administrations                     vancomycin (VANCOCIN) 750 mg in dextrose 5 % 250 mL IVPB (mg) 750 mg New Bag 08/04/22 1505     750 mg New Bag  0322    vancomycin (VANCOCIN) 1,250 mg in dextrose 5 % 250 mL IVPB (mg) 1,250 mg New Bag 08/03/22 1556                    Assessment:  Date/Time Current Dose Concentration Timing of Concentration (h) AUC   08/05 at 0253 Vancomycin 750 mg IV every 12 hours 10.4 11 hours 48 minutes 423 (61% probability)   Note: Serum concentrations collected for AUC dosing may appear elevated if collected in close proximity to the dose administered, this is not necessarily an indication of toxicity    Plan:  Current dosing regimen is  slightly therapeutic with 61 % probability of AUC of 423.  Will increase regimen for better probability  Increase dose to 1000 mg IV every 12 hours  Repeat vancomycin concentration ordered for 08/06 @ [address] [place] [address] [place] will continue to monitor patient and adjust therapy as indicated    Thank you for the consult,  FRANC Sinclair [facility]  8/5/2022 4:10 AM

## 2022-08-05 NOTE — PROGRESS NOTES
Progress Note  Georgia Domínguez  8/5/2022 4:10 PM  Subjective:   Admit Date:   8/3/2022      CC/ADMIT DX:       Interval History:   Reviewed overnight events and nursing notes. She c/o congestion. I have reviewed all labs/diagnostics from the last 24hrs. ROS:   I have done a 10 point ROS and all are negative, except what is mentioned in the HPI. ADULT DIET; Regular    Medications:      sodium chloride      sodium chloride 100 mL/hr at 08/03/22 2118      vancomycin  1,000 mg IntraVENous Q12H    sodium chloride  1,000 mL IntraVENous Once    enoxaparin  1 mg/kg SubCUTAneous BID    ALPRAZolam  1 mg Oral 4x Daily    vancomycin (VANCOCIN) intermittent dosing (placeholder)   Other RX Placeholder    sodium chloride flush  5-40 mL IntraVENous 2 times per day    ipratropium-albuterol  1 vial Inhalation Q4H    cefepime  2,000 mg IntraVENous Q8H    citalopram  40 mg Oral Daily    gabapentin  600 mg Oral TID    pantoprazole  40 mg Oral BID AC    sucralfate  1 g Oral 4x Daily           Objective:   Vitals: BP (!) 98/50   Pulse 74   Temp 97.3 °F (36.3 °C) (Temporal)   Resp 16   Ht 5' 6\" (1.676 m)   Wt 117 lb 8 oz (53.3 kg)   SpO2 90%   BMI 18.96 kg/m²    Intake/Output Summary (Last 24 hours) at 8/5/2022 1610  Last data filed at 8/5/2022 1206  Gross per 24 hour   Intake 991 ml   Output 1000 ml   Net -9 ml     General appearance: alert and cooperative with exam  Lungs: coarse  Heart: RRR  Abdomen: soft, non-tender; bowel sounds normal; no masses,  no organomegaly  Extremities: extremities normal, atraumatic, no cyanosis or edema  Neurologic:  negative, No obvious focal neurologic deficits. Assessment and Plan:   Principal Problem:    Pneumonia  Resolved Problems:    * No resolved hospital problems.  *   Severe COPD   Chronic pain    Anxiety   Abnormal V/Q scan  Plan:   Continue present medication(s)    Follow with Pulm   Continue Lovenox to cover mod prob V./Q scan   Continue antibiotics   Supportive care      Discharge planning:   her home     Reviewed treatment plans with the patient and/or family.              Electronically signed by Kaleb Suazo MD on 8/5/2022 at 4:10 PM

## 2022-08-05 NOTE — PROGRESS NOTES
Visited with pt to provide spiritual care. Pt says she is in severe pain in her stomach. She says she has to wait for pain medication because it lowers her blood pressure. This  provided spiritual care with sustaining presence, nurtured hope, ritual, and prayer. Pt expressed gratitude for spiritual care.      Electronically signed by Tamera Ojeda on 8/5/2022 at 3:07 PM

## 2022-08-05 NOTE — PLAN OF CARE
Problem: Discharge Planning  Goal: Discharge to home or other facility with appropriate resources  8/5/2022 1249 by rAmida Amin RN  Outcome: Progressing  8/5/2022 0409 by Blossom Montemayor RN  Outcome: Progressing  Flowsheets (Taken 8/4/2022 1952)  Discharge to home or other facility with appropriate resources: Identify barriers to discharge with patient and caregiver     Problem: Safety - Adult  Goal: Free from fall injury  8/5/2022 1249 by Armida Amin RN  Outcome: Progressing  Flowsheets (Taken 8/5/2022 0903)  Free From Fall Injury: Instruct family/caregiver on patient safety  8/5/2022 0409 by Blossom Montemayor RN  Outcome: Progressing  Flowsheets (Taken 8/5/2022 0356)  Free From Fall Injury: Instruct family/caregiver on patient safety     Problem: ABCDS Injury Assessment  Goal: Absence of physical injury  8/5/2022 1249 by Armida Amin RN  Outcome: Progressing  8/5/2022 0409 by Blossom Montemayor RN  Outcome: Progressing  Flowsheets (Taken 8/5/2022 0356)  Absence of Physical Injury: Implement safety measures based on patient assessment     Problem: Skin/Tissue Integrity  Goal: Absence of new skin breakdown  Description: 1. Monitor for areas of redness and/or skin breakdown  2. Assess vascular access sites hourly  3. Every 4-6 hours minimum:  Change oxygen saturation probe site  4. Every 4-6 hours:  If on nasal continuous positive airway pressure, respiratory therapy assess nares and determine need for appliance change or resting period.   8/5/2022 1249 by Armida Amin RN  Outcome: Progressing  8/5/2022 0409 by Blossom Montemayor RN  Outcome: Progressing     Problem: Pain  Goal: Verbalizes/displays adequate comfort level or baseline comfort level  8/5/2022 1249 by Armida Amin RN  Outcome: Progressing  8/5/2022 0409 by Blossom Montemayor RN  Outcome: Progressing

## 2022-08-05 NOTE — PROGRESS NOTES
Occupational Therapy  Pt in bed not feeling well this afternoon. Nursing states BP is running low. Will continue to follow.  Electronically signed by BERTHA Duenas on 8/5/2022 at 2:57 PM

## 2022-08-06 LAB — VANCOMYCIN TROUGH: 25.7 UG/ML (ref 10–20)

## 2022-08-06 PROCEDURE — 80202 ASSAY OF VANCOMYCIN: CPT

## 2022-08-06 PROCEDURE — 94640 AIRWAY INHALATION TREATMENT: CPT

## 2022-08-06 PROCEDURE — 2580000003 HC RX 258: Performed by: EMERGENCY MEDICINE

## 2022-08-06 PROCEDURE — 1210000000 HC MED SURG R&B

## 2022-08-06 PROCEDURE — 6360000002 HC RX W HCPCS: Performed by: FAMILY MEDICINE

## 2022-08-06 PROCEDURE — 2580000003 HC RX 258: Performed by: FAMILY MEDICINE

## 2022-08-06 PROCEDURE — 6360000002 HC RX W HCPCS: Performed by: EMERGENCY MEDICINE

## 2022-08-06 PROCEDURE — 36415 COLL VENOUS BLD VENIPUNCTURE: CPT

## 2022-08-06 PROCEDURE — 6370000000 HC RX 637 (ALT 250 FOR IP): Performed by: FAMILY MEDICINE

## 2022-08-06 PROCEDURE — 2700000000 HC OXYGEN THERAPY PER DAY

## 2022-08-06 RX ADMIN — ALPRAZOLAM 1 MG: 0.5 TABLET ORAL at 14:14

## 2022-08-06 RX ADMIN — GABAPENTIN 600 MG: 600 TABLET, FILM COATED ORAL at 08:27

## 2022-08-06 RX ADMIN — OXYCODONE AND ACETAMINOPHEN 1 TABLET: 10; 325 TABLET ORAL at 08:27

## 2022-08-06 RX ADMIN — GABAPENTIN 600 MG: 600 TABLET, FILM COATED ORAL at 19:54

## 2022-08-06 RX ADMIN — IPRATROPIUM BROMIDE AND ALBUTEROL SULFATE 3 ML: 2.5; .5 SOLUTION RESPIRATORY (INHALATION) at 15:16

## 2022-08-06 RX ADMIN — ALPRAZOLAM 1 MG: 0.5 TABLET ORAL at 19:53

## 2022-08-06 RX ADMIN — SODIUM CHLORIDE, PRESERVATIVE FREE 10 ML: 5 INJECTION INTRAVENOUS at 19:54

## 2022-08-06 RX ADMIN — PANTOPRAZOLE SODIUM 40 MG: 40 TABLET, DELAYED RELEASE ORAL at 16:25

## 2022-08-06 RX ADMIN — PANTOPRAZOLE SODIUM 40 MG: 40 TABLET, DELAYED RELEASE ORAL at 05:00

## 2022-08-06 RX ADMIN — ALPRAZOLAM 1 MG: 0.5 TABLET ORAL at 09:30

## 2022-08-06 RX ADMIN — IPRATROPIUM BROMIDE AND ALBUTEROL SULFATE 3 ML: 2.5; .5 SOLUTION RESPIRATORY (INHALATION) at 06:57

## 2022-08-06 RX ADMIN — CITALOPRAM HYDROBROMIDE 40 MG: 20 TABLET ORAL at 08:27

## 2022-08-06 RX ADMIN — IPRATROPIUM BROMIDE AND ALBUTEROL SULFATE 3 ML: 2.5; .5 SOLUTION RESPIRATORY (INHALATION) at 18:15

## 2022-08-06 RX ADMIN — ENOXAPARIN SODIUM 50 MG: 100 INJECTION SUBCUTANEOUS at 19:54

## 2022-08-06 RX ADMIN — IPRATROPIUM BROMIDE AND ALBUTEROL SULFATE 3 ML: 2.5; .5 SOLUTION RESPIRATORY (INHALATION) at 02:22

## 2022-08-06 RX ADMIN — SUCRALFATE 1 G: 1 TABLET ORAL at 13:08

## 2022-08-06 RX ADMIN — Medication 1000 MG: at 02:15

## 2022-08-06 RX ADMIN — CEFEPIME 2000 MG: 2 INJECTION, POWDER, FOR SOLUTION INTRAVENOUS at 16:23

## 2022-08-06 RX ADMIN — SODIUM CHLORIDE, PRESERVATIVE FREE 10 ML: 5 INJECTION INTRAVENOUS at 09:00

## 2022-08-06 RX ADMIN — IPRATROPIUM BROMIDE AND ALBUTEROL SULFATE 3 ML: 2.5; .5 SOLUTION RESPIRATORY (INHALATION) at 22:35

## 2022-08-06 RX ADMIN — IPRATROPIUM BROMIDE AND ALBUTEROL SULFATE 3 ML: 2.5; .5 SOLUTION RESPIRATORY (INHALATION) at 11:39

## 2022-08-06 RX ADMIN — ENOXAPARIN SODIUM 50 MG: 100 INJECTION SUBCUTANEOUS at 08:27

## 2022-08-06 RX ADMIN — GABAPENTIN 600 MG: 600 TABLET, FILM COATED ORAL at 14:14

## 2022-08-06 RX ADMIN — SODIUM CHLORIDE: 9 INJECTION, SOLUTION INTRAVENOUS at 14:21

## 2022-08-06 RX ADMIN — CEFEPIME 2000 MG: 2 INJECTION, POWDER, FOR SOLUTION INTRAVENOUS at 05:00

## 2022-08-06 RX ADMIN — SUCRALFATE 1 G: 1 TABLET ORAL at 17:52

## 2022-08-06 RX ADMIN — SUCRALFATE 1 G: 1 TABLET ORAL at 08:27

## 2022-08-06 RX ADMIN — Medication 1000 MG: at 14:16

## 2022-08-06 RX ADMIN — OXYCODONE AND ACETAMINOPHEN 1 TABLET: 10; 325 TABLET ORAL at 19:54

## 2022-08-06 RX ADMIN — SUCRALFATE 1 G: 1 TABLET ORAL at 19:54

## 2022-08-06 RX ADMIN — OXYCODONE AND ACETAMINOPHEN 1 TABLET: 10; 325 TABLET ORAL at 02:12

## 2022-08-06 ASSESSMENT — PAIN DESCRIPTION - DESCRIPTORS
DESCRIPTORS: DISCOMFORT
DESCRIPTORS: ACHING;DISCOMFORT;CRAMPING;SHARP;SORE

## 2022-08-06 ASSESSMENT — PAIN DESCRIPTION - ORIENTATION
ORIENTATION: LOWER
ORIENTATION: LEFT;RIGHT

## 2022-08-06 ASSESSMENT — PAIN - FUNCTIONAL ASSESSMENT: PAIN_FUNCTIONAL_ASSESSMENT: ACTIVITIES ARE NOT PREVENTED

## 2022-08-06 ASSESSMENT — PAIN SCALES - GENERAL: PAINLEVEL_OUTOF10: 8

## 2022-08-06 ASSESSMENT — PAIN DESCRIPTION - LOCATION
LOCATION: BACK
LOCATION: ABDOMEN

## 2022-08-06 NOTE — PROGRESS NOTES
[facility]  [facility]  8/6/2022 10:46 AM  Subjective:   Admit Date:   8/3/2022      CC/ADMIT DX:       Interval History:   Reviewed overnight events and nursing notes. She c/o congestion. I have reviewed all labs/diagnostics from the last 24hrs. ROS:   I have done a 10 point ROS and all are negative, except what is mentioned in the HPI. ADULT DIET; Regular    Medications:      sodium chloride      sodium chloride 100 mL/hr at 08/03/22 2118      vancomycin  1,000 mg IntraVENous Q12H    enoxaparin  1 mg/kg SubCUTAneous BID    ALPRAZolam  1 mg Oral 4x Daily    vancomycin (VANCOCIN) intermittent dosing (placeholder)   Other RX Placeholder    sodium chloride flush  5-40 mL IntraVENous 2 times per day    ipratropium-albuterol  1 vial Inhalation Q4H    cefepime  2,000 mg IntraVENous Q8H    citalopram  40 mg Oral Daily    gabapentin  600 mg Oral TID    pantoprazole  40 mg Oral BID AC    sucralfate  1 g Oral 4x Daily           Objective:   Vitals: BP (!) 118/51   Pulse 88   Temp 98.4 °F (36.9 °C) (Temporal)   Resp 16   Ht 5' 6\" (1.676 m)   Wt 117 lb 8 oz (53.3 kg)   SpO2 90%   BMI 18.96 kg/m²    Intake/Output Summary (Last 24 hours) at 8/6/2022 1046  Last data filed at 8/6/2022 0223  Gross per 24 hour   Intake --   Output 2000 ml   Net -2000 ml     General appearance: alert and cooperative with exam  Lungs: coarse  Heart: RRR  Abdomen: soft, non-tender; bowel sounds normal; no masses,  no organomegaly  Extremities: extremities normal, atraumatic, no cyanosis or edema  Neurologic:  negative, No obvious focal neurologic deficits. Assessment and Plan:   Principal Problem:    Pneumonia  Resolved Problems:    * No resolved hospital problems.  *   Severe COPD   Chronic pain    Anxiety   Abnormal V/Q scan  Plan:    Medically stable      Continue present medication(s)    Follow with Pulm   Continue Lovenox to cover mod prob V./Q scan   Continue antibiotics   Supportive care      Discharge planning: her home     Reviewed treatment plans with the patient and/or family. Electronically signed by Lynn Anand PA-C on 8/6/2022 at 10:46 AM     I have discussed the care of [facility], including pertinent history and exam findings with the ARNP/PA. I have seen and examined the patient and the key elements of all parts of the encounter have been performed by me. I agree with the assessment and plan as outlined by the ARNP/PA. Please refer to my separate note for complete documentation.      Electronically signed by Sunday Nam MD on 8/7/2022 at 2:44 PM

## 2022-08-06 NOTE — PROGRESS NOTES
Physical Therapy  [facility]     08/06/22 1148   General   Missed reason Patient ill;Patient declined   Subjective   Subjective Pt refused stating she is having pain in her stomach, refuses breakfast.

## 2022-08-06 NOTE — PROGRESS NOTES
[address] [facility] Pharmacokinetic Monitoring Service - Vancomycin    Consulting Provider: Dr. Nancy Sotelo   Indication: Pneumonia  Target Concentration: Goal AUC/LETTY 400-600 mg*hr/L  Day of Therapy: 4  Additional Antimicrobials: Cefepime    Pertinent Laboratory Values: Wt Readings from Last 1 Encounters:   08/03/22 117 lb 8 oz (53.3 kg)     Temp Readings from Last 1 Encounters:   08/06/22 98.4 °F (36.9 °C) (Temporal)     Estimated Creatinine Clearance: 83 mL/min (based on SCr of 0.5 mg/dL). No results for input(s): CREATININE, WBC in the last 72 hours. Invalid input(s):  BUN  Procalcitonin: not ordered    Pertinent Cultures:  Culture Date Source Results   08/03/22 Blood x 2 No growth to date     MRSA Nasal Swab: showed MRSA positive result on 08/04    Recent vancomycin administrations                     vancomycin (VANCOCIN) 1000 mg in dextrose 5% 250 mL IVPB (mg) 1,000 mg New Bag 08/06/22 0215     1,000 mg New Bag 08/05/22 0442    vancomycin (VANCOCIN) 750 mg in dextrose 5 % 250 mL IVPB (mg) 750 mg New Bag 08/04/22 1505     750 mg New Bag  0322    vancomycin (VANCOCIN) 1,250 mg in dextrose 5 % 250 mL IVPB (mg) 1,250 mg New Bag 08/03/22 1556                    Assessment:  Date/Time Current Dose Concentration Timing of Concentration (h)   08/06 at 0458 Vancomycin 1000 mg IV every 12 hours 25.7 2 hours 46 minutes post dose   (Not an applicable level)   Note: Serum concentrations collected for AUC dosing may appear elevated if collected in close proximity to the dose administered, this is not necessarily an indication of toxicity    Plan: Will continue current regimen and retime trough as trough isn't applicable as it was drawn 2 hours 46 minutes post dose. Patient also missed a dose of this current regimen.   Continue current dose  Repeat vancomycin concentration ordered for 08/07 @ [place] [address] will continue to monitor patient and adjust therapy as indicated    Thank you for the consult,  Roland Araiza, [facility]  8/6/2022 6:59 AM

## 2022-08-07 LAB
ANION GAP SERPL CALCULATED.3IONS-SCNC: 7 MMOL/L (ref 7–19)
BUN BLDV-MCNC: 8 MG/DL (ref 8–23)
CALCIUM SERPL-MCNC: 7.5 MG/DL (ref 8.8–10.2)
CHLORIDE BLD-SCNC: 104 MMOL/L (ref 98–111)
CO2: 31 MMOL/L (ref 22–29)
CREAT SERPL-MCNC: 0.4 MG/DL (ref 0.5–0.9)
GFR AFRICAN AMERICAN: >59
GFR NON-AFRICAN AMERICAN: >60
GLUCOSE BLD-MCNC: 94 MG/DL (ref 74–109)
POTASSIUM REFLEX MAGNESIUM: 4 MMOL/L (ref 3.5–5)
SODIUM BLD-SCNC: 142 MMOL/L (ref 136–145)
VANCOMYCIN TROUGH: 16.8 UG/ML (ref 10–20)

## 2022-08-07 PROCEDURE — 36415 COLL VENOUS BLD VENIPUNCTURE: CPT

## 2022-08-07 PROCEDURE — 6370000000 HC RX 637 (ALT 250 FOR IP): Performed by: PHYSICIAN ASSISTANT

## 2022-08-07 PROCEDURE — 80048 BASIC METABOLIC PNL TOTAL CA: CPT

## 2022-08-07 PROCEDURE — 6370000000 HC RX 637 (ALT 250 FOR IP): Performed by: FAMILY MEDICINE

## 2022-08-07 PROCEDURE — 2580000003 HC RX 258: Performed by: EMERGENCY MEDICINE

## 2022-08-07 PROCEDURE — 2700000000 HC OXYGEN THERAPY PER DAY

## 2022-08-07 PROCEDURE — 6360000002 HC RX W HCPCS: Performed by: FAMILY MEDICINE

## 2022-08-07 PROCEDURE — 6360000002 HC RX W HCPCS: Performed by: EMERGENCY MEDICINE

## 2022-08-07 PROCEDURE — 80202 ASSAY OF VANCOMYCIN: CPT

## 2022-08-07 PROCEDURE — 94640 AIRWAY INHALATION TREATMENT: CPT

## 2022-08-07 PROCEDURE — 1210000000 HC MED SURG R&B

## 2022-08-07 RX ORDER — GUAIFENESIN 600 MG/1
600 TABLET, EXTENDED RELEASE ORAL 2 TIMES DAILY
Status: DISCONTINUED | OUTPATIENT
Start: 2022-08-07 | End: 2022-08-09

## 2022-08-07 RX ADMIN — Medication 1000 MG: at 15:32

## 2022-08-07 RX ADMIN — GABAPENTIN 600 MG: 600 TABLET, FILM COATED ORAL at 15:39

## 2022-08-07 RX ADMIN — ENOXAPARIN SODIUM 50 MG: 100 INJECTION SUBCUTANEOUS at 08:32

## 2022-08-07 RX ADMIN — IPRATROPIUM BROMIDE AND ALBUTEROL SULFATE 3 ML: 2.5; .5 SOLUTION RESPIRATORY (INHALATION) at 01:39

## 2022-08-07 RX ADMIN — GUAIFENESIN 100 MG: 100 SOLUTION ORAL at 22:33

## 2022-08-07 RX ADMIN — GUAIFENESIN 100 MG: 100 SOLUTION ORAL at 03:07

## 2022-08-07 RX ADMIN — CEFEPIME 2000 MG: 2 INJECTION, POWDER, FOR SOLUTION INTRAVENOUS at 22:54

## 2022-08-07 RX ADMIN — ALPRAZOLAM 1 MG: 0.5 TABLET ORAL at 03:04

## 2022-08-07 RX ADMIN — GABAPENTIN 600 MG: 600 TABLET, FILM COATED ORAL at 22:33

## 2022-08-07 RX ADMIN — ALPRAZOLAM 1 MG: 0.5 TABLET ORAL at 11:15

## 2022-08-07 RX ADMIN — IPRATROPIUM BROMIDE AND ALBUTEROL SULFATE 3 ML: 2.5; .5 SOLUTION RESPIRATORY (INHALATION) at 20:46

## 2022-08-07 RX ADMIN — SUCRALFATE 1 G: 1 TABLET ORAL at 16:27

## 2022-08-07 RX ADMIN — GUAIFENESIN 600 MG: 600 TABLET, EXTENDED RELEASE ORAL at 13:23

## 2022-08-07 RX ADMIN — OXYCODONE AND ACETAMINOPHEN 1 TABLET: 10; 325 TABLET ORAL at 01:31

## 2022-08-07 RX ADMIN — OXYCODONE AND ACETAMINOPHEN 1 TABLET: 10; 325 TABLET ORAL at 15:39

## 2022-08-07 RX ADMIN — IPRATROPIUM BROMIDE AND ALBUTEROL SULFATE 3 ML: 2.5; .5 SOLUTION RESPIRATORY (INHALATION) at 13:48

## 2022-08-07 RX ADMIN — ENOXAPARIN SODIUM 50 MG: 100 INJECTION SUBCUTANEOUS at 22:33

## 2022-08-07 RX ADMIN — OXYCODONE AND ACETAMINOPHEN 1 TABLET: 10; 325 TABLET ORAL at 22:33

## 2022-08-07 RX ADMIN — OXYCODONE AND ACETAMINOPHEN 1 TABLET: 10; 325 TABLET ORAL at 11:15

## 2022-08-07 RX ADMIN — Medication 1000 MG: at 04:27

## 2022-08-07 RX ADMIN — GABAPENTIN 600 MG: 600 TABLET, FILM COATED ORAL at 08:32

## 2022-08-07 RX ADMIN — PANTOPRAZOLE SODIUM 40 MG: 40 TABLET, DELAYED RELEASE ORAL at 08:32

## 2022-08-07 RX ADMIN — CEFEPIME 2000 MG: 2 INJECTION, POWDER, FOR SOLUTION INTRAVENOUS at 00:00

## 2022-08-07 RX ADMIN — SUCRALFATE 1 G: 1 TABLET ORAL at 08:33

## 2022-08-07 RX ADMIN — CEFEPIME 2000 MG: 2 INJECTION, POWDER, FOR SOLUTION INTRAVENOUS at 17:57

## 2022-08-07 RX ADMIN — IPRATROPIUM BROMIDE AND ALBUTEROL SULFATE 3 ML: 2.5; .5 SOLUTION RESPIRATORY (INHALATION) at 10:34

## 2022-08-07 RX ADMIN — OXYCODONE AND ACETAMINOPHEN 1 TABLET: 10; 325 TABLET ORAL at 07:07

## 2022-08-07 RX ADMIN — ALPRAZOLAM 1 MG: 0.5 TABLET ORAL at 17:30

## 2022-08-07 RX ADMIN — GUAIFENESIN 600 MG: 600 TABLET, EXTENDED RELEASE ORAL at 22:32

## 2022-08-07 RX ADMIN — CITALOPRAM HYDROBROMIDE 40 MG: 20 TABLET ORAL at 08:32

## 2022-08-07 RX ADMIN — SUCRALFATE 1 G: 1 TABLET ORAL at 13:24

## 2022-08-07 RX ADMIN — ONDANSETRON HYDROCHLORIDE 4 MG: 2 SOLUTION INTRAMUSCULAR; INTRAVENOUS at 22:53

## 2022-08-07 RX ADMIN — PANTOPRAZOLE SODIUM 40 MG: 40 TABLET, DELAYED RELEASE ORAL at 15:39

## 2022-08-07 RX ADMIN — ALPRAZOLAM 1 MG: 0.5 TABLET ORAL at 22:32

## 2022-08-07 RX ADMIN — SUCRALFATE 1 G: 1 TABLET ORAL at 22:33

## 2022-08-07 RX ADMIN — CEFEPIME 2000 MG: 2 INJECTION, POWDER, FOR SOLUTION INTRAVENOUS at 08:31

## 2022-08-07 RX ADMIN — IPRATROPIUM BROMIDE AND ALBUTEROL SULFATE 3 ML: 2.5; .5 SOLUTION RESPIRATORY (INHALATION) at 06:34

## 2022-08-07 ASSESSMENT — PAIN SCALES - GENERAL
PAINLEVEL_OUTOF10: 8
PAINLEVEL_OUTOF10: 8
PAINLEVEL_OUTOF10: 9
PAINLEVEL_OUTOF10: 0
PAINLEVEL_OUTOF10: 9
PAINLEVEL_OUTOF10: 9

## 2022-08-07 ASSESSMENT — PAIN DESCRIPTION - LOCATION
LOCATION: ABDOMEN;BACK;HEAD
LOCATION: ABDOMEN
LOCATION: BACK
LOCATION: ABDOMEN;BACK;CHEST

## 2022-08-07 ASSESSMENT — PAIN DESCRIPTION - DESCRIPTORS
DESCRIPTORS: DISCOMFORT
DESCRIPTORS: DISCOMFORT
DESCRIPTORS: STABBING
DESCRIPTORS: ACHING;STABBING

## 2022-08-07 ASSESSMENT — PAIN - FUNCTIONAL ASSESSMENT: PAIN_FUNCTIONAL_ASSESSMENT: ACTIVITIES ARE NOT PREVENTED

## 2022-08-07 ASSESSMENT — PAIN DESCRIPTION - ORIENTATION: ORIENTATION: LOWER

## 2022-08-07 NOTE — PROGRESS NOTES
Progress Note  Marcin Person  8/7/2022 9:19 AM  Subjective:   Admit Date:   8/3/2022      CC/ADMIT DX:       Interval History:   Reviewed overnight events and nursing notes. She c/o congestion. I have reviewed all labs/diagnostics from the last 24hrs. No NEW Complaints     ROS:   I have done a 10 point ROS and all are negative, except what is mentioned in the HPI. ADULT DIET; Regular    Medications:      sodium chloride      sodium chloride 100 mL/hr at 08/06/22 1421      vancomycin  1,000 mg IntraVENous Q12H    enoxaparin  1 mg/kg SubCUTAneous BID    ALPRAZolam  1 mg Oral 4x Daily    vancomycin (VANCOCIN) intermittent dosing (placeholder)   Other RX Placeholder    sodium chloride flush  5-40 mL IntraVENous 2 times per day    ipratropium-albuterol  1 vial Inhalation Q4H    cefepime  2,000 mg IntraVENous Q8H    citalopram  40 mg Oral Daily    gabapentin  600 mg Oral TID    pantoprazole  40 mg Oral BID AC    sucralfate  1 g Oral 4x Daily           Objective:   Vitals: BP (!) 141/51   Pulse 82   Temp 99.3 °F (37.4 °C) (Temporal)   Resp 20   Ht 5' 6\" (1.676 m)   Wt 117 lb 8 oz (53.3 kg)   SpO2 91%   BMI 18.96 kg/m²    Intake/Output Summary (Last 24 hours) at 8/7/2022 0919  Last data filed at 8/7/2022 4754  Gross per 24 hour   Intake 2930 ml   Output 1300 ml   Net 1630 ml     General appearance: alert and cooperative with exam  Lungs: coarse  Heart: RRR  Abdomen: soft, non-tender; bowel sounds normal; no masses,  no organomegaly  Extremities: extremities normal, atraumatic, no cyanosis or edema  Neurologic:  negative, No obvious focal neurologic deficits. Assessment and Plan:   Principal Problem:    Pneumonia  Resolved Problems:    * No resolved hospital problems.  *   Severe COPD   Chronic pain    Anxiety   Abnormal V/Q scan  Plan:    Medically stable      Continue present medication(s)    Follow with Pulm   Continue Lovenox to cover mod prob V./Q scan   Continue IV antibiotics   Supportive care, PT for mobility       Discharge planning:   her home     Reviewed treatment plans with the patient and/or family. Electronically signed by Ko Ruiz PA-C on 8/7/2022 at 9:19 AM     I have discussed the care of [facility], including pertinent history and exam findings with the ARNP/PA. I have seen and examined the patient and the key elements of all parts of the encounter have been performed by me. I agree with the assessment and plan as outlined by the ARNP/PA. Please refer to my separate note for complete documentation.      Electronically signed by Diane White MD on 8/7/2022 at 2:44 PM

## 2022-08-07 NOTE — PROGRESS NOTES
[address] [facility] Pharmacokinetic Monitoring Service - Vancomycin    Consulting Provider: Dr. Mira Key   Indication: pneumonia  Target Concentration: Goal AUC/LETTY 400-600 mg*hr/L  Day of Therapy: 5  Additional Antimicrobials: Cefepime    Pertinent Laboratory Values: Wt Readings from Last 1 Encounters:   08/03/22 117 lb 8 oz (53.3 kg)     Temp Readings from Last 1 Encounters:   08/07/22 98.4 °F (36.9 °C) (Temporal)     Estimated Creatinine Clearance: 104 mL/min (A) (based on SCr of 0.4 mg/dL (L)). Recent Labs     08/07/22  0232   CREATININE 0.4*     Procalcitonin: not ordered    Pertinent Cultures:  Culture Date Source Results   08/03/22 Blood x 2 No growth to date     MRSA Nasal Swab: showed MRSA positive result on 08/04    Recent vancomycin administrations                     vancomycin (VANCOCIN) 1000 mg in dextrose 5% 250 mL IVPB (mg) 1,000 mg New Bag 08/06/22 1416     1,000 mg New Bag  0215     1,000 mg New Bag 08/05/22 0442    vancomycin (VANCOCIN) 750 mg in dextrose 5 % 250 mL IVPB (mg) 750 mg New Bag 08/04/22 1505                    Assessment:  Date/Time Current Dose Concentration Timing of Concentration (h) AUC   08/07 at 0232 Vancomycin 1000 mg IV every 12 hours 16.8 12 hours 16 minutes 522   Note: Serum concentrations collected for AUC dosing may appear elevated if collected in close proximity to the dose administered, this is not necessarily an indication of toxicity    Plan:  Current dosing regimen is therapeutic  Continue current dose  Repeat vancomycin concentration will not be ordered at this time.   Pharmacy will continue to monitor patient and adjust therapy as indicated    Thank you for the consult,  FRANC Sharp [facility]  8/7/2022 3:41 AM

## 2022-08-08 ENCOUNTER — APPOINTMENT (OUTPATIENT)
Dept: GENERAL RADIOLOGY | Age: 72
DRG: 853 | End: 2022-08-08
Payer: MEDICARE

## 2022-08-08 LAB
BLOOD CULTURE, ROUTINE: NORMAL
CULTURE, BLOOD 2: NORMAL

## 2022-08-08 PROCEDURE — 97530 THERAPEUTIC ACTIVITIES: CPT

## 2022-08-08 PROCEDURE — 71045 X-RAY EXAM CHEST 1 VIEW: CPT

## 2022-08-08 PROCEDURE — 6360000002 HC RX W HCPCS: Performed by: FAMILY MEDICINE

## 2022-08-08 PROCEDURE — 2580000003 HC RX 258: Performed by: EMERGENCY MEDICINE

## 2022-08-08 PROCEDURE — 6360000002 HC RX W HCPCS: Performed by: EMERGENCY MEDICINE

## 2022-08-08 PROCEDURE — 2700000000 HC OXYGEN THERAPY PER DAY

## 2022-08-08 PROCEDURE — 97116 GAIT TRAINING THERAPY: CPT

## 2022-08-08 PROCEDURE — 1210000000 HC MED SURG R&B

## 2022-08-08 PROCEDURE — 6370000000 HC RX 637 (ALT 250 FOR IP): Performed by: FAMILY MEDICINE

## 2022-08-08 PROCEDURE — 6370000000 HC RX 637 (ALT 250 FOR IP): Performed by: PHYSICIAN ASSISTANT

## 2022-08-08 PROCEDURE — 94640 AIRWAY INHALATION TREATMENT: CPT

## 2022-08-08 PROCEDURE — 71045 X-RAY EXAM CHEST 1 VIEW: CPT | Performed by: RADIOLOGY

## 2022-08-08 RX ORDER — NICOTINE 21 MG/24HR
1 PATCH, TRANSDERMAL 24 HOURS TRANSDERMAL DAILY
Status: DISCONTINUED | OUTPATIENT
Start: 2022-08-08 | End: 2022-08-23 | Stop reason: HOSPADM

## 2022-08-08 RX ADMIN — OXYCODONE AND ACETAMINOPHEN 1 TABLET: 10; 325 TABLET ORAL at 15:41

## 2022-08-08 RX ADMIN — CEFEPIME 2000 MG: 2 INJECTION, POWDER, FOR SOLUTION INTRAVENOUS at 15:44

## 2022-08-08 RX ADMIN — GABAPENTIN 600 MG: 600 TABLET, FILM COATED ORAL at 09:19

## 2022-08-08 RX ADMIN — CEFEPIME 2000 MG: 2 INJECTION, POWDER, FOR SOLUTION INTRAVENOUS at 23:28

## 2022-08-08 RX ADMIN — TIZANIDINE 4 MG: 4 TABLET ORAL at 20:03

## 2022-08-08 RX ADMIN — OXYCODONE AND ACETAMINOPHEN 1 TABLET: 10; 325 TABLET ORAL at 20:03

## 2022-08-08 RX ADMIN — OXYCODONE AND ACETAMINOPHEN 1 TABLET: 10; 325 TABLET ORAL at 03:09

## 2022-08-08 RX ADMIN — ALPRAZOLAM 1 MG: 0.5 TABLET ORAL at 17:56

## 2022-08-08 RX ADMIN — GABAPENTIN 600 MG: 600 TABLET, FILM COATED ORAL at 22:14

## 2022-08-08 RX ADMIN — GUAIFENESIN 600 MG: 600 TABLET, EXTENDED RELEASE ORAL at 22:13

## 2022-08-08 RX ADMIN — SUCRALFATE 1 G: 1 TABLET ORAL at 17:56

## 2022-08-08 RX ADMIN — APIXABAN 10 MG: 5 TABLET, FILM COATED ORAL at 22:13

## 2022-08-08 RX ADMIN — IPRATROPIUM BROMIDE AND ALBUTEROL SULFATE 3 ML: 2.5; .5 SOLUTION RESPIRATORY (INHALATION) at 10:47

## 2022-08-08 RX ADMIN — OXYCODONE AND ACETAMINOPHEN 1 TABLET: 10; 325 TABLET ORAL at 09:19

## 2022-08-08 RX ADMIN — ENOXAPARIN SODIUM 50 MG: 100 INJECTION SUBCUTANEOUS at 09:20

## 2022-08-08 RX ADMIN — Medication 1000 MG: at 03:08

## 2022-08-08 RX ADMIN — PANTOPRAZOLE SODIUM 40 MG: 40 TABLET, DELAYED RELEASE ORAL at 05:29

## 2022-08-08 RX ADMIN — CEFEPIME 2000 MG: 2 INJECTION, POWDER, FOR SOLUTION INTRAVENOUS at 09:25

## 2022-08-08 RX ADMIN — ALPRAZOLAM 1 MG: 0.5 TABLET ORAL at 22:13

## 2022-08-08 RX ADMIN — IPRATROPIUM BROMIDE AND ALBUTEROL SULFATE 3 ML: 2.5; .5 SOLUTION RESPIRATORY (INHALATION) at 01:47

## 2022-08-08 RX ADMIN — Medication 1000 MG: at 14:48

## 2022-08-08 RX ADMIN — ALPRAZOLAM 1 MG: 0.5 TABLET ORAL at 05:28

## 2022-08-08 RX ADMIN — IPRATROPIUM BROMIDE AND ALBUTEROL SULFATE 3 ML: 2.5; .5 SOLUTION RESPIRATORY (INHALATION) at 06:41

## 2022-08-08 RX ADMIN — SUCRALFATE 1 G: 1 TABLET ORAL at 09:19

## 2022-08-08 RX ADMIN — CITALOPRAM HYDROBROMIDE 40 MG: 20 TABLET ORAL at 09:19

## 2022-08-08 RX ADMIN — SUCRALFATE 1 G: 1 TABLET ORAL at 22:13

## 2022-08-08 RX ADMIN — PANTOPRAZOLE SODIUM 40 MG: 40 TABLET, DELAYED RELEASE ORAL at 15:41

## 2022-08-08 RX ADMIN — GUAIFENESIN 100 MG: 100 SOLUTION ORAL at 03:09

## 2022-08-08 RX ADMIN — IPRATROPIUM BROMIDE AND ALBUTEROL SULFATE 3 ML: 2.5; .5 SOLUTION RESPIRATORY (INHALATION) at 14:25

## 2022-08-08 RX ADMIN — IPRATROPIUM BROMIDE AND ALBUTEROL SULFATE 3 ML: 2.5; .5 SOLUTION RESPIRATORY (INHALATION) at 18:22

## 2022-08-08 RX ADMIN — GABAPENTIN 600 MG: 600 TABLET, FILM COATED ORAL at 15:42

## 2022-08-08 RX ADMIN — IPRATROPIUM BROMIDE AND ALBUTEROL SULFATE 3 ML: 2.5; .5 SOLUTION RESPIRATORY (INHALATION) at 22:04

## 2022-08-08 RX ADMIN — ALPRAZOLAM 1 MG: 0.5 TABLET ORAL at 12:14

## 2022-08-08 RX ADMIN — GUAIFENESIN 600 MG: 600 TABLET, EXTENDED RELEASE ORAL at 09:20

## 2022-08-08 ASSESSMENT — PAIN DESCRIPTION - DESCRIPTORS
DESCRIPTORS: ACHING;DISCOMFORT
DESCRIPTORS: DISCOMFORT
DESCRIPTORS: ACHING

## 2022-08-08 ASSESSMENT — PAIN SCALES - GENERAL
PAINLEVEL_OUTOF10: 7
PAINLEVEL_OUTOF10: 4

## 2022-08-08 ASSESSMENT — PAIN DESCRIPTION - LOCATION
LOCATION: BACK
LOCATION: ABDOMEN;BACK
LOCATION: ABDOMEN;BACK

## 2022-08-08 ASSESSMENT — PAIN DESCRIPTION - ORIENTATION
ORIENTATION: LOWER

## 2022-08-08 ASSESSMENT — PAIN DESCRIPTION - FREQUENCY: FREQUENCY: CONTINUOUS

## 2022-08-08 ASSESSMENT — PAIN DESCRIPTION - PAIN TYPE: TYPE: ACUTE PAIN

## 2022-08-08 ASSESSMENT — PAIN DESCRIPTION - ONSET: ONSET: ON-GOING

## 2022-08-08 NOTE — PROGRESS NOTES
Physical Therapy  Name: Malena Roberts  MRN:  [id number]  Date of service:  8/8/2022 08/08/22 1542   Restrictions/Precautions   Restrictions/Precautions Fall Risk;Contact Precautions   Position Activity Restriction   Other position/activity restrictions c diff rule out, MRSA, rhinovirus   General   Chart Reviewed Yes   Subjective   Subjective Pt requires much encouragement to participate. Pain Assessment   Pain Assessment 0-10   Pain Level 7   Pain Location Abdomen;Back   Pain Orientation Lower   Pain Descriptors Aching   Functional Pain Assessment Activities are not prevented   Oxygen Therapy   O2 Device Nasal cannula   O2 Flow Rate (L/min) 4 L/min   Bed Mobility   Supine to Sit Independent   Sit to Supine Independent   Transfers   Sit to Stand Stand by assistance   Stand to sit Stand by assistance   Comment pt stood from bedside and from toilet, able to perform pericare with SBA   Ambulation   Surface level tile   Device No Device   Assistance Stand by assistance   Quality of Gait steady   Gait Deviations Slow Rosa;Decreased step length;Decreased step height   Distance 15', 10'   Short Term Goals   Time Frame for Short term goals 2 wks   Short term goal 1 amb. 48' with or without RW SBA   Short term goal 2 sit to stand indep   Conditions Requiring Skilled Therapeutic Intervention   Body Structures, Functions, Activity Limitations Requiring Skilled Therapeutic Intervention Decreased functional mobility ; Decreased ADL status; Decreased sensation;Decreased endurance;Decreased safe awareness;Decreased strength;Decreased balance; Increased pain   Assessment Assisted pt to/from the BR, pt did well with no AD, steady overall. Assisted with gown change and BTB.    Activity Tolerance   Activity Tolerance Patient limited by endurance   PT Plan of Care   Monday X   Safety Devices   Type of Devices Bed alarm in place;Call light within reach;Gait belt;Left in bed         Electronically signed by Antwon Bennett PTA on 8/8/2022 at 3:47 PM

## 2022-08-08 NOTE — PROGRESS NOTES
Progress Note  Karen Abdalla [place]  8/8/2022 6:48 PM  Subjective:   Admit Date:   8/3/2022      CC/ADMIT DX:       Interval History:   Reviewed overnight events and nursing notes. She has c/o SOA. Her SOA is stable to improved. I have reviewed all labs/diagnostics from the last 24hrs. ROS:   I have done a 10 point ROS and all are negative, except what is mentioned in the HPI. ADULT DIET; Regular  ADULT ORAL NUTRITION SUPPLEMENT; Lunch, Dinner, Breakfast; Standard High Calorie/High Protein Oral Supplement    Medications:      sodium chloride      sodium chloride 75 mL/hr at 08/07/22 1323      nicotine  1 patch TransDERmal Daily    guaiFENesin  600 mg Oral BID    vancomycin  1,000 mg IntraVENous Q12H    enoxaparin  1 mg/kg SubCUTAneous BID    ALPRAZolam  1 mg Oral 4x Daily    vancomycin (VANCOCIN) intermittent dosing (placeholder)   Other RX Placeholder    sodium chloride flush  5-40 mL IntraVENous 2 times per day    ipratropium-albuterol  1 vial Inhalation Q4H    cefepime  2,000 mg IntraVENous Q8H    citalopram  40 mg Oral Daily    gabapentin  600 mg Oral TID    pantoprazole  40 mg Oral BID AC    sucralfate  1 g Oral 4x Daily           Objective:   Vitals: BP (!) 115/44   Pulse 85   Temp 98.6 °F (37 °C) (Temporal)   Resp 18   Ht 5' 6\" (1.676 m)   Wt 117 lb 8 oz (53.3 kg)   SpO2 90%   BMI 18.96 kg/m²    Intake/Output Summary (Last 24 hours) at 8/8/2022 1848  Last data filed at 8/8/2022 1356  Gross per 24 hour   Intake 2701 ml   Output 2300 ml   Net 401 ml     General appearance: alert and cooperative with exam  Lungs: coarse  Heart: RRR  Abdomen: soft, non-tender; bowel sounds normal; no masses,  no organomegaly  Extremities: extremities normal, atraumatic, no cyanosis or edema  Neurologic:  negative, No obvious focal neurologic deficits. Assessment and Plan:   Principal Problem:    Pneumonia  Resolved Problems:    * No resolved hospital problems.  *      Plan:   Continue present medication(s) Follow with Pulm   Start Eliquis    Continue antibiotics   Supportive care      Discharge planning:   her home     Reviewed treatment plans with the patient and/or family.              Electronically signed by Eron Tinoco MD on 8/8/2022 at 6:48 PM

## 2022-08-09 PROCEDURE — 2700000000 HC OXYGEN THERAPY PER DAY

## 2022-08-09 PROCEDURE — 94640 AIRWAY INHALATION TREATMENT: CPT

## 2022-08-09 PROCEDURE — 6370000000 HC RX 637 (ALT 250 FOR IP): Performed by: FAMILY MEDICINE

## 2022-08-09 PROCEDURE — 2580000003 HC RX 258: Performed by: EMERGENCY MEDICINE

## 2022-08-09 PROCEDURE — 1210000000 HC MED SURG R&B

## 2022-08-09 PROCEDURE — 6360000002 HC RX W HCPCS: Performed by: FAMILY MEDICINE

## 2022-08-09 PROCEDURE — 6360000002 HC RX W HCPCS: Performed by: EMERGENCY MEDICINE

## 2022-08-09 RX ORDER — TIZANIDINE 4 MG/1
4 TABLET ORAL EVERY 6 HOURS PRN
Status: DISCONTINUED | OUTPATIENT
Start: 2022-08-09 | End: 2022-08-11

## 2022-08-09 RX ADMIN — CEFEPIME 2000 MG: 2 INJECTION, POWDER, FOR SOLUTION INTRAVENOUS at 16:52

## 2022-08-09 RX ADMIN — IPRATROPIUM BROMIDE AND ALBUTEROL SULFATE 3 ML: 2.5; .5 SOLUTION RESPIRATORY (INHALATION) at 22:18

## 2022-08-09 RX ADMIN — SUCRALFATE 1 G: 1 TABLET ORAL at 10:46

## 2022-08-09 RX ADMIN — GABAPENTIN 600 MG: 600 TABLET, FILM COATED ORAL at 14:39

## 2022-08-09 RX ADMIN — IPRATROPIUM BROMIDE AND ALBUTEROL SULFATE 3 ML: 2.5; .5 SOLUTION RESPIRATORY (INHALATION) at 01:43

## 2022-08-09 RX ADMIN — TIZANIDINE 4 MG: 4 TABLET ORAL at 21:49

## 2022-08-09 RX ADMIN — PANTOPRAZOLE SODIUM 40 MG: 40 TABLET, DELAYED RELEASE ORAL at 16:44

## 2022-08-09 RX ADMIN — ONDANSETRON HYDROCHLORIDE 4 MG: 2 SOLUTION INTRAMUSCULAR; INTRAVENOUS at 16:49

## 2022-08-09 RX ADMIN — IPRATROPIUM BROMIDE AND ALBUTEROL SULFATE 3 ML: 2.5; .5 SOLUTION RESPIRATORY (INHALATION) at 14:03

## 2022-08-09 RX ADMIN — ALPRAZOLAM 1 MG: 0.5 TABLET ORAL at 10:45

## 2022-08-09 RX ADMIN — SUCRALFATE 1 G: 1 TABLET ORAL at 21:18

## 2022-08-09 RX ADMIN — TIZANIDINE 4 MG: 4 TABLET ORAL at 13:36

## 2022-08-09 RX ADMIN — OXYCODONE AND ACETAMINOPHEN 1 TABLET: 10; 325 TABLET ORAL at 06:46

## 2022-08-09 RX ADMIN — IPRATROPIUM BROMIDE AND ALBUTEROL SULFATE 3 ML: 2.5; .5 SOLUTION RESPIRATORY (INHALATION) at 10:07

## 2022-08-09 RX ADMIN — CEFEPIME 2000 MG: 2 INJECTION, POWDER, FOR SOLUTION INTRAVENOUS at 21:50

## 2022-08-09 RX ADMIN — TIZANIDINE 4 MG: 4 TABLET ORAL at 04:00

## 2022-08-09 RX ADMIN — GUAIFENESIN 100 MG: 100 SOLUTION ORAL at 05:06

## 2022-08-09 RX ADMIN — Medication 1000 MG: at 16:54

## 2022-08-09 RX ADMIN — SUCRALFATE 1 G: 1 TABLET ORAL at 16:44

## 2022-08-09 RX ADMIN — OXYCODONE AND ACETAMINOPHEN 1 TABLET: 10; 325 TABLET ORAL at 10:01

## 2022-08-09 RX ADMIN — IPRATROPIUM BROMIDE AND ALBUTEROL SULFATE 3 ML: 2.5; .5 SOLUTION RESPIRATORY (INHALATION) at 05:55

## 2022-08-09 RX ADMIN — OXYCODONE AND ACETAMINOPHEN 1 TABLET: 10; 325 TABLET ORAL at 18:39

## 2022-08-09 RX ADMIN — ALPRAZOLAM 1 MG: 0.5 TABLET ORAL at 16:45

## 2022-08-09 RX ADMIN — IPRATROPIUM BROMIDE AND ALBUTEROL SULFATE 3 ML: 2.5; .5 SOLUTION RESPIRATORY (INHALATION) at 17:47

## 2022-08-09 RX ADMIN — GABAPENTIN 600 MG: 600 TABLET, FILM COATED ORAL at 10:46

## 2022-08-09 RX ADMIN — OXYCODONE AND ACETAMINOPHEN 1 TABLET: 10; 325 TABLET ORAL at 01:56

## 2022-08-09 RX ADMIN — GABAPENTIN 600 MG: 600 TABLET, FILM COATED ORAL at 21:18

## 2022-08-09 RX ADMIN — CEFEPIME 2000 MG: 2 INJECTION, POWDER, FOR SOLUTION INTRAVENOUS at 10:30

## 2022-08-09 RX ADMIN — CITALOPRAM HYDROBROMIDE 40 MG: 20 TABLET ORAL at 10:46

## 2022-08-09 RX ADMIN — APIXABAN 10 MG: 5 TABLET, FILM COATED ORAL at 10:46

## 2022-08-09 RX ADMIN — APIXABAN 10 MG: 5 TABLET, FILM COATED ORAL at 21:17

## 2022-08-09 RX ADMIN — Medication 1000 MG: at 05:04

## 2022-08-09 RX ADMIN — PANTOPRAZOLE SODIUM 40 MG: 40 TABLET, DELAYED RELEASE ORAL at 05:04

## 2022-08-09 RX ADMIN — GUAIFENESIN 100 MG: 100 SOLUTION ORAL at 18:43

## 2022-08-09 RX ADMIN — OXYCODONE AND ACETAMINOPHEN 1 TABLET: 10; 325 TABLET ORAL at 14:39

## 2022-08-09 ASSESSMENT — PAIN - FUNCTIONAL ASSESSMENT: PAIN_FUNCTIONAL_ASSESSMENT: PREVENTS OR INTERFERES SOME ACTIVE ACTIVITIES AND ADLS

## 2022-08-09 ASSESSMENT — PAIN DESCRIPTION - ONSET: ONSET: ON-GOING

## 2022-08-09 ASSESSMENT — PAIN DESCRIPTION - PAIN TYPE: TYPE: ACUTE PAIN

## 2022-08-09 ASSESSMENT — PAIN SCALES - GENERAL
PAINLEVEL_OUTOF10: 8
PAINLEVEL_OUTOF10: 10
PAINLEVEL_OUTOF10: 0
PAINLEVEL_OUTOF10: 8
PAINLEVEL_OUTOF10: 9

## 2022-08-09 ASSESSMENT — PAIN DESCRIPTION - LOCATION: LOCATION: ABDOMEN;BACK

## 2022-08-09 ASSESSMENT — PAIN DESCRIPTION - FREQUENCY: FREQUENCY: CONTINUOUS

## 2022-08-09 ASSESSMENT — PAIN DESCRIPTION - ORIENTATION: ORIENTATION: LOWER

## 2022-08-09 NOTE — PROGRESS NOTES
Occupational Therapy  Pt states she is in increased pain this morning and refusing therapy at this time. Will continue to follow.  Electronically signed by BERTHA Sapp on 8/9/2022 at 12:54 PM

## 2022-08-09 NOTE — PROGRESS NOTES
New consult placed for this pt. Pt is already followed by palliative care this admission. PC  visited with her today.   See her note on goal.    Electronically signed by Wendy Randhawa RN on 8/9/2022 at 2:10 PM

## 2022-08-09 NOTE — PROGRESS NOTES
This  visited with pt to provide spiritual care and discuss goals of care. Pt says she thinks the pneumonia is better. Pt also says her goal is to return to her daughters house. She previously informed this  she needs a walker at home and her [address] was informed. This  provided spiritual care with sustaining presence, support, and prayer. Pt expressed gratitude for spiritual care. Palliative care to continue to follow to provide support and continue goals of care discussion.      Electronically signed by Jonnathan Maldonado on 8/9/2022 at 2:32 PM

## 2022-08-09 NOTE — PROGRESS NOTES
Progress Note  Tiarra Farrell  8/9/2022 12:51 PM  Subjective:   Admit Date:   8/3/2022      CC/ADMIT DX:       Interval History:   Reviewed overnight events and nursing notes. She has no new issues. I have reviewed all labs/diagnostics from the last 24hrs. ROS:   I have done a 10 point ROS and all are negative, except what is mentioned in the HPI. ADULT DIET; Regular  ADULT ORAL NUTRITION SUPPLEMENT; Lunch, Dinner, Breakfast; Standard High Calorie/High Protein Oral Supplement    Medications:      sodium chloride      sodium chloride 75 mL/hr at 08/07/22 1323      nicotine  1 patch TransDERmal Daily    apixaban  10 mg Oral BID    Followed by    Rebecamichelle Holmine ON 8/15/2022] apixaban  5 mg Oral BID    guaiFENesin  600 mg Oral BID    vancomycin  1,000 mg IntraVENous Q12H    ALPRAZolam  1 mg Oral 4x Daily    vancomycin (VANCOCIN) intermittent dosing (placeholder)   Other RX Placeholder    sodium chloride flush  5-40 mL IntraVENous 2 times per day    ipratropium-albuterol  1 vial Inhalation Q4H    cefepime  2,000 mg IntraVENous Q8H    citalopram  40 mg Oral Daily    gabapentin  600 mg Oral TID    pantoprazole  40 mg Oral BID AC    sucralfate  1 g Oral 4x Daily           Objective:   Vitals: /69   Pulse 81   Temp 98.8 °F (37.1 °C) (Temporal)   Resp 18   Ht 5' 6\" (1.676 m)   Wt 117 lb 8 oz (53.3 kg)   SpO2 93%   BMI 18.96 kg/m²    Intake/Output Summary (Last 24 hours) at 8/9/2022 1251  Last data filed at 8/9/2022 1001  Gross per 24 hour   Intake 2030 ml   Output --   Net 2030 ml     General appearance: alert and cooperative with exam  Lungs: coarse  Heart: RRR  Abdomen: soft, non-tender; bowel sounds normal; no masses,  no organomegaly  Extremities: extremities normal, atraumatic, no cyanosis or edema  Neurologic:  negative, No obvious focal neurologic deficits. Assessment and Plan:   Principal Problem:    Pneumonia  Resolved Problems:    * No resolved hospital problems.  *      Plan:   Continue present medication(s)    Follow with Pulm   Continue Eliquis for 3 mths   Continue antibiotics   Supportive care      Discharge planning:   her home     Reviewed treatment plans with the patient and/or family.              Electronically signed by Donald Patterson MD on 8/9/2022 at 12:51 PM

## 2022-08-09 NOTE — PROGRESS NOTES
Physical Therapy  Name: Fortino Hardwick  MRN:  [id number]  Date of service:  8/9/2022 08/09/22 1323   Subjective   Subjective Attempt: Pt adamently refusing to work with therapy.          Electronically signed by Brett Dorantes PTA on 8/9/2022 at 1:23 PM

## 2022-08-10 ENCOUNTER — APPOINTMENT (OUTPATIENT)
Dept: CT IMAGING | Age: 72
DRG: 853 | End: 2022-08-10
Payer: MEDICARE

## 2022-08-10 LAB — VANCOMYCIN TROUGH: 21.2 UG/ML (ref 10–20)

## 2022-08-10 PROCEDURE — 6370000000 HC RX 637 (ALT 250 FOR IP): Performed by: FAMILY MEDICINE

## 2022-08-10 PROCEDURE — 2580000003 HC RX 258: Performed by: EMERGENCY MEDICINE

## 2022-08-10 PROCEDURE — 6360000002 HC RX W HCPCS: Performed by: EMERGENCY MEDICINE

## 2022-08-10 PROCEDURE — 80202 ASSAY OF VANCOMYCIN: CPT

## 2022-08-10 PROCEDURE — 2700000000 HC OXYGEN THERAPY PER DAY

## 2022-08-10 PROCEDURE — 99233 SBSQ HOSP IP/OBS HIGH 50: CPT | Performed by: INTERNAL MEDICINE

## 2022-08-10 PROCEDURE — 94640 AIRWAY INHALATION TREATMENT: CPT

## 2022-08-10 PROCEDURE — 71250 CT THORAX DX C-: CPT | Performed by: RADIOLOGY

## 2022-08-10 PROCEDURE — 97116 GAIT TRAINING THERAPY: CPT

## 2022-08-10 PROCEDURE — 71250 CT THORAX DX C-: CPT

## 2022-08-10 PROCEDURE — 1210000000 HC MED SURG R&B

## 2022-08-10 PROCEDURE — 36415 COLL VENOUS BLD VENIPUNCTURE: CPT

## 2022-08-10 PROCEDURE — 97535 SELF CARE MNGMENT TRAINING: CPT

## 2022-08-10 RX ADMIN — SUCRALFATE 1 G: 1 TABLET ORAL at 11:31

## 2022-08-10 RX ADMIN — OXYCODONE AND ACETAMINOPHEN 1 TABLET: 10; 325 TABLET ORAL at 06:05

## 2022-08-10 RX ADMIN — IPRATROPIUM BROMIDE AND ALBUTEROL SULFATE 3 ML: 2.5; .5 SOLUTION RESPIRATORY (INHALATION) at 02:10

## 2022-08-10 RX ADMIN — OXYCODONE AND ACETAMINOPHEN 1 TABLET: 10; 325 TABLET ORAL at 16:18

## 2022-08-10 RX ADMIN — GUAIFENESIN 100 MG: 100 SOLUTION ORAL at 01:00

## 2022-08-10 RX ADMIN — GUAIFENESIN 100 MG: 100 SOLUTION ORAL at 13:33

## 2022-08-10 RX ADMIN — ALPRAZOLAM 1 MG: 0.5 TABLET ORAL at 20:41

## 2022-08-10 RX ADMIN — TIZANIDINE 4 MG: 4 TABLET ORAL at 03:45

## 2022-08-10 RX ADMIN — APIXABAN 10 MG: 5 TABLET, FILM COATED ORAL at 20:41

## 2022-08-10 RX ADMIN — ALPRAZOLAM 1 MG: 0.5 TABLET ORAL at 11:31

## 2022-08-10 RX ADMIN — GABAPENTIN 600 MG: 600 TABLET, FILM COATED ORAL at 20:41

## 2022-08-10 RX ADMIN — TIZANIDINE 4 MG: 4 TABLET ORAL at 20:41

## 2022-08-10 RX ADMIN — ALPRAZOLAM 1 MG: 0.5 TABLET ORAL at 02:23

## 2022-08-10 RX ADMIN — GABAPENTIN 600 MG: 600 TABLET, FILM COATED ORAL at 07:29

## 2022-08-10 RX ADMIN — IPRATROPIUM BROMIDE AND ALBUTEROL SULFATE 3 ML: 2.5; .5 SOLUTION RESPIRATORY (INHALATION) at 18:04

## 2022-08-10 RX ADMIN — CEFEPIME 2000 MG: 2 INJECTION, POWDER, FOR SOLUTION INTRAVENOUS at 07:32

## 2022-08-10 RX ADMIN — PANTOPRAZOLE SODIUM 40 MG: 40 TABLET, DELAYED RELEASE ORAL at 07:30

## 2022-08-10 RX ADMIN — SUCRALFATE 1 G: 1 TABLET ORAL at 20:41

## 2022-08-10 RX ADMIN — CEFEPIME 2000 MG: 2 INJECTION, POWDER, FOR SOLUTION INTRAVENOUS at 18:36

## 2022-08-10 RX ADMIN — IPRATROPIUM BROMIDE AND ALBUTEROL SULFATE 3 ML: 2.5; .5 SOLUTION RESPIRATORY (INHALATION) at 14:10

## 2022-08-10 RX ADMIN — IPRATROPIUM BROMIDE AND ALBUTEROL SULFATE 3 ML: 2.5; .5 SOLUTION RESPIRATORY (INHALATION) at 23:00

## 2022-08-10 RX ADMIN — CITALOPRAM HYDROBROMIDE 40 MG: 20 TABLET ORAL at 07:29

## 2022-08-10 RX ADMIN — APIXABAN 10 MG: 5 TABLET, FILM COATED ORAL at 07:29

## 2022-08-10 RX ADMIN — ALPRAZOLAM 1 MG: 0.5 TABLET ORAL at 16:11

## 2022-08-10 RX ADMIN — IPRATROPIUM BROMIDE AND ALBUTEROL SULFATE 3 ML: 2.5; .5 SOLUTION RESPIRATORY (INHALATION) at 10:00

## 2022-08-10 RX ADMIN — GUAIFENESIN 100 MG: 100 SOLUTION ORAL at 17:34

## 2022-08-10 RX ADMIN — ALPRAZOLAM 1 MG: 0.5 TABLET ORAL at 07:29

## 2022-08-10 RX ADMIN — OXYCODONE AND ACETAMINOPHEN 1 TABLET: 10; 325 TABLET ORAL at 11:32

## 2022-08-10 RX ADMIN — PANTOPRAZOLE SODIUM 40 MG: 40 TABLET, DELAYED RELEASE ORAL at 16:12

## 2022-08-10 RX ADMIN — ONDANSETRON 4 MG: 4 TABLET, ORALLY DISINTEGRATING ORAL at 12:56

## 2022-08-10 RX ADMIN — Medication 1000 MG: at 17:53

## 2022-08-10 RX ADMIN — OXYCODONE AND ACETAMINOPHEN 1 TABLET: 10; 325 TABLET ORAL at 21:40

## 2022-08-10 RX ADMIN — SUCRALFATE 1 G: 1 TABLET ORAL at 16:12

## 2022-08-10 RX ADMIN — GABAPENTIN 600 MG: 600 TABLET, FILM COATED ORAL at 13:48

## 2022-08-10 RX ADMIN — OXYCODONE AND ACETAMINOPHEN 1 TABLET: 10; 325 TABLET ORAL at 02:07

## 2022-08-10 RX ADMIN — SUCRALFATE 1 G: 1 TABLET ORAL at 07:30

## 2022-08-10 RX ADMIN — IPRATROPIUM BROMIDE AND ALBUTEROL SULFATE 3 ML: 2.5; .5 SOLUTION RESPIRATORY (INHALATION) at 06:51

## 2022-08-10 RX ADMIN — Medication 1000 MG: at 05:02

## 2022-08-10 ASSESSMENT — PAIN DESCRIPTION - DESCRIPTORS
DESCRIPTORS: STABBING
DESCRIPTORS: ACHING
DESCRIPTORS: ACHING
DESCRIPTORS: ACHING;DISCOMFORT
DESCRIPTORS: ACHING;SORE;TENDER

## 2022-08-10 ASSESSMENT — PAIN DESCRIPTION - LOCATION
LOCATION: CHEST
LOCATION: RIB CAGE
LOCATION: CHEST;ABDOMEN

## 2022-08-10 ASSESSMENT — PAIN SCALES - GENERAL
PAINLEVEL_OUTOF10: 9
PAINLEVEL_OUTOF10: 6
PAINLEVEL_OUTOF10: 10
PAINLEVEL_OUTOF10: 9
PAINLEVEL_OUTOF10: 8
PAINLEVEL_OUTOF10: 10
PAINLEVEL_OUTOF10: 7

## 2022-08-10 ASSESSMENT — PAIN - FUNCTIONAL ASSESSMENT
PAIN_FUNCTIONAL_ASSESSMENT: ACTIVITIES ARE NOT PREVENTED
PAIN_FUNCTIONAL_ASSESSMENT: PREVENTS OR INTERFERES SOME ACTIVE ACTIVITIES AND ADLS
PAIN_FUNCTIONAL_ASSESSMENT: ACTIVITIES ARE NOT PREVENTED
PAIN_FUNCTIONAL_ASSESSMENT: ACTIVITIES ARE NOT PREVENTED

## 2022-08-10 ASSESSMENT — PAIN DESCRIPTION - PAIN TYPE: TYPE: ACUTE PAIN

## 2022-08-10 ASSESSMENT — PAIN DESCRIPTION - FREQUENCY: FREQUENCY: CONTINUOUS

## 2022-08-10 ASSESSMENT — PAIN DESCRIPTION - ORIENTATION
ORIENTATION: RIGHT

## 2022-08-10 ASSESSMENT — PAIN DESCRIPTION - ONSET: ONSET: ON-GOING

## 2022-08-10 NOTE — PROGRESS NOTES
[address] [facility] Pharmacokinetic Monitoring Service - Vancomycin    Consulting Provider: Dr. Dain Deras   Indication: HAP  Target Concentration: Goal AUC/LETTY 400-600 mg*hr/L  Day of Therapy: 8  Additional Antimicrobials: Cefepime    Pertinent Laboratory Values: Wt Readings from Last 1 Encounters:   08/03/22 117 lb 8 oz (53.3 kg)     Temp Readings from Last 1 Encounters:   08/10/22 98.2 °F (36.8 °C) (Oral)     Estimated Creatinine Clearance: 104 mL/min (A) (based on SCr of 0.4 mg/dL (L)). No results for input(s): CREATININE, WBC in the last 72 hours.     Invalid input(s):  BUN  Procalcitonin: No level    Pertinent Cultures:  Culture Date Source Results   08/03/22 Blood No growth   MRSA Nasal Swab: showed MRSA positive result on 08/04/22         Recent vancomycin administrations                     vancomycin (VANCOCIN) 1000 mg in dextrose 5% 250 mL IVPB (mg) 1,000 mg New Bag 08/10/22 0502     1,000 mg New Bag 08/09/22 1654     1,000 mg New Bag  0504     1,000 mg New Bag 08/08/22 1448     1,000 mg New Bag  0308                    Assessment:  Date/Time Current Dose Concentration Timing of Concentration (h) AUC   Rfoylan@[employer] 1000 mg IV q12hr 21.2 10 h 37m 582   Note: Serum concentrations collected for AUC dosing may appear elevated if collected in close proximity to the dose administered, this is not necessarily an indication of toxicity    Plan:  Current dosing regimen is therapeutic  Continue current dose  Repeat vancomycin concentration ordered for 08/13 @ [address] [facility] [place] will continue to monitor patient and adjust therapy as indicated    Thank you for the consult,  Franck Benjamin, [address] [facility]  8/10/2022 5:32 PM

## 2022-08-10 NOTE — PROGRESS NOTES
Occupational Therapy  Facility/Department: [facility] 5 SURG SERVICES  Daily Treatment Note  NAME: Elizabeth Wu  : 1950  MRN: [id number]    Date of Service: 8/10/2022    Discharge Recommendations:  Home with assist PRN       Assessment   Assessment: Pt able to complete LE dressing with Supervision. Pt performed toilet transfer with SBA and dem I with toileting. Pt tolerated tx well and continues to benefit from skilled OT services. Treatment Diagnosis: Pneumonia, abdominal pain  Prognosis: Good  Activity Tolerance  Activity Tolerance: Patient Tolerated treatment well  Activity Tolerance Comments: . Patient Diagnosis(es): The encounter diagnosis was Pneumonia of right lung due to infectious organism, unspecified part of lung.      has a past medical history of ADHD (attention deficit hyperactivity disorder), Anxiety, COPD (chronic obstructive pulmonary disease) ([facility] [address] Depression, Fibromyalgia, GERD (gastroesophageal reflux disease), Hypertension, Malignant neoplasm of upper lobe of right lung ([facility] [address] Palliative care patient, and RA (rheumatoid arthritis) ([facility] [address] has a past surgical history that includes Hysterectomy; hernia repair; Leg Surgery; and Cholecystectomy.     Restrictions  Restrictions/Precautions  Restrictions/Precautions: Fall Risk, Contact Precautions  Position Activity Restriction  Other position/activity restrictions: c diff rule out, MRSA, rhinovirus  Subjective   General  Chart Reviewed: Yes  Patient assessed for rehabilitation services?: Yes  Response to previous treatment: Patient with no complaints from previous session  Family / Caregiver Present: No  Pre Treatment Pain Screening  Pain at present: 6  Scale Used: Numeric Score  Intervention List: Patient able to continue with treatment   Orientation     Objective    ADL  LE Dressing: Stand by assistance  Toileting: Stand by assistance                                                                          Plan   Plan  Times per Week: 3-5  Goals  Short Term Goals  Short Term Goal 1: Modified independent with dressing and toileting  Short Term Goal 2: Modified independent with transfers and standing  Short Term Goal 3: Modified independent with light ambulatory ADL  Short Term Goal 4:  Independent with therapeutic activity recommendations       Therapy Time   Individual Concurrent Group Co-treatment   Time In           Time Out           Minutes              BERTHA Stauffer  Electronically signed by BERTHA Stauffer on 8/10/2022 at 3:25 PM

## 2022-08-10 NOTE — PROGRESS NOTES
Palliative f/u visit. Pt resting in bed, on phone with dtr. Pt tells me \"I feel better but, not great\". She is waiting for MD to round and let her know what the plan is. Palliative will continue to follow.     Electronically signed by Cj Puentes RN on 8/10/2022 at 1:32 PM

## 2022-08-10 NOTE — PROGRESS NOTES
Physical Therapy  Name: Rick Iraheta  MRN:  [id number]  Date of service:  8/10/2022     08/10/22 1541   Restrictions/Precautions   Restrictions/Precautions Fall Risk;Contact Precautions   Position Activity Restriction   Other position/activity restrictions c diff rule out, MRSA, rhinovirus   General   Chart Reviewed Yes   Subjective   Subjective Pt requires encouragement to participate. Oxygen Therapy   O2 Device Nasal cannula   O2 Flow Rate (L/min) 3 L/min   Bed Mobility   Supine to Sit Independent   Sit to Supine Independent   Transfers   Sit to Stand Stand by assistance   Stand to sit Stand by assistance   Ambulation   Surface level tile   Device No Device   Assistance Stand by assistance   Quality of Gait steady   Gait Deviations Slow Rosa;Decreased step length;Decreased step height   Distance 40', 10'   Short Term Goals   Time Frame for Short term goals 2 wks   Short term goal 1 amb. 48' with or without RW SBA   Short term goal 2 sit to stand indep   Conditions Requiring Skilled Therapeutic Intervention   Body Structures, Functions, Activity Limitations Requiring Skilled Therapeutic Intervention Decreased functional mobility ; Decreased ADL status; Decreased sensation;Decreased endurance;Decreased safe awareness;Decreased strength;Decreased balance; Increased pain   Assessment Pt doing well with ambulation without AD. Plan to see 1-2 more times to assess for safety.    Activity Tolerance   Activity Tolerance Patient limited by endurance   PT Plan of Care   Wednesday X   Safety Devices   Type of Devices Bed alarm in place;Call light within reach;Gait belt;Left in bed         Electronically signed by Nataliya Luna PTA on 8/10/2022 at 3:49 PM

## 2022-08-10 NOTE — PROGRESS NOTES
Pulmonary and Critical Care Progress note. Susanne Caal    MRN# [id number]    Acct# [de-identified]  8/10/2022   11:32 AM CDT    Referring Mary Dixon MD      Chief Complaint: shortness of breath abd pain. HPI: Continues to have some pleurisy on the right. Some blood tinged sputum. Medications    cefepime, 2,000 mg, IntraVENous, Q8H    nicotine, 1 patch, TransDERmal, Daily    apixaban, 10 mg, Oral, BID **FOLLOWED BY** [START ON 8/15/2022] apixaban, 5 mg, Oral, BID    vancomycin, 1,000 mg, IntraVENous, Q12H    ALPRAZolam, 1 mg, Oral, 4x Daily    vancomycin (VANCOCIN) intermittent dosing (placeholder), , Other, RX Placeholder    sodium chloride flush, 5-40 mL, IntraVENous, 2 times per day    ipratropium-albuterol, 1 vial, Inhalation, Q4H    citalopram, 40 mg, Oral, Daily    gabapentin, 600 mg, Oral, TID    pantoprazole, 40 mg, Oral, BID AC    sucralfate, 1 g, Oral, 4x Daily     Review of Systems:  RESPIRATORY:  positive for  dyspnea  CARDIOVASCULAR:  positive for  dyspnea      Physical Exam:  BP (!) 99/52   Pulse 69   Temp 98.4 °F (36.9 °C) (Temporal)   Resp 24   Ht 5' 6\" (1.676 m)   Wt 117 lb 8 oz (53.3 kg)   SpO2 91%   BMI 18.96 kg/m²   Intake/Output Summary (Last 24 hours) at 8/10/2022 1631  Last data filed at 8/10/2022 1200  Gross per 24 hour   Intake 2880 ml   Output --   Net 2880 ml         General appearance:elderly white female who does not appear to be in distress. HEENT:normocephalic atraumatic  EAJQK:L5M4 no murmurs  Lungs:diminished bilaterally no rubs or tenderness or dullness to percussion  Abdomen:Soft non tender no organomegaly  Extremities:non clubbing cyanosis or edema  Neuro:no focal findings  Skin:intact    No results for input(s): WBC, RBC, HGB, HCT, PLT, MCV, MCH, MCHC, RDW, NRBC, SEGSPCT, BANDSPCT in the last 72 hours. No results for input(s): NA, K, CL, CO2, BUN, CREATININE, CALCIUM, GLUCOSE in the last 72 hours.      No results for input(s): PHART, ZKB1YBK, PO2ART, MDK0OYK, E0KOOADD, BEART in the last 72 hours. No results for input(s): AST, ALT, ALKPHOS, BILITOT, MG, CALCIUM, PHOS, PROBNP, TROPONINI, LACTA, INR, TSH, DDIMER, PROCAL in the last 72 hours. No results for input(s): BC, LABGRAM, CULTRESP, BFCX in the last 72 hours. Radiograph: CT ABDOMEN PELVIS WO CONTRAST Additional Contrast? None    Result Date: 8/4/2022  1. No evidence for acute abdominal or pelvic process. 2.Right lower lobe consolidation with small pleural effusion. Superimposed infection is not excluded. 3.Colonic diverticulosis without diverticulitis. 4.Calcific atherosclerosis of the abdominal aorta. 5.Degenerative spondylosis. NM LUNG SCAN PERFUSION ONLY    Result Date: 8/4/2022  1. Small subsegmental defect in the posterior left lower lobe, new since prior, compatible with and intermediate probability for pulmonary embolism. 2. Pulmonary hyperaeration, with decreased perfusion in the lung apices, likely related to COPD. 3. Patchy airspace abnormality in the right mid and lower lung field with small right pleural fluid, likely pneumonia. Please note according to the Piopped criteria a low probability reflects a 0 to 19%chance of a pulmonary embolism. Please correlate clinically. If symptoms persist, consider CT PA gram. RECOMMENDATION: Follow-up as clinically warranted. Electronically Signed by Ivania Dey MD at 04-Aug-2022 11:14:59 AM             XR CHEST PORTABLE    Result Date: 8/3/2022  Right mid and lower lung infiltrates. Small right pleural effusion not excluded. Recommendation: Follow up as clinically indicated. Electronically Signed by Alison Hopkins MD at 03-Aug-2022 08:41:33 AM                My radiograph interpretation/independent review of imaging: reviewed.      Problem list generated by [facility] Problems             Last Modified POA    * (Principal) Pneumonia 8/3/2022 Yes        Pulmonary Assessment/Plan:     Chronic hypoxic hypercapnic respiratory failure continue oxygen supplementation as necessary to maintain adequate oxygenation. Underlying severe COPD. Continue bronchodilators continue pulmonary toilet. Right lower lobe pneumonia. Continue empirical antibiotic therapy. X-ray findings noted and reviewed. Will repeat CT of the chest to better delineate underlying anatomy. It could be that the patient has loculated effusion. In that setting would consider thoracentesis however she would not be a candidate for decortication due to her very poor pulmonary status. Small loculated effusion not a candidate for decortication due to severe COPD. Low probability for PE per PIOPED criteria. The patient's pretest probability for PE was low. The patient's lower extremity scan is negative. Her elevated D-dimer is likely secondary to her pneumonia. We will continue anticoagulation for 3 months. , consider transition to [address] Dyspnea due to the above supportive care. DC planning. Rand Gongora MD, [facility], [facility]    The above note was generated using voice recognition software. Inadvertent typographical errors in transcription may have occurred.       Electronically signed by Rand Gongora MD on 08/10/22 at 4:31 PM

## 2022-08-10 NOTE — PROGRESS NOTES
Comprehensive Nutrition Assessment    Type and Reason for Visit:  Initial, RD Nutrition Re-Screen/LOS    Nutrition Recommendations/Plan:   Continue POC       Nutrition Assessment:    Pt's oral intake of meals is generally good. Pt tolerates a Regular diet. Continue current POC. Current Nutrition Intake & Therapies:    Average Meal Intake: %, 51-75%     ADULT DIET;  Regular  ADULT ORAL NUTRITION SUPPLEMENT; Lunch, Dinner, Breakfast; Standard High Calorie/High Protein Oral Supplement    Anthropometric Measures:  Height: 5' 6\" (167.6 cm)  Ideal Body Weight (IBW): 130 lbs (59 kg)       Current Body Weight: 117 lb (53.1 kg)  Current BMI (kg/m2): 18.9                          BMI Categories: Underweight (BMI less than 22) age over 72        Nutrition Diagnosis:   No nutrition diagnosis at this time     Nutrition Interventions:   Food and/or Nutrient Delivery: Continue Current Diet  Nutrition Education/Counseling: No recommendation at this time  Coordination of Nutrition Care: Continue to monitor while inpatient       Goals:     Goals: Meet at least 75% of estimated needs       Nutrition Monitoring and Evaluation:      Food/Nutrient Intake Outcomes: Food and Nutrient Intake  Physical Signs/Symptoms Outcomes: Biochemical Data, Nutrition Focused Physical Findings, Weight        De Holstein, MS, RD, LD  Contact: [phone]

## 2022-08-10 NOTE — CARE COORDINATION
I called Era Neal regarding patient's out of pocket cost for Eliquis. At this time patient has NO out of pocket cost. She would like for prescription to be sent to [facility] for meds to bed.     Electronically signed by Yvette Yoon RN, BSN on 8/10/2022 at 11:57 AM

## 2022-08-11 PROCEDURE — 6370000000 HC RX 637 (ALT 250 FOR IP): Performed by: FAMILY MEDICINE

## 2022-08-11 PROCEDURE — 2580000003 HC RX 258: Performed by: PHYSICIAN ASSISTANT

## 2022-08-11 PROCEDURE — 2580000003 HC RX 258: Performed by: EMERGENCY MEDICINE

## 2022-08-11 PROCEDURE — 94375 RESPIRATORY FLOW VOLUME LOOP: CPT

## 2022-08-11 PROCEDURE — 6360000002 HC RX W HCPCS: Performed by: EMERGENCY MEDICINE

## 2022-08-11 PROCEDURE — 2700000000 HC OXYGEN THERAPY PER DAY

## 2022-08-11 PROCEDURE — 94640 AIRWAY INHALATION TREATMENT: CPT

## 2022-08-11 PROCEDURE — 2580000003 HC RX 258: Performed by: INTERNAL MEDICINE

## 2022-08-11 PROCEDURE — 99233 SBSQ HOSP IP/OBS HIGH 50: CPT | Performed by: INTERNAL MEDICINE

## 2022-08-11 PROCEDURE — 6360000002 HC RX W HCPCS: Performed by: INTERNAL MEDICINE

## 2022-08-11 PROCEDURE — 1210000000 HC MED SURG R&B

## 2022-08-11 RX ORDER — CYCLOBENZAPRINE HCL 10 MG
5 TABLET ORAL 3 TIMES DAILY PRN
Status: DISCONTINUED | OUTPATIENT
Start: 2022-08-11 | End: 2022-08-13

## 2022-08-11 RX ORDER — SODIUM CHLORIDE 0.9 % (FLUSH) 0.9 %
10 SYRINGE (ML) INJECTION EVERY 12 HOURS SCHEDULED
Status: DISCONTINUED | OUTPATIENT
Start: 2022-08-11 | End: 2022-08-15 | Stop reason: SDUPTHER

## 2022-08-11 RX ORDER — SODIUM CHLORIDE 9 MG/ML
INJECTION, SOLUTION INTRAVENOUS PRN
Status: DISCONTINUED | OUTPATIENT
Start: 2022-08-11 | End: 2022-08-12 | Stop reason: SDUPTHER

## 2022-08-11 RX ORDER — SODIUM CHLORIDE 0.9 % (FLUSH) 0.9 %
10 SYRINGE (ML) INJECTION PRN
Status: DISCONTINUED | OUTPATIENT
Start: 2022-08-11 | End: 2022-08-15 | Stop reason: SDUPTHER

## 2022-08-11 RX ADMIN — ALPRAZOLAM 1 MG: 0.5 TABLET ORAL at 20:24

## 2022-08-11 RX ADMIN — GABAPENTIN 600 MG: 600 TABLET, FILM COATED ORAL at 12:57

## 2022-08-11 RX ADMIN — TIZANIDINE 4 MG: 4 TABLET ORAL at 03:20

## 2022-08-11 RX ADMIN — Medication 1000 MG: at 05:53

## 2022-08-11 RX ADMIN — OXYCODONE AND ACETAMINOPHEN 1 TABLET: 10; 325 TABLET ORAL at 14:56

## 2022-08-11 RX ADMIN — SUCRALFATE 1 G: 1 TABLET ORAL at 07:57

## 2022-08-11 RX ADMIN — Medication 1000 MG: at 17:34

## 2022-08-11 RX ADMIN — OXYCODONE AND ACETAMINOPHEN 1 TABLET: 10; 325 TABLET ORAL at 05:52

## 2022-08-11 RX ADMIN — ALPRAZOLAM 1 MG: 0.5 TABLET ORAL at 12:57

## 2022-08-11 RX ADMIN — PANTOPRAZOLE SODIUM 40 MG: 40 TABLET, DELAYED RELEASE ORAL at 05:52

## 2022-08-11 RX ADMIN — PANTOPRAZOLE SODIUM 40 MG: 40 TABLET, DELAYED RELEASE ORAL at 14:56

## 2022-08-11 RX ADMIN — IPRATROPIUM BROMIDE AND ALBUTEROL SULFATE 3 ML: 2.5; .5 SOLUTION RESPIRATORY (INHALATION) at 07:06

## 2022-08-11 RX ADMIN — CEFEPIME 2000 MG: 2 INJECTION, POWDER, FOR SOLUTION INTRAVENOUS at 16:28

## 2022-08-11 RX ADMIN — ALPRAZOLAM 1 MG: 0.5 TABLET ORAL at 16:35

## 2022-08-11 RX ADMIN — CEFEPIME 2000 MG: 2 INJECTION, POWDER, FOR SOLUTION INTRAVENOUS at 08:06

## 2022-08-11 RX ADMIN — IPRATROPIUM BROMIDE AND ALBUTEROL SULFATE 3 ML: 2.5; .5 SOLUTION RESPIRATORY (INHALATION) at 10:28

## 2022-08-11 RX ADMIN — ALPRAZOLAM 1 MG: 0.5 TABLET ORAL at 07:56

## 2022-08-11 RX ADMIN — APIXABAN 10 MG: 5 TABLET, FILM COATED ORAL at 07:56

## 2022-08-11 RX ADMIN — GABAPENTIN 600 MG: 600 TABLET, FILM COATED ORAL at 07:57

## 2022-08-11 RX ADMIN — CEFEPIME 2000 MG: 2 INJECTION, POWDER, FOR SOLUTION INTRAVENOUS at 00:56

## 2022-08-11 RX ADMIN — Medication 1000 MG: at 07:35

## 2022-08-11 RX ADMIN — CITALOPRAM HYDROBROMIDE 40 MG: 20 TABLET ORAL at 07:56

## 2022-08-11 RX ADMIN — IPRATROPIUM BROMIDE AND ALBUTEROL SULFATE 3 ML: 2.5; .5 SOLUTION RESPIRATORY (INHALATION) at 03:05

## 2022-08-11 RX ADMIN — OXYCODONE AND ACETAMINOPHEN 1 TABLET: 10; 325 TABLET ORAL at 01:31

## 2022-08-11 RX ADMIN — OXYCODONE AND ACETAMINOPHEN 1 TABLET: 10; 325 TABLET ORAL at 11:13

## 2022-08-11 RX ADMIN — SUCRALFATE 1 G: 1 TABLET ORAL at 16:35

## 2022-08-11 RX ADMIN — SUCRALFATE 1 G: 1 TABLET ORAL at 12:57

## 2022-08-11 RX ADMIN — ONDANSETRON 4 MG: 4 TABLET, ORALLY DISINTEGRATING ORAL at 11:12

## 2022-08-11 RX ADMIN — IPRATROPIUM BROMIDE AND ALBUTEROL SULFATE 3 ML: 2.5; .5 SOLUTION RESPIRATORY (INHALATION) at 14:41

## 2022-08-11 RX ADMIN — CYCLOBENZAPRINE 5 MG: 10 TABLET, FILM COATED ORAL at 15:00

## 2022-08-11 RX ADMIN — IPRATROPIUM BROMIDE AND ALBUTEROL SULFATE 3 ML: 2.5; .5 SOLUTION RESPIRATORY (INHALATION) at 20:10

## 2022-08-11 RX ADMIN — SODIUM CHLORIDE: 9 INJECTION, SOLUTION INTRAVENOUS at 00:55

## 2022-08-11 RX ADMIN — TIZANIDINE 4 MG: 4 TABLET ORAL at 11:11

## 2022-08-11 ASSESSMENT — PAIN DESCRIPTION - DESCRIPTORS
DESCRIPTORS: ACHING;DISCOMFORT

## 2022-08-11 ASSESSMENT — PAIN SCALES - GENERAL
PAINLEVEL_OUTOF10: 9
PAINLEVEL_OUTOF10: 10
PAINLEVEL_OUTOF10: 9
PAINLEVEL_OUTOF10: 9
PAINLEVEL_OUTOF10: 7

## 2022-08-11 ASSESSMENT — PAIN DESCRIPTION - LOCATION
LOCATION: RIB CAGE

## 2022-08-11 ASSESSMENT — PAIN DESCRIPTION - ORIENTATION
ORIENTATION: RIGHT

## 2022-08-11 ASSESSMENT — PAIN DESCRIPTION - ONSET: ONSET: ON-GOING

## 2022-08-11 ASSESSMENT — PAIN - FUNCTIONAL ASSESSMENT
PAIN_FUNCTIONAL_ASSESSMENT: ACTIVITIES ARE NOT PREVENTED

## 2022-08-11 ASSESSMENT — PAIN DESCRIPTION - FREQUENCY: FREQUENCY: CONTINUOUS

## 2022-08-11 ASSESSMENT — PAIN DESCRIPTION - PAIN TYPE: TYPE: ACUTE PAIN

## 2022-08-11 NOTE — CONSULTS
Consultation History & Physical    Date of Admission:  8/3/2022  5:15 AM  Date of Consultation:  8/11/2022    Surgeon:  Dr. Lauri Mcdaniels      PCP:  Adele Mendoza MD     Referring Physician: Dr. Nitesh Adrian, Pulmonology     Reason for Consultation:  Right Pleural Loculated Effusion/Empyema    History of Present Illness:   Ms. Roopa Davenport is a 74 y.o. female who was diagnosed with left lung cancer via PET scan and has undergone radiation therapy; finishing in 2019. She also has severe COPD/emphysema with chronic hypoxia on continuous home oxygen. Patient also has chronic GERD and chronic pain syndrome. She presented to the ER on 08/03/2022 with complaints of severe pain on right side. Noted that patient was recently hospitalized in June of this year with LLL pneumonia. Patient was noted to have elevated D-dimer, therefore she underwent VQ scan that demonstrated intermediate probability for PE in LLL. Venous duplex was negative for DVT. She was started on Eliquis. CXR demonstrated right middle and lower lobe infiltrates. CT abdomen was unremarkable, except for RLL consolidation. She was started on empiric antibiotics and admitted to her PCP with pulmonology consultation. Patient was seen by Dr. Amna Go who ordered CT scan of chest. Reported RLL consolidations, suggestive of infectious process with emphysematous changes. Continued on empiric antibiotics. Repeat CT scan on 08/10/2022 demonstrated persistent pneumonia with possible trapped lung. CT surgery consulted for evaluation with possible recommendations and management.       Past Medical History:    Past Medical History:   Diagnosis Date    ADHD (attention deficit hyperactivity disorder)     Anxiety     COPD (chronic obstructive pulmonary disease) (HCC)     Depression     Fibromyalgia     GERD (gastroesophageal reflux disease)     Hypertension     Malignant neoplasm of upper lobe of right lung ([address] 12/04/2018    Palliative care patient 10/06/2021    RA (rheumatoid arthritis) ([facility] [address]        Past Surgical History:    Past Surgical History:   Procedure Laterality Date    CHOLECYSTECTOMY      HERNIA REPAIR      HYSTERECTOMY (CERVIX STATUS UNKNOWN)      LEG SURGERY      steel garth placement. 2009         Home Medications:   Prior to Admission medications    Medication Sig Start Date End Date Taking? Authorizing Provider   levalbuterol Elizabeth Saras HFA) 45 MCG/ACT inhaler Inhale 1 puff into the lungs every 4 hours as needed for Wheezing 6/29/22 6/29/23  Angela Mendes MD   sucralfate (CARAFATE) 1 GM tablet Take 1 tablet by mouth 4 times daily 6/25/22   Prasanth Ulrich MD   phenazopyridine (PYRIDIUM) 200 MG tablet Take 1 tablet by mouth 3 times daily as needed for Pain (bladder discomfort) 11/14/21   Olivia Wright MD   montelukast (SINGULAIR) 10 MG tablet Take 10 mg by mouth nightly    Historical Provider, MD   oxyCODONE-acetaminophen (PERCOCET)  MG per tablet TAKE 1 TABLET Q 4 TO 6 HRS PRN. (MAX 5/DAY).  Earliest Fill Date: 7/19/18 7/19/18 8/18/18  Fadi Del Cid MD   ondansetron (ZOFRAN ODT) 4 MG disintegrating tablet Take 1 tablet by mouth every 8 hours as needed for Nausea or Vomiting 5/1/18   TEN Woods   nicotine (NICODERM CQ) 21 MG/24HR Place 1 patch onto the skin every 24 hours  Patient taking differently: Place 1 patch onto the skin every 24 hours 14 mg/24 5/1/18   TEN Woods   tiZANidine (ZANAFLEX) 4 MG tablet Take 1 tablet by mouth 3 times daily as needed (.) 4/30/18   Fadi Del Cid MD   albuterol-ipratropium (COMBIVENT RESPIMAT)  MCG/ACT AERS inhaler Inhale 1 puff into the lungs every 6 hours 4/27/18   Zoila Barajas MD   albuterol sulfate HFA (VENTOLIN HFA) 108 (90 Base) MCG/ACT inhaler Inhale 2 puffs into the lungs every 6 hours as needed for Wheezing 4/27/18   Zoila Barajas MD   budesonide-formoterol [facility]) 160-4.5 MCG/ACT AERO Inhale 2 puffs into the lungs 2 times daily 4/27/18 Carole Rucker MD   pantoprazole (PROTONIX) 20 MG tablet TAKE 1 TABLET BY MOUTH 2 TIMES DAILY 12/18/17   Carole Rucker MD   fluticasone Earnest Curie) 50 MCG/ACT nasal spray INSTILL 1 SPRAY IN EACH NOSTRIL DAILY 10/4/17   Carole Rucker MD   OXYGEN Inhale into the lungs    Historical Provider, MD   ipratropium-albuterol (DUONEB) 0.5-2.5 (3) MG/3ML SOLN nebulizer solution Inhale 3 mLs into the lungs every 4 hours 4/6/17   Carole Rucker MD   metoclopramide (REGLAN) 5 MG tablet Take 1 tablet by mouth 4 times daily 3/30/17   Carole Rucker MD   albuterol (PROVENTIL) (2.5 MG/3ML) 0.083% nebulizer solution Take 3 mLs by nebulization every 6 hours as needed for Wheezing 3/17/17   Carole Rucker MD   gabapentin (NEURONTIN) 600 MG tablet Take 600 mg by mouth 3 times daily    Historical Provider, MD   ALPRAZolam Rosaleen Kidney) 1 MG tablet Take 1 mg by mouth in the morning and 1 mg at noon and 1 mg in the evening and 1 mg before bedtime. Takes 4 times a day. 1/15/15   Historical Provider, MD   amphetamine-dextroamphetamine (ADDERALL) 20 MG tablet Take 20 mg by mouth in the morning and 20 mg before bedtime. Pt has not had filled recently  last filled 11/15/21. 1/21/15   Historical Provider, MD   citalopram (CELEXA) 40 MG tablet Take 20 mg by mouth in the morning.     Historical Provider, MD        Facility Administered Medications:    cefepime  2,000 mg IntraVENous Q8H    nicotine  1 patch TransDERmal Daily    apixaban  10 mg Oral BID    Followed by    Dayron Guevara ON 8/15/2022] apixaban  5 mg Oral BID    vancomycin  1,000 mg IntraVENous Q12H    ALPRAZolam  1 mg Oral 4x Daily    vancomycin (VANCOCIN) intermittent dosing (placeholder)   Other RX Placeholder    sodium chloride flush  5-40 mL IntraVENous 2 times per day    ipratropium-albuterol  1 vial Inhalation Q4H    citalopram  40 mg Oral Daily    gabapentin  600 mg Oral TID    pantoprazole  40 mg Oral BID AC    sucralfate  1 g Oral 4x Daily Allergies: Iv dye [iodides], Aspirin, Codeine, Demerol, Fentanyl, Neosporin [bacitracin-neomycin-polymyxin], Nsaids, Pcn [penicillins], Pentazocine lactate, Sulfa antibiotics, Talwin [pentazocine], and Influenza vaccines     Social History:       Social History     Socioeconomic History    Marital status:      Spouse name: Not on file    Number of children: Not on file    Years of education: Not on file    Highest education level: Not on file   Occupational History    Not on file   Tobacco Use    Smoking status: Some Days     Packs/day: 0.25     Years: 30.00     Pack years: 7.50     Types: Cigarettes    Smokeless tobacco: Never   Vaping Use    Vaping Use: Never used   Substance and Sexual Activity    Alcohol use: No    Drug use: No    Sexual activity: Not on file   Other Topics Concern    Not on file   Social History Narrative    Not on file     Social Determinants of Health     Financial Resource Strain: Not on file   Food Insecurity: Not on file   Transportation Needs: Not on file   Physical Activity: Not on file   Stress: Not on file   Social Connections: Not on file   Intimate Partner Violence: Not on file   Housing Stability: Not on file       Family History:     Family History   Problem Relation Age of Onset    Cancer Father          Review of Systems:  Constitutional:  No fever, chills, night sweats, or weight loss. Fever on admission. HEENT: No vision loss, double vision, blurred vision, or tearing. No hearing loss, tinnitus, or infection. No nasal discharge or epistaxis. No dysphagia. Respiratory:  Right pleuritic chest pain. Chronic shortness of breath. Hx recent LLL pneumonia in 06/2022. Cardiovascular: No hypertension, hypotension, anginal type chest pain, dizziness, or syncopal spells. No history of palpitations. Peripheral Vascular:  No history of claudication. Gastrointestinal:  No nausea, vomiting, diarrhea, or constipation. No reflux or gastroesophageal reflux disease. Right sided abdominal pain. Genitourinary:  No dysuria, urgency, frequency, or history of urinary tract infections. No history of nephrolithiasis or renal insufficiency. Neurological:  No history of cerebrovascular accident, transient ischemic attack, or amaurosis fugax. No history of seizure disorder. Integumentary: No rash, history of nonhealing wounds or skin cancer removal.   Psychiatric:  No anxiety or depression. Endocrine: No polyuria, polydipsia, or significant weight gain. No heat or cold intolerance. Musculoskeletal: No limit to range of motion of joints or swelling of limbs. Generalized weakness. Hematologic: No history of DVT, PE, or anemia. Physical Examination:    BP (!) 92/41   Pulse 85   Temp 98.6 °F (37 °C) (Temporal)   Resp 24   Ht 5' 6\" (1.676 m)   Wt 117 lb 8 oz (53.3 kg)   SpO2 92%   BMI 18.96 kg/m²       General:  Alert & Oriented x 3 in no apparent distress. Patient is cachectic. HEENT:  Normocephalic. Atraumatic. VLADIMIR. Neck: No JVD, no lymphadenopathy. Supple. Trachea midline. Thyroid normal.   Respiratory: Effort appears mildly labored. Bilateral breath sounds diminished with expiratory wheezes throughout. Barrel chest.  Cardiovascular: Normal HT with RRR. No murmurs, gallops or rubs. No carotid bruits. No edema or varicosities. Pulses: Normal  GI: Abdomen soft, nondistended, no organomegaly. Active bowel sounds. Extremities: PPP, no edema  Skin: Warm & Dry  Neuro/psychiatric: Grossly intact    MEDICAL DECISION MAKING/TESTING    CXR (08/08/2022): Slight improvement in right mid lung and lower infiltrates. However, the infiltrates remaining appear more consolidated. Pulmonary hyperinflation. Small right pleural effusion suspected. Mild cardiomegaly. CT Chest without Contrast (08/05/2022): 1. Right lower lobe consolidations, suggestive of infectious process. 2.Small right pleural effusion. 3.Emphysematous changes. 4.Small hiatal hernia. 5.Status post cholecystectomy. 6.Aortic and coronary calcific atherosclerosis. 7.Degenerative spondylosis. Labs:    Liver Profile:  Lab Results   Component Value Date/Time    AST 24 08/03/2022 05:25 AM    ALT 21 08/03/2022 05:25 AM    BILITOT <0.2 08/03/2022 05:25 AM    ALKPHOS 95 08/03/2022 05:25 AM     UA:   Lab Results   Component Value Date/Time    COLORU YELLOW 08/03/2022 06:00 AM    PHUR 6.5 08/03/2022 06:00 AM    WBCUA 1 08/03/2022 06:00 AM    RBCUA 0 08/03/2022 06:00 AM    BACTERIA NEGATIVE 08/03/2022 06:00 AM    CLARITYU Clear 08/03/2022 06:00 AM    SPECGRAV 1.007 08/03/2022 06:00 AM    LEUKOCYTESUR TRACE 08/03/2022 06:00 AM    UROBILINOGEN 0.2 08/03/2022 06:00 AM    BILIRUBINUR Negative 08/03/2022 06:00 AM    BLOODU Negative 08/03/2022 06:00 AM    GLUCOSEU Negative 08/03/2022 06:00 AM       Diagnosis:      RLL Pneumonia with Loculated Empyema  Recent LLL Pneumonia in 06/2022  Severe End-Stage COPD/Emphysema with Chronic Hypoxia on Continuous O2  Essential HTN  Chronic GERD  Chronic Pain Syndrome  Mixed Anxiety/Depressive Disorder  Hx Lung Cancer, Left - S/P Radiation Therapy 2019    Plan:     Dr. Loyde Sever to review records/ images. He will discuss recommendations as well as R/B/A with patient and family. Further recommendations per Dr. Loyde Sever.          Nithin Peoples, APRN  8/11/2022  4:15 PM

## 2022-08-11 NOTE — PROGRESS NOTES
Progress Note  Simona Cushing [place]  8/11/2022 6:28 PM  Subjective:   Admit Date:   8/3/2022      CC/ADMIT DX:       Interval History:   Reviewed overnight events and nursing notes. She has c/o right chest sided chest pain. She has a cough. I have reviewed all labs/diagnostics from the last 24hrs. ROS:   I have done a 10 point ROS and all are negative, except what is mentioned in the HPI. ADULT DIET; Regular  ADULT ORAL NUTRITION SUPPLEMENT; Lunch, Dinner, Breakfast; Standard High Calorie/High Protein Oral Supplement  Diet NPO    Medications:      sodium chloride      sodium chloride      sodium chloride 75 mL/hr at 08/11/22 0055      sodium chloride flush  10 mL IntraVENous 2 times per day    cefepime  2,000 mg IntraVENous Q8H    nicotine  1 patch TransDERmal Daily    apixaban  10 mg Oral BID    Followed by    Awilda Hernandez ON 8/15/2022] apixaban  5 mg Oral BID    vancomycin  1,000 mg IntraVENous Q12H    ALPRAZolam  1 mg Oral 4x Daily    vancomycin (VANCOCIN) intermittent dosing (placeholder)   Other RX Placeholder    sodium chloride flush  5-40 mL IntraVENous 2 times per day    ipratropium-albuterol  1 vial Inhalation Q4H    citalopram  40 mg Oral Daily    gabapentin  600 mg Oral TID    pantoprazole  40 mg Oral BID AC    sucralfate  1 g Oral 4x Daily           Objective:   Vitals: /69   Pulse 74   Temp 98.2 °F (36.8 °C) (Temporal)   Resp 18   Ht 5' 6\" (1.676 m)   Wt 117 lb 8 oz (53.3 kg)   SpO2 94%   BMI 18.96 kg/m²    Intake/Output Summary (Last 24 hours) at 8/11/2022 1828  Last data filed at 8/11/2022 1503  Gross per 24 hour   Intake 1380 ml   Output 1500 ml   Net -120 ml     General appearance: resting  Lungs: coarse  Heart: RRR  Abdomen: soft, non-tender; bowel sounds normal; no masses,  no organomegaly  Extremities: extremities normal, atraumatic, no cyanosis or edema  Neurologic:  negative, No obvious focal neurologic deficits.     Assessment and Plan:   Principal Problem:    Pneumonia  Resolved Problems:    * No resolved hospital problems. *      Plan:   Continue present medication(s)    Follow with Pulm   CT Surgery consulted by Pulm   Continue anticoagulation   Continue antibiotics   Supportive care      Discharge planning:   her home     Reviewed treatment plans with the patient and/or family.              Electronically signed by Eron Tinoco MD on 8/11/2022 at 6:28 PM

## 2022-08-11 NOTE — PROGRESS NOTES
Pulmonary and Critical Care Progress note. Susanne Caal    MRN# [id number]    Acct# [de-identified]  8/11/2022   11:32 AM CDT    Referring Leah Hollins MD      Chief Complaint: shortness of breath abd pain. HPI: Continues to have some pleurisy on the right. CT of the chest was reviewed. Medications    cefepime, 2,000 mg, IntraVENous, Q8H    nicotine, 1 patch, TransDERmal, Daily    apixaban, 10 mg, Oral, BID **FOLLOWED BY** [START ON 8/15/2022] apixaban, 5 mg, Oral, BID    vancomycin, 1,000 mg, IntraVENous, Q12H    ALPRAZolam, 1 mg, Oral, 4x Daily    vancomycin (VANCOCIN) intermittent dosing (placeholder), , Other, RX Placeholder    sodium chloride flush, 5-40 mL, IntraVENous, 2 times per day    ipratropium-albuterol, 1 vial, Inhalation, Q4H    citalopram, 40 mg, Oral, Daily    gabapentin, 600 mg, Oral, TID    pantoprazole, 40 mg, Oral, BID AC    sucralfate, 1 g, Oral, 4x Daily     Review of Systems:  RESPIRATORY:  positive for  dyspnea  CARDIOVASCULAR:  positive for  dyspnea      Physical Exam:  BP (!) 92/41   Pulse 85   Temp 98.6 °F (37 °C) (Temporal)   Resp 18   Ht 5' 6\" (1.676 m)   Wt 117 lb 8 oz (53.3 kg)   SpO2 92%   BMI 18.96 kg/m²   Intake/Output Summary (Last 24 hours) at 8/11/2022 1309  Last data filed at 8/11/2022 1228  Gross per 24 hour   Intake 1620 ml   Output 850 ml   Net 770 ml         General appearance:elderly white female who does not appear to be in distress. HEENT:normocephalic atraumatic  HLRDV:W5P8 no murmurs  Lungs:diminished bilaterally no rubs or tenderness or dullness to percussion  Abdomen:Soft non tender no organomegaly  Extremities:non clubbing cyanosis or edema  Neuro:no focal findings  Skin:intact    No results for input(s): WBC, RBC, HGB, HCT, PLT, MCV, MCH, MCHC, RDW, NRBC, SEGSPCT, BANDSPCT in the last 72 hours. No results for input(s): NA, K, CL, CO2, BUN, CREATININE, CALCIUM, GLUCOSE in the last 72 hours.      No results for input(s): PHART, XKE7ADZ, PO2ART, HAS9WAJ, H1AQUCLG, BEART in the last 72 hours. No results for input(s): AST, ALT, ALKPHOS, BILITOT, MG, CALCIUM, PHOS, PROBNP, TROPONINI, LACTA, INR, TSH, DDIMER, PROCAL in the last 72 hours. No results for input(s): BC, LABGRAM, CULTRESP, BFCX in the last 72 hours. Radiograph: CT ABDOMEN PELVIS WO CONTRAST Additional Contrast? None    Result Date: 8/4/2022  1. No evidence for acute abdominal or pelvic process. 2.Right lower lobe consolidation with small pleural effusion. Superimposed infection is not excluded. 3.Colonic diverticulosis without diverticulitis. 4.Calcific atherosclerosis of the abdominal aorta. 5.Degenerative spondylosis. NM LUNG SCAN PERFUSION ONLY    Result Date: 8/4/2022  1. Small subsegmental defect in the posterior left lower lobe, new since prior, compatible with and intermediate probability for pulmonary embolism. 2. Pulmonary hyperaeration, with decreased perfusion in the lung apices, likely related to COPD. 3. Patchy airspace abnormality in the right mid and lower lung field with small right pleural fluid, likely pneumonia. Please note according to the Piopped criteria a low probability reflects a 0 to 19%chance of a pulmonary embolism. Please correlate clinically. If symptoms persist, consider CT PA gram. RECOMMENDATION: Follow-up as clinically warranted. Electronically Signed by Alen Ferrera MD at 04-Aug-2022 11:14:59 AM             XR CHEST PORTABLE    Result Date: 8/3/2022  Right mid and lower lung infiltrates. Small right pleural effusion not excluded. Recommendation: Follow up as clinically indicated. Electronically Signed by Yolis Johnson MD at 03-Aug-2022 08:41:33 AM                My radiograph interpretation/independent review of imaging: reviewed.      Problem list generated by [facility] Problems             Last Modified POA    * (Principal) Pneumonia 8/3/2022 Yes        Pulmonary Assessment/Plan:     Worsening loculated effusion likely empyema. Thoracic surgery was consulted. The patient will require minimum a chest tube. Chronic hypoxic hypercapnic respiratory failure continue oxygen supplementation as necessary to maintain adequate oxygenation. Underlying severe COPD. Continue bronchodilators continue pulmonary toilet. Right lower lobe pneumonia. Continue empirical antibiotic therapy. X-ray findings noted and reviewed. Will repeat CT of the chest to better delineate underlying anatomy. It could be that the patient has loculated effusion. In that setting would consider thoracentesis however she would not be a candidate for decortication due to her very poor pulmonary status. Low probability for PE per PIOPED criteria. The patient's pretest probability for PE was low. The patient's lower extremity scan is negative. Currently on direct oral anticoagulation. Stop anticoagulation for possible chest tube placement. Dyspnea due to the above supportive care. DC planning. Deedee Rod MD, [facility], [facility]    The above note was generated using voice recognition software. Inadvertent typographical errors in transcription may have occurred.       Electronically signed by Deedee Rod MD on 08/11/22 at 1:09 PM

## 2022-08-11 NOTE — PROGRESS NOTES
Progress Note  Elio Townsend [place]  8/10/2022 7:15 PM  Subjective:   Admit Date:   8/3/2022      CC/ADMIT DX:       Interval History:   Reviewed overnight events and nursing notes. She has no new issues per Staff. I have reviewed all labs/diagnostics from the last 24hrs. ROS:   I have done a 10 point ROS and all are negative, except what is mentioned in the HPI. ADULT DIET; Regular  ADULT ORAL NUTRITION SUPPLEMENT; Lunch, Dinner, Breakfast; Standard High Calorie/High Protein Oral Supplement    Medications:      sodium chloride      sodium chloride 75 mL/hr at 08/07/22 1323      cefepime  2,000 mg IntraVENous Q8H    nicotine  1 patch TransDERmal Daily    apixaban  10 mg Oral BID    Followed by    Mary Mask ON 8/15/2022] apixaban  5 mg Oral BID    vancomycin  1,000 mg IntraVENous Q12H    ALPRAZolam  1 mg Oral 4x Daily    vancomycin (VANCOCIN) intermittent dosing (placeholder)   Other RX Placeholder    sodium chloride flush  5-40 mL IntraVENous 2 times per day    ipratropium-albuterol  1 vial Inhalation Q4H    citalopram  40 mg Oral Daily    gabapentin  600 mg Oral TID    pantoprazole  40 mg Oral BID AC    sucralfate  1 g Oral 4x Daily           Objective:   Vitals: BP (!) 103/50   Pulse 80   Temp 98.2 °F (36.8 °C) (Oral)   Resp 18   Ht 5' 6\" (1.676 m)   Wt 117 lb 8 oz (53.3 kg)   SpO2 90%   BMI 18.96 kg/m²    Intake/Output Summary (Last 24 hours) at 8/10/2022 1915  Last data filed at 8/10/2022 1800  Gross per 24 hour   Intake 4020 ml   Output 450 ml   Net 3570 ml     General appearance: resting  Lungs: coarse  Heart: RRR  Abdomen: soft, non-tender; bowel sounds normal; no masses,  no organomegaly  Extremities: extremities normal, atraumatic, no cyanosis or edema  Neurologic:  negative, No obvious focal neurologic deficits. Assessment and Plan:   Principal Problem:    Pneumonia  Resolved Problems:    * No resolved hospital problems.  *      Plan:   Continue present medication(s)    Follow with Pulm Continue anticoagulation   Continue antibiotics   Supportive care      Discharge planning:   her home     Reviewed treatment plans with the patient and/or family.              Electronically signed by Veronica Dinero MD on 8/10/2022 at 7:15 PM

## 2022-08-12 ENCOUNTER — ANESTHESIA (OUTPATIENT)
Dept: OPERATING ROOM | Age: 72
DRG: 853 | End: 2022-08-12
Payer: MEDICARE

## 2022-08-12 ENCOUNTER — ANESTHESIA EVENT (OUTPATIENT)
Dept: OPERATING ROOM | Age: 72
DRG: 853 | End: 2022-08-12
Payer: MEDICARE

## 2022-08-12 ENCOUNTER — APPOINTMENT (OUTPATIENT)
Dept: GENERAL RADIOLOGY | Age: 72
DRG: 853 | End: 2022-08-12
Payer: MEDICARE

## 2022-08-12 PROBLEM — J90 LOCULATED PLEURAL EFFUSION: Status: ACTIVE | Noted: 2022-08-12

## 2022-08-12 LAB
ABO/RH: NORMAL
ANTIBODY SCREEN: NORMAL
BASE EXCESS ARTERIAL: 15.6 MMOL/L (ref -2–2)
BASOPHILS ABSOLUTE: 0.1 K/UL (ref 0–0.2)
BASOPHILS RELATIVE PERCENT: 0.8 % (ref 0–1)
BI-LEVEL POS AIRWAY PRESSURE(EXPIRATORY): 7
BI-LEVEL POS AIRWAY PRESSURE(INSPIRATORY): 14
CARBOXYHEMOGLOBIN ARTERIAL: 2.7 % (ref 0–5)
EOSINOPHILS ABSOLUTE: 0.1 K/UL (ref 0–0.6)
EOSINOPHILS RELATIVE PERCENT: 1.6 % (ref 0–5)
HCO3 ARTERIAL: 44.8 MMOL/L (ref 22–26)
HCT VFR BLD CALC: 27.9 % (ref 37–47)
HEMOGLOBIN, ART, EXTENDED: 9.7 G/DL (ref 12–16)
HEMOGLOBIN: 8.4 G/DL (ref 12–16)
IMMATURE GRANULOCYTES #: 0 K/UL
LYMPHOCYTES ABSOLUTE: 1.1 K/UL (ref 1.1–4.5)
LYMPHOCYTES RELATIVE PERCENT: 17.5 % (ref 20–40)
MCH RBC QN AUTO: 31.3 PG (ref 27–31)
MCHC RBC AUTO-ENTMCNC: 30.1 G/DL (ref 33–37)
MCV RBC AUTO: 104.1 FL (ref 81–99)
METHEMOGLOBIN ARTERIAL: 1.1 %
MODE: ABNORMAL
MONOCYTES ABSOLUTE: 0.7 K/UL (ref 0–0.9)
MONOCYTES RELATIVE PERCENT: 11.9 % (ref 0–10)
MRSA SCREEN RT-PCR: DETECTED
NEUTROPHILS ABSOLUTE: 4.2 K/UL (ref 1.5–7.5)
NEUTROPHILS RELATIVE PERCENT: 67.6 % (ref 50–65)
O2 CONTENT ARTERIAL: 12.7 ML/DL
O2 SAT, ARTERIAL: 92.7 %
O2 THERAPY: ABNORMAL
OXYGEN FLOW: 15
PCO2 ARTERIAL: 89 MMHG (ref 35–45)
PDW BLD-RTO: 12.7 % (ref 11.5–14.5)
PH ARTERIAL: 7.31 (ref 7.35–7.45)
PLATELET # BLD: 344 K/UL (ref 130–400)
PMV BLD AUTO: 10.1 FL (ref 9.4–12.3)
PO2 ARTERIAL: 74 MMHG (ref 80–100)
POTASSIUM, WHOLE BLOOD: 4.1
RBC # BLD: 2.68 M/UL (ref 4.2–5.4)
SAMPLE SOURCE: ABNORMAL
WBC # BLD: 6.2 K/UL (ref 4.8–10.8)

## 2022-08-12 PROCEDURE — 6370000000 HC RX 637 (ALT 250 FOR IP): Performed by: SURGERY

## 2022-08-12 PROCEDURE — 2500000003 HC RX 250 WO HCPCS: Performed by: NURSE ANESTHETIST, CERTIFIED REGISTERED

## 2022-08-12 PROCEDURE — 87102 FUNGUS ISOLATION CULTURE: CPT

## 2022-08-12 PROCEDURE — 36415 COLL VENOUS BLD VENIPUNCTURE: CPT

## 2022-08-12 PROCEDURE — C1725 CATH, TRANSLUMIN NON-LASER: HCPCS | Performed by: SURGERY

## 2022-08-12 PROCEDURE — 6360000002 HC RX W HCPCS: Performed by: SURGERY

## 2022-08-12 PROCEDURE — 87116 MYCOBACTERIA CULTURE: CPT

## 2022-08-12 PROCEDURE — 6370000000 HC RX 637 (ALT 250 FOR IP): Performed by: FAMILY MEDICINE

## 2022-08-12 PROCEDURE — 86901 BLOOD TYPING SEROLOGIC RH(D): CPT

## 2022-08-12 PROCEDURE — 3700000001 HC ADD 15 MINUTES (ANESTHESIA): Performed by: SURGERY

## 2022-08-12 PROCEDURE — 88305 TISSUE EXAM BY PATHOLOGIST: CPT

## 2022-08-12 PROCEDURE — 82803 BLOOD GASES ANY COMBINATION: CPT

## 2022-08-12 PROCEDURE — 0BCN4ZZ EXTIRPATION OF MATTER FROM RIGHT PLEURA, PERCUTANEOUS ENDOSCOPIC APPROACH: ICD-10-PCS | Performed by: SURGERY

## 2022-08-12 PROCEDURE — 6360000002 HC RX W HCPCS: Performed by: EMERGENCY MEDICINE

## 2022-08-12 PROCEDURE — P9040 RBC LEUKOREDUCED IRRADIATED: HCPCS

## 2022-08-12 PROCEDURE — 6360000002 HC RX W HCPCS: Performed by: NURSE ANESTHETIST, CERTIFIED REGISTERED

## 2022-08-12 PROCEDURE — 2580000003 HC RX 258: Performed by: EMERGENCY MEDICINE

## 2022-08-12 PROCEDURE — 2580000003 HC RX 258: Performed by: PHYSICIAN ASSISTANT

## 2022-08-12 PROCEDURE — 3600000015 HC SURGERY LEVEL 5 ADDTL 15MIN: Performed by: SURGERY

## 2022-08-12 PROCEDURE — 7100000001 HC PACU RECOVERY - ADDTL 15 MIN: Performed by: SURGERY

## 2022-08-12 PROCEDURE — 6360000002 HC RX W HCPCS: Performed by: INTERNAL MEDICINE

## 2022-08-12 PROCEDURE — 2000000000 HC ICU R&B

## 2022-08-12 PROCEDURE — 87205 SMEAR GRAM STAIN: CPT

## 2022-08-12 PROCEDURE — 87641 MR-STAPH DNA AMP PROBE: CPT

## 2022-08-12 PROCEDURE — C1729 CATH, DRAINAGE: HCPCS | Performed by: SURGERY

## 2022-08-12 PROCEDURE — 87070 CULTURE OTHR SPECIMN AEROBIC: CPT

## 2022-08-12 PROCEDURE — 2700000000 HC OXYGEN THERAPY PER DAY

## 2022-08-12 PROCEDURE — 87015 SPECIMEN INFECT AGNT CONCNTJ: CPT

## 2022-08-12 PROCEDURE — 86900 BLOOD TYPING SEROLOGIC ABO: CPT

## 2022-08-12 PROCEDURE — 37799 UNLISTED PX VASCULAR SURGERY: CPT

## 2022-08-12 PROCEDURE — 2580000003 HC RX 258: Performed by: SURGERY

## 2022-08-12 PROCEDURE — 6360000002 HC RX W HCPCS: Performed by: FAMILY MEDICINE

## 2022-08-12 PROCEDURE — 87206 SMEAR FLUORESCENT/ACID STAI: CPT

## 2022-08-12 PROCEDURE — 3600000005 HC SURGERY LEVEL 5 BASE: Performed by: SURGERY

## 2022-08-12 PROCEDURE — 85025 COMPLETE CBC W/AUTO DIFF WBC: CPT

## 2022-08-12 PROCEDURE — 2580000003 HC RX 258: Performed by: INTERNAL MEDICINE

## 2022-08-12 PROCEDURE — 7100000000 HC PACU RECOVERY - FIRST 15 MIN: Performed by: SURGERY

## 2022-08-12 PROCEDURE — 86923 COMPATIBILITY TEST ELECTRIC: CPT

## 2022-08-12 PROCEDURE — 2709999900 HC NON-CHARGEABLE SUPPLY: Performed by: SURGERY

## 2022-08-12 PROCEDURE — 94640 AIRWAY INHALATION TREATMENT: CPT

## 2022-08-12 PROCEDURE — 99233 SBSQ HOSP IP/OBS HIGH 50: CPT | Performed by: INTERNAL MEDICINE

## 2022-08-12 PROCEDURE — 71045 X-RAY EXAM CHEST 1 VIEW: CPT

## 2022-08-12 PROCEDURE — 3700000000 HC ANESTHESIA ATTENDED CARE: Performed by: SURGERY

## 2022-08-12 PROCEDURE — 86850 RBC ANTIBODY SCREEN: CPT

## 2022-08-12 PROCEDURE — 87075 CULTR BACTERIA EXCEPT BLOOD: CPT

## 2022-08-12 PROCEDURE — 0BJ08ZZ INSPECTION OF TRACHEOBRONCHIAL TREE, VIA NATURAL OR ARTIFICIAL OPENING ENDOSCOPIC: ICD-10-PCS | Performed by: SURGERY

## 2022-08-12 PROCEDURE — 2580000003 HC RX 258: Performed by: ANESTHESIOLOGY

## 2022-08-12 RX ORDER — FENTANYL CITRATE 50 UG/ML
INJECTION, SOLUTION INTRAMUSCULAR; INTRAVENOUS PRN
Status: DISCONTINUED | OUTPATIENT
Start: 2022-08-12 | End: 2022-08-12 | Stop reason: SDUPTHER

## 2022-08-12 RX ORDER — ALBUTEROL SULFATE 2.5 MG/3ML
2.5 SOLUTION RESPIRATORY (INHALATION)
Status: DISCONTINUED | OUTPATIENT
Start: 2022-08-12 | End: 2022-08-12 | Stop reason: SDUPTHER

## 2022-08-12 RX ORDER — SODIUM CHLORIDE 450 MG/100ML
INJECTION, SOLUTION INTRAVENOUS CONTINUOUS
Status: DISCONTINUED | OUTPATIENT
Start: 2022-08-12 | End: 2022-08-13

## 2022-08-12 RX ORDER — ROCURONIUM BROMIDE 10 MG/ML
INJECTION, SOLUTION INTRAVENOUS PRN
Status: DISCONTINUED | OUTPATIENT
Start: 2022-08-12 | End: 2022-08-12 | Stop reason: SDUPTHER

## 2022-08-12 RX ORDER — SODIUM CHLORIDE, SODIUM LACTATE, POTASSIUM CHLORIDE, CALCIUM CHLORIDE 600; 310; 30; 20 MG/100ML; MG/100ML; MG/100ML; MG/100ML
INJECTION, SOLUTION INTRAVENOUS CONTINUOUS
Status: DISCONTINUED | OUTPATIENT
Start: 2022-08-12 | End: 2022-08-12

## 2022-08-12 RX ORDER — IPRATROPIUM BROMIDE AND ALBUTEROL SULFATE 2.5; .5 MG/3ML; MG/3ML
1 SOLUTION RESPIRATORY (INHALATION)
Status: DISCONTINUED | OUTPATIENT
Start: 2022-08-12 | End: 2022-08-15

## 2022-08-12 RX ORDER — ALPRAZOLAM 0.5 MG/1
1 TABLET ORAL NIGHTLY PRN
Status: DISCONTINUED | OUTPATIENT
Start: 2022-08-12 | End: 2022-08-13

## 2022-08-12 RX ORDER — ENOXAPARIN SODIUM 100 MG/ML
40 INJECTION SUBCUTANEOUS DAILY
Status: DISCONTINUED | OUTPATIENT
Start: 2022-08-12 | End: 2022-08-23

## 2022-08-12 RX ORDER — PROPOFOL 10 MG/ML
INJECTION, EMULSION INTRAVENOUS PRN
Status: DISCONTINUED | OUTPATIENT
Start: 2022-08-12 | End: 2022-08-12 | Stop reason: SDUPTHER

## 2022-08-12 RX ORDER — HYDROMORPHONE HYDROCHLORIDE 1 MG/ML
0.25 INJECTION, SOLUTION INTRAMUSCULAR; INTRAVENOUS; SUBCUTANEOUS EVERY 5 MIN PRN
Status: DISCONTINUED | OUTPATIENT
Start: 2022-08-12 | End: 2022-08-12 | Stop reason: HOSPADM

## 2022-08-12 RX ORDER — NALOXONE HYDROCHLORIDE 0.4 MG/ML
INJECTION, SOLUTION INTRAMUSCULAR; INTRAVENOUS; SUBCUTANEOUS PRN
Status: DISCONTINUED | OUTPATIENT
Start: 2022-08-12 | End: 2022-08-12 | Stop reason: SDUPTHER

## 2022-08-12 RX ORDER — OXYCODONE HYDROCHLORIDE 5 MG/1
5 TABLET ORAL EVERY 4 HOURS PRN
Status: DISCONTINUED | OUTPATIENT
Start: 2022-08-12 | End: 2022-08-13

## 2022-08-12 RX ORDER — OXYCODONE HYDROCHLORIDE 5 MG/1
10 TABLET ORAL EVERY 4 HOURS PRN
Status: DISCONTINUED | OUTPATIENT
Start: 2022-08-12 | End: 2022-08-13

## 2022-08-12 RX ORDER — BUDESONIDE 0.25 MG/2ML
250 INHALANT ORAL 2 TIMES DAILY
Status: DISCONTINUED | OUTPATIENT
Start: 2022-08-12 | End: 2022-08-23 | Stop reason: HOSPADM

## 2022-08-12 RX ORDER — SODIUM CHLORIDE 0.9 % (FLUSH) 0.9 %
5-40 SYRINGE (ML) INJECTION PRN
Status: DISCONTINUED | OUTPATIENT
Start: 2022-08-12 | End: 2022-08-15

## 2022-08-12 RX ORDER — ARFORMOTEROL TARTRATE 15 UG/2ML
15 SOLUTION RESPIRATORY (INHALATION) 2 TIMES DAILY
Status: DISCONTINUED | OUTPATIENT
Start: 2022-08-12 | End: 2022-08-23 | Stop reason: HOSPADM

## 2022-08-12 RX ORDER — SODIUM CHLORIDE 0.9 % (FLUSH) 0.9 %
5-40 SYRINGE (ML) INJECTION EVERY 12 HOURS SCHEDULED
Status: DISCONTINUED | OUTPATIENT
Start: 2022-08-12 | End: 2022-08-12 | Stop reason: HOSPADM

## 2022-08-12 RX ORDER — ONDANSETRON 2 MG/ML
INJECTION INTRAMUSCULAR; INTRAVENOUS PRN
Status: DISCONTINUED | OUTPATIENT
Start: 2022-08-12 | End: 2022-08-12 | Stop reason: SDUPTHER

## 2022-08-12 RX ORDER — DEXAMETHASONE SODIUM PHOSPHATE 10 MG/ML
INJECTION, SOLUTION INTRAMUSCULAR; INTRAVENOUS PRN
Status: DISCONTINUED | OUTPATIENT
Start: 2022-08-12 | End: 2022-08-12 | Stop reason: SDUPTHER

## 2022-08-12 RX ORDER — SODIUM CHLORIDE 9 MG/ML
INJECTION, SOLUTION INTRAVENOUS PRN
Status: DISCONTINUED | OUTPATIENT
Start: 2022-08-12 | End: 2022-08-12 | Stop reason: HOSPADM

## 2022-08-12 RX ORDER — DIPHENHYDRAMINE HYDROCHLORIDE 50 MG/ML
12.5 INJECTION INTRAMUSCULAR; INTRAVENOUS
Status: DISCONTINUED | OUTPATIENT
Start: 2022-08-12 | End: 2022-08-12 | Stop reason: HOSPADM

## 2022-08-12 RX ORDER — BUDESONIDE AND FORMOTEROL FUMARATE DIHYDRATE 80; 4.5 UG/1; UG/1
2 AEROSOL RESPIRATORY (INHALATION) 2 TIMES DAILY
Status: DISCONTINUED | OUTPATIENT
Start: 2022-08-12 | End: 2022-08-12 | Stop reason: CLARIF

## 2022-08-12 RX ORDER — BUPIVACAINE HYDROCHLORIDE 2.5 MG/ML
INJECTION, SOLUTION INFILTRATION; PERINEURAL PRN
Status: DISCONTINUED | OUTPATIENT
Start: 2022-08-12 | End: 2022-08-12 | Stop reason: ALTCHOICE

## 2022-08-12 RX ORDER — MORPHINE SULFATE 2 MG/ML
2 INJECTION, SOLUTION INTRAMUSCULAR; INTRAVENOUS
Status: DISCONTINUED | OUTPATIENT
Start: 2022-08-12 | End: 2022-08-15

## 2022-08-12 RX ORDER — HYDRALAZINE HYDROCHLORIDE 25 MG/1
25 TABLET, FILM COATED ORAL EVERY 8 HOURS SCHEDULED
Status: DISCONTINUED | OUTPATIENT
Start: 2022-08-12 | End: 2022-08-15

## 2022-08-12 RX ORDER — METOCLOPRAMIDE HYDROCHLORIDE 5 MG/ML
10 INJECTION INTRAMUSCULAR; INTRAVENOUS
Status: DISCONTINUED | OUTPATIENT
Start: 2022-08-12 | End: 2022-08-12 | Stop reason: HOSPADM

## 2022-08-12 RX ORDER — SODIUM CHLORIDE 0.9 % (FLUSH) 0.9 %
5-40 SYRINGE (ML) INJECTION PRN
Status: DISCONTINUED | OUTPATIENT
Start: 2022-08-12 | End: 2022-08-12 | Stop reason: HOSPADM

## 2022-08-12 RX ORDER — LIDOCAINE HYDROCHLORIDE 10 MG/ML
INJECTION, SOLUTION EPIDURAL; INFILTRATION; INTRACAUDAL; PERINEURAL PRN
Status: DISCONTINUED | OUTPATIENT
Start: 2022-08-12 | End: 2022-08-12 | Stop reason: SDUPTHER

## 2022-08-12 RX ORDER — MIDAZOLAM HYDROCHLORIDE 1 MG/ML
INJECTION INTRAMUSCULAR; INTRAVENOUS PRN
Status: DISCONTINUED | OUTPATIENT
Start: 2022-08-12 | End: 2022-08-12 | Stop reason: SDUPTHER

## 2022-08-12 RX ORDER — SODIUM CHLORIDE 9 MG/ML
INJECTION, SOLUTION INTRAVENOUS PRN
Status: DISCONTINUED | OUTPATIENT
Start: 2022-08-12 | End: 2022-08-23 | Stop reason: HOSPADM

## 2022-08-12 RX ORDER — SODIUM CHLORIDE 0.9 % (FLUSH) 0.9 %
5-40 SYRINGE (ML) INJECTION EVERY 12 HOURS SCHEDULED
Status: DISCONTINUED | OUTPATIENT
Start: 2022-08-12 | End: 2022-08-15

## 2022-08-12 RX ORDER — HYDROMORPHONE HYDROCHLORIDE 1 MG/ML
0.5 INJECTION, SOLUTION INTRAMUSCULAR; INTRAVENOUS; SUBCUTANEOUS EVERY 5 MIN PRN
Status: DISCONTINUED | OUTPATIENT
Start: 2022-08-12 | End: 2022-08-12 | Stop reason: HOSPADM

## 2022-08-12 RX ORDER — CHLORHEXIDINE GLUCONATE 0.12 MG/ML
15 RINSE ORAL ONCE
Status: COMPLETED | OUTPATIENT
Start: 2022-08-12 | End: 2022-08-12

## 2022-08-12 RX ADMIN — NALOXONE HYDROCHLORIDE 0.08 MG: 0.4 INJECTION, SOLUTION INTRAMUSCULAR; INTRAVENOUS; SUBCUTANEOUS at 14:54

## 2022-08-12 RX ADMIN — ROCURONIUM BROMIDE 50 MG: 10 INJECTION, SOLUTION INTRAVENOUS at 13:41

## 2022-08-12 RX ADMIN — ENOXAPARIN SODIUM 40 MG: 100 INJECTION SUBCUTANEOUS at 17:39

## 2022-08-12 RX ADMIN — ALPRAZOLAM 1 MG: 0.5 TABLET ORAL at 09:33

## 2022-08-12 RX ADMIN — MUPIROCIN: 20 OINTMENT TOPICAL at 09:35

## 2022-08-12 RX ADMIN — CEFEPIME 2000 MG: 2 INJECTION, POWDER, FOR SOLUTION INTRAVENOUS at 08:59

## 2022-08-12 RX ADMIN — MORPHINE SULFATE 2 MG: 2 INJECTION, SOLUTION INTRAMUSCULAR; INTRAVENOUS at 17:52

## 2022-08-12 RX ADMIN — SODIUM CHLORIDE: 4.5 INJECTION, SOLUTION INTRAVENOUS at 17:14

## 2022-08-12 RX ADMIN — LIDOCAINE HYDROCHLORIDE 50 MG: 10 INJECTION, SOLUTION EPIDURAL; INFILTRATION; INTRACAUDAL; PERINEURAL at 13:41

## 2022-08-12 RX ADMIN — IPRATROPIUM BROMIDE AND ALBUTEROL SULFATE 1 AMPULE: .5; 3 SOLUTION RESPIRATORY (INHALATION) at 18:04

## 2022-08-12 RX ADMIN — HYDROMORPHONE HYDROCHLORIDE 0.5 MG: 1 INJECTION, SOLUTION INTRAMUSCULAR; INTRAVENOUS; SUBCUTANEOUS at 15:51

## 2022-08-12 RX ADMIN — ONDANSETRON HYDROCHLORIDE 4 MG: 2 SOLUTION INTRAMUSCULAR; INTRAVENOUS at 09:45

## 2022-08-12 RX ADMIN — ONDANSETRON 4 MG: 2 INJECTION INTRAMUSCULAR; INTRAVENOUS at 14:38

## 2022-08-12 RX ADMIN — ALPRAZOLAM 1 MG: 0.5 TABLET ORAL at 20:03

## 2022-08-12 RX ADMIN — ARFORMOTEROL TARTRATE 15 MCG: 15 SOLUTION RESPIRATORY (INHALATION) at 18:17

## 2022-08-12 RX ADMIN — GABAPENTIN 600 MG: 600 TABLET, FILM COATED ORAL at 20:02

## 2022-08-12 RX ADMIN — PHENYLEPHRINE HYDROCHLORIDE 100 MCG: 10 INJECTION INTRAVENOUS at 14:27

## 2022-08-12 RX ADMIN — SUCRALFATE 1 G: 1 TABLET ORAL at 20:02

## 2022-08-12 RX ADMIN — CEFEPIME 2000 MG: 2 INJECTION, POWDER, FOR SOLUTION INTRAVENOUS at 00:24

## 2022-08-12 RX ADMIN — Medication 1000 MG: at 06:39

## 2022-08-12 RX ADMIN — CHLORHEXIDINE GLUCONATE 15 ML: 1.2 RINSE ORAL at 06:18

## 2022-08-12 RX ADMIN — SODIUM CHLORIDE, SODIUM LACTATE, POTASSIUM CHLORIDE, AND CALCIUM CHLORIDE: 600; 310; 30; 20 INJECTION, SOLUTION INTRAVENOUS at 12:12

## 2022-08-12 RX ADMIN — SUGAMMADEX 300 MG: 100 INJECTION, SOLUTION INTRAVENOUS at 14:47

## 2022-08-12 RX ADMIN — BUDESONIDE 250 MCG: 0.25 SUSPENSION RESPIRATORY (INHALATION) at 18:17

## 2022-08-12 RX ADMIN — IPRATROPIUM BROMIDE AND ALBUTEROL SULFATE 3 ML: 2.5; .5 SOLUTION RESPIRATORY (INHALATION) at 01:10

## 2022-08-12 RX ADMIN — SODIUM CHLORIDE: 9 INJECTION, SOLUTION INTRAVENOUS at 08:57

## 2022-08-12 RX ADMIN — MUPIROCIN: 20 OINTMENT TOPICAL at 20:38

## 2022-08-12 RX ADMIN — DEXAMETHASONE SODIUM PHOSPHATE 10 MG: 10 INJECTION, SOLUTION INTRAMUSCULAR; INTRAVENOUS at 14:03

## 2022-08-12 RX ADMIN — MIDAZOLAM 2 MG: 1 INJECTION INTRAMUSCULAR; INTRAVENOUS at 13:38

## 2022-08-12 RX ADMIN — ONDANSETRON HYDROCHLORIDE 4 MG: 2 SOLUTION INTRAMUSCULAR; INTRAVENOUS at 20:12

## 2022-08-12 RX ADMIN — IPRATROPIUM BROMIDE AND ALBUTEROL SULFATE 3 ML: 2.5; .5 SOLUTION RESPIRATORY (INHALATION) at 06:53

## 2022-08-12 RX ADMIN — IPRATROPIUM BROMIDE AND ALBUTEROL SULFATE 3 ML: 2.5; .5 SOLUTION RESPIRATORY (INHALATION) at 10:55

## 2022-08-12 RX ADMIN — HYDRALAZINE HYDROCHLORIDE 25 MG: 25 TABLET, FILM COATED ORAL at 17:39

## 2022-08-12 RX ADMIN — PROPOFOL 100 MG: 10 INJECTION, EMULSION INTRAVENOUS at 13:41

## 2022-08-12 RX ADMIN — FENTANYL CITRATE 50 MCG: 50 INJECTION, SOLUTION INTRAMUSCULAR; INTRAVENOUS at 14:02

## 2022-08-12 RX ADMIN — OXYCODONE AND ACETAMINOPHEN 1 TABLET: 10; 325 TABLET ORAL at 03:25

## 2022-08-12 RX ADMIN — PHENYLEPHRINE HYDROCHLORIDE 100 MCG: 10 INJECTION INTRAVENOUS at 13:51

## 2022-08-12 RX ADMIN — FENTANYL CITRATE 50 MCG: 50 INJECTION, SOLUTION INTRAMUSCULAR; INTRAVENOUS at 14:12

## 2022-08-12 RX ADMIN — OXYCODONE AND ACETAMINOPHEN 1 TABLET: 10; 325 TABLET ORAL at 09:33

## 2022-08-12 RX ADMIN — FENTANYL CITRATE 100 MCG: 50 INJECTION, SOLUTION INTRAMUSCULAR; INTRAVENOUS at 13:41

## 2022-08-12 ASSESSMENT — PAIN SCALES - GENERAL
PAINLEVEL_OUTOF10: 0
PAINLEVEL_OUTOF10: 8
PAINLEVEL_OUTOF10: 9
PAINLEVEL_OUTOF10: 5
PAINLEVEL_OUTOF10: 10
PAINLEVEL_OUTOF10: 7

## 2022-08-12 ASSESSMENT — PAIN DESCRIPTION - LOCATION
LOCATION: FLANK
LOCATION: CHEST;HEAD

## 2022-08-12 ASSESSMENT — PAIN DESCRIPTION - DESCRIPTORS
DESCRIPTORS: ACHING;DISCOMFORT
DESCRIPTORS: ACHING;DISCOMFORT

## 2022-08-12 ASSESSMENT — PAIN DESCRIPTION - ORIENTATION
ORIENTATION: RIGHT

## 2022-08-12 ASSESSMENT — LIFESTYLE VARIABLES: SMOKING_STATUS: 0

## 2022-08-12 ASSESSMENT — PAIN DESCRIPTION - ONSET: ONSET: ON-GOING

## 2022-08-12 ASSESSMENT — PAIN DESCRIPTION - PAIN TYPE: TYPE: ACUTE PAIN

## 2022-08-12 ASSESSMENT — PAIN - FUNCTIONAL ASSESSMENT
PAIN_FUNCTIONAL_ASSESSMENT: ACTIVITIES ARE NOT PREVENTED
PAIN_FUNCTIONAL_ASSESSMENT: PREVENTS OR INTERFERES SOME ACTIVE ACTIVITIES AND ADLS

## 2022-08-12 ASSESSMENT — PAIN DESCRIPTION - FREQUENCY: FREQUENCY: CONTINUOUS

## 2022-08-12 NOTE — PROGRESS NOTES
Pulmonary and Critical Care Progress note. Susanne Caal    MRN# [id number]    Acct# [[id number]]  8/12/2022   11:32 AM CDT    Referring Elizabeth Alonzo MD      Chief Complaint: shortness of breath abd pain. HPI: Continues to have some pleurisy on the right. Seen in the preop holding area    Medications    [MAR Hold] mupirocin, , Nasal, BID    [MAR Hold] chlorhexidine gluconate, , Topical, See Admin Instructions    hydrALAZINE, 25 mg, Oral, 3 times per day    budesonide-formoterol, 2 puff, Inhalation, BID    [MAR Hold] sodium chloride flush, 10 mL, IntraVENous, 2 times per day    [facility] Hold] cefepime, 2,000 mg, IntraVENous, Q8H    [MAR Hold] nicotine, 1 patch, TransDERmal, Daily    [MAR Hold] vancomycin, 1,000 mg, IntraVENous, Q12H    [MAR Hold] ALPRAZolam, 1 mg, Oral, 4x Daily    [MAR Hold] vancomycin (VANCOCIN) intermittent dosing (placeholder), , Other, RX Placeholder    [MAR Hold] sodium chloride flush, 5-40 mL, IntraVENous, 2 times per day    [MAR Hold] ipratropium-albuterol, 1 vial, Inhalation, Q4H    [MAR Hold] citalopram, 40 mg, Oral, Daily    [MAR Hold] gabapentin, 600 mg, Oral, TID    [MAR Hold] pantoprazole, 40 mg, Oral, BID AC    [MAR Hold] sucralfate, 1 g, Oral, 4x Daily     Review of Systems:  RESPIRATORY:  positive for  dyspnea  CARDIOVASCULAR:  positive for  dyspnea      Physical Exam:  BP (!) 115/52   Pulse 78   Temp 97 °F (36.1 °C) (Temporal)   Resp 21   Ht 5' 6\" (1.676 m)   Wt 117 lb 8 oz (53.3 kg)   SpO2 98%   BMI 18.96 kg/m²   Intake/Output Summary (Last 24 hours) at 8/12/2022 1508  Last data filed at 8/12/2022 0908  Gross per 24 hour   Intake 0 ml   Output 750 ml   Net -750 ml         General appearance:elderly white female who does not appear to be in distress.     HEENT:normocephalic atraumatic  YPBXC:V0L9 no murmurs  Lungs:diminished bilaterally no rubs or tenderness or dullness to percussion  Abdomen:Soft non tender no indicated. Electronically Signed by Lakeshia Le MD at 03-Aug-2022 08:41:33 AM                My radiograph interpretation/independent review of imaging: reviewed. Problem list generated by [facility] Problems             Last Modified POA    * (Principal) Pneumonia 8/3/2022 Yes    Loculated pleural effusion 8/12/2022 Yes        Pulmonary Assessment/Plan:     Chronic hypoxic hypercapnic respiratory failure continue oxygen supplementation as necessary to maintain adequate oxygenation. Discussed potential need for mechanical ventilation postoperatively and the fact that she might have some difficulty getting off mechanical ventilation and possible need for tracheostomy. She agrees with tracheostomy if needed postoperatively. Underlying severe COPD. Continue bronchodilators continue pulmonary toilet. Right lower lobe pneumonia. Continue empirical antibiotic therapy. Loculated effusion that is worse. CTS consulted. For decortication today. Low probability for PE per PIOPED criteria. The patient's pretest probability for PE was low. The patient's lower extremity scan is negative. Her elevated D-dimer is likely secondary to her pneumonia. We will continue anticoagulation for 3 months. , consider transition to [address]. Dyspnea due to the above supportive care. DC planning. Ayush Damon MD, [facility], [facility]    The above note was generated using voice recognition software. Inadvertent typographical errors in transcription may have occurred.       Electronically signed by Ayush Damon MD on 08/12/22 at 3:08 PM

## 2022-08-12 NOTE — PROGRESS NOTES
Physical Therapy  Name: Kaitlynn Arboleda  MRN:  [id number]  Date of service:  8/12/2022 08/12/22 1212   Subjective   Subjective Attempt: Pt out to procedure at this time. Will continue to follow.          Electronically signed by Lissette Gomez PTA on 8/12/2022 at 12:12 PM

## 2022-08-12 NOTE — PROGRESS NOTES
Progress Note  Paul Greenwood Hasty  8/12/2022 12:39 PM  Subjective:   Admit Date:   8/3/2022      CC/ADMIT DX:       Interval History:   Reviewed overnight events and nursing notes. She has no new complaints. She is anxious about upcoming surgery,   I have reviewed all labs/diagnostics from the last 24hrs. ROS:   I have done a 10 point ROS and all are negative, except what is mentioned in the HPI.     Diet NPO    Medications:      lactated ringers 100 mL/hr at 08/12/22 1212    [MAR Hold] sodium chloride      [MAR Hold] sodium chloride      [MAR Hold] sodium chloride 75 mL/hr at 08/12/22 0857      [MAR Hold] mupirocin   Nasal BID    [MAR Hold] chlorhexidine gluconate   Topical See Admin Instructions    [MAR Hold] sodium chloride flush  10 mL IntraVENous 2 times per day    [MAR Hold] cefepime  2,000 mg IntraVENous Q8H    [MAR Hold] nicotine  1 patch TransDERmal Daily    [MAR Hold] vancomycin  1,000 mg IntraVENous Q12H    [MAR Hold] ALPRAZolam  1 mg Oral 4x Daily    [MAR Hold] vancomycin (VANCOCIN) intermittent dosing (placeholder)   Other RX Placeholder    [MAR Hold] sodium chloride flush  5-40 mL IntraVENous 2 times per day    [MAR Hold] ipratropium-albuterol  1 vial Inhalation Q4H    [MAR Hold] citalopram  40 mg Oral Daily    [MAR Hold] gabapentin  600 mg Oral TID    [MAR Hold] pantoprazole  40 mg Oral BID AC    [MAR Hold] sucralfate  1 g Oral 4x Daily           Objective:   Vitals: BP (!) 116/55   Pulse 79   Temp 97 °F (36.1 °C) (Temporal)   Resp 20   Ht 5' 6\" (1.676 m)   Wt 117 lb 8 oz (53.3 kg)   SpO2 96%   BMI 18.96 kg/m²    Intake/Output Summary (Last 24 hours) at 8/12/2022 1239  Last data filed at 8/12/2022 0908  Gross per 24 hour   Intake 900 ml   Output 1850 ml   Net -950 ml     General appearance: resting  Lungs: coarse  Heart: RRR  Abdomen: soft, non-tender; bowel sounds normal; no masses,  no organomegaly  Extremities: extremities normal, atraumatic, no cyanosis or edema  Neurologic:  negative, No obvious focal neurologic deficits. Assessment and Plan:   Principal Problem:    Pneumonia  Active Problems:    Loculated pleural effusion  Resolved Problems:    * No resolved hospital problems. *    COPD    Chronic Pain    Depression    Plan:   Continue present medication(s)    Follow with Pulm   CT Surgery to do surgery today for empyema   Continue anticoagulation as per Surgery   Continue antibiotics   Supportive care      Discharge planning:   her home     Reviewed treatment plans with the patient and/or family.              Electronically signed by Angelika Matias MD on 8/12/2022 at 12:39 PM

## 2022-08-12 NOTE — PROGRESS NOTES
Per Dr. Farhan Flores: Aung Nassar notified in microbiology for all 3 cultures on right pleural peel

## 2022-08-12 NOTE — PROGRESS NOTES
Procedural/Anesthesia consents have been reviewed with and signed by the pt. Consents have been placed in  chart.

## 2022-08-12 NOTE — ANESTHESIA PRE PROCEDURE
Department of Anesthesiology  Preprocedure Note       Name:  Sherif Díaz   Age:  74 y.o.  :  1950                                          MRN:  [id number]         Date:  2022      Surgeon: Adeola Farah):  Ivan Bacon MD    Procedure: Procedure(s):  THORACOSCOPY DECORTICATION VIDEO ASSISTED    Medications prior to admission:   Prior to Admission medications    Medication Sig Start Date End Date Taking? Authorizing Provider   levalbuterol [facility] HFA) 45 MCG/ACT inhaler Inhale 1 puff into the lungs every 4 hours as needed for Wheezing 22  Angela Mendes MD   sucralfate (CARAFATE) 1 GM tablet Take 1 tablet by mouth 4 times daily 22   Cherelle Padgett MD   phenazopyridine (PYRIDIUM) 200 MG tablet Take 1 tablet by mouth 3 times daily as needed for Pain (bladder discomfort) 21   Gracia Page MD   montelukast (SINGULAIR) 10 MG tablet Take 10 mg by mouth nightly    Historical Provider, MD   oxyCODONE-acetaminophen (PERCOCET)  MG per tablet TAKE 1 TABLET Q 4 TO 6 HRS PRN. (MAX 5/DAY).  Earliest Fill Date: 18  Stephanie Cohen MD   ondansetron (ZOFRAN ODT) 4 MG disintegrating tablet Take 1 tablet by mouth every 8 hours as needed for Nausea or Vomiting 18   TEN Suarez   nicotine (NICODERM CQ) 21 MG/24HR Place 1 patch onto the skin every 24 hours  Patient taking differently: Place 1 patch onto the skin every 24 hours 14 mg/24 18   TEN Suarez   tiZANidine (ZANAFLEX) 4 MG tablet Take 1 tablet by mouth 3 times daily as needed (.) 18   Stephanie Cohen MD   albuterol-ipratropium (COMBIVENT RESPIMAT)  MCG/ACT AERS inhaler Inhale 1 puff into the lungs every 6 hours 18   Telma Green MD   albuterol sulfate HFA (VENTOLIN HFA) 108 (90 Base) MCG/ACT inhaler Inhale 2 puffs into the lungs every 6 hours as needed for Wheezing 18   Telma Green MD   budesonide-formoterol (SYMBICORT) 160-4.5 MCG/ACT AERO Inhale 2 puffs into the lungs 2 times daily 4/27/18   Carole Rucker MD   pantoprazole (PROTONIX) 20 MG tablet TAKE 1 TABLET BY MOUTH 2 TIMES DAILY 12/18/17   Carole Rucker MD   fluticasone [facility]) 50 MCG/ACT nasal spray INSTILL 1 SPRAY IN EACH NOSTRIL DAILY 10/4/17   Carole Rucker MD   OXYGEN Inhale into the lungs    Historical Provider, MD   ipratropium-albuterol (DUONEB) 0.5-2.5 (3) MG/3ML SOLN nebulizer solution Inhale 3 mLs into the lungs every 4 hours 4/6/17   Carole Rucker MD   metoclopramide (REGLAN) 5 MG tablet Take 1 tablet by mouth 4 times daily 3/30/17   Carole Rucker MD   albuterol (PROVENTIL) (2.5 MG/3ML) 0.083% nebulizer solution Take 3 mLs by nebulization every 6 hours as needed for Wheezing 3/17/17   Carole Rucker MD   gabapentin (NEURONTIN) 600 MG tablet Take 600 mg by mouth 3 times daily    Historical Provider, MD   ALPRAZolam [facility]) 1 MG tablet Take 1 mg by mouth in the morning and 1 mg at noon and 1 mg in the evening and 1 mg before bedtime. Takes 4 times a day. 1/15/15   Historical Provider, MD   amphetamine-dextroamphetamine (ADDERALL) 20 MG tablet Take 20 mg by mouth in the morning and 20 mg before bedtime. Pt has not had filled recently  last filled 11/15/21. 1/21/15   Historical Provider, MD   citalopram (CELEXA) 40 MG tablet Take 20 mg by mouth in the morning.     Historical Provider, MD       Current medications:    Current Facility-Administered Medications   Medication Dose Route Frequency Provider Last Rate Last Admin    mupirocin (BACTROBAN) 2 % ointment   Nasal BID Claribel Naidu MD   Given at 08/12/22 0935    chlorhexidine gluconate (ANTISEPTIC SKIN CLEANSER) 4 % solution   Topical See Admin Instructions Claribel Naidu MD        cyclobenzaprine (FLEXERIL) tablet 5 mg  5 mg Oral TID PRN Carlos Simon MD   5 mg at 08/11/22 1500    sodium chloride flush 0.9 % injection 10 mL  10 mL IntraVENous 2 times per day Claribel Naidu MD  sodium chloride flush 0.9 % injection 10 mL  10 mL IntraVENous PRN Aung Nassar MD        0.9 % sodium chloride infusion   IntraVENous PRN Aung Nassar MD        cefepime (MAXIPIME) 2000 mg IVPB minibag  2,000 mg IntraVENous Q8H Angela Mendes MD 12.5 mL/hr at 08/12/22 0859 2,000 mg at 08/12/22 0859    nicotine (NICODERM CQ) 14 MG/24HR 1 patch  1 patch TransDERmal Daily Charity Portillo MD   1 patch at 08/12/22 0902    sodium chloride (OCEAN, BABY AYR) 0.65 % nasal spray 1 spray  1 spray Each Nostril PRN Charity Portillo MD        vancomycin (VANCOCIN) 1000 mg in dextrose 5% 250 mL IVPB  1,000 mg IntraVENous Q12H Og Gannon MD   Stopped at 08/12/22 0749    guaiFENesin (ROBITUSSIN) 100 MG/5ML oral solution 100 mg  100 mg Oral Q4H PRN Charity Portillo MD   100 mg at 08/10/22 1734    ALPRAZolam (XANAX) tablet 1 mg  1 mg Oral 4x Daily Charity Portillo MD   1 mg at 08/12/22 0933    oxyCODONE-acetaminophen (PERCOCET)  MG per tablet 1 tablet  1 tablet Oral Q4H PRN Charity Portillo MD   1 tablet at 08/12/22 0933    vancomycin (VANCOCIN) intermittent dosing (placeholder)   Other RX Vidhi MD Vilma        sodium chloride flush 0.9 % injection 5-40 mL  5-40 mL IntraVENous 2 times per day Charity Portillo MD   10 mL at 08/06/22 1954    sodium chloride flush 0.9 % injection 5-40 mL  5-40 mL IntraVENous PRN Charity Portillo MD        0.9 % sodium chloride infusion   IntraVENous PRN Charity Portillo MD        acetaminophen (TYLENOL) tablet 650 mg  650 mg Oral Q4H PRN Charity Portillo MD   650 mg at 08/05/22 1401    ondansetron (ZOFRAN-ODT) disintegrating tablet 4 mg  4 mg Oral Q8H PRN Charity Portillo MD   4 mg at 08/11/22 1112    Or    ondansetron (ZOFRAN) injection 4 mg  4 mg IntraVENous Q6H PRN Charity Portillo MD   4 mg at 08/12/22 0945    0.9 % sodium chloride infusion   IntraVENous Continuous Leland Pablo PA-C 75 mL/hr at 08/12/22 0857 New Bag at 08/12/22 0857    ipratropium-albuterol (DUONEB) nebulizer solution 3 mL  1 vial Inhalation Q4H Jean Alonzo MD   3 mL at 08/12/22 1055    citalopram (CELEXA) tablet 40 mg  40 mg Oral Daily Jean Alonzo MD   40 mg at 08/11/22 0756    gabapentin (NEURONTIN) tablet 600 mg  600 mg Oral TID Jean Alonzo MD   600 mg at 08/11/22 1257    pantoprazole (PROTONIX) tablet 40 mg  40 mg Oral BID AC Jean Alonzo MD   40 mg at 08/11/22 1456    sucralfate (CARAFATE) tablet 1 g  1 g Oral 4x Daily Jean Alonzo MD   1 g at 08/11/22 1635       Allergies: Allergies   Allergen Reactions    Iv Dye [Iodides] Swelling     PT states throat swells    Aspirin Hives    Codeine     Demerol     Fentanyl Hives    Neosporin [Bacitracin-Neomycin-Polymyxin]     Nsaids     Pcn [Penicillins]     Pentazocine Lactate     Sulfa Antibiotics     Talwin [Pentazocine] Hives    Influenza Vaccines Hives and Rash       Problem List:    Patient Active Problem List   Diagnosis Code    Chronic pain G89.29    Anxiety and depression F41.9, F32. A    GERD (gastroesophageal reflux disease) K21.9    Chronic bronchitis with acute exacerbation (HCC) J20.9, J42    Hypertension I10    Echocardiogram abnormal R93.1    COPD exacerbation (HCC) J44.1    Cervicalgia M54.2    Chronic low back pain M54.50, G89.29    Acute hypercapnic respiratory failure (HCC) J96.02    Alpha-1-antitrypsin deficiency carrier Z14.8    Current every day smoker F17.200    Folate deficiency anemia D52.9    Malignant neoplasm of upper lobe of right lung (HCC) C34.11    Palliative care patient Z51.5    Severe malnutrition (HCC) E43    Intractable nausea and vomiting R11.2    Gram-negative bacteremia R78.81    Hypomagnesemia E83.42    Tobacco abuse counseling Z71.6    Cigarette nicotine dependence with nicotine-induced disorder F17.219    Clostridium perfringens infection B96.7    Acute on chronic respiratory failure (HCC) J96.20    Anxiety F41.9    Upper abdominal pain R10.10    Heart burn R12    Nausea R11.0    Rhinovirus infection B34.8    Pneumonia J18.9    Loculated pleural effusion J90       Past Medical History:        Diagnosis Date    ADHD (attention deficit hyperactivity disorder)     Anxiety     COPD (chronic obstructive pulmonary disease) (HCC)     Depression     Fibromyalgia     GERD (gastroesophageal reflux disease)     Hypertension     Malignant neoplasm of upper lobe of right lung ([facility] [address] 12/04/2018    Palliative care patient 10/06/2021    RA (rheumatoid arthritis) ([facility] [address]        Past Surgical History:        Procedure Laterality Date    CHOLECYSTECTOMY      HERNIA REPAIR      HYSTERECTOMY (CERVIX STATUS UNKNOWN)      LEG SURGERY      steel garth placement. 2009       Social History:    Social History     Tobacco Use    Smoking status: Some Days     Packs/day: 0.25     Years: 30.00     Pack years: 7.50     Types: Cigarettes    Smokeless tobacco: Never   Substance Use Topics    Alcohol use: No                                Ready to quit: Not Answered  Counseling given: Not Answered      Vital Signs (Current):   Vitals:    08/11/22 2003 08/12/22 0130 08/12/22 0336 08/12/22 0830   BP: (!) 161/72  (!) 110/45 (!) 118/55   Pulse: (!) 108 91 93 77   Resp: 28 22 22   Temp: 98.4 °F (36.9 °C)  98.8 °F (37.1 °C) 97 °F (36.1 °C)   TempSrc: Temporal  Temporal Temporal   SpO2: 96% 92% 91% 90%   Weight:       Height:                                                  BP Readings from Last 3 Encounters:   08/12/22 (!) 118/55   06/25/22 (!) 116/52   11/14/21 (!) 131/55       NPO Status:                                                                                 BMI:   Wt Readings from Last 3 Encounters:   08/03/22 117 lb 8 oz (53.3 kg)   06/20/22 115 lb (52.2 kg)   11/10/21 118 lb 6.4 oz (53.7 kg)     Body mass index is 18.96 kg/m².     CBC:   Lab Results   Component Value Date/Time    WBC 10.6 08/03/2022 05:25 AM    RBC 3.54 08/03/2022 05:25 AM HGB 11.0 08/03/2022 05:25 AM    HCT 36.4 08/03/2022 05:25 AM    .8 08/03/2022 05:25 AM    RDW 12.4 08/03/2022 05:25 AM     08/03/2022 05:25 AM       CMP:   Lab Results   Component Value Date/Time     08/07/2022 02:32 AM    K 4.0 08/07/2022 02:32 AM     08/07/2022 02:32 AM    CO2 31 08/07/2022 02:32 AM    BUN 8 08/07/2022 02:32 AM    CREATININE 0.4 08/07/2022 02:32 AM    GFRAA >59 08/07/2022 02:32 AM    LABGLOM >60 08/07/2022 02:32 AM    GLUCOSE 94 08/07/2022 02:32 AM    PROT 5.7 08/03/2022 05:25 AM    CALCIUM 7.5 08/07/2022 02:32 AM    BILITOT <0.2 08/03/2022 05:25 AM    ALKPHOS 95 08/03/2022 05:25 AM    AST 24 08/03/2022 05:25 AM    ALT 21 08/03/2022 05:25 AM       POC Tests: No results for input(s): POCGLU, POCNA, POCK, POCCL, POCBUN, POCHEMO, POCHCT in the last 72 hours.     Coags:   Lab Results   Component Value Date/Time    PROTIME 12.4 11/08/2021 09:12 PM    INR 0.90 11/08/2021 09:12 PM    APTT 32.5 11/08/2021 09:12 PM       HCG (If Applicable): No results found for: PREGTESTUR, PREGSERUM, HCG, HCGQUANT     ABGs:   Lab Results   Component Value Date/Time    PHART 7.430 06/16/2022 02:54 PM    PO2ART 53.0 06/16/2022 02:54 PM    FXS2XEE 57.0 06/16/2022 02:54 PM    RPJ6LLJ 37.8 06/16/2022 02:54 PM    BEART 11.3 06/16/2022 02:54 PM    P1OHVNGV 86.7 06/16/2022 02:54 PM        Type & Screen (If Applicable):  No results found for: LABABO, LABRH    Drug/Infectious Status (If Applicable):  No results found for: HIV, HEPCAB    COVID-19 Screening (If Applicable):   Lab Results   Component Value Date/Time    COVID19 Not Detected 08/03/2022 05:27 AM    COVID19 Not Detected 06/10/2022 12:01 PM           Anesthesia Evaluation  Patient summary reviewed no history of anesthetic complications:   Airway: Mallampati: I  TM distance: >3 FB   Neck ROM: full  Mouth opening: > = 3 FB   Dental:    (+) edentulous, lower dentures and upper dentures      Pulmonary:   (+) pneumonia:  COPD (wears 3L NCO2):  sleep apnea:  decreased breath sounds (worst on right): bilateral wheezes: scattered     (-) asthma, recent URI and not a current smoker (quit within past month)          Patient did not smoke on day of surgery. ROS comment: Lung Cancer    CT 8/4/22  1. Right lower lobe consolidations, suggestive of infectious process. 2.Small right pleural effusion. 3.Emphysematous changes. 4.Small hiatal hernia. 5.Status post cholecystectomy. 6.Aortic and coronary calcific atherosclerosis. 7.Degenerative spondylosis. Cardiovascular:  Exercise tolerance: poor (<4 METS), Limited by SOB  (+) hypertension:,     (-) pacemaker, past MI, CABG/stent and  angina    ECG reviewed  Rhythm: regular  Rate: normal  Echocardiogram reviewed         Beta Blocker:  Not on Beta Blocker         Neuro/Psych:   (+) neuromuscular disease (Fibromyalgia):, psychiatric history (ADHD, Anxiety):   (-) seizures, TIA and CVA           GI/Hepatic/Renal:   (+) GERD: well controlled,      (-) liver disease and no renal disease       Endo/Other:    (+) : arthritis: rheumatoid. , .    (-) diabetes mellitus, hypothyroidism, hyperthyroidism               Abdominal:             Vascular: Other Findings:           Anesthesia Plan      general     ASA 4     (Discussed risks associated with anesthesia, including MACE, pulmonary complications. Also discussed likelihood that patient will remain intubated after the procedure.  )  Induction: intravenous. arterial line  MIPS: Postoperative opioids intended and Prophylactic antiemetics administered. Anesthetic plan and risks discussed with patient and child/children. Use of blood products discussed with patient and child/children whom consented to blood products.                      Rogelio Pacheco MD   8/12/2022

## 2022-08-12 NOTE — PROGRESS NOTES
Called Dr Delia Tate to come to bedside to evaluate pt. Was getting ready to transport pt. To ICU and heart rate increased to 160, pt. In SVT, and aretrial BP reading 55/28. Pt. Responsive and following commands. Given 200 mcg Phenylephrine per Dr Delia Tate. Bp increased to 104/60 and heart rate decreased to 92. Pt. Still alert and following commands. Ok to transport to ICU per Dr Delia Tate. Called Yumiko Britt RN in ICU to give update on the above event. Pt. Transported to  on monitor by myself and Telma Montoya.

## 2022-08-12 NOTE — ANESTHESIA POSTPROCEDURE EVALUATION
Department of Anesthesiology  Postprocedure Note    Patient: Ofelia Velasquez  MRN: [id number]  YOB: 1950  Date of evaluation: 8/12/2022      Procedure Summary     Date: 08/12/22 Room / Location: [address]    Anesthesia Start: 1338 Anesthesia Stop: 1514    Procedure: [address] (Right) Diagnosis:       Exudative pleurisy      (Exudative pleurisy [J90])    Surgeons: Magdiel Montenegro MD Responsible Provider: TEN Thomas CRNA    Anesthesia Type: general ASA Status: 4          Anesthesia Type: No value filed.     Adonay Phase I: Adonay Score: 5    Adonay Phase II:        Anesthesia Post Evaluation    Patient location during evaluation: bedside  Patient participation: waiting for patient participation  Level of consciousness: sleepy but conscious  Pain score: 0  Airway patency: patent  Nausea & Vomiting: no nausea and no vomiting  Complications: no  Cardiovascular status: hemodynamically stable  Respiratory status: acceptable, spontaneous ventilation and face mask (10L)  Hydration status: euvolemic

## 2022-08-12 NOTE — PROGRESS NOTES
Bipap placed on pt 14/7 with 15 lpm bled in.pt sating 92%. Pt informed rt and rn's in the room that she has a trilogy at home. This is the first the staff has heard anything about the trilogy.   Pt states she does not wear  because the mask will not stay on her face

## 2022-08-12 NOTE — CONSULTS
Comprehensive Nutrition Assessment    Type and Reason for Visit:  Consult    Nutrition Recommendations/Plan:   Continue POC         Nutrition Assessment:    Consult received for diet education. Pt is currently in the OR and NPO. Will evaluate per current f/u date.         Current Nutrition Intake & Therapies:         Diet NPO    Anthropometric Measures:  Height: 5' 6\" (167.6 cm)  Ideal Body Weight (IBW): 130 lbs (59 kg)       Current Body Weight: 117 lb (53.1 kg)  Current BMI (kg/m2): 18.9                          BMI Categories: Underweight (BMI less than 22) age over 72        Nutrition Diagnosis:   No nutrition diagnosis at this time     Nutrition Interventions:     Coordination of Nutrition Care: Continue to monitor while inpatient              Nutrition Monitoring and Evaluation:        Physical Signs/Symptoms Outcomes: Biochemical Data, Nutrition Focused Physical Findings, Weight        Betty Allen MS, RD, LD  Contact: [phone]

## 2022-08-12 NOTE — BRIEF OP NOTE
Brief Postoperative Note      Patient: Jacqualine Primrose  YOB: 1950  MRN: [id number]    Date of Procedure: 8/12/2022    Pre-Op Diagnosis: Exudative pleurisy [J90]    Post-Op Diagnosis: Same       Procedure(s):  THORACOSCOPY DECORTICATION VIDEO ASSISTED    Surgeon(s):  Carley Wilkinson MD    Assistant:  First Assistant: Alec Laird RN    Anesthesia: General    Estimated Blood Loss (mL): Minimal    Complications: None    Specimens:   ID Type Source Tests Collected by Time Destination   1 : right pleural fluid Body Fluid Lung CULTURE, FUNGUS, CULTURE, SURGICAL, CULTURE WITH SMEAR, ACID FAST Felipa Cornejo MD 8/12/2022 1420    2 : pleural peel right Tissue Lung CULTURE, ACID FAST BACILLUS, BLOOD Carley Wilkinson MD 8/12/2022 1422    A : pleural peel right Tissue Lung SURGICAL PATHOLOGY Carley Wilkinson MD 8/12/2022 1426        Implants:  * No implants in log *      Drains:   Urinary Catheter 2 Way (Active)       Findings: posterior empyema peel that was decorticated; 1.3 liters fluid drained. Both specimens sent for culture/pathology.     Electronically signed by Carley Wilkinson MD on 8/12/2022 at 2:47 PM

## 2022-08-13 ENCOUNTER — APPOINTMENT (OUTPATIENT)
Dept: GENERAL RADIOLOGY | Age: 72
DRG: 853 | End: 2022-08-13
Payer: MEDICARE

## 2022-08-13 PROBLEM — J96.12 CHRONIC RESPIRATORY FAILURE WITH HYPOXIA AND HYPERCAPNIA (HCC): Status: ACTIVE | Noted: 2022-06-10

## 2022-08-13 PROBLEM — J96.11 CHRONIC RESPIRATORY FAILURE WITH HYPOXIA AND HYPERCAPNIA (HCC): Status: ACTIVE | Noted: 2022-06-10

## 2022-08-13 LAB
ALBUMIN SERPL-MCNC: 3 G/DL (ref 3.5–5.2)
ALP BLD-CCNC: 62 U/L (ref 35–104)
ALT SERPL-CCNC: 9 U/L (ref 5–33)
ANION GAP SERPL CALCULATED.3IONS-SCNC: 5 MMOL/L (ref 7–19)
AST SERPL-CCNC: 12 U/L (ref 5–32)
BASE EXCESS ARTERIAL: 17.5 MMOL/L (ref -2–2)
BASOPHILS ABSOLUTE: 0 K/UL (ref 0–0.2)
BASOPHILS RELATIVE PERCENT: 0.1 % (ref 0–1)
BI-LEVEL POS AIRWAY PRESSURE(EXPIRATORY): 7
BI-LEVEL POS AIRWAY PRESSURE(INSPIRATORY): 14
BILIRUB SERPL-MCNC: <0.2 MG/DL (ref 0.2–1.2)
BUN BLDV-MCNC: 9 MG/DL (ref 8–23)
CALCIUM SERPL-MCNC: 8.3 MG/DL (ref 8.8–10.2)
CARBOXYHEMOGLOBIN ARTERIAL: 2.3 % (ref 0–5)
CHLORIDE BLD-SCNC: 97 MMOL/L (ref 98–111)
CO2: 38 MMOL/L (ref 22–29)
CREAT SERPL-MCNC: 0.3 MG/DL (ref 0.5–0.9)
EOSINOPHILS ABSOLUTE: 0 K/UL (ref 0–0.6)
EOSINOPHILS RELATIVE PERCENT: 0 % (ref 0–5)
GFR AFRICAN AMERICAN: >59
GFR NON-AFRICAN AMERICAN: >60
GLUCOSE BLD-MCNC: 114 MG/DL (ref 74–109)
HCO3 ARTERIAL: 44.1 MMOL/L (ref 22–26)
HCT VFR BLD CALC: 29.4 % (ref 37–47)
HEMOGLOBIN, ART, EXTENDED: 9.4 G/DL (ref 12–16)
HEMOGLOBIN: 9 G/DL (ref 12–16)
IMMATURE GRANULOCYTES #: 0 K/UL
LYMPHOCYTES ABSOLUTE: 0.5 K/UL (ref 1.1–4.5)
LYMPHOCYTES RELATIVE PERCENT: 6.8 % (ref 20–40)
MCH RBC QN AUTO: 31.3 PG (ref 27–31)
MCHC RBC AUTO-ENTMCNC: 30.6 G/DL (ref 33–37)
MCV RBC AUTO: 102.1 FL (ref 81–99)
METHEMOGLOBIN ARTERIAL: 1.5 %
MONOCYTES ABSOLUTE: 0.5 K/UL (ref 0–0.9)
MONOCYTES RELATIVE PERCENT: 6.2 % (ref 0–10)
NEUTROPHILS ABSOLUTE: 6.7 K/UL (ref 1.5–7.5)
NEUTROPHILS RELATIVE PERCENT: 86.4 % (ref 50–65)
O2 CONTENT ARTERIAL: 11.8 ML/DL
O2 SAT, ARTERIAL: 88.9 %
O2 THERAPY: ABNORMAL
OXYGEN FLOW: 12
PCO2 ARTERIAL: 65 MMHG (ref 35–45)
PDW BLD-RTO: 12.4 % (ref 11.5–14.5)
PH ARTERIAL: 7.44 (ref 7.35–7.45)
PLATELET # BLD: 319 K/UL (ref 130–400)
PMV BLD AUTO: 10.1 FL (ref 9.4–12.3)
PO2 ARTERIAL: 59 MMHG (ref 80–100)
POTASSIUM SERPL-SCNC: 4.3 MMOL/L (ref 3.5–5)
POTASSIUM, WHOLE BLOOD: 4.1
RBC # BLD: 2.88 M/UL (ref 4.2–5.4)
SAMPLE SOURCE: ABNORMAL
SODIUM BLD-SCNC: 140 MMOL/L (ref 136–145)
TOTAL PROTEIN: 5.1 G/DL (ref 6.6–8.7)
WBC # BLD: 7.7 K/UL (ref 4.8–10.8)

## 2022-08-13 PROCEDURE — 6370000000 HC RX 637 (ALT 250 FOR IP): Performed by: SURGERY

## 2022-08-13 PROCEDURE — 80053 COMPREHEN METABOLIC PANEL: CPT

## 2022-08-13 PROCEDURE — 94660 CPAP INITIATION&MGMT: CPT

## 2022-08-13 PROCEDURE — 6360000002 HC RX W HCPCS: Performed by: SURGERY

## 2022-08-13 PROCEDURE — 99233 SBSQ HOSP IP/OBS HIGH 50: CPT | Performed by: INTERNAL MEDICINE

## 2022-08-13 PROCEDURE — 2700000000 HC OXYGEN THERAPY PER DAY

## 2022-08-13 PROCEDURE — 2580000003 HC RX 258: Performed by: SURGERY

## 2022-08-13 PROCEDURE — 2000000000 HC ICU R&B

## 2022-08-13 PROCEDURE — 99024 POSTOP FOLLOW-UP VISIT: CPT | Performed by: SURGERY

## 2022-08-13 PROCEDURE — 71045 X-RAY EXAM CHEST 1 VIEW: CPT

## 2022-08-13 PROCEDURE — 82803 BLOOD GASES ANY COMBINATION: CPT

## 2022-08-13 PROCEDURE — 94640 AIRWAY INHALATION TREATMENT: CPT

## 2022-08-13 PROCEDURE — 85025 COMPLETE CBC W/AUTO DIFF WBC: CPT

## 2022-08-13 RX ADMIN — SODIUM CHLORIDE: 9 INJECTION, SOLUTION INTRAVENOUS at 16:32

## 2022-08-13 RX ADMIN — SUCRALFATE 1 G: 1 TABLET ORAL at 08:30

## 2022-08-13 RX ADMIN — OXYCODONE AND ACETAMINOPHEN 1 TABLET: 10; 325 TABLET ORAL at 16:34

## 2022-08-13 RX ADMIN — IPRATROPIUM BROMIDE AND ALBUTEROL SULFATE 3 ML: 2.5; .5 SOLUTION RESPIRATORY (INHALATION) at 10:22

## 2022-08-13 RX ADMIN — CEFEPIME 2000 MG: 2 INJECTION, POWDER, FOR SOLUTION INTRAVENOUS at 00:17

## 2022-08-13 RX ADMIN — CEFEPIME 2000 MG: 2 INJECTION, POWDER, FOR SOLUTION INTRAVENOUS at 08:48

## 2022-08-13 RX ADMIN — GABAPENTIN 600 MG: 600 TABLET, FILM COATED ORAL at 13:26

## 2022-08-13 RX ADMIN — MUPIROCIN: 20 OINTMENT TOPICAL at 08:23

## 2022-08-13 RX ADMIN — SUCRALFATE 1 G: 1 TABLET ORAL at 12:35

## 2022-08-13 RX ADMIN — ARFORMOTEROL TARTRATE 15 MCG: 15 SOLUTION RESPIRATORY (INHALATION) at 18:12

## 2022-08-13 RX ADMIN — HYDRALAZINE HYDROCHLORIDE 25 MG: 25 TABLET, FILM COATED ORAL at 13:26

## 2022-08-13 RX ADMIN — IPRATROPIUM BROMIDE AND ALBUTEROL SULFATE 3 ML: 2.5; .5 SOLUTION RESPIRATORY (INHALATION) at 06:27

## 2022-08-13 RX ADMIN — ALPRAZOLAM 1 MG: 0.5 TABLET ORAL at 12:35

## 2022-08-13 RX ADMIN — MORPHINE SULFATE 2 MG: 2 INJECTION, SOLUTION INTRAMUSCULAR; INTRAVENOUS at 08:23

## 2022-08-13 RX ADMIN — OXYCODONE AND ACETAMINOPHEN 1 TABLET: 10; 325 TABLET ORAL at 12:32

## 2022-08-13 RX ADMIN — CEFEPIME 2000 MG: 2 INJECTION, POWDER, FOR SOLUTION INTRAVENOUS at 23:57

## 2022-08-13 RX ADMIN — IPRATROPIUM BROMIDE AND ALBUTEROL SULFATE 3 ML: 2.5; .5 SOLUTION RESPIRATORY (INHALATION) at 14:19

## 2022-08-13 RX ADMIN — IPRATROPIUM BROMIDE AND ALBUTEROL SULFATE 3 ML: 2.5; .5 SOLUTION RESPIRATORY (INHALATION) at 02:03

## 2022-08-13 RX ADMIN — BUDESONIDE 250 MCG: 0.25 SUSPENSION RESPIRATORY (INHALATION) at 18:12

## 2022-08-13 RX ADMIN — Medication 1000 MG: at 20:43

## 2022-08-13 RX ADMIN — GABAPENTIN 600 MG: 600 TABLET, FILM COATED ORAL at 20:33

## 2022-08-13 RX ADMIN — IPRATROPIUM BROMIDE AND ALBUTEROL SULFATE 3 ML: 2.5; .5 SOLUTION RESPIRATORY (INHALATION) at 22:14

## 2022-08-13 RX ADMIN — ALPRAZOLAM 1 MG: 0.5 TABLET ORAL at 08:31

## 2022-08-13 RX ADMIN — Medication 1000 MG: at 05:22

## 2022-08-13 RX ADMIN — GABAPENTIN 600 MG: 600 TABLET, FILM COATED ORAL at 08:30

## 2022-08-13 RX ADMIN — OXYCODONE AND ACETAMINOPHEN 1 TABLET: 10; 325 TABLET ORAL at 21:32

## 2022-08-13 RX ADMIN — ENOXAPARIN SODIUM 40 MG: 100 INJECTION SUBCUTANEOUS at 16:38

## 2022-08-13 RX ADMIN — SUCRALFATE 1 G: 1 TABLET ORAL at 20:33

## 2022-08-13 RX ADMIN — ARFORMOTEROL TARTRATE 15 MCG: 15 SOLUTION RESPIRATORY (INHALATION) at 06:27

## 2022-08-13 RX ADMIN — ALPRAZOLAM 1 MG: 0.5 TABLET ORAL at 16:33

## 2022-08-13 RX ADMIN — ALPRAZOLAM 1 MG: 0.5 TABLET ORAL at 20:33

## 2022-08-13 RX ADMIN — BUDESONIDE 250 MCG: 0.25 SUSPENSION RESPIRATORY (INHALATION) at 06:27

## 2022-08-13 RX ADMIN — IPRATROPIUM BROMIDE AND ALBUTEROL SULFATE 3 ML: 2.5; .5 SOLUTION RESPIRATORY (INHALATION) at 17:58

## 2022-08-13 RX ADMIN — PANTOPRAZOLE SODIUM 40 MG: 40 TABLET, DELAYED RELEASE ORAL at 16:33

## 2022-08-13 RX ADMIN — MUPIROCIN: 20 OINTMENT TOPICAL at 20:33

## 2022-08-13 RX ADMIN — MORPHINE SULFATE 2 MG: 2 INJECTION, SOLUTION INTRAMUSCULAR; INTRAVENOUS at 04:45

## 2022-08-13 RX ADMIN — MORPHINE SULFATE 2 MG: 2 INJECTION, SOLUTION INTRAMUSCULAR; INTRAVENOUS at 23:52

## 2022-08-13 RX ADMIN — CEFEPIME 2000 MG: 2 INJECTION, POWDER, FOR SOLUTION INTRAVENOUS at 16:32

## 2022-08-13 RX ADMIN — CITALOPRAM HYDROBROMIDE 40 MG: 20 TABLET ORAL at 08:30

## 2022-08-13 RX ADMIN — SUCRALFATE 1 G: 1 TABLET ORAL at 16:33

## 2022-08-13 ASSESSMENT — PAIN SCALES - GENERAL
PAINLEVEL_OUTOF10: 8
PAINLEVEL_OUTOF10: 10
PAINLEVEL_OUTOF10: 10
PAINLEVEL_OUTOF10: 1
PAINLEVEL_OUTOF10: 10
PAINLEVEL_OUTOF10: 6
PAINLEVEL_OUTOF10: 7
PAINLEVEL_OUTOF10: 0
PAINLEVEL_OUTOF10: 10
PAINLEVEL_OUTOF10: 3
PAINLEVEL_OUTOF10: 0
PAINLEVEL_OUTOF10: 0

## 2022-08-13 ASSESSMENT — PAIN DESCRIPTION - LOCATION
LOCATION: CHEST
LOCATION: BACK
LOCATION: BACK
LOCATION: CHEST
LOCATION: CHEST
LOCATION: BACK

## 2022-08-13 ASSESSMENT — PAIN DESCRIPTION - DESCRIPTORS
DESCRIPTORS: ACHING;SORE
DESCRIPTORS: ACHING
DESCRIPTORS: ACHING;DISCOMFORT
DESCRIPTORS: ACHING

## 2022-08-13 ASSESSMENT — PAIN DESCRIPTION - PAIN TYPE
TYPE: SURGICAL PAIN

## 2022-08-13 ASSESSMENT — PAIN - FUNCTIONAL ASSESSMENT
PAIN_FUNCTIONAL_ASSESSMENT: PREVENTS OR INTERFERES SOME ACTIVE ACTIVITIES AND ADLS

## 2022-08-13 ASSESSMENT — PAIN DESCRIPTION - FREQUENCY
FREQUENCY: INTERMITTENT
FREQUENCY: INTERMITTENT
FREQUENCY: OTHER (COMMENT)

## 2022-08-13 ASSESSMENT — PAIN DESCRIPTION - ORIENTATION
ORIENTATION: RIGHT;LOWER
ORIENTATION: RIGHT;LOWER
ORIENTATION: RIGHT
ORIENTATION: LOWER
ORIENTATION: LOWER
ORIENTATION: RIGHT;LOWER

## 2022-08-13 ASSESSMENT — PAIN DESCRIPTION - ONSET
ONSET: GRADUAL

## 2022-08-13 ASSESSMENT — ENCOUNTER SYMPTOMS: SHORTNESS OF BREATH: 1

## 2022-08-13 NOTE — PROGRESS NOTES
Progress Note    2022 8:20 AM          POD#   1 VATS decortication    Subjective:  Ms. David Gomez is on bipap, oxygenating adequately. PULSE OXIMETRY RANGE: SpO2  Av.2 %  Min: 85 %  Max: 100 %  SUPPLEMENTAL O2: O2 Flow Rate (L/min): 6 L/min   Vital Signs: BP (!) 119/49   Pulse 76   Temp 99.2 °F (37.3 °C) (Temporal)   Resp 24   Ht 5' 6\" (1.676 m)   Wt 123 lb 8 oz (56 kg)   SpO2 94%   BMI 19.93 kg/m²    Temperature Range:   Temp: 99.2 °F (37.3 °C)   Temp  Av.2 °F (36.8 °C)  Min: 97 °F (36.1 °C)  Max: 99.2 °F (37.3 °C)        Rhythm: normal sinus rhythm    Labs:   ABG:    Lab Results   Component Value Date/Time    PHART 7.440 2022 04:23 AM    PO2ART 59.0 2022 04:23 AM    WWC7RQP 65.0 2022 04:23 AM    C3FBYSQR 88.9 2022 04:23 AM    ECG3WFZ 44.1 2022 04:23 AM    BEART 17.5 2022 04:23 AM     CBC:   Recent Labs     22  1305 22  0417   WBC 6.2 7.7   HGB 8.4* 9.0*   HCT 27.9* 29.4*   .1* 102.1*    319     BMP:   Recent Labs     22  1701 22  0417 22  0423   NA  --  140  --    K 4.1 4.3 4.1   CL  --  97*  --    CO2  --  38*  --    BUN  --  9  --    CREATININE  --  0.3*  --      PT/INR: No results for input(s): PROTIME, INR in the last 72 hours. APTT: No results for input(s): APTT in the last 72 hours. Chest X-Ray:  right lung well expanded, no effusion   CT:  I/O last 3 completed shifts: In: 1030.7 [P.O.:130; I.V.:600.7; IV Piggyback:300]  Out: 4189 [Urine:1915; Chest Tube:298]      Air Leak:  No air leak  I/O last 3 completed shifts: In: 1030.7 [P.O.:130; I.V.:600.7;  IV Piggyback:300]  Out: 5118 [Urine:1915; Chest Tube:298]  Scheduled Meds:    mupirocin   Nasal BID    sodium chloride flush  5-40 mL IntraVENous 2 times per day    enoxaparin  40 mg SubCUTAneous Daily    ipratropium-albuterol  1 ampule Inhalation Q4H WA    hydrALAZINE  25 mg Oral 3 times per day    budesonide  250 mcg Nebulization BID    Arformoterol Tartrate 15 mcg Nebulization BID    sodium chloride flush  10 mL IntraVENous 2 times per day    cefepime  2,000 mg IntraVENous Q8H    nicotine  1 patch TransDERmal Daily    vancomycin  1,000 mg IntraVENous Q12H    ALPRAZolam  1 mg Oral 4x Daily    vancomycin (VANCOCIN) intermittent dosing (placeholder)   Other RX Placeholder    ipratropium-albuterol  1 vial Inhalation Q4H    citalopram  40 mg Oral Daily    gabapentin  600 mg Oral TID    pantoprazole  40 mg Oral BID AC    sucralfate  1 g Oral 4x Daily     Continuous Infusions:    sodium chloride 75 mL/hr at 08/12/22 1714    sodium chloride      sodium chloride 75 mL/hr at 08/12/22 0857     Exam:   General appearance: on BIPAP  Neck: no bruits, no JVD, No lymph nodes   Lungs: no rhonchi, no wheezes, no rales   Rigth port site incisions: stable, dressing dry and intact    Heart: S1 and S2 no murmer, + rub  Abdomen: positive bowel sounds, no bruits, no masses  Extremities: warm and dry, no cyanosis, no clubbing    Assessment  SP VATS decort, bronchoscopy. Requiring frequent morphine doses for pain. In addition, she has CO2 retention. Morphine improves her ability to have less problems with chest compliance but also creates a risk for aggravating her hypoventilation. Will start toradol given good renal function. She is a chronic CO2 retainer with end-stage COPD. Baseline PCO2 57 in June 2022. Her current CO2 is close to baseline. Will attempt to wean off BIPAP according to O2 sats. Continue aggressive pulmonary rehab, inhalers, ambulation, etc.  Minimal bleeding. Chest tube output 300cc total.  Will remove according to the more rigorous criteria for empyema tubes (<200cc per day). Continue current abx for pneumonia/empyema.       Patient Active Problem List   Diagnosis    Chronic pain    Anxiety and depression    GERD (gastroesophageal reflux disease)    Chronic bronchitis with acute exacerbation (HCC)    Hypertension    Echocardiogram abnormal    COPD exacerbation ([facility] [address] Cervicalgia    Chronic low back pain    Acute hypercapnic respiratory failure (HCC)    Alpha-1-antitrypsin deficiency carrier    Current every day smoker    Folate deficiency anemia    Malignant neoplasm of upper lobe of right lung (HCC)    Palliative care patient    Severe malnutrition (HCC)    Intractable nausea and vomiting    Gram-negative bacteremia    Hypomagnesemia    Tobacco abuse counseling    Cigarette nicotine dependence with nicotine-induced disorder    Clostridium perfringens infection    Acute on chronic respiratory failure (HCC)    Anxiety    Upper abdominal pain    Heart burn    Nausea    Rhinovirus infection    Pneumonia    Loculated pleural effusion         MD Kandice Mosley MD

## 2022-08-13 NOTE — PROGRESS NOTES
Pulmonary and Critical Care Progress note. Susanne Caal    MRN# [id number]    Acct# [de-identified]  8/13/2022   11:32 AM CDT    Referring Angi Smith MD      Chief Complaint: shortness of breath abd pain. HPI: Patient is seen and examined in intensive care unit. She seems comfortable. Some pain at the surgical site    Medications    mupirocin, , Nasal, BID    sodium chloride flush, 5-40 mL, IntraVENous, 2 times per day    enoxaparin, 40 mg, SubCUTAneous, Daily    ipratropium-albuterol, 1 ampule, Inhalation, Q4H WA    hydrALAZINE, 25 mg, Oral, 3 times per day    budesonide, 250 mcg, Nebulization, BID    Arformoterol Tartrate, 15 mcg, Nebulization, BID    sodium chloride flush, 10 mL, IntraVENous, 2 times per day    cefepime, 2,000 mg, IntraVENous, Q8H    nicotine, 1 patch, TransDERmal, Daily    vancomycin, 1,000 mg, IntraVENous, Q12H    ALPRAZolam, 1 mg, Oral, 4x Daily    vancomycin (VANCOCIN) intermittent dosing (placeholder), , Other, RX Placeholder    ipratropium-albuterol, 1 vial, Inhalation, Q4H    citalopram, 40 mg, Oral, Daily    gabapentin, 600 mg, Oral, TID    pantoprazole, 40 mg, Oral, BID AC    sucralfate, 1 g, Oral, 4x Daily     Review of Systems:  RESPIRATORY:  positive for  dyspnea  CARDIOVASCULAR:  positive for  dyspnea      Physical Exam:  BP (!) 120/50   Pulse 86   Temp 98.4 °F (36.9 °C) (Temporal)   Resp 21   Ht 5' 6\" (1.676 m)   Wt 123 lb 8 oz (56 kg)   SpO2 93%   BMI 19.93 kg/m²   Intake/Output Summary (Last 24 hours) at 8/13/2022 1144  Last data filed at 8/13/2022 1134  Gross per 24 hour   Intake 1030.65 ml   Output 2028 ml   Net -997.35 ml         General appearance:elderly white female who does not appear to be in distress.     HEENT:normocephalic atraumatic  GTBIU:Z6N9 no murmurs  Lungs:diminished bilaterally no rubs or tenderness or dullness to percussion  Abdomen:Soft non tender no organomegaly  Extremities:non clubbing cyanosis or edema  Neuro:no focal findings  Skin:intact    Recent Labs     08/12/22  1305 08/13/22  0417   WBC 6.2 7.7   RBC 2.68* 2.88*   HGB 8.4* 9.0*   HCT 27.9* 29.4*    319   .1* 102.1*   MCH 31.3* 31.3*   MCHC 30.1* 30.6*   RDW 12.7 12.4        Recent Labs     08/12/22  1701 08/13/22  0417 08/13/22  0423   NA  --  140  --    K 4.1 4.3 4.1   CL  --  97*  --    CO2  --  38*  --    BUN  --  9  --    CREATININE  --  0.3*  --    CALCIUM  --  8.3*  --    GLUCOSE  --  114*  --         Recent Labs     08/12/22  1701 08/13/22  0423   PHART 7.310* 7.440   ZTK6QOM 89.0* 65.0*   PO2ART 74.0* 59.0*   QGL9VLO 44.8* 44.1*   B9KXGGHA 92.7 88.9*   BEART 15.6* 17.5*     Recent Labs     08/13/22  0417   AST 12   ALT 9   ALKPHOS 62   BILITOT <0.2   CALCIUM 8.3*       Recent Labs     08/12/22  1320 08/12/22  1422   LABGRAM Few WBC's (Polymorphonuclear)  No organisms seen   No WBCs or organisms seen           Radiograph: CT ABDOMEN PELVIS WO CONTRAST Additional Contrast? None    Result Date: 8/4/2022  1. No evidence for acute abdominal or pelvic process. 2.Right lower lobe consolidation with small pleural effusion. Superimposed infection is not excluded. 3.Colonic diverticulosis without diverticulitis. 4.Calcific atherosclerosis of the abdominal aorta. 5.Degenerative spondylosis. NM LUNG SCAN PERFUSION ONLY    Result Date: 8/4/2022  1. Small subsegmental defect in the posterior left lower lobe, new since prior, compatible with and intermediate probability for pulmonary embolism. 2. Pulmonary hyperaeration, with decreased perfusion in the lung apices, likely related to COPD. 3. Patchy airspace abnormality in the right mid and lower lung field with small right pleural fluid, likely pneumonia. Please note according to the Piopped criteria a low probability reflects a 0 to 19%chance of a pulmonary embolism. Please correlate clinically. If symptoms persist, consider CT PA gram. RECOMMENDATION: Follow-up as clinically warranted. Electronically Signed by Mark Nelson MD at 04-Aug-2022 11:14:59 AM             XR CHEST PORTABLE    Result Date: 8/3/2022  Right mid and lower lung infiltrates. Small right pleural effusion not excluded. Recommendation: Follow up as clinically indicated. Electronically Signed by Terra Forde MD at 03-Aug-2022 08:41:33 AM                My radiograph interpretation/independent review of imaging: reviewed. Problem list generated by [facility] Problems             Last Modified POA    * (Principal) Pneumonia 8/3/2022 Yes    Loculated pleural effusion 8/12/2022 Yes        Pulmonary Assessment/Plan:     Chronic hypoxic hypercapnic respiratory failure continue oxygen supplementation as necessary to maintain adequate oxygenation. At her baseline  Underlying severe COPD. Continue bronchodilators continue pulmonary toilet. Right lower lobe pneumonia. Continue empirical antibiotic therapy. Loculated effusion that is worse. Post right video-assisted thoracoscopy and decortication doing very well. Continue pulmonary toilet continue pain management  Low probability for PE per PIOPED criteria. On anticoagulation. Resume direct oral anticoagulation when appropriate  Dyspnea due to the above supportive care. DC planning. Kendal Holguin MD, [facility], [facility]    The above note was generated using voice recognition software. Inadvertent typographical errors in transcription may have occurred.       Electronically signed by Kendal Holguin MD on 08/13/22 at 11:44 AM

## 2022-08-13 NOTE — PROGRESS NOTES
Progress Note  Date:2022       Room:0150/150-01  Patient Name:Iman Horton     Date of Birth:     Age:72 y.o. Looks good sitting up in chair  Weaning oxygen  Anxious        Subjective    Subjective:  Symptoms:  Stable. She reports shortness of breath and anxiety. Diet:  Poor intake. Activity level: Impaired due to weakness. Pain:  She complains of pain that is mild. She reports pain is improving. Pain is well controlled. Review of Systems   Respiratory:  Positive for shortness of breath. Objective         Vitals Last 24 Hours:  TEMPERATURE:  Temp  Av.4 °F (36.9 °C)  Min: 97.7 °F (36.5 °C)  Max: 99.2 °F (37.3 °C)  RESPIRATIONS RANGE: Resp  Av.3  Min: 16  Max: 35  PULSE OXIMETRY RANGE: SpO2  Av.3 %  Min: 85 %  Max: 100 %  PULSE RANGE: Pulse  Av  Min: 76  Max: 161  BLOOD PRESSURE RANGE: Systolic (62LHN), OKQ:178 , Min:109 , BQY:407   ; Diastolic (95EIR), TTU:58, Min:43, Max:77    I/O (24Hr): Intake/Output Summary (Last 24 hours) at 2022 0831  Last data filed at 2022 4743  Gross per 24 hour   Intake 1030.65 ml   Output 2213 ml   Net -1182.35 ml     Objective:  General Appearance:  Comfortable, ill-appearing and in no acute distress. Vital signs: (most recent): Blood pressure (!) 119/49, pulse 76, temperature 99.2 °F (37.3 °C), temperature source Temporal, resp. rate 24, height 5' 6\" (1.676 m), weight 123 lb 8 oz (56 kg), SpO2 94 %, not currently breastfeeding. Vital signs are normal.    Output: Producing urine and minimal stool output. HEENT: Normal HEENT exam.    Lungs:  Increased effort. There are decreased breath sounds. Heart: Normal rate. Regular rhythm. Abdomen: Abdomen is soft. Hypoactive bowel sounds. Neurological: Patient is alert.  (anxious). Skin:  Warm and dry.     Labs/Imaging/Diagnostics    Labs:  CBC:  Recent Labs     22  1305 22  0417   WBC 6.2 7.7   RBC 2.68* 2.88*   HGB 8.4* 9.0*   HCT 27.9* 29.4* .1* 102.1*   RDW 12.7 12.4    319     CHEMISTRIES:  Recent Labs     08/12/22  1701 08/13/22  0417 08/13/22  0423   NA  --  140  --    K 4.1 4.3 4.1   CL  --  97*  --    CO2  --  38*  --    BUN  --  9  --    CREATININE  --  0.3*  --    GLUCOSE  --  114*  --      PT/INR:No results for input(s): PROTIME, INR in the last 72 hours. APTT:No results for input(s): APTT in the last 72 hours. LIVER PROFILE:  Recent Labs     08/13/22 0417   AST 12   ALT 9   BILITOT <0.2   ALKPHOS 62       Imaging Last 24 Hours:  XR CHEST PORTABLE    Result Date: 8/13/2022  RADIOGRAPH OF THE CHEST CLINICAL HISTORY: Thoracotomy TECHNIQUE: Frontal view of the chest. COMPARISON: 8/20/2022 FINDINGS: Since the prior examination, again identified is COPD. Improved aeration with decreased visualization of edema. No pneumothorax is identified. Right-sided chest tube is in place. No significant pleural effusion is seen. 1.improved aeration when compared to the previous examination. XR CHEST PORTABLE    Result Date: 8/12/2022  RADIOGRAPH OF THE CHEST CLINICAL HISTORY: Thoracotomy TECHNIQUE: Portable chest COMPARISON: None FINDINGS: Since the prior examination, there is interval worsening of bilateral pulmonary infiltrates and bilateral pleural effusions. No pneumothorax is seen. Right-sided chest tube in place. COPD. 1.worsening of infiltrates likely a sequelae of worsening edema. No pneumothorax identified    Assessment//Plan           Hospital Problems             Last Modified POA    * (Principal) Pneumonia 8/3/2022 Yes    Loculated pleural effusion 8/12/2022 Yes     Assessment:    Condition: In stable condition. Improving. Plan:   Transfer to floor (if ok with CV Surgery). Continue respiratory treatments (wean oxygen). (Ok to floor if ok w/others  Clean up pain meds/sedating meds  Up in chair/ambulate today  Re-check labs in AM).      Electronically signed by Radha Rodriguez MD on 8/13/22 at 8:31 AM CDT

## 2022-08-13 NOTE — OP NOTE
patient was on Eliquis  and the lung appeared to fully reexpand after the decortication. The  patient also had a bronchoscopy performed at this point to examine for  any endobronchial lesions or evidence of pneumonia by bronchoscopy, none  was noted in after examining either of the right or left  tracheobronchial tree.         Chase Zelaya MD    D: 08/12/2022 16:05:46      T: 08/12/2022 22:08:48     [email or web address]  Job#: [id number]     Doc#: [id number]    CC:

## 2022-08-13 NOTE — PROGRESS NOTES
Latest Reference Range & Units 8/13/22 04:23   O2 Therapy  Unknown   Hemoglobin, Art, Extended 12.0 - 16.0 g/dL 9.4 (L)   pH, Arterial 7.350 - 7.450  7.440   pCO2, Arterial 35.0 - 45.0 mmHg 65.0 (H)   pO2, Arterial 80.0 - 100.0 mmHg 59.0 (L)   HCO3, Arterial 22.0 - 26.0 mmol/L 44.1 (H)   Base Excess, Arterial -2.0 - 2.0 mmol/L 17.5 (H)   O2 Sat, Arterial >92 % 88.9 (L)   O2 Content, Arterial Not Established mL/dL 11.8   Methemoglobin, Arterial <1.5 % 1.5   Carboxyhgb, Arterial 0.0 - 5.0 % 2.3   (L): Data is abnormally low  (H): Data is abnormally high  Art line, bipap 14/7 12 l/m results given to RN

## 2022-08-14 ENCOUNTER — APPOINTMENT (OUTPATIENT)
Dept: GENERAL RADIOLOGY | Age: 72
DRG: 853 | End: 2022-08-14
Payer: MEDICARE

## 2022-08-14 LAB
ALBUMIN SERPL-MCNC: 2.9 G/DL (ref 3.5–5.2)
ALP BLD-CCNC: 54 U/L (ref 35–104)
ALT SERPL-CCNC: 6 U/L (ref 5–33)
ANION GAP SERPL CALCULATED.3IONS-SCNC: 4 MMOL/L (ref 7–19)
AST SERPL-CCNC: 13 U/L (ref 5–32)
BASOPHILS ABSOLUTE: 0 K/UL (ref 0–0.2)
BASOPHILS RELATIVE PERCENT: 0.5 % (ref 0–1)
BILIRUB SERPL-MCNC: <0.2 MG/DL (ref 0.2–1.2)
BUN BLDV-MCNC: 13 MG/DL (ref 8–23)
CALCIUM SERPL-MCNC: 8.3 MG/DL (ref 8.8–10.2)
CHLORIDE BLD-SCNC: 93 MMOL/L (ref 98–111)
CO2: 38 MMOL/L (ref 22–29)
CREAT SERPL-MCNC: 0.5 MG/DL (ref 0.5–0.9)
EOSINOPHILS ABSOLUTE: 0.1 K/UL (ref 0–0.6)
EOSINOPHILS RELATIVE PERCENT: 0.8 % (ref 0–5)
GFR AFRICAN AMERICAN: >59
GFR NON-AFRICAN AMERICAN: >60
GLUCOSE BLD-MCNC: 136 MG/DL (ref 74–109)
HCT VFR BLD CALC: 27.9 % (ref 37–47)
HEMOGLOBIN: 8.5 G/DL (ref 12–16)
IMMATURE GRANULOCYTES #: 0 K/UL
LYMPHOCYTES ABSOLUTE: 1 K/UL (ref 1.1–4.5)
LYMPHOCYTES RELATIVE PERCENT: 11.1 % (ref 20–40)
MCH RBC QN AUTO: 30.7 PG (ref 27–31)
MCHC RBC AUTO-ENTMCNC: 30.5 G/DL (ref 33–37)
MCV RBC AUTO: 100.7 FL (ref 81–99)
MONOCYTES ABSOLUTE: 1.5 K/UL (ref 0–0.9)
MONOCYTES RELATIVE PERCENT: 16.7 % (ref 0–10)
NEUTROPHILS ABSOLUTE: 6.2 K/UL (ref 1.5–7.5)
NEUTROPHILS RELATIVE PERCENT: 70.7 % (ref 50–65)
PDW BLD-RTO: 12.7 % (ref 11.5–14.5)
PLATELET # BLD: 319 K/UL (ref 130–400)
PMV BLD AUTO: 10.4 FL (ref 9.4–12.3)
POTASSIUM REFLEX MAGNESIUM: 3.7 MMOL/L (ref 3.5–5)
RBC # BLD: 2.77 M/UL (ref 4.2–5.4)
SODIUM BLD-SCNC: 135 MMOL/L (ref 136–145)
TOTAL PROTEIN: 5.4 G/DL (ref 6.6–8.7)
VANCOMYCIN TROUGH: 24.6 UG/ML (ref 10–20)
WBC # BLD: 8.8 K/UL (ref 4.8–10.8)

## 2022-08-14 PROCEDURE — 2700000000 HC OXYGEN THERAPY PER DAY

## 2022-08-14 PROCEDURE — 2580000003 HC RX 258: Performed by: SURGERY

## 2022-08-14 PROCEDURE — 80202 ASSAY OF VANCOMYCIN: CPT

## 2022-08-14 PROCEDURE — 2580000003 HC RX 258: Performed by: INTERNAL MEDICINE

## 2022-08-14 PROCEDURE — 6360000002 HC RX W HCPCS: Performed by: SURGERY

## 2022-08-14 PROCEDURE — 71045 X-RAY EXAM CHEST 1 VIEW: CPT

## 2022-08-14 PROCEDURE — 6370000000 HC RX 637 (ALT 250 FOR IP): Performed by: SURGERY

## 2022-08-14 PROCEDURE — 99233 SBSQ HOSP IP/OBS HIGH 50: CPT | Performed by: INTERNAL MEDICINE

## 2022-08-14 PROCEDURE — 2500000003 HC RX 250 WO HCPCS

## 2022-08-14 PROCEDURE — 6370000000 HC RX 637 (ALT 250 FOR IP): Performed by: FAMILY MEDICINE

## 2022-08-14 PROCEDURE — 2000000000 HC ICU R&B

## 2022-08-14 PROCEDURE — 94640 AIRWAY INHALATION TREATMENT: CPT

## 2022-08-14 PROCEDURE — 85025 COMPLETE CBC W/AUTO DIFF WBC: CPT

## 2022-08-14 PROCEDURE — 94660 CPAP INITIATION&MGMT: CPT

## 2022-08-14 PROCEDURE — 36415 COLL VENOUS BLD VENIPUNCTURE: CPT

## 2022-08-14 PROCEDURE — 80053 COMPREHEN METABOLIC PANEL: CPT

## 2022-08-14 PROCEDURE — 99024 POSTOP FOLLOW-UP VISIT: CPT | Performed by: SURGERY

## 2022-08-14 RX ORDER — 0.9 % SODIUM CHLORIDE 0.9 %
500 INTRAVENOUS SOLUTION INTRAVENOUS ONCE
Status: COMPLETED | OUTPATIENT
Start: 2022-08-14 | End: 2022-08-14

## 2022-08-14 RX ORDER — NOREPINEPHRINE BIT/0.9 % NACL 16MG/250ML
1-100 INFUSION BOTTLE (ML) INTRAVENOUS CONTINUOUS
Status: DISCONTINUED | OUTPATIENT
Start: 2022-08-14 | End: 2022-08-15

## 2022-08-14 RX ORDER — KETOROLAC TROMETHAMINE 30 MG/ML
15 INJECTION, SOLUTION INTRAMUSCULAR; INTRAVENOUS EVERY 6 HOURS PRN
Status: DISCONTINUED | OUTPATIENT
Start: 2022-08-14 | End: 2022-08-14

## 2022-08-14 RX ORDER — NOREPINEPHRINE BIT/0.9 % NACL 16MG/250ML
INFUSION BOTTLE (ML) INTRAVENOUS
Status: COMPLETED
Start: 2022-08-14 | End: 2022-08-14

## 2022-08-14 RX ORDER — TIZANIDINE 4 MG/1
4 TABLET ORAL 3 TIMES DAILY PRN
Status: DISCONTINUED | OUTPATIENT
Start: 2022-08-14 | End: 2022-08-15

## 2022-08-14 RX ORDER — ALPRAZOLAM 0.5 MG/1
1 TABLET ORAL 4 TIMES DAILY PRN
Status: DISCONTINUED | OUTPATIENT
Start: 2022-08-14 | End: 2022-08-23 | Stop reason: HOSPADM

## 2022-08-14 RX ADMIN — ALPRAZOLAM 1 MG: 0.5 TABLET ORAL at 19:10

## 2022-08-14 RX ADMIN — TIZANIDINE 4 MG: 4 TABLET ORAL at 17:41

## 2022-08-14 RX ADMIN — GABAPENTIN 600 MG: 600 TABLET, FILM COATED ORAL at 14:26

## 2022-08-14 RX ADMIN — SUCRALFATE 1 G: 1 TABLET ORAL at 15:49

## 2022-08-14 RX ADMIN — CITALOPRAM HYDROBROMIDE 40 MG: 20 TABLET ORAL at 07:21

## 2022-08-14 RX ADMIN — CEFEPIME 2000 MG: 2 INJECTION, POWDER, FOR SOLUTION INTRAVENOUS at 16:13

## 2022-08-14 RX ADMIN — Medication 5 MCG/MIN: at 11:25

## 2022-08-14 RX ADMIN — BUDESONIDE 250 MCG: 0.25 SUSPENSION RESPIRATORY (INHALATION) at 06:29

## 2022-08-14 RX ADMIN — SODIUM CHLORIDE, PRESERVATIVE FREE 10 ML: 5 INJECTION INTRAVENOUS at 07:21

## 2022-08-14 RX ADMIN — TIZANIDINE 4 MG: 4 TABLET ORAL at 09:41

## 2022-08-14 RX ADMIN — ONDANSETRON HYDROCHLORIDE 4 MG: 2 SOLUTION INTRAMUSCULAR; INTRAVENOUS at 18:43

## 2022-08-14 RX ADMIN — MUPIROCIN: 20 OINTMENT TOPICAL at 07:21

## 2022-08-14 RX ADMIN — ENOXAPARIN SODIUM 40 MG: 100 INJECTION SUBCUTANEOUS at 18:13

## 2022-08-14 RX ADMIN — OXYCODONE AND ACETAMINOPHEN 1 TABLET: 10; 325 TABLET ORAL at 23:08

## 2022-08-14 RX ADMIN — BUDESONIDE 250 MCG: 0.25 SUSPENSION RESPIRATORY (INHALATION) at 18:11

## 2022-08-14 RX ADMIN — IPRATROPIUM BROMIDE AND ALBUTEROL SULFATE 3 ML: 2.5; .5 SOLUTION RESPIRATORY (INHALATION) at 06:29

## 2022-08-14 RX ADMIN — SUCRALFATE 1 G: 1 TABLET ORAL at 21:52

## 2022-08-14 RX ADMIN — IPRATROPIUM BROMIDE AND ALBUTEROL SULFATE 3 ML: 2.5; .5 SOLUTION RESPIRATORY (INHALATION) at 14:10

## 2022-08-14 RX ADMIN — OXYCODONE AND ACETAMINOPHEN 1 TABLET: 10; 325 TABLET ORAL at 05:38

## 2022-08-14 RX ADMIN — GABAPENTIN 600 MG: 600 TABLET, FILM COATED ORAL at 21:52

## 2022-08-14 RX ADMIN — GABAPENTIN 600 MG: 600 TABLET, FILM COATED ORAL at 08:57

## 2022-08-14 RX ADMIN — IPRATROPIUM BROMIDE AND ALBUTEROL SULFATE 3 ML: 2.5; .5 SOLUTION RESPIRATORY (INHALATION) at 22:08

## 2022-08-14 RX ADMIN — IPRATROPIUM BROMIDE AND ALBUTEROL SULFATE 3 ML: 2.5; .5 SOLUTION RESPIRATORY (INHALATION) at 17:59

## 2022-08-14 RX ADMIN — SUCRALFATE 1 G: 1 TABLET ORAL at 07:21

## 2022-08-14 RX ADMIN — CEFEPIME 2000 MG: 2 INJECTION, POWDER, FOR SOLUTION INTRAVENOUS at 08:33

## 2022-08-14 RX ADMIN — OXYCODONE AND ACETAMINOPHEN 1 TABLET: 10; 325 TABLET ORAL at 01:17

## 2022-08-14 RX ADMIN — Medication 1000 MG: at 05:40

## 2022-08-14 RX ADMIN — MORPHINE SULFATE 2 MG: 2 INJECTION, SOLUTION INTRAMUSCULAR; INTRAVENOUS at 03:23

## 2022-08-14 RX ADMIN — ARFORMOTEROL TARTRATE 15 MCG: 15 SOLUTION RESPIRATORY (INHALATION) at 18:11

## 2022-08-14 RX ADMIN — OXYCODONE AND ACETAMINOPHEN 1 TABLET: 10; 325 TABLET ORAL at 15:49

## 2022-08-14 RX ADMIN — PANTOPRAZOLE SODIUM 40 MG: 40 TABLET, DELAYED RELEASE ORAL at 05:38

## 2022-08-14 RX ADMIN — SODIUM CHLORIDE 500 ML: 9 INJECTION, SOLUTION INTRAVENOUS at 16:14

## 2022-08-14 RX ADMIN — IPRATROPIUM BROMIDE AND ALBUTEROL SULFATE 3 ML: 2.5; .5 SOLUTION RESPIRATORY (INHALATION) at 10:07

## 2022-08-14 RX ADMIN — PANTOPRAZOLE SODIUM 40 MG: 40 TABLET, DELAYED RELEASE ORAL at 15:49

## 2022-08-14 RX ADMIN — ARFORMOTEROL TARTRATE 15 MCG: 15 SOLUTION RESPIRATORY (INHALATION) at 06:29

## 2022-08-14 RX ADMIN — ALPRAZOLAM 1 MG: 0.5 TABLET ORAL at 07:21

## 2022-08-14 RX ADMIN — SODIUM CHLORIDE, PRESERVATIVE FREE 10 ML: 5 INJECTION INTRAVENOUS at 21:53

## 2022-08-14 RX ADMIN — SUCRALFATE 1 G: 1 TABLET ORAL at 11:45

## 2022-08-14 RX ADMIN — MUPIROCIN: 20 OINTMENT TOPICAL at 21:52

## 2022-08-14 RX ADMIN — IPRATROPIUM BROMIDE AND ALBUTEROL SULFATE 3 ML: 2.5; .5 SOLUTION RESPIRATORY (INHALATION) at 02:08

## 2022-08-14 RX ADMIN — SODIUM CHLORIDE 500 ML: 9 INJECTION, SOLUTION INTRAVENOUS at 15:02

## 2022-08-14 ASSESSMENT — PAIN DESCRIPTION - ORIENTATION
ORIENTATION: RIGHT;LOWER
ORIENTATION: RIGHT
ORIENTATION: RIGHT;LOWER
ORIENTATION: RIGHT;LOWER
ORIENTATION: RIGHT
ORIENTATION: RIGHT;LOWER

## 2022-08-14 ASSESSMENT — PAIN SCALES - GENERAL
PAINLEVEL_OUTOF10: 5
PAINLEVEL_OUTOF10: 3
PAINLEVEL_OUTOF10: 3
PAINLEVEL_OUTOF10: 9
PAINLEVEL_OUTOF10: 0
PAINLEVEL_OUTOF10: 6
PAINLEVEL_OUTOF10: 3
PAINLEVEL_OUTOF10: 6
PAINLEVEL_OUTOF10: 7
PAINLEVEL_OUTOF10: 0
PAINLEVEL_OUTOF10: 7
PAINLEVEL_OUTOF10: 3

## 2022-08-14 ASSESSMENT — PAIN DESCRIPTION - DESCRIPTORS
DESCRIPTORS: ACHING
DESCRIPTORS: ACHING;SORE
DESCRIPTORS: ACHING
DESCRIPTORS: ACHING

## 2022-08-14 ASSESSMENT — PAIN DESCRIPTION - PAIN TYPE
TYPE: SURGICAL PAIN

## 2022-08-14 ASSESSMENT — PAIN DESCRIPTION - LOCATION
LOCATION: CHEST
LOCATION: FLANK
LOCATION: FLANK

## 2022-08-14 ASSESSMENT — PAIN DESCRIPTION - ONSET
ONSET: AWAKENED FROM SLEEP
ONSET: SUDDEN
ONSET: SUDDEN
ONSET: ON-GOING
ONSET: ON-GOING

## 2022-08-14 ASSESSMENT — PAIN DESCRIPTION - FREQUENCY
FREQUENCY: INTERMITTENT
FREQUENCY: CONTINUOUS

## 2022-08-14 ASSESSMENT — ENCOUNTER SYMPTOMS: SHORTNESS OF BREATH: 1

## 2022-08-14 NOTE — PROGRESS NOTES
Progress Note    2022 8:14 AM          POD#   2    Subjective:  Ms. Marcelino Gibbons continues to have pain and high narcotic requirements. PULSE OXIMETRY RANGE: SpO2  Av %  Min: 82 %  Max: 100 %  SUPPLEMENTAL O2: O2 Flow Rate (L/min): 6 L/min   Vital Signs: BP (!) 96/49   Pulse 73   Temp 98 °F (36.7 °C) (Temporal)   Resp 19   Ht 5' 6\" (1.676 m)   Wt 120 lb 12.8 oz (54.8 kg)   SpO2 98%   BMI 19.50 kg/m²    Temperature Range:   Temp: 98 °F (36.7 °C)   Temp  Av.5 °F (36.9 °C)  Min: 98 °F (36.7 °C)  Max: 98.9 °F (37.2 °C)        Rhythm: normal sinus rhythm    Labs:   ABG:    Lab Results   Component Value Date/Time    PHART 7.440 2022 04:23 AM    PO2ART 59.0 2022 04:23 AM    DAB2XYM 65.0 2022 04:23 AM    F4SUGSIN 88.9 2022 04:23 AM    GZX5VCC 44.1 2022 04:23 AM    BEART 17.5 2022 04:23 AM     CBC:   Recent Labs     22  1305 22  0417 22  0619   WBC 6.2 7.7 8.8   HGB 8.4* 9.0* 8.5*   HCT 27.9* 29.4* 27.9*   .1* 102.1* 100.7*    319 319     BMP:   Recent Labs     22  0417 22  0423 22  0619     --  135*   K 4.3 4.1 3.7   CL 97*  --  93*   CO2 38*  --  38*   BUN 9  --  13   CREATININE 0.3*  --  0.5     PT/INR: No results for input(s): PROTIME, INR in the last 72 hours. APTT: No results for input(s): APTT in the last 72 hours. Chest X-Ray:  right lung well expanded   CT:  I/O last 3 completed shifts: In: 2846.5 [P.O.:980; I.V.:966.5; IV Piggyback:900]  Out: 3111 [Urine:2760; Chest Tube:438]      Air Leak:  No air leak  I/O last 3 completed shifts: In: 2846.5 [P.O.:980; I.V.:966.5; IV Piggyback:900]  Out: 0138 [Urine:2760;  Chest Tube:438]  Scheduled Meds:    mupirocin   Nasal BID    sodium chloride flush  5-40 mL IntraVENous 2 times per day    enoxaparin  40 mg SubCUTAneous Daily    ipratropium-albuterol  1 ampule Inhalation Q4H WA    hydrALAZINE  25 mg Oral 3 times per day    budesonide  250 mcg Nebulization BID Arformoterol Tartrate  15 mcg Nebulization BID    sodium chloride flush  10 mL IntraVENous 2 times per day    cefepime  2,000 mg IntraVENous Q8H    nicotine  1 patch TransDERmal Daily    vancomycin  1,000 mg IntraVENous Q12H    vancomycin (VANCOCIN) intermittent dosing (placeholder)   Other RX Placeholder    ipratropium-albuterol  1 vial Inhalation Q4H    citalopram  40 mg Oral Daily    gabapentin  600 mg Oral TID    pantoprazole  40 mg Oral BID AC    sucralfate  1 g Oral 4x Daily     Continuous Infusions:    sodium chloride 10 mL/hr at 08/13/22 1632     Exam:   General appearance: NAD  Neck: no bruits, no JVD, No lymph nodes   Lungs: no rhonchi, no wheezes, no rales   Right chest incisions: stable, dressing dry and intact    Heart: S1 and S2 no murmer, + rub  Abdomen: positive bowel sounds, no bruits, no masses  Extremities: warm and dry, no cyanosis, no clubbing    Assessment  SP VATS decort. Tolerated the procedure well with no evidence of lung injury. RLL infiltrate seen on CXR is likely a contusion and/or residual atelectasis. No sign of actively worsening pneumonia: afebrile, WBC ct normal, O2 requirement the same. Pain control is a critical concern. She is doing OK right now with morphine and percocets. However, the concern is that narcotics could reduce her respiratory drive. She has a baseline PCO2 of 55-60. Therefore, there is no margin for error if this were to occur. If narcotics are continued at this current rate and were to cause this effect, she would likely need emergent reintubation. Therefore, despite the history of \"NSAID allergy\", I believe the risk/benefit analysis is in favor of proceeding with toradol. It has potent pain relieving effects in this type of a case with no risk for respiratory suppression. The nurse has been made aware to look for a reaction as the dose is being given. Once pain control is accomplished, she can go to the 7th floor PCU.   Chest tube output still too high for removal given empyema.       Patient Active Problem List   Diagnosis    Chronic pain    Anxiety and depression    GERD (gastroesophageal reflux disease)    Chronic bronchitis with acute exacerbation (HCC)    Hypertension    Echocardiogram abnormal    COPD exacerbation (HCC)    Cervicalgia    Chronic low back pain    Acute hypercapnic respiratory failure (HCC)    Alpha-1-antitrypsin deficiency carrier    Current every day smoker    Folate deficiency anemia    Malignant neoplasm of upper lobe of right lung (HCC)    Palliative care patient    Severe malnutrition (HCC)    Intractable nausea and vomiting    Gram-negative bacteremia    Hypomagnesemia    Tobacco abuse counseling    Cigarette nicotine dependence with nicotine-induced disorder    Clostridium perfringens infection    Chronic respiratory failure with hypoxia and hypercapnia (HCC)    Anxiety    Upper abdominal pain    Heart burn    Nausea    Rhinovirus infection    Pneumonia    Loculated pleural effusion         MD Charlene Greenwood MD

## 2022-08-14 NOTE — PROGRESS NOTES
[facility] Pharmacokinetic Monitoring Service - Vancomycin    Consulting Provider: Dr. Leonard Cruz   Indication: HAP  Target Concentration: Goal AUC/LETTY 400-600 mg*hr/L  Day of Therapy: 12  Additional Antimicrobials: cefepime    Pertinent Laboratory Values: Wt Readings from Last 1 Encounters:   08/14/22 120 lb 12.8 oz (54.8 kg)     Temp Readings from Last 1 Encounters:   08/14/22 98 °F (36.7 °C) (Temporal)     Estimated Creatinine Clearance: 85 mL/min (based on SCr of 0.5 mg/dL).   Recent Labs     08/13/22  0417 08/14/22  0619   CREATININE 0.3* 0.5   WBC 7.7 8.8     Procalcitonin: No level    Pertinent Cultures:  Culture Date Source Results   08/03/22 Blood x 2 No growth   08/12/22 Body Fluid from Lung No organisms seen   08/12/22 Tissue No WBCs or organisms seen     MRSA Nasal Swab: showed MRSA positive result on 08/12/22    Recent vancomycin administrations                     vancomycin (VANCOCIN) 1000 mg in dextrose 5% 250 mL IVPB (mg) 1,000 mg New Bag 08/14/22 0540     1,000 mg New Bag 08/13/22 2043     1,000 mg New Bag  0522     1,000 mg New Bag 08/12/22 6525                    Assessment:  Date/Time Current Dose Concentration Timing of Concentration (h) AUC   08/14/22 @ 1714 1000mg IV q 12 hours 24.6 11h 34m 807   Note: Serum concentrations collected for AUC dosing may appear elevated if collected in close proximity to the dose administered, this is not necessarily an indication of toxicity    Plan:  Current dosing regimen is supra-therapeutic  \"Decrease dose to vancomycin 1000mg IV q 24 hours  Repeat vancomycin concentration ordered for 08/16/22 @ [facility] will continue to monitor patient and adjust therapy as indicated    Thank you for the consult,  MAREK EVANS, PHARM D, 8/14/2022, 6:05 PM

## 2022-08-14 NOTE — PROGRESS NOTES
Progress Note  Date:2022       Room:0150/150-01  Patient Name:Iman Horton     Date of Birth:     Age:72 y.o. Looks good sitting up in chair  Weaning oxygen  Anxious        Subjective    Subjective:  Symptoms:  Stable. She reports shortness of breath and anxiety. Diet:  Poor intake. Activity level: Impaired due to weakness. Pain:  She complains of pain that is mild. She reports pain is improving. Pain is well controlled. Review of Systems   Respiratory:  Positive for shortness of breath. Objective         Vitals Last 24 Hours:  TEMPERATURE:  Temp  Av.5 °F (36.9 °C)  Min: 98 °F (36.7 °C)  Max: 98.9 °F (37.2 °C)  RESPIRATIONS RANGE: Resp  Av.4  Min: 15  Max: 29  PULSE OXIMETRY RANGE: SpO2  Av %  Min: 82 %  Max: 100 %  PULSE RANGE: Pulse  Av.2  Min: 71  Max: 92  BLOOD PRESSURE RANGE: Systolic (66WMW), KDK:594 , Min:87 , EST:209   ; Diastolic (05JGJ), DBZ:39, Min:36, Max:68    I/O (24Hr): Intake/Output Summary (Last 24 hours) at 2022 0735  Last data filed at 2022 0640  Gross per 24 hour   Intake 1902.43 ml   Output 2485 ml   Net -582.57 ml       Objective:  General Appearance:  Comfortable, ill-appearing and in no acute distress. Vital signs: (most recent): Blood pressure (!) 96/49, pulse 73, temperature 98 °F (36.7 °C), temperature source Temporal, resp. rate 19, height 5' 6\" (1.676 m), weight 120 lb 12.8 oz (54.8 kg), SpO2 98 %, not currently breastfeeding. Vital signs are normal.    Output: Producing urine and minimal stool output. HEENT: Normal HEENT exam.    Lungs:  Increased effort. There are decreased breath sounds. Heart: Normal rate. Regular rhythm. Abdomen: Abdomen is soft. Hypoactive bowel sounds. Neurological: Patient is alert.  (anxious). Skin:  Warm and dry.     Labs/Imaging/Diagnostics    Labs:  CBC:  Recent Labs     22  1305 22  0417 22  0619   WBC 6.2 7.7 8.8   RBC 2.68* 2.88* 2.77*   HGB 8.4* 9. 0* 8.5*   HCT 27.9* 29.4* 27.9*   .1* 102.1* 100.7*   RDW 12.7 12.4 12.7    319 319       CHEMISTRIES:  Recent Labs     08/13/22 0417 08/13/22 0423 08/14/22 0619     --  135*   K 4.3 4.1 3.7   CL 97*  --  93*   CO2 38*  --  38*   BUN 9  --  13   CREATININE 0.3*  --  0.5   GLUCOSE 114*  --  136*       PT/INR:No results for input(s): PROTIME, INR in the last 72 hours. APTT:No results for input(s): APTT in the last 72 hours. LIVER PROFILE:  Recent Labs     08/13/22 0417 08/14/22 0619   AST 12 13   ALT 9 6   BILITOT <0.2 <0.2   ALKPHOS 62 54         Imaging Last 24 Hours:  XR CHEST PORTABLE    Result Date: 8/13/2022  RADIOGRAPH OF THE CHEST CLINICAL HISTORY: Thoracotomy TECHNIQUE: Frontal view of the chest. COMPARISON: 8/20/2022 FINDINGS: Since the prior examination, again identified is COPD. Improved aeration with decreased visualization of edema. No pneumothorax is identified. Right-sided chest tube is in place. No significant pleural effusion is seen. 1.improved aeration when compared to the previous examination. XR CHEST PORTABLE    Result Date: 8/12/2022  RADIOGRAPH OF THE CHEST CLINICAL HISTORY: Thoracotomy TECHNIQUE: Portable chest COMPARISON: None FINDINGS: Since the prior examination, there is interval worsening of bilateral pulmonary infiltrates and bilateral pleural effusions. No pneumothorax is seen. Right-sided chest tube in place. COPD. 1.worsening of infiltrates likely a sequelae of worsening edema. No pneumothorax identified    Assessment//Plan           Hospital Problems             Last Modified POA    * (Principal) Pneumonia 8/3/2022 Yes    Chronic respiratory failure with hypoxia and hypercapnia (HCC) 8/13/2022 Yes    Loculated pleural effusion 8/12/2022 Yes   Assessment:    Condition: In stable condition. Improving. Plan:   Transfer to floor (if ok with CV Surgery). Continue respiratory treatments (wean oxygen).    (Ok to floor if ok w/others  Clean up pain meds/sedating meds  Up in chair/ambulate today  Re-check labs in AM).      Electronically signed by Lora Zavaleta MD on 8/13/22 at 8:31 AM CDT

## 2022-08-14 NOTE — PLAN OF CARE
Problem: Discharge Planning  Goal: Discharge to home or other facility with appropriate resources  Outcome: Progressing     Problem: Safety - Adult  Goal: Free from fall injury  Outcome: Progressing     Problem: ABCDS Injury Assessment  Goal: Absence of physical injury  Outcome: Progressing     Problem: Skin/Tissue Integrity  Goal: Absence of new skin breakdown  Description: 1. Monitor for areas of redness and/or skin breakdown  2. Assess vascular access sites hourly  3. Every 4-6 hours minimum:  Change oxygen saturation probe site  4. Every 4-6 hours:  If on nasal continuous positive airway pressure, respiratory therapy assess nares and determine need for appliance change or resting period.   Outcome: Progressing     Problem: Pain  Goal: Verbalizes/displays adequate comfort level or baseline comfort level  Outcome: Progressing     Problem: Respiratory - Adult  Goal: Achieves optimal ventilation and oxygenation  Outcome: Progressing     Problem: Musculoskeletal - Adult  Goal: Return mobility to safest level of function  Outcome: Progressing     Problem: Infection - Adult  Goal: Absence of infection at discharge  Outcome: Progressing

## 2022-08-14 NOTE — PROGRESS NOTES
Physician Progress Note      PATIENTDayesica Pham  CSN #:                  [id number]  :                       1950  ADMIT DATE:       8/3/2022 5:15 AM  DISCH DATE:  RESPONDING  PROVIDER #:        Angeles Dykes MD          QUERY TEXT:    Pt admitted with pneumonia. Pt noted to have sepsis indicators. If possible,   please document in the progress notes and discharge summary if you are   evaluating and /or treating any of the following: The medical record reflects the following:  Risk Factors: Pneumonia, COPD  Clinical Indicators:  97 96 91 99 79, RR 20 37 29 20. Temp, pt reported   102, here 98.4 96.6 97.6, /50 95/42 110/42 96/52 92/55 105/44,   Neutrophils % 82.5, DDimer 2.44. Treatment: Maxipime 2g IV q 8 hr., Vanco with pharmacy placeholder for   internittent dosing, 0.9% NS 2L bolus then 75 ml/hr. CBC, chenistry panel. Options provided:  -- Sepsis, present on admission  -- Sepsis, present on admission, now resolved  -- Sepsis was ruled out  -- Other - I will add my own diagnosis  -- Disagree - Not applicable / Not valid  -- Disagree - Clinically unable to determine / Unknown  -- Refer to Clinical Documentation Reviewer    PROVIDER RESPONSE TEXT:    This patient was treated for sepsis this admission, which was present on   admission and is currently resolved.     Query created by: Ivonne Davis on 8/10/2022 12:48 PM      Electronically signed by:  Angeles Dykes MD 2022 8:36 AM

## 2022-08-14 NOTE — PLAN OF CARE
Problem: Discharge Planning  Goal: Discharge to home or other facility with appropriate resources  Outcome: Progressing  Flowsheets  Taken 8/14/2022 1600  Discharge to home or other facility with appropriate resources: Identify barriers to discharge with patient and caregiver  Taken 8/14/2022 1200  Discharge to home or other facility with appropriate resources: Identify barriers to discharge with patient and caregiver  Taken 8/14/2022 0800  Discharge to home or other facility with appropriate resources:   Arrange for needed discharge resources and transportation as appropriate   Identify barriers to discharge with patient and caregiver     Problem: Safety - Adult  Goal: Free from fall injury  Outcome: Progressing  Flowsheets (Taken 8/14/2022 0800)  Free From Fall Injury: Instruct family/caregiver on patient safety     Problem: ABCDS Injury Assessment  Goal: Absence of physical injury  Outcome: Progressing  Flowsheets (Taken 8/14/2022 0800)  Absence of Physical Injury: Implement safety measures based on patient assessment     Problem: Skin/Tissue Integrity  Goal: Absence of new skin breakdown  Description: 1. Monitor for areas of redness and/or skin breakdown  2. Assess vascular access sites hourly  3. Every 4-6 hours minimum:  Change oxygen saturation probe site  4. Every 4-6 hours:  If on nasal continuous positive airway pressure, respiratory therapy assess nares and determine need for appliance change or resting period.   Outcome: Progressing     Problem: Pain  Goal: Verbalizes/displays adequate comfort level or baseline comfort level  Outcome: Progressing  Flowsheets  Taken 8/14/2022 1601  Verbalizes/displays adequate comfort level or baseline comfort level:   Encourage patient to monitor pain and request assistance   Assess pain using appropriate pain scale  Taken 8/14/2022 1200  Verbalizes/displays adequate comfort level or baseline comfort level:   Encourage patient to monitor pain and request assistance   Assess pain using appropriate pain scale     Problem: Respiratory - Adult  Goal: Achieves optimal ventilation and oxygenation  Outcome: Progressing  Flowsheets  Taken 8/14/2022 1600  Achieves optimal ventilation and oxygenation:   Assess for changes in respiratory status   Assess for changes in mentation and behavior  Taken 8/14/2022 1200  Achieves optimal ventilation and oxygenation:   Assess for changes in mentation and behavior   Assess for changes in respiratory status  Taken 8/14/2022 0800  Achieves optimal ventilation and oxygenation:   Assess for changes in respiratory status   Assess for changes in mentation and behavior     Problem: Musculoskeletal - Adult  Goal: Return mobility to safest level of function  Outcome: Progressing  Flowsheets  Taken 8/14/2022 1600  Return Mobility to Safest Level of Function:   Assess patient stability and activity tolerance for standing, transferring and ambulating with or without assistive devices   Assist with transfers and ambulation using safe patient handling equipment as needed  Taken 8/14/2022 1200  Return Mobility to Safest Level of Function:   Assess patient stability and activity tolerance for standing, transferring and ambulating with or without assistive devices   Assist with transfers and ambulation using safe patient handling equipment as needed  Taken 8/14/2022 0800  Return Mobility to Safest Level of Function:   Assess patient stability and activity tolerance for standing, transferring and ambulating with or without assistive devices   Assist with transfers and ambulation using safe patient handling equipment as needed     Problem: Infection - Adult  Goal: Absence of infection at discharge  Outcome: Progressing  Flowsheets  Taken 8/14/2022 1600  Absence of infection at discharge:   Assess and monitor for signs and symptoms of infection   Monitor lab/diagnostic results  Taken 8/14/2022 1200  Absence of infection at discharge:   Assess and monitor for signs and symptoms of infection   Monitor lab/diagnostic results  Taken 8/14/2022 0800  Absence of infection at discharge:   Assess and monitor for signs and symptoms of infection   Monitor lab/diagnostic results   Monitor all insertion sites i.e., indwelling lines, tubes and drains

## 2022-08-14 NOTE — PROGRESS NOTES
Pulmonary and Critical Care Progress note. Susanne Caal    MRN# [id number]    Acct# [de-identified]  8/14/2022   11:32 AM CDT    Referring Sheri Talavera MD      Chief Complaint: shortness of breath abd pain. HPI: Patient is seen and examined in intensive care unit. She seems comfortable. Some pain at the surgical site    Medications    sodium chloride, 500 mL, IntraVENous, Once    mupirocin, , Nasal, BID    sodium chloride flush, 5-40 mL, IntraVENous, 2 times per day    enoxaparin, 40 mg, SubCUTAneous, Daily    ipratropium-albuterol, 1 ampule, Inhalation, Q4H WA    hydrALAZINE, 25 mg, Oral, 3 times per day    budesonide, 250 mcg, Nebulization, BID    Arformoterol Tartrate, 15 mcg, Nebulization, BID    sodium chloride flush, 10 mL, IntraVENous, 2 times per day    cefepime, 2,000 mg, IntraVENous, Q8H    nicotine, 1 patch, TransDERmal, Daily    vancomycin, 1,000 mg, IntraVENous, Q12H    vancomycin (VANCOCIN) intermittent dosing (placeholder), , Other, RX Placeholder    ipratropium-albuterol, 1 vial, Inhalation, Q4H    citalopram, 40 mg, Oral, Daily    gabapentin, 600 mg, Oral, TID    pantoprazole, 40 mg, Oral, BID AC    sucralfate, 1 g, Oral, 4x Daily     Review of Systems:  RESPIRATORY:  positive for  dyspnea  CARDIOVASCULAR:  positive for  dyspnea      Physical Exam:  BP (!) 117/44   Pulse 81   Temp 97.7 °F (36.5 °C) (Temporal)   Resp 23   Ht 5' 6\" (1.676 m)   Wt 120 lb 12.8 oz (54.8 kg)   SpO2 93%   BMI 19.50 kg/m²   Intake/Output Summary (Last 24 hours) at 8/14/2022 1555  Last data filed at 8/14/2022 1400  Gross per 24 hour   Intake 2349.58 ml   Output 3275 ml   Net -925.42 ml         General appearance:elderly white female who does not appear to be in distress.     HEENT:normocephalic atraumatic  ERHSU:N8U3 no murmurs  Lungs:diminished bilaterally no rubs or tenderness or dullness to percussion  Abdomen:Soft non tender no organomegaly  Extremities:non clubbing cyanosis or edema  Neuro:no focal findings  Skin:intact    Recent Labs     08/12/22  1305 08/13/22  0417 08/14/22  0619   WBC 6.2 7.7 8.8   RBC 2.68* 2.88* 2.77*   HGB 8.4* 9.0* 8.5*   HCT 27.9* 29.4* 27.9*    319 319   .1* 102.1* 100.7*   MCH 31.3* 31.3* 30.7   MCHC 30.1* 30.6* 30.5*   RDW 12.7 12.4 12.7        Recent Labs     08/12/22  1701 08/13/22  0417 08/13/22  0423 08/14/22  0619   NA  --  140  --  135*   K 4.1 4.3 4.1 3.7   CL  --  97*  --  93*   CO2  --  38*  --  38*   BUN  --  9  --  13   CREATININE  --  0.3*  --  0.5   CALCIUM  --  8.3*  --  8.3*   GLUCOSE  --  114*  --  136*        Recent Labs     08/12/22  1701 08/13/22  0423   PHART 7.310* 7.440   MSR6EMX 89.0* 65.0*   PO2ART 74.0* 59.0*   DQV6DIZ 44.8* 44.1*   D7FWIIUM 92.7 88.9*   BEART 15.6* 17.5*       Recent Labs     08/14/22  0619   AST 13   ALT 6   ALKPHOS 54   BILITOT <0.2   CALCIUM 8.3*       Recent Labs     08/12/22  1320 08/12/22  1422   LABGRAM Few WBC's (Polymorphonuclear)  No organisms seen   No WBCs or organisms seen           Radiograph: CT ABDOMEN PELVIS WO CONTRAST Additional Contrast? None    Result Date: 8/4/2022  1. No evidence for acute abdominal or pelvic process. 2.Right lower lobe consolidation with small pleural effusion. Superimposed infection is not excluded. 3.Colonic diverticulosis without diverticulitis. 4.Calcific atherosclerosis of the abdominal aorta. 5.Degenerative spondylosis. NM LUNG SCAN PERFUSION ONLY    Result Date: 8/4/2022  1. Small subsegmental defect in the posterior left lower lobe, new since prior, compatible with and intermediate probability for pulmonary embolism. 2. Pulmonary hyperaeration, with decreased perfusion in the lung apices, likely related to COPD. 3. Patchy airspace abnormality in the right mid and lower lung field with small right pleural fluid, likely pneumonia. Please note according to the Piopped criteria a low probability reflects a 0 to 19%chance of a pulmonary embolism. Please correlate clinically. If symptoms persist, consider CT PA gram. RECOMMENDATION: Follow-up as clinically warranted. Electronically Signed by Mancil Siemens MD at 04-Aug-2022 11:14:59 AM             XR CHEST PORTABLE    Result Date: 8/3/2022  Right mid and lower lung infiltrates. Small right pleural effusion not excluded. Recommendation: Follow up as clinically indicated. Electronically Signed by Brittney Arreguin MD at 03-Aug-2022 08:41:33 AM                My radiograph interpretation/independent review of imaging: reviewed. Problem list generated by [facility] Problems             Last Modified POA    * (Principal) Pneumonia 8/3/2022 Yes    Chronic respiratory failure with hypoxia and hypercapnia (HCC) 8/13/2022 Yes    Loculated pleural effusion 8/12/2022 Yes        Pulmonary Assessment/Plan:     Chronic hypoxic hypercapnic respiratory failure continue oxygen supplementation as necessary to maintain adequate oxygenation. At her baseline  Underlying severe COPD. Continue bronchodilators continue pulmonary toilet. Right lower lobe pneumonia. Continue empirical antibiotic therapy. Loculated effusion that is worse. Post right video-assisted thoracoscopy and decortication doing very well. Continue pulmonary toilet continue pain management  Low probability for PE per PIOPED criteria. On anticoagulation. Resume direct oral anticoagulation when appropriate  Dyspnea due to the above supportive care. DC planning. OK to send to floor when OK with surgery. Romario Hastings MD, [facility], [facility]    The above note was generated using voice recognition software. Inadvertent typographical errors in transcription may have occurred.       Electronically signed by Romario Hastings MD on 08/14/22 at 3:55 PM

## 2022-08-15 ENCOUNTER — TELEPHONE (OUTPATIENT)
Dept: CARDIOLOGY CLINIC | Age: 72
End: 2022-08-15

## 2022-08-15 ENCOUNTER — APPOINTMENT (OUTPATIENT)
Dept: GENERAL RADIOLOGY | Age: 72
DRG: 853 | End: 2022-08-15
Payer: MEDICARE

## 2022-08-15 LAB
ALBUMIN SERPL-MCNC: 2.3 G/DL (ref 3.5–5.2)
ALP BLD-CCNC: 47 U/L (ref 35–104)
ALT SERPL-CCNC: 7 U/L (ref 5–33)
ANION GAP SERPL CALCULATED.3IONS-SCNC: 5 MMOL/L (ref 7–19)
AST SERPL-CCNC: 10 U/L (ref 5–32)
BILIRUB SERPL-MCNC: <0.2 MG/DL (ref 0.2–1.2)
BLOOD BANK DISPENSE STATUS: NORMAL
BLOOD BANK PRODUCT CODE: NORMAL
BPU ID: NORMAL
BUN BLDV-MCNC: 12 MG/DL (ref 8–23)
CALCIUM SERPL-MCNC: 8 MG/DL (ref 8.8–10.2)
CHLORIDE BLD-SCNC: 96 MMOL/L (ref 98–111)
CO2: 36 MMOL/L (ref 22–29)
CREAT SERPL-MCNC: 0.5 MG/DL (ref 0.5–0.9)
DESCRIPTION BLOOD BANK: NORMAL
GFR AFRICAN AMERICAN: >59
GFR NON-AFRICAN AMERICAN: >60
GLUCOSE BLD-MCNC: 112 MG/DL (ref 74–109)
HCT VFR BLD CALC: 23.9 % (ref 37–47)
HCT VFR BLD CALC: 30.7 % (ref 37–47)
HEMOGLOBIN: 7.4 G/DL (ref 12–16)
HEMOGLOBIN: 9.8 G/DL (ref 12–16)
MCH RBC QN AUTO: 31.4 PG (ref 27–31)
MCHC RBC AUTO-ENTMCNC: 31 G/DL (ref 33–37)
MCV RBC AUTO: 101.3 FL (ref 81–99)
PDW BLD-RTO: 12.8 % (ref 11.5–14.5)
PLATELET # BLD: 234 K/UL (ref 130–400)
PMV BLD AUTO: 11.1 FL (ref 9.4–12.3)
POTASSIUM REFLEX MAGNESIUM: 3.9 MMOL/L (ref 3.5–5)
RBC # BLD: 2.36 M/UL (ref 4.2–5.4)
SODIUM BLD-SCNC: 137 MMOL/L (ref 136–145)
TOTAL PROTEIN: 5 G/DL (ref 6.6–8.7)
WBC # BLD: 6.5 K/UL (ref 4.8–10.8)

## 2022-08-15 PROCEDURE — 94640 AIRWAY INHALATION TREATMENT: CPT

## 2022-08-15 PROCEDURE — 6360000002 HC RX W HCPCS: Performed by: SURGERY

## 2022-08-15 PROCEDURE — 80053 COMPREHEN METABOLIC PANEL: CPT

## 2022-08-15 PROCEDURE — 6370000000 HC RX 637 (ALT 250 FOR IP): Performed by: FAMILY MEDICINE

## 2022-08-15 PROCEDURE — 6370000000 HC RX 637 (ALT 250 FOR IP): Performed by: NURSE PRACTITIONER

## 2022-08-15 PROCEDURE — 6370000000 HC RX 637 (ALT 250 FOR IP): Performed by: SURGERY

## 2022-08-15 PROCEDURE — 82530 CORTISOL FREE: CPT

## 2022-08-15 PROCEDURE — 36415 COLL VENOUS BLD VENIPUNCTURE: CPT

## 2022-08-15 PROCEDURE — 36430 TRANSFUSION BLD/BLD COMPNT: CPT

## 2022-08-15 PROCEDURE — 2580000003 HC RX 258: Performed by: SURGERY

## 2022-08-15 PROCEDURE — 94660 CPAP INITIATION&MGMT: CPT

## 2022-08-15 PROCEDURE — 85027 COMPLETE CBC AUTOMATED: CPT

## 2022-08-15 PROCEDURE — 71045 X-RAY EXAM CHEST 1 VIEW: CPT

## 2022-08-15 PROCEDURE — 2700000000 HC OXYGEN THERAPY PER DAY

## 2022-08-15 PROCEDURE — 85014 HEMATOCRIT: CPT

## 2022-08-15 PROCEDURE — 2500000003 HC RX 250 WO HCPCS: Performed by: NURSE PRACTITIONER

## 2022-08-15 PROCEDURE — 85018 HEMOGLOBIN: CPT

## 2022-08-15 PROCEDURE — P9040 RBC LEUKOREDUCED IRRADIATED: HCPCS

## 2022-08-15 PROCEDURE — 2140000000 HC CCU INTERMEDIATE R&B

## 2022-08-15 PROCEDURE — 6360000002 HC RX W HCPCS: Performed by: NURSE PRACTITIONER

## 2022-08-15 PROCEDURE — 99233 SBSQ HOSP IP/OBS HIGH 50: CPT | Performed by: INTERNAL MEDICINE

## 2022-08-15 RX ORDER — SODIUM CHLORIDE 9 MG/ML
INJECTION, SOLUTION INTRAVENOUS PRN
Status: DISCONTINUED | OUTPATIENT
Start: 2022-08-15 | End: 2022-08-20 | Stop reason: SDUPTHER

## 2022-08-15 RX ORDER — DOCUSATE SODIUM 100 MG/1
100 CAPSULE, LIQUID FILLED ORAL 2 TIMES DAILY
Status: DISCONTINUED | OUTPATIENT
Start: 2022-08-15 | End: 2022-08-18

## 2022-08-15 RX ORDER — BISACODYL 10 MG
10 SUPPOSITORY, RECTAL RECTAL ONCE
Status: DISCONTINUED | OUTPATIENT
Start: 2022-08-15 | End: 2022-08-23 | Stop reason: HOSPADM

## 2022-08-15 RX ORDER — POLYETHYLENE GLYCOL 3350 17 G/17G
17 POWDER, FOR SOLUTION ORAL DAILY
Status: DISCONTINUED | OUTPATIENT
Start: 2022-08-15 | End: 2022-08-18

## 2022-08-15 RX ORDER — HYDRALAZINE HYDROCHLORIDE 25 MG/1
25 TABLET, FILM COATED ORAL EVERY 12 HOURS SCHEDULED
Status: DISCONTINUED | OUTPATIENT
Start: 2022-08-16 | End: 2022-08-23 | Stop reason: HOSPADM

## 2022-08-15 RX ORDER — BUMETANIDE 0.25 MG/ML
2 INJECTION, SOLUTION INTRAMUSCULAR; INTRAVENOUS ONCE
Status: COMPLETED | OUTPATIENT
Start: 2022-08-15 | End: 2022-08-15

## 2022-08-15 RX ADMIN — MAGNESIUM HYDROXIDE 30 ML: 400 SUSPENSION ORAL at 06:30

## 2022-08-15 RX ADMIN — SUCRALFATE 1 G: 1 TABLET ORAL at 16:51

## 2022-08-15 RX ADMIN — CEFEPIME 2000 MG: 2 INJECTION, POWDER, FOR SOLUTION INTRAVENOUS at 08:33

## 2022-08-15 RX ADMIN — CEFEPIME 2000 MG: 2 INJECTION, POWDER, FOR SOLUTION INTRAVENOUS at 16:52

## 2022-08-15 RX ADMIN — ARFORMOTEROL TARTRATE 15 MCG: 15 SOLUTION RESPIRATORY (INHALATION) at 19:33

## 2022-08-15 RX ADMIN — ALPRAZOLAM 1 MG: 0.5 TABLET ORAL at 01:55

## 2022-08-15 RX ADMIN — IPRATROPIUM BROMIDE AND ALBUTEROL SULFATE 3 ML: 2.5; .5 SOLUTION RESPIRATORY (INHALATION) at 06:21

## 2022-08-15 RX ADMIN — TIZANIDINE 4 MG: 4 TABLET ORAL at 03:34

## 2022-08-15 RX ADMIN — MUPIROCIN: 20 OINTMENT TOPICAL at 21:52

## 2022-08-15 RX ADMIN — CITALOPRAM HYDROBROMIDE 40 MG: 20 TABLET ORAL at 08:12

## 2022-08-15 RX ADMIN — PANTOPRAZOLE SODIUM 40 MG: 40 TABLET, DELAYED RELEASE ORAL at 16:51

## 2022-08-15 RX ADMIN — OXYCODONE AND ACETAMINOPHEN 1 TABLET: 10; 325 TABLET ORAL at 05:04

## 2022-08-15 RX ADMIN — IPRATROPIUM BROMIDE AND ALBUTEROL SULFATE 3 ML: 2.5; .5 SOLUTION RESPIRATORY (INHALATION) at 14:24

## 2022-08-15 RX ADMIN — POLYETHYLENE GLYCOL 3350 17 G: 17 POWDER, FOR SOLUTION ORAL at 08:13

## 2022-08-15 RX ADMIN — IPRATROPIUM BROMIDE AND ALBUTEROL SULFATE 3 ML: 2.5; .5 SOLUTION RESPIRATORY (INHALATION) at 10:15

## 2022-08-15 RX ADMIN — BUDESONIDE 250 MCG: 0.25 SUSPENSION RESPIRATORY (INHALATION) at 19:33

## 2022-08-15 RX ADMIN — Medication 1000 MG: at 05:44

## 2022-08-15 RX ADMIN — CEFEPIME 2000 MG: 2 INJECTION, POWDER, FOR SOLUTION INTRAVENOUS at 00:03

## 2022-08-15 RX ADMIN — ALPRAZOLAM 1 MG: 0.5 TABLET ORAL at 23:32

## 2022-08-15 RX ADMIN — GABAPENTIN 600 MG: 600 TABLET, FILM COATED ORAL at 21:40

## 2022-08-15 RX ADMIN — OXYCODONE AND ACETAMINOPHEN 1 TABLET: 10; 325 TABLET ORAL at 09:04

## 2022-08-15 RX ADMIN — BUDESONIDE 250 MCG: 0.25 SUSPENSION RESPIRATORY (INHALATION) at 06:21

## 2022-08-15 RX ADMIN — MUPIROCIN: 20 OINTMENT TOPICAL at 08:14

## 2022-08-15 RX ADMIN — OXYCODONE AND ACETAMINOPHEN 1 TABLET: 10; 325 TABLET ORAL at 22:37

## 2022-08-15 RX ADMIN — OXYCODONE AND ACETAMINOPHEN 1 TABLET: 10; 325 TABLET ORAL at 18:36

## 2022-08-15 RX ADMIN — DOCUSATE SODIUM 100 MG: 100 CAPSULE, LIQUID FILLED ORAL at 08:13

## 2022-08-15 RX ADMIN — ARFORMOTEROL TARTRATE 15 MCG: 15 SOLUTION RESPIRATORY (INHALATION) at 06:21

## 2022-08-15 RX ADMIN — SODIUM CHLORIDE, PRESERVATIVE FREE 10 ML: 5 INJECTION INTRAVENOUS at 08:14

## 2022-08-15 RX ADMIN — BUMETANIDE 2 MG: 0.25 INJECTION INTRAMUSCULAR; INTRAVENOUS at 18:29

## 2022-08-15 RX ADMIN — ALPRAZOLAM 1 MG: 0.5 TABLET ORAL at 08:12

## 2022-08-15 RX ADMIN — ALPRAZOLAM 1 MG: 0.5 TABLET ORAL at 16:51

## 2022-08-15 RX ADMIN — IPRATROPIUM BROMIDE AND ALBUTEROL SULFATE 3 ML: 2.5; .5 SOLUTION RESPIRATORY (INHALATION) at 02:00

## 2022-08-15 RX ADMIN — SUCRALFATE 1 G: 1 TABLET ORAL at 08:13

## 2022-08-15 RX ADMIN — SUCRALFATE 1 G: 1 TABLET ORAL at 14:32

## 2022-08-15 RX ADMIN — OXYCODONE AND ACETAMINOPHEN 1 TABLET: 10; 325 TABLET ORAL at 13:55

## 2022-08-15 RX ADMIN — SUCRALFATE 1 G: 1 TABLET ORAL at 21:40

## 2022-08-15 RX ADMIN — ENOXAPARIN SODIUM 40 MG: 100 INJECTION SUBCUTANEOUS at 16:51

## 2022-08-15 RX ADMIN — ONDANSETRON HYDROCHLORIDE 4 MG: 2 SOLUTION INTRAMUSCULAR; INTRAVENOUS at 22:37

## 2022-08-15 RX ADMIN — PANTOPRAZOLE SODIUM 40 MG: 40 TABLET, DELAYED RELEASE ORAL at 06:30

## 2022-08-15 RX ADMIN — GABAPENTIN 600 MG: 600 TABLET, FILM COATED ORAL at 08:13

## 2022-08-15 RX ADMIN — GABAPENTIN 600 MG: 600 TABLET, FILM COATED ORAL at 14:32

## 2022-08-15 ASSESSMENT — PAIN DESCRIPTION - DESCRIPTORS
DESCRIPTORS: DISCOMFORT
DESCRIPTORS: ACHING
DESCRIPTORS: ACHING
DESCRIPTORS: ACHING;SORE
DESCRIPTORS: DISCOMFORT
DESCRIPTORS: ACHING

## 2022-08-15 ASSESSMENT — PAIN SCALES - GENERAL
PAINLEVEL_OUTOF10: 9
PAINLEVEL_OUTOF10: 10
PAINLEVEL_OUTOF10: 4
PAINLEVEL_OUTOF10: 0
PAINLEVEL_OUTOF10: 9
PAINLEVEL_OUTOF10: 10
PAINLEVEL_OUTOF10: 0
PAINLEVEL_OUTOF10: 9
PAINLEVEL_OUTOF10: 0
PAINLEVEL_OUTOF10: 0
PAINLEVEL_OUTOF10: 4
PAINLEVEL_OUTOF10: 3
PAINLEVEL_OUTOF10: 8

## 2022-08-15 ASSESSMENT — PAIN DESCRIPTION - ORIENTATION
ORIENTATION: RIGHT
ORIENTATION: RIGHT
ORIENTATION: RIGHT;LOWER
ORIENTATION: RIGHT
ORIENTATION: RIGHT;LOWER
ORIENTATION: RIGHT

## 2022-08-15 ASSESSMENT — PAIN DESCRIPTION - FREQUENCY
FREQUENCY: CONTINUOUS
FREQUENCY: CONTINUOUS

## 2022-08-15 ASSESSMENT — PAIN - FUNCTIONAL ASSESSMENT
PAIN_FUNCTIONAL_ASSESSMENT: PREVENTS OR INTERFERES SOME ACTIVE ACTIVITIES AND ADLS

## 2022-08-15 ASSESSMENT — PAIN DESCRIPTION - PAIN TYPE
TYPE: SURGICAL PAIN

## 2022-08-15 ASSESSMENT — PAIN DESCRIPTION - LOCATION
LOCATION: SHOULDER;BACK
LOCATION: CHEST
LOCATION: CHEST
LOCATION: CHEST;BACK
LOCATION: CHEST
LOCATION: ARM;CHEST

## 2022-08-15 ASSESSMENT — PAIN DESCRIPTION - ONSET
ONSET: ON-GOING
ONSET: ON-GOING

## 2022-08-15 ASSESSMENT — PAIN SCALES - WONG BAKER: WONGBAKER_NUMERICALRESPONSE: 2

## 2022-08-15 NOTE — PROGRESS NOTES
Occupational Therapy  Pt sitting up in recliner waiting for her lunch. Pt is having some pains in her chest and abdomin. Pt states \"I am going up to 7th floor later\". Nursing notified of pain. Will continue to follow.  Electronically signed by BERTHA Duenas on 8/15/2022 at 2:33 PM

## 2022-08-15 NOTE — PROGRESS NOTES
Radha Ortiz transferred to [phone] from 150 via wheelchair. Reason for transfer: to floor bed   Explained reason for transfer to Patient. Belongings: None sent home with daughter . Soft chart transferred with patient: Yes. Telemetry box number 954 transferred with patient: yes. Report given to: Susan Durbin, via telephone.       Electronically signed by Elsy Deal RN on 8/15/2022 at 5:35 PM

## 2022-08-15 NOTE — PROGRESS NOTES
Physical Therapy  Name: Terrie Malcolm  MRN:  [id number]  Date of service:  8/15/2022     08/15/22 6795   Subjective   Subjective Attempt: Pt up in recliner, unwilling to work with therapy.          Electronically signed by Ricco Jiménez PTA on 8/15/2022 at 2:37 PM

## 2022-08-15 NOTE — PROGRESS NOTES
POST OP CARDIOTHORACIC SURGERY PROGRESS NOTE    Post op day 3    SUBJECTIVE:  Sitting up in chair, comfortable. Anxious about having chest tube removed. BP (!) 112/47   Pulse 77   Temp 98.5 °F (36.9 °C) (Temporal)   Resp 18   Ht 5' 6\" (1.676 m)   Wt 118 lb 4.8 oz (53.7 kg)   SpO2 (!) 89%   BMI 19.09 kg/m²   Average, Min, and Max for last 24 hours Vitals:  TEMPERATURE:  Temp  Av.3 °F (36.8 °C)  Min: 97.4 °F (36.3 °C)  Max: 99.8 °F (37.7 °C)  RESPIRATIONS RANGE: Resp  Av.3  Min: 16  Max: 31  PULSE RANGE: Pulse  Av.9  Min: 68  Max: 93  BLOOD PRESSURE RANGE:  Systolic (78CVY), FXM:514 , Min:63 , YJ   ; Diastolic (05QFD), CJ, Min:31, Max:60    PULSE OXIMETRY RANGE: SpO2  Av.9 %  Min: 87 %  Max: 100 %    I/O last 3 completed shifts: In: 2951.9 [P.O.:1640; I.V.:61.9; IV Piggyback:1250.1]  Out: 2419 [Urine:4325; Chest Tube:410]    CHEST: Clear, but diminished throughout. No wheezes or rhonchi. CARDIOVASCULAR: Normal HT with RRR. No murmurs, gallops or rubs. INCISION: Right VATS incisions with Mepilex dressing dry and intact. DRAINS: Right CT to 20 cm suction with 180 cm serosanguinous drainage in the past 24 hours. No air leak.      LABS:  CBC:   Lab Results   Component Value Date/Time    WBC 6.5 08/15/2022 04:41 AM    RBC 2.36 08/15/2022 04:41 AM    HGB 7.4 08/15/2022 04:41 AM    HCT 23.9 08/15/2022 04:41 AM    .3 08/15/2022 04:41 AM    MCH 31.4 08/15/2022 04:41 AM    MCHC 31.0 08/15/2022 04:41 AM    RDW 12.8 08/15/2022 04:41 AM     08/15/2022 04:41 AM    MPV 11.1 08/15/2022 04:41 AM     CMP:    Lab Results   Component Value Date/Time     08/15/2022 04:41 AM    K 3.9 08/15/2022 04:41 AM    CL 96 08/15/2022 04:41 AM    CO2 36 08/15/2022 04:41 AM    BUN 12 08/15/2022 04:41 AM    CREATININE 0.5 08/15/2022 04:41 AM    GFRAA >59 08/15/2022 04:41 AM    LABGLOM >60 08/15/2022 04:41 AM    GLUCOSE 112 08/15/2022 04:41 AM    PROT 5.0 08/15/2022 04:41 AM LABALBU 2.3 08/15/2022 04:41 AM    CALCIUM 8.0 08/15/2022 04:41 AM    BILITOT <0.2 08/15/2022 04:41 AM    ALKPHOS 47 08/15/2022 04:41 AM    AST 10 08/15/2022 04:41 AM    ALT 7 08/15/2022 04:41 AM         CHEST XRAY: Normal postoperative changes. Small right pleural effusion.      ASSESSMENT:     RLL Pneumonia with Loculated Empyema  Postoperative Anemia 2' to Acute Blood Loss, Expected  Recent LLL Pneumonia in 06/2022  Severe End-Stage COPD/Emphysema with Chronic Hypoxia on Continuous O2  Essential HTN  Chronic GERD  Chronic Pain Syndrome  Mixed Anxiety/Depressive Disorder  Hx Lung Cancer, Left - S/P Radiation Therapy 2019    PLAN:     Remove Right CT  Transfuse 1 unit PRBC  Transfer to PCU    Electronically signed by TEN Mcleod on 8/15/22 at 8:38 AM CDT

## 2022-08-15 NOTE — PROGRESS NOTES
Palliative care RN in to see pt. She is up in chair, resting comfortably. Tells me pain is \"tolerable\". Pt believes she will move to the 7th floor today. Notes from MD indicate she will move out of ICU. No other needs voiced at present time. Palliative care following for support.     Electronically signed by Lester Cano RN on 8/15/2022 at 10:52 AM

## 2022-08-15 NOTE — PROCEDURES
Media Information                Pulmonary Function Study    Interpretation:    Non diagnostic study    Impression:    Non diagnostic pulmonary function study due to the fact that the patient could not perform the study to the satisfaction of the [employer] standards.          Angela Mendes MD, [facility], [facility]

## 2022-08-15 NOTE — PROGRESS NOTES
Linh Lancaster received from ICU to room # [id number] [phone] . Mental Status: Patient is oriented, alert, coherent, logical, thought processes intact, and able to concentrate and follow conversation. Vitals:    08/15/22 1814   BP:    Pulse: 80   Resp:    Temp:    SpO2:      Placed on cardiac monitor: Yes. Box # [id number] . Belongings: None location is not applicable . Family at bedside Yes. Oriented Patient and Family to room. Call light within reach. Yes. Transfer was: Well tolerated by patient. .    Electronically signed by Reymundo Go RN on 8/15/2022 at 6:40 PM

## 2022-08-15 NOTE — PROGRESS NOTES
tender no organomegaly  Extremities:non clubbing cyanosis or edema  Neuro:no focal findings  Skin:intact    Recent Labs     08/13/22  0417 08/14/22  0619 08/15/22  0441   WBC 7.7 8.8 6.5   RBC 2.88* 2.77* 2.36*   HGB 9.0* 8.5* 7.4*   HCT 29.4* 27.9* 23.9*    319 234   .1* 100.7* 101.3*   MCH 31.3* 30.7 31.4*   MCHC 30.6* 30.5* 31.0*   RDW 12.4 12.7 12.8        Recent Labs     08/12/22  1701 08/13/22 0417 08/13/22  0423 08/14/22  0619 08/15/22  0441   NA  --  140  --  135* 137   K 4.1 4.3 4.1 3.7 3.9   CL  --  97*  --  93* 96*   CO2  --  38*  --  38* 36*   BUN  --  9  --  13 12   CREATININE  --  0.3*  --  0.5 0.5   CALCIUM  --  8.3*  --  8.3* 8.0*   GLUCOSE  --  114*  --  136* 112*        Recent Labs     08/12/22  1701 08/13/22 0423   PHART 7.310* 7.440   ORE3SJT 89.0* 65.0*   PO2ART 74.0* 59.0*   GFG6BFO 44.8* 44.1*   E0OEGLZS 92.7 88.9*   BEART 15.6* 17.5*       Recent Labs     08/15/22  0441   AST 10   ALT 7   ALKPHOS 47   BILITOT <0.2   CALCIUM 8.0*       Recent Labs     08/12/22  1320 08/12/22  1422   LABGRAM Few WBC's (Polymorphonuclear)  No organisms seen   No WBCs or organisms seen           Radiograph: CT ABDOMEN PELVIS WO CONTRAST Additional Contrast? None    Result Date: 8/4/2022  1. No evidence for acute abdominal or pelvic process. 2.Right lower lobe consolidation with small pleural effusion. Superimposed infection is not excluded. 3.Colonic diverticulosis without diverticulitis. 4.Calcific atherosclerosis of the abdominal aorta. 5.Degenerative spondylosis. NM LUNG SCAN PERFUSION ONLY    Result Date: 8/4/2022  1. Small subsegmental defect in the posterior left lower lobe, new since prior, compatible with and intermediate probability for pulmonary embolism. 2. Pulmonary hyperaeration, with decreased perfusion in the lung apices, likely related to COPD. 3. Patchy airspace abnormality in the right mid and lower lung field with small right pleural fluid, likely pneumonia.  Please note according to the Piopped criteria a low probability reflects a 0 to 19%chance of a pulmonary embolism. Please correlate clinically. If symptoms persist, consider CT PA gram. RECOMMENDATION: Follow-up as clinically warranted. Electronically Signed by Suzi Viramontes MD at 04-Aug-2022 11:14:59 AM             XR CHEST PORTABLE    Result Date: 8/3/2022  Right mid and lower lung infiltrates. Small right pleural effusion not excluded. Recommendation: Follow up as clinically indicated. Electronically Signed by Lauro Samayoa MD at 03-Aug-2022 08:41:33 AM                My radiograph interpretation/independent review of imaging: reviewed. Problem list generated by [facility] Problems             Last Modified POA    * (Principal) Pneumonia 8/3/2022 Yes    Chronic respiratory failure with hypoxia and hypercapnia (HCC) 8/13/2022 Yes    Loculated pleural effusion 8/12/2022 Yes        Pulmonary Assessment/Plan:     Chronic hypoxic hypercapnic respiratory failure continue oxygen supplementation as necessary to maintain adequate oxygenation. At her baseline  Underlying severe COPD. Continue bronchodilators continue pulmonary toilet. Right lower lobe pneumonia. Continue empirical antibiotic therapy. Loculated effusion that is worse. Post right video-assisted thoracoscopy and decortication doing very well. Continue pulmonary toilet continue pain management  Low probability for PE per PIOPED criteria. On anticoagulation. Resume direct oral anticoagulation when appropriate  Dyspnea due to the above supportive care. DC planning. OK to send to floor when OK with surgery. Ellis Nuñez MD, [facility], [facility]    The above note was generated using voice recognition software. Inadvertent typographical errors in transcription may have occurred.       Electronically signed by Ellis Nuñez MD on 08/15/22 at 1:05 PM

## 2022-08-15 NOTE — FLOWSHEET NOTE
08/15/22 1806   Encounter Summary   Encounter Overview/Reason  Spiritual/Emotional Needs;Palliative Care   Service Provided For: Patient   Referral/Consult From: Palliative Care   Last Encounter  08/14/22   Complexity of Encounter High   Begin Time 1330   End Time  1400   Total Time Calculated 30 min   Spiritual/Emotional needs   Type Spiritual Support   Palliative Care   Type Palliative Care, Follow-up   Assessment/Intervention/Outcome   Assessment Anxious; Concerns with suffering; Impaired resilience   Intervention Active listening;Discussed belief system/Orthodoxy practices/john;Discussed illness injury and its impact; Explored/Affirmed feelings, thoughts, concerns;Prayer (assurance of)/[place]   Outcome Engaged in conversation;Expressed feelings, needs, and concerns;Expressed Gratitude;Receptive

## 2022-08-15 NOTE — PLAN OF CARE
Problem: Discharge Planning  Goal: Discharge to home or other facility with appropriate resources  8/15/2022 0036 by Pablo Ng RN  Outcome: Progressing     Problem: Safety - Adult  Goal: Free from fall injury  8/15/2022 0036 by Pablo Ng RN  Outcome: Progressing     Problem: ABCDS Injury Assessment  Goal: Absence of physical injury  8/15/2022 0036 by Pablo Ng RN  Outcome: Progressing     Problem: Skin/Tissue Integrity  Goal: Absence of new skin breakdown  Description: 1. Monitor for areas of redness and/or skin breakdown  2. Assess vascular access sites hourly  3. Every 4-6 hours minimum:  Change oxygen saturation probe site  4. Every 4-6 hours:  If on nasal continuous positive airway pressure, respiratory therapy assess nares and determine need for appliance change or resting period.   8/15/2022 0036 by Pablo Ng RN  Outcome: Progressing     Problem: Pain  Goal: Verbalizes/displays adequate comfort level or baseline comfort level  8/15/2022 0036 by Pablo Ng RN  Outcome: Progressing     Problem: Respiratory - Adult  Goal: Achieves optimal ventilation and oxygenation  8/15/2022 0036 by Pablo Ng RN  Outcome: Progressing     Problem: Musculoskeletal - Adult  Goal: Return mobility to safest level of function  8/15/2022 0036 by Pablo Ng RN  Outcome: Progressing     Problem: Infection - Adult  Goal: Absence of infection at discharge  8/15/2022 0036 by Pablo Ng RN  Outcome: Progressing

## 2022-08-16 ENCOUNTER — APPOINTMENT (OUTPATIENT)
Dept: GENERAL RADIOLOGY | Age: 72
DRG: 853 | End: 2022-08-16
Payer: MEDICARE

## 2022-08-16 LAB
ALBUMIN SERPL-MCNC: 2.4 G/DL (ref 3.5–5.2)
ALP BLD-CCNC: 53 U/L (ref 35–104)
ALT SERPL-CCNC: 7 U/L (ref 5–33)
ANAEROBIC CULTURE: NORMAL
ANAEROBIC CULTURE: NORMAL
ANION GAP SERPL CALCULATED.3IONS-SCNC: 4 MMOL/L (ref 7–19)
AST SERPL-CCNC: 12 U/L (ref 5–32)
BASE EXCESS ARTERIAL: 21.9 MMOL/L (ref -2–2)
BI-LEVEL POS AIRWAY PRESSURE(EXPIRATORY): 7
BI-LEVEL POS AIRWAY PRESSURE(INSPIRATORY): 14
BILIRUB SERPL-MCNC: <0.2 MG/DL (ref 0.2–1.2)
BUN BLDV-MCNC: 13 MG/DL (ref 8–23)
CALCIUM SERPL-MCNC: 8.5 MG/DL (ref 8.8–10.2)
CARBOXYHEMOGLOBIN ARTERIAL: 2.1 % (ref 0–5)
CHLORIDE BLD-SCNC: 96 MMOL/L (ref 98–111)
CO2: 39 MMOL/L (ref 22–29)
CREAT SERPL-MCNC: 0.5 MG/DL (ref 0.5–0.9)
CULTURE SURGICAL: NORMAL
CULTURE SURGICAL: NORMAL
GFR AFRICAN AMERICAN: >59
GFR NON-AFRICAN AMERICAN: >60
GLUCOSE BLD-MCNC: 106 MG/DL (ref 74–109)
GRAM STAIN RESULT: NORMAL
GRAM STAIN RESULT: NORMAL
HCO3 ARTERIAL: 50.8 MMOL/L (ref 22–26)
HCT VFR BLD CALC: 31.6 % (ref 37–47)
HEMOGLOBIN, ART, EXTENDED: 11.4 G/DL (ref 12–16)
HEMOGLOBIN: 10.1 G/DL (ref 12–16)
MCH RBC QN AUTO: 31.1 PG (ref 27–31)
MCHC RBC AUTO-ENTMCNC: 32 G/DL (ref 33–37)
MCV RBC AUTO: 97.2 FL (ref 81–99)
MECHANICAL RATE IN BPM: 14
METHEMOGLOBIN ARTERIAL: 1.1 %
MODE: ABNORMAL
O2 CONTENT ARTERIAL: 15 ML/DL
O2 SAT, ARTERIAL: 93.3 %
O2 THERAPY: ABNORMAL
OXYGEN FLOW: 15
PCO2 ARTERIAL: 82 MMHG (ref 35–45)
PDW BLD-RTO: 15.2 % (ref 11.5–14.5)
PH ARTERIAL: 7.4 (ref 7.35–7.45)
PLATELET # BLD: 189 K/UL (ref 130–400)
PMV BLD AUTO: 11.2 FL (ref 9.4–12.3)
PO2 ARTERIAL: 71 MMHG (ref 80–100)
POTASSIUM REFLEX MAGNESIUM: 4.3 MMOL/L (ref 3.5–5)
POTASSIUM, WHOLE BLOOD: 4.2
RBC # BLD: 3.25 M/UL (ref 4.2–5.4)
SODIUM BLD-SCNC: 139 MMOL/L (ref 136–145)
TOTAL PROTEIN: 5.4 G/DL (ref 6.6–8.7)
VANCOMYCIN TROUGH: 13.1 UG/ML (ref 10–20)
WBC # BLD: 6.5 K/UL (ref 4.8–10.8)

## 2022-08-16 PROCEDURE — 94761 N-INVAS EAR/PLS OXIMETRY MLT: CPT

## 2022-08-16 PROCEDURE — 2580000003 HC RX 258: Performed by: NURSE PRACTITIONER

## 2022-08-16 PROCEDURE — 2500000003 HC RX 250 WO HCPCS: Performed by: NURSE PRACTITIONER

## 2022-08-16 PROCEDURE — 6370000000 HC RX 637 (ALT 250 FOR IP): Performed by: NURSE PRACTITIONER

## 2022-08-16 PROCEDURE — 36415 COLL VENOUS BLD VENIPUNCTURE: CPT

## 2022-08-16 PROCEDURE — 36600 WITHDRAWAL OF ARTERIAL BLOOD: CPT

## 2022-08-16 PROCEDURE — 85027 COMPLETE CBC AUTOMATED: CPT

## 2022-08-16 PROCEDURE — 2700000000 HC OXYGEN THERAPY PER DAY

## 2022-08-16 PROCEDURE — 80202 ASSAY OF VANCOMYCIN: CPT

## 2022-08-16 PROCEDURE — 6360000002 HC RX W HCPCS: Performed by: NURSE PRACTITIONER

## 2022-08-16 PROCEDURE — 94660 CPAP INITIATION&MGMT: CPT

## 2022-08-16 PROCEDURE — 99233 SBSQ HOSP IP/OBS HIGH 50: CPT | Performed by: INTERNAL MEDICINE

## 2022-08-16 PROCEDURE — 94640 AIRWAY INHALATION TREATMENT: CPT

## 2022-08-16 PROCEDURE — 6360000002 HC RX W HCPCS

## 2022-08-16 PROCEDURE — 99024 POSTOP FOLLOW-UP VISIT: CPT | Performed by: SURGERY

## 2022-08-16 PROCEDURE — 82803 BLOOD GASES ANY COMBINATION: CPT

## 2022-08-16 PROCEDURE — 71045 X-RAY EXAM CHEST 1 VIEW: CPT

## 2022-08-16 PROCEDURE — 6360000002 HC RX W HCPCS: Performed by: INTERNAL MEDICINE

## 2022-08-16 PROCEDURE — 80053 COMPREHEN METABOLIC PANEL: CPT

## 2022-08-16 PROCEDURE — 2140000000 HC CCU INTERMEDIATE R&B

## 2022-08-16 RX ORDER — BUMETANIDE 0.25 MG/ML
2 INJECTION, SOLUTION INTRAMUSCULAR; INTRAVENOUS ONCE
Status: COMPLETED | OUTPATIENT
Start: 2022-08-16 | End: 2022-08-16

## 2022-08-16 RX ORDER — ACETYLCYSTEINE 200 MG/ML
600 SOLUTION ORAL; RESPIRATORY (INHALATION) 2 TIMES DAILY PRN
Status: DISCONTINUED | OUTPATIENT
Start: 2022-08-16 | End: 2022-08-23 | Stop reason: HOSPADM

## 2022-08-16 RX ORDER — FUROSEMIDE 10 MG/ML
40 INJECTION INTRAMUSCULAR; INTRAVENOUS ONCE
Status: COMPLETED | OUTPATIENT
Start: 2022-08-16 | End: 2022-08-16

## 2022-08-16 RX ORDER — ALBUTEROL SULFATE 2.5 MG/3ML
2.5 SOLUTION RESPIRATORY (INHALATION) EVERY 4 HOURS PRN
Status: DISCONTINUED | OUTPATIENT
Start: 2022-08-16 | End: 2022-08-23 | Stop reason: HOSPADM

## 2022-08-16 RX ORDER — ALBUTEROL SULFATE 2.5 MG/3ML
SOLUTION RESPIRATORY (INHALATION)
Status: COMPLETED
Start: 2022-08-16 | End: 2022-08-16

## 2022-08-16 RX ADMIN — PANTOPRAZOLE SODIUM 40 MG: 40 TABLET, DELAYED RELEASE ORAL at 17:49

## 2022-08-16 RX ADMIN — MUPIROCIN: 20 OINTMENT TOPICAL at 20:24

## 2022-08-16 RX ADMIN — PANTOPRAZOLE SODIUM 40 MG: 40 TABLET, DELAYED RELEASE ORAL at 08:32

## 2022-08-16 RX ADMIN — BUMETANIDE 2 MG: 0.25 INJECTION INTRAMUSCULAR; INTRAVENOUS at 08:38

## 2022-08-16 RX ADMIN — POLYETHYLENE GLYCOL 3350 17 G: 17 POWDER, FOR SOLUTION ORAL at 08:32

## 2022-08-16 RX ADMIN — OXYCODONE AND ACETAMINOPHEN 1 TABLET: 10; 325 TABLET ORAL at 13:53

## 2022-08-16 RX ADMIN — SUCRALFATE 1 G: 1 TABLET ORAL at 08:32

## 2022-08-16 RX ADMIN — DOCUSATE SODIUM 100 MG: 100 CAPSULE, LIQUID FILLED ORAL at 20:23

## 2022-08-16 RX ADMIN — SUCRALFATE 1 G: 1 TABLET ORAL at 13:53

## 2022-08-16 RX ADMIN — ALPRAZOLAM 1 MG: 0.5 TABLET ORAL at 20:23

## 2022-08-16 RX ADMIN — ENOXAPARIN SODIUM 40 MG: 100 INJECTION SUBCUTANEOUS at 17:50

## 2022-08-16 RX ADMIN — ALBUTEROL SULFATE: 2.5 SOLUTION RESPIRATORY (INHALATION) at 05:31

## 2022-08-16 RX ADMIN — ARFORMOTEROL TARTRATE 15 MCG: 15 SOLUTION RESPIRATORY (INHALATION) at 06:58

## 2022-08-16 RX ADMIN — ONDANSETRON HYDROCHLORIDE 4 MG: 2 SOLUTION INTRAMUSCULAR; INTRAVENOUS at 04:38

## 2022-08-16 RX ADMIN — SUCRALFATE 1 G: 1 TABLET ORAL at 20:23

## 2022-08-16 RX ADMIN — BUDESONIDE 250 MCG: 0.25 SUSPENSION RESPIRATORY (INHALATION) at 06:58

## 2022-08-16 RX ADMIN — GABAPENTIN 600 MG: 600 TABLET, FILM COATED ORAL at 08:32

## 2022-08-16 RX ADMIN — ALPRAZOLAM 1 MG: 0.5 TABLET ORAL at 11:53

## 2022-08-16 RX ADMIN — BUDESONIDE 250 MCG: 0.25 SUSPENSION RESPIRATORY (INHALATION) at 19:45

## 2022-08-16 RX ADMIN — CEFEPIME 2000 MG: 2 INJECTION, POWDER, FOR SOLUTION INTRAVENOUS at 00:58

## 2022-08-16 RX ADMIN — OXYCODONE AND ACETAMINOPHEN 1 TABLET: 10; 325 TABLET ORAL at 09:03

## 2022-08-16 RX ADMIN — HYDRALAZINE HYDROCHLORIDE 25 MG: 25 TABLET, FILM COATED ORAL at 08:32

## 2022-08-16 RX ADMIN — GABAPENTIN 600 MG: 600 TABLET, FILM COATED ORAL at 20:23

## 2022-08-16 RX ADMIN — OXYCODONE AND ACETAMINOPHEN 1 TABLET: 10; 325 TABLET ORAL at 04:33

## 2022-08-16 RX ADMIN — CEFEPIME 2000 MG: 2 INJECTION, POWDER, FOR SOLUTION INTRAVENOUS at 17:13

## 2022-08-16 RX ADMIN — SUCRALFATE 1 G: 1 TABLET ORAL at 17:10

## 2022-08-16 RX ADMIN — GABAPENTIN 600 MG: 600 TABLET, FILM COATED ORAL at 13:53

## 2022-08-16 RX ADMIN — MUPIROCIN: 20 OINTMENT TOPICAL at 08:32

## 2022-08-16 RX ADMIN — DOCUSATE SODIUM 100 MG: 100 CAPSULE, LIQUID FILLED ORAL at 08:32

## 2022-08-16 RX ADMIN — ARFORMOTEROL TARTRATE 15 MCG: 15 SOLUTION RESPIRATORY (INHALATION) at 19:45

## 2022-08-16 RX ADMIN — CEFEPIME 2000 MG: 2 INJECTION, POWDER, FOR SOLUTION INTRAVENOUS at 10:46

## 2022-08-16 RX ADMIN — HYDRALAZINE HYDROCHLORIDE 25 MG: 25 TABLET, FILM COATED ORAL at 20:24

## 2022-08-16 RX ADMIN — CITALOPRAM HYDROBROMIDE 40 MG: 20 TABLET ORAL at 08:32

## 2022-08-16 RX ADMIN — FUROSEMIDE 40 MG: 10 INJECTION, SOLUTION INTRAMUSCULAR; INTRAVENOUS at 17:10

## 2022-08-16 RX ADMIN — Medication 1000 MG: at 08:56

## 2022-08-16 ASSESSMENT — PAIN DESCRIPTION - LOCATION
LOCATION: SHOULDER
LOCATION: RIB CAGE
LOCATION: SHOULDER

## 2022-08-16 ASSESSMENT — PAIN SCALES - GENERAL
PAINLEVEL_OUTOF10: 3
PAINLEVEL_OUTOF10: 4
PAINLEVEL_OUTOF10: 0
PAINLEVEL_OUTOF10: 3
PAINLEVEL_OUTOF10: 10
PAINLEVEL_OUTOF10: 8
PAINLEVEL_OUTOF10: 8
PAINLEVEL_OUTOF10: 0

## 2022-08-16 ASSESSMENT — PAIN SCALES - WONG BAKER
WONGBAKER_NUMERICALRESPONSE: 0
WONGBAKER_NUMERICALRESPONSE: 2

## 2022-08-16 ASSESSMENT — PAIN DESCRIPTION - DESCRIPTORS
DESCRIPTORS: SHARP
DESCRIPTORS: ACHING
DESCRIPTORS: SHOOTING;ACHING

## 2022-08-16 ASSESSMENT — PAIN DESCRIPTION - ORIENTATION
ORIENTATION: RIGHT

## 2022-08-16 ASSESSMENT — PAIN - FUNCTIONAL ASSESSMENT
PAIN_FUNCTIONAL_ASSESSMENT: PREVENTS OR INTERFERES SOME ACTIVE ACTIVITIES AND ADLS

## 2022-08-16 NOTE — PROGRESS NOTES
Progress Note  Abby Ross [place]  8/16/2022 12:35 PM  Subjective:   Admit Date:   8/3/2022      CC/ADMIT DX:       Interval History:   Reviewed overnight events and nursing notes. She had an event this am with desat to the 60%. She has no complaints now. She denies any SOA. I have reviewed all labs/diagnostics from the last 24hrs. ROS:   I have done a 10 point ROS and all are negative, except what is mentioned in the HPI. ADULT DIET; Regular  ADULT ORAL NUTRITION SUPPLEMENT; Breakfast, Lunch, Dinner; Standard High Calorie/High Protein Oral Supplement    Medications:      sodium chloride      sodium chloride 10 mL/hr at 08/13/22 1632      docusate sodium  100 mg Oral BID    polyethylene glycol  17 g Oral Daily    bisacodyl  10 mg Rectal Once    hydrALAZINE  25 mg Oral 2 times per day    vancomycin  1,000 mg IntraVENous Q24H    mupirocin   Nasal BID    enoxaparin  40 mg SubCUTAneous Daily    budesonide  250 mcg Nebulization BID    Arformoterol Tartrate  15 mcg Nebulization BID    cefepime  2,000 mg IntraVENous Q8H    nicotine  1 patch TransDERmal Daily    vancomycin (VANCOCIN) intermittent dosing (placeholder)   Other RX Placeholder    citalopram  40 mg Oral Daily    gabapentin  600 mg Oral TID    pantoprazole  40 mg Oral BID AC    sucralfate  1 g Oral 4x Daily           Objective:   Vitals: BP (!) 87/52   Pulse 74   Temp 97.2 °F (36.2 °C) (Temporal)   Resp 20   Ht 5' 6\" (1.676 m)   Wt 124 lb 8 oz (56.5 kg)   SpO2 98%   BMI 20.09 kg/m²    Intake/Output Summary (Last 24 hours) at 8/16/2022 1235  Last data filed at 8/16/2022 1145  Gross per 24 hour   Intake 180 ml   Output 4125 ml   Net -3945 ml     General appearance: NAD, on O2  Lungs: coarse  Heart: RRR  Abdomen: soft, non-tender; bowel sounds normal; no masses,  no organomegaly  Extremities: extremities normal, atraumatic, no cyanosis or edema  Neurologic:  negative, No obvious focal neurologic deficits.     Assessment and Plan:   Principal Problem:    Pneumonia  Active Problems:    Chronic respiratory failure with hypoxia and hypercapnia (HCC)    Loculated pleural effusion  Resolved Problems:    * No resolved hospital problems. *    COPD    Chronic Pain    Depression    ABL Anemia    S/P VATS Thorascopic Decortication of RightChest Empyema        Plan:   Continue present medication(s)    Follow with Pulm, CT Surgery   Continue antibiotic treatment   Supportive care      Discharge planning:   her home     Reviewed treatment plans with the patient and/or family.              Electronically signed by Kevon Rubalcava MD on 8/16/2022 at 12:35 PM

## 2022-08-16 NOTE — PROGRESS NOTES
POST OP CARDIOTHORACIC SURGERY PROGRESS NOTE    Post op day 4    SUBJECTIVE:  Currently resting in bed. Events from this morning noted. Patient currently on BiPAP 14/7 with O2 at 20 lpm.     BP (!) 106/46   Pulse 83   Temp 97.4 °F (36.3 °C) (Temporal)   Resp 18   Ht 5' 6\" (1.676 m)   Wt 124 lb 8 oz (56.5 kg)   SpO2 96%   BMI 20.09 kg/m²   Average, Min, and Max for last 24 hours Vitals:  TEMPERATURE:  Temp  Av.1 °F (36.7 °C)  Min: 97.2 °F (36.2 °C)  Max: 98.9 °F (37.2 °C)  RESPIRATIONS RANGE: Resp  Av.2  Min: 12  Max: 29  PULSE RANGE: Pulse  Av.2  Min: 71  Max: 93  BLOOD PRESSURE RANGE:  Systolic (98XIK), GS , Min:61 , WTE:159   ; Diastolic (16CDN), OMV:58, Min:33, Max:60    PULSE OXIMETRY RANGE: SpO2  Av %  Min: 61 %  Max: 97 %    I/O last 3 completed shifts: In: 2462.4 [P.O.:1240; I.V.:61.9; Blood:462.5; IV Piggyback:698]  Out: 4400 [Urine:4200; Chest Tube:200]    CHEST: Clear, but diminished throughout. No wheezes or rhonchi. CARDIOVASCULAR: Normal HT with RRR. No murmurs, gallops or rubs. INCISION: Right VATS incisions with Mepilex dressing dry and intact.        LABS:  CBC:   Lab Results   Component Value Date/Time    WBC 6.5 2022 02:06 AM    RBC 3.25 2022 02:06 AM    HGB 10.1 2022 02:06 AM    HCT 31.6 2022 02:06 AM    MCV 97.2 2022 02:06 AM    MCH 31.1 2022 02:06 AM    MCHC 32.0 2022 02:06 AM    RDW 15.2 2022 02:06 AM     2022 02:06 AM    MPV 11.2 2022 02:06 AM     CMP:    Lab Results   Component Value Date/Time     2022 02:06 AM    K 4.3 2022 02:06 AM    CL 96 2022 02:06 AM    CO2 39 2022 02:06 AM    BUN 13 2022 02:06 AM    CREATININE 0.5 2022 02:06 AM    GFRAA >59 2022 02:06 AM    LABGLOM >60 2022 02:06 AM    GLUCOSE 106 2022 02:06 AM    PROT 5.4 2022 02:06 AM    LABALBU 2.4 2022 02:06 AM    CALCIUM 8.5 2022 02:06 AM BILITOT <0.2 08/16/2022 02:06 AM    ALKPHOS 53 08/16/2022 02:06 AM    AST 12 08/16/2022 02:06 AM    ALT 7 08/16/2022 02:06 AM     Surgical Pathology: Pending      CHEST XRAY: Normal postoperative changes with increased pulmonary congestion, RLL    ASSESSMENT:     RLL Pneumonia with Loculated Empyema  Postoperative Acute on Chronic Hypoxic Respiratory Failure  Postoperative Anemia 2' to Acute Blood Loss, Expected  Recent LLL Pneumonia in 06/2022  Severe End-Stage COPD/Emphysema with Chronic Hypoxia on Continuous O2  Essential HTN  Chronic GERD  Chronic Pain Syndrome  Mixed Anxiety/Depressive Disorder  Hx Lung Cancer, Left - S/P Radiation Therapy 2019    PLAN:     Appreciate Dr. Lindsey Salomon  Await Pathology  Bumex IV x 1  Wean Oxygen as Tolerated    Electronically signed by TEN Bustillo on 8/16/22 at 6:41 AM CDT

## 2022-08-16 NOTE — PROGRESS NOTES
Physical Therapy  Name: Malena Roberts  MRN:  [id number]  Date of service:  8/16/2022 08/16/22 0953   Subjective   Subjective Attempt: Pts O2 sats still low at this time, nurse recommends no therapy at this time. Will continue to follow.          Electronically signed by Bruce Olson PTA on 8/16/2022 at 9:56 AM

## 2022-08-16 NOTE — PROGRESS NOTES
[address] [facility] Pharmacokinetic Monitoring Service - Vancomycin    Consulting Provider: Dr. Sada Rod   Indication: pneumonia  Target Concentration: Goal AUC/LETTY 400-600 mg*hr/L  Day of Therapy: 14  Additional Antimicrobials: Cefepime    Pertinent Laboratory Values: Wt Readings from Last 1 Encounters:   08/15/22 124 lb 8 oz (56.5 kg)     Temp Readings from Last 1 Encounters:   08/15/22 98 °F (36.7 °C) (Temporal)     Estimated Creatinine Clearance: 88 mL/min (based on SCr of 0.5 mg/dL). Recent Labs     08/15/22  0441 08/16/22  0206   CREATININE 0.5 0.5   WBC 6.5 6.5     Procalcitonin: not ordered    Pertinent Cultures:  Culture Date Source Results   08/03/22 Blood x 2 No growth   08/12/22 Body Fluid from Lung No organisms seen   08/12/22 Tissue - pleural peel right No WBCs or organisms seen     MRSA Nasal Swab: showed MRSA positive result on 08/12/22    Recent vancomycin administrations                     vancomycin (VANCOCIN) 1000 mg in dextrose 5% 250 mL IVPB (mg) 1,000 mg New Bag 08/15/22 0544    vancomycin (VANCOCIN) 1000 mg in dextrose 5% 250 mL IVPB (mg) 1,000 mg New Bag 08/14/22 0540     1,000 mg New Bag 08/13/22 2043     1,000 mg New Bag  0522                    Assessment:  Date/Time Current Dose Concentration Timing of Concentration (h) AUC   08/16 at 0206 Vancomycin 1000 mg IV every 24 hours 13.1 20 hours 22 minutes 409  (57% probability)   Note: Serum concentrations collected for AUC dosing may appear elevated if collected in close proximity to the dose administered, this is not necessarily an indication of toxicity    Plan:  Current dosing regimen is therapeutic. The probability of AUC of 409 is only 57%. Will check another level prior to the next dose to confirm if regimen is still in goal range of 400-600.   Continue current dose  Repeat vancomycin concentration ordered for 08/17 @ [address] [place] [address],[address] [place] will continue to monitor patient and adjust therapy as indicated    Thank you for the consult,  Aleksandar Yarbrough, [facility]  8/16/2022 3:32 AM

## 2022-08-16 NOTE — PROGRESS NOTES
Dr. Javier Martinez contacted to inform of rapid response this AM.    Electronically signed by Angella Castillo RN on 8/16/2022 at 6:42 AM

## 2022-08-16 NOTE — PROGRESS NOTES
Rapid response called on pt at 0515 this morning. Pt O2 sats at 66%. NC 5L changed to Bipap 15L and O2 sats improved to 97%. Prior to rapid response being called, pt was in pain and given a percocet. Pt sats doing well at 97%. Will continue to monitor.      Electronically signed by Aimee Hobbs RN on 8/16/2022 at 6:56 AM

## 2022-08-16 NOTE — PROGRESS NOTES
Pulmonary and Critical Care Progress note. Susanne Caal    MRN# [id number]    Acct# [de-identified]  8/16/2022   11:32 AM CDT    Referring Justine Warren MD      Chief Complaint: shortness of breath abd pain. HPI: Patient is seen and examined on the floor. She is on the BIPAP now. Apparently had an episode of hypoxia early AM requiring NIV. Medications    furosemide, 40 mg, IntraVENous, Once    docusate sodium, 100 mg, Oral, BID    polyethylene glycol, 17 g, Oral, Daily    bisacodyl, 10 mg, Rectal, Once    hydrALAZINE, 25 mg, Oral, 2 times per day    vancomycin, 1,000 mg, IntraVENous, Q24H    mupirocin, , Nasal, BID    enoxaparin, 40 mg, SubCUTAneous, Daily    budesonide, 250 mcg, Nebulization, BID    Arformoterol Tartrate, 15 mcg, Nebulization, BID    cefepime, 2,000 mg, IntraVENous, Q8H    nicotine, 1 patch, TransDERmal, Daily    vancomycin (VANCOCIN) intermittent dosing (placeholder), , Other, RX Placeholder    citalopram, 40 mg, Oral, Daily    gabapentin, 600 mg, Oral, TID    pantoprazole, 40 mg, Oral, BID AC    sucralfate, 1 g, Oral, 4x Daily     Review of Systems:  RESPIRATORY:  positive for  dyspnea  CARDIOVASCULAR:  positive for  dyspnea      Physical Exam:  BP (!) 90/52   Pulse 74   Temp 97 °F (36.1 °C) (Temporal)   Resp 18   Ht 5' 6\" (1.676 m)   Wt 124 lb 8 oz (56.5 kg)   SpO2 94%   BMI 20.09 kg/m²   Intake/Output Summary (Last 24 hours) at 8/16/2022 1643  Last data filed at 8/16/2022 1446  Gross per 24 hour   Intake 660 ml   Output 3725 ml   Net -3065 ml         General appearance:elderly white female who does not appear to be in distress. Sleeping. BiPAP on.    HEENT:normocephalic atraumatic  HBKTK:[id number] no murmurs  Lungs:diminished bilaterally no rubs or tenderness or dullness to percussion  Abdomen:Soft non tender no organomegaly  Extremities:non clubbing cyanosis or edema  Neuro:no focal findings  Skin:intact    Recent Labs     08/14/22  8207 08/15/22  0441 08/15/22  1748 08/16/22  0206   WBC 8.8 6.5  --  6.5   RBC 2.77* 2.36*  --  3.25*   HGB 8.5* 7.4* 9.8* 10.1*   HCT 27.9* 23.9* 30.7* 31.6*    234  --  189   .7* 101.3*  --  97.2   MCH 30.7 31.4*  --  31.1*   MCHC 30.5* 31.0*  --  32.0*   RDW 12.7 12.8  --  15.2*        Recent Labs     08/14/22  0619 08/15/22  0441 08/16/22  0206   * 137 139   K 3.7 3.9 4.3   CL 93* 96* 96*   CO2 38* 36* 39*   BUN 13 12 13   CREATININE 0.5 0.5 0.5   CALCIUM 8.3* 8.0* 8.5*   GLUCOSE 136* 112* 106        No results for input(s): PHART, UKU0EJY, PO2ART, NNE4ILH, A9HCOREZ, BEART in the last 72 hours. Recent Labs     08/16/22  0206   AST 12   ALT 7   ALKPHOS 53   BILITOT <0.2   CALCIUM 8.5*       No results for input(s): BC, LABGRAM, CULTRESP, BFCX in the last 72 hours. Radiograph: CT ABDOMEN PELVIS WO CONTRAST Additional Contrast? None    Result Date: 8/4/2022  1. No evidence for acute abdominal or pelvic process. 2.Right lower lobe consolidation with small pleural effusion. Superimposed infection is not excluded. 3.Colonic diverticulosis without diverticulitis. 4.Calcific atherosclerosis of the abdominal aorta. 5.Degenerative spondylosis. NM LUNG SCAN PERFUSION ONLY    Result Date: 8/4/2022  1. Small subsegmental defect in the posterior left lower lobe, new since prior, compatible with and intermediate probability for pulmonary embolism. 2. Pulmonary hyperaeration, with decreased perfusion in the lung apices, likely related to COPD. 3. Patchy airspace abnormality in the right mid and lower lung field with small right pleural fluid, likely pneumonia. Please note according to the Piopped criteria a low probability reflects a 0 to 19%chance of a pulmonary embolism. Please correlate clinically. If symptoms persist, consider CT PA gram. RECOMMENDATION: Follow-up as clinically warranted.  Electronically Signed by Jesenia Hamilton MD at 04-Aug-2022 11:14:59 AM             XR CHEST PORTABLE    Result Date: 8/3/2022  Right mid and lower lung infiltrates. Small right pleural effusion not excluded. Recommendation: Follow up as clinically indicated. Electronically Signed by Lupillo Paula MD at 03-Aug-2022 08:41:33 AM                My radiograph interpretation/independent review of imaging: reviewed. Problem list generated by [facility] Problems             Last Modified POA    * (Principal) Pneumonia 8/3/2022 Yes    Chronic respiratory failure with hypoxia and hypercapnia (HCC) 8/13/2022 Yes    Loculated pleural effusion 8/12/2022 Yes        Pulmonary Assessment/Plan:     Chronic hypoxic hypercapnic respiratory failure continue oxygen supplementation as necessary to maintain adequate oxygenation. Worse oxygenation, pleural effusion on CXR. Diuresis. Underlying severe COPD. Continue bronchodilators continue pulmonary toilet. Right lower lobe pneumonia. Continue empirical antibiotic therapy. Loculated effusion post decortication. Low probability for PE per PIOPED criteria. On anticoagulation. Resume direct oral anticoagulation when appropriate  Dyspnea due to the above supportive care. DC planning. Rand Gongora MD, [facility], [facility]    The above note was generated using voice recognition software. Inadvertent typographical errors in transcription may have occurred.       Electronically signed by Rand Gongora MD on 08/16/22 at 4:43 PM

## 2022-08-16 NOTE — PROGRESS NOTES
Progress Note    2022 7:26 AM          POD#   1    Subjective:  Ms. Gabino Cohen is on bipap, breathing comfortably  PULSE OXIMETRY RANGE: SpO2  Av.5 %  Min: 61 %  Max: 97 %  SUPPLEMENTAL O2: O2 Flow Rate (L/min): 10 L/min   Vital Signs: BP (!) 106/46   Pulse 83   Temp 97.4 °F (36.3 °C) (Temporal)   Resp 18   Ht 5' 6\" (1.676 m)   Wt 124 lb 8 oz (56.5 kg)   SpO2 97%   BMI 20.09 kg/m²    Temperature Range:   Temp: 97.4 °F (36.3 °C)   Temp  Av.1 °F (36.7 °C)  Min: 97.2 °F (36.2 °C)  Max: 98.9 °F (37.2 °C)         Rhythm: normal sinus rhythm    Labs:   ABG:    Lab Results   Component Value Date/Time    PHART 7.440 2022 04:23 AM    PO2ART 59.0 2022 04:23 AM    WEN3BWC 65.0 2022 04:23 AM    P7UMADIP 88.9 2022 04:23 AM    NDS4EWL 44.1 2022 04:23 AM    BEART 17.5 2022 04:23 AM     CBC:   Recent Labs     22  0619 08/15/22  0441 08/15/22  1748 22  0206   WBC 8.8 6.5  --  6.5   HGB 8.5* 7.4* 9.8* 10.1*   HCT 27.9* 23.9* 30.7* 31.6*   .7* 101.3*  --  97.2    234  --  189     BMP:   Recent Labs     08/14/22  0619 08/15/22  0441 08/16/22  0206   * 137 139   K 3.7 3.9 4.3   CL 93* 96* 96*   CO2 38* 36* 39*   BUN 13 12 13   CREATININE 0.5 0.5 0.5     PT/INR: No results for input(s): PROTIME, INR in the last 72 hours. APTT: No results for input(s): APTT in the last 72 hours. Chest X-Ray: left lung well expanded but atelectasis in RLL worse today than yesterday. No effusions or ptx after removal of chest tube yesterday. CT:  I/O last 3 completed shifts: In: 1505.3 [P.O.:860; I.V.:34.9; Blood:462.5; IV Piggyback:147.9]  Out: 4900 [Urine:4800; Chest Tube:100]      I/O last 3 completed shifts: In: 1505.3 [P.O.:860; I.V.:34.9; Blood:462.5; IV Piggyback:147.9]  Out: 4900 [Urine:4800;  Chest Tube:100]  Scheduled Meds:    docusate sodium  100 mg Oral BID    polyethylene glycol  17 g Oral Daily    bisacodyl  10 mg Rectal Once    hydrALAZINE  25 mg Oral 2 times per day    vancomycin  1,000 mg IntraVENous Q24H    mupirocin   Nasal BID    enoxaparin  40 mg SubCUTAneous Daily    budesonide  250 mcg Nebulization BID    Arformoterol Tartrate  15 mcg Nebulization BID    cefepime  2,000 mg IntraVENous Q8H    nicotine  1 patch TransDERmal Daily    vancomycin (VANCOCIN) intermittent dosing (placeholder)   Other RX Placeholder    citalopram  40 mg Oral Daily    gabapentin  600 mg Oral TID    pantoprazole  40 mg Oral BID AC    sucralfate  1 g Oral 4x Daily     Continuous Infusions:    sodium chloride      sodium chloride 10 mL/hr at 08/13/22 1632     Exam:   General appearance: NAD  Neck: no bruits, no JVD, No lymph nodes   Lungs: no rhonchi, no wheezes, no rales   Port site incisions: stable, dressing dry and intact    Heart: S1 and S2 no murmer, + rub  Abdomen: positive bowel sounds, no bruits, no masses  Extremities: warm and dry, no cyanosis, no clubbing    Assessment  SP VATS decortication due to parapneumonic effusion. No evidence of active infection - OR cultures were all negative, afebrile, WBC ct 6, no productive cough. Infiltrate on CXR is likely either resolving pneumonia infiltrate or (more likely) RLL atelectasis. Acute pulmonary insufficiency on top of chronic endstage COPD. This is likely from atelectasis due to pain and poor pulmonary effort. This should improve now that the chest tube is out and the surgical pain is resolving. She has been getting percocets regularly. May need additional IV agents for breakthrough.       Patient Active Problem List   Diagnosis    Chronic pain    Anxiety and depression    GERD (gastroesophageal reflux disease)    Chronic bronchitis with acute exacerbation (HCC)    Hypertension    Echocardiogram abnormal    COPD exacerbation (HCC)    Cervicalgia    Chronic low back pain    Acute hypercapnic respiratory failure (HCC)    Alpha-1-antitrypsin deficiency carrier    Current every day smoker    Folate deficiency anemia Malignant neoplasm of upper lobe of right lung (HCC)    Palliative care patient    Severe malnutrition (HCC)    Intractable nausea and vomiting    Gram-negative bacteremia    Hypomagnesemia    Tobacco abuse counseling    Cigarette nicotine dependence with nicotine-induced disorder    Clostridium perfringens infection    Chronic respiratory failure with hypoxia and hypercapnia (HCC)    Anxiety    Upper abdominal pain    Heart burn    Nausea    Rhinovirus infection    Pneumonia    Loculated pleural effusion         MD Aung Suggs MD

## 2022-08-17 ENCOUNTER — APPOINTMENT (OUTPATIENT)
Dept: GENERAL RADIOLOGY | Age: 72
DRG: 853 | End: 2022-08-17
Payer: MEDICARE

## 2022-08-17 LAB
ANION GAP SERPL CALCULATED.3IONS-SCNC: 8 MMOL/L (ref 7–19)
BASOPHILS ABSOLUTE: 0.1 K/UL (ref 0–0.2)
BASOPHILS RELATIVE PERCENT: 0.8 % (ref 0–1)
BUN BLDV-MCNC: 14 MG/DL (ref 8–23)
CALCIUM SERPL-MCNC: 8.5 MG/DL (ref 8.8–10.2)
CHLORIDE BLD-SCNC: 89 MMOL/L (ref 98–111)
CO2: 41 MMOL/L (ref 22–29)
CREAT SERPL-MCNC: 0.5 MG/DL (ref 0.5–0.9)
EOSINOPHILS ABSOLUTE: 0.1 K/UL (ref 0–0.6)
EOSINOPHILS RELATIVE PERCENT: 1.7 % (ref 0–5)
GFR AFRICAN AMERICAN: >59
GFR NON-AFRICAN AMERICAN: >60
GLUCOSE BLD-MCNC: 136 MG/DL (ref 74–109)
HCT VFR BLD CALC: 33.3 % (ref 37–47)
HEMOGLOBIN: 10.3 G/DL (ref 12–16)
IMMATURE GRANULOCYTES #: 0 K/UL
LYMPHOCYTES ABSOLUTE: 0.9 K/UL (ref 1.1–4.5)
LYMPHOCYTES RELATIVE PERCENT: 13.1 % (ref 20–40)
MCH RBC QN AUTO: 30.7 PG (ref 27–31)
MCHC RBC AUTO-ENTMCNC: 30.9 G/DL (ref 33–37)
MCV RBC AUTO: 99.1 FL (ref 81–99)
MONOCYTES ABSOLUTE: 0.9 K/UL (ref 0–0.9)
MONOCYTES RELATIVE PERCENT: 13.3 % (ref 0–10)
NEUTROPHILS ABSOLUTE: 4.7 K/UL (ref 1.5–7.5)
NEUTROPHILS RELATIVE PERCENT: 70.8 % (ref 50–65)
PDW BLD-RTO: 14.1 % (ref 11.5–14.5)
PLATELET # BLD: 226 K/UL (ref 130–400)
PMV BLD AUTO: 11.8 FL (ref 9.4–12.3)
POTASSIUM SERPL-SCNC: 4.3 MMOL/L (ref 3.5–5)
RBC # BLD: 3.36 M/UL (ref 4.2–5.4)
SODIUM BLD-SCNC: 138 MMOL/L (ref 136–145)
VANCOMYCIN TROUGH: 46.4 UG/ML (ref 10–20)
WBC # BLD: 6.6 K/UL (ref 4.8–10.8)

## 2022-08-17 PROCEDURE — 2140000000 HC CCU INTERMEDIATE R&B

## 2022-08-17 PROCEDURE — 94660 CPAP INITIATION&MGMT: CPT

## 2022-08-17 PROCEDURE — 6370000000 HC RX 637 (ALT 250 FOR IP): Performed by: NURSE PRACTITIONER

## 2022-08-17 PROCEDURE — 94761 N-INVAS EAR/PLS OXIMETRY MLT: CPT

## 2022-08-17 PROCEDURE — 99233 SBSQ HOSP IP/OBS HIGH 50: CPT | Performed by: INTERNAL MEDICINE

## 2022-08-17 PROCEDURE — 6360000002 HC RX W HCPCS: Performed by: NURSE PRACTITIONER

## 2022-08-17 PROCEDURE — 6370000000 HC RX 637 (ALT 250 FOR IP): Performed by: INTERNAL MEDICINE

## 2022-08-17 PROCEDURE — 2580000003 HC RX 258: Performed by: NURSE PRACTITIONER

## 2022-08-17 PROCEDURE — 71045 X-RAY EXAM CHEST 1 VIEW: CPT

## 2022-08-17 PROCEDURE — 36415 COLL VENOUS BLD VENIPUNCTURE: CPT

## 2022-08-17 PROCEDURE — 94640 AIRWAY INHALATION TREATMENT: CPT

## 2022-08-17 PROCEDURE — 85025 COMPLETE CBC W/AUTO DIFF WBC: CPT

## 2022-08-17 PROCEDURE — 80048 BASIC METABOLIC PNL TOTAL CA: CPT

## 2022-08-17 PROCEDURE — 6360000002 HC RX W HCPCS: Performed by: INTERNAL MEDICINE

## 2022-08-17 PROCEDURE — 2700000000 HC OXYGEN THERAPY PER DAY

## 2022-08-17 PROCEDURE — 80202 ASSAY OF VANCOMYCIN: CPT

## 2022-08-17 RX ORDER — FUROSEMIDE 10 MG/ML
40 INJECTION INTRAMUSCULAR; INTRAVENOUS ONCE
Status: COMPLETED | OUTPATIENT
Start: 2022-08-17 | End: 2022-08-17

## 2022-08-17 RX ORDER — FUROSEMIDE 40 MG/1
40 TABLET ORAL DAILY
Status: DISCONTINUED | OUTPATIENT
Start: 2022-08-17 | End: 2022-08-23 | Stop reason: HOSPADM

## 2022-08-17 RX ORDER — GUAIFENESIN 100 MG/5ML
200 SOLUTION ORAL 2 TIMES DAILY
Status: DISCONTINUED | OUTPATIENT
Start: 2022-08-17 | End: 2022-08-23 | Stop reason: HOSPADM

## 2022-08-17 RX ADMIN — ALPRAZOLAM 1 MG: 0.5 TABLET ORAL at 18:22

## 2022-08-17 RX ADMIN — CITALOPRAM HYDROBROMIDE 40 MG: 20 TABLET ORAL at 09:42

## 2022-08-17 RX ADMIN — SUCRALFATE 1 G: 1 TABLET ORAL at 17:50

## 2022-08-17 RX ADMIN — GABAPENTIN 600 MG: 600 TABLET, FILM COATED ORAL at 09:43

## 2022-08-17 RX ADMIN — GABAPENTIN 600 MG: 600 TABLET, FILM COATED ORAL at 15:23

## 2022-08-17 RX ADMIN — BUDESONIDE 250 MCG: 0.25 SUSPENSION RESPIRATORY (INHALATION) at 06:43

## 2022-08-17 RX ADMIN — PANTOPRAZOLE SODIUM 40 MG: 40 TABLET, DELAYED RELEASE ORAL at 15:23

## 2022-08-17 RX ADMIN — DOCUSATE SODIUM 100 MG: 100 CAPSULE, LIQUID FILLED ORAL at 19:48

## 2022-08-17 RX ADMIN — GUAIFENESIN 200 MG: 100 SOLUTION ORAL at 19:27

## 2022-08-17 RX ADMIN — ENOXAPARIN SODIUM 40 MG: 100 INJECTION SUBCUTANEOUS at 17:50

## 2022-08-17 RX ADMIN — OXYCODONE AND ACETAMINOPHEN 1 TABLET: 10; 325 TABLET ORAL at 15:23

## 2022-08-17 RX ADMIN — GABAPENTIN 600 MG: 600 TABLET, FILM COATED ORAL at 19:26

## 2022-08-17 RX ADMIN — Medication 1000 MG: at 05:18

## 2022-08-17 RX ADMIN — OXYCODONE AND ACETAMINOPHEN 1 TABLET: 10; 325 TABLET ORAL at 19:17

## 2022-08-17 RX ADMIN — ALPRAZOLAM 1 MG: 0.5 TABLET ORAL at 23:26

## 2022-08-17 RX ADMIN — ONDANSETRON HYDROCHLORIDE 4 MG: 2 SOLUTION INTRAMUSCULAR; INTRAVENOUS at 18:22

## 2022-08-17 RX ADMIN — POLYETHYLENE GLYCOL 3350 17 G: 17 POWDER, FOR SOLUTION ORAL at 09:42

## 2022-08-17 RX ADMIN — SUCRALFATE 1 G: 1 TABLET ORAL at 09:43

## 2022-08-17 RX ADMIN — CEFEPIME 2000 MG: 2 INJECTION, POWDER, FOR SOLUTION INTRAVENOUS at 09:39

## 2022-08-17 RX ADMIN — ARFORMOTEROL TARTRATE 15 MCG: 15 SOLUTION RESPIRATORY (INHALATION) at 19:55

## 2022-08-17 RX ADMIN — CEFEPIME 2000 MG: 2 INJECTION, POWDER, FOR SOLUTION INTRAVENOUS at 01:58

## 2022-08-17 RX ADMIN — MUPIROCIN: 20 OINTMENT TOPICAL at 10:23

## 2022-08-17 RX ADMIN — MUPIROCIN: 20 OINTMENT TOPICAL at 23:27

## 2022-08-17 RX ADMIN — FUROSEMIDE 40 MG: 40 TABLET ORAL at 09:47

## 2022-08-17 RX ADMIN — GUAIFENESIN 200 MG: 100 SOLUTION ORAL at 09:43

## 2022-08-17 RX ADMIN — ALPRAZOLAM 1 MG: 0.5 TABLET ORAL at 11:16

## 2022-08-17 RX ADMIN — OXYCODONE AND ACETAMINOPHEN 1 TABLET: 10; 325 TABLET ORAL at 05:09

## 2022-08-17 RX ADMIN — ALPRAZOLAM 1 MG: 0.5 TABLET ORAL at 02:58

## 2022-08-17 RX ADMIN — BUDESONIDE 250 MCG: 0.25 SUSPENSION RESPIRATORY (INHALATION) at 19:55

## 2022-08-17 RX ADMIN — OXYCODONE AND ACETAMINOPHEN 1 TABLET: 10; 325 TABLET ORAL at 09:43

## 2022-08-17 RX ADMIN — FUROSEMIDE 40 MG: 10 INJECTION, SOLUTION INTRAMUSCULAR; INTRAVENOUS at 09:41

## 2022-08-17 RX ADMIN — ARFORMOTEROL TARTRATE 15 MCG: 15 SOLUTION RESPIRATORY (INHALATION) at 06:42

## 2022-08-17 RX ADMIN — HYDRALAZINE HYDROCHLORIDE 25 MG: 25 TABLET, FILM COATED ORAL at 19:26

## 2022-08-17 RX ADMIN — PANTOPRAZOLE SODIUM 40 MG: 40 TABLET, DELAYED RELEASE ORAL at 06:02

## 2022-08-17 RX ADMIN — DOCUSATE SODIUM 100 MG: 100 CAPSULE, LIQUID FILLED ORAL at 09:43

## 2022-08-17 RX ADMIN — HYDRALAZINE HYDROCHLORIDE 25 MG: 25 TABLET, FILM COATED ORAL at 09:42

## 2022-08-17 RX ADMIN — SUCRALFATE 1 G: 1 TABLET ORAL at 19:26

## 2022-08-17 ASSESSMENT — PAIN SCALES - WONG BAKER
WONGBAKER_NUMERICALRESPONSE: 0

## 2022-08-17 ASSESSMENT — PAIN DESCRIPTION - DESCRIPTORS
DESCRIPTORS: DULL
DESCRIPTORS: STABBING;THROBBING

## 2022-08-17 ASSESSMENT — PAIN SCALES - GENERAL
PAINLEVEL_OUTOF10: 0
PAINLEVEL_OUTOF10: 0
PAINLEVEL_OUTOF10: 6
PAINLEVEL_OUTOF10: 7
PAINLEVEL_OUTOF10: 10
PAINLEVEL_OUTOF10: 10

## 2022-08-17 ASSESSMENT — PAIN DESCRIPTION - ORIENTATION
ORIENTATION: MID
ORIENTATION: MID

## 2022-08-17 ASSESSMENT — PAIN DESCRIPTION - LOCATION
LOCATION: GENERALIZED
LOCATION: GENERALIZED

## 2022-08-17 NOTE — PROGRESS NOTES
Progress Note  [facility]  8/17/2022 6:33 PM  Subjective:   Admit Date:   8/3/2022      CC/ADMIT DX:       Interval History:   Reviewed overnight events and nursing notes. She has c/o chest wall pain. She has had hemoptysis. I have reviewed all labs/diagnostics from the last 24hrs. ROS:   I have done a 10 point ROS and all are negative, except what is mentioned in the HPI. ADULT DIET; Regular  ADULT ORAL NUTRITION SUPPLEMENT; Breakfast, Lunch, Dinner; Standard High Calorie/High Protein Oral Supplement    Medications:      sodium chloride      sodium chloride 10 mL/hr at 08/13/22 1632      guaiFENesin  200 mg Oral BID    furosemide  40 mg Oral Daily    docusate sodium  100 mg Oral BID    polyethylene glycol  17 g Oral Daily    bisacodyl  10 mg Rectal Once    hydrALAZINE  25 mg Oral 2 times per day    vancomycin  1,000 mg IntraVENous Q24H    mupirocin   Nasal BID    enoxaparin  40 mg SubCUTAneous Daily    budesonide  250 mcg Nebulization BID    Arformoterol Tartrate  15 mcg Nebulization BID    nicotine  1 patch TransDERmal Daily    vancomycin (VANCOCIN) intermittent dosing (placeholder)   Other RX Placeholder    citalopram  40 mg Oral Daily    gabapentin  600 mg Oral TID    pantoprazole  40 mg Oral BID AC    sucralfate  1 g Oral 4x Daily           Objective:   Vitals: BP (!) 99/48   Pulse 69   Temp (!) 96.4 °F (35.8 °C) (Temporal)   Resp 20   Ht 5' 6\" (1.676 m)   Wt 124 lb 8 oz (56.5 kg)   SpO2 93%   BMI 20.09 kg/m²    Intake/Output Summary (Last 24 hours) at 8/17/2022 1833  Last data filed at 8/17/2022 1804  Gross per 24 hour   Intake 385.13 ml   Output 3100 ml   Net -2714.87 ml     General appearance: NAD, on O2  Lungs: coarse  Heart: RRR  Abdomen: soft, non-tender; bowel sounds normal; no masses,  no organomegaly  Extremities: extremities normal, atraumatic, no cyanosis or edema  Neurologic:  negative, No obvious focal neurologic deficits.     Assessment and Plan:   Principal Problem: Pneumonia  Active Problems:    Chronic respiratory failure with hypoxia and hypercapnia (HCC)    Loculated pleural effusion  Resolved Problems:    * No resolved hospital problems. *    COPD    Chronic Pain    Depression    ABL Anemia    S/P VATS Thorascopic Decortication of RightChest Empyema    Hemoptysis   Generalized Weakness    Plan:   Continue present medication(s)    Follow with Pulm, CT Surgery   Continue antibiotic treatment   Encouraged to get OOB with PT   Supportive care      Discharge planning:   her home     Reviewed treatment plans with the patient and/or family.              Electronically signed by Tami Aguila MD on 8/17/2022 at 6:33 PM

## 2022-08-17 NOTE — PROGRESS NOTES
Occupational Therapy  Attempted therapy this afternoon. Pt required increased encouragement to arouse. Pt refused therapy at this time stating \"I can't do anything right now\". Will continue to follow.  Electronically signed by BERTHA Gruber on 8/17/2022 at 4:35 PM

## 2022-08-17 NOTE — PROGRESS NOTES
Pulmonary and Critical Care Progress note. Susanne Caal    MRN# [id number]    Acct# [de-identified]  8/17/2022   11:32 AM CDT    Referring Dulce Hess MD      Chief Complaint: shortness of breath abd pain. HPI: Patient is seen and examined on the floor. She is on the BIPAP now. She denies new complaints. Appears weak    Medications    guaiFENesin, 200 mg, Oral, BID    furosemide, 40 mg, Oral, Daily    docusate sodium, 100 mg, Oral, BID    polyethylene glycol, 17 g, Oral, Daily    bisacodyl, 10 mg, Rectal, Once    hydrALAZINE, 25 mg, Oral, 2 times per day    vancomycin, 1,000 mg, IntraVENous, Q24H    mupirocin, , Nasal, BID    enoxaparin, 40 mg, SubCUTAneous, Daily    budesonide, 250 mcg, Nebulization, BID    Arformoterol Tartrate, 15 mcg, Nebulization, BID    cefepime, 2,000 mg, IntraVENous, Q8H    nicotine, 1 patch, TransDERmal, Daily    vancomycin (VANCOCIN) intermittent dosing (placeholder), , Other, RX Placeholder    citalopram, 40 mg, Oral, Daily    gabapentin, 600 mg, Oral, TID    pantoprazole, 40 mg, Oral, BID AC    sucralfate, 1 g, Oral, 4x Daily     Review of Systems:  RESPIRATORY:  positive for  dyspnea  CARDIOVASCULAR:  positive for  dyspnea      Physical Exam:  BP 98/60   Pulse 72   Temp 97.5 °F (36.4 °C) (Temporal)   Resp 16   Ht 5' 6\" (1.676 m)   Wt 124 lb 8 oz (56.5 kg)   SpO2 96%   BMI 20.09 kg/m²   Intake/Output Summary (Last 24 hours) at 8/17/2022 1544  Last data filed at 8/17/2022 1441  Gross per 24 hour   Intake 385.13 ml   Output 3000 ml   Net -2614.87 ml         General appearance:elderly white female who does not appear to be in distress. Sleeping. BiPAP on.    HEENT:normocephalic atraumatic  CISAQ:X0M8 no murmurs  Lungs:diminished bilaterally no rubs or tenderness or dullness to percussion  Abdomen:Soft non tender no organomegaly  Extremities:non clubbing cyanosis or edema  Neuro:no focal findings  Skin:intact    Recent Labs 08/15/22  0441 08/15/22  1748 08/16/22 0206 08/17/22  0605   WBC 6.5  --  6.5 6.6   RBC 2.36*  --  3.25* 3.36*   HGB 7.4* 9.8* 10.1* 10.3*   HCT 23.9* 30.7* 31.6* 33.3*     --  189 226   .3*  --  97.2 99.1*   MCH 31.4*  --  31.1* 30.7   MCHC 31.0*  --  32.0* 30.9*   RDW 12.8  --  15.2* 14.1        Recent Labs     08/15/22  0441 08/16/22  0206 08/16/22  1731 08/17/22  0605    139  --  138   K 3.9 4.3 4.2 4.3   CL 96* 96*  --  89*   CO2 36* 39*  --  41*   BUN 12 13  --  14   CREATININE 0.5 0.5  --  0.5   CALCIUM 8.0* 8.5*  --  8.5*   GLUCOSE 112* 106  --  136*        Recent Labs     08/16/22 1731   PHART 7.400   XCF7TGG 82.0*   PO2ART 71.0*   AHL9FDA 50.8*   V6CYRRVV 93.3   BEART 21.9*       Recent Labs     08/16/22 0206 08/17/22  0605   AST 12  --    ALT 7  --    ALKPHOS 53  --    BILITOT <0.2  --    CALCIUM 8.5* 8.5*       No results for input(s): BC, LABGRAM, CULTRESP, BFCX in the last 72 hours. Radiograph: CT ABDOMEN PELVIS WO CONTRAST Additional Contrast? None    Result Date: 8/4/2022  1. No evidence for acute abdominal or pelvic process. 2.Right lower lobe consolidation with small pleural effusion. Superimposed infection is not excluded. 3.Colonic diverticulosis without diverticulitis. 4.Calcific atherosclerosis of the abdominal aorta. 5.Degenerative spondylosis. NM LUNG SCAN PERFUSION ONLY    Result Date: 8/4/2022  1. Small subsegmental defect in the posterior left lower lobe, new since prior, compatible with and intermediate probability for pulmonary embolism. 2. Pulmonary hyperaeration, with decreased perfusion in the lung apices, likely related to COPD. 3. Patchy airspace abnormality in the right mid and lower lung field with small right pleural fluid, likely pneumonia. Please note according to the Piopped criteria a low probability reflects a 0 to 19%chance of a pulmonary embolism. Please correlate clinically.  If symptoms persist, consider CT PA gram. RECOMMENDATION: Follow-up as clinically warranted. Electronically Signed by Norm Rene MD at 04-Aug-2022 11:14:59 AM             XR CHEST PORTABLE    Result Date: 8/3/2022  Right mid and lower lung infiltrates. Small right pleural effusion not excluded. Recommendation: Follow up as clinically indicated. Electronically Signed by Marcella Knapp MD at 03-Aug-2022 08:41:33 AM                My radiograph interpretation/independent review of imaging: reviewed. Problem list generated by [facility] Problems             Last Modified POA    * (Principal) Pneumonia 8/3/2022 Yes    Chronic respiratory failure with hypoxia and hypercapnia (HCC) 8/13/2022 Yes    Loculated pleural effusion 8/12/2022 Yes        Pulmonary Assessment/Plan:     Chronic hypoxic hypercapnic respiratory failure continue oxygen supplementation as necessary to maintain adequate oxygenation. Underlying severe COPD. Continue bronchodilators continue pulmonary toilet. Right lower lobe pneumonia. Continue empirical antibiotic therapy. Pleural effusion improved on chest x-ray today  Low probability for PE per PIOPED criteria. On anticoagulation. Resume direct oral anticoagulation when appropriate  Dyspnea due to the above supportive care. DC planning. Beola Bernheim, MD, [facility], [facility]    The above note was generated using voice recognition software. Inadvertent typographical errors in transcription may have occurred.       Electronically signed by Beola Bernheim, MD on 08/17/22 at 3:44 PM

## 2022-08-17 NOTE — PLAN OF CARE
Problem: Discharge Planning  Goal: Discharge to home or other facility with appropriate resources  8/17/2022 1059 by Melodie Garcia RN  Outcome: Progressing  8/17/2022 0219 by Jessica Phoenix RN  Outcome: Progressing  Flowsheets (Taken 8/16/2022 2025)  Discharge to home or other facility with appropriate resources: Arrange for needed discharge resources and transportation as appropriate     Problem: Safety - Adult  Goal: Free from fall injury  8/17/2022 1059 by Melodei Garcia RN  Outcome: Progressing  8/17/2022 0219 by Jessica Phoenix RN  Outcome: Progressing  Flowsheets (Taken 8/17/2022 0212)  Free From Fall Injury: Instruct family/caregiver on patient safety     Problem: ABCDS Injury Assessment  Goal: Absence of physical injury  8/17/2022 1059 by Melodie Garcia RN  Outcome: Progressing  8/17/2022 0219 by Jessica Phoenix RN  Outcome: Progressing     Problem: Skin/Tissue Integrity  Goal: Absence of new skin breakdown  Description: 1. Monitor for areas of redness and/or skin breakdown  2. Assess vascular access sites hourly  3. Every 4-6 hours minimum:  Change oxygen saturation probe site  4. Every 4-6 hours:  If on nasal continuous positive airway pressure, respiratory therapy assess nares and determine need for appliance change or resting period.   8/17/2022 1059 by Melodie Garcia RN  Outcome: Progressing  8/17/2022 0219 by Jessica Phoenix RN  Outcome: Progressing     Problem: Pain  Goal: Verbalizes/displays adequate comfort level or baseline comfort level  8/17/2022 1059 by Melodie Garcia RN  Outcome: Progressing  8/17/2022 0219 by Jessica Phoenix RN  Outcome: Progressing     Problem: Respiratory - Adult  Goal: Achieves optimal ventilation and oxygenation  8/17/2022 1059 by Melodie Garcia RN  Outcome: Progressing  8/17/2022 0219 by Jessica Phoenix RN  Outcome: Progressing  Flowsheets (Taken 8/16/2022 2025)  Achieves optimal ventilation and oxygenation:   Assess and instruct to report shortness of breath or any respiratory difficulty   Assess for changes in respiratory status     Problem: Musculoskeletal - Adult  Goal: Return mobility to safest level of function  8/17/2022 1059 by Umair Tran RN  Outcome: Progressing  8/17/2022 0219 by Lilly Fernando RN  Outcome: Progressing     Problem: Infection - Adult  Goal: Absence of infection at discharge  8/17/2022 1059 by Umair Tran RN  Outcome: Progressing  8/17/2022 0219 by Lilly Fernando RN  Outcome: Progressing  Flowsheets (Taken 8/16/2022 2025)  Absence of infection at discharge:   Assess and monitor for signs and symptoms of infection   Monitor lab/diagnostic results

## 2022-08-17 NOTE — PROGRESS NOTES
POST OP CARDIOTHORACIC SURGERY PROGRESS NOTE    Post op day 5    SUBJECTIVE:  Resting in bed. Productive cough - thick sputum. Still on BiPAP, but oxygenation much better. BP (!) 133/96   Pulse 93   Temp 97.2 °F (36.2 °C) (Temporal)   Resp 22   Ht 5' 6\" (1.676 m)   Wt 124 lb 8 oz (56.5 kg)   SpO2 93%   BMI 20.09 kg/m²   Average, Min, and Max for last 24 hours Vitals:  TEMPERATURE:  Temp  Av °F (36.1 °C)  Min: 96.8 °F (36 °C)  Max: 97.3 °F (36.3 °C)  RESPIRATIONS RANGE: Resp  Av.2  Min: 16  Max: 22  PULSE RANGE: Pulse  Av  Min: 74  Max: 93  BLOOD PRESSURE RANGE:  Systolic (68XJO), PITO:478 , Min:87 , OHA:357   ; Diastolic (91OOZ), NBI:91, Min:43, Max:111    PULSE OXIMETRY RANGE: SpO2  Av %  Min: 93 %  Max: 98 %    I/O last 3 completed shifts: In: 1045.1 [P.O.:760; IV Piggyback:285.1]  Out: 0049 [Urine:5825]    CHEST: Clear, but diminished throughout. No wheezes or rhonchi. CARDIOVASCULAR: Normal HT with RRR. No murmurs, gallops or rubs. INCISION: Right VATS incisions with Mepilex dressing dry and intact. CHEST XRAY: Normal postoperative changes with improved RLL effusion. Surgical Pathology:  Pleura, right pleural peel: Benign pleural tissue with severe mixed acute   and chronic inflammation, negative for evidence of malignancy. ASSESSMENT:     RLL Pneumonia with Loculated Empyema  Postoperative Acute on Chronic Hypoxic Respiratory Failure  Postoperative Anemia 2' to Acute Blood Loss, Expected  Recent LLL Pneumonia in 2022  Severe End-Stage COPD/Emphysema with Chronic Hypoxia on Continuous O2  Essential HTN  Chronic GERD  Chronic Pain Syndrome  Mixed Anxiety/Depressive Disorder  Hx Lung Cancer, Left - S/P Radiation Therapy 2019    PLAN:     Stable from CT surgical standpoint. Add liquid Mucinex BID  Continue with PT/OT  Wean Oxygen to maintain O2 sat > 90%.       Electronically signed by TEN Blake on 22 at 7:18 AM CDT

## 2022-08-17 NOTE — PROGRESS NOTES
Physical Therapy  Name: Mine Barron  MRN:  [id number]  Date of service:  8/17/2022 08/17/22 1146   Subjective   Subjective Attempt: Pt had just received pain meds and requests to wait until later for pain meds to take affect. Will continue to follow.        Electronically signed by Juanjo Malcolm PTA on 8/17/2022 at 11:47 AM

## 2022-08-17 NOTE — PROGRESS NOTES
Physical Therapy  Name: Ammon Degroot  MRN:  [id number]  Date of service:  8/17/2022 08/17/22 1520   Subjective   Subjective Attempt: Pt declined mobility at this time. Encouraged pt of importance of attempting mobility everyday. Will continue to follow.        Electronically signed by Dakota Arce PTA on 8/17/2022 at 3:21 PM

## 2022-08-17 NOTE — PROGRESS NOTES
Patient coughed up big mucus plugs x 3 . Brown , thick. Keeping her BIPAP on. Tolerating well. SPO2@ is 94 % . Will continue to monitor the patient.

## 2022-08-17 NOTE — DISCHARGE INSTRUCTIONS
[address], [place]  Jeanie Anderson [address]  [address] MD Luis Prado MD    POST OPERATIVE INSTRUCTIONS    THORACOTOMY      DO NOT DRIVE WHILE ON PAIN MEDICATIONS! Do NOT lift anything over 10 pounds. Shower daily with a liquid anti-bacterial soap (Dial). Avoid extremely hot water. To help prevent infection, do not use any powder, lotion or cream on your incision unless instructed by your physician. Your incision may appear red, bruised, dry or numb for several weeks. If you have used tobacco products in the past (cigarettes, pipes, chewing tobacco), you should never use them again. Continued tobacco use will affect the long-term results of your surgery and cause advancement of your disease. Follow-Up Office Visit     Remember to bring your medications or a current list to your appointment. Medications     Take only the medications prescribed for you at the time of your discharge. Please note any dosage change or deletion to home meds. DO NOT continue any previous medications unless specified at the time of your discharge, however do not throw away any previous medications as these may be restarted at a later time. Any over-the-counter medication (laxative, Tylenol) is acceptable as needed. Pain Medication Refills     The prescription you receive for pain control at the time of discharge should last for 4 weeks. Please note that this is to be taken as needed. It is not a scheduled medication, so it should not be taken hourly. After this time period, you should no longer need any pain medication. For refills, you must contact our office prior to noon on Monday-Friday. NO REFILLS FOR ANY MEDICATION WILL BE GIVEN AFTER HOURS OR ON WEEKENDS. Reasons to Call the Doctor   1. Change in appearance of incision (color, temperature, drainage). 2. Fever 101 or greater.  Wait 30 minutes after

## 2022-08-17 NOTE — PROGRESS NOTES
[address] [facility] Pharmacokinetic Monitoring Service - Vancomycin    Consulting Provider: TEN Mcleod   Indication: VAP  / HAP  Target Concentration: Goal AUC/LETTY 400-600 mg*hr/L  Day of Therapy: 15  Additional Antimicrobials: Cefepime    Pertinent Laboratory Values: Wt Readings from Last 1 Encounters:   08/16/22 124 lb 8 oz (56.5 kg)     Temp Readings from Last 1 Encounters:   08/17/22 97.2 °F (36.2 °C) (Temporal)     Estimated Creatinine Clearance: 88 mL/min (based on SCr of 0.5 mg/dL).   Recent Labs     08/15/22  0441 08/16/22  0206   CREATININE 0.5 0.5   WBC 6.5 6.5     Procalcitonin: None ordered at this time    Pertinent Cultures:  Culture Date Source Results   08/03/22 Blood x 2 No growth   08/12/22 Body Fluid from Lung No organisms seen   08/12/22 Tissue - pleural peel right No WBCs or organisms seen       MRSA Nasal Swab: showed MRSA positive result on 08/12/22    Recent vancomycin administrations                     vancomycin (VANCOCIN) 1000 mg in dextrose 5% 250 mL IVPB (mg) 1,000 mg New Bag 08/17/22 0518     1,000 mg New Bag 08/16/22 0856     1,000 mg New Bag 08/15/22 0544                    Assessment:  Date/Time Current Dose Concentration Timing of Concentration (h) AUC   08/17/22 1 gm IV q 24 hrs 46.4 47 minutes 465   Note: Serum concentrations collected for AUC dosing may appear elevated if collected in close proximity to the dose administered, this is not necessarily an indication of toxicity    Plan:  Current dosing regimen is therapeutic  Continue current dose  Pharmacy will continue to monitor patient and adjust therapy as indicated    Thank you for the consult,  Mary Rodriguez [facility]  8/17/2022 8:40 AM

## 2022-08-18 ENCOUNTER — APPOINTMENT (OUTPATIENT)
Dept: GENERAL RADIOLOGY | Age: 72
DRG: 853 | End: 2022-08-18
Payer: MEDICARE

## 2022-08-18 PROCEDURE — 99233 SBSQ HOSP IP/OBS HIGH 50: CPT | Performed by: INTERNAL MEDICINE

## 2022-08-18 PROCEDURE — 71045 X-RAY EXAM CHEST 1 VIEW: CPT

## 2022-08-18 PROCEDURE — 6360000002 HC RX W HCPCS: Performed by: NURSE PRACTITIONER

## 2022-08-18 PROCEDURE — 2580000003 HC RX 258: Performed by: NURSE PRACTITIONER

## 2022-08-18 PROCEDURE — 6370000000 HC RX 637 (ALT 250 FOR IP): Performed by: NURSE PRACTITIONER

## 2022-08-18 PROCEDURE — 2140000000 HC CCU INTERMEDIATE R&B

## 2022-08-18 PROCEDURE — 94761 N-INVAS EAR/PLS OXIMETRY MLT: CPT

## 2022-08-18 PROCEDURE — 2700000000 HC OXYGEN THERAPY PER DAY

## 2022-08-18 PROCEDURE — 94640 AIRWAY INHALATION TREATMENT: CPT

## 2022-08-18 PROCEDURE — 6360000002 HC RX W HCPCS: Performed by: HOSPITALIST

## 2022-08-18 PROCEDURE — 6370000000 HC RX 637 (ALT 250 FOR IP): Performed by: INTERNAL MEDICINE

## 2022-08-18 PROCEDURE — 94660 CPAP INITIATION&MGMT: CPT

## 2022-08-18 RX ORDER — POLYETHYLENE GLYCOL 3350 17 G/17G
17 POWDER, FOR SOLUTION ORAL DAILY PRN
Status: DISCONTINUED | OUTPATIENT
Start: 2022-08-18 | End: 2022-08-23 | Stop reason: HOSPADM

## 2022-08-18 RX ADMIN — GUAIFENESIN 200 MG: 100 SOLUTION ORAL at 08:30

## 2022-08-18 RX ADMIN — SUCRALFATE 1 G: 1 TABLET ORAL at 08:28

## 2022-08-18 RX ADMIN — ALBUTEROL SULFATE 2.5 MG: 2.5 SOLUTION RESPIRATORY (INHALATION) at 14:53

## 2022-08-18 RX ADMIN — ALPRAZOLAM 1 MG: 0.5 TABLET ORAL at 16:17

## 2022-08-18 RX ADMIN — MUPIROCIN: 20 OINTMENT TOPICAL at 08:31

## 2022-08-18 RX ADMIN — PANTOPRAZOLE SODIUM 40 MG: 40 TABLET, DELAYED RELEASE ORAL at 05:01

## 2022-08-18 RX ADMIN — ALPRAZOLAM 1 MG: 0.5 TABLET ORAL at 08:28

## 2022-08-18 RX ADMIN — SUCRALFATE 1 G: 1 TABLET ORAL at 16:13

## 2022-08-18 RX ADMIN — ARFORMOTEROL TARTRATE 15 MCG: 15 SOLUTION RESPIRATORY (INHALATION) at 06:35

## 2022-08-18 RX ADMIN — ACETAMINOPHEN 650 MG: 325 TABLET, FILM COATED ORAL at 00:49

## 2022-08-18 RX ADMIN — GABAPENTIN 600 MG: 600 TABLET, FILM COATED ORAL at 08:29

## 2022-08-18 RX ADMIN — BUDESONIDE 250 MCG: 0.25 SUSPENSION RESPIRATORY (INHALATION) at 06:36

## 2022-08-18 RX ADMIN — PANTOPRAZOLE SODIUM 40 MG: 40 TABLET, DELAYED RELEASE ORAL at 14:49

## 2022-08-18 RX ADMIN — SUCRALFATE 1 G: 1 TABLET ORAL at 20:52

## 2022-08-18 RX ADMIN — ALPRAZOLAM 1 MG: 0.5 TABLET ORAL at 23:41

## 2022-08-18 RX ADMIN — SUCRALFATE 1 G: 1 TABLET ORAL at 13:19

## 2022-08-18 RX ADMIN — CITALOPRAM HYDROBROMIDE 40 MG: 20 TABLET ORAL at 08:28

## 2022-08-18 RX ADMIN — OXYCODONE AND ACETAMINOPHEN 1 TABLET: 10; 325 TABLET ORAL at 06:09

## 2022-08-18 RX ADMIN — ONDANSETRON HYDROCHLORIDE 4 MG: 2 SOLUTION INTRAMUSCULAR; INTRAVENOUS at 08:31

## 2022-08-18 RX ADMIN — Medication 1000 MG: at 05:04

## 2022-08-18 RX ADMIN — OXYCODONE AND ACETAMINOPHEN 1 TABLET: 10; 325 TABLET ORAL at 16:17

## 2022-08-18 RX ADMIN — POLYETHYLENE GLYCOL 3350 17 G: 17 POWDER, FOR SOLUTION ORAL at 08:31

## 2022-08-18 RX ADMIN — ACETAMINOPHEN 650 MG: 325 TABLET, FILM COATED ORAL at 13:19

## 2022-08-18 RX ADMIN — ONDANSETRON HYDROCHLORIDE 4 MG: 2 SOLUTION INTRAMUSCULAR; INTRAVENOUS at 14:49

## 2022-08-18 RX ADMIN — ONDANSETRON HYDROCHLORIDE 4 MG: 2 SOLUTION INTRAMUSCULAR; INTRAVENOUS at 20:56

## 2022-08-18 RX ADMIN — GABAPENTIN 600 MG: 600 TABLET, FILM COATED ORAL at 20:52

## 2022-08-18 RX ADMIN — ENOXAPARIN SODIUM 40 MG: 100 INJECTION SUBCUTANEOUS at 16:13

## 2022-08-18 RX ADMIN — FUROSEMIDE 40 MG: 40 TABLET ORAL at 08:28

## 2022-08-18 RX ADMIN — DOCUSATE SODIUM 100 MG: 100 CAPSULE, LIQUID FILLED ORAL at 08:28

## 2022-08-18 RX ADMIN — BUDESONIDE 250 MCG: 0.25 SUSPENSION RESPIRATORY (INHALATION) at 18:55

## 2022-08-18 RX ADMIN — OXYCODONE AND ACETAMINOPHEN 1 TABLET: 10; 325 TABLET ORAL at 20:56

## 2022-08-18 RX ADMIN — MUPIROCIN: 20 OINTMENT TOPICAL at 21:10

## 2022-08-18 RX ADMIN — OXYCODONE AND ACETAMINOPHEN 1 TABLET: 10; 325 TABLET ORAL at 10:40

## 2022-08-18 RX ADMIN — GUAIFENESIN 200 MG: 100 SOLUTION ORAL at 20:52

## 2022-08-18 RX ADMIN — ARFORMOTEROL TARTRATE 15 MCG: 15 SOLUTION RESPIRATORY (INHALATION) at 18:55

## 2022-08-18 RX ADMIN — GABAPENTIN 600 MG: 600 TABLET, FILM COATED ORAL at 13:19

## 2022-08-18 ASSESSMENT — PAIN DESCRIPTION - LOCATION
LOCATION: GENERALIZED
LOCATION: SHOULDER
LOCATION: SHOULDER
LOCATION: GENERALIZED

## 2022-08-18 ASSESSMENT — PAIN DESCRIPTION - DESCRIPTORS
DESCRIPTORS: DULL
DESCRIPTORS: JABBING;DULL
DESCRIPTORS: ACHING
DESCRIPTORS: JABBING;DULL
DESCRIPTORS: ACHING
DESCRIPTORS: DULL;SPASM

## 2022-08-18 ASSESSMENT — PAIN SCALES - WONG BAKER
WONGBAKER_NUMERICALRESPONSE: 2
WONGBAKER_NUMERICALRESPONSE: 0
WONGBAKER_NUMERICALRESPONSE: 2
WONGBAKER_NUMERICALRESPONSE: 0
WONGBAKER_NUMERICALRESPONSE: 0
WONGBAKER_NUMERICALRESPONSE: 2
WONGBAKER_NUMERICALRESPONSE: 0
WONGBAKER_NUMERICALRESPONSE: 0

## 2022-08-18 ASSESSMENT — PAIN - FUNCTIONAL ASSESSMENT
PAIN_FUNCTIONAL_ASSESSMENT: PREVENTS OR INTERFERES SOME ACTIVE ACTIVITIES AND ADLS
PAIN_FUNCTIONAL_ASSESSMENT: ACTIVITIES ARE NOT PREVENTED
PAIN_FUNCTIONAL_ASSESSMENT: PREVENTS OR INTERFERES SOME ACTIVE ACTIVITIES AND ADLS
PAIN_FUNCTIONAL_ASSESSMENT: ACTIVITIES ARE NOT PREVENTED
PAIN_FUNCTIONAL_ASSESSMENT: PREVENTS OR INTERFERES SOME ACTIVE ACTIVITIES AND ADLS
PAIN_FUNCTIONAL_ASSESSMENT: PREVENTS OR INTERFERES SOME ACTIVE ACTIVITIES AND ADLS

## 2022-08-18 ASSESSMENT — PAIN DESCRIPTION - PAIN TYPE: TYPE: SURGICAL PAIN

## 2022-08-18 ASSESSMENT — PAIN SCALES - GENERAL
PAINLEVEL_OUTOF10: 10
PAINLEVEL_OUTOF10: 2
PAINLEVEL_OUTOF10: 4
PAINLEVEL_OUTOF10: 5
PAINLEVEL_OUTOF10: 0
PAINLEVEL_OUTOF10: 6
PAINLEVEL_OUTOF10: 7
PAINLEVEL_OUTOF10: 6
PAINLEVEL_OUTOF10: 9

## 2022-08-18 ASSESSMENT — PAIN DESCRIPTION - ORIENTATION
ORIENTATION: MID
ORIENTATION: RIGHT
ORIENTATION: MID
ORIENTATION: RIGHT
ORIENTATION: POSTERIOR
ORIENTATION: MID

## 2022-08-18 ASSESSMENT — PAIN DESCRIPTION - FREQUENCY: FREQUENCY: INTERMITTENT

## 2022-08-18 ASSESSMENT — PAIN DESCRIPTION - ONSET: ONSET: PROGRESSIVE

## 2022-08-18 NOTE — CARE COORDINATION
Received consult re: SNF therapy rehab for the pt upon dc. Pt has refused all therapy attempts for the past 2 days. Pt insurance requires therapy and precert approval. Discussed this with the pt and dtr Cheri present at bedside. Both report the pt is capable of returning home and the 2 dtrs reside in the home for 24/7 care. Pt has [facility] for [email or web address] p[facility] and has [employer] equipment. Pt has received a RW from [employer] for use upon dc as well. SW suggested bringing the trilogy equipment to [facility] for use at bedside to assist with pt respiratory needs. Pt denies further needs other than HH services upon dc.

## 2022-08-18 NOTE — PROGRESS NOTES
POST OP CARDIOTHORACIC SURGERY PROGRESS NOTE    Post op day 6    SUBJECTIVE:  Resting in bed with breathing treatment in progress. Stated she rested well last night. She did require BiPAP for a few hours last night. Daughter at bedside. /60   Pulse 79   Temp 97.3 °F (36.3 °C) (Temporal)   Resp 20   Ht 5' 6\" (1.676 m)   Wt 124 lb 8 oz (56.5 kg)   SpO2 94%   BMI 20.09 kg/m²   Average, Min, and Max for last 24 hours Vitals:  TEMPERATURE:  Temp  Av.2 °F (36.2 °C)  Min: 96.4 °F (35.8 °C)  Max: 97.5 °F (36.4 °C)  RESPIRATIONS RANGE: Resp  Av.5  Min: 12  Max: 20  PULSE RANGE: Pulse  Av.8  Min: 69  Max: 79  BLOOD PRESSURE RANGE:  Systolic (71FJR), MZD:763 , Min:83 , JYB:214   ; Diastolic (04LBA), NCO:32, Min:43, Max:78    PULSE OXIMETRY RANGE: SpO2  Av.7 %  Min: 89 %  Max: 96 %    I/O last 3 completed shifts: In: 865.1 [P.O.:580; IV Piggyback:285.1]  Out: 4400 [Urine:4400]    CHEST: Clear, but diminished throughout. No wheezes or rhonchi. CARDIOVASCULAR: Normal HT with RRR. No murmurs, gallops or rubs. INCISION: Right VATS incisions - open to air. Edges well approximated. No drainage or erythema. CHEST XRAY: Normal postoperative changes with mild congestion, right pleural effusion. ASSESSMENT:     RLL Pneumonia with Loculated Empyema - Surgical cultures - no growth. Pathology - no malignancy. Postoperative Acute on Chronic Hypoxic Respiratory Failure  Postoperative Anemia 2' to Acute Blood Loss, Expected  Recent LLL Pneumonia in 2022  Severe End-Stage COPD/Emphysema with Chronic Hypoxia on Continuous O2  Essential HTN  Chronic GERD  Chronic Pain Syndrome  Mixed Anxiety/Depressive Disorder  Hx Lung Cancer, Left - S/P Radiation Therapy 2019    PLAN:     Continue current treatment. Stable from CT surgery standpoint.        Electronically signed by TEN Grove on 22 at 6:50 AM CDT

## 2022-08-18 NOTE — PROGRESS NOTES
Progress Note  Jian Baker Malone  8/18/2022 1:50 PM  Subjective:   Admit Date:   8/3/2022      CC/ADMIT DX:       Interval History:   Reviewed overnight events and nursing notes. She has c/o diarrhea. I have reviewed all labs/diagnostics from the last 24hrs. ROS:   I have done a 10 point ROS and all are negative, except what is mentioned in the HPI. ADULT DIET; Regular  ADULT ORAL NUTRITION SUPPLEMENT; Breakfast, Lunch, Dinner; Standard High Calorie/High Protein Oral Supplement    Medications:      sodium chloride      sodium chloride 10 mL/hr at 08/13/22 1632      guaiFENesin  200 mg Oral BID    furosemide  40 mg Oral Daily    bisacodyl  10 mg Rectal Once    hydrALAZINE  25 mg Oral 2 times per day    mupirocin   Nasal BID    enoxaparin  40 mg SubCUTAneous Daily    budesonide  250 mcg Nebulization BID    Arformoterol Tartrate  15 mcg Nebulization BID    nicotine  1 patch TransDERmal Daily    citalopram  40 mg Oral Daily    gabapentin  600 mg Oral TID    pantoprazole  40 mg Oral BID AC    sucralfate  1 g Oral 4x Daily           Objective:   Vitals: BP (!) 92/43   Pulse 70   Temp 97.7 °F (36.5 °C) (Temporal)   Resp 20   Ht 5' 6\" (1.676 m)   Wt 124 lb 8 oz (56.5 kg)   SpO2 98%   BMI 20.09 kg/m²    Intake/Output Summary (Last 24 hours) at 8/18/2022 1350  Last data filed at 8/18/2022 1058  Gross per 24 hour   Intake 490 ml   Output 1200 ml   Net -710 ml     General appearance: NAD, on O2  Lungs: coarse  Heart: RRR  Abdomen: soft, non-tender; bowel sounds normal; no masses,  no organomegaly  Extremities: extremities normal, atraumatic, no cyanosis or edema  Neurologic:  negative, No obvious focal neurologic deficits. Assessment and Plan:   Principal Problem:    Pneumonia  Active Problems:    Chronic respiratory failure with hypoxia and hypercapnia (HCC)    Loculated pleural effusion  Resolved Problems:    * No resolved hospital problems.  *    COPD    Chronic Pain    Depression    ABL Anemia    S/P VATS Thorascopic Decortication of RightChest Empyema    Hemoptysis   Generalized Weakness    Plan:   Continue present medication(s)    Follow with Pulm, CT Surgery   D/C antibiotics   D/C Colace and make Miralax prn   Encouraged to get OOB with PT   Supportive care      Discharge planning:   her home     Reviewed treatment plans with the patient and/or family.              Electronically signed by Ashley Danielle MD on 8/18/2022 at 1:50 PM

## 2022-08-18 NOTE — PROGRESS NOTES
Patient is doing much better today. Resting well . NC @ 4 liters most of the night. However after patient went down the deep sleep SATs are when down the 86 % on NC . Patient is placed on the BIPAP at 0330. 14/7  with 15 lpm SPO2 is 91 % at this time. Patient is resting well. Daughter is in the room with the patient. Will continue to monitor the patient.

## 2022-08-18 NOTE — PROGRESS NOTES
Patient is coughing mucus plugs dark color . Encourage to use her breathing exercises. Patient is also on 4 liters of NC .and tolerating well. Getting up the bedside commode and tolerating well. Daughter is in the room. Will continue to monitor the patient.

## 2022-08-18 NOTE — CARE COORDINATION
08/18/22 1314   IMM Letter   IMM Letter given to Patient/Family/Significant other/Guardian/POA/by: Riana Boswell  (PT Signed IMM)   IMM Letter date given: 08/18/22   IMM Letter time given: 18   PT Signed and Received IMM with understanding.  Electronically signed by Riana Boswell on 8/18/2022 at 1:15 PM

## 2022-08-18 NOTE — PROGRESS NOTES
O2 sat dropped down to 86% with 4L per NC. O2 increased to 10L high flow NC per RT, O2 sat returned to 93%. Pt is resting in the bed w/o distress, states:\" feeling okay\", no SOB. PRN abuterol and mucomyst given per order.

## 2022-08-18 NOTE — PROGRESS NOTES
Pulmonary and Critical Care Progress note. Susanne Caal    MRN# [id number]    Acct# [de-identified]  8/18/2022   11:32 AM CDT    Referring Justine Warren MD      Chief Complaint: shortness of breath abd pain. HPI: Patient is more alert and conversational today. Denies complaints. Medications    guaiFENesin, 200 mg, Oral, BID    furosemide, 40 mg, Oral, Daily    docusate sodium, 100 mg, Oral, BID    polyethylene glycol, 17 g, Oral, Daily    bisacodyl, 10 mg, Rectal, Once    hydrALAZINE, 25 mg, Oral, 2 times per day    vancomycin, 1,000 mg, IntraVENous, Q24H    mupirocin, , Nasal, BID    enoxaparin, 40 mg, SubCUTAneous, Daily    budesonide, 250 mcg, Nebulization, BID    Arformoterol Tartrate, 15 mcg, Nebulization, BID    nicotine, 1 patch, TransDERmal, Daily    vancomycin (VANCOCIN) intermittent dosing (placeholder), , Other, RX Placeholder    citalopram, 40 mg, Oral, Daily    gabapentin, 600 mg, Oral, TID    pantoprazole, 40 mg, Oral, BID AC    sucralfate, 1 g, Oral, 4x Daily     Review of Systems:  RESPIRATORY:  positive for  dyspnea  CARDIOVASCULAR:  positive for  dyspnea      Physical Exam:  BP (!) 92/43   Pulse 70   Temp 97.7 °F (36.5 °C) (Temporal)   Resp 20   Ht 5' 6\" (1.676 m)   Wt 124 lb 8 oz (56.5 kg)   SpO2 98%   BMI 20.09 kg/m²   Intake/Output Summary (Last 24 hours) at 8/18/2022 1239  Last data filed at 8/18/2022 1058  Gross per 24 hour   Intake 490 ml   Output 1200 ml   Net -710 ml         General appearance:elderly white female who does not appear to be in distress. Sleeping. BiPAP on.    HEENT:normocephalic atraumatic  YOKYX:I8M1 no murmurs  Lungs:diminished bilaterally no rubs or tenderness or dullness to percussion  Abdomen:Soft non tender no organomegaly  Extremities:non clubbing cyanosis or edema  Neuro:no focal findings  Skin:intact    Recent Labs     08/15/22  1748 08/16/22  0206 08/17/22  0605   WBC  --  6.5 6.6   RBC  --  3.25* 3.36*   HGB 9.8* 10.1* 10.3*   HCT 30.7* 31.6* 33.3*   PLT  --  189 226   MCV  --  97.2 99.1*   MCH  --  31.1* 30.7   MCHC  --  32.0* 30.9*   RDW  --  15.2* 14.1        Recent Labs     08/16/22  0206 08/16/22  1731 08/17/22  0605     --  138   K 4.3 4.2 4.3   CL 96*  --  89*   CO2 39*  --  41*   BUN 13  --  14   CREATININE 0.5  --  0.5   CALCIUM 8.5*  --  8.5*   GLUCOSE 106  --  136*        Recent Labs     08/16/22  1731   PHART 7.400   ZLH3YXS 82.0*   PO2ART 71.0*   CBW1AFH 50.8*   B2QPMPDQ 93.3   BEART 21.9*       Recent Labs     08/16/22  0206 08/17/22  0605   AST 12  --    ALT 7  --    ALKPHOS 53  --    BILITOT <0.2  --    CALCIUM 8.5* 8.5*       No results for input(s): BC, LABGRAM, CULTRESP, BFCX in the last 72 hours. Radiograph: CT ABDOMEN PELVIS WO CONTRAST Additional Contrast? None    Result Date: 8/4/2022  1. No evidence for acute abdominal or pelvic process. 2.Right lower lobe consolidation with small pleural effusion. Superimposed infection is not excluded. 3.Colonic diverticulosis without diverticulitis. 4.Calcific atherosclerosis of the abdominal aorta. 5.Degenerative spondylosis. NM LUNG SCAN PERFUSION ONLY    Result Date: 8/4/2022  1. Small subsegmental defect in the posterior left lower lobe, new since prior, compatible with and intermediate probability for pulmonary embolism. 2. Pulmonary hyperaeration, with decreased perfusion in the lung apices, likely related to COPD. 3. Patchy airspace abnormality in the right mid and lower lung field with small right pleural fluid, likely pneumonia. Please note according to the Piopped criteria a low probability reflects a 0 to 19%chance of a pulmonary embolism. Please correlate clinically. If symptoms persist, consider CT PA gram. RECOMMENDATION: Follow-up as clinically warranted. Electronically Signed by Helena Condon MD at 04-Aug-2022 11:14:59 AM             XR CHEST PORTABLE    Result Date: 8/3/2022  Right mid and lower lung infiltrates.  Small right pleural effusion not excluded. Recommendation: Follow up as clinically indicated. Electronically Signed by Sakina Hensley MD at 03-Aug-2022 08:41:33 AM                My radiograph interpretation/independent review of imaging: reviewed. Problem list generated by [facility] Problems             Last Modified POA    * (Principal) Pneumonia 8/3/2022 Yes    Chronic respiratory failure with hypoxia and hypercapnia (HCC) 8/13/2022 Yes    Loculated pleural effusion 8/12/2022 Yes        Pulmonary Assessment/Plan:     Chronic hypoxic hypercapnic respiratory failure continue oxygen supplementation as necessary to maintain adequate oxygenation. Underlying severe COPD. Continue bronchodilators continue pulmonary toilet. Right lower lobe pneumonia. Continue empirical antibiotic therapy. Consider oral antibiotics. Pleural effusion improved on chest x-ray today  Low probability for PE per PIOPED criteria. On anticoagulation. Resume direct oral anticoagulation when appropriate  Dyspnea due to the above supportive care. DC planning. Mobilize PT/OT           Fabiano Valdez MD, [facility] [place]    The above note was generated using voice recognition software. Inadvertent typographical errors in transcription may have occurred.       Electronically signed by Fabiano Valdez MD on 08/18/22 at 12:39 PM

## 2022-08-19 ENCOUNTER — APPOINTMENT (OUTPATIENT)
Dept: GENERAL RADIOLOGY | Age: 72
DRG: 853 | End: 2022-08-19
Payer: MEDICARE

## 2022-08-19 PROCEDURE — 6370000000 HC RX 637 (ALT 250 FOR IP): Performed by: NURSE PRACTITIONER

## 2022-08-19 PROCEDURE — 94660 CPAP INITIATION&MGMT: CPT

## 2022-08-19 PROCEDURE — 6370000000 HC RX 637 (ALT 250 FOR IP): Performed by: INTERNAL MEDICINE

## 2022-08-19 PROCEDURE — 99233 SBSQ HOSP IP/OBS HIGH 50: CPT | Performed by: INTERNAL MEDICINE

## 2022-08-19 PROCEDURE — 73030 X-RAY EXAM OF SHOULDER: CPT

## 2022-08-19 PROCEDURE — 6360000002 HC RX W HCPCS: Performed by: NURSE PRACTITIONER

## 2022-08-19 PROCEDURE — 2140000000 HC CCU INTERMEDIATE R&B

## 2022-08-19 PROCEDURE — 94761 N-INVAS EAR/PLS OXIMETRY MLT: CPT

## 2022-08-19 PROCEDURE — 2700000000 HC OXYGEN THERAPY PER DAY

## 2022-08-19 PROCEDURE — 73030 X-RAY EXAM OF SHOULDER: CPT | Performed by: RADIOLOGY

## 2022-08-19 PROCEDURE — 94640 AIRWAY INHALATION TREATMENT: CPT

## 2022-08-19 RX ORDER — METOCLOPRAMIDE 10 MG/1
10 TABLET ORAL
Status: DISCONTINUED | OUTPATIENT
Start: 2022-08-19 | End: 2022-08-23 | Stop reason: HOSPADM

## 2022-08-19 RX ORDER — LIDOCAINE 4 G/G
1 PATCH TOPICAL DAILY
Status: DISCONTINUED | OUTPATIENT
Start: 2022-08-19 | End: 2022-08-23 | Stop reason: HOSPADM

## 2022-08-19 RX ADMIN — OXYCODONE AND ACETAMINOPHEN 1 TABLET: 10; 325 TABLET ORAL at 17:40

## 2022-08-19 RX ADMIN — GUAIFENESIN 200 MG: 100 SOLUTION ORAL at 21:12

## 2022-08-19 RX ADMIN — HYDRALAZINE HYDROCHLORIDE 25 MG: 25 TABLET, FILM COATED ORAL at 21:12

## 2022-08-19 RX ADMIN — GUAIFENESIN 200 MG: 100 SOLUTION ORAL at 09:06

## 2022-08-19 RX ADMIN — ONDANSETRON HYDROCHLORIDE 4 MG: 2 SOLUTION INTRAMUSCULAR; INTRAVENOUS at 12:35

## 2022-08-19 RX ADMIN — SUCRALFATE 1 G: 1 TABLET ORAL at 12:35

## 2022-08-19 RX ADMIN — ARFORMOTEROL TARTRATE 15 MCG: 15 SOLUTION RESPIRATORY (INHALATION) at 18:43

## 2022-08-19 RX ADMIN — OXYCODONE AND ACETAMINOPHEN 1 TABLET: 10; 325 TABLET ORAL at 13:35

## 2022-08-19 RX ADMIN — GABAPENTIN 600 MG: 600 TABLET, FILM COATED ORAL at 12:35

## 2022-08-19 RX ADMIN — BUDESONIDE 250 MCG: 0.25 SUSPENSION RESPIRATORY (INHALATION) at 06:19

## 2022-08-19 RX ADMIN — MUPIROCIN: 20 OINTMENT TOPICAL at 09:07

## 2022-08-19 RX ADMIN — PANTOPRAZOLE SODIUM 40 MG: 40 TABLET, DELAYED RELEASE ORAL at 05:19

## 2022-08-19 RX ADMIN — OXYCODONE AND ACETAMINOPHEN 1 TABLET: 10; 325 TABLET ORAL at 21:19

## 2022-08-19 RX ADMIN — METOCLOPRAMIDE 10 MG: 10 TABLET ORAL at 17:15

## 2022-08-19 RX ADMIN — OXYCODONE AND ACETAMINOPHEN 1 TABLET: 10; 325 TABLET ORAL at 09:18

## 2022-08-19 RX ADMIN — CITALOPRAM HYDROBROMIDE 40 MG: 20 TABLET ORAL at 09:06

## 2022-08-19 RX ADMIN — SUCRALFATE 1 G: 1 TABLET ORAL at 21:12

## 2022-08-19 RX ADMIN — PANTOPRAZOLE SODIUM 40 MG: 40 TABLET, DELAYED RELEASE ORAL at 17:15

## 2022-08-19 RX ADMIN — METOCLOPRAMIDE 10 MG: 10 TABLET ORAL at 21:12

## 2022-08-19 RX ADMIN — ALPRAZOLAM 1 MG: 0.5 TABLET ORAL at 22:36

## 2022-08-19 RX ADMIN — ENOXAPARIN SODIUM 40 MG: 100 INJECTION SUBCUTANEOUS at 17:16

## 2022-08-19 RX ADMIN — BUDESONIDE 250 MCG: 0.25 SUSPENSION RESPIRATORY (INHALATION) at 18:43

## 2022-08-19 RX ADMIN — SUCRALFATE 1 G: 1 TABLET ORAL at 09:06

## 2022-08-19 RX ADMIN — ARFORMOTEROL TARTRATE 15 MCG: 15 SOLUTION RESPIRATORY (INHALATION) at 06:19

## 2022-08-19 RX ADMIN — GABAPENTIN 600 MG: 600 TABLET, FILM COATED ORAL at 09:06

## 2022-08-19 RX ADMIN — MUPIROCIN: 20 OINTMENT TOPICAL at 21:12

## 2022-08-19 RX ADMIN — FUROSEMIDE 40 MG: 40 TABLET ORAL at 09:06

## 2022-08-19 RX ADMIN — GABAPENTIN 600 MG: 600 TABLET, FILM COATED ORAL at 21:12

## 2022-08-19 RX ADMIN — ALPRAZOLAM 1 MG: 0.5 TABLET ORAL at 07:04

## 2022-08-19 RX ADMIN — OXYCODONE AND ACETAMINOPHEN 1 TABLET: 10; 325 TABLET ORAL at 05:17

## 2022-08-19 RX ADMIN — SUCRALFATE 1 G: 1 TABLET ORAL at 17:15

## 2022-08-19 ASSESSMENT — PAIN DESCRIPTION - LOCATION
LOCATION: SHOULDER

## 2022-08-19 ASSESSMENT — PAIN SCALES - GENERAL
PAINLEVEL_OUTOF10: 8
PAINLEVEL_OUTOF10: 0
PAINLEVEL_OUTOF10: 4
PAINLEVEL_OUTOF10: 1
PAINLEVEL_OUTOF10: 6
PAINLEVEL_OUTOF10: 0
PAINLEVEL_OUTOF10: 8

## 2022-08-19 ASSESSMENT — PAIN DESCRIPTION - DESCRIPTORS
DESCRIPTORS: ACHING
DESCRIPTORS: ACHING
DESCRIPTORS: DULL
DESCRIPTORS: ACHING

## 2022-08-19 ASSESSMENT — PAIN DESCRIPTION - ORIENTATION
ORIENTATION: PROXIMAL
ORIENTATION: RIGHT

## 2022-08-19 ASSESSMENT — PAIN SCALES - WONG BAKER
WONGBAKER_NUMERICALRESPONSE: 0
WONGBAKER_NUMERICALRESPONSE: 2
WONGBAKER_NUMERICALRESPONSE: 0
WONGBAKER_NUMERICALRESPONSE: 0

## 2022-08-19 NOTE — PROGRESS NOTES
POST OP CARDIOTHORACIC SURGERY PROGRESS NOTE    Post op day 6    SUBJECTIVE:  Resting in bed. Had good BM this morning. Breathing continues to improve. Having quite a bit of nausea. BP (!) 96/40   Pulse 68   Temp (!) 96.7 °F (35.9 °C) (Temporal)   Resp 20   Ht 5' 6\" (1.676 m)   Wt 124 lb 8 oz (56.5 kg)   SpO2 97%   BMI 20.09 kg/m²   Average, Min, and Max for last 24 hours Vitals:  TEMPERATURE:  Temp  Av °F (36.1 °C)  Min: 96.4 °F (35.8 °C)  Max: 97.7 °F (36.5 °C)  RESPIRATIONS RANGE: Resp  Av.8  Min: 18  Max: 22  PULSE RANGE: Pulse  Av.6  Min: 68  Max: 77  BLOOD PRESSURE RANGE:  Systolic (60HAJ), KBJ:22 , Min:90 , OZP:933   ; Diastolic (06QPK), MMC:76, Min:40, Max:75    PULSE OXIMETRY RANGE: SpO2  Av.7 %  Min: 90 %  Max: 98 %    I/O last 3 completed shifts: In: 660 [P.O.:410; IV Piggyback:250]  Out: 2750 [Urine:2750]    CHEST: Clear, but diminished throughout. No wheezes or rhonchi. CARDIOVASCULAR: Normal HT with RRR. No murmurs, gallops or rubs. INCISION: Right VATS incisions - open to air. Edges well approximated. No drainage or erythema. CHEST XRAY: Normal postoperative changes with small right pleural effusion. ASSESSMENT:     RLL Pneumonia with Loculated Empyema - Surgical cultures - no growth. Pathology - no malignancy. Postoperative Acute on Chronic Hypoxic Respiratory Failure  Postoperative Anemia 2' to Acute Blood Loss, Expected  Recent LLL Pneumonia in 2022  Severe End-Stage COPD/Emphysema with Chronic Hypoxia on Continuous O2  Essential HTN  Chronic GERD  Chronic Pain Syndrome  Mixed Anxiety/Depressive Disorder  Hx Lung Cancer, Left - S/P Radiation Therapy 2019    PLAN:     Stable from CT surgery standpoint.    XR Right Shoulder  Lidoderm Patch to Right Shoulder      Electronically signed by TEN Pradhan on 22 at 9:45 AM CDT

## 2022-08-19 NOTE — PROGRESS NOTES
Support visit:    Pt's tray is still on table, she says she is nauseous and cannot eat yet. Has had antiemetic and wishes to try later. Denies pain. On oxygen via n/c, becomes soa with conversation, but sat is stable. Pt has home oxygen in place. Her daughter is present, talked about goal of care and pt says her 2 daughters will be with her for 24 hr care at discharge. She has refused therapy. Plans for [facility] consult at discharge. Pt is pleasant and appreciative of support. Palliative Care will continue to follow.     Electronically signed by Luke Almonte RN on 8/19/2022 at 2:02 PM

## 2022-08-19 NOTE — PROGRESS NOTES
Pulmonary and Critical Care Progress note. Susanne Caal    MRN# [id number]    Acct# [de-identified]  8/19/2022   11:32 AM CDT    Referring Margarita Laughlin MD      Chief Complaint: shortness of breath abd pain. HPI: Patient is more alert and conversational today. Denies complaints. Medications    lidocaine, 1 patch, TransDERmal, Daily    metoclopramide, 10 mg, Oral, 4x Daily AC & HS    guaiFENesin, 200 mg, Oral, BID    furosemide, 40 mg, Oral, Daily    bisacodyl, 10 mg, Rectal, Once    hydrALAZINE, 25 mg, Oral, 2 times per day    mupirocin, , Nasal, BID    enoxaparin, 40 mg, SubCUTAneous, Daily    budesonide, 250 mcg, Nebulization, BID    Arformoterol Tartrate, 15 mcg, Nebulization, BID    nicotine, 1 patch, TransDERmal, Daily    citalopram, 40 mg, Oral, Daily    gabapentin, 600 mg, Oral, TID    pantoprazole, 40 mg, Oral, BID AC    sucralfate, 1 g, Oral, 4x Daily     Review of Systems:  RESPIRATORY:  positive for  dyspnea  CARDIOVASCULAR:  positive for  dyspnea      Physical Exam:  BP (!) 93/45   Pulse 72   Temp 97.3 °F (36.3 °C) (Temporal)   Resp 20   Ht 5' 6\" (1.676 m)   Wt 124 lb 8 oz (56.5 kg)   SpO2 94%   BMI 20.09 kg/m²   Intake/Output Summary (Last 24 hours) at 8/19/2022 1627  Last data filed at 8/19/2022 1519  Gross per 24 hour   Intake 290 ml   Output 2550 ml   Net -2260 ml         General appearance:elderly white female who does not appear to be in distress. Sleeping. BiPAP on.    HEENT:normocephalic atraumatic  YMBKI:N0S6 no murmurs  Lungs:diminished bilaterally no rubs or tenderness or dullness to percussion  Abdomen:Soft non tender no organomegaly  Extremities:non clubbing cyanosis or edema  Neuro:no focal findings  Skin:intact    Recent Labs     08/17/22  0605   WBC 6.6   RBC 3.36*   HGB 10.3*   HCT 33.3*      MCV 99.1*   MCH 30.7   MCHC 30.9*   RDW 14.1        Recent Labs     08/16/22  1731 08/17/22  0605   NA  --  138   K 4.2 4.3   CL  --  89* respiratory failure with hypoxia and hypercapnia (HCC) 8/13/2022 Yes    Loculated pleural effusion 8/12/2022 Yes        Pulmonary Assessment/Plan:     Chronic hypoxic hypercapnic respiratory failure continue oxygen supplementation as necessary to maintain adequate oxygenation. Underlying severe COPD. Continue bronchodilators continue pulmonary toilet. Right lower lobe pneumonia. Continue empirical antibiotic therapy. Consider oral antibiotics. Pleural effusion improved on chest x-ray today  Low probability for PE per PIOPED criteria. Would resume Eliquis for 3 months. Dyspnea due to the above supportive care. DC planning. Mobilize PT/OT    DC planning. Antwon Alonso MD, [facility], [facility]    The above note was generated using voice recognition software. Inadvertent typographical errors in transcription may have occurred.       Electronically signed by Antwon Alonso MD on 08/19/22 at 4:27 PM

## 2022-08-19 NOTE — PROGRESS NOTES
Progress Note  Aliyah Chi Deloit  8/19/2022 2:17 PM  Subjective:   Admit Date:   8/3/2022      CC/ADMIT DX:       Interval History:   Reviewed overnight events and nursing notes. She has no new complaints. I have reviewed all labs/diagnostics from the last 24hrs. ROS:   I have done a 10 point ROS and all are negative, except what is mentioned in the HPI. ADULT DIET; Regular  ADULT ORAL NUTRITION SUPPLEMENT; Breakfast, Lunch, Dinner; Standard High Calorie/High Protein Oral Supplement    Medications:      sodium chloride      sodium chloride 10 mL/hr at 08/13/22 1632      lidocaine  1 patch TransDERmal Daily    metoclopramide  10 mg Oral 4x Daily AC & HS    guaiFENesin  200 mg Oral BID    furosemide  40 mg Oral Daily    bisacodyl  10 mg Rectal Once    hydrALAZINE  25 mg Oral 2 times per day    mupirocin   Nasal BID    enoxaparin  40 mg SubCUTAneous Daily    budesonide  250 mcg Nebulization BID    Arformoterol Tartrate  15 mcg Nebulization BID    nicotine  1 patch TransDERmal Daily    citalopram  40 mg Oral Daily    gabapentin  600 mg Oral TID    pantoprazole  40 mg Oral BID AC    sucralfate  1 g Oral 4x Daily           Objective:   Vitals: BP (!) 93/45   Pulse 72   Temp 97.3 °F (36.3 °C) (Temporal)   Resp 20   Ht 5' 6\" (1.676 m)   Wt 124 lb 8 oz (56.5 kg)   SpO2 94%   BMI 20.09 kg/m²    Intake/Output Summary (Last 24 hours) at 8/19/2022 1417  Last data filed at 8/19/2022 0934  Gross per 24 hour   Intake 410 ml   Output 1850 ml   Net -1440 ml     General appearance: NAD, on O2  Lungs: coarse  Heart: RRR  Abdomen: soft, non-tender; bowel sounds normal; no masses,  no organomegaly  Extremities: extremities normal, atraumatic, no cyanosis or edema  Neurologic:  negative, No obvious focal neurologic deficits.     Assessment and Plan:   Principal Problem:    Pneumonia  Active Problems:    Chronic respiratory failure with hypoxia and hypercapnia (HCC)    Loculated pleural effusion  Resolved Problems:    * No resolved hospital problems. *    COPD    Chronic Pain    Depression    ABL Anemia    S/P VATS Thorascopic Decortication of RightChest Empyema    Hemoptysis   Generalized Weakness    Plan:   Continue present medication(s)    Follow with Pulm, CT Surgery   Encouraged to get OOB   Supportive care      Discharge planning:   her home     Reviewed treatment plans with the patient and/or family.              Electronically signed by Timmy Azar MD on 8/19/2022 at 2:17 PM

## 2022-08-19 NOTE — PROGRESS NOTES
Comprehensive Nutrition Assessment    Type and Reason for Visit:  Consult    Nutrition Recommendations/Plan:   Continue current interventions     Malnutrition Assessment:  Malnutrition Status:  No malnutrition (08/19/22 1305)    Context:  Acute Illness     Findings of the 6 clinical characteristics of malnutrition:  Energy Intake:  Mild decrease in energy intake (Comment)  Weight Loss:  No significant weight loss     Body Fat Loss:  No significant body fat loss     Muscle Mass Loss:  No significant muscle mass loss    Fluid Accumulation:  No significant fluid accumulation Extremities   Strength:  Not Performed    Nutrition Assessment:    Patient's diet has been advanced to Regular. PO intake remains decreased. complaining of nausea still. Is taking ONS well. No new wt available    Nutrition Related Findings:      Wound Type: Surgical Incision       Current Nutrition Intake & Therapies:    Average Meal Intake: 1-25%, 26-50%  Average Supplements Intake: %, 51-75%  ADULT DIET; Regular  ADULT ORAL NUTRITION SUPPLEMENT; Breakfast, Lunch, Dinner; Standard High Calorie/High Protein Oral Supplement    Anthropometric Measures:  Height: 5' 6\" (167.6 cm)  Ideal Body Weight (IBW): 130 lbs (59 kg)       Current Body Weight: 124 lb 8 oz (56.5 kg), 95.8 % IBW.     Current BMI (kg/m2): 20.1     BMI Categories: Underweight (BMI less than 22) age over 72    Estimated Daily Nutrient Needs:        Energy (kcal/day):       Protein (g/day):       Fluid (ml/day):      Nutrition Diagnosis:   Inadequate oral intake related to acute injury/trauma as evidenced by intake 0-25%, nausea    Nutrition Interventions:   Food and/or Nutrient Delivery: Continue Current Diet, Continue Oral Nutrition Supplement  Nutrition Education/Counseling: No recommendation at this time, Education not indicated  Coordination of Nutrition Care: Continue to monitor while inpatient       Goals:  Previous Goal Met: Progressing toward Goal(s)  Goals: Meet at least 75% of estimated needs       Nutrition Monitoring and Evaluation:   Behavioral-Environmental Outcomes: None Identified  Food/Nutrient Intake Outcomes: Food and Nutrient Intake, Supplement Intake  Physical Signs/Symptoms Outcomes: Biochemical Data, Nausea or Vomiting, Fluid Status or Edema, Weight, Skin    Discharge Planning:    Continue current diet     Xi Awan MS, RD, LD  Contact: [phone]

## 2022-08-20 PROBLEM — J86.9 EMPYEMA (HCC): Status: ACTIVE | Noted: 2022-08-20

## 2022-08-20 LAB — CORTISOL FREE, SERUM: 0.24 UG/DL

## 2022-08-20 PROCEDURE — 6370000000 HC RX 637 (ALT 250 FOR IP): Performed by: NURSE PRACTITIONER

## 2022-08-20 PROCEDURE — 2700000000 HC OXYGEN THERAPY PER DAY

## 2022-08-20 PROCEDURE — 6360000002 HC RX W HCPCS: Performed by: NURSE PRACTITIONER

## 2022-08-20 PROCEDURE — 2140000000 HC CCU INTERMEDIATE R&B

## 2022-08-20 PROCEDURE — 6370000000 HC RX 637 (ALT 250 FOR IP): Performed by: INTERNAL MEDICINE

## 2022-08-20 PROCEDURE — 94660 CPAP INITIATION&MGMT: CPT

## 2022-08-20 PROCEDURE — 94640 AIRWAY INHALATION TREATMENT: CPT

## 2022-08-20 PROCEDURE — 94761 N-INVAS EAR/PLS OXIMETRY MLT: CPT

## 2022-08-20 PROCEDURE — 99024 POSTOP FOLLOW-UP VISIT: CPT | Performed by: SURGERY

## 2022-08-20 RX ADMIN — SUCRALFATE 1 G: 1 TABLET ORAL at 09:44

## 2022-08-20 RX ADMIN — METOCLOPRAMIDE 10 MG: 10 TABLET ORAL at 16:52

## 2022-08-20 RX ADMIN — FUROSEMIDE 40 MG: 40 TABLET ORAL at 09:44

## 2022-08-20 RX ADMIN — GABAPENTIN 600 MG: 600 TABLET, FILM COATED ORAL at 14:09

## 2022-08-20 RX ADMIN — ALPRAZOLAM 1 MG: 0.5 TABLET ORAL at 06:23

## 2022-08-20 RX ADMIN — OXYCODONE AND ACETAMINOPHEN 1 TABLET: 10; 325 TABLET ORAL at 14:13

## 2022-08-20 RX ADMIN — MUPIROCIN: 20 OINTMENT TOPICAL at 09:43

## 2022-08-20 RX ADMIN — BUDESONIDE 250 MCG: 0.25 SUSPENSION RESPIRATORY (INHALATION) at 06:18

## 2022-08-20 RX ADMIN — HYDRALAZINE HYDROCHLORIDE 25 MG: 25 TABLET, FILM COATED ORAL at 20:53

## 2022-08-20 RX ADMIN — GABAPENTIN 600 MG: 600 TABLET, FILM COATED ORAL at 09:44

## 2022-08-20 RX ADMIN — METOCLOPRAMIDE 10 MG: 10 TABLET ORAL at 05:41

## 2022-08-20 RX ADMIN — ARFORMOTEROL TARTRATE 15 MCG: 15 SOLUTION RESPIRATORY (INHALATION) at 20:05

## 2022-08-20 RX ADMIN — SUCRALFATE 1 G: 1 TABLET ORAL at 20:53

## 2022-08-20 RX ADMIN — BUDESONIDE 250 MCG: 0.25 SUSPENSION RESPIRATORY (INHALATION) at 20:05

## 2022-08-20 RX ADMIN — SUCRALFATE 1 G: 1 TABLET ORAL at 16:53

## 2022-08-20 RX ADMIN — HYDRALAZINE HYDROCHLORIDE 25 MG: 25 TABLET, FILM COATED ORAL at 09:44

## 2022-08-20 RX ADMIN — OXYCODONE AND ACETAMINOPHEN 1 TABLET: 10; 325 TABLET ORAL at 21:08

## 2022-08-20 RX ADMIN — METOCLOPRAMIDE 10 MG: 10 TABLET ORAL at 09:44

## 2022-08-20 RX ADMIN — SUCRALFATE 1 G: 1 TABLET ORAL at 14:09

## 2022-08-20 RX ADMIN — OXYCODONE AND ACETAMINOPHEN 1 TABLET: 10; 325 TABLET ORAL at 06:12

## 2022-08-20 RX ADMIN — CITALOPRAM HYDROBROMIDE 40 MG: 20 TABLET ORAL at 09:44

## 2022-08-20 RX ADMIN — METOCLOPRAMIDE 10 MG: 10 TABLET ORAL at 20:53

## 2022-08-20 RX ADMIN — GUAIFENESIN 200 MG: 100 SOLUTION ORAL at 20:54

## 2022-08-20 RX ADMIN — ARFORMOTEROL TARTRATE 15 MCG: 15 SOLUTION RESPIRATORY (INHALATION) at 06:18

## 2022-08-20 RX ADMIN — GUAIFENESIN 200 MG: 100 SOLUTION ORAL at 09:43

## 2022-08-20 RX ADMIN — MUPIROCIN: 20 OINTMENT TOPICAL at 20:59

## 2022-08-20 RX ADMIN — ALPRAZOLAM 1 MG: 0.5 TABLET ORAL at 21:08

## 2022-08-20 RX ADMIN — ENOXAPARIN SODIUM 40 MG: 100 INJECTION SUBCUTANEOUS at 17:08

## 2022-08-20 RX ADMIN — PANTOPRAZOLE SODIUM 40 MG: 40 TABLET, DELAYED RELEASE ORAL at 16:52

## 2022-08-20 RX ADMIN — PANTOPRAZOLE SODIUM 40 MG: 40 TABLET, DELAYED RELEASE ORAL at 05:41

## 2022-08-20 RX ADMIN — ALPRAZOLAM 1 MG: 0.5 TABLET ORAL at 16:52

## 2022-08-20 RX ADMIN — GABAPENTIN 600 MG: 600 TABLET, FILM COATED ORAL at 20:53

## 2022-08-20 ASSESSMENT — PAIN DESCRIPTION - ORIENTATION
ORIENTATION: RIGHT

## 2022-08-20 ASSESSMENT — PAIN SCALES - GENERAL
PAINLEVEL_OUTOF10: 10
PAINLEVEL_OUTOF10: 8
PAINLEVEL_OUTOF10: 4
PAINLEVEL_OUTOF10: 8
PAINLEVEL_OUTOF10: 0
PAINLEVEL_OUTOF10: 2

## 2022-08-20 ASSESSMENT — PAIN DESCRIPTION - LOCATION
LOCATION: SHOULDER

## 2022-08-20 ASSESSMENT — PAIN DESCRIPTION - DESCRIPTORS
DESCRIPTORS: STABBING
DESCRIPTORS: ACHING

## 2022-08-20 ASSESSMENT — PAIN SCALES - WONG BAKER: WONGBAKER_NUMERICALRESPONSE: 2

## 2022-08-20 NOTE — PROGRESS NOTES
Progress Note    2022 8:27 AM          POD#   7    Subjective:  Ms. Delfina Asher still requires high flow nasal cannula to maintain adequate oxygenation. Receiving regular pain medications. PULSE OXIMETRY RANGE: SpO2  Av.9 %  Min: 90 %  Max: 95 %  SUPPLEMENTAL O2: O2 Flow Rate (L/min): 4 L/min   Vital Signs: BP (!) 128/55   Pulse 83   Temp 97.9 °F (36.6 °C) (Temporal)   Resp 20   Ht 5' 6\" (1.676 m)   Wt 124 lb 8 oz (56.5 kg)   SpO2 92%   BMI 20.09 kg/m²    Temperature Range:   Temp: 97.9 °F (36.6 °C)   Temp  Av.3 °F (36.3 °C)  Min: 96.9 °F (36.1 °C)  Max: 97.9 °F (36.6 °C)                   Rhythm: normal sinus rhythm    Labs:   ABG:    Lab Results   Component Value Date/Time    PHART 7.400 2022 05:31 PM    PO2ART 71.0 2022 05:31 PM    DRH9YDG 82.0 2022 05:31 PM    C2TPGOJC 93.3 2022 05:31 PM    YYQ8SRN 50.8 2022 05:31 PM    BEART 21.9 2022 05:31 PM     CBC: No results for input(s): WBC, HGB, HCT, MCV, PLT in the last 72 hours. BMP: No results for input(s): NA, K, CL, CO2, PHOS, BUN, CREATININE, CA in the last 72 hours. PT/INR: No results for input(s): PROTIME, INR in the last 72 hours. APTT: No results for input(s): APTT in the last 72 hours. Chest X-Ray:  Left lung well expaded   CT:  I/O last 3 completed shifts: In: 65 [P.O.:530]  Out: 3150 [Urine:3150]      I/O last 3 completed shifts:   In: 530 [P.O.:530]  Out: 3150 [Urine:3150]  Scheduled Meds:    lidocaine  1 patch TransDERmal Daily    metoclopramide  10 mg Oral 4x Daily AC & HS    guaiFENesin  200 mg Oral BID    furosemide  40 mg Oral Daily    bisacodyl  10 mg Rectal Once    hydrALAZINE  25 mg Oral 2 times per day    mupirocin   Nasal BID    enoxaparin  40 mg SubCUTAneous Daily    budesonide  250 mcg Nebulization BID    Arformoterol Tartrate  15 mcg Nebulization BID    nicotine  1 patch TransDERmal Daily    citalopram  40 mg Oral Daily    gabapentin  600 mg Oral TID    pantoprazole  40 mg Oral BID AC sucralfate  1 g Oral 4x Daily     Continuous Infusions:    sodium chloride 10 mL/hr at 08/13/22 1632     Exam:   General appearance: NAD  Neck: no bruits, no JVD, No lymph nodes   Lungs: no rhonchi, no wheezes, no rales   Port incisions: stable, dressing dry and intact    Heart: S1 and S2 no murmer, + rub  Abdomen: positive bowel sounds, no bruits, no masses  Extremities: warm and dry, no cyanosis, no clubbing    Assessment  SP VATS decort. Incisions healing well. Pain she is currently having is mostly from a variety of ortho issues. Pulmonary insufficiency continues. No signs of active infection off all abx. Suspect this is due to chronic COPD, exacerbated by her recent requirement for surgery. Continue aggressive pulmonary exercises as she tolerates.       Patient Active Problem List   Diagnosis    Chronic pain    Anxiety and depression    GERD (gastroesophageal reflux disease)    Chronic bronchitis with acute exacerbation (HCC)    Hypertension    Echocardiogram abnormal    COPD exacerbation (HCC)    Cervicalgia    Chronic low back pain    Acute hypercapnic respiratory failure (HCC)    Alpha-1-antitrypsin deficiency carrier    Current every day smoker    Folate deficiency anemia    Malignant neoplasm of upper lobe of right lung (HCC)    Palliative care patient    Severe malnutrition (HCC)    Intractable nausea and vomiting    Gram-negative bacteremia    Hypomagnesemia    Tobacco abuse counseling    Cigarette nicotine dependence with nicotine-induced disorder    Clostridium perfringens infection    Chronic respiratory failure with hypoxia and hypercapnia (HCC)    Anxiety    Upper abdominal pain    Heart burn    Nausea    Rhinovirus infection    Pneumonia    Loculated pleural effusion         MD Naresh Shepherd MD

## 2022-08-20 NOTE — PROGRESS NOTES
Medicine Progress Note 8/20/2022    Patient:  Gerson Field  YOB: 1950  MRN: [id number]     Acct: [de-identified]   Admit date: 8/3/2022    Patient Seen, Chart, Consults notes, Labs, Radiology studies reviewed. Subjective:   Remains on HFNC. No acute issues overnight. No worsening chest pain or shortness of breath. No fever or chills.       Medications:   Scheduled Meds:   lidocaine  1 patch TransDERmal Daily    metoclopramide  10 mg Oral 4x Daily AC & HS    guaiFENesin  200 mg Oral BID    furosemide  40 mg Oral Daily    bisacodyl  10 mg Rectal Once    hydrALAZINE  25 mg Oral 2 times per day    mupirocin   Nasal BID    enoxaparin  40 mg SubCUTAneous Daily    budesonide  250 mcg Nebulization BID    Arformoterol Tartrate  15 mcg Nebulization BID    nicotine  1 patch TransDERmal Daily    citalopram  40 mg Oral Daily    gabapentin  600 mg Oral TID    pantoprazole  40 mg Oral BID AC    sucralfate  1 g Oral 4x Daily     Continuous Infusions:   sodium chloride 10 mL/hr at 08/13/22 1632     PRN Meds:polyethylene glycol, albuterol, acetylcysteine, magnesium hydroxide, ALPRAZolam, sodium chloride, sodium chloride, oxyCODONE-acetaminophen, acetaminophen, [DISCONTINUED] ondansetron **OR** ondansetron    Objective:   Vitals: Patient Vitals for the past 24 hrs:   BP Temp Temp src Pulse Resp SpO2 Weight   08/20/22 1057 (!) 101/52 (!) 96.4 °F (35.8 °C) Temporal 66 18 (!) 88 % --   08/20/22 0939 -- -- -- 69 -- -- --   08/20/22 0728 -- -- -- -- -- -- 126 lb 4 oz (57.3 kg)   08/20/22 0620 -- -- -- -- -- 92 % --   08/20/22 0557 (!) 128/55 97.9 °F (36.6 °C) Temporal 83 20 95 % --   08/20/22 0538 (!) 106/44 97.1 °F (36.2 °C) Temporal 79 20 95 % --   08/19/22 2356 (!) 101/44 96.9 °F (36.1 °C) Temporal 70 22 94 % --   08/19/22 1844 -- -- -- -- -- 90 % --   08/19/22 1810 -- -- -- -- 20 -- --   08/19/22 1740 (!) 117/48 -- -- -- 20 -- --   08/19/22 1600 -- -- -- -- -- 94 % --   08/19/22 1545 -- -- -- -- -- 90 % --   08/19/22 1416 -- -- -- -- -- 92 % --   08/19/22 1405 -- -- -- -- 20 -- --   08/19/22 1335 -- -- -- -- 20 -- --       GENERAL: Asleep with BiPap, arousable, in no acute distress. CARDIAC: Regular rate and rhythm. No murmurs, rubs, or gallops. RESPIRATORY: Chest is clear to auscultation bilaterally. No wheezes, rales, or rhonchi. ABDOMEN: Normoactive bowel sounds. Abdomen soft, nontender, and nondistended. EXTREMITIES: No cyanosis, clubbing, or edema. 24 hour intake/output:  Intake/Output Summary (Last 24 hours) at 8/20/2022 1309  Last data filed at 8/20/2022 1300  Gross per 24 hour   Intake 480 ml   Output 2400 ml   Net -1920 ml     Last 3 weights: Wt Readings from Last 3 Encounters:   08/20/22 126 lb 4 oz (57.3 kg)   06/20/22 115 lb (52.2 kg)   11/10/21 118 lb 6.4 oz (53.7 kg)       Labs:  Hematology:No results for input(s): WBC, HGB, HCT, PLT, SEDRATE, CRP, INR in the last 72 hours. Chemistry:No results for input(s): NA, K, CL, CO2, GLUCOSE, BUN, CREATININE, MG, ANIONGAP, LABGLOM, GFRAA, CALCIUM, CAION, PHOS, PSA, BNP, TROPONINI, CKTOTAL, CKMB, CKMBINDEX, MYOGLOBIN in the last 72 hours. No results for input(s): PROT, LABALBU, LABA1C, M9RILLA, F9FROSI, FT4, TSH, AST, ALT, LDH, GGT, ALKPHOS, BILITOT, BILIDIR, AMMONIA, AMYLASE, LIPASE, LACTATE, CHOL, HDL, LDLCHOLESTEROL, CHOLHDLRATIO, TRIG, VLDL, PHENYTOIN, PHENYF in the last 72 hours. No results for input(s): POCGLU in the last 72 hours. Cultures:   Lab Results   Component Value Date/Time    BC No growth after 5 days of incubation.  08/03/2022 06:18 AM     Urine Culture:   Lab Results   Component Value Date/Time    LABURIN >100,000 CFU/ml (A) 05/11/2021 01:10 PM    LABURIN Light growth 05/11/2021 01:10 PM    LABURIN Moderate growth 05/11/2021 01:10 PM       Assessment/Plan:   Principal Problem: Empyema [facility])  Active Hospital Problems    Diagnosis Date Noted    [facility]) [J86.9] 08/20/2022     Priority: Medium    Loculated pleural effusion [J90] 08/12/2022     Priority: Medium    Pneumonia [J18.9] 08/03/2022     Priority: Medium    Chronic respiratory failure with hypoxia and hypercapnia (HCC) [J96.11, J96.12] 06/10/2022     Priority: Medium    Anxiety and depression [F41.9, F32.A] 05/14/2014    Hypertension [I10] 05/14/2014    [facility] [J44.9] 05/14/2014       Continue controller meds, pulmonary hygiene, and bronchodilators. Wean supp O2 as tolerate. Encourage activity. Recommend PT/OT. Pulm & CTSurg following. Chronic medical conditions otherwise stable. Continue current medications & management. Ongoing goals of care discussions.       Electronically signed by Dimitri Cabello MD on 8/20/2022 at 1:09 PM

## 2022-08-21 PROCEDURE — 6360000002 HC RX W HCPCS: Performed by: NURSE PRACTITIONER

## 2022-08-21 PROCEDURE — 2140000000 HC CCU INTERMEDIATE R&B

## 2022-08-21 PROCEDURE — 6370000000 HC RX 637 (ALT 250 FOR IP): Performed by: NURSE PRACTITIONER

## 2022-08-21 PROCEDURE — 94660 CPAP INITIATION&MGMT: CPT

## 2022-08-21 PROCEDURE — 99024 POSTOP FOLLOW-UP VISIT: CPT | Performed by: SURGERY

## 2022-08-21 PROCEDURE — 94640 AIRWAY INHALATION TREATMENT: CPT

## 2022-08-21 PROCEDURE — 94761 N-INVAS EAR/PLS OXIMETRY MLT: CPT

## 2022-08-21 PROCEDURE — 2700000000 HC OXYGEN THERAPY PER DAY

## 2022-08-21 PROCEDURE — 6370000000 HC RX 637 (ALT 250 FOR IP): Performed by: INTERNAL MEDICINE

## 2022-08-21 RX ADMIN — ONDANSETRON HYDROCHLORIDE 4 MG: 2 SOLUTION INTRAMUSCULAR; INTRAVENOUS at 19:59

## 2022-08-21 RX ADMIN — OXYCODONE AND ACETAMINOPHEN 1 TABLET: 10; 325 TABLET ORAL at 05:18

## 2022-08-21 RX ADMIN — PANTOPRAZOLE SODIUM 40 MG: 40 TABLET, DELAYED RELEASE ORAL at 16:37

## 2022-08-21 RX ADMIN — GUAIFENESIN 200 MG: 100 SOLUTION ORAL at 09:25

## 2022-08-21 RX ADMIN — GABAPENTIN 600 MG: 600 TABLET, FILM COATED ORAL at 09:26

## 2022-08-21 RX ADMIN — SUCRALFATE 1 G: 1 TABLET ORAL at 16:37

## 2022-08-21 RX ADMIN — ALPRAZOLAM 1 MG: 0.5 TABLET ORAL at 14:48

## 2022-08-21 RX ADMIN — OXYCODONE AND ACETAMINOPHEN 1 TABLET: 10; 325 TABLET ORAL at 23:33

## 2022-08-21 RX ADMIN — MUPIROCIN: 20 OINTMENT TOPICAL at 09:37

## 2022-08-21 RX ADMIN — METOCLOPRAMIDE 10 MG: 10 TABLET ORAL at 05:17

## 2022-08-21 RX ADMIN — BUDESONIDE 250 MCG: 0.25 SUSPENSION RESPIRATORY (INHALATION) at 08:00

## 2022-08-21 RX ADMIN — OXYCODONE AND ACETAMINOPHEN 1 TABLET: 10; 325 TABLET ORAL at 13:24

## 2022-08-21 RX ADMIN — SUCRALFATE 1 G: 1 TABLET ORAL at 21:00

## 2022-08-21 RX ADMIN — ENOXAPARIN SODIUM 40 MG: 100 INJECTION SUBCUTANEOUS at 16:37

## 2022-08-21 RX ADMIN — METOCLOPRAMIDE 10 MG: 10 TABLET ORAL at 21:00

## 2022-08-21 RX ADMIN — BUDESONIDE 250 MCG: 0.25 SUSPENSION RESPIRATORY (INHALATION) at 20:16

## 2022-08-21 RX ADMIN — MUPIROCIN: 20 OINTMENT TOPICAL at 21:01

## 2022-08-21 RX ADMIN — SUCRALFATE 1 G: 1 TABLET ORAL at 09:26

## 2022-08-21 RX ADMIN — ALPRAZOLAM 1 MG: 0.5 TABLET ORAL at 09:37

## 2022-08-21 RX ADMIN — FUROSEMIDE 40 MG: 40 TABLET ORAL at 09:26

## 2022-08-21 RX ADMIN — HYDRALAZINE HYDROCHLORIDE 25 MG: 25 TABLET, FILM COATED ORAL at 09:26

## 2022-08-21 RX ADMIN — HYDRALAZINE HYDROCHLORIDE 25 MG: 25 TABLET, FILM COATED ORAL at 21:00

## 2022-08-21 RX ADMIN — ARFORMOTEROL TARTRATE 15 MCG: 15 SOLUTION RESPIRATORY (INHALATION) at 08:00

## 2022-08-21 RX ADMIN — GUAIFENESIN 200 MG: 100 SOLUTION ORAL at 21:00

## 2022-08-21 RX ADMIN — METOCLOPRAMIDE 10 MG: 10 TABLET ORAL at 16:37

## 2022-08-21 RX ADMIN — GABAPENTIN 600 MG: 600 TABLET, FILM COATED ORAL at 21:00

## 2022-08-21 RX ADMIN — GABAPENTIN 600 MG: 600 TABLET, FILM COATED ORAL at 13:23

## 2022-08-21 RX ADMIN — ALPRAZOLAM 1 MG: 0.5 TABLET ORAL at 21:00

## 2022-08-21 RX ADMIN — METOCLOPRAMIDE 10 MG: 10 TABLET ORAL at 09:26

## 2022-08-21 RX ADMIN — ALPRAZOLAM 1 MG: 0.5 TABLET ORAL at 02:05

## 2022-08-21 RX ADMIN — CITALOPRAM HYDROBROMIDE 40 MG: 20 TABLET ORAL at 09:26

## 2022-08-21 RX ADMIN — ARFORMOTEROL TARTRATE 15 MCG: 15 SOLUTION RESPIRATORY (INHALATION) at 20:16

## 2022-08-21 RX ADMIN — SUCRALFATE 1 G: 1 TABLET ORAL at 13:23

## 2022-08-21 RX ADMIN — PANTOPRAZOLE SODIUM 40 MG: 40 TABLET, DELAYED RELEASE ORAL at 05:17

## 2022-08-21 ASSESSMENT — PAIN SCALES - WONG BAKER: WONGBAKER_NUMERICALRESPONSE: 2

## 2022-08-21 ASSESSMENT — PAIN DESCRIPTION - LOCATION
LOCATION: SHOULDER

## 2022-08-21 ASSESSMENT — PAIN - FUNCTIONAL ASSESSMENT
PAIN_FUNCTIONAL_ASSESSMENT: PREVENTS OR INTERFERES SOME ACTIVE ACTIVITIES AND ADLS
PAIN_FUNCTIONAL_ASSESSMENT: ACTIVITIES ARE NOT PREVENTED
PAIN_FUNCTIONAL_ASSESSMENT: PREVENTS OR INTERFERES SOME ACTIVE ACTIVITIES AND ADLS

## 2022-08-21 ASSESSMENT — PAIN DESCRIPTION - ORIENTATION
ORIENTATION: RIGHT

## 2022-08-21 ASSESSMENT — PAIN SCALES - GENERAL
PAINLEVEL_OUTOF10: 3
PAINLEVEL_OUTOF10: 8
PAINLEVEL_OUTOF10: 0
PAINLEVEL_OUTOF10: 8
PAINLEVEL_OUTOF10: 3
PAINLEVEL_OUTOF10: 5
PAINLEVEL_OUTOF10: 2

## 2022-08-21 ASSESSMENT — PAIN DESCRIPTION - DESCRIPTORS
DESCRIPTORS: ACHING;SHARP
DESCRIPTORS: ACHING;DISCOMFORT
DESCRIPTORS: ACHING

## 2022-08-21 NOTE — PLAN OF CARE
Problem: Discharge Planning  Goal: Discharge to home or other facility with appropriate resources  Outcome: Progressing  Flowsheets (Taken 8/20/2022 2114)  Discharge to home or other facility with appropriate resources: Identify barriers to discharge with patient and caregiver     Problem: Safety - Adult  Goal: Free from fall injury  Outcome: Progressing     Problem: ABCDS Injury Assessment  Goal: Absence of physical injury  Outcome: Progressing     Problem: Skin/Tissue Integrity  Goal: Absence of new skin breakdown  Description: 1. Monitor for areas of redness and/or skin breakdown  2. Assess vascular access sites hourly  3. Every 4-6 hours minimum:  Change oxygen saturation probe site  4. Every 4-6 hours:  If on nasal continuous positive airway pressure, respiratory therapy assess nares and determine need for appliance change or resting period.   Outcome: Progressing     Problem: Respiratory - Adult  Goal: Achieves optimal ventilation and oxygenation  Outcome: Progressing  Flowsheets (Taken 8/20/2022 2114)  Achieves optimal ventilation and oxygenation: Assess for changes in respiratory status     Problem: Musculoskeletal - Adult  Goal: Return mobility to safest level of function  Outcome: Progressing  Flowsheets (Taken 8/20/2022 2114)  Return Mobility to Safest Level of Function: Assess patient stability and activity tolerance for standing, transferring and ambulating with or without assistive devices     Problem: Infection - Adult  Goal: Absence of infection at discharge  Outcome: Progressing  Flowsheets (Taken 8/20/2022 2114)  Absence of infection at discharge: Assess and monitor for signs and symptoms of infection

## 2022-08-21 NOTE — PROGRESS NOTES
Progress Note    2022 8:41 AM          POD#   8    Subjective:  Ms. Jose Saleem is requiring less supplemental O2 in order to maintain good O2 sats in the 90's. PULSE OXIMETRY RANGE: SpO2  Av.2 %  Min: 88 %  Max: 93 %  SUPPLEMENTAL O2: O2 Flow Rate (L/min): 2.5 L/min   Vital Signs: BP (!) 110/50   Pulse 86   Temp 97.7 °F (36.5 °C) (Temporal)   Resp 18   Ht 5' 6\" (1.676 m)   Wt 124 lb 4 oz (56.4 kg)   SpO2 93%   BMI 20.05 kg/m²    Temperature Range:   Temp: 97.7 °F (36.5 °C)   Temp  Av.3 °F (36.3 °C)  Min: 96.3 °F (35.7 °C)  Max: 98.1 °F (36.7 °C)        Rhythm: normal sinus rhythm    Labs:   ABG:    Lab Results   Component Value Date/Time    PHART 7.400 2022 05:31 PM    PO2ART 71.0 2022 05:31 PM    SBR5TRU 82.0 2022 05:31 PM    J7FWRVUO 93.3 2022 05:31 PM    SHA0ULQ 50.8 2022 05:31 PM    BEART 21.9 2022 05:31 PM     CBC: No results for input(s): WBC, HGB, HCT, MCV, PLT in the last 72 hours. BMP: No results for input(s): NA, K, CL, CO2, PHOS, BUN, CREATININE, CA in the last 72 hours. PT/INR: No results for input(s): PROTIME, INR in the last 72 hours. APTT: No results for input(s): APTT in the last 72 hours. Chest X-Ray:  none recently   CT:  I/O last 3 completed shifts: In: 600 [P.O.:600]  Out: 2800 [Urine:2800]      I/O last 3 completed shifts:   In: 600 [P.O.:600]  Out: 2800 [Urine:2800]  Scheduled Meds:    lidocaine  1 patch TransDERmal Daily    metoclopramide  10 mg Oral 4x Daily AC & HS    guaiFENesin  200 mg Oral BID    furosemide  40 mg Oral Daily    bisacodyl  10 mg Rectal Once    hydrALAZINE  25 mg Oral 2 times per day    mupirocin   Nasal BID    enoxaparin  40 mg SubCUTAneous Daily    budesonide  250 mcg Nebulization BID    Arformoterol Tartrate  15 mcg Nebulization BID    nicotine  1 patch TransDERmal Daily    citalopram  40 mg Oral Daily    gabapentin  600 mg Oral TID    pantoprazole  40 mg Oral BID AC    sucralfate  1 g Oral 4x Daily Continuous Infusions:    sodium chloride 10 mL/hr at 08/13/22 1632     Exam:   General appearance: NAD  Neck: no bruits, no JVD, No lymph nodes   Lungs: no rhonchi, no wheezes, no rales   Thoracic incisions: stable, dressing dry and intact    Heart: S1 and S2 no murmer, + rub  Abdomen: positive bowel sounds, no bruits, no masses  Extremities: warm and dry, no cyanosis, no clubbing    Assessment  SP VATS decort. Pain adequately controlled on percocets q8 hrs. No additional morphine required. Oxygenation improving. OR cultures negative. No sign of infection off abx. Discharge planning in progress.       Patient Active Problem List   Diagnosis    Chronic pain    Anxiety and depression    GERD (gastroesophageal reflux disease)    COPD (chronic obstructive pulmonary disease) ([facility])    Chronic bronchitis with acute exacerbation ([facility])    Hypertension    Echocardiogram abnormal    COPD exacerbation ([facility])    Cervicalgia    Chronic low back pain    Acute hypercapnic respiratory failure ([facility])    Alpha-1-antitrypsin deficiency carrier    Current every day smoker    Folate deficiency anemia    Malignant neoplasm of upper lobe of right lung (HCC)    Palliative care patient    Severe malnutrition (HCC)    Intractable nausea and vomiting    Gram-negative bacteremia    Hypomagnesemia    Tobacco abuse counseling    Cigarette nicotine dependence with nicotine-induced disorder    Clostridium perfringens infection    Chronic respiratory failure with hypoxia and hypercapnia (HCC)    Anxiety    Upper abdominal pain    Heart burn    Nausea    Rhinovirus infection    Pneumonia    Loculated pleural effusion    Empyema ([facility])         MD Corrie Bradley MD

## 2022-08-21 NOTE — PROGRESS NOTES
Medicine Progress Note 8/21/2022    Patient:  Georgia Domínguez  YOB: 1950  MRN: [id number]     Acct: [de-identified]   Admit date: 8/3/2022    Patient Seen, Chart, Consults notes, Labs, Radiology studies reviewed. Subjective:   More comfortable. No acute issues overnight. No worsening chest pain or shortness of breath. No fever or chills. On 2-4L HFNC. Daughter at bedside.       Medications:   Scheduled Meds:   lidocaine  1 patch TransDERmal Daily    metoclopramide  10 mg Oral 4x Daily AC & HS    guaiFENesin  200 mg Oral BID    furosemide  40 mg Oral Daily    bisacodyl  10 mg Rectal Once    hydrALAZINE  25 mg Oral 2 times per day    mupirocin   Nasal BID    enoxaparin  40 mg SubCUTAneous Daily    budesonide  250 mcg Nebulization BID    Arformoterol Tartrate  15 mcg Nebulization BID    nicotine  1 patch TransDERmal Daily    citalopram  40 mg Oral Daily    gabapentin  600 mg Oral TID    pantoprazole  40 mg Oral BID AC    sucralfate  1 g Oral 4x Daily     Continuous Infusions:   sodium chloride 10 mL/hr at 08/13/22 1632     PRN Meds:polyethylene glycol, albuterol, acetylcysteine, magnesium hydroxide, ALPRAZolam, sodium chloride, sodium chloride, oxyCODONE-acetaminophen, acetaminophen, [DISCONTINUED] ondansetron **OR** ondansetron    Objective:   Vitals: Patient Vitals for the past 24 hrs:   BP Temp Temp src Pulse Resp SpO2 Weight   08/21/22 1420 (!) 99/46 97.9 °F (36.6 °C) Temporal 79 20 92 % --   08/21/22 1324 -- -- -- -- 18 -- --   08/21/22 1019 (!) 84/33 97.5 °F (36.4 °C) Temporal 79 20 92 % --   08/21/22 0921 -- -- -- 85 -- -- --   08/21/22 0739 -- -- -- -- -- 93 % --   08/21/22 0518 (!) 110/50 97.7 °F (36.5 °C) Temporal 86 18 -- 124 lb 4 oz (56.4 kg)   08/21/22 0023 (!) 99/57 (!) 96.3 °F (35.7 °C) Temporal 78 18 90 % --   08/20/22 2114 -- -- -- 82 -- -- --   08/20/22 2005 -- -- -- 91 -- -- --   08/20/22 1748 (!) 110/46 97.9 °F (36.6 °C) Temporal 86 20 91 % --   08/20/22 1443 -- -- -- -- 19 -- -- GENERAL: Awake, alert, in no acute distress. CARDIAC: Regular rate and rhythm. No murmurs, rubs, or gallops. RESPIRATORY: Chest is clear to auscultation bilaterally. No wheezes, rales, or rhonchi. ABDOMEN: Normoactive bowel sounds. Abdomen soft, nontender, and nondistended. EXTREMITIES: No cyanosis, clubbing, or edema. 24 hour intake/output:  Intake/Output Summary (Last 24 hours) at 8/21/2022 1429  Last data filed at 8/21/2022 1331  Gross per 24 hour   Intake 240 ml   Output 2000 ml   Net -1760 ml       Last 3 weights: Wt Readings from Last 3 Encounters:   08/21/22 124 lb 4 oz (56.4 kg)   06/20/22 115 lb (52.2 kg)   11/10/21 118 lb 6.4 oz (53.7 kg)       Labs:  Hematology:No results for input(s): WBC, HGB, HCT, PLT, SEDRATE, CRP, INR in the last 72 hours. Chemistry:No results for input(s): NA, K, CL, CO2, GLUCOSE, BUN, CREATININE, MG, ANIONGAP, LABGLOM, GFRAA, CALCIUM, CAION, PHOS, PSA, BNP, TROPONINI, CKTOTAL, CKMB, CKMBINDEX, MYOGLOBIN in the last 72 hours. No results for input(s): PROT, LABALBU, LABA1C, Q5SUWQR, Y9NJEBN, FT4, TSH, AST, ALT, LDH, GGT, ALKPHOS, BILITOT, BILIDIR, AMMONIA, AMYLASE, LIPASE, LACTATE, CHOL, HDL, LDLCHOLESTEROL, CHOLHDLRATIO, TRIG, VLDL, PHENYTOIN, PHENYF in the last 72 hours. No results for input(s): POCGLU in the last 72 hours. Cultures:   Lab Results   Component Value Date/Time    BC No growth after 5 days of incubation.  08/03/2022 06:18 AM     Urine Culture:   Lab Results   Component Value Date/Time    LABURIN >100,000 CFU/ml (A) 05/11/2021 01:10 PM    LABURIN Light growth 05/11/2021 01:10 PM    LABURIN Moderate growth 05/11/2021 01:10 PM       Assessment/Plan:   Principal Problem: Empyema [facility])  Active Hospital Problems    Diagnosis Date Noted    Empyema [facility]) [J86.9] 08/20/2022     Priority: Medium    Loculated pleural effusion [J90] 08/12/2022     Priority: Medium    Pneumonia [J18.9] 08/03/2022     Priority: Medium    Chronic respiratory failure with hypoxia and hypercapnia ([facility] [J96.11, J96.12] 06/10/2022     Priority: Medium    Anxiety and depression [F41.9, F32.A] 05/14/2014    Hypertension [I10] 05/14/2014    COPD (chronic obstructive pulmonary disease) ([facility] [J44.9] 05/14/2014       Continue controller meds, pulmonary hygiene, and bronchodilators. Wean supp O2 as tolerate. Encourage activity. Recommend PT/OT. Pulm & CTSurg following. Chronic medical conditions otherwise stable. Continue current medications & management. Poss work on Netaplan activity & dc hien davidson.         Electronically signed by Hilda Peace MD on 8/21/2022 at 2:29 PM

## 2022-08-22 PROCEDURE — 6370000000 HC RX 637 (ALT 250 FOR IP): Performed by: INTERNAL MEDICINE

## 2022-08-22 PROCEDURE — 6360000002 HC RX W HCPCS: Performed by: NURSE PRACTITIONER

## 2022-08-22 PROCEDURE — 6370000000 HC RX 637 (ALT 250 FOR IP): Performed by: NURSE PRACTITIONER

## 2022-08-22 PROCEDURE — 94640 AIRWAY INHALATION TREATMENT: CPT

## 2022-08-22 PROCEDURE — 94660 CPAP INITIATION&MGMT: CPT

## 2022-08-22 PROCEDURE — 2140000000 HC CCU INTERMEDIATE R&B

## 2022-08-22 PROCEDURE — 2700000000 HC OXYGEN THERAPY PER DAY

## 2022-08-22 PROCEDURE — 94761 N-INVAS EAR/PLS OXIMETRY MLT: CPT

## 2022-08-22 PROCEDURE — 99232 SBSQ HOSP IP/OBS MODERATE 35: CPT | Performed by: INTERNAL MEDICINE

## 2022-08-22 RX ADMIN — PANTOPRAZOLE SODIUM 40 MG: 40 TABLET, DELAYED RELEASE ORAL at 16:24

## 2022-08-22 RX ADMIN — MUPIROCIN: 20 OINTMENT TOPICAL at 21:42

## 2022-08-22 RX ADMIN — HYDRALAZINE HYDROCHLORIDE 25 MG: 25 TABLET, FILM COATED ORAL at 08:03

## 2022-08-22 RX ADMIN — METOCLOPRAMIDE 10 MG: 10 TABLET ORAL at 21:51

## 2022-08-22 RX ADMIN — SUCRALFATE 1 G: 1 TABLET ORAL at 08:04

## 2022-08-22 RX ADMIN — ARFORMOTEROL TARTRATE 15 MCG: 15 SOLUTION RESPIRATORY (INHALATION) at 08:22

## 2022-08-22 RX ADMIN — OXYCODONE AND ACETAMINOPHEN 1 TABLET: 10; 325 TABLET ORAL at 05:21

## 2022-08-22 RX ADMIN — CITALOPRAM HYDROBROMIDE 40 MG: 20 TABLET ORAL at 08:03

## 2022-08-22 RX ADMIN — ALPRAZOLAM 1 MG: 0.5 TABLET ORAL at 16:24

## 2022-08-22 RX ADMIN — BUDESONIDE 250 MCG: 0.25 SUSPENSION RESPIRATORY (INHALATION) at 19:10

## 2022-08-22 RX ADMIN — SUCRALFATE 1 G: 1 TABLET ORAL at 12:59

## 2022-08-22 RX ADMIN — GABAPENTIN 600 MG: 600 TABLET, FILM COATED ORAL at 21:41

## 2022-08-22 RX ADMIN — OXYCODONE AND ACETAMINOPHEN 1 TABLET: 10; 325 TABLET ORAL at 21:51

## 2022-08-22 RX ADMIN — OXYCODONE AND ACETAMINOPHEN 1 TABLET: 10; 325 TABLET ORAL at 14:36

## 2022-08-22 RX ADMIN — FUROSEMIDE 40 MG: 40 TABLET ORAL at 08:04

## 2022-08-22 RX ADMIN — PANTOPRAZOLE SODIUM 40 MG: 40 TABLET, DELAYED RELEASE ORAL at 08:04

## 2022-08-22 RX ADMIN — SUCRALFATE 1 G: 1 TABLET ORAL at 21:42

## 2022-08-22 RX ADMIN — GABAPENTIN 600 MG: 600 TABLET, FILM COATED ORAL at 08:04

## 2022-08-22 RX ADMIN — METOCLOPRAMIDE 10 MG: 10 TABLET ORAL at 08:04

## 2022-08-22 RX ADMIN — SUCRALFATE 1 G: 1 TABLET ORAL at 16:25

## 2022-08-22 RX ADMIN — ALPRAZOLAM 1 MG: 0.5 TABLET ORAL at 08:16

## 2022-08-22 RX ADMIN — ALPRAZOLAM 1 MG: 0.5 TABLET ORAL at 03:35

## 2022-08-22 RX ADMIN — ARFORMOTEROL TARTRATE 15 MCG: 15 SOLUTION RESPIRATORY (INHALATION) at 19:10

## 2022-08-22 RX ADMIN — GUAIFENESIN 200 MG: 100 SOLUTION ORAL at 21:41

## 2022-08-22 RX ADMIN — GUAIFENESIN 200 MG: 100 SOLUTION ORAL at 08:03

## 2022-08-22 RX ADMIN — GABAPENTIN 600 MG: 600 TABLET, FILM COATED ORAL at 14:35

## 2022-08-22 RX ADMIN — ALPRAZOLAM 1 MG: 0.5 TABLET ORAL at 21:41

## 2022-08-22 RX ADMIN — HYDRALAZINE HYDROCHLORIDE 25 MG: 25 TABLET, FILM COATED ORAL at 21:42

## 2022-08-22 RX ADMIN — ONDANSETRON HYDROCHLORIDE 4 MG: 2 SOLUTION INTRAMUSCULAR; INTRAVENOUS at 14:33

## 2022-08-22 RX ADMIN — MUPIROCIN: 20 OINTMENT TOPICAL at 12:59

## 2022-08-22 RX ADMIN — METOCLOPRAMIDE 10 MG: 10 TABLET ORAL at 16:24

## 2022-08-22 RX ADMIN — ENOXAPARIN SODIUM 40 MG: 100 INJECTION SUBCUTANEOUS at 16:24

## 2022-08-22 RX ADMIN — METOCLOPRAMIDE 10 MG: 10 TABLET ORAL at 12:59

## 2022-08-22 RX ADMIN — BUDESONIDE 250 MCG: 0.25 SUSPENSION RESPIRATORY (INHALATION) at 08:22

## 2022-08-22 ASSESSMENT — PAIN DESCRIPTION - DESCRIPTORS
DESCRIPTORS: ACHING;DISCOMFORT
DESCRIPTORS: ACHING
DESCRIPTORS: ACHING;DISCOMFORT

## 2022-08-22 ASSESSMENT — PAIN SCALES - GENERAL
PAINLEVEL_OUTOF10: 0
PAINLEVEL_OUTOF10: 7
PAINLEVEL_OUTOF10: 6
PAINLEVEL_OUTOF10: 0
PAINLEVEL_OUTOF10: 0
PAINLEVEL_OUTOF10: 8
PAINLEVEL_OUTOF10: 0
PAINLEVEL_OUTOF10: 7

## 2022-08-22 ASSESSMENT — PAIN DESCRIPTION - ORIENTATION
ORIENTATION: RIGHT
ORIENTATION: RIGHT
ORIENTATION: LEFT

## 2022-08-22 ASSESSMENT — PAIN SCALES - WONG BAKER
WONGBAKER_NUMERICALRESPONSE: 0

## 2022-08-22 ASSESSMENT — PAIN DESCRIPTION - LOCATION
LOCATION: SHOULDER

## 2022-08-22 NOTE — PROGRESS NOTES
Pulmonary and Critical Care Progress note. Susanne Caal    MRN# [id number]    Acct# [de-identified]  8/22/2022   11:32 AM CDT    Referring Terrie Lassiter MD      Chief Complaint: shortness of breath abd pain. HPI: The patient is at her baseline. Medications    lidocaine, 1 patch, TransDERmal, Daily    metoclopramide, 10 mg, Oral, 4x Daily AC & HS    guaiFENesin, 200 mg, Oral, BID    furosemide, 40 mg, Oral, Daily    bisacodyl, 10 mg, Rectal, Once    hydrALAZINE, 25 mg, Oral, 2 times per day    mupirocin, , Nasal, BID    enoxaparin, 40 mg, SubCUTAneous, Daily    budesonide, 250 mcg, Nebulization, BID    Arformoterol Tartrate, 15 mcg, Nebulization, BID    nicotine, 1 patch, TransDERmal, Daily    citalopram, 40 mg, Oral, Daily    gabapentin, 600 mg, Oral, TID    pantoprazole, 40 mg, Oral, BID AC    sucralfate, 1 g, Oral, 4x Daily     Review of Systems:  RESPIRATORY:  positive for  dyspnea  CARDIOVASCULAR:  positive for  dyspnea      Physical Exam:  BP (!) 97/41   Pulse 81   Temp 97.5 °F (36.4 °C) (Temporal)   Resp 18   Ht 5' 6\" (1.676 m)   Wt 112 lb 8 oz (51 kg)   SpO2 91%   BMI 18.16 kg/m²   Intake/Output Summary (Last 24 hours) at 8/22/2022 1610  Last data filed at 8/22/2022 1514  Gross per 24 hour   Intake 1080 ml   Output 850 ml   Net 230 ml         General appearance:elderly white female who does not appear to be in distress. HEENT:normocephalic atraumatic  KCFNA:G7A2 no murmurs  Lungs:diminished bilaterally no rubs or tenderness or dullness to percussion  Abdomen:Soft non tender no organomegaly  Extremities:non clubbing cyanosis or edema  Neuro:no focal findings  Skin:intact    No results for input(s): WBC, RBC, HGB, HCT, PLT, MCV, MCH, MCHC, RDW, NRBC, SEGSPCT, BANDSPCT in the last 72 hours. No results for input(s): NA, K, CL, CO2, BUN, CREATININE, CALCIUM, GLUCOSE in the last 72 hours.      No results for input(s): PHART, NUV6GDL, PO2ART, KBT0HGW, E4XTZDTU, BEART in the last 72 hours. No results for input(s): AST, ALT, ALKPHOS, BILITOT, MG, CALCIUM, PHOS, PROBNP, TROPONINI, LACTA, INR, TSH, DDIMER, PROCAL in the last 72 hours. No results for input(s): BC, LABGRAM, CULTRESP, BFCX in the last 72 hours. Radiograph: CT ABDOMEN PELVIS WO CONTRAST Additional Contrast? None    Result Date: 8/4/2022  1. No evidence for acute abdominal or pelvic process. 2.Right lower lobe consolidation with small pleural effusion. Superimposed infection is not excluded. 3.Colonic diverticulosis without diverticulitis. 4.Calcific atherosclerosis of the abdominal aorta. 5.Degenerative spondylosis. NM LUNG SCAN PERFUSION ONLY    Result Date: 8/4/2022  1. Small subsegmental defect in the posterior left lower lobe, new since prior, compatible with and intermediate probability for pulmonary embolism. 2. Pulmonary hyperaeration, with decreased perfusion in the lung apices, likely related to COPD. 3. Patchy airspace abnormality in the right mid and lower lung field with small right pleural fluid, likely pneumonia. Please note according to the Piopped criteria a low probability reflects a 0 to 19%chance of a pulmonary embolism. Please correlate clinically. If symptoms persist, consider CT PA gram. RECOMMENDATION: Follow-up as clinically warranted. Electronically Signed by Adali Bautista MD at 04-Aug-2022 11:14:59 AM             XR CHEST PORTABLE    Result Date: 8/3/2022  Right mid and lower lung infiltrates. Small right pleural effusion not excluded. Recommendation: Follow up as clinically indicated. Electronically Signed by Lupillo Paula MD at 03-Aug-2022 08:41:33 AM                My radiograph interpretation/independent review of imaging: reviewed.      Problem list generated by [facility] Problems             Last Modified POA    * (Principal) Empyema ([address] 8/20/2022 Yes    Chronic respiratory failure with hypoxia and hypercapnia (HCC) 8/13/2022 Yes    Pneumonia 8/20/2022 Yes    Loculated pleural effusion 8/12/2022 Yes    Anxiety and depression 8/20/2022 Yes    COPD (chronic obstructive pulmonary disease) ([facility] 8/20/2022 Yes    Hypertension 8/20/2022 Yes        Pulmonary Assessment/Plan:     Chronic hypoxic hypercapnic respiratory failure continue oxygen supplementation as necessary to maintain adequate oxygenation. Underlying severe COPD. Continue bronchodilators continue pulmonary toilet. Right lower lobe pneumonia. Clinically resolved. Parapneumonic effusion post decortication. Low probability for PE per PIOPED criteria. DOAC for 3 months. Dyspnea due to the above supportive care. DC planning. Mobilize PT/OT    DC planning. Tony Diaz MD, FCCP, DABSM    The above note was generated using voice recognition software. Inadvertent typographical errors in transcription may have occurred.       Electronically signed by Tony Diaz MD on 08/22/22 at 4:10 PM

## 2022-08-22 NOTE — PROGRESS NOTES
POST OP CARDIOTHORACIC SURGERY PROGRESS NOTE    Post op day 9    SUBJECTIVE:  Resting comfortably. Currently on 2 lpm/NC    BP (!) 104/42   Pulse 69   Temp 97.2 °F (36.2 °C) (Temporal)   Resp 18   Ht 5' 6\" (1.676 m)   Wt 112 lb 8 oz (51 kg)   SpO2 93%   BMI 18.16 kg/m²   Average, Min, and Max for last 24 hours Vitals:  TEMPERATURE:  Temp  Av.5 °F (36.4 °C)  Min: 97 °F (36.1 °C)  Max: 97.9 °F (36.6 °C)  RESPIRATIONS RANGE: Resp  Av.8  Min: 18  Max: 22  PULSE RANGE: Pulse  Av.5  Min: 69  Max: 85  BLOOD PRESSURE RANGE:  Systolic (69IOX), QQB:325 , Min:84 , NJW:529   ; Diastolic (90MME), OCF:14, Min:33, Max:53    PULSE OXIMETRY RANGE: SpO2  Av.8 %  Min: 90 %  Max: 94 %    I/O last 3 completed shifts: In: 980 [P.O.:980]  Out: 2850 [Urine:2850]    CHEST: Clear bilateral breath sounds without wheezes or rhonchi. CARDIOVASCULAR: Normal HT with RRR. No murmurs, gallops or rubs. INCISION: Right VATS incisions - open to air. Edges well approximated. No drainage or erythema. ASSESSMENT:     RLL Pneumonia with Loculated Empyema - Surgical cultures - no growth. Pathology - no malignancy. Postoperative Acute on Chronic Hypoxic Respiratory Failure  Postoperative Anemia 2' to Acute Blood Loss, Expected  Recent LLL Pneumonia in 2022  Severe End-Stage COPD/Emphysema with Chronic Hypoxia on Continuous O2  Essential HTN  Chronic GERD  Chronic Pain Syndrome  Mixed Anxiety/Depressive Disorder  Hx Lung Cancer, Left - S/P Radiation Therapy 2019    PLAN:     Stable from CT Surgery standpoint.    F/U in 2 weeks with CXR      Electronically signed by TEN Goznales on 22 at 7:24 AM CDT

## 2022-08-22 NOTE — PROGRESS NOTES
Progress Note  Abby Ross [place]  8/22/2022 5:28 PM  Subjective:   Admit Date:   8/3/2022      CC/ADMIT DX:       Interval History:   Reviewed overnight events and nursing notes. She is resting. No concerns by Staff. I have reviewed all labs/diagnostics from the last 24hrs. ROS:   I have done a 10 point ROS and all are negative, except what is mentioned in the HPI. ADULT DIET;  Regular  ADULT ORAL NUTRITION SUPPLEMENT; Breakfast, Lunch, Dinner; Standard High Calorie/High Protein Oral Supplement    Medications:      sodium chloride 10 mL/hr at 08/13/22 1632      lidocaine  1 patch TransDERmal Daily    metoclopramide  10 mg Oral 4x Daily AC & HS    guaiFENesin  200 mg Oral BID    furosemide  40 mg Oral Daily    bisacodyl  10 mg Rectal Once    hydrALAZINE  25 mg Oral 2 times per day    mupirocin   Nasal BID    enoxaparin  40 mg SubCUTAneous Daily    budesonide  250 mcg Nebulization BID    Arformoterol Tartrate  15 mcg Nebulization BID    nicotine  1 patch TransDERmal Daily    citalopram  40 mg Oral Daily    gabapentin  600 mg Oral TID    pantoprazole  40 mg Oral BID AC    sucralfate  1 g Oral 4x Daily           Objective:   Vitals: BP (!) 91/40   Pulse 67   Temp 97.7 °F (36.5 °C)   Resp 18   Ht 5' 6\" (1.676 m)   Wt 112 lb 8 oz (51 kg)   SpO2 96%   BMI 18.16 kg/m²    Intake/Output Summary (Last 24 hours) at 8/22/2022 1728  Last data filed at 8/22/2022 1514  Gross per 24 hour   Intake 1080 ml   Output 850 ml   Net 230 ml     General appearance: NAD, on O2, sleeping  Lungs: coarse  Heart: RRR  Abdomen: soft, non-tender; bowel sounds normal; no masses,  no organomegaly  Extremities: extremities normal, atraumatic, no cyanosis or edema      Assessment and Plan:   Principal Problem:    Empyema ([address]  Active Problems:    Chronic respiratory failure with hypoxia and hypercapnia (HCC)    Pneumonia    Loculated pleural effusion    Anxiety and depression    COPD (chronic obstructive pulmonary disease) ([facility]) Hypertension  Resolved Problems:    * No resolved hospital problems. *    COPD    Chronic Pain    Depression    ABL Anemia    S/P VATS Thorascopic Decortication of RightChest Empyema    Hemoptysis   Generalized Weakness    Plan:   Continue present medication(s)    Follow with Pulm, CT Surgery   Continue therapy   Supportive care   D/C planning      Discharge planning:   her home     Reviewed treatment plans with the patient and/or family.              Electronically signed by Blaine Fountain MD on 8/22/2022 at 5:28 PM

## 2022-08-22 NOTE — PLAN OF CARE
Problem: Discharge Planning  Goal: Discharge to home or other facility with appropriate resources  Outcome: Progressing  Flowsheets (Taken 8/22/2022 0710)  Discharge to home or other facility with appropriate resources:   Identify barriers to discharge with patient and caregiver   Arrange for needed discharge resources and transportation as appropriate   Identify discharge learning needs (meds, wound care, etc)     Problem: Safety - Adult  Goal: Free from fall injury  Outcome: Progressing     Problem: ABCDS Injury Assessment  Goal: Absence of physical injury  Outcome: Progressing     Problem: Skin/Tissue Integrity  Goal: Absence of new skin breakdown  Outcome: Progressing     Problem: Pain  Goal: Verbalizes/displays adequate comfort level or baseline comfort level  Outcome: Progressing     Problem: Respiratory - Adult  Goal: Achieves optimal ventilation and oxygenation  Outcome: Progressing     Problem: Musculoskeletal - Adult  Goal: Return mobility to safest level of function  Outcome: Progressing     Problem: Infection - Adult  Goal: Absence of infection at discharge  Outcome: Progressing  Flowsheets (Taken 8/22/2022 0710)  Absence of infection at discharge:   Assess and monitor for signs and symptoms of infection   Monitor lab/diagnostic results   Monitor all insertion sites i.e., indwelling lines, tubes and drains   Monitor endotracheal (as able) and nasal secretions for changes in amount and color   Administer medications as ordered   Instruct and encourage patient and family to use good hand hygiene technique   Identify and instruct in appropriate isolation precautions for identified infection/condition

## 2022-08-22 NOTE — CARE COORDINATION
HH referral received. Spoke with patient regarding [facility] services. Patient states that she is not interested in [facility], and has good support system at home.   No further interventions identified  Electronically signed by John Cartwright on 8/22/22 at 11:35 AM CDT

## 2022-08-22 NOTE — PROGRESS NOTES
Physical Therapy   Name: Tiarra Farrell  MRN:  [id number]  Date of service:  8/22/2022  Pt. declined PT this AM due to nausea and vomiting after breakfast. Will f/u at a later time.   Electronically signed by Bowen Wolfe PT on 8/22/2022 at 9:35 AM

## 2022-08-23 VITALS
SYSTOLIC BLOOD PRESSURE: 120 MMHG | BODY MASS INDEX: 18.08 KG/M2 | HEART RATE: 75 BPM | HEIGHT: 66 IN | DIASTOLIC BLOOD PRESSURE: 48 MMHG | TEMPERATURE: 98.4 F | RESPIRATION RATE: 18 BRPM | OXYGEN SATURATION: 96 % | WEIGHT: 112.5 LBS

## 2022-08-23 PROCEDURE — 6360000002 HC RX W HCPCS: Performed by: NURSE PRACTITIONER

## 2022-08-23 PROCEDURE — 6370000000 HC RX 637 (ALT 250 FOR IP): Performed by: NURSE PRACTITIONER

## 2022-08-23 PROCEDURE — 94640 AIRWAY INHALATION TREATMENT: CPT

## 2022-08-23 PROCEDURE — 6370000000 HC RX 637 (ALT 250 FOR IP): Performed by: INTERNAL MEDICINE

## 2022-08-23 PROCEDURE — 6370000000 HC RX 637 (ALT 250 FOR IP): Performed by: FAMILY MEDICINE

## 2022-08-23 PROCEDURE — 2700000000 HC OXYGEN THERAPY PER DAY

## 2022-08-23 RX ORDER — FUROSEMIDE 40 MG/1
40 TABLET ORAL DAILY
Qty: 60 TABLET | Refills: 3 | Status: SHIPPED | OUTPATIENT
Start: 2022-08-24

## 2022-08-23 RX ORDER — GUAIFENESIN 100 MG/5ML
200 SOLUTION ORAL 2 TIMES DAILY
Qty: 1200 ML | Refills: 0 | Status: SHIPPED | OUTPATIENT
Start: 2022-08-23

## 2022-08-23 RX ORDER — ARFORMOTEROL TARTRATE 15 UG/2ML
15 SOLUTION RESPIRATORY (INHALATION) 2 TIMES DAILY
Qty: 120 ML | Refills: 3 | Status: SHIPPED | OUTPATIENT
Start: 2022-08-23

## 2022-08-23 RX ORDER — HYDRALAZINE HYDROCHLORIDE 25 MG/1
25 TABLET, FILM COATED ORAL EVERY 12 HOURS SCHEDULED
Qty: 90 TABLET | Refills: 3 | Status: SHIPPED | OUTPATIENT
Start: 2022-08-23

## 2022-08-23 RX ORDER — BUDESONIDE 0.25 MG/2ML
250 INHALANT ORAL 2 TIMES DAILY
Qty: 60 EACH | Refills: 3 | Status: SHIPPED | OUTPATIENT
Start: 2022-08-23

## 2022-08-23 RX ORDER — LIDOCAINE 4 G/G
1 PATCH TOPICAL DAILY
Qty: 1 BOX | Refills: 1 | Status: SHIPPED | OUTPATIENT
Start: 2022-08-24

## 2022-08-23 RX ADMIN — MUPIROCIN: 20 OINTMENT TOPICAL at 10:25

## 2022-08-23 RX ADMIN — APIXABAN 10 MG: 5 TABLET, FILM COATED ORAL at 14:02

## 2022-08-23 RX ADMIN — METOCLOPRAMIDE 10 MG: 10 TABLET ORAL at 10:48

## 2022-08-23 RX ADMIN — GABAPENTIN 600 MG: 600 TABLET, FILM COATED ORAL at 14:02

## 2022-08-23 RX ADMIN — ARFORMOTEROL TARTRATE 15 MCG: 15 SOLUTION RESPIRATORY (INHALATION) at 07:10

## 2022-08-23 RX ADMIN — ALPRAZOLAM 1 MG: 0.5 TABLET ORAL at 11:50

## 2022-08-23 RX ADMIN — FUROSEMIDE 40 MG: 40 TABLET ORAL at 10:23

## 2022-08-23 RX ADMIN — GUAIFENESIN 200 MG: 100 SOLUTION ORAL at 10:21

## 2022-08-23 RX ADMIN — HYDRALAZINE HYDROCHLORIDE 25 MG: 25 TABLET, FILM COATED ORAL at 10:22

## 2022-08-23 RX ADMIN — CITALOPRAM HYDROBROMIDE 40 MG: 20 TABLET ORAL at 10:23

## 2022-08-23 RX ADMIN — SUCRALFATE 1 G: 1 TABLET ORAL at 17:07

## 2022-08-23 RX ADMIN — ALPRAZOLAM 1 MG: 0.5 TABLET ORAL at 02:37

## 2022-08-23 RX ADMIN — SUCRALFATE 1 G: 1 TABLET ORAL at 14:06

## 2022-08-23 RX ADMIN — GABAPENTIN 600 MG: 600 TABLET, FILM COATED ORAL at 10:23

## 2022-08-23 RX ADMIN — OXYCODONE AND ACETAMINOPHEN 1 TABLET: 10; 325 TABLET ORAL at 02:37

## 2022-08-23 RX ADMIN — ALPRAZOLAM 1 MG: 0.5 TABLET ORAL at 17:07

## 2022-08-23 RX ADMIN — PANTOPRAZOLE SODIUM 40 MG: 40 TABLET, DELAYED RELEASE ORAL at 17:07

## 2022-08-23 RX ADMIN — SUCRALFATE 1 G: 1 TABLET ORAL at 10:23

## 2022-08-23 RX ADMIN — METOCLOPRAMIDE 10 MG: 10 TABLET ORAL at 17:07

## 2022-08-23 RX ADMIN — BUDESONIDE 250 MCG: 0.25 SUSPENSION RESPIRATORY (INHALATION) at 07:10

## 2022-08-23 RX ADMIN — OXYCODONE AND ACETAMINOPHEN 1 TABLET: 10; 325 TABLET ORAL at 10:46

## 2022-08-23 RX ADMIN — PANTOPRAZOLE SODIUM 40 MG: 40 TABLET, DELAYED RELEASE ORAL at 05:24

## 2022-08-23 RX ADMIN — METOCLOPRAMIDE 10 MG: 10 TABLET ORAL at 05:24

## 2022-08-23 ASSESSMENT — PAIN SCALES - GENERAL
PAINLEVEL_OUTOF10: 0
PAINLEVEL_OUTOF10: 8
PAINLEVEL_OUTOF10: 7
PAINLEVEL_OUTOF10: 0

## 2022-08-23 ASSESSMENT — PAIN DESCRIPTION - LOCATION
LOCATION: SHOULDER
LOCATION: SHOULDER

## 2022-08-23 ASSESSMENT — PAIN DESCRIPTION - ORIENTATION
ORIENTATION: RIGHT
ORIENTATION: RIGHT

## 2022-08-23 ASSESSMENT — PAIN DESCRIPTION - DIRECTION: RADIATING_TOWARDS: ARM

## 2022-08-23 ASSESSMENT — PAIN DESCRIPTION - DESCRIPTORS
DESCRIPTORS: ACHING;DISCOMFORT;SHARP
DESCRIPTORS: SHARP

## 2022-08-23 ASSESSMENT — PAIN DESCRIPTION - ONSET: ONSET: PROGRESSIVE

## 2022-08-23 ASSESSMENT — PAIN DESCRIPTION - FREQUENCY: FREQUENCY: CONTINUOUS

## 2022-08-23 ASSESSMENT — PAIN DESCRIPTION - PAIN TYPE: TYPE: ACUTE PAIN

## 2022-08-23 NOTE — CARE COORDINATION
I was on the phone with Dr. Kailee Dallas assisting with completing the patient's discharge med reconciliation. The patient was to be discharged today. The assigned nurse to the patient came to the desk and said \"the patient says she's not going home today. She's going tomorrow. She doesn't feel well today. \"  I advised Dr. Kailee Dallas what was just told to me. She said, Kelley Ramirez, I'll work on this tomorrow then\" and she hung up the phone.

## 2022-08-23 NOTE — PROGRESS NOTES
Occupational Therapy  Pt asleep upon arrival. Pt awaken and states she does not want to do anything at this time.  Electronically signed by BERTHA Wilks on 8/23/2022 at 2:26 PM

## 2022-08-23 NOTE — CARE COORDINATION
SW notified by Dr Zackary Muniz asking about dc barriers and SW explained no barriers at this time. Spoke with pt's dtr Cheri via telephone to confirm able to come  pt today for dc. Dtr confirms able to bring pt's portable O2 tank and pt is to bring home CPAP mask from bedside to use at home. Dtr states prefers to use the [facility] [address] at [facility] entered into the pt's chart to check eliquis costs. Dtr also confirms she and her sister still intend to be 24/7 CGs for the pt in the home and decline [facility] services for dc. Spoke with [facility] [address] who reports pt copay is $0 through insurance.

## 2022-08-23 NOTE — PROGRESS NOTES
Progress Note  Vee Castelan Rutledge  8/23/2022 12:51 PM  Subjective:   Admit Date:   8/3/2022      CC/ADMIT DX:       Interval History:   Reviewed overnight events and nursing notes. She has no new complaints. She has c/o weakness. I have reviewed all labs/diagnostics from the last 24hrs. ROS:   I have done a 10 point ROS and all are negative, except what is mentioned in the HPI. ADULT DIET;  Regular  ADULT ORAL NUTRITION SUPPLEMENT; Breakfast, Lunch, Dinner; Standard High Calorie/High Protein Oral Supplement    Medications:      sodium chloride 10 mL/hr at 08/13/22 1632      lidocaine  1 patch TransDERmal Daily    metoclopramide  10 mg Oral 4x Daily AC & HS    guaiFENesin  200 mg Oral BID    furosemide  40 mg Oral Daily    bisacodyl  10 mg Rectal Once    hydrALAZINE  25 mg Oral 2 times per day    mupirocin   Nasal BID    enoxaparin  40 mg SubCUTAneous Daily    budesonide  250 mcg Nebulization BID    Arformoterol Tartrate  15 mcg Nebulization BID    nicotine  1 patch TransDERmal Daily    citalopram  40 mg Oral Daily    gabapentin  600 mg Oral TID    pantoprazole  40 mg Oral BID AC    sucralfate  1 g Oral 4x Daily           Objective:   Vitals: BP (!) 94/48   Pulse 77   Temp 98 °F (36.7 °C)   Resp 18   Ht 5' 6\" (1.676 m)   Wt 112 lb 8 oz (51 kg)   SpO2 93%   BMI 18.16 kg/m²    Intake/Output Summary (Last 24 hours) at 8/23/2022 1251  Last data filed at 8/23/2022 0957  Gross per 24 hour   Intake 1230 ml   Output 1750 ml   Net -520 ml     General appearance: NAD, on O2, sleeping  Lungs: coarse  Heart: RRR  Abdomen: soft, non-tender; bowel sounds normal; no masses,  no organomegaly  Extremities: extremities normal, atraumatic, no cyanosis or edema      Assessment and Plan:   Principal Problem:    Empyema ([address]  Active Problems:    Chronic respiratory failure with hypoxia and hypercapnia (HCC)    Pneumonia    Loculated pleural effusion    Anxiety and depression    COPD (chronic obstructive pulmonary disease) [facility])    Hypertension  Resolved Problems:    * No resolved hospital problems. *    COPD    Chronic Pain    Depression    ABL Anemia    S/P VATS Thorascopic Decortication of RightChest Empyema    Hemoptysis   Generalized Weakness    Plan:   Continue present medication(s)    Follow with others   Continue therapy   Resume Eliquis   D/C planning for tomorrow      Discharge planning:   her home     Reviewed treatment plans with the patient and/or family.              Electronically signed by Tami Aguila MD on 8/23/2022 at 12:51 PM

## 2022-08-23 NOTE — PLAN OF CARE
Problem: Discharge Planning  Goal: Discharge to home or other facility with appropriate resources  Outcome: Completed  Flowsheets (Taken 8/23/2022 0710)  Discharge to home or other facility with appropriate resources:   Identify barriers to discharge with patient and caregiver   Arrange for needed discharge resources and transportation as appropriate   Identify discharge learning needs (meds, wound care, etc)   Refer to discharge planning if patient needs post-hospital services based on physician order or complex needs related to functional status, cognitive ability or social support system     Problem: Safety - Adult  Goal: Free from fall injury  Outcome: Completed     Problem: ABCDS Injury Assessment  Goal: Absence of physical injury  Outcome: Completed     Problem: Skin/Tissue Integrity  Goal: Absence of new skin breakdown  Outcome: Completed     Problem: Pain  Goal: Verbalizes/displays adequate comfort level or baseline comfort level  Outcome: Completed     Problem: Respiratory - Adult  Goal: Achieves optimal ventilation and oxygenation  Outcome: Completed     Problem: Musculoskeletal - Adult  Goal: Return mobility to safest level of function  Outcome: Completed     Problem: Infection - Adult  Goal: Absence of infection at discharge  Outcome: Completed  Flowsheets (Taken 8/23/2022 0710)  Absence of infection at discharge:   Assess and monitor for signs and symptoms of infection   Monitor lab/diagnostic results   Monitor all insertion sites i.e., indwelling lines, tubes and drains   Administer medications as ordered   Instruct and encourage patient and family to use good hand hygiene technique   Identify and instruct in appropriate isolation precautions for identified infection/condition

## 2022-08-23 NOTE — PLAN OF CARE
Problem: Discharge Planning  Goal: Discharge to home or other facility with appropriate resources  8/23/2022 0037 by Newton Melgar RN  Outcome: Progressing  8/22/2022 1120 by Juan Diego Ivan RN  Outcome: Progressing  Flowsheets (Taken 8/22/2022 0710)  Discharge to home or other facility with appropriate resources:   Identify barriers to discharge with patient and caregiver   Arrange for needed discharge resources and transportation as appropriate   Identify discharge learning needs (meds, wound care, etc)     Problem: Safety - Adult  Goal: Free from fall injury  8/23/2022 0037 by Newton Melgar RN  Outcome: Progressing  8/22/2022 1120 by Juan Diego Ivan RN  Outcome: Progressing     Problem: Skin/Tissue Integrity  Goal: Absence of new skin breakdown  Description: 1. Monitor for areas of redness and/or skin breakdown  2. Assess vascular access sites hourly  3. Every 4-6 hours minimum:  Change oxygen saturation probe site  4. Every 4-6 hours:  If on nasal continuous positive airway pressure, respiratory therapy assess nares and determine need for appliance change or resting period.   8/23/2022 0037 by Newton Melgar RN  Outcome: Progressing  8/22/2022 1120 by Juan Diego Ivan RN  Outcome: Progressing     Problem: Pain  Goal: Verbalizes/displays adequate comfort level or baseline comfort level  8/23/2022 0037 by Newton Melgar RN  Outcome: Progressing  8/22/2022 1120 by Juan Diego Ivan RN  Outcome: Progressing     Problem: Respiratory - Adult  Goal: Achieves optimal ventilation and oxygenation  8/23/2022 0037 by Newton Melgar RN  Outcome: Progressing  8/22/2022 1120 by Juan Diego Ivan RN  Outcome: Progressing     Problem: Musculoskeletal - Adult  Goal: Return mobility to safest level of function  8/23/2022 0037 by Newton Melgar RN  Outcome: Progressing  8/22/2022 1120 by Juan Diego Ivan RN  Outcome: Progressing     Problem: Infection - Adult  Goal: Absence of infection at discharge  8/23/2022 0037 by Newton Melgar RN  Outcome: Progressing  8/22/2022 1120 by Gladys Aguila RN  Outcome: Progressing  Flowsheets (Taken 8/22/2022 0710)  Absence of infection at discharge:   Assess and monitor for signs and symptoms of infection   Monitor lab/diagnostic results   Monitor all insertion sites i.e., indwelling lines, tubes and drains   Monitor endotracheal (as able) and nasal secretions for changes in amount and color   Administer medications as ordered   Instruct and encourage patient and family to use good hand hygiene technique   Identify and instruct in appropriate isolation precautions for identified infection/condition

## 2022-08-23 NOTE — CARE COORDINATION
08/23/22 1539   IMM Letter   IMM Letter given to Patient/Family/Significant other/Guardian/POA/by: to patient by Nithin Parikh and all questiosn answered at this time   IMM Letter date given: 08/23/22   IMM Letter time given: 1215       Later in the afternoon, discussed pt's dc expected today and pt was upset that unable to remain one more day. SW again explained IMM letter and pt stated \"I'm aware of what that (IMM letter) says\" and pt had her copy for reference on the food tray at bedside.

## 2022-08-23 NOTE — PROGRESS NOTES
Pt had a consult for spiritual care and also is a palliative care pt. Pt says yesterday was a rough day but she came through the surgery last week with God's help. Pt had a tube inserted a chest tube to pull fluid out of her lung and it has been removed. This  provided spiritual care with sustaining presence, nurtured hope, ritual, and prayer. Pt expressed gratitude for spiritual care.      Electronically signed by Hadley Mercado on 8/23/2022 at 11:32 AM

## 2022-08-24 NOTE — DISCHARGE SUMMARY
Hospital Discharge Summary    Ammon Degroot  :  1950  MRN:  [id number]    Admit date:  8/3/2022  Discharge date:  2022    Admitting Physician:    Kaleb Suazo MD    Discharge Diagnoses:    Principal Problem:    Empyema [facility])  Active Problems:    Chronic respiratory failure with hypoxia and hypercapnia (HCC)    Pneumonia    Loculated pleural effusion    Anxiety and depression    COPD (chronic obstructive pulmonary disease) ([address]    Hypertension  Resolved Problems:    * No resolved hospital problems. [facility] Course:   She was admitted and treated for Acute on Chronic resp failure. She was treated with antibiotics. She continued with resp treatments and O2. Her care was directed by Pulm. She did have evidence of an empyema on CT, and had decortication by CT Surgery. She has done well after the procedure and has completed antibiotics. Her VS are stable. She is taking PO. She is working with PT. She has refused SNF/Rehab or even Home health. She has been cleared for d/c by CT Surgery and Pulm. She will be treated with 3 mths of Eliquis for abnormal V/Q scan. Discharge Medications:         Medication List        START taking these medications      apixaban 5 MG Tabs tablet  Commonly known as: ELIQUIS  Take 2 tablets by mouth 2 times daily for 13 doses     Arformoterol Tartrate 15 MCG/2ML Nebu  Commonly known as: BROVANA  Take 2 mLs by nebulization in the morning and 2 mLs in the evening.      budesonide 0.25 MG/2ML nebulizer suspension  Commonly known as: Pulmicort  Take 2 mLs by nebulization 2 times daily     furosemide 40 MG tablet  Commonly known as: LASIX  Take 1 tablet by mouth daily     guaiFENesin 100 MG/5ML Soln oral solution  Commonly known as: ROBITUSSIN  Take 10 mLs by mouth 2 times daily     hydrALAZINE 25 MG tablet  Commonly known as: APRESOLINE  Take 1 tablet by mouth every 12 hours     lidocaine 4 % external patch  Place 1 patch onto the skin daily     sodium chloride 0.65 % nasal spray  Commonly known as: OCEAN, BABY AYR  1 spray by Nasal route as needed for Congestion (dry sores in nose)            CHANGE how you take these medications      nicotine 21 MG/24HR  Commonly known as: NICODERM CQ  Place 1 patch onto the skin every 24 hours  What changed: additional instructions            CONTINUE taking these medications      ALPRAZolam 1 MG tablet  Commonly known as: XANAX     amphetamine-dextroamphetamine 20 MG tablet  Commonly known as: ADDERALL     budesonide-formoterol 160-4.5 MCG/ACT Aero  Commonly known as: SYMBICORT  Inhale 2 puffs into the lungs 2 times daily     citalopram 40 MG tablet  Commonly known as: CELEXA     fluticasone 50 MCG/ACT nasal spray  Commonly known as: FLONASE  INSTILL 1 SPRAY IN EACH NOSTRIL DAILY     gabapentin 600 MG tablet  Commonly known as: NEURONTIN     levalbuterol 45 MCG/ACT inhaler  Commonly known as: Xopenex HFA  Inhale 1 puff into the lungs every 4 hours as needed for Wheezing     LIDOCAINE (LIDODERM) 5% PATCH AND REMOVAL     metoclopramide 5 MG tablet  Commonly known as: REGLAN  Take 1 tablet by mouth 4 times daily     montelukast 10 MG tablet  Commonly known as: SINGULAIR     ondansetron 4 MG disintegrating tablet  Commonly known as: Zofran ODT  Take 1 tablet by mouth every 8 hours as needed for Nausea or Vomiting     oxyCODONE-acetaminophen  MG per tablet  Commonly known as: PERCOCET  TAKE 1 TABLET Q 4 TO 6 HRS PRN. (MAX 5/DAY).  Earliest Fill Date: 7/19/18     OXYGEN     pantoprazole 20 MG tablet  Commonly known as: PROTONIX  TAKE 1 TABLET BY MOUTH 2 TIMES DAILY     phenazopyridine 200 MG tablet  Commonly known as: Pyridium  Take 1 tablet by mouth 3 times daily as needed for Pain (bladder discomfort)     sucralfate 1 GM tablet  Commonly known as: CARAFATE  Take 1 tablet by mouth 4 times daily     tiZANidine 4 MG tablet  Commonly known as: ZANAFLEX  Take 1 tablet by mouth 3 times daily as needed (.)            STOP taking these medications albuterol (2.5 MG/3ML) 0.083% nebulizer solution  Commonly known as: PROVENTIL     albuterol sulfate  (90 Base) MCG/ACT inhaler  Commonly known as: Ventolin HFA     albuterol-ipratropium  MCG/ACT Aers inhaler  Commonly known as: COMBIVENT RESPIMAT     ipratropium-albuterol 0.5-2.5 (3) MG/3ML Soln nebulizer solution  Commonly known as: DUONEB               Where to Get Your Medications        These medications were sent to [facility], [address] , [address] [facility] [address]      Phone: [phone]   apixaban 5 MG Tabs tablet  Arformoterol Tartrate 15 MCG/2ML Nebu  budesonide 0.25 MG/2ML nebulizer suspension  furosemide 40 MG tablet  guaiFENesin 100 MG/5ML Soln oral solution  hydrALAZINE 25 MG tablet  lidocaine 4 % external patch  sodium chloride 0.65 % nasal spray         Consults:  CT Surgery, Pulm, Palliative Care    Significant Diagnostic Studies:  see complete admission record      Disposition:   home in stable condition  Follow up with Carlos Simon MD in 1 week. F/U with Pulm and CT Surgery as recommended    Diet: as tolerated    Activity: as tolerated    Special Instructions: use home O2 and home Trilogy      The patient or family are to call or return if there are any problems, questions, concerns or change in her condition.      Signed:  Carlos Simon MD MD  8/24/2022, 8:22 AM

## 2022-08-29 ENCOUNTER — TELEPHONE (OUTPATIENT)
Dept: PULMONOLOGY | Age: 72
End: 2022-08-29

## 2022-08-29 NOTE — TELEPHONE ENCOUNTER
Called patient with not answer. Unable to leave a voicemail. Called to ask patient which medication she had a question about and what her question was. Patient cancelled appointment today in which she was going to discuss medications with Dr. Agustin Fajardo.

## 2022-09-07 ENCOUNTER — OFFICE VISIT (OUTPATIENT)
Dept: PULMONOLOGY | Age: 72
End: 2022-09-07
Payer: MEDICARE

## 2022-09-07 VITALS
HEART RATE: 65 BPM | DIASTOLIC BLOOD PRESSURE: 60 MMHG | BODY MASS INDEX: 18.48 KG/M2 | WEIGHT: 115 LBS | OXYGEN SATURATION: 98 % | SYSTOLIC BLOOD PRESSURE: 116 MMHG | TEMPERATURE: 97.9 F | HEIGHT: 66 IN

## 2022-09-07 DIAGNOSIS — J96.11 CHRONIC RESPIRATORY FAILURE WITH HYPOXIA AND HYPERCAPNIA (HCC): ICD-10-CM

## 2022-09-07 DIAGNOSIS — J44.9 STAGE 4 VERY SEVERE COPD BY GOLD CLASSIFICATION (HCC): Primary | ICD-10-CM

## 2022-09-07 DIAGNOSIS — Z87.891 HISTORY OF SMOKING: ICD-10-CM

## 2022-09-07 DIAGNOSIS — J96.12 CHRONIC RESPIRATORY FAILURE WITH HYPOXIA AND HYPERCAPNIA (HCC): ICD-10-CM

## 2022-09-07 DIAGNOSIS — R06.02 SHORTNESS OF BREATH: ICD-10-CM

## 2022-09-07 PROCEDURE — 99214 OFFICE O/P EST MOD 30 MIN: CPT | Performed by: INTERNAL MEDICINE

## 2022-09-07 PROCEDURE — 1123F ACP DISCUSS/DSCN MKR DOCD: CPT | Performed by: INTERNAL MEDICINE

## 2022-09-07 RX ORDER — BUDESONIDE, GLYCOPYRROLATE, AND FORMOTEROL FUMARATE 160; 9; 4.8 UG/1; UG/1; UG/1
2 AEROSOL, METERED RESPIRATORY (INHALATION) 2 TIMES DAILY
Qty: 1 EACH | Refills: 11 | Status: SHIPPED | OUTPATIENT
Start: 2022-09-07

## 2022-09-07 ASSESSMENT — ENCOUNTER SYMPTOMS
WHEEZING: 1
CHEST TIGHTNESS: 0
ANAL BLEEDING: 0
RHINORRHEA: 0
ABDOMINAL PAIN: 0
ABDOMINAL DISTENTION: 0
SHORTNESS OF BREATH: 1
COUGH: 0
BACK PAIN: 0
APNEA: 0

## 2022-09-07 NOTE — PROGRESS NOTES
Pulmonary and Sleep Medicine    Joe Hebert (:  1950) is a 74 y.o. female,Established patient, here for evaluation of the following chief complaint(s):  Follow-Up from Hospital (Patient following up after being discharged from hospital on . Patient has questions about her medications. )      Referring physician:  No referring provider defined for this encounter. ASSESSMENT/PLAN:  1. Stage 4 very severe COPD by GOLD classification ([facility]  -     XR CHEST (2 VW); Future  -     Budeson-Glycopyrrol-Formoterol (BREZTRI AEROSPHERE) 160-9-4.8 MCG/ACT AERO; Inhale 2 puffs into the lungs 2 times daily, Disp-1 each, R-11Normal  2. Chronic respiratory failure with hypoxia and hypercapnia (HCC)  3. History of smoking  4. Shortness of breath      We will continue with the oxygen supplementation. She will benefit from portable oxygen concentrator. Will change symbicort to [facility]. Octavia Laura MD, [facility], [facility]    Return in about 3 months (around 2022). SUBJECTIVE/OBJECTIVE:  She is here for follow up after hospital discharge. She denies new complaints. Feels well. She continues to be on oxygen. She quit smoking. She is using the inhalers and feels they help. Prior to Visit Medications    Medication Sig Taking? Authorizing Provider   LIDOCAINE, LIDODERM, 5% PATCH AND REMOVAL Apply 1 patch topically daily  Historical Provider, MD   apixaban (ELIQUIS) 5 MG TABS tablet Take 2 tablets by mouth 2 times daily for 13 doses  Kae Johns MD   Arformoterol Tartrate (BROVANA) 15 MCG/2ML NEBU Take 2 mLs by nebulization in the morning and 2 mLs in the evening.   Patient not taking: Reported on 2022  Kae Johns MD   budesonide (PULMICORT) 0.25 MG/2ML nebulizer suspension Take 2 mLs by nebulization 2 times daily  Patient not taking: Reported on 2022  Kae Johns MD   hydrALAZINE (APRESOLINE) 25 MG tablet Take 1 tablet by mouth every 12 hours  Kae Johns MD guaiFENesin (ROBITUSSIN) 100 MG/5ML SOLN oral solution Take 10 mLs by mouth 2 times daily  Kar Hamilton MD   sodium chloride (OCEAN, BABY AYR) 0.65 % nasal spray 1 spray by Nasal route as needed for Congestion (dry sores in nose)  aKr Hamilton MD   lidocaine 4 % external patch Place 1 patch onto the skin daily  Kar Hamilton MD   furosemide (LASIX) 40 MG tablet Take 1 tablet by mouth daily  Kar Hamilton MD   levalbuterol (XOPENEX HFA) 45 MCG/ACT inhaler Inhale 1 puff into the lungs every 4 hours as needed for Wheezing  Angela Mendes MD   sucralfate (CARAFATE) 1 GM tablet Take 1 tablet by mouth 4 times daily  Betty Crowell MD   phenazopyridine (PYRIDIUM) 200 MG tablet Take 1 tablet by mouth 3 times daily as needed for Pain (bladder discomfort)  Bj Hart MD   montelukast (SINGULAIR) 10 MG tablet Take 10 mg by mouth nightly  Kimberley Mcfadden MD   oxyCODONE-acetaminophen (PERCOCET)  MG per tablet TAKE 1 TABLET Q 4 TO 6 HRS PRN. (MAX 5/DAY).  Earliest Fill Date: 7/19/18  Isaias Jimenez MD   ondansetron (ZOFRAN ODT) 4 MG disintegrating tablet Take 1 tablet by mouth every 8 hours as needed for Nausea or Vomiting  TEN Carvalho   nicotine (NICODERM CQ) 21 MG/24HR Place 1 patch onto the skin every 24 hours  TEN Carvalho   tiZANidine (ZANAFLEX) 4 MG tablet Take 1 tablet by mouth 3 times daily as needed (.)  Isaias Jimenez MD   budesonide-formoterol (SYMBICORT) 160-4.5 MCG/ACT AERO Inhale 2 puffs into the lungs 2 times daily  Beatrice Pham MD   albuterol-ipratropium (COMBIVENT RESPIMAT)  MCG/ACT AERS inhaler Inhale 1 puff into the lungs every 6 hours  Beatrice Pham MD   albuterol sulfate HFA (VENTOLIN HFA) 108 (90 Base) MCG/ACT inhaler Inhale 2 puffs into the lungs every 6 hours as needed for Wheezing  Beatrice Pham MD   pantoprazole (PROTONIX) 20 MG tablet TAKE 1 TABLET BY MOUTH 2 TIMES DAILY  Beatrice Pham MD   fluticasone (FLONASE) 50 MCG/ACT nasal spray INSTILL 1 SPRAY IN EACH NOSTRIL DAILY  Sophia English MD   OXYGEN Inhale into the lungs  Historical Provider, MD   metoclopramide (REGLAN) 5 MG tablet Take 1 tablet by mouth 4 times daily  Sophia English MD   gabapentin (NEURONTIN) 600 MG tablet Take 600 mg by mouth 3 times daily  Historical Provider, MD   ALPRAZolam Cecilia Miquel) 1 MG tablet Take 1 mg by mouth 4 times daily as needed. Historical Provider, MD   amphetamine-dextroamphetamine (ADDERALL) 20 MG tablet Take 20 mg by mouth in the morning and 20 mg before bedtime. Pt has not had filled recently  last filled 11/15/21. Historical Provider, MD   citalopram (CELEXA) 40 MG tablet Take 20 mg by mouth in the morning. Historical Provider, MD        Review of Systems   Constitutional:  Negative for activity change, appetite change, chills, diaphoresis and fatigue. HENT:  Negative for congestion, dental problem, drooling, ear discharge, postnasal drip and rhinorrhea. Eyes:  Negative for visual disturbance. Respiratory:  Positive for shortness of breath and wheezing. Negative for apnea, cough and chest tightness. Gastrointestinal:  Negative for abdominal distention, abdominal pain and anal bleeding. Endocrine: Negative for cold intolerance, heat intolerance and polydipsia. Genitourinary:  Negative for difficulty urinating, dysuria, enuresis and flank pain. Musculoskeletal:  Negative for arthralgias, back pain and gait problem. Allergic/Immunologic: Negative for environmental allergies. Neurological:  Negative for dizziness, facial asymmetry, light-headedness and headaches. Vitals:    09/07/22 1447   BP: 116/60   Pulse: 65   Temp: 97.9 °F (36.6 °C)   SpO2: 98%     BMI Readings from Last 1 Encounters:   09/07/22 18.56 kg/m²         Physical Exam  Vitals reviewed. Constitutional:       Appearance: Normal appearance. Comments: Frail     HENT:      Head: Normocephalic and atraumatic.       Nose: Nose normal.   Eyes:      Extraocular Movements: Extraocular movements intact. Conjunctiva/sclera: Conjunctivae normal.   Cardiovascular:      Rate and Rhythm: Normal rate and regular rhythm. Heart sounds: No murmur heard. No friction rub. Pulmonary:      Effort: Pulmonary effort is normal. No respiratory distress. Breath sounds: Normal breath sounds. No stridor. No wheezing, rhonchi or rales. Abdominal:      General: There is no distension. Palpations: There is no mass. Tenderness: There is no abdominal tenderness. There is no guarding or rebound. Musculoskeletal:      Cervical back: Normal range of motion and neck supple. Neurological:      Mental Status: She is alert and oriented to person, place, and time. This note was generated used a voice recognition software. Errors in voice recognition may have occurred. An electronic signature was used to authenticate this note.     --Carrie Herrera MD

## 2022-09-13 LAB
FUNGUS (MYCOLOGY) CULTURE: NORMAL
FUNGUS (MYCOLOGY) CULTURE: NORMAL
KOH PREP: NORMAL
KOH PREP: NORMAL

## 2022-09-28 LAB
AFB CULTURE (MYCOBACTERIA): NORMAL
AFB CULTURE (MYCOBACTERIA): NORMAL
AFB SMEAR: NORMAL
AFB SMEAR: NORMAL

## 2022-10-10 DIAGNOSIS — J00 NASOPHARYNGITIS: ICD-10-CM

## 2022-10-10 RX ORDER — MONTELUKAST SODIUM 10 MG/1
10 TABLET ORAL
Qty: 90 TABLET | Refills: 3 | Status: SHIPPED | OUTPATIENT
Start: 2022-10-10

## 2022-10-10 RX ORDER — MONTELUKAST SODIUM 10 MG/1
10 TABLET ORAL NIGHTLY
Qty: 90 TABLET | Refills: 3 | Status: SHIPPED | OUTPATIENT
Start: 2022-10-10

## 2022-10-10 NOTE — TELEPHONE ENCOUNTER
Pharmacy sent a request for refills on Singulair. Routed to Dr. Flores.   Rx Refill Note  Requested Prescriptions     Pending Prescriptions Disp Refills   • montelukast (SINGULAIR) 10 MG tablet 30 tablet 0     Sig: Take 1 tablet by mouth every night at bedtime.      Last office visit with prescribing clinician: 8/2/2021      Next office visit with prescribing clinician: Visit date not found            Benny Patel CMA  10/10/22, 16:12 CDT

## 2022-11-11 DIAGNOSIS — J44.9 STAGE 4 VERY SEVERE COPD BY GOLD CLASSIFICATION: ICD-10-CM

## 2022-11-11 RX ORDER — BUDESONIDE AND FORMOTEROL FUMARATE DIHYDRATE 160; 4.5 UG/1; UG/1
AEROSOL RESPIRATORY (INHALATION)
Qty: 10.7 G | Refills: 12 | Status: SHIPPED | OUTPATIENT
Start: 2022-11-11

## 2022-11-11 NOTE — TELEPHONE ENCOUNTER
[facility] [place] is requesting a refill on Symbicort inhaler.    Rx Refill Note  Requested Prescriptions     Pending Prescriptions Disp Refills   • budesonide-formoterol (SYMBICORT) 160-4.5 MCG/ACT inhaler [Pharmacy Med Name: BUDESONIDE-FORMOTEROL 160-4.5] 10.7 g 12     Si puffs twice a day      Last office visit with prescribing clinician: 2021 with Dr Flores  Next office visit with prescribing clinician: Visit date not found            Ingris Hwang MA  22, 10:54 CST     It was orginally ordered by Keisha so if you approve it put your name there.

## 2022-11-17 ENCOUNTER — TELEPHONE (OUTPATIENT)
Dept: PULMONOLOGY | Age: 72
End: 2022-11-17

## 2022-11-17 DIAGNOSIS — J44.1 COPD EXACERBATION (HCC): Primary | ICD-10-CM

## 2022-11-17 RX ORDER — DOXYCYCLINE HYCLATE 100 MG
100 TABLET ORAL 2 TIMES DAILY
Qty: 10 TABLET | Refills: 0 | Status: SHIPPED | OUTPATIENT
Start: 2022-11-17 | End: 2022-11-22

## 2022-11-17 RX ORDER — PREDNISONE 10 MG/1
TABLET ORAL
Qty: 35 TABLET | Refills: 0 | Status: SHIPPED | OUTPATIENT
Start: 2022-11-17

## 2022-11-17 NOTE — TELEPHONE ENCOUNTER
Spoke with the patient to let her know that Dr. Marcia Mooney is going to call in antibiotics for the patient. The patient acknowledged and was thankful.

## 2022-11-17 NOTE — TELEPHONE ENCOUNTER
Patient called in stating she has flu like symptoms. Patient states fever, cough, head and chest congestion. Patient is requesting zpack be sent to [facility] on [address].

## 2022-12-02 ENCOUNTER — TELEPHONE (OUTPATIENT)
Dept: PULMONOLOGY | Age: 72
End: 2022-12-02

## 2022-12-02 NOTE — TELEPHONE ENCOUNTER
Patient called in stating that she still has chest congestion after taking doxy prescribed on 11/17/22. She is worried that it will turn into pneumonia. She states she is unable to take the prednisone due to ulcers. I spoke with Dr Pari Perez and he is recommending an ER visit. I have been unable to reach patient at either number listed to provide the recommendations.   She has no voicemail set up on either phone

## 2022-12-07 ENCOUNTER — OFFICE VISIT (OUTPATIENT)
Dept: PULMONOLOGY | Age: 72
End: 2022-12-07
Payer: MEDICARE

## 2022-12-07 ENCOUNTER — HOSPITAL ENCOUNTER (OUTPATIENT)
Dept: GENERAL RADIOLOGY | Age: 72
Discharge: HOME | End: 2022-12-07
Payer: MEDICARE

## 2022-12-07 VITALS
TEMPERATURE: 97.7 F | OXYGEN SATURATION: 96 % | BODY MASS INDEX: 18.45 KG/M2 | HEART RATE: 84 BPM | DIASTOLIC BLOOD PRESSURE: 66 MMHG | HEIGHT: 66 IN | WEIGHT: 114.8 LBS | SYSTOLIC BLOOD PRESSURE: 118 MMHG

## 2022-12-07 DIAGNOSIS — J96.12 CHRONIC RESPIRATORY FAILURE WITH HYPOXIA AND HYPERCAPNIA (HCC): ICD-10-CM

## 2022-12-07 DIAGNOSIS — J44.9 STAGE 4 VERY SEVERE COPD BY GOLD CLASSIFICATION (HCC): ICD-10-CM

## 2022-12-07 DIAGNOSIS — J96.11 CHRONIC RESPIRATORY FAILURE WITH HYPOXIA AND HYPERCAPNIA (HCC): ICD-10-CM

## 2022-12-07 DIAGNOSIS — R05.3 CHRONIC COUGH: ICD-10-CM

## 2022-12-07 DIAGNOSIS — J44.1 COPD EXACERBATION (HCC): Primary | ICD-10-CM

## 2022-12-07 DIAGNOSIS — Z87.891 HISTORY OF SMOKING: ICD-10-CM

## 2022-12-07 DIAGNOSIS — R06.02 SHORTNESS OF BREATH: ICD-10-CM

## 2022-12-07 PROCEDURE — 3078F DIAST BP <80 MM HG: CPT | Performed by: INTERNAL MEDICINE

## 2022-12-07 PROCEDURE — 99214 OFFICE O/P EST MOD 30 MIN: CPT | Performed by: INTERNAL MEDICINE

## 2022-12-07 PROCEDURE — 71046 X-RAY EXAM CHEST 2 VIEWS: CPT

## 2022-12-07 PROCEDURE — 3074F SYST BP LT 130 MM HG: CPT | Performed by: INTERNAL MEDICINE

## 2022-12-07 PROCEDURE — 71046 X-RAY EXAM CHEST 2 VIEWS: CPT | Performed by: RADIOLOGY

## 2022-12-07 PROCEDURE — 1123F ACP DISCUSS/DSCN MKR DOCD: CPT | Performed by: INTERNAL MEDICINE

## 2022-12-07 RX ORDER — BENZONATATE 100 MG/1
100 CAPSULE ORAL 3 TIMES DAILY PRN
Qty: 30 CAPSULE | Refills: 0 | Status: SHIPPED | OUTPATIENT
Start: 2022-12-07 | End: 2022-12-14

## 2022-12-07 RX ORDER — CEFUROXIME AXETIL 500 MG/1
500 TABLET ORAL 2 TIMES DAILY
Qty: 20 TABLET | Refills: 0 | Status: SHIPPED | OUTPATIENT
Start: 2022-12-07 | End: 2022-12-17

## 2022-12-07 RX ORDER — BUDESONIDE AND FORMOTEROL FUMARATE DIHYDRATE 160; 4.5 UG/1; UG/1
2 AEROSOL RESPIRATORY (INHALATION) 2 TIMES DAILY
Qty: 1 EACH | Refills: 11 | Status: SHIPPED | OUTPATIENT
Start: 2022-12-07

## 2022-12-07 ASSESSMENT — ENCOUNTER SYMPTOMS
WHEEZING: 1
ABDOMINAL PAIN: 0
ABDOMINAL DISTENTION: 0
CHEST TIGHTNESS: 0
BACK PAIN: 0
RHINORRHEA: 0
ANAL BLEEDING: 0
SHORTNESS OF BREATH: 1
APNEA: 0
COUGH: 1

## 2022-12-07 NOTE — PROGRESS NOTES
Pulmonary and Sleep Medicine    Lorin Lilly (:  1950) is a 74 y.o. female,Established patient, here for evaluation of the following chief complaint(s):  Follow-up (Follow up- CXR )      Referring physician:  No referring provider defined for this encounter. ASSESSMENT/PLAN:  1. COPD exacerbation (HCC)  -     cefUROXime (CEFTIN) 500 MG tablet; Take 1 tablet by mouth 2 times daily for 10 days, Disp-20 tablet, R-0Normal  2. Stage 4 very severe COPD by GOLD classification ([address]  -     budesonide-formoterol (SYMBICORT) 160-4.5 MCG/ACT AERO; Inhale 2 puffs into the lungs 2 times daily, Disp-1 each, R-11Normal  3. Chronic respiratory failure with hypoxia and hypercapnia (HCC)  4. History of smoking  5. Shortness of breath  6. Chronic cough  -     benzonatate (TESSALON) 100 MG capsule; Take 1 capsule by mouth 3 times daily as needed for Cough, Disp-30 capsule, R-0Normal      COPD with acute exacerbation. Will start antibiotics again for 10 days. She say she can not take steroids by mouth. Bret Plummer MD, [facility], [facility]    Return in about 3 months (around 3/7/2023). SUBJECTIVE/OBJECTIVE:  The patient is here for follow-up on COPD with exacerbation and chronic respiratory failure. Apparently she got exposed to a family member who had the flu when she contracted the flu. She was treated with antibiotics and steroid. Her symptoms improved but then she started coughing again. She feels her sinuses are congested. She is using her oxygen. Chest x-ray done today showed improvement in the previous findings. Prior to Visit Medications    Medication Sig Taking?  Authorizing Provider   predniSONE (DELTASONE) 10 MG tablet 40 mg a day and taper by 10 mg every third day to off Yes Angela Mendes MD   Budeson-Glycopyrrol-Formoterol (BREZTRI AEROSPHERE) 160-9-4.8 MCG/ACT AERO Inhale 2 puffs into the lungs 2 times daily Yes Angela Mendes MD   LIDOCAINE, LIDODERM, 5% PATCH AND REMOVAL Apply 1 patch topically daily Yes Historical Provider, MD   apixaban (ELIQUIS) 5 MG TABS tablet Take 2 tablets by mouth 2 times daily for 13 doses Yes Cely Adams MD   Arformoterol Tartrate (BROVANA) 15 MCG/2ML NEBU Take 2 mLs by nebulization in the morning and 2 mLs in the evening. Yes Cely Adams MD   budesonide (PULMICORT) 0.25 MG/2ML nebulizer suspension Take 2 mLs by nebulization 2 times daily Yes Cely Adams MD   hydrALAZINE (APRESOLINE) 25 MG tablet Take 1 tablet by mouth every 12 hours Yes Cely Adams MD   guaiFENesin (ROBITUSSIN) 100 MG/5ML SOLN oral solution Take 10 mLs by mouth 2 times daily Yes Cely Adams MD   sodium chloride (OCEAN, BABY AYR) 0.65 % nasal spray 1 spray by Nasal route as needed for Congestion (dry sores in nose) Yes Cely Adams MD   lidocaine 4 % external patch Place 1 patch onto the skin daily Yes Cely Adams MD   furosemide (LASIX) 40 MG tablet Take 1 tablet by mouth daily Yes Cely Adams MD   levalbuterol (XOPENEX HFA) 45 MCG/ACT inhaler Inhale 1 puff into the lungs every 4 hours as needed for Wheezing Yes Angela Mendes MD   sucralfate (CARAFATE) 1 GM tablet Take 1 tablet by mouth 4 times daily Yes Jazlyn Salomon MD   phenazopyridine (PYRIDIUM) 200 MG tablet Take 1 tablet by mouth 3 times daily as needed for Pain (bladder discomfort) Yes Laura Romero MD   montelukast (SINGULAIR) 10 MG tablet Take 10 mg by mouth nightly Yes Historical Provider, MD   oxyCODONE-acetaminophen (PERCOCET)  MG per tablet TAKE 1 TABLET Q 4 TO 6 HRS PRN. (MAX 5/DAY).  Earliest Fill Date: 7/19/18 Yes Jarret Horn MD   ondansetron (ZOFRAN ODT) 4 MG disintegrating tablet Take 1 tablet by mouth every 8 hours as needed for Nausea or Vomiting Yes TEN Mendoza   nicotine (NICODERM CQ) 21 MG/24HR Place 1 patch onto the skin every 24 hours Yes TEN Mendoza   tiZANidine (ZANAFLEX) 4 MG tablet Take 1 tablet by mouth 3 times daily as needed (.) Yes Fabio Figueroa MD   budesonide-formoterol (SYMBICORT) 160-4.5 MCG/ACT AERO Inhale 2 puffs into the lungs 2 times daily Yes Lashanda Sarah MD   albuterol sulfate HFA (VENTOLIN HFA) 108 (90 Base) MCG/ACT inhaler Inhale 2 puffs into the lungs every 6 hours as needed for Wheezing Yes Lashanda Sarah MD   pantoprazole (PROTONIX) 20 MG tablet TAKE 1 TABLET BY MOUTH 2 TIMES DAILY Yes Lashanda Sarah MD   fluticasone (FLONASE) 50 MCG/ACT nasal spray INSTILL 1 SPRAY IN EACH NOSTRIL DAILY Yes Lashanda Sarah MD   OXYGEN Inhale into the lungs Yes Historical Provider, MD   metoclopramide (REGLAN) 5 MG tablet Take 1 tablet by mouth 4 times daily Yes Lashanda Sarah MD   gabapentin (NEURONTIN) 600 MG tablet Take 600 mg by mouth 3 times daily Yes Historical Provider, MD   ALPRAZolam Edward Julian) 1 MG tablet Take 1 mg by mouth 4 times daily as needed. Yes Historical Provider, MD   amphetamine-dextroamphetamine (ADDERALL) 20 MG tablet Take 20 mg by mouth in the morning and 20 mg before bedtime. Pt has not had filled recently  last filled 11/15/21. Yes Historical Provider, MD   citalopram (CELEXA) 40 MG tablet Take 20 mg by mouth in the morning. Yes Historical Provider, MD   albuterol-ipratropium (COMBIVENT RESPIMAT)  MCG/ACT AERS inhaler Inhale 1 puff into the lungs every 6 hours  Lashanda Sarah MD        Review of Systems   Constitutional:  Negative for activity change, appetite change, chills, diaphoresis and fatigue. HENT:  Negative for congestion, dental problem, drooling, ear discharge, postnasal drip and rhinorrhea. Eyes:  Negative for visual disturbance. Respiratory:  Positive for cough, shortness of breath and wheezing. Negative for apnea and chest tightness. Gastrointestinal:  Negative for abdominal distention, abdominal pain and anal bleeding. Endocrine: Negative for cold intolerance, heat intolerance and polydipsia.    Genitourinary:  Negative for difficulty urinating, dysuria, enuresis and flank pain. Musculoskeletal:  Negative for arthralgias, back pain and gait problem. Allergic/Immunologic: Negative for environmental allergies. Neurological:  Negative for dizziness, facial asymmetry, light-headedness and headaches. Vitals:    12/07/22 1354   BP: 118/66   Pulse: 84   Temp: 97.7 °F (36.5 °C)   SpO2: 96%     BMI Readings from Last 1 Encounters:   12/07/22 18.53 kg/m²         Physical Exam  Vitals reviewed. Constitutional:       Appearance: Normal appearance. HENT:      Head: Normocephalic and atraumatic. Nose: Nose normal.   Eyes:      Extraocular Movements: Extraocular movements intact. Conjunctiva/sclera: Conjunctivae normal.   Cardiovascular:      Rate and Rhythm: Normal rate and regular rhythm. Heart sounds: No murmur heard. No friction rub. Pulmonary:      Effort: Pulmonary effort is normal. No respiratory distress. Breath sounds: Normal breath sounds. No stridor. No wheezing, rhonchi or rales. Abdominal:      General: There is no distension. Palpations: There is no mass. Tenderness: There is no abdominal tenderness. There is no guarding or rebound. Musculoskeletal:      Cervical back: Normal range of motion and neck supple. Neurological:      Mental Status: She is alert and oriented to person, place, and time. This note was generated using a voice recognition software. Errors in voice recognition may have occurred. An electronic signature was used to authenticate this note.     --Gurmeet Bartlett MD

## 2022-12-09 DIAGNOSIS — J44.1 COPD EXACERBATION (HCC): Primary | ICD-10-CM

## 2022-12-09 RX ORDER — CEFUROXIME AXETIL 500 MG/1
500 TABLET ORAL 2 TIMES DAILY
Qty: 20 TABLET | Refills: 0 | Status: SHIPPED | OUTPATIENT
Start: 2022-12-09 | End: 2022-12-19

## 2022-12-13 DIAGNOSIS — J44.1 COPD EXACERBATION (HCC): Primary | ICD-10-CM

## 2022-12-15 RX ORDER — DOXYCYCLINE HYCLATE 100 MG
100 TABLET ORAL 2 TIMES DAILY
Qty: 20 TABLET | Refills: 0 | Status: SHIPPED | OUTPATIENT
Start: 2022-12-15 | End: 2022-12-25

## 2023-01-05 ENCOUNTER — TELEPHONE (OUTPATIENT)
Dept: PULMONOLOGY | Age: 73
End: 2023-01-05

## 2023-01-05 NOTE — TELEPHONE ENCOUNTER
Patient is requesting a return call from the office. She states she finished the  10 day antibiotic Doxycyline. She states she has green drainage and has a bronchitis cough. She is afraid that she might get pneumonia again. She ask if she needs another round of antibiotic. Please return her call. Thank you!

## 2023-01-06 NOTE — TELEPHONE ENCOUNTER
Erik Watt requests that Nurse  return their call. The best time to reach her is Anytime. Patient missed the called yesterday from [facility]. Patient ask if she could called her back this morning. Please called the patient [phone]    Thank you.

## 2023-01-06 NOTE — TELEPHONE ENCOUNTER
Left voicemail for patient again. Dr Nahum Peoples recommended that patient be seen in the ED. We have now given multiple rounds of antibiotics. Let her know his recommendations, and that if she was any worse to proceed to the ED.

## 2023-01-10 DIAGNOSIS — R05.3 CHRONIC COUGH: Primary | ICD-10-CM

## 2023-01-10 RX ORDER — LEVOFLOXACIN 500 MG/1
500 TABLET, FILM COATED ORAL DAILY
Qty: 5 TABLET | Refills: 0 | Status: SHIPPED | OUTPATIENT
Start: 2023-01-10 | End: 2023-01-15

## 2023-01-10 NOTE — TELEPHONE ENCOUNTER
Adi Daughters requests that nurse return their call. Wants to see what Dr. Lucy Mcneal said. Said she received messages but doesn't want to go to hospital. Alan Cox she is some better but afraid to go and be put in. She said last time she went they did surgery and she doesn't want that. She's not suppose to be put to sleep. The best time to reach her is Anytime. Thank you.

## 2023-01-13 DIAGNOSIS — J44.9 STAGE 4 VERY SEVERE COPD BY GOLD CLASSIFICATION: ICD-10-CM

## 2023-01-13 RX ORDER — ALBUTEROL SULFATE 90 UG/1
AEROSOL, METERED RESPIRATORY (INHALATION)
Refills: 11 | OUTPATIENT
Start: 2023-01-13

## 2023-01-23 ENCOUNTER — TELEPHONE (OUTPATIENT)
Dept: PULMONOLOGY | Age: 73
End: 2023-01-23

## 2023-01-23 NOTE — TELEPHONE ENCOUNTER
Spoke with Ms Galileo Turk, she wanted to let me know that she was having trouble getting her Breztri. And that she was feeling better after the antibiotics. She did not know when her next appointment was, so I gave her that information as well. She voiced understanding.

## 2023-01-23 NOTE — TELEPHONE ENCOUNTER
Jess Settles requests a call back from the nurse please, she did not advise what it is in relation to. Thank you.

## 2023-02-03 ENCOUNTER — TELEPHONE (OUTPATIENT)
Dept: PULMONOLOGY | Age: 73
End: 2023-02-03

## 2023-02-03 NOTE — TELEPHONE ENCOUNTER
Erik Faria requests that a nurse return her call. The best time to reach her is Anytime. Erik Faria called stating that she is getting over having the Flu. She says that her inhaler is not working for her. She would like to speak to a nurse to advise. Thank you.

## 2023-02-03 NOTE — TELEPHONE ENCOUNTER
Returned patients call, states she is till coughing with sinus congestion. Let her know that I had spoke with Dr Yared Cabrera previously and he said if she was still sick to come into the ER to be seen since we have been trying antibiotics at home with not much relief. She asked that I talk with him again today, but unfortunately he is already out of the office today. She said her inhaler isn't working and the pharmacy told her not to take it anymore than she already was. Told patient that if she continued to fell worse or became short of breath to go to the nearest ED. Patient stated she doesn't want to go to the ED because she knows they will probably admit her. Let her know Dr Yared Cabrera will be in on Monday morning if she decided not to go to the ED and I can speak with him regarding this. She voiced understanding.

## 2023-03-08 ENCOUNTER — APPOINTMENT (OUTPATIENT)
Dept: CT IMAGING | Age: 73
End: 2023-03-08
Payer: MEDICARE

## 2023-03-08 ENCOUNTER — APPOINTMENT (OUTPATIENT)
Dept: GENERAL RADIOLOGY | Age: 73
End: 2023-03-08
Payer: MEDICARE

## 2023-03-08 ENCOUNTER — HOSPITAL ENCOUNTER (OUTPATIENT)
Age: 73
Setting detail: OBSERVATION
Discharge: HOME | End: 2023-03-10
Attending: PEDIATRICS | Admitting: STUDENT IN AN ORGANIZED HEALTH CARE EDUCATION/TRAINING PROGRAM
Payer: MEDICARE

## 2023-03-08 DIAGNOSIS — J18.9 PNEUMONIA OF RIGHT UPPER LOBE DUE TO INFECTIOUS ORGANISM: ICD-10-CM

## 2023-03-08 DIAGNOSIS — R07.9 CHEST PAIN, UNSPECIFIED TYPE: Primary | ICD-10-CM

## 2023-03-08 DIAGNOSIS — R06.02 SHORTNESS OF BREATH: ICD-10-CM

## 2023-03-08 LAB
ADENOVIRUS BY PCR: NOT DETECTED
ALBUMIN SERPL-MCNC: 4.4 G/DL (ref 3.5–5.2)
ALLENS TEST: ABNORMAL
ALP BLD-CCNC: 99 U/L (ref 35–104)
ALT SERPL-CCNC: 8 U/L (ref 5–33)
ANION GAP SERPL CALCULATED.3IONS-SCNC: 9 MMOL/L (ref 7–19)
AST SERPL-CCNC: 14 U/L (ref 5–32)
BASE EXCESS ARTERIAL: 11.7 MMOL/L (ref -2–2)
BASOPHILS ABSOLUTE: 0.1 K/UL (ref 0–0.2)
BASOPHILS RELATIVE PERCENT: 0.8 % (ref 0–1)
BILIRUB SERPL-MCNC: <0.2 MG/DL (ref 0.2–1.2)
BORDETELLA PARAPERTUSSIS BY PCR: NOT DETECTED
BORDETELLA PERTUSSIS BY PCR: NOT DETECTED
BUN BLDV-MCNC: 6 MG/DL (ref 8–23)
CALCIUM SERPL-MCNC: 9.5 MG/DL (ref 8.8–10.2)
CARBOXYHEMOGLOBIN ARTERIAL: 2.2 % (ref 0–5)
CHLAMYDOPHILIA PNEUMONIAE BY PCR: NOT DETECTED
CHLORIDE BLD-SCNC: 93 MMOL/L (ref 98–111)
CO2: 31 MMOL/L (ref 22–29)
CORONAVIRUS 229E BY PCR: NOT DETECTED
CORONAVIRUS HKU1 BY PCR: NOT DETECTED
CORONAVIRUS NL63 BY PCR: NOT DETECTED
CORONAVIRUS OC43 BY PCR: NOT DETECTED
CREAT SERPL-MCNC: 0.5 MG/DL (ref 0.5–0.9)
EOSINOPHILS ABSOLUTE: 0.1 K/UL (ref 0–0.6)
EOSINOPHILS RELATIVE PERCENT: 2 % (ref 0–5)
GFR SERPL CREATININE-BSD FRML MDRD: >60 ML/MIN/{1.73_M2}
GLUCOSE BLD-MCNC: 99 MG/DL (ref 74–109)
HCO3 ARTERIAL: 38.3 MMOL/L (ref 22–26)
HCT VFR BLD CALC: 36.8 % (ref 37–47)
HEMOGLOBIN, ART, EXTENDED: 11.7 G/DL (ref 12–16)
HEMOGLOBIN: 11.8 G/DL (ref 12–16)
HUMAN METAPNEUMOVIRUS BY PCR: NOT DETECTED
HUMAN RHINOVIRUS/ENTEROVIRUS BY PCR: NOT DETECTED
IMMATURE GRANULOCYTES #: 0 K/UL
INFLUENZA A BY PCR: NOT DETECTED
INFLUENZA B BY PCR: NOT DETECTED
LYMPHOCYTES ABSOLUTE: 1.2 K/UL (ref 1.1–4.5)
LYMPHOCYTES RELATIVE PERCENT: 19.6 % (ref 20–40)
MAGNESIUM: 1.5 MG/DL (ref 1.6–2.4)
MCH RBC QN AUTO: 31.6 PG (ref 27–31)
MCHC RBC AUTO-ENTMCNC: 32.1 G/DL (ref 33–37)
MCV RBC AUTO: 98.4 FL (ref 81–99)
METHEMOGLOBIN ARTERIAL: 0.7 %
MONOCYTES ABSOLUTE: 0.5 K/UL (ref 0–0.9)
MONOCYTES RELATIVE PERCENT: 9.1 % (ref 0–10)
MYCOPLASMA PNEUMONIAE BY PCR: NOT DETECTED
NEUTROPHILS ABSOLUTE: 4 K/UL (ref 1.5–7.5)
NEUTROPHILS RELATIVE PERCENT: 68.3 % (ref 50–65)
O2 CONTENT ARTERIAL: 15.7 ML/DL
O2 DELIVERY DEVICE: ABNORMAL
O2 SAT, ARTERIAL: 95 %
O2 THERAPY: ABNORMAL
OXYGEN FLOW: 2
PARAINFLUENZA VIRUS 1 BY PCR: NOT DETECTED
PARAINFLUENZA VIRUS 2 BY PCR: NOT DETECTED
PARAINFLUENZA VIRUS 3 BY PCR: NOT DETECTED
PARAINFLUENZA VIRUS 4 BY PCR: NOT DETECTED
PCO2 ARTERIAL: 59 MMHG (ref 35–45)
PDW BLD-RTO: 12.7 % (ref 11.5–14.5)
PH ARTERIAL: 7.42 (ref 7.35–7.45)
PLATELET # BLD: 263 K/UL (ref 130–400)
PMV BLD AUTO: 10.6 FL (ref 9.4–12.3)
PO2 ARTERIAL: 74 MMHG (ref 80–100)
POTASSIUM SERPL-SCNC: 3.9 MMOL/L (ref 3.5–5)
POTASSIUM, WHOLE BLOOD: 3.9
PRO-BNP: 103 PG/ML (ref 0–900)
RBC # BLD: 3.74 M/UL (ref 4.2–5.4)
RESPIRATORY SYNCYTIAL VIRUS BY PCR: NOT DETECTED
SAMPLE SOURCE: ABNORMAL
SARS-COV-2, PCR: NOT DETECTED
SODIUM BLD-SCNC: 133 MMOL/L (ref 136–145)
TOTAL PROTEIN: 7.5 G/DL (ref 6.6–8.7)
TROPONIN: <0.01 NG/ML (ref 0–0.03)
WBC # BLD: 5.9 K/UL (ref 4.8–10.8)

## 2023-03-08 PROCEDURE — 85025 COMPLETE CBC W/AUTO DIFF WBC: CPT

## 2023-03-08 PROCEDURE — 71045 X-RAY EXAM CHEST 1 VIEW: CPT

## 2023-03-08 PROCEDURE — 2500000003 HC RX 250 WO HCPCS

## 2023-03-08 PROCEDURE — 93005 ELECTROCARDIOGRAM TRACING: CPT | Performed by: PHYSICIAN ASSISTANT

## 2023-03-08 PROCEDURE — 99285 EMERGENCY DEPT VISIT HI MDM: CPT

## 2023-03-08 PROCEDURE — 80053 COMPREHEN METABOLIC PANEL: CPT

## 2023-03-08 PROCEDURE — 2580000003 HC RX 258: Performed by: PEDIATRICS

## 2023-03-08 PROCEDURE — 6370000000 HC RX 637 (ALT 250 FOR IP)

## 2023-03-08 PROCEDURE — 83735 ASSAY OF MAGNESIUM: CPT

## 2023-03-08 PROCEDURE — 96375 TX/PRO/DX INJ NEW DRUG ADDON: CPT

## 2023-03-08 PROCEDURE — 6360000002 HC RX W HCPCS

## 2023-03-08 PROCEDURE — 0202U NFCT DS 22 TRGT SARS-COV-2: CPT

## 2023-03-08 PROCEDURE — 71250 CT THORAX DX C-: CPT

## 2023-03-08 PROCEDURE — G0378 HOSPITAL OBSERVATION PER HR: HCPCS

## 2023-03-08 PROCEDURE — 36600 WITHDRAWAL OF ARTERIAL BLOOD: CPT

## 2023-03-08 PROCEDURE — 6370000000 HC RX 637 (ALT 250 FOR IP): Performed by: PEDIATRICS

## 2023-03-08 PROCEDURE — 2700000000 HC OXYGEN THERAPY PER DAY

## 2023-03-08 PROCEDURE — 83880 ASSAY OF NATRIURETIC PEPTIDE: CPT

## 2023-03-08 PROCEDURE — 2580000003 HC RX 258

## 2023-03-08 PROCEDURE — 6370000000 HC RX 637 (ALT 250 FOR IP): Performed by: STUDENT IN AN ORGANIZED HEALTH CARE EDUCATION/TRAINING PROGRAM

## 2023-03-08 PROCEDURE — 94760 N-INVAS EAR/PLS OXIMETRY 1: CPT

## 2023-03-08 PROCEDURE — 94640 AIRWAY INHALATION TREATMENT: CPT

## 2023-03-08 PROCEDURE — 6360000002 HC RX W HCPCS: Performed by: PEDIATRICS

## 2023-03-08 PROCEDURE — 96374 THER/PROPH/DIAG INJ IV PUSH: CPT

## 2023-03-08 PROCEDURE — 84484 ASSAY OF TROPONIN QUANT: CPT

## 2023-03-08 PROCEDURE — 36415 COLL VENOUS BLD VENIPUNCTURE: CPT

## 2023-03-08 PROCEDURE — 82803 BLOOD GASES ANY COMBINATION: CPT

## 2023-03-08 RX ORDER — PANTOPRAZOLE SODIUM 40 MG/1
40 TABLET, DELAYED RELEASE ORAL
Status: DISCONTINUED | OUTPATIENT
Start: 2023-03-09 | End: 2023-03-10 | Stop reason: HOSPADM

## 2023-03-08 RX ORDER — AZITHROMYCIN 250 MG/1
500 TABLET, FILM COATED ORAL ONCE
Status: COMPLETED | OUTPATIENT
Start: 2023-03-08 | End: 2023-03-08

## 2023-03-08 RX ORDER — SODIUM CHLORIDE 0.9 % (FLUSH) 0.9 %
5-40 SYRINGE (ML) INJECTION PRN
Status: DISCONTINUED | OUTPATIENT
Start: 2023-03-08 | End: 2023-03-10 | Stop reason: HOSPADM

## 2023-03-08 RX ORDER — ALBUTEROL SULFATE 2.5 MG/3ML
2.5 SOLUTION RESPIRATORY (INHALATION) EVERY 6 HOURS PRN
Status: DISCONTINUED | OUTPATIENT
Start: 2023-03-08 | End: 2023-03-10 | Stop reason: HOSPADM

## 2023-03-08 RX ORDER — MONTELUKAST SODIUM 10 MG/1
10 TABLET ORAL NIGHTLY
Status: DISCONTINUED | OUTPATIENT
Start: 2023-03-08 | End: 2023-03-10 | Stop reason: HOSPADM

## 2023-03-08 RX ORDER — POLYETHYLENE GLYCOL 3350 17 G/17G
17 POWDER, FOR SOLUTION ORAL DAILY PRN
Status: DISCONTINUED | OUTPATIENT
Start: 2023-03-08 | End: 2023-03-10 | Stop reason: HOSPADM

## 2023-03-08 RX ORDER — METHYLPREDNISOLONE SODIUM SUCCINATE 125 MG/2ML
125 INJECTION, POWDER, LYOPHILIZED, FOR SOLUTION INTRAMUSCULAR; INTRAVENOUS ONCE
Status: COMPLETED | OUTPATIENT
Start: 2023-03-08 | End: 2023-03-08

## 2023-03-08 RX ORDER — GUAIFENESIN 200 MG/10ML
200 LIQUID ORAL 2 TIMES DAILY
Status: DISCONTINUED | OUTPATIENT
Start: 2023-03-08 | End: 2023-03-10 | Stop reason: HOSPADM

## 2023-03-08 RX ORDER — ACETAMINOPHEN 325 MG/1
650 TABLET ORAL EVERY 6 HOURS PRN
Status: DISCONTINUED | OUTPATIENT
Start: 2023-03-08 | End: 2023-03-10 | Stop reason: HOSPADM

## 2023-03-08 RX ORDER — BUDESONIDE AND FORMOTEROL FUMARATE DIHYDRATE 160; 4.5 UG/1; UG/1
2 AEROSOL RESPIRATORY (INHALATION) 2 TIMES DAILY
Status: DISCONTINUED | OUTPATIENT
Start: 2023-03-08 | End: 2023-03-10 | Stop reason: HOSPADM

## 2023-03-08 RX ORDER — ONDANSETRON 2 MG/ML
4 INJECTION INTRAMUSCULAR; INTRAVENOUS EVERY 6 HOURS PRN
Status: DISCONTINUED | OUTPATIENT
Start: 2023-03-08 | End: 2023-03-10 | Stop reason: HOSPADM

## 2023-03-08 RX ORDER — ENOXAPARIN SODIUM 100 MG/ML
40 INJECTION SUBCUTANEOUS DAILY
Status: DISCONTINUED | OUTPATIENT
Start: 2023-03-08 | End: 2023-03-10 | Stop reason: HOSPADM

## 2023-03-08 RX ORDER — SODIUM CHLORIDE 9 MG/ML
INJECTION, SOLUTION INTRAVENOUS PRN
Status: DISCONTINUED | OUTPATIENT
Start: 2023-03-08 | End: 2023-03-10 | Stop reason: HOSPADM

## 2023-03-08 RX ORDER — FLUTICASONE PROPIONATE 50 MCG
1 SPRAY, SUSPENSION (ML) NASAL DAILY
Status: DISCONTINUED | OUTPATIENT
Start: 2023-03-08 | End: 2023-03-10 | Stop reason: HOSPADM

## 2023-03-08 RX ORDER — BUDESONIDE AND FORMOTEROL FUMARATE DIHYDRATE 80; 4.5 UG/1; UG/1
1 AEROSOL RESPIRATORY (INHALATION) 2 TIMES DAILY
Status: CANCELLED | OUTPATIENT
Start: 2023-03-08

## 2023-03-08 RX ORDER — GABAPENTIN 600 MG/1
600 TABLET ORAL 3 TIMES DAILY
Status: DISCONTINUED | OUTPATIENT
Start: 2023-03-08 | End: 2023-03-10 | Stop reason: HOSPADM

## 2023-03-08 RX ORDER — SUCRALFATE 1 G/1
1 TABLET ORAL 4 TIMES DAILY
Status: DISCONTINUED | OUTPATIENT
Start: 2023-03-08 | End: 2023-03-10 | Stop reason: HOSPADM

## 2023-03-08 RX ORDER — CETIRIZINE HYDROCHLORIDE 10 MG/1
10 TABLET ORAL DAILY
Status: DISCONTINUED | OUTPATIENT
Start: 2023-03-08 | End: 2023-03-10 | Stop reason: HOSPADM

## 2023-03-08 RX ORDER — TIZANIDINE 4 MG/1
4 TABLET ORAL 3 TIMES DAILY PRN
Status: DISCONTINUED | OUTPATIENT
Start: 2023-03-08 | End: 2023-03-10 | Stop reason: HOSPADM

## 2023-03-08 RX ORDER — LEVALBUTEROL INHALATION SOLUTION 0.63 MG/3ML
0.63 SOLUTION RESPIRATORY (INHALATION) EVERY 4 HOURS PRN
Status: DISCONTINUED | OUTPATIENT
Start: 2023-03-08 | End: 2023-03-08 | Stop reason: SDUPTHER

## 2023-03-08 RX ORDER — AZITHROMYCIN 250 MG/1
500 TABLET, FILM COATED ORAL DAILY
Status: DISCONTINUED | OUTPATIENT
Start: 2023-03-09 | End: 2023-03-10

## 2023-03-08 RX ORDER — HYDRALAZINE HYDROCHLORIDE 25 MG/1
25 TABLET, FILM COATED ORAL EVERY 12 HOURS SCHEDULED
Status: DISCONTINUED | OUTPATIENT
Start: 2023-03-08 | End: 2023-03-08

## 2023-03-08 RX ORDER — ONDANSETRON 2 MG/ML
4 INJECTION INTRAMUSCULAR; INTRAVENOUS ONCE
Status: COMPLETED | OUTPATIENT
Start: 2023-03-08 | End: 2023-03-08

## 2023-03-08 RX ORDER — CITALOPRAM 20 MG/1
20 TABLET ORAL DAILY
Status: DISCONTINUED | OUTPATIENT
Start: 2023-03-08 | End: 2023-03-10 | Stop reason: HOSPADM

## 2023-03-08 RX ORDER — FUROSEMIDE 40 MG/1
40 TABLET ORAL DAILY
Status: DISCONTINUED | OUTPATIENT
Start: 2023-03-08 | End: 2023-03-08

## 2023-03-08 RX ORDER — PREDNISONE 10 MG/1
10 TABLET ORAL DAILY
Status: DISCONTINUED | OUTPATIENT
Start: 2023-03-09 | End: 2023-03-09

## 2023-03-08 RX ORDER — ONDANSETRON 4 MG/1
4 TABLET, ORALLY DISINTEGRATING ORAL EVERY 8 HOURS PRN
Status: DISCONTINUED | OUTPATIENT
Start: 2023-03-08 | End: 2023-03-10 | Stop reason: HOSPADM

## 2023-03-08 RX ORDER — METOCLOPRAMIDE 5 MG/1
5 TABLET ORAL 4 TIMES DAILY
Status: DISCONTINUED | OUTPATIENT
Start: 2023-03-08 | End: 2023-03-08

## 2023-03-08 RX ORDER — ACETAMINOPHEN 650 MG/1
650 SUPPOSITORY RECTAL EVERY 6 HOURS PRN
Status: DISCONTINUED | OUTPATIENT
Start: 2023-03-08 | End: 2023-03-10 | Stop reason: HOSPADM

## 2023-03-08 RX ORDER — NICOTINE 21 MG/24HR
1 PATCH, TRANSDERMAL 24 HOURS TRANSDERMAL EVERY 24 HOURS
Status: DISCONTINUED | OUTPATIENT
Start: 2023-03-08 | End: 2023-03-08

## 2023-03-08 RX ORDER — ALPRAZOLAM 0.5 MG/1
1 TABLET ORAL 4 TIMES DAILY PRN
Status: DISCONTINUED | OUTPATIENT
Start: 2023-03-08 | End: 2023-03-10 | Stop reason: HOSPADM

## 2023-03-08 RX ORDER — IPRATROPIUM BROMIDE AND ALBUTEROL SULFATE 2.5; .5 MG/3ML; MG/3ML
1 SOLUTION RESPIRATORY (INHALATION)
Status: DISCONTINUED | OUTPATIENT
Start: 2023-03-08 | End: 2023-03-08

## 2023-03-08 RX ORDER — SODIUM CHLORIDE 0.9 % (FLUSH) 0.9 %
5-40 SYRINGE (ML) INJECTION EVERY 12 HOURS SCHEDULED
Status: DISCONTINUED | OUTPATIENT
Start: 2023-03-08 | End: 2023-03-10 | Stop reason: HOSPADM

## 2023-03-08 RX ADMIN — FLUTICASONE PROPIONATE 1 SPRAY: 50 SPRAY, METERED NASAL at 21:00

## 2023-03-08 RX ADMIN — MONTELUKAST 10 MG: 10 TABLET, FILM COATED ORAL at 22:33

## 2023-03-08 RX ADMIN — CITALOPRAM HYDROBROMIDE 20 MG: 20 TABLET ORAL at 18:18

## 2023-03-08 RX ADMIN — SODIUM CHLORIDE, PRESERVATIVE FREE 10 ML: 5 INJECTION INTRAVENOUS at 22:33

## 2023-03-08 RX ADMIN — ALPRAZOLAM 1 MG: 0.5 TABLET ORAL at 22:40

## 2023-03-08 RX ADMIN — CEFTRIAXONE 1000 MG: 1 INJECTION, POWDER, FOR SOLUTION INTRAMUSCULAR; INTRAVENOUS at 16:01

## 2023-03-08 RX ADMIN — ONDANSETRON 4 MG: 2 INJECTION INTRAMUSCULAR; INTRAVENOUS at 13:39

## 2023-03-08 RX ADMIN — CETIRIZINE HYDROCHLORIDE 10 MG: 10 TABLET, FILM COATED ORAL at 18:18

## 2023-03-08 RX ADMIN — BUDESONIDE AND FORMOTEROL FUMARATE DIHYDRATE 2 PUFF: 160; 4.5 AEROSOL RESPIRATORY (INHALATION) at 19:55

## 2023-03-08 RX ADMIN — SUCRALFATE 1 G: 1 TABLET ORAL at 22:33

## 2023-03-08 RX ADMIN — METHYLPREDNISOLONE SODIUM SUCCINATE 125 MG: 125 INJECTION, POWDER, FOR SOLUTION INTRAMUSCULAR; INTRAVENOUS at 18:18

## 2023-03-08 RX ADMIN — GABAPENTIN 600 MG: 600 TABLET, FILM COATED ORAL at 21:00

## 2023-03-08 RX ADMIN — GUAIFENESIN 200 MG: 100 SOLUTION ORAL at 22:32

## 2023-03-08 RX ADMIN — ALPRAZOLAM 1 MG: 0.5 TABLET ORAL at 17:48

## 2023-03-08 RX ADMIN — AZITHROMYCIN MONOHYDRATE 500 MG: 250 TABLET ORAL at 16:03

## 2023-03-08 RX ADMIN — ALBUTEROL SULFATE 2.5 MG: 2.5 SOLUTION RESPIRATORY (INHALATION) at 18:41

## 2023-03-08 ASSESSMENT — ENCOUNTER SYMPTOMS
BACK PAIN: 1
SINUS PRESSURE: 1
DIARRHEA: 1
NAUSEA: 1
RHINORRHEA: 1
GASTROINTESTINAL NEGATIVE: 1
COUGH: 1
ABDOMINAL PAIN: 0
SORE THROAT: 0
SHORTNESS OF BREATH: 1
VOMITING: 0

## 2023-03-08 ASSESSMENT — PAIN - FUNCTIONAL ASSESSMENT: PAIN_FUNCTIONAL_ASSESSMENT: 0-10

## 2023-03-08 ASSESSMENT — PAIN SCALES - GENERAL
PAINLEVEL_OUTOF10: 7
PAINLEVEL_OUTOF10: 0

## 2023-03-08 ASSESSMENT — PAIN DESCRIPTION - LOCATION: LOCATION: CHEST;HEAD

## 2023-03-08 ASSESSMENT — PAIN DESCRIPTION - DESCRIPTORS: DESCRIPTORS: ACHING

## 2023-03-08 NOTE — ED NOTES
Report given to [place], [address] on 4th floor     Heather Anderson, [facility]  03/08/23 9173

## 2023-03-08 NOTE — ED PROVIDER NOTES
[address] [facility]  eMERGENCY dEPARTMENT eNCOUnter      Pt Name: Diane Martin  MRN: [id number]  [place] 1950  Date of evaluation: 3/8/2023  Provider: Giorgio Mike MD    CHIEF COMPLAINT       Chief Complaint   Patient presents with    Hypertension     Pt arrived via ems with c/o htn and chest congestion. PT states BP at home was 859S systolic over 59N. Pt c/o sinus drainage and chest congestion and nauseated so unable to take medications. Chest Congestion         HISTORY OF PRESENT ILLNESS   (Location/Symptom, Timing/Onset,Context/Setting, Quality, Duration, Modifying Factors, Severity)  Note limiting factors. Diane Martin is a 74 y.o. female who presents to the emergency department complaints of hypertension and chest congestion. Patient states that her blood pressure at home was 190/90. Patient states that she has been having chest pressure for the last 1 to 2 days. Pressure is present in the left side of her chest and radiates to left shoulder blade. Pressure is exacerbated by stress and alleviated by rest.  Severity is 6 out of 10. Associated symptoms include shortness of breath and nausea. Patient has also had a productive cough of white sputum and congestion with sinus drainage. Patient denies lower extremity edema or pain, black or bloody stools, vomiting, or fever. Patient has had a small amount of diarrhea and decreased appetite over the last few days. Patient is not able to take most of her medications but did take her blood pressure medicine prior to arrival.  States that she had an episode of \"the flu\" in January and that the symptoms have seem to continue from that time. Patient has history of lung cancer for which she has been treated with radiation in 2022 and is now in remission. States that she does not have a primary care provider as Dr. Chiquita Lai has dismissed her from her practice.   Is normally on 3 to 3-1/2 L of oxygen per nasal cannula due to her alpha 1 antitrypsin disease/COPD. HPI    NursingNotes were reviewed. REVIEW OF SYSTEMS    (2-9 systems for level 4, 10 or more for level 5)     Review of Systems   Constitutional:  Positive for appetite change. Negative for chills and fever. HENT:  Positive for congestion and sinus pressure. Respiratory:  Positive for cough and shortness of breath. Cardiovascular:  Positive for chest pain. Negative for leg swelling. Gastrointestinal:  Positive for diarrhea and nausea. Negative for abdominal pain and vomiting. Genitourinary:  Negative for difficulty urinating and dysuria. Musculoskeletal:  Positive for back pain (Chronic). Negative for neck pain. Skin:  Negative for rash and wound. Neurological:  Positive for weakness (Generalized). Negative for speech difficulty and light-headedness. Psychiatric/Behavioral:  Negative for confusion and suicidal ideas. The patient is nervous/anxious. PAST MEDICALHISTORY     Past Medical History:   Diagnosis Date    ADHD (attention deficit hyperactivity disorder)     Anxiety     COPD (chronic obstructive pulmonary disease) (HCC)     Depression     Fibromyalgia     GERD (gastroesophageal reflux disease)     Hypertension     Malignant neoplasm of upper lobe of right lung ([facility] [address] 12/04/2018    Palliative care patient 10/06/2021    RA (rheumatoid arthritis) ([facility] [address]          SURGICAL HISTORY       Past Surgical History:   Procedure Laterality Date    CHOLECYSTECTOMY      HERNIA REPAIR      HYSTERECTOMY (CERVIX STATUS UNKNOWN)      LEG SURGERY      steel garth placement. 2009    THORACOSCOPY Right 8/12/2022    THORACOSCOPY DECORTICATION VIDEO ASSISTED performed by Shayne Gonsales MD at [address] [facility]     Previous Medications    ALPRAZOLAM (XANAX) 1 MG TABLET    Take 1 mg by mouth 4 times daily as needed. AMPHETAMINE-DEXTROAMPHETAMINE (ADDERALL) 20 MG TABLET    Take 20 mg by mouth in the morning and 20 mg before bedtime.  Pt has not had filled recently  last filled 11/15/21.    APIXABAN (ELIQUIS) 5 MG TABS TABLET    Take 2 tablets by mouth 2 times daily for 13 doses    ARFORMOTEROL TARTRATE (BROVANA) 15 MCG/2ML NEBU    Take 2 mLs by nebulization in the morning and 2 mLs in the evening.    BUDESON-GLYCOPYRROL-FORMOTEROL (BREZTRI AEROSPHERE) 160-9-4.8 MCG/ACT AERO    Inhale 2 puffs into the lungs 2 times daily    BUDESONIDE (PULMICORT) 0.25 MG/2ML NEBULIZER SUSPENSION    Take 2 mLs by nebulization 2 times daily    BUDESONIDE-FORMOTEROL (SYMBICORT) 160-4.5 MCG/ACT AERO    Inhale 2 puffs into the lungs 2 times daily    BUDESONIDE-FORMOTEROL (SYMBICORT) 160-4.5 MCG/ACT AERO    Inhale 2 puffs into the lungs 2 times daily    CITALOPRAM (CELEXA) 40 MG TABLET    Take 20 mg by mouth in the morning.    FLUTICASONE (FLONASE) 50 MCG/ACT NASAL SPRAY    INSTILL 1 SPRAY IN EACH NOSTRIL DAILY    FUROSEMIDE (LASIX) 40 MG TABLET    Take 1 tablet by mouth daily    GABAPENTIN (NEURONTIN) 600 MG TABLET    Take 600 mg by mouth 3 times daily    GUAIFENESIN (ROBITUSSIN) 100 MG/5ML SOLN ORAL SOLUTION    Take 10 mLs by mouth 2 times daily    HYDRALAZINE (APRESOLINE) 25 MG TABLET    Take 1 tablet by mouth every 12 hours    LEVALBUTEROL (XOPENEX HFA) 45 MCG/ACT INHALER    Inhale 1 puff into the lungs every 4 hours as needed for Wheezing    LIDOCAINE 4 % EXTERNAL PATCH    Place 1 patch onto the skin daily    LIDOCAINE, LIDODERM, 5% PATCH AND REMOVAL    Apply 1 patch topically daily    METOCLOPRAMIDE (REGLAN) 5 MG TABLET    Take 1 tablet by mouth 4 times daily    MONTELUKAST (SINGULAIR) 10 MG TABLET    Take 10 mg by mouth nightly    NICOTINE (NICODERM CQ) 21 MG/24HR    Place 1 patch onto the skin every 24 hours    ONDANSETRON (ZOFRAN ODT) 4 MG DISINTEGRATING TABLET    Take 1 tablet by mouth every 8 hours as needed for Nausea or Vomiting    OXYCODONE-ACETAMINOPHEN (PERCOCET)  MG PER TABLET    TAKE 1 TABLET Q 4 TO 6 HRS PRN. (MAX 5/DAY). Earliest Fill Date: 7/19/18    OXYGEN    Inhale into the  lungs    PANTOPRAZOLE (PROTONIX) 20 MG TABLET    TAKE 1 TABLET BY MOUTH 2 TIMES DAILY    PHENAZOPYRIDINE (PYRIDIUM) 200 MG TABLET    Take 1 tablet by mouth 3 times daily as needed for Pain (bladder discomfort)    PREDNISONE (DELTASONE) 10 MG TABLET    40 mg a day and taper by 10 mg every third day to off    SODIUM CHLORIDE (OCEAN, BABY AYR) 0.65 % NASAL SPRAY    1 spray by Nasal route as needed for Congestion (dry sores in nose)    SUCRALFATE (CARAFATE) 1 GM TABLET    Take 1 tablet by mouth 4 times daily    TIZANIDINE (ZANAFLEX) 4 MG TABLET    Take 1 tablet by mouth 3 times daily as needed (.)       ALLERGIES     Iv dye [iodides], Aspirin, Codeine, Demerol, Fentanyl, Neosporin [bacitracin-neomycin-polymyxin], Nsaids, Pcn [penicillins], Pentazocine lactate, Sulfa antibiotics, Talwin [pentazocine], and Influenza vaccines    FAMILY HISTORY       Family History   Problem Relation Age of Onset    Cancer Father           SOCIAL HISTORY       Social History     Socioeconomic History    Marital status:      Spouse name: None    Number of children: None    Years of education: None    Highest education level: None   Tobacco Use    Smoking status: Some Days     Packs/day: 0.25     Years: 30.00     Pack years: 7.50     Types: Cigarettes    Smokeless tobacco: Never   Vaping Use    Vaping Use: Never used   Substance and Sexual Activity    Alcohol use: No    Drug use: No       SCREENINGS    Fidel Coma Scale  Eye Opening: Spontaneous  Best Verbal Response: Oriented  Best Motor Response: Obeys commands  Fidel Coma Scale Score: 15        PHYSICAL EXAM    (up to 7 for level 4, 8 or more for level 5)     ED Triage Vitals   BP Temp Temp src Heart Rate Resp SpO2 Height Weight   03/08/23 1131 03/08/23 1131 -- 03/08/23 1131 03/08/23 1131 03/08/23 1131 03/08/23 1108 03/08/23 1108   (!) 141/85 98.6 °F (37 °C)  82 18 97 % 5' 6\" (1.676 m) 114 lb (51.7 kg)       Physical Exam  Vitals and nursing note reviewed.    Constitutional: General: She is not in acute distress. Comments: Harsh cough   HENT:      Head: Normocephalic and atraumatic. Right Ear: External ear normal.      Left Ear: External ear normal.      Nose: Congestion present. No rhinorrhea. Mouth/Throat:      Mouth: Mucous membranes are moist.      Pharynx: Oropharynx is clear. No oropharyngeal exudate or posterior oropharyngeal erythema. Eyes:      General: No scleral icterus. Conjunctiva/sclera: Conjunctivae normal.      Pupils: Pupils are equal, round, and reactive to light. Cardiovascular:      Rate and Rhythm: Normal rate and regular rhythm. Pulses: Normal pulses. Heart sounds: Murmur heard. Pulmonary:      Effort: Pulmonary effort is normal. No respiratory distress. Breath sounds: No stridor. Rales present. No wheezing or rhonchi. Abdominal:      General: Bowel sounds are normal. There is no distension. Palpations: Abdomen is soft. Tenderness: There is abdominal tenderness (Diffuse tenderness to palpation without guarding or rebound). There is no guarding or rebound. Musculoskeletal:         General: No tenderness or deformity. Cervical back: Neck supple. No rigidity. Right lower leg: No edema. Left lower leg: No edema. Skin:     General: Skin is warm and dry. Capillary Refill: Capillary refill takes less than 2 seconds. Coloration: Skin is not jaundiced. Neurological:      General: No focal deficit present. Mental Status: She is alert and oriented to person, place, and time. Cranial Nerves: No cranial nerve deficit. Sensory: No sensory deficit. Motor: Weakness (Generalized) present.       Coordination: Coordination normal.   Psychiatric:         Mood and Affect: Mood normal.         Behavior: Behavior normal.       DIAGNOSTIC RESULTS     EKG: All EKG's areinterpreted by the Emergency Department Physician who either signs or Co-signs this chart in the absence of a cardiologist.    EKG dated 3/8/2023 at 12:33 PM: Normal sinus rhythm, rate 80. Left atrial enlargement. Q waves in V1 and V2. Nonspecific T wave flattening in 1, aVL and V1 and V2.  QRS 76 QTc 446. RADIOLOGY:  Non-plain film images such as CT, Ultrasound and MRI are read by the radiologist. Plain radiographic images are visualized and preliminarily interpreted bythe emergency physician with the below findings:          XR CHEST PORTABLE   Final Result   COPD and emphysema. Hazy opacity in the right upper lobe. Pneumonia not excluded. Recommend chest   CT. CT CHEST WO CONTRAST    (Results Pending)           LABS:  Labs Reviewed   COMPREHENSIVE METABOLIC PANEL - Abnormal; Notable for the following components:       Result Value    Sodium 133 (*)     Chloride 93 (*)     CO2 31 (*)     BUN 6 (*)     All other components within normal limits   CBC WITH AUTO DIFFERENTIAL - Abnormal; Notable for the following components:    RBC 3.74 (*)     Hemoglobin 11.8 (*)     Hematocrit 36.8 (*)     MCH 31.6 (*)     MCHC 32.1 (*)     Neutrophils % 68.3 (*)     Lymphocytes % 19.6 (*)     All other components within normal limits   BLOOD GAS, ARTERIAL - Abnormal; Notable for the following components:    pCO2, Arterial 59.0 (*)     pO2, Arterial 74.0 (*)     HCO3, Arterial 38.3 (*)     Base Excess, Arterial 11.7 (*)     Hemoglobin, Art, Extended 11.7 (*)     All other components within normal limits   RESPIRATORY PANEL, MOLECULAR, WITH COVID-19   TROPONIN   BRAIN NATRIURETIC PEPTIDE   MAGNESIUM       All other labs were within normal range or not returned as of this dictation.     EMERGENCY DEPARTMENT COURSE and DIFFERENTIAL DIAGNOSIS/MDM:   Vitals:    Vitals:    03/08/23 1108 03/08/23 1131 03/08/23 1315 03/08/23 1530   BP:  (!) 141/85 (!) 108/51 (!) 103/52   Pulse:  82 75 79   Resp:  18 16 16   Temp:  98.6 °F (37 °C)     SpO2:  97% 97% 98%   Weight: 114 lb (51.7 kg)      Height: 5' 6\" (1.676 m)          MDM     Amount and/or Complexity of Data Reviewed  Clinical lab tests: reviewed  Tests in the radiology section of CPT®: reviewed  Decide to obtain previous medical records or to obtain history from someone other than the patient: yes    64-year-old female presents to the emergency department with chest pain, congestion, and hypertension. Patient's blood pressure here in the emergency department is 141/85 which has improved from home blood pressure. Lab, EKG, and radiology results reviewed. Discussed with KENDY Logan, hospitalist, who accepts patient for further evaluation and treatment. (Confirmed with Dr. Arturo Alberto office that patient has been dismissed from her practice. Spoke with Manuel Ortiz RN.)        CONSULTS:  None    PROCEDURES:  Unless otherwise noted below, none     Procedures    FINAL IMPRESSION      1. Chest pain, unspecified type    2. Shortness of breath    3.  Pneumonia of right upper lobe due to infectious organism          DISPOSITION/PLAN   DISPOSITION Decision To Admit 03/08/2023 03:21:09 PM           (Please note that portions of this note were completed with a voice recognition program.  Efforts were made to edit thedictations but occasionally words are mis-transcribed.)    Esmer Navarro MD (electronically signed)  Attending Emergency Physician          Esmer Navarro MD  03/08/23 9425

## 2023-03-08 NOTE — ED PROVIDER NOTES
[facility] EMERGENCY DEPT    Pt Name: Arlen Mccormick  MRN: [id number]  [place] 1950  Date of evaluation: 3/8/2023    As provider-in-triage, I performed a medical screening history and physical examination on this patient. HISTORY OF PRESENT ILLNESS  Arlen Mccormick is a 74 y.o. female with history including left lung cancer, anxiety, emphysema, COPD, hypertension, ADHD, fibromyalgia, RA, and depression who presents to the ER with shortness of breath and chest congestion. The patient states she has had the flu for about 10 days. Over the last 2 days, her symptoms have significantly worsened. She notes worsening shortness of breath and chest tightness. She also complains of wheezing. She notes cough and congestion. She denies any body aches or GI complaints. She typically is on 3 L of oxygen. PHYSICAL EXAM  BP (!) 141/85   Pulse 82   Temp 98.6 °F (37 °C)   Resp 18   Ht 5' 6\" (1.676 m)   Wt 114 lb (51.7 kg)   SpO2 97%   BMI 18.40 kg/m²     On exam, the patient appears in no acute distress. Speech is clear. Breathing is mildly labored on 2 liters. Wheezing and rhonchi heard bilaterally. Actively coughing. Congestion and rhinorrhea present. Moves all extremities. In order to expedite ED evaluation, initial laboratory/radiology orders as indicated were placed at this time. Due to ED capacity, patient was returned to the waiting room pending bed availability. Labs Reviewed   RESPIRATORY PANEL, MOLECULAR, WITH COVID-19   COMPREHENSIVE METABOLIC PANEL   CBC WITH AUTO DIFFERENTIAL   TROPONIN   BRAIN NATRIURETIC PEPTIDE   BLOOD GAS, ARTERIAL     Medications - No data to display  XR CHEST PORTABLE    (Results Pending)       1.  Shortness of breath        (Please note that portions of this note were completed with a voice recognition program.  Efforts were made to edit the dictations but occasionally words are mis-transcribed.)    CLARK Norris (electronically signed)     Mary Jane Healy PA  03/08/23 0376

## 2023-03-08 NOTE — H&P
[facility] Hospitalists      Hospitalist - History & Physical      PCP: Ellen Victoria MD    Date of Admission: 3/8/2023    Date of Service: 3/8/2023    Chief Complaint:  shortness of breath    History Of Present Illness: The patient is a 74 y.o. female with past medical history of COPD stage IV, hypertension, GERD, RUL lung cancer and anxiety who presents to [facility] ED with concerns of shortness of breath and chest congestion. Patient reports that she had the flu last month, never recovered, and symptoms have worsened over the last 2 days. She reports SOB/Chest tightness, as well as loose cough and nasal congestion. Also reports postnasal drip and left ear pain. Chest tightness is worsened by stress and relieved by rest. Is currently denying any CP. EKG shows NSR with a rate of 80. She wears 3 L of oxygen at home baseline. Chest x-ray in ER shows hazy opacity in right upper lobe, pneumonia not excluded. She was admitted back in August and had a VATS decortication due to RLL empyema. Patient will be observed by hospitalist group overnight for possible COPD exacerbation vs pneumonia pending chest CT results. Past Medical History:        Diagnosis Date    ADHD (attention deficit hyperactivity disorder)     Anxiety     COPD (chronic obstructive pulmonary disease) (HCC)     Depression     Fibromyalgia     GERD (gastroesophageal reflux disease)     Hypertension     Malignant neoplasm of upper lobe of right lung ([address] 12/04/2018    Palliative care patient 10/06/2021    RA (rheumatoid arthritis) ([facility]        Past Surgical History:        Procedure Laterality Date    CHOLECYSTECTOMY      HERNIA REPAIR      HYSTERECTOMY (CERVIX STATUS UNKNOWN)      LEG SURGERY      steel garth placement. 2009    THORACOSCOPY Right 8/12/2022    THORACOSCOPY DECORTICATION VIDEO ASSISTED performed by Heidi Martines MD at [address] [place] [address] Medications:  Prior to Admission medications    Medication Sig Start Date End Date Taking?  Authorizing Provider   budesonide-formoterol (SYMBICORT) 160-4.5 MCG/ACT AERO Inhale 2 puffs into the lungs 2 times daily 12/7/22   Angela Mendes MD   predniSONE (DELTASONE) 10 MG tablet 40 mg a day and taper by 10 mg every third day to off 11/17/22   Angela Mendes MD   Budeson-Glycopyrrol-Formoterol (BREZTRI AEROSPHERE) 160-9-4.8 MCG/ACT AERO Inhale 2 puffs into the lungs 2 times daily 9/7/22   Angela Mendes MD   LIDOCAINE, LIDODERM, 5% PATCH AND REMOVAL Apply 1 patch topically daily    Historical Provider, MD   apixaban (ELIQUIS) 5 MG TABS tablet Take 2 tablets by mouth 2 times daily for 13 doses 8/23/22 12/7/22  Kevon Rubalcava MD   Arformoterol Tartrate (BROVANA) 15 MCG/2ML NEBU Take 2 mLs by nebulization in the morning and 2 mLs in the evening. 8/23/22   Kevon Rubalcava MD   budesonide (PULMICORT) 0.25 MG/2ML nebulizer suspension Take 2 mLs by nebulization 2 times daily 8/23/22   Kevon Rubalcava MD   hydrALAZINE (APRESOLINE) 25 MG tablet Take 1 tablet by mouth every 12 hours 8/23/22   Kevon Rubalcava MD   guaiFENesin (ROBITUSSIN) 100 MG/5ML SOLN oral solution Take 10 mLs by mouth 2 times daily 8/23/22   Kevon Rubalcava MD   sodium chloride (OCEAN, BABY AYR) 0.65 % nasal spray 1 spray by Nasal route as needed for Congestion (dry sores in nose) 8/23/22   Kevon Rubalcava MD   lidocaine 4 % external patch Place 1 patch onto the skin daily 8/24/22   Kevon Rubalcava MD   furosemide (LASIX) 40 MG tablet Take 1 tablet by mouth daily 8/24/22   Kevon Rubalcava MD   levalbuterol [facility]) 45 MCG/ACT inhaler Inhale 1 puff into the lungs every 4 hours as needed for Wheezing 6/29/22 6/29/23  Angela Mendes MD   sucralfate (CARAFATE) 1 GM tablet Take 1 tablet by mouth 4 times daily 6/25/22   Hector Cruz MD   phenazopyridine (PYRIDIUM) 200 MG tablet Take 1 tablet by mouth 3 times daily as needed for Pain (bladder discomfort) 11/14/21   Carrie Krishnamurthy MD   montelukast (SINGULAIR) 10 MG tablet Take 10 mg by mouth nightly    Historical Provider, MD   oxyCODONE-acetaminophen (PERCOCET)  MG per tablet TAKE 1 TABLET Q 4 TO 6 HRS PRN. (MAX 5/DAY). Earliest Fill Date: 7/19/18 7/19/18 12/7/22  Franki Lee MD   ondansetron (ZOFRAN ODT) 4 MG disintegrating tablet Take 1 tablet by mouth every 8 hours as needed for Nausea or Vomiting 5/1/18   TEN Ramirez   nicotine (NICODERM CQ) 21 MG/24HR Place 1 patch onto the skin every 24 hours 5/1/18   TEN Ramirez   tiZANidine (ZANAFLEX) 4 MG tablet Take 1 tablet by mouth 3 times daily as needed (.) 4/30/18   Franki Lee MD   budesonide-formoterol [facility]) 160-4.5 MCG/ACT AERO Inhale 2 puffs into the lungs 2 times daily 4/27/18   Bhavani Camacho MD   albuterol-ipratropium (COMBIVENT RESPIMAT)  MCG/ACT AERS inhaler Inhale 1 puff into the lungs every 6 hours 4/27/18 8/23/22  Bhavani Camacho MD   albuterol sulfate HFA (VENTOLIN HFA) 108 (90 Base) MCG/ACT inhaler Inhale 2 puffs into the lungs every 6 hours as needed for Wheezing 4/27/18 8/23/22  Bhavani Camacho MD   pantoprazole (PROTONIX) 20 MG tablet TAKE 1 TABLET BY MOUTH 2 TIMES DAILY 12/18/17   Bhavani Camacho MD   fluticasone [facility]) 50 MCG/ACT nasal spray INSTILL 1 SPRAY IN EACH NOSTRIL DAILY 10/4/17   Bhavani Camacho MD   OXYGEN Inhale into the lungs    Historical Provider, MD   metoclopramide (REGLAN) 5 MG tablet Take 1 tablet by mouth 4 times daily 3/30/17   Bhavani Camacho MD   gabapentin (NEURONTIN) 600 MG tablet Take 600 mg by mouth 3 times daily    Historical Provider, MD   ALPRAZolam [facility]) 1 MG tablet Take 1 mg by mouth 4 times daily as needed. 1/15/15   Historical Provider, MD   amphetamine-dextroamphetamine (ADDERALL) 20 MG tablet Take 20 mg by mouth in the morning and 20 mg before bedtime.  Pt has not had filled recently  last filled 11/15/21. 1/21/15   Historical Provider, MD   citalopram (CELEXA) 40 MG tablet Take 20 mg by mouth in the morning. Historical Provider, MD       Allergies: Iv dye [iodides], Aspirin, Codeine, Demerol, Fentanyl, Neosporin [bacitracin-neomycin-polymyxin], Nsaids, Pcn [penicillins], Pentazocine lactate, Sulfa antibiotics, Talwin [pentazocine], and Influenza vaccines    Social History:      Tobacco:   reports that she has been smoking cigarettes. She has a 7.50 pack-year smoking history. She has never used smokeless tobacco.  Alcohol:   reports no history of alcohol use. Illicit Drugs: denies    Family History:      Problem Relation Age of Onset    Cancer Father        Review of Systems   Constitutional:  Positive for fever. HENT:  Positive for congestion, ear pain, postnasal drip and rhinorrhea. Negative for sore throat. Respiratory:  Positive for cough. Cardiovascular: Negative. Gastrointestinal: Negative. Genitourinary: Negative. Musculoskeletal: Negative. Neurological: Negative. Psychiatric/Behavioral: Negative. Physical Examination:  BP (!) 103/52   Pulse 79   Temp 99.2 °F (37.3 °C) (Oral)   Resp 16   Ht 5' 6\" (1.676 m)   Wt 114 lb (51.7 kg)   SpO2 98%   BMI 18.40 kg/m²     Physical Exam  Constitutional:       General: She is not in acute distress. Appearance: Normal appearance. She is underweight. HENT:      Head: Normocephalic. Left Ear: A middle ear effusion is present. Nose: Congestion present. Mouth/Throat:      Mouth: Mucous membranes are moist.      Pharynx: Oropharynx is clear. No posterior oropharyngeal erythema. Eyes:      Pupils: Pupils are equal, round, and reactive to light. Cardiovascular:      Rate and Rhythm: Normal rate and regular rhythm. Pulses: Normal pulses. Heart sounds: Normal heart sounds. No murmur heard. Pulmonary:      Effort: Pulmonary effort is normal.      Breath sounds: Examination of the right-lower field reveals decreased breath sounds.  Examination of the left-lower field reveals decreased breath sounds. Decreased breath sounds present. Comments: Expiratory wheezing noted to posterior left lung  Abdominal:      General: Abdomen is flat. Palpations: Abdomen is soft. Musculoskeletal:         General: Normal range of motion. Skin:     General: Skin is warm and dry. Capillary Refill: Capillary refill takes less than 2 seconds. Neurological:      General: No focal deficit present. Mental Status: She is alert and oriented to person, place, and time. Mental status is at baseline. Psychiatric:         Mood and Affect: Mood normal.         Behavior: Behavior normal.         Thought Content: Thought content normal.         Judgment: Judgment normal.        Diagnostic Data:    CBC:  Recent Labs     03/08/23  1218   WBC 5.9   HGB 11.8*   HCT 36.8*          BMP:  Recent Labs     03/08/23  1218 03/08/23  1436   *  --    K 3.9 3.9   CL 93*  --    CO2 31*  --    BUN 6*  --    CREATININE 0.5  --    CALCIUM 9.5  --      Recent Labs     03/08/23  1218   AST 14   ALT 8   BILITOT <0.2   ALKPHOS 99       Coag Panel: No results for input(s): INR, PROTIME, APTT in the last 72 hours. Cardiac Enzymes:   Recent Labs     03/08/23  1218   TROPONINI <0.01       ABGs:  Lab Results   Component Value Date/Time    PHART 7.420 03/08/2023 02:36 PM    PO2ART 74.0 03/08/2023 02:36 PM    MVG5TZD 59.0 03/08/2023 02:36 PM       Urinalysis:  Lab Results   Component Value Date/Time    NITRU Negative 08/03/2022 06:00 AM    WBCUA 1 08/03/2022 06:00 AM    BACTERIA NEGATIVE 08/03/2022 06:00 AM    RBCUA 0 08/03/2022 06:00 AM    BLOODU Negative 08/03/2022 06:00 AM    SPECGRAV 1.007 08/03/2022 06:00 AM    GLUCOSEU Negative 08/03/2022 06:00 AM       A1C: No results for input(s): LABA1C in the last 72 hours.     ABG:  Recent Labs     03/08/23  1436   PHART 7.420   AYO1PTW 59.0*   PO2ART 74.0*   HLY0TZZ 38.3*   BEART 11.7*   HGBAE 11.7*   A4UCGNWR 95.0   CARBOXHGBART 2.2       Rad:    XR CHEST PORTABLE    Result Date: 3/8/2023  CLINICAL HISTORY: Shortness of breath TECHNIQUE: Single AP view of the chest COMPARISON: CXR from 12/8/2022 FINDINGS: Lines and tubes: None. Cardiomediastinal: Normal size and configuration of the cardiomediastinal silhouette. No pneumomediastinum. Lungs and pleura: COPD and emphysema. Hazy right upper lobe opacity. Biapical pleural thickening. No pleural effusion. No pneumothorax. Musculoskeletal: No acute osseous abnormalities. Osseous degenerative changes. Other: None. COPD and emphysema. Hazy opacity in the right upper lobe. Pneumonia not excluded. Recommend chest CT. Assessment/Plan:   Principal Problem:    Shortness of breath/URI symptoms/COPD/Chest tightness   -CXR shows hazy opacity in RUL, pneumonia not excluded   -CT chest pending   -Supplemental O2 as needed; currently 98% on home 3L   -Continue home inhalers   -Duonebs Q4hrs while awake   -Solu-Medrol 125mg   -Antibiotics; azithromycin/Rocephin   -Vitals per unit routine   -Flonase, zyrtec   -Trend trops    Active Problems:    Anxiety   -Home ativan ordered      GERD (gastroesophageal reflux disease)   -Protonix/Carafate    Antibiotic: Rocephin/Azith    DVT Prophylaxis: Lovenox     GI prophylaxis:  Protonix     Discharge planning:  Tomorrow    Advance Directive: Prior    Diet: No diet orders on file     Consults Made:   None    Further Orders per Clinical course/attending. Signed:  Electronically signed by TEN Christie CNP on 3/8/23 at 4:45 PM CST     EMR Dragon/Transcription disclaimer:   Much of this encounter note is an electronic transcription/translation of spoken language to printed text.  The electronic translation of spoken language may permit erroneous, or at times, nonsensical words or phrases to be inadvertently transcribed; although attempts have made to review the note for such errors, some may still exist.

## 2023-03-09 PROBLEM — R07.9 CHEST PAIN: Status: ACTIVE | Noted: 2023-03-09

## 2023-03-09 LAB
ANION GAP SERPL CALCULATED.3IONS-SCNC: 10 MMOL/L (ref 7–19)
BASOPHILS ABSOLUTE: 0 K/UL (ref 0–0.2)
BASOPHILS RELATIVE PERCENT: 0.5 % (ref 0–1)
BUN BLDV-MCNC: 10 MG/DL (ref 8–23)
CALCIUM SERPL-MCNC: 9.1 MG/DL (ref 8.8–10.2)
CHLORIDE BLD-SCNC: 97 MMOL/L (ref 98–111)
CO2: 27 MMOL/L (ref 22–29)
CREAT SERPL-MCNC: 0.7 MG/DL (ref 0.5–0.9)
EKG P AXIS: 80 DEGREES
EKG P AXIS: 86 DEGREES
EKG P-R INTERVAL: 156 MS
EKG P-R INTERVAL: 158 MS
EKG Q-T INTERVAL: 376 MS
EKG Q-T INTERVAL: 380 MS
EKG QRS DURATION: 70 MS
EKG QRS DURATION: 76 MS
EKG QTC CALCULATION (BAZETT): 409 MS
EKG QTC CALCULATION (BAZETT): 411 MS
EKG T AXIS: 70 DEGREES
EKG T AXIS: 82 DEGREES
EOSINOPHILS ABSOLUTE: 0 K/UL (ref 0–0.6)
EOSINOPHILS RELATIVE PERCENT: 0 % (ref 0–5)
GFR SERPL CREATININE-BSD FRML MDRD: >60 ML/MIN/{1.73_M2}
GLUCOSE BLD-MCNC: 130 MG/DL (ref 74–109)
HBA1C MFR BLD: 5.1 % (ref 4–6)
HCT VFR BLD CALC: 32.9 % (ref 37–47)
HEMOGLOBIN: 10.6 G/DL (ref 12–16)
IMMATURE GRANULOCYTES #: 0 K/UL
LV EF: 58 %
LVEF MODALITY: NORMAL
LYMPHOCYTES ABSOLUTE: 0.6 K/UL (ref 1.1–4.5)
LYMPHOCYTES RELATIVE PERCENT: 14.8 % (ref 20–40)
MCH RBC QN AUTO: 31.7 PG (ref 27–31)
MCHC RBC AUTO-ENTMCNC: 32.2 G/DL (ref 33–37)
MCV RBC AUTO: 98.5 FL (ref 81–99)
MONOCYTES ABSOLUTE: 0.1 K/UL (ref 0–0.9)
MONOCYTES RELATIVE PERCENT: 1.2 % (ref 0–10)
NEUTROPHILS ABSOLUTE: 3.4 K/UL (ref 1.5–7.5)
NEUTROPHILS RELATIVE PERCENT: 83 % (ref 50–65)
PDW BLD-RTO: 12.8 % (ref 11.5–14.5)
PLATELET # BLD: 258 K/UL (ref 130–400)
PMV BLD AUTO: 11.4 FL (ref 9.4–12.3)
POTASSIUM REFLEX MAGNESIUM: 4.9 MMOL/L (ref 3.5–5)
PROCALCITONIN: 0.06 NG/ML (ref 0–0.09)
RBC # BLD: 3.34 M/UL (ref 4.2–5.4)
SODIUM BLD-SCNC: 134 MMOL/L (ref 136–145)
WBC # BLD: 4.1 K/UL (ref 4.8–10.8)

## 2023-03-09 PROCEDURE — G0378 HOSPITAL OBSERVATION PER HR: HCPCS

## 2023-03-09 PROCEDURE — 94640 AIRWAY INHALATION TREATMENT: CPT

## 2023-03-09 PROCEDURE — 2580000003 HC RX 258

## 2023-03-09 PROCEDURE — 6360000002 HC RX W HCPCS

## 2023-03-09 PROCEDURE — 97535 SELF CARE MNGMENT TRAINING: CPT

## 2023-03-09 PROCEDURE — 80048 BASIC METABOLIC PNL TOTAL CA: CPT

## 2023-03-09 PROCEDURE — 93005 ELECTROCARDIOGRAM TRACING: CPT

## 2023-03-09 PROCEDURE — 6370000000 HC RX 637 (ALT 250 FOR IP): Performed by: HOSPITALIST

## 2023-03-09 PROCEDURE — 96376 TX/PRO/DX INJ SAME DRUG ADON: CPT

## 2023-03-09 PROCEDURE — 94760 N-INVAS EAR/PLS OXIMETRY 1: CPT

## 2023-03-09 PROCEDURE — 36415 COLL VENOUS BLD VENIPUNCTURE: CPT

## 2023-03-09 PROCEDURE — 6370000000 HC RX 637 (ALT 250 FOR IP)

## 2023-03-09 PROCEDURE — 2500000003 HC RX 250 WO HCPCS

## 2023-03-09 PROCEDURE — 6360000002 HC RX W HCPCS: Performed by: INTERNAL MEDICINE

## 2023-03-09 PROCEDURE — 6370000000 HC RX 637 (ALT 250 FOR IP): Performed by: STUDENT IN AN ORGANIZED HEALTH CARE EDUCATION/TRAINING PROGRAM

## 2023-03-09 PROCEDURE — 97165 OT EVAL LOW COMPLEX 30 MIN: CPT

## 2023-03-09 PROCEDURE — 93306 TTE W/DOPPLER COMPLETE: CPT

## 2023-03-09 PROCEDURE — 83036 HEMOGLOBIN GLYCOSYLATED A1C: CPT

## 2023-03-09 PROCEDURE — 97530 THERAPEUTIC ACTIVITIES: CPT

## 2023-03-09 PROCEDURE — 85025 COMPLETE CBC W/AUTO DIFF WBC: CPT

## 2023-03-09 PROCEDURE — 84145 PROCALCITONIN (PCT): CPT

## 2023-03-09 PROCEDURE — 2700000000 HC OXYGEN THERAPY PER DAY

## 2023-03-09 PROCEDURE — 99223 1ST HOSP IP/OBS HIGH 75: CPT | Performed by: INTERNAL MEDICINE

## 2023-03-09 RX ORDER — METHYLPREDNISOLONE SODIUM SUCCINATE 125 MG/2ML
60 INJECTION, POWDER, LYOPHILIZED, FOR SOLUTION INTRAMUSCULAR; INTRAVENOUS EVERY 6 HOURS
Status: COMPLETED | OUTPATIENT
Start: 2023-03-09 | End: 2023-03-10

## 2023-03-09 RX ORDER — OXYCODONE HYDROCHLORIDE AND ACETAMINOPHEN 5; 325 MG/1; MG/1
1 TABLET ORAL ONCE
Status: COMPLETED | OUTPATIENT
Start: 2023-03-09 | End: 2023-03-09

## 2023-03-09 RX ORDER — OXYCODONE AND ACETAMINOPHEN 10; 325 MG/1; MG/1
1 TABLET ORAL EVERY 6 HOURS PRN
Status: DISCONTINUED | OUTPATIENT
Start: 2023-03-09 | End: 2023-03-10 | Stop reason: HOSPADM

## 2023-03-09 RX ADMIN — ALBUTEROL SULFATE 2.5 MG: 2.5 SOLUTION RESPIRATORY (INHALATION) at 04:40

## 2023-03-09 RX ADMIN — ALPRAZOLAM 1 MG: 0.5 TABLET ORAL at 09:50

## 2023-03-09 RX ADMIN — SUCRALFATE 1 G: 1 TABLET ORAL at 09:38

## 2023-03-09 RX ADMIN — GABAPENTIN 600 MG: 600 TABLET, FILM COATED ORAL at 13:51

## 2023-03-09 RX ADMIN — ALPRAZOLAM 1 MG: 0.5 TABLET ORAL at 17:14

## 2023-03-09 RX ADMIN — SODIUM CHLORIDE, PRESERVATIVE FREE 10 ML: 5 INJECTION INTRAVENOUS at 20:01

## 2023-03-09 RX ADMIN — BUDESONIDE AND FORMOTEROL FUMARATE DIHYDRATE 2 PUFF: 160; 4.5 AEROSOL RESPIRATORY (INHALATION) at 06:54

## 2023-03-09 RX ADMIN — GABAPENTIN 600 MG: 600 TABLET, FILM COATED ORAL at 09:38

## 2023-03-09 RX ADMIN — FLUTICASONE PROPIONATE 1 SPRAY: 50 SPRAY, METERED NASAL at 09:38

## 2023-03-09 RX ADMIN — SODIUM CHLORIDE, PRESERVATIVE FREE 10 ML: 5 INJECTION INTRAVENOUS at 09:37

## 2023-03-09 RX ADMIN — TIOTROPIUM BROMIDE INHALATION SPRAY 2 PUFF: 3.12 SPRAY, METERED RESPIRATORY (INHALATION) at 06:54

## 2023-03-09 RX ADMIN — AZITHROMYCIN MONOHYDRATE 500 MG: 250 TABLET ORAL at 18:28

## 2023-03-09 RX ADMIN — SUCRALFATE 1 G: 1 TABLET ORAL at 13:21

## 2023-03-09 RX ADMIN — SUCRALFATE 1 G: 1 TABLET ORAL at 17:15

## 2023-03-09 RX ADMIN — MONTELUKAST 10 MG: 10 TABLET, FILM COATED ORAL at 20:00

## 2023-03-09 RX ADMIN — ONDANSETRON 4 MG: 4 TABLET, ORALLY DISINTEGRATING ORAL at 09:50

## 2023-03-09 RX ADMIN — OXYCODONE HYDROCHLORIDE AND ACETAMINOPHEN 1 TABLET: 5; 325 TABLET ORAL at 05:15

## 2023-03-09 RX ADMIN — OXYCODONE AND ACETAMINOPHEN 1 TABLET: 10; 325 TABLET ORAL at 17:14

## 2023-03-09 RX ADMIN — BUDESONIDE AND FORMOTEROL FUMARATE DIHYDRATE 2 PUFF: 160; 4.5 AEROSOL RESPIRATORY (INHALATION) at 19:01

## 2023-03-09 RX ADMIN — CETIRIZINE HYDROCHLORIDE 10 MG: 10 TABLET, FILM COATED ORAL at 09:38

## 2023-03-09 RX ADMIN — GABAPENTIN 600 MG: 600 TABLET, FILM COATED ORAL at 20:00

## 2023-03-09 RX ADMIN — METHYLPREDNISOLONE SODIUM SUCCINATE 60 MG: 125 INJECTION, POWDER, FOR SOLUTION INTRAMUSCULAR; INTRAVENOUS at 18:28

## 2023-03-09 RX ADMIN — GUAIFENESIN 200 MG: 100 SOLUTION ORAL at 09:37

## 2023-03-09 RX ADMIN — SUCRALFATE 1 G: 1 TABLET ORAL at 20:00

## 2023-03-09 RX ADMIN — GUAIFENESIN 200 MG: 100 SOLUTION ORAL at 20:00

## 2023-03-09 RX ADMIN — PANTOPRAZOLE SODIUM 40 MG: 40 TABLET, DELAYED RELEASE ORAL at 06:40

## 2023-03-09 RX ADMIN — CITALOPRAM HYDROBROMIDE 20 MG: 20 TABLET ORAL at 09:38

## 2023-03-09 ASSESSMENT — ENCOUNTER SYMPTOMS
RHINORRHEA: 1
COUGH: 1
GASTROINTESTINAL NEGATIVE: 1
SHORTNESS OF BREATH: 1

## 2023-03-09 ASSESSMENT — PAIN DESCRIPTION - LOCATION: LOCATION: OTHER (COMMENT)

## 2023-03-09 ASSESSMENT — PAIN SCALES - GENERAL: PAINLEVEL_OUTOF10: 8

## 2023-03-09 ASSESSMENT — PAIN DESCRIPTION - DESCRIPTORS: DESCRIPTORS: ACHING

## 2023-03-09 NOTE — CONSULTS
Palliative Care:Patient Known to palliative Care  Ms Daniella Hidalgo is a 74year old female, she is alert and oriented to person ,place, time. This RN introduced self and is working with palliative care  provided  information on palliative care with patients with chronic illness. Patient was able to ask questions and answer questions in regard to her illness. Patient presented self to the ER with symptoms of weakness, shortness of breath , cough . Per patient she had flu about 10 days ago and symptom worsen  patient uses 2 at home at times  and Now using continuous 02 3 liters at home since new onset. Patient  resides with Her two daughters and grandchild. Per Ms Daniella Hidalgo she would not be able to reside in her home with out her children , patient expressed finical concerts due to limited   income. Past Medical History:        Past Medical History:   Diagnosis Date    ADHD (attention deficit hyperactivity disorder)     Anxiety     COPD (chronic obstructive pulmonary disease) (HCC)     Depression     Fibromyalgia     GERD (gastroesophageal reflux disease)     Hypertension     Malignant neoplasm of upper lobe of right lung ([facility] 12/04/2018    Palliative care patient 10/06/2021    RA (rheumatoid arthritis) ([facility]        Advance Directives:  Ms Daniella Hidalgo reports she has a living will but would like to change the living will to have tube feeds         Pain/Other Symptoms:  reports No pain today      How are symptoms affecting QOL  Shortness of breath, weakness, cough , decrease appetite   fatigue           Psychological/Spiritual:  does not want to see a  today , but has spiritual values and beliefs. Plan:  Ms Daniella Hidalgo reports she will be going to her private resident  at discharge and living with her family       Palliative Performance Scale:40      Review of any needed services: DME at home patient has 02 , walker, trilogy use    Requires some assist with ADL's due to shortness of breath and weakness. Electronically signed by Marco Pimentel RN on 3/9/2023 at 11:39 AM

## 2023-03-09 NOTE — PROGRESS NOTES
03/09/23 1714   Encounter Summary   Encounter Overview/Reason  Advance Care Planning   Service Provided For: Patient   Referral/Consult From: Palliative Care   Support System Family members   Complexity of Encounter Moderate   Begin Time 1645   End Time  1715   Total Time Calculated 30 min   Spiritual/Emotional needs   Type Spiritual Support   Advance Care Planning   Type ACP conversation   Assessment/Intervention/Outcome   Assessment Anxious; Hopeful   Intervention Active listening;Prayer (assurance of)/[place]   Outcome Expressed Gratitude      Edna, Palliative Care RN asked to assist with a new LW. Ms. Delfina Asher just got back from the test. Abdulkadir Mcginnis she wants to make some changes on her LW but not able to do it today. Requested a return visit tomorrow. Will follow up.      Electronically signed by Chitra Blackwood on 3/9/2023 at 5:22 PM

## 2023-03-09 NOTE — CARE COORDINATION
Case Management Assessment  Initial Evaluation    Date/Time of Evaluation: 3/9/2023 9:45 AM  Assessment Completed by: Alex Rasmussen RN    If patient is discharged prior to next notation, then this note serves as note for discharge by case management. Patient Name: Rashawn Newby                   YOB: 1950  Diagnosis: Shortness of breath [R06.02]  Pneumonia of right upper lobe due to infectious organism [J18.9]  Chest pain, unspecified type [R07.9]                   Date / Time: 3/8/2023 11:05 AM    Patient Admission Status: Observation   Readmission Risk (Low < 19, Mod (19-27), High > 27): Readmission Risk Score: 23.2    Current PCP: Shelly Joel MD  PCP verified by CM? Yes (Patient no longer wants to see Dr. Fran Lubin and requesting a [address] [facility] PCP. Informed Char Gilbert, to establish new PCP.)    Chart Reviewed: Yes      History Provided by: Patient  Patient Orientation: Alert and Oriented, Person, Place, Situation    Patient Cognition: Alert    Hospitalization in the last 30 days (Readmission):  No    If yes, Readmission Assessment in CM Navigator will be completed. Advance Directives:      Code Status: Full Code   Patient's Primary Decision Maker is:      Primary Decision Maker: Debi Cope - Child - [phone]    Secondary Decision Maker: Divine Sánchez - Child - [phone]    Secondary Decision Maker: Julius Cuba - [phone]    Discharge Planning:    Patient lives with: Children Type of Home: House  Primary Care Giver:    Patient Support Systems include: Family Members   Current Financial resources:  (no financial needs identified)  Current community resources:    Current services prior to admission: Durable Medical Equipment, Oxygen Therapy, Other (Comment) (uses O2 at 3-3.5L and Trilogy; supplied by [employer])            Current DME: Walker            Type of Home Care services:  None    ADLS  Prior functional level:  Independent in ADLs/IADLs  Current functional level: Independent in ADLs/IADLs    PT AM-PAC:   /24  OT AM-PAC:   /24    Family can provide assistance at DC: Yes  Would you like Case Management to discuss the discharge plan with any other family members/significant others, and if so, who? Yes (individuals listed as contacts)  Plans to Return to Present Housing: Yes  Other Identified Issues/Barriers to RETURNING to current housing: N/A  Potential Assistance needed at discharge: N/A            Potential DME:    Patient expects to discharge to: [address] [place] [address] for transportation at discharge: Family    Financial    Payor: Satish Joseph / Plan: [employer] ESSENTIAL/PLUS / Product Type: *No Product type* /     Does insurance require precert for SNF: Yes    Potential assistance Purchasing Medications: No  Meds-to-Beds request:        [facility] [address], [address] [facility] [address] [place] [address]  Phone: [phone] Fax: [phone]      Notes:    Factors facilitating achievement of predicted outcomes: Family support and Has needed Durable Medical Equipment at home    Barriers to discharge: N/A    Additional Case Management Notes:   Patient lives at home with her daughter, Arnold Leventhal, and plans to return home at discharge. Her daughter, Jeanne Saldana, is also temporarily staying with patient. Prior to admission, patient was independent with ADLs. She has a walker available to use, if needed. She uses home oxygen at 3 to 3.5L, supplied by [employer]. She also has a Trilogy from [employer] that she uses PRN. Patient states she gets up frequently to go to the bathroom. Because of the frequent trips, it is difficult to consistently use her Trilogy. Patient saw her PCP, Dr. Lina Jenkins, approximately 6 months ago. She is requesting a new PCP that is located near the hospital.  Informed Alfred Arnold, to assist in establishing patient with a new PCP. Patient did not receive the Covid vaccine.   She is not interested in [address]. She has safe transportation home. Explained [address] [employer] Worker program.  Provided literature. Patient consented to participate in CHW program.  Information faxed to [facility]. The Plan for Transition of Care is related to the following treatment goals of Shortness of breath [R06.02]  Pneumonia of right upper lobe due to infectious organism [J18.9]  Chest pain, unspecified type [U76.7]    IF APPLICABLE: The Patient and/or patient representative Jus Mathew and her family were provided with a choice of provider and agrees with the discharge plan. Freedom of choice list with basic dialogue that supports the patient's individualized plan of care/goals and shares the quality data associated with the providers was provided to: Patient   Patient Representative Name:       The Patient and/or Patient Representative Agree with the Discharge Plan?  Yes    Virginia Cardenas RN  Case Management Department  Ph: [phone] Fax: [phone]

## 2023-03-09 NOTE — CONSULTS
Pulmonary and Critical Care Consult Note    THE [facility] Bhavana Strange    MRN# [id number]    Acct# [de-identified]  3/9/2023   5:59 PM CST    Referring Provider:Reese Hay MD      Chief Complaint: Shortness of breath     Requesting physician: Dr. Maribel Hay    Reason for consult: Shortness of breath. HPI: We have been consulted to see this 74y.o. year old female born on 1950. The patient presented to the hospital due to increased shortness of breath and elevated blood pressure. She says her blood pressure usually runs low. She was concerned when she saw her blood pressure reading of 160/89. She has multiple somatic complaints relating to chronic pains. She is concerned that she does not have a pain doctor to address her pain management needs. She also apparently lost her primary care provider. She had a CT of the chest in the emergency room that was unremarkable with stable chronic changes. Currently the patient is at her baseline respiratory status. She was started on steroids and empirical antibiotic therapy for the treatment of COPD exacerbation. She says that she could not tolerate oral steroids and requesting to be changed to IV Solu-Medrol. Past Medical History      Past Medical History:   Diagnosis Date    ADHD (attention deficit hyperactivity disorder)     Anxiety     COPD (chronic obstructive pulmonary disease) (HCC)     Depression     Fibromyalgia     GERD (gastroesophageal reflux disease)     Hypertension     Malignant neoplasm of upper lobe of right lung ([address] 12/04/2018    Palliative care patient 10/06/2021    RA (rheumatoid arthritis) ([facility] [address]      SurgicalHistory  Past Surgical History:   Procedure Laterality Date    CHOLECYSTECTOMY      HERNIA REPAIR      HYSTERECTOMY (CERVIX STATUS UNKNOWN)      LEG SURGERY      steel garth placement. 2009    THORACOSCOPY Right 8/12/2022    THORACOSCOPY DECORTICATION VIDEO ASSISTED performed [facility] Mary Dee MD [facility] [address] [facility]     Allergies  Allergies   Allergen Reactions    Iv Dye [Iodides] Swelling     PT states throat swells    Aspirin Hives    Codeine     Demerol     Fentanyl Hives    Neosporin [Bacitracin-Neomycin-Polymyxin]     Nsaids     Pcn [Penicillins]     Pentazocine Lactate     Sulfa Antibiotics     Talwin [Pentazocine] Hives    Influenza Vaccines Hives and Rash     Medications    methylPREDNISolone, 60 mg, IntraVENous, Q6H    sodium chloride flush, 5-40 mL, IntraVENous, 2 times per day    enoxaparin, 40 mg, SubCUTAneous, Daily    azithromycin, 500 mg, Oral, Daily    fluticasone, 1 spray, Each Nostril, Daily    cetirizine, 10 mg, Oral, Daily    citalopram, 20 mg, Oral, Daily    gabapentin, 600 mg, Oral, TID    guaiFENesin, 200 mg, Oral, BID    montelukast, 10 mg, Oral, Nightly    pantoprazole, 40 mg, Oral, QAM AC    sucralfate, 1 g, Oral, 4x Daily    budesonide-formoterol, 2 puff, Inhalation, BID    tiotropium, 2 puff, Inhalation, Daily   Social History   reports that she has been smoking cigarettes. She has a 7.50 pack-year smoking history. She has never used smokeless tobacco. She reports that she does not drink alcohol and does not use drugs. Family History  family history includes Cancer in her father.     Review of Systems:  12 point review of systems is negative except as below:  CONSTITUTIONAL:  positive for  malaise  RESPIRATORY:  positive for  dyspnea and wheezing  CARDIOVASCULAR:  positive for  chest pain, dyspnea    Physical Exam:  /61   Pulse 82   Temp 97.3 °F (36.3 °C) (Temporal)   Resp 20   Ht 5' 6\" (1.676 m)   Wt 114 lb (51.7 kg)   SpO2 95%   BMI 18.40 kg/m²   Intake/Output Summary (Last 24 hours) at 3/9/2023 2884  Last data filed at 3/9/2023 1307  Gross per 24 hour   Intake 450 ml   Output 300 ml   Net 150 ml       General appearance: Elderly frail female in no distress   HEENT: Normocephalic atraumatic  Heart: S1-S2 distant sounds no murmurs  Lungs: Diminished bilaterally no rubs or tenderness on dullness to percussion  Abdomen: Soft nontender no organomegalies normal bowel sounds  Extremities: No clubbing cyanosis or edema  Neuro: No focal findings  Skin: Intact        Labs:  Recent Labs     03/08/23  1218 03/09/23  0256   WBC 5.9 4.1*   RBC 3.74* 3.34*   HGB 11.8* 10.6*   HCT 36.8* 32.9*    258   MCV 98.4 98.5   MCH 31.6* 31.7*   MCHC 32.1* 32.2*   RDW 12.7 12.8      Recent Labs     03/08/23  1218 03/08/23  1436 03/09/23  0256   *  --  134*   K 3.9 3.9 4.9   CL 93*  --  97*   CO2 31*  --  27   BUN 6*  --  10   CREATININE 0.5  --  0.7   CALCIUM 9.5  --  9.1   GLUCOSE 99  --  130*      Recent Labs     03/08/23  1436   PHART 7.420   ZAK1GFX 59.0*   PO2ART 74.0*   RRD5IMB 38.3*   D6UVAYJD 95.0   BEART 11.7*     Recent Labs     03/08/23  1218 03/08/23  1744 03/08/23  2019 03/09/23  0256   AST 14  --   --   --    ALT 8  --   --   --    ALKPHOS 99  --   --   --    BILITOT <0.2  --   --   --    MG 1.5*  --   --   --    CALCIUM 9.5  --   --  9.1   PROBNP 103  --   --   --    TROPONINI <0.01   < > <0.01  --    PROCAL  --   --   --  0.06    < > = values in this interval not displayed. No results for input(s): BC, LABGRAM, CULTRESP, BFCX in the last 72 hours. Radiograph: CT CHEST WO CONTRAST    Result Date: 3/9/2023  No acute intrathoracic process. Chronic changes. Recommendation: Follow up as clinically indicated. All CT scans at this facility utilize dose modulation, iterative reconstruction, and/or weight based dosing when appropriate to reduce radiation dose to as low as reasonably achievable. Dictated and Electronically Signed by Lilly Garcia MD at 09-Mar-2023 08:21:53 AM EST             XR CHEST PORTABLE    Result Date: 3/8/2023  COPD and emphysema. Hazy opacity in the right upper lobe. Pneumonia not excluded. Recommend chest CT.        My radiograph interpretation/independent review of imaging: CT of the chest reviewed. Problem list generated by [employer]:      Pulmonary Assessment/plan:    COPD with acute exacerbation. Continue current management with antibiotics. Switch steroids to IV per her request.  She has severe intolerance to oral steroids. The patient is almost at her baseline. Consider discharge within the next 24 hours. Dyspnea due to the above supportive care. Chronic hypoxic hypercapnic respiratory failure due to the above at baseline. Hypertension management. Care  DVT prophylaxis. Marline Edmonds MD, [facility]    The above note was generated using voice recognition software. Inadvertent typographical errors in transcription may have occurred.     Electronically signed by Marline Edmonds MD on 03/09/23 at 5:59 PM

## 2023-03-09 NOTE — PROGRESS NOTES
Occupational Therapy Initial Assessment  Date: 3/9/2023   Patient Name: Linh Lancaster  MRN: [id number]     : 1950    Date of Service: 3/9/2023    Discharge Recommendations:  24 hour supervision or assist, Patient would benefit from continued therapy after discharge       Assessment   Assessment: OT evaluation completed and tx initiated. Pt may benefit from continued skilled services to address functional deficits. Pt will likely progress with further tx. OT does not anticipate any environmental barriers to D/C home once medically cleared. Currently pt is at risk for falls/fall-related injuries. Therefore, OT recommends D/C location to have 24/7 supervision/assist.  REQUIRES OT FOLLOW-UP: Yes  Activity Tolerance  Activity Tolerance: Patient Tolerated treatment well              Patient Diagnosis(es): The primary encounter diagnosis was Chest pain, unspecified type. Diagnoses of Shortness of breath and Pneumonia of right upper lobe due to infectious organism were also pertinent to this visit. Past Medical History:   Past Medical History:   Diagnosis Date    ADHD (attention deficit hyperactivity disorder)     Anxiety     COPD (chronic obstructive pulmonary disease) (HCC)     Depression     Fibromyalgia     GERD (gastroesophageal reflux disease)     Hypertension     Malignant neoplasm of upper lobe of right lung ([address] 2018    Palliative care patient 10/06/2021    RA (rheumatoid arthritis) ([facility] [address]         Past Surgical History:   Past Surgical History:   Procedure Laterality Date    CHOLECYSTECTOMY      HERNIA REPAIR      HYSTERECTOMY (CERVIX STATUS UNKNOWN)      LEG SURGERY      steel garth placement.     THORACOSCOPY Right 2022    THORACOSCOPY DECORTICATION VIDEO ASSISTED performed by Alice Cole MD at [address] [facility] OR              Restrictions  Restrictions/Precautions  Restrictions/Precautions: Fall Risk    Subjective      Pain Assessment  Pain Level: 8  Patient's Stated Pain Goal: 8  Pain Location: Other (Comment) (generalized)  Pain Descriptors: Aching  Pre Treatment Pain Screening  Pain at present: 0  Scale Used: Numeric Score  Intervention List: Patient able to continue with treatment  Vital Signs  Temp: 97.3 °F (36.3 °C)  Temp Source: Temporal  Heart Rate: 78  Heart Rate Source: Monitor  Resp: 20  BP: 115/62  MAP (Calculated): 80  MAP (mmHg): 76  BP Location: Left upper arm  BP Method: Automatic  Patient Position: Left side  Level of Consciousness: Alert (0)  MEWS Score: 2  Oxygen Therapy  SpO2: 94 %  O2 Device: Nasal cannula  O2 Flow Rate (L/min): 3 L/min    Social/Functional History  Social/Functional History  Lives With: Daughter  Type of Home: House  Home Layout: One level  Home Access: Stairs to enter with rails  Entrance Stairs - Number of Steps: 3  Bathroom Shower/Tub: Walk-in shower, Shower chair with back  Bathroom Equipment: Shower chair  Home Equipment: Walker, rolling (uses walker PRN)  Receives Help From: Family  ADL Assistance: Independent  Ambulation Assistance: Independent  Transfer Assistance: Independent  Active : No  Patient's  Info: daughter       Objective   Hearing: Within functional limits             Toilet Transfers  Toilet - Technique: Ambulating  Equipment Used: Raised toilet seat with rails  Toilet Transfer: Contact guard assistance  Toilet Transfers Comments: with RW  ADL  Feeding: Independent  Grooming: Independent  UE Bathing: Stand by assistance  LE Bathing: Minimal assistance  UE Dressing: Stand by assistance  LE Dressing: Minimal assistance  Toileting: Contact guard assistance        Bed mobility  Supine to Sit: Contact guard assistance  Sit to Supine: Contact guard assistance  Transfers  Sit to stand: Contact guard assistance  Stand to sit: Contact guard assistance  Transfer Comments: with RW     Cognition  Overall Cognitive Status: WFL               Gross Assessment  AROM: Generally decreased, functional  Strength: Generally decreased, functional Included Treatment  Tx consisted of: bed mobility; pt/family education; transfer training; DME training; activity tolerance/balance challenges; functional ambulation. (Treatment time: 30 min)        Plan   Occupational Therapy Plan  Times Per Week: 3-5       Goals  Short Term Goals  Time Frame for Short Term Goals: 1 week  Short Term Goal 1: Tolerate short, functional distances with SBA and DME. Short Term Goal 2: Transfer with sup and DME. Short Term Goal 3: LBD and bathing with CGA and AE/DME. Short Term Goal 4: Toileting skills with sup and DME.           Tiffanie Palma OTR/L  Electronically signed by Tiffanie Palma OTR/L on 3/9/2023 at 12:40 PM.

## 2023-03-09 NOTE — PROGRESS NOTES
Pt has home trilogy that they are coming to  because she is unable to tolerate it.  Offered pt hospital BiPAP she declined

## 2023-03-09 NOTE — PLAN OF CARE
Problem: Pain  Goal: Verbalizes/displays adequate comfort level or baseline comfort level  3/9/2023 1122 by Beatriz Valente RN  Outcome: Progressing  3/9/2023 0455 by Tosha Montenegro RN  Outcome: Progressing  Flowsheets (Taken 3/8/2023 2100)  Verbalizes/displays adequate comfort level or baseline comfort level: Encourage patient to monitor pain and request assistance     Problem: Safety - Adult  Goal: Free from fall injury  3/9/2023 1122 by Beatriz Valente RN  Outcome: Progressing  3/9/2023 0455 by Tosha Montenegro RN  Outcome: Progressing     Problem: Discharge Planning  Goal: Discharge to home or other facility with appropriate resources  3/9/2023 1122 by Beatriz Valente RN  Outcome: Progressing  3/9/2023 0455 by Tosha Montenegro RN  Outcome: Progressing

## 2023-03-09 NOTE — PROGRESS NOTES
[facility] Hospitalists    Progress Note    Patient:  Linh Lancaster  YOB: 1950  Date of Service: 3/9/2023  MRN: [id number]   Acct: [de-identified]   Primary Care Physician: Kevon Rubalcava MD  Advance Directive: Full Code  Admit Date: 3/8/2023       Hospital Day: 0    Portions of this note have been copied forward, however, updated to reflect the most current clinical status of this patient. CHIEF COMPLAINT: Shortness of breath    CUMULATIVE HOSPITAL COURSE:     The patient is a 74 y.o. female with past medical history of COPD stage IV, hypertension, GERD, RUL lung cancer and anxiety who presents to [facility] ED with concerns of shortness of breath and chest congestion. Patient reports that she had the flu last month, never recovered, and symptoms have worsened over the last 2 days. She reports SOB/Chest tightness, as well as loose cough and nasal congestion. Also reports postnasal drip and left ear pain. Chest tightness is worsened by stress and relieved by rest. Is currently denying any CP. EKG shows NSR with a rate of 80. She wears 3L of oxygen at home baseline. Chest x-ray in ER shows hazy opacity in right upper lobe, pneumonia not excluded. She was admitted back in August and had a VATS decortication due to RLL empyema. Patient will be observed by hospitalist group overnight for possible COPD exacerbation vs pneumonia pending chest CT results. CT of the chest shows emphysematous changes in bilateral lung fields, right moderate effusion, basal atelectatic changes, groundglass densities and interstitial thickening in RUL, left minimal effusion, 7 mm nonspecific RLL subpleural nodule. Pulmonology consulted due to COPD exacerbation for continuation of care. Patient has a Trelegy that she is supposed to be using at home, however she has not been. Patient also declined BiPAP last night.   Patient has significant anxiety about going home; reports that she is unable to walk to the bathroom without becoming short of breath. PT and OT consulted. OT recommending continued skilled services upon discharge or 24/7 supervision/assist due to risk for falls. Palliative care consulted. Pending PT consult. Vital signs stable at this time; patient is 94% on home 3L. Troponins have been negative. Lab work is unremarkable. EKG shows normal sinus rhythm, cannot exclude prior anteroseptal infarct. Echo pending. . Review of Systems   Constitutional:  Positive for fatigue and fever. HENT:  Positive for congestion, postnasal drip and rhinorrhea. Respiratory:  Positive for cough and shortness of breath. Cardiovascular:  Negative for chest pain and palpitations. Gastrointestinal: Negative. Genitourinary: Negative. Musculoskeletal: Negative. Neurological: Negative. Psychiatric/Behavioral: Negative. Objective   VITALS:  /62   Pulse 78   Temp 97.3 °F (36.3 °C) (Temporal)   Resp 20   Ht 5' 6\" (1.676 m)   Wt 114 lb (51.7 kg)   SpO2 94%   BMI 18.40 kg/m²     24HR INTAKE/OUTPUT:    Intake/Output Summary (Last 24 hours) at 3/9/2023 1425  Last data filed at 3/9/2023 0845  Gross per 24 hour   Intake 330 ml   Output 300 ml   Net 30 ml       Physical Exam  Constitutional:       Appearance: She is underweight. She is ill-appearing. Comments: Chronic   HENT:      Nose: Congestion present. Eyes:      Pupils: Pupils are equal, round, and reactive to light. Cardiovascular:      Rate and Rhythm: Normal rate and regular rhythm. Pulses: Normal pulses. Heart sounds: Normal heart sounds. Pulmonary:      Effort: Pulmonary effort is normal.      Breath sounds: Examination of the right-lower field reveals decreased breath sounds. Examination of the left-lower field reveals decreased breath sounds. Decreased breath sounds and wheezing present. Abdominal:      General: Abdomen is flat. Bowel sounds are normal. There is no distension. Palpations: Abdomen is soft. Tenderness: There is no abdominal tenderness. Musculoskeletal:         General: Normal range of motion. Skin:     General: Skin is warm and dry. Capillary Refill: Capillary refill takes less than 2 seconds. Neurological:      General: No focal deficit present. Mental Status: She is alert and oriented to person, place, and time. Mental status is at baseline. Psychiatric:         Mood and Affect: Mood is anxious. Medications:      sodium chloride        sodium chloride flush  5-40 mL IntraVENous 2 times per day    enoxaparin  40 mg SubCUTAneous Daily    azithromycin  500 mg Oral Daily    fluticasone  1 spray Each Nostril Daily    cetirizine  10 mg Oral Daily    predniSONE  10 mg Oral Daily    citalopram  20 mg Oral Daily    gabapentin  600 mg Oral TID    guaiFENesin  200 mg Oral BID    montelukast  10 mg Oral Nightly    pantoprazole  40 mg Oral QAM AC    sucralfate  1 g Oral 4x Daily    budesonide-formoterol  2 puff Inhalation BID    tiotropium  2 puff Inhalation Daily     sodium chloride flush, sodium chloride, ondansetron **OR** ondansetron, polyethylene glycol, acetaminophen **OR** acetaminophen, ALPRAZolam, sodium chloride, tiZANidine, albuterol  ADULT DIET;  Regular  ADULT ORAL NUTRITION SUPPLEMENT; Breakfast, Lunch, Dinner; Standard High Calorie/High Protein Oral Supplement  ADULT ORAL NUTRITION SUPPLEMENT; Breakfast, Lunch, Dinner; Standard High Calorie/High Protein Oral Supplement     Labs and Other Data:     Recent Labs     03/08/23  1218 03/09/23  0256   WBC 5.9 4.1*   HGB 11.8* 10.6*    258     Recent Labs     03/08/23  1218 03/08/23  1436 03/09/23  0256   *  --  134*   K 3.9 3.9 4.9   CL 93*  --  97*   CO2 31*  --  27   BUN 6*  --  10   CREATININE 0.5  --  0.7   GLUCOSE 99  --  130*     Recent Labs     03/08/23  1218   AST 14   ALT 8   BILITOT <0.2   ALKPHOS 99       Troponin T:   Recent Labs     03/08/23  1218 03/08/23  1744 03/08/23  2019   TROPONINI <0.01 <0.01 <0.01       Pro-BNP: No results for input(s): BNP in the last 72 hours. INR: No results for input(s): INR in the last 72 hours. UA:No results for input(s): NITRITE, COLORU, PHUR, LABCAST, WBCUA, RBCUA, MUCUS, TRICHOMONAS, YEAST, BACTERIA, CLARITYU, SPECGRAV, LEUKOCYTESUR, UROBILINOGEN, BILIRUBINUR, BLOODU, GLUCOSEU, AMORPHOUS in the last 72 hours. Invalid input(s): Sheridan Castro    A1C: No results for input(s): LABA1C in the last 72 hours. ABG:  Recent Labs     03/08/23  1436   PHART 7.420   RRP2QKC 59.0*   PO2ART 74.0*   WOH5FIL 38.3*   BEART 11.7*   HGBAE 11.7*   J9QKSJBH 95.0   CARBOXHGBART 2.2       Rad:   CT CHEST WO CONTRAST    Result Date: 3/9/2023  Exam: CT OF THE CHEST WITHOUT CONTRAST Clinical data: Cough, sob, abnormal CXR. Technique: Axial CT images through the lungs were acquired without contrast and imaged using soft tissue and lung algorithms. Reformatted/MPR images were performed. Radiation Dose: CTDIvol =5.65 mGy, DLP =205.73 mGy x cm. Limitations: Lack of intravenous contrast limits evaluation of the soft tissues and vascularity. Prior studies: Radiograph of the chest dated 12/07/2022. CT scan of the chest dated 08/10/2022. Findings: Lungs: Moderate to severe emphysematous changes in bilateral lung fields. Right moderate effusion basal atelectatic changes. Groundglass densities and interstitial thickening in right lower lobe. Left minimal effusion. Bilateral apical scarring noted. No  pneumothorax. Approximately 7 mm average diameter nonspecific appearing right lower lobe subpleural nodule. Soft Tissues: No mediastinal, axillary or supraclavicular adenopathy identified. Dense fibroglandular soft tissue pattern in right breast region. Vascular: Unremarkable aorta and pulmonary vascularity. Grossly unremarkable sized heart. Bony structures: No acute or destructive abnormality. Moderate spondylosis of thoracic vertebra. Upper Abdomen: Status post cholecystectomy.     No acute intrathoracic process. Chronic changes. Recommendation: Follow up as clinically indicated. All CT scans at this facility utilize dose modulation, iterative reconstruction, and/or weight based dosing when appropriate to reduce radiation dose to as low as reasonably achievable. Dictated and Electronically Signed by Taya Leger MD at 09-Mar-2023 08:21:53 AM EST             XR CHEST PORTABLE    Result Date: 3/8/2023  CLINICAL HISTORY: Shortness of breath TECHNIQUE: Single AP view of the chest COMPARISON: CXR from 12/8/2022 FINDINGS: Lines and tubes: None. Cardiomediastinal: Normal size and configuration of the cardiomediastinal silhouette. No pneumomediastinum. Lungs and pleura: COPD and emphysema. Hazy right upper lobe opacity. Biapical pleural thickening. No pleural effusion. No pneumothorax. Musculoskeletal: No acute osseous abnormalities. Osseous degenerative changes. Other: None. COPD and emphysema. Hazy opacity in the right upper lobe. Pneumonia not excluded. Recommend chest CT. Culture Results:    No results for input(s): CXSURG in the last 720 hours. Blood Culture Recent: No results for input(s): BC in the last 720 hours. No results for input(s): BC, BLOODCULT2, ORG in the last 72 hours.     Assessment/Plan:   Principal Problem:    Shortness of breath/URI symptoms/COPD/Chest tightness              -CXR shows hazy opacity in RUL, pneumonia not excluded              -CT chest shows emphysematous changes in bilateral lung fields, right moderate effusion   -Pulmonology consulted              -Supplemental O2 as needed; currently 98% on home 3L              -Continue home inhalers              -Albuterol every 4 hours while awake              -Prednisone p.o.              -Antibiotics; azithromycin/Rocephin              -Vitals per unit routine              -Flonase, zyrtec              -Trops negative   -Echo pending     Active Problems:    Anxiety              -Home ativan ordered       GERD (gastroesophageal reflux disease)              -Protonix/Carafate    Antibiotic: Azith/Rocephin    DVT Prophylaxis: Lovenox    GI prophylaxis: Protonix    Discharge planning: TBD    Advance Directive: Full Code    Diet: ADULT DIET; Regular  ADULT ORAL NUTRITION SUPPLEMENT; Breakfast, Lunch, Dinner; Standard High Calorie/High Protein Oral Supplement  ADULT ORAL NUTRITION SUPPLEMENT; Breakfast, Lunch, Dinner; Standard High Calorie/High Protein Oral Supplement     Consults Made:   PALLIATIVE CARE EVAL  IP CONSULT TO PULMONOLOGY    Further Orders per Clinical course/attending. EMR Dragon/Transcription disclaimer:   Much of this encounter note is an electronic transcription/translation of spoken language to printed text.  The electronic translation of spoken language may permit erroneous, or at times, nonsensical words or phrases to be inadvertently transcribed; although attempts have made to review the note for such errors, some may still exist.

## 2023-03-09 NOTE — ACP (ADVANCE CARE PLANNING)
Advance Care Planning     Advance Care Planning Activator (Inpatient)  Conversation Note      Date of ACP Conversation: 3/9/2023     Conversation Conducted with: Patient  M Horton    ACP Activator: Nitish Fu RN        Health Care Decision Maker:     Current Designated Health Care Decision Maker:     Primary Decision Maker: Usha Thomas - Child - [phone]    Secondary Decision Maker: Milad Herrera - Child - [phone]    Secondary Decision Maker: [address] - [phone]        Care Preferences    Ventilation: \"If you were in your present state of health and suddenly became very ill and were unable to breathe on your own, what would your preference be about the use of a ventilator (breathing machine) if it were available to you? \"      Would the patient desire the use of ventilator (breathing machine)?:YES          Resuscitation  \"CPR works best to restart the heart when there is a sudden event, like a heart attack, in someone who is otherwise healthy. Unfortunately, CPR does not typically restart the heart for people who have serious health conditions or who are very sick. \"    \"In the event your heart stopped as a result of an underlying serious health condition, would you want attempts to be made to restart your heart (answer \"yes\" for attempt to resuscitate) or would you prefer a natural death (answer \"no\" for do not attempt to resuscitate)? \"   YES           Conversation Outcomes:  ACP discussion completed and Existing advance directive reviewed with patient; no changes to patient's previously recorded wishes    Follow-up plan:    [x] Schedule follow-up conversation to continue planning  [] Referred individual to Provider for additional questions/concerns   [] Advised patient/agent/surrogate to review completed ACP document and update if needed with changes in condition, patient preferences or care setting    [] This note routed to one or more involved healthcare providers

## 2023-03-09 NOTE — PROGRESS NOTES
Comprehensive Nutrition Assessment    Type and Reason for Visit:  Reassess    Nutrition Recommendations/Plan:   Will start ONS TID and order new A1c lab value. Malnutrition Assessment:  Malnutrition Status: At risk for malnutrition (Comment) (03/09/23 1317)    Context:  Acute Illness     Findings of the 6 clinical characteristics of malnutrition:  Energy Intake:  Mild decrease in energy intake (Comment)  Weight Loss:  No significant weight loss     Body Fat Loss:  No significant body fat loss     Muscle Mass Loss:  No significant muscle mass loss    Fluid Accumulation:  No significant fluid accumulation     Strength:  Not Performed    Nutrition Assessment:    Seen for reassessment. Pt reflecting a PO intake of 0% upon admission. Sodium decreased at 134. BG elevated at 130. No A1c available. Wt stable. Will start ONS TID. Will order new A1c lab. Nutrition Related Findings:      Wound Type: None       Current Nutrition Intake & Therapies:    Average Meal Intake: 0%  Average Supplements Intake: Unable to assess  ADULT DIET; Regular  ADULT ORAL NUTRITION SUPPLEMENT; Breakfast, Lunch, Dinner; Standard High Calorie/High Protein Oral Supplement    Anthropometric Measures:  Height: 5' 6\" (167.6 cm)  Ideal Body Weight (IBW): 130 lbs (59 kg)       Current Body Weight: 114 lb (51.7 kg), 87.7 % IBW.  Weight Source: Stated  Current BMI (kg/m2): 18.4        Weight Adjustment For: No Adjustment       BMI Categories: Underweight (BMI less than 22) age over 72    Estimated Daily Nutrient Needs:  Energy Requirements Based On: Kcal/kg  Weight Used for Energy Requirements: Current  Energy (kcal/day): 2322-1473  Weight Used for Protein Requirements: Current  Protein (g/day): 78  Method Used for Fluid Requirements: 1 ml/kcal  Fluid (ml/day): 3848-3721    Nutrition Diagnosis:   Inadequate oral intake related to acute injury/trauma as evidenced by intake 0-25%    Nutrition Interventions:   Food and/or Nutrient Delivery: Continue Current Diet, Start Oral Nutrition Supplement  Nutrition Education/Counseling: No recommendation at this time  Coordination of Nutrition Care: No recommendation at this time       Goals:     Goals: Meet at least 75% of estimated needs, PO intake 75% or greater       Nutrition Monitoring and Evaluation:   Behavioral-Environmental Outcomes: None Identified  Food/Nutrient Intake Outcomes: Diet Advancement/Tolerance, Food and Nutrient Intake, Supplement Intake  Physical Signs/Symptoms Outcomes: Biochemical Data, Nutrition Focused Physical Findings, Weight    Discharge Planning:     Too soon to determine     Anali Bella, [address]  Contact: [phone]

## 2023-03-10 VITALS
WEIGHT: 114 LBS | RESPIRATION RATE: 18 BRPM | DIASTOLIC BLOOD PRESSURE: 66 MMHG | HEART RATE: 64 BPM | SYSTOLIC BLOOD PRESSURE: 124 MMHG | HEIGHT: 66 IN | OXYGEN SATURATION: 95 % | TEMPERATURE: 98.1 F | BODY MASS INDEX: 18.32 KG/M2

## 2023-03-10 LAB
ANION GAP SERPL CALCULATED.3IONS-SCNC: 9 MMOL/L (ref 7–19)
BASOPHILS ABSOLUTE: 0 K/UL (ref 0–0.2)
BASOPHILS RELATIVE PERCENT: 0 % (ref 0–1)
BUN BLDV-MCNC: 19 MG/DL (ref 8–23)
CALCIUM SERPL-MCNC: 9 MG/DL (ref 8.8–10.2)
CHLORIDE BLD-SCNC: 98 MMOL/L (ref 98–111)
CO2: 28 MMOL/L (ref 22–29)
CREAT SERPL-MCNC: 0.8 MG/DL (ref 0.5–0.9)
EOSINOPHILS ABSOLUTE: 0 K/UL (ref 0–0.6)
EOSINOPHILS RELATIVE PERCENT: 0 % (ref 0–5)
GFR SERPL CREATININE-BSD FRML MDRD: >60 ML/MIN/{1.73_M2}
GLUCOSE BLD-MCNC: 197 MG/DL (ref 74–109)
HCT VFR BLD CALC: 32.2 % (ref 37–47)
HEMOGLOBIN: 10.2 G/DL (ref 12–16)
IMMATURE GRANULOCYTES #: 0 K/UL
LYMPHOCYTES ABSOLUTE: 0.3 K/UL (ref 1.1–4.5)
LYMPHOCYTES RELATIVE PERCENT: 6.3 % (ref 20–40)
MCH RBC QN AUTO: 31.7 PG (ref 27–31)
MCHC RBC AUTO-ENTMCNC: 31.7 G/DL (ref 33–37)
MCV RBC AUTO: 100 FL (ref 81–99)
MONOCYTES ABSOLUTE: 0 K/UL (ref 0–0.9)
MONOCYTES RELATIVE PERCENT: 0.7 % (ref 0–10)
NEUTROPHILS ABSOLUTE: 4 K/UL (ref 1.5–7.5)
NEUTROPHILS RELATIVE PERCENT: 92.5 % (ref 50–65)
PDW BLD-RTO: 12.8 % (ref 11.5–14.5)
PLATELET # BLD: 215 K/UL (ref 130–400)
PMV BLD AUTO: 11 FL (ref 9.4–12.3)
POTASSIUM REFLEX MAGNESIUM: 5 MMOL/L (ref 3.5–5)
RBC # BLD: 3.22 M/UL (ref 4.2–5.4)
SODIUM BLD-SCNC: 135 MMOL/L (ref 136–145)
WBC # BLD: 4.3 K/UL (ref 4.8–10.8)

## 2023-03-10 PROCEDURE — 2500000003 HC RX 250 WO HCPCS

## 2023-03-10 PROCEDURE — 97161 PT EVAL LOW COMPLEX 20 MIN: CPT

## 2023-03-10 PROCEDURE — 2700000000 HC OXYGEN THERAPY PER DAY

## 2023-03-10 PROCEDURE — G0378 HOSPITAL OBSERVATION PER HR: HCPCS

## 2023-03-10 PROCEDURE — 96376 TX/PRO/DX INJ SAME DRUG ADON: CPT

## 2023-03-10 PROCEDURE — 94640 AIRWAY INHALATION TREATMENT: CPT

## 2023-03-10 PROCEDURE — 2580000003 HC RX 258

## 2023-03-10 PROCEDURE — 6360000002 HC RX W HCPCS: Performed by: INTERNAL MEDICINE

## 2023-03-10 PROCEDURE — 6370000000 HC RX 637 (ALT 250 FOR IP)

## 2023-03-10 PROCEDURE — 97530 THERAPEUTIC ACTIVITIES: CPT

## 2023-03-10 PROCEDURE — 80048 BASIC METABOLIC PNL TOTAL CA: CPT

## 2023-03-10 PROCEDURE — 6360000002 HC RX W HCPCS

## 2023-03-10 PROCEDURE — 6370000000 HC RX 637 (ALT 250 FOR IP): Performed by: STUDENT IN AN ORGANIZED HEALTH CARE EDUCATION/TRAINING PROGRAM

## 2023-03-10 PROCEDURE — 36415 COLL VENOUS BLD VENIPUNCTURE: CPT

## 2023-03-10 PROCEDURE — 94760 N-INVAS EAR/PLS OXIMETRY 1: CPT

## 2023-03-10 PROCEDURE — 85025 COMPLETE CBC W/AUTO DIFF WBC: CPT

## 2023-03-10 RX ORDER — BENZONATATE 100 MG/1
100 CAPSULE ORAL 3 TIMES DAILY PRN
Qty: 21 CAPSULE | Refills: 0 | Status: SHIPPED | OUTPATIENT
Start: 2023-03-10 | End: 2023-03-17

## 2023-03-10 RX ORDER — CETIRIZINE HYDROCHLORIDE 10 MG/1
10 TABLET ORAL DAILY
Qty: 10 TABLET | Refills: 0 | Status: SHIPPED | OUTPATIENT
Start: 2023-03-11

## 2023-03-10 RX ORDER — AZITHROMYCIN 250 MG/1
500 TABLET, FILM COATED ORAL DAILY
Status: COMPLETED | OUTPATIENT
Start: 2023-03-10 | End: 2023-03-10

## 2023-03-10 RX ADMIN — ALBUTEROL SULFATE 2.5 MG: 2.5 SOLUTION RESPIRATORY (INHALATION) at 00:25

## 2023-03-10 RX ADMIN — CETIRIZINE HYDROCHLORIDE 10 MG: 10 TABLET, FILM COATED ORAL at 08:54

## 2023-03-10 RX ADMIN — SUCRALFATE 1 G: 1 TABLET ORAL at 12:50

## 2023-03-10 RX ADMIN — ALPRAZOLAM 1 MG: 0.5 TABLET ORAL at 05:12

## 2023-03-10 RX ADMIN — BUDESONIDE AND FORMOTEROL FUMARATE DIHYDRATE 2 PUFF: 160; 4.5 AEROSOL RESPIRATORY (INHALATION) at 06:24

## 2023-03-10 RX ADMIN — CITALOPRAM HYDROBROMIDE 20 MG: 20 TABLET ORAL at 08:54

## 2023-03-10 RX ADMIN — OXYCODONE AND ACETAMINOPHEN 1 TABLET: 10; 325 TABLET ORAL at 07:50

## 2023-03-10 RX ADMIN — FLUTICASONE PROPIONATE 1 SPRAY: 50 SPRAY, METERED NASAL at 08:58

## 2023-03-10 RX ADMIN — GABAPENTIN 600 MG: 600 TABLET, FILM COATED ORAL at 14:18

## 2023-03-10 RX ADMIN — GUAIFENESIN 200 MG: 100 SOLUTION ORAL at 08:54

## 2023-03-10 RX ADMIN — OXYCODONE AND ACETAMINOPHEN 1 TABLET: 10; 325 TABLET ORAL at 14:26

## 2023-03-10 RX ADMIN — TIOTROPIUM BROMIDE INHALATION SPRAY 2 PUFF: 3.12 SPRAY, METERED RESPIRATORY (INHALATION) at 06:21

## 2023-03-10 RX ADMIN — ALBUTEROL SULFATE 2.5 MG: 2.5 SOLUTION RESPIRATORY (INHALATION) at 11:29

## 2023-03-10 RX ADMIN — ALPRAZOLAM 1 MG: 0.5 TABLET ORAL at 12:50

## 2023-03-10 RX ADMIN — OXYCODONE AND ACETAMINOPHEN 1 TABLET: 10; 325 TABLET ORAL at 00:01

## 2023-03-10 RX ADMIN — GABAPENTIN 600 MG: 600 TABLET, FILM COATED ORAL at 08:54

## 2023-03-10 RX ADMIN — SODIUM CHLORIDE, PRESERVATIVE FREE 10 ML: 5 INJECTION INTRAVENOUS at 08:54

## 2023-03-10 RX ADMIN — PANTOPRAZOLE SODIUM 40 MG: 40 TABLET, DELAYED RELEASE ORAL at 05:12

## 2023-03-10 RX ADMIN — METHYLPREDNISOLONE SODIUM SUCCINATE 60 MG: 125 INJECTION, POWDER, FOR SOLUTION INTRAMUSCULAR; INTRAVENOUS at 00:01

## 2023-03-10 RX ADMIN — METHYLPREDNISOLONE SODIUM SUCCINATE 60 MG: 125 INJECTION, POWDER, FOR SOLUTION INTRAMUSCULAR; INTRAVENOUS at 12:50

## 2023-03-10 RX ADMIN — ALPRAZOLAM 1 MG: 0.5 TABLET ORAL at 00:01

## 2023-03-10 RX ADMIN — SUCRALFATE 1 G: 1 TABLET ORAL at 08:54

## 2023-03-10 RX ADMIN — AZITHROMYCIN MONOHYDRATE 500 MG: 250 TABLET ORAL at 14:18

## 2023-03-10 RX ADMIN — METHYLPREDNISOLONE SODIUM SUCCINATE 60 MG: 125 INJECTION, POWDER, FOR SOLUTION INTRAMUSCULAR; INTRAVENOUS at 05:12

## 2023-03-10 ASSESSMENT — PAIN SCALES - GENERAL
PAINLEVEL_OUTOF10: 7
PAINLEVEL_OUTOF10: 8
PAINLEVEL_OUTOF10: 7

## 2023-03-10 ASSESSMENT — PAIN DESCRIPTION - LOCATION
LOCATION: EAR;HEAD
LOCATION: EAR

## 2023-03-10 ASSESSMENT — PAIN DESCRIPTION - DESCRIPTORS: DESCRIPTORS: ACHING;DISCOMFORT

## 2023-03-10 NOTE — DISCHARGE SUMMARY
[facility] Hospitalists    Discharge Summary      Roopa Davenport  :  1950  MRN:  [id number]    Admit date:  3/8/2023  Discharge date:    3/10/2023    Discharging Physician: Dr. Magali Prieot Directive: Full Code    Consults: pulmonary    Primary Care Physician:  Adele Mendoza MD    Discharge Diagnoses:  Principal Problem:    Shortness of breath  Active Problems:    Anxiety    Chest pain    GERD (gastroesophageal reflux disease)    Stage 4 very severe COPD by GOLD classification ([address]    Hypertension    COPD exacerbation ([address]    Current every day smoker    Malignant neoplasm of upper lobe of right lung ([address]  Resolved Problems:    * No resolved hospital problems. *      Portions of this note have been copied forward, however, changed to reflect the most current clinical status of this patient. Hospital Course: The patient is a 74 y.o. female with past medical history of COPD stage IV, hypertension, GERD, RUL lung cancer and anxiety who presents to [facility] ED with concerns of shortness of breath and chest congestion. Patient reports that she had the flu last month, never recovered, and symptoms have worsened over the last 2 days. She reports SOB/Chest tightness, as well as loose cough and nasal congestion. Also reports postnasal drip and left ear pain. Chest tightness is worsened by stress and relieved by rest. Is currently denying any CP. EKG shows NSR with a rate of 80. She wears 3L of oxygen at home baseline. Chest x-ray in ER shows hazy opacity in right upper lobe, pneumonia not excluded. She was admitted back in August and had a VATS decortication due to RLL empyema. Patient will be observed by hospitalist group overnight for possible COPD exacerbation vs pneumonia pending chest CT results.       CT of the chest shows emphysematous changes in bilateral lung fields, right moderate effusion, basal atelectatic changes, groundglass densities and interstitial thickening in RUL, left minimal effusion, 7 mm nonspecific RLL subpleural nodule. Patient has a Trelegy that she is supposed to be using at home, however she has not been. Patient also declined BiPAP while admitted. Pulmonology consulted due to COPD exacerbation for continuation of care. They state that the patient is at her baseline, agree with COPD exacerbation, continue antibiotics, and discharge within the next 24 hours. Initially, patient was very anxious about returning home as she felt as though she was unable to walk to the bathroom without becoming short of breath. Physical therapy evaluated the patient and believe she is safe to discharge at this time. Patient lives with her daughter who is able to help provide care. Vital signs stable at this time; patient is 94% on home 3L. Troponins have been negative. Lab work is unremarkable. Currently pending echo read, however this can be followed up on an outpatient basis. Patient is encouraged to follow-up with a PCP within a week of discharge. Patient is also encouraged to follow-up with pulmonology to clarify which inhaler she needs to be taking on a daily basis. Offered oral steroids; patient declines due to previous history of ulcers and GI intolerance. Patient to receive 1 more dose of IV Solu-Medrol prior to discharge. 3-day course of azithromycin complete. Patient is also requesting a medication for cough; [facility] sent to pharmacy. Patient is currently in stable condition to be discharged. Significant Diagnostic Studies:   CT CHEST WO CONTRAST    Result Date: 3/9/2023  Exam: CT OF THE CHEST WITHOUT CONTRAST Clinical data: Cough, sob, abnormal CXR. Technique: Axial CT images through the lungs were acquired without contrast and imaged using soft tissue and lung algorithms. Reformatted/MPR images were performed. Radiation Dose: CTDIvol =5.65 mGy, DLP =205.73 mGy x cm. Limitations: Lack of intravenous contrast limits evaluation of the soft tissues and vascularity.  Prior studies: Radiograph of the chest dated 12/07/2022. CT scan of the chest dated 08/10/2022. Findings: Lungs: Moderate to severe emphysematous changes in bilateral lung fields. Right moderate effusion basal atelectatic changes. Groundglass densities and interstitial thickening in right lower lobe. Left minimal effusion. Bilateral apical scarring noted. No  pneumothorax. Approximately 7 mm average diameter nonspecific appearing right lower lobe subpleural nodule. Soft Tissues: No mediastinal, axillary or supraclavicular adenopathy identified. Dense fibroglandular soft tissue pattern in right breast region. Vascular: Unremarkable aorta and pulmonary vascularity. Grossly unremarkable sized heart. Bony structures: No acute or destructive abnormality. Moderate spondylosis of thoracic vertebra. Upper Abdomen: Status post cholecystectomy. No acute intrathoracic process. Chronic changes. Recommendation: Follow up as clinically indicated. All CT scans at this facility utilize dose modulation, iterative reconstruction, and/or weight based dosing when appropriate to reduce radiation dose to as low as reasonably achievable. Dictated and Electronically Signed by Taya Leger MD at 09-Mar-2023 08:21:53 AM EST             XR CHEST PORTABLE    Result Date: 3/8/2023  CLINICAL HISTORY: Shortness of breath TECHNIQUE: Single AP view of the chest COMPARISON: CXR from 12/8/2022 FINDINGS: Lines and tubes: None. Cardiomediastinal: Normal size and configuration of the cardiomediastinal silhouette. No pneumomediastinum. Lungs and pleura: COPD and emphysema. Hazy right upper lobe opacity. Biapical pleural thickening. No pleural effusion. No pneumothorax. Musculoskeletal: No acute osseous abnormalities. Osseous degenerative changes. Other: None. COPD and emphysema. Hazy opacity in the right upper lobe. Pneumonia not excluded. Recommend chest CT.       Pertinent Labs:  CBC:   Recent Labs     03/08/23  1218 03/09/23  0256 03/10/23  0307   WBC 5.9 4. 1* 4.3*   HGB 11.8* 10.6* 10.2*    258 215     BMP:    Recent Labs     03/08/23  1218 03/08/23  1436 03/09/23  0256 03/10/23  0307   *  --  134* 135*   K 3.9 3.9 4.9 5.0   CL 93*  --  97* 98   CO2 31*  --  27 28   BUN 6*  --  10 19   CREATININE 0.5  --  0.7 0.8   GLUCOSE 99  --  130* 197*     INR: No results for input(s): INR in the last 72 hours. Physical Exam:  Vital Signs: /66   Pulse 64   Temp 98.1 °F (36.7 °C)   Resp 18   Ht 5' 6\" (1.676 m)   Wt 114 lb (51.7 kg)   SpO2 95%   BMI 18.40 kg/m²   General appearance:. Alert and Cooperative   HEENT: Normocephalic. Chest: Breath sounds equal bilaterally with diminished bases and barrel chest noted. Cardiac: RRR, S1, S2 normal. No murmurs, gallops, or rubs auscultated. Abdomen: soft, non-tender; non-distended normal bowel sounds no masses, no organomegaly. Extremities: No clubbing or cyanosis. No peripheral edema. Peripheral pulses palpable. Neurologic: Grossly intact. Discharge Medications:        Medication List        ASK your doctor about these medications      albuterol sulfate  (90 Base) MCG/ACT inhaler  Commonly known as: Ventolin HFA  Inhale 2 puffs into the lungs every 6 hours as needed for Wheezing     albuterol-ipratropium  MCG/ACT Aers inhaler  Commonly known as: COMBIVENT RESPIMAT  Inhale 1 puff into the lungs every 6 hours     ALPRAZolam 1 MG tablet  Commonly known as: XANAX     amphetamine-dextroamphetamine 20 MG tablet  Commonly known as: ADDERALL     arformoterol tartrate 15 MCG/2ML Nebu  Commonly known as: BROVANA  Take 2 mLs by nebulization in the morning and 2 mLs in the evening.      Breztri Aerosphere 160-9-4.8 MCG/ACT Aero  Generic drug: Budeson-Glycopyrrol-Formoterol  Inhale 2 puffs into the lungs 2 times daily     budesonide 0.25 MG/2ML nebulizer suspension  Commonly known as: Pulmicort  Take 2 mLs by nebulization 2 times daily     * budesonide-formoterol 160-4.5 MCG/ACT Aero  Commonly known as: SYMBICORT  Inhale 2 puffs into the lungs 2 times daily     * budesonide-formoterol 160-4.5 MCG/ACT Aero  Commonly known as: Symbicort  Inhale 2 puffs into the lungs 2 times daily     citalopram 40 MG tablet  Commonly known as: CELEXA     fluticasone 50 MCG/ACT nasal spray  Commonly known as: FLONASE  INSTILL 1 SPRAY IN EACH NOSTRIL DAILY     furosemide 40 MG tablet  Commonly known as: LASIX  Take 1 tablet by mouth daily     gabapentin 600 MG tablet  Commonly known as: NEURONTIN     guaiFENesin 100 MG/5ML Soln oral solution  Commonly known as: ROBITUSSIN  Take 10 mLs by mouth 2 times daily     hydrALAZINE 25 MG tablet  Commonly known as: APRESOLINE  Take 1 tablet by mouth every 12 hours     levalbuterol 45 MCG/ACT inhaler  Commonly known as: Xopenex HFA  Inhale 1 puff into the lungs every 4 hours as needed for Wheezing     montelukast 10 MG tablet  Commonly known as: SINGULAIR     nicotine 21 MG/24HR  Commonly known as: NICODERM CQ  Place 1 patch onto the skin every 24 hours     ondansetron 4 MG disintegrating tablet  Commonly known as: Zofran ODT  Take 1 tablet by mouth every 8 hours as needed for Nausea or Vomiting     oxyCODONE-acetaminophen  MG per tablet  Commonly known as: PERCOCET  TAKE 1 TABLET Q 4 TO 6 HRS PRN. (MAX 5/DAY). Earliest Fill Date: 7/19/18     OXYGEN     pantoprazole 20 MG tablet  Commonly known as: PROTONIX  TAKE 1 TABLET BY MOUTH 2 TIMES DAILY     predniSONE 10 MG tablet  Commonly known as: DELTASONE  40 mg a day and taper by 10 mg every third day to off     sodium chloride 0.65 % nasal spray  Commonly known as: OCEAN, BABY AYR  1 spray by Nasal route as needed for Congestion (dry sores in nose)     sucralfate 1 GM tablet  Commonly known as: CARAFATE  Take 1 tablet by mouth 4 times daily           * This list has 2 medication(s) that are the same as other medications prescribed for you. Read the directions carefully, and ask your doctor or other care provider to review them with you. Discharge Instructions: Follow up with PCP in 7 days. Follow-up with pulmonology as needed. Take medications as directed. Resume activity as tolerated. Diet: ADULT DIET; Regular  ADULT ORAL NUTRITION SUPPLEMENT; Breakfast, Lunch, Dinner; Standard High Calorie/High Protein Oral Supplement     Disposition: Patient is medically stable and will be discharged today. Time spent on discharge 38 minutes spent in assessing patient, reviewing medications, discussion with nursing, confirming safe discharge plan and preparation of discharge summary. Signed:  Electronically signed by TEN Smith CNP on 3/10/23 at 11:35 AM CST     EMR Dragon/Transcription disclaimer:   Much of this encounter note is an electronic transcription/translation of spoken language to printed text.  The electronic translation of spoken language may permit erroneous, or at times, nonsensical words or phrases to be inadvertently transcribed; although attempts have made to review the note for such errors, some may still exist.

## 2023-03-10 NOTE — PROGRESS NOTES
Physical Therapy  Facility/Department: [facility] ONCOLOGY UNIT  Physical Therapy Initial Assessment    Name: Claudio Rutherford  : 1950  MRN: [id number]  Date of Service: 3/10/2023    Discharge Recommendations:  Home with assist PRN (Pt demostrates safe DC home at this time. Pt may need assist PRN due to SOB.)          Patient Diagnosis(es): The primary encounter diagnosis was Chest pain, unspecified type. Diagnoses of Shortness of breath and Pneumonia of right upper lobe due to infectious organism were also pertinent to this visit. Past Medical History:  has a past medical history of ADHD (attention deficit hyperactivity disorder), Anxiety, COPD (chronic obstructive pulmonary disease) ([facility] Depression, Fibromyalgia, GERD (gastroesophageal reflux disease), Hypertension, Malignant neoplasm of upper lobe of right lung ([facility] Palliative care patient, and RA (rheumatoid arthritis) ([facility] Past Surgical History:  has a past surgical history that includes Hysterectomy; hernia repair; Leg Surgery; Cholecystectomy; and Thoracoscopy (Right, 2022). Assessment   Assessment: Pt demo no need for skilled PT in this setting at this time. Treatment Diagnosis: SOB  Therapy Prognosis: Good  Decision Making: Low Complexity  Requires PT Follow-Up: No  Activity Tolerance  Activity Tolerance: Patient tolerated evaluation without incident;Patient tolerated treatment well     Plan   Physcial Therapy Plan  General Plan: Discharge with evaluation only  Safety Devices  Type of Devices: Call light within reach, Left in bed, Gait belt     Restrictions  Restrictions/Precautions  Restrictions/Precautions: Fall Risk     Subjective   Pain: denies  General  Chart Reviewed: Yes  Patient assessed for rehabilitation services?: Yes  Diagnosis: SOB  Follows Commands: Within Functional Limits  Subjective  Subjective: Pt is agreeable to therapy stating she just got up to the bathroom with no difficulty but is SOB.          Social/Functional History  Social/Functional History  Lives With: Daughter  Type of Home: House  Home Layout: One level  Home Access: Stairs to enter with rails  Entrance Stairs - Number of Steps: 3  Bathroom Shower/Tub: Walk-in shower, Shower chair with back  Bathroom Equipment: Shower chair  Home Equipment: Gerardo Centeno, [address] Help From: Family  ADL Assistance: Independent  Ambulation Assistance: Independent  Transfer Assistance: Independent  Active : No  Patient's  Info: daughter  Vision/Hearing  Vision  Vision: Within Functional Limits  Hearing  Hearing: Within functional limits    Cognition   Orientation  Overall Orientation Status: Within Functional Limits  Cognition  Overall Cognitive Status: WFL     Objective   Heart Rate: 64  BP: 124/66  MAP (Calculated): 85  Resp: 18  SpO2: 95 %     Observation/Palpation  Posture: Good  Observation: 3 L of O2  Gross Assessment  AROM: Generally decreased, functional  PROM: Generally decreased, functional  Strength: Generally decreased, functional     AROM RLE (degrees)  RLE AROM: WNL  AROM LLE (degrees)  LLE AROM : WNL  Strength RLE  Strength RLE: WFL  Comment: grossly 4/5  Strength LLE  Strength LLE: WFL  Comment: grossly 4/5           Bed mobility  Supine to Sit: Modified independent  Sit to Supine: Modified independent  Transfers  Sit to Stand: Modified independent  Stand to Sit: Modified independent  Ambulation  WB Status: FWB  Ambulation  Surface: Level tile  Device: Rolling Walker  Other Apparatus: O2  Assistance: Stand by assistance  Gait Deviations: Slow Rosa  Distance: 25'  Comments: Pt amb slow to reduce SOB     Balance  Posture: Good  Sitting - Static: Good  Sitting - Dynamic: Good  Standing - Static: Good  Standing - Dynamic: Good           OutComes Score                                                  AM-PAC Score             Tinneti Score       Goals  Short Term Goals  Short Term Goal 1: assess need for skilled PT in this setting  - MET  Short Term Goal 2: assess for safe DC home - MET       Education  Patient Education  Education Given To: Patient  Education Provided: Role of Therapy;Plan of Care;Energy Conservation  Education Method: Demonstration;Verbal  Barriers to Learning: None  Education Outcome: Verbalized understanding;Demonstrated understanding      Therapy Time   Individual Concurrent Group Co-treatment   Time In           Time Out           Minutes                   Marino Olmos   Electronically signed by Marino Olmos, Student PT on 3/10/2023 at 11:35 AM

## 2023-03-10 NOTE — PLAN OF CARE
Problem: Pain  Goal: Verbalizes/displays adequate comfort level or baseline comfort level  3/9/2023 2107 by Ellen Smith RN  Outcome: Progressing  3/9/2023 1122 by Rhys Guardado RN  Outcome: Progressing     Problem: Safety - Adult  Goal: Free from fall injury  3/9/2023 2107 by Ellen Smith RN  Outcome: Tamiko Avila (Taken 3/9/2023 2107)  Free From Fall Injury: Instruct family/caregiver on patient safety  3/9/2023 1122 by Rhys Guardado RN  Outcome: Progressing

## 2023-03-13 ENCOUNTER — TELEPHONE (OUTPATIENT)
Dept: INTERNAL MEDICINE | Age: 73
End: 2023-03-13

## 2023-03-13 NOTE — TELEPHONE ENCOUNTER
[facility]    Outreach made within 2 business days of discharge: Yes    Patient: Joaquin Kunz   Patient : 1950 MRN: [id number]    Reason for Admission: SOB, chest congestion    Discharge Date: 3/10/23      Discharge Diagnoses:  Principal Problem:    Shortness of breath  Active Problems:    Anxiety    Chest pain    GERD (gastroesophageal reflux disease)    Stage 4 very severe COPD by GOLD classification ([facility]    Hypertension    COPD exacerbation ([facility]    Current every day smoker    Malignant neoplasm of upper lobe of right lung ([facility]  Resolved Problems:    * No resolved hospital problems     Spoke with: Patient    Discharge department/facility: [address] Interactive Patient Contact:  Was patient able to fill all prescriptions: Yes  Was patient instructed to bring all medications to the follow-up visit: Yes  Is patient taking all medications as directed in the discharge summary? Yes  Does patient understand their discharge instructions: Yes  Does patient have questions or concerns that need addressed prior to 7-14 day follow up office visit: no    Spoke to the patient she said that she is not feeling well today. She said that she was not sent home with any antibiotics. She does use home O2 at 3L 24hrs a day. She said that her appetite is not good. She states that her ear hurts and she feels like she has a sinus infection. I had her scheduled for the  and offered her a sooner apt she will call us back to change it she has to see if she can get a ride here.     Scheduled appointment with PCP within 7-14 days    Follow Up  Future Appointments   Date Time Provider Lynda Acevedo   3/16/2023  9:00 AM [facility]       [place], [place]

## 2023-04-04 ENCOUNTER — OFFICE VISIT (OUTPATIENT)
Dept: PRIMARY CARE CLINIC | Age: 73
End: 2023-04-04
Payer: MEDICARE

## 2023-04-04 VITALS
HEART RATE: 70 BPM | OXYGEN SATURATION: 97 % | TEMPERATURE: 97.3 F | WEIGHT: 123.8 LBS | SYSTOLIC BLOOD PRESSURE: 126 MMHG | BODY MASS INDEX: 19.89 KG/M2 | DIASTOLIC BLOOD PRESSURE: 74 MMHG | HEIGHT: 66 IN

## 2023-04-04 DIAGNOSIS — K21.9 GASTROESOPHAGEAL REFLUX DISEASE WITHOUT ESOPHAGITIS: ICD-10-CM

## 2023-04-04 DIAGNOSIS — J44.9 STAGE 4 VERY SEVERE COPD BY GOLD CLASSIFICATION (HCC): ICD-10-CM

## 2023-04-04 DIAGNOSIS — I10 PRIMARY HYPERTENSION: Primary | ICD-10-CM

## 2023-04-04 DIAGNOSIS — J01.90 ACUTE SINUSITIS, RECURRENCE NOT SPECIFIED, UNSPECIFIED LOCATION: ICD-10-CM

## 2023-04-04 PROCEDURE — 99204 OFFICE O/P NEW MOD 45 MIN: CPT | Performed by: NURSE PRACTITIONER

## 2023-04-04 PROCEDURE — 3074F SYST BP LT 130 MM HG: CPT | Performed by: NURSE PRACTITIONER

## 2023-04-04 PROCEDURE — 1123F ACP DISCUSS/DSCN MKR DOCD: CPT | Performed by: NURSE PRACTITIONER

## 2023-04-04 PROCEDURE — 3078F DIAST BP <80 MM HG: CPT | Performed by: NURSE PRACTITIONER

## 2023-04-04 RX ORDER — DOXYCYCLINE HYCLATE 100 MG
100 TABLET ORAL 2 TIMES DAILY
Qty: 20 TABLET | Refills: 0 | Status: SHIPPED | OUTPATIENT
Start: 2023-04-04 | End: 2023-04-14

## 2023-04-04 RX ORDER — ESOMEPRAZOLE MAGNESIUM 40 MG/1
40 CAPSULE, DELAYED RELEASE ORAL
Qty: 90 CAPSULE | Refills: 1 | Status: SHIPPED | OUTPATIENT
Start: 2023-04-04

## 2023-04-04 SDOH — ECONOMIC STABILITY: FOOD INSECURITY: WITHIN THE PAST 12 MONTHS, YOU WORRIED THAT YOUR FOOD WOULD RUN OUT BEFORE YOU GOT MONEY TO BUY MORE.: SOMETIMES TRUE

## 2023-04-04 SDOH — ECONOMIC STABILITY: INCOME INSECURITY: HOW HARD IS IT FOR YOU TO PAY FOR THE VERY BASICS LIKE FOOD, HOUSING, MEDICAL CARE, AND HEATING?: NOT VERY HARD

## 2023-04-04 SDOH — ECONOMIC STABILITY: FOOD INSECURITY: WITHIN THE PAST 12 MONTHS, THE FOOD YOU BOUGHT JUST DIDN'T LAST AND YOU DIDN'T HAVE MONEY TO GET MORE.: SOMETIMES TRUE

## 2023-04-04 SDOH — ECONOMIC STABILITY: HOUSING INSECURITY
IN THE LAST 12 MONTHS, WAS THERE A TIME WHEN YOU DID NOT HAVE A STEADY PLACE TO SLEEP OR SLEPT IN A SHELTER (INCLUDING NOW)?: NO

## 2023-04-04 ASSESSMENT — ENCOUNTER SYMPTOMS
COUGH: 1
SINUS PRESSURE: 1
WHEEZING: 1
EYES NEGATIVE: 1
ABDOMINAL PAIN: 0
CHEST TIGHTNESS: 1
SINUS PAIN: 1
GASTROINTESTINAL NEGATIVE: 1
ALLERGIC/IMMUNOLOGIC NEGATIVE: 1
RHINORRHEA: 1
SHORTNESS OF BREATH: 1

## 2023-04-04 ASSESSMENT — PATIENT HEALTH QUESTIONNAIRE - PHQ9
2. FEELING DOWN, DEPRESSED OR HOPELESS: 0
10. IF YOU CHECKED OFF ANY PROBLEMS, HOW DIFFICULT HAVE THESE PROBLEMS MADE IT FOR YOU TO DO YOUR WORK, TAKE CARE OF THINGS AT HOME, OR GET ALONG WITH OTHER PEOPLE: 0
6. FEELING BAD ABOUT YOURSELF - OR THAT YOU ARE A FAILURE OR HAVE LET YOURSELF OR YOUR FAMILY DOWN: 0
SUM OF ALL RESPONSES TO PHQ QUESTIONS 1-9: 0
4. FEELING TIRED OR HAVING LITTLE ENERGY: 0
9. THOUGHTS THAT YOU WOULD BE BETTER OFF DEAD, OR OF HURTING YOURSELF: 0
5. POOR APPETITE OR OVEREATING: 0
SUM OF ALL RESPONSES TO PHQ9 QUESTIONS 1 & 2: 0
1. LITTLE INTEREST OR PLEASURE IN DOING THINGS: 0
8. MOVING OR SPEAKING SO SLOWLY THAT OTHER PEOPLE COULD HAVE NOTICED. OR THE OPPOSITE, BEING SO FIGETY OR RESTLESS THAT YOU HAVE BEEN MOVING AROUND A LOT MORE THAN USUAL: 0
SUM OF ALL RESPONSES TO PHQ QUESTIONS 1-9: 0
SUM OF ALL RESPONSES TO PHQ QUESTIONS 1-9: 0
3. TROUBLE FALLING OR STAYING ASLEEP: 0
7. TROUBLE CONCENTRATING ON THINGS, SUCH AS READING THE NEWSPAPER OR WATCHING TELEVISION: 0
SUM OF ALL RESPONSES TO PHQ QUESTIONS 1-9: 0

## 2023-04-04 NOTE — PROGRESS NOTES
Mouth/Throat:      Mouth: Mucous membranes are moist.   Eyes:      Pupils: Pupils are equal, round, and reactive to light. Cardiovascular:      Rate and Rhythm: Normal rate and regular rhythm. Pulses: Normal pulses. Heart sounds: Normal heart sounds. Pulmonary:      Effort: No respiratory distress. Breath sounds: Rhonchi present. No wheezing or rales. Abdominal:      General: Bowel sounds are normal.      Palpations: Abdomen is soft. Musculoskeletal:         General: Normal range of motion. Cervical back: Normal range of motion and neck supple. No rigidity or tenderness. Lymphadenopathy:      Cervical: No cervical adenopathy. Skin:     General: Skin is warm and dry. Coloration: Skin is not jaundiced or pale. Findings: No erythema or rash. Neurological:      Mental Status: She is alert and oriented to person, place, and time. Mental status is at baseline. Psychiatric:         Mood and Affect: Mood normal.         Behavior: Behavior normal.         Thought Content: Thought content normal.         Judgment: Judgment normal.     /74   Pulse 70   Temp 97.3 °F (36.3 °C) (Temporal)   Ht 5' 6\" (1.676 m)   Wt 123 lb 12.8 oz (56.2 kg)   SpO2 97%   BMI 19.98 kg/m²     Assessment:       Diagnosis Orders   1. Primary hypertension        2. Stage 4 very severe COPD by GOLD classification ([address]        3. Gastroesophageal reflux disease without esophagitis        4. Acute sinusitis, recurrence not specified, unspecified location              Plan:   Patient states she does not understand having a diagnosis with prior hypertension. She states that she has had recent spikes but normally has problems with hypotension. She does state that when she was discharged from the hospital that she was on a couple medications last fall that she would never knew why she was on them this was including Lasix and hydralazine.   BP Readings from Last 3 Encounters:   04/04/23 126/74   03/10/23

## 2023-04-05 RX ORDER — FLUTICASONE PROPIONATE 50 MCG
SPRAY, SUSPENSION (ML) NASAL
Qty: 1 EACH | Refills: 3 | Status: SHIPPED | OUTPATIENT
Start: 2023-04-05

## 2023-05-12 ENCOUNTER — HOSPITAL ENCOUNTER (EMERGENCY)
Age: 73
Discharge: HOME | End: 2023-05-12
Attending: EMERGENCY MEDICINE
Payer: MEDICARE

## 2023-05-12 ENCOUNTER — OFFICE VISIT (OUTPATIENT)
Dept: PRIMARY CARE CLINIC | Age: 73
End: 2023-05-12
Payer: MEDICARE

## 2023-05-12 ENCOUNTER — APPOINTMENT (OUTPATIENT)
Dept: CT IMAGING | Age: 73
End: 2023-05-12
Payer: MEDICARE

## 2023-05-12 ENCOUNTER — APPOINTMENT (OUTPATIENT)
Dept: GENERAL RADIOLOGY | Age: 73
End: 2023-05-12
Payer: MEDICARE

## 2023-05-12 VITALS
BODY MASS INDEX: 19.69 KG/M2 | WEIGHT: 122 LBS | HEART RATE: 89 BPM | SYSTOLIC BLOOD PRESSURE: 113 MMHG | OXYGEN SATURATION: 94 % | RESPIRATION RATE: 17 BRPM | DIASTOLIC BLOOD PRESSURE: 55 MMHG | TEMPERATURE: 98 F

## 2023-05-12 VITALS
OXYGEN SATURATION: 94 % | RESPIRATION RATE: 18 BRPM | WEIGHT: 122 LBS | BODY MASS INDEX: 19.61 KG/M2 | HEIGHT: 66 IN | DIASTOLIC BLOOD PRESSURE: 58 MMHG | SYSTOLIC BLOOD PRESSURE: 104 MMHG | HEART RATE: 74 BPM | TEMPERATURE: 98.2 F

## 2023-05-12 DIAGNOSIS — J44.9 CHRONIC OBSTRUCTIVE PULMONARY DISEASE, UNSPECIFIED COPD TYPE (HCC): ICD-10-CM

## 2023-05-12 DIAGNOSIS — R19.7 DIARRHEA, UNSPECIFIED TYPE: Primary | ICD-10-CM

## 2023-05-12 DIAGNOSIS — J20.6 ACUTE BRONCHITIS DUE TO RHINOVIRUS: Primary | ICD-10-CM

## 2023-05-12 DIAGNOSIS — R93.89 ABNORMAL CT OF THE CHEST: ICD-10-CM

## 2023-05-12 DIAGNOSIS — J44.9 STAGE 4 VERY SEVERE COPD BY GOLD CLASSIFICATION (HCC): ICD-10-CM

## 2023-05-12 DIAGNOSIS — R50.9 FEVER, UNSPECIFIED FEVER CAUSE: ICD-10-CM

## 2023-05-12 PROBLEM — R63.4 WEIGHT LOSS: Status: ACTIVE | Noted: 2021-05-26

## 2023-05-12 PROBLEM — Z99.81 OXYGEN DEPENDENT: Status: ACTIVE | Noted: 2021-05-26

## 2023-05-12 PROBLEM — S91.301A WOUND OF RIGHT FOOT: Status: ACTIVE | Noted: 2019-09-08

## 2023-05-12 PROBLEM — E87.29 COMPENSATED RESPIRATORY ACIDOSIS: Status: ACTIVE | Noted: 2019-09-08

## 2023-05-12 PROBLEM — R91.8 LUNG NODULE, MULTIPLE: Status: ACTIVE | Noted: 2021-05-26

## 2023-05-12 PROBLEM — Z99.81 HYPOXEMIA REQUIRING SUPPLEMENTAL OXYGEN: Status: ACTIVE | Noted: 2021-08-02

## 2023-05-12 PROBLEM — F43.21 UNRESOLVED GRIEF: Status: ACTIVE | Noted: 2020-02-05

## 2023-05-12 PROBLEM — R09.02 HYPOXEMIA REQUIRING SUPPLEMENTAL OXYGEN: Status: ACTIVE | Noted: 2021-08-02

## 2023-05-12 PROBLEM — R59.0 ANTERIOR CERVICAL LYMPHADENOPATHY: Status: ACTIVE | Noted: 2021-08-02

## 2023-05-12 PROBLEM — J00 NASOPHARYNGITIS: Status: ACTIVE | Noted: 2018-12-04

## 2023-05-12 PROBLEM — E87.6 HYPOKALEMIA: Status: ACTIVE | Noted: 2017-03-06

## 2023-05-12 LAB
ALBUMIN SERPL-MCNC: 3.6 G/DL (ref 3.5–5.2)
ALP SERPL-CCNC: 181 U/L (ref 35–104)
ALT SERPL-CCNC: 16 U/L (ref 5–33)
ANION GAP SERPL CALCULATED.3IONS-SCNC: 9 MMOL/L (ref 7–19)
AST SERPL-CCNC: 14 U/L (ref 5–32)
B PARAP IS1001 DNA NPH QL NAA+NON-PROBE: NOT DETECTED
B PERT.PT PRMT NPH QL NAA+NON-PROBE: NOT DETECTED
BASOPHILS # BLD: 0 K/UL (ref 0–0.2)
BASOPHILS NFR BLD: 0.7 % (ref 0–1)
BILIRUB SERPL-MCNC: <0.2 MG/DL (ref 0.2–1.2)
BNP BLD-MCNC: 520 PG/ML (ref 0–449)
BUN SERPL-MCNC: 6 MG/DL (ref 8–23)
C PNEUM DNA NPH QL NAA+NON-PROBE: NOT DETECTED
CALCIUM SERPL-MCNC: 8.7 MG/DL (ref 8.8–10.2)
CHLORIDE SERPL-SCNC: 101 MMOL/L (ref 98–111)
CO2 SERPL-SCNC: 33 MMOL/L (ref 22–29)
CREAT SERPL-MCNC: 0.6 MG/DL (ref 0.5–0.9)
EOSINOPHIL # BLD: 0.1 K/UL (ref 0–0.6)
EOSINOPHIL NFR BLD: 1.6 % (ref 0–5)
ERYTHROCYTE [DISTWIDTH] IN BLOOD BY AUTOMATED COUNT: 12.6 % (ref 11.5–14.5)
FLUAV RNA NPH QL NAA+NON-PROBE: NOT DETECTED
FLUBV RNA NPH QL NAA+NON-PROBE: NOT DETECTED
GLUCOSE SERPL-MCNC: 90 MG/DL (ref 74–109)
HADV DNA NPH QL NAA+NON-PROBE: NOT DETECTED
HCOV 229E RNA NPH QL NAA+NON-PROBE: NOT DETECTED
HCOV HKU1 RNA NPH QL NAA+NON-PROBE: NOT DETECTED
HCOV NL63 RNA NPH QL NAA+NON-PROBE: NOT DETECTED
HCOV OC43 RNA NPH QL NAA+NON-PROBE: NOT DETECTED
HCT VFR BLD AUTO: 32.6 % (ref 37–47)
HGB BLD-MCNC: 10.3 G/DL (ref 12–16)
HMPV RNA NPH QL NAA+NON-PROBE: NOT DETECTED
HPIV1 RNA NPH QL NAA+NON-PROBE: NOT DETECTED
HPIV2 RNA NPH QL NAA+NON-PROBE: NOT DETECTED
HPIV3 RNA NPH QL NAA+NON-PROBE: NOT DETECTED
HPIV4 RNA NPH QL NAA+NON-PROBE: NOT DETECTED
IMM GRANULOCYTES # BLD: 0 K/UL
LACTATE BLDV-SCNC: 0.8 MG/DL (ref 0.5–1.9)
LIPASE SERPL-CCNC: 13 U/L (ref 13–60)
LYMPHOCYTES # BLD: 0.8 K/UL (ref 1.1–4.5)
LYMPHOCYTES NFR BLD: 18.3 % (ref 20–40)
M PNEUMO DNA NPH QL NAA+NON-PROBE: NOT DETECTED
MCH RBC QN AUTO: 32.2 PG (ref 27–31)
MCHC RBC AUTO-ENTMCNC: 31.6 G/DL (ref 33–37)
MCV RBC AUTO: 101.9 FL (ref 81–99)
MONOCYTES # BLD: 0.4 K/UL (ref 0–0.9)
MONOCYTES NFR BLD: 8.9 % (ref 0–10)
NEUTROPHILS # BLD: 3.1 K/UL (ref 1.5–7.5)
NEUTS SEG NFR BLD: 70 % (ref 50–65)
PLATELET # BLD AUTO: 316 K/UL (ref 130–400)
PMV BLD AUTO: 9.6 FL (ref 9.4–12.3)
POTASSIUM SERPL-SCNC: 3.9 MMOL/L (ref 3.5–5)
PROCALCITONIN: 0.71 NG/ML (ref 0–0.09)
PROT SERPL-MCNC: 6.3 G/DL (ref 6.6–8.7)
RBC # BLD AUTO: 3.2 M/UL (ref 4.2–5.4)
RSV RNA NPH QL NAA+NON-PROBE: NOT DETECTED
RV+EV RNA NPH QL NAA+NON-PROBE: DETECTED
SARS-COV-2 RNA NPH QL NAA+NON-PROBE: NOT DETECTED
SODIUM SERPL-SCNC: 143 MMOL/L (ref 136–145)
TROPONIN T SERPL-MCNC: <0.01 NG/ML (ref 0–0.03)
WBC # BLD AUTO: 4.4 K/UL (ref 4.8–10.8)

## 2023-05-12 PROCEDURE — 74176 CT ABD & PELVIS W/O CONTRAST: CPT

## 2023-05-12 PROCEDURE — 84145 PROCALCITONIN (PCT): CPT

## 2023-05-12 PROCEDURE — 99215 OFFICE O/P EST HI 40 MIN: CPT | Performed by: NURSE PRACTITIONER

## 2023-05-12 PROCEDURE — 96365 THER/PROPH/DIAG IV INF INIT: CPT

## 2023-05-12 PROCEDURE — 71045 X-RAY EXAM CHEST 1 VIEW: CPT

## 2023-05-12 PROCEDURE — 84484 ASSAY OF TROPONIN QUANT: CPT

## 2023-05-12 PROCEDURE — 71045 X-RAY EXAM CHEST 1 VIEW: CPT | Performed by: RADIOLOGY

## 2023-05-12 PROCEDURE — 36415 COLL VENOUS BLD VENIPUNCTURE: CPT

## 2023-05-12 PROCEDURE — 6360000002 HC RX W HCPCS: Performed by: EMERGENCY MEDICINE

## 2023-05-12 PROCEDURE — 0202U NFCT DS 22 TRGT SARS-COV-2: CPT

## 2023-05-12 PROCEDURE — 96366 THER/PROPH/DIAG IV INF ADDON: CPT

## 2023-05-12 PROCEDURE — 1123F ACP DISCUSS/DSCN MKR DOCD: CPT | Performed by: NURSE PRACTITIONER

## 2023-05-12 PROCEDURE — 83605 ASSAY OF LACTIC ACID: CPT

## 2023-05-12 PROCEDURE — 3078F DIAST BP <80 MM HG: CPT | Performed by: NURSE PRACTITIONER

## 2023-05-12 PROCEDURE — 6370000000 HC RX 637 (ALT 250 FOR IP): Performed by: EMERGENCY MEDICINE

## 2023-05-12 PROCEDURE — 99285 EMERGENCY DEPT VISIT HI MDM: CPT

## 2023-05-12 PROCEDURE — 74176 CT ABD & PELVIS W/O CONTRAST: CPT | Performed by: RADIOLOGY

## 2023-05-12 PROCEDURE — 96375 TX/PRO/DX INJ NEW DRUG ADDON: CPT

## 2023-05-12 PROCEDURE — 3074F SYST BP LT 130 MM HG: CPT | Performed by: NURSE PRACTITIONER

## 2023-05-12 PROCEDURE — 83690 ASSAY OF LIPASE: CPT

## 2023-05-12 PROCEDURE — 71250 CT THORAX DX C-: CPT | Performed by: RADIOLOGY

## 2023-05-12 PROCEDURE — 96374 THER/PROPH/DIAG INJ IV PUSH: CPT

## 2023-05-12 PROCEDURE — 94640 AIRWAY INHALATION TREATMENT: CPT

## 2023-05-12 PROCEDURE — 85025 COMPLETE CBC W/AUTO DIFF WBC: CPT

## 2023-05-12 PROCEDURE — 83880 ASSAY OF NATRIURETIC PEPTIDE: CPT

## 2023-05-12 PROCEDURE — 80053 COMPREHEN METABOLIC PANEL: CPT

## 2023-05-12 PROCEDURE — 87040 BLOOD CULTURE FOR BACTERIA: CPT

## 2023-05-12 PROCEDURE — 71250 CT THORAX DX C-: CPT

## 2023-05-12 PROCEDURE — 93005 ELECTROCARDIOGRAM TRACING: CPT | Performed by: EMERGENCY MEDICINE

## 2023-05-12 RX ORDER — DIPHENHYDRAMINE HYDROCHLORIDE 50 MG/ML
25 INJECTION INTRAMUSCULAR; INTRAVENOUS ONCE
Status: COMPLETED | OUTPATIENT
Start: 2023-05-12 | End: 2023-05-12

## 2023-05-12 RX ORDER — LEVOFLOXACIN 750 MG/1
750 TABLET ORAL DAILY
Qty: 5 TABLET | Refills: 0 | Status: SHIPPED | OUTPATIENT
Start: 2023-05-12 | End: 2023-05-17

## 2023-05-12 RX ORDER — METHYLPREDNISOLONE SODIUM SUCCINATE 125 MG/2ML
125 INJECTION, POWDER, LYOPHILIZED, FOR SOLUTION INTRAMUSCULAR; INTRAVENOUS ONCE
Status: COMPLETED | OUTPATIENT
Start: 2023-05-12 | End: 2023-05-12

## 2023-05-12 RX ORDER — LEVOFLOXACIN 5 MG/ML
750 INJECTION, SOLUTION INTRAVENOUS ONCE
Status: COMPLETED | OUTPATIENT
Start: 2023-05-12 | End: 2023-05-12

## 2023-05-12 RX ORDER — MIRTAZAPINE 30 MG/1
30 TABLET, FILM COATED ORAL DAILY
COMMUNITY
Start: 2023-04-14

## 2023-05-12 RX ORDER — ALPRAZOLAM 0.5 MG/1
1 TABLET ORAL ONCE
Status: COMPLETED | OUTPATIENT
Start: 2023-05-12 | End: 2023-05-12

## 2023-05-12 RX ORDER — DEXTROAMPHETAMINE SACCHARATE, AMPHETAMINE ASPARTATE, DEXTROAMPHETAMINE SULFATE AND AMPHETAMINE SULFATE 5; 5; 5; 5 MG/1; MG/1; MG/1; MG/1
1 TABLET ORAL 2 TIMES DAILY
COMMUNITY
Start: 2023-04-24

## 2023-05-12 RX ORDER — PREDNISONE 20 MG/1
40 TABLET ORAL DAILY
Qty: 14 TABLET | Refills: 0 | Status: SHIPPED | OUTPATIENT
Start: 2023-05-12 | End: 2023-05-19

## 2023-05-12 RX ORDER — MORPHINE SULFATE 4 MG/ML
4 INJECTION, SOLUTION INTRAMUSCULAR; INTRAVENOUS ONCE
Status: COMPLETED | OUTPATIENT
Start: 2023-05-12 | End: 2023-05-12

## 2023-05-12 RX ORDER — ALBUTEROL SULFATE 2.5 MG/3ML
2.5 SOLUTION RESPIRATORY (INHALATION) ONCE
Status: COMPLETED | OUTPATIENT
Start: 2023-05-12 | End: 2023-05-12

## 2023-05-12 RX ORDER — ONDANSETRON 2 MG/ML
4 INJECTION INTRAMUSCULAR; INTRAVENOUS ONCE
Status: COMPLETED | OUTPATIENT
Start: 2023-05-12 | End: 2023-05-12

## 2023-05-12 RX ADMIN — DIPHENHYDRAMINE HYDROCHLORIDE 25 MG: 50 INJECTION, SOLUTION INTRAMUSCULAR; INTRAVENOUS at 22:41

## 2023-05-12 RX ADMIN — ALPRAZOLAM 1 MG: 0.5 TABLET ORAL at 22:54

## 2023-05-12 RX ADMIN — MORPHINE SULFATE 4 MG: 4 INJECTION, SOLUTION INTRAMUSCULAR; INTRAVENOUS at 19:35

## 2023-05-12 RX ADMIN — ONDANSETRON 4 MG: 2 INJECTION INTRAMUSCULAR; INTRAVENOUS at 19:35

## 2023-05-12 RX ADMIN — LEVOFLOXACIN 750 MG: 5 INJECTION, SOLUTION INTRAVENOUS at 21:09

## 2023-05-12 RX ADMIN — IPRATROPIUM BROMIDE 0.5 MG: 0.5 SOLUTION RESPIRATORY (INHALATION) at 20:21

## 2023-05-12 RX ADMIN — ALBUTEROL SULFATE 2.5 MG: 2.5 SOLUTION RESPIRATORY (INHALATION) at 20:20

## 2023-05-12 RX ADMIN — METHYLPREDNISOLONE SODIUM SUCCINATE 125 MG: 125 INJECTION, POWDER, FOR SOLUTION INTRAMUSCULAR; INTRAVENOUS at 19:35

## 2023-05-12 ASSESSMENT — ENCOUNTER SYMPTOMS
SHORTNESS OF BREATH: 1
COUGH: 1
DIARRHEA: 1
NAUSEA: 1
CHEST TIGHTNESS: 1
EYES NEGATIVE: 1
ALLERGIC/IMMUNOLOGIC NEGATIVE: 1
WHEEZING: 1
VOMITING: 1

## 2023-05-12 ASSESSMENT — PAIN SCALES - GENERAL: PAINLEVEL_OUTOF10: 8

## 2023-05-12 NOTE — PROGRESS NOTES
[facility] PHYSICIAN SERVICES  [facility]  [address] [place] [address]  [address] [place] [address]  Dept: [phone]  Dept Fax: [phone]  Loc: [phone]    Jas Cohen is a 74 y.o. female who presents today for her medical conditions/complaints as noted below. Jas Cohen is c/o of Congestion (Pt c/o congestion. ), Cough, Diarrhea (Pt states she has had diarrhea 10 days. Nothing has helped and she is just drinking brith.), and Fever (Pt states she had a fever that reached 102 about 3 days ago)        HPI:     HPI    This patient presents today for liquid diarrhea for 10 days. She reports fever and worsening breathing. pt states that she finished her antibiotics but never felt any better but only got worse. 3 to 3.5 L O2 via NC . Chief Complaint   Patient presents with    Congestion     Pt c/o congestion. Cough    Diarrhea     Pt states she has had diarrhea 10 days. Nothing has helped and she is just drinking brith. Fever     Pt states she had a fever that reached 102 about 3 days ago     Past Medical History:   Diagnosis Date    ADHD (attention deficit hyperactivity disorder)     Anxiety     COPD (chronic obstructive pulmonary disease) (HCC)     Depression     Fibromyalgia     GERD (gastroesophageal reflux disease)     Hypertension     Malignant neoplasm of upper lobe of right lung ([address] 12/04/2018    Palliative care patient 10/06/2021    RA (rheumatoid arthritis) ([address]       Past Surgical History:   Procedure Laterality Date    CHOLECYSTECTOMY      HERNIA REPAIR      HYSTERECTOMY (CERVIX STATUS UNKNOWN)      LEG SURGERY      steel garth placement. 2009    THORACOSCOPY Right 8/12/2022    THORACOSCOPY DECORTICATION VIDEO ASSISTED performed by Pinky Downey MD at [address] [place] [address] 5/12/2023 5/12/2023 4/4/2023 3/10/2023 3/10/2023 2/99/5032   SYSTOLIC [phone] - - -   DIASTOLIC 73 58 74 - - -   Site - Left Upper Arm - - - -   Position - Sitting - - - -   Cuff Size - Medium Adult - - - -   Pulse

## 2023-05-13 LAB
BACTERIA BLD CULT ORG #2: NORMAL
BACTERIA BLD CULT: NORMAL

## 2023-05-13 NOTE — ED NOTES
Pt called this RN into room. Pt states her arm that her IV is in getting the antibiotics started itching and became red with a rash. Antibiotics were stopped and Dr. Carolyn King came to bedside. Awaiting new orders at this time.      Bry Barney RN  05/12/23 [id number]

## 2023-05-13 NOTE — ED PROVIDER NOTES
(SINGULAIR) 10 MG TABLET    Take 1 tablet by mouth nightly    NICOTINE (NICODERM CQ) 21 MG/24HR    Place 1 patch onto the skin every 24 hours    NYSTATIN (MYCOSTATIN) 073090 UNIT/ML SUSPENSION    Take 5 mLs by mouth 4 times daily    ONDANSETRON (ZOFRAN ODT) 4 MG DISINTEGRATING TABLET    Take 1 tablet by mouth every 8 hours as needed for Nausea or Vomiting    OXYCODONE-ACETAMINOPHEN (PERCOCET)  MG PER TABLET    TAKE 1 TABLET Q 4 TO 6 HRS PRN. (MAX 5/DAY).  Earliest Fill Date: 7/19/18    OXYGEN    Inhale into the lungs    SODIUM CHLORIDE (OCEAN, BABY AYR) 0.65 % NASAL SPRAY    1 spray by Nasal route as needed for Congestion (dry sores in nose)    SUCRALFATE (CARAFATE) 1 GM TABLET    Take 1 tablet by mouth 4 times daily       ALLERGIES     Iv dye [iodides], Tetracyclines & related, Aspirin, Codeine, Demerol, Fentanyl, Neosporin [bacitracin-neomycin-polymyxin], Nsaids, Pcn [penicillins], Pentazocine lactate, Sulfa antibiotics, Talwin [pentazocine], and Influenza vaccines    FAMILY HISTORY       Family History   Problem Relation Age of Onset    Cancer Father           SOCIAL HISTORY       Social History     Socioeconomic History    Marital status:      Spouse name: None    Number of children: None    Years of education: None    Highest education level: None   Tobacco Use    Smoking status: Some Days     Packs/day: 0.25     Years: 30.00     Pack years: 7.50     Types: Cigarettes    Smokeless tobacco: Never   Vaping Use    Vaping Use: Never used   Substance and Sexual Activity    Alcohol use: No    Drug use: No     Social Determinants of Health     Financial Resource Strain: Low Risk     Difficulty of Paying Living Expenses: Not very hard   Food Insecurity: Food Insecurity Present    Worried About Running Out of Food in the Last Year: Sometimes true    Ran Out of Food in the Last Year: Sometimes true   Transportation Needs: Unknown    Lack of Transportation (Non-Medical): No   Housing Stability: Unknown

## 2023-05-15 ENCOUNTER — TELEPHONE (OUTPATIENT)
Dept: PULMONOLOGY | Age: 73
End: 2023-05-15

## 2023-05-15 DIAGNOSIS — R19.7 DIARRHEA, UNSPECIFIED TYPE: ICD-10-CM

## 2023-05-15 LAB
EKG P AXIS: 81 DEGREES
EKG P-R INTERVAL: 150 MS
EKG Q-T INTERVAL: 422 MS
EKG QRS DURATION: 114 MS
EKG QTC CALCULATION (BAZETT): 427 MS
EKG T AXIS: 17 DEGREES

## 2023-05-15 PROCEDURE — 93010 ELECTROCARDIOGRAM REPORT: CPT | Performed by: INTERNAL MEDICINE

## 2023-05-15 NOTE — TELEPHONE ENCOUNTER
Patient was in the ED and was told to Call Eusebio Handy MD (Pulmonary Disease) on 5/15/2023; Schedule an appointment for a recheck next week. You need further evaluation regarding CT Chest findings.  Please called the patient [phone]        Thank You

## 2023-05-17 LAB
BACTERIA BLD CULT ORG #2: NORMAL
BACTERIA BLD CULT: NORMAL

## 2023-05-19 ENCOUNTER — TELEPHONE (OUTPATIENT)
Dept: PRIMARY CARE CLINIC | Age: 73
End: 2023-05-19

## 2023-05-19 NOTE — TELEPHONE ENCOUNTER
Called patient to let her know that she doesn't need another antibiotic but she does need to be taking mucinex 1200 bid along with antihistamine and flonase. Pt voices understanding.

## 2023-05-19 NOTE — TELEPHONE ENCOUNTER
Patient called and states that she went to hospital and was put on levaquin for 5 days until she sees a doctor. She went to [facility] on 5/12/23 for acute bronchitis due to Rhinovirus. Patient still has some congestion of head and chest. She wants to know if she needs more antibiotics.

## 2023-05-27 ENCOUNTER — HOSPITAL ENCOUNTER (INPATIENT)
Age: 73
LOS: 3 days | Discharge: HOME | DRG: 194 | End: 2023-05-30
Attending: PEDIATRICS
Payer: MEDICARE

## 2023-05-27 ENCOUNTER — APPOINTMENT (OUTPATIENT)
Dept: GENERAL RADIOLOGY | Age: 73
DRG: 194 | End: 2023-05-27
Payer: MEDICARE

## 2023-05-27 ENCOUNTER — APPOINTMENT (OUTPATIENT)
Dept: NUCLEAR MEDICINE | Age: 73
DRG: 194 | End: 2023-05-27
Payer: MEDICARE

## 2023-05-27 DIAGNOSIS — R41.0 DELIRIUM: ICD-10-CM

## 2023-05-27 DIAGNOSIS — I95.9 HYPOTENSION, UNSPECIFIED HYPOTENSION TYPE: ICD-10-CM

## 2023-05-27 DIAGNOSIS — J18.9 COMMUNITY ACQUIRED PNEUMONIA, UNSPECIFIED LATERALITY: ICD-10-CM

## 2023-05-27 DIAGNOSIS — A41.9 SEPSIS, DUE TO UNSPECIFIED ORGANISM, UNSPECIFIED WHETHER ACUTE ORGAN DYSFUNCTION PRESENT (HCC): Primary | ICD-10-CM

## 2023-05-27 DIAGNOSIS — J18.9 PNEUMONIA OF RIGHT LOWER LOBE DUE TO INFECTIOUS ORGANISM: ICD-10-CM

## 2023-05-27 DIAGNOSIS — R09.02 HYPOXIA: ICD-10-CM

## 2023-05-27 PROBLEM — J18.1 RIGHT LOWER LOBE CONSOLIDATION (HCC): Status: ACTIVE | Noted: 2023-05-27

## 2023-05-27 LAB
ALBUMIN SERPL-MCNC: 3.5 G/DL (ref 3.5–5.2)
ALLENS TEST: ABNORMAL
ALP SERPL-CCNC: 81 U/L (ref 35–104)
ALT SERPL-CCNC: 7 U/L (ref 5–33)
ANION GAP SERPL CALCULATED.3IONS-SCNC: 9 MMOL/L (ref 7–19)
ANION GAP SERPL CALCULATED.3IONS-SCNC: 9 MMOL/L (ref 7–19)
AST SERPL-CCNC: 14 U/L (ref 5–32)
B PARAP IS1001 DNA NPH QL NAA+NON-PROBE: NOT DETECTED
B PERT.PT PRMT NPH QL NAA+NON-PROBE: NOT DETECTED
BASE EXCESS ARTERIAL: 8.5 MMOL/L (ref -2–2)
BASOPHILS # BLD: 0 K/UL (ref 0–0.2)
BASOPHILS NFR BLD: 0.3 % (ref 0–1)
BILIRUB SERPL-MCNC: 0.3 MG/DL (ref 0.2–1.2)
BNP BLD-MCNC: 255 PG/ML (ref 0–449)
BUN SERPL-MCNC: 9 MG/DL (ref 8–23)
BUN SERPL-MCNC: 9 MG/DL (ref 8–23)
C PNEUM DNA NPH QL NAA+NON-PROBE: NOT DETECTED
CALCIUM SERPL-MCNC: 7.8 MG/DL (ref 8.8–10.2)
CALCIUM SERPL-MCNC: 8.7 MG/DL (ref 8.8–10.2)
CARBOXYHEMOGLOBIN ARTERIAL: 1.9 % (ref 0–5)
CHLORIDE SERPL-SCNC: 100 MMOL/L (ref 98–111)
CHLORIDE SERPL-SCNC: 97 MMOL/L (ref 98–111)
CO2 SERPL-SCNC: 29 MMOL/L (ref 22–29)
CO2 SERPL-SCNC: 29 MMOL/L (ref 22–29)
CREAT SERPL-MCNC: 0.6 MG/DL (ref 0.5–0.9)
CREAT SERPL-MCNC: 0.7 MG/DL (ref 0.5–0.9)
D DIMER PPP FEU-MCNC: 1.4 UG/ML FEU (ref 0–0.48)
EOSINOPHIL # BLD: 0.1 K/UL (ref 0–0.6)
EOSINOPHIL NFR BLD: 0.6 % (ref 0–5)
ERYTHROCYTE [DISTWIDTH] IN BLOOD BY AUTOMATED COUNT: 12.7 % (ref 11.5–14.5)
FLUAV RNA NPH QL NAA+NON-PROBE: NOT DETECTED
FLUBV RNA NPH QL NAA+NON-PROBE: NOT DETECTED
GLUCOSE SERPL-MCNC: 109 MG/DL (ref 74–109)
GLUCOSE SERPL-MCNC: 120 MG/DL (ref 74–109)
HADV DNA NPH QL NAA+NON-PROBE: NOT DETECTED
HCO3 ARTERIAL: 34.7 MMOL/L (ref 22–26)
HCOV 229E RNA NPH QL NAA+NON-PROBE: NOT DETECTED
HCOV HKU1 RNA NPH QL NAA+NON-PROBE: NOT DETECTED
HCOV NL63 RNA NPH QL NAA+NON-PROBE: NOT DETECTED
HCOV OC43 RNA NPH QL NAA+NON-PROBE: NOT DETECTED
HCT VFR BLD AUTO: 31.4 % (ref 37–47)
HEMOGLOBIN, ART, EXTENDED: 10.3 G/DL (ref 12–16)
HGB BLD-MCNC: 9.8 G/DL (ref 12–16)
HMPV RNA NPH QL NAA+NON-PROBE: NOT DETECTED
HPIV1 RNA NPH QL NAA+NON-PROBE: NOT DETECTED
HPIV2 RNA NPH QL NAA+NON-PROBE: NOT DETECTED
HPIV3 RNA NPH QL NAA+NON-PROBE: NOT DETECTED
HPIV4 RNA NPH QL NAA+NON-PROBE: NOT DETECTED
IMM GRANULOCYTES # BLD: 0.1 K/UL
LACTATE BLDV-SCNC: 1.1 MMOL/L (ref 0.5–1.9)
LYMPHOCYTES # BLD: 0.8 K/UL (ref 1.1–4.5)
LYMPHOCYTES NFR BLD: 7.4 % (ref 20–40)
M PNEUMO DNA NPH QL NAA+NON-PROBE: NOT DETECTED
MCH RBC QN AUTO: 32.3 PG (ref 27–31)
MCHC RBC AUTO-ENTMCNC: 31.2 G/DL (ref 33–37)
MCV RBC AUTO: 103.6 FL (ref 81–99)
METHEMOGLOBIN ARTERIAL: 1.4 %
MONOCYTES # BLD: 0.8 K/UL (ref 0–0.9)
MONOCYTES NFR BLD: 7.6 % (ref 0–10)
NEUTROPHILS # BLD: 8.6 K/UL (ref 1.5–7.5)
NEUTS SEG NFR BLD: 83.6 % (ref 50–65)
O2 CONTENT ARTERIAL: 13.2 ML/DL
O2 SAT, ARTERIAL: 91 %
O2 THERAPY: ABNORMAL
OXYGEN FLOW: 3
PCO2 ARTERIAL: 56 MMHG (ref 35–45)
PH ARTERIAL: 7.4 (ref 7.35–7.45)
PLATELET # BLD AUTO: 214 K/UL (ref 130–400)
PMV BLD AUTO: 11 FL (ref 9.4–12.3)
PO2 ARTERIAL: 62 MMHG (ref 80–100)
POTASSIUM BLD-SCNC: 3.8 MMOL/L
POTASSIUM SERPL-SCNC: 4 MMOL/L (ref 3.5–5)
POTASSIUM SERPL-SCNC: 4.1 MMOL/L (ref 3.5–5)
PROT SERPL-MCNC: 7 G/DL (ref 6.6–8.7)
RBC # BLD AUTO: 3.03 M/UL (ref 4.2–5.4)
RSV RNA NPH QL NAA+NON-PROBE: NOT DETECTED
RV+EV RNA NPH QL NAA+NON-PROBE: NOT DETECTED
SAMPLE SOURCE: ABNORMAL
SARS-COV-2 RNA NPH QL NAA+NON-PROBE: NOT DETECTED
SODIUM SERPL-SCNC: 135 MMOL/L (ref 136–145)
SODIUM SERPL-SCNC: 138 MMOL/L (ref 136–145)
TROPONIN T SERPL-MCNC: <0.01 NG/ML (ref 0–0.03)
WBC # BLD AUTO: 10.3 K/UL (ref 4.8–10.8)

## 2023-05-27 PROCEDURE — 87040 BLOOD CULTURE FOR BACTERIA: CPT

## 2023-05-27 PROCEDURE — 6360000002 HC RX W HCPCS: Performed by: HOSPITALIST

## 2023-05-27 PROCEDURE — 6370000000 HC RX 637 (ALT 250 FOR IP): Performed by: HOSPITALIST

## 2023-05-27 PROCEDURE — 94644 CONT INHLJ TX 1ST HOUR: CPT

## 2023-05-27 PROCEDURE — 71045 X-RAY EXAM CHEST 1 VIEW: CPT

## 2023-05-27 PROCEDURE — 96365 THER/PROPH/DIAG IV INF INIT: CPT

## 2023-05-27 PROCEDURE — 80053 COMPREHEN METABOLIC PANEL: CPT

## 2023-05-27 PROCEDURE — 78580 LUNG PERFUSION IMAGING: CPT

## 2023-05-27 PROCEDURE — 2700000000 HC OXYGEN THERAPY PER DAY

## 2023-05-27 PROCEDURE — 99285 EMERGENCY DEPT VISIT HI MDM: CPT

## 2023-05-27 PROCEDURE — 36415 COLL VENOUS BLD VENIPUNCTURE: CPT

## 2023-05-27 PROCEDURE — 83880 ASSAY OF NATRIURETIC PEPTIDE: CPT

## 2023-05-27 PROCEDURE — 94640 AIRWAY INHALATION TREATMENT: CPT

## 2023-05-27 PROCEDURE — 2580000003 HC RX 258: Performed by: HOSPITALIST

## 2023-05-27 PROCEDURE — 2500000003 HC RX 250 WO HCPCS: Performed by: HOSPITALIST

## 2023-05-27 PROCEDURE — 6370000000 HC RX 637 (ALT 250 FOR IP): Performed by: PEDIATRICS

## 2023-05-27 PROCEDURE — 82803 BLOOD GASES ANY COMBINATION: CPT

## 2023-05-27 PROCEDURE — 6360000002 HC RX W HCPCS: Performed by: PEDIATRICS

## 2023-05-27 PROCEDURE — A9540 TC99M MAA: HCPCS | Performed by: PEDIATRICS

## 2023-05-27 PROCEDURE — 84484 ASSAY OF TROPONIN QUANT: CPT

## 2023-05-27 PROCEDURE — 93005 ELECTROCARDIOGRAM TRACING: CPT | Performed by: PEDIATRICS

## 2023-05-27 PROCEDURE — 85025 COMPLETE CBC W/AUTO DIFF WBC: CPT

## 2023-05-27 PROCEDURE — 1210000000 HC MED SURG R&B

## 2023-05-27 PROCEDURE — 36600 WITHDRAWAL OF ARTERIAL BLOOD: CPT

## 2023-05-27 PROCEDURE — 96367 TX/PROPH/DG ADDL SEQ IV INF: CPT

## 2023-05-27 PROCEDURE — 0202U NFCT DS 22 TRGT SARS-COV-2: CPT

## 2023-05-27 PROCEDURE — 85379 FIBRIN DEGRADATION QUANT: CPT

## 2023-05-27 PROCEDURE — 2580000003 HC RX 258: Performed by: PEDIATRICS

## 2023-05-27 PROCEDURE — 3430000000 HC RX DIAGNOSTIC RADIOPHARMACEUTICAL: Performed by: PEDIATRICS

## 2023-05-27 PROCEDURE — 83605 ASSAY OF LACTIC ACID: CPT

## 2023-05-27 RX ORDER — ONDANSETRON 4 MG/1
4 TABLET, ORALLY DISINTEGRATING ORAL EVERY 8 HOURS PRN
Status: DISCONTINUED | OUTPATIENT
Start: 2023-05-27 | End: 2023-05-30 | Stop reason: HOSPADM

## 2023-05-27 RX ORDER — PANTOPRAZOLE SODIUM 40 MG/1
40 TABLET, DELAYED RELEASE ORAL
Status: DISCONTINUED | OUTPATIENT
Start: 2023-05-27 | End: 2023-05-30 | Stop reason: HOSPADM

## 2023-05-27 RX ORDER — ACETAMINOPHEN 325 MG/1
650 TABLET ORAL EVERY 4 HOURS PRN
Status: DISCONTINUED | OUTPATIENT
Start: 2023-05-27 | End: 2023-05-30 | Stop reason: HOSPADM

## 2023-05-27 RX ORDER — MONTELUKAST SODIUM 10 MG/1
10 TABLET ORAL NIGHTLY
Status: DISCONTINUED | OUTPATIENT
Start: 2023-05-27 | End: 2023-05-30 | Stop reason: HOSPADM

## 2023-05-27 RX ORDER — SODIUM CHLORIDE 0.9 % (FLUSH) 0.9 %
5-40 SYRINGE (ML) INJECTION PRN
Status: DISCONTINUED | OUTPATIENT
Start: 2023-05-27 | End: 2023-05-30 | Stop reason: HOSPADM

## 2023-05-27 RX ORDER — GUAIFENESIN/DEXTROMETHORPHAN 100-10MG/5
10 SYRUP ORAL EVERY 4 HOURS PRN
Status: DISCONTINUED | OUTPATIENT
Start: 2023-05-27 | End: 2023-05-28

## 2023-05-27 RX ORDER — MECOBALAMIN 5000 MCG
5 TABLET,DISINTEGRATING ORAL NIGHTLY PRN
Status: DISCONTINUED | OUTPATIENT
Start: 2023-05-27 | End: 2023-05-30 | Stop reason: HOSPADM

## 2023-05-27 RX ORDER — POTASSIUM CHLORIDE 20 MEQ/1
40 TABLET, EXTENDED RELEASE ORAL PRN
Status: DISCONTINUED | OUTPATIENT
Start: 2023-05-27 | End: 2023-05-30 | Stop reason: HOSPADM

## 2023-05-27 RX ORDER — ARFORMOTEROL TARTRATE 15 UG/2ML
15 SOLUTION RESPIRATORY (INHALATION) 2 TIMES DAILY
Status: DISCONTINUED | OUTPATIENT
Start: 2023-05-27 | End: 2023-05-30 | Stop reason: HOSPADM

## 2023-05-27 RX ORDER — GABAPENTIN 300 MG/1
300 CAPSULE ORAL 3 TIMES DAILY
Status: DISCONTINUED | OUTPATIENT
Start: 2023-05-27 | End: 2023-05-30 | Stop reason: HOSPADM

## 2023-05-27 RX ORDER — SODIUM CHLORIDE, SODIUM LACTATE, POTASSIUM CHLORIDE, CALCIUM CHLORIDE 600; 310; 30; 20 MG/100ML; MG/100ML; MG/100ML; MG/100ML
INJECTION, SOLUTION INTRAVENOUS CONTINUOUS
Status: DISCONTINUED | OUTPATIENT
Start: 2023-05-27 | End: 2023-05-30

## 2023-05-27 RX ORDER — OXYCODONE AND ACETAMINOPHEN 10; 325 MG/1; MG/1
1 TABLET ORAL ONCE
Status: COMPLETED | OUTPATIENT
Start: 2023-05-27 | End: 2023-05-27

## 2023-05-27 RX ORDER — ACETAMINOPHEN 500 MG
1000 TABLET ORAL ONCE
Status: COMPLETED | OUTPATIENT
Start: 2023-05-27 | End: 2023-05-27

## 2023-05-27 RX ORDER — OXYCODONE AND ACETAMINOPHEN 10; 325 MG/1; MG/1
1 TABLET ORAL EVERY 4 HOURS PRN
COMMUNITY

## 2023-05-27 RX ORDER — FLUTICASONE PROPIONATE 50 MCG
1 SPRAY, SUSPENSION (ML) NASAL DAILY
Status: DISCONTINUED | OUTPATIENT
Start: 2023-05-27 | End: 2023-05-30 | Stop reason: HOSPADM

## 2023-05-27 RX ORDER — ALPRAZOLAM 0.5 MG/1
1 TABLET ORAL 4 TIMES DAILY PRN
Status: DISCONTINUED | OUTPATIENT
Start: 2023-05-27 | End: 2023-05-30 | Stop reason: HOSPADM

## 2023-05-27 RX ORDER — POLYETHYLENE GLYCOL 3350 17 G/17G
17 POWDER, FOR SOLUTION ORAL DAILY PRN
Status: DISCONTINUED | OUTPATIENT
Start: 2023-05-27 | End: 2023-05-30 | Stop reason: HOSPADM

## 2023-05-27 RX ORDER — IPRATROPIUM BROMIDE AND ALBUTEROL SULFATE 2.5; .5 MG/3ML; MG/3ML
1 SOLUTION RESPIRATORY (INHALATION)
Status: COMPLETED | OUTPATIENT
Start: 2023-05-27 | End: 2023-05-27

## 2023-05-27 RX ORDER — ACETYLCYSTEINE 200 MG/ML
600 SOLUTION ORAL; RESPIRATORY (INHALATION) 2 TIMES DAILY PRN
Status: DISCONTINUED | OUTPATIENT
Start: 2023-05-27 | End: 2023-05-30 | Stop reason: HOSPADM

## 2023-05-27 RX ORDER — ACETAMINOPHEN 650 MG/1
650 SUPPOSITORY RECTAL EVERY 6 HOURS PRN
Status: DISCONTINUED | OUTPATIENT
Start: 2023-05-27 | End: 2023-05-30 | Stop reason: HOSPADM

## 2023-05-27 RX ORDER — SODIUM CHLORIDE, SODIUM LACTATE, POTASSIUM CHLORIDE, AND CALCIUM CHLORIDE .6; .31; .03; .02 G/100ML; G/100ML; G/100ML; G/100ML
30 INJECTION, SOLUTION INTRAVENOUS ONCE
Status: COMPLETED | OUTPATIENT
Start: 2023-05-27 | End: 2023-05-27

## 2023-05-27 RX ORDER — CALCIUM CARBONATE 500 MG/1
500 TABLET, CHEWABLE ORAL 3 TIMES DAILY PRN
Status: DISCONTINUED | OUTPATIENT
Start: 2023-05-27 | End: 2023-05-30 | Stop reason: HOSPADM

## 2023-05-27 RX ORDER — GUAIFENESIN 200 MG/10ML
200 LIQUID ORAL EVERY 4 HOURS PRN
Status: DISCONTINUED | OUTPATIENT
Start: 2023-05-27 | End: 2023-05-28

## 2023-05-27 RX ORDER — ALBUTEROL SULFATE 2.5 MG/3ML
2.5 SOLUTION RESPIRATORY (INHALATION) EVERY 4 HOURS PRN
Status: DISCONTINUED | OUTPATIENT
Start: 2023-05-27 | End: 2023-05-30 | Stop reason: HOSPADM

## 2023-05-27 RX ORDER — CITALOPRAM 20 MG/1
20 TABLET ORAL DAILY
Status: DISCONTINUED | OUTPATIENT
Start: 2023-05-27 | End: 2023-05-30 | Stop reason: HOSPADM

## 2023-05-27 RX ORDER — GABAPENTIN 600 MG/1
600 TABLET ORAL 3 TIMES DAILY
Status: DISCONTINUED | OUTPATIENT
Start: 2023-05-27 | End: 2023-05-27

## 2023-05-27 RX ORDER — SUCRALFATE 1 G/1
1 TABLET ORAL 4 TIMES DAILY
Status: DISCONTINUED | OUTPATIENT
Start: 2023-05-27 | End: 2023-05-30 | Stop reason: HOSPADM

## 2023-05-27 RX ORDER — SODIUM CHLORIDE 0.9 % (FLUSH) 0.9 %
5-40 SYRINGE (ML) INJECTION EVERY 12 HOURS SCHEDULED
Status: DISCONTINUED | OUTPATIENT
Start: 2023-05-27 | End: 2023-05-30 | Stop reason: HOSPADM

## 2023-05-27 RX ORDER — POTASSIUM CHLORIDE 7.45 MG/ML
10 INJECTION INTRAVENOUS PRN
Status: DISCONTINUED | OUTPATIENT
Start: 2023-05-27 | End: 2023-05-30 | Stop reason: HOSPADM

## 2023-05-27 RX ORDER — SODIUM CHLORIDE 9 MG/ML
INJECTION, SOLUTION INTRAVENOUS PRN
Status: DISCONTINUED | OUTPATIENT
Start: 2023-05-27 | End: 2023-05-30 | Stop reason: HOSPADM

## 2023-05-27 RX ORDER — BUDESONIDE 0.5 MG/2ML
0.5 INHALANT ORAL EVERY 12 HOURS
Status: DISCONTINUED | OUTPATIENT
Start: 2023-05-27 | End: 2023-05-30 | Stop reason: HOSPADM

## 2023-05-27 RX ORDER — ENOXAPARIN SODIUM 100 MG/ML
40 INJECTION SUBCUTANEOUS DAILY
Status: DISCONTINUED | OUTPATIENT
Start: 2023-05-27 | End: 2023-05-27

## 2023-05-27 RX ORDER — METRONIDAZOLE 500 MG/100ML
500 INJECTION, SOLUTION INTRAVENOUS EVERY 8 HOURS
Status: DISCONTINUED | OUTPATIENT
Start: 2023-05-27 | End: 2023-05-28

## 2023-05-27 RX ORDER — ENOXAPARIN SODIUM 100 MG/ML
40 INJECTION SUBCUTANEOUS DAILY
Status: DISCONTINUED | OUTPATIENT
Start: 2023-05-28 | End: 2023-05-30 | Stop reason: HOSPADM

## 2023-05-27 RX ORDER — MAGNESIUM SULFATE IN WATER 40 MG/ML
2000 INJECTION, SOLUTION INTRAVENOUS PRN
Status: DISCONTINUED | OUTPATIENT
Start: 2023-05-27 | End: 2023-05-30 | Stop reason: HOSPADM

## 2023-05-27 RX ORDER — ENOXAPARIN SODIUM 100 MG/ML
55 INJECTION SUBCUTANEOUS ONCE
Status: COMPLETED | OUTPATIENT
Start: 2023-05-27 | End: 2023-05-27

## 2023-05-27 RX ORDER — ONDANSETRON 2 MG/ML
4 INJECTION INTRAMUSCULAR; INTRAVENOUS EVERY 6 HOURS PRN
Status: DISCONTINUED | OUTPATIENT
Start: 2023-05-27 | End: 2023-05-30 | Stop reason: HOSPADM

## 2023-05-27 RX ADMIN — CEFEPIME 2000 MG: 2 INJECTION, POWDER, FOR SOLUTION INTRAVENOUS at 10:40

## 2023-05-27 RX ADMIN — ALBUTEROL SULFATE 2.5 MG: 2.5 SOLUTION RESPIRATORY (INHALATION) at 14:54

## 2023-05-27 RX ADMIN — IPRATROPIUM BROMIDE 0.5 MG: 0.5 SOLUTION RESPIRATORY (INHALATION) at 19:31

## 2023-05-27 RX ADMIN — SODIUM CHLORIDE, POTASSIUM CHLORIDE, SODIUM LACTATE AND CALCIUM CHLORIDE: 600; 310; 30; 20 INJECTION, SOLUTION INTRAVENOUS at 06:20

## 2023-05-27 RX ADMIN — FLUTICASONE PROPIONATE 1 SPRAY: 50 SPRAY, METERED NASAL at 10:46

## 2023-05-27 RX ADMIN — GABAPENTIN 300 MG: 300 CAPSULE ORAL at 10:33

## 2023-05-27 RX ADMIN — ARFORMOTEROL TARTRATE 15 MCG: 15 SOLUTION RESPIRATORY (INHALATION) at 06:11

## 2023-05-27 RX ADMIN — BUDESONIDE 500 MCG: 0.5 INHALANT ORAL at 10:25

## 2023-05-27 RX ADMIN — GABAPENTIN 300 MG: 300 CAPSULE ORAL at 14:21

## 2023-05-27 RX ADMIN — IPRATROPIUM BROMIDE 0.5 MG: 0.5 SOLUTION RESPIRATORY (INHALATION) at 10:25

## 2023-05-27 RX ADMIN — PANTOPRAZOLE SODIUM 40 MG: 40 TABLET, DELAYED RELEASE ORAL at 10:46

## 2023-05-27 RX ADMIN — VANCOMYCIN HYDROCHLORIDE 750 MG: 750 INJECTION, POWDER, LYOPHILIZED, FOR SOLUTION INTRAVENOUS at 16:29

## 2023-05-27 RX ADMIN — SODIUM CHLORIDE, PRESERVATIVE FREE 20 MG: 5 INJECTION INTRAVENOUS at 10:41

## 2023-05-27 RX ADMIN — ARFORMOTEROL TARTRATE 15 MCG: 15 SOLUTION RESPIRATORY (INHALATION) at 19:32

## 2023-05-27 RX ADMIN — NYSTATIN 500000 UNITS: 100000 SUSPENSION ORAL at 18:04

## 2023-05-27 RX ADMIN — IPRATROPIUM BROMIDE AND ALBUTEROL SULFATE 1 DOSE: .5; 2.5 SOLUTION RESPIRATORY (INHALATION) at 02:55

## 2023-05-27 RX ADMIN — IPRATROPIUM BROMIDE 0.5 MG: 0.5 SOLUTION RESPIRATORY (INHALATION) at 06:11

## 2023-05-27 RX ADMIN — ALPRAZOLAM 1 MG: 0.5 TABLET ORAL at 10:59

## 2023-05-27 RX ADMIN — ENOXAPARIN SODIUM 60 MG: 100 INJECTION SUBCUTANEOUS at 05:26

## 2023-05-27 RX ADMIN — CEFEPIME 1000 MG: 1 INJECTION, POWDER, FOR SOLUTION INTRAMUSCULAR; INTRAVENOUS at 02:30

## 2023-05-27 RX ADMIN — METRONIDAZOLE 500 MG: 500 INJECTION, SOLUTION INTRAVENOUS at 14:30

## 2023-05-27 RX ADMIN — NYSTATIN 500000 UNITS: 100000 SUSPENSION ORAL at 10:33

## 2023-05-27 RX ADMIN — ALPRAZOLAM 1 MG: 0.5 TABLET ORAL at 20:40

## 2023-05-27 RX ADMIN — ACETAMINOPHEN 650 MG: 325 TABLET ORAL at 14:21

## 2023-05-27 RX ADMIN — SUCRALFATE 1 G: 1 TABLET ORAL at 20:41

## 2023-05-27 RX ADMIN — SODIUM CHLORIDE, PRESERVATIVE FREE 20 MG: 5 INJECTION INTRAVENOUS at 22:04

## 2023-05-27 RX ADMIN — OXYCODONE AND ACETAMINOPHEN 1 TABLET: 10; 325 TABLET ORAL at 05:25

## 2023-05-27 RX ADMIN — GABAPENTIN 300 MG: 300 CAPSULE ORAL at 20:41

## 2023-05-27 RX ADMIN — Medication 5 MILLICURIE: at 10:20

## 2023-05-27 RX ADMIN — Medication 1000 MG: at 02:55

## 2023-05-27 RX ADMIN — SUCRALFATE 1 G: 1 TABLET ORAL at 14:21

## 2023-05-27 RX ADMIN — MONTELUKAST 10 MG: 10 TABLET, FILM COATED ORAL at 20:41

## 2023-05-27 RX ADMIN — METRONIDAZOLE 500 MG: 500 INJECTION, SOLUTION INTRAVENOUS at 22:54

## 2023-05-27 RX ADMIN — SODIUM CHLORIDE, POTASSIUM CHLORIDE, SODIUM LACTATE AND CALCIUM CHLORIDE 1617 ML: 600; 310; 30; 20 INJECTION, SOLUTION INTRAVENOUS at 02:29

## 2023-05-27 RX ADMIN — CEFEPIME 2000 MG: 2 INJECTION, POWDER, FOR SOLUTION INTRAVENOUS at 17:59

## 2023-05-27 RX ADMIN — SUCRALFATE 1 G: 1 TABLET ORAL at 10:32

## 2023-05-27 RX ADMIN — BUDESONIDE 500 MCG: 0.5 INHALANT ORAL at 19:31

## 2023-05-27 RX ADMIN — GUAIFENESIN AND DEXTROMETHORPHAN 10 ML: 100; 10 SYRUP ORAL at 20:41

## 2023-05-27 RX ADMIN — NYSTATIN 500000 UNITS: 100000 SUSPENSION ORAL at 20:40

## 2023-05-27 RX ADMIN — CITALOPRAM HYDROBROMIDE 20 MG: 20 TABLET ORAL at 10:32

## 2023-05-27 RX ADMIN — METRONIDAZOLE 500 MG: 500 INJECTION, SOLUTION INTRAVENOUS at 05:09

## 2023-05-27 RX ADMIN — ACETAMINOPHEN 1000 MG: 500 TABLET ORAL at 02:30

## 2023-05-27 RX ADMIN — SUCRALFATE 1 G: 1 TABLET ORAL at 18:04

## 2023-05-27 RX ADMIN — NYSTATIN 500000 UNITS: 100000 SUSPENSION ORAL at 14:29

## 2023-05-27 ASSESSMENT — PAIN - FUNCTIONAL ASSESSMENT
PAIN_FUNCTIONAL_ASSESSMENT: NONE - DENIES PAIN
PAIN_FUNCTIONAL_ASSESSMENT: 0-10
PAIN_FUNCTIONAL_ASSESSMENT: NONE - DENIES PAIN
PAIN_FUNCTIONAL_ASSESSMENT: 0-10

## 2023-05-27 ASSESSMENT — ENCOUNTER SYMPTOMS
SHORTNESS OF BREATH: 1
COUGH: 0

## 2023-05-27 ASSESSMENT — PAIN SCALES - GENERAL
PAINLEVEL_OUTOF10: 7
PAINLEVEL_OUTOF10: 0
PAINLEVEL_OUTOF10: 5
PAINLEVEL_OUTOF10: 6
PAINLEVEL_OUTOF10: 0
PAINLEVEL_OUTOF10: 4

## 2023-05-27 ASSESSMENT — PAIN DESCRIPTION - LOCATION: LOCATION: OTHER (COMMENT)

## 2023-05-28 LAB
ANION GAP SERPL CALCULATED.3IONS-SCNC: 6 MMOL/L (ref 7–19)
BASOPHILS # BLD: 0 K/UL (ref 0–0.2)
BASOPHILS NFR BLD: 0.5 % (ref 0–1)
BUN SERPL-MCNC: 8 MG/DL (ref 8–23)
CALCIUM SERPL-MCNC: 8.2 MG/DL (ref 8.8–10.2)
CHLORIDE SERPL-SCNC: 103 MMOL/L (ref 98–111)
CO2 SERPL-SCNC: 30 MMOL/L (ref 22–29)
CREAT SERPL-MCNC: 0.7 MG/DL (ref 0.5–0.9)
EOSINOPHIL # BLD: 0.1 K/UL (ref 0–0.6)
EOSINOPHIL NFR BLD: 1.2 % (ref 0–5)
ERYTHROCYTE [DISTWIDTH] IN BLOOD BY AUTOMATED COUNT: 12.8 % (ref 11.5–14.5)
GLUCOSE SERPL-MCNC: 106 MG/DL (ref 74–109)
HCT VFR BLD AUTO: 25.3 % (ref 37–47)
HGB BLD-MCNC: 7.9 G/DL (ref 12–16)
IMM GRANULOCYTES # BLD: 0 K/UL
LYMPHOCYTES # BLD: 1 K/UL (ref 1.1–4.5)
LYMPHOCYTES NFR BLD: 15.2 % (ref 20–40)
MCH RBC QN AUTO: 31.5 PG (ref 27–31)
MCHC RBC AUTO-ENTMCNC: 31.2 G/DL (ref 33–37)
MCV RBC AUTO: 100.8 FL (ref 81–99)
MONOCYTES # BLD: 0.6 K/UL (ref 0–0.9)
MONOCYTES NFR BLD: 9.5 % (ref 0–10)
MRSA DNA SPEC QL NAA+PROBE: NOT DETECTED
NEUTROPHILS # BLD: 4.8 K/UL (ref 1.5–7.5)
NEUTS SEG NFR BLD: 73 % (ref 50–65)
PLATELET # BLD AUTO: 182 K/UL (ref 130–400)
PMV BLD AUTO: 10.9 FL (ref 9.4–12.3)
POTASSIUM SERPL-SCNC: 3.8 MMOL/L (ref 3.5–5)
RBC # BLD AUTO: 2.51 M/UL (ref 4.2–5.4)
SODIUM SERPL-SCNC: 139 MMOL/L (ref 136–145)
VANCOMYCIN TROUGH SERPL-MCNC: 9.1 UG/ML (ref 10–20)
WBC # BLD AUTO: 6.5 K/UL (ref 4.8–10.8)

## 2023-05-28 PROCEDURE — 1210000000 HC MED SURG R&B

## 2023-05-28 PROCEDURE — 87641 MR-STAPH DNA AMP PROBE: CPT

## 2023-05-28 PROCEDURE — 2580000003 HC RX 258: Performed by: HOSPITALIST

## 2023-05-28 PROCEDURE — 80202 ASSAY OF VANCOMYCIN: CPT

## 2023-05-28 PROCEDURE — 6370000000 HC RX 637 (ALT 250 FOR IP): Performed by: NURSE PRACTITIONER

## 2023-05-28 PROCEDURE — 2500000003 HC RX 250 WO HCPCS: Performed by: HOSPITALIST

## 2023-05-28 PROCEDURE — 80048 BASIC METABOLIC PNL TOTAL CA: CPT

## 2023-05-28 PROCEDURE — 94640 AIRWAY INHALATION TREATMENT: CPT

## 2023-05-28 PROCEDURE — 6360000002 HC RX W HCPCS: Performed by: HOSPITALIST

## 2023-05-28 PROCEDURE — 6370000000 HC RX 637 (ALT 250 FOR IP): Performed by: HOSPITALIST

## 2023-05-28 PROCEDURE — 85025 COMPLETE CBC W/AUTO DIFF WBC: CPT

## 2023-05-28 PROCEDURE — 94150 VITAL CAPACITY TEST: CPT

## 2023-05-28 PROCEDURE — 36415 COLL VENOUS BLD VENIPUNCTURE: CPT

## 2023-05-28 PROCEDURE — 2700000000 HC OXYGEN THERAPY PER DAY

## 2023-05-28 PROCEDURE — 94760 N-INVAS EAR/PLS OXIMETRY 1: CPT

## 2023-05-28 RX ORDER — OXYCODONE AND ACETAMINOPHEN 10; 325 MG/1; MG/1
1 TABLET ORAL EVERY 4 HOURS PRN
Status: DISCONTINUED | OUTPATIENT
Start: 2023-05-28 | End: 2023-05-30 | Stop reason: HOSPADM

## 2023-05-28 RX ORDER — PREDNISONE 20 MG/1
40 TABLET ORAL DAILY
Status: DISCONTINUED | OUTPATIENT
Start: 2023-05-28 | End: 2023-05-30 | Stop reason: HOSPADM

## 2023-05-28 RX ORDER — BENZONATATE 100 MG/1
100 CAPSULE ORAL 3 TIMES DAILY PRN
Status: DISCONTINUED | OUTPATIENT
Start: 2023-05-28 | End: 2023-05-30 | Stop reason: HOSPADM

## 2023-05-28 RX ORDER — GUAIFENESIN 600 MG/1
1200 TABLET, EXTENDED RELEASE ORAL 2 TIMES DAILY
Status: DISCONTINUED | OUTPATIENT
Start: 2023-05-28 | End: 2023-05-28 | Stop reason: ALTCHOICE

## 2023-05-28 RX ORDER — IPRATROPIUM BROMIDE AND ALBUTEROL SULFATE 2.5; .5 MG/3ML; MG/3ML
1 SOLUTION RESPIRATORY (INHALATION)
Status: DISCONTINUED | OUTPATIENT
Start: 2023-05-28 | End: 2023-05-30 | Stop reason: HOSPADM

## 2023-05-28 RX ORDER — GUAIFENESIN 200 MG/1
200 TABLET ORAL EVERY 6 HOURS SCHEDULED
Status: DISCONTINUED | OUTPATIENT
Start: 2023-05-29 | End: 2023-05-30 | Stop reason: HOSPADM

## 2023-05-28 RX ADMIN — IPRATROPIUM BROMIDE AND ALBUTEROL SULFATE 1 DOSE: 2.5; .5 SOLUTION RESPIRATORY (INHALATION) at 10:09

## 2023-05-28 RX ADMIN — CITALOPRAM HYDROBROMIDE 20 MG: 20 TABLET ORAL at 10:48

## 2023-05-28 RX ADMIN — IPRATROPIUM BROMIDE AND ALBUTEROL SULFATE 1 DOSE: 2.5; .5 SOLUTION RESPIRATORY (INHALATION) at 14:01

## 2023-05-28 RX ADMIN — NYSTATIN 500000 UNITS: 100000 SUSPENSION ORAL at 17:40

## 2023-05-28 RX ADMIN — ALPRAZOLAM 1 MG: 0.5 TABLET ORAL at 11:00

## 2023-05-28 RX ADMIN — NYSTATIN 500000 UNITS: 100000 SUSPENSION ORAL at 20:46

## 2023-05-28 RX ADMIN — GUAIFENESIN AND DEXTROMETHORPHAN 10 ML: 100; 10 SYRUP ORAL at 00:38

## 2023-05-28 RX ADMIN — OXYCODONE AND ACETAMINOPHEN 1 TABLET: 10; 325 TABLET ORAL at 21:05

## 2023-05-28 RX ADMIN — OXYCODONE AND ACETAMINOPHEN 1 TABLET: 10; 325 TABLET ORAL at 10:59

## 2023-05-28 RX ADMIN — GABAPENTIN 300 MG: 300 CAPSULE ORAL at 10:48

## 2023-05-28 RX ADMIN — SUCRALFATE 1 G: 1 TABLET ORAL at 10:48

## 2023-05-28 RX ADMIN — CEFEPIME 2000 MG: 2 INJECTION, POWDER, FOR SOLUTION INTRAVENOUS at 10:37

## 2023-05-28 RX ADMIN — METRONIDAZOLE 500 MG: 500 INJECTION, SOLUTION INTRAVENOUS at 06:53

## 2023-05-28 RX ADMIN — IPRATROPIUM BROMIDE 0.5 MG: 0.5 SOLUTION RESPIRATORY (INHALATION) at 06:12

## 2023-05-28 RX ADMIN — BENZONATATE 100 MG: 100 CAPSULE ORAL at 22:46

## 2023-05-28 RX ADMIN — ARFORMOTEROL TARTRATE 15 MCG: 15 SOLUTION RESPIRATORY (INHALATION) at 06:12

## 2023-05-28 RX ADMIN — SODIUM CHLORIDE, PRESERVATIVE FREE 20 MG: 5 INJECTION INTRAVENOUS at 21:57

## 2023-05-28 RX ADMIN — SODIUM CHLORIDE, PRESERVATIVE FREE 10 ML: 5 INJECTION INTRAVENOUS at 10:46

## 2023-05-28 RX ADMIN — GABAPENTIN 300 MG: 300 CAPSULE ORAL at 21:05

## 2023-05-28 RX ADMIN — GUAIFENESIN 1200 MG: 600 TABLET ORAL at 10:48

## 2023-05-28 RX ADMIN — SODIUM CHLORIDE, PRESERVATIVE FREE 20 MG: 5 INJECTION INTRAVENOUS at 10:42

## 2023-05-28 RX ADMIN — BUDESONIDE 500 MCG: 0.5 INHALANT ORAL at 06:12

## 2023-05-28 RX ADMIN — SUCRALFATE 1 G: 1 TABLET ORAL at 17:40

## 2023-05-28 RX ADMIN — ARFORMOTEROL TARTRATE 15 MCG: 15 SOLUTION RESPIRATORY (INHALATION) at 19:52

## 2023-05-28 RX ADMIN — GABAPENTIN 300 MG: 300 CAPSULE ORAL at 15:23

## 2023-05-28 RX ADMIN — CEFEPIME 2000 MG: 2 INJECTION, POWDER, FOR SOLUTION INTRAVENOUS at 17:46

## 2023-05-28 RX ADMIN — MONTELUKAST 10 MG: 10 TABLET, FILM COATED ORAL at 21:06

## 2023-05-28 RX ADMIN — SUCRALFATE 1 G: 1 TABLET ORAL at 15:23

## 2023-05-28 RX ADMIN — NYSTATIN 500000 UNITS: 100000 SUSPENSION ORAL at 10:49

## 2023-05-28 RX ADMIN — PANTOPRAZOLE SODIUM 40 MG: 40 TABLET, DELAYED RELEASE ORAL at 06:51

## 2023-05-28 RX ADMIN — PREDNISONE 40 MG: 20 TABLET ORAL at 10:48

## 2023-05-28 RX ADMIN — SUCRALFATE 1 G: 1 TABLET ORAL at 21:06

## 2023-05-28 RX ADMIN — IPRATROPIUM BROMIDE AND ALBUTEROL SULFATE 1 DOSE: 2.5; .5 SOLUTION RESPIRATORY (INHALATION) at 19:52

## 2023-05-28 RX ADMIN — ENOXAPARIN SODIUM 40 MG: 100 INJECTION SUBCUTANEOUS at 10:42

## 2023-05-28 RX ADMIN — ALPRAZOLAM 1 MG: 0.5 TABLET ORAL at 22:46

## 2023-05-28 RX ADMIN — CEFEPIME 2000 MG: 2 INJECTION, POWDER, FOR SOLUTION INTRAVENOUS at 01:32

## 2023-05-28 RX ADMIN — ALBUTEROL SULFATE 2.5 MG: 2.5 SOLUTION RESPIRATORY (INHALATION) at 01:08

## 2023-05-28 RX ADMIN — VANCOMYCIN HYDROCHLORIDE 750 MG: 750 INJECTION, POWDER, LYOPHILIZED, FOR SOLUTION INTRAVENOUS at 05:11

## 2023-05-28 RX ADMIN — BUDESONIDE 500 MCG: 0.5 INHALANT ORAL at 19:52

## 2023-05-28 RX ADMIN — VANCOMYCIN HYDROCHLORIDE 750 MG: 750 INJECTION, POWDER, LYOPHILIZED, FOR SOLUTION INTRAVENOUS at 15:25

## 2023-05-28 ASSESSMENT — PAIN SCALES - GENERAL
PAINLEVEL_OUTOF10: 9
PAINLEVEL_OUTOF10: 5

## 2023-05-28 ASSESSMENT — PAIN - FUNCTIONAL ASSESSMENT: PAIN_FUNCTIONAL_ASSESSMENT: ACTIVITIES ARE NOT PREVENTED

## 2023-05-28 ASSESSMENT — PAIN DESCRIPTION - ORIENTATION
ORIENTATION: RIGHT
ORIENTATION: RIGHT

## 2023-05-28 ASSESSMENT — ENCOUNTER SYMPTOMS
CONSTIPATION: 0
COUGH: 0
SHORTNESS OF BREATH: 0
ABDOMINAL DISTENTION: 0
SINUS PAIN: 0
NAUSEA: 0
BLOOD IN STOOL: 0
WHEEZING: 0
SORE THROAT: 0
DIARRHEA: 0
ABDOMINAL PAIN: 0
VOMITING: 0
TROUBLE SWALLOWING: 0

## 2023-05-28 ASSESSMENT — PAIN DESCRIPTION - DESCRIPTORS: DESCRIPTORS: ACHING

## 2023-05-28 ASSESSMENT — PAIN DESCRIPTION - LOCATION
LOCATION: RIB CAGE
LOCATION: RIB CAGE

## 2023-05-28 ASSESSMENT — PAIN DESCRIPTION - ONSET: ONSET: ON-GOING

## 2023-05-28 ASSESSMENT — PAIN DESCRIPTION - PAIN TYPE: TYPE: ACUTE PAIN

## 2023-05-28 ASSESSMENT — PAIN DESCRIPTION - FREQUENCY: FREQUENCY: CONTINUOUS

## 2023-05-28 ASSESSMENT — PAIN DESCRIPTION - DIRECTION: RADIATING_TOWARDS: NO

## 2023-05-29 LAB
ANION GAP SERPL CALCULATED.3IONS-SCNC: 9 MMOL/L (ref 7–19)
BASOPHILS # BLD: 0 K/UL (ref 0–0.2)
BASOPHILS NFR BLD: 0.2 % (ref 0–1)
BUN SERPL-MCNC: 10 MG/DL (ref 8–23)
CALCIUM SERPL-MCNC: 8.1 MG/DL (ref 8.8–10.2)
CHLORIDE SERPL-SCNC: 103 MMOL/L (ref 98–111)
CO2 SERPL-SCNC: 27 MMOL/L (ref 22–29)
CREAT SERPL-MCNC: 0.6 MG/DL (ref 0.5–0.9)
EOSINOPHIL # BLD: 0 K/UL (ref 0–0.6)
EOSINOPHIL NFR BLD: 0 % (ref 0–5)
ERYTHROCYTE [DISTWIDTH] IN BLOOD BY AUTOMATED COUNT: 12.7 % (ref 11.5–14.5)
GLUCOSE SERPL-MCNC: 176 MG/DL (ref 74–109)
HCT VFR BLD AUTO: 26.1 % (ref 37–47)
HGB BLD-MCNC: 8.3 G/DL (ref 12–16)
IMM GRANULOCYTES # BLD: 0 K/UL
LYMPHOCYTES # BLD: 0.5 K/UL (ref 1.1–4.5)
LYMPHOCYTES NFR BLD: 11.3 % (ref 20–40)
MAGNESIUM SERPL-MCNC: 1.5 MG/DL (ref 1.6–2.4)
MCH RBC QN AUTO: 31.7 PG (ref 27–31)
MCHC RBC AUTO-ENTMCNC: 31.8 G/DL (ref 33–37)
MCV RBC AUTO: 99.6 FL (ref 81–99)
MONOCYTES # BLD: 0.4 K/UL (ref 0–0.9)
MONOCYTES NFR BLD: 8.4 % (ref 0–10)
NEUTROPHILS # BLD: 3.8 K/UL (ref 1.5–7.5)
NEUTS SEG NFR BLD: 79.3 % (ref 50–65)
PLATELET # BLD AUTO: 197 K/UL (ref 130–400)
PMV BLD AUTO: 11.1 FL (ref 9.4–12.3)
POTASSIUM SERPL-SCNC: 3.5 MMOL/L (ref 3.5–5)
RBC # BLD AUTO: 2.62 M/UL (ref 4.2–5.4)
SODIUM SERPL-SCNC: 139 MMOL/L (ref 136–145)
WBC # BLD AUTO: 4.8 K/UL (ref 4.8–10.8)

## 2023-05-29 PROCEDURE — 2700000000 HC OXYGEN THERAPY PER DAY

## 2023-05-29 PROCEDURE — 6370000000 HC RX 637 (ALT 250 FOR IP): Performed by: NURSE PRACTITIONER

## 2023-05-29 PROCEDURE — 6370000000 HC RX 637 (ALT 250 FOR IP): Performed by: HOSPITALIST

## 2023-05-29 PROCEDURE — 85025 COMPLETE CBC W/AUTO DIFF WBC: CPT

## 2023-05-29 PROCEDURE — 94761 N-INVAS EAR/PLS OXIMETRY MLT: CPT

## 2023-05-29 PROCEDURE — 94640 AIRWAY INHALATION TREATMENT: CPT

## 2023-05-29 PROCEDURE — 6360000002 HC RX W HCPCS: Performed by: HOSPITALIST

## 2023-05-29 PROCEDURE — 2500000003 HC RX 250 WO HCPCS: Performed by: HOSPITALIST

## 2023-05-29 PROCEDURE — 80048 BASIC METABOLIC PNL TOTAL CA: CPT

## 2023-05-29 PROCEDURE — 83735 ASSAY OF MAGNESIUM: CPT

## 2023-05-29 PROCEDURE — 2580000003 HC RX 258: Performed by: HOSPITALIST

## 2023-05-29 PROCEDURE — 1210000000 HC MED SURG R&B

## 2023-05-29 PROCEDURE — 36415 COLL VENOUS BLD VENIPUNCTURE: CPT

## 2023-05-29 PROCEDURE — 2580000003 HC RX 258: Performed by: NURSE PRACTITIONER

## 2023-05-29 RX ORDER — MELATONIN 10 MG
10 CAPSULE ORAL NIGHTLY
Status: DISCONTINUED | OUTPATIENT
Start: 2023-05-29 | End: 2023-05-30 | Stop reason: HOSPADM

## 2023-05-29 RX ORDER — LEVOFLOXACIN 750 MG/1
750 TABLET ORAL DAILY
Status: DISCONTINUED | OUTPATIENT
Start: 2023-05-29 | End: 2023-05-30 | Stop reason: HOSPADM

## 2023-05-29 RX ADMIN — OXYCODONE AND ACETAMINOPHEN 1 TABLET: 10; 325 TABLET ORAL at 15:42

## 2023-05-29 RX ADMIN — ARFORMOTEROL TARTRATE 15 MCG: 15 SOLUTION RESPIRATORY (INHALATION) at 16:55

## 2023-05-29 RX ADMIN — MONTELUKAST 10 MG: 10 TABLET, FILM COATED ORAL at 21:14

## 2023-05-29 RX ADMIN — PREDNISONE 40 MG: 20 TABLET ORAL at 11:02

## 2023-05-29 RX ADMIN — CITALOPRAM HYDROBROMIDE 20 MG: 20 TABLET ORAL at 11:03

## 2023-05-29 RX ADMIN — SODIUM CHLORIDE, PRESERVATIVE FREE 20 MG: 5 INJECTION INTRAVENOUS at 11:02

## 2023-05-29 RX ADMIN — NYSTATIN 500000 UNITS: 100000 SUSPENSION ORAL at 21:14

## 2023-05-29 RX ADMIN — SODIUM CHLORIDE, POTASSIUM CHLORIDE, SODIUM LACTATE AND CALCIUM CHLORIDE: 600; 310; 30; 20 INJECTION, SOLUTION INTRAVENOUS at 06:03

## 2023-05-29 RX ADMIN — SUCRALFATE 1 G: 1 TABLET ORAL at 11:02

## 2023-05-29 RX ADMIN — BENZONATATE 100 MG: 100 CAPSULE ORAL at 15:43

## 2023-05-29 RX ADMIN — GUAIFENESIN 200 MG: 200 TABLET ORAL at 00:11

## 2023-05-29 RX ADMIN — PANTOPRAZOLE SODIUM 40 MG: 40 TABLET, DELAYED RELEASE ORAL at 06:04

## 2023-05-29 RX ADMIN — ENOXAPARIN SODIUM 40 MG: 100 INJECTION SUBCUTANEOUS at 11:02

## 2023-05-29 RX ADMIN — ALPRAZOLAM 1 MG: 0.5 TABLET ORAL at 06:09

## 2023-05-29 RX ADMIN — Medication 10 MG: at 21:14

## 2023-05-29 RX ADMIN — ALPRAZOLAM 1 MG: 0.5 TABLET ORAL at 21:14

## 2023-05-29 RX ADMIN — ALBUTEROL SULFATE 2.5 MG: 2.5 SOLUTION RESPIRATORY (INHALATION) at 02:35

## 2023-05-29 RX ADMIN — OXYCODONE AND ACETAMINOPHEN 1 TABLET: 10; 325 TABLET ORAL at 11:01

## 2023-05-29 RX ADMIN — OXYCODONE AND ACETAMINOPHEN 1 TABLET: 10; 325 TABLET ORAL at 21:14

## 2023-05-29 RX ADMIN — GABAPENTIN 300 MG: 300 CAPSULE ORAL at 11:01

## 2023-05-29 RX ADMIN — NYSTATIN 500000 UNITS: 100000 SUSPENSION ORAL at 15:43

## 2023-05-29 RX ADMIN — IPRATROPIUM BROMIDE AND ALBUTEROL SULFATE 1 DOSE: 2.5; .5 SOLUTION RESPIRATORY (INHALATION) at 15:11

## 2023-05-29 RX ADMIN — SODIUM CHLORIDE, POTASSIUM CHLORIDE, SODIUM LACTATE AND CALCIUM CHLORIDE: 600; 310; 30; 20 INJECTION, SOLUTION INTRAVENOUS at 15:48

## 2023-05-29 RX ADMIN — GABAPENTIN 300 MG: 300 CAPSULE ORAL at 21:14

## 2023-05-29 RX ADMIN — SUCRALFATE 1 G: 1 TABLET ORAL at 21:14

## 2023-05-29 RX ADMIN — IPRATROPIUM BROMIDE AND ALBUTEROL SULFATE 1 DOSE: 2.5; .5 SOLUTION RESPIRATORY (INHALATION) at 19:03

## 2023-05-29 RX ADMIN — ALPRAZOLAM 1 MG: 0.5 TABLET ORAL at 15:42

## 2023-05-29 RX ADMIN — NYSTATIN 500000 UNITS: 100000 SUSPENSION ORAL at 11:07

## 2023-05-29 RX ADMIN — GABAPENTIN 300 MG: 300 CAPSULE ORAL at 15:43

## 2023-05-29 RX ADMIN — SODIUM CHLORIDE, PRESERVATIVE FREE 20 MG: 5 INJECTION INTRAVENOUS at 21:14

## 2023-05-29 RX ADMIN — BUDESONIDE 500 MCG: 0.5 INHALANT ORAL at 16:55

## 2023-05-29 RX ADMIN — LEVOFLOXACIN 750 MG: 750 TABLET, FILM COATED ORAL at 11:12

## 2023-05-29 RX ADMIN — ARFORMOTEROL TARTRATE 15 MCG: 15 SOLUTION RESPIRATORY (INHALATION) at 07:20

## 2023-05-29 RX ADMIN — MAGNESIUM SULFATE HEPTAHYDRATE 2000 MG: 40 INJECTION, SOLUTION INTRAVENOUS at 18:07

## 2023-05-29 RX ADMIN — BUDESONIDE 500 MCG: 0.5 INHALANT ORAL at 07:20

## 2023-05-29 RX ADMIN — CEFEPIME 2000 MG: 2 INJECTION, POWDER, FOR SOLUTION INTRAVENOUS at 02:07

## 2023-05-29 RX ADMIN — FLUTICASONE PROPIONATE 1 SPRAY: 50 SPRAY, METERED NASAL at 11:03

## 2023-05-29 RX ADMIN — GUAIFENESIN 200 MG: 200 TABLET ORAL at 06:04

## 2023-05-29 RX ADMIN — IPRATROPIUM BROMIDE AND ALBUTEROL SULFATE 1 DOSE: 2.5; .5 SOLUTION RESPIRATORY (INHALATION) at 07:04

## 2023-05-29 RX ADMIN — VANCOMYCIN HYDROCHLORIDE 750 MG: 750 INJECTION, POWDER, LYOPHILIZED, FOR SOLUTION INTRAVENOUS at 03:01

## 2023-05-29 RX ADMIN — SUCRALFATE 1 G: 1 TABLET ORAL at 15:43

## 2023-05-29 RX ADMIN — ALPRAZOLAM 1 MG: 0.5 TABLET ORAL at 11:01

## 2023-05-29 RX ADMIN — OXYCODONE AND ACETAMINOPHEN 1 TABLET: 10; 325 TABLET ORAL at 03:20

## 2023-05-29 RX ADMIN — IPRATROPIUM BROMIDE AND ALBUTEROL SULFATE 1 DOSE: 2.5; .5 SOLUTION RESPIRATORY (INHALATION) at 10:31

## 2023-05-29 RX ADMIN — BENZONATATE 100 MG: 100 CAPSULE ORAL at 21:14

## 2023-05-29 ASSESSMENT — PAIN DESCRIPTION - LOCATION
LOCATION: RIB CAGE
LOCATION: RIB CAGE
LOCATION: ABDOMEN

## 2023-05-29 ASSESSMENT — PAIN DESCRIPTION - FREQUENCY: FREQUENCY: CONTINUOUS

## 2023-05-29 ASSESSMENT — PAIN DESCRIPTION - DESCRIPTORS
DESCRIPTORS: ACHING
DESCRIPTORS: CRAMPING
DESCRIPTORS: ACHING

## 2023-05-29 ASSESSMENT — ENCOUNTER SYMPTOMS
SHORTNESS OF BREATH: 0
VOMITING: 0
BLOOD IN STOOL: 0
COUGH: 0
TROUBLE SWALLOWING: 0
CONSTIPATION: 0
ABDOMINAL DISTENTION: 0
SINUS PAIN: 0
SORE THROAT: 0
DIARRHEA: 0
ABDOMINAL PAIN: 0
WHEEZING: 0
NAUSEA: 0

## 2023-05-29 ASSESSMENT — PAIN SCALES - GENERAL
PAINLEVEL_OUTOF10: 9
PAINLEVEL_OUTOF10: 8
PAINLEVEL_OUTOF10: 5
PAINLEVEL_OUTOF10: 7

## 2023-05-29 ASSESSMENT — PAIN DESCRIPTION - ORIENTATION
ORIENTATION: RIGHT
ORIENTATION: LEFT
ORIENTATION: MID;LOWER

## 2023-05-29 ASSESSMENT — PAIN DESCRIPTION - DIRECTION: RADIATING_TOWARDS: NO

## 2023-05-29 ASSESSMENT — PAIN DESCRIPTION - PAIN TYPE: TYPE: ACUTE PAIN

## 2023-05-29 ASSESSMENT — PAIN DESCRIPTION - ONSET: ONSET: ON-GOING

## 2023-05-29 ASSESSMENT — PAIN - FUNCTIONAL ASSESSMENT: PAIN_FUNCTIONAL_ASSESSMENT: ACTIVITIES ARE NOT PREVENTED

## 2023-05-29 NOTE — PROGRESS NOTES
Occupational Therapy  OT eval attempt. Per RN, Sarah Strauss, pt had just ambulated to bathroom and too fatigued for eval. Will f/u at a later time.     Electronically signed by Dmitri Jack OT on 5/29/23 at 3:52 PM CDT

## 2023-05-29 NOTE — PLAN OF CARE
Problem: Safety - Adult  Goal: Free from fall injury  Outcome: Progressing  Flowsheets (Taken 5/29/2023 0022)  Free From Fall Injury: Instruct family/caregiver on patient safety     Problem: Skin/Tissue Integrity  Goal: Absence of new skin breakdown  Description: 1. Monitor for areas of redness and/or skin breakdown  2. Every 4-6 hours minimum:  Change oxygen saturation probe site  3. Every 4-6 hours:  If on nasal continuous positive airway pressure, respiratory therapy assess nares and determine need for appliance change or resting period. Outcome: Progressing     Problem: Confusion  Goal: Confusion, delirium, dementia, or psychosis is improved or at baseline  Description: INTERVENTIONS:  1. Assess for possible contributors to thought disturbance, including medications, impaired vision or hearing, underlying metabolic abnormalities, dehydration, psychiatric diagnoses, and notify attending LIP  2. [place] high risk fall precautions, as indicated  3. Provide frequent short contacts to provide reality reorientation, refocusing and direction  4. Decrease environmental stimuli, including noise as appropriate  5. Monitor and intervene to maintain adequate nutrition, hydration, elimination, sleep and activity  6. If unable to ensure safety without constant attention obtain sitter and review sitter guidelines with assigned personnel  7.  Initiate Psychosocial CNS and Spiritual Care consult, as indicated  Outcome: Completed  Flowsheets (Taken 5/28/2023 2323)  Effect of thought disturbance (confusion, delirium, dementia, or psychosis) are managed with adequate functional status:   Assess for contributors to thought disturbance, including medications, impaired vision or hearing, underlying metabolic abnormalities, dehydration, psychiatric diagnoses, notify [place] high risk fall precautions, as indicated   Provide frequent short contacts to provide reality reorientation, refocusing and direction   Decrease

## 2023-05-29 NOTE — PROGRESS NOTES
[address] [facility]      Patient:  Josefa Guan  YOB: 1950  Date of Service: 5/29/2023  MRN: [id number]   Acct: [de-identified]   Primary Care Physician: TEN Noel NP  Advance Directive: Full Code  Admit Date: 5/27/2023       Hospital Day: 2  Portions of this note have been copied forward, however, changed to reflect the most current clinical status of this patient. CHIEF COMPLAINT Cough/Pneumonia    SUBJECTIVE:  She is very upset today. She c/o cough-which has been productive, and that she is completely exhausted from coughing. She states that her PRN medication isn't helping. She states that she has not slept and feels that nobody understands \"how sick I actually am\". She c/o bilateral rib pain from coughing. She has been weaned back to baseline [email or web address]. She continues to remain afebrile. CUMULATIVE HOSPITAL STAY:  The patient is a 75 yo female with a PMH of COPD with continuous 3LNC at baseline, GERD, HTN, smoker, anxiety and depression who presented to [facility] ED on 5/27/2023 with c/o worsening cough x3 days and fever. She was recently seen in the ED for SOA and rhinovirus. She was given steroids and antibiotics and sent home in stable condition. Based on chart review,it appears that the patient has been struggling with sinus congestion, viral illnesses, and worsening pulmonary symptoms over the last 6 months and has been followed closely by pulmonary. In ED, she was found to have a temperature of 103.3. Vignesh showed , Cl-95 glucose 120. CBC showed WBC 10.3, H&H 9.8/31.4 and a platelet count of 961. Troponin negative. proBNP 255. D-dimer 1.4-VQ scan showed no defects to suggest PE. Chest x-ray showed small right pleural effusion with infiltrate or atelectasis to the right lung worsening compared to previous exams. RPP negative for viral illnesses  Patient was admitted to hospital medicine pneumonia right lower lobe and COPD-without current exacerbation.   She was placed on

## 2023-05-30 VITALS
TEMPERATURE: 97.3 F | DIASTOLIC BLOOD PRESSURE: 62 MMHG | HEIGHT: 67 IN | RESPIRATION RATE: 18 BRPM | BODY MASS INDEX: 22.18 KG/M2 | WEIGHT: 141.3 LBS | HEART RATE: 73 BPM | SYSTOLIC BLOOD PRESSURE: 129 MMHG | OXYGEN SATURATION: 98 %

## 2023-05-30 LAB
ANION GAP SERPL CALCULATED.3IONS-SCNC: 7 MMOL/L (ref 7–19)
BASOPHILS # BLD: 0 K/UL (ref 0–0.2)
BASOPHILS NFR BLD: 0.3 % (ref 0–1)
BUN SERPL-MCNC: 6 MG/DL (ref 8–23)
CALCIUM SERPL-MCNC: 8.2 MG/DL (ref 8.8–10.2)
CHLORIDE SERPL-SCNC: 103 MMOL/L (ref 98–111)
CO2 SERPL-SCNC: 31 MMOL/L (ref 22–29)
CREAT SERPL-MCNC: 0.5 MG/DL (ref 0.5–0.9)
EKG P AXIS: 74 DEGREES
EKG P-R INTERVAL: 120 MS
EKG Q-T INTERVAL: 316 MS
EKG QRS DURATION: 74 MS
EKG QTC CALCULATION (BAZETT): 386 MS
EKG T AXIS: 73 DEGREES
EOSINOPHIL # BLD: 0 K/UL (ref 0–0.6)
EOSINOPHIL NFR BLD: 0 % (ref 0–5)
ERYTHROCYTE [DISTWIDTH] IN BLOOD BY AUTOMATED COUNT: 12.9 % (ref 11.5–14.5)
GLUCOSE SERPL-MCNC: 148 MG/DL (ref 74–109)
HCT VFR BLD AUTO: 25.1 % (ref 37–47)
HGB BLD-MCNC: 8 G/DL (ref 12–16)
IMM GRANULOCYTES # BLD: 0 K/UL
LYMPHOCYTES # BLD: 0.3 K/UL (ref 1.1–4.5)
LYMPHOCYTES NFR BLD: 9.4 % (ref 20–40)
MCH RBC QN AUTO: 32 PG (ref 27–31)
MCHC RBC AUTO-ENTMCNC: 31.9 G/DL (ref 33–37)
MCV RBC AUTO: 100.4 FL (ref 81–99)
MONOCYTES # BLD: 0.3 K/UL (ref 0–0.9)
MONOCYTES NFR BLD: 9.4 % (ref 0–10)
NEUTROPHILS # BLD: 2.5 K/UL (ref 1.5–7.5)
NEUTS SEG NFR BLD: 80 % (ref 50–65)
PLATELET # BLD AUTO: 182 K/UL (ref 130–400)
PMV BLD AUTO: 10.9 FL (ref 9.4–12.3)
POTASSIUM SERPL-SCNC: 3.7 MMOL/L (ref 3.5–5)
RBC # BLD AUTO: 2.5 M/UL (ref 4.2–5.4)
SODIUM SERPL-SCNC: 141 MMOL/L (ref 136–145)
WBC # BLD AUTO: 3.2 K/UL (ref 4.8–10.8)

## 2023-05-30 PROCEDURE — 85025 COMPLETE CBC W/AUTO DIFF WBC: CPT

## 2023-05-30 PROCEDURE — 2580000003 HC RX 258: Performed by: HOSPITALIST

## 2023-05-30 PROCEDURE — 2500000003 HC RX 250 WO HCPCS: Performed by: HOSPITALIST

## 2023-05-30 PROCEDURE — 94761 N-INVAS EAR/PLS OXIMETRY MLT: CPT

## 2023-05-30 PROCEDURE — 94640 AIRWAY INHALATION TREATMENT: CPT

## 2023-05-30 PROCEDURE — 2580000003 HC RX 258: Performed by: NURSE PRACTITIONER

## 2023-05-30 PROCEDURE — 6370000000 HC RX 637 (ALT 250 FOR IP): Performed by: HOSPITALIST

## 2023-05-30 PROCEDURE — 80048 BASIC METABOLIC PNL TOTAL CA: CPT

## 2023-05-30 PROCEDURE — 36415 COLL VENOUS BLD VENIPUNCTURE: CPT

## 2023-05-30 PROCEDURE — 2700000000 HC OXYGEN THERAPY PER DAY

## 2023-05-30 PROCEDURE — 6370000000 HC RX 637 (ALT 250 FOR IP): Performed by: NURSE PRACTITIONER

## 2023-05-30 PROCEDURE — 6360000002 HC RX W HCPCS: Performed by: HOSPITALIST

## 2023-05-30 PROCEDURE — 93010 ELECTROCARDIOGRAM REPORT: CPT | Performed by: INTERNAL MEDICINE

## 2023-05-30 RX ORDER — LEVOFLOXACIN 750 MG/1
750 TABLET ORAL DAILY
Qty: 3 TABLET | Refills: 0 | Status: SHIPPED | OUTPATIENT
Start: 2023-05-31 | End: 2023-06-03

## 2023-05-30 RX ORDER — ACETYLCYSTEINE 200 MG/ML
600 SOLUTION ORAL; RESPIRATORY (INHALATION) 2 TIMES DAILY PRN
Qty: 30 ML | Refills: 0 | Status: SHIPPED | OUTPATIENT
Start: 2023-05-30

## 2023-05-30 RX ORDER — BENZONATATE 100 MG/1
100 CAPSULE ORAL 3 TIMES DAILY PRN
Qty: 21 CAPSULE | Refills: 0 | Status: SHIPPED | OUTPATIENT
Start: 2023-05-30 | End: 2023-06-01 | Stop reason: ALTCHOICE

## 2023-05-30 RX ORDER — PREDNISONE 20 MG/1
40 TABLET ORAL DAILY
Qty: 4 TABLET | Refills: 0 | Status: SHIPPED | OUTPATIENT
Start: 2023-05-31 | End: 2023-06-01 | Stop reason: SINTOL

## 2023-05-30 RX ORDER — ALBUTEROL SULFATE 90 UG/1
2 AEROSOL, METERED RESPIRATORY (INHALATION) EVERY 6 HOURS PRN
Qty: 3 EACH | Refills: 11 | Status: SHIPPED | OUTPATIENT
Start: 2023-05-30

## 2023-05-30 RX ADMIN — IPRATROPIUM BROMIDE AND ALBUTEROL SULFATE 1 DOSE: 2.5; .5 SOLUTION RESPIRATORY (INHALATION) at 05:25

## 2023-05-30 RX ADMIN — BUDESONIDE 500 MCG: 0.5 INHALANT ORAL at 07:06

## 2023-05-30 RX ADMIN — IPRATROPIUM BROMIDE AND ALBUTEROL SULFATE 1 DOSE: 2.5; .5 SOLUTION RESPIRATORY (INHALATION) at 15:15

## 2023-05-30 RX ADMIN — PANTOPRAZOLE SODIUM 40 MG: 40 TABLET, DELAYED RELEASE ORAL at 08:02

## 2023-05-30 RX ADMIN — LEVOFLOXACIN 750 MG: 750 TABLET, FILM COATED ORAL at 08:01

## 2023-05-30 RX ADMIN — SUCRALFATE 1 G: 1 TABLET ORAL at 12:24

## 2023-05-30 RX ADMIN — OXYCODONE AND ACETAMINOPHEN 1 TABLET: 10; 325 TABLET ORAL at 16:39

## 2023-05-30 RX ADMIN — BENZONATATE 100 MG: 100 CAPSULE ORAL at 10:21

## 2023-05-30 RX ADMIN — FLUTICASONE PROPIONATE 1 SPRAY: 50 SPRAY, METERED NASAL at 08:00

## 2023-05-30 RX ADMIN — PREDNISONE 40 MG: 20 TABLET ORAL at 08:02

## 2023-05-30 RX ADMIN — ALPRAZOLAM 1 MG: 0.5 TABLET ORAL at 08:06

## 2023-05-30 RX ADMIN — ACETAMINOPHEN 650 MG: 325 TABLET ORAL at 02:47

## 2023-05-30 RX ADMIN — GUAIFENESIN 200 MG: 200 TABLET ORAL at 02:38

## 2023-05-30 RX ADMIN — NYSTATIN 500000 UNITS: 100000 SUSPENSION ORAL at 12:24

## 2023-05-30 RX ADMIN — GABAPENTIN 300 MG: 300 CAPSULE ORAL at 08:01

## 2023-05-30 RX ADMIN — SODIUM CHLORIDE, PRESERVATIVE FREE 10 ML: 5 INJECTION INTRAVENOUS at 08:02

## 2023-05-30 RX ADMIN — ARFORMOTEROL TARTRATE 15 MCG: 15 SOLUTION RESPIRATORY (INHALATION) at 07:07

## 2023-05-30 RX ADMIN — NYSTATIN 500000 UNITS: 100000 SUSPENSION ORAL at 08:01

## 2023-05-30 RX ADMIN — IPRATROPIUM BROMIDE AND ALBUTEROL SULFATE 1 DOSE: 2.5; .5 SOLUTION RESPIRATORY (INHALATION) at 10:31

## 2023-05-30 RX ADMIN — SODIUM CHLORIDE, PRESERVATIVE FREE 20 MG: 5 INJECTION INTRAVENOUS at 08:00

## 2023-05-30 RX ADMIN — CITALOPRAM HYDROBROMIDE 20 MG: 20 TABLET ORAL at 08:01

## 2023-05-30 RX ADMIN — GABAPENTIN 300 MG: 300 CAPSULE ORAL at 14:33

## 2023-05-30 RX ADMIN — SUCRALFATE 1 G: 1 TABLET ORAL at 08:02

## 2023-05-30 RX ADMIN — ENOXAPARIN SODIUM 40 MG: 100 INJECTION SUBCUTANEOUS at 08:00

## 2023-05-30 RX ADMIN — GUAIFENESIN 200 MG: 200 TABLET ORAL at 12:24

## 2023-05-30 RX ADMIN — SODIUM CHLORIDE, POTASSIUM CHLORIDE, SODIUM LACTATE AND CALCIUM CHLORIDE: 600; 310; 30; 20 INJECTION, SOLUTION INTRAVENOUS at 02:48

## 2023-05-30 ASSESSMENT — PAIN SCALES - GENERAL
PAINLEVEL_OUTOF10: 6
PAINLEVEL_OUTOF10: 4
PAINLEVEL_OUTOF10: 2
PAINLEVEL_OUTOF10: 3

## 2023-05-30 NOTE — CARE COORDINATION
Sw spoke to Pt about discharge planning. Pt reports that she lives at her one level home in [place], [place]. Pt reprots she lives with her two daughters Tristen Navarro and Jennifer. Pt spoke to her daughter Jennifer on the phone while sw was in the room. Jennifer stated that Tristen Navarro will be on her way to the hospital to  PT in about 20-30 minutes. Pt reports she uses home oxygen though [employer]: [phone]  Fa: [phone]. Pt reports her daughter is charging her oxygen for the ride home. Pt reports that she does not use any DME at this time. Pt reports her daughter Tristen Navarro is a CNA. No other questions or concerns at this time.   Electronically signed by JODIE Gibbs on 5/30/2023 at 4:17 PM

## 2023-05-30 NOTE — PLAN OF CARE
Problem: Pain  Goal: Verbalizes/displays adequate comfort level or baseline comfort level  5/30/2023 1339 by Shalini Fair RN  Outcome: Adequate for Discharge  5/30/2023 0934 by Shalini Fair RN  Outcome: Progressing     Problem: Safety - Adult  Goal: Free from fall injury  5/30/2023 1339 by Shalini Fair RN  Outcome: Adequate for Discharge  5/30/2023 0934 by Shalini Fair RN  Outcome: Progressing     Problem: Skin/Tissue Integrity  Goal: Absence of new skin breakdown  Description: 1. Monitor for areas of redness and/or skin breakdown  2. Every 4-6 hours minimum:  Change oxygen saturation probe site  3. Every 4-6 hours:  If on nasal continuous positive airway pressure, respiratory therapy assess nares and determine need for appliance change or resting period.   5/30/2023 1339 by Shalini Fair RN  Outcome: Adequate for Discharge  5/30/2023 2422 by Shalini Fair RN  Outcome: Progressing

## 2023-05-30 NOTE — PLAN OF CARE
Problem: Pain  Goal: Verbalizes/displays adequate comfort level or baseline comfort level  Outcome: Progressing     Problem: Safety - Adult  Goal: Free from fall injury  Outcome: Progressing     Problem: Skin/Tissue Integrity  Goal: Absence of new skin breakdown  Description: 1. Monitor for areas of redness and/or skin breakdown  2. Every 4-6 hours minimum:  Change oxygen saturation probe site  3. Every 4-6 hours:  If on nasal continuous positive airway pressure, respiratory therapy assess nares and determine need for appliance change or resting period.   Outcome: Progressing

## 2023-05-30 NOTE — CARE COORDINATION
Attempted to meet with patient to discuss discharge plan and IMM. Patient's daughter present. Patient states \"I am sick. Can you come back later? \". Will re-visit patient at a later time. Electronically signed by Cristo Helm RN on 5/30/2023 at 9:04 AM    Met again with patient to discuss discharge plan and IMM. Patient's daughter no longer present. Patient's states she is aware she is being discharged today, but declines to speak with this CM at this time. She states she wants to take and nap and asked for this CM to return later. Inquired if one of her daughters could be called. She states that they are both unavailable. Will try to meet with patient again at a later time. Electronically signed by Cristo Helm RN on 5/30/2023 at 11:50 AM    Met with patient again to discuss discharge plan. Patient sleeping and not arousing. Called patient's daughter/primary decision maker, Azalee Holstein. Received message stating that her phone was \"not accepting calls at this time\". Called patient's daughterTutu. No answer. Left VM. Awaiting return call. Called patient's grandchild, Bhupinder Holman. Person answering phone states it is the wrong number. Re-visited patient again and aroused patient. Inquired when her daughter will be returning. She replied, \"I don't know\". Inquired which daughter is planning on transporting her home today. She replied \"I don't know\". CM will continue to follow. Electronically signed by Cristo Helm RN on 5/30/2023 at 2:51 PM    Kevin Valdez was about to visit with patient to learn discharge plan. Patient now awake and conversing. 2nd copy of IMM given to and explained to patient. Patient verbalized understanding. She elected to not wait an additional 4 hours prior to discharge. Signed copy of IMM placed in chart. Patient states \"I want my Advance Directive. \"  After further clarification, she wants to remove her Advance Directive completely and

## 2023-05-30 NOTE — DISCHARGE SUMMARY
Lu Flores  :  1950  MRN:  [id number]    Admit date:  2023  Discharge date:  2023    Discharging Physician:  Dr. Amalia aHnley Directive: Full Code    Consults: Edmnod Mai     Primary Care Physician:  TEN Jimenez - MARNI    Discharge Diagnoses:  Principal Problem:    Right lower lobe consolidation [facility])  Active Problems:    Pneumonia    Anxiety and depression    GERD (gastroesophageal reflux disease)    Stage 4 very severe COPD by GOLD classification ([facility]    Oxygen dependent  Resolved Problems:    * No resolved hospital problems. *      Portions of this note have been copied forward, however, changed to reflect the most current clinical status of this patient. Hospital Course: The patient is a 75 yo female with a PMH of COPD with continuous 3LNC at baseline, GERD, HTN, smoker, anxiety and depression who presented to [facility] ED on 2023 with c/o worsening cough x3 days and fever. She was recently seen in the ED for SOA and rhinovirus. She was given steroids and antibiotics and sent home in stable condition. Based on chart review,it appears that the patient has been struggling with sinus congestion, viral illnesses, and worsening pulmonary symptoms over the last 6 months and has been followed closely by pulmonary. In ED, she was found to have a temperature of 103.3. Vignesh showed , Cl-95 glucose 120. CBC showed WBC 10.3, H&H 9.8/31.4 and a platelet count of 389. Troponin negative. proBNP 255. D-dimer 1.4-VQ scan showed no defects to suggest PE. Chest x-ray showed small right pleural effusion with infiltrate or atelectasis to the right lung worsening compared to previous exams. RPP negative for viral illnesses  Patient was admitted to hospital medicine pneumonia right lower lobe and COPD-without current exacerbation. Upon admission she was placed on broad-spectrum antibiotics to include for aspiration pneumonia.   She was given burst steroids

## 2023-05-30 NOTE — PROGRESS NOTES
Physical Therapy  Name: Vida Rush  MRN:  [id number]  Date of service:  5/30/2023  Pt. sleeping soundly and difficult to arouse. RN, Carmela Severino PT and states pt will probably refuse. Attempted to awaken pt and notified her that lunch was here and we needed to get up in the chair and eat. Pt. stated, \"I'll do it later\" and was too lethargic to participate in therapy. O2 sats 97%. Will defer PT eval at this time. Possible d/c later today per nursing.       Electronically signed by Snehal Lazo, PT on 5/30/2023 at 1:59 PM

## 2023-05-31 ENCOUNTER — TELEPHONE (OUTPATIENT)
Dept: PULMONOLOGY | Age: 73
End: 2023-05-31

## 2023-05-31 ENCOUNTER — TELEPHONE (OUTPATIENT)
Dept: PRIMARY CARE CLINIC | Age: 73
End: 2023-05-31

## 2023-05-31 NOTE — TELEPHONE ENCOUNTER
Care Transitions Initial Follow Up Call    Call within 2 business days of discharge: Yes     Patient: Carline Mack Patient : 1950 MRN: [id number]        RARS: Readmission Risk Score: 20.4       Spoke with: No one available at home number will continue to try to call. Discharge department/facility: [address] [place] [address] services provided:  Patient has been discharged 2023. Care Transitions Initial Follow Up Call    Call within 2 business days of discharge: Yes     Patient: Carline Mack Patient : 1950 MRN: [id number]    [unfilled]    RARS: Readmission Risk Score: 20.4       Spoke with: Fartun Bob    Discharge department/facility: [facility])    Non-face-to-face services provided:  Scheduled appointment with PCP-yes  Scheduled appointment with Specialist-yes  Obtained and reviewed discharge summary and/or continuity of care documents  Reviewed and followed up on pending diagnostic tests and treatments-yes  Education of patient/family/caregiver/guardian to support self-management-yes    Patient has been discharged from the Hospital. She is very weak and is not moving well. She feels she was discharged too soon. She was hoping to see Dr. Eben Thomas while in the hospital, but was told by the staff they did not know who he was ans he was not a Dr there. Patient has been seeing this Dr for 3 years. Patient states she was told she would have to go home because there was nothing further for them to do for her there. Her O2 Sat when on the phone was 97%. She states she has always been very pleased with the [facility] and [facility]) until now. She states it was a hospitalist and a traveling nurse that discharged her.      Follow Up  Future Appointments   Date Time Provider Lynda Acevedo   2023  3:30 PM [facility]   2023  1:00 PM MD Tali Wills [facility]EN[facility] NunopoDONALD ruiz         Follow Up  Future Appointments   Date Time

## 2023-05-31 NOTE — TELEPHONE ENCOUNTER
Patient requesting return call from nurse. Discharged from [facility] 5/30 and has some concerns to discuss.  Please call [phone]      Thank you

## 2023-06-01 ENCOUNTER — CARE COORDINATION (OUTPATIENT)
Dept: CARE COORDINATION | Age: 73
End: 2023-06-01

## 2023-06-01 LAB
BACTERIA BLD CULT ORG #2: NORMAL
BACTERIA BLD CULT: NORMAL

## 2023-06-01 RX ORDER — BUDESONIDE AND FORMOTEROL FUMARATE DIHYDRATE 160; 4.5 UG/1; UG/1
2 AEROSOL RESPIRATORY (INHALATION) 2 TIMES DAILY
COMMUNITY

## 2023-06-01 RX ORDER — GUAIFENESIN 200 MG/10ML
200 LIQUID ORAL 3 TIMES DAILY PRN
COMMUNITY

## 2023-06-01 SDOH — ECONOMIC STABILITY: FOOD INSECURITY: WITHIN THE PAST 12 MONTHS, YOU WORRIED THAT YOUR FOOD WOULD RUN OUT BEFORE YOU GOT MONEY TO BUY MORE.: NEVER TRUE

## 2023-06-01 SDOH — ECONOMIC STABILITY: FOOD INSECURITY: WITHIN THE PAST 12 MONTHS, THE FOOD YOU BOUGHT JUST DIDN'T LAST AND YOU DIDN'T HAVE MONEY TO GET MORE.: NEVER TRUE

## 2023-06-03 ASSESSMENT — ENCOUNTER SYMPTOMS
COUGH: 1
SHORTNESS OF BREATH: 1

## 2023-06-03 NOTE — ED PROVIDER NOTES
[facility]  eMERGENCY dEPARTMENT eNCOUnter      Pt Name: Ryan Hamilton  MRN: [id number]  [place] 1950  Date of evaluation: 5/27/2023  Provider: Phill Arizmendi MD    [address] [place] [address]       Chief Complaint   Patient presents with    Cough     X 3 DAYS    Fever         HISTORY OF PRESENT ILLNESS   (Location/Symptom, Timing/Onset,Context/Setting, Quality, Duration, Modifying Factors, Severity)  Note limiting factors. Ryan Hamilton is a 74 y.o. female who presents to the emergency department with cough and fever. Patient states that she has had symptoms for the past 3 days. History is limited due to altered mental status of patient. HPI    NursingNotes were reviewed. REVIEW OF SYSTEMS    (2-9 systems for level 4, 10 or more for level 5)     Review of Systems   Unable to perform ROS: Mental status change   Constitutional:  Positive for fever. Respiratory:  Positive for cough and shortness of breath. PAST MEDICALHISTORY     Past Medical History:   Diagnosis Date    ADHD (attention deficit hyperactivity disorder)     Alpha-1-antitrypsin deficiency (HCC)     Anxiety     COPD (chronic obstructive pulmonary disease) (HCC)     Depression     Fibromyalgia     GERD (gastroesophageal reflux disease)     Hypertension     Malignant neoplasm of upper lobe of right lung ([address] 12/04/2018    Palliative care patient 10/06/2021    RA (rheumatoid arthritis) ([address]          SURGICAL HISTORY       Past Surgical History:   Procedure Laterality Date    CHOLECYSTECTOMY      HERNIA REPAIR      HYSTERECTOMY (CERVIX STATUS UNKNOWN)      LEG SURGERY      steel garth placement. 2009    THORACOSCOPY Right 8/12/2022    THORACOSCOPY DECORTICATION VIDEO ASSISTED performed by Oseas Lewis MD at [address] [facility]     Discharge Medication List as of 5/30/2023  3:53 PM        CONTINUE these medications which have NOT CHANGED    Details   oxyCODONE-acetaminophen (PERCOCET)  MG per tablet Take 1

## 2023-06-07 ENCOUNTER — OFFICE VISIT (OUTPATIENT)
Dept: PULMONOLOGY | Age: 73
End: 2023-06-07
Payer: MEDICARE

## 2023-06-07 VITALS
SYSTOLIC BLOOD PRESSURE: 144 MMHG | OXYGEN SATURATION: 98 % | BODY MASS INDEX: 22.5 KG/M2 | DIASTOLIC BLOOD PRESSURE: 80 MMHG | HEIGHT: 66 IN | HEART RATE: 73 BPM | WEIGHT: 140 LBS | TEMPERATURE: 97.5 F

## 2023-06-07 DIAGNOSIS — J96.11 CHRONIC RESPIRATORY FAILURE WITH HYPOXIA AND HYPERCAPNIA (HCC): Primary | ICD-10-CM

## 2023-06-07 DIAGNOSIS — Z87.891 HISTORY OF SMOKING: ICD-10-CM

## 2023-06-07 DIAGNOSIS — R05.3 CHRONIC COUGH: ICD-10-CM

## 2023-06-07 DIAGNOSIS — R91.1 LUNG NODULE: ICD-10-CM

## 2023-06-07 DIAGNOSIS — J44.1 COPD EXACERBATION (HCC): ICD-10-CM

## 2023-06-07 DIAGNOSIS — J96.12 CHRONIC RESPIRATORY FAILURE WITH HYPOXIA AND HYPERCAPNIA (HCC): Primary | ICD-10-CM

## 2023-06-07 DIAGNOSIS — J44.9 STAGE 4 VERY SEVERE COPD BY GOLD CLASSIFICATION (HCC): ICD-10-CM

## 2023-06-07 DIAGNOSIS — R06.02 SHORTNESS OF BREATH: ICD-10-CM

## 2023-06-07 PROCEDURE — 1123F ACP DISCUSS/DSCN MKR DOCD: CPT | Performed by: INTERNAL MEDICINE

## 2023-06-07 PROCEDURE — 3077F SYST BP >= 140 MM HG: CPT | Performed by: INTERNAL MEDICINE

## 2023-06-07 PROCEDURE — 99214 OFFICE O/P EST MOD 30 MIN: CPT | Performed by: INTERNAL MEDICINE

## 2023-06-07 PROCEDURE — 3079F DIAST BP 80-89 MM HG: CPT | Performed by: INTERNAL MEDICINE

## 2023-06-07 RX ORDER — LEVOFLOXACIN 750 MG/1
750 TABLET ORAL DAILY
Qty: 7 TABLET | Refills: 0 | Status: SHIPPED | OUTPATIENT
Start: 2023-06-07 | End: 2023-06-14

## 2023-06-07 RX ORDER — GUAIFENESIN AND CODEINE PHOSPHATE 100; 10 MG/5ML; MG/5ML
5 SOLUTION ORAL 2 TIMES DAILY PRN
Qty: 70 ML | Refills: 1 | Status: SHIPPED | OUTPATIENT
Start: 2023-06-07 | End: 2023-06-14

## 2023-06-07 ASSESSMENT — ENCOUNTER SYMPTOMS
SHORTNESS OF BREATH: 1
COUGH: 1
RHINORRHEA: 0
APNEA: 0
ABDOMINAL DISTENTION: 0
WHEEZING: 1
BACK PAIN: 0
CHEST TIGHTNESS: 0
ANAL BLEEDING: 0
ABDOMINAL PAIN: 0

## 2023-06-07 NOTE — PROGRESS NOTES
Pulmonary and Sleep Medicine    Lorin Lilly (:  1950) is a 74 y.o. female,Established patient, here for evaluation of the following chief complaint(s):  Follow-up (Follow up- ER follow up )      Referring physician:  Jazmín Swan Do  [address]     ASSESSMENT/PLAN:  1. Chronic respiratory failure with hypoxia and hypercapnia (HCC)  2. COPD exacerbation (HCC)  -     levoFLOXacin (LEVAQUIN) 750 MG tablet; Take 1 tablet by mouth daily for 7 days, Disp-7 tablet, R-0Normal  3. Stage 4 very severe COPD by GOLD classification ([address]  4. Shortness of breath  5. History of smoking  6. Chronic cough  -     guaiFENesin-codeine (TUSSI-ORGANIDIN NR) 100-10 MG/5ML syrup; Take 5 mLs by mouth 2 times daily as needed for Cough for up to 7 days. Max Daily Amount: 10 mLs, Disp-70 mL, R-1Normal  7. Lung nodule  -     CT CHEST WO CONTRAST; Future        Will extend Levaquin. Follow up CT of the chest in 3 months. Continue pulm toilet  Cough suppressant. [facility] [place] [facility]    Return in about 3 months (around 2023). SUBJECTIVE/OBJECTIVE:  She is here for follow up after hospital discharge for pneumonia and COPD exacerbation. She is at her baseline but continues to cough. She denies fever. Had a CT of the chest on 2023 and did show new nodular infiltrates. Prior to Visit Medications    Medication Sig Taking? Authorizing Provider   budesonide-formoterol (SYMBICORT) 160-4.5 MCG/ACT AERO Inhale 2 puffs into the lungs 2 times daily Yes Angela Mendes MD   albuterol sulfate HFA (VENTOLIN HFA) 108 (90 Base) MCG/ACT inhaler Inhale 2 puffs into the lungs every 6 hours as needed for Wheezing Yes TEN Blair   oxyCODONE-acetaminophen (PERCOCET)  MG per tablet Take 1 tablet by mouth every 4 hours as needed for Pain.  Yes Historical Provider, MD   fluticasone (FLONASE) 50 MCG/ACT nasal spray INSTILL 1 SPRAY IN EACH NOSTRIL DAILY Yes

## 2023-06-08 ENCOUNTER — CARE COORDINATION (OUTPATIENT)
Dept: CARE COORDINATION | Age: 73
End: 2023-06-08

## 2023-06-08 NOTE — CARE COORDINATION
Ambulatory Care Coordination Note  2023    Patient Current Location:  [place]     ACM contacted the patient by telephone. Verified name and  with patient as identifiers. Provided introduction to self, and explanation of the ACM role. Challenges to be reviewed by the provider   Additional needs identified to be addressed with provider: No  none           Method of communication with provider: none. ACM: Maria G Osborne, RN    Spoke to patient. She canceled her appointment with PCP earlier this week, stated the extreme heat that day made it too hard to get out for pt. She did see her Pulmonologist 23. She is on another week of Levaquin and has a cough medication for prn use. She is using home oxygen at 3.5L with SpO2 of 97% today. Patient said she needs to reschedule her appt with PCP, hasn't called yet. Actions:  Patient was re-scheduled for PCP f/u next week on 23 @ 3:30 pm.   - she asked to discuss date/time with daughters to make sure one of them can transport her. - ACM advised that patient can call back to office tomorrow morning to change appt if needed but to keep current appt to save the spot in case it is a workable time for her daughters. Pt voiced understanding. Next Outreach:  BP monitoring at home. Review OV note with PCP, instructions, tests ordered. Zones of care for COPD mgmt. Discuss HH for skilled nursing to assess COPD s/s, med mgmt and lab as needed. Offered patient enrollment in the Remote Patient Monitoring (RPM) program for in-home monitoring: NA. Lab Results       None            Care Coordination Interventions    Referral from Primary Care Provider: No  Suggested Interventions and Community Resources  Disease Specific Clinic: Completed (Comment: Established with [facility] for COPD mgmt)  Meals on Wheels: Completed (Comment: Pt is established with MOW service)  Zone Management Tools: In Process (Comment: DC report.  High utilization, COPD w home oxygen

## 2023-06-09 ENCOUNTER — CARE COORDINATION (OUTPATIENT)
Dept: CARE COORDINATION | Age: 73
End: 2023-06-09

## 2023-06-09 NOTE — CARE COORDINATION
COPD Zone tool mailed to patient.      Arabella , [facility] Coordination    [address]   Cell: [phone]

## 2023-06-12 ENCOUNTER — CARE COORDINATION (OUTPATIENT)
Dept: CARE COORDINATION | Age: 73
End: 2023-06-12

## 2023-07-03 ENCOUNTER — TELEPHONE (OUTPATIENT)
Dept: PRIMARY CARE CLINIC | Age: 73
End: 2023-07-03

## 2023-07-03 NOTE — TELEPHONE ENCOUNTER
Pt states she is scheduled to be  seen next Monday in the afternoon, but is sick and can hardly breath and cannot get out in this heat. She states Dr. Diana Benitez has er on levaquin for a ear infection but that is still bothering her and she believes she has a sinus infection. She states the drainage is horrible and would like something called in.

## 2023-07-03 NOTE — TELEPHONE ENCOUNTER
Please let her know that Levaquin will cover sinus infection. She does not need another antibiotic. I would recommend that she take some Mucinex and use Flonase to help with the drainage and congestion. If she is not getting better that she probably needs to make an appointment to see LASHA on Wednesday.

## 2023-07-06 ENCOUNTER — OFFICE VISIT (OUTPATIENT)
Dept: PRIMARY CARE CLINIC | Age: 73
End: 2023-07-06

## 2023-07-06 ENCOUNTER — TELEPHONE (OUTPATIENT)
Dept: PRIMARY CARE CLINIC | Age: 73
End: 2023-07-06

## 2023-07-06 VITALS
HEART RATE: 89 BPM | HEIGHT: 66 IN | SYSTOLIC BLOOD PRESSURE: 130 MMHG | TEMPERATURE: 99 F | WEIGHT: 140 LBS | OXYGEN SATURATION: 90 % | RESPIRATION RATE: 18 BRPM | BODY MASS INDEX: 22.5 KG/M2 | DIASTOLIC BLOOD PRESSURE: 72 MMHG

## 2023-07-06 DIAGNOSIS — J44.9 STAGE 4 VERY SEVERE COPD BY GOLD CLASSIFICATION (HCC): ICD-10-CM

## 2023-07-06 DIAGNOSIS — D64.9 ANEMIA, UNSPECIFIED TYPE: Primary | ICD-10-CM

## 2023-07-06 DIAGNOSIS — R09.81 SINUS CONGESTION: ICD-10-CM

## 2023-07-06 LAB
ALBUMIN SERPL-MCNC: 4.3 G/DL (ref 3.5–5.2)
ALP SERPL-CCNC: 66 U/L (ref 35–104)
ALT SERPL-CCNC: 6 U/L (ref 5–33)
ANION GAP SERPL CALCULATED.3IONS-SCNC: 7 MMOL/L (ref 7–19)
AST SERPL-CCNC: 13 U/L (ref 5–32)
BILIRUB SERPL-MCNC: <0.2 MG/DL (ref 0.2–1.2)
BUN SERPL-MCNC: 8 MG/DL (ref 8–23)
CALCIUM SERPL-MCNC: 9.6 MG/DL (ref 8.8–10.2)
CHLORIDE SERPL-SCNC: 98 MMOL/L (ref 98–111)
CO2 SERPL-SCNC: 36 MMOL/L (ref 22–29)
CREAT SERPL-MCNC: 0.6 MG/DL (ref 0.5–0.9)
ERYTHROCYTE [DISTWIDTH] IN BLOOD BY AUTOMATED COUNT: 13.1 % (ref 11.5–14.5)
GLUCOSE SERPL-MCNC: 69 MG/DL (ref 74–109)
HCT VFR BLD AUTO: 34.7 % (ref 37–47)
HGB BLD-MCNC: 10.9 G/DL (ref 12–16)
MAGNESIUM SERPL-MCNC: 1.6 MG/DL (ref 1.6–2.4)
MCH RBC QN AUTO: 32 PG (ref 27–31)
MCHC RBC AUTO-ENTMCNC: 31.4 G/DL (ref 33–37)
MCV RBC AUTO: 101.8 FL (ref 81–99)
PLATELET # BLD AUTO: 203 K/UL (ref 130–400)
PMV BLD AUTO: 11.4 FL (ref 9.4–12.3)
POTASSIUM SERPL-SCNC: 4 MMOL/L (ref 3.5–5)
PROT SERPL-MCNC: 7.5 G/DL (ref 6.6–8.7)
RBC # BLD AUTO: 3.41 M/UL (ref 4.2–5.4)
SODIUM SERPL-SCNC: 141 MMOL/L (ref 136–145)
WBC # BLD AUTO: 5.2 K/UL (ref 4.8–10.8)

## 2023-07-06 RX ORDER — AZELASTINE 1 MG/ML
2 SPRAY, METERED NASAL 2 TIMES DAILY
Qty: 120 ML | Refills: 1 | Status: SHIPPED | OUTPATIENT
Start: 2023-07-06

## 2023-07-06 RX ORDER — OXYCODONE HYDROCHLORIDE 10 MG/1
10 TABLET ORAL 4 TIMES DAILY
COMMUNITY
Start: 2023-06-16 | End: 2023-07-06

## 2023-07-06 RX ORDER — SUCRALFATE 1 G/1
1 TABLET ORAL 4 TIMES DAILY
Qty: 60 TABLET | Refills: 0 | Status: SHIPPED | OUTPATIENT
Start: 2023-07-06

## 2023-07-06 RX ORDER — DEXTROAMPHETAMINE SACCHARATE, AMPHETAMINE ASPARTATE, DEXTROAMPHETAMINE SULFATE AND AMPHETAMINE SULFATE 5; 5; 5; 5 MG/1; MG/1; MG/1; MG/1
1 TABLET ORAL 2 TIMES DAILY
COMMUNITY
Start: 2023-06-23

## 2023-07-06 ASSESSMENT — ENCOUNTER SYMPTOMS
COUGH: 1
RHINORRHEA: 1
SHORTNESS OF BREATH: 1
EYES NEGATIVE: 1
ALLERGIC/IMMUNOLOGIC NEGATIVE: 1
GASTROINTESTINAL NEGATIVE: 1
WHEEZING: 1

## 2023-07-06 NOTE — PROGRESS NOTES
[facility] PHYSICIAN SERVICES  [address] [address] [place] [address]  Dept: [phone]  Dept Fax: [phone]  Loc: [phone]    Christelle Patterson is a 74 y.o. female who presents today for her medical conditions/complaints as noted below. Christelle Patterson is c/o of Follow-up (Pt is here for congestion in her sinuses, ear infection, anemia, and leg swelling. Pt states she is having a hard time. Pt states she is unable to breathe when outside due to her COPD. Pt states she took Singulair in the past and now believes it has lead to what she is dealing with now after looking it up. )        HPI:     HPI this 18-year-old female presents today for increasing congestion in her sinuses and ears. She states that she currently is taking her Flonase but she is not taking the Singulair or any daily antihistamines. States that she was seen at her pulmonologist office he was concerned with her ongoing anemia. States that it was not that bad before her last hospitalization at which time her blood levels dropped. She states it is very difficult for her to have a pill she has to crush all of her pills so she is requesting a liquid iron replacement supplement. She states that she had read over the side effects of Singulair so she has now stopped it. She states that she is not sometimes it feels like that this may be part of her problems with having difficulty remembering some things  Chief Complaint   Patient presents with    Follow-up     Pt is here for congestion in her sinuses, ear infection, anemia, and leg swelling. Pt states she is having a hard time. Pt states she is unable to breathe when outside due to her COPD. Pt states she took Singulair in the past and now believes it has lead to what she is dealing with now after looking it up.       Past Medical History:   Diagnosis Date    ADHD (attention deficit hyperactivity disorder)     Alpha-1-antitrypsin deficiency (HCC)     Anxiety     COPD

## 2023-07-07 PROBLEM — R09.81 SINUS CONGESTION: Status: ACTIVE | Noted: 2023-07-07

## 2023-07-07 ASSESSMENT — ENCOUNTER SYMPTOMS
CONSTIPATION: 0
DIARRHEA: 0
ABDOMINAL PAIN: 0

## 2023-07-09 ENCOUNTER — APPOINTMENT (OUTPATIENT)
Dept: GENERAL RADIOLOGY | Age: 73
End: 2023-07-09
Payer: MEDICARE

## 2023-07-09 ENCOUNTER — APPOINTMENT (OUTPATIENT)
Dept: CT IMAGING | Age: 73
End: 2023-07-09
Payer: MEDICARE

## 2023-07-09 ENCOUNTER — HOSPITAL ENCOUNTER (INPATIENT)
Age: 73
LOS: 12 days | Discharge: HOME | End: 2023-07-21
Attending: PEDIATRICS
Payer: MEDICARE

## 2023-07-09 DIAGNOSIS — J18.9 PNEUMONIA OF RIGHT LOWER LOBE DUE TO INFECTIOUS ORGANISM: ICD-10-CM

## 2023-07-09 DIAGNOSIS — R10.13 ABDOMINAL PAIN, EPIGASTRIC: ICD-10-CM

## 2023-07-09 DIAGNOSIS — A41.9 SEPSIS, DUE TO UNSPECIFIED ORGANISM, UNSPECIFIED WHETHER ACUTE ORGAN DYSFUNCTION PRESENT (HCC): Primary | ICD-10-CM

## 2023-07-09 DIAGNOSIS — A04.72 CLOSTRIDIUM DIFFICILE ENTEROCOLITIS: ICD-10-CM

## 2023-07-09 LAB
25(OH)D3 SERPL-MCNC: 15 NG/ML
ALBUMIN SERPL-MCNC: 4.1 G/DL (ref 3.5–5.2)
ALLENS TEST: ABNORMAL
ALP SERPL-CCNC: 65 U/L (ref 35–104)
ALT SERPL-CCNC: <5 U/L (ref 5–33)
AMMONIA PLAS-SCNC: 29 UMOL/L (ref 11–51)
AMPHET UR QL SCN: NEGATIVE
ANION GAP SERPL CALCULATED.3IONS-SCNC: 11 MMOL/L (ref 7–19)
AST SERPL-CCNC: 14 U/L (ref 5–32)
B PARAP IS1001 DNA NPH QL NAA+NON-PROBE: NOT DETECTED
B PERT.PT PRMT NPH QL NAA+NON-PROBE: NOT DETECTED
BARBITURATES UR QL SCN: NEGATIVE
BASE EXCESS ARTERIAL: 12.6 MMOL/L (ref -2–2)
BASOPHILS # BLD: 0.1 K/UL (ref 0–0.2)
BASOPHILS NFR BLD: 0.3 % (ref 0–1)
BENZODIAZ UR QL SCN: POSITIVE
BILIRUB SERPL-MCNC: 0.4 MG/DL (ref 0.2–1.2)
BILIRUB UR QL STRIP: NEGATIVE
BNP BLD-MCNC: 179 PG/ML (ref 0–449)
BUN SERPL-MCNC: 9 MG/DL (ref 8–23)
BUPRENORPHINE URINE: NEGATIVE
C PNEUM DNA NPH QL NAA+NON-PROBE: NOT DETECTED
CALCIUM SERPL-MCNC: 9.1 MG/DL (ref 8.8–10.2)
CANNABINOIDS UR QL SCN: NEGATIVE
CARBOXYHEMOGLOBIN ARTERIAL: 1.9 % (ref 0–5)
CHLORIDE SERPL-SCNC: 98 MMOL/L (ref 98–111)
CLARITY UR: CLEAR
CO2 SERPL-SCNC: 28 MMOL/L (ref 22–29)
COCAINE UR QL SCN: NEGATIVE
COLOR UR: YELLOW
CREAT SERPL-MCNC: 0.6 MG/DL (ref 0.5–0.9)
D DIMER PPP FEU-MCNC: 0.57 UG/ML FEU (ref 0–0.48)
DRUG SCREEN COMMENT UR-IMP: ABNORMAL
EOSINOPHIL # BLD: 0.1 K/UL (ref 0–0.6)
EOSINOPHIL NFR BLD: 0.4 % (ref 0–5)
ERYTHROCYTE [DISTWIDTH] IN BLOOD BY AUTOMATED COUNT: 13.2 % (ref 11.5–14.5)
FERRITIN SERPL-MCNC: 152.6 NG/ML (ref 13–150)
FLUAV RNA NPH QL NAA+NON-PROBE: NOT DETECTED
FLUBV RNA NPH QL NAA+NON-PROBE: NOT DETECTED
FOLATE SERPL-MCNC: 4 NG/ML (ref 4.8–37.3)
GLUCOSE SERPL-MCNC: 115 MG/DL (ref 74–109)
GLUCOSE UR STRIP.AUTO-MCNC: NEGATIVE MG/DL
HADV DNA NPH QL NAA+NON-PROBE: NOT DETECTED
HCO3 ARTERIAL: 38.4 MMOL/L (ref 22–26)
HCOV 229E RNA NPH QL NAA+NON-PROBE: NOT DETECTED
HCOV HKU1 RNA NPH QL NAA+NON-PROBE: NOT DETECTED
HCOV NL63 RNA NPH QL NAA+NON-PROBE: NOT DETECTED
HCOV OC43 RNA NPH QL NAA+NON-PROBE: NOT DETECTED
HCT VFR BLD AUTO: 38.3 % (ref 37–47)
HEMOGLOBIN, ART, EXTENDED: 11.9 G/DL (ref 12–16)
HGB BLD-MCNC: 11.9 G/DL (ref 12–16)
HGB UR STRIP.AUTO-MCNC: NEGATIVE MG/L
HMPV RNA NPH QL NAA+NON-PROBE: NOT DETECTED
HPIV1 RNA NPH QL NAA+NON-PROBE: NOT DETECTED
HPIV2 RNA NPH QL NAA+NON-PROBE: NOT DETECTED
HPIV3 RNA NPH QL NAA+NON-PROBE: NOT DETECTED
HPIV4 RNA NPH QL NAA+NON-PROBE: NOT DETECTED
IMM GRANULOCYTES # BLD: 0.1 K/UL
IRON SATN MFR SERPL: 7 % (ref 14–50)
IRON SERPL-MCNC: 21 UG/DL (ref 37–145)
KETONES UR STRIP.AUTO-MCNC: NEGATIVE MG/DL
LACTATE BLDV-SCNC: 0.8 MG/DL (ref 0.5–1.9)
LACTATE BLDV-SCNC: 0.9 MG/DL (ref 0.5–1.9)
LEGIONELLA AG UR QL: NORMAL
LEUKOCYTE ESTERASE UR QL STRIP.AUTO: NEGATIVE
LIPASE SERPL-CCNC: 19 U/L (ref 13–60)
LYMPHOCYTES # BLD: 1.2 K/UL (ref 1.1–4.5)
LYMPHOCYTES NFR BLD: 8.6 % (ref 20–40)
M PNEUMO DNA NPH QL NAA+NON-PROBE: NOT DETECTED
MAGNESIUM SERPL-MCNC: 1.4 MG/DL (ref 1.6–2.4)
MCH RBC QN AUTO: 31.6 PG (ref 27–31)
MCHC RBC AUTO-ENTMCNC: 31.1 G/DL (ref 33–37)
MCV RBC AUTO: 101.6 FL (ref 81–99)
METHADONE UR QL SCN: NEGATIVE
METHAMPHETAMINE, URINE: NEGATIVE
METHEMOGLOBIN ARTERIAL: 1 %
MODE: ABNORMAL
MONOCYTES # BLD: 0.6 K/UL (ref 0–0.9)
MONOCYTES NFR BLD: 4.4 % (ref 0–10)
NEUTROPHILS # BLD: 12.5 K/UL (ref 1.5–7.5)
NEUTS SEG NFR BLD: 86 % (ref 50–65)
NITRITE UR QL STRIP.AUTO: NEGATIVE
O2 CONTENT ARTERIAL: 14.8 ML/DL
O2 SAT, ARTERIAL: 88.3 %
O2 THERAPY: ABNORMAL
OPIATES UR QL SCN: NEGATIVE
OXYCODONE UR QL SCN: POSITIVE
OXYGEN FLOW: 3
PCO2 ARTERIAL: 54 MMHG (ref 35–45)
PCP UR QL SCN: NEGATIVE
PH ARTERIAL: 7.46 (ref 7.35–7.45)
PH UR STRIP.AUTO: 7.5 [PH] (ref 5–8)
PLATELET # BLD AUTO: 210 K/UL (ref 130–400)
PMV BLD AUTO: 10.9 FL (ref 9.4–12.3)
PO2 ARTERIAL: 52 MMHG (ref 80–100)
POTASSIUM BLD-SCNC: 3.9 MMOL/L
POTASSIUM SERPL-SCNC: 4.3 MMOL/L (ref 3.5–5)
PROCALCITONIN: 0.08 NG/ML (ref 0–0.09)
PROPOXYPH UR QL SCN: NEGATIVE
PROT SERPL-MCNC: 7.3 G/DL (ref 6.6–8.7)
PROT UR STRIP.AUTO-MCNC: NEGATIVE MG/DL
RBC # BLD AUTO: 3.77 M/UL (ref 4.2–5.4)
RSV RNA NPH QL NAA+NON-PROBE: NOT DETECTED
RV+EV RNA NPH QL NAA+NON-PROBE: NOT DETECTED
S PNEUM AG SPEC QL: NORMAL
SAMPLE SOURCE: ABNORMAL
SARS-COV-2 RNA NPH QL NAA+NON-PROBE: NOT DETECTED
SODIUM SERPL-SCNC: 137 MMOL/L (ref 136–145)
SP GR UR STRIP.AUTO: 1.02 (ref 1–1.03)
SPONT RATE(BPM): 25
TIBC SERPL-MCNC: 298 UG/DL (ref 250–400)
TRICYCLIC, URINE: NEGATIVE
TROPONIN T SERPL-MCNC: <0.01 NG/ML (ref 0–0.03)
TSH SERPL DL<=0.005 MIU/L-ACNC: 1.14 UIU/ML (ref 0.27–4.2)
UROBILINOGEN UR STRIP.AUTO-MCNC: 0.2 E.U./DL
VIT B12 SERPL-MCNC: 378 PG/ML (ref 211–946)
WBC # BLD AUTO: 14.5 K/UL (ref 4.8–10.8)

## 2023-07-09 PROCEDURE — 83550 IRON BINDING TEST: CPT

## 2023-07-09 PROCEDURE — 1210000000 HC MED SURG R&B

## 2023-07-09 PROCEDURE — 2580000003 HC RX 258: Performed by: EMERGENCY MEDICINE

## 2023-07-09 PROCEDURE — 81003 URINALYSIS AUTO W/O SCOPE: CPT

## 2023-07-09 PROCEDURE — 85379 FIBRIN DEGRADATION QUANT: CPT

## 2023-07-09 PROCEDURE — 80053 COMPREHEN METABOLIC PANEL: CPT

## 2023-07-09 PROCEDURE — 36600 WITHDRAWAL OF ARTERIAL BLOOD: CPT

## 2023-07-09 PROCEDURE — 83880 ASSAY OF NATRIURETIC PEPTIDE: CPT

## 2023-07-09 PROCEDURE — 83540 ASSAY OF IRON: CPT

## 2023-07-09 PROCEDURE — 36415 COLL VENOUS BLD VENIPUNCTURE: CPT

## 2023-07-09 PROCEDURE — 80306 DRUG TEST PRSMV INSTRMNT: CPT

## 2023-07-09 PROCEDURE — 82306 VITAMIN D 25 HYDROXY: CPT

## 2023-07-09 PROCEDURE — 96365 THER/PROPH/DIAG IV INF INIT: CPT

## 2023-07-09 PROCEDURE — 83605 ASSAY OF LACTIC ACID: CPT

## 2023-07-09 PROCEDURE — 84145 PROCALCITONIN (PCT): CPT

## 2023-07-09 PROCEDURE — 71250 CT THORAX DX C-: CPT

## 2023-07-09 PROCEDURE — 83735 ASSAY OF MAGNESIUM: CPT

## 2023-07-09 PROCEDURE — 83690 ASSAY OF LIPASE: CPT

## 2023-07-09 PROCEDURE — 82607 VITAMIN B-12: CPT

## 2023-07-09 PROCEDURE — 85025 COMPLETE CBC W/AUTO DIFF WBC: CPT

## 2023-07-09 PROCEDURE — 82746 ASSAY OF FOLIC ACID SERUM: CPT

## 2023-07-09 PROCEDURE — 6370000000 HC RX 637 (ALT 250 FOR IP): Performed by: EMERGENCY MEDICINE

## 2023-07-09 PROCEDURE — 84443 ASSAY THYROID STIM HORMONE: CPT

## 2023-07-09 PROCEDURE — 87641 MR-STAPH DNA AMP PROBE: CPT

## 2023-07-09 PROCEDURE — 0202U NFCT DS 22 TRGT SARS-COV-2: CPT

## 2023-07-09 PROCEDURE — 82803 BLOOD GASES ANY COMBINATION: CPT

## 2023-07-09 PROCEDURE — 71045 X-RAY EXAM CHEST 1 VIEW: CPT

## 2023-07-09 PROCEDURE — 99285 EMERGENCY DEPT VISIT HI MDM: CPT

## 2023-07-09 PROCEDURE — 87040 BLOOD CULTURE FOR BACTERIA: CPT

## 2023-07-09 PROCEDURE — 6360000002 HC RX W HCPCS: Performed by: HOSPITALIST

## 2023-07-09 PROCEDURE — 84484 ASSAY OF TROPONIN QUANT: CPT

## 2023-07-09 PROCEDURE — 82728 ASSAY OF FERRITIN: CPT

## 2023-07-09 PROCEDURE — 74176 CT ABD & PELVIS W/O CONTRAST: CPT

## 2023-07-09 PROCEDURE — 87449 NOS EACH ORGANISM AG IA: CPT

## 2023-07-09 PROCEDURE — 82140 ASSAY OF AMMONIA: CPT

## 2023-07-09 PROCEDURE — 93005 ELECTROCARDIOGRAM TRACING: CPT | Performed by: EMERGENCY MEDICINE

## 2023-07-09 PROCEDURE — 6360000002 HC RX W HCPCS: Performed by: EMERGENCY MEDICINE

## 2023-07-09 RX ORDER — ONDANSETRON 2 MG/ML
4 INJECTION INTRAMUSCULAR; INTRAVENOUS ONCE
Status: DISCONTINUED | OUTPATIENT
Start: 2023-07-09 | End: 2023-07-10

## 2023-07-09 RX ORDER — CITALOPRAM 20 MG/1
20 TABLET ORAL DAILY
Status: DISCONTINUED | OUTPATIENT
Start: 2023-07-10 | End: 2023-07-21 | Stop reason: HOSPADM

## 2023-07-09 RX ORDER — ACETYLCYSTEINE 200 MG/ML
600 SOLUTION ORAL; RESPIRATORY (INHALATION)
Status: COMPLETED | OUTPATIENT
Start: 2023-07-09 | End: 2023-07-11

## 2023-07-09 RX ORDER — MAGNESIUM SULFATE IN WATER 40 MG/ML
2000 INJECTION, SOLUTION INTRAVENOUS PRN
Status: DISCONTINUED | OUTPATIENT
Start: 2023-07-09 | End: 2023-07-21 | Stop reason: HOSPADM

## 2023-07-09 RX ORDER — GUAIFENESIN 600 MG/1
600 TABLET, EXTENDED RELEASE ORAL 2 TIMES DAILY
Status: DISCONTINUED | OUTPATIENT
Start: 2023-07-09 | End: 2023-07-21 | Stop reason: HOSPADM

## 2023-07-09 RX ORDER — DEXTROAMPHETAMINE SACCHARATE, AMPHETAMINE ASPARTATE, DEXTROAMPHETAMINE SULFATE AND AMPHETAMINE SULFATE 5; 5; 5; 5 MG/1; MG/1; MG/1; MG/1
1 TABLET ORAL 2 TIMES DAILY WITH MEALS
Status: DISCONTINUED | OUTPATIENT
Start: 2023-07-10 | End: 2023-07-21 | Stop reason: HOSPADM

## 2023-07-09 RX ORDER — 0.9 % SODIUM CHLORIDE 0.9 %
30 INTRAVENOUS SOLUTION INTRAVENOUS ONCE
Status: COMPLETED | OUTPATIENT
Start: 2023-07-09 | End: 2023-07-09

## 2023-07-09 RX ORDER — ENOXAPARIN SODIUM 100 MG/ML
1 INJECTION SUBCUTANEOUS 2 TIMES DAILY
Status: DISCONTINUED | OUTPATIENT
Start: 2023-07-10 | End: 2023-07-10

## 2023-07-09 RX ORDER — HYDROMORPHONE HYDROCHLORIDE 1 MG/ML
0.25 INJECTION, SOLUTION INTRAMUSCULAR; INTRAVENOUS; SUBCUTANEOUS ONCE
Status: DISCONTINUED | OUTPATIENT
Start: 2023-07-09 | End: 2023-07-10

## 2023-07-09 RX ORDER — LIDOCAINE HYDROCHLORIDE 20 MG/ML
15 SOLUTION OROPHARYNGEAL PRN
Qty: 100 ML | Refills: 0 | Status: SHIPPED | OUTPATIENT
Start: 2023-07-09

## 2023-07-09 RX ORDER — FOLIC ACID 1 MG/1
1 TABLET ORAL DAILY
Status: DISCONTINUED | OUTPATIENT
Start: 2023-07-10 | End: 2023-07-21 | Stop reason: HOSPADM

## 2023-07-09 RX ORDER — MAGNESIUM SULFATE IN WATER 40 MG/ML
2000 INJECTION, SOLUTION INTRAVENOUS ONCE
Status: COMPLETED | OUTPATIENT
Start: 2023-07-09 | End: 2023-07-10

## 2023-07-09 RX ORDER — ACETAMINOPHEN 325 MG/1
650 TABLET ORAL ONCE
Status: COMPLETED | OUTPATIENT
Start: 2023-07-09 | End: 2023-07-09

## 2023-07-09 RX ORDER — MONTELUKAST SODIUM 10 MG/1
10 TABLET ORAL NIGHTLY
Status: DISCONTINUED | OUTPATIENT
Start: 2023-07-10 | End: 2023-07-21 | Stop reason: HOSPADM

## 2023-07-09 RX ORDER — ERGOCALCIFEROL 1.25 MG/1
50000 CAPSULE ORAL WEEKLY
Status: DISCONTINUED | OUTPATIENT
Start: 2023-07-09 | End: 2023-07-21 | Stop reason: HOSPADM

## 2023-07-09 RX ORDER — ALPRAZOLAM 0.5 MG/1
0.5 TABLET ORAL 4 TIMES DAILY PRN
Status: DISCONTINUED | OUTPATIENT
Start: 2023-07-09 | End: 2023-07-10

## 2023-07-09 RX ORDER — FERROUS SULFATE 325(65) MG
325 TABLET ORAL 2 TIMES DAILY WITH MEALS
Status: DISCONTINUED | OUTPATIENT
Start: 2023-07-10 | End: 2023-07-21 | Stop reason: HOSPADM

## 2023-07-09 RX ORDER — FLUTICASONE PROPIONATE 50 MCG
2 SPRAY, SUSPENSION (ML) NASAL DAILY
Status: DISCONTINUED | OUTPATIENT
Start: 2023-07-10 | End: 2023-07-21 | Stop reason: HOSPADM

## 2023-07-09 RX ORDER — SUCRALFATE 1 G/1
1 TABLET ORAL 4 TIMES DAILY
Status: DISCONTINUED | OUTPATIENT
Start: 2023-07-10 | End: 2023-07-21 | Stop reason: HOSPADM

## 2023-07-09 RX ORDER — AZELASTINE 1 MG/ML
2 SPRAY, METERED NASAL 2 TIMES DAILY
Status: DISCONTINUED | OUTPATIENT
Start: 2023-07-09 | End: 2023-07-09 | Stop reason: CLARIF

## 2023-07-09 RX ORDER — BUDESONIDE AND FORMOTEROL FUMARATE DIHYDRATE 160; 4.5 UG/1; UG/1
2 AEROSOL RESPIRATORY (INHALATION) 2 TIMES DAILY
Status: DISCONTINUED | OUTPATIENT
Start: 2023-07-09 | End: 2023-07-21 | Stop reason: HOSPADM

## 2023-07-09 RX ORDER — PANTOPRAZOLE SODIUM 40 MG/1
40 TABLET, DELAYED RELEASE ORAL
Status: DISCONTINUED | OUTPATIENT
Start: 2023-07-10 | End: 2023-07-09

## 2023-07-09 RX ORDER — GUAIFENESIN 200 MG/10ML
200 LIQUID ORAL 3 TIMES DAILY PRN
Status: DISCONTINUED | OUTPATIENT
Start: 2023-07-09 | End: 2023-07-21 | Stop reason: HOSPADM

## 2023-07-09 RX ORDER — OXYCODONE AND ACETAMINOPHEN 10; 325 MG/1; MG/1
1 TABLET ORAL EVERY 8 HOURS PRN
Status: DISCONTINUED | OUTPATIENT
Start: 2023-07-09 | End: 2023-07-10

## 2023-07-09 RX ADMIN — MAGNESIUM SULFATE HEPTAHYDRATE 2000 MG: 40 INJECTION, SOLUTION INTRAVENOUS at 22:49

## 2023-07-09 RX ADMIN — ACETAMINOPHEN 650 MG: 325 TABLET ORAL at 20:15

## 2023-07-09 RX ADMIN — Medication 1000 MG: at 21:57

## 2023-07-09 RX ADMIN — CEFEPIME 2000 MG: 2 INJECTION, POWDER, FOR SOLUTION INTRAVENOUS at 20:27

## 2023-07-09 RX ADMIN — SODIUM CHLORIDE 1905 ML: 9 INJECTION, SOLUTION INTRAVENOUS at 20:22

## 2023-07-09 ASSESSMENT — ENCOUNTER SYMPTOMS
COLOR CHANGE: 0
DIARRHEA: 0
NAUSEA: 1
VOMITING: 0
BLOOD IN STOOL: 0
BACK PAIN: 0
ABDOMINAL PAIN: 1
RHINORRHEA: 0
SHORTNESS OF BREATH: 1
COUGH: 1

## 2023-07-09 ASSESSMENT — PAIN - FUNCTIONAL ASSESSMENT
PAIN_FUNCTIONAL_ASSESSMENT: NONE - DENIES PAIN

## 2023-07-09 ASSESSMENT — PAIN SCALES - GENERAL
PAINLEVEL_OUTOF10: 0
PAINLEVEL_OUTOF10: 0

## 2023-07-10 ENCOUNTER — APPOINTMENT (OUTPATIENT)
Dept: NUCLEAR MEDICINE | Age: 73
End: 2023-07-10
Payer: MEDICARE

## 2023-07-10 PROBLEM — J96.21 ACUTE ON CHRONIC RESPIRATORY FAILURE WITH HYPOXEMIA (HCC): Status: ACTIVE | Noted: 2023-07-10

## 2023-07-10 LAB
ALBUMIN SERPL-MCNC: 3.7 G/DL (ref 3.5–5.2)
ALP SERPL-CCNC: 52 U/L (ref 35–104)
ALT SERPL-CCNC: <5 U/L (ref 5–33)
ANION GAP SERPL CALCULATED.3IONS-SCNC: 13 MMOL/L (ref 7–19)
AST SERPL-CCNC: 11 U/L (ref 5–32)
BILIRUB SERPL-MCNC: 0.3 MG/DL (ref 0.2–1.2)
BUN SERPL-MCNC: 9 MG/DL (ref 8–23)
CALCIUM SERPL-MCNC: 8.1 MG/DL (ref 8.8–10.2)
CHLORIDE SERPL-SCNC: 103 MMOL/L (ref 98–111)
CO2 SERPL-SCNC: 26 MMOL/L (ref 22–29)
CREAT SERPL-MCNC: 0.6 MG/DL (ref 0.5–0.9)
EKG P AXIS: 70 DEGREES
EKG P-R INTERVAL: 112 MS
EKG Q-T INTERVAL: 298 MS
EKG QRS DURATION: 80 MS
EKG QTC CALCULATION (BAZETT): 406 MS
EKG T AXIS: 80 DEGREES
ERYTHROCYTE [DISTWIDTH] IN BLOOD BY AUTOMATED COUNT: 13.2 % (ref 11.5–14.5)
GLUCOSE SERPL-MCNC: 111 MG/DL (ref 74–109)
HCT VFR BLD AUTO: 32.6 % (ref 37–47)
HEMOCCULT SP1 STL QL: NORMAL
HGB BLD-MCNC: 10.3 G/DL (ref 12–16)
LACTATE BLDV-SCNC: 0.8 MG/DL (ref 0.5–1.9)
LACTATE BLDV-SCNC: 0.8 MMOL/L (ref 0.5–1.9)
LACTATE BLDV-SCNC: 0.9 MG/DL (ref 0.5–1.9)
MAGNESIUM SERPL-MCNC: 1.5 MG/DL (ref 1.6–2.4)
MAGNESIUM SERPL-MCNC: 1.7 MG/DL (ref 1.6–2.4)
MCH RBC QN AUTO: 32.2 PG (ref 27–31)
MCHC RBC AUTO-ENTMCNC: 31.6 G/DL (ref 33–37)
MCV RBC AUTO: 101.9 FL (ref 81–99)
MRSA DNA SPEC QL NAA+PROBE: NOT DETECTED
PHOSPHATE SERPL-MCNC: 2.8 MG/DL (ref 2.5–4.5)
PLATELET # BLD AUTO: 161 K/UL (ref 130–400)
PMV BLD AUTO: 11.4 FL (ref 9.4–12.3)
POTASSIUM SERPL-SCNC: 4.3 MMOL/L (ref 3.5–5)
PROCALCITONIN: 0.88 NG/ML (ref 0–0.09)
PROT SERPL-MCNC: 5.8 G/DL (ref 6.6–8.7)
RBC # BLD AUTO: 3.2 M/UL (ref 4.2–5.4)
SODIUM SERPL-SCNC: 142 MMOL/L (ref 136–145)
WBC # BLD AUTO: 15 K/UL (ref 4.8–10.8)

## 2023-07-10 PROCEDURE — 94640 AIRWAY INHALATION TREATMENT: CPT

## 2023-07-10 PROCEDURE — 1210000000 HC MED SURG R&B

## 2023-07-10 PROCEDURE — 94761 N-INVAS EAR/PLS OXIMETRY MLT: CPT

## 2023-07-10 PROCEDURE — 78580 LUNG PERFUSION IMAGING: CPT

## 2023-07-10 PROCEDURE — 2700000000 HC OXYGEN THERAPY PER DAY

## 2023-07-10 PROCEDURE — 87040 BLOOD CULTURE FOR BACTERIA: CPT

## 2023-07-10 PROCEDURE — 83735 ASSAY OF MAGNESIUM: CPT

## 2023-07-10 PROCEDURE — 3430000000 HC RX DIAGNOSTIC RADIOPHARMACEUTICAL: Performed by: HOSPITALIST

## 2023-07-10 PROCEDURE — 36415 COLL VENOUS BLD VENIPUNCTURE: CPT

## 2023-07-10 PROCEDURE — 83605 ASSAY OF LACTIC ACID: CPT

## 2023-07-10 PROCEDURE — 87205 SMEAR GRAM STAIN: CPT

## 2023-07-10 PROCEDURE — 87186 SC STD MICRODIL/AGAR DIL: CPT

## 2023-07-10 PROCEDURE — 84145 PROCALCITONIN (PCT): CPT

## 2023-07-10 PROCEDURE — 94150 VITAL CAPACITY TEST: CPT

## 2023-07-10 PROCEDURE — 2580000003 HC RX 258: Performed by: STUDENT IN AN ORGANIZED HEALTH CARE EDUCATION/TRAINING PROGRAM

## 2023-07-10 PROCEDURE — 94669 MECHANICAL CHEST WALL OSCILL: CPT

## 2023-07-10 PROCEDURE — 6370000000 HC RX 637 (ALT 250 FOR IP): Performed by: HOSPITALIST

## 2023-07-10 PROCEDURE — 87070 CULTURE OTHR SPECIMN AEROBIC: CPT

## 2023-07-10 PROCEDURE — A9540 TC99M MAA: HCPCS | Performed by: HOSPITALIST

## 2023-07-10 PROCEDURE — 6360000002 HC RX W HCPCS: Performed by: HOSPITALIST

## 2023-07-10 PROCEDURE — 51798 US URINE CAPACITY MEASURE: CPT

## 2023-07-10 PROCEDURE — C9113 INJ PANTOPRAZOLE SODIUM, VIA: HCPCS | Performed by: HOSPITALIST

## 2023-07-10 PROCEDURE — 6360000002 HC RX W HCPCS: Performed by: EMERGENCY MEDICINE

## 2023-07-10 PROCEDURE — 87507 IADNA-DNA/RNA PROBE TQ 12-25: CPT

## 2023-07-10 PROCEDURE — 6360000002 HC RX W HCPCS: Performed by: STUDENT IN AN ORGANIZED HEALTH CARE EDUCATION/TRAINING PROGRAM

## 2023-07-10 PROCEDURE — 93010 ELECTROCARDIOGRAM REPORT: CPT | Performed by: INTERNAL MEDICINE

## 2023-07-10 PROCEDURE — 84100 ASSAY OF PHOSPHORUS: CPT

## 2023-07-10 PROCEDURE — 85027 COMPLETE CBC AUTOMATED: CPT

## 2023-07-10 PROCEDURE — 80053 COMPREHEN METABOLIC PANEL: CPT

## 2023-07-10 PROCEDURE — 2580000003 HC RX 258: Performed by: HOSPITALIST

## 2023-07-10 PROCEDURE — 2580000003 HC RX 258: Performed by: EMERGENCY MEDICINE

## 2023-07-10 PROCEDURE — 6370000000 HC RX 637 (ALT 250 FOR IP): Performed by: STUDENT IN AN ORGANIZED HEALTH CARE EDUCATION/TRAINING PROGRAM

## 2023-07-10 PROCEDURE — 82270 OCCULT BLOOD FECES: CPT

## 2023-07-10 RX ORDER — SODIUM CHLORIDE 0.9 % (FLUSH) 0.9 %
5-40 SYRINGE (ML) INJECTION EVERY 12 HOURS SCHEDULED
Status: DISCONTINUED | OUTPATIENT
Start: 2023-07-10 | End: 2023-07-21 | Stop reason: HOSPADM

## 2023-07-10 RX ORDER — ACETAMINOPHEN 325 MG/1
650 TABLET ORAL EVERY 4 HOURS PRN
Status: DISCONTINUED | OUTPATIENT
Start: 2023-07-10 | End: 2023-07-21 | Stop reason: HOSPADM

## 2023-07-10 RX ORDER — ONDANSETRON 4 MG/1
4 TABLET, ORALLY DISINTEGRATING ORAL EVERY 8 HOURS PRN
Status: DISCONTINUED | OUTPATIENT
Start: 2023-07-10 | End: 2023-07-10 | Stop reason: SDUPTHER

## 2023-07-10 RX ORDER — PROCHLORPERAZINE EDISYLATE 5 MG/ML
10 INJECTION INTRAMUSCULAR; INTRAVENOUS EVERY 6 HOURS PRN
Status: DISCONTINUED | OUTPATIENT
Start: 2023-07-10 | End: 2023-07-21 | Stop reason: HOSPADM

## 2023-07-10 RX ORDER — ENOXAPARIN SODIUM 100 MG/ML
40 INJECTION SUBCUTANEOUS DAILY
Status: DISCONTINUED | OUTPATIENT
Start: 2023-07-10 | End: 2023-07-10

## 2023-07-10 RX ORDER — ONDANSETRON 2 MG/ML
4 INJECTION INTRAMUSCULAR; INTRAVENOUS EVERY 6 HOURS PRN
Status: DISCONTINUED | OUTPATIENT
Start: 2023-07-10 | End: 2023-07-21 | Stop reason: HOSPADM

## 2023-07-10 RX ORDER — OXYCODONE AND ACETAMINOPHEN 10; 325 MG/1; MG/1
1 TABLET ORAL EVERY 4 HOURS PRN
Status: DISCONTINUED | OUTPATIENT
Start: 2023-07-10 | End: 2023-07-21 | Stop reason: HOSPADM

## 2023-07-10 RX ORDER — 0.9 % SODIUM CHLORIDE 0.9 %
1000 INTRAVENOUS SOLUTION INTRAVENOUS ONCE
Status: COMPLETED | OUTPATIENT
Start: 2023-07-10 | End: 2023-07-10

## 2023-07-10 RX ORDER — SODIUM CHLORIDE 9 MG/ML
INJECTION, SOLUTION INTRAVENOUS CONTINUOUS
Status: DISCONTINUED | OUTPATIENT
Start: 2023-07-10 | End: 2023-07-21 | Stop reason: HOSPADM

## 2023-07-10 RX ORDER — ONDANSETRON 4 MG/1
4 TABLET, ORALLY DISINTEGRATING ORAL EVERY 8 HOURS PRN
Status: DISCONTINUED | OUTPATIENT
Start: 2023-07-10 | End: 2023-07-21 | Stop reason: HOSPADM

## 2023-07-10 RX ORDER — SODIUM CHLORIDE, SODIUM LACTATE, POTASSIUM CHLORIDE, AND CALCIUM CHLORIDE .6; .31; .03; .02 G/100ML; G/100ML; G/100ML; G/100ML
30 INJECTION, SOLUTION INTRAVENOUS ONCE
Status: COMPLETED | OUTPATIENT
Start: 2023-07-10 | End: 2023-07-10

## 2023-07-10 RX ORDER — SODIUM CHLORIDE 9 MG/ML
INJECTION, SOLUTION INTRAVENOUS PRN
Status: DISCONTINUED | OUTPATIENT
Start: 2023-07-10 | End: 2023-07-21 | Stop reason: HOSPADM

## 2023-07-10 RX ORDER — ALPRAZOLAM 0.5 MG/1
1 TABLET ORAL 4 TIMES DAILY PRN
Status: DISCONTINUED | OUTPATIENT
Start: 2023-07-10 | End: 2023-07-21 | Stop reason: HOSPADM

## 2023-07-10 RX ORDER — ONDANSETRON 2 MG/ML
4 INJECTION INTRAMUSCULAR; INTRAVENOUS EVERY 6 HOURS PRN
Status: DISCONTINUED | OUTPATIENT
Start: 2023-07-10 | End: 2023-07-10 | Stop reason: SDUPTHER

## 2023-07-10 RX ORDER — IPRATROPIUM BROMIDE AND ALBUTEROL SULFATE 2.5; .5 MG/3ML; MG/3ML
1 SOLUTION RESPIRATORY (INHALATION)
Status: DISCONTINUED | OUTPATIENT
Start: 2023-07-10 | End: 2023-07-21 | Stop reason: HOSPADM

## 2023-07-10 RX ORDER — SODIUM CHLORIDE 0.9 % (FLUSH) 0.9 %
5-40 SYRINGE (ML) INJECTION PRN
Status: DISCONTINUED | OUTPATIENT
Start: 2023-07-10 | End: 2023-07-21 | Stop reason: HOSPADM

## 2023-07-10 RX ORDER — ENOXAPARIN SODIUM 100 MG/ML
1 INJECTION SUBCUTANEOUS 2 TIMES DAILY
Status: DISCONTINUED | OUTPATIENT
Start: 2023-07-10 | End: 2023-07-21 | Stop reason: HOSPADM

## 2023-07-10 RX ORDER — MAGNESIUM SULFATE IN WATER 40 MG/ML
2000 INJECTION, SOLUTION INTRAVENOUS ONCE
Status: COMPLETED | OUTPATIENT
Start: 2023-07-10 | End: 2023-07-10

## 2023-07-10 RX ADMIN — SUCRALFATE 1 G: 1 TABLET ORAL at 21:19

## 2023-07-10 RX ADMIN — SODIUM CHLORIDE, POTASSIUM CHLORIDE, SODIUM LACTATE AND CALCIUM CHLORIDE 1905 ML: 600; 310; 30; 20 INJECTION, SOLUTION INTRAVENOUS at 02:05

## 2023-07-10 RX ADMIN — SODIUM CHLORIDE 1000 ML: 9 INJECTION, SOLUTION INTRAVENOUS at 21:33

## 2023-07-10 RX ADMIN — ONDANSETRON 4 MG: 2 INJECTION INTRAMUSCULAR; INTRAVENOUS at 16:27

## 2023-07-10 RX ADMIN — IPRATROPIUM BROMIDE AND ALBUTEROL SULFATE 1 DOSE: 2.5; .5 SOLUTION RESPIRATORY (INHALATION) at 00:44

## 2023-07-10 RX ADMIN — SUCRALFATE 1 G: 1 TABLET ORAL at 16:48

## 2023-07-10 RX ADMIN — GUAIFENESIN 600 MG: 600 TABLET ORAL at 08:32

## 2023-07-10 RX ADMIN — ALPRAZOLAM 1 MG: 0.5 TABLET ORAL at 12:10

## 2023-07-10 RX ADMIN — ONDANSETRON 4 MG: 2 INJECTION INTRAMUSCULAR; INTRAVENOUS at 10:57

## 2023-07-10 RX ADMIN — NALOXEGOL OXALATE 12.5 MG: 12.5 TABLET, FILM COATED ORAL at 09:04

## 2023-07-10 RX ADMIN — IPRATROPIUM BROMIDE AND ALBUTEROL SULFATE 1 DOSE: 2.5; .5 SOLUTION RESPIRATORY (INHALATION) at 20:35

## 2023-07-10 RX ADMIN — FOLIC ACID 1 MG: 1 TABLET ORAL at 08:32

## 2023-07-10 RX ADMIN — IPRATROPIUM BROMIDE AND ALBUTEROL SULFATE 1 DOSE: 2.5; .5 SOLUTION RESPIRATORY (INHALATION) at 07:29

## 2023-07-10 RX ADMIN — CEFEPIME 2000 MG: 2 INJECTION, POWDER, FOR SOLUTION INTRAVENOUS at 05:58

## 2023-07-10 RX ADMIN — ACETYLCYSTEINE 600 MG: 200 SOLUTION ORAL; RESPIRATORY (INHALATION) at 00:44

## 2023-07-10 RX ADMIN — Medication 5 MILLICURIE: at 09:33

## 2023-07-10 RX ADMIN — CEFEPIME 2000 MG: 2 INJECTION, POWDER, FOR SOLUTION INTRAVENOUS at 21:36

## 2023-07-10 RX ADMIN — ENOXAPARIN SODIUM 60 MG: 100 INJECTION SUBCUTANEOUS at 21:19

## 2023-07-10 RX ADMIN — CEFEPIME 2000 MG: 2 INJECTION, POWDER, FOR SOLUTION INTRAVENOUS at 13:18

## 2023-07-10 RX ADMIN — OXYCODONE HYDROCHLORIDE AND ACETAMINOPHEN 1 TABLET: 10; 325 TABLET ORAL at 21:47

## 2023-07-10 RX ADMIN — SUCRALFATE 1 G: 1 TABLET ORAL at 08:32

## 2023-07-10 RX ADMIN — ALPRAZOLAM 1 MG: 0.5 TABLET ORAL at 18:22

## 2023-07-10 RX ADMIN — SODIUM CHLORIDE, PRESERVATIVE FREE 10 ML: 5 INJECTION INTRAVENOUS at 21:19

## 2023-07-10 RX ADMIN — MAGNESIUM SULFATE HEPTAHYDRATE 2000 MG: 40 INJECTION, SOLUTION INTRAVENOUS at 11:01

## 2023-07-10 RX ADMIN — FERROUS SULFATE TAB 325 MG (65 MG ELEMENTAL FE) 325 MG: 325 (65 FE) TAB at 08:32

## 2023-07-10 RX ADMIN — MONTELUKAST 10 MG: 10 TABLET, FILM COATED ORAL at 21:19

## 2023-07-10 RX ADMIN — VANCOMYCIN HYDROCHLORIDE 750 MG: 750 INJECTION, POWDER, LYOPHILIZED, FOR SOLUTION INTRAVENOUS at 21:36

## 2023-07-10 RX ADMIN — ACETYLCYSTEINE 600 MG: 200 SOLUTION ORAL; RESPIRATORY (INHALATION) at 07:29

## 2023-07-10 RX ADMIN — SODIUM CHLORIDE: 9 INJECTION, SOLUTION INTRAVENOUS at 23:39

## 2023-07-10 RX ADMIN — ERGOCALCIFEROL 50000 UNITS: 1.25 CAPSULE ORAL at 05:59

## 2023-07-10 RX ADMIN — VANCOMYCIN HYDROCHLORIDE 750 MG: 750 INJECTION, POWDER, LYOPHILIZED, FOR SOLUTION INTRAVENOUS at 11:08

## 2023-07-10 RX ADMIN — OXYCODONE HYDROCHLORIDE AND ACETAMINOPHEN 1 TABLET: 10; 325 TABLET ORAL at 16:46

## 2023-07-10 RX ADMIN — BUDESONIDE AND FORMOTEROL FUMARATE DIHYDRATE 2 PUFF: 160; 4.5 AEROSOL RESPIRATORY (INHALATION) at 07:54

## 2023-07-10 RX ADMIN — ENOXAPARIN SODIUM 60 MG: 100 INJECTION SUBCUTANEOUS at 08:32

## 2023-07-10 RX ADMIN — FLUTICASONE PROPIONATE 2 SPRAY: 50 SPRAY, METERED NASAL at 08:32

## 2023-07-10 RX ADMIN — GUAIFENESIN 600 MG: 600 TABLET ORAL at 21:19

## 2023-07-10 RX ADMIN — ACETAMINOPHEN 650 MG: 325 TABLET ORAL at 05:58

## 2023-07-10 RX ADMIN — ACETYLCYSTEINE 600 MG: 200 SOLUTION ORAL; RESPIRATORY (INHALATION) at 20:35

## 2023-07-10 RX ADMIN — PROCHLORPERAZINE EDISYLATE 10 MG: 5 INJECTION INTRAMUSCULAR; INTRAVENOUS at 18:26

## 2023-07-10 RX ADMIN — ENOXAPARIN SODIUM 60 MG: 100 INJECTION SUBCUTANEOUS at 02:08

## 2023-07-10 RX ADMIN — CITALOPRAM HYDROBROMIDE 20 MG: 20 TABLET ORAL at 08:32

## 2023-07-10 RX ADMIN — SODIUM CHLORIDE: 9 INJECTION, SOLUTION INTRAVENOUS at 05:56

## 2023-07-10 RX ADMIN — SODIUM CHLORIDE, PRESERVATIVE FREE 40 MG: 5 INJECTION INTRAVENOUS at 02:22

## 2023-07-10 RX ADMIN — BUDESONIDE AND FORMOTEROL FUMARATE DIHYDRATE 2 PUFF: 160; 4.5 AEROSOL RESPIRATORY (INHALATION) at 20:36

## 2023-07-10 SDOH — ECONOMIC STABILITY: TRANSPORTATION INSECURITY
IN THE PAST 12 MONTHS, HAS LACK OF TRANSPORTATION KEPT YOU FROM MEETINGS, WORK, OR FROM GETTING THINGS NEEDED FOR DAILY LIVING?: NO

## 2023-07-10 SDOH — ECONOMIC STABILITY: INCOME INSECURITY: IN THE LAST 12 MONTHS, WAS THERE A TIME WHEN YOU WERE NOT ABLE TO PAY THE MORTGAGE OR RENT ON TIME?: NO

## 2023-07-10 SDOH — HEALTH STABILITY: PHYSICAL HEALTH: ON AVERAGE, HOW MANY DAYS PER WEEK DO YOU ENGAGE IN MODERATE TO STRENUOUS EXERCISE (LIKE A BRISK WALK)?: 0 DAYS

## 2023-07-10 SDOH — ECONOMIC STABILITY: FOOD INSECURITY: WITHIN THE PAST 12 MONTHS, YOU WORRIED THAT YOUR FOOD WOULD RUN OUT BEFORE YOU GOT MONEY TO BUY MORE.: NEVER TRUE

## 2023-07-10 SDOH — ECONOMIC STABILITY: HOUSING INSECURITY: IN THE LAST 12 MONTHS, HOW MANY PLACES HAVE YOU LIVED?: 1

## 2023-07-10 SDOH — HEALTH STABILITY: PHYSICAL HEALTH: ON AVERAGE, HOW MANY MINUTES DO YOU ENGAGE IN EXERCISE AT THIS LEVEL?: 0 MIN

## 2023-07-10 SDOH — ECONOMIC STABILITY: FOOD INSECURITY: WITHIN THE PAST 12 MONTHS, THE FOOD YOU BOUGHT JUST DIDN'T LAST AND YOU DIDN'T HAVE MONEY TO GET MORE.: NEVER TRUE

## 2023-07-10 SDOH — ECONOMIC STABILITY: TRANSPORTATION INSECURITY
IN THE PAST 12 MONTHS, HAS THE LACK OF TRANSPORTATION KEPT YOU FROM MEDICAL APPOINTMENTS OR FROM GETTING MEDICATIONS?: NO

## 2023-07-10 ASSESSMENT — SOCIAL DETERMINANTS OF HEALTH (SDOH)
HOW OFTEN DO YOU GET TOGETHER WITH FRIENDS OR RELATIVES?: MORE THAN THREE TIMES A WEEK
HOW OFTEN DO YOU ATTEND CHURCH OR RELIGIOUS SERVICES?: NEVER
HOW HARD IS IT FOR YOU TO PAY FOR THE VERY BASICS LIKE FOOD, HOUSING, MEDICAL CARE, AND HEATING?: NOT HARD AT ALL
WITHIN THE LAST YEAR, HAVE TO BEEN RAPED OR FORCED TO HAVE ANY KIND OF SEXUAL ACTIVITY BY YOUR PARTNER OR EX-PARTNER?: NO
WITHIN THE LAST YEAR, HAVE YOU BEEN KICKED, HIT, SLAPPED, OR OTHERWISE PHYSICALLY HURT BY YOUR PARTNER OR EX-PARTNER?: NO
HOW OFTEN DO YOU ATTENT MEETINGS OF THE CLUB OR ORGANIZATION YOU BELONG TO?: NEVER
WITHIN THE LAST YEAR, HAVE YOU BEEN HUMILIATED OR EMOTIONALLY ABUSED IN OTHER WAYS BY YOUR PARTNER OR EX-PARTNER?: NO
WITHIN THE LAST YEAR, HAVE YOU BEEN AFRAID OF YOUR PARTNER OR EX-PARTNER?: NO
DO YOU BELONG TO ANY CLUBS OR ORGANIZATIONS SUCH AS CHURCH GROUPS UNIONS, FRATERNAL OR ATHLETIC GROUPS, OR SCHOOL GROUPS?: NO
IN A TYPICAL WEEK, HOW MANY TIMES DO YOU TALK ON THE PHONE WITH FAMILY, FRIENDS, OR NEIGHBORS?: MORE THAN THREE TIMES A WEEK

## 2023-07-10 ASSESSMENT — PAIN DESCRIPTION - LOCATION
LOCATION: HEAD
LOCATION: HEAD;ABDOMEN

## 2023-07-10 ASSESSMENT — PATIENT HEALTH QUESTIONNAIRE - PHQ9
2. FEELING DOWN, DEPRESSED OR HOPELESS: NOT AT ALL
1. LITTLE INTEREST OR PLEASURE IN DOING THINGS: SEVERAL DAYS
SUM OF ALL RESPONSES TO PHQ9 QUESTIONS 1 & 2: 1

## 2023-07-10 ASSESSMENT — LIFESTYLE VARIABLES
HOW OFTEN DO YOU HAVE A DRINK CONTAINING ALCOHOL: NEVER
HOW MANY STANDARD DRINKS CONTAINING ALCOHOL DO YOU HAVE ON A TYPICAL DAY: PATIENT DOES NOT DRINK
HOW OFTEN DO YOU HAVE A DRINK CONTAINING ALCOHOL: NEVER
HOW MANY STANDARD DRINKS CONTAINING ALCOHOL DO YOU HAVE ON A TYPICAL DAY: PATIENT DOES NOT DRINK

## 2023-07-10 ASSESSMENT — PAIN SCALES - GENERAL
PAINLEVEL_OUTOF10: 0
PAINLEVEL_OUTOF10: 0
PAINLEVEL_OUTOF10: 8
PAINLEVEL_OUTOF10: 7

## 2023-07-10 ASSESSMENT — PAIN DESCRIPTION - DESCRIPTORS
DESCRIPTORS: ACHING
DESCRIPTORS: ACHING

## 2023-07-10 ASSESSMENT — PAIN DESCRIPTION - ORIENTATION: ORIENTATION: MID

## 2023-07-10 NOTE — ED PROVIDER NOTES
[facility] EMERGENCY DEPT  eMERGENCY dEPARTMENT eNCOUnter      Pt Name: Jackie Buckley  MRN: [id number]  [id number] [facility] 1950  Date of evaluation: 7/9/2023  Provider: Tony Casanova MD    CHIEF COMPLAINT       Chief Complaint   Patient presents with    Shortness of Breath    Cough     Released with pneumonia 2 wks ago         HISTORY OF PRESENT ILLNESS   (Location/Symptom, Timing/Onset,Context/Setting, Quality, Duration, Modifying Factors, Severity)  Note limiting factors. Jackie Buckley is a 74 y.o. female who presents to the emergency department with shortness of breath. Patient presents with shortness of breath and cough. Patient states that they have been present for approximately 1-1 and half weeks. Cough is productive of clear sputum. Patient developed a fever yesterday. Patient states she has had generalized weakness for the last 1 to 2 weeks. Patient was hospitalized from 5/27 to 5/30/2023 with sepsis and right lower lobe pneumonia. Patient went home on Levaquin. Patient states that she is having mid upper abdominal pain. She describes it as \"my stomach store up. \"  She says that she is uncomfortable but cannot give me a pain score. Patient has been nauseated but denies vomiting, diarrhea, black or bloody stools, burning with urination, difficulty urinating, or chest pain. Patient is oriented and able to give history but occasionally becomes confused and starts talking about the \"golf course\" and \"my psychiatric appointment. \"  Patient then states \"I do not know why I am so stupid today. I do not know why I am saying these things. \"    HPI    NursingNotes were reviewed. REVIEW OF SYSTEMS    (2-9 systems for level 4, 10 or more for level 5)     Review of Systems   Constitutional:  Positive for activity change, appetite change, chills, fatigue and fever. HENT:  Negative for congestion and rhinorrhea. Respiratory:  Positive for cough and shortness of breath.     Cardiovascular:  Negative for chest

## 2023-07-10 NOTE — PROGRESS NOTES
Pharmacy Adjustment per [facility] protocol    Jo Aly is a 74 y.o. female. Pharmacy has adjusted medications per [facility] protocol. Recent Labs     07/09/23 2009   BUN 9       Recent Labs     07/09/23 2009   CREATININE 0.6       Estimated Creatinine Clearance: 76 mL/min (based on SCr of 0.6 mg/dL).     Height:   Ht Readings from Last 1 Encounters:   07/09/23 5' 6\" (1.676 m)     Weight:  Wt Readings from Last 1 Encounters:   07/09/23 140 lb (63.5 kg)         Plan: Adjust the following medications based on [facility] protocol:           Changed Maxipime 2gm Q12hr to 2gm Q8hr due to patient indication and renal function    Electronically signed by Tirso Davis, [address] [place] [address] on 7/10/2023 at 12:33 AM

## 2023-07-10 NOTE — H&P
History and Physical    Patient Name:  Annel De Leon    :  1950    Chief Complaint:   Fever, cough, lethargy     History of Present Illness:   Annel De Leon presents to [facility] with lethargy, dyspnea, fever and cough. Per ER her symptoms started about one week ago. Cough productive of white sputum. Patient is febrile. Patient was hospitalized from  to 2023 with sepsis and right lower lobe pneumonia. Patient went home on Levaquin. Patient is very lethargic, and not answering my questions. On admission on 3L NC, now on 3.5L. she was tachycardic, tachypnic and febrile. Labs show leukocytosis. CT chest shows multifocal right lung pneumonia. She is started on IVF and IV ab. Past Medical History:   has a past medical history of ADHD (attention deficit hyperactivity disorder), Alpha-1-antitrypsin deficiency ([address]), Anxiety, COPD (chronic obstructive pulmonary disease) ([address]), Depression, Fibromyalgia, GERD (gastroesophageal reflux disease), Hypertension, Malignant neoplasm of upper lobe of right lung ([address]), Palliative care patient, and RA (rheumatoid arthritis) ([address]). Surgical History:   has a past surgical history that includes Hysterectomy; hernia repair; Leg Surgery; Cholecystectomy; and Thoracoscopy (Right, 2022). Social History:   reports that she quit smoking about 18 months ago. Her smoking use included cigarettes. She has a 7.50 pack-year smoking history. She has never used smokeless tobacco. She reports that she does not drink alcohol and does not use drugs. Family History:  family history includes Cancer in her father. Medications:  Prior to Admission medications    Medication Sig Start Date End Date Taking? Authorizing Provider   lidocaine viscous hcl (XYLOCAINE) 2 % SOLN solution Take 15 mLs by mouth as needed for Irritation 23   TEN Bueno NP   amphetamine-dextroamphetamine (ADDERALL) 20 MG tablet Take 1 tablet by mouth 2 times daily.

## 2023-07-10 NOTE — PROGRESS NOTES
PHARMACY NOTE  Li Calhoun Horton was ordered Astelin Nasal Spray. Per the [facility] Formulary Committee, this medication is non-formulary and not stocked by pharmacy. It has been discontinued. The medication can be reordered at discharge.      Electronically signed by Nicole Yo [facility] on 7/9/2023 at 10:14 PM

## 2023-07-10 NOTE — PROGRESS NOTES
Palliative Care/Spiritual Care: Pt's SW was present and this  was also in the room. Pt says she is on oxygen at home 3 - 3.5 liters, through [employer]. She also sees Dr. Yoly Sam for pulmonary. Pt was having shortness of breath with cough and had been released two weeks ago for pneumonia. Pt has other diagnoses in past medical history. Pt is known to palliative care. Advance Directives: Pt has a LW with her decision makers named, Mio Charles, her daughter is primary, and her grandson Brianne Stallings and her daughter Darell Briggs are secondary/alternate decision makers. Pt is full code and want CPR and Ventilator. SEE ACP NOTE. Pain/other symptoms: Pt has chronic back pain and takes medication at home. Social/Spiritual: Pt says she is a believer in backstitch. Pt/family discussion r/t goals: Pt lives at home with her two daughters. She says she ambulates without assistance and is able to perform daily living skills to care for herself at home. Pt's goal is to return home with previous quality of life and previous daily living skills. During the visit pt coughed and said \"I still have diarrhea. \" She asked for her nurse. This  told Delio Allen her nurse she wanted her to check on her. Provided spiritual care with sustaining presence, nurtured hope, and prayer. Pt expressed gratitude for spiritual care.      Electronically signed by Waldemar Monreal on 7/10/2023 at 11:44 AM

## 2023-07-10 NOTE — ACP (ADVANCE CARE PLANNING)
Advance Care Planning     Advance Care Planning Inpatient Note  [facility] Department    Today's Date: 7/10/2023  Unit: L 4 ONCOLOGY UNIT    Received request from Other: Palliative care . Upon review of chart and communication with care team, patient's decision making abilities are not in question. . Patient was/were present in the room during visit. Goals of ACP Conversation:  Discuss advance care planning documents    Health Care Decision Makers:       Primary Decision Maker: Andrews Haro - Child - [phone]    Secondary Decision Maker: Katie Mcgill - Child - [phone]    Secondary Decision Maker: Jeff Hairston - [phone]  Summary:  Verified Documents    Advance Care Planning Documents (Patient Wishes):  Living Will/Advance Directive     Assessment:  Pt wants all treatment and has her decision makers named in a document if she is unable to make decisions herself. Interventions:  Verified pt's document, decision makers, and code status. Care Preferences Communicated:     Hospitalization:  If the patient's health worsens and it becomes clear that the chance of recovery is unlikely,     the patient wants hospitalization. Ventilation:   If the patient, in their present state of health, suddenly became very ill and unable to breathe on their own,     the patient would desire the use of a ventilator (breathing machine). If their health worsens and it becomes clear that the change of recovery is unlikely,     the patient would desire the use of a ventilator (breathing machine). Resuscitation:  In the event the patient's heart stopped as a result of an underlying serious health condition, the patient communicates a preference for      resuscitative attempts (CPR).     Outcomes/Plan:  ACP Discussion: Completed    Electronically signed by Chaplain Ofe on 7/10/2023 at 11:46 AM

## 2023-07-10 NOTE — PROGRESS NOTES
Comprehensive Nutrition Assessment    Type and Reason for Visit:  Initial, Positive Nutrition Screen    Nutrition Recommendations/Plan:   ONS TID     Malnutrition Assessment:  Malnutrition Status:  Severe malnutrition (07/10/23 1413)    Context:  Chronic Illness     Findings of the 6 clinical characteristics of malnutrition:  Energy Intake:  75% or less estimated energy requirements for 1 month or longer  Weight Loss:  Greater than 5% over 1 month       Nutrition Assessment:    Pt is Severely Malnourished AEB inadequate nutritional intake with significant wt loss over the past month. Pt is currently on a Regular diet. Last BM noted 7/10. Will start ONS TID to help meet nutritional needs and promote desired wt gain. Current Nutrition Intake & Therapies:    ADULT DIET;  Regular    Anthropometric Measures:  Height: 5' 6\" (167.6 cm)  Current Body Weight: 124 lb (56.2 kg)  Current BMI (kg/m2): 20  BMI Categories: Underweight (BMI less than 22) age over 72    Estimated Daily Nutrient Needs:  Energy Requirements Based On: Kcal/kg  Weight Used for Energy Requirements: Current  Energy (kcal/day): 2185-5752 kcals/day  Weight Used for Protein Requirements: Current  Protein (g/day):  g/protein/day  Method Used for Fluid Requirements: 1 ml/kcal  Fluid (ml/day): 2702-1344 mL/day    Nutrition Diagnosis:   Severe malnutrition, In context of chronic illness related to inadequate protein-energy intake as evidenced by poor intake prior to admission, weight loss greater than or equal to 5% in 1 month    Nutrition Interventions:   Food and/or Nutrient Delivery: Continue Current Diet, Start Oral Nutrition Supplement  Coordination of Nutrition Care: Speech Therapy    Goals:  Goals: PO intake 50% or greater    Nutrition Monitoring and Evaluation:   Food/Nutrient Intake Outcomes: Food and Nutrient Intake, Supplement Intake  Physical Signs/Symptoms Outcomes: Biochemical Data, Nutrition Focused Physical Findings, Weight    Leala Profit

## 2023-07-11 PROBLEM — R11.2 NAUSEA VOMITING AND DIARRHEA: Status: ACTIVE | Noted: 2023-07-11

## 2023-07-11 PROBLEM — R19.7 NAUSEA VOMITING AND DIARRHEA: Status: ACTIVE | Noted: 2023-07-11

## 2023-07-11 PROBLEM — A04.72 CLOSTRIDIUM DIFFICILE ENTEROCOLITIS: Status: ACTIVE | Noted: 2023-07-11

## 2023-07-11 PROBLEM — K52.9 GASTROENTERITIS: Status: ACTIVE | Noted: 2023-07-11

## 2023-07-11 LAB
ADV 40+41 DNA STL QL NAA+NON-PROBE: NOT DETECTED
ANION GAP SERPL CALCULATED.3IONS-SCNC: 6 MMOL/L (ref 7–19)
BUN SERPL-MCNC: 10 MG/DL (ref 8–23)
C CAYETANENSIS DNA STL QL NAA+NON-PROBE: NOT DETECTED
C COLI+JEJ+UPSA DNA STL QL NAA+NON-PROBE: NOT DETECTED
C DIF TOX TCDA+TCDB STL QL NAA+NON-PROBE: DETECTED
CALCIUM SERPL-MCNC: 8.1 MG/DL (ref 8.8–10.2)
CHLORIDE SERPL-SCNC: 109 MMOL/L (ref 98–111)
CO2 SERPL-SCNC: 24 MMOL/L (ref 22–29)
CREAT SERPL-MCNC: 0.4 MG/DL (ref 0.5–0.9)
CRYPTOSP DNA STL QL NAA+NON-PROBE: NOT DETECTED
E HISTOLYT DNA STL QL NAA+NON-PROBE: NOT DETECTED
EAEC PAA PLAS AGGR+AATA ST NAA+NON-PRB: NOT DETECTED
EC STX1+STX2 GENES STL QL NAA+NON-PROBE: NOT DETECTED
EPEC EAE GENE STL QL NAA+NON-PROBE: DETECTED
ERYTHROCYTE [DISTWIDTH] IN BLOOD BY AUTOMATED COUNT: 12.9 % (ref 11.5–14.5)
ETEC LTA+ST1A+ST1B TOX ST NAA+NON-PROBE: NOT DETECTED
G LAMBLIA DNA STL QL NAA+NON-PROBE: NOT DETECTED
GI PATH DNA+RNA PNL STL NAA+NON-PROBE: NOT DETECTED
GLUCOSE SERPL-MCNC: 84 MG/DL (ref 74–109)
HCT VFR BLD AUTO: 24.7 % (ref 37–47)
HGB BLD-MCNC: 8 G/DL (ref 12–16)
MAGNESIUM SERPL-MCNC: 1.9 MG/DL (ref 1.6–2.4)
MCH RBC QN AUTO: 32 PG (ref 27–31)
MCHC RBC AUTO-ENTMCNC: 32.4 G/DL (ref 33–37)
MCV RBC AUTO: 98.8 FL (ref 81–99)
NOROVIRUS GI+II RNA STL QL NAA+NON-PROBE: NOT DETECTED
P SHIGELLOIDES DNA STL QL NAA+NON-PROBE: NOT DETECTED
PLATELET # BLD AUTO: 122 K/UL (ref 130–400)
PMV BLD AUTO: 11.2 FL (ref 9.4–12.3)
POTASSIUM SERPL-SCNC: 3.4 MMOL/L (ref 3.5–5)
RBC # BLD AUTO: 2.5 M/UL (ref 4.2–5.4)
RVA RNA STL QL NAA+NON-PROBE: NOT DETECTED
S ENT+BONG DNA STL QL NAA+NON-PROBE: NOT DETECTED
SAPO I+II+IV+V RNA STL QL NAA+NON-PROBE: NOT DETECTED
SHIGELLA SP+EIEC IPAH ST NAA+NON-PROBE: NOT DETECTED
SODIUM SERPL-SCNC: 139 MMOL/L (ref 136–145)
V CHOL+PARA+VUL DNA STL QL NAA+NON-PROBE: NOT DETECTED
V CHOLERAE DNA STL QL NAA+NON-PROBE: NOT DETECTED
VANCOMYCIN TROUGH SERPL-MCNC: 4.7 UG/ML (ref 10–20)
WBC # BLD AUTO: 8.7 K/UL (ref 4.8–10.8)
Y ENTEROCOL DNA STL QL NAA+NON-PROBE: NOT DETECTED

## 2023-07-11 PROCEDURE — 2580000003 HC RX 258: Performed by: STUDENT IN AN ORGANIZED HEALTH CARE EDUCATION/TRAINING PROGRAM

## 2023-07-11 PROCEDURE — 6370000000 HC RX 637 (ALT 250 FOR IP): Performed by: STUDENT IN AN ORGANIZED HEALTH CARE EDUCATION/TRAINING PROGRAM

## 2023-07-11 PROCEDURE — 1210000000 HC MED SURG R&B

## 2023-07-11 PROCEDURE — 6360000002 HC RX W HCPCS: Performed by: STUDENT IN AN ORGANIZED HEALTH CARE EDUCATION/TRAINING PROGRAM

## 2023-07-11 PROCEDURE — 6370000000 HC RX 637 (ALT 250 FOR IP): Performed by: HOSPITALIST

## 2023-07-11 PROCEDURE — 80202 ASSAY OF VANCOMYCIN: CPT

## 2023-07-11 PROCEDURE — 83735 ASSAY OF MAGNESIUM: CPT

## 2023-07-11 PROCEDURE — 2700000000 HC OXYGEN THERAPY PER DAY

## 2023-07-11 PROCEDURE — 2500000003 HC RX 250 WO HCPCS: Performed by: HOSPITALIST

## 2023-07-11 PROCEDURE — 80048 BASIC METABOLIC PNL TOTAL CA: CPT

## 2023-07-11 PROCEDURE — 36415 COLL VENOUS BLD VENIPUNCTURE: CPT

## 2023-07-11 PROCEDURE — 2580000003 HC RX 258: Performed by: HOSPITALIST

## 2023-07-11 PROCEDURE — 94761 N-INVAS EAR/PLS OXIMETRY MLT: CPT

## 2023-07-11 PROCEDURE — 6360000002 HC RX W HCPCS: Performed by: HOSPITALIST

## 2023-07-11 PROCEDURE — C9113 INJ PANTOPRAZOLE SODIUM, VIA: HCPCS | Performed by: STUDENT IN AN ORGANIZED HEALTH CARE EDUCATION/TRAINING PROGRAM

## 2023-07-11 PROCEDURE — 6360000002 HC RX W HCPCS: Performed by: EMERGENCY MEDICINE

## 2023-07-11 PROCEDURE — 85027 COMPLETE CBC AUTOMATED: CPT

## 2023-07-11 PROCEDURE — 2580000003 HC RX 258: Performed by: EMERGENCY MEDICINE

## 2023-07-11 PROCEDURE — 94640 AIRWAY INHALATION TREATMENT: CPT

## 2023-07-11 RX ORDER — VANCOMYCIN HYDROCHLORIDE 125 MG/1
125 CAPSULE ORAL EVERY 6 HOURS SCHEDULED
Status: DISCONTINUED | OUTPATIENT
Start: 2023-07-11 | End: 2023-07-14

## 2023-07-11 RX ADMIN — SUCRALFATE 1 G: 1 TABLET ORAL at 17:11

## 2023-07-11 RX ADMIN — POTASSIUM BICARBONATE 20 MEQ: 782 TABLET, EFFERVESCENT ORAL at 20:35

## 2023-07-11 RX ADMIN — GUAIFENESIN 200 MG: 100 SOLUTION ORAL at 20:45

## 2023-07-11 RX ADMIN — ALPRAZOLAM 1 MG: 0.5 TABLET ORAL at 22:24

## 2023-07-11 RX ADMIN — VANCOMYCIN HYDROCHLORIDE 750 MG: 750 INJECTION, POWDER, LYOPHILIZED, FOR SOLUTION INTRAVENOUS at 10:29

## 2023-07-11 RX ADMIN — IPRATROPIUM BROMIDE AND ALBUTEROL SULFATE 1 DOSE: 2.5; .5 SOLUTION RESPIRATORY (INHALATION) at 06:58

## 2023-07-11 RX ADMIN — OXYCODONE HYDROCHLORIDE AND ACETAMINOPHEN 1 TABLET: 10; 325 TABLET ORAL at 22:23

## 2023-07-11 RX ADMIN — SODIUM CHLORIDE, PRESERVATIVE FREE 10 ML: 5 INJECTION INTRAVENOUS at 10:26

## 2023-07-11 RX ADMIN — SODIUM CHLORIDE, PRESERVATIVE FREE 40 MG: 5 INJECTION INTRAVENOUS at 10:13

## 2023-07-11 RX ADMIN — PROCHLORPERAZINE EDISYLATE 10 MG: 5 INJECTION INTRAMUSCULAR; INTRAVENOUS at 10:14

## 2023-07-11 RX ADMIN — FERROUS SULFATE TAB 325 MG (65 MG ELEMENTAL FE) 325 MG: 325 (65 FE) TAB at 17:11

## 2023-07-11 RX ADMIN — PROCHLORPERAZINE EDISYLATE 10 MG: 5 INJECTION INTRAMUSCULAR; INTRAVENOUS at 17:08

## 2023-07-11 RX ADMIN — ALPRAZOLAM 1 MG: 0.5 TABLET ORAL at 17:11

## 2023-07-11 RX ADMIN — VANCOMYCIN HYDROCHLORIDE 125 MG: 125 CAPSULE ORAL at 17:11

## 2023-07-11 RX ADMIN — CEFEPIME 2000 MG: 2 INJECTION, POWDER, FOR SOLUTION INTRAVENOUS at 13:25

## 2023-07-11 RX ADMIN — SUCRALFATE 1 G: 1 TABLET ORAL at 20:35

## 2023-07-11 RX ADMIN — FERROUS SULFATE TAB 325 MG (65 MG ELEMENTAL FE) 325 MG: 325 (65 FE) TAB at 10:14

## 2023-07-11 RX ADMIN — GUAIFENESIN 200 MG: 100 SOLUTION ORAL at 10:14

## 2023-07-11 RX ADMIN — POTASSIUM BICARBONATE 20 MEQ: 782 TABLET, EFFERVESCENT ORAL at 10:12

## 2023-07-11 RX ADMIN — CEFEPIME 2000 MG: 2 INJECTION, POWDER, FOR SOLUTION INTRAVENOUS at 05:15

## 2023-07-11 RX ADMIN — CEFEPIME 2000 MG: 2 INJECTION, POWDER, FOR SOLUTION INTRAVENOUS at 20:33

## 2023-07-11 RX ADMIN — IPRATROPIUM BROMIDE AND ALBUTEROL SULFATE 1 DOSE: 2.5; .5 SOLUTION RESPIRATORY (INHALATION) at 10:32

## 2023-07-11 RX ADMIN — ALPRAZOLAM 1 MG: 0.5 TABLET ORAL at 11:11

## 2023-07-11 RX ADMIN — SODIUM CHLORIDE, PRESERVATIVE FREE 10 ML: 5 INJECTION INTRAVENOUS at 20:34

## 2023-07-11 RX ADMIN — BUDESONIDE AND FORMOTEROL FUMARATE DIHYDRATE 2 PUFF: 160; 4.5 AEROSOL RESPIRATORY (INHALATION) at 10:16

## 2023-07-11 RX ADMIN — ONDANSETRON 4 MG: 4 TABLET, ORALLY DISINTEGRATING ORAL at 06:33

## 2023-07-11 RX ADMIN — SODIUM CHLORIDE: 9 INJECTION, SOLUTION INTRAVENOUS at 23:44

## 2023-07-11 RX ADMIN — FLUTICASONE PROPIONATE 2 SPRAY: 50 SPRAY, METERED NASAL at 10:17

## 2023-07-11 RX ADMIN — ENOXAPARIN SODIUM 60 MG: 100 INJECTION SUBCUTANEOUS at 20:35

## 2023-07-11 RX ADMIN — FOLIC ACID 1 MG: 1 TABLET ORAL at 10:14

## 2023-07-11 RX ADMIN — ALPRAZOLAM 1 MG: 0.5 TABLET ORAL at 03:56

## 2023-07-11 RX ADMIN — OXYCODONE HYDROCHLORIDE AND ACETAMINOPHEN 1 TABLET: 10; 325 TABLET ORAL at 17:11

## 2023-07-11 RX ADMIN — OXYCODONE HYDROCHLORIDE AND ACETAMINOPHEN 1 TABLET: 10; 325 TABLET ORAL at 10:14

## 2023-07-11 RX ADMIN — ACETYLCYSTEINE 600 MG: 200 SOLUTION ORAL; RESPIRATORY (INHALATION) at 06:59

## 2023-07-11 RX ADMIN — NALOXEGOL OXALATE 12.5 MG: 12.5 TABLET, FILM COATED ORAL at 05:16

## 2023-07-11 RX ADMIN — SUCRALFATE 1 G: 1 TABLET ORAL at 10:14

## 2023-07-11 RX ADMIN — BUDESONIDE AND FORMOTEROL FUMARATE DIHYDRATE 2 PUFF: 160; 4.5 AEROSOL RESPIRATORY (INHALATION) at 20:17

## 2023-07-11 RX ADMIN — IPRATROPIUM BROMIDE AND ALBUTEROL SULFATE 1 DOSE: 2.5; .5 SOLUTION RESPIRATORY (INHALATION) at 20:08

## 2023-07-11 RX ADMIN — ENOXAPARIN SODIUM 60 MG: 100 INJECTION SUBCUTANEOUS at 10:15

## 2023-07-11 RX ADMIN — CITALOPRAM HYDROBROMIDE 20 MG: 20 TABLET ORAL at 10:13

## 2023-07-11 RX ADMIN — VANCOMYCIN HYDROCHLORIDE 125 MG: 125 CAPSULE ORAL at 23:38

## 2023-07-11 ASSESSMENT — PAIN DESCRIPTION - DESCRIPTORS
DESCRIPTORS: ACHING
DESCRIPTORS: ACHING;SHARP
DESCRIPTORS: ACHING;THROBBING

## 2023-07-11 ASSESSMENT — PAIN SCALES - GENERAL
PAINLEVEL_OUTOF10: 7
PAINLEVEL_OUTOF10: 8
PAINLEVEL_OUTOF10: 7

## 2023-07-11 ASSESSMENT — PAIN DESCRIPTION - LOCATION
LOCATION: ABDOMEN
LOCATION: ABDOMEN;BACK
LOCATION: ABDOMEN;BACK;HEAD

## 2023-07-11 NOTE — PROGRESS NOTES
Attempted and began cognitive-linguistic evaluation this AM. Daughter present. Patient laying in bed reporting nausea. Will attempt evaluation at a later time.     Electronically signed by ANDREW Shepherd on 7/11/2023 at 8:57 AM

## 2023-07-11 NOTE — PROGRESS NOTES
1:48 AM   Result Value Ref Range    Sodium 139 136 - 145 mmol/L    Potassium 3.4 (L) 3.5 - 5.0 mmol/L    Chloride 109 98 - 111 mmol/L    CO2 24 22 - 29 mmol/L    Anion Gap 6 (L) 7 - 19 mmol/L    Glucose 84 74 - 109 mg/dL    BUN 10 8 - 23 mg/dL    Creatinine 0.4 (L) 0.5 - 0.9 mg/dL    Est, Glom Filt Rate >60 >60    Calcium 8.1 (L) 8.8 - 10.2 mg/dL   CBC    Collection Time: 07/11/23  1:48 AM   Result Value Ref Range    WBC 8.7 4.8 - 10.8 K/uL    RBC 2.50 (L) 4.20 - 5.40 M/uL    Hemoglobin 8.0 (L) 12.0 - 16.0 g/dL    Hematocrit 24.7 (L) 37.0 - 47.0 %    MCV 98.8 81.0 - 99.0 fL    MCH 32.0 (H) 27.0 - 31.0 pg    MCHC 32.4 (L) 33.0 - 37.0 g/dL    RDW 12.9 11.5 - 14.5 %    Platelets 787 (L) 968 - 400 K/uL    MPV 11.2 9.4 - 12.3 fL   Magnesium    Collection Time: 07/11/23  1:48 AM   Result Value Ref Range    Magnesium 1.9 1.6 - 2.4 mg/dL   Vancomycin Level, Trough    Collection Time: 07/11/23  9:02 AM   Result Value Ref Range    Vancomycin Tr 4.7 (L) 10.0 - 20.0 ug/mL       I/O last 3 completed shifts:   In: 5052.2 [I.V.:4958.4; IV Piggyback:93.8]  Out: 300 [Urine:753]  I/O this shift:  In: 3880.8 [I.V.:2000; IV Piggyback:1880.8]  Out: 1600 [Urine:1600]      Current Facility-Administered Medications:     potassium bicarb-citric acid (EFFER-K) effervescent tablet 20 mEq, 20 mEq, Oral, BID, Kaylee Hook MD, 20 mEq at 07/11/23 1012    vancomycin (VANCOCIN) capsule 125 mg, 125 mg, Oral, 4 times per day, Kaylee Hook MD, 125 mg at 07/11/23 1711    ipratropium 0.5 mg-albuterol 2.5 mg (DUONEB) nebulizer solution 1 Dose, 1 Dose, Inhalation, Q4H WA, Arlie Gosselin, MD, 1 Dose at 07/11/23 1032    ondansetron (ZOFRAN-ODT) disintegrating tablet 4 mg, 4 mg, Oral, Q8H PRN, 4 mg at 07/11/23 0633 **OR** ondansetron (ZOFRAN) injection 4 mg, 4 mg, IntraVENous, Q6H PRN, Arlie Gosselin, MD, 4 mg at 07/10/23 1627    sodium chloride flush 0.9 % injection 5-40 mL, 5-40 mL, IntraVENous, 2 times per day, Mary Ann Melton

## 2023-07-11 NOTE — PROGRESS NOTES
Checked back in with patient later this AM. Patient laying in bed, still not feeling well. Nursing came into room. Will attempt evaluation at a later time.      Electronically signed by ANDREW Kiran on 7/11/2023 at 10:13 AM

## 2023-07-11 NOTE — PROGRESS NOTES
Bladder scan read 378 cc. Pt was assisted to [facility] in attempt to empty bladder. Pt was able to void 375 cc without difficulty. Will continue to monitor intake and output.    Electronically signed by Abrahan Burton RN on 7/10/2023 at 9:07 PM

## 2023-07-12 LAB
ANION GAP SERPL CALCULATED.3IONS-SCNC: 7 MMOL/L (ref 7–19)
BUN SERPL-MCNC: 6 MG/DL (ref 8–23)
CALCIUM SERPL-MCNC: 8.1 MG/DL (ref 8.8–10.2)
CHLORIDE SERPL-SCNC: 107 MMOL/L (ref 98–111)
CO2 SERPL-SCNC: 27 MMOL/L (ref 22–29)
CREAT SERPL-MCNC: 0.5 MG/DL (ref 0.5–0.9)
ERYTHROCYTE [DISTWIDTH] IN BLOOD BY AUTOMATED COUNT: 13 % (ref 11.5–14.5)
GLUCOSE SERPL-MCNC: 99 MG/DL (ref 74–109)
HCT VFR BLD AUTO: 23.7 % (ref 37–47)
HGB BLD-MCNC: 7.7 G/DL (ref 12–16)
MAGNESIUM SERPL-MCNC: 1.5 MG/DL (ref 1.6–2.4)
MCH RBC QN AUTO: 32 PG (ref 27–31)
MCHC RBC AUTO-ENTMCNC: 32.5 G/DL (ref 33–37)
MCV RBC AUTO: 98.3 FL (ref 81–99)
PLATELET # BLD AUTO: 122 K/UL (ref 130–400)
PMV BLD AUTO: 11.7 FL (ref 9.4–12.3)
POTASSIUM SERPL-SCNC: 3.3 MMOL/L (ref 3.5–5)
RBC # BLD AUTO: 2.41 M/UL (ref 4.2–5.4)
SODIUM SERPL-SCNC: 141 MMOL/L (ref 136–145)
WBC # BLD AUTO: 5.7 K/UL (ref 4.8–10.8)

## 2023-07-12 PROCEDURE — 6360000002 HC RX W HCPCS: Performed by: EMERGENCY MEDICINE

## 2023-07-12 PROCEDURE — 83735 ASSAY OF MAGNESIUM: CPT

## 2023-07-12 PROCEDURE — 1210000000 HC MED SURG R&B

## 2023-07-12 PROCEDURE — 80048 BASIC METABOLIC PNL TOTAL CA: CPT

## 2023-07-12 PROCEDURE — 2580000003 HC RX 258: Performed by: HOSPITALIST

## 2023-07-12 PROCEDURE — 2580000003 HC RX 258: Performed by: EMERGENCY MEDICINE

## 2023-07-12 PROCEDURE — 94640 AIRWAY INHALATION TREATMENT: CPT

## 2023-07-12 PROCEDURE — 6370000000 HC RX 637 (ALT 250 FOR IP): Performed by: HOSPITALIST

## 2023-07-12 PROCEDURE — 94761 N-INVAS EAR/PLS OXIMETRY MLT: CPT

## 2023-07-12 PROCEDURE — 85027 COMPLETE CBC AUTOMATED: CPT

## 2023-07-12 PROCEDURE — 36415 COLL VENOUS BLD VENIPUNCTURE: CPT

## 2023-07-12 PROCEDURE — 2700000000 HC OXYGEN THERAPY PER DAY

## 2023-07-12 PROCEDURE — 2500000003 HC RX 250 WO HCPCS: Performed by: HOSPITALIST

## 2023-07-12 PROCEDURE — 6370000000 HC RX 637 (ALT 250 FOR IP): Performed by: STUDENT IN AN ORGANIZED HEALTH CARE EDUCATION/TRAINING PROGRAM

## 2023-07-12 PROCEDURE — 6360000002 HC RX W HCPCS: Performed by: STUDENT IN AN ORGANIZED HEALTH CARE EDUCATION/TRAINING PROGRAM

## 2023-07-12 PROCEDURE — 2580000003 HC RX 258: Performed by: STUDENT IN AN ORGANIZED HEALTH CARE EDUCATION/TRAINING PROGRAM

## 2023-07-12 PROCEDURE — C9113 INJ PANTOPRAZOLE SODIUM, VIA: HCPCS | Performed by: STUDENT IN AN ORGANIZED HEALTH CARE EDUCATION/TRAINING PROGRAM

## 2023-07-12 PROCEDURE — 6360000002 HC RX W HCPCS: Performed by: HOSPITALIST

## 2023-07-12 RX ADMIN — ALPRAZOLAM 1 MG: 0.5 TABLET ORAL at 15:54

## 2023-07-12 RX ADMIN — VANCOMYCIN HYDROCHLORIDE 125 MG: 125 CAPSULE ORAL at 10:38

## 2023-07-12 RX ADMIN — BUDESONIDE AND FORMOTEROL FUMARATE DIHYDRATE 2 PUFF: 160; 4.5 AEROSOL RESPIRATORY (INHALATION) at 18:49

## 2023-07-12 RX ADMIN — GUAIFENESIN 200 MG: 100 SOLUTION ORAL at 05:51

## 2023-07-12 RX ADMIN — OXYCODONE HYDROCHLORIDE AND ACETAMINOPHEN 1 TABLET: 10; 325 TABLET ORAL at 08:12

## 2023-07-12 RX ADMIN — CEFEPIME 2000 MG: 2 INJECTION, POWDER, FOR SOLUTION INTRAVENOUS at 20:37

## 2023-07-12 RX ADMIN — ENOXAPARIN SODIUM 60 MG: 100 INJECTION SUBCUTANEOUS at 08:14

## 2023-07-12 RX ADMIN — ALPRAZOLAM 1 MG: 0.5 TABLET ORAL at 20:40

## 2023-07-12 RX ADMIN — FOLIC ACID 1 MG: 1 TABLET ORAL at 08:14

## 2023-07-12 RX ADMIN — ALPRAZOLAM 1 MG: 0.5 TABLET ORAL at 05:51

## 2023-07-12 RX ADMIN — VANCOMYCIN HYDROCHLORIDE 125 MG: 125 CAPSULE ORAL at 05:51

## 2023-07-12 RX ADMIN — IPRATROPIUM BROMIDE AND ALBUTEROL SULFATE 1 DOSE: 2.5; .5 SOLUTION RESPIRATORY (INHALATION) at 18:52

## 2023-07-12 RX ADMIN — CITALOPRAM HYDROBROMIDE 20 MG: 20 TABLET ORAL at 08:14

## 2023-07-12 RX ADMIN — SODIUM CHLORIDE, PRESERVATIVE FREE 40 MG: 5 INJECTION INTRAVENOUS at 08:14

## 2023-07-12 RX ADMIN — FLUTICASONE PROPIONATE 2 SPRAY: 50 SPRAY, METERED NASAL at 08:17

## 2023-07-12 RX ADMIN — SODIUM CHLORIDE, PRESERVATIVE FREE 10 ML: 5 INJECTION INTRAVENOUS at 20:41

## 2023-07-12 RX ADMIN — SUCRALFATE 1 G: 1 TABLET ORAL at 20:40

## 2023-07-12 RX ADMIN — FERROUS SULFATE TAB 325 MG (65 MG ELEMENTAL FE) 325 MG: 325 (65 FE) TAB at 15:54

## 2023-07-12 RX ADMIN — PROCHLORPERAZINE EDISYLATE 10 MG: 5 INJECTION INTRAMUSCULAR; INTRAVENOUS at 18:41

## 2023-07-12 RX ADMIN — VANCOMYCIN HYDROCHLORIDE 125 MG: 125 CAPSULE ORAL at 23:41

## 2023-07-12 RX ADMIN — IPRATROPIUM BROMIDE AND ALBUTEROL SULFATE 1 DOSE: 2.5; .5 SOLUTION RESPIRATORY (INHALATION) at 14:15

## 2023-07-12 RX ADMIN — CEFEPIME 2000 MG: 2 INJECTION, POWDER, FOR SOLUTION INTRAVENOUS at 13:16

## 2023-07-12 RX ADMIN — VANCOMYCIN HYDROCHLORIDE 125 MG: 125 CAPSULE ORAL at 18:38

## 2023-07-12 RX ADMIN — IPRATROPIUM BROMIDE AND ALBUTEROL SULFATE 1 DOSE: 2.5; .5 SOLUTION RESPIRATORY (INHALATION) at 07:00

## 2023-07-12 RX ADMIN — PROCHLORPERAZINE EDISYLATE 10 MG: 5 INJECTION INTRAMUSCULAR; INTRAVENOUS at 10:38

## 2023-07-12 RX ADMIN — OXYCODONE HYDROCHLORIDE AND ACETAMINOPHEN 1 TABLET: 10; 325 TABLET ORAL at 18:41

## 2023-07-12 RX ADMIN — CEFEPIME 2000 MG: 2 INJECTION, POWDER, FOR SOLUTION INTRAVENOUS at 04:34

## 2023-07-12 RX ADMIN — BUDESONIDE AND FORMOTEROL FUMARATE DIHYDRATE 2 PUFF: 160; 4.5 AEROSOL RESPIRATORY (INHALATION) at 09:17

## 2023-07-12 RX ADMIN — GUAIFENESIN 200 MG: 100 SOLUTION ORAL at 20:40

## 2023-07-12 RX ADMIN — ENOXAPARIN SODIUM 60 MG: 100 INJECTION SUBCUTANEOUS at 20:41

## 2023-07-12 RX ADMIN — FERROUS SULFATE TAB 325 MG (65 MG ELEMENTAL FE) 325 MG: 325 (65 FE) TAB at 08:13

## 2023-07-12 NOTE — PROGRESS NOTES
steroids  Bronchodilators PRN        Iron deficiency  Continue iron supplementation     Malnutrition  Nutritional support     Severe anxiety  Continue Xanax as needed     Chronic pain   Home regimen            Continue management of other chronic medical conditions - see above and orders. GERD  PPI  Carafate      Advance Directive: Full Code    ADULT DIET; Regular  ADULT ORAL NUTRITION SUPPLEMENT; Breakfast, Lunch, Dinner; Standard High Calorie/High Protein Oral Supplement         Consults Made:   PALLIATIVE CARE EVAL      DVT prophylaxis: Enoxaparin      Current medications reviewed  Lab work reviewed  Radiology  films reviewed  Discussed treatment plan with the nurse and addressed all questions/concerns  Discussed with Patient and Family at the bedside in detail . .. they understand and agree with the management plan.       Discharge planning: tbd    Multiple complex medical problems  Morbidity mortality high risk  Medical decision making High complexity       Electronically signed by   Brandee Wren MD,   Internal Medicine Hospitalist   7/12/2023 8:53 AM

## 2023-07-12 NOTE — PROGRESS NOTES
Physical Therapy  Attempted PT eval but Pt declined due to nausea. Dtr reports she has assisted Pt onto [facility] recently but then PT has been too ill to TF.   Electronically signed by Edward Bernstein PT on 7/12/2023 at 10:25 AM

## 2023-07-13 LAB
ALBUMIN SERPL-MCNC: 3.3 G/DL (ref 3.5–5.2)
ALP SERPL-CCNC: 52 U/L (ref 35–104)
ALT SERPL-CCNC: <5 U/L (ref 5–33)
ANION GAP SERPL CALCULATED.3IONS-SCNC: 10 MMOL/L (ref 7–19)
AST SERPL-CCNC: 9 U/L (ref 5–32)
BACTERIA SPEC RESP CULT: ABNORMAL
BACTERIA SPEC RESP CULT: ABNORMAL
BASOPHILS # BLD: 0 K/UL (ref 0–0.2)
BASOPHILS NFR BLD: 0.4 % (ref 0–1)
BILIRUB SERPL-MCNC: 0.3 MG/DL (ref 0.2–1.2)
BUN SERPL-MCNC: 3 MG/DL (ref 8–23)
CALCIUM SERPL-MCNC: 8.6 MG/DL (ref 8.8–10.2)
CHLORIDE SERPL-SCNC: 102 MMOL/L (ref 98–111)
CO2 SERPL-SCNC: 29 MMOL/L (ref 22–29)
CREAT SERPL-MCNC: 0.4 MG/DL (ref 0.5–0.9)
EOSINOPHIL # BLD: 0.1 K/UL (ref 0–0.6)
EOSINOPHIL NFR BLD: 1.2 % (ref 0–5)
ERYTHROCYTE [DISTWIDTH] IN BLOOD BY AUTOMATED COUNT: 12.7 % (ref 11.5–14.5)
GLUCOSE SERPL-MCNC: 79 MG/DL (ref 74–109)
GRAM STN SPEC: ABNORMAL
HCT VFR BLD AUTO: 27.4 % (ref 37–47)
HGB BLD-MCNC: 9.3 G/DL (ref 12–16)
IMM GRANULOCYTES # BLD: 0 K/UL
LYMPHOCYTES # BLD: 1 K/UL (ref 1.1–4.5)
LYMPHOCYTES NFR BLD: 19.2 % (ref 20–40)
MAGNESIUM SERPL-MCNC: 1.3 MG/DL (ref 1.6–2.4)
MCH RBC QN AUTO: 32.6 PG (ref 27–31)
MCHC RBC AUTO-ENTMCNC: 33.9 G/DL (ref 33–37)
MCV RBC AUTO: 96.1 FL (ref 81–99)
MONOCYTES # BLD: 0.4 K/UL (ref 0–0.9)
MONOCYTES NFR BLD: 8.3 % (ref 0–10)
NEUTROPHILS # BLD: 3.6 K/UL (ref 1.5–7.5)
NEUTS SEG NFR BLD: 70.3 % (ref 50–65)
ORGANISM: ABNORMAL
PLATELET # BLD AUTO: 167 K/UL (ref 130–400)
PMV BLD AUTO: 11.4 FL (ref 9.4–12.3)
POTASSIUM SERPL-SCNC: 2.7 MMOL/L (ref 3.5–5)
PROT SERPL-MCNC: 6.3 G/DL (ref 6.6–8.7)
RBC # BLD AUTO: 2.85 M/UL (ref 4.2–5.4)
SODIUM SERPL-SCNC: 141 MMOL/L (ref 136–145)
WBC # BLD AUTO: 5.1 K/UL (ref 4.8–10.8)

## 2023-07-13 PROCEDURE — 36415 COLL VENOUS BLD VENIPUNCTURE: CPT

## 2023-07-13 PROCEDURE — 2580000003 HC RX 258: Performed by: EMERGENCY MEDICINE

## 2023-07-13 PROCEDURE — 2500000003 HC RX 250 WO HCPCS: Performed by: HOSPITALIST

## 2023-07-13 PROCEDURE — 2580000003 HC RX 258: Performed by: STUDENT IN AN ORGANIZED HEALTH CARE EDUCATION/TRAINING PROGRAM

## 2023-07-13 PROCEDURE — 85025 COMPLETE CBC W/AUTO DIFF WBC: CPT

## 2023-07-13 PROCEDURE — 6360000002 HC RX W HCPCS: Performed by: HOSPITALIST

## 2023-07-13 PROCEDURE — 6360000002 HC RX W HCPCS: Performed by: STUDENT IN AN ORGANIZED HEALTH CARE EDUCATION/TRAINING PROGRAM

## 2023-07-13 PROCEDURE — 80053 COMPREHEN METABOLIC PANEL: CPT

## 2023-07-13 PROCEDURE — 6370000000 HC RX 637 (ALT 250 FOR IP): Performed by: HOSPITALIST

## 2023-07-13 PROCEDURE — 2700000000 HC OXYGEN THERAPY PER DAY

## 2023-07-13 PROCEDURE — 6360000002 HC RX W HCPCS: Performed by: EMERGENCY MEDICINE

## 2023-07-13 PROCEDURE — 6360000002 HC RX W HCPCS: Performed by: SPECIALIST

## 2023-07-13 PROCEDURE — 99222 1ST HOSP IP/OBS MODERATE 55: CPT | Performed by: SPECIALIST

## 2023-07-13 PROCEDURE — 94761 N-INVAS EAR/PLS OXIMETRY MLT: CPT

## 2023-07-13 PROCEDURE — 2580000003 HC RX 258: Performed by: HOSPITALIST

## 2023-07-13 PROCEDURE — C9113 INJ PANTOPRAZOLE SODIUM, VIA: HCPCS | Performed by: SPECIALIST

## 2023-07-13 PROCEDURE — 1210000000 HC MED SURG R&B

## 2023-07-13 PROCEDURE — 6360000002 HC RX W HCPCS: Performed by: INTERNAL MEDICINE

## 2023-07-13 PROCEDURE — 83735 ASSAY OF MAGNESIUM: CPT

## 2023-07-13 PROCEDURE — 94640 AIRWAY INHALATION TREATMENT: CPT

## 2023-07-13 PROCEDURE — 2580000003 HC RX 258: Performed by: SPECIALIST

## 2023-07-13 PROCEDURE — C9113 INJ PANTOPRAZOLE SODIUM, VIA: HCPCS | Performed by: STUDENT IN AN ORGANIZED HEALTH CARE EDUCATION/TRAINING PROGRAM

## 2023-07-13 PROCEDURE — 6370000000 HC RX 637 (ALT 250 FOR IP): Performed by: STUDENT IN AN ORGANIZED HEALTH CARE EDUCATION/TRAINING PROGRAM

## 2023-07-13 RX ORDER — POTASSIUM CHLORIDE 7.45 MG/ML
10 INJECTION INTRAVENOUS PRN
Status: DISCONTINUED | OUTPATIENT
Start: 2023-07-13 | End: 2023-07-21 | Stop reason: HOSPADM

## 2023-07-13 RX ORDER — POTASSIUM CHLORIDE 20 MEQ/1
40 TABLET, EXTENDED RELEASE ORAL PRN
Status: DISCONTINUED | OUTPATIENT
Start: 2023-07-13 | End: 2023-07-21 | Stop reason: HOSPADM

## 2023-07-13 RX ADMIN — OXYCODONE HYDROCHLORIDE AND ACETAMINOPHEN 1 TABLET: 10; 325 TABLET ORAL at 09:36

## 2023-07-13 RX ADMIN — CEFEPIME 2000 MG: 2 INJECTION, POWDER, FOR SOLUTION INTRAVENOUS at 13:08

## 2023-07-13 RX ADMIN — SODIUM CHLORIDE, PRESERVATIVE FREE 10 ML: 5 INJECTION INTRAVENOUS at 21:35

## 2023-07-13 RX ADMIN — IPRATROPIUM BROMIDE AND ALBUTEROL SULFATE 1 DOSE: 2.5; .5 SOLUTION RESPIRATORY (INHALATION) at 14:25

## 2023-07-13 RX ADMIN — VANCOMYCIN HYDROCHLORIDE 125 MG: 125 CAPSULE ORAL at 16:39

## 2023-07-13 RX ADMIN — BUDESONIDE AND FORMOTEROL FUMARATE DIHYDRATE 2 PUFF: 160; 4.5 AEROSOL RESPIRATORY (INHALATION) at 23:52

## 2023-07-13 RX ADMIN — FOLIC ACID 1 MG: 1 TABLET ORAL at 10:50

## 2023-07-13 RX ADMIN — BUDESONIDE AND FORMOTEROL FUMARATE DIHYDRATE 2 PUFF: 160; 4.5 AEROSOL RESPIRATORY (INHALATION) at 10:01

## 2023-07-13 RX ADMIN — PROCHLORPERAZINE EDISYLATE 10 MG: 5 INJECTION INTRAMUSCULAR; INTRAVENOUS at 00:44

## 2023-07-13 RX ADMIN — ENOXAPARIN SODIUM 60 MG: 100 INJECTION SUBCUTANEOUS at 21:25

## 2023-07-13 RX ADMIN — IPRATROPIUM BROMIDE AND ALBUTEROL SULFATE 1 DOSE: 2.5; .5 SOLUTION RESPIRATORY (INHALATION) at 06:52

## 2023-07-13 RX ADMIN — OXYCODONE HYDROCHLORIDE AND ACETAMINOPHEN 1 TABLET: 10; 325 TABLET ORAL at 00:43

## 2023-07-13 RX ADMIN — CITALOPRAM HYDROBROMIDE 20 MG: 20 TABLET ORAL at 10:50

## 2023-07-13 RX ADMIN — FERROUS SULFATE TAB 325 MG (65 MG ELEMENTAL FE) 325 MG: 325 (65 FE) TAB at 16:39

## 2023-07-13 RX ADMIN — ALPRAZOLAM 1 MG: 0.5 TABLET ORAL at 21:21

## 2023-07-13 RX ADMIN — SUCRALFATE 1 G: 1 TABLET ORAL at 21:35

## 2023-07-13 RX ADMIN — VANCOMYCIN HYDROCHLORIDE 125 MG: 125 CAPSULE ORAL at 05:33

## 2023-07-13 RX ADMIN — CEFEPIME 2000 MG: 2 INJECTION, POWDER, FOR SOLUTION INTRAVENOUS at 21:31

## 2023-07-13 RX ADMIN — IPRATROPIUM BROMIDE AND ALBUTEROL SULFATE 1 DOSE: 2.5; .5 SOLUTION RESPIRATORY (INHALATION) at 23:50

## 2023-07-13 RX ADMIN — FLUTICASONE PROPIONATE 2 SPRAY: 50 SPRAY, METERED NASAL at 10:52

## 2023-07-13 RX ADMIN — POTASSIUM CHLORIDE 10 MEQ: 7.46 INJECTION, SOLUTION INTRAVENOUS at 17:09

## 2023-07-13 RX ADMIN — SODIUM CHLORIDE, PRESERVATIVE FREE 10 ML: 5 INJECTION INTRAVENOUS at 10:52

## 2023-07-13 RX ADMIN — ENOXAPARIN SODIUM 60 MG: 100 INJECTION SUBCUTANEOUS at 10:53

## 2023-07-13 RX ADMIN — POTASSIUM CHLORIDE 10 MEQ: 7.46 INJECTION, SOLUTION INTRAVENOUS at 22:30

## 2023-07-13 RX ADMIN — SUCRALFATE 1 G: 1 TABLET ORAL at 16:40

## 2023-07-13 RX ADMIN — VANCOMYCIN HYDROCHLORIDE 125 MG: 125 CAPSULE ORAL at 13:08

## 2023-07-13 RX ADMIN — FERROUS SULFATE TAB 325 MG (65 MG ELEMENTAL FE) 325 MG: 325 (65 FE) TAB at 10:50

## 2023-07-13 RX ADMIN — PROCHLORPERAZINE EDISYLATE 10 MG: 5 INJECTION INTRAMUSCULAR; INTRAVENOUS at 18:03

## 2023-07-13 RX ADMIN — POTASSIUM CHLORIDE 10 MEQ: 7.46 INJECTION, SOLUTION INTRAVENOUS at 18:08

## 2023-07-13 RX ADMIN — GUAIFENESIN 200 MG: 100 SOLUTION ORAL at 16:39

## 2023-07-13 RX ADMIN — CEFEPIME 2000 MG: 2 INJECTION, POWDER, FOR SOLUTION INTRAVENOUS at 04:53

## 2023-07-13 RX ADMIN — MONTELUKAST 10 MG: 10 TABLET, FILM COATED ORAL at 21:35

## 2023-07-13 RX ADMIN — SODIUM CHLORIDE, PRESERVATIVE FREE 40 MG: 5 INJECTION INTRAVENOUS at 09:35

## 2023-07-13 RX ADMIN — POTASSIUM CHLORIDE 10 MEQ: 7.46 INJECTION, SOLUTION INTRAVENOUS at 21:33

## 2023-07-13 RX ADMIN — PROCHLORPERAZINE EDISYLATE 10 MG: 5 INJECTION INTRAMUSCULAR; INTRAVENOUS at 09:35

## 2023-07-13 RX ADMIN — ALPRAZOLAM 1 MG: 0.5 TABLET ORAL at 11:31

## 2023-07-13 RX ADMIN — OXYCODONE HYDROCHLORIDE AND ACETAMINOPHEN 1 TABLET: 10; 325 TABLET ORAL at 18:03

## 2023-07-13 RX ADMIN — POTASSIUM CHLORIDE 10 MEQ: 7.46 INJECTION, SOLUTION INTRAVENOUS at 23:32

## 2023-07-13 RX ADMIN — OXYCODONE HYDROCHLORIDE AND ACETAMINOPHEN 1 TABLET: 10; 325 TABLET ORAL at 23:29

## 2023-07-13 RX ADMIN — ALPRAZOLAM 1 MG: 0.5 TABLET ORAL at 04:50

## 2023-07-13 RX ADMIN — MAGNESIUM SULFATE HEPTAHYDRATE 2000 MG: 40 INJECTION, SOLUTION INTRAVENOUS at 15:00

## 2023-07-13 RX ADMIN — SODIUM CHLORIDE, PRESERVATIVE FREE 40 MG: 5 INJECTION INTRAVENOUS at 21:25

## 2023-07-13 ASSESSMENT — PAIN SCALES - WONG BAKER: WONGBAKER_NUMERICALRESPONSE: 0

## 2023-07-13 ASSESSMENT — PAIN SCALES - GENERAL
PAINLEVEL_OUTOF10: 0
PAINLEVEL_OUTOF10: 7
PAINLEVEL_OUTOF10: 8

## 2023-07-13 ASSESSMENT — PAIN - FUNCTIONAL ASSESSMENT: PAIN_FUNCTIONAL_ASSESSMENT: PREVENTS OR INTERFERES WITH MANY ACTIVE NOT PASSIVE ACTIVITIES

## 2023-07-13 ASSESSMENT — PAIN DESCRIPTION - LOCATION
LOCATION: OTHER (COMMENT)
LOCATION: HEAD;ABDOMEN

## 2023-07-13 ASSESSMENT — PAIN DESCRIPTION - DESCRIPTORS
DESCRIPTORS: ACHING
DESCRIPTORS: ACHING;THROBBING

## 2023-07-13 NOTE — PROGRESS NOTES
hospitalized in May for pneumonia, presenting back with several days of malaise, increased dyspnea and cough, noted fever, tachycardia, tachypnea and leukocytosis, readmitted on 7/9 with sepsis likely from recurrent pneumonia. Treated with broad-spectrum IV antibiotics. Noted hypoxemia with PO2 53 on ABG, requiring increase in supplemental oxygen. Developed nausea vomiting diarrhea. Tested positive for C. difficile and E. coli on GI PCR. Started on oral vancomycin. \"    Sepsis due to Pneumonia with risk factors for MDRO--- improved  Multiple recent hospitalizations  History of Empyema s/p VATS  MRSA swab negative--discontinued IV vancomycin  Continue IV Cefepime     Nausea/vomiting/diarrhea  C. difficile infection and E. coli Enterocolitis  Reviewed gastrointestinal PCR  Vancomycin 125 mg PO every 6 hours x 10-day course  PRN antiemetics  Consider GI evaluation     Acute on chronic hypoxic respiratory failure, secondary PNA  Chronic hypercapnic respiratory failure  Hypoxemia with PO2 53 on ABG  Continue supplemental oxygen with goal SPO2 88-92%  Continue nebulized bronchodilators     Severe COPD  Not in acute exacerbation, hold off steroids  Bronchodilators PRN        Iron deficiency  Continue iron supplementation     Malnutrition  Nutritional support     Severe anxiety  Continue Xanax as needed     Chronic pain   Home regimen            Continue management of other chronic medical conditions - see above and orders. GERD  PPI  Carafate      Advance Directive: Full Code    ADULT DIET;  Regular  ADULT ORAL NUTRITION SUPPLEMENT; Breakfast, Lunch, Dinner; Standard High Calorie/High Protein Oral Supplement         Consults Made:   PALLIATIVE CARE EVAL  IP CONSULT TO GI      DVT prophylaxis: Enoxaparin      Current medications reviewed  Lab work reviewed  Radiology  films reviewed  Discussed treatment plan with the nurse and addressed all questions/concerns  Discussed with Patient and Family at the bedside in detail

## 2023-07-13 NOTE — CONSULTS
esophagitis. CT scan of the abdomen does not show any distal esophageal thickening. Patient declines barium swallow and upper GI. Note CT scan shows liver, spleen and pancreas are unremarkable. CBD is prominent. Continue vancomycin. Discussed colonoscopy. I do not think colonoscopy is going to change the management at this time. At this time she needs vancomycin. Patient also does not  want colonoscopy at any time. Nothing else to offer from GI standpoint view at this time. I have signed off. Please call me if further assistance needed in the management of this patient. At the time of discharge, please schedule follow-up in the [address] [place] [address] in 1 month after discharge. Electronically signed by Yulia Hoover MD on 7/13/23 at 3:35 PM CDT    I will not be able to follow-up on this patient as an outpatient basis since I am a locum tenens physician. And I do not have an office practice. Locum tenens possibilities    To whom it may concern. I am a locum tenens physician. Therefore,  I do not have an office practice. I will not be able to follow-up on any patient as an outpatient basis. I will not be able to follow-up on the results of pending labs, pathology reports,  imaging studies. I will not be able to or do any outpatient procedures. I will not be able to provide continuity of care of any kind to any patient . I  will not be able to keep in touch with any patient by any means such as phone, text or by e-mail because of the nature of my responsibilities as a locum tenens physician. When the patient is discharged, my patient physician relationship is over. The patient will need to follow-up with other physicians in the hospital when I am not on-call.   After discharge, patient will need to follow-up with their primary care doctor, gastroenterologist or liver doctor for continuity of care, check on pending labs, check on pending pathology and check on results of the

## 2023-07-14 LAB
ALBUMIN SERPL-MCNC: 3.3 G/DL (ref 3.5–5.2)
ALP SERPL-CCNC: 50 U/L (ref 35–104)
ALT SERPL-CCNC: <5 U/L (ref 5–33)
ANION GAP SERPL CALCULATED.3IONS-SCNC: 12 MMOL/L (ref 7–19)
AST SERPL-CCNC: 8 U/L (ref 5–32)
BACTERIA BLD CULT ORG #2: NORMAL
BACTERIA BLD CULT: NORMAL
BASOPHILS # BLD: 0 K/UL (ref 0–0.2)
BASOPHILS NFR BLD: 0.7 % (ref 0–1)
BILIRUB SERPL-MCNC: <0.2 MG/DL (ref 0.2–1.2)
BUN SERPL-MCNC: 4 MG/DL (ref 8–23)
CALCIUM SERPL-MCNC: 8.5 MG/DL (ref 8.8–10.2)
CHLORIDE SERPL-SCNC: 103 MMOL/L (ref 98–111)
CO2 SERPL-SCNC: 26 MMOL/L (ref 22–29)
CREAT SERPL-MCNC: 0.4 MG/DL (ref 0.5–0.9)
EOSINOPHIL # BLD: 0.1 K/UL (ref 0–0.6)
EOSINOPHIL NFR BLD: 1.2 % (ref 0–5)
ERYTHROCYTE [DISTWIDTH] IN BLOOD BY AUTOMATED COUNT: 12.5 % (ref 11.5–14.5)
GLUCOSE SERPL-MCNC: 101 MG/DL (ref 74–109)
HCT VFR BLD AUTO: 28.3 % (ref 37–47)
HGB BLD-MCNC: 9.5 G/DL (ref 12–16)
IMM GRANULOCYTES # BLD: 0 K/UL
LYMPHOCYTES # BLD: 0.8 K/UL (ref 1.1–4.5)
LYMPHOCYTES NFR BLD: 18.5 % (ref 20–40)
MCH RBC QN AUTO: 31.8 PG (ref 27–31)
MCHC RBC AUTO-ENTMCNC: 33.6 G/DL (ref 33–37)
MCV RBC AUTO: 94.6 FL (ref 81–99)
MONOCYTES # BLD: 0.5 K/UL (ref 0–0.9)
MONOCYTES NFR BLD: 12.4 % (ref 0–10)
NEUTROPHILS # BLD: 2.9 K/UL (ref 1.5–7.5)
NEUTS SEG NFR BLD: 66.7 % (ref 50–65)
PLATELET # BLD AUTO: 183 K/UL (ref 130–400)
PMV BLD AUTO: 10.5 FL (ref 9.4–12.3)
POTASSIUM SERPL-SCNC: 3.6 MMOL/L (ref 3.5–5)
PROT SERPL-MCNC: 6.2 G/DL (ref 6.6–8.7)
RBC # BLD AUTO: 2.99 M/UL (ref 4.2–5.4)
SODIUM SERPL-SCNC: 141 MMOL/L (ref 136–145)
WBC # BLD AUTO: 4.3 K/UL (ref 4.8–10.8)

## 2023-07-14 PROCEDURE — 6360000002 HC RX W HCPCS: Performed by: STUDENT IN AN ORGANIZED HEALTH CARE EDUCATION/TRAINING PROGRAM

## 2023-07-14 PROCEDURE — 6370000000 HC RX 637 (ALT 250 FOR IP): Performed by: HOSPITALIST

## 2023-07-14 PROCEDURE — 2580000003 HC RX 258: Performed by: EMERGENCY MEDICINE

## 2023-07-14 PROCEDURE — 2580000003 HC RX 258: Performed by: SPECIALIST

## 2023-07-14 PROCEDURE — 36415 COLL VENOUS BLD VENIPUNCTURE: CPT

## 2023-07-14 PROCEDURE — 1210000000 HC MED SURG R&B

## 2023-07-14 PROCEDURE — 2700000000 HC OXYGEN THERAPY PER DAY

## 2023-07-14 PROCEDURE — C9113 INJ PANTOPRAZOLE SODIUM, VIA: HCPCS | Performed by: SPECIALIST

## 2023-07-14 PROCEDURE — 80053 COMPREHEN METABOLIC PANEL: CPT

## 2023-07-14 PROCEDURE — 6370000000 HC RX 637 (ALT 250 FOR IP): Performed by: STUDENT IN AN ORGANIZED HEALTH CARE EDUCATION/TRAINING PROGRAM

## 2023-07-14 PROCEDURE — 2500000003 HC RX 250 WO HCPCS: Performed by: HOSPITALIST

## 2023-07-14 PROCEDURE — 6360000002 HC RX W HCPCS: Performed by: SPECIALIST

## 2023-07-14 PROCEDURE — 6370000000 HC RX 637 (ALT 250 FOR IP): Performed by: INTERNAL MEDICINE

## 2023-07-14 PROCEDURE — 2580000003 HC RX 258: Performed by: HOSPITALIST

## 2023-07-14 PROCEDURE — 94761 N-INVAS EAR/PLS OXIMETRY MLT: CPT

## 2023-07-14 PROCEDURE — 85025 COMPLETE CBC W/AUTO DIFF WBC: CPT

## 2023-07-14 PROCEDURE — 94640 AIRWAY INHALATION TREATMENT: CPT

## 2023-07-14 PROCEDURE — 6360000002 HC RX W HCPCS: Performed by: INTERNAL MEDICINE

## 2023-07-14 PROCEDURE — 6360000002 HC RX W HCPCS: Performed by: EMERGENCY MEDICINE

## 2023-07-14 PROCEDURE — 6360000002 HC RX W HCPCS: Performed by: HOSPITALIST

## 2023-07-14 PROCEDURE — 92610 EVALUATE SWALLOWING FUNCTION: CPT

## 2023-07-14 RX ORDER — VANCOMYCIN HYDROCHLORIDE 50 MG/ML
125 KIT ORAL EVERY 6 HOURS SCHEDULED
Status: DISCONTINUED | OUTPATIENT
Start: 2023-07-14 | End: 2023-07-14

## 2023-07-14 RX ORDER — VANCOMYCIN HYDROCHLORIDE 50 MG/ML
125 KIT ORAL EVERY 6 HOURS SCHEDULED
Status: DISCONTINUED | OUTPATIENT
Start: 2023-07-14 | End: 2023-07-20

## 2023-07-14 RX ADMIN — SUCRALFATE 1 G: 1 TABLET ORAL at 13:41

## 2023-07-14 RX ADMIN — VANCOMYCIN HYDROCHLORIDE 125 MG: KIT at 13:41

## 2023-07-14 RX ADMIN — ENOXAPARIN SODIUM 60 MG: 100 INJECTION SUBCUTANEOUS at 08:51

## 2023-07-14 RX ADMIN — SODIUM CHLORIDE, PRESERVATIVE FREE 40 MG: 5 INJECTION INTRAVENOUS at 08:51

## 2023-07-14 RX ADMIN — SODIUM CHLORIDE, PRESERVATIVE FREE 40 MG: 5 INJECTION INTRAVENOUS at 21:43

## 2023-07-14 RX ADMIN — ENOXAPARIN SODIUM 60 MG: 100 INJECTION SUBCUTANEOUS at 20:57

## 2023-07-14 RX ADMIN — FERROUS SULFATE TAB 325 MG (65 MG ELEMENTAL FE) 325 MG: 325 (65 FE) TAB at 08:51

## 2023-07-14 RX ADMIN — SODIUM CHLORIDE, PRESERVATIVE FREE 10 ML: 5 INJECTION INTRAVENOUS at 08:52

## 2023-07-14 RX ADMIN — OXYCODONE HYDROCHLORIDE AND ACETAMINOPHEN 1 TABLET: 10; 325 TABLET ORAL at 20:58

## 2023-07-14 RX ADMIN — ALPRAZOLAM 1 MG: 0.5 TABLET ORAL at 04:17

## 2023-07-14 RX ADMIN — IPRATROPIUM BROMIDE AND ALBUTEROL SULFATE 1 DOSE: 2.5; .5 SOLUTION RESPIRATORY (INHALATION) at 15:10

## 2023-07-14 RX ADMIN — PROCHLORPERAZINE EDISYLATE 10 MG: 5 INJECTION INTRAMUSCULAR; INTRAVENOUS at 07:14

## 2023-07-14 RX ADMIN — CEFEPIME 2000 MG: 2 INJECTION, POWDER, FOR SOLUTION INTRAVENOUS at 22:11

## 2023-07-14 RX ADMIN — SODIUM CHLORIDE, PRESERVATIVE FREE 10 ML: 5 INJECTION INTRAVENOUS at 20:57

## 2023-07-14 RX ADMIN — IPRATROPIUM BROMIDE AND ALBUTEROL SULFATE 1 DOSE: 2.5; .5 SOLUTION RESPIRATORY (INHALATION) at 21:04

## 2023-07-14 RX ADMIN — GUAIFENESIN 200 MG: 100 SOLUTION ORAL at 21:13

## 2023-07-14 RX ADMIN — IPRATROPIUM BROMIDE AND ALBUTEROL SULFATE 1 DOSE: 2.5; .5 SOLUTION RESPIRATORY (INHALATION) at 07:29

## 2023-07-14 RX ADMIN — CITALOPRAM HYDROBROMIDE 20 MG: 20 TABLET ORAL at 08:51

## 2023-07-14 RX ADMIN — SUCRALFATE 1 G: 1 TABLET ORAL at 20:58

## 2023-07-14 RX ADMIN — FOLIC ACID 1 MG: 1 TABLET ORAL at 08:51

## 2023-07-14 RX ADMIN — BUDESONIDE AND FORMOTEROL FUMARATE DIHYDRATE 2 PUFF: 160; 4.5 AEROSOL RESPIRATORY (INHALATION) at 21:04

## 2023-07-14 RX ADMIN — PROCHLORPERAZINE EDISYLATE 10 MG: 5 INJECTION INTRAMUSCULAR; INTRAVENOUS at 21:43

## 2023-07-14 RX ADMIN — OXYCODONE HYDROCHLORIDE AND ACETAMINOPHEN 1 TABLET: 10; 325 TABLET ORAL at 07:11

## 2023-07-14 RX ADMIN — IPRATROPIUM BROMIDE AND ALBUTEROL SULFATE 1 DOSE: 2.5; .5 SOLUTION RESPIRATORY (INHALATION) at 11:06

## 2023-07-14 RX ADMIN — PROCHLORPERAZINE EDISYLATE 10 MG: 5 INJECTION INTRAMUSCULAR; INTRAVENOUS at 07:11

## 2023-07-14 RX ADMIN — SUCRALFATE 1 G: 1 TABLET ORAL at 08:51

## 2023-07-14 RX ADMIN — CEFEPIME 2000 MG: 2 INJECTION, POWDER, FOR SOLUTION INTRAVENOUS at 13:41

## 2023-07-14 RX ADMIN — FLUTICASONE PROPIONATE 2 SPRAY: 50 SPRAY, METERED NASAL at 08:51

## 2023-07-14 RX ADMIN — POTASSIUM CHLORIDE 10 MEQ: 7.46 INJECTION, SOLUTION INTRAVENOUS at 00:37

## 2023-07-14 RX ADMIN — VANCOMYCIN HYDROCHLORIDE 125 MG: KIT at 20:58

## 2023-07-14 RX ADMIN — CEFEPIME 2000 MG: 2 INJECTION, POWDER, FOR SOLUTION INTRAVENOUS at 07:16

## 2023-07-14 RX ADMIN — BUDESONIDE AND FORMOTEROL FUMARATE DIHYDRATE 2 PUFF: 160; 4.5 AEROSOL RESPIRATORY (INHALATION) at 07:41

## 2023-07-14 ASSESSMENT — PAIN DESCRIPTION - PAIN TYPE: TYPE: ACUTE PAIN

## 2023-07-14 ASSESSMENT — PAIN DESCRIPTION - LOCATION
LOCATION: GENERALIZED
LOCATION: OTHER (COMMENT)

## 2023-07-14 ASSESSMENT — PAIN - FUNCTIONAL ASSESSMENT
PAIN_FUNCTIONAL_ASSESSMENT: PREVENTS OR INTERFERES SOME ACTIVE ACTIVITIES AND ADLS
PAIN_FUNCTIONAL_ASSESSMENT: PREVENTS OR INTERFERES WITH MANY ACTIVE NOT PASSIVE ACTIVITIES

## 2023-07-14 ASSESSMENT — PAIN SCALES - GENERAL
PAINLEVEL_OUTOF10: 0
PAINLEVEL_OUTOF10: 9
PAINLEVEL_OUTOF10: 10

## 2023-07-14 ASSESSMENT — PAIN DESCRIPTION - FREQUENCY: FREQUENCY: CONTINUOUS

## 2023-07-14 ASSESSMENT — PAIN DESCRIPTION - ORIENTATION: ORIENTATION: OTHER (COMMENT)

## 2023-07-14 ASSESSMENT — PAIN DESCRIPTION - DIRECTION: RADIATING_TOWARDS: NO

## 2023-07-14 ASSESSMENT — PAIN DESCRIPTION - ONSET: ONSET: ON-GOING

## 2023-07-14 ASSESSMENT — PAIN DESCRIPTION - DESCRIPTORS: DESCRIPTORS: ACHING

## 2023-07-14 NOTE — PROGRESS NOTES
Nutrition Assessment     Type and Reason for Visit: Reassess    Nutrition Recommendations/Plan:   If pt unable to tolerate any PO, consider short-term PN via peripheral IV access. Malnutrition Assessment:  Malnutrition Status: Severe malnutrition    Nutrition Assessment:  Pt continues to decline from a nutritional standpoint AEB persistent N/V and C-diff with poor PO intake. No PO intake documented in flowsheet since admit. Pt has declined GI work-up. She does have PRN Zofran available for nausea, last given 7/11 per MAR. If pt unable to tolerate any PO, she may benefit from short-term PN via peripheral IV access. Will continue to follow. Estimated Daily Nutrient Needs:  Energy (kcal):  9230-1652 kcals/day Weight Used for Energy Requirements: Current     Protein (g):   g/protein/day Weight Used for Protein Requirements: Current        Fluid (ml/day):  6456-5115 mL/day Method Used for Fluid Requirements: 1 ml/kcal    Current Nutrition Therapies:    ADULT DIET;  Regular  ADULT ORAL NUTRITION SUPPLEMENT; Breakfast, Lunch, Dinner; Standard High Calorie/High Protein Oral Supplement    Anthropometric Measures:  Height: 5' 6\" (167.6 cm)  Current Body Wt: 124 lb (56.2 kg)   BMI: 20    Nutrition Diagnosis:   Severe malnutrition, In context of chronic illness related to inadequate protein-energy intake as evidenced by poor intake prior to admission, weight loss greater than or equal to 5% in 1 month    Nutrition Interventions:   Food and/or Nutrient Delivery: Continue Current Diet, Start Parenteral Nutrition  Nutrition Education/Counseling: No recommendation at this time  Coordination of Nutrition Care: Continue to monitor while inpatient       Goals:  Previous Goal Met: No Progress toward Goal(s)  Goals: PO intake 50% or greater       Nutrition Monitoring and Evaluation:   Behavioral-Environmental Outcomes: None Identified  Food/Nutrient Intake Outcomes: Food and Nutrient Intake, Supplement Intake  Physical

## 2023-07-14 NOTE — PROGRESS NOTES
Occupational Therapy  Evaluation attempted. The patient declines stating she doesn't feel well and would like to sleep.   Electronically signed by Satinder Cardenas OT on 7/14/2023 at 2:29 PM

## 2023-07-14 NOTE — PROGRESS NOTES
SLP ALL NOTES  Facility/Department: [facility] ONCOLOGY UNIT   CLINICAL BEDSIDE SWALLOW EVALUATION    NAME: Edda Gautam  : 1950  MRN: [id number]    ADMISSION DATE: 2023  ADMITTING DIAGNOSIS: has Chronic pain; Anxiety and depression; GERD (gastroesophageal reflux disease); Stage 4 very severe COPD by GOLD classification ([address]); Chronic bronchitis with acute exacerbation ([address]); Hypertension; Echocardiogram abnormal; COPD exacerbation ([address]); Cervicalgia; Chronic low back pain; Acute hypercapnic respiratory failure ([address]); Alpha-1-antitrypsin deficiency carrier; Current every day smoker; Folate deficiency anemia; Palliative care patient; Severe malnutrition ([address]); Intractable nausea and vomiting; Gram-negative bacteremia; Hypomagnesemia; Tobacco abuse counseling; Cigarette nicotine dependence with nicotine-induced disorder; Clostridium perfringens infection; Chronic respiratory failure with hypoxia and hypercapnia ([address]); Anxiety; Upper abdominal pain; Heart burn; Nausea; Rhinovirus infection; Pneumonia; Loculated pleural effusion; Empyema ([address]); History of smoking; Shortness of breath; Chest pain; Anterior cervical lymphadenopathy; Compensated respiratory acidosis; Hypokalemia; Hypoxemia requiring supplemental oxygen; Lung nodule, multiple; Nasopharyngitis; Oxygen dependent; Unresolved grief; Weight loss; Wound of right foot; Right lower lobe consolidation ([address]); Sinus congestion; Sepsis ([address]); Acute on chronic respiratory failure with hypoxemia ([address]); Gastroenteritis; Nausea vomiting and diarrhea; and Clostridium difficile enterocolitis on their problem list.  ONSET DATE: 2023    Recent Chest Xray/CT of Chest: (2023)    Date of Eval: 2023  Evaluating Therapist: ANDREW Rivera    Current Diet level:  Current Diet : Regular      Primary Complaint  Patient Complaint: Pt has had decreased PO intake over the past few days due to nausea.     Pain:  Pain Assessment  Pain Assessment: 0-10  Pain Level:

## 2023-07-14 NOTE — PROGRESS NOTES
[facility] Progress Note    Patient:  Sachin Haines  YOB: 1950  Date of Service: 7/14/2023  MRN: [id number]   Acct: [de-identified]   Primary Care Physician: TEN Trujillo NP  Advance Directive: Full Code  Admit Date: 7/9/2023       Hospital Day: 5  . CHIEF COMPLAINT:     Chief Complaint   Patient presents with    Shortness of Breath    Cough     Released with pneumonia 2 wks ago       7/14/2023 7:44 AM  Subjective / Interval History:   07/14/2023  Patient seen and examined. Doing well. No new complaints. No acute changes or acute overnight events reported. Lying comfortably in bed in no apparent acute distress. Denies any acute complaints or distress at this time. Patient continues to endorse poor p.o. intake. Family at bedside. 07/13/2023  Patient seen and examined. Continues to endorse nausea and vomiting, and unable to tolerate p.o. intake. No diarrhea reported this AM.  Patient laying comfortably in bed in no acute distress. No acute changes or acute overnight event reported. 07/12/2023  No acute changes or acute overnight event reported. Patient seen and examined. Doing well. No new complaints. Continues to endorse intermittent diarrhea. Patient also endorses some nausea but no vomiting this AM.  Laying comfortably in bed in no acute distress. Family at bedside          Review of Systems:   Review of Systems  ROS: 14 point review of systems is negative except as specifically addressed above. ADULT DIET;  Regular  ADULT ORAL NUTRITION SUPPLEMENT; Breakfast, Lunch, Dinner; Standard High Calorie/High Protein Oral Supplement    Intake/Output Summary (Last 24 hours) at 7/14/2023 0744  Last data filed at 7/14/2023 0720  Gross per 24 hour   Intake 972.01 ml   Output 500 ml   Net 472.01 ml         Medications:   sodium chloride      sodium chloride 75 mL/hr at 07/11/23 2344     Current Facility-Administered Medications   Medication Dose Route Frequency

## 2023-07-14 NOTE — PROGRESS NOTES
Physical Therapy  Name: Solis Burgos  MRN:  [id number]  Date of service:  7/14/2023    RN, Francis Nunez PT. Pt unwilling to work with therapy at this time. States she is not feeling well and was trying to nap. Pt's daughter present. Will f/u at a later time.     Electronically signed by Neno Mathew PT on 7/14/2023 at 1:51 PM

## 2023-07-15 LAB
ALBUMIN SERPL-MCNC: 3.3 G/DL (ref 3.5–5.2)
ALP SERPL-CCNC: 52 U/L (ref 35–104)
ALT SERPL-CCNC: <5 U/L (ref 5–33)
ANION GAP SERPL CALCULATED.3IONS-SCNC: 12 MMOL/L (ref 7–19)
AST SERPL-CCNC: 8 U/L (ref 5–32)
BACTERIA BLD CULT: NORMAL
BASOPHILS # BLD: 0 K/UL (ref 0–0.2)
BASOPHILS NFR BLD: 0.7 % (ref 0–1)
BILIRUB SERPL-MCNC: <0.2 MG/DL (ref 0.2–1.2)
BUN SERPL-MCNC: 5 MG/DL (ref 8–23)
CALCIUM SERPL-MCNC: 8.8 MG/DL (ref 8.8–10.2)
CHLORIDE SERPL-SCNC: 101 MMOL/L (ref 98–111)
CO2 SERPL-SCNC: 27 MMOL/L (ref 22–29)
CREAT SERPL-MCNC: 0.3 MG/DL (ref 0.5–0.9)
EOSINOPHIL # BLD: 0.1 K/UL (ref 0–0.6)
EOSINOPHIL NFR BLD: 2.5 % (ref 0–5)
ERYTHROCYTE [DISTWIDTH] IN BLOOD BY AUTOMATED COUNT: 12.9 % (ref 11.5–14.5)
GLUCOSE SERPL-MCNC: 84 MG/DL (ref 74–109)
HCT VFR BLD AUTO: 28.5 % (ref 37–47)
HGB BLD-MCNC: 9.5 G/DL (ref 12–16)
IMM GRANULOCYTES # BLD: 0 K/UL
LYMPHOCYTES # BLD: 0.8 K/UL (ref 1.1–4.5)
LYMPHOCYTES NFR BLD: 18.4 % (ref 20–40)
MAGNESIUM SERPL-MCNC: 1.5 MG/DL (ref 1.6–2.4)
MCH RBC QN AUTO: 32.2 PG (ref 27–31)
MCHC RBC AUTO-ENTMCNC: 33.3 G/DL (ref 33–37)
MCV RBC AUTO: 96.6 FL (ref 81–99)
MONOCYTES # BLD: 0.5 K/UL (ref 0–0.9)
MONOCYTES NFR BLD: 11.7 % (ref 0–10)
NEUTROPHILS # BLD: 3 K/UL (ref 1.5–7.5)
NEUTS SEG NFR BLD: 66 % (ref 50–65)
PLATELET # BLD AUTO: 204 K/UL (ref 130–400)
PMV BLD AUTO: 10.8 FL (ref 9.4–12.3)
POTASSIUM SERPL-SCNC: 3.1 MMOL/L (ref 3.5–5)
POTASSIUM SERPL-SCNC: 3.9 MMOL/L (ref 3.5–5)
PROT SERPL-MCNC: 6.2 G/DL (ref 6.6–8.7)
RBC # BLD AUTO: 2.95 M/UL (ref 4.2–5.4)
SODIUM SERPL-SCNC: 140 MMOL/L (ref 136–145)
WBC # BLD AUTO: 4.5 K/UL (ref 4.8–10.8)

## 2023-07-15 PROCEDURE — 84132 ASSAY OF SERUM POTASSIUM: CPT

## 2023-07-15 PROCEDURE — 94761 N-INVAS EAR/PLS OXIMETRY MLT: CPT

## 2023-07-15 PROCEDURE — 2580000003 HC RX 258: Performed by: SPECIALIST

## 2023-07-15 PROCEDURE — 6370000000 HC RX 637 (ALT 250 FOR IP): Performed by: HOSPITALIST

## 2023-07-15 PROCEDURE — 1210000000 HC MED SURG R&B

## 2023-07-15 PROCEDURE — C9113 INJ PANTOPRAZOLE SODIUM, VIA: HCPCS | Performed by: SPECIALIST

## 2023-07-15 PROCEDURE — 2580000003 HC RX 258: Performed by: EMERGENCY MEDICINE

## 2023-07-15 PROCEDURE — 85025 COMPLETE CBC W/AUTO DIFF WBC: CPT

## 2023-07-15 PROCEDURE — 2500000003 HC RX 250 WO HCPCS: Performed by: HOSPITALIST

## 2023-07-15 PROCEDURE — 2700000000 HC OXYGEN THERAPY PER DAY

## 2023-07-15 PROCEDURE — 36415 COLL VENOUS BLD VENIPUNCTURE: CPT

## 2023-07-15 PROCEDURE — 2580000003 HC RX 258: Performed by: HOSPITALIST

## 2023-07-15 PROCEDURE — 83735 ASSAY OF MAGNESIUM: CPT

## 2023-07-15 PROCEDURE — 2580000003 HC RX 258: Performed by: STUDENT IN AN ORGANIZED HEALTH CARE EDUCATION/TRAINING PROGRAM

## 2023-07-15 PROCEDURE — 6370000000 HC RX 637 (ALT 250 FOR IP): Performed by: STUDENT IN AN ORGANIZED HEALTH CARE EDUCATION/TRAINING PROGRAM

## 2023-07-15 PROCEDURE — 6360000002 HC RX W HCPCS: Performed by: EMERGENCY MEDICINE

## 2023-07-15 PROCEDURE — 6370000000 HC RX 637 (ALT 250 FOR IP): Performed by: INTERNAL MEDICINE

## 2023-07-15 PROCEDURE — 6360000002 HC RX W HCPCS: Performed by: STUDENT IN AN ORGANIZED HEALTH CARE EDUCATION/TRAINING PROGRAM

## 2023-07-15 PROCEDURE — 6360000002 HC RX W HCPCS: Performed by: SPECIALIST

## 2023-07-15 PROCEDURE — 6360000002 HC RX W HCPCS: Performed by: HOSPITALIST

## 2023-07-15 PROCEDURE — 94640 AIRWAY INHALATION TREATMENT: CPT

## 2023-07-15 PROCEDURE — 80053 COMPREHEN METABOLIC PANEL: CPT

## 2023-07-15 RX ADMIN — CEFEPIME 2000 MG: 2 INJECTION, POWDER, FOR SOLUTION INTRAVENOUS at 21:53

## 2023-07-15 RX ADMIN — ALPRAZOLAM 1 MG: 0.5 TABLET ORAL at 03:30

## 2023-07-15 RX ADMIN — PROCHLORPERAZINE EDISYLATE 10 MG: 5 INJECTION INTRAMUSCULAR; INTRAVENOUS at 06:13

## 2023-07-15 RX ADMIN — IPRATROPIUM BROMIDE AND ALBUTEROL SULFATE 1 DOSE: 2.5; .5 SOLUTION RESPIRATORY (INHALATION) at 06:53

## 2023-07-15 RX ADMIN — SODIUM CHLORIDE, PRESERVATIVE FREE 10 ML: 5 INJECTION INTRAVENOUS at 09:40

## 2023-07-15 RX ADMIN — ENOXAPARIN SODIUM 60 MG: 100 INJECTION SUBCUTANEOUS at 09:39

## 2023-07-15 RX ADMIN — VANCOMYCIN HYDROCHLORIDE 125 MG: KIT at 03:25

## 2023-07-15 RX ADMIN — ENOXAPARIN SODIUM 60 MG: 100 INJECTION SUBCUTANEOUS at 21:47

## 2023-07-15 RX ADMIN — BUDESONIDE AND FORMOTEROL FUMARATE DIHYDRATE 2 PUFF: 160; 4.5 AEROSOL RESPIRATORY (INHALATION) at 19:14

## 2023-07-15 RX ADMIN — FOLIC ACID 1 MG: 1 TABLET ORAL at 09:39

## 2023-07-15 RX ADMIN — OXYCODONE HYDROCHLORIDE AND ACETAMINOPHEN 1 TABLET: 10; 325 TABLET ORAL at 05:32

## 2023-07-15 RX ADMIN — SUCRALFATE 1 G: 1 TABLET ORAL at 09:39

## 2023-07-15 RX ADMIN — IPRATROPIUM BROMIDE AND ALBUTEROL SULFATE 1 DOSE: 2.5; .5 SOLUTION RESPIRATORY (INHALATION) at 19:14

## 2023-07-15 RX ADMIN — ALPRAZOLAM 1 MG: 0.5 TABLET ORAL at 13:51

## 2023-07-15 RX ADMIN — FLUTICASONE PROPIONATE 2 SPRAY: 50 SPRAY, METERED NASAL at 09:40

## 2023-07-15 RX ADMIN — MAGNESIUM SULFATE HEPTAHYDRATE 2000 MG: 40 INJECTION, SOLUTION INTRAVENOUS at 09:54

## 2023-07-15 RX ADMIN — CEFEPIME 2000 MG: 2 INJECTION, POWDER, FOR SOLUTION INTRAVENOUS at 05:41

## 2023-07-15 RX ADMIN — ALPRAZOLAM 1 MG: 0.5 TABLET ORAL at 21:47

## 2023-07-15 RX ADMIN — SUCRALFATE 1 G: 1 TABLET ORAL at 21:47

## 2023-07-15 RX ADMIN — VANCOMYCIN HYDROCHLORIDE 125 MG: KIT at 22:28

## 2023-07-15 RX ADMIN — CEFEPIME 2000 MG: 2 INJECTION, POWDER, FOR SOLUTION INTRAVENOUS at 12:41

## 2023-07-15 RX ADMIN — CITALOPRAM HYDROBROMIDE 20 MG: 20 TABLET ORAL at 09:39

## 2023-07-15 RX ADMIN — GUAIFENESIN 200 MG: 100 SOLUTION ORAL at 21:48

## 2023-07-15 RX ADMIN — POTASSIUM BICARBONATE 40 MEQ: 782 TABLET, EFFERVESCENT ORAL at 05:35

## 2023-07-15 RX ADMIN — OXYCODONE HYDROCHLORIDE AND ACETAMINOPHEN 1 TABLET: 10; 325 TABLET ORAL at 13:51

## 2023-07-15 RX ADMIN — FERROUS SULFATE TAB 325 MG (65 MG ELEMENTAL FE) 325 MG: 325 (65 FE) TAB at 09:39

## 2023-07-15 RX ADMIN — VANCOMYCIN HYDROCHLORIDE 125 MG: KIT at 09:39

## 2023-07-15 RX ADMIN — IPRATROPIUM BROMIDE AND ALBUTEROL SULFATE 1 DOSE: 2.5; .5 SOLUTION RESPIRATORY (INHALATION) at 14:16

## 2023-07-15 RX ADMIN — SODIUM CHLORIDE, PRESERVATIVE FREE 40 MG: 5 INJECTION INTRAVENOUS at 09:38

## 2023-07-15 RX ADMIN — PROCHLORPERAZINE EDISYLATE 10 MG: 5 INJECTION INTRAMUSCULAR; INTRAVENOUS at 21:56

## 2023-07-15 RX ADMIN — IPRATROPIUM BROMIDE AND ALBUTEROL SULFATE 1 DOSE: 2.5; .5 SOLUTION RESPIRATORY (INHALATION) at 10:36

## 2023-07-15 RX ADMIN — SODIUM CHLORIDE: 9 INJECTION, SOLUTION INTRAVENOUS at 03:25

## 2023-07-15 RX ADMIN — BUDESONIDE AND FORMOTEROL FUMARATE DIHYDRATE 2 PUFF: 160; 4.5 AEROSOL RESPIRATORY (INHALATION) at 06:53

## 2023-07-15 RX ADMIN — SODIUM CHLORIDE, PRESERVATIVE FREE 40 MG: 5 INJECTION INTRAVENOUS at 21:56

## 2023-07-15 RX ADMIN — OXYCODONE HYDROCHLORIDE AND ACETAMINOPHEN 1 TABLET: 10; 325 TABLET ORAL at 21:47

## 2023-07-15 ASSESSMENT — PAIN DESCRIPTION - DESCRIPTORS
DESCRIPTORS: ACHING
DESCRIPTORS: ACHING

## 2023-07-15 ASSESSMENT — PAIN DESCRIPTION - ORIENTATION
ORIENTATION: MID
ORIENTATION: OTHER (COMMENT)

## 2023-07-15 ASSESSMENT — PAIN SCALES - GENERAL
PAINLEVEL_OUTOF10: 2
PAINLEVEL_OUTOF10: 4
PAINLEVEL_OUTOF10: 2
PAINLEVEL_OUTOF10: 5
PAINLEVEL_OUTOF10: 8
PAINLEVEL_OUTOF10: 7
PAINLEVEL_OUTOF10: 4
PAINLEVEL_OUTOF10: 0

## 2023-07-15 ASSESSMENT — PAIN DESCRIPTION - PAIN TYPE: TYPE: ACUTE PAIN

## 2023-07-15 ASSESSMENT — PAIN DESCRIPTION - LOCATION
LOCATION: GENERALIZED
LOCATION: ABDOMEN

## 2023-07-15 ASSESSMENT — PAIN DESCRIPTION - FREQUENCY: FREQUENCY: CONTINUOUS

## 2023-07-15 ASSESSMENT — PAIN DESCRIPTION - ONSET: ONSET: ON-GOING

## 2023-07-15 ASSESSMENT — PAIN DESCRIPTION - DIRECTION: RADIATING_TOWARDS: NO

## 2023-07-15 ASSESSMENT — PAIN - FUNCTIONAL ASSESSMENT: PAIN_FUNCTIONAL_ASSESSMENT: PREVENTS OR INTERFERES SOME ACTIVE ACTIVITIES AND ADLS

## 2023-07-15 NOTE — PROGRESS NOTES
[facility] Progress Note    Patient:  Rolo Gongora  YOB: 1950  Date of Service: 7/15/2023  MRN: [id number]   Acct: [de-identified]   Primary Care Physician: TEN Farr NP  Advance Directive: Full Code  Admit Date: 7/9/2023       Hospital Day: 6  . CHIEF COMPLAINT:     Chief Complaint   Patient presents with    Shortness of Breath    Cough     Released with pneumonia 2 wks ago       7/15/2023 10:39 AM  Subjective / Interval History:   07/15/2023  Patient endorses episodes of vomiting yesterday. No acute changes or acute overnight event reported. Laying comfortably in bed in no acute distress. Denies any acute complaints or symptoms. Shortness of breath improved. No active diarrhea at this time       07/14/2023  Patient seen and examined. Doing well. No new complaints. No acute changes or acute overnight events reported. Lying comfortably in bed in no apparent acute distress. Denies any acute complaints or distress at this time. Patient continues to endorse poor p.o. intake. Family at bedside. 07/13/2023  Patient seen and examined. Continues to endorse nausea and vomiting, and unable to tolerate p.o. intake. No diarrhea reported this AM.  Patient laying comfortably in bed in no acute distress. No acute changes or acute overnight event reported. 07/12/2023  No acute changes or acute overnight event reported. Patient seen and examined. Doing well. No new complaints. Continues to endorse intermittent diarrhea. Patient also endorses some nausea but no vomiting this AM.  Laying comfortably in bed in no acute distress. Family at bedside          Review of Systems:   Review of Systems  ROS: 14 point review of systems is negative except as specifically addressed above. ADULT DIET;  Clear Liquid  ADULT ORAL NUTRITION SUPPLEMENT; Breakfast, Lunch, Dinner; Clear Liquid Oral Supplement    Intake/Output Summary (Last 24 hours) at 7/15/2023 1039  Last data

## 2023-07-16 LAB
ALBUMIN SERPL-MCNC: 3.5 G/DL (ref 3.5–5.2)
ALP SERPL-CCNC: 53 U/L (ref 35–104)
ALT SERPL-CCNC: <5 U/L (ref 5–33)
ANION GAP SERPL CALCULATED.3IONS-SCNC: 11 MMOL/L (ref 7–19)
AST SERPL-CCNC: 9 U/L (ref 5–32)
BASOPHILS # BLD: 0 K/UL (ref 0–0.2)
BASOPHILS NFR BLD: 0.7 % (ref 0–1)
BILIRUB SERPL-MCNC: <0.2 MG/DL (ref 0.2–1.2)
BUN SERPL-MCNC: 6 MG/DL (ref 8–23)
CALCIUM SERPL-MCNC: 8.6 MG/DL (ref 8.8–10.2)
CHLORIDE SERPL-SCNC: 104 MMOL/L (ref 98–111)
CO2 SERPL-SCNC: 27 MMOL/L (ref 22–29)
CREAT SERPL-MCNC: 0.4 MG/DL (ref 0.5–0.9)
EOSINOPHIL # BLD: 0.2 K/UL (ref 0–0.6)
EOSINOPHIL NFR BLD: 3.3 % (ref 0–5)
ERYTHROCYTE [DISTWIDTH] IN BLOOD BY AUTOMATED COUNT: 13.1 % (ref 11.5–14.5)
GLUCOSE SERPL-MCNC: 109 MG/DL (ref 74–109)
HCT VFR BLD AUTO: 29.7 % (ref 37–47)
HGB BLD-MCNC: 9.7 G/DL (ref 12–16)
IMM GRANULOCYTES # BLD: 0 K/UL
LYMPHOCYTES # BLD: 1 K/UL (ref 1.1–4.5)
LYMPHOCYTES NFR BLD: 22 % (ref 20–40)
MAGNESIUM SERPL-MCNC: 1.9 MG/DL (ref 1.6–2.4)
MCH RBC QN AUTO: 32.1 PG (ref 27–31)
MCHC RBC AUTO-ENTMCNC: 32.7 G/DL (ref 33–37)
MCV RBC AUTO: 98.3 FL (ref 81–99)
MONOCYTES # BLD: 0.6 K/UL (ref 0–0.9)
MONOCYTES NFR BLD: 12 % (ref 0–10)
NEUTROPHILS # BLD: 2.8 K/UL (ref 1.5–7.5)
NEUTS SEG NFR BLD: 61.6 % (ref 50–65)
PLATELET # BLD AUTO: 232 K/UL (ref 130–400)
PMV BLD AUTO: 10.8 FL (ref 9.4–12.3)
POTASSIUM SERPL-SCNC: 3.5 MMOL/L (ref 3.5–5)
PROT SERPL-MCNC: 5.8 G/DL (ref 6.6–8.7)
RBC # BLD AUTO: 3.02 M/UL (ref 4.2–5.4)
SODIUM SERPL-SCNC: 142 MMOL/L (ref 136–145)
WBC # BLD AUTO: 4.6 K/UL (ref 4.8–10.8)

## 2023-07-16 PROCEDURE — 2580000003 HC RX 258: Performed by: STUDENT IN AN ORGANIZED HEALTH CARE EDUCATION/TRAINING PROGRAM

## 2023-07-16 PROCEDURE — 6360000002 HC RX W HCPCS: Performed by: SPECIALIST

## 2023-07-16 PROCEDURE — 2500000003 HC RX 250 WO HCPCS: Performed by: HOSPITALIST

## 2023-07-16 PROCEDURE — 6360000002 HC RX W HCPCS: Performed by: EMERGENCY MEDICINE

## 2023-07-16 PROCEDURE — 94640 AIRWAY INHALATION TREATMENT: CPT

## 2023-07-16 PROCEDURE — 6370000000 HC RX 637 (ALT 250 FOR IP): Performed by: HOSPITALIST

## 2023-07-16 PROCEDURE — 6370000000 HC RX 637 (ALT 250 FOR IP): Performed by: STUDENT IN AN ORGANIZED HEALTH CARE EDUCATION/TRAINING PROGRAM

## 2023-07-16 PROCEDURE — 85025 COMPLETE CBC W/AUTO DIFF WBC: CPT

## 2023-07-16 PROCEDURE — 6360000002 HC RX W HCPCS: Performed by: STUDENT IN AN ORGANIZED HEALTH CARE EDUCATION/TRAINING PROGRAM

## 2023-07-16 PROCEDURE — 1210000000 HC MED SURG R&B

## 2023-07-16 PROCEDURE — 94761 N-INVAS EAR/PLS OXIMETRY MLT: CPT

## 2023-07-16 PROCEDURE — 6370000000 HC RX 637 (ALT 250 FOR IP): Performed by: INTERNAL MEDICINE

## 2023-07-16 PROCEDURE — 36415 COLL VENOUS BLD VENIPUNCTURE: CPT

## 2023-07-16 PROCEDURE — 2580000003 HC RX 258: Performed by: SPECIALIST

## 2023-07-16 PROCEDURE — 80053 COMPREHEN METABOLIC PANEL: CPT

## 2023-07-16 PROCEDURE — 6360000002 HC RX W HCPCS: Performed by: HOSPITALIST

## 2023-07-16 PROCEDURE — 2580000003 HC RX 258: Performed by: EMERGENCY MEDICINE

## 2023-07-16 PROCEDURE — 2580000003 HC RX 258: Performed by: HOSPITALIST

## 2023-07-16 PROCEDURE — 83735 ASSAY OF MAGNESIUM: CPT

## 2023-07-16 PROCEDURE — C9113 INJ PANTOPRAZOLE SODIUM, VIA: HCPCS | Performed by: SPECIALIST

## 2023-07-16 PROCEDURE — 2700000000 HC OXYGEN THERAPY PER DAY

## 2023-07-16 RX ORDER — GABAPENTIN 600 MG/1
300 TABLET ORAL 3 TIMES DAILY
Status: DISCONTINUED | OUTPATIENT
Start: 2023-07-16 | End: 2023-07-21 | Stop reason: HOSPADM

## 2023-07-16 RX ADMIN — CEFEPIME 2000 MG: 2 INJECTION, POWDER, FOR SOLUTION INTRAVENOUS at 21:15

## 2023-07-16 RX ADMIN — ALPRAZOLAM 1 MG: 0.5 TABLET ORAL at 20:45

## 2023-07-16 RX ADMIN — CEFEPIME 2000 MG: 2 INJECTION, POWDER, FOR SOLUTION INTRAVENOUS at 05:07

## 2023-07-16 RX ADMIN — BUDESONIDE AND FORMOTEROL FUMARATE DIHYDRATE 2 PUFF: 160; 4.5 AEROSOL RESPIRATORY (INHALATION) at 06:39

## 2023-07-16 RX ADMIN — CITALOPRAM HYDROBROMIDE 20 MG: 20 TABLET ORAL at 07:57

## 2023-07-16 RX ADMIN — VANCOMYCIN HYDROCHLORIDE 125 MG: KIT at 05:04

## 2023-07-16 RX ADMIN — SUCRALFATE 1 G: 1 TABLET ORAL at 13:36

## 2023-07-16 RX ADMIN — IPRATROPIUM BROMIDE AND ALBUTEROL SULFATE 1 DOSE: 2.5; .5 SOLUTION RESPIRATORY (INHALATION) at 19:51

## 2023-07-16 RX ADMIN — VANCOMYCIN HYDROCHLORIDE 125 MG: KIT at 10:15

## 2023-07-16 RX ADMIN — FERROUS SULFATE TAB 325 MG (65 MG ELEMENTAL FE) 325 MG: 325 (65 FE) TAB at 17:21

## 2023-07-16 RX ADMIN — SUCRALFATE 1 G: 1 TABLET ORAL at 20:45

## 2023-07-16 RX ADMIN — GUAIFENESIN 200 MG: 100 SOLUTION ORAL at 20:44

## 2023-07-16 RX ADMIN — IPRATROPIUM BROMIDE AND ALBUTEROL SULFATE 1 DOSE: 2.5; .5 SOLUTION RESPIRATORY (INHALATION) at 06:39

## 2023-07-16 RX ADMIN — GABAPENTIN 300 MG: 600 TABLET, FILM COATED ORAL at 20:45

## 2023-07-16 RX ADMIN — SODIUM CHLORIDE, PRESERVATIVE FREE 40 MG: 5 INJECTION INTRAVENOUS at 21:24

## 2023-07-16 RX ADMIN — SODIUM CHLORIDE, PRESERVATIVE FREE 40 MG: 5 INJECTION INTRAVENOUS at 07:58

## 2023-07-16 RX ADMIN — ALPRAZOLAM 1 MG: 0.5 TABLET ORAL at 14:53

## 2023-07-16 RX ADMIN — ENOXAPARIN SODIUM 60 MG: 100 INJECTION SUBCUTANEOUS at 20:46

## 2023-07-16 RX ADMIN — VANCOMYCIN HYDROCHLORIDE 125 MG: KIT at 17:22

## 2023-07-16 RX ADMIN — PROCHLORPERAZINE EDISYLATE 10 MG: 5 INJECTION INTRAMUSCULAR; INTRAVENOUS at 07:58

## 2023-07-16 RX ADMIN — PROCHLORPERAZINE EDISYLATE 10 MG: 5 INJECTION INTRAMUSCULAR; INTRAVENOUS at 23:25

## 2023-07-16 RX ADMIN — CEFEPIME 2000 MG: 2 INJECTION, POWDER, FOR SOLUTION INTRAVENOUS at 13:39

## 2023-07-16 RX ADMIN — GUAIFENESIN 200 MG: 100 SOLUTION ORAL at 08:15

## 2023-07-16 RX ADMIN — ALPRAZOLAM 1 MG: 0.5 TABLET ORAL at 03:26

## 2023-07-16 RX ADMIN — FOLIC ACID 1 MG: 1 TABLET ORAL at 07:58

## 2023-07-16 RX ADMIN — IPRATROPIUM BROMIDE AND ALBUTEROL SULFATE 1 DOSE: 2.5; .5 SOLUTION RESPIRATORY (INHALATION) at 14:29

## 2023-07-16 RX ADMIN — SODIUM CHLORIDE, PRESERVATIVE FREE 10 ML: 5 INJECTION INTRAVENOUS at 08:14

## 2023-07-16 RX ADMIN — ENOXAPARIN SODIUM 60 MG: 100 INJECTION SUBCUTANEOUS at 08:15

## 2023-07-16 RX ADMIN — OXYCODONE HYDROCHLORIDE AND ACETAMINOPHEN 1 TABLET: 10; 325 TABLET ORAL at 03:26

## 2023-07-16 RX ADMIN — BUDESONIDE AND FORMOTEROL FUMARATE DIHYDRATE 2 PUFF: 160; 4.5 AEROSOL RESPIRATORY (INHALATION) at 19:49

## 2023-07-16 RX ADMIN — VANCOMYCIN HYDROCHLORIDE 125 MG: KIT at 23:16

## 2023-07-16 RX ADMIN — OXYCODONE HYDROCHLORIDE AND ACETAMINOPHEN 1 TABLET: 10; 325 TABLET ORAL at 08:15

## 2023-07-16 RX ADMIN — FERROUS SULFATE TAB 325 MG (65 MG ELEMENTAL FE) 325 MG: 325 (65 FE) TAB at 07:58

## 2023-07-16 RX ADMIN — SODIUM CHLORIDE: 9 INJECTION, SOLUTION INTRAVENOUS at 10:53

## 2023-07-16 RX ADMIN — OXYCODONE HYDROCHLORIDE AND ACETAMINOPHEN 1 TABLET: 10; 325 TABLET ORAL at 23:31

## 2023-07-16 RX ADMIN — ERGOCALCIFEROL 50000 UNITS: 1.25 CAPSULE ORAL at 08:16

## 2023-07-16 RX ADMIN — SUCRALFATE 1 G: 1 TABLET ORAL at 17:20

## 2023-07-16 RX ADMIN — SUCRALFATE 1 G: 1 TABLET ORAL at 07:58

## 2023-07-16 RX ADMIN — IPRATROPIUM BROMIDE AND ALBUTEROL SULFATE 1 DOSE: 2.5; .5 SOLUTION RESPIRATORY (INHALATION) at 03:02

## 2023-07-16 ASSESSMENT — PAIN SCALES - GENERAL
PAINLEVEL_OUTOF10: 9
PAINLEVEL_OUTOF10: 7
PAINLEVEL_OUTOF10: 7
PAINLEVEL_OUTOF10: 0

## 2023-07-16 ASSESSMENT — PAIN DESCRIPTION - DESCRIPTORS
DESCRIPTORS: ACHING
DESCRIPTORS: ACHING

## 2023-07-16 ASSESSMENT — PAIN DESCRIPTION - FREQUENCY: FREQUENCY: CONTINUOUS

## 2023-07-16 ASSESSMENT — PAIN DESCRIPTION - LOCATION
LOCATION: HEAD;ABDOMEN
LOCATION: GENERALIZED

## 2023-07-16 ASSESSMENT — PAIN DESCRIPTION - DIRECTION: RADIATING_TOWARDS: NO

## 2023-07-16 ASSESSMENT — PAIN DESCRIPTION - PAIN TYPE: TYPE: ACUTE PAIN

## 2023-07-16 ASSESSMENT — PAIN DESCRIPTION - ONSET: ONSET: ON-GOING

## 2023-07-16 ASSESSMENT — PAIN SCALES - WONG BAKER: WONGBAKER_NUMERICALRESPONSE: 0

## 2023-07-16 ASSESSMENT — PAIN - FUNCTIONAL ASSESSMENT: PAIN_FUNCTIONAL_ASSESSMENT: PREVENTS OR INTERFERES SOME ACTIVE ACTIVITIES AND ADLS

## 2023-07-16 ASSESSMENT — PAIN DESCRIPTION - ORIENTATION: ORIENTATION: OTHER (COMMENT)

## 2023-07-16 NOTE — PROGRESS NOTES
[facility] Progress Note    Patient:  Shackelford Shield  YOB: 1950  Date of Service: 7/16/2023  MRN: [id number]   Acct: [de-identified]   Primary Care Physician: TEN Ramirez NP  Advance Directive: Full Code  Admit Date: 7/9/2023       Hospital Day: 7  . CHIEF COMPLAINT:     Chief Complaint   Patient presents with    Shortness of Breath    Cough     Released with pneumonia 2 wks ago       7/16/2023 7:41 AM  Subjective / Interval History:   07/16/2023  New complaints. No acute changes or acute overnight event reported. Patient laying comfortably in bed in no acute distress. Denies any active diarrhea at this time. Patient continues to report decreased p.o. intake and intermittent nausea, but no vomiting.      07/15/2023  Patient endorses episodes of vomiting yesterday. No acute changes or acute overnight event reported. Laying comfortably in bed in no acute distress. Denies any acute complaints or symptoms. Shortness of breath improved. No active diarrhea at this time       07/14/2023  Patient seen and examined. Doing well. No new complaints. No acute changes or acute overnight events reported. Lying comfortably in bed in no apparent acute distress. Denies any acute complaints or distress at this time. Patient continues to endorse poor p.o. intake. Family at bedside. 07/13/2023  Patient seen and examined. Continues to endorse nausea and vomiting, and unable to tolerate p.o. intake. No diarrhea reported this AM.  Patient laying comfortably in bed in no acute distress. No acute changes or acute overnight event reported. 07/12/2023  No acute changes or acute overnight event reported. Patient seen and examined. Doing well. No new complaints. Continues to endorse intermittent diarrhea. Patient also endorses some nausea but no vomiting this AM.  Laying comfortably in bed in no acute distress.   Family at bedside          Review of Systems:   Review of

## 2023-07-17 LAB
ALBUMIN SERPL-MCNC: 3.3 G/DL (ref 3.5–5.2)
ALP SERPL-CCNC: 49 U/L (ref 35–104)
ALT SERPL-CCNC: <5 U/L (ref 5–33)
ANION GAP SERPL CALCULATED.3IONS-SCNC: 10 MMOL/L (ref 7–19)
AST SERPL-CCNC: 9 U/L (ref 5–32)
BASOPHILS # BLD: 0 K/UL (ref 0–0.2)
BASOPHILS NFR BLD: 0.9 % (ref 0–1)
BILIRUB SERPL-MCNC: <0.2 MG/DL (ref 0.2–1.2)
BUN SERPL-MCNC: 6 MG/DL (ref 8–23)
CALCIUM SERPL-MCNC: 8.4 MG/DL (ref 8.8–10.2)
CHLORIDE SERPL-SCNC: 105 MMOL/L (ref 98–111)
CO2 SERPL-SCNC: 27 MMOL/L (ref 22–29)
CREAT SERPL-MCNC: 0.3 MG/DL (ref 0.5–0.9)
EOSINOPHIL # BLD: 0.1 K/UL (ref 0–0.6)
EOSINOPHIL NFR BLD: 2.9 % (ref 0–5)
ERYTHROCYTE [DISTWIDTH] IN BLOOD BY AUTOMATED COUNT: 13.1 % (ref 11.5–14.5)
GLUCOSE SERPL-MCNC: 84 MG/DL (ref 74–109)
HCT VFR BLD AUTO: 30.1 % (ref 37–47)
HGB BLD-MCNC: 9.4 G/DL (ref 12–16)
IMM GRANULOCYTES # BLD: 0 K/UL
LYMPHOCYTES # BLD: 0.9 K/UL (ref 1.1–4.5)
LYMPHOCYTES NFR BLD: 19.7 % (ref 20–40)
MAGNESIUM SERPL-MCNC: 1.5 MG/DL (ref 1.6–2.4)
MCH RBC QN AUTO: 31.2 PG (ref 27–31)
MCHC RBC AUTO-ENTMCNC: 31.2 G/DL (ref 33–37)
MCV RBC AUTO: 100 FL (ref 81–99)
MONOCYTES # BLD: 0.5 K/UL (ref 0–0.9)
MONOCYTES NFR BLD: 10.2 % (ref 0–10)
NEUTROPHILS # BLD: 3 K/UL (ref 1.5–7.5)
NEUTS SEG NFR BLD: 65.6 % (ref 50–65)
PLATELET # BLD AUTO: 241 K/UL (ref 130–400)
PMV BLD AUTO: 10.6 FL (ref 9.4–12.3)
POTASSIUM SERPL-SCNC: 3.5 MMOL/L (ref 3.5–5)
PROT SERPL-MCNC: 5.3 G/DL (ref 6.6–8.7)
RBC # BLD AUTO: 3.01 M/UL (ref 4.2–5.4)
SODIUM SERPL-SCNC: 142 MMOL/L (ref 136–145)
WBC # BLD AUTO: 4.5 K/UL (ref 4.8–10.8)

## 2023-07-17 PROCEDURE — 05HD33Z INSERTION OF INFUSION DEVICE INTO RIGHT CEPHALIC VEIN, PERCUTANEOUS APPROACH: ICD-10-PCS | Performed by: INTERNAL MEDICINE

## 2023-07-17 PROCEDURE — 2500000003 HC RX 250 WO HCPCS: Performed by: HOSPITALIST

## 2023-07-17 PROCEDURE — 6370000000 HC RX 637 (ALT 250 FOR IP): Performed by: HOSPITALIST

## 2023-07-17 PROCEDURE — 6370000000 HC RX 637 (ALT 250 FOR IP): Performed by: INTERNAL MEDICINE

## 2023-07-17 PROCEDURE — 94761 N-INVAS EAR/PLS OXIMETRY MLT: CPT

## 2023-07-17 PROCEDURE — 2580000003 HC RX 258: Performed by: HOSPITALIST

## 2023-07-17 PROCEDURE — 36415 COLL VENOUS BLD VENIPUNCTURE: CPT

## 2023-07-17 PROCEDURE — 80053 COMPREHEN METABOLIC PANEL: CPT

## 2023-07-17 PROCEDURE — 6360000002 HC RX W HCPCS: Performed by: EMERGENCY MEDICINE

## 2023-07-17 PROCEDURE — 2700000000 HC OXYGEN THERAPY PER DAY

## 2023-07-17 PROCEDURE — 76937 US GUIDE VASCULAR ACCESS: CPT

## 2023-07-17 PROCEDURE — 6370000000 HC RX 637 (ALT 250 FOR IP): Performed by: STUDENT IN AN ORGANIZED HEALTH CARE EDUCATION/TRAINING PROGRAM

## 2023-07-17 PROCEDURE — 94640 AIRWAY INHALATION TREATMENT: CPT

## 2023-07-17 PROCEDURE — 6360000002 HC RX W HCPCS: Performed by: HOSPITALIST

## 2023-07-17 PROCEDURE — 6360000002 HC RX W HCPCS: Performed by: STUDENT IN AN ORGANIZED HEALTH CARE EDUCATION/TRAINING PROGRAM

## 2023-07-17 PROCEDURE — 85025 COMPLETE CBC W/AUTO DIFF WBC: CPT

## 2023-07-17 PROCEDURE — 6360000002 HC RX W HCPCS: Performed by: SPECIALIST

## 2023-07-17 PROCEDURE — 83735 ASSAY OF MAGNESIUM: CPT

## 2023-07-17 PROCEDURE — 36410 VNPNXR 3YR/> PHY/QHP DX/THER: CPT

## 2023-07-17 PROCEDURE — 2580000003 HC RX 258: Performed by: EMERGENCY MEDICINE

## 2023-07-17 PROCEDURE — C9113 INJ PANTOPRAZOLE SODIUM, VIA: HCPCS | Performed by: SPECIALIST

## 2023-07-17 PROCEDURE — 2580000003 HC RX 258: Performed by: SPECIALIST

## 2023-07-17 PROCEDURE — 2709999900 HC NON-CHARGEABLE SUPPLY

## 2023-07-17 PROCEDURE — 1210000000 HC MED SURG R&B

## 2023-07-17 RX ADMIN — CITALOPRAM HYDROBROMIDE 20 MG: 20 TABLET ORAL at 08:18

## 2023-07-17 RX ADMIN — ALPRAZOLAM 1 MG: 0.5 TABLET ORAL at 18:24

## 2023-07-17 RX ADMIN — SUCRALFATE 1 G: 1 TABLET ORAL at 18:24

## 2023-07-17 RX ADMIN — SUCRALFATE 1 G: 1 TABLET ORAL at 11:55

## 2023-07-17 RX ADMIN — GABAPENTIN 300 MG: 600 TABLET, FILM COATED ORAL at 15:27

## 2023-07-17 RX ADMIN — FERROUS SULFATE TAB 325 MG (65 MG ELEMENTAL FE) 325 MG: 325 (65 FE) TAB at 18:24

## 2023-07-17 RX ADMIN — CEFEPIME 2000 MG: 2 INJECTION, POWDER, FOR SOLUTION INTRAVENOUS at 05:30

## 2023-07-17 RX ADMIN — SUCRALFATE 1 G: 1 TABLET ORAL at 20:57

## 2023-07-17 RX ADMIN — FOLIC ACID 1 MG: 1 TABLET ORAL at 08:17

## 2023-07-17 RX ADMIN — VANCOMYCIN HYDROCHLORIDE 125 MG: KIT at 10:06

## 2023-07-17 RX ADMIN — CEFEPIME 2000 MG: 2 INJECTION, POWDER, FOR SOLUTION INTRAVENOUS at 20:55

## 2023-07-17 RX ADMIN — OXYCODONE HYDROCHLORIDE AND ACETAMINOPHEN 1 TABLET: 10; 325 TABLET ORAL at 06:39

## 2023-07-17 RX ADMIN — IPRATROPIUM BROMIDE AND ALBUTEROL SULFATE 1 DOSE: 2.5; .5 SOLUTION RESPIRATORY (INHALATION) at 10:47

## 2023-07-17 RX ADMIN — BUDESONIDE AND FORMOTEROL FUMARATE DIHYDRATE 2 PUFF: 160; 4.5 AEROSOL RESPIRATORY (INHALATION) at 19:21

## 2023-07-17 RX ADMIN — CEFEPIME 2000 MG: 2 INJECTION, POWDER, FOR SOLUTION INTRAVENOUS at 12:00

## 2023-07-17 RX ADMIN — BUDESONIDE AND FORMOTEROL FUMARATE DIHYDRATE 2 PUFF: 160; 4.5 AEROSOL RESPIRATORY (INHALATION) at 06:36

## 2023-07-17 RX ADMIN — GUAIFENESIN 200 MG: 100 SOLUTION ORAL at 08:17

## 2023-07-17 RX ADMIN — IPRATROPIUM BROMIDE AND ALBUTEROL SULFATE 1 DOSE: 2.5; .5 SOLUTION RESPIRATORY (INHALATION) at 19:18

## 2023-07-17 RX ADMIN — GABAPENTIN 300 MG: 600 TABLET, FILM COATED ORAL at 08:18

## 2023-07-17 RX ADMIN — SODIUM CHLORIDE, PRESERVATIVE FREE 10 ML: 5 INJECTION INTRAVENOUS at 20:56

## 2023-07-17 RX ADMIN — VANCOMYCIN HYDROCHLORIDE 125 MG: KIT at 18:24

## 2023-07-17 RX ADMIN — ENOXAPARIN SODIUM 60 MG: 100 INJECTION SUBCUTANEOUS at 20:55

## 2023-07-17 RX ADMIN — SUCRALFATE 1 G: 1 TABLET ORAL at 08:17

## 2023-07-17 RX ADMIN — FERROUS SULFATE TAB 325 MG (65 MG ELEMENTAL FE) 325 MG: 325 (65 FE) TAB at 08:18

## 2023-07-17 RX ADMIN — VANCOMYCIN HYDROCHLORIDE 125 MG: KIT at 22:45

## 2023-07-17 RX ADMIN — SODIUM CHLORIDE, PRESERVATIVE FREE 10 ML: 5 INJECTION INTRAVENOUS at 10:05

## 2023-07-17 RX ADMIN — IPRATROPIUM BROMIDE AND ALBUTEROL SULFATE 1 DOSE: 2.5; .5 SOLUTION RESPIRATORY (INHALATION) at 06:36

## 2023-07-17 RX ADMIN — GUAIFENESIN 200 MG: 100 SOLUTION ORAL at 20:56

## 2023-07-17 RX ADMIN — SODIUM CHLORIDE, PRESERVATIVE FREE 40 MG: 5 INJECTION INTRAVENOUS at 10:05

## 2023-07-17 RX ADMIN — GABAPENTIN 300 MG: 600 TABLET, FILM COATED ORAL at 20:57

## 2023-07-17 RX ADMIN — ENOXAPARIN SODIUM 60 MG: 100 INJECTION SUBCUTANEOUS at 08:31

## 2023-07-17 RX ADMIN — VANCOMYCIN HYDROCHLORIDE 125 MG: KIT at 05:28

## 2023-07-17 RX ADMIN — ALPRAZOLAM 1 MG: 0.5 TABLET ORAL at 05:28

## 2023-07-17 RX ADMIN — OXYCODONE HYDROCHLORIDE AND ACETAMINOPHEN 1 TABLET: 10; 325 TABLET ORAL at 20:56

## 2023-07-17 RX ADMIN — SODIUM CHLORIDE, PRESERVATIVE FREE 40 MG: 5 INJECTION INTRAVENOUS at 20:56

## 2023-07-17 RX ADMIN — PROCHLORPERAZINE EDISYLATE 10 MG: 5 INJECTION INTRAMUSCULAR; INTRAVENOUS at 10:29

## 2023-07-17 ASSESSMENT — PAIN SCALES - GENERAL
PAINLEVEL_OUTOF10: 0
PAINLEVEL_OUTOF10: 2
PAINLEVEL_OUTOF10: 6
PAINLEVEL_OUTOF10: 7
PAINLEVEL_OUTOF10: 4

## 2023-07-17 ASSESSMENT — PAIN DESCRIPTION - DESCRIPTORS
DESCRIPTORS: ACHING;DISCOMFORT
DESCRIPTORS: ACHING

## 2023-07-17 ASSESSMENT — PAIN DESCRIPTION - LOCATION
LOCATION: GENERALIZED
LOCATION: GENERALIZED

## 2023-07-17 ASSESSMENT — PAIN DESCRIPTION - DIRECTION: RADIATING_TOWARDS: NO

## 2023-07-17 ASSESSMENT — PAIN DESCRIPTION - ONSET: ONSET: ON-GOING

## 2023-07-17 ASSESSMENT — PAIN DESCRIPTION - PAIN TYPE: TYPE: ACUTE PAIN

## 2023-07-17 ASSESSMENT — PAIN DESCRIPTION - ORIENTATION: ORIENTATION: OTHER (COMMENT)

## 2023-07-17 ASSESSMENT — PAIN - FUNCTIONAL ASSESSMENT: PAIN_FUNCTIONAL_ASSESSMENT: PREVENTS OR INTERFERES SOME ACTIVE ACTIVITIES AND ADLS

## 2023-07-17 ASSESSMENT — PAIN DESCRIPTION - FREQUENCY: FREQUENCY: CONTINUOUS

## 2023-07-17 NOTE — PROCEDURES
[facility] Vascular Access Team:  PowerGlide Midline/Extended Dwell IV Catheter  Insertion Procedure Note      Patient: Giovanna Gonzalez  YOB: 1950   MRN: [id number]  Room: [address] [place] [address]     Attending Physician - Diane King MD  Ordering Physician - Diane King MD    Diagnosis:   Abdominal pain, epigastric [R10.13]  Sepsis ([address]) [A41.9]  Pneumonia of right lower lobe due to infectious organism [J18.9]  Sepsis, due to unspecified organism, unspecified whether acute organ dysfunction present [facility]) [A41.9]       Procedure(s): Insertion of a 20 gauge 8 cm Single Lumen PowerGlide Catheter    Date of Procedure: 7/17/2023     Performed by: Johnny Merrill RN    Complications: None      Findings:   1. Successful insertion of PowerGlide Catheter. 2. PowerGlide is ready to be used. Please change tubing prior to using the new line. Make sure to label, date and use alcohol caps on new tubing and alcohol caps on unused ports. 3. Patient may be changed to a unit draw for lab work. Detailed Description of Procedure: The Right Cephalic vein was visualized using the ultrasound and deemed a suitable vessel. The area was prepped with Chlorhexidine and draped in sterile fashion using partial barrier draping. The vein was accessed using US guidance. The 20 gauge 8 cm Single Lumen PowerGlide catheter was advanced into the vein using ANTT. Approximately 0 cm exposed. All lumens had brisk blood return and was flushed with 10 cc of NS. The catheter was secured using a securement device. A 3M CHG Tegaderm was placed over the securement device and insertion site. Dressing was dated and initialed with external measurement marked. Patient did tolerate procedure well.       Electronically signed by Johnny Merrill RN on 7/17/2023 at 10:08 AM

## 2023-07-17 NOTE — PROGRESS NOTES
[facility] Progress Note    Patient:  Christelle Patterson  YOB: 1950  Date of Service: 7/17/2023  MRN: [id number]   Acct: [de-identified]   Primary Care Physician: TEN Torres NP  Advance Directive: Full Code  Admit Date: 7/9/2023       Hospital Day: 8  . CHIEF COMPLAINT:     Chief Complaint   Patient presents with    Shortness of Breath    Cough     Released with pneumonia 2 wks ago       7/17/2023 8:41 AM  Subjective / Interval History:   07/17/2023  Patient seen & examined. Doing well. No new complaints. No acute changes or acute overnight event reported. Laying comfortably in bed in no acute distress. Denies any active diarrhea at this time. Currently on clear liquid diet. Denies any acute complaints or distress at this time. 07/16/2023  New complaints. No acute changes or acute overnight event reported. Patient laying comfortably in bed in no acute distress. Denies any active diarrhea at this time. Patient continues to report decreased p.o. intake and intermittent nausea, but no vomiting.      07/15/2023  Patient endorses episodes of vomiting yesterday. No acute changes or acute overnight event reported. Laying comfortably in bed in no acute distress. Denies any acute complaints or symptoms. Shortness of breath improved. No active diarrhea at this time       07/14/2023  Patient seen and examined. Doing well. No new complaints. No acute changes or acute overnight events reported. Lying comfortably in bed in no apparent acute distress. Denies any acute complaints or distress at this time. Patient continues to endorse poor p.o. intake. Family at bedside. 07/13/2023  Patient seen and examined. Continues to endorse nausea and vomiting, and unable to tolerate p.o. intake. No diarrhea reported this AM.  Patient laying comfortably in bed in no acute distress. No acute changes or acute overnight event reported.       07/12/2023  No acute changes or acute supplementation     Severe Malnutrition  Nutritional support     Severe anxiety  Continue Xanax as needed     Chronic pain   Home regimen     Hypomagnesemia  Replace as per pulm       Continue management of other chronic medical conditions - see above and orders. GERD  PPI  Carafate      Advance Directive: Full Code    ADULT DIET; Clear Liquid  ADULT ORAL NUTRITION SUPPLEMENT; Breakfast, Lunch, Dinner; Clear Liquid Oral Supplement         Consults Made:   PALLIATIVE CARE EVAL  IP CONSULT TO GI  IP CONSULT TO IV TEAM      DVT prophylaxis: Enoxaparin      Current medications reviewed  Lab work reviewed  Radiology  films reviewed  Discussed treatment plan with the nurse and addressed all questions/concerns  Discussed with Patient and Family at the bedside in detail . .. they understand and agree with the management plan. Discharge planning: tbd  Patient currently with no diarrhea.   Plan for possible EGD, per GI      Multiple complex medical problems  Morbidity mortality high risk  Medical decision making High complexity       Electronically signed by   Marisel Marvin MD,   Internal Medicine Hospitalist   7/17/2023 8:41 AM

## 2023-07-18 LAB
ALBUMIN SERPL-MCNC: 3.1 G/DL (ref 3.5–5.2)
ALP SERPL-CCNC: 50 U/L (ref 35–104)
ALT SERPL-CCNC: <5 U/L (ref 5–33)
ANION GAP SERPL CALCULATED.3IONS-SCNC: 8 MMOL/L (ref 7–19)
AST SERPL-CCNC: 11 U/L (ref 5–32)
BASOPHILS # BLD: 0 K/UL (ref 0–0.2)
BASOPHILS NFR BLD: 1 % (ref 0–1)
BILIRUB SERPL-MCNC: <0.2 MG/DL (ref 0.2–1.2)
BUN SERPL-MCNC: 6 MG/DL (ref 8–23)
CALCIUM SERPL-MCNC: 8.7 MG/DL (ref 8.8–10.2)
CHLORIDE SERPL-SCNC: 104 MMOL/L (ref 98–111)
CO2 SERPL-SCNC: 29 MMOL/L (ref 22–29)
CREAT SERPL-MCNC: 0.4 MG/DL (ref 0.5–0.9)
EOSINOPHIL # BLD: 0.1 K/UL (ref 0–0.6)
EOSINOPHIL NFR BLD: 3.5 % (ref 0–5)
ERYTHROCYTE [DISTWIDTH] IN BLOOD BY AUTOMATED COUNT: 13.2 % (ref 11.5–14.5)
GLUCOSE SERPL-MCNC: 96 MG/DL (ref 74–109)
HCT VFR BLD AUTO: 29.6 % (ref 37–47)
HGB BLD-MCNC: 9.4 G/DL (ref 12–16)
IMM GRANULOCYTES # BLD: 0 K/UL
LYMPHOCYTES # BLD: 1.1 K/UL (ref 1.1–4.5)
LYMPHOCYTES NFR BLD: 27.9 % (ref 20–40)
MAGNESIUM SERPL-MCNC: 1.4 MG/DL (ref 1.6–2.4)
MCH RBC QN AUTO: 31.5 PG (ref 27–31)
MCHC RBC AUTO-ENTMCNC: 31.8 G/DL (ref 33–37)
MCV RBC AUTO: 99.3 FL (ref 81–99)
MONOCYTES # BLD: 0.5 K/UL (ref 0–0.9)
MONOCYTES NFR BLD: 12.8 % (ref 0–10)
NEUTROPHILS # BLD: 2.2 K/UL (ref 1.5–7.5)
NEUTS SEG NFR BLD: 54.1 % (ref 50–65)
PLATELET # BLD AUTO: 247 K/UL (ref 130–400)
PMV BLD AUTO: 10.6 FL (ref 9.4–12.3)
POTASSIUM SERPL-SCNC: 3.4 MMOL/L (ref 3.5–5)
PROT SERPL-MCNC: 5.8 G/DL (ref 6.6–8.7)
RBC # BLD AUTO: 2.98 M/UL (ref 4.2–5.4)
SODIUM SERPL-SCNC: 141 MMOL/L (ref 136–145)
WBC # BLD AUTO: 4.1 K/UL (ref 4.8–10.8)

## 2023-07-18 PROCEDURE — 85025 COMPLETE CBC W/AUTO DIFF WBC: CPT

## 2023-07-18 PROCEDURE — 6360000002 HC RX W HCPCS: Performed by: STUDENT IN AN ORGANIZED HEALTH CARE EDUCATION/TRAINING PROGRAM

## 2023-07-18 PROCEDURE — 2500000003 HC RX 250 WO HCPCS: Performed by: HOSPITALIST

## 2023-07-18 PROCEDURE — 6370000000 HC RX 637 (ALT 250 FOR IP): Performed by: HOSPITALIST

## 2023-07-18 PROCEDURE — 83735 ASSAY OF MAGNESIUM: CPT

## 2023-07-18 PROCEDURE — 2580000003 HC RX 258: Performed by: EMERGENCY MEDICINE

## 2023-07-18 PROCEDURE — C9113 INJ PANTOPRAZOLE SODIUM, VIA: HCPCS | Performed by: SPECIALIST

## 2023-07-18 PROCEDURE — 2580000003 HC RX 258: Performed by: SPECIALIST

## 2023-07-18 PROCEDURE — 2700000000 HC OXYGEN THERAPY PER DAY

## 2023-07-18 PROCEDURE — 6370000000 HC RX 637 (ALT 250 FOR IP): Performed by: INTERNAL MEDICINE

## 2023-07-18 PROCEDURE — 6360000002 HC RX W HCPCS: Performed by: EMERGENCY MEDICINE

## 2023-07-18 PROCEDURE — 6360000002 HC RX W HCPCS: Performed by: HOSPITALIST

## 2023-07-18 PROCEDURE — 6360000002 HC RX W HCPCS: Performed by: SPECIALIST

## 2023-07-18 PROCEDURE — 94640 AIRWAY INHALATION TREATMENT: CPT

## 2023-07-18 PROCEDURE — 80053 COMPREHEN METABOLIC PANEL: CPT

## 2023-07-18 PROCEDURE — 2580000003 HC RX 258: Performed by: HOSPITALIST

## 2023-07-18 PROCEDURE — 94761 N-INVAS EAR/PLS OXIMETRY MLT: CPT

## 2023-07-18 PROCEDURE — 6370000000 HC RX 637 (ALT 250 FOR IP): Performed by: STUDENT IN AN ORGANIZED HEALTH CARE EDUCATION/TRAINING PROGRAM

## 2023-07-18 PROCEDURE — 1210000000 HC MED SURG R&B

## 2023-07-18 RX ADMIN — FOLIC ACID 1 MG: 1 TABLET ORAL at 08:47

## 2023-07-18 RX ADMIN — GABAPENTIN 300 MG: 600 TABLET, FILM COATED ORAL at 20:05

## 2023-07-18 RX ADMIN — DEXTROAMPHETAMINE SACCHARATE, AMPHETAMINE ASPARTATE MONOHYDRATE, DEXTROAMPHETAMINE SULFATE AND AMPHETAMINE SULFATE 1 TABLET: 5; 5; 5; 5 TABLET ORAL at 16:00

## 2023-07-18 RX ADMIN — SODIUM CHLORIDE, PRESERVATIVE FREE 40 MG: 5 INJECTION INTRAVENOUS at 22:30

## 2023-07-18 RX ADMIN — PROCHLORPERAZINE EDISYLATE 10 MG: 5 INJECTION INTRAMUSCULAR; INTRAVENOUS at 03:24

## 2023-07-18 RX ADMIN — FERROUS SULFATE TAB 325 MG (65 MG ELEMENTAL FE) 325 MG: 325 (65 FE) TAB at 16:00

## 2023-07-18 RX ADMIN — CEFEPIME 2000 MG: 2 INJECTION, POWDER, FOR SOLUTION INTRAVENOUS at 20:07

## 2023-07-18 RX ADMIN — IPRATROPIUM BROMIDE AND ALBUTEROL SULFATE 1 DOSE: 2.5; .5 SOLUTION RESPIRATORY (INHALATION) at 10:46

## 2023-07-18 RX ADMIN — SUCRALFATE 1 G: 1 TABLET ORAL at 16:00

## 2023-07-18 RX ADMIN — FLUTICASONE PROPIONATE 2 SPRAY: 50 SPRAY, METERED NASAL at 08:50

## 2023-07-18 RX ADMIN — OXYCODONE HYDROCHLORIDE AND ACETAMINOPHEN 1 TABLET: 10; 325 TABLET ORAL at 04:40

## 2023-07-18 RX ADMIN — IPRATROPIUM BROMIDE AND ALBUTEROL SULFATE 1 DOSE: 2.5; .5 SOLUTION RESPIRATORY (INHALATION) at 15:33

## 2023-07-18 RX ADMIN — GABAPENTIN 300 MG: 600 TABLET, FILM COATED ORAL at 08:48

## 2023-07-18 RX ADMIN — SUCRALFATE 1 G: 1 TABLET ORAL at 20:06

## 2023-07-18 RX ADMIN — DEXTROAMPHETAMINE SACCHARATE, AMPHETAMINE ASPARTATE MONOHYDRATE, DEXTROAMPHETAMINE SULFATE AND AMPHETAMINE SULFATE 1 TABLET: 5; 5; 5; 5 TABLET ORAL at 08:48

## 2023-07-18 RX ADMIN — FERROUS SULFATE TAB 325 MG (65 MG ELEMENTAL FE) 325 MG: 325 (65 FE) TAB at 08:47

## 2023-07-18 RX ADMIN — VANCOMYCIN HYDROCHLORIDE 125 MG: KIT at 16:00

## 2023-07-18 RX ADMIN — ENOXAPARIN SODIUM 60 MG: 100 INJECTION SUBCUTANEOUS at 20:04

## 2023-07-18 RX ADMIN — IPRATROPIUM BROMIDE AND ALBUTEROL SULFATE 1 DOSE: 2.5; .5 SOLUTION RESPIRATORY (INHALATION) at 06:02

## 2023-07-18 RX ADMIN — SUCRALFATE 1 G: 1 TABLET ORAL at 08:48

## 2023-07-18 RX ADMIN — SODIUM CHLORIDE, PRESERVATIVE FREE 40 MG: 5 INJECTION INTRAVENOUS at 08:53

## 2023-07-18 RX ADMIN — SODIUM CHLORIDE, PRESERVATIVE FREE 10 ML: 5 INJECTION INTRAVENOUS at 08:50

## 2023-07-18 RX ADMIN — BUDESONIDE AND FORMOTEROL FUMARATE DIHYDRATE 2 PUFF: 160; 4.5 AEROSOL RESPIRATORY (INHALATION) at 19:25

## 2023-07-18 RX ADMIN — ENOXAPARIN SODIUM 60 MG: 100 INJECTION SUBCUTANEOUS at 08:47

## 2023-07-18 RX ADMIN — VANCOMYCIN HYDROCHLORIDE 125 MG: KIT at 08:53

## 2023-07-18 RX ADMIN — ALPRAZOLAM 1 MG: 0.5 TABLET ORAL at 08:47

## 2023-07-18 RX ADMIN — ALPRAZOLAM 1 MG: 0.5 TABLET ORAL at 20:06

## 2023-07-18 RX ADMIN — IPRATROPIUM BROMIDE AND ALBUTEROL SULFATE 1 DOSE: 2.5; .5 SOLUTION RESPIRATORY (INHALATION) at 19:25

## 2023-07-18 RX ADMIN — VANCOMYCIN HYDROCHLORIDE 125 MG: KIT at 04:30

## 2023-07-18 RX ADMIN — GABAPENTIN 300 MG: 600 TABLET, FILM COATED ORAL at 13:09

## 2023-07-18 RX ADMIN — GUAIFENESIN 200 MG: 100 SOLUTION ORAL at 20:06

## 2023-07-18 RX ADMIN — CEFEPIME 2000 MG: 2 INJECTION, POWDER, FOR SOLUTION INTRAVENOUS at 04:30

## 2023-07-18 RX ADMIN — SODIUM CHLORIDE, PRESERVATIVE FREE 10 ML: 5 INJECTION INTRAVENOUS at 20:05

## 2023-07-18 RX ADMIN — OXYCODONE HYDROCHLORIDE AND ACETAMINOPHEN 1 TABLET: 10; 325 TABLET ORAL at 11:51

## 2023-07-18 RX ADMIN — GUAIFENESIN 600 MG: 600 TABLET ORAL at 08:48

## 2023-07-18 RX ADMIN — CEFEPIME 2000 MG: 2 INJECTION, POWDER, FOR SOLUTION INTRAVENOUS at 13:11

## 2023-07-18 RX ADMIN — ALPRAZOLAM 1 MG: 0.5 TABLET ORAL at 02:32

## 2023-07-18 RX ADMIN — BUDESONIDE AND FORMOTEROL FUMARATE DIHYDRATE 2 PUFF: 160; 4.5 AEROSOL RESPIRATORY (INHALATION) at 06:14

## 2023-07-18 RX ADMIN — VANCOMYCIN HYDROCHLORIDE 125 MG: KIT at 22:30

## 2023-07-18 RX ADMIN — SUCRALFATE 1 G: 1 TABLET ORAL at 13:09

## 2023-07-18 RX ADMIN — CITALOPRAM HYDROBROMIDE 20 MG: 20 TABLET ORAL at 08:47

## 2023-07-18 ASSESSMENT — PAIN SCALES - GENERAL
PAINLEVEL_OUTOF10: 10
PAINLEVEL_OUTOF10: 7
PAINLEVEL_OUTOF10: 4
PAINLEVEL_OUTOF10: 3

## 2023-07-18 ASSESSMENT — PAIN DESCRIPTION - LOCATION
LOCATION: GENERALIZED

## 2023-07-18 ASSESSMENT — PAIN SCALES - WONG BAKER
WONGBAKER_NUMERICALRESPONSE: 0
WONGBAKER_NUMERICALRESPONSE: 0

## 2023-07-18 ASSESSMENT — PAIN DESCRIPTION - DESCRIPTORS: DESCRIPTORS: ACHING

## 2023-07-18 NOTE — PROGRESS NOTES
Pulse ox 98% on 3lpm, decreased to 2lpm Opioid Counseling: I discussed with the patient the potential side effects of opioids including but not limited to addiction, altered mental status, and depression. I stressed avoiding alcohol, benzodiazepines, muscle relaxants and sleep aids unless specifically okayed by a physician. The patient verbalized understanding of the proper use and possible adverse effects of opioids. All of the patient's questions and concerns were addressed. They were instructed to flush the remaining pills down the toilet if they did not need them for pain.

## 2023-07-18 NOTE — PROGRESS NOTES
[facility] Progress Note    Patient:  Rafia Clemons  YOB: 1950  Date of Service: 7/18/2023  MRN: [id number]   Acct: [de-identified]   Primary Care Physician: TEN Carvalho NP  Advance Directive: Full Code  Admit Date: 7/9/2023       Hospital Day: 9  . CHIEF COMPLAINT:     Chief Complaint   Patient presents with    Shortness of Breath    Cough     Released with pneumonia 2 wks ago       7/18/2023 2:01 PM  Subjective / Interval History:   07/18/2023  Patient seen and examined. Doing well. No new complaints. Lying comfortably bed in no acute distress. Denies any acute complaints or distress. .  No acute changes or acute overnight event reported. 07/17/2023  Patient seen & examined. Doing well. No new complaints. No acute changes or acute overnight event reported. Laying comfortably in bed in no acute distress. Denies any active diarrhea at this time. Currently on clear liquid diet. Denies any acute complaints or distress at this time. 07/16/2023  New complaints. No acute changes or acute overnight event reported. Patient laying comfortably in bed in no acute distress. Denies any active diarrhea at this time. Patient continues to report decreased p.o. intake and intermittent nausea, but no vomiting.      07/15/2023  Patient endorses episodes of vomiting yesterday. No acute changes or acute overnight event reported. Laying comfortably in bed in no acute distress. Denies any acute complaints or symptoms. Shortness of breath improved. No active diarrhea at this time       07/14/2023  Patient seen and examined. Doing well. No new complaints. No acute changes or acute overnight events reported. Lying comfortably in bed in no apparent acute distress. Denies any acute complaints or distress at this time. Patient continues to endorse poor p.o. intake. Family at bedside. 07/13/2023  Patient seen and examined.   Continues to endorse nausea and vomiting, vitamin D  50,000 Units Oral Weekly    ferrous sulfate  325 mg Oral BID WC     potassium chloride **OR** potassium alternative oral replacement **OR** potassium chloride, ondansetron **OR** ondansetron, sodium chloride flush, sodium chloride, acetaminophen, ALPRAZolam, oxyCODONE-acetaminophen, prochlorperazine, sodium chloride, guaiFENesin, magnesium sulfate  ADULT DIET; Clear Liquid  ADULT ORAL NUTRITION SUPPLEMENT; Breakfast, Lunch, Dinner; Clear Liquid Oral Supplement       Labs:   CBC with DIFF:   Recent Labs     07/16/23 0235 07/17/23 0225 07/18/23 0445   WBC 4.6* 4.5* 4.1*   RBC 3.02* 3.01* 2.98*   HGB 9.7* 9.4* 9.4*   HCT 29.7* 30.1* 29.6*   MCV 98.3 100.0* 99.3*   MCH 32.1* 31.2* 31.5*   MCHC 32.7* 31.2* 31.8*   RDW 13.1 13.1 13.2    241 247   MPV 10.8 10.6 10.6   NEUTOPHILPCT 61.6 65.6* 54.1   LYMPHOPCT 22.0 19.7* 27.9   MONOPCT 12.0* 10.2* 12.8*   EOSRELPCT 3.3 2.9 3.5   BASOPCT 0.7 0.9 1.0   NEUTROABS 2.8 3.0 2.2   LYMPHSABS 1.0* 0.9* 1.1   MONOSABS 0.60 0.50 0.50   EOSABS 0.20 0.10 0.10   BASOSABS 0.00 0.00 0.00         CMP/BMP:   Recent Labs     07/16/23 0235 07/17/23 0225 07/18/23 0445    142 141   K 3.5 3.5 3.4*    105 104   CO2 27 27 29   ANIONGAP 11 10 8   GLUCOSE 109 84 96   BUN 6* 6* 6*   CREATININE 0.4* 0.3* 0.4*   LABGLOM >60 >60 >60   CALCIUM 8.6* 8.4* 8.7*   PROT 5.8* 5.3* 5.8*   LABALBU 3.5 3.3* 3.1*   BILITOT <0.2 <0.2 <0.2   ALKPHOS 53 49 50   ALT <5* <5* <5*   AST 9 9 11           CRP:  No results for input(s): CRP in the last 72 hours. Sed Rate:  No results for input(s): SEDRATE in the last 72 hours. HgBA1c:  No components found for: HGBA1C  FLP:  No results found for: TRIG, HDL, LDLCALC, LDLDIRECT, LABVLDL  TSH:    Lab Results   Component Value Date/Time    TSH 1.140 07/09/2023 08:09 PM     Troponin T:   No results for input(s): TROPONINI in the last 72 hours. Pro-BNP: No results for input(s): BNP in the last 72 hours.   INR: No results for input(s): INR

## 2023-07-18 NOTE — PROGRESS NOTES
Comprehensive Nutrition Assessment    Type and Reason for Visit:  Reassess    Nutrition Recommendations/Plan:   Consider short-term TPN via midline. Malnutrition Assessment:  Malnutrition Status:  Severe malnutrition (07/10/23 1413)    Context:  Chronic Illness     Findings of the 6 clinical characteristics of malnutrition:  Energy Intake:  75% or less estimated energy requirements for 1 month or longer  Weight Loss:  Greater than 5% over 1 month       Nutrition Assessment:    Pt remains severely malnourished and at risk for further decline due to clear liquid diet with continued inadequate intake of <25%. She is receiving ONS TID but with little intake. Pt without adequate nutrition since admission. Pt has declined EGD. Pt now has midline access. Would recommend initiating TPN via midline for short-term nutrition support. Current Nutrition Intake & Therapies:    Average Meal Intake: Unable to assess  Average Supplements Intake: None Ordered  ADULT DIET; Clear Liquid  ADULT ORAL NUTRITION SUPPLEMENT; Breakfast, Lunch, Dinner; Clear Liquid Oral Supplement    Anthropometric Measures:  Height: 5' 6\" (167.6 cm)  Ideal Body Weight (IBW): 130 lbs (59 kg)       Current Body Weight: 124 lb (56.2 kg),   IBW.     Current BMI (kg/m2): 20   BMI Categories: Underweight (BMI less than 22) age over 72    Estimated Daily Nutrient Needs:  Energy Requirements Based On: Kcal/kg  Weight Used for Energy Requirements: Current  Energy (kcal/day): 8078-4714 kcals/day  Weight Used for Protein Requirements: Current  Protein (g/day):  g/protein/day  Method Used for Fluid Requirements: 1 ml/kcal  Fluid (ml/day): 9930-3444 mL/day    Nutrition Diagnosis:   Severe malnutrition, In context of chronic illness related to inadequate protein-energy intake as evidenced by poor intake prior to admission, weight loss greater than or equal to 5% in 1 month    Nutrition Interventions:   Food and/or Nutrient Delivery: Continue Current Diet,

## 2023-07-19 ENCOUNTER — APPOINTMENT (OUTPATIENT)
Dept: GENERAL RADIOLOGY | Age: 73
End: 2023-07-19
Payer: MEDICARE

## 2023-07-19 LAB
ALBUMIN SERPL-MCNC: 3.7 G/DL (ref 3.5–5.2)
ALP SERPL-CCNC: 56 U/L (ref 35–104)
ALT SERPL-CCNC: 8 U/L (ref 5–33)
ANION GAP SERPL CALCULATED.3IONS-SCNC: 10 MMOL/L (ref 7–19)
AST SERPL-CCNC: 19 U/L (ref 5–32)
BASOPHILS # BLD: 0.1 K/UL (ref 0–0.2)
BASOPHILS NFR BLD: 1.1 % (ref 0–1)
BILIRUB SERPL-MCNC: <0.2 MG/DL (ref 0.2–1.2)
BUN SERPL-MCNC: 4 MG/DL (ref 8–23)
CALCIUM SERPL-MCNC: 9 MG/DL (ref 8.8–10.2)
CHLORIDE SERPL-SCNC: 103 MMOL/L (ref 98–111)
CO2 SERPL-SCNC: 27 MMOL/L (ref 22–29)
CREAT SERPL-MCNC: 0.4 MG/DL (ref 0.5–0.9)
EOSINOPHIL # BLD: 0.1 K/UL (ref 0–0.6)
EOSINOPHIL NFR BLD: 2 % (ref 0–5)
ERYTHROCYTE [DISTWIDTH] IN BLOOD BY AUTOMATED COUNT: 12.7 % (ref 11.5–14.5)
GLUCOSE SERPL-MCNC: 117 MG/DL (ref 74–109)
HCT VFR BLD AUTO: 31 % (ref 37–47)
HGB BLD-MCNC: 10 G/DL (ref 12–16)
IMM GRANULOCYTES # BLD: 0 K/UL
LYMPHOCYTES # BLD: 0.8 K/UL (ref 1.1–4.5)
LYMPHOCYTES NFR BLD: 18.2 % (ref 20–40)
MAGNESIUM SERPL-MCNC: 1.3 MG/DL (ref 1.6–2.4)
MCH RBC QN AUTO: 31.4 PG (ref 27–31)
MCHC RBC AUTO-ENTMCNC: 32.3 G/DL (ref 33–37)
MCV RBC AUTO: 97.5 FL (ref 81–99)
MONOCYTES # BLD: 0.5 K/UL (ref 0–0.9)
MONOCYTES NFR BLD: 11.5 % (ref 0–10)
NEUTROPHILS # BLD: 3 K/UL (ref 1.5–7.5)
NEUTS SEG NFR BLD: 66.5 % (ref 50–65)
PLATELET # BLD AUTO: 295 K/UL (ref 130–400)
PMV BLD AUTO: 10.6 FL (ref 9.4–12.3)
POTASSIUM SERPL-SCNC: 3.4 MMOL/L (ref 3.5–5)
PROT SERPL-MCNC: 6.6 G/DL (ref 6.6–8.7)
RBC # BLD AUTO: 3.18 M/UL (ref 4.2–5.4)
SODIUM SERPL-SCNC: 140 MMOL/L (ref 136–145)
WBC # BLD AUTO: 4.5 K/UL (ref 4.8–10.8)

## 2023-07-19 PROCEDURE — 6360000002 HC RX W HCPCS: Performed by: HOSPITALIST

## 2023-07-19 PROCEDURE — 83735 ASSAY OF MAGNESIUM: CPT

## 2023-07-19 PROCEDURE — 2500000003 HC RX 250 WO HCPCS: Performed by: HOSPITALIST

## 2023-07-19 PROCEDURE — 6360000002 HC RX W HCPCS: Performed by: STUDENT IN AN ORGANIZED HEALTH CARE EDUCATION/TRAINING PROGRAM

## 2023-07-19 PROCEDURE — 6370000000 HC RX 637 (ALT 250 FOR IP): Performed by: HOSPITALIST

## 2023-07-19 PROCEDURE — 6370000000 HC RX 637 (ALT 250 FOR IP): Performed by: STUDENT IN AN ORGANIZED HEALTH CARE EDUCATION/TRAINING PROGRAM

## 2023-07-19 PROCEDURE — 6370000000 HC RX 637 (ALT 250 FOR IP): Performed by: INTERNAL MEDICINE

## 2023-07-19 PROCEDURE — 85025 COMPLETE CBC W/AUTO DIFF WBC: CPT

## 2023-07-19 PROCEDURE — 94640 AIRWAY INHALATION TREATMENT: CPT

## 2023-07-19 PROCEDURE — C9113 INJ PANTOPRAZOLE SODIUM, VIA: HCPCS | Performed by: SPECIALIST

## 2023-07-19 PROCEDURE — 6360000002 HC RX W HCPCS: Performed by: SPECIALIST

## 2023-07-19 PROCEDURE — 2580000003 HC RX 258: Performed by: SPECIALIST

## 2023-07-19 PROCEDURE — 2580000003 HC RX 258: Performed by: HOSPITALIST

## 2023-07-19 PROCEDURE — 1210000000 HC MED SURG R&B

## 2023-07-19 PROCEDURE — 74018 RADEX ABDOMEN 1 VIEW: CPT

## 2023-07-19 PROCEDURE — 2700000000 HC OXYGEN THERAPY PER DAY

## 2023-07-19 PROCEDURE — 97530 THERAPEUTIC ACTIVITIES: CPT

## 2023-07-19 PROCEDURE — 80053 COMPREHEN METABOLIC PANEL: CPT

## 2023-07-19 PROCEDURE — 6360000002 HC RX W HCPCS: Performed by: EMERGENCY MEDICINE

## 2023-07-19 PROCEDURE — 97165 OT EVAL LOW COMPLEX 30 MIN: CPT

## 2023-07-19 PROCEDURE — 94761 N-INVAS EAR/PLS OXIMETRY MLT: CPT

## 2023-07-19 PROCEDURE — 2580000003 HC RX 258: Performed by: EMERGENCY MEDICINE

## 2023-07-19 RX ORDER — ALBUTEROL SULFATE 2.5 MG/3ML
2.5 SOLUTION RESPIRATORY (INHALATION) EVERY 6 HOURS PRN
Status: DISCONTINUED | OUTPATIENT
Start: 2023-07-19 | End: 2023-07-21 | Stop reason: HOSPADM

## 2023-07-19 RX ADMIN — ENOXAPARIN SODIUM 60 MG: 100 INJECTION SUBCUTANEOUS at 21:03

## 2023-07-19 RX ADMIN — FOLIC ACID 1 MG: 1 TABLET ORAL at 09:10

## 2023-07-19 RX ADMIN — CEFEPIME 2000 MG: 2 INJECTION, POWDER, FOR SOLUTION INTRAVENOUS at 04:14

## 2023-07-19 RX ADMIN — IPRATROPIUM BROMIDE AND ALBUTEROL SULFATE 1 DOSE: 2.5; .5 SOLUTION RESPIRATORY (INHALATION) at 19:10

## 2023-07-19 RX ADMIN — GUAIFENESIN 600 MG: 600 TABLET ORAL at 09:11

## 2023-07-19 RX ADMIN — ALPRAZOLAM 1 MG: 0.5 TABLET ORAL at 04:15

## 2023-07-19 RX ADMIN — DEXTROAMPHETAMINE SACCHARATE, AMPHETAMINE ASPARTATE MONOHYDRATE, DEXTROAMPHETAMINE SULFATE AND AMPHETAMINE SULFATE 1 TABLET: 5; 5; 5; 5 TABLET ORAL at 09:10

## 2023-07-19 RX ADMIN — BUDESONIDE AND FORMOTEROL FUMARATE DIHYDRATE 2 PUFF: 160; 4.5 AEROSOL RESPIRATORY (INHALATION) at 19:08

## 2023-07-19 RX ADMIN — VANCOMYCIN HYDROCHLORIDE 125 MG: KIT at 22:06

## 2023-07-19 RX ADMIN — GABAPENTIN 300 MG: 600 TABLET, FILM COATED ORAL at 21:02

## 2023-07-19 RX ADMIN — SUCRALFATE 1 G: 1 TABLET ORAL at 13:01

## 2023-07-19 RX ADMIN — ALPRAZOLAM 1 MG: 0.5 TABLET ORAL at 21:03

## 2023-07-19 RX ADMIN — SUCRALFATE 1 G: 1 TABLET ORAL at 09:10

## 2023-07-19 RX ADMIN — SODIUM CHLORIDE, PRESERVATIVE FREE 40 MG: 5 INJECTION INTRAVENOUS at 21:03

## 2023-07-19 RX ADMIN — GABAPENTIN 300 MG: 600 TABLET, FILM COATED ORAL at 09:11

## 2023-07-19 RX ADMIN — FERROUS SULFATE TAB 325 MG (65 MG ELEMENTAL FE) 325 MG: 325 (65 FE) TAB at 16:34

## 2023-07-19 RX ADMIN — IPRATROPIUM BROMIDE AND ALBUTEROL SULFATE 1 DOSE: 2.5; .5 SOLUTION RESPIRATORY (INHALATION) at 14:15

## 2023-07-19 RX ADMIN — VANCOMYCIN HYDROCHLORIDE 125 MG: KIT at 10:11

## 2023-07-19 RX ADMIN — SUCRALFATE 1 G: 1 TABLET ORAL at 16:34

## 2023-07-19 RX ADMIN — OXYCODONE HYDROCHLORIDE AND ACETAMINOPHEN 1 TABLET: 10; 325 TABLET ORAL at 16:34

## 2023-07-19 RX ADMIN — FLUTICASONE PROPIONATE 2 SPRAY: 50 SPRAY, METERED NASAL at 09:52

## 2023-07-19 RX ADMIN — CITALOPRAM HYDROBROMIDE 20 MG: 20 TABLET ORAL at 09:11

## 2023-07-19 RX ADMIN — GABAPENTIN 300 MG: 600 TABLET, FILM COATED ORAL at 14:08

## 2023-07-19 RX ADMIN — OXYCODONE HYDROCHLORIDE AND ACETAMINOPHEN 1 TABLET: 10; 325 TABLET ORAL at 09:11

## 2023-07-19 RX ADMIN — IPRATROPIUM BROMIDE AND ALBUTEROL SULFATE 1 DOSE: 2.5; .5 SOLUTION RESPIRATORY (INHALATION) at 10:48

## 2023-07-19 RX ADMIN — VANCOMYCIN HYDROCHLORIDE 125 MG: KIT at 16:34

## 2023-07-19 RX ADMIN — VANCOMYCIN HYDROCHLORIDE 125 MG: KIT at 04:14

## 2023-07-19 RX ADMIN — SODIUM CHLORIDE, PRESERVATIVE FREE 10 ML: 5 INJECTION INTRAVENOUS at 21:13

## 2023-07-19 RX ADMIN — BUDESONIDE AND FORMOTEROL FUMARATE DIHYDRATE 2 PUFF: 160; 4.5 AEROSOL RESPIRATORY (INHALATION) at 06:52

## 2023-07-19 RX ADMIN — CEFEPIME 2000 MG: 2 INJECTION, POWDER, FOR SOLUTION INTRAVENOUS at 21:01

## 2023-07-19 RX ADMIN — ENOXAPARIN SODIUM 60 MG: 100 INJECTION SUBCUTANEOUS at 09:10

## 2023-07-19 RX ADMIN — SODIUM CHLORIDE, PRESERVATIVE FREE 10 ML: 5 INJECTION INTRAVENOUS at 09:39

## 2023-07-19 RX ADMIN — OXYCODONE HYDROCHLORIDE AND ACETAMINOPHEN 1 TABLET: 10; 325 TABLET ORAL at 21:02

## 2023-07-19 RX ADMIN — IPRATROPIUM BROMIDE AND ALBUTEROL SULFATE 1 DOSE: 2.5; .5 SOLUTION RESPIRATORY (INHALATION) at 06:45

## 2023-07-19 RX ADMIN — POTASSIUM BICARBONATE 40 MEQ: 782 TABLET, EFFERVESCENT ORAL at 09:11

## 2023-07-19 RX ADMIN — MAGNESIUM SULFATE HEPTAHYDRATE 2000 MG: 40 INJECTION, SOLUTION INTRAVENOUS at 09:38

## 2023-07-19 RX ADMIN — GUAIFENESIN 200 MG: 100 SOLUTION ORAL at 21:02

## 2023-07-19 RX ADMIN — CEFEPIME 2000 MG: 2 INJECTION, POWDER, FOR SOLUTION INTRAVENOUS at 13:04

## 2023-07-19 RX ADMIN — SUCRALFATE 1 G: 1 TABLET ORAL at 21:03

## 2023-07-19 RX ADMIN — PROCHLORPERAZINE EDISYLATE 10 MG: 5 INJECTION INTRAMUSCULAR; INTRAVENOUS at 09:11

## 2023-07-19 RX ADMIN — SODIUM CHLORIDE, PRESERVATIVE FREE 40 MG: 5 INJECTION INTRAVENOUS at 09:11

## 2023-07-19 RX ADMIN — FERROUS SULFATE TAB 325 MG (65 MG ELEMENTAL FE) 325 MG: 325 (65 FE) TAB at 09:10

## 2023-07-19 RX ADMIN — ALPRAZOLAM 1 MG: 0.5 TABLET ORAL at 16:34

## 2023-07-19 ASSESSMENT — PAIN DESCRIPTION - LOCATION: LOCATION: LEG

## 2023-07-19 ASSESSMENT — PAIN SCALES - GENERAL: PAINLEVEL_OUTOF10: 8

## 2023-07-19 NOTE — PROGRESS NOTES
Attempted dysphagia therapy. Patient completing breathing treatment at this time. Will attempt at a later time.     Electronically signed by ANDREW Ann on 7/19/2023 at 2:36 PM

## 2023-07-19 NOTE — PROGRESS NOTES
Occupational Therapy  Facility/Department: [facility] 4 ONCOLOGY UNIT  Occupational Therapy Initial Assessment    Name: Austin Cavanaugh  : 1950  MRN: [id number]  Date of Service: 2023    Discharge Recommendations:             Patient Diagnosis(es): The primary encounter diagnosis was Sepsis, due to unspecified organism, unspecified whether acute organ dysfunction present [facility]). Diagnoses of Pneumonia of right lower lobe due to infectious organism and Abdominal pain, epigastric were also pertinent to this visit. Past Medical History:  has a past medical history of ADHD (attention deficit hyperactivity disorder), Alpha-1-antitrypsin deficiency ([address]), Anxiety, COPD (chronic obstructive pulmonary disease) ([address]), Depression, Fibromyalgia, GERD (gastroesophageal reflux disease), Hypertension, Malignant neoplasm of upper lobe of right lung ([address]), Palliative care patient, and RA (rheumatoid arthritis) ([address]). Past Surgical History:  has a past surgical history that includes Hysterectomy; hernia repair; Leg Surgery; Cholecystectomy; and Thoracoscopy (Right, 2022). Treatment Diagnosis: Pneumonia, sepsis, SOB      Assessment   Performance deficits / Impairments: Decreased functional mobility ; Decreased endurance;Decreased ADL status; Decreased balance  Assessment: Will progress as tolerated  Treatment Diagnosis: Pneumonia, sepsis, SOB  Prognosis: Good  Decision Making: Low Complexity  REQUIRES OT FOLLOW-UP: Yes  Activity Tolerance  Activity Tolerance: Patient Tolerated treatment well        Plan   Occupational Therapy Plan  Times Per Week: 3-5x/week  Times Per Day:  Once a day     Restrictions       Subjective   General  Chart Reviewed: Yes  Patient assessed for rehabilitation services?: Yes  Additional Pertinent Hx: Pneumonia, C-diff, Anxiety  Family / Caregiver Present: No  Diagnosis: Pneumonia, Sepsis, SOB  General Comment  Comments: Uses O2 at home     Social/Functional History  Social/Functional History  Lives With: Daughter  Type of Home: House  Home Layout: One level  Home Access: Stairs to enter with rails  Entrance Stairs - Number of Steps: 2  Bathroom Shower/Tub: Walk-in shower, Shower chair without back  Bathroom Equipment: Shower chair  Home Equipment: Oxygen, Walker, rolling  Receives Help From: Family  ADL Assistance: Independent  Ambulation Assistance: Independent  Transfer Assistance: Independent  Active : No  Patient's  Info: daughter       Objective   Pulse: 93  Heart Rate Source: Monitor  BP: (!) 153/64  BP Location: Left upper arm  MAP (Calculated): 94  SpO2: 96 %  O2 Device: Nasal cannula                      AROM: Within functional limits  Strength:  Within functional limits  ADL  Feeding: Independent  Grooming: Independent  UE Bathing: Supervision  LE Bathing: Minimal assistance  UE Dressing: Supervision  LE Dressing: Minimal assistance  Toileting: Minimal assistance        Bed mobility  Supine to Sit: Supervision  Transfers  Stand Step Transfers: Contact guard assistance  Sit to stand: Contact guard assistance  Vision  Vision: Within Functional Limits  Hearing  Hearing: Within functional limits  Cognition  Overall Cognitive Status: WFL  Orientation  Overall Orientation Status: Within Functional Limits                                           G-Code     OutComes Score                                                  AM-PAC Score             Tinneti Score       Goals  Short Term Goals  Time Frame for Short Term Goals: 1 week  Short Term Goal 1: Supervision with toilet tfers  Short Term Goal 2: Supervision with light ambulatory ADLs without excessive fatigue  Short Term Goal 3: Supervision with LB dsg tasks  Long Term Goals  Long Term Goal 1: Return to PLOF       Therapy Time   Individual Concurrent Group Co-treatment   Time In           Time Out           Minutes                   Ruthy Asencio OT

## 2023-07-19 NOTE — PROGRESS NOTES
Nutrition Assessment     Type and Reason for Visit: Reassess    Nutrition Recommendations/Plan:   Modify ONS to Ensure Plus High Protein TID. Malnutrition Assessment:  Malnutrition Status: Severe malnutrition    Nutrition Assessment:  Diet advanced to Regular. No PO intake yet documented on regular diet. Modifying ONS to higher calorie/protein option. Will continue to follow for oral intake and modify intervention as needed. Estimated Daily Nutrient Needs:  Energy (kcal):  6971-2816 kcals/day Weight Used for Energy Requirements: Current     Protein (g):   g/protein/day Weight Used for Protein Requirements: Current        Fluid (ml/day):  1145-7633 mL/day Method Used for Fluid Requirements: 1 ml/kcal    Nutrition Related Findings:          Current Nutrition Therapies:    ADULT DIET; Regular  ADULT ORAL NUTRITION SUPPLEMENT; Breakfast, Lunch, Dinner; Standard High Calorie/High Protein Oral Supplement    Anthropometric Measures:  Height: 5' 6\" (167.6 cm)  Current Body Wt: 124 lb (56.2 kg)   BMI: 20    Nutrition Diagnosis:   Severe malnutrition, In context of chronic illness related to inadequate protein-energy intake as evidenced by poor intake prior to admission, weight loss greater than or equal to 5% in 1 month    Nutrition Interventions:   Food and/or Nutrient Delivery: Continue Current Diet, Modify Oral Nutrition Supplement  Nutrition Education/Counseling: No recommendation at this time  Coordination of Nutrition Care: Continue to monitor while inpatient       Goals:  Previous Goal Met: Progressing toward Goal(s)  Goals: PO intake 50% or greater       Nutrition Monitoring and Evaluation:   Behavioral-Environmental Outcomes: None Identified  Food/Nutrient Intake Outcomes: Food and Nutrient Intake, Supplement Intake  Physical Signs/Symptoms Outcomes: Biochemical Data, Weight, Nutrition Focused Physical Findings, Nausea or Vomiting, Diarrhea    Discharge Planning:     Too soon to determine     Rosamaria Hernandez MS, RD, LD, [facility]  Contact: [phone]

## 2023-07-19 NOTE — PROGRESS NOTES
[facility]  Inpatient Progress Note    Reason for Follow Up: Persistent nausea    Subjective     Interval History:  Has had multiple medical problems long hospitalizations continue antibiotics for presumed C. difficile also patient has had history of hiatal hernia repair and multiple other abdominal surgeries with complications       Current Facility-Administered Medications:     albuterol (PROVENTIL) (2.5 MG/3ML) 0.083% nebulizer solution 2.5 mg, 2.5 mg, Nebulization, Q6H PRN, Billy García MD    gabapentin (NEURONTIN) tablet 300 mg, 300 mg, Oral, TID, Billy García MD, 300 mg at 07/19/23 0911    vancomycin [facility]) 50 MG/ML oral solution 125 mg, 125 mg, Oral, 4 times per day, Billy García MD, 125 mg at 07/19/23 1011    pantoprazole (PROTONIX) 40 mg in sodium chloride (PF) 0.9 % 10 mL injection, 40 mg, IntraVENous, Q12H, Zachary Weir MD, 40 mg at 07/19/23 0911    potassium chloride (KLOR-CON M) extended release tablet 40 mEq, 40 mEq, Oral, PRN **OR** potassium bicarb-citric acid (EFFER-K) effervescent tablet 40 mEq, 40 mEq, Oral, PRN, 40 mEq at 07/19/23 0911 **OR** potassium chloride 10 mEq/100 mL IVPB (Peripheral Line), 10 mEq, IntraVENous, PRN, Billy García MD, Last Rate: 100 mL/hr at 07/14/23 0037, 10 mEq at 07/14/23 0037    ipratropium 0.5 mg-albuterol 2.5 mg (DUONEB) nebulizer solution 1 Dose, 1 Dose, Inhalation, Q4H WA, Marika Munroe MD, 1 Dose at 07/19/23 1048    ondansetron (ZOFRAN-ODT) disintegrating tablet 4 mg, 4 mg, Oral, Q8H PRN, 4 mg at 07/11/23 0633 **OR** ondansetron (ZOFRAN) injection 4 mg, 4 mg, IntraVENous, Q6H PRN, Marika Munroe MD, 4 mg at 07/10/23 1627    sodium chloride flush 0.9 % injection 5-40 mL, 5-40 mL, IntraVENous, 2 times per day, Marika Munroe MD, 10 mL at 07/19/23 0939    sodium chloride flush 0.9 % injection 5-40 mL, 5-40 mL, IntraVENous, PRN, Marika Munroe MD    0.9 % sodium chloride infusion, ,

## 2023-07-19 NOTE — PROGRESS NOTES
This  visited with pt to provide support, and spiritual care. Pt was sitting on the side of the bed and says she is better. She had spent several days in ICU. Provided a listening ear, sustaining presence, nurtured hope, and prayer. Pt also asked about her LW and this  looked at it in her EMR and answered her questions. Pt's daughter came in at the end of the visit. Pt expressed gratitude for spiritual care.      Electronically signed by Misty Waddell on 7/19/2023 at 10:35 AM

## 2023-07-19 NOTE — PROGRESS NOTES
[facility] Progress Note    Patient:  Deanna Small  YOB: 1950  Date of Service: 7/19/2023  MRN: [id number]   Acct: [de-identified]   Primary Care Physician: TEN Simons NP  Advance Directive: Full Code  Admit Date: 7/9/2023       Hospital Day: 8  .    CHIEF COMPLAINT:     Chief Complaint   Patient presents with    Shortness of Breath    Cough     Released with pneumonia 2 wks ago       7/19/2023 9:13 AM  Subjective / Interval History:   07/19/2023  Patient doing well. Endorses intermittent shortness of breath. Lying comfortably in bed in no acute distress. No acute changes or acute overnight event noted. Tolerating regular diet. Denies any other acute complaints or distress at this time. 07/18/2023  Patient seen and examined. Doing well. No new complaints. Lying comfortably bed in no acute distress. Denies any acute complaints or distress. .  No acute changes or acute overnight event reported. 07/17/2023  Patient seen & examined. Doing well. No new complaints. No acute changes or acute overnight event reported. Laying comfortably in bed in no acute distress. Denies any active diarrhea at this time. Currently on clear liquid diet. Denies any acute complaints or distress at this time. 07/16/2023  New complaints. No acute changes or acute overnight event reported. Patient laying comfortably in bed in no acute distress. Denies any active diarrhea at this time. Patient continues to report decreased p.o. intake and intermittent nausea, but no vomiting.      07/15/2023  Patient endorses episodes of vomiting yesterday. No acute changes or acute overnight event reported. Laying comfortably in bed in no acute distress. Denies any acute complaints or symptoms. Shortness of breath improved. No active diarrhea at this time       07/14/2023  Patient seen and examined. Doing well. No new complaints. No acute changes or acute overnight events reported. mEq Oral PRN Radha Schaefer MD   40 mEq at 07/15/23 0535    Or    potassium chloride 10 mEq/100 mL IVPB (Peripheral Line)  10 mEq IntraVENous PRN Radha Schaefer  mL/hr at 07/14/23 0037 10 mEq at 07/14/23 0037    ipratropium 0.5 mg-albuterol 2.5 mg (DUONEB) nebulizer solution 1 Dose  1 Dose Inhalation Q4H WA Alena Moe MD   1 Dose at 07/19/23 0645    ondansetron (ZOFRAN-ODT) disintegrating tablet 4 mg  4 mg Oral Q8H PRN Alena Moe MD   4 mg at 07/11/23 6981    Or    ondansetron (ZOFRAN) injection 4 mg  4 mg IntraVENous Q6H PRN Alena Moe MD   4 mg at 07/10/23 1627    sodium chloride flush 0.9 % injection 5-40 mL  5-40 mL IntraVENous 2 times per day Alena Moe MD   10 mL at 07/18/23 2005    sodium chloride flush 0.9 % injection 5-40 mL  5-40 mL IntraVENous PRN Alena Moe MD        0.9 % sodium chloride infusion   IntraVENous PRN Alena Moe MD        ceFEPIme (MAXIPIME) 2,000 mg in sodium chloride 0.9 % 50 mL IVPB ([id number])  2,000 mg IntraVENous Q8H Linda Mcleod MD 12.5 mL/hr at 07/19/23 0414 2,000 mg at 07/19/23 0414    acetaminophen (TYLENOL) tablet 650 mg  650 mg Oral Q4H PRN Alena Moe MD   650 mg at 07/10/23 0558    0.9 % sodium chloride infusion   IntraVENous Continuous Oumou Dunbar MD 75 mL/hr at 07/16/23 1053 New Bag at 07/16/23 1053    ALPRAZolam (XANAX) tablet 1 mg  1 mg Oral 4x Daily PRN Oumou Dunbar MD   1 mg at 07/19/23 0415    oxyCODONE-acetaminophen (PERCOCET)  MG per tablet 1 tablet  1 tablet Oral Q4H PRN Oumou Dunbar MD   1 tablet at 07/18/23 1151    prochlorperazine (COMPAZINE) injection 10 mg  10 mg IntraVENous Q6H PRN Oumou Dunbar MD   10 mg at 07/18/23 0324    enoxaparin (LOVENOX) injection 60 mg  1 mg/kg SubCUTAneous BID Alena Moe MD   60 mg at 07/18/23 2004    guaiFENesin [facility]) extended release tablet 600 mg  600 mg Oral BID Alena Moe MD

## 2023-07-20 LAB
ANION GAP SERPL CALCULATED.3IONS-SCNC: 7 MMOL/L (ref 7–19)
BASOPHILS # BLD: 0 K/UL (ref 0–0.2)
BASOPHILS NFR BLD: 0.6 % (ref 0–1)
BUN SERPL-MCNC: 6 MG/DL (ref 8–23)
CALCIUM SERPL-MCNC: 9.4 MG/DL (ref 8.8–10.2)
CHLORIDE SERPL-SCNC: 102 MMOL/L (ref 98–111)
CO2 SERPL-SCNC: 30 MMOL/L (ref 22–29)
CREAT SERPL-MCNC: 0.6 MG/DL (ref 0.5–0.9)
EOSINOPHIL # BLD: 0.2 K/UL (ref 0–0.6)
EOSINOPHIL NFR BLD: 4.2 % (ref 0–5)
ERYTHROCYTE [DISTWIDTH] IN BLOOD BY AUTOMATED COUNT: 12.9 % (ref 11.5–14.5)
GLUCOSE SERPL-MCNC: 142 MG/DL (ref 74–109)
HCT VFR BLD AUTO: 31.2 % (ref 37–47)
HGB BLD-MCNC: 10.1 G/DL (ref 12–16)
IMM GRANULOCYTES # BLD: 0 K/UL
LYMPHOCYTES # BLD: 0.9 K/UL (ref 1.1–4.5)
LYMPHOCYTES NFR BLD: 25.8 % (ref 20–40)
MCH RBC QN AUTO: 31.5 PG (ref 27–31)
MCHC RBC AUTO-ENTMCNC: 32.4 G/DL (ref 33–37)
MCV RBC AUTO: 97.2 FL (ref 81–99)
MONOCYTES # BLD: 0.4 K/UL (ref 0–0.9)
MONOCYTES NFR BLD: 11.1 % (ref 0–10)
NEUTROPHILS # BLD: 2.1 K/UL (ref 1.5–7.5)
NEUTS SEG NFR BLD: 58 % (ref 50–65)
PLATELET # BLD AUTO: 275 K/UL (ref 130–400)
PMV BLD AUTO: 10.7 FL (ref 9.4–12.3)
POTASSIUM SERPL-SCNC: 3.7 MMOL/L (ref 3.5–5)
RBC # BLD AUTO: 3.21 M/UL (ref 4.2–5.4)
SODIUM SERPL-SCNC: 139 MMOL/L (ref 136–145)
WBC # BLD AUTO: 3.6 K/UL (ref 4.8–10.8)

## 2023-07-20 PROCEDURE — 85025 COMPLETE CBC W/AUTO DIFF WBC: CPT

## 2023-07-20 PROCEDURE — 6370000000 HC RX 637 (ALT 250 FOR IP): Performed by: HOSPITALIST

## 2023-07-20 PROCEDURE — 94640 AIRWAY INHALATION TREATMENT: CPT

## 2023-07-20 PROCEDURE — 80048 BASIC METABOLIC PNL TOTAL CA: CPT

## 2023-07-20 PROCEDURE — 6360000002 HC RX W HCPCS: Performed by: EMERGENCY MEDICINE

## 2023-07-20 PROCEDURE — 94761 N-INVAS EAR/PLS OXIMETRY MLT: CPT

## 2023-07-20 PROCEDURE — 2580000003 HC RX 258: Performed by: STUDENT IN AN ORGANIZED HEALTH CARE EDUCATION/TRAINING PROGRAM

## 2023-07-20 PROCEDURE — 6370000000 HC RX 637 (ALT 250 FOR IP): Performed by: STUDENT IN AN ORGANIZED HEALTH CARE EDUCATION/TRAINING PROGRAM

## 2023-07-20 PROCEDURE — 2580000003 HC RX 258: Performed by: EMERGENCY MEDICINE

## 2023-07-20 PROCEDURE — 6370000000 HC RX 637 (ALT 250 FOR IP): Performed by: INTERNAL MEDICINE

## 2023-07-20 PROCEDURE — 36415 COLL VENOUS BLD VENIPUNCTURE: CPT

## 2023-07-20 PROCEDURE — 1210000000 HC MED SURG R&B

## 2023-07-20 PROCEDURE — 92526 ORAL FUNCTION THERAPY: CPT

## 2023-07-20 PROCEDURE — 92523 SPEECH SOUND LANG COMPREHEN: CPT

## 2023-07-20 PROCEDURE — 2500000003 HC RX 250 WO HCPCS: Performed by: HOSPITALIST

## 2023-07-20 PROCEDURE — 2700000000 HC OXYGEN THERAPY PER DAY

## 2023-07-20 PROCEDURE — 6360000002 HC RX W HCPCS: Performed by: HOSPITALIST

## 2023-07-20 RX ORDER — VANCOMYCIN HYDROCHLORIDE 50 MG/ML
125 KIT ORAL EVERY 6 HOURS SCHEDULED
Status: DISCONTINUED | OUTPATIENT
Start: 2023-07-20 | End: 2023-07-21 | Stop reason: HOSPADM

## 2023-07-20 RX ORDER — LANOLIN ALCOHOL/MO/W.PET/CERES
400 CREAM (GRAM) TOPICAL DAILY
Status: DISCONTINUED | OUTPATIENT
Start: 2023-07-20 | End: 2023-07-21 | Stop reason: HOSPADM

## 2023-07-20 RX ORDER — ERGOCALCIFEROL 1.25 MG/1
50000 CAPSULE ORAL WEEKLY
Qty: 5 CAPSULE | Refills: 0 | Status: SHIPPED | OUTPATIENT
Start: 2023-07-23 | End: 2023-07-20 | Stop reason: SDUPTHER

## 2023-07-20 RX ORDER — VANCOMYCIN HYDROCHLORIDE 50 MG/ML
125 KIT ORAL EVERY 6 HOURS
Qty: 40 ML | Refills: 0 | Status: SHIPPED | OUTPATIENT
Start: 2023-07-20 | End: 2023-07-20 | Stop reason: SDUPTHER

## 2023-07-20 RX ORDER — FERROUS SULFATE 325(65) MG
325 TABLET ORAL 2 TIMES DAILY WITH MEALS
Qty: 30 TABLET | Refills: 1 | Status: SHIPPED | OUTPATIENT
Start: 2023-07-20

## 2023-07-20 RX ORDER — VANCOMYCIN HYDROCHLORIDE 50 MG/ML
125 KIT ORAL EVERY 6 HOURS
Qty: 40 ML | Refills: 0 | Status: SHIPPED | OUTPATIENT
Start: 2023-07-20 | End: 2023-07-24

## 2023-07-20 RX ORDER — FERROUS SULFATE 325(65) MG
325 TABLET ORAL 2 TIMES DAILY WITH MEALS
Qty: 30 TABLET | Refills: 1 | Status: SHIPPED | OUTPATIENT
Start: 2023-07-20 | End: 2023-07-20 | Stop reason: SDUPTHER

## 2023-07-20 RX ORDER — ERGOCALCIFEROL 1.25 MG/1
50000 CAPSULE ORAL WEEKLY
Qty: 5 CAPSULE | Refills: 0 | Status: SHIPPED | OUTPATIENT
Start: 2023-07-23 | End: 2023-08-21

## 2023-07-20 RX ORDER — MAGNESIUM OXIDE 400 MG/1
400 TABLET ORAL DAILY
Qty: 30 TABLET | Refills: 0 | Status: SHIPPED | OUTPATIENT
Start: 2023-07-20

## 2023-07-20 RX ORDER — MAGNESIUM OXIDE 400 MG/1
400 TABLET ORAL DAILY
Qty: 30 TABLET | Refills: 0 | Status: SHIPPED | OUTPATIENT
Start: 2023-07-20 | End: 2023-07-20 | Stop reason: SDUPTHER

## 2023-07-20 RX ADMIN — VANCOMYCIN HYDROCHLORIDE 125 MG: KIT at 10:06

## 2023-07-20 RX ADMIN — ENOXAPARIN SODIUM 60 MG: 100 INJECTION SUBCUTANEOUS at 21:29

## 2023-07-20 RX ADMIN — GUAIFENESIN 200 MG: 100 SOLUTION ORAL at 08:48

## 2023-07-20 RX ADMIN — OXYCODONE HYDROCHLORIDE AND ACETAMINOPHEN 1 TABLET: 10; 325 TABLET ORAL at 11:26

## 2023-07-20 RX ADMIN — ENOXAPARIN SODIUM 60 MG: 100 INJECTION SUBCUTANEOUS at 08:48

## 2023-07-20 RX ADMIN — ALPRAZOLAM 1 MG: 0.5 TABLET ORAL at 06:35

## 2023-07-20 RX ADMIN — VANCOMYCIN HYDROCHLORIDE 125 MG: KIT at 16:42

## 2023-07-20 RX ADMIN — BUDESONIDE AND FORMOTEROL FUMARATE DIHYDRATE 2 PUFF: 160; 4.5 AEROSOL RESPIRATORY (INHALATION) at 19:01

## 2023-07-20 RX ADMIN — FERROUS SULFATE TAB 325 MG (65 MG ELEMENTAL FE) 325 MG: 325 (65 FE) TAB at 08:47

## 2023-07-20 RX ADMIN — SUCRALFATE 1 G: 1 TABLET ORAL at 08:47

## 2023-07-20 RX ADMIN — IPRATROPIUM BROMIDE AND ALBUTEROL SULFATE 1 DOSE: 2.5; .5 SOLUTION RESPIRATORY (INHALATION) at 06:45

## 2023-07-20 RX ADMIN — SODIUM CHLORIDE: 9 INJECTION, SOLUTION INTRAVENOUS at 01:45

## 2023-07-20 RX ADMIN — VANCOMYCIN HYDROCHLORIDE 125 MG: KIT at 21:40

## 2023-07-20 RX ADMIN — GABAPENTIN 300 MG: 600 TABLET, FILM COATED ORAL at 08:47

## 2023-07-20 RX ADMIN — GABAPENTIN 300 MG: 600 TABLET, FILM COATED ORAL at 21:23

## 2023-07-20 RX ADMIN — GUAIFENESIN 200 MG: 100 SOLUTION ORAL at 21:24

## 2023-07-20 RX ADMIN — SUCRALFATE 1 G: 1 TABLET ORAL at 14:41

## 2023-07-20 RX ADMIN — OXYCODONE HYDROCHLORIDE AND ACETAMINOPHEN 1 TABLET: 10; 325 TABLET ORAL at 21:24

## 2023-07-20 RX ADMIN — OXYCODONE HYDROCHLORIDE AND ACETAMINOPHEN 1 TABLET: 10; 325 TABLET ORAL at 03:40

## 2023-07-20 RX ADMIN — FOLIC ACID 1 MG: 1 TABLET ORAL at 08:47

## 2023-07-20 RX ADMIN — ALPRAZOLAM 1 MG: 0.5 TABLET ORAL at 21:25

## 2023-07-20 RX ADMIN — FERROUS SULFATE TAB 325 MG (65 MG ELEMENTAL FE) 325 MG: 325 (65 FE) TAB at 16:42

## 2023-07-20 RX ADMIN — CITALOPRAM HYDROBROMIDE 20 MG: 20 TABLET ORAL at 08:48

## 2023-07-20 RX ADMIN — BUDESONIDE AND FORMOTEROL FUMARATE DIHYDRATE 2 PUFF: 160; 4.5 AEROSOL RESPIRATORY (INHALATION) at 06:54

## 2023-07-20 RX ADMIN — GABAPENTIN 300 MG: 600 TABLET, FILM COATED ORAL at 14:41

## 2023-07-20 RX ADMIN — Medication 400 MG: at 14:41

## 2023-07-20 RX ADMIN — IPRATROPIUM BROMIDE AND ALBUTEROL SULFATE 1 DOSE: 2.5; .5 SOLUTION RESPIRATORY (INHALATION) at 10:37

## 2023-07-20 RX ADMIN — CEFEPIME 2000 MG: 2 INJECTION, POWDER, FOR SOLUTION INTRAVENOUS at 04:41

## 2023-07-20 RX ADMIN — IPRATROPIUM BROMIDE AND ALBUTEROL SULFATE 1 DOSE: 2.5; .5 SOLUTION RESPIRATORY (INHALATION) at 14:36

## 2023-07-20 RX ADMIN — VANCOMYCIN HYDROCHLORIDE 125 MG: KIT at 03:40

## 2023-07-20 RX ADMIN — SUCRALFATE 1 G: 1 TABLET ORAL at 21:23

## 2023-07-20 RX ADMIN — IPRATROPIUM BROMIDE AND ALBUTEROL SULFATE 1 DOSE: 2.5; .5 SOLUTION RESPIRATORY (INHALATION) at 18:59

## 2023-07-20 RX ADMIN — ALPRAZOLAM 1 MG: 0.5 TABLET ORAL at 14:47

## 2023-07-20 RX ADMIN — SUCRALFATE 1 G: 1 TABLET ORAL at 16:42

## 2023-07-20 ASSESSMENT — PAIN SCALES - GENERAL
PAINLEVEL_OUTOF10: 6
PAINLEVEL_OUTOF10: 9
PAINLEVEL_OUTOF10: 10

## 2023-07-20 ASSESSMENT — PAIN DESCRIPTION - LOCATION
LOCATION: LEG
LOCATION: ARM

## 2023-07-20 ASSESSMENT — PAIN DESCRIPTION - ORIENTATION: ORIENTATION: RIGHT

## 2023-07-20 NOTE — DISCHARGE SUMMARY
[facility], [address] [place] [address]  DEPARTMENT OF HOSPITALIST MEDICINE    DISCHARGE SUMMARY:        Clark Quiñones  :  1950  MRN:  [id number]    Admit date:  2023  Discharge date:  2023      Admitting Physician:  No admitting provider for patient encounter. Advance Directive: Full Code    Consults Made:   PALLIATIVE CARE EVAL  IP CONSULT TO GI  IP CONSULT TO GI      Primary Care Physician:  TEN Dockery - NP    Discharge Diagnoses:  Principal Problem:    Sepsis [facility])  Active Problems:    Acute on chronic respiratory failure with hypoxemia (HCC)    Clostridium difficile enterocolitis    Chronic respiratory failure with hypoxia and hypercapnia (HCC)    Pneumonia    Stage 4 very severe COPD by GOLD classification ([address])    Alpha-1-antitrypsin deficiency carrier    Severe malnutrition (HCC)    Gastroenteritis    Nausea vomiting and diarrhea  Resolved Problems:    * No resolved hospital problems.  *          Pertinent Labs:  CBC with DIFF:  Recent Labs     23  0921   WBC 4.1* 4.5* 3.6*   RBC 2.98* 3.18* 3.21*   HGB 9.4* 10.0* 10.1*   HCT 29.6* 31.0* 31.2*   MCV 99.3* 97.5 97.2   MCH 31.5* 31.4* 31.5*   MCHC 31.8* 32.3* 32.4*   RDW 13.2 12.7 12.9    295 275   MPV 10.6 10.6 10.7   NEUTOPHILPCT 54.1 66.5* 58.0   LYMPHOPCT 27.9 18.2* 25.8   MONOPCT 12.8* 11.5* 11.1*   EOSRELPCT 3.5 2.0 4.2   BASOPCT 1.0 1.1* 0.6   NEUTROABS 2.2 3.0 2.1   LYMPHSABS 1.1 0.8* 0.9*   MONOSABS 0.50 0.50 0.40   EOSABS 0.10 0.10 0.20   BASOSABS 0.00 0.10 0.00       CMP/BMP:  Recent Labs     23  0921    140 139   K 3.4* 3.4* 3.7    103 102   CO2 29 27 30*   ANIONGAP 8 10 7   GLUCOSE 96 117* 142*   BUN 6* 4* 6*   CREATININE 0.4* 0.4* 0.6   LABGLOM >60 >60 >60   CALCIUM 8.7* 9.0 9.4   PROT 5.8* 6.6  --    LABALBU 3.1* 3.7  --    BILITOT <0.2 <0.2  --    ALKPHOS 50 56  --    ALT <5* 8  --    AST 11 19  --          CRP:  No results

## 2023-07-20 NOTE — PROGRESS NOTES
Facility/Department: [facility] ONCOLOGY UNIT  Initial Speech/Language/Cognitive Assessment  Swallow Therapy    NAME: Laura Bryan  : 1950   MRN: [id number]  ADMISSION DATE: 2023  ADMITTING DIAGNOSIS: has Chronic pain; Anxiety and depression; GERD (gastroesophageal reflux disease); Stage 4 very severe COPD by GOLD classification ([address]); Chronic bronchitis with acute exacerbation ([address]); Hypertension; Echocardiogram abnormal; COPD exacerbation ([address]); Cervicalgia; Chronic low back pain; Acute hypercapnic respiratory failure ([address]); Alpha-1-antitrypsin deficiency carrier; Current every day smoker; Folate deficiency anemia; Palliative care patient; Severe malnutrition ([address]); Intractable nausea and vomiting; Gram-negative bacteremia; Hypomagnesemia; Tobacco abuse counseling; Cigarette nicotine dependence with nicotine-induced disorder; Clostridium perfringens infection; Chronic respiratory failure with hypoxia and hypercapnia ([address]); Anxiety; Upper abdominal pain; Heart burn; Nausea; Rhinovirus infection; Pneumonia; Loculated pleural effusion; Empyema ([address]); History of smoking; Shortness of breath; Chest pain; Anterior cervical lymphadenopathy; Compensated respiratory acidosis; Hypokalemia; Hypoxemia requiring supplemental oxygen; Lung nodule, multiple; Nasopharyngitis; Oxygen dependent; Unresolved grief; Weight loss; Wound of right foot; Right lower lobe consolidation ([address]); Sinus congestion; Sepsis ([address]); Acute on chronic respiratory failure with hypoxemia ([address]); Gastroenteritis;  Nausea vomiting and diarrhea; and Clostridium difficile enterocolitis on their problem list.    Date of Eval/Treat: 2023   Evaluating/treating Therapist: ANDREW Forbes    Pain:  Pain Assessment  Pain Assessment: 0-10  Pain Level: 6  Lara-Baker Pain Rating: No hurt  Patient's Stated Pain Goal: 0 - No pain  Pain Location: Leg  Pain Orientation: Other (Comment) (generalized)  Pain Descriptors: Aching  Functional Pain Assessment: outward S/S of aspiration/penetration during all PO intake. Goal 2: Follow pt for potential diet upgrade. Goal 3: Oral care to be provided by staff. Goal 4: Speech/language/cognitive reassessment. Subjective:  Patient alert and in bed. Patient on nasal cannula O2. Patient cooperative in reevaluation and evaluation. Objective:  Patient completed the MINI Mental State Examination. Patient achieved score of 29 out of 30 possible points. This score is WNL (30-25). Further evaluation completed using portions of the RIPA, WAB, and Crash Inventory. Breakdown of scores are as follows:    Orientation:  Biographical: 100%  Temporal: 100%  Spatial: 100%    Auditory Comprehension:  Y/N: 100%  3 step verbal command (MINI): 3/3  2 step verbal commands: 100%    Verbal Expression:  Confrontational naming (MINI): 2/2  Sentence repetition (MINI): 1/1  Responsive speech: 80%; 100% with repetition  Confrontational namin%    Memory:  Immediate recall (MINI): 3/3  Distracted delayed recall (MINI): 3/3  Immediate recall of digits: 3/3    Attention and Calculation:   100% (in task on MINI) with self correction    Problem Solving/Reasonin%; 100% with repetition (patient required affirmation and verbal prompt in one trial, delay noted in this trial)    Oral Phase:  Oral Prep: primarily vertical oral prep noted with ice chips and regular solid  Oral Transit: oral transit appeared timely with all consistencies (ice chips; puree; mildly thick liquid; regular solid; thin liquid)  Oral Residue: none observed with puree; min observed following regular solid cleared with additional dry swallow (patient reported dry mouth due to O2)     Pharyngeal Phase:  Throat movement appeared to be potentially mildly decreased and sluggish. Question possibly mild-moderately decreased and sluggish with thin liquids. S/S: Note audible swallows with mildly thick liquid and inconsistently audible swallows with thin liquid.  No other overt s/s

## 2023-07-20 NOTE — PLAN OF CARE
Problem: ABCDS Injury Assessment  Goal: Absence of physical injury  Outcome: Progressing  Flowsheets (Taken 7/17/2023 2224 by Tracey Martinez RN)  Absence of Physical Injury: Implement safety measures based on patient assessment     Problem: Safety - Adult  Goal: Free from fall injury  7/18/2023 0719 by Radha Tena RN  Outcome: Progressing  7/17/2023 2225 by Tracey Martinez RN  Outcome: Progressing  Flowsheets (Taken 7/17/2023 2224)  Free From Fall Injury: Based on caregiver fall risk screen, instruct family/caregiver to ask for assistance with transferring infant if caregiver noted to have fall risk factors
Problem: Safety - Adult  Goal: Free from fall injury  7/10/2023 0327 by Nancy Tinoco LPN  Outcome: Progressing  Flowsheets (Taken 7/10/2023 0138)  Free From Fall Injury: Instruct family/caregiver on patient safety  7/10/2023 0138 by Nancy Tinoco LPN  Outcome: Progressing  Flowsheets (Taken 7/10/2023 0138)  Free From Fall Injury: Instruct family/caregiver on patient safety     Problem: ABCDS Injury Assessment  Goal: Absence of physical injury  7/10/2023 0327 by Nancy Tinoco LPN  Outcome: Progressing  Flowsheets (Taken 7/10/2023 0138)  Absence of Physical Injury: Implement safety measures based on patient assessment  7/10/2023 0138 by Nancy Tinoco LPN  Outcome: Progressing  Flowsheets (Taken 7/10/2023 0138)  Absence of Physical Injury: Implement safety measures based on patient assessment     Problem: Chronic Conditions and Co-morbidities  Goal: Patient's chronic conditions and co-morbidity symptoms are monitored and maintained or improved  7/10/2023 0327 by Nancy Tinoco LPN  Outcome: Progressing  Flowsheets (Taken 7/10/2023 0138)  Care Plan - Patient's Chronic Conditions and Co-Morbidity Symptoms are Monitored and Maintained or Improved:   Monitor and assess patient's chronic conditions and comorbid symptoms for stability, deterioration, or improvement   Collaborate with multidisciplinary team to address chronic and comorbid conditions and prevent exacerbation or deterioration   Update acute care plan with appropriate goals if chronic or comorbid symptoms are exacerbated and prevent overall improvement and discharge  7/10/2023 0138 by Nancy Tinoco LPN  Outcome: Progressing  Flowsheets (Taken 7/10/2023 0138)  Care Plan - Patient's Chronic Conditions and Co-Morbidity Symptoms are Monitored and Maintained or Improved:   Monitor and assess patient's chronic conditions and comorbid symptoms for stability, deterioration, or improvement   Collaborate with multidisciplinary team to
Problem: Safety - Adult  Goal: Free from fall injury  7/12/2023 0035 by Kaveh Mckoy RN  Outcome: Progressing  7/11/2023 1556 by Eden Riley RN  Outcome: Progressing     Problem: ABCDS Injury Assessment  Goal: Absence of physical injury  7/12/2023 0035 by Kaveh Mckoy RN  Outcome: Progressing  7/11/2023 1556 by Eden Riley RN  Outcome: Progressing     Problem: Chronic Conditions and Co-morbidities  Goal: Patient's chronic conditions and co-morbidity symptoms are monitored and maintained or improved  7/12/2023 0035 by Kaveh Mckoy RN  Outcome: Progressing  7/11/2023 1556 by Eden Riley RN  Outcome: Progressing     Problem: Pain  Goal: Verbalizes/displays adequate comfort level or baseline comfort level  7/12/2023 0035 by Kaveh Mckoy RN  Outcome: Progressing  7/11/2023 1556 by Eden Riley RN  Outcome: Progressing     Problem: Nutrition Deficit:  Goal: Optimize nutritional status  7/12/2023 0035 by Kaveh Mckoy RN  Outcome: Progressing  7/11/2023 1556 by Eden Riley RN  Outcome: Progressing     Problem: Skin/Tissue Integrity  Goal: Absence of new skin breakdown  Description: 1. Monitor for areas of redness and/or skin breakdown  2. Assess vascular access sites hourly  3. Every 4-6 hours minimum:  Change oxygen saturation probe site  4. Every 4-6 hours:  If on nasal continuous positive airway pressure, respiratory therapy assess nares and determine need for appliance change or resting period.   7/12/2023 0035 by Kaveh Mckoy RN  Outcome: Progressing  7/11/2023 1556 by Eden Riley RN  Outcome: Progressing
Problem: Safety - Adult  Goal: Free from fall injury  7/17/2023 0955 by Anisha Talamantes RN  Outcome: Progressing  7/17/2023 0439 by Alan Victoria LPN  Outcome: Progressing  Flowsheets (Taken 7/17/2023 0438)  Free From Fall Injury: Instruct family/caregiver on patient safety     Problem: ABCDS Injury Assessment  Goal: Absence of physical injury  7/17/2023 0955 by Anisha Talamantes RN  Outcome: Progressing  7/17/2023 0439 by Aaln Victoria LPN  Outcome: Progressing  Flowsheets (Taken 7/17/2023 0438)  Absence of Physical Injury: Implement safety measures based on patient assessment     Problem: Chronic Conditions and Co-morbidities  Goal: Patient's chronic conditions and co-morbidity symptoms are monitored and maintained or improved  7/17/2023 0955 by Anisha Talamantes RN  Outcome: Progressing  Flowsheets (Taken 7/17/2023 0820)  Care Plan - Patient's Chronic Conditions and Co-Morbidity Symptoms are Monitored and Maintained or Improved: Monitor and assess patient's chronic conditions and comorbid symptoms for stability, deterioration, or improvement  7/17/2023 0439 by Alan Victoria LPN  Outcome: Progressing  Flowsheets (Taken 7/16/2023 2115)  Care Plan - Patient's Chronic Conditions and Co-Morbidity Symptoms are Monitored and Maintained or Improved:   Monitor and assess patient's chronic conditions and comorbid symptoms for stability, deterioration, or improvement   Collaborate with multidisciplinary team to address chronic and comorbid conditions and prevent exacerbation or deterioration   Update acute care plan with appropriate goals if chronic or comorbid symptoms are exacerbated and prevent overall improvement and discharge     Problem: Pain  Goal: Verbalizes/displays adequate comfort level or baseline comfort level  7/17/2023 0955 by Anisha Talamantes RN  Outcome: Progressing  7/17/2023 0439 by Alan Victoria LPN  Outcome: Progressing  Flowsheets (Taken 7/17/2023 0028)  Verbalizes/displays adequate
Problem: Safety - Adult  Goal: Free from fall injury  7/17/2023 2225 by Arron Gallardo RN  Outcome: Progressing  Flowsheets (Taken 7/17/2023 2224)  Free From Fall Injury: Based on caregiver fall risk screen, instruct family/caregiver to ask for assistance with transferring infant if caregiver noted to have fall risk factors  7/17/2023 0955 by Julian Woods RN  Outcome: Progressing     Problem: Chronic Conditions and Co-morbidities  Goal: Patient's chronic conditions and co-morbidity symptoms are monitored and maintained or improved  7/17/2023 2225 by Arron Gallardo RN  Outcome: Progressing  Flowsheets (Taken 7/17/2023 2054)  Care Plan - Patient's Chronic Conditions and Co-Morbidity Symptoms are Monitored and Maintained or Improved: Monitor and assess patient's chronic conditions and comorbid symptoms for stability, deterioration, or improvement  7/17/2023 0955 by Julian Woods RN  Outcome: Progressing  Flowsheets (Taken 7/17/2023 0820)  Care Plan - Patient's Chronic Conditions and Co-Morbidity Symptoms are Monitored and Maintained or Improved: Monitor and assess patient's chronic conditions and comorbid symptoms for stability, deterioration, or improvement     Problem: Pain  Goal: Verbalizes/displays adequate comfort level or baseline comfort level  7/17/2023 2225 by Arron Gallardo RN  Outcome: Progressing  7/17/2023 0955 by Julian Woods RN  Outcome: Progressing
Problem: Safety - Adult  Goal: Free from fall injury  7/19/2023 2237 by Hoa Ludwig RN  Outcome: Progressing  7/19/2023 0947 by Blanca Hitchcock RN  Outcome: Progressing     Problem: ABCDS Injury Assessment  Goal: Absence of physical injury  7/19/2023 2237 by Hoa Ludwig RN  Outcome: Progressing  7/19/2023 0947 by Blanca Hitchcock RN  Outcome: Progressing     Problem: Chronic Conditions and Co-morbidities  Goal: Patient's chronic conditions and co-morbidity symptoms are monitored and maintained or improved  7/19/2023 2237 by Hoa Ludwig RN  Outcome: Progressing  7/19/2023 0947 by Blanca Hitchcock RN  Outcome: Progressing     Problem: Pain  Goal: Verbalizes/displays adequate comfort level or baseline comfort level  7/19/2023 2237 by Hoa Ludwig RN  Outcome: Progressing  7/19/2023 0947 by Blanca Hitchcock RN  Outcome: Progressing     Problem: Nutrition Deficit:  Goal: Optimize nutritional status  7/19/2023 2237 by Hoa Ludwig RN  Outcome: Progressing  7/19/2023 0947 by Blanca Hitchcock RN  Outcome: Progressing  Flowsheets (Taken 7/19/2023 0742 by Tanvi Cevallos, MS, RD, LD)  Nutrient intake appropriate for improving, restoring, or maintaining nutritional needs:   Recommend appropriate diets, oral nutritional supplements, and vitamin/mineral supplements   Monitor oral intake, labs, and treatment plans     Problem: Skin/Tissue Integrity  Goal: Absence of new skin breakdown  Description: 1. Monitor for areas of redness and/or skin breakdown  2. Every 4-6 hours minimum:  Change oxygen saturation probe site  3. Every 4-6 hours:  If on nasal continuous positive airway pressure, respiratory therapy assess nares and determine need for appliance change or resting period.   7/19/2023 2237 by Hoa Ludwig RN  Outcome: Progressing  7/19/2023 0947 by Blanca Hitchcock RN  Outcome: Progressing
Problem: Safety - Adult  Goal: Free from fall injury  Outcome: Progressing     Problem: ABCDS Injury Assessment  Goal: Absence of physical injury  Outcome: Progressing     Problem: Chronic Conditions and Co-morbidities  Goal: Patient's chronic conditions and co-morbidity symptoms are monitored and maintained or improved  Outcome: Progressing     Problem: Pain  Goal: Verbalizes/displays adequate comfort level or baseline comfort level  Outcome: Progressing     Problem: Nutrition Deficit:  Goal: Optimize nutritional status  Outcome: Progressing     Problem: Skin/Tissue Integrity  Goal: Absence of new skin breakdown  Description: 1. Monitor for areas of redness and/or skin breakdown  2. Assess vascular access sites hourly  3. Every 4-6 hours minimum:  Change oxygen saturation probe site  4. Every 4-6 hours:  If on nasal continuous positive airway pressure, respiratory therapy assess nares and determine need for appliance change or resting period.   Outcome: Progressing
Problem: Safety - Adult  Goal: Free from fall injury  Outcome: Progressing     Problem: ABCDS Injury Assessment  Goal: Absence of physical injury  Outcome: Progressing     Problem: Chronic Conditions and Co-morbidities  Goal: Patient's chronic conditions and co-morbidity symptoms are monitored and maintained or improved  Outcome: Progressing     Problem: Pain  Goal: Verbalizes/displays adequate comfort level or baseline comfort level  Outcome: Progressing     Problem: Nutrition Deficit:  Goal: Optimize nutritional status  Outcome: Progressing  Flowsheets (Taken 7/19/2023 0592 by Marquise Blue, MS, RD, LD)  Nutrient intake appropriate for improving, restoring, or maintaining nutritional needs:   Recommend appropriate diets, oral nutritional supplements, and vitamin/mineral supplements   Monitor oral intake, labs, and treatment plans     Problem: Skin/Tissue Integrity  Goal: Absence of new skin breakdown  Description: 1. Monitor for areas of redness and/or skin breakdown  2. Every 4-6 hours minimum:  Change oxygen saturation probe site  3. Every 4-6 hours:  If on nasal continuous positive airway pressure, respiratory therapy assess nares and determine need for appliance change or resting period.   Outcome: Progressing
Problem: Safety - Adult  Goal: Free from fall injury  Outcome: Progressing  Flowsheets  Taken 7/16/2023 1100 by Storm Neal RN  Free From Fall Injury: Instruct family/caregiver on patient safety  Taken 7/16/2023 0244 by Ector Marr LPN  Free From Fall Injury: Instruct family/caregiver on patient safety     Problem: ABCDS Injury Assessment  Goal: Absence of physical injury  Outcome: Progressing  Flowsheets  Taken 7/16/2023 1100 by Storm Neal RN  Absence of Physical Injury: Implement safety measures based on patient assessment  Taken 7/16/2023 0244 by Ector Marr LPN  Absence of Physical Injury: Implement safety measures based on patient assessment     Problem: Chronic Conditions and Co-morbidities  Goal: Patient's chronic conditions and co-morbidity symptoms are monitored and maintained or improved  Outcome: Progressing  Flowsheets  Taken 7/16/2023 0835 by Storm Neal [facility] - Patient's Chronic Conditions and Co-Morbidity Symptoms are Monitored and Maintained or Improved: Monitor and assess patient's chronic conditions and comorbid symptoms for stability, deterioration, or improvement  Taken 7/15/2023 2229 by Ector Marr LPN  Care Plan - Patient's Chronic Conditions and Co-Morbidity Symptoms are Monitored and Maintained or Improved:   Monitor and assess patient's chronic conditions and comorbid symptoms for stability, deterioration, or improvement   Collaborate with multidisciplinary team to address chronic and comorbid conditions and prevent exacerbation or deterioration   Update acute care plan with appropriate goals if chronic or comorbid symptoms are exacerbated and prevent overall improvement and discharge     Problem: Pain  Goal: Verbalizes/displays adequate comfort level or baseline comfort level  Outcome: Progressing  Flowsheets (Taken 7/15/2023 2305 by Ector Marr LPN)  Verbalizes/displays adequate comfort level or baseline comfort level:   Encourage patient to
Problem: Safety - Adult  Goal: Free from fall injury  Outcome: Progressing  Flowsheets (Taken 7/10/2023 0138)  Free From Fall Injury: Instruct family/caregiver on patient safety     Problem: ABCDS Injury Assessment  Goal: Absence of physical injury  Outcome: Progressing  Flowsheets (Taken 7/10/2023 0138)  Absence of Physical Injury: Implement safety measures based on patient assessment     Problem: Chronic Conditions and Co-morbidities  Goal: Patient's chronic conditions and co-morbidity symptoms are monitored and maintained or improved  Outcome: Progressing  Flowsheets (Taken 7/10/2023 0138)  Care Plan - Patient's Chronic Conditions and Co-Morbidity Symptoms are Monitored and Maintained or Improved:   Monitor and assess patient's chronic conditions and comorbid symptoms for stability, deterioration, or improvement   Collaborate with multidisciplinary team to address chronic and comorbid conditions and prevent exacerbation or deterioration   Update acute care plan with appropriate goals if chronic or comorbid symptoms are exacerbated and prevent overall improvement and discharge
Problem: Safety - Adult  Goal: Free from fall injury  Outcome: Progressing  Flowsheets (Taken 7/17/2023 0438)  Free From Fall Injury: Instruct family/caregiver on patient safety     Problem: ABCDS Injury Assessment  Goal: Absence of physical injury  Outcome: Progressing  Flowsheets (Taken 7/17/2023 0438)  Absence of Physical Injury: Implement safety measures based on patient assessment     Problem: Chronic Conditions and Co-morbidities  Goal: Patient's chronic conditions and co-morbidity symptoms are monitored and maintained or improved  Outcome: Progressing  Flowsheets (Taken 7/16/2023 2115)  Care Plan - Patient's Chronic Conditions and Co-Morbidity Symptoms are Monitored and Maintained or Improved:   Monitor and assess patient's chronic conditions and comorbid symptoms for stability, deterioration, or improvement   Collaborate with multidisciplinary team to address chronic and comorbid conditions and prevent exacerbation or deterioration   Update acute care plan with appropriate goals if chronic or comorbid symptoms are exacerbated and prevent overall improvement and discharge     Problem: Pain  Goal: Verbalizes/displays adequate comfort level or baseline comfort level  Outcome: Progressing  Flowsheets (Taken 7/17/2023 0028)  Verbalizes/displays adequate comfort level or baseline comfort level:   Encourage patient to monitor pain and request assistance   Assess pain using appropriate pain scale   Administer analgesics based on type and severity of pain and evaluate response   Implement non-pharmacological measures as appropriate and evaluate response   Consider cultural and social influences on pain and pain management   Notify Licensed Independent Practitioner if interventions unsuccessful or patient reports new pain     Problem: Nutrition Deficit:  Goal: Optimize nutritional status  Outcome: Progressing  Flowsheets (Taken 7/14/2023 1349 by Rhonda Harris, MS, RD, LD)  Nutrient intake appropriate for improving,
restoring, or maintaining nutritional needs:   Monitor oral intake, labs, and treatment plans   Recommend appropriate diets, oral nutritional supplements, and vitamin/mineral supplements   Recommend, monitor, and adjust tube feedings and TPN/PPN based on assessed needs     Problem: Skin/Tissue Integrity  Goal: Absence of new skin breakdown  Description: 1. Monitor for areas of redness and/or skin breakdown  2. Every 4-6 hours minimum:  Change oxygen saturation probe site  3. Every 4-6 hours:  If on nasal continuous positive airway pressure, respiratory therapy assess nares and determine need for appliance change or resting period.   Outcome: Progressing

## 2023-07-20 NOTE — PROGRESS NOTES
[facility] Progress Note    Patient:  Priscilla Pierce  YOB: 1950  Date of Service: 7/20/2023  MRN: [id number]   Acct: [de-identified]   Primary Care Physician: Claudean Hensen, APRN - NP  Advance Directive: Full Code  Admit Date: 7/9/2023       Hospital Day: 6  . CHIEF COMPLAINT:     Chief Complaint   Patient presents with    Shortness of Breath    Cough     Released with pneumonia 2 wks ago       7/20/2023 6:28 PM  Subjective / Interval History:   07/20/2023  Patient seen and examined. Doing well. No new complaints. No acute changes or acute overnight event reported. Diarrhea resolved. Denies any acute complaints or distress at this time. 07/19/2023  Patient doing well. Endorses intermittent shortness of breath. Lying comfortably in bed in no acute distress. No acute changes or acute overnight event noted. Tolerating regular diet. Denies any other acute complaints or distress at this time. 07/18/2023  Patient seen and examined. Doing well. No new complaints. Lying comfortably bed in no acute distress. Denies any acute complaints or distress. .  No acute changes or acute overnight event reported. 07/17/2023  Patient seen & examined. Doing well. No new complaints. No acute changes or acute overnight event reported. Laying comfortably in bed in no acute distress. Denies any active diarrhea at this time. Currently on clear liquid diet. Denies any acute complaints or distress at this time. 07/16/2023  New complaints. No acute changes or acute overnight event reported. Patient laying comfortably in bed in no acute distress. Denies any active diarrhea at this time. Patient continues to report decreased p.o. intake and intermittent nausea, but no vomiting.      07/15/2023  Patient endorses episodes of vomiting yesterday. No acute changes or acute overnight event reported. Laying comfortably in bed in no acute distress.   Denies any acute complaints or are normal. There is no distension. Palpations: Abdomen is soft. Tenderness: There is no abdominal tenderness. There is no guarding or rebound. Musculoskeletal:         General: No swelling, tenderness, deformity or signs of injury. Normal range of motion. Cervical back: Normal range of motion and neck supple. No rigidity. No muscular tenderness. Right lower leg: No edema. Left lower leg: No edema. Skin:     General: Skin is warm and dry. Capillary Refill: Capillary refill takes less than 2 seconds. Coloration: Skin is not jaundiced or pale. Findings: No bruising, erythema, lesion or rash. Neurological:      General: No focal deficit present. Mental Status: She is alert and oriented to person, place, and time. Cranial Nerves: No cranial nerve deficit. Sensory: No sensory deficit. Motor: No weakness. Coordination: Coordination normal.   Psychiatric:         Mood and Affect: Mood normal.         Behavior: Behavior normal.         Thought Content:  Thought content normal.         Judgment: Judgment normal.         Assessment/plan:     Patient Active Problem List    Diagnosis Date Noted    Clostridium difficile enterocolitis 07/11/2023     Priority: High    Acute on chronic respiratory failure with hypoxemia (HCC) 07/10/2023     Priority: High    Echocardiogram abnormal 10/16/2014     Priority: High     9/15/2014  Echo  Normal LVFX, RVSP 53 mmHg      Chest pain 03/09/2023     Priority: Medium    History of smoking 09/07/2022     Priority: Medium    Shortness of breath 09/07/2022     Priority: Medium    Empyema ([address]) 08/20/2022     Priority: Medium    Loculated pleural effusion 08/12/2022     Priority: Medium    Pneumonia 08/03/2022     Priority: Medium    Rhinovirus infection 06/25/2022     Priority: Medium    Upper abdominal pain      Priority: Medium    Heart burn      Priority: Medium    Nausea      Priority: Medium    Anxiety      Priority: Medium

## 2023-07-21 VITALS
TEMPERATURE: 98.6 F | BODY MASS INDEX: 20.01 KG/M2 | DIASTOLIC BLOOD PRESSURE: 76 MMHG | SYSTOLIC BLOOD PRESSURE: 133 MMHG | RESPIRATION RATE: 20 BRPM | HEART RATE: 82 BPM | WEIGHT: 124.5 LBS | OXYGEN SATURATION: 95 % | HEIGHT: 66 IN

## 2023-07-21 LAB
ANION GAP SERPL CALCULATED.3IONS-SCNC: 9 MMOL/L (ref 7–19)
BASOPHILS # BLD: 0 K/UL (ref 0–0.2)
BASOPHILS NFR BLD: 0.9 % (ref 0–1)
BUN SERPL-MCNC: 8 MG/DL (ref 8–23)
CALCIUM SERPL-MCNC: 9.2 MG/DL (ref 8.8–10.2)
CHLORIDE SERPL-SCNC: 100 MMOL/L (ref 98–111)
CO2 SERPL-SCNC: 33 MMOL/L (ref 22–29)
CREAT SERPL-MCNC: 0.6 MG/DL (ref 0.5–0.9)
EOSINOPHIL # BLD: 0.1 K/UL (ref 0–0.6)
EOSINOPHIL NFR BLD: 2.6 % (ref 0–5)
ERYTHROCYTE [DISTWIDTH] IN BLOOD BY AUTOMATED COUNT: 13 % (ref 11.5–14.5)
GLUCOSE SERPL-MCNC: 97 MG/DL (ref 74–109)
HCT VFR BLD AUTO: 29.9 % (ref 37–47)
HGB BLD-MCNC: 9.6 G/DL (ref 12–16)
IMM GRANULOCYTES # BLD: 0 K/UL
LYMPHOCYTES # BLD: 1.1 K/UL (ref 1.1–4.5)
LYMPHOCYTES NFR BLD: 24.5 % (ref 20–40)
MAGNESIUM SERPL-MCNC: 1.5 MG/DL (ref 1.6–2.4)
MCH RBC QN AUTO: 31.8 PG (ref 27–31)
MCHC RBC AUTO-ENTMCNC: 32.1 G/DL (ref 33–37)
MCV RBC AUTO: 99 FL (ref 81–99)
MONOCYTES # BLD: 0.5 K/UL (ref 0–0.9)
MONOCYTES NFR BLD: 11.4 % (ref 0–10)
NEUTROPHILS # BLD: 2.6 K/UL (ref 1.5–7.5)
NEUTS SEG NFR BLD: 60.1 % (ref 50–65)
PLATELET # BLD AUTO: 257 K/UL (ref 130–400)
PMV BLD AUTO: 11.3 FL (ref 9.4–12.3)
POTASSIUM SERPL-SCNC: 3.5 MMOL/L (ref 3.5–5)
RBC # BLD AUTO: 3.02 M/UL (ref 4.2–5.4)
SODIUM SERPL-SCNC: 142 MMOL/L (ref 136–145)
WBC # BLD AUTO: 4.3 K/UL (ref 4.8–10.8)

## 2023-07-21 PROCEDURE — 83735 ASSAY OF MAGNESIUM: CPT

## 2023-07-21 PROCEDURE — 92526 ORAL FUNCTION THERAPY: CPT

## 2023-07-21 PROCEDURE — 94640 AIRWAY INHALATION TREATMENT: CPT

## 2023-07-21 PROCEDURE — 2500000003 HC RX 250 WO HCPCS: Performed by: HOSPITALIST

## 2023-07-21 PROCEDURE — 6370000000 HC RX 637 (ALT 250 FOR IP): Performed by: HOSPITALIST

## 2023-07-21 PROCEDURE — 80048 BASIC METABOLIC PNL TOTAL CA: CPT

## 2023-07-21 PROCEDURE — 85025 COMPLETE CBC W/AUTO DIFF WBC: CPT

## 2023-07-21 PROCEDURE — 6370000000 HC RX 637 (ALT 250 FOR IP): Performed by: STUDENT IN AN ORGANIZED HEALTH CARE EDUCATION/TRAINING PROGRAM

## 2023-07-21 PROCEDURE — 94761 N-INVAS EAR/PLS OXIMETRY MLT: CPT

## 2023-07-21 PROCEDURE — 2700000000 HC OXYGEN THERAPY PER DAY

## 2023-07-21 PROCEDURE — 6370000000 HC RX 637 (ALT 250 FOR IP): Performed by: INTERNAL MEDICINE

## 2023-07-21 PROCEDURE — 6360000002 HC RX W HCPCS: Performed by: HOSPITALIST

## 2023-07-21 PROCEDURE — 36415 COLL VENOUS BLD VENIPUNCTURE: CPT

## 2023-07-21 RX ORDER — PANTOPRAZOLE SODIUM 40 MG/1
40 TABLET, DELAYED RELEASE ORAL
Status: DISCONTINUED | OUTPATIENT
Start: 2023-07-21 | End: 2023-07-21 | Stop reason: HOSPADM

## 2023-07-21 RX ORDER — PANTOPRAZOLE SODIUM 40 MG/1
40 TABLET, DELAYED RELEASE ORAL
Qty: 30 TABLET | Refills: 1 | Status: SHIPPED | OUTPATIENT
Start: 2023-07-21

## 2023-07-21 RX ADMIN — FOLIC ACID 1 MG: 1 TABLET ORAL at 08:54

## 2023-07-21 RX ADMIN — GABAPENTIN 300 MG: 600 TABLET, FILM COATED ORAL at 08:53

## 2023-07-21 RX ADMIN — ENOXAPARIN SODIUM 60 MG: 100 INJECTION SUBCUTANEOUS at 08:59

## 2023-07-21 RX ADMIN — IPRATROPIUM BROMIDE AND ALBUTEROL SULFATE 1 DOSE: 2.5; .5 SOLUTION RESPIRATORY (INHALATION) at 10:25

## 2023-07-21 RX ADMIN — FERROUS SULFATE TAB 325 MG (65 MG ELEMENTAL FE) 325 MG: 325 (65 FE) TAB at 08:54

## 2023-07-21 RX ADMIN — OXYCODONE HYDROCHLORIDE AND ACETAMINOPHEN 1 TABLET: 10; 325 TABLET ORAL at 06:30

## 2023-07-21 RX ADMIN — BUDESONIDE AND FORMOTEROL FUMARATE DIHYDRATE 2 PUFF: 160; 4.5 AEROSOL RESPIRATORY (INHALATION) at 06:40

## 2023-07-21 RX ADMIN — ONDANSETRON 4 MG: 4 TABLET, ORALLY DISINTEGRATING ORAL at 01:44

## 2023-07-21 RX ADMIN — IPRATROPIUM BROMIDE AND ALBUTEROL SULFATE 1 DOSE: 2.5; .5 SOLUTION RESPIRATORY (INHALATION) at 06:22

## 2023-07-21 RX ADMIN — ALPRAZOLAM 1 MG: 0.5 TABLET ORAL at 08:57

## 2023-07-21 RX ADMIN — SUCRALFATE 1 G: 1 TABLET ORAL at 14:40

## 2023-07-21 RX ADMIN — IPRATROPIUM BROMIDE AND ALBUTEROL SULFATE 1 DOSE: 2.5; .5 SOLUTION RESPIRATORY (INHALATION) at 14:28

## 2023-07-21 RX ADMIN — VANCOMYCIN HYDROCHLORIDE 125 MG: KIT at 04:12

## 2023-07-21 RX ADMIN — OXYCODONE HYDROCHLORIDE AND ACETAMINOPHEN 1 TABLET: 10; 325 TABLET ORAL at 11:56

## 2023-07-21 RX ADMIN — VANCOMYCIN HYDROCHLORIDE 125 MG: KIT at 16:43

## 2023-07-21 RX ADMIN — GABAPENTIN 300 MG: 600 TABLET, FILM COATED ORAL at 14:44

## 2023-07-21 RX ADMIN — PANTOPRAZOLE SODIUM 40 MG: 40 TABLET, DELAYED RELEASE ORAL at 16:43

## 2023-07-21 RX ADMIN — FERROUS SULFATE TAB 325 MG (65 MG ELEMENTAL FE) 325 MG: 325 (65 FE) TAB at 16:46

## 2023-07-21 RX ADMIN — CITALOPRAM HYDROBROMIDE 20 MG: 20 TABLET ORAL at 08:54

## 2023-07-21 RX ADMIN — GUAIFENESIN 200 MG: 100 SOLUTION ORAL at 08:57

## 2023-07-21 RX ADMIN — SUCRALFATE 1 G: 1 TABLET ORAL at 08:54

## 2023-07-21 RX ADMIN — VANCOMYCIN HYDROCHLORIDE 125 MG: KIT at 11:01

## 2023-07-21 RX ADMIN — PANTOPRAZOLE SODIUM 40 MG: 40 TABLET, DELAYED RELEASE ORAL at 12:55

## 2023-07-21 RX ADMIN — Medication 400 MG: at 08:54

## 2023-07-21 ASSESSMENT — PAIN DESCRIPTION - LOCATION
LOCATION: OTHER (COMMENT)
LOCATION: HEAD

## 2023-07-21 ASSESSMENT — PAIN SCALES - GENERAL
PAINLEVEL_OUTOF10: 7
PAINLEVEL_OUTOF10: 10

## 2023-07-21 NOTE — CARE COORDINATION
Checking on Pt Vancomycin medication cost. Pharmacy will notify sw of cost.   Electronically signed by JODIE Long on 7/21/2023 at 10:25 AM    Sw provided meds to beds to pharmacy for voucher for vancomycin 50 MG/ML Solr oral solution  Electronically signed by JODIE Long on 7/21/2023 at 11:46 AM
Second IMM given to patient and explained with patient verbalizing understanding. All questions and concerns addressed     Signed letter placed in pt soft chart  Patient declined waiting 4 hr period prior to discharge. Electronically signed by JODIE Sandoval on 7/20/2023 at 9:44 AM     07/20/23 0950   IMM Letter   IMM Letter given to Patient/Family/Significant other/Guardian/POA/by: derrek presented Pt and family with IMM letter.    IMM Letter date given: 07/20/23   IMM Letter time given: 6770
copy of the IMM prior to discharge. Patient voiced understanding. The Plan for Transition of Care is related to the following treatment goals of Abdominal pain, epigastric [R10.13]  Sepsis ([address]) [A41.9]  Pneumonia of right lower lobe due to infectious organism [J18.9]  Sepsis, due to unspecified organism, unspecified whether acute organ dysfunction present ([address]) [P07.3]    IF APPLICABLE: The Patient and/or patient representative Dave Boyd and her family were provided with a choice of provider and agrees with the discharge plan. Freedom of choice list with basic dialogue that supports the patient's individualized plan of care/goals and shares the quality data associated with the providers was provided to: Patient   Patient Representative Name:       The Patient and/or Patient Representative Agree with the Discharge Plan?  Yes    Sanya Duarte RN  Case Management Department  Ph: [phone] Fax: [phone]

## 2023-07-21 NOTE — PROGRESS NOTES
CLINICAL PHARMACY NOTE: MEDS TO BEDS    Total # of Prescriptions Filled: 1   The following medications were delivered to the patient:    Vancomycin        Additional Documentation:     Delivered Rx's to patients room. Gave Rx's to patient. Paid $0.00. [place] funds.

## 2023-07-21 NOTE — PROGRESS NOTES
Visited with pt to provide support and spiritual care. Pt says she was suppose to go home yesterday but was told her insurance company would not pay for her antibiotic. Pt says she had to stay another day and the SW is looking into it. Pt is hoping to go home today. This  provided spiritual care with sustaining presence, nurtured hope, and prayer. Pt expressed gratitude for spiritual care.      Electronically signed by Chaz Cam on 7/21/2023 at 10:56 AM

## 2023-07-21 NOTE — PROGRESS NOTES
sluggish and mild-moderately decreased for swallow airway protection with regular solids and thin liquids via consecutive sips side of cup. S/S of aspiration: Even so, no overt s/s of aspiration/penetration observed with regular solids and thin liquids via consecutive sips side of cup    Recommend continue easy to chew consistencies with thin liquids. Medications administered whole with H2O. Please monitor/notify SLP for the following: changes in lung sounds, spikes in temperature, and/or difficulties swallowing. SLP will continue to follow and treat.         Electronically signed by ANDREW Ann on 7/21/2023 at 12:43 PM

## 2023-07-21 NOTE — PROGRESS NOTES
Physical Therapy    Pt declines need for therapy eval, hoping for home today. States has been up to chair and br with supervision. Has assist at home.     Electronically signed by Levar Luu PT on 7/21/2023 at 10:17 AM

## 2023-07-21 NOTE — DISCHARGE SUMMARY
baseline, severe anxiety, history of VATS decortication for RLL empyema in 2022, recent hospitalization in March for COPD exacerbation and hospitalized in May for pneumonia, presenting back with several days of malaise, increased dyspnea and cough, noted fever, tachycardia, tachypnea and leukocytosis, readmitted on 7/9 with sepsis likely from recurrent pneumonia. Treated with broad-spectrum IV antibiotics. Noted hypoxemia with PO2 53 on ABG, requiring increase in supplemental oxygen. Developed nausea vomiting diarrhea. Tested positive for C. difficile and E. coli on GI PCR. Started on oral vancomycin. \"     Sepsis due to Pneumonia with risk factors for MDRO--- improved  Multiple recent hospitalizations  History of Empyema s/p VATS  MRSA swab negative--discontinued IV vancomycin  Continued/completed a course of Abx:  IV Cefepime while inpatient     Nausea/vomiting/diarrhea  C. difficile infection and E. coli Enterocolitis  Reviewed gastrointestinal PCR  Vancomycin 125 mg PO every 6 hours x 10-day course  PRN antiemetics  GI on board  Upon extensive discussion by GI and patient (please see GI documentation from 07/19/2023), patient does not want to pursue any work-up, including EGD and/or colonoscopy at this time. Continue monitoring.   Follow-up with GI outpatient as needed     Acute on chronic hypoxic respiratory failure, secondary PNA  Chronic hypercapnic respiratory failure  Hypoxemia with PO2 53 on ABG  Continued supplemental oxygen with goal SPO2 88-92%  Continue nebulized bronchodilators     Severe COPD on baseline Home O2  Not in acute exacerbation, hold off steroids  Bronchodilators PRN        Iron deficiency  Continued iron supplementation     Severe Malnutrition  Nutritional support     Severe anxiety  Continued Xanax as needed     Chronic pain   Home regimen     Hypomagnesemia  Replaced as per protocol  Magnesium oxide 400 mg p.o. daily upon discharge  Follow-up PCP outpatient for ongoing monitoring of be inadvertently transcribed; although attempts have made to review the note for such errors, some may still exist.

## 2023-07-24 ENCOUNTER — TELEPHONE (OUTPATIENT)
Dept: PRIMARY CARE CLINIC | Age: 73
End: 2023-07-24

## 2023-07-24 NOTE — TELEPHONE ENCOUNTER
Pt states she was in the Hospital for 11 days with C-Diff. She is home now.  She states she has Zofran for Nausea but would also like Phenergan sent to [facility]

## 2023-07-25 ENCOUNTER — TELEPHONE (OUTPATIENT)
Dept: PRIMARY CARE CLINIC | Age: 73
End: 2023-07-25

## 2023-07-25 ENCOUNTER — OFFICE VISIT (OUTPATIENT)
Dept: PRIMARY CARE CLINIC | Age: 73
End: 2023-07-25
Payer: MEDICARE

## 2023-07-25 VITALS
TEMPERATURE: 98.4 F | OXYGEN SATURATION: 88 % | DIASTOLIC BLOOD PRESSURE: 50 MMHG | HEART RATE: 90 BPM | RESPIRATION RATE: 18 BRPM | SYSTOLIC BLOOD PRESSURE: 90 MMHG

## 2023-07-25 DIAGNOSIS — I10 PRIMARY HYPERTENSION: Primary | ICD-10-CM

## 2023-07-25 DIAGNOSIS — E83.42 HYPOMAGNESEMIA: ICD-10-CM

## 2023-07-25 DIAGNOSIS — I95.1 ORTHOSTATIC HYPOTENSION: ICD-10-CM

## 2023-07-25 DIAGNOSIS — J44.9 STAGE 4 VERY SEVERE COPD BY GOLD CLASSIFICATION (HCC): ICD-10-CM

## 2023-07-25 PROCEDURE — 1123F ACP DISCUSS/DSCN MKR DOCD: CPT | Performed by: NURSE PRACTITIONER

## 2023-07-25 PROCEDURE — 3078F DIAST BP <80 MM HG: CPT | Performed by: NURSE PRACTITIONER

## 2023-07-25 PROCEDURE — 3074F SYST BP LT 130 MM HG: CPT | Performed by: NURSE PRACTITIONER

## 2023-07-25 PROCEDURE — 99214 OFFICE O/P EST MOD 30 MIN: CPT | Performed by: NURSE PRACTITIONER

## 2023-07-25 RX ORDER — OXYCODONE HYDROCHLORIDE 10 MG/1
TABLET ORAL
COMMUNITY
Start: 2023-07-24

## 2023-07-25 NOTE — PROGRESS NOTES
[facility] PHYSICIAN SERVICES  [facility] [address] [address] [address]  [address] [place] [address] Street [address]  Dept: [phone]  Dept Fax: [phone]  Loc: [phone]    Aylin Toney is a 74 y.o. female who presents today for her medical conditions/complaints as noted below. Aylin Toney is c/o of Hypertension (BP has been running high for the last 1-2 days. Also feeling dizzy and weak feeling. Having some motion sickness today. 198/93 last PM. Also having more SOB.)        HPI:     HPI this 57-year-old female presents today for follow-up of. She reports she has had recent issues with her blood pressure. She states that at times is very low and then it will spike up to 198/93 with a reading last night. She also ports he had significant more shortness of breath despite use of her inhalers and continuous O2 by nasal cannula. Chief Complaint   Patient presents with    Hypertension     BP has been running high for the last 1-2 days. Also feeling dizzy and weak feeling. Having some motion sickness today. 198/93 last PM. Also having more SOB. Past Medical History:   Diagnosis Date    ADHD (attention deficit hyperactivity disorder)     Alpha-1-antitrypsin deficiency (HCC)     Anxiety     COPD (chronic obstructive pulmonary disease) (HCC)     Depression     Fibromyalgia     GERD (gastroesophageal reflux disease)     Hypertension     Malignant neoplasm of upper lobe of right lung ([address]) 12/04/2018    Palliative care patient 10/06/2021    RA (rheumatoid arthritis) ([address])       Past Surgical History:   Procedure Laterality Date    CHOLECYSTECTOMY      HERNIA REPAIR      HYSTERECTOMY (CERVIX STATUS UNKNOWN)      LEG SURGERY      steel garth placement. 2009    THORACOSCOPY Right 08/12/2022    THORACOSCOPY DECORTICATION VIDEO ASSISTED performed by Heather Pollard MD at [facility] 7/25/2023 7/21/2023 7/21/2023 7/21/2023 7/21/2023 9/81/2413   SYSTOLIC 90 - - 094 - -   DIASTOLIC 50 - - 76 - -   Site - - - - - -   Position - -

## 2023-07-25 NOTE — TELEPHONE ENCOUNTER
Care Transitions Initial Follow Up Call    Outreach made within 2 business days of discharge: Yes    Patient: Mayra Foss Patient : 1950   MRN: [id number]  Reason for Admission: Sepsis    Discharge Date: 23       Spoke with: No One Answered phone    Discharge department/facility: [facility])      Scheduled appointment with PCP within 7-14 days    Follow Up  Future Appointments   Date Time Provider [address]   8/3/2023  3:00 PM TEN Sidhu NP [facility]   2023  2:30 PM TEN Sidhu NP [facility]   2023  3:00 PM [facility] CT RM 1 [facility] CT [facility] Rad   2023  2:45 PM MD Tali Nguyễn [facility]       Ghulam Mccormack LPN

## 2023-07-26 ENCOUNTER — TELEPHONE (OUTPATIENT)
Dept: PRIMARY CARE CLINIC | Age: 73
End: 2023-07-26

## 2023-07-26 NOTE — TELEPHONE ENCOUNTER
Care Transitions Initial Follow Up Call    Outreach made within 2 business days of discharge: Yes    Patient: Gladys Triana Patient : 1950   MRN: [id number]  Reason for Admission: SEPSIS    Discharge Date: 23       Spoke with: Trena/Samia    Discharge department/facility: [facility])    TCM Interactive Patient Contact:  Was patient able to fill all prescriptions: No  Was patient instructed to bring all medications to the follow-up visit: Yes  Is patient taking all medications as directed in the discharge summary? Yes  Does patient understand their discharge instructions: Yes  Does patient have questions or concerns that need addressed prior to 7-14 day follow up office visit: no    Was unable to fill the GI Cocktail type medication due to the pharmacy not having the Lidocaine to make it. She did have a fall over the oxygen tubing and leaned over to get her dog. Henrene Feathers on her face and the bruising is better today.      Scheduled appointment with PCP within 7-14 days    Follow Up  Future Appointments   Date Time Provider [address]   8/3/2023  3:00  W Lauren Arana,[address], APRN - NP [facility]   2023  2:30  W Lauren Arana,[address], APRN - NP [facility]   2023  3:00 PM [facility] L[facility] CT RM 1 [facility] L[facility] CT [facility] Rad   2023  2[facility]45 [facility]       Aliya Angulo LPN

## 2023-08-01 PROBLEM — I95.9 HYPOTENSION: Status: ACTIVE | Noted: 2023-08-01

## 2023-08-01 ASSESSMENT — ENCOUNTER SYMPTOMS
SHORTNESS OF BREATH: 1
GASTROINTESTINAL NEGATIVE: 1
WHEEZING: 1
ALLERGIC/IMMUNOLOGIC NEGATIVE: 1
EYES NEGATIVE: 1
COUGH: 1

## 2023-08-09 ENCOUNTER — CARE COORDINATION (OUTPATIENT)
Dept: CARE COORDINATION | Age: 73
End: 2023-08-09

## 2023-08-09 RX ORDER — BUDESONIDE, GLYCOPYRROLATE, AND FORMOTEROL FUMARATE 160; 9; 4.8 UG/1; UG/1; UG/1
2 AEROSOL, METERED RESPIRATORY (INHALATION) 2 TIMES DAILY
COMMUNITY

## 2023-08-09 NOTE — CARE COORDINATION
Ambulatory Care Coordination Note  2023    Patient Current Location:  [place]     ACM contacted the patient by telephone. Verified name and  with patient as identifiers. Provided introduction to self, and explanation of the ACM role. Challenges to be reviewed by the provider   Additional needs identified to be addressed with provider: Yes  medications-Pt needs refills, also asking for alternative meds for GERD and nausea           Method of communication with provider: staff message. ACM: Barry Gutierres, RN    Spoke to patient. Megan Timothy and gdtr helping to check vitals. 2 dtrs stay with her. Megan Aguirre is primary caregiver and oldest dtr now moving in but is on disability. A granddaughter comes by to check on pt as well. Patient said she is eating and drinking OK, girls make sure she eats unless she is not hungry. Pt said she will usually eat at least 2 meals daily. Patient receives MOW M-F. She reported she's had a cold with nasal congestion and is using Flonase. She is trying to concentrate on nose breathing despite her congestion as Pulm recommended this to increase advantage of oxygen use through cannula. Patient is easily SoB w exertion, has a productive cough but feel overall she is improving. O2 is at 3L, does not increase level with exertion. Her BP has been more stable since OV, stated systolic range of 453 - 017, usually runs 120s/70s. Patient has a pet dog, in home. She reported one fall while leaning over to get dog and reported this to PCP at [facility] in July. Pt denied any injury and did not seek medical attention. No additional falls since then. Having SE from Carafate - reduced use to bid but continues to have abd cramps and BM urgency. Pt would like an alternative medication if possible. She stated has Zofran but doesn't work as well as Compazine and asked if PCP could prescribe it. Pt needs refill of weekly Vitamin D and daily Mag-Ox if PCP wishes her to continue meds.   Patient reported

## 2023-08-15 NOTE — TELEPHONE ENCOUNTER
BLOCK VERIFICATION SIMULATION      February 5, 2018     Patient Name:  JESSICA THORNTON  YOB: 1950                          MRN:  [id number]  DX:  [ICD10] C34.31 Malignant neoplasm of lower lobe, right bronchus or lung IIIB      Jessica Thornton is a 67 year old female with diagnosis of Malignant neoplasm of lower lobe, right bronchus or lung Squamous cell carcinoma, NOS, Stage IIIB T4 N2 M0.  This patient underwent simulation, 3-D simulation and IMRT treatment planning. The patient is here for the treatment field confirmation verification simulation. The following is the radiation prescription and the plan that we are verifying.     Site Technique Modality Total Dose # Fractions Fraction Dose   Rt Lung lower IMRT RapidArc IMRT 6X 6,000 cGy 30 200 cGy   Rt Lung upper 4-FIELD 6X/20X 4,600 cGy 23 200 cGy     The patient was placed on the treatment table with the same immobilization devices and in the same position as at the time of CT simulation.  According to the 3-D simulation and IMRT treatment plan, the shift from CT center to the treatment center was made. AP and Lateral isocenter films were taken using On Board Imaging technology and checked against the digitally reconstructed radiographs of the treatment fields generated from the 3-D simulation. Port films were found to match the DRRs.  Cone beam CT was done as well and the images were compared with reference CT images. CT images were saved in [employer] software. The electronic port films were saved in the [employer] system. The patient’s setup was checked clinically.  The simulation parameters are correct and we will proceed as planned.    Block verification simulation is medically necessary prior to the beginning of a course of radiation therapy and is necessary prior to any field or portal changes.  Simulation was performed today to establish the treatment portals to the known area of tumor volume while allowing protection of any adjacent  Denzel Fried requests that a nurse  return their call. The best time to reach her is Anytime. Thank you. normal structures.      Authenticated by Nadia Baltazar M.D. on 3/21/2018 at 2:52 PM  NADIA BALTAZAR M.D.

## 2023-08-18 RX ORDER — ALBUTEROL SULFATE 90 UG/1
2 AEROSOL, METERED RESPIRATORY (INHALATION) EVERY 6 HOURS PRN
Qty: 3 EACH | Refills: 11 | Status: SHIPPED | OUTPATIENT
Start: 2023-08-18

## 2023-08-18 RX ORDER — BUDESONIDE, GLYCOPYRROLATE, AND FORMOTEROL FUMARATE 160; 9; 4.8 UG/1; UG/1; UG/1
2 AEROSOL, METERED RESPIRATORY (INHALATION) 2 TIMES DAILY
Qty: 1 EACH | Refills: 11 | Status: SHIPPED | OUTPATIENT
Start: 2023-08-18

## 2023-08-21 RX ORDER — PROCHLORPERAZINE MALEATE 5 MG/1
5 TABLET ORAL EVERY 6 HOURS PRN
Qty: 60 TABLET | Refills: 3 | Status: SHIPPED | OUTPATIENT
Start: 2023-08-21

## 2023-09-01 ENCOUNTER — CARE COORDINATION (OUTPATIENT)
Dept: CARE COORDINATION | Age: 73
End: 2023-09-01

## 2023-09-08 ENCOUNTER — ANCILLARY PROCEDURE (OUTPATIENT)
Dept: PRIMARY CARE CLINIC | Age: 73
End: 2023-09-08

## 2023-09-08 ENCOUNTER — OFFICE VISIT (OUTPATIENT)
Dept: PRIMARY CARE CLINIC | Age: 73
End: 2023-09-08
Payer: MEDICARE

## 2023-09-08 ENCOUNTER — TELEPHONE (OUTPATIENT)
Dept: PULMONOLOGY | Age: 73
End: 2023-09-08

## 2023-09-08 VITALS
OXYGEN SATURATION: 100 % | WEIGHT: 120.6 LBS | TEMPERATURE: 99.8 F | BODY MASS INDEX: 19.47 KG/M2 | DIASTOLIC BLOOD PRESSURE: 78 MMHG | SYSTOLIC BLOOD PRESSURE: 142 MMHG | HEART RATE: 73 BPM

## 2023-09-08 DIAGNOSIS — J44.1 COPD EXACERBATION (HCC): Primary | ICD-10-CM

## 2023-09-08 DIAGNOSIS — D64.9 ANEMIA, UNSPECIFIED TYPE: ICD-10-CM

## 2023-09-08 DIAGNOSIS — J44.9 STAGE 4 VERY SEVERE COPD BY GOLD CLASSIFICATION (HCC): ICD-10-CM

## 2023-09-08 DIAGNOSIS — E55.9 VITAMIN D DEFICIENCY: ICD-10-CM

## 2023-09-08 DIAGNOSIS — I95.1 ORTHOSTATIC HYPOTENSION: ICD-10-CM

## 2023-09-08 DIAGNOSIS — R07.81 RIB PAIN ON LEFT SIDE: Primary | ICD-10-CM

## 2023-09-08 DIAGNOSIS — E83.42 HYPOMAGNESEMIA: ICD-10-CM

## 2023-09-08 DIAGNOSIS — K21.9 GASTROESOPHAGEAL REFLUX DISEASE WITHOUT ESOPHAGITIS: ICD-10-CM

## 2023-09-08 PROCEDURE — 3078F DIAST BP <80 MM HG: CPT | Performed by: NURSE PRACTITIONER

## 2023-09-08 PROCEDURE — 99214 OFFICE O/P EST MOD 30 MIN: CPT | Performed by: NURSE PRACTITIONER

## 2023-09-08 PROCEDURE — 3074F SYST BP LT 130 MM HG: CPT | Performed by: NURSE PRACTITIONER

## 2023-09-08 PROCEDURE — 1123F ACP DISCUSS/DSCN MKR DOCD: CPT | Performed by: NURSE PRACTITIONER

## 2023-09-08 RX ORDER — ESOMEPRAZOLE MAGNESIUM 40 MG/1
40 CAPSULE, DELAYED RELEASE ORAL
Qty: 90 CAPSULE | Refills: 1 | Status: SHIPPED | OUTPATIENT
Start: 2023-09-08

## 2023-09-08 RX ORDER — LIDOCAINE 50 MG/G
1 PATCH TOPICAL DAILY
Qty: 10 PATCH | Refills: 0 | Status: SHIPPED | OUTPATIENT
Start: 2023-09-08 | End: 2023-09-18

## 2023-09-08 RX ORDER — BUDESONIDE AND FORMOTEROL FUMARATE DIHYDRATE 160; 4.5 UG/1; UG/1
2 AEROSOL RESPIRATORY (INHALATION) 2 TIMES DAILY
Qty: 1 EACH | Refills: 11 | Status: CANCELLED | OUTPATIENT
Start: 2023-09-08

## 2023-09-08 RX ORDER — BUDESONIDE AND FORMOTEROL FUMARATE DIHYDRATE 160; 4.5 UG/1; UG/1
2 AEROSOL RESPIRATORY (INHALATION) 2 TIMES DAILY
Qty: 1 EACH | Refills: 11 | Status: SHIPPED | OUTPATIENT
Start: 2023-09-08

## 2023-09-08 RX ORDER — TIZANIDINE 4 MG/1
4 TABLET ORAL 3 TIMES DAILY PRN
Qty: 90 TABLET | Refills: 0 | Status: SHIPPED | OUTPATIENT
Start: 2023-09-08

## 2023-09-08 NOTE — PROGRESS NOTES
09:21 AM    BUN 4 07/19/2023 04:15 AM    CREATININE 0.6 07/21/2023 01:44 AM    CREATININE 0.6 07/20/2023 09:21 AM    CREATININE 0.4 07/19/2023 04:15 AM    GLUCOSE 97 07/21/2023 01:44 AM    GLUCOSE 142 07/20/2023 09:21 AM    GLUCOSE 117 07/19/2023 04:15 AM    CALCIUM 9.2 07/21/2023 01:44 AM    CALCIUM 9.4 07/20/2023 09:21 AM    CALCIUM 9.0 07/19/2023 04:15 AM    ALKPHOS 56 07/19/2023 04:15 AM    ALKPHOS 50 07/18/2023 04:45 AM    ALKPHOS 49 07/17/2023 02:25 AM    AST 19 07/19/2023 04:15 AM    AST 11 07/18/2023 04:45 AM    AST 9 07/17/2023 02:25 AM    ALT 8 07/19/2023 04:15 AM    ALT <5 07/18/2023 04:45 AM    ALT <5 07/17/2023 02:25 AM    LABGLOM >60 07/21/2023 01:44 AM    LABGLOM >60 07/20/2023 09:21 AM    LABGLOM >60 07/19/2023 04:15 AM    GFRAA >59 08/17/2022 06:05 AM    GFRAA >59 08/16/2022 02:06 AM    GFRAA >59 08/15/2022 04:41 AM    GLOB 3.7 02/15/2016 09:12 PM       Lab Results   Component Value Date/Time    TSH 1.140 07/09/2023 08:09 PM     No results found for: \"E7NMEIN\", \"T4FREE\"  No results found for: \"CHOL\"  No results found for: \"TRIG\"  No results found for: \"HDL\"  No results found for: \"LDLCHOLESTEROL\", \"LDLCALC\"  No results found for: \"LABVLDL\", \"VLDL\"  No results found for: \"CHOLHDLRATIO\"    5. Chronic condition uncontrolled  Patient states that the only medications that has helped with this has been her Nexium. States that when she was in the hospital they changed her to Protonix. States that this has not been effective to control her symptoms. Stop Protonix stop Protonix  New prescription for Nexium sent to pharmacy    Orders placed for vitamin D mag CBC and CMP P labs. Patient given educational materials -see patient instructions. Discussed use, benefit, and side effects of prescribed medications. All patient questions answered. Pt voiced understanding. Reviewed health maintenance. Instructed to continue currentmedications, diet and exercise. Patient agreed with treatment plan.  Follow up

## 2023-09-11 ENCOUNTER — TELEPHONE (OUTPATIENT)
Dept: PRIMARY CARE CLINIC | Age: 73
End: 2023-09-11

## 2023-09-11 ASSESSMENT — ENCOUNTER SYMPTOMS
GASTROINTESTINAL NEGATIVE: 1
ALLERGIC/IMMUNOLOGIC NEGATIVE: 1
SHORTNESS OF BREATH: 1
EYES NEGATIVE: 1
BACK PAIN: 1
COUGH: 1
WHEEZING: 1

## 2023-09-11 NOTE — TELEPHONE ENCOUNTER
Advised pt to come to our office for labs or to go to the hospital after her CAT scan tomorrow. She voiced understanding.

## 2023-09-11 NOTE — TELEPHONE ENCOUNTER
----- Message from Claudean Hensen, APRN - NP sent at 9/11/2023  1:32 PM CDT -----  Can you check with her and see if she had her labs drawn when she was here? If not she needs to stop by at her convenience and have them drawn.

## 2023-09-26 ENCOUNTER — TELEPHONE (OUTPATIENT)
Dept: PULMONOLOGY | Age: 73
End: 2023-09-26

## 2023-09-26 DIAGNOSIS — R05.3 CHRONIC COUGH: ICD-10-CM

## 2023-09-26 DIAGNOSIS — J44.1 COPD EXACERBATION (HCC): Primary | ICD-10-CM

## 2023-09-26 RX ORDER — LEVALBUTEROL TARTRATE 45 UG/1
1 AEROSOL, METERED ORAL EVERY 4 HOURS PRN
Qty: 1 EACH | Refills: 11 | Status: SHIPPED | OUTPATIENT
Start: 2023-09-26 | End: 2024-09-25

## 2023-09-26 NOTE — TELEPHONE ENCOUNTER
Patient requesting return call from nurse in regards to inhaler.  Please return call to discuss [phone]      Thank you

## 2023-10-02 RX ORDER — TIZANIDINE 4 MG/1
4 TABLET ORAL 3 TIMES DAILY PRN
Qty: 90 TABLET | Refills: 0 | Status: SHIPPED | OUTPATIENT
Start: 2023-10-02

## 2023-10-10 ENCOUNTER — TELEPHONE (OUTPATIENT)
Dept: PULMONOLOGY | Age: 73
End: 2023-10-10

## 2023-10-10 DIAGNOSIS — R05.1 ACUTE COUGH: Primary | ICD-10-CM

## 2023-10-10 RX ORDER — CEFUROXIME AXETIL 500 MG/1
500 TABLET ORAL 2 TIMES DAILY
Qty: 14 TABLET | Refills: 0 | Status: SHIPPED | OUTPATIENT
Start: 2023-10-10 | End: 2023-10-17

## 2023-10-10 RX ORDER — PREDNISONE 10 MG/1
TABLET ORAL
Qty: 35 TABLET | Refills: 0 | Status: SHIPPED | OUTPATIENT
Start: 2023-10-10

## 2023-10-10 NOTE — TELEPHONE ENCOUNTER
Patient called in stating she has cough sinus congestion runny nose. She is requesting antibiotic be sent in so she can keep her appt tomorrow.

## 2023-10-26 ENCOUNTER — APPOINTMENT (OUTPATIENT)
Dept: GENERAL RADIOLOGY | Age: 73
DRG: 192 | End: 2023-10-26
Payer: MEDICARE

## 2023-10-26 ENCOUNTER — APPOINTMENT (OUTPATIENT)
Dept: CT IMAGING | Age: 73
DRG: 192 | End: 2023-10-26
Payer: MEDICARE

## 2023-10-26 ENCOUNTER — HOSPITAL ENCOUNTER (INPATIENT)
Age: 73
LOS: 3 days | Discharge: HOME HEALTH | DRG: 192 | End: 2023-10-29
Attending: EMERGENCY MEDICINE | Admitting: HOSPITALIST
Payer: MEDICARE

## 2023-10-26 DIAGNOSIS — J44.1 COPD EXACERBATION (HCC): Primary | ICD-10-CM

## 2023-10-26 DIAGNOSIS — R53.1 GENERAL WEAKNESS: ICD-10-CM

## 2023-10-26 LAB
ALBUMIN SERPL-MCNC: 3.5 G/DL (ref 3.5–5.2)
ALLENS TEST: ABNORMAL
ALP SERPL-CCNC: 189 U/L (ref 35–104)
ALT SERPL-CCNC: 38 U/L (ref 5–33)
ANION GAP SERPL CALCULATED.3IONS-SCNC: 8 MMOL/L (ref 7–19)
AST SERPL-CCNC: 102 U/L (ref 5–32)
BACTERIA URNS QL MICRO: NEGATIVE /HPF
BASE EXCESS ARTERIAL: 12.6 MMOL/L (ref -2–2)
BASOPHILS # BLD: 0 K/UL (ref 0–0.2)
BASOPHILS NFR BLD: 0.3 % (ref 0–1)
BILIRUB SERPL-MCNC: 0.3 MG/DL (ref 0.2–1.2)
BILIRUB UR QL STRIP: NEGATIVE
BUN SERPL-MCNC: 11 MG/DL (ref 8–23)
CALCIUM SERPL-MCNC: 9.2 MG/DL (ref 8.8–10.2)
CARBOXYHEMOGLOBIN ARTERIAL: 1.6 % (ref 0–5)
CHLORIDE SERPL-SCNC: 96 MMOL/L (ref 98–111)
CLARITY UR: CLEAR
CO2 SERPL-SCNC: 33 MMOL/L (ref 22–29)
COLOR UR: YELLOW
CREAT SERPL-MCNC: 0.7 MG/DL (ref 0.5–0.9)
CRYSTALS URNS MICRO: ABNORMAL /HPF
EOSINOPHIL # BLD: 0.1 K/UL (ref 0–0.6)
EOSINOPHIL NFR BLD: 2.1 % (ref 0–5)
EPI CELLS #/AREA URNS AUTO: 0 /HPF (ref 0–5)
ERYTHROCYTE [DISTWIDTH] IN BLOOD BY AUTOMATED COUNT: 14.4 % (ref 11.5–14.5)
FLUAV AG NPH QL: NEGATIVE
FLUBV AG NPH QL: NEGATIVE
GLUCOSE SERPL-MCNC: 96 MG/DL (ref 74–109)
GLUCOSE UR STRIP.AUTO-MCNC: NEGATIVE MG/DL
HCO3 ARTERIAL: 39.9 MMOL/L (ref 22–26)
HCT VFR BLD AUTO: 33 % (ref 37–47)
HEMOGLOBIN, ART, EXTENDED: 9.3 G/DL (ref 12–16)
HGB BLD-MCNC: 10.2 G/DL (ref 12–16)
HGB UR STRIP.AUTO-MCNC: NEGATIVE MG/L
HYALINE CASTS #/AREA URNS AUTO: 0 /HPF (ref 0–8)
IMM GRANULOCYTES # BLD: 0 K/UL
KETONES UR STRIP.AUTO-MCNC: NEGATIVE MG/DL
LACTATE BLDV-SCNC: 0.7 MMOL/L (ref 0.5–1.9)
LEUKOCYTE ESTERASE UR QL STRIP.AUTO: ABNORMAL
LYMPHOCYTES # BLD: 0.7 K/UL (ref 1.1–4.5)
LYMPHOCYTES NFR BLD: 12.3 % (ref 20–40)
MCH RBC QN AUTO: 32.1 PG (ref 27–31)
MCHC RBC AUTO-ENTMCNC: 30.9 G/DL (ref 33–37)
MCV RBC AUTO: 103.8 FL (ref 81–99)
MECHANICAL RATE IN BPM: 14
METHEMOGLOBIN ARTERIAL: 1.2 %
MONOCYTES # BLD: 0.6 K/UL (ref 0–0.9)
MONOCYTES NFR BLD: 9.5 % (ref 0–10)
NEUTROPHILS # BLD: 4.4 K/UL (ref 1.5–7.5)
NEUTS SEG NFR BLD: 75.6 % (ref 50–65)
NITRITE UR QL STRIP.AUTO: NEGATIVE
O2 CONTENT ARTERIAL: 11.8 ML/DL
O2 DELIVERY DEVICE: ABNORMAL
O2 SAT, ARTERIAL: 90.1 %
O2 THERAPY: ABNORMAL
OXYGEN FLOW: 3
PCO2 ARTERIAL: 69 MMHG (ref 35–45)
PH ARTERIAL: 7.37 (ref 7.35–7.45)
PH UR STRIP.AUTO: 6.5 [PH] (ref 5–8)
PLATELET # BLD AUTO: 212 K/UL (ref 130–400)
PMV BLD AUTO: 10.7 FL (ref 9.4–12.3)
PO2 ARTERIAL: 57 MMHG (ref 80–100)
POTASSIUM BLD-SCNC: 4.1 MMOL/L
POTASSIUM SERPL-SCNC: 4.5 MMOL/L (ref 3.5–5)
PROT SERPL-MCNC: 7.5 G/DL (ref 6.6–8.7)
PROT UR STRIP.AUTO-MCNC: NEGATIVE MG/DL
RBC # BLD AUTO: 3.18 M/UL (ref 4.2–5.4)
RBC #/AREA URNS AUTO: 1 /HPF (ref 0–4)
SAMPLE SOURCE: ABNORMAL
SARS-COV-2 RDRP RESP QL NAA+PROBE: NOT DETECTED
SODIUM SERPL-SCNC: 137 MMOL/L (ref 136–145)
SP GR UR STRIP.AUTO: 1.01 (ref 1–1.03)
TROPONIN T SERPL-MCNC: <0.01 NG/ML (ref 0–0.03)
UROBILINOGEN UR STRIP.AUTO-MCNC: 1 E.U./DL
WBC # BLD AUTO: 5.8 K/UL (ref 4.8–10.8)
WBC #/AREA URNS AUTO: 3 /HPF (ref 0–5)

## 2023-10-26 PROCEDURE — 36600 WITHDRAWAL OF ARTERIAL BLOOD: CPT

## 2023-10-26 PROCEDURE — 84484 ASSAY OF TROPONIN QUANT: CPT

## 2023-10-26 PROCEDURE — 71045 X-RAY EXAM CHEST 1 VIEW: CPT

## 2023-10-26 PROCEDURE — 85025 COMPLETE CBC W/AUTO DIFF WBC: CPT

## 2023-10-26 PROCEDURE — 81001 URINALYSIS AUTO W/SCOPE: CPT

## 2023-10-26 PROCEDURE — 99285 EMERGENCY DEPT VISIT HI MDM: CPT

## 2023-10-26 PROCEDURE — 80053 COMPREHEN METABOLIC PANEL: CPT

## 2023-10-26 PROCEDURE — 87635 SARS-COV-2 COVID-19 AMP PRB: CPT

## 2023-10-26 PROCEDURE — 94760 N-INVAS EAR/PLS OXIMETRY 1: CPT

## 2023-10-26 PROCEDURE — 1210000000 HC MED SURG R&B

## 2023-10-26 PROCEDURE — 83605 ASSAY OF LACTIC ACID: CPT

## 2023-10-26 PROCEDURE — 2700000000 HC OXYGEN THERAPY PER DAY

## 2023-10-26 PROCEDURE — 70450 CT HEAD/BRAIN W/O DYE: CPT

## 2023-10-26 PROCEDURE — 82803 BLOOD GASES ANY COMBINATION: CPT

## 2023-10-26 PROCEDURE — 2580000003 HC RX 258: Performed by: EMERGENCY MEDICINE

## 2023-10-26 PROCEDURE — 36415 COLL VENOUS BLD VENIPUNCTURE: CPT

## 2023-10-26 PROCEDURE — 87804 INFLUENZA ASSAY W/OPTIC: CPT

## 2023-10-26 PROCEDURE — 93005 ELECTROCARDIOGRAM TRACING: CPT

## 2023-10-26 RX ORDER — IPRATROPIUM BROMIDE AND ALBUTEROL SULFATE 2.5; .5 MG/3ML; MG/3ML
1 SOLUTION RESPIRATORY (INHALATION) ONCE
Status: DISCONTINUED | OUTPATIENT
Start: 2023-10-26 | End: 2023-10-29 | Stop reason: HOSPADM

## 2023-10-26 RX ORDER — ALBUTEROL SULFATE 2.5 MG/3ML
2.5 SOLUTION RESPIRATORY (INHALATION) EVERY 4 HOURS PRN
Status: DISCONTINUED | OUTPATIENT
Start: 2023-10-26 | End: 2023-10-29 | Stop reason: HOSPADM

## 2023-10-26 RX ORDER — 0.9 % SODIUM CHLORIDE 0.9 %
1000 INTRAVENOUS SOLUTION INTRAVENOUS ONCE
Status: COMPLETED | OUTPATIENT
Start: 2023-10-26 | End: 2023-10-27

## 2023-10-26 RX ORDER — BUDESONIDE 0.5 MG/2ML
0.5 INHALANT ORAL
Status: DISCONTINUED | OUTPATIENT
Start: 2023-10-26 | End: 2023-10-29 | Stop reason: HOSPADM

## 2023-10-26 RX ORDER — ARFORMOTEROL TARTRATE 15 UG/2ML
15 SOLUTION RESPIRATORY (INHALATION)
Status: DISCONTINUED | OUTPATIENT
Start: 2023-10-26 | End: 2023-10-29 | Stop reason: HOSPADM

## 2023-10-26 RX ORDER — FAMOTIDINE 20 MG/1
20 TABLET, FILM COATED ORAL 2 TIMES DAILY
Status: DISCONTINUED | OUTPATIENT
Start: 2023-10-26 | End: 2023-10-29 | Stop reason: HOSPADM

## 2023-10-26 RX ADMIN — SODIUM CHLORIDE 1000 ML: 9 INJECTION, SOLUTION INTRAVENOUS at 21:49

## 2023-10-26 ASSESSMENT — ENCOUNTER SYMPTOMS
NAUSEA: 0
SHORTNESS OF BREATH: 1
ABDOMINAL PAIN: 0
SORE THROAT: 0
COUGH: 1
BACK PAIN: 0
DIARRHEA: 0
RHINORRHEA: 0
VOMITING: 0

## 2023-10-27 LAB
B PARAP IS1001 DNA NPH QL NAA+NON-PROBE: NOT DETECTED
B PERT.PT PRMT NPH QL NAA+NON-PROBE: NOT DETECTED
C PNEUM DNA NPH QL NAA+NON-PROBE: NOT DETECTED
FLUAV RNA NPH QL NAA+NON-PROBE: NOT DETECTED
FLUBV RNA NPH QL NAA+NON-PROBE: NOT DETECTED
HADV DNA NPH QL NAA+NON-PROBE: NOT DETECTED
HCOV 229E RNA NPH QL NAA+NON-PROBE: NOT DETECTED
HCOV HKU1 RNA NPH QL NAA+NON-PROBE: NOT DETECTED
HCOV NL63 RNA NPH QL NAA+NON-PROBE: NOT DETECTED
HCOV OC43 RNA NPH QL NAA+NON-PROBE: NOT DETECTED
HMPV RNA NPH QL NAA+NON-PROBE: NOT DETECTED
HPIV1 RNA NPH QL NAA+NON-PROBE: NOT DETECTED
HPIV2 RNA NPH QL NAA+NON-PROBE: NOT DETECTED
HPIV3 RNA NPH QL NAA+NON-PROBE: NOT DETECTED
HPIV4 RNA NPH QL NAA+NON-PROBE: NOT DETECTED
M PNEUMO DNA NPH QL NAA+NON-PROBE: NOT DETECTED
RSV RNA NPH QL NAA+NON-PROBE: NOT DETECTED
RV+EV RNA NPH QL NAA+NON-PROBE: NOT DETECTED
SARS-COV-2 RNA NPH QL NAA+NON-PROBE: NOT DETECTED

## 2023-10-27 PROCEDURE — 6360000002 HC RX W HCPCS: Performed by: STUDENT IN AN ORGANIZED HEALTH CARE EDUCATION/TRAINING PROGRAM

## 2023-10-27 PROCEDURE — 94640 AIRWAY INHALATION TREATMENT: CPT

## 2023-10-27 PROCEDURE — 6360000002 HC RX W HCPCS: Performed by: HOSPITALIST

## 2023-10-27 PROCEDURE — 1210000000 HC MED SURG R&B

## 2023-10-27 PROCEDURE — 6370000000 HC RX 637 (ALT 250 FOR IP): Performed by: STUDENT IN AN ORGANIZED HEALTH CARE EDUCATION/TRAINING PROGRAM

## 2023-10-27 PROCEDURE — 94760 N-INVAS EAR/PLS OXIMETRY 1: CPT

## 2023-10-27 PROCEDURE — 6370000000 HC RX 637 (ALT 250 FOR IP): Performed by: HOSPITALIST

## 2023-10-27 PROCEDURE — 0202U NFCT DS 22 TRGT SARS-COV-2: CPT

## 2023-10-27 PROCEDURE — 2700000000 HC OXYGEN THERAPY PER DAY

## 2023-10-27 PROCEDURE — 2580000003 HC RX 258: Performed by: HOSPITALIST

## 2023-10-27 RX ORDER — PROCHLORPERAZINE EDISYLATE 5 MG/ML
10 INJECTION INTRAMUSCULAR; INTRAVENOUS EVERY 6 HOURS PRN
Status: DISCONTINUED | OUTPATIENT
Start: 2023-10-27 | End: 2023-10-29 | Stop reason: HOSPADM

## 2023-10-27 RX ORDER — NALOXONE HYDROCHLORIDE 0.4 MG/ML
0.4 INJECTION, SOLUTION INTRAMUSCULAR; INTRAVENOUS; SUBCUTANEOUS PRN
Status: DISCONTINUED | OUTPATIENT
Start: 2023-10-27 | End: 2023-10-29 | Stop reason: HOSPADM

## 2023-10-27 RX ORDER — ENOXAPARIN SODIUM 100 MG/ML
40 INJECTION SUBCUTANEOUS DAILY
Status: DISCONTINUED | OUTPATIENT
Start: 2023-10-27 | End: 2023-10-29 | Stop reason: HOSPADM

## 2023-10-27 RX ORDER — POTASSIUM CHLORIDE 7.45 MG/ML
10 INJECTION INTRAVENOUS PRN
Status: DISCONTINUED | OUTPATIENT
Start: 2023-10-27 | End: 2023-10-29 | Stop reason: HOSPADM

## 2023-10-27 RX ORDER — ONDANSETRON 2 MG/ML
4 INJECTION INTRAMUSCULAR; INTRAVENOUS EVERY 6 HOURS PRN
Status: DISCONTINUED | OUTPATIENT
Start: 2023-10-27 | End: 2023-10-27

## 2023-10-27 RX ORDER — ALPRAZOLAM 0.5 MG/1
1 TABLET ORAL 4 TIMES DAILY PRN
Status: DISCONTINUED | OUTPATIENT
Start: 2023-10-27 | End: 2023-10-29 | Stop reason: HOSPADM

## 2023-10-27 RX ORDER — GABAPENTIN 600 MG/1
600 TABLET ORAL 3 TIMES DAILY
Status: DISCONTINUED | OUTPATIENT
Start: 2023-10-27 | End: 2023-10-29 | Stop reason: HOSPADM

## 2023-10-27 RX ORDER — SUCRALFATE 1 G/1
1 TABLET ORAL 4 TIMES DAILY
Status: DISCONTINUED | OUTPATIENT
Start: 2023-10-27 | End: 2023-10-29 | Stop reason: HOSPADM

## 2023-10-27 RX ORDER — AZELASTINE 1 MG/ML
2 SPRAY, METERED NASAL 2 TIMES DAILY
Status: DISCONTINUED | OUTPATIENT
Start: 2023-10-27 | End: 2023-10-27

## 2023-10-27 RX ORDER — POLYETHYLENE GLYCOL 3350 17 G/17G
17 POWDER, FOR SOLUTION ORAL DAILY PRN
Status: DISCONTINUED | OUTPATIENT
Start: 2023-10-27 | End: 2023-10-29 | Stop reason: HOSPADM

## 2023-10-27 RX ORDER — POTASSIUM CHLORIDE 20 MEQ/1
40 TABLET, EXTENDED RELEASE ORAL PRN
Status: DISCONTINUED | OUTPATIENT
Start: 2023-10-27 | End: 2023-10-29 | Stop reason: HOSPADM

## 2023-10-27 RX ORDER — SODIUM CHLORIDE 0.9 % (FLUSH) 0.9 %
5-40 SYRINGE (ML) INJECTION EVERY 12 HOURS SCHEDULED
Status: DISCONTINUED | OUTPATIENT
Start: 2023-10-27 | End: 2023-10-29 | Stop reason: HOSPADM

## 2023-10-27 RX ORDER — LIDOCAINE HYDROCHLORIDE 20 MG/ML
15 SOLUTION OROPHARYNGEAL PRN
Status: DISCONTINUED | OUTPATIENT
Start: 2023-10-27 | End: 2023-10-29 | Stop reason: HOSPADM

## 2023-10-27 RX ORDER — ACETAMINOPHEN 325 MG/1
650 TABLET ORAL EVERY 4 HOURS PRN
Status: DISCONTINUED | OUTPATIENT
Start: 2023-10-27 | End: 2023-10-29 | Stop reason: HOSPADM

## 2023-10-27 RX ORDER — CALCIUM CARBONATE 500 MG/1
500 TABLET, CHEWABLE ORAL 3 TIMES DAILY PRN
Status: DISCONTINUED | OUTPATIENT
Start: 2023-10-27 | End: 2023-10-29 | Stop reason: HOSPADM

## 2023-10-27 RX ORDER — SODIUM CHLORIDE 0.9 % (FLUSH) 0.9 %
5-40 SYRINGE (ML) INJECTION PRN
Status: DISCONTINUED | OUTPATIENT
Start: 2023-10-27 | End: 2023-10-29 | Stop reason: HOSPADM

## 2023-10-27 RX ORDER — TIZANIDINE 4 MG/1
4 TABLET ORAL 3 TIMES DAILY PRN
Status: DISCONTINUED | OUTPATIENT
Start: 2023-10-27 | End: 2023-10-29 | Stop reason: HOSPADM

## 2023-10-27 RX ORDER — PANTOPRAZOLE SODIUM 40 MG/1
40 TABLET, DELAYED RELEASE ORAL
Status: DISCONTINUED | OUTPATIENT
Start: 2023-10-27 | End: 2023-10-29 | Stop reason: HOSPADM

## 2023-10-27 RX ORDER — HYDROXYZINE HYDROCHLORIDE 25 MG/1
25 TABLET, FILM COATED ORAL EVERY 6 HOURS PRN
Status: DISCONTINUED | OUTPATIENT
Start: 2023-10-27 | End: 2023-10-29 | Stop reason: HOSPADM

## 2023-10-27 RX ORDER — FLUTICASONE PROPIONATE 50 MCG
1 SPRAY, SUSPENSION (ML) NASAL DAILY
Status: DISCONTINUED | OUTPATIENT
Start: 2023-10-27 | End: 2023-10-29 | Stop reason: HOSPADM

## 2023-10-27 RX ORDER — LANOLIN ALCOHOL/MO/W.PET/CERES
400 CREAM (GRAM) TOPICAL DAILY
Status: DISCONTINUED | OUTPATIENT
Start: 2023-10-27 | End: 2023-10-29 | Stop reason: HOSPADM

## 2023-10-27 RX ORDER — MONTELUKAST SODIUM 10 MG/1
10 TABLET ORAL NIGHTLY
Status: DISCONTINUED | OUTPATIENT
Start: 2023-10-27 | End: 2023-10-29 | Stop reason: HOSPADM

## 2023-10-27 RX ORDER — GUAIFENESIN 200 MG/10ML
200 LIQUID ORAL 3 TIMES DAILY PRN
Status: DISCONTINUED | OUTPATIENT
Start: 2023-10-27 | End: 2023-10-29 | Stop reason: HOSPADM

## 2023-10-27 RX ORDER — MAGNESIUM SULFATE IN WATER 40 MG/ML
2000 INJECTION, SOLUTION INTRAVENOUS PRN
Status: DISCONTINUED | OUTPATIENT
Start: 2023-10-27 | End: 2023-10-29 | Stop reason: HOSPADM

## 2023-10-27 RX ORDER — ACETYLCYSTEINE 200 MG/ML
600 SOLUTION ORAL; RESPIRATORY (INHALATION) 2 TIMES DAILY PRN
Status: DISCONTINUED | OUTPATIENT
Start: 2023-10-27 | End: 2023-10-29 | Stop reason: HOSPADM

## 2023-10-27 RX ORDER — MECOBALAMIN 5000 MCG
5 TABLET,DISINTEGRATING ORAL NIGHTLY PRN
Status: DISCONTINUED | OUTPATIENT
Start: 2023-10-27 | End: 2023-10-29 | Stop reason: HOSPADM

## 2023-10-27 RX ORDER — TRAZODONE HYDROCHLORIDE 50 MG/1
50 TABLET ORAL NIGHTLY
Status: DISCONTINUED | OUTPATIENT
Start: 2023-10-27 | End: 2023-10-29 | Stop reason: HOSPADM

## 2023-10-27 RX ORDER — SODIUM CHLORIDE 9 MG/ML
INJECTION, SOLUTION INTRAVENOUS PRN
Status: DISCONTINUED | OUTPATIENT
Start: 2023-10-27 | End: 2023-10-29 | Stop reason: HOSPADM

## 2023-10-27 RX ORDER — OXYCODONE AND ACETAMINOPHEN 10; 325 MG/1; MG/1
1 TABLET ORAL EVERY 6 HOURS PRN
Status: DISCONTINUED | OUTPATIENT
Start: 2023-10-27 | End: 2023-10-29 | Stop reason: HOSPADM

## 2023-10-27 RX ORDER — ONDANSETRON 4 MG/1
4 TABLET, ORALLY DISINTEGRATING ORAL EVERY 8 HOURS PRN
Status: DISCONTINUED | OUTPATIENT
Start: 2023-10-27 | End: 2023-10-27

## 2023-10-27 RX ORDER — FERROUS SULFATE 325(65) MG
325 TABLET ORAL 2 TIMES DAILY WITH MEALS
Status: DISCONTINUED | OUTPATIENT
Start: 2023-10-27 | End: 2023-10-29 | Stop reason: HOSPADM

## 2023-10-27 RX ORDER — CITALOPRAM 20 MG/1
20 TABLET ORAL DAILY
Status: DISCONTINUED | OUTPATIENT
Start: 2023-10-27 | End: 2023-10-29 | Stop reason: HOSPADM

## 2023-10-27 RX ADMIN — MONTELUKAST 10 MG: 10 TABLET, FILM COATED ORAL at 20:00

## 2023-10-27 RX ADMIN — CITALOPRAM 20 MG: 20 TABLET, FILM COATED ORAL at 09:55

## 2023-10-27 RX ADMIN — SUCRALFATE 1 G: 1 TABLET ORAL at 20:00

## 2023-10-27 RX ADMIN — FAMOTIDINE 20 MG: 20 TABLET ORAL at 01:04

## 2023-10-27 RX ADMIN — SUCRALFATE 1 G: 1 TABLET ORAL at 09:55

## 2023-10-27 RX ADMIN — FAMOTIDINE 20 MG: 20 TABLET ORAL at 20:00

## 2023-10-27 RX ADMIN — METHYLPREDNISOLONE SODIUM SUCCINATE 125 MG: 125 INJECTION, POWDER, FOR SOLUTION INTRAMUSCULAR; INTRAVENOUS at 01:05

## 2023-10-27 RX ADMIN — ALPRAZOLAM 1 MG: 0.5 TABLET ORAL at 20:08

## 2023-10-27 RX ADMIN — ALBUTEROL SULFATE 2.5 MG: 2.5 SOLUTION RESPIRATORY (INHALATION) at 04:23

## 2023-10-27 RX ADMIN — IPRATROPIUM BROMIDE 0.5 MG: 0.5 SOLUTION RESPIRATORY (INHALATION) at 06:13

## 2023-10-27 RX ADMIN — AZITHROMYCIN MONOHYDRATE 500 MG: 500 INJECTION, POWDER, LYOPHILIZED, FOR SOLUTION INTRAVENOUS at 01:14

## 2023-10-27 RX ADMIN — AZITHROMYCIN MONOHYDRATE 500 MG: 500 INJECTION, POWDER, LYOPHILIZED, FOR SOLUTION INTRAVENOUS at 20:11

## 2023-10-27 RX ADMIN — OXYCODONE AND ACETAMINOPHEN 1 TABLET: 10; 325 TABLET ORAL at 15:50

## 2023-10-27 RX ADMIN — GABAPENTIN 600 MG: 600 TABLET, FILM COATED ORAL at 09:55

## 2023-10-27 RX ADMIN — ENOXAPARIN SODIUM 40 MG: 100 INJECTION SUBCUTANEOUS at 09:55

## 2023-10-27 RX ADMIN — SODIUM CHLORIDE, PRESERVATIVE FREE 10 ML: 5 INJECTION INTRAVENOUS at 11:57

## 2023-10-27 RX ADMIN — SUCRALFATE 1 G: 1 TABLET ORAL at 01:04

## 2023-10-27 RX ADMIN — MONTELUKAST 10 MG: 10 TABLET, FILM COATED ORAL at 01:04

## 2023-10-27 RX ADMIN — PROCHLORPERAZINE EDISYLATE 10 MG: 5 INJECTION INTRAMUSCULAR; INTRAVENOUS at 11:00

## 2023-10-27 RX ADMIN — METHYLPREDNISOLONE SODIUM SUCCINATE 40 MG: 40 INJECTION, POWDER, FOR SOLUTION INTRAMUSCULAR; INTRAVENOUS at 20:00

## 2023-10-27 RX ADMIN — ARFORMOTEROL TARTRATE 15 MCG: 15 SOLUTION RESPIRATORY (INHALATION) at 06:13

## 2023-10-27 RX ADMIN — SODIUM CHLORIDE, PRESERVATIVE FREE 10 ML: 5 INJECTION INTRAVENOUS at 20:00

## 2023-10-27 RX ADMIN — TRAZODONE HYDROCHLORIDE 50 MG: 50 TABLET ORAL at 01:04

## 2023-10-27 RX ADMIN — HYDROXYZINE HYDROCHLORIDE 25 MG: 25 TABLET, FILM COATED ORAL at 01:04

## 2023-10-27 RX ADMIN — FERROUS SULFATE TAB 325 MG (65 MG ELEMENTAL FE) 325 MG: 325 (65 FE) TAB at 09:55

## 2023-10-27 RX ADMIN — FLUTICASONE PROPIONATE 1 SPRAY: 50 SPRAY, METERED NASAL at 09:55

## 2023-10-27 RX ADMIN — GABAPENTIN 600 MG: 600 TABLET, FILM COATED ORAL at 15:26

## 2023-10-27 RX ADMIN — BUDESONIDE 500 MCG: 0.5 INHALANT ORAL at 06:13

## 2023-10-27 RX ADMIN — FAMOTIDINE 20 MG: 20 TABLET ORAL at 09:55

## 2023-10-27 RX ADMIN — GABAPENTIN 600 MG: 600 TABLET, FILM COATED ORAL at 20:00

## 2023-10-27 RX ADMIN — Medication 400 MG: at 09:55

## 2023-10-27 ASSESSMENT — PAIN DESCRIPTION - LOCATION: LOCATION: HEAD

## 2023-10-27 ASSESSMENT — ENCOUNTER SYMPTOMS
DIARRHEA: 0
COUGH: 1
SHORTNESS OF BREATH: 0
NAUSEA: 1
VOMITING: 0

## 2023-10-27 ASSESSMENT — PAIN SCALES - GENERAL: PAINLEVEL_OUTOF10: 8

## 2023-10-27 NOTE — H&P
daily    Chronic Medical Problems:  Continue home regimen as indicated   citalopram  20 mg Oral Daily    pantoprazole  40 mg Oral QAM AC    ferrous sulfate  325 mg Oral BID WC    fluticasone  1 spray Each Nostril Daily    gabapentin  600 mg Oral TID    magnesium oxide  400 mg Oral Daily    montelukast  10 mg Oral Nightly    sucralfate  1 g Oral 4x Daily    traZODone  50 mg Oral Nightly   Puffers replaced with nebs   arformoterol tartrate  15 mcg Nebulization BID RT    budesonide  0.5 mg Nebulization BID RT    ipratropium  0.5 mg Nebulization 4x Daily RT    famotidine  20 mg Oral BID     Supoportive and Prophylactic Txx:  DVT Prophylaxis: lovenox SQ  GI (PUD) PPx: as per above  PT consult for evalutation and Txx as indicated  ADULT DIET; Regular; 4 carb choices (60 gm/meal); Low Fat/Low Chol/High Fiber/2 gm Na; Low Sodium (2 gm)  acetylcysteine, hydrOXYzine HCl, guaiFENesin, lidocaine viscous hcl, sodium chloride, tiZANidine, naloxone, sodium chloride flush, sodium chloride, ondansetron **OR** ondansetron, acetaminophen **OR** acetaminophen, magnesium sulfate, potassium chloride **OR** potassium alternative oral replacement **OR** potassium chloride, polyethylene glycol, melatonin, calcium carbonate, albuterol      Care time of 45 minutes  Pt seen/examined and admitted to inpatient status. Inpatient status is used for patients with an expected LOS extending past two midnights due to medical therapy and or critical care needs, otherwise patients are placed to OBServation status. Signed:  Electronically signed by Marbin Be MD on 10/27/23 at 6:35 AM CDT.

## 2023-10-27 NOTE — ED PROVIDER NOTES
[facility] EMERGENCY DEPT  eMERGENCY dEPARTMENT eNCOUnter      Pt Name: Jose R Silverio  MRN: [id number]  [facility] 1950  Date of evaluation: 10/26/2023  Provider: Ross Noriega MD    [address]       Chief Complaint   Patient presents with    Fatigue    Generalized Body Aches     Since today          HISTORY OF PRESENT ILLNESS   (Location/Symptom, Timing/Onset,Context/Setting, Quality, Duration, Modifying Factors, Severity)  Note limiting factors. Jose R Silverio is a 74 y.o. female who presents to the emergency department for 1 day history of worsening generalized weakness and fatigue. Patient midst to productive cough with clear sputum however this is generally unchanged and seems to be more productive than her baseline. Denies any chest pain. Has known COPD and wears 3 L of oxygen currently on her home 3 L. Does admit that she had a fall night before last and did hit her head but did not lose consciousness. Admits to intermittent dysuria and frequency. Denies any abdominal pain vomiting or diarrhea. No fevers or chills. Has had some body aches as well. HPI    NursingNotes were reviewed. REVIEW OF SYSTEMS    (2-9 systems for level 4, 10 or more for level 5)     Review of Systems   Constitutional:  Positive for activity change and fatigue. Negative for chills and fever. HENT:  Negative for rhinorrhea and sore throat. Respiratory:  Positive for cough and shortness of breath. Cardiovascular:  Negative for chest pain and leg swelling. Gastrointestinal:  Negative for abdominal pain, diarrhea, nausea and vomiting. Genitourinary:  Positive for dysuria and frequency. Negative for urgency. Musculoskeletal:  Positive for myalgias. Negative for back pain and neck pain. Skin:  Negative for wound. Neurological:  Negative for dizziness, syncope and headaches. All other systems reviewed and are negative.            PAST MEDICALHISTORY     Past Medical History:   Diagnosis Date    ADHD (attention

## 2023-10-27 NOTE — PLAN OF CARE
Problem: Skin/Tissue Integrity  Goal: Absence of new skin breakdown  Description: 1. Monitor for areas of redness and/or skin breakdown  2. Assess vascular access sites hourly  3. Every 4-6 hours minimum:  Change oxygen saturation probe site  4. Every 4-6 hours:  If on nasal continuous positive airway pressure, respiratory therapy assess nares and determine need for appliance change or resting period.   Outcome: Progressing     Problem: ABCDS Injury Assessment  Goal: Absence of physical injury  Outcome: Progressing     Problem: Risk for Elopement  Goal: Patient will not exit the unit/facility without proper excort  Outcome: Progressing  Flowsheets (Taken 10/27/2023 1701)  Nursing Interventions for Elopement Risk: Collaborate with family members/caregivers to mitigate the elopement risk     Problem: Safety - Adult  Goal: Free from fall injury  Outcome: Progressing

## 2023-10-28 LAB
ANION GAP SERPL CALCULATED.3IONS-SCNC: 9 MMOL/L (ref 7–19)
BASOPHILS # BLD: 0 K/UL (ref 0–0.2)
BASOPHILS NFR BLD: 0 % (ref 0–1)
BUN SERPL-MCNC: 19 MG/DL (ref 8–23)
CALCIUM SERPL-MCNC: 8.7 MG/DL (ref 8.8–10.2)
CHLORIDE SERPL-SCNC: 97 MMOL/L (ref 98–111)
CO2 SERPL-SCNC: 31 MMOL/L (ref 22–29)
CREAT SERPL-MCNC: 0.6 MG/DL (ref 0.5–0.9)
EOSINOPHIL # BLD: 0 K/UL (ref 0–0.6)
EOSINOPHIL NFR BLD: 0 % (ref 0–5)
ERYTHROCYTE [DISTWIDTH] IN BLOOD BY AUTOMATED COUNT: 14.5 % (ref 11.5–14.5)
GLUCOSE SERPL-MCNC: 137 MG/DL (ref 74–109)
HCT VFR BLD AUTO: 29.3 % (ref 37–47)
HGB BLD-MCNC: 9 G/DL (ref 12–16)
IMM GRANULOCYTES # BLD: 0 K/UL
LYMPHOCYTES # BLD: 0.3 K/UL (ref 1.1–4.5)
LYMPHOCYTES NFR BLD: 8.4 % (ref 20–40)
MCH RBC QN AUTO: 32.1 PG (ref 27–31)
MCHC RBC AUTO-ENTMCNC: 30.7 G/DL (ref 33–37)
MCV RBC AUTO: 104.6 FL (ref 81–99)
MONOCYTES # BLD: 0.2 K/UL (ref 0–0.9)
MONOCYTES NFR BLD: 5.2 % (ref 0–10)
NEUTROPHILS # BLD: 3.5 K/UL (ref 1.5–7.5)
NEUTS SEG NFR BLD: 86.2 % (ref 50–65)
PLATELET # BLD AUTO: 247 K/UL (ref 130–400)
PMV BLD AUTO: 10.5 FL (ref 9.4–12.3)
POTASSIUM SERPL-SCNC: 4.6 MMOL/L (ref 3.5–5)
RBC # BLD AUTO: 2.8 M/UL (ref 4.2–5.4)
SODIUM SERPL-SCNC: 137 MMOL/L (ref 136–145)
T4 FREE SERPL-MCNC: 1.06 NG/DL (ref 0.93–1.7)
TSH SERPL DL<=0.005 MIU/L-ACNC: 0.23 UIU/ML (ref 0.35–5.5)
WBC # BLD AUTO: 4 K/UL (ref 4.8–10.8)

## 2023-10-28 PROCEDURE — 94640 AIRWAY INHALATION TREATMENT: CPT

## 2023-10-28 PROCEDURE — 6360000002 HC RX W HCPCS: Performed by: STUDENT IN AN ORGANIZED HEALTH CARE EDUCATION/TRAINING PROGRAM

## 2023-10-28 PROCEDURE — 94760 N-INVAS EAR/PLS OXIMETRY 1: CPT

## 2023-10-28 PROCEDURE — 80048 BASIC METABOLIC PNL TOTAL CA: CPT

## 2023-10-28 PROCEDURE — 6370000000 HC RX 637 (ALT 250 FOR IP): Performed by: STUDENT IN AN ORGANIZED HEALTH CARE EDUCATION/TRAINING PROGRAM

## 2023-10-28 PROCEDURE — 2700000000 HC OXYGEN THERAPY PER DAY

## 2023-10-28 PROCEDURE — 6360000002 HC RX W HCPCS: Performed by: HOSPITALIST

## 2023-10-28 PROCEDURE — 84443 ASSAY THYROID STIM HORMONE: CPT

## 2023-10-28 PROCEDURE — 84439 ASSAY OF FREE THYROXINE: CPT

## 2023-10-28 PROCEDURE — 1210000000 HC MED SURG R&B

## 2023-10-28 PROCEDURE — 6370000000 HC RX 637 (ALT 250 FOR IP): Performed by: HOSPITALIST

## 2023-10-28 PROCEDURE — 36415 COLL VENOUS BLD VENIPUNCTURE: CPT

## 2023-10-28 PROCEDURE — 85025 COMPLETE CBC W/AUTO DIFF WBC: CPT

## 2023-10-28 PROCEDURE — 2580000003 HC RX 258: Performed by: HOSPITALIST

## 2023-10-28 RX ADMIN — OXYCODONE AND ACETAMINOPHEN 1 TABLET: 10; 325 TABLET ORAL at 02:02

## 2023-10-28 RX ADMIN — SUCRALFATE 1 G: 1 TABLET ORAL at 14:34

## 2023-10-28 RX ADMIN — FAMOTIDINE 20 MG: 20 TABLET ORAL at 20:31

## 2023-10-28 RX ADMIN — AZITHROMYCIN MONOHYDRATE 500 MG: 500 INJECTION, POWDER, LYOPHILIZED, FOR SOLUTION INTRAVENOUS at 20:35

## 2023-10-28 RX ADMIN — SUCRALFATE 1 G: 1 TABLET ORAL at 07:35

## 2023-10-28 RX ADMIN — SODIUM CHLORIDE, PRESERVATIVE FREE 10 ML: 5 INJECTION INTRAVENOUS at 20:30

## 2023-10-28 RX ADMIN — GABAPENTIN 600 MG: 600 TABLET, FILM COATED ORAL at 14:34

## 2023-10-28 RX ADMIN — Medication 400 MG: at 07:51

## 2023-10-28 RX ADMIN — GABAPENTIN 600 MG: 600 TABLET, FILM COATED ORAL at 20:31

## 2023-10-28 RX ADMIN — FERROUS SULFATE TAB 325 MG (65 MG ELEMENTAL FE) 325 MG: 325 (65 FE) TAB at 07:35

## 2023-10-28 RX ADMIN — SUCRALFATE 1 G: 1 TABLET ORAL at 20:31

## 2023-10-28 RX ADMIN — PROCHLORPERAZINE EDISYLATE 10 MG: 5 INJECTION INTRAMUSCULAR; INTRAVENOUS at 09:50

## 2023-10-28 RX ADMIN — BUDESONIDE 500 MCG: 0.5 INHALANT ORAL at 06:08

## 2023-10-28 RX ADMIN — PANTOPRAZOLE SODIUM 40 MG: 40 TABLET, DELAYED RELEASE ORAL at 05:58

## 2023-10-28 RX ADMIN — IPRATROPIUM BROMIDE 0.5 MG: 0.5 SOLUTION RESPIRATORY (INHALATION) at 18:29

## 2023-10-28 RX ADMIN — IPRATROPIUM BROMIDE 0.5 MG: 0.5 SOLUTION RESPIRATORY (INHALATION) at 14:00

## 2023-10-28 RX ADMIN — OXYCODONE AND ACETAMINOPHEN 1 TABLET: 10; 325 TABLET ORAL at 16:51

## 2023-10-28 RX ADMIN — ALPRAZOLAM 1 MG: 0.5 TABLET ORAL at 20:31

## 2023-10-28 RX ADMIN — BUDESONIDE 500 MCG: 0.5 INHALANT ORAL at 18:29

## 2023-10-28 RX ADMIN — ARFORMOTEROL TARTRATE 15 MCG: 15 SOLUTION RESPIRATORY (INHALATION) at 06:08

## 2023-10-28 RX ADMIN — ALPRAZOLAM 1 MG: 0.5 TABLET ORAL at 08:27

## 2023-10-28 RX ADMIN — GABAPENTIN 600 MG: 600 TABLET, FILM COATED ORAL at 07:51

## 2023-10-28 RX ADMIN — FLUTICASONE PROPIONATE 1 SPRAY: 50 SPRAY, METERED NASAL at 07:45

## 2023-10-28 RX ADMIN — FAMOTIDINE 20 MG: 20 TABLET ORAL at 07:35

## 2023-10-28 RX ADMIN — METHYLPREDNISOLONE SODIUM SUCCINATE 40 MG: 40 INJECTION, POWDER, FOR SOLUTION INTRAMUSCULAR; INTRAVENOUS at 07:54

## 2023-10-28 RX ADMIN — ENOXAPARIN SODIUM 40 MG: 100 INJECTION SUBCUTANEOUS at 07:35

## 2023-10-28 RX ADMIN — MONTELUKAST 10 MG: 10 TABLET, FILM COATED ORAL at 20:31

## 2023-10-28 RX ADMIN — CITALOPRAM 20 MG: 20 TABLET, FILM COATED ORAL at 07:35

## 2023-10-28 RX ADMIN — IPRATROPIUM BROMIDE 0.5 MG: 0.5 SOLUTION RESPIRATORY (INHALATION) at 06:08

## 2023-10-28 RX ADMIN — ARFORMOTEROL TARTRATE 15 MCG: 15 SOLUTION RESPIRATORY (INHALATION) at 18:29

## 2023-10-28 ASSESSMENT — PAIN DESCRIPTION - LOCATION: LOCATION: LEG;ARM

## 2023-10-28 ASSESSMENT — PAIN DESCRIPTION - ORIENTATION: ORIENTATION: RIGHT;LEFT

## 2023-10-28 ASSESSMENT — PAIN SCALES - GENERAL: PAINLEVEL_OUTOF10: 7

## 2023-10-28 ASSESSMENT — PAIN DESCRIPTION - DESCRIPTORS: DESCRIPTORS: ACHING

## 2023-10-29 VITALS
TEMPERATURE: 97.7 F | OXYGEN SATURATION: 97 % | HEART RATE: 66 BPM | WEIGHT: 124.19 LBS | RESPIRATION RATE: 20 BRPM | SYSTOLIC BLOOD PRESSURE: 91 MMHG | DIASTOLIC BLOOD PRESSURE: 43 MMHG | BODY MASS INDEX: 20.04 KG/M2

## 2023-10-29 LAB
ANION GAP SERPL CALCULATED.3IONS-SCNC: 7 MMOL/L (ref 7–19)
BASOPHILS # BLD: 0 K/UL (ref 0–0.2)
BASOPHILS NFR BLD: 0.2 % (ref 0–1)
BUN SERPL-MCNC: 20 MG/DL (ref 8–23)
CALCIUM SERPL-MCNC: 8.7 MG/DL (ref 8.8–10.2)
CHLORIDE SERPL-SCNC: 99 MMOL/L (ref 98–111)
CO2 SERPL-SCNC: 33 MMOL/L (ref 22–29)
CREAT SERPL-MCNC: 0.6 MG/DL (ref 0.5–0.9)
EOSINOPHIL # BLD: 0 K/UL (ref 0–0.6)
EOSINOPHIL NFR BLD: 0 % (ref 0–5)
ERYTHROCYTE [DISTWIDTH] IN BLOOD BY AUTOMATED COUNT: 14.2 % (ref 11.5–14.5)
GLUCOSE SERPL-MCNC: 80 MG/DL (ref 74–109)
HCT VFR BLD AUTO: 28.3 % (ref 37–47)
HGB BLD-MCNC: 9.3 G/DL (ref 12–16)
IMM GRANULOCYTES # BLD: 0 K/UL
LYMPHOCYTES # BLD: 0.8 K/UL (ref 1.1–4.5)
LYMPHOCYTES NFR BLD: 13.3 % (ref 20–40)
MCH RBC QN AUTO: 31.8 PG (ref 27–31)
MCHC RBC AUTO-ENTMCNC: 32.9 G/DL (ref 33–37)
MCV RBC AUTO: 96.9 FL (ref 81–99)
MONOCYTES # BLD: 0.6 K/UL (ref 0–0.9)
MONOCYTES NFR BLD: 11 % (ref 0–10)
NEUTROPHILS # BLD: 4.3 K/UL (ref 1.5–7.5)
NEUTS SEG NFR BLD: 75.2 % (ref 50–65)
PLATELET # BLD AUTO: 271 K/UL (ref 130–400)
PMV BLD AUTO: 10 FL (ref 9.4–12.3)
POTASSIUM SERPL-SCNC: 4.5 MMOL/L (ref 3.5–5)
RBC # BLD AUTO: 2.92 M/UL (ref 4.2–5.4)
SODIUM SERPL-SCNC: 139 MMOL/L (ref 136–145)
WBC # BLD AUTO: 5.7 K/UL (ref 4.8–10.8)

## 2023-10-29 PROCEDURE — 6370000000 HC RX 637 (ALT 250 FOR IP): Performed by: STUDENT IN AN ORGANIZED HEALTH CARE EDUCATION/TRAINING PROGRAM

## 2023-10-29 PROCEDURE — 36415 COLL VENOUS BLD VENIPUNCTURE: CPT

## 2023-10-29 PROCEDURE — 85025 COMPLETE CBC W/AUTO DIFF WBC: CPT

## 2023-10-29 PROCEDURE — 6360000002 HC RX W HCPCS: Performed by: HOSPITALIST

## 2023-10-29 PROCEDURE — 2700000000 HC OXYGEN THERAPY PER DAY

## 2023-10-29 PROCEDURE — 94640 AIRWAY INHALATION TREATMENT: CPT

## 2023-10-29 PROCEDURE — 80048 BASIC METABOLIC PNL TOTAL CA: CPT

## 2023-10-29 PROCEDURE — 6370000000 HC RX 637 (ALT 250 FOR IP): Performed by: HOSPITALIST

## 2023-10-29 RX ORDER — OLOPATADINE HYDROCHLORIDE 665 UG/1
2 SPRAY NASAL 2 TIMES DAILY
Qty: 1 EACH | Refills: 0 | Status: SHIPPED | OUTPATIENT
Start: 2023-10-29

## 2023-10-29 RX ADMIN — TIZANIDINE 4 MG: 4 TABLET ORAL at 04:53

## 2023-10-29 RX ADMIN — IPRATROPIUM BROMIDE 0.5 MG: 0.5 SOLUTION RESPIRATORY (INHALATION) at 06:41

## 2023-10-29 RX ADMIN — OXYCODONE AND ACETAMINOPHEN 1 TABLET: 10; 325 TABLET ORAL at 00:12

## 2023-10-29 RX ADMIN — FERROUS SULFATE TAB 325 MG (65 MG ELEMENTAL FE) 325 MG: 325 (65 FE) TAB at 10:01

## 2023-10-29 RX ADMIN — ALPRAZOLAM 1 MG: 0.5 TABLET ORAL at 03:53

## 2023-10-29 RX ADMIN — ARFORMOTEROL TARTRATE 15 MCG: 15 SOLUTION RESPIRATORY (INHALATION) at 06:41

## 2023-10-29 RX ADMIN — Medication 400 MG: at 10:02

## 2023-10-29 RX ADMIN — OXYCODONE AND ACETAMINOPHEN 1 TABLET: 10; 325 TABLET ORAL at 10:02

## 2023-10-29 RX ADMIN — PANTOPRAZOLE SODIUM 40 MG: 40 TABLET, DELAYED RELEASE ORAL at 04:53

## 2023-10-29 RX ADMIN — GABAPENTIN 600 MG: 600 TABLET, FILM COATED ORAL at 10:02

## 2023-10-29 RX ADMIN — BUDESONIDE 500 MCG: 0.5 INHALANT ORAL at 06:41

## 2023-10-29 RX ADMIN — CITALOPRAM 20 MG: 20 TABLET, FILM COATED ORAL at 10:00

## 2023-10-29 ASSESSMENT — PAIN DESCRIPTION - DESCRIPTORS: DESCRIPTORS: ACHING

## 2023-10-29 ASSESSMENT — PAIN DESCRIPTION - LOCATION: LOCATION: CHEST

## 2023-10-29 ASSESSMENT — PAIN SCALES - GENERAL: PAINLEVEL_OUTOF10: 7

## 2023-10-29 NOTE — PLAN OF CARE
Problem: Skin/Tissue Integrity  Goal: Absence of new skin breakdown  Description: 1. Monitor for areas of redness and/or skin breakdown  2. Assess vascular access sites hourly  3. Every 4-6 hours minimum:  Change oxygen saturation probe site  4. Every 4-6 hours:  If on nasal continuous positive airway pressure, respiratory therapy assess nares and determine need for appliance change or resting period.   Outcome: Completed     Problem: ABCDS Injury Assessment  Goal: Absence of physical injury  Outcome: Completed     Problem: Risk for Elopement  Goal: Patient will not exit the unit/facility without proper excort  Outcome: Completed     Problem: Safety - Adult  Goal: Free from fall injury  Outcome: Completed Yes...

## 2023-10-30 RX ORDER — TIZANIDINE 4 MG/1
4 TABLET ORAL 3 TIMES DAILY PRN
Qty: 90 TABLET | Refills: 0 | Status: SHIPPED | OUTPATIENT
Start: 2023-10-30

## 2023-10-30 NOTE — CARE COORDINATION
Set up with [facility]  [phone].    Electronically signed by Dami Swenson on 10/30/23 at 1:25 PM CDT
/24  OT AM-PAC:   /24    Family can provide assistance at DC: Yes  Would you like Case Management to discuss the discharge plan with any other family members/significant others, and if so, who? Yes (daughters)  Plans to Return to Present Housing: Yes  Other Identified Issues/Barriers to RETURNING to current housing: NA  Potential Assistance needed at discharge: [facility] ([facility] if eligible)            Potential DME:    Patient expects to discharge to: [address] [place] [address] for transportation at discharge: Family    Financial    Payor: Tunde Gloria / Plan: Evelio John / Product Type: *No Product type* /     Does insurance require precert for SNF: Yes    Potential assistance Purchasing Medications: No  Meds-to-Beds request:        [facility] [address] [facility], [address] [facility]  [address] [facility] [address]  Phone: [phone] Fax: [phone]      Notes:    Factors facilitating achievement of predicted outcomes: Family support, Cooperative, and Has needed Durable Medical Equipment at home    Barriers to discharge: Decreased endurance, Upper extremity weakness, Lower extremity weakness, Medical complications, and Medication managment    Additional Case Management Notes: SW spoke to daughter, Chema Jimenez on the phone for assessment. Daughter stated pt has wheelchair, walker, and wears oxygen. Daughter explained she takes care of her mother and her sister has now moved in with them as well. Pt has strong support from her daughters. Pt does need assistance bathing but is independent for the most part. Daughter stated if her mother is eligible they would consider HH at d/c. Daughter will provide transportation at d/c. [facility] supplies pt oxygen.      The Plan for Transition of Care is related to the following treatment goals of General weakness [R53.1]  COPD exacerbation ([address]) [J44.1]  COPD with acute exacerbation ([address]) [W35.1]    IF APPLICABLE: The Patient and/or patient

## 2023-10-31 ENCOUNTER — TELEPHONE (OUTPATIENT)
Dept: PRIMARY CARE CLINIC | Age: 73
End: 2023-10-31

## 2023-10-31 ENCOUNTER — CARE COORDINATION (OUTPATIENT)
Dept: CARE COORDINATION | Age: 73
End: 2023-10-31

## 2023-10-31 NOTE — CARE COORDINATION
Ambulatory Care Coordination Note  10/31/2023    Patient Current Location:  [place]     ACM contacted the patient by telephone. Verified name and  with patient as identifiers. Provided introduction to self, and explanation of the ACM role. Challenges to be reviewed by the provider   Additional needs identified to be addressed with provider: No  Pt declined offer to contact PCP about possible UTI           Method of communication with provider: none. ACM: David Li, RN    Spoke to patient. Patient was in the hospital, discharged 10/29. COPD exacerbation and general weakness. She is using her home oxygen, stated she is checking her SpO2 and it's ok - in the 90s. She reported she needs to  2 prescriptions today - a nasal spray and her muscle relaxer. Patient stated her daughter and son-in-law are assisting her right now. Her family will provide transportation to appointments. Patient reported she has to see her pain mgmt provider (Dr. Shirley Still) this week due to his schedule, that she is awaiting a call back for her appt time. Patient reported she thinks she has a UTI - pt c/o urinary frequency and pain with urination. She plans to get some OTC Azo to try for her sx. Patient declined offer to contact PCP for urine test order. She wants to try the Azo first and will notify ACM if she would like further evaluation. Pt was coughing some during call, stated she has had some increased sinus sx since the weather turned cold and will begin her nasal spray today. - Pt agreed to f/u call with ACM later this week. Assess COPD sx mgmt, UTI sx mgmt. Offered patient enrollment in the Remote Patient Monitoring (RPM) program for in-home monitoring: NA.     Lab Results       None            Care Coordination Interventions    Referral from Primary Care Provider: No  Suggested Interventions and Community Resources  Behavorial Health: Completed (Comment: Dr. Bob Perdomo)  Disease Specific Clinic: Completed

## 2023-10-31 NOTE — TELEPHONE ENCOUNTER
Care Transitions Initial Follow Up Call    Outreach made within 2 business days of discharge: Yes    Patient: Reynaldo Houser Patient : 1950   MRN: [id number]  Reason for Admission: COPD with acute exacerbation    Discharge Date: 10/29/23       Spoke with: Toño Mccoy    Discharge department/facility: [address] [place] [address] Interactive Patient Contact:  Was patient able to fill all prescriptions: Yes  Was patient instructed to bring all medications to the follow-up visit: Yes  Is patient taking all medications as directed in the discharge summary?  Yes  Does patient understand their discharge instructions: Yes  Does patient have questions or concerns that need addressed prior to 7-14 day follow up office visit: no        Scheduled appointment with PCP within 7-14 days    Follow Up  Future Appointments   Date Time Provider [address] [place] [address]   2023  1:45 PM Gerry Mendes MD [facility]   2023  2:30 PM TEN Carter NP [facility]       Xenia Santa LPN

## 2023-11-01 LAB
EKG P AXIS: 56 DEGREES
EKG P-R INTERVAL: 116 MS
EKG Q-T INTERVAL: 366 MS
EKG QRS DURATION: 74 MS
EKG QTC CALCULATION (BAZETT): 402 MS
EKG T AXIS: 67 DEGREES

## 2023-11-03 ENCOUNTER — CARE COORDINATION (OUTPATIENT)
Dept: CARE COORDINATION | Age: 73
End: 2023-11-03

## 2023-11-03 RX ORDER — OXYCODONE AND ACETAMINOPHEN 10; 325 MG/1; MG/1
1 TABLET ORAL EVERY 4 HOURS PRN
COMMUNITY

## 2023-11-03 NOTE — CARE COORDINATION
Ambulatory Care Coordination Note  11/3/2023    Patient Current Location:  [place]     ACM contacted the patient by telephone. Verified name and  with patient as identifiers. Provided introduction to self, and explanation of the ACM role. Challenges to be reviewed by the provider   Additional needs identified to be addressed with provider: Yes  medications-refills requested, new antihistamine requested           Method of communication with provider: staff message. ACM: Chris Alvarenga RN    Spoke to patient. Patient reported that since taking Azo, she feels better. Patient said she will finish it today. She has been drinking a lot of fluids - water, tea and coffee. Patient has been using a nasal spray for sinus drainage. She stopped her Flonase, unsure if ok to take that too. Patient reported she is out of several supplemental prescriptions - Magnesium, Vitamin D and Iron. She would also like to try a different antihistamine, stated the Singulair gave her some side effects. Pt prefers a prescription for OTC meds as her ins will pay for this and she is on a limited income. Patient receives MOW, denied any immediate concerns about her bills or other financial concerns. Patient is reluctant to get out for any reason during cold weather as this aggravates her respiratory sx. She prefers VV if that is an option for her, stated her pain mgmt provider did switch her visit to telehealth for her. She denied cardiac chest pain, stated she has a hiatal hernia that causes discomfort at times. Patient has some intermittent SoB, sleeps propped on pillows at night 2/2 COPD. She uses her rescue inhaler or Xopenex for wheezing, keeps a fan on to circulate the air. She is using her home oxygen, stated her SpO2 is in upper 90s. Patient takes Robitussin with Guaifenesin as well. She has good understanding of her COPD sx and mgmt.   Patient needs to f/u with her eye doctor about reduced vision of right eye, stated she

## 2023-11-09 RX ORDER — CETIRIZINE HYDROCHLORIDE 10 MG/1
10 TABLET ORAL DAILY
Qty: 90 TABLET | Refills: 1 | OUTPATIENT
Start: 2023-11-09 | End: 2023-12-09

## 2023-11-13 ENCOUNTER — OFFICE VISIT (OUTPATIENT)
Dept: PRIMARY CARE CLINIC | Age: 73
End: 2023-11-13

## 2023-11-13 VITALS
WEIGHT: 121.2 LBS | BODY MASS INDEX: 19.48 KG/M2 | HEIGHT: 66 IN | OXYGEN SATURATION: 90 % | HEART RATE: 95 BPM | TEMPERATURE: 99 F | DIASTOLIC BLOOD PRESSURE: 68 MMHG | RESPIRATION RATE: 18 BRPM | SYSTOLIC BLOOD PRESSURE: 132 MMHG

## 2023-11-13 DIAGNOSIS — R35.0 URINARY FREQUENCY: Primary | ICD-10-CM

## 2023-11-13 DIAGNOSIS — Z79.899 MEDICATION MANAGEMENT: ICD-10-CM

## 2023-11-13 DIAGNOSIS — R82.998 LEUKOCYTES IN URINE: ICD-10-CM

## 2023-11-13 DIAGNOSIS — Z09 HOSPITAL DISCHARGE FOLLOW-UP: ICD-10-CM

## 2023-11-13 DIAGNOSIS — R30.0 DYSURIA: ICD-10-CM

## 2023-11-13 LAB
APPEARANCE FLUID: ABNORMAL
BILIRUBIN, POC: ABNORMAL
BLOOD URINE, POC: ABNORMAL
CLARITY, POC: ABNORMAL
COLOR, POC: YELLOW
GLUCOSE URINE, POC: ABNORMAL
KETONES, POC: ABNORMAL
LEUKOCYTE EST, POC: ABNORMAL
NITRITE, POC: POSITIVE
PH, POC: 6.5
PROTEIN, POC: ABNORMAL
SPECIFIC GRAVITY, POC: 1.03
UROBILINOGEN, POC: 0.2

## 2023-11-13 RX ORDER — CETIRIZINE HYDROCHLORIDE 10 MG/1
10 TABLET ORAL DAILY
Qty: 90 TABLET | Refills: 1 | OUTPATIENT
Start: 2023-11-13 | End: 2023-12-13

## 2023-11-13 RX ORDER — CODEINE PHOSPHATE AND GUAIFENESIN 10; 100 MG/5ML; MG/5ML
SOLUTION ORAL
COMMUNITY
Start: 2023-11-06 | End: 2023-11-13 | Stop reason: SDUPTHER

## 2023-11-13 RX ORDER — ACETAMINOPHEN 500 MG
1 TABLET ORAL 2 TIMES DAILY
Qty: 60 CAPSULE | Refills: 1 | Status: SHIPPED | OUTPATIENT
Start: 2023-11-13

## 2023-11-13 RX ORDER — NITROFURANTOIN 25; 75 MG/1; MG/1
100 CAPSULE ORAL 2 TIMES DAILY
Qty: 20 CAPSULE | Refills: 0 | Status: SHIPPED | OUTPATIENT
Start: 2023-11-13 | End: 2023-11-15 | Stop reason: ALTCHOICE

## 2023-11-13 RX ORDER — PHENAZOPYRIDINE HYDROCHLORIDE 200 MG/1
200 TABLET, FILM COATED ORAL 3 TIMES DAILY PRN
Qty: 9 TABLET | Refills: 0 | Status: SHIPPED | OUTPATIENT
Start: 2023-11-13 | End: 2023-11-16

## 2023-11-13 NOTE — PROGRESS NOTES
Post-Discharge Transitional Care  Follow Up      Valentine Perdomo Brunson   YOB: 1950    Date of Office Visit:  11/13/2023  Date of Hospital Admission: 10/26/23  Date of Hospital Discharge: 10/29/23  Risk of hospital readmission (high >=14%. Medium >=10%) :Readmission Risk Score: 18.9      Care management risk score Rising risk (score 2-5) and Complex Care (Scores >=6): No Risk Score On File     Non face to face  following discharge, date last encounter closed (first attempt may have been earlier): 10/31/2023    Call initiated 2 business days of discharge: Yes    ASSESSMENT/PLAN:   Urinary frequency  -     POCT Urinalysis no Micro  Dysuria  -     POCT Urinalysis no Micro  I am concerned over starting her on antibiotic due to the history of C. difficile however urine is positive for nitrites leukocytes and blood. We will send this for culture and sensitivity. Empiric treatment with Macrobid take as directed to complete  Antibiotic Therapy  Take your antibiotic as prescribed. Take all of your antibiotics. You should not have any left over. Many antibiotics may cause diarrhea. . you can start an OTC probiotic to support normal gut bacteria. If you are on birth control, you need to use an alternative method of contraceptive for one month as antibiotics can reduce the effectiveness of oral BC. Always monitor for signs of allergic reaction such as itching, hives or any difficulty swallowing or breathing STOP THE MEDICATION IMMEDIATELY. If difficulty breathing, call 911 and go to the ER. If hives and itching, contact provider through [address] [place] [address] or phone for alternative antibiotics or be seen if necessary. Patient was instructed she would have to start a probiotic I would like for her to stay on this for at least 3 months but 6 months preferably    She is to return in 2 weeks for evaluation of GI symptoms and recheck urine    Medical Decision Making: moderate complexity  Return in 2 weeks (on 11/27/2023).

## 2023-11-14 ENCOUNTER — TELEPHONE (OUTPATIENT)
Dept: PRIMARY CARE CLINIC | Age: 73
End: 2023-11-14

## 2023-11-15 LAB
BACTERIA UR CULT: ABNORMAL
BACTERIA UR CULT: ABNORMAL
ORGANISM: ABNORMAL

## 2023-11-15 RX ORDER — CIPROFLOXACIN 250 MG/1
250 TABLET, FILM COATED ORAL 2 TIMES DAILY
Qty: 14 TABLET | Refills: 0 | Status: SHIPPED | OUTPATIENT
Start: 2023-11-15 | End: 2023-11-22

## 2023-11-27 RX ORDER — TIZANIDINE 4 MG/1
4 TABLET ORAL 3 TIMES DAILY PRN
Qty: 90 TABLET | Refills: 1 | Status: SHIPPED | OUTPATIENT
Start: 2023-11-27

## 2023-11-28 RX ORDER — TIZANIDINE 4 MG/1
4 TABLET ORAL 3 TIMES DAILY PRN
Qty: 270 TABLET | Refills: 1 | OUTPATIENT
Start: 2023-11-28

## 2023-11-29 ENCOUNTER — TELEPHONE (OUTPATIENT)
Dept: PULMONOLOGY | Age: 73
End: 2023-11-29

## 2023-11-29 DIAGNOSIS — R05.1 ACUTE COUGH: Primary | ICD-10-CM

## 2023-11-29 DIAGNOSIS — R09.89 CHEST CONGESTION: ICD-10-CM

## 2023-11-29 NOTE — TELEPHONE ENCOUNTER
Attempted to return call to patient. Will send in script for ceftin and prenisone taper pack to her pharmacy. Unable to reach patient or leave a voicemail.

## 2023-11-29 NOTE — TELEPHONE ENCOUNTER
Patient called in to reschedule appointment scheduled for today, 11/29/23. She c/o runny nose, difficultly breathing due to congestion, wet productive cough. She denies any fever stating she did have the chills but thought it may have been the weather. She states she has taken otc cough syrup but it is not helping. She would like to know what Dr Alex Alexandre recommends. I told her I would talk to him and call her back with his recommendations.

## 2023-11-30 RX ORDER — ESOMEPRAZOLE MAGNESIUM 40 MG/1
40 CAPSULE, DELAYED RELEASE ORAL
Qty: 90 CAPSULE | Refills: 1 | Status: SHIPPED | OUTPATIENT
Start: 2023-11-30

## 2023-11-30 RX ORDER — PREDNISONE 10 MG/1
TABLET ORAL
Qty: 35 TABLET | Refills: 0 | Status: SHIPPED | OUTPATIENT
Start: 2023-11-30

## 2023-11-30 RX ORDER — CEFUROXIME AXETIL 500 MG/1
500 TABLET ORAL 2 TIMES DAILY
Qty: 14 TABLET | Refills: 0 | Status: SHIPPED | OUTPATIENT
Start: 2023-11-30 | End: 2023-12-07

## 2023-11-30 NOTE — TELEPHONE ENCOUNTER
Richard Scott requests a call back please, she returned a missed call from the office yesterday evening. Thank you.

## 2023-12-04 ENCOUNTER — OFFICE VISIT (OUTPATIENT)
Dept: PRIMARY CARE CLINIC | Age: 73
End: 2023-12-04
Payer: MEDICARE

## 2023-12-04 VITALS
OXYGEN SATURATION: 95 % | TEMPERATURE: 97.9 F | WEIGHT: 121.8 LBS | SYSTOLIC BLOOD PRESSURE: 126 MMHG | HEIGHT: 66 IN | RESPIRATION RATE: 18 BRPM | DIASTOLIC BLOOD PRESSURE: 72 MMHG | BODY MASS INDEX: 19.58 KG/M2 | HEART RATE: 71 BPM

## 2023-12-04 DIAGNOSIS — F41.9 ANXIETY AND DEPRESSION: ICD-10-CM

## 2023-12-04 DIAGNOSIS — J44.9 STAGE 4 VERY SEVERE COPD BY GOLD CLASSIFICATION (HCC): Primary | ICD-10-CM

## 2023-12-04 DIAGNOSIS — F41.9 ANXIETY: ICD-10-CM

## 2023-12-04 DIAGNOSIS — R06.02 SHORTNESS OF BREATH: ICD-10-CM

## 2023-12-04 DIAGNOSIS — F32.A ANXIETY AND DEPRESSION: ICD-10-CM

## 2023-12-04 PROCEDURE — 3074F SYST BP LT 130 MM HG: CPT | Performed by: NURSE PRACTITIONER

## 2023-12-04 PROCEDURE — 3078F DIAST BP <80 MM HG: CPT | Performed by: NURSE PRACTITIONER

## 2023-12-04 PROCEDURE — 1123F ACP DISCUSS/DSCN MKR DOCD: CPT | Performed by: NURSE PRACTITIONER

## 2023-12-04 PROCEDURE — 99214 OFFICE O/P EST MOD 30 MIN: CPT | Performed by: NURSE PRACTITIONER

## 2023-12-04 RX ORDER — CETIRIZINE HYDROCHLORIDE 10 MG/1
10 TABLET ORAL DAILY
Qty: 30 TABLET | Refills: 0 | Status: SHIPPED | OUTPATIENT
Start: 2023-12-04 | End: 2023-12-04 | Stop reason: ALTCHOICE

## 2023-12-04 RX ORDER — LEVOCETIRIZINE DIHYDROCHLORIDE 5 MG/1
5 TABLET, FILM COATED ORAL NIGHTLY
Qty: 30 TABLET | Refills: 1 | Status: SHIPPED | OUTPATIENT
Start: 2023-12-04

## 2023-12-04 RX ORDER — CITALOPRAM 20 MG/1
40 TABLET ORAL DAILY
Qty: 60 TABLET | Refills: 0
Start: 2023-12-04

## 2023-12-04 RX ORDER — DEXTROMETHORPHAN HYDROBROMIDE AND PROMETHAZINE HYDROCHLORIDE 15; 6.25 MG/5ML; MG/5ML
SYRUP ORAL
Qty: 240 ML | Refills: 0 | Status: SHIPPED | OUTPATIENT
Start: 2023-12-04 | End: 2023-12-11

## 2023-12-04 NOTE — PROGRESS NOTES
[facility] PHYSICIAN SERVICES  LPS [address] [place] [address] [address] [address]  Dept: [phone]  Dept Fax: [phone]  Loc: [phone]    Michelle Mendoza is a 74 y.o. female who presents today for her medical conditions/complaints as noted below. Michelle Mendoza is c/o of Follow-up (Pt is here for a follow up on a UTI. Pt states she has had a cold and is having trouble breathing. Pt has spoken with her pulmonologist about a new inhaler, and was given a new antibiotic but she didn't take them and steroids. )        HPI:     HPI this 78-year-old female presents today for follow-up for her urinary tract infection. She states that she did finish her antibiotic and does feel that her symptoms were controlled. However she also reports she is having worsening symptoms with her breathing. She states she is still using her oxygen continuously at 3 L via nasal cannula. States that she did contact her pulmonologist who sent her in a prescription for oral steroids and an antibiotic. She states she did not start either of these. She states that she cannot tolerate oral steroids because they cause symptoms such severe issues with her stomach. She states that she did get steroid injections while she was in the hospital recently. She does report she did not want to start another antibiotic because she just came off of 1. However she reports that her chest hurts her back hurts. She is overwhelmed at the moment. She states that she wishes that she just would not wake up on 1 hand but she does not want to leave her children on the other. She questions whether this is the end for her. She does state that she has been seen by her psychiatrist and they increased her Celexa to 40 mg daily. She states she does not see them again until January.   She states that she is on very limited funds and she is trying to buy over-the-counter cough syrup but it comes in a very small moderate does not last for very

## 2023-12-05 ASSESSMENT — ENCOUNTER SYMPTOMS
COUGH: 1
ABDOMINAL PAIN: 0
GASTROINTESTINAL NEGATIVE: 1
WHEEZING: 1
ALLERGIC/IMMUNOLOGIC NEGATIVE: 1
CHEST TIGHTNESS: 1
SHORTNESS OF BREATH: 1
EYES NEGATIVE: 1
ABDOMINAL DISTENTION: 0

## 2023-12-08 ENCOUNTER — TELEPHONE (OUTPATIENT)
Dept: PRIMARY CARE CLINIC | Age: 73
End: 2023-12-08

## 2023-12-08 NOTE — TELEPHONE ENCOUNTER
Pt calls and states she has a sore throat and would like medication. Was trying to keep from taking anything for a cold that she has had. She now has the sore throat from the drainage and wants to take something for it.

## 2023-12-26 RX ORDER — LEVOCETIRIZINE DIHYDROCHLORIDE 5 MG/1
5 TABLET, FILM COATED ORAL NIGHTLY
Qty: 90 TABLET | Refills: 1 | Status: SHIPPED | OUTPATIENT
Start: 2023-12-26

## 2023-12-26 RX ORDER — TIZANIDINE 4 MG/1
4 TABLET ORAL 3 TIMES DAILY PRN
Qty: 90 TABLET | Refills: 1 | Status: SHIPPED | OUTPATIENT
Start: 2023-12-26

## 2023-12-28 RX ORDER — GUAIFENESIN 200 MG/10ML
200 LIQUID ORAL 3 TIMES DAILY PRN
Qty: 473 ML | Refills: 1 | Status: SHIPPED | OUTPATIENT
Start: 2023-12-28

## 2024-01-01 NOTE — TELEPHONE ENCOUNTER
Pt called back stating BP is elevated. 190/94. She also states she is dizzy.  Should she go back to the Hospital? 38.3

## 2024-01-04 DIAGNOSIS — J00 NASOPHARYNGITIS: ICD-10-CM

## 2024-01-04 RX ORDER — MONTELUKAST SODIUM 10 MG/1
10 TABLET ORAL
Qty: 90 TABLET | Refills: 3 | OUTPATIENT
Start: 2024-01-04

## 2025-07-10 NOTE — TELEPHONE ENCOUNTER
I spoke with pt and got her scheduled to come in for an appointment. Pt also had questions about what inhaler Dr. Abby Palacio wanted to start her on. I informed pt I would speak with Dr. Abby Palacio and get back with her. Verbal understanding from pt.
Kaley Ge called stating she was seen by provider in hospital asked provider if she could be seen in office for future care. She stated provider was changing inhaler etc but did not get to see provider before discharged on 6/25. No follow up appointment on discharged papers.     Please reach out to patient to discuss @ [phone]    Thank you
Patient is scheduled for 8/2/22
operating room

## (undated) DEVICE — ADHESIVE SKIN CLSR 0.7ML TOP DERMBND ADV

## (undated) DEVICE — PACK,UNIVERSAL,NO GOWNS: Brand: MEDLINE

## (undated) DEVICE — SUTURE VCRL SZ 3-0 L27IN ABSRB UD L26MM SH 1/2 CIR J416H

## (undated) DEVICE — TROCAR: Brand: KII FIOS FIRST ENTRY

## (undated) DEVICE — TOWEL,OR,DSP,ST,BLUE,DLX,4/PK,20PK/CS: Brand: MEDLINE

## (undated) DEVICE — GLOVE SURG SZ 8 L11.6IN THK9.8MIL STRW LTX POLYMER BEAD CUF

## (undated) DEVICE — 3M™ IOBAN™ 2 ANTIMICROBIAL INCISE DRAPE 6650EZ: Brand: IOBAN™ 2

## (undated) DEVICE — TUBING, SUCTION, 1/4" X 20', STRAIGHT: Brand: MEDLINE INDUSTRIES, INC.

## (undated) DEVICE — KIT,ANTI FOG,W/SPONGE & FLUID,SOFT PACK: Brand: MEDLINE

## (undated) DEVICE — SYRINGE 20ML LL S/C 50

## (undated) DEVICE — BLANKET WRM W40.2XL55.9IN IORT LO BODY + MISTRAL AIR

## (undated) DEVICE — GLOVE SURG SZ 85 L12IN FNGR THK94MIL TRNSLUC YEL LTX

## (undated) DEVICE — MINOR CDS: Brand: MEDLINE INDUSTRIES, INC.

## (undated) DEVICE — DRAIN SURG SGL COLL PT TB FOR ATS BG OASIS

## (undated) DEVICE — NEEDLE HYPO 18GA L1.5IN PNK POLYPR HUB S STL REG BVL STR

## (undated) DEVICE — TUBE EB 37FR L PVC DBL LUMN W/ CPAP SYS CARLENS W/OUT

## (undated) DEVICE — BLADE LARYNSCP HNDL MAC 3 DISP CURAVIEW LED

## (undated) DEVICE — TROCAR: Brand: KII® SLEEVE

## (undated) DEVICE — JP CHANNEL DRAIN, 24FR HUBLESS: Brand: CARDINAL HEALTH

## (undated) DEVICE — SUTURE PERMAHAND SZ 0 L30IN NONABSORBABLE BLK SILK BRAID A306H

## (undated) DEVICE — SUTURE VCRL SZ 4-0 L27IN ABSRB UD L19MM PS-2 3/8 CIR PRIM J426H

## (undated) DEVICE — DRAIN SURG L3/8-1/2IN DIA3/16IN SIL CARD CONN 1:1 BLAK

## (undated) DEVICE — SOLUTION IV IRRIG WATER 1000ML POUR BRL 2F7114

## (undated) DEVICE — GAUZE,SPONGE,4"X4",8PLY,STRL,LF,10/TRAY: Brand: MEDLINE

## (undated) DEVICE — YANKAUER,TAPERED BULBOUS TIP,W/O VENT: Brand: MEDLINE

## (undated) DEVICE — SOLUTION ANTIFOG VIS SYS CLEARIFY LAPSCP

## (undated) DEVICE — GLIDESCOPE BFLEX 3.8 BRONCHOSCOPE

## (undated) DEVICE — SUTURE PERMAHAND SZ 2-0 L30IN NONABSORBABLE BLK SILK W/O A305H

## (undated) DEVICE — GLOVE SURG SZ 7 L12IN FNGR THK79MIL GRN LTX FREE

## (undated) DEVICE — GENERAL LAPAROSCOPY-SFMC: Brand: MEDLINE INDUSTRIES, INC.

## (undated) DEVICE — TRAP,MUCUS SPECIMEN, 80CC: Brand: MEDLINE

## (undated) DEVICE — CONTAINER,SPECIMEN,OR STERILE,4OZ: Brand: MEDLINE

## (undated) DEVICE — SUTURE SZ 0 27IN 5/8 CIR UR-6  TAPER PT VIOLET ABSRB VICRYL J603H

## (undated) DEVICE — DRAPE,UTILITY,XL,4/PK,STERILE: Brand: MEDLINE

## (undated) DEVICE — ROYAL SILK SURGICAL GOWN, XXL: Brand: CONVERTORS

## (undated) DEVICE — TOTAL TRAY, 16FR 10ML SIL FOLEY, URN: Brand: MEDLINE

## (undated) DEVICE — EXCEL 10FT (3.05 M) INSUFFLATION TUBING SET WITH 0.1 MICRON FILTER: Brand: EXCEL